# Patient Record
Sex: MALE | Race: WHITE | NOT HISPANIC OR LATINO | Employment: OTHER | URBAN - METROPOLITAN AREA
[De-identification: names, ages, dates, MRNs, and addresses within clinical notes are randomized per-mention and may not be internally consistent; named-entity substitution may affect disease eponyms.]

---

## 2017-01-05 ENCOUNTER — ALLSCRIPTS OFFICE VISIT (OUTPATIENT)
Dept: OTHER | Facility: OTHER | Age: 72
End: 2017-01-05

## 2017-01-09 ENCOUNTER — GENERIC CONVERSION - ENCOUNTER (OUTPATIENT)
Dept: OTHER | Facility: OTHER | Age: 72
End: 2017-01-09

## 2017-01-19 ENCOUNTER — GENERIC CONVERSION - ENCOUNTER (OUTPATIENT)
Dept: OTHER | Facility: OTHER | Age: 72
End: 2017-01-19

## 2017-01-20 ENCOUNTER — GENERIC CONVERSION - ENCOUNTER (OUTPATIENT)
Dept: OTHER | Facility: OTHER | Age: 72
End: 2017-01-20

## 2017-01-27 ENCOUNTER — ALLSCRIPTS OFFICE VISIT (OUTPATIENT)
Dept: OTHER | Facility: OTHER | Age: 72
End: 2017-01-27

## 2017-01-27 ENCOUNTER — GENERIC CONVERSION - ENCOUNTER (OUTPATIENT)
Dept: OTHER | Facility: OTHER | Age: 72
End: 2017-01-27

## 2017-01-27 ENCOUNTER — ALLSCRIPTS OFFICE VISIT (OUTPATIENT)
Dept: RADIOLOGY | Facility: CLINIC | Age: 72
End: 2017-01-27
Payer: MEDICARE

## 2017-02-01 ENCOUNTER — ALLSCRIPTS OFFICE VISIT (OUTPATIENT)
Dept: OTHER | Facility: OTHER | Age: 72
End: 2017-02-01

## 2017-02-03 ENCOUNTER — HOSPITAL ENCOUNTER (OUTPATIENT)
Dept: NON INVASIVE DIAGNOSTICS | Facility: CLINIC | Age: 72
Discharge: HOME | End: 2017-02-03
Payer: MEDICARE

## 2017-02-03 ENCOUNTER — ALLSCRIPTS OFFICE VISIT (OUTPATIENT)
Dept: OTHER | Facility: OTHER | Age: 72
End: 2017-02-03

## 2017-02-03 ENCOUNTER — LAB (OUTPATIENT)
Dept: LAB | Facility: CLINIC | Age: 72
End: 2017-02-03
Payer: MEDICARE

## 2017-02-03 ENCOUNTER — TRANSCRIBE ORDERS (OUTPATIENT)
Dept: LAB | Facility: CLINIC | Age: 72
End: 2017-02-03

## 2017-02-03 DIAGNOSIS — I65.23 OCCLUSION AND STENOSIS OF BILATERAL CAROTID ARTERIES: ICD-10-CM

## 2017-02-03 DIAGNOSIS — E10.8 TYPE 1 DIABETES MELLITUS WITH COMPLICATION (HCC): Primary | ICD-10-CM

## 2017-02-03 DIAGNOSIS — E03.9 UNSPECIFIED HYPOTHYROIDISM: ICD-10-CM

## 2017-02-03 DIAGNOSIS — I70.219 ATHEROSCLEROSIS OF NATIVE ARTERIES OF EXTREMITY WITH INTERMITTENT CLAUDICATION (HCC): ICD-10-CM

## 2017-02-03 DIAGNOSIS — E10.8 TYPE 1 DIABETES MELLITUS WITH COMPLICATION (HCC): ICD-10-CM

## 2017-02-03 LAB
EST. AVERAGE GLUCOSE BLD GHB EST-MCNC: 160 MG/DL
HBA1C MFR BLD: 7.2 % (ref 4.2–6.3)
TSH SERPL DL<=0.05 MIU/L-ACNC: 0.56 UIU/ML (ref 0.36–3.74)

## 2017-02-03 PROCEDURE — 93925 LOWER EXTREMITY STUDY: CPT

## 2017-02-03 PROCEDURE — 93923 UPR/LXTR ART STDY 3+ LVLS: CPT

## 2017-02-03 PROCEDURE — 93880 EXTRACRANIAL BILAT STUDY: CPT

## 2017-02-03 PROCEDURE — 36415 COLL VENOUS BLD VENIPUNCTURE: CPT

## 2017-02-03 PROCEDURE — 83036 HEMOGLOBIN GLYCOSYLATED A1C: CPT

## 2017-02-03 PROCEDURE — 84443 ASSAY THYROID STIM HORMONE: CPT

## 2017-02-03 PROCEDURE — 93978 VASCULAR STUDY: CPT

## 2017-02-06 ENCOUNTER — GENERIC CONVERSION - ENCOUNTER (OUTPATIENT)
Dept: OTHER | Facility: OTHER | Age: 72
End: 2017-02-06

## 2017-02-16 ENCOUNTER — HOSPITAL ENCOUNTER (OUTPATIENT)
Dept: ULTRASOUND IMAGING | Facility: HOSPITAL | Age: 72
Discharge: HOME | End: 2017-02-16
Attending: INTERNAL MEDICINE
Payer: MEDICARE

## 2017-02-16 ENCOUNTER — ALLSCRIPTS OFFICE VISIT (OUTPATIENT)
Dept: OTHER | Facility: OTHER | Age: 72
End: 2017-02-16

## 2017-02-16 DIAGNOSIS — I70.1 ATHEROSCLEROSIS OF RENAL ARTERY (HCC): ICD-10-CM

## 2017-02-16 DIAGNOSIS — N28.1 ACQUIRED CYST OF KIDNEY: ICD-10-CM

## 2017-02-16 DIAGNOSIS — N18.4 CHRONIC KIDNEY DISEASE, STAGE IV (SEVERE) (HCC): ICD-10-CM

## 2017-02-16 DIAGNOSIS — R80.9 PROTEINURIA: ICD-10-CM

## 2017-02-16 DIAGNOSIS — I12.9 HYPERTENSIVE CHRONIC KIDNEY DISEASE WITH STAGE 1 THROUGH STAGE 4 CHRONIC KIDNEY DISEASE, OR UNSPECIFIED CHRONIC KIDNEY DISEASE: ICD-10-CM

## 2017-02-16 DIAGNOSIS — N18.30 CHRONIC KIDNEY DISEASE, STAGE III (MODERATE) (HCC): ICD-10-CM

## 2017-02-16 PROCEDURE — 76770 US EXAM ABDO BACK WALL COMP: CPT

## 2017-02-23 ENCOUNTER — GENERIC CONVERSION - ENCOUNTER (OUTPATIENT)
Dept: OTHER | Facility: OTHER | Age: 72
End: 2017-02-23

## 2017-03-13 ENCOUNTER — LAB (OUTPATIENT)
Dept: LAB | Facility: CLINIC | Age: 72
End: 2017-03-13
Payer: MEDICARE

## 2017-03-13 DIAGNOSIS — N28.1 ACQUIRED CYST OF KIDNEY: ICD-10-CM

## 2017-03-13 DIAGNOSIS — I12.9 HYPERTENSIVE CHRONIC KIDNEY DISEASE WITH STAGE 1 THROUGH STAGE 4 CHRONIC KIDNEY DISEASE, OR UNSPECIFIED CHRONIC KIDNEY DISEASE: ICD-10-CM

## 2017-03-13 DIAGNOSIS — N18.4 CHRONIC KIDNEY DISEASE, STAGE IV (SEVERE) (HCC): ICD-10-CM

## 2017-03-13 DIAGNOSIS — N18.30 CHRONIC KIDNEY DISEASE, STAGE III (MODERATE) (HCC): ICD-10-CM

## 2017-03-13 DIAGNOSIS — I70.1 ATHEROSCLEROSIS OF RENAL ARTERY (HCC): ICD-10-CM

## 2017-03-13 DIAGNOSIS — R80.9 PROTEINURIA: ICD-10-CM

## 2017-03-13 LAB
ANION GAP SERPL CALCULATED.3IONS-SCNC: 8 MMOL/L (ref 4–13)
BACTERIA UR QL AUTO: NORMAL /HPF
BILIRUB UR QL STRIP: NEGATIVE
BUN SERPL-MCNC: 19 MG/DL (ref 5–25)
CALCIUM SERPL-MCNC: 9.1 MG/DL (ref 8.3–10.1)
CHLORIDE SERPL-SCNC: 103 MMOL/L (ref 100–108)
CLARITY UR: CLEAR
CO2 SERPL-SCNC: 27 MMOL/L (ref 21–32)
COLOR UR: ABNORMAL
CREAT SERPL-MCNC: 1.4 MG/DL (ref 0.6–1.3)
CREAT UR-MCNC: 210 MG/DL
ERYTHROCYTE [DISTWIDTH] IN BLOOD BY AUTOMATED COUNT: 13.2 % (ref 11.6–15.1)
GFR SERPL CREATININE-BSD FRML MDRD: 50 ML/MIN/1.73SQ M
GLUCOSE SERPL-MCNC: 141 MG/DL (ref 65–140)
GLUCOSE UR STRIP-MCNC: NEGATIVE MG/DL
HCT VFR BLD AUTO: 40.3 % (ref 36.5–49.3)
HGB BLD-MCNC: 13.5 G/DL (ref 12–17)
HGB UR QL STRIP.AUTO: NEGATIVE
HYALINE CASTS #/AREA URNS LPF: NORMAL /LPF
KETONES UR STRIP-MCNC: ABNORMAL MG/DL
LEUKOCYTE ESTERASE UR QL STRIP: NEGATIVE
MAGNESIUM SERPL-MCNC: 2.3 MG/DL (ref 1.6–2.6)
MCH RBC QN AUTO: 32.5 PG (ref 26.8–34.3)
MCHC RBC AUTO-ENTMCNC: 33.5 G/DL (ref 31.4–37.4)
MCV RBC AUTO: 97 FL (ref 82–98)
NITRITE UR QL STRIP: NEGATIVE
NON-SQ EPI CELLS URNS QL MICRO: NORMAL /HPF
PH UR STRIP.AUTO: 6.5 [PH] (ref 4.5–8)
PHOSPHATE SERPL-MCNC: 3.2 MG/DL (ref 2.3–4.1)
PLATELET # BLD AUTO: 157 THOUSANDS/UL (ref 149–390)
PMV BLD AUTO: 12.5 FL (ref 8.9–12.7)
POTASSIUM SERPL-SCNC: 4.4 MMOL/L (ref 3.5–5.3)
PROT UR STRIP-MCNC: ABNORMAL MG/DL
PROT UR-MCNC: 263 MG/DL
PROT/CREAT UR: 1.25 MG/G{CREAT} (ref 0–0.1)
PTH-INTACT SERPL-MCNC: 57.3 PG/ML (ref 14–72)
RBC # BLD AUTO: 4.15 MILLION/UL (ref 3.88–5.62)
RBC #/AREA URNS AUTO: NORMAL /HPF
SODIUM SERPL-SCNC: 138 MMOL/L (ref 136–145)
SP GR UR STRIP.AUTO: 1.03 (ref 1–1.03)
UROBILINOGEN UR QL STRIP.AUTO: 1 E.U./DL
WBC # BLD AUTO: 5.58 THOUSAND/UL (ref 4.31–10.16)
WBC #/AREA URNS AUTO: NORMAL /HPF

## 2017-03-13 PROCEDURE — 80048 BASIC METABOLIC PNL TOTAL CA: CPT

## 2017-03-13 PROCEDURE — 84156 ASSAY OF PROTEIN URINE: CPT

## 2017-03-13 PROCEDURE — 36415 COLL VENOUS BLD VENIPUNCTURE: CPT

## 2017-03-13 PROCEDURE — 81001 URINALYSIS AUTO W/SCOPE: CPT

## 2017-03-13 PROCEDURE — 83735 ASSAY OF MAGNESIUM: CPT

## 2017-03-13 PROCEDURE — 83970 ASSAY OF PARATHORMONE: CPT

## 2017-03-13 PROCEDURE — 84100 ASSAY OF PHOSPHORUS: CPT

## 2017-03-13 PROCEDURE — 82570 ASSAY OF URINE CREATININE: CPT

## 2017-03-13 PROCEDURE — 85027 COMPLETE CBC AUTOMATED: CPT

## 2017-03-15 ENCOUNTER — GENERIC CONVERSION - ENCOUNTER (OUTPATIENT)
Dept: OTHER | Facility: OTHER | Age: 72
End: 2017-03-15

## 2017-03-15 ENCOUNTER — ALLSCRIPTS OFFICE VISIT (OUTPATIENT)
Dept: OTHER | Facility: OTHER | Age: 72
End: 2017-03-15

## 2017-03-20 ENCOUNTER — ALLSCRIPTS OFFICE VISIT (OUTPATIENT)
Dept: OTHER | Facility: OTHER | Age: 72
End: 2017-03-20

## 2017-03-20 ENCOUNTER — GENERIC CONVERSION - ENCOUNTER (OUTPATIENT)
Dept: OTHER | Facility: OTHER | Age: 72
End: 2017-03-20

## 2017-03-21 ENCOUNTER — GENERIC CONVERSION - ENCOUNTER (OUTPATIENT)
Dept: OTHER | Facility: OTHER | Age: 72
End: 2017-03-21

## 2017-03-23 ENCOUNTER — GENERIC CONVERSION - ENCOUNTER (OUTPATIENT)
Dept: OTHER | Facility: OTHER | Age: 72
End: 2017-03-23

## 2017-03-24 ENCOUNTER — LAB (OUTPATIENT)
Dept: LAB | Facility: CLINIC | Age: 72
End: 2017-03-24
Payer: MEDICARE

## 2017-03-24 DIAGNOSIS — I70.219 ATHEROSCLEROSIS OF NATIVE ARTERIES OF EXTREMITY WITH INTERMITTENT CLAUDICATION (HCC): ICD-10-CM

## 2017-03-24 DIAGNOSIS — I25.10 ATHEROSCLEROTIC HEART DISEASE OF NATIVE CORONARY ARTERY WITHOUT ANGINA PECTORIS: ICD-10-CM

## 2017-03-24 LAB
ALBUMIN SERPL BCP-MCNC: 3 G/DL (ref 3.5–5)
ALP SERPL-CCNC: 122 U/L (ref 46–116)
ALT SERPL W P-5'-P-CCNC: 26 U/L (ref 12–78)
AST SERPL W P-5'-P-CCNC: 19 U/L (ref 5–45)
BILIRUB DIRECT SERPL-MCNC: 0.2 MG/DL (ref 0–0.2)
BILIRUB SERPL-MCNC: 0.85 MG/DL (ref 0.2–1)
CHOLEST SERPL-MCNC: 163 MG/DL (ref 50–200)
ERYTHROCYTE [DISTWIDTH] IN BLOOD BY AUTOMATED COUNT: 13.6 % (ref 11.6–15.1)
HCT VFR BLD AUTO: 39.7 % (ref 36.5–49.3)
HDLC SERPL-MCNC: 68 MG/DL (ref 40–60)
HGB BLD-MCNC: 13.3 G/DL (ref 12–17)
LDLC SERPL CALC-MCNC: 70 MG/DL (ref 0–100)
MCH RBC QN AUTO: 32.4 PG (ref 26.8–34.3)
MCHC RBC AUTO-ENTMCNC: 33.5 G/DL (ref 31.4–37.4)
MCV RBC AUTO: 97 FL (ref 82–98)
PLATELET # BLD AUTO: 143 THOUSANDS/UL (ref 149–390)
PMV BLD AUTO: 11.9 FL (ref 8.9–12.7)
PROT SERPL-MCNC: 7 G/DL (ref 6.4–8.2)
RBC # BLD AUTO: 4.1 MILLION/UL (ref 3.88–5.62)
TRIGL SERPL-MCNC: 127 MG/DL
WBC # BLD AUTO: 5.36 THOUSAND/UL (ref 4.31–10.16)

## 2017-03-24 PROCEDURE — 80076 HEPATIC FUNCTION PANEL: CPT

## 2017-03-24 PROCEDURE — 85027 COMPLETE CBC AUTOMATED: CPT

## 2017-03-24 PROCEDURE — 80061 LIPID PANEL: CPT

## 2017-03-24 PROCEDURE — 36415 COLL VENOUS BLD VENIPUNCTURE: CPT

## 2017-03-28 DIAGNOSIS — I65.23 OCCLUSION AND STENOSIS OF BILATERAL CAROTID ARTERIES: ICD-10-CM

## 2017-03-29 ENCOUNTER — ALLSCRIPTS OFFICE VISIT (OUTPATIENT)
Dept: OTHER | Facility: OTHER | Age: 72
End: 2017-03-29

## 2017-03-29 ENCOUNTER — GENERIC CONVERSION - ENCOUNTER (OUTPATIENT)
Dept: OTHER | Facility: OTHER | Age: 72
End: 2017-03-29

## 2017-03-30 ENCOUNTER — TRANSCRIBE ORDERS (OUTPATIENT)
Dept: LAB | Facility: CLINIC | Age: 72
End: 2017-03-30

## 2017-04-03 ENCOUNTER — GENERIC CONVERSION - ENCOUNTER (OUTPATIENT)
Dept: OTHER | Facility: OTHER | Age: 72
End: 2017-04-03

## 2017-04-25 ENCOUNTER — GENERIC CONVERSION - ENCOUNTER (OUTPATIENT)
Dept: OTHER | Facility: OTHER | Age: 72
End: 2017-04-25

## 2017-04-28 ENCOUNTER — ALLSCRIPTS OFFICE VISIT (OUTPATIENT)
Dept: OTHER | Facility: OTHER | Age: 72
End: 2017-04-28

## 2017-05-11 ENCOUNTER — LAB (OUTPATIENT)
Dept: LAB | Facility: CLINIC | Age: 72
End: 2017-05-11
Payer: MEDICARE

## 2017-05-11 ENCOUNTER — ALLSCRIPTS OFFICE VISIT (OUTPATIENT)
Dept: OTHER | Facility: OTHER | Age: 72
End: 2017-05-11

## 2017-05-11 ENCOUNTER — TRANSCRIBE ORDERS (OUTPATIENT)
Dept: LAB | Facility: CLINIC | Age: 72
End: 2017-05-11

## 2017-05-11 DIAGNOSIS — E10.9 TYPE 1 DIABETES MELLITUS WITHOUT COMPLICATION (HCC): ICD-10-CM

## 2017-05-11 DIAGNOSIS — E03.9 UNSPECIFIED HYPOTHYROIDISM: ICD-10-CM

## 2017-05-11 DIAGNOSIS — E03.9 UNSPECIFIED HYPOTHYROIDISM: Primary | ICD-10-CM

## 2017-05-11 LAB
EST. AVERAGE GLUCOSE BLD GHB EST-MCNC: 157 MG/DL
HBA1C MFR BLD: 7.1 % (ref 4.2–6.3)
TSH SERPL DL<=0.05 MIU/L-ACNC: 0.74 UIU/ML (ref 0.36–3.74)

## 2017-05-11 PROCEDURE — 84443 ASSAY THYROID STIM HORMONE: CPT

## 2017-05-11 PROCEDURE — 36415 COLL VENOUS BLD VENIPUNCTURE: CPT

## 2017-05-11 PROCEDURE — 83036 HEMOGLOBIN GLYCOSYLATED A1C: CPT

## 2017-05-22 ENCOUNTER — GENERIC CONVERSION - ENCOUNTER (OUTPATIENT)
Dept: OTHER | Facility: OTHER | Age: 72
End: 2017-05-22

## 2017-05-23 ENCOUNTER — GENERIC CONVERSION - ENCOUNTER (OUTPATIENT)
Dept: OTHER | Facility: OTHER | Age: 72
End: 2017-05-23

## 2017-05-23 ENCOUNTER — HOSPITAL ENCOUNTER (OUTPATIENT)
Dept: NON INVASIVE DIAGNOSTICS | Facility: CLINIC | Age: 72
Discharge: HOME | End: 2017-05-23
Payer: MEDICARE

## 2017-05-23 DIAGNOSIS — I70.1 ATHEROSCLEROSIS OF RENAL ARTERY (HCC): ICD-10-CM

## 2017-05-23 DIAGNOSIS — I12.9 HYPERTENSIVE CHRONIC KIDNEY DISEASE WITH STAGE 1 THROUGH STAGE 4 CHRONIC KIDNEY DISEASE, OR UNSPECIFIED CHRONIC KIDNEY DISEASE: ICD-10-CM

## 2017-05-23 DIAGNOSIS — N18.30 CHRONIC KIDNEY DISEASE, STAGE III (MODERATE) (HCC): ICD-10-CM

## 2017-05-23 DIAGNOSIS — N18.4 CHRONIC KIDNEY DISEASE, STAGE IV (SEVERE) (HCC): ICD-10-CM

## 2017-05-23 DIAGNOSIS — N28.1 ACQUIRED CYST OF KIDNEY: ICD-10-CM

## 2017-05-23 DIAGNOSIS — R80.9 PROTEINURIA: ICD-10-CM

## 2017-05-23 PROCEDURE — 93975 VASCULAR STUDY: CPT

## 2017-06-01 ENCOUNTER — ALLSCRIPTS OFFICE VISIT (OUTPATIENT)
Dept: RADIOLOGY | Facility: CLINIC | Age: 72
End: 2017-06-01
Payer: MEDICARE

## 2017-06-19 ENCOUNTER — GENERIC CONVERSION - ENCOUNTER (OUTPATIENT)
Dept: OTHER | Facility: OTHER | Age: 72
End: 2017-06-19

## 2017-06-26 DIAGNOSIS — I25.10 ATHEROSCLEROTIC HEART DISEASE OF NATIVE CORONARY ARTERY WITHOUT ANGINA PECTORIS: ICD-10-CM

## 2017-06-26 DIAGNOSIS — I70.219 ATHEROSCLEROSIS OF NATIVE ARTERIES OF EXTREMITY WITH INTERMITTENT CLAUDICATION (HCC): ICD-10-CM

## 2017-06-26 DIAGNOSIS — I12.9 HYPERTENSIVE CHRONIC KIDNEY DISEASE WITH STAGE 1 THROUGH STAGE 4 CHRONIC KIDNEY DISEASE, OR UNSPECIFIED CHRONIC KIDNEY DISEASE: ICD-10-CM

## 2017-06-30 ENCOUNTER — LAB (OUTPATIENT)
Dept: LAB | Facility: CLINIC | Age: 72
End: 2017-06-30
Payer: MEDICARE

## 2017-06-30 DIAGNOSIS — I70.219 ATHEROSCLEROSIS OF NATIVE ARTERIES OF EXTREMITY WITH INTERMITTENT CLAUDICATION (HCC): ICD-10-CM

## 2017-06-30 DIAGNOSIS — I12.9 HYPERTENSIVE CHRONIC KIDNEY DISEASE WITH STAGE 1 THROUGH STAGE 4 CHRONIC KIDNEY DISEASE, OR UNSPECIFIED CHRONIC KIDNEY DISEASE: ICD-10-CM

## 2017-06-30 DIAGNOSIS — I25.10 ATHEROSCLEROTIC HEART DISEASE OF NATIVE CORONARY ARTERY WITHOUT ANGINA PECTORIS: ICD-10-CM

## 2017-06-30 LAB
ALBUMIN SERPL BCP-MCNC: 3.2 G/DL (ref 3.5–5)
ALP SERPL-CCNC: 89 U/L (ref 46–116)
ALT SERPL W P-5'-P-CCNC: 21 U/L (ref 12–78)
ANION GAP SERPL CALCULATED.3IONS-SCNC: 6 MMOL/L (ref 4–13)
AST SERPL W P-5'-P-CCNC: 21 U/L (ref 5–45)
BILIRUB SERPL-MCNC: 0.69 MG/DL (ref 0.2–1)
BUN SERPL-MCNC: 21 MG/DL (ref 5–25)
CALCIUM SERPL-MCNC: 8.9 MG/DL (ref 8.3–10.1)
CHLORIDE SERPL-SCNC: 106 MMOL/L (ref 100–108)
CHOLEST SERPL-MCNC: 153 MG/DL (ref 50–200)
CO2 SERPL-SCNC: 28 MMOL/L (ref 21–32)
CREAT SERPL-MCNC: 1.17 MG/DL (ref 0.6–1.3)
ERYTHROCYTE [DISTWIDTH] IN BLOOD BY AUTOMATED COUNT: 12.7 % (ref 11.6–15.1)
GFR SERPL CREATININE-BSD FRML MDRD: >60 ML/MIN/1.73SQ M
GLUCOSE P FAST SERPL-MCNC: 123 MG/DL (ref 65–99)
HCT VFR BLD AUTO: 38.3 % (ref 36.5–49.3)
HDLC SERPL-MCNC: 59 MG/DL (ref 40–60)
HGB BLD-MCNC: 12.8 G/DL (ref 12–17)
LDLC SERPL CALC-MCNC: 74 MG/DL (ref 0–100)
MCH RBC QN AUTO: 32.5 PG (ref 26.8–34.3)
MCHC RBC AUTO-ENTMCNC: 33.4 G/DL (ref 31.4–37.4)
MCV RBC AUTO: 97 FL (ref 82–98)
PLATELET # BLD AUTO: 127 THOUSANDS/UL (ref 149–390)
PMV BLD AUTO: 12.6 FL (ref 8.9–12.7)
POTASSIUM SERPL-SCNC: 4.1 MMOL/L (ref 3.5–5.3)
PROT SERPL-MCNC: 6.7 G/DL (ref 6.4–8.2)
RBC # BLD AUTO: 3.94 MILLION/UL (ref 3.88–5.62)
SODIUM SERPL-SCNC: 140 MMOL/L (ref 136–145)
TRIGL SERPL-MCNC: 101 MG/DL
WBC # BLD AUTO: 4.88 THOUSAND/UL (ref 4.31–10.16)

## 2017-06-30 PROCEDURE — 36415 COLL VENOUS BLD VENIPUNCTURE: CPT

## 2017-06-30 PROCEDURE — 85027 COMPLETE CBC AUTOMATED: CPT

## 2017-06-30 PROCEDURE — 80053 COMPREHEN METABOLIC PANEL: CPT

## 2017-06-30 PROCEDURE — 80061 LIPID PANEL: CPT

## 2017-07-05 ENCOUNTER — ALLSCRIPTS OFFICE VISIT (OUTPATIENT)
Dept: OTHER | Facility: OTHER | Age: 72
End: 2017-07-05

## 2017-08-16 DIAGNOSIS — I12.9 HYPERTENSIVE CHRONIC KIDNEY DISEASE WITH STAGE 1 THROUGH STAGE 4 CHRONIC KIDNEY DISEASE, OR UNSPECIFIED CHRONIC KIDNEY DISEASE: ICD-10-CM

## 2017-08-16 DIAGNOSIS — R80.9 PROTEINURIA: ICD-10-CM

## 2017-08-16 DIAGNOSIS — M48.061 SPINAL STENOSIS OF LUMBAR REGION: ICD-10-CM

## 2017-08-16 DIAGNOSIS — N28.1 ACQUIRED CYST OF KIDNEY: ICD-10-CM

## 2017-08-16 DIAGNOSIS — I70.1 ATHEROSCLEROSIS OF RENAL ARTERY (HCC): ICD-10-CM

## 2017-08-16 DIAGNOSIS — I70.219 ATHEROSCLEROSIS OF NATIVE ARTERIES OF EXTREMITY WITH INTERMITTENT CLAUDICATION (HCC): ICD-10-CM

## 2017-08-16 DIAGNOSIS — I65.23 OCCLUSION AND STENOSIS OF BILATERAL CAROTID ARTERIES: ICD-10-CM

## 2017-08-16 DIAGNOSIS — N18.30 CHRONIC KIDNEY DISEASE, STAGE III (MODERATE) (HCC): ICD-10-CM

## 2017-08-17 ENCOUNTER — HOSPITAL ENCOUNTER (OUTPATIENT)
Dept: NON INVASIVE DIAGNOSTICS | Facility: CLINIC | Age: 72
Discharge: HOME | End: 2017-08-17
Payer: MEDICARE

## 2017-08-17 DIAGNOSIS — M48.061 SPINAL STENOSIS OF LUMBAR REGION: ICD-10-CM

## 2017-08-17 DIAGNOSIS — I70.1 ATHEROSCLEROSIS OF RENAL ARTERY (HCC): ICD-10-CM

## 2017-08-17 DIAGNOSIS — N18.30 CHRONIC KIDNEY DISEASE, STAGE III (MODERATE) (HCC): ICD-10-CM

## 2017-08-17 DIAGNOSIS — I70.219 ATHEROSCLEROSIS OF NATIVE ARTERIES OF EXTREMITY WITH INTERMITTENT CLAUDICATION (HCC): ICD-10-CM

## 2017-08-17 DIAGNOSIS — I65.23 OCCLUSION AND STENOSIS OF BILATERAL CAROTID ARTERIES: ICD-10-CM

## 2017-08-17 PROCEDURE — 93880 EXTRACRANIAL BILAT STUDY: CPT

## 2017-08-21 ENCOUNTER — TRANSCRIBE ORDERS (OUTPATIENT)
Dept: LAB | Facility: CLINIC | Age: 72
End: 2017-08-21

## 2017-08-21 ENCOUNTER — LAB (OUTPATIENT)
Dept: LAB | Facility: CLINIC | Age: 72
End: 2017-08-21
Payer: MEDICARE

## 2017-08-21 DIAGNOSIS — N28.1 ACQUIRED CYST OF KIDNEY: ICD-10-CM

## 2017-08-21 DIAGNOSIS — I70.1 ATHEROSCLEROSIS OF RENAL ARTERY (HCC): ICD-10-CM

## 2017-08-21 DIAGNOSIS — R80.9 PROTEINURIA: ICD-10-CM

## 2017-08-21 DIAGNOSIS — I12.9 HYPERTENSIVE CHRONIC KIDNEY DISEASE WITH STAGE 1 THROUGH STAGE 4 CHRONIC KIDNEY DISEASE, OR UNSPECIFIED CHRONIC KIDNEY DISEASE: ICD-10-CM

## 2017-08-21 DIAGNOSIS — E03.9 UNSPECIFIED HYPOTHYROIDISM: Primary | ICD-10-CM

## 2017-08-21 DIAGNOSIS — E10.9 TYPE 1 DIABETES MELLITUS WITHOUT COMPLICATION (HCC): ICD-10-CM

## 2017-08-21 DIAGNOSIS — N18.30 CHRONIC KIDNEY DISEASE, STAGE III (MODERATE) (HCC): ICD-10-CM

## 2017-08-21 DIAGNOSIS — E03.9 UNSPECIFIED HYPOTHYROIDISM: ICD-10-CM

## 2017-08-21 LAB
ALBUMIN SERPL BCP-MCNC: 3 G/DL (ref 3.5–5)
ANION GAP SERPL CALCULATED.3IONS-SCNC: 9 MMOL/L (ref 4–13)
BUN SERPL-MCNC: 18 MG/DL (ref 5–25)
CALCIUM SERPL-MCNC: 8.9 MG/DL (ref 8.3–10.1)
CHLORIDE SERPL-SCNC: 104 MMOL/L (ref 100–108)
CO2 SERPL-SCNC: 26 MMOL/L (ref 21–32)
CREAT SERPL-MCNC: 1.27 MG/DL (ref 0.6–1.3)
ERYTHROCYTE [DISTWIDTH] IN BLOOD BY AUTOMATED COUNT: 13.4 % (ref 11.6–15.1)
EST. AVERAGE GLUCOSE BLD GHB EST-MCNC: 160 MG/DL
GFR SERPL CREATININE-BSD FRML MDRD: 56 ML/MIN/1.73SQ M
GLUCOSE P FAST SERPL-MCNC: 260 MG/DL (ref 65–99)
HBA1C MFR BLD: 7.2 % (ref 4.2–6.3)
HCT VFR BLD AUTO: 38.8 % (ref 36.5–49.3)
HGB BLD-MCNC: 12.9 G/DL (ref 12–17)
MAGNESIUM SERPL-MCNC: 2.1 MG/DL (ref 1.6–2.6)
MCH RBC QN AUTO: 32.3 PG (ref 26.8–34.3)
MCHC RBC AUTO-ENTMCNC: 33.2 G/DL (ref 31.4–37.4)
MCV RBC AUTO: 97 FL (ref 82–98)
PHOSPHATE SERPL-MCNC: 3 MG/DL (ref 2.3–4.1)
PLATELET # BLD AUTO: 137 THOUSANDS/UL (ref 149–390)
PMV BLD AUTO: 12 FL (ref 8.9–12.7)
POTASSIUM SERPL-SCNC: 4.1 MMOL/L (ref 3.5–5.3)
PTH-INTACT SERPL-MCNC: 47.2 PG/ML (ref 14–72)
RBC # BLD AUTO: 4 MILLION/UL (ref 3.88–5.62)
SODIUM SERPL-SCNC: 139 MMOL/L (ref 136–145)
TSH SERPL DL<=0.05 MIU/L-ACNC: 0.99 UIU/ML (ref 0.36–3.74)
WBC # BLD AUTO: 5.06 THOUSAND/UL (ref 4.31–10.16)

## 2017-08-21 PROCEDURE — 83036 HEMOGLOBIN GLYCOSYLATED A1C: CPT

## 2017-08-21 PROCEDURE — 82040 ASSAY OF SERUM ALBUMIN: CPT

## 2017-08-21 PROCEDURE — 85027 COMPLETE CBC AUTOMATED: CPT

## 2017-08-21 PROCEDURE — 83735 ASSAY OF MAGNESIUM: CPT

## 2017-08-21 PROCEDURE — 36415 COLL VENOUS BLD VENIPUNCTURE: CPT

## 2017-08-21 PROCEDURE — 80048 BASIC METABOLIC PNL TOTAL CA: CPT

## 2017-08-21 PROCEDURE — 84100 ASSAY OF PHOSPHORUS: CPT

## 2017-08-21 PROCEDURE — 84443 ASSAY THYROID STIM HORMONE: CPT

## 2017-08-21 PROCEDURE — 83970 ASSAY OF PARATHORMONE: CPT

## 2017-08-23 ENCOUNTER — ALLSCRIPTS OFFICE VISIT (OUTPATIENT)
Dept: OTHER | Facility: OTHER | Age: 72
End: 2017-08-23

## 2017-08-30 ENCOUNTER — GENERIC CONVERSION - ENCOUNTER (OUTPATIENT)
Dept: OTHER | Facility: OTHER | Age: 72
End: 2017-08-30

## 2017-09-01 ENCOUNTER — ALLSCRIPTS OFFICE VISIT (OUTPATIENT)
Dept: OTHER | Facility: OTHER | Age: 72
End: 2017-09-01

## 2017-09-03 ENCOUNTER — GENERIC CONVERSION - ENCOUNTER (OUTPATIENT)
Dept: OTHER | Facility: OTHER | Age: 72
End: 2017-09-03

## 2017-09-05 ENCOUNTER — LAB (OUTPATIENT)
Dept: LAB | Facility: CLINIC | Age: 72
End: 2017-09-05
Payer: MEDICARE

## 2017-09-05 DIAGNOSIS — I65.23 OCCLUSION AND STENOSIS OF BILATERAL CAROTID ARTERIES: ICD-10-CM

## 2017-09-05 DIAGNOSIS — N18.30 CHRONIC KIDNEY DISEASE, STAGE III (MODERATE) (HCC): ICD-10-CM

## 2017-09-05 LAB
ANION GAP SERPL CALCULATED.3IONS-SCNC: 10 MMOL/L (ref 4–13)
BUN SERPL-MCNC: 20 MG/DL (ref 5–25)
CALCIUM SERPL-MCNC: 9.2 MG/DL (ref 8.3–10.1)
CHLORIDE SERPL-SCNC: 107 MMOL/L (ref 100–108)
CO2 SERPL-SCNC: 26 MMOL/L (ref 21–32)
CREAT SERPL-MCNC: 1.24 MG/DL (ref 0.6–1.3)
GFR SERPL CREATININE-BSD FRML MDRD: 58 ML/MIN/1.73SQ M
GLUCOSE P FAST SERPL-MCNC: 139 MG/DL (ref 65–99)
POTASSIUM SERPL-SCNC: 4.2 MMOL/L (ref 3.5–5.3)
SODIUM SERPL-SCNC: 143 MMOL/L (ref 136–145)

## 2017-09-05 PROCEDURE — 80048 BASIC METABOLIC PNL TOTAL CA: CPT

## 2017-09-05 PROCEDURE — 36415 COLL VENOUS BLD VENIPUNCTURE: CPT

## 2017-09-06 ENCOUNTER — GENERIC CONVERSION - ENCOUNTER (OUTPATIENT)
Dept: OTHER | Facility: OTHER | Age: 72
End: 2017-09-06

## 2017-09-19 ENCOUNTER — GENERIC CONVERSION - ENCOUNTER (OUTPATIENT)
Dept: OTHER | Facility: OTHER | Age: 72
End: 2017-09-19

## 2017-09-19 LAB
LEFT EYE DIABETIC RETINOPATHY: NORMAL
RIGHT EYE DIABETIC RETINOPATHY: NORMAL

## 2017-09-26 ENCOUNTER — ALLSCRIPTS OFFICE VISIT (OUTPATIENT)
Dept: OTHER | Facility: OTHER | Age: 72
End: 2017-09-26

## 2017-10-24 ENCOUNTER — GENERIC CONVERSION - ENCOUNTER (OUTPATIENT)
Dept: OTHER | Facility: OTHER | Age: 72
End: 2017-10-24

## 2017-10-24 DIAGNOSIS — I73.9 PERIPHERAL VASCULAR DISEASE (HCC): ICD-10-CM

## 2017-10-24 DIAGNOSIS — I70.219 ATHEROSCLEROSIS OF NATIVE ARTERIES OF EXTREMITY WITH INTERMITTENT CLAUDICATION (HCC): ICD-10-CM

## 2017-10-24 DIAGNOSIS — N18.30 CHRONIC KIDNEY DISEASE, STAGE III (MODERATE) (HCC): ICD-10-CM

## 2017-10-24 DIAGNOSIS — R80.9 PROTEINURIA: ICD-10-CM

## 2017-10-24 DIAGNOSIS — I65.23 OCCLUSION AND STENOSIS OF BILATERAL CAROTID ARTERIES: ICD-10-CM

## 2017-10-27 NOTE — PROGRESS NOTES
Assessment  Assessed    1  Multiple vessel coronary artery disease (414 00) (I25 10)   2  History of Cath Stent 1 Type Drug-Eluting   3  History of Cath Stent 2 Type Drug-Eluting   4  History of Cath Stent 3 Type Drug-Eluting   5  History of Cath Stent 1 Assessment Lesion Could Not Be Crossed   6  History of Ischemic cardiomyopathy (414 8) (I25 5)   7  Benign hypertension with chronic kidney disease, stage III (403 10,585 3) (I12 9,N18 3)   8  Dyslipidemia (272 4) (E78 5)   9  Peripheral vascular disease (443 9) (I73 9)   10  Asymptomatic bilateral carotid artery stenosis (433 10,433 30) (I65 23)   11  ITZEL on CPAP (327 23,V46 8) (G47 33,Z99 89)    Plan  Atherosclerosis of native artery of extremity with intermittent claudication, ITZEL on CPAP    · Follow-up visit in 2 months Evaluation and Treatment  Follow-up  Status: Complete   Done: 20SZD8567   Ordered; For: Atherosclerosis of native artery of extremity with intermittent claudication, ITZEL on CPAP; Ordered By: Len Kimball Performed:  Due: 62SXF9211; Last Updated By: Bertha Arteaga; 9/26/2017 3:56:42 PM  PMH: Cath Stent 1 Type Drug-Eluting    · Clopidogrel Bisulfate 75 MG Oral Tablet (Plavix); TAKE 1 TABLET DAILY   Rx By: Len Kimball; Dispense: 90 Days ; #:90 Tablet; Refill: 3;For: PMH: Cath Stent 1 Type Drug-Eluting; JACKIE = N; Verified Transmission to [place] [id number]; Last Updated By: SystemSemasio; 9/26/2017 3:53:57 PM  Unlinked    · Effient 10 MG Oral Tablet (Prasugrel HCl)   Dispense: 0 Days ; #: Sufficient Tablet; Refill: 0; JACKIE = N; Record; Last Updated By: Len Kimball; 9/26/2017 3:53:20 PM    Discussion/Summary  Cardiology Discussion Summary Free Text Note Form [facility]:   Mr Ricarda Obrien is a pleasant 66-year-old male who presents to the office today for routine follow-up  Since his last visit his shortness of breath persists     terms of his shortness of breath, this may be an anginal equivalent given his 95% 2nd OM lesion which was unable to be crossed during his cath  We had previously discussed a repeat cath given a stress test reveals ischemia in that territory  For now he wishes to continue medical management  HIs symptoms did not improve with Imdur or Ranexa  He noted fatigue on higher doses of carvedilol in the past  Thus no changes were made to his regimen  most recent lipids were reviewed  His LDL is slightly suboptimal  Therapeutic lifestyle modifications were recommended during his last visit I will reassess his lipids in the near future  If his LDL remains high he will need escalation of his atorvastatin dose  is euvolemic on exam on his current diuretic regimen  blood pressure control appears acceptable  is due to see his vascular surgeon in the near future regarding his claudication  He also was noted to have worsening of his carotid disease for which a CTA of his neck was recommended  will follow-up in the office in two months for re-evaluation  History of Present Illness  Cardiology HPI Free Text Note Form [facility]: Mr Sanya Cota is a pleasant 68-year-old male who presents to the office today for routine follow-up    leads a sedentary lifestyle  With the activity he is able to perform he denies any chest pain and his shortness of breath with exertion is persistent and unchanged since his last visit  He recovers within a few minutes of rest  His oral diuretic regimen was recently intensified by his PCP as he was taking Lasix every day given his shortness of breath  He felt no better  He denies signs or symptoms of heart failure including increasing lower extremity edema, paroxysmal nocturnal dyspnea, or orthopnea  His weight has been stable on his home scale  He denies lightheadedness, dizziness, syncope or presyncope  He denies symptoms of palpitations  He reports worsening cramping and heaviness in his legs with walking shorter distances and also with standing  remains compliant with CPAP nightly        Review of Systems  Cardiology Male ROS:     Cardiac: as noted in HPI  Skin: No complaints of nonhealing sores or skin rash  Genitourinary: No complaints of recurrent urinary tract infections, frequent urination at night, difficult urination, blood in urine, kidney stones, loss of bladder control, no kidney or prostate problems, no erectile dysfunction  General: as noted in HPI  Respiratory: as noted in HPI  HEENT: No complaints of serious problems, hearing problems, nose problems, throat problems, or snoring  Gastrointestinal: No complaints of liver problems, nausea, vomiting, heartburn, constipation, bloody stools, diarrhea, problems swallowing, adbominal pain, or rectal bleeding  Hematologic: No complaints of bleeding disorders, anemia, blood clots, or excessive brusing  Neurological: No complaints of numbness, tingling, dizziness, weakness, seizures, headaches, syncope or fainting, AM fatigue, daytime sleepiness, no witnessed apnea episodes  Musculoskeletal: No complaints of arthritis, back pain, or painfull swelling  Active Problems  Problems    1  Acquired polyneuropathy (356 9) (G62 9)   2  Acute conjunctivitis (372 00) (H10 30)   3  Age-related cataract (366 10) (H25 9)   4  Anemia (285 9) (D64 9)   5  Arthralgia (719 40) (M25 50)   6  Asymptomatic bilateral carotid artery stenosis (433 10,433 30) (I65 23)   7  Atherosclerosis of native artery of extremity with intermittent claudication (440 21)   (I70 219)   8  Benign hypertension with chronic kidney disease, stage III (403 10,585 3) (I12 9,N18 3)   9  Calf cramp (729 82) (R25 2)   10  Cataract (366 9) (H26 9)   11  Centrilobular emphysema (492 8) (J43 2)   12  Chronic kidney disease, stage 3 (585 3) (N18 3)   13  Degenerative lumbar spinal stenosis (724 02) (M48 06)   14  Dermatitis (692 9) (L30 9)   15  Dry eye syndrome (375 15) (H04 129)   16  Dyslipidemia (272 4) (E78 5)   17  Eczema (692 9) (L30 9)   18   Encounter for prostate cancer screening (V76 44) (Z12 5)   19  Facial skin lesion (709 9) (L98 9)   20  Fatigue (780 79) (R53 83)   21  Heartburn (787 1) (R12)   22  Hyperkalemia (276 7) (E87 5)   23  Hypothyroidism, postablative (244 1) (E89 0)   24  Intervertebral disc disorders with radiculopathy, lumbosacral region (724 4) (M51 17)   25  Low back pain with sciatica (724 3) (M54 40)   26  Multiple vessel coronary artery disease (414 00) (I25 10)   27  Need for 23-polyvalent pneumococcal polysaccharide vaccine (V03 82) (Z23)   28  Need for prophylactic vaccination and inoculation against influenza (V04 81) (Z23)   29  ITZEL on CPAP (327 23,V46 8) (G47 33,Z99 89)   30  Peripheral vascular disease (443 9) (I73 9)   31  Proteinuria (791 0) (R80 9)   32  Protruded lumbar disc (722 10) (M51 26)   33  Renal artery stenosis (440 1) (I70 1)   34  Renal cyst, right (753 10) (N28 1)   35  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 (250 41,585 9) (E10 22,N18 9)   36  Unsteadiness (781 2) (R26 81)   37  Vitamin D deficiency (268 9) (E55 9)   38  White coat hypertension   39  Xerosis cutis (706 8) (L85 3)    Past Medical History  Problems    1  History of Acute kidney injury (584 9) (N17 9)   2  History of Acute venous embolism and thrombosis of deep vessels of proximal lower   extremity (453 41) (I82 4Y9)   3  History of Chronic cough (786 2) (R05)   4  History of dizziness (V13 89) (Z87 898)   5  History of hypertension (V12 59) (Z86 79)   6  History of Ischemic cardiomyopathy (414 8) (I25 5)   7  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) (J84 10)  Active Problems And Past Medical History Reviewed: The active problems and past medical history were reviewed and updated today  Surgical History  Problems    1  History of Cardiac Cath Procedure Outcome:   2  History of Cath Stent 1 Assessment Lesion Could Not Be Crossed   3  History of Cath Stent 1 Type Drug-Eluting   4  History of Cath Stent 2 Type Drug-Eluting   5  History of Cath Stent 3 Type Drug-Eluting   6  History of PTA Femoral-Popliteal Initial Stenosis With Stent  Surgical History Reviewed: The surgical history was reviewed and updated today  Family History  Mother    1  Family history of liver disease (V18 59) (Z83 79)  Father    2  Family history of cardiac disorder (V17 49) (Z82 49)   3  Family history of kidney disease (V18 69) (Z84 1)   4  Family history of liver disease (V18 59) (Z83 79)  Paternal Cousin    11  Family history of diabetes mellitus (V18 0) (Z83 3)  Family History Reviewed: The family history was reviewed and updated today  Social History  Problems    · Alcohol Use (History)   · Former smoker (J15 93) (M75 843)   ·    · Retired  Social History Reviewed: The social history was reviewed and updated today  Current Meds   1  Accu-Chek Angie Plus In Vitro Strip; TEST 4 TIMES DAILY DX: E11 9; Therapy: 07OIQ0085 to (Evaluate:63Why4913)  Requested for: 81LYW7274 Recorded   2  AmLODIPine Besylate 10 MG Oral Tablet; take 1 tablet daily at bedtime; Therapy: 55KZH8517 to (Evaluate:17Nov2017)  Requested for: 77RJE3334; Last   Rx:22Nov2016 Ordered   3  Aspirin 81 MG TABS; CHEW ONE TABLET DAILY; Therapy: (Recorded:72Hgu1561) to Recorded   4  Atorvastatin Calcium 40 MG Oral Tablet; Take 1 tablet daily  Requested for: 93GQU6271;   Last Rx:61Qjo5247 Ordered   5  Carvedilol 12 5 MG Oral Tablet; TAKE ONE TABLET BY MOUTH TWICE A DAY; Therapy: 08CAG5837 to (Evaluate:08Jan2018)  Requested for: 13Apr2017; Last   Rx:13Apr2017 Ordered   6  CoQ-10 200 MG CAPS; TAKE 1 CAPSULE DAILY; Therapy: (Recorded:77Mgd3623) to Recorded   7  Effient 10 MG Oral Tablet; TAKE 1 TABLET DAILY AS DIRECTED; Therapy: (Recorded:52Btm0969) to Recorded   8  Furosemide 20 MG Oral Tablet; TAKE 1 TABLET EVERY OTHER DAY; Therapy: (Aurelia Flores) to Recorded   9  Furosemide 20 MG Oral Tablet; TAKE ONE TABLET BY MOUTH EVERY DAY AS NEEDED   FOR LEG edema;    Therapy: 53GQT4607 to (Evaluate:02Mar2018) Requested for: 03Sep2017; Last   Rx:03Sep2017 Ordered   10  Glucagon (rDNA) 1 MG Kit; AS NEEDED FOR <40 BLOOD SUGAR; Therapy: (Recorded:76Jkn1157) to Recorded   11  HumaLOG 100 UNIT/ML Subcutaneous Solution; USE AS DIRECTED; Therapy: (Recorded:57Efg4531) to Recorded   12  Lantus SoloStar 100 UNIT/ML Subcutaneous Solution Pen-injector; use as directed 19    IU BID; Therapy: 59CLR9563 to Recorded   13  Levothyroxine Sodium 175 MCG Oral Tablet; TAKE 1 TABLET DAILY  Requested for:    47HMO1867; Last Rx:04Oct2016 Ordered   14  Nitroglycerin 0 4 MG Sublingual Tablet Sublingual; DISSOLVE 1 TABLET UNDER THE    TONGUE AS NEEDED FOR CHEST PAIN; Last Rx:01Dec2016 Ordered   15  TraMADol HCl - 50 MG Oral Tablet; TAKE ONE 1-2  TABLET(S) THREE TIMES DAILYAS    NEEDED FOR PAIN;    Therapy: 69EHW6971 to (Evaluate:05Sep2017); Last Rx:07Jun2017 Ordered  Medication List Reviewed: The medication list was reviewed and updated today  Allergies  Medication    1  Cardura TABS   2  Zestril TABS    Vitals  Vital Signs    Recorded: 43AWN2437 03:21PM   Heart Rate 70, Apical   Pulse Quality Normal, Apical   Systolic 866, LUE, Sitting   Diastolic 52, LUE, Sitting   Height 5 ft 7 in   Weight 196 lb    BMI Calculated 30 7   BSA Calculated 2     Physical Exam    Constitutional   General appearance: No acute distress, well appearing and well nourished  Eyes   Conjunctiva and Sclera examination: Conjunctiva pink, sclera anicteric  Ears, Nose, Mouth, and Throat - Oropharynx: Clear, nares are clear, mucous membranes are moist    Neck   Neck and thyroid: Normal, supple, trachea midline, no thyromegaly  Pulmonary   Respiratory effort: No increased work of breathing or signs of respiratory distress  Auscultation of lungs: Clear to auscultation, no rales, no rhonchi, no wheezing, good air movement  Cardiovascular   Auscultation of heart: Abnormal   The heart rate was bradycardic  The rhythm was regular   Heart sounds: normal S1,-- normal S2,-- no S3-- and-- no S4  A grade 1 systolic murmur was heard at the RUSB  Carotid pulses: Abnormal   right carotid bruit heard-- and-- left carotid bruit heard  Peripheral vascular exam: Normal pulses throughout, no tenderness, erythema or swelling  Pedal pulses: Abnormal   Posterior tibialis pulse was 1+ on the right-- and-- 0 on the left  Dorsalis pedis pulse was 0 on the right-- and-- 0 on the left  Examination of extremities for edema and/or varicosities: Normal     Abdomen   Abdomen: Non-tender and no distention  Liver and spleen: No hepatomegaly or splenomegaly  Musculoskeletal Gait and station: Normal gait  -- Digits and nails: Normal without clubbing or cyanosis  -- Inspection/palpation of joints, bones, and muscles: Normal, ROM normal     Skin - Skin and subcutaneous tissue: Normal without rashes or lesions  Skin is warm and well perfused, normal turgor  Neurologic - Cranial nerves: II - XII intact  -- Speech: Normal     Psychiatric - Orientation to person, place, and time: Normal -- Mood and affect: Normal       Results/Data  ECG Report:   Rhythm and rate: sinus bradycardia  QRS: left ventricular hypertrophy   T waves:   there are nonspecific ST-T wave changes  Health Management  Health Maintenance   Medicare Annual Wellness Visit; every 1 year; Next Due: 36JDD0496; Active    Future Appointments    Date/Time Provider Specialty Site   10/31/2017 01:40 PM TERRY Caban   Pulmonary Medicine [facility] [place]   11/09/2017 03:40 PM Verónica Osullivan DO Cardiology [facility]   11/01/2017 01:45 PM Lee Ann Ocasio MD Vascular Surgery [facility]   10/24/2017 01:00 PM Huseyin Costa DO Nephrology  [address] [facility]     Signatures   Electronically signed by : Ken Overton DO; Sep 26 2017  9:10PM EST                       (Author)

## 2017-10-31 ENCOUNTER — ALLSCRIPTS OFFICE VISIT (OUTPATIENT)
Dept: OTHER | Facility: OTHER | Age: 72
End: 2017-10-31

## 2017-11-01 ENCOUNTER — APPOINTMENT (OUTPATIENT)
Dept: LAB | Facility: CLINIC | Age: 72
End: 2017-11-01
Payer: MEDICARE

## 2017-11-01 ENCOUNTER — TRANSCRIBE ORDERS (OUTPATIENT)
Dept: LAB | Facility: CLINIC | Age: 72
End: 2017-11-01

## 2017-11-01 ENCOUNTER — ALLSCRIPTS OFFICE VISIT (OUTPATIENT)
Dept: OTHER | Facility: OTHER | Age: 72
End: 2017-11-01

## 2017-11-01 DIAGNOSIS — R80.9 PROTEINURIA: ICD-10-CM

## 2017-11-01 DIAGNOSIS — N18.30 CHRONIC KIDNEY DISEASE, STAGE III (MODERATE) (HCC): ICD-10-CM

## 2017-11-01 LAB
ANION GAP SERPL CALCULATED.3IONS-SCNC: 7 MMOL/L (ref 4–13)
BUN SERPL-MCNC: 25 MG/DL (ref 5–25)
CALCIUM SERPL-MCNC: 9.6 MG/DL (ref 8.3–10.1)
CHLORIDE SERPL-SCNC: 103 MMOL/L (ref 100–108)
CO2 SERPL-SCNC: 27 MMOL/L (ref 21–32)
CREAT SERPL-MCNC: 1.28 MG/DL (ref 0.6–1.3)
CREAT UR-MCNC: 103 MG/DL
GFR SERPL CREATININE-BSD FRML MDRD: 56 ML/MIN/1.73SQ M
GLUCOSE SERPL-MCNC: 271 MG/DL (ref 65–140)
POTASSIUM SERPL-SCNC: 5.1 MMOL/L (ref 3.5–5.3)
PROT UR-MCNC: 132 MG/DL
PROT/CREAT UR: 1.28 MG/G{CREAT} (ref 0–0.1)
SODIUM SERPL-SCNC: 137 MMOL/L (ref 136–145)

## 2017-11-01 PROCEDURE — 84156 ASSAY OF PROTEIN URINE: CPT

## 2017-11-01 PROCEDURE — 36415 COLL VENOUS BLD VENIPUNCTURE: CPT

## 2017-11-01 PROCEDURE — 82570 ASSAY OF URINE CREATININE: CPT

## 2017-11-01 PROCEDURE — 80048 BASIC METABOLIC PNL TOTAL CA: CPT

## 2017-11-01 NOTE — PROGRESS NOTES
Assessment  1  Centrilobular emphysema (492 8) (J43 2)   2  ITZEL on CPAP (327 23,V46 8) (G47 33,Z99 89)   3  Shortness of breath on exertion (786 05) (R06 02)    Results/Data  Results Free Text Form [facility]:   Results   Other Recent hemoglobin was 12 9  Discussion/Summary  Discussion Summary:   Jarvis Valenzuela likely is not getting the therapeutic benefit from CPAP  I discussed with him about possibly pursuing a repeat CPAP titration but he does not seem particularly interested  He is agreeable to a slight increase in his pressures and I have ordered any increased from 6 cm of water to 8 cm of water to see if this makes a difference  I did warn him that this could increase the potential leak related to mask fit   has never been particularly responsive to bronchodilators and therefore I have recommended against a trial of bronchodilators again currently  He does have upcoming follow-up with his cardiologist and there is some concern that he could potentially have persistent coronary blockage  I do think he is somewhat deconditioned and I have encouraged more regular exercise as he is able to tolerate  will have Jarvis Valenzuela return to see me in 3 months to re-evaluate both his shortness of breath and his response to the increase in his CPAP pressure  If he has any new or worsening symptoms he was encouraged to contact me  Thank you for asking me to participate in his care  Goals and Barriers: The patient has the current Goals: Improved breathing  Patient's Capacity to Self-Care: Patient is able to Self-Care  Medication SE Review and Pt Understands Tx: The treatment plan was reviewed with the patient/guardian  The patient/guardian understands and agrees with the treatment plan   Self Referrals:   Self Referrals: No      Active Problems  1  Acquired polyneuropathy (356 9) (G62 9)   2  Acute conjunctivitis (372 00) (H10 30)   3  Age-related cataract (366 10) (H25 9)   4  Anemia (285 9) (D64 9)   5   Arthralgia (719 40) (M25 50) 6  Asymptomatic bilateral carotid artery stenosis (433 10,433 30) (I65 23)   7  Atherosclerosis of native artery of extremity with intermittent claudication (440 21)   (I70 219)   8  Benign hypertension with chronic kidney disease, stage III (403 10,585 3) (I12 9,N18 3)   9  Calf cramp (729 82) (R25 2)   10  Cataract (366 9) (H26 9)   11  Centrilobular emphysema (492 8) (J43 2)   12  Chronic kidney disease, stage 3 (585 3) (N18 3)   13  Degenerative lumbar spinal stenosis (724 02) (M48 061)   14  Dermatitis (692 9) (L30 9)   15  Dry eye syndrome (375 15) (H04 129)   16  Dyslipidemia (272 4) (E78 5)   17  Eczema (692 9) (L30 9)   18  Encounter for prostate cancer screening (V76 44) (Z12 5)   19  Facial skin lesion (709 9) (L98 9)   20  Fatigue (780 79) (R53 83)   21  Heartburn (787 1) (R12)   22  Hyperkalemia (276 7) (E87 5)   23  Hypothyroidism, postablative (244 1) (E89 0)   24  Intervertebral disc disorders with radiculopathy, lumbosacral region (724 4) (M51 17)   25  Low back pain with sciatica (724 3) (M54 40)   26  Multiple vessel coronary artery disease (414 00) (I25 10)   27  Need for 23-polyvalent pneumococcal polysaccharide vaccine (V03 82) (Z23)   28  Need for prophylactic vaccination and inoculation against influenza (V04 81) (Z23)   29  ITZEL on CPAP (327 23,V46 8) (G47 33,Z99 89)   30  Peripheral vascular disease (443 9) (I73 9)   31  Proteinuria (791 0) (R80 9)   32  Protruded lumbar disc (722 10) (M51 26)   33  Renal artery stenosis (440 1) (I70 1)   34  Renal cyst, right (753 10) (N28 1)   35  Shortness of breath on exertion (786 05) (R06 02)   36  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 (250 41,585 9) (E10 22,N18 9)   37  Unsteadiness (781 2) (R26 81)   38  Vitamin D deficiency (268 9) (E55 9)   39  White coat hypertension   40  Xerosis cutis (706 8) (L85 3)    History of Present Illness  HPI: [address] [facility] presents today with his wife for follow-up   He reports that he has had slightly more shortness of breath exertionally  He is more sedentary and less active  He has not had any signs or symptoms of congestive heart failure  He denies lower extremity swelling  He has not had chest pain  He denies wheezing  He has not had cough or phlegm production  He has only noticed exertional shortness of breath  He is not on any bronchodilators  He has had prior Spirometry is a have demonstrated restriction which is mild  also reports that he has felt more fatigued and less well rested after wakening  He has not noticed that his CPAP pressures are problem but his wife has noted that he has some whistling of the mask and a return of some snoring  He has gained about 10 lb since his diagnosis of sleep apnea  This was in 2015  He has gotten a new mask and straps and despite this has still noticed the mask leak and snoring  does have a multiple other medical problems  He has coronary disease  He has mild chronic kidney disease  He has had a recent hemoglobin which was 12 9 and therefore he is not anemic  Review of Systems  Complete-Male Pulm:   Constitutional: as noted in HPI  Eyes: no complaints of vision problems  ENT: no rhinitis, no PND, no epistaxis  Cardiovascular: as noted in HPI  Respiratory: as noted in HPI  Gastrointestinal: no complaints of esophageal reflux, no abdominal pain  Genitourinary: as noted in HPI  Musculoskeletal: no arthralgias, no joint swelling, no myalgias  Integumentary: no rash, no lesions  Neurological: no headache, no fainting, no weakness  Psychiatric: no anxiety, no depression  Hematologic/Lymphatic: no complaints of swollen glands  Past Medical History  1  History of Acute kidney injury (584 9) (N17 9)   2  History of Acute venous embolism and thrombosis of deep vessels of proximal lower   extremity (453 41) (I82 4Y9)   3  History of Chronic cough (786 2) (R05)   4  History of dizziness (V13 89) (Z87 898)   5   History of hypertension (V12 59) (Z86 79)   6  History of Ischemic cardiomyopathy (414 8) (I25 5)   7  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) (J84 10)    Surgical History  1  History of Cardiac Cath Procedure Outcome:   2  History of Cath Stent 1 Assessment Lesion Could Not Be Crossed   3  History of Cath Stent 1 Type Drug-Eluting   4  History of Cath Stent 2 Type Drug-Eluting   5  History of Cath Stent 3 Type Drug-Eluting   6  History of PTA Femoral-Popliteal Initial Stenosis With Stent  Surgical History Reviewed: The surgical history was reviewed and updated today  Family History  Mother    1  Family history of liver disease (V18 59) (Z83 79)  Father    2  Family history of cardiac disorder (V17 49) (Z82 49)   3  Family history of kidney disease (V18 69) (Z84 1)   4  Family history of liver disease (V18 59) (Z83 79)  Paternal Cousin    11  Family history of diabetes mellitus (V18 0) (Z83 3)  Family History Reviewed: The family history was reviewed and updated today  Social History   · Alcohol Use (History)   · 2 drinks per day   · Former smoker (V15 82) (G85 833)   · Smoking 48 years 1 5 ppd    d/c Oct 2008      Screening protocol   ·    · Retired   · desk work, [employer]  Social History Reviewed: The social history was reviewed and updated today  Current Meds   1  Accu-Chek Angie Plus In Vitro Strip; TEST 4 TIMES DAILY DX: E11 9; Therapy: 02ASC3962 to (Evaluate:45Ikv0692)  Requested for: 28GUL5507 Recorded   2  AmLODIPine Besylate 10 MG Oral Tablet; take 1 tablet daily at bedtime; Therapy: 28VPL1938 to (Evaluate:17Nov2017)  Requested for: 88TSP4663; Last   Rx:22Nov2016 Ordered   3  Aspirin 81 MG TABS; CHEW ONE TABLET DAILY; Therapy: (Recorded:05Blr2662) to Recorded   4  Atorvastatin Calcium 40 MG Oral Tablet; Take 1 tablet daily  Requested for: 01FET0594;   Last Rx:26Oct2017 Ordered   5  Carvedilol 12 5 MG Oral Tablet; TAKE ONE TABLET BY MOUTH TWICE A DAY;    Therapy: 00XNT9724 to (AEAQSOBY:23OHG3038)  Requested for: 15YVR7252; Last   Rx:02Oct2017 Ordered   6  CoQ-10 200 MG CAPS; TAKE 1 CAPSULE DAILY; Therapy: (Recorded:40Kum0334) to Recorded   7  Effient 10 MG Oral Tablet; TAKE 1 TABLET DAILY; Therapy: (YYQCNOMI:58XND9116) to Recorded   8  Furosemide 20 MG Oral Tablet; TAKE 1 TABLET EVERY OTHER DAY; Therapy: (Bernadette Woodard) to Recorded   9  Glucagon (rDNA) 1 MG Kit; AS NEEDED FOR <40 BLOOD SUGAR; Therapy: (Recorded:05Kkg5027) to Recorded   10  HumaLOG 100 UNIT/ML Subcutaneous Solution; USE AS DIRECTED; Therapy: (Recorded:02Qmh7893) to Recorded   11  Lantus SoloStar 100 UNIT/ML Subcutaneous Solution Pen-injector; use as directed 19    IU BID; Therapy: 70GSE3178 to Recorded   12  Levothyroxine Sodium 175 MCG Oral Tablet; TAKE 1 TABLET DAILY  Requested for:    95HUT9108; Last Rx:46Yky5698 Ordered   13  Nitroglycerin 0 4 MG Sublingual Tablet Sublingual; DISSOLVE 1 TABLET UNDER THE    TONGUE AS NEEDED FOR CHEST PAIN; Last Rx:21Eai5223 Ordered   14  TraMADol HCl - 50 MG Oral Tablet; TAKE ONE 1-2  TABLET(S) THREE TIMES DAILYAS    NEEDED FOR PAIN;    Therapy: 20HRT4871 to (Evaluate:36Uhd7395); Last Rx:07Jun2017 Ordered  Medication List Reviewed: The medication list was reviewed and updated today  Allergies  1  Cardura TABS   2  Zestril TABS    Vitals  Vital Signs    Recorded: 79LBK7660 02:23PM   Temperature 96 8 F   Heart Rate 67   Respiration 18   Systolic 477   Diastolic 58   Height 5 ft 8 in   Weight 199 lb    BMI Calculated 30 26   BSA Calculated 2 04   O2 Saturation 95, RA     Physical Exam    Constitutional   General appearance: No acute distress, well appearing and well nourished  Ears, Nose, Mouth, and Throat   Nasal mucosa, septum, and turbinates: Normal without edema or erythema  Lips, teeth, and gums: Normal, good dentition  Oropharynx: Normal with no erythema, edema, exudate or lesions  Neck   Neck: Supple, symmetric, trachea midline, no masses  Jugular veins: Normal     Pulmonary   Chest: Abnormal   Diminished respiratory excursion bilaterally  Respiratory effort: No increased work of breathing or signs of respiratory distress  Percussion of chest: Normal     Auscultation of lungs: Abnormal   Auscultation of the lungs revealed decreased breath sounds diffusely  no rales or crackles were heard bilaterally  no wheezing  Cardiovascular   Auscultation of heart: Normal rate and rhythm, normal S1 and S2, no murmurs  Examination of extremities for edema and/or varicosities: Normal     Abdomen   Abdomen: Soft, non-tender  Lymphatic   Palpation of lymph nodes in neck: No lymphadenopathy  Musculoskeletal   Gait and station: Normal     Digits and nails: Normal without clubbing or cyanosis  Neurologic   Mental Status: Normal  Not confused, no evidence of dementia, good comprehension, good concentration  Skin   Skin and subcutaneous tissue: Limited exam shows no rash  Psychiatric   Orientation to person, place and time: Normal     Mood and affect: Normal        Health Management  Health Maintenance   Medicare Annual Wellness Visit; every 1 year; Next Due: 31ZKG6837; Active    Future Appointments    Date/Time Provider Specialty Site   01/30/2018 02:20 PM TERRY Clinton   Pulmonary Medicine [address] [facility] [place]   11/09/2017 03:40 PM Willian Alexander DO Cardiology [facility]   11/01/2017 01:45 PM Hanna Flanagan MD Vascular Surgery [facility]     Signatures   Electronically signed by : TERRY Conn ; Oct 31 2017  3:03PM EST                       (Author)

## 2017-11-02 NOTE — PROGRESS NOTES
Assessment  1  Asymptomatic bilateral carotid artery stenosis (433 10,433 30) (I65 23)   2  Atherosclerosis of native artery of extremity with intermittent claudication (440 21)   (I70 219)   3  Chronic kidney disease, stage 3 (585 3) (N18 3)    Plan  Asymptomatic bilateral carotid artery stenosis, Atherosclerosis of native artery of  extremity with intermittent claudication, Chronic kidney disease, stage 3    · CTA NECK W WO CONTRAST; Status:Hold For - Scheduling; Requested for:01Nov2017;    Perform:[facility]; LTV:[id number]; Ordered; For:Asymptomatic bilateral carotid artery stenosis, Atherosclerosis of native artery of extremity with intermittent claudication, Chronic kidney disease, stage 3; Ordered By:Alec Quiros;   · Follow Up After Tests Complete Evaluation and Treatment  Follow-up  Status: Hold For -  Scheduling  Requested for: [id number]   Ordered; For: Asymptomatic bilateral carotid artery stenosis, Atherosclerosis of native artery of extremity with intermittent claudication, Chronic kidney disease, stage 3; Ordered By: Carvel Salts Performed:  Due: 49AKI8734    Discussion/Summary  Discussion Summary:   1  Asymptomatic bilateral carotid artery stenosis with progression of disease on the right  We discussed the findings on carotid duplex in the utility of further evaluation in light of his relatively Rapid progression on the right  We will plan on proceeding with CT angiogram with pre-CT hydration for renal protection  Following the study further treatment respirations be made  Aortoiliac and infrainguinal arterial occlusive disease with severe bilateral lower extremity claudication more severe on the left  We discussed the option of further evaluation potential treatment with arteriography but will await results in follow-up of the carotid CTA prior to final treatment recommendations  Chief Complaint  Chief Complaint Free Text Note Form: I am here for my leg pain    has bilateral leg pain with cramping in his calfs over the past 8 years that has been gradually getting worse  SYLVESTER was on 2/3/17  Patient has cramping when at rest as well as walking 1/2 block  Resting would help, however most recently does not  Patient had CV on 8/17/17  At his visit in Sept a CTA of the neck was recommended which patient deferred because he is worried about the dye  Patient denies TIA/stoke like symptoms  He denies hx of stroke  He denies tobacco use  He take ASA and Effient daily  History of Present Illness  HPI: 80-year-old with history of peripheral arterial occlusive disease with previous iliac and femoral stenting procedures presents for evaluation of both peripheral arterial occlusive disease and cerebrovascular occlusive disease  He was recently seen in the office with a progression of disease in his right internal carotid artery and advised to undergo CT angiogram  He originally deferred CT angiogram because of his renal insufficiency and now presents to discuss further  evaluation today he denies any focal neurologic event which would be consistent with TIA or CVA  He does complain of bilateral left greater than right calf claudication at less than one block  This then resolves with rest  This is been present for matter of years but has been worsening recently and now causes severe limitation in his ability to perform daily activities  He denies rest pain or tissue loss  has bilateral leg pain with cramping in his calfs over the past 8 years that has been gradually getting worse  SYLVESTER was on 2/3/17  Patient has cramping when at rest as well as walking 1/2 block  Resting would help, however most recently does not  Patient had CV on 8/17/17  At his visit in Sept a CTA of the neck was recommended which patient deferred because he is worried about the dye  Patient denies TIA/stoke like symptoms  He denies hx of stroke  He denies tobacco use  He take ASA and Effient daily        Review of Systems  Complete Male - Vasc:   Constitutional: No fever or chills, feels well, no tiredness, no recent weight gain or weight loss  Eyes: no sudden vision loss,-- no blurred vision-- and-- no double vision, but-- no eye pain,-- no eyesight problems,-- no dryness of the eyes,-- eyes not red,-- no purulent discharge from the eyes,-- no itching of the eyes-- and-- wears glasses  ENT: no loss of hearing, no nosebleeds, no hoarseness  Cardiovascular: intermittent leg claudication-- and-- no painful veins, but-- regular heart rate,-- no chest pain,-- no palpitations-- and-- leg pain with walking  Respiratory: no wheezing,-- no cough,-- no orthopnea-- and-- no complaints of PND, but-- shortness of breath-- and-- shortness of breath during exertion  Gastrointestinal: No nausea, No vomiting, no diarrhea, no blood in stool  Genitourinary: no dysuria, no hematuria, No urinary incontinence, no erectile dysfunction  Musculoskeletal: limb pain, but-- no limb pain, no limb swelling  Integumentary: no rash, no lesions, no wounds, no ulcer  Neurological: no dementia, no headache, no numbness, no limb weakness, no dizziness, no difficulty walking  Psychiatric: no depression, no mood disorders, no anxiety  Hematologic/Lymphatic: a tendency for easy bleeding, but-- no swollen glands,-- no tendency for easy bruising-- and-- no swollen glands in the neck  ROS Reviewed:   ROS reviewed  Active Problems  1  Acquired polyneuropathy (356 9) (G62 9)   2  Acute conjunctivitis (372 00) (H10 30)   3  Age-related cataract (366 10) (H25 9)   4  Anemia (285 9) (D64 9)   5  Arthralgia (719 40) (M25 50)   6  Asymptomatic bilateral carotid artery stenosis (433 10,433 30) (I65 23)   7  Atherosclerosis of native artery of extremity with intermittent claudication (440 21)   (I70 219)   8  Benign hypertension with chronic kidney disease, stage III (403 10,585 3) (I12 9,N18 3)   9  Calf cramp (729 82) (R25 2)   10  Cataract (366 9) (H26 9)   11  Centrilobular emphysema (492 8) (J43 2)   12  Chronic kidney disease, stage 3 (585 3) (N18 3)   13  Degenerative lumbar spinal stenosis (724 02) (M48 061)   14  Dermatitis (692 9) (L30 9)   15  Dry eye syndrome (375 15) (H04 129)   16  Dyslipidemia (272 4) (E78 5)   17  Eczema (692 9) (L30 9)   18  Encounter for prostate cancer screening (V76 44) (Z12 5)   19  Facial skin lesion (709 9) (L98 9)   20  Fatigue (780 79) (R53 83)   21  Heartburn (787 1) (R12)   22  Hyperkalemia (276 7) (E87 5)   23  Hypothyroidism, postablative (244 1) (E89 0)   24  Intervertebral disc disorders with radiculopathy, lumbosacral region (724 4) (M51 17)   25  Low back pain with sciatica (724 3) (M54 40)   26  Multiple vessel coronary artery disease (414 00) (I25 10)   27  Need for 23-polyvalent pneumococcal polysaccharide vaccine (V03 82) (Z23)   28  Need for prophylactic vaccination and inoculation against influenza (V04 81) (Z23)   29  ITZEL on CPAP (327 23,V46 8) (G47 33,Z99 89)   30  Peripheral vascular disease (443 9) (I73 9)   31  Proteinuria (791 0) (R80 9)   32  Protruded lumbar disc (722 10) (M51 26)   33  Renal artery stenosis (440 1) (I70 1)   34  Renal cyst, right (753 10) (N28 1)   35  Shortness of breath on exertion (786 05) (R06 02)   36  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 (250 41,585 9) (E10 22,N18 9)   37  Unsteadiness (781 2) (R26 81)   38  Vitamin D deficiency (268 9) (E55 9)   39  White coat hypertension   40  Xerosis cutis (706 8) (L85 3)    Past Medical History  1  History of Acute kidney injury (584 9) (N17 9)   2  History of Acute venous embolism and thrombosis of deep vessels of proximal lower   extremity (453 41) (I82 4Y9)   3  History of Chronic cough (786 2) (R05)   4  History of dizziness (V13 89) (Z87 898)   5  History of hypertension (V12 59) (Z86 79)   6  History of Ischemic cardiomyopathy (414 8) (I25 5)   7   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) (J84 10)  Active Problems And Past Medical History Reviewed: The active problems and past medical history were reviewed and updated today  Surgical History  1  History of Cardiac Cath Procedure Outcome:   2  History of Cath Stent 1 Assessment Lesion Could Not Be Crossed   3  History of Cath Stent 1 Type Drug-Eluting   4  History of Cath Stent 2 Type Drug-Eluting   5  History of Cath Stent 3 Type Drug-Eluting   6  History of PTA Femoral-Popliteal Initial Stenosis With Stent  Surgical History Reviewed: The surgical history was reviewed and updated today  Family History  Mother    1  Family history of liver disease (V18 59) (Z83 79)  Father    2  Family history of cardiac disorder (V17 49) (Z82 49)   3  Family history of kidney disease (V18 69) (Z84 1)   4  Family history of liver disease (V18 59) (Z83 79)  Paternal Cousin    11  Family history of diabetes mellitus (V18 0) (Z83 3)  Family History Reviewed: The family history was reviewed and updated today  Social History   · Alcohol Use (History)   · Former smoker (D64 44) (W86 967)   ·    · Retired  Social History Reviewed: The social history was reviewed and updated today  Current Meds   1  Accu-Chek Angie Plus In Vitro Strip; TEST 4 TIMES DAILY DX: E11 9; Therapy: 64TCU5447 to (Evaluate:59Uoe5309)  Requested for: 65VXX8140 Recorded   2  AmLODIPine Besylate 10 MG Oral Tablet; take 1 tablet daily at bedtime; Therapy: 04KIU7808 to (Evaluate:17Nov2017)  Requested for: 62OKZ9169; Last   Rx:22Nov2016 Ordered   3  Aspirin 81 MG TABS; CHEW ONE TABLET DAILY; Therapy: (Recorded:24Dby1679) to Recorded   4  Atorvastatin Calcium 40 MG Oral Tablet; Take 1 tablet daily  Requested for: 58XBI5993;   Last Rx:26Oct2017 Ordered   5  Carvedilol 12 5 MG Oral Tablet; TAKE ONE TABLET BY MOUTH TWICE A DAY; Therapy: 82DHD2186 to (Evaluate:29Jun2018)  Requested for: 02Oct2017; Last   Rx:02Oct2017 Ordered   6  CoQ-10 200 MG CAPS; TAKE 1 CAPSULE DAILY;    Therapy: (Recorded:65Fvy6332) to Recorded   7  Effient 10 MG Oral Tablet; TAKE 1 TABLET DAILY; Therapy: (JUQAMZQT:33RHM4377) to Recorded   8  Furosemide 20 MG Oral Tablet; TAKE 1 TABLET EVERY OTHER DAY; Therapy: (Christine Perez) to Recorded   9  Glucagon (rDNA) 1 MG Kit; AS NEEDED FOR <40 BLOOD SUGAR; Therapy: (Recorded:57Wvu7776) to Recorded   10  HumaLOG 100 UNIT/ML Subcutaneous Solution; USE AS DIRECTED; Therapy: (Recorded:07Hwa3314) to Recorded   11  Lantus SoloStar 100 UNIT/ML Subcutaneous Solution Pen-injector; use as directed 19    IU BID; Therapy: 08GOH7969 to Recorded   12  Levothyroxine Sodium 175 MCG Oral Tablet; TAKE 1 TABLET DAILY  Requested for:    33WSG7654; Last Rx:04Oct2016 Ordered   13  Nitroglycerin 0 4 MG Sublingual Tablet Sublingual; DISSOLVE 1 TABLET UNDER THE    TONGUE AS NEEDED FOR CHEST PAIN; Last Rx:44Hga4005 Ordered   14  TraMADol HCl - 50 MG Oral Tablet; TAKE ONE 1-2  TABLET(S) THREE TIMES DAILYAS    NEEDED FOR PAIN;    Therapy: 47NJO7701 to (Evaluate:78Njn1189); Last Rx:07Jun2017 Ordered  Medication List Reviewed: The medication list was reviewed and updated today  Allergies  1  Cardura TABS   2  Zestril TABS    Vitals  Vital Signs    Recorded: 56TDQ2576 01:55PM   Temperature 96 7 F, Tympanic   Heart Rate 72, L Radial   Pulse Quality Normal, L Radial   Respiration Quality Normal   Respiration 18   Systolic 535, LUE, Sitting   Diastolic 62, LUE, Sitting   Height 5 ft 8 in   Weight 199 lb    BMI Calculated 30 26   BSA Calculated 2 04     Physical Exam    Carotid: right 2+,-- bruit heard on the right,-- left 2+-- and-- bruit heard on the left  Radial: right 2+-- and-- left 2+  Femoral: right 2+,-- no bruit heard on the right,-- left 1+-- and-- no bruit on the left  Popliteal: right 0-- and-- left 0  Posterior tibialis: right 0-- and-- left 0  Dorsalis pedis: right 0-- and-- left 0  Distal Pulse Exam: Decreased capillary refill bilaterally       Extremities: No upper or lower extremity edema    LE Varicose Veins: No Varicose Veins are Present  The heart rate was normal  The rhythm was regular  Heart sounds: normal S1,-- normal S2,-- no S3-- and-- no S4  Murmurs: No murmurs were heard  Pulmonary   Respiratory effort: No increased work of breathing or signs of respiratory distress  Auscultation of lungs: Clear to auscultation  No wheezing, no rales, no rhonchi  Abdomen   Abdomen: Abdomen soft, non-tender, no masses, non distended, no rebound tenderness  No palpable aneurysm  -- No bruit heard  Psychiatric   Orientation to person, place and time: Normal    Mood and affect: Normal    Eyes   Pupils and irises: Equal, round and reactive to light  Ears, Nose, Mouth, and Throat   Hearing: Normal    Neck   Neck: No jugular venous distention, normal ROM and extension  No cervical adenopathy, trachea midline, neck supple  Neurologic Sensory exam normal   Motor skills intact  Musculoskeletal   Gait and station: Normal    Range of motion: Normal    Muscle strength/tone: Normal    Skin   Skin and subcutaneous tissue:-- Bilateral feet have a cyanotic appearance which may be related to a venous congestion or livedo reticularis  Palpation of skin and subcutaneous tissue: Normal turgor  Venous Disease: No lipodermatosclerosis, stasis dermatitis, hyperpigmentation, or atrophie suha noted on exam       Results/Data  Diagnostic Studies Reviewed Vasc: I personally reviewed the films/images/results in the office today  My interpretation follows  Laboratory Results Serum creatinine of 1 24 and GFR 58  Vascular Study Review Carotid duplex 8/17/17 with high-grade right carotid stenosis with flow velocity through her 71/68 cm/s  This is a significant progression from previous study with a flow velocity of 231   On the left there is a greater than 70% stenosis with flow velocity 229/81 7 m/s   and lower extremity arterial duplex studies with recurrent bilateral common iliac and left superficial femoral artery stenoses of greater than 75% with ankle-brachial indices of 0 6 on the right and 0 56 on the left  Hydration Order Form  Vascular Hydration Physician Orders:   Location:   Admission Type:    Diagnosis:   Please consult: Nephrology: Dr Liseth Garcia saw last week  Test Pending: CTA Carotids & 3D reconstruction  Future Appointments    Date/Time Provider Specialty Site   01/30/2018 02:20 PM TERRY Reaves   Pulmonary Medicine [facility] [place]   11/09/2017 03:40 PM Ayah Pérez DO Cardiology [facility]     Signatures   Electronically signed by : Komal Urban MD; Nov 1 2017  2:35PM EST                       (Author)

## 2017-11-07 ENCOUNTER — GENERIC CONVERSION - ENCOUNTER (OUTPATIENT)
Dept: OTHER | Facility: OTHER | Age: 72
End: 2017-11-07

## 2017-11-09 ENCOUNTER — ALLSCRIPTS OFFICE VISIT (OUTPATIENT)
Dept: OTHER | Facility: OTHER | Age: 72
End: 2017-11-09

## 2017-11-10 NOTE — PROGRESS NOTES
Assessment  1  Asymptomatic bilateral carotid artery stenosis (433 10,433 30) (I65 23)   2  Chronic kidney disease, stage 3 (585 3) (N18 3)    Plan   Asymptomatic bilateral carotid artery stenosis, Chronic kidney disease, stage 3    · Call 911 if: You have any symptoms of a stroke ; Status:Complete;   Done: 44VHQ0940   Ordered; For:Asymptomatic bilateral carotid artery stenosis, Chronic kidney disease, stage 3; Ordered By:Shreya Salas;   · (1) BASIC METABOLIC PROFILE; Status:Active; Requested for:01Sep2017;    Perform:[facility]; Due:01Sep2018; Ordered;bilateral carotid artery stenosis, Chronic kidney disease, stage 3; Ordered By:Shreya Salas;  CTA NECK W WO CONTRAST; Status:Hold For - Scheduling; Requested for:01Sep2017;  Perform:[facility]; Due:01Sep2018; Last Updated By:Zoe Garcia; 10/30/2017 12:05:33 PM;Ordered; For:Asymptomatic bilateral carotid artery stenosis, Chronic kidney disease, stage 3; Ordered By:Shreya Salas; Annotations             Pt needs clr from Nephrology to have CTA neck done  Faxed form to [phone] Pt saw Dr Brittany Mendoza in April and has an upcoming apt  Once clr is obtained pls set up pt has pending appt to review this 11/1 w/ TS0 please update appt line once scheduled, Follow Up After Tests Complete Evaluation and Treatment  Follow-up w/ Dr Ngozi Morocho  Patient requests TCO only  Status: Hold For - Scheduling  Requested for: 01Sep2017 Ordered; For: Asymptomatic bilateral carotid artery stenosis, Chronic kidney disease, stage 3;  Ordered By: Dellia Gosselin  Performed:   Due: 74LCA4526; Last Updated By: Bucky Cotton; 9/25/2017 4:01:26 PM    Annotations             Pt needs clr from Nephrology to have CTA neck done  Faxed form and hydration orders to [phone] Pt saw Dr Brittany Mendoza in April and has an upcoming apt  Pt is scheduled with  11/1 PLEASE UPDATE [facility] APPT LINE WITH ACTUAL CTA DATE  OK PER TL TO MAKE APPT BEFORE CTA IS SCHEDULED   1 - Diallo MARTINEZ [facility]  *  Status: Active - Retrospective By Protocol Authorization  Requested for: [id number] Ordered; For: Asymptomatic bilateral carotid artery stenosis; Ordered By: Johan Deluca  Performed:   Due: 76IVY2374; Last Updated By: Doris Phillips; 10/30/2017 12:05:34 PM    Annotations             Pt needs clr from Nephrology to have CTA neck done  Faxed form and hydration orders to [phone] Pt saw Dr Yessica Jaimes in April and has an upcoming apt  Asymptomatic bilateral carotid artery stenosis (433 10) (I65 23)     Chronic kidney disease, stage 3 (585 3) (N18 3)       Discussion/Summary  Discussion Summary:   70-year-old male with HTN, HLD, CAD, DM,CKD 3, Aortoiliac and peripheral arterial occlusive disease w/ iliac PTA/stenting '10, LSFA stenting, bilateral carotid stenosis who presents in follow up after routine carotid duplex 8/17/17  Bilateral carotid artery stenosis  Carotid duplex showed progression of right carotid artery stenosis to 70%, velocities 371/61, ratio 4 89  Left ICA 50-69 %stenosis  He is asymptomatic from a carotid standpoint will evaluate with CTA of the neck  Will get renal clearance  We reviewed the signs symptoms of TIA/CVA and that he go immediately to the emergency room should he experience these  He will return to the office with the physician to review the CTA of the neck  PAD/AIOD  Stable bilateral lower extremity claudication  Will continue with surveillance  We will evaluate with aortoiliac duplex  Counseling Documentation With Imm: The patient was counseled regarding diagnostic results,-- instructions for management,-- risk factor reductions,-- prognosis,-- patient and family education,-- impressions,-- risks and benefits of treatment options,-- importance of compliance with treatment  Chief Complaint  Chief Complaint Free Text Note Form:  I am here to review my test results  Ana Britton is here to review his CV he had on 08/17/2017   Pt denies TIA/stroke symptoms  Pt denies previous history of strokes  Pt denies fm hx of strokes  History of Present Illness  HPI: 55-year-old male with HTN, HLD, CAD, DM,CKD 3, Aortoiliac and peripheral arterial occlusive disease w/ iliac PTA/stenting '10, LSFA stenting, bilateral carotid stenosis who presents in follow up after routine carotid duplex 8/17/17  Carotid duplex noted progression in the right internal carotid artery stenosis to 70 percent and bilateral subclavian artery stenosis  He is asymptomatic from a carotid standpoint  He denies any focal neurological deficits  He denies amaurosis fugax  He has bilateral lower extremity claudication symptoms  Somewhat sedentary  No ischemic rest pain or tissue loss  Last AOIl 2/3/17  Review of Systems  Complete Male - Vasc:  Constitutional: no loss in appetite-- and-- feeling tired, but-- no fever,-- no recent weight gain,-- no chills-- and-- no recent weight loss  Eyes: No sudden vision loss, no blurred vision, no double vision  ENT: no loss of hearing, no nosebleeds, no hoarseness  Cardiovascular: irregular heart rate,-- intermittent leg claudication,-- no painful veins-- and-- no bleeding veins, but-- no chest pain,-- no palpitations-- and-- leg pain with walking  Respiratory: no wheezing,-- no cough,-- no orthopnea-- and-- no complaints of PND, but-- shortness of breath-- and-- shortness of breath during exertion  Gastrointestinal: No nausea, No vomiting, no diarrhea, no blood in stool  Genitourinary: no dysuria, no hematuria, No urinary incontinence, no erectile dysfunction  Musculoskeletal: no limb pain, no limb swelling  Integumentary: no rash, no lesions, no wounds, no ulcer  Neurological: no dementia-- and-- difficulty walking, but-- no headache,-- no numbness,-- no confusion,-- no dizziness,-- no limb weakness,-- no convulsions-- and-- no fainting  Psychiatric: no depression, no mood disorders, no anxiety    Hematologic/Lymphatic: no bleeding disorder, no easy bruising  ROS Reviewed:   ROS reviewed  Active Problems     1  Acquired polyneuropathy (356 9) (G62 9)   2  Acute conjunctivitis (372 00) (H10 30)   3  Age-related cataract (366 10) (H25 9)   4  Anemia (285 9) (D64 9)   5  Arthralgia (719 40) (M25 50)   6  Atherosclerosis of native artery of extremity with intermittent claudication (440 21) (I70 219)   7  Calf cramp (729 82) (R25 2)   8  Cataract (366 9) (H26 9)   9  Degenerative lumbar spinal stenosis (724 02) (M48 061)   10  Dermatitis (692 9) (L30 9)   11  Dry eye syndrome (375 15) (H04 129)   12  Eczema (692 9) (L30 9)   13  Encounter for prostate cancer screening (V76 44) (Z12 5)   14  Facial skin lesion (709 9) (L98 9)   15  Fatigue (780 79) (R53 83)   16  Heartburn (787 1) (R12)   17  Hyperkalemia (276 7) (E87 5)   18  Hypothyroidism, postablative (244 1) (E89 0)   19  Intervertebral disc disorders with radiculopathy, lumbosacral region (724 4) (M51 17)   20  Low back pain with sciatica (724 3) (M54 40)   21  Need for 23-polyvalent pneumococcal polysaccharide vaccine (V03 82) (Z23)   22  Protruded lumbar disc (722 10) (M51 26)   23  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 (250 41,585 9) (E10 22,N18 9)   24  Unsteadiness (781 2) (R26 81)   25  White coat hypertension   26  Xerosis cutis (706 8) (L85 3)  Dyslipidemia (272 4) (E78 5)     Peripheral vascular disease (443 9) (I73 9)     Renal artery stenosis (440 1) (I70 1)     Vitamin D deficiency (268 9) (E55 9)     Proteinuria (791 0) (R80 9)     Renal cyst, right (753 10) (N28 1)     Benign hypertension with chronic kidney disease, stage III (403 10) (I12 9)     Multiple vessel coronary artery disease (414 00) (I25 10)     Centrilobular emphysema (492 8) (J43 2)     Asymptomatic bilateral carotid artery stenosis (433 10) (I65 23)     Chronic kidney disease, stage 3 (585 3) (N18 3)     ITZEL on CPAP (327 23) (G47 33)       Past Medical History     1   History of Acute kidney injury (584 9) (N17 9)   2  History of Acute venous embolism and thrombosis of deep vessels of proximal lower extremity (453 41) (I82 4Y9)   3  History of Chronic cough (786 2) (R05)   4  History of dizziness (V13 89) (Z87 898)   5  History of hypertension (V12 59) (Z86 79)   6  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) (J84 10)  Active Problems And Past Medical History Reviewed: The active problems and past medical history were reviewed and updated today  History of Ischemic cardiomyopathy (414 8) (I25 5)          Surgical History     1  History of Cardiac Cath Procedure Outcome:   2  History of PTA Femoral-Popliteal Initial Stenosis With Stent  Surgical History Reviewed: The surgical history was reviewed and updated today  History of Cath Stent 1 Type Drug-Eluting    - Ostial LAD - 09/2015  History of Cath Stent 2 Type Drug-Eluting    - Proximal LAD - 09/2015  History of Cath Stent 3 Type Drug-Eluting    - Left circumflex x 2- 09/2015  History of Cath Stent 1 Assessment Lesion Could Not Be Crossed    - OM2 - 09/2015  Family History  Mother    1  Family history of liver disease (V18 59) (Z83 79)  Father    2  Family history of cardiac disorder (V17 49) (Z82 49)   3  Family history of kidney disease (V18 69) (Z84 1)   4  Family history of liver disease (V18 59) (Z83 79)  Paternal Cousin    11  Family history of diabetes mellitus (V18 0) (Z83 3)  Family History Reviewed: The family history was reviewed and updated today  Social History     · Alcohol Use (History)   · Former smoker (A80 02) (S97 086)   ·    · Retired  Social History Reviewed: The social history was reviewed and updated today  Current Meds   1  Accu-Chek Angie Plus In Vitro Strip; TEST 4 TIMES DAILY DX: E11 9; Therapy: 51KHF6579 to (Evaluate:89Cjt2028)  Requested for: 86LJQ5426 Recorded   2  AmLODIPine Besylate 10 MG Oral Tablet; take 1 tablet daily at bedtime;  Therapy: 52NXP5743 to (Evaluate:78Nzl1657)  Requested for: 94PFZ6289; Last Rx:22Nov2016 Ordered   3  Aspirin 81 MG TABS; CHEW ONE TABLET DAILY; Therapy: (Recorded:45Wek0818) to Recorded   4  Astaxanthin CAPS; Take 2mg tablets daily; Therapy: (Recorded:49Xtz9836) to Recorded   5  Atorvastatin Calcium 40 MG Oral Tablet; Take 1 tablet daily  Requested for: 32RTL7604; Last Rx:04Oct2016 Ordered   6  Carvedilol 12 5 MG Oral Tablet; TAKE ONE TABLET BY MOUTH TWICE A DAY; Therapy: 97ZTC5926 to (Evaluate:08Jan2018)  Requested for: 13Apr2017; Last Rx:13Apr2017 Ordered   7  CoQ-10 200 MG CAPS; TAKE 1 CAPSULE DAILY; Therapy: (Recorded:14Apm6209) to Recorded   8  Effient 10 MG Oral Tablet; TAKE 1 TABLET DAILY AS DIRECTED; Therapy: (Recorded:49Vdt6746) to Recorded   9  Furosemide 20 MG Oral Tablet; TAKE 1 TABLET DAILY AS NEEDED for leg edema; Therapy: 71WFX3989 to (Last Rx:86Ndt0267)  Requested for: 09SAR1187 Ordered   10  Glucagon (rDNA) 1 MG Kit; AS NEEDED FOR <40 BLOOD SUGAR; Therapy: (Recorded:39Wwk5110) to Recorded   11  HumaLOG 100 UNIT/ML Subcutaneous Solution; USE AS DIRECTED; Therapy: (Recorded:60Bbd4448) to Recorded   12  Lantus SoloStar 100 UNIT/ML Subcutaneous Solution Pen-injector; use as directed 19  IU BID; Therapy: 85HFS0789 to Recorded   13  Levothyroxine Sodium 175 MCG Oral Tablet; TAKE 1 TABLET DAILY  Requested for:  86OBH2480; Last Rx:04Oct2016 Ordered   14  Nitroglycerin 0 4 MG Sublingual Tablet Sublingual; DISSOLVE 1 TABLET UNDER THE  TONGUE AS NEEDED FOR CHEST PAIN; Last Rx:01Dec2016 Ordered   15  TraMADol HCl - 50 MG Oral Tablet; TAKE ONE 1-2  TABLET(S) THREE TIMES DAILYAS  NEEDED FOR PAIN;  Therapy: 39VXM3489 to (Evaluate:05Sep2017); Last Rx:07Jun2017 Ordered  Medication List Reviewed: The medication list was reviewed and updated today  Allergies  1  Cardura TABS   2   Zestril TABS    Vitals  Vital Signs    Recorded: 01Sep2017 11:22AM   Heart Rate 84   Respiration Quality Normal   Systolic 379, RUE, Sitting   Diastolic 78, RUE, Sitting   Height 5 ft 7 in Physical Exam   Carotid: bruit heard on the right-- and-- bruit heard on the left  Posterior tibialis: right 0-- and-- left 0  Dorsalis pedis: right 0-- and-- left 0  Distal Pulse Exam:    Neurologic Sensory exam normal  Motor skills intact  Skin  Skin and subcutaneous tissue: Normal without rashes or lesions  Results/Data  VAS CAROTID COMPLETE STUDY [id number] 02:12PM Montez Mattson Order Number: [id number]   - Patient Instructions: To schedule this appointment, please contact Central Scheduling at [phone]  Test Name Result Flag Reference   VAS CAROTID COMPLETE STUDY (Report)       [facility] REPORT  CLINICAL:  Indications: 6 month surveillance of carotid artery disease  Patient claims  he is very tired and finds it very difficult to walk even shorter distances  The  PCP is aware of his current conditions  Operative History  9/1/2015 Cardiac angioplasty and stent placement  2/25/2014 Left Transluminal placement of stent, percutaneous, SFA  1/1/2010 Bilateral Aortoiliac angioplasty with stent placement of iliac  arteries  Risk Factors  The patient has history of obesity, HTN, Diabetes (Yes), hyperlipidemia, PAD  and CAD  Clinical  Right Brachial Pressure: 166/68 mm Hg, Left Brachial Pressure: 162/68 mm Hg  FINDINGS:    Right    Impression PSV EDV (cm/s) Direction of Flow Ratio   Dist  ICA         44      0           0 58   Mid  ICA   50 - 69%  222     116           2 92   Prox  ICA  70%+    371     68           4 89   Dist CCA         74     17                Mid CCA          76     24           0 77   Prox CCA         99     22                Ext Carotid       175     15           2 31   Prox Vert         56     12 Antegrade           Subclavian  50 - 75%  309      0                  Left     Impression PSV EDV (cm/s) Direction of Flow Ratio   Dist  ICA  Other                           Mid  ICA         98     11           1 07   Prox   ICA  50 - 69%  229     41 2 50   Dist CCA         79     12                Mid CCA          92     11           1 01   Prox CCA         90     14                Ext Carotid       264     31           2 88   Prox Vert        107     16 Antegrade           Subclavian  75 - 99%  542      0                      CONCLUSION:    Impression  RIGHT:  There is 70% or greater stenosis noted in the internal carotid artery  Acoustic  shadowing may obscure a more significant stenosis  Plaque is calcified and irregular  Vertebral artery flow is antegrade  Findings suggest moderate to severe  subclavian artery disease  Anders Trevino LEFT:  There is 50-69% stenosis noted in the internal carotid artery( ICA )  The  proximal and more distal portions of the ICA are obscured by acoustic shadows  from dense concentrations of irregular, calcified plaque  Vertebral artery flow is antegrade  Findings suggest severe subclavian artery  disease  Compared to previous study on 2/03/2017 , there is progression of disease in  the right ICA and the high-grade stenosis discovered in the left subclavian  artery is a new finding  Recommend repeat testing in 6 months as per protocol unless otherwise  indicated  Internal carotid artery stenosis determination by consensus criteria from:  Naif Eason et al  Carotid Artery Stenosis: Gray-Scale and Doppler US Diagnosis  - Society of Radiologists in [address] [place] [address], Radiology 2003;  [phone]  SIGNATURE:  Electronically Signed by: Manny Morales on 2017-08-17 05:22:13 PM     Future Appointments    Date/Time Provider Specialty Site   01/30/2018 02:20 PM TERRY Duncan   Pulmonary Medicine [facility] [place]   11/09/2017 03:40 PM Kim Mary DO Cardiology [facility]   11/01/2017 01:45 PM Andressa Kim MD Vascular Surgery [facility]       Signatures   Electronically signed by : Vipul Salinas; Sep  1 2017  2:22PM EST (Author)    Electronically signed by : Aretha Sanches; Nov 1 2017 10:22AM EST                       (Author)    Electronically signed by : Aida Kim MD; Nov 8 2017  1:36PM EST

## 2017-11-16 ENCOUNTER — ALLSCRIPTS OFFICE VISIT (OUTPATIENT)
Dept: OTHER | Facility: OTHER | Age: 72
End: 2017-11-16

## 2017-11-19 NOTE — PROGRESS NOTES
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 (250 41,585 9) (E10 22,N18 9)  7  Benign hypertension with chronic kidney disease, stage III (403 10,585 3) (I12 9,N18 3)   · 12/2015 echo showed nl diast fx and nl LV thickness    Plan  Arthralgia    · Renew: TraMADol HCl - 50 MG Oral Tablet; TAKE ONE 1-2  TABLET(S) THREE TIMESDAILYAS NEEDED FOR PAIN  Atherosclerosis of native artery of extremity with intermittent claudication, Benignhypertension with chronic kidney disease, stage III    · Renew: AmLODIPine Besylate 10 MG Oral Tablet; take 1 tablet daily at bedtime  Atherosclerosis of native artery of extremity with intermittent claudication, Chronic kidneydisease, stage 3, Peripheral vascular disease    · (1) CBC/ PLT (NO DIFF); Status:Active; Requested for:16Nov2017;    · (1) COMPREHENSIVE METABOLIC PANEL; Status:Active; Requested for:16Nov2017;    · (1) LIPID PANEL FASTING W DIRECT LDL REFLEX; Status:Active; Requestedfor:16Nov2017;   Dermatitis    · Start: Ammonium Lactate 12 % External Lotion; APPLY  AND RUB  IN A THIN FILM TOAFFECTED AREAS TWICE DAILY  (AM AND PM)    Discussion/Summary    Patient with multiple chronic medical conditions presents to discuss forthcoming testing due to asymptomatic but worsening carotid stenosis bilaterally  I strongly advised him to follow vascular surgery advise to proceed with further testing that will determine further treatment  will be forcing fluids, we will follow his BMP 48 hours after CTA is performed  reassured patient that his recent creatinine levels are within normal range, I explained patient and his wife that asymptomatic carotid stenosis could be posing significant risk for development of CVA in this patient with significant vasculopathic features including type 1 diabetes, CAD, renal artery stenosis, peripheral artery disease and known albeit treated sleep apnea  is compliant with current medications including aspirin and Plavix and statin  dyspnea on exertion felt to be related to coronary artery disease  is currently well controlled on beta-blocker and amlodipine  1 diabetes and hypothyroidism a followed by Endocrinology  will use Lac-Hydrin lotion for treatment of xerosis  medications refilled  questions answered to patient's and his wife's satisfaction  laceration left ankle, no evidence of cellulitic change, observation advised  explained patient and his wife that if decision to proceed with carotid surgery would be made-he will actually require cardiac clearance  Office visit today Re presents follow-up/discussion, not a surgical clearance  The patient, patient's family was counseled regarding instructions for management,-- risk factor reductions,-- impressions  total time of encounter was 30 minutes-- and-- 20 minutes was spent counseling  Possible side effects of new medications were reviewed with the patient/guardian today  The treatment plan was reviewed with the patient/guardian  The patient/guardian understands and agrees with the treatment plan      Chief Complaint  Patient is here for a preoperative clearance for a CT of the carotid arteries dated for 11/27/17 with [facility], Dr Duke Bailey surgeon  Patient c/o discolored areas on back of his left ankle x's 2+ days  Patient would like a medication refill for amlodipine and tramadol sent to his local pharmacy  All medications were reviewed and updated with the patient  History of Present Illness  HPI: Patient presents to discuss a recent evaluation by vascular surgery and planning for further testing of carotid stenosis and possible carotid surgery   He is extremely concerned about forthcoming CTA neck due to known renal insufficiency and IV dye administration  denies any stroke type symptoms that were reviewed during recent consultation with Dr Marleen Elizabethr  has symptoms of chronic claudication, left more than right, he remains on daily medications for type 1 diabetes, coronary artery disease, hypertension, hyperlipidemia, hypothyroidism  He uses CPAP as directed for recent diagnosis of obstructive sleep apnea  is under comprehensive care of Endocrinology, Cardiology, vascular surgery, pulmonology  of recent carotid duplex, vascular consult reviewed with patient and his wife in length today  is complaining of skin lesions/laceration left lateral ankle  is requesting refill for tramadol that he uses as needed for chronic back pain in claudication  Review of Systems   Constitutional: feeling tired  Eyes: No complaints of eye pain, no red eyes, no discharge from eyes, no itchy eyes  ENT: no complaints of earache, no hearing loss, no nosebleeds, no nasal discharge, no sore throat, no hoarseness  Cardiovascular: intermittent leg claudication, but-- no chest pain,-- no palpitations-- and-- no extremity edema  Respiratory: shortness of breath during exertion  Gastrointestinal: No complaints of abdominal pain, no constipation, no nausea or vomiting, no diarrhea or bloody stools  Genitourinary: No complaints of dysuria, no incontinence, no hesitancy, no nocturia, no genital lesion, no testicular pain  Musculoskeletal: arthralgias-- and-- lower back pain  Integumentary: No complaints of skin rash or skin lesions, no itching, no skin wound, no dry skin  Neurological: No compliants of headache, no confusion, no convulsions, no numbness or tingling, no dizziness or fainting, no limb weakness, no difficulty walking  Psychiatric: Is not suicidal, no sleep disturbances, no anxiety or depression, no change in personality, no emotional problems    Endocrine: No complaints of proptosis, no hot flashes, no muscle weakness, no erectile dysfunction, no deepening of the voice, no feelings of weakness  Hematologic/Lymphatic: No complaints of swollen glands, no swollen glands in the neck, does not bleed easily, no easy bruising  Active Problems    1  Acquired polyneuropathy (356 9) (G62 9)  2  Acute conjunctivitis (372 00) (H10 30)  3  Age-related cataract (366 10) (H25 9)  4  Anemia (285 9) (D64 9)  5  Arthralgia (719 40) (M25 50)  6  Asymptomatic bilateral carotid artery stenosis (433 10,433 30) (I65 23)  7  Atherosclerosis of native artery of extremity with intermittent claudication (440 21) (I70 219)  8  Benign hypertension with chronic kidney disease, stage III (403 10,585 3) (I12 9,N18 3)  9  Calf cramp (729 82) (R25 2)  10  Cataract (366 9) (H26 9)  11  Centrilobular emphysema (492 8) (J43 2)  12  Chronic kidney disease, stage 3 (585 3) (N18 3)  13  Degenerative lumbar spinal stenosis (724 02) (M48 061)  14  Dermatitis (692 9) (L30 9)  15  Dry eye syndrome (375 15) (H04 129)  16  Dyslipidemia (272 4) (E78 5)  17  Eczema (692 9) (L30 9)  18  Encounter for prostate cancer screening (V76 44) (Z12 5)  19  Facial skin lesion (709 9) (L98 9)  20  Fatigue (780 79) (R53 83)  21  Heartburn (787 1) (R12)  22  Hyperkalemia (276 7) (E87 5)  23  Hypothyroidism, postablative (244 1) (E89 0)  24  Intervertebral disc disorders with radiculopathy, lumbosacral region (724 4) (M51 17)  25  Low back pain with sciatica (724 3) (M54 40)  26  Multiple vessel coronary artery disease (414 00) (I25 10)  27  Need for 23-polyvalent pneumococcal polysaccharide vaccine (V03 82) (Z23)  28  Need for prophylactic vaccination and inoculation against influenza (V04 81) (Z23)  29  ITZEL on CPAP (327 23,V46 8) (G47 33,Z99 89)  30  Peripheral vascular disease (443 9) (I73 9)  31  Proteinuria (791 0) (R80 9)  32  Protruded lumbar disc (722 10) (M51 26)  33  Renal artery stenosis (440 1) (I70 1)  34  Renal cyst, right (753 10) (N28 1)  35  Shortness of breath on exertion (786 05) (R06 02)  36  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 (250 41,585 9) (E10 22,N18 9)  37  Unsteadiness (781 2) (R26 81)  38  Vitamin D deficiency (268 9) (E55 9)  39  White coat hypertension  40  Xerosis cutis (706 8) (L85 3)    Past Medical History     · History of Acute kidney injury (584 9) (N17 9)   · History of Acute venous embolism and thrombosis of deep vessels of proximal lowerextremity (453 41) (I82 4Y9)   · History of Chronic cough (786 2) (R05)   · History of dizziness (V13 89) (S20 242)   · History of hypertension (V12 59) (Z86 79)   · History of Ischemic cardiomyopathy (414 8) (I25 5)   · 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) (J84 10)    The active problems and past medical history were reviewed and updated today  Surgical History   · History of Cardiac Cath Procedure Outcome:   · History of Cath Stent 1 Assessment Lesion Could Not Be Crossed   · History of Cath Stent 1 Type Drug-Eluting   · History of Cath Stent 2 Type Drug-Eluting   · History of Cath Stent 3 Type Drug-Eluting   · History of PTA Femoral-Popliteal Initial Stenosis With Stent    The surgical history was reviewed and updated today  Family History  Mother    · Family history of liver disease (V18 59) (Z80 76)  Father    · Family history of cardiac disorder (V17 49) (Z82 49)   · Family history of kidney disease (V18 69) (Z84 1)   · Family history of liver disease (V18 59) (Z80 76)  Paternal Cousin    · Family history of diabetes mellitus (V18 0) (Z83 3)    The family history was reviewed and updated today  Social History     · Alcohol Use (History)   · 2 drinks per day   · Former smoker (V15 82) (G24 417)   · Smoking 48 years 1 5 ppd    d/c Oct 2008Screening  protocol   ·    · Retired   · desk work, [email or web address]  The social history was reviewed and updated today  Current Meds  1   Accu-Chek Angie Plus In Vitro Strip; TEST 4 TIMES DAILY DX: E11 9; Therapy: 16RWY5599 to (Evaluate:31Kox7975)  Requested for: 92AAE8767 Recorded  2  AmLODIPine Besylate 10 MG Oral Tablet; take 1 tablet daily at bedtime; Therapy: 82IRV8037 to (Evaluate:17Nov2017)  Requested for: 08GAM0725; Last Rx:22Nov2016 Ordered  3  Aspirin 81 MG TABS; CHEW ONE TABLET DAILY; Therapy: (Recorded:62Ocw5641) to Recorded  4  Atorvastatin Calcium 40 MG Oral Tablet; Take 1 tablet daily  Requested for: 95DUP6595; Last Rx:26Oct2017 Ordered  5  Carvedilol 12 5 MG Oral Tablet; TAKE ONE TABLET BY MOUTH TWICE A DAY; Therapy: 72EMW4739 to (Evaluate:29Jun2018)  Requested for: 02Oct2017; Last Rx:02Oct2017 Ordered  6  Clopidogrel Bisulfate 75 MG Oral Tablet; take one tablet by mouth daily; Therapy: 02AOS6641 to Recorded  7  CoQ-10 200 MG CAPS; TAKE 1 CAPSULE DAILY; Therapy: (Recorded:06Nmx8280) to Recorded  8  Furosemide 20 MG Oral Tablet; TAKE 1 TABLET EVERY OTHER DAY; Therapy: (Recorded:16Nov2017) to Recorded  9  Glucagon (rDNA) 1 MG Kit; AS NEEDED FOR <40 BLOOD SUGAR; Therapy: (Recorded:82Dqh6777) to Recorded  10  HumaLOG 100 UNIT/ML Subcutaneous Solution; USE AS DIRECTED; Therapy: (Recorded:13Ldo2325) to Recorded  11  Lantus SoloStar 100 UNIT/ML Subcutaneous Solution Pen-injector; use as directed 22  IU BID; Therapy: 12CYZ9737 to Recorded  12  Levothyroxine Sodium 175 MCG Oral Tablet; TAKE 1 TABLET DAILY  Requested for:  78WCN4669; Last Rx:04Oct2016 Ordered  13  Nitroglycerin 0 4 MG Sublingual Tablet Sublingual; DISSOLVE 1 TABLET UNDER THE  TONGUE AS NEEDED FOR CHEST PAIN; Last Rx:00Vrp7296 Ordered  14  TraMADol HCl - 50 MG Oral Tablet; TAKE ONE 1-2  TABLET(S) THREE TIMES DAILYAS  NEEDED FOR PAIN;  Therapy: 41FRA7435 to (Evaluate:00Hzn1572); Last Rx:07Jun2017 Ordered    The medication list was reviewed and updated today  Allergies  1  Cardura TABS  2   Zestril TABS    Vitals   Recorded: 73FKM0806 08:12AM   Temperature 96 4 F   Heart Rate 76   Respiration 18   Systolic 325   Diastolic 72   Height 5 ft 8 in   Weight 199 lb    BMI Calculated 30 26   BSA Calculated 2 04       Physical Exam   Constitutional  General appearance: No acute distress, well appearing and well nourished  Neck  Neck: Supple, symmetric, trachea midline, no masses  Pulmonary  Auscultation of lungs: Clear to auscultation  Cardiovascular  Auscultation of heart: Normal rate and rhythm, normal S1 and S2, no murmurs  Carotid pulses: Abnormal   right 1+,-- right carotid bruit heard,-- left 1+-- and-- left carotid bruit heard  Examination of extremities for edema and/or varicosities: Normal  -- Tiny laceration left lateral ankle, no cellulitic change, dryness of bilateral lower extremities  Diabetic Foot Exam: Socks and shoes removed, Right Foot Findings: erythema,-- dryness-- and-- cold /purplish discoloration, but-- no swelling  The toes were normal  Socks and Shoes removed, Left Foot Findings: erythema,-- dryness-- and-- Cold/purplish discoloration, but-- no swelling  The toes were normal   Monofilament Testing: normal tactile sensation with monofilament testing throughout both feet  Vascular: Pulses: 1+ in the dorsalis pedis  Pulses: 0 in the dorsalis pedis  Assign Risk Category: 1: No loss of protective sensation, deformity present  Impending risk  Psychiatric  Judgment and insight: Normal    Mood and affect: Normal        End of Encounter Meds    1  TraMADol HCl - 50 MG Oral Tablet; TAKE ONE 1-2  TABLET(S) THREE TIMES DAILYAS NEEDED FOR PAIN; Therapy: 25SJM7648 to (Evaluate:55Xcp4442); Last Rx:16Nov2017 Ordered    2  Clopidogrel Bisulfate 75 MG Oral Tablet; take one tablet by mouth daily; Therapy: 36BSE1451 to Recorded    3  AmLODIPine Besylate 10 MG Oral Tablet; take 1 tablet daily at bedtime; Therapy: 85JID5336 to (Evaluate:11Nov2018)  Requested for: 31GJY3527; Last Rx:16Nov2017 Ordered    4  Carvedilol 12 5 MG Oral Tablet; TAKE ONE TABLET BY MOUTH TWICE A DAY;  Therapy: 58ZSD7796 to (Evaluate:29Jun2018)  Requested for: 30CJB5826; Last Rx:02Oct2017 Ordered    5  Ammonium Lactate 12 % External Lotion; APPLY  AND RUB  IN A THIN FILM TO AFFECTED AREAS TWICE DAILY  (AM AND PM); Therapy: 68CLG7383 to (Last Rx:16Nov2017)  Requested for: 21BRV2259 Ordered    6  HumaLOG 100 UNIT/ML Subcutaneous Solution; USE AS DIRECTED; Therapy: (Recorded:95Zqw4821) to Recorded    7  Atorvastatin Calcium 40 MG Oral Tablet; Take 1 tablet daily  Requested for: 53PKZ5963; Last Rx:26Oct2017 Ordered    8  Aspirin 81 MG TABS; CHEW ONE TABLET DAILY; Therapy: (Recorded:54Bbf4978) to Recorded  9  CoQ-10 200 MG CAPS; TAKE 1 CAPSULE DAILY; Therapy: (Recorded:01Dec2016) to Recorded  10  Furosemide 20 MG Oral Tablet; TAKE 1 TABLET EVERY OTHER DAY; Therapy: (Recorded:16Nov2017) to Recorded    11  Levothyroxine Sodium 175 MCG Oral Tablet; TAKE 1 TABLET DAILY  Requested for:  54MTR2261; Last Rx:04Oct2016 Ordered    12  Nitroglycerin 0 4 MG Sublingual Tablet Sublingual (Nitrostat); DISSOLVE 1 TABLET  UNDER THE TONGUE AS NEEDED FOR CHEST PAIN; Last Rx:79Kcp7593 Ordered    13  Accu-Chek Angie Plus In Vitro Strip; TEST 4 TIMES DAILY DX: E11 9; Therapy: 15IAJ3856 to (Evaluate:54Cwj9116)  Requested for: 23KUL9878 Recorded    14  Glucagon (rDNA) 1 MG Kit; AS NEEDED FOR <40 BLOOD SUGAR; Therapy: (Recorded:72Mgp2690) to Recorded  15  Lantus SoloStar 100 UNIT/ML Subcutaneous Solution Pen-injector; use as directed 22  IU BID; Therapy: 34SAG8652 to Recorded    Future Appointments    Date/Time Provider Specialty Site   01/30/2018 02:20 PM TERRY Smith  Pulmonary Medicine [facility]   11/30/2017 01:45 PM Elmo Romano MD Vascular Surgery [facility]       Signatures   Electronically signed by :  TERRY Hutchinson ; Nov 17 2017  9:54PM EST                       (Author)

## 2017-11-24 RX ORDER — SODIUM CHLORIDE 9 MG/ML
100 INJECTION, SOLUTION INTRAVENOUS CONTINUOUS
Status: DISCONTINUED | OUTPATIENT
Start: 2017-11-27 | End: 2017-11-30 | Stop reason: HOSPADM

## 2017-11-27 ENCOUNTER — HOSPITAL ENCOUNTER (OUTPATIENT)
Dept: CT IMAGING | Facility: HOSPITAL | Age: 72
Discharge: HOME | End: 2017-11-27
Attending: SURGERY
Payer: MEDICARE

## 2017-11-27 ENCOUNTER — HOSPITAL ENCOUNTER (OUTPATIENT)
Dept: INFUSION CENTER | Facility: CLINIC | Age: 72
Discharge: HOME | End: 2017-11-27
Payer: MEDICARE

## 2017-11-27 VITALS
OXYGEN SATURATION: 97 % | HEIGHT: 67 IN | BODY MASS INDEX: 31.12 KG/M2 | RESPIRATION RATE: 18 BRPM | WEIGHT: 198.31 LBS | SYSTOLIC BLOOD PRESSURE: 140 MMHG | DIASTOLIC BLOOD PRESSURE: 70 MMHG | HEART RATE: 66 BPM | TEMPERATURE: 98 F

## 2017-11-27 DIAGNOSIS — I70.219 ATHEROSCLEROSIS OF NATIVE ARTERIES OF EXTREMITY WITH INTERMITTENT CLAUDICATION (HCC): ICD-10-CM

## 2017-11-27 DIAGNOSIS — N18.30 CHRONIC KIDNEY DISEASE, STAGE III (MODERATE) (HCC): ICD-10-CM

## 2017-11-27 DIAGNOSIS — I65.23 OCCLUSION AND STENOSIS OF BILATERAL CAROTID ARTERIES: ICD-10-CM

## 2017-11-27 PROCEDURE — 70498 CT ANGIOGRAPHY NECK: CPT

## 2017-11-27 RX ADMIN — SODIUM CHLORIDE 100 ML/HR: 0.9 INJECTION, SOLUTION INTRAVENOUS at 08:34

## 2017-11-27 RX ADMIN — IOHEXOL 85 ML: 350 INJECTION, SOLUTION INTRAVENOUS at 11:03

## 2017-11-29 ENCOUNTER — TRANSCRIBE ORDERS (OUTPATIENT)
Dept: LAB | Facility: CLINIC | Age: 72
End: 2017-11-29

## 2017-11-29 ENCOUNTER — LAB (OUTPATIENT)
Dept: LAB | Facility: CLINIC | Age: 72
End: 2017-11-29
Payer: MEDICARE

## 2017-11-29 DIAGNOSIS — E03.9 HYPOTHYROIDISM, UNSPECIFIED TYPE: Primary | ICD-10-CM

## 2017-11-29 DIAGNOSIS — I70.219 ATHEROSCLEROSIS OF NATIVE ARTERIES OF EXTREMITY WITH INTERMITTENT CLAUDICATION (HCC): ICD-10-CM

## 2017-11-29 DIAGNOSIS — E10.9 TYPE 1 DIABETES MELLITUS WITHOUT COMPLICATION (HCC): ICD-10-CM

## 2017-11-29 DIAGNOSIS — N18.30 CHRONIC KIDNEY DISEASE, STAGE III (MODERATE) (HCC): ICD-10-CM

## 2017-11-29 DIAGNOSIS — E03.9 HYPOTHYROIDISM, UNSPECIFIED TYPE: ICD-10-CM

## 2017-11-29 DIAGNOSIS — I73.9 PERIPHERAL VASCULAR DISEASE (HCC): ICD-10-CM

## 2017-11-29 LAB
ALBUMIN SERPL BCP-MCNC: 3.1 G/DL (ref 3.5–5)
ALP SERPL-CCNC: 110 U/L (ref 46–116)
ALT SERPL W P-5'-P-CCNC: 22 U/L (ref 12–78)
ANION GAP SERPL CALCULATED.3IONS-SCNC: 9 MMOL/L (ref 4–13)
AST SERPL W P-5'-P-CCNC: 15 U/L (ref 5–45)
BILIRUB SERPL-MCNC: 0.6 MG/DL (ref 0.2–1)
BUN SERPL-MCNC: 20 MG/DL (ref 5–25)
CALCIUM SERPL-MCNC: 8.8 MG/DL (ref 8.3–10.1)
CHLORIDE SERPL-SCNC: 107 MMOL/L (ref 100–108)
CHOLEST SERPL-MCNC: 150 MG/DL (ref 50–200)
CO2 SERPL-SCNC: 24 MMOL/L (ref 21–32)
CREAT SERPL-MCNC: 1.31 MG/DL (ref 0.6–1.3)
ERYTHROCYTE [DISTWIDTH] IN BLOOD BY AUTOMATED COUNT: 13 % (ref 11.6–15.1)
EST. AVERAGE GLUCOSE BLD GHB EST-MCNC: 169 MG/DL
GFR SERPL CREATININE-BSD FRML MDRD: 54 ML/MIN/1.73SQ M
GLUCOSE P FAST SERPL-MCNC: 189 MG/DL (ref 65–99)
HBA1C MFR BLD: 7.5 % (ref 4.2–6.3)
HCT VFR BLD AUTO: 37.8 % (ref 36.5–49.3)
HDLC SERPL-MCNC: 54 MG/DL (ref 40–60)
HGB BLD-MCNC: 13 G/DL (ref 12–17)
LDLC SERPL CALC-MCNC: 73 MG/DL (ref 0–100)
MCH RBC QN AUTO: 32.6 PG (ref 26.8–34.3)
MCHC RBC AUTO-ENTMCNC: 34.4 G/DL (ref 31.4–37.4)
MCV RBC AUTO: 95 FL (ref 82–98)
PLATELET # BLD AUTO: 146 THOUSANDS/UL (ref 149–390)
PMV BLD AUTO: 12.7 FL (ref 8.9–12.7)
POTASSIUM SERPL-SCNC: 3.9 MMOL/L (ref 3.5–5.3)
PROT SERPL-MCNC: 6.7 G/DL (ref 6.4–8.2)
RBC # BLD AUTO: 3.99 MILLION/UL (ref 3.88–5.62)
SODIUM SERPL-SCNC: 140 MMOL/L (ref 136–145)
TRIGL SERPL-MCNC: 116 MG/DL
TSH SERPL DL<=0.05 MIU/L-ACNC: 1.96 UIU/ML (ref 0.36–3.74)
WBC # BLD AUTO: 5.32 THOUSAND/UL (ref 4.31–10.16)

## 2017-11-29 PROCEDURE — 36415 COLL VENOUS BLD VENIPUNCTURE: CPT

## 2017-11-29 PROCEDURE — 83036 HEMOGLOBIN GLYCOSYLATED A1C: CPT

## 2017-11-29 PROCEDURE — 80053 COMPREHEN METABOLIC PANEL: CPT

## 2017-11-29 PROCEDURE — 85027 COMPLETE CBC AUTOMATED: CPT

## 2017-11-29 PROCEDURE — 84443 ASSAY THYROID STIM HORMONE: CPT

## 2017-11-29 PROCEDURE — 80061 LIPID PANEL: CPT

## 2017-11-30 ENCOUNTER — GENERIC CONVERSION - ENCOUNTER (OUTPATIENT)
Dept: OTHER | Facility: OTHER | Age: 72
End: 2017-11-30

## 2017-12-05 ENCOUNTER — ALLSCRIPTS OFFICE VISIT (OUTPATIENT)
Dept: OTHER | Facility: OTHER | Age: 72
End: 2017-12-05

## 2017-12-05 NOTE — PROGRESS NOTES
Surgery Scheduling Form  Pre-Operative Risk Assessment:  Date Last Seen: 09/26/2017  Date of Surgery: pending  Hospital: [facility]  Type of Surgery: Right Carotid Endarterectomy  Surgeon Name: Dr Devi Sainz Office Number: [phone]  Fax Number: [phone]  Cardiac: No Cardiac Contraindication for planned surgical procedure  Further Testing Required: No  Anesthesia: General  Patient Approved for Surgery: Yes  Clearing Doctor: Lani Mortimer, DO  Comments: Continue statin and beta-blocker perioperatively        Signatures   Electronically signed by : Jay Hirsch, ; Dec  1 2017  9:39AM EST                       (Author)    Electronically signed by : Lani Mortimer, DO; Dec  1 2017  2:22PM EST                       (Author)

## 2017-12-06 NOTE — CONSULTS
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 (250 41,585 9) (E10 22,N18 9)    Discussion/Summary  Discussion Summary:   Type 1 diabetes well controlled: Currently patients blood sugars are well controlled during the day but do increase to 150 at night time, patient is currently on Lantus 22 units twice a day, after a discussion with the patient lantus will be changes to 28 units at night and continue 22 units during the day with carb counting based insulin with meals  There was also an extensive discussion about possibility of patient switching to an insulin pump in the future which will be further discussed during next visit  Records from his other consultants and his blood sugar sensor were reviewed as well  Patient should return for follow up in February with labs  Will follow the patient in the hospital during his surgery  disease status post ablation now hypothyroid: TSH in November was within normal limits  We will check it again in February  Plan is to continue current dose of levothyroxine  Counseling Documentation With Imm: The patient, patient's family was counseled regarding diagnostic results,-- instructions for management,-- impressions,-- risks and benefits of treatment options  Medication SE Review and Pt Understands Tx: Possible side effects of new medications were reviewed with the patient/guardian today  The treatment plan was reviewed with the patient/guardian  The patient/guardian understands and agrees with the treatment plan   Patient's Capacity to Self-Care: Patient is able to Self-Care  Chief Complaint  Chief Complaint Free Text Note Form: Consult DM 1      History of Present Illness  HPI: Pt will have surgery at [facility] for Carotid stenosis B/L and would like endocrine support at that time   He has an endocrinologist from [facility] but was advised to see Ziyad Lauren Endocrinologist  Last A1c 7 5% on 11/29  Eye doctor 9/17  Pod follow up  Protein/ cr ratio 1 28  Diabetes Type I (Initial): The patient is being seen for a consultation regarding diabetes mellitus type I  40 year(s) ago he was evaluated by a colleague and presented with  Current symptoms include fatigue, but-- no abdominal pain,-- no extremity pain,-- no polyphagia,-- no extremity numbness,-- no polydipsia,-- no extremity paresthesias,-- no polyuria-- (nocturia ),-- no anorexia,-- no blurred vision,-- no frequent infections,-- no seizures,-- no bladder dysfunction,-- no diarrhea,-- no change in vision-- (cataracts ),-- no excessive hunger,-- no lower extremity ulcers,-- no vomiting,-- no sweating,-- no nausea-- and-- no weight loss--   The patient presents with complaints of no fruity breath (Never been in DKA, never been hospitilized for DM)  The onset was sudden  The symptoms occur constantly  He describes this as mild  (Lantus 22 untis at HS and sliding scale humalog for tid meals) Pertinent medical history: cataract(s)  (NSTEMI 9/23/15 sp 4 stents  Hx of CHF  Graves disease sp radiation therapy, now hypothyroid ) (does not smoke, 2 drinks a day vodka on ice, no drugs )  (DM does not know if type 1 or 2 )  Disease Course and Complications:  Cardiovascular: peripheral arterial disease  Review of Systems  Endo Adult ROS Male New Patient:  Constitutional/General: no change in ring size,-- no change in shoe size,-- no chills,-- no dizziness,-- no fainting,-- no fatigue,-- no fever,-- forgetfulness,-- no headache,-- no loss of sleep,-- no recent weight loss,-- no nervousness,-- no numbness,-- no temperature intolerance,-- no excessive sweating-- and-- no weight gain    Muscle/Joint/Bone: leg pain,-- foot pain-- and-- leg weakness, but-- no arm pain,-- no back pain,-- no hip pain,-- no neck pain,-- no hand pain,-- no shoulder pain,-- no arm weakness,-- no back weakness,-- no hip weakness,-- no foot weakness,-- no neck weakness,-- no hand weakness,-- no shoulder weakness,-- no arm numbness,-- no back numbness,-- no hip numbness,-- no leg numbness,-- no foot numbness,-- no neck numbness,-- no hand numbness-- and-- no shoulder numbness  Gastrointestinal: no constipation,-- no diarrhea,-- no excessive hunger,-- no excessive thirst,-- no nausea,-- no poor appetite,-- no rectal bleeding,-- no stomach pain,-- no vomiting-- and-- no vomiting blood  Cardiovascular: hypertension,-- poor circulation-- and-- ankle swelling, but-- no chest pain,-- no irregular heart beat,-- no hypotension-- and-- no rapid heart beat  Eye/Ear/Nose/Throat: no bleeding gums,-- no blurred vision,-- no difficulty swallowing,-- no double vision,-- no gritty eyes,-- no hoarseness,-- no hearing loss,-- no persistent cough,-- no sinus problems,-- not seeing flashes-- and-- not seeing halos  Skin: no easy bruising,-- no hives,-- no itching,-- no change in a mole,-- no rashes,-- no scar-- and-- no slow healing sores  Genitourinary night time urination, but-- no blood in urine,-- no frequent urination-- and-- no painful urination  Genitourinary - Reproductive erection difficulties, but-- no breast lump  ROS Reviewed:   ROS reviewed  Active Problems    1  Acquired polyneuropathy (356 9) (G62 9)   2  Acute conjunctivitis (372 00) (H10 30)   3  Age-related cataract (366 10) (H25 9)   4  Anemia (285 9) (D64 9)   5  Arthralgia (719 40) (M25 50)   6  Asymptomatic bilateral carotid artery stenosis (433 10,433 30) (I65 23)   7  Atherosclerosis of native artery of extremity with intermittent claudication (440 21) (I70 219)   8  Benign hypertension with chronic kidney disease, stage III (403 10,585 3) (I12 9,N18 3)   9  Calf cramp (729 82) (R25 2)   10  Cataract (366 9) (H26 9)   11  Centrilobular emphysema (492 8) (J43 2)   12  Chronic kidney disease, stage 3 (585 3) (N18 3)   13   Degenerative lumbar spinal stenosis (724 02) (M48 061)   14  Dermatitis (692 9) (L30 9)   15  Dry eye syndrome (375 15) (H04 129)   16  Dyslipidemia (272 4) (E78 5)   17  Eczema (692 9) (L30 9)   18  Encounter for prostate cancer screening (V76 44) (Z12 5)   19  Facial skin lesion (709 9) (L98 9)   20  Fatigue (780 79) (R53 83)   21  Heartburn (787 1) (R12)   22  Hyperkalemia (276 7) (E87 5)   23  Hypothyroidism, postablative (244 1) (E89 0)   24  Intervertebral disc disorders with radiculopathy, lumbosacral region (724 4) (M51 17)   25  Low back pain with sciatica (724 3) (M54 40)   26  Multiple vessel coronary artery disease (414 00) (I25 10)   27  Need for 23-polyvalent pneumococcal polysaccharide vaccine (V03 82) (Z23)   28  Need for prophylactic vaccination and inoculation against influenza (V04 81) (Z23)   29  ITZEL on CPAP (327 23,V46 8) (G47 33,Z99 89)   30  Peripheral vascular disease (443 9) (I73 9)   31  Proteinuria (791 0) (R80 9)   32  Protruded lumbar disc (722 10) (M51 26)   33  Renal artery stenosis (440 1) (I70 1)   34  Renal cyst, right (753 10) (N28 1)   35  Shortness of breath on exertion (786 05) (R06 02)   36  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 (250 41,585 9) (E10 22,N18 9)   37  Unsteadiness (781 2) (R26 81)   38  Vitamin D deficiency (268 9) (E55 9)   39  White coat hypertension   40  Xerosis cutis (706 8) (L85 3)    Past Medical History    1  History of Acute kidney injury (584 9) (N17 9)   2  History of Acute venous embolism and thrombosis of deep vessels of proximal lower extremity (453 41) (I82 4Y9)   3  History of Chronic cough (786 2) (R05)   4  History of dizziness (V13 89) (Z87 898)   5  History of hypertension (V12 59) (Z86 79)   6  History of Ischemic cardiomyopathy (414 8) (I25 5)   7  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) (J84 10)  Active Problems And Past Medical History Reviewed: The active problems and past medical history were reviewed and updated today  Surgical History  1  History of Cardiac Cath Procedure Outcome:   2  History of Cath Stent 1 Assessment Lesion Could Not Be Crossed   3  History of Cath Stent 1 Type Drug-Eluting   4  History of Cath Stent 2 Type Drug-Eluting   5  History of Cath Stent 3 Type Drug-Eluting   6  History of PTA Femoral-Popliteal Initial Stenosis With Stent  Surgical History Reviewed: The surgical history was reviewed and updated today  Family History  Mother    1  Family history of liver disease (V18 59) (Z83 79)  Father    2  Family history of cardiac disorder (V17 49) (Z82 49)   3  Family history of kidney disease (V18 69) (Z84 1)   4  Family history of liver disease (V18 59) (Z83 79)  Paternal Cousin    11  Family history of diabetes mellitus (V18 0) (Z83 3)  Family History Reviewed: The family history was reviewed and updated today  Social History     · Alcohol Use (History)   · Former smoker (X86 97) (F31 279)   ·    · Retired  Social History Reviewed: The social history was reviewed and updated today  The social history was reviewed and is unchanged  Current Meds   1  Accu-Chek Angie Plus In Vitro Strip; TEST 4 TIMES DAILY DX: E11 9; Therapy: 55DGC8048 to (Evaluate:88Slz7358)  Requested for: 31ZBE0600 Recorded   2  AmLODIPine Besylate 10 MG Oral Tablet; take 1 tablet daily at bedtime; Therapy: 10ULB6635 to (Evaluate:11Nov2018)  Requested for: 59JHX0486; Last Rx:16Nov2017 Ordered   3  Aspirin 81 MG TABS; CHEW ONE TABLET DAILY; Therapy: (Recorded:90Cpd3038) to Recorded   4  Atorvastatin Calcium 40 MG Oral Tablet; Take 1 tablet daily  Requested for: 05RJZ0218; Last Rx:26Oct2017 Ordered   5  Carvedilol 12 5 MG Oral Tablet; TAKE ONE TABLET BY MOUTH TWICE A DAY; Therapy: 91PIP6448 to (Evaluate:29Jun2018)  Requested for: 02Oct2017; Last Rx:02Oct2017 Ordered   6  Clopidogrel Bisulfate 75 MG Oral Tablet; take one tablet by mouth daily; Therapy: 41PGG7549 to Recorded   7  CoQ-10 200 MG CAPS; TAKE 1 CAPSULE DAILY;  Therapy: (Recorded:48Qkq9010) to Recorded   8  Furosemide 20 MG Oral Tablet; TAKE 1 TABLET EVERY OTHER DAY; Therapy: (Recorded:16Nov2017) to Recorded   9  Glucagon (rDNA) 1 MG Kit; AS NEEDED FOR <40 BLOOD SUGAR; Therapy: (Recorded:62Usa7652) to Recorded   10  HumaLOG 100 UNIT/ML Subcutaneous Solution; USE AS DIRECTED; Therapy: (Recorded:50Brw7407) to Recorded   11  Levothyroxine Sodium 175 MCG Oral Tablet; TAKE 1 TABLET DAILY  Requested for:  12PLK7739; Last Rx:04Oct2016 Ordered   12  Nitroglycerin 0 4 MG Sublingual Tablet Sublingual; DISSOLVE 1 TABLET UNDER THE  TONGUE AS NEEDED FOR CHEST PAIN; Last Rx:01Dec2016 Ordered   13  TraMADol HCl - 50 MG Oral Tablet; TAKE ONE 1-2  TABLET(S) THREE TIMES DAILYAS  NEEDED FOR PAIN;  Therapy: 47PIO5105 to (Evaluate:86Ffh8547); Last Rx:16Nov2017 Ordered  Medication List Reviewed: The medication list was reviewed and updated today  Allergies  1  Cardura TABS   2  Zestril TABS    Vitals  Vital Signs    Recorded: 79AIT3919 07:42AM   Heart Rate 72   Systolic 989   Diastolic 68   Height 5 ft 8 in   Weight 202 lb    BMI Calculated 30 71   BSA Calculated 2 06       Physical Exam   Constitutional  General appearance: No acute distress, well appearing and well nourished  Eyes  Conjunctiva and lids: No swelling, erythema, or discharge  Pupils: Equal, round and reactive to light  The sclera are anicteric  Extraocular movements are intact  Ears, Nose, Mouth, and Throat  External inspection of ears, nose and lips: Normal    Oropharynx: Normal with no erythema, edema, exudate or lesions  Exam of Head: The head is atraumatic and normocephalic  Neck: The neck is supple  The thyroid is normal in size with no palpable nodules  Pulmonary  Respiratory effort: No increased work of breathing or signs of respiratory distress  Auscultation of lungs: Clear to auscultation bilaterally with normal chest expansion     Cardiovascular  Auscultation of heart: Normal rate and rhythm with no murmurs, gallops or rubs  Examination of pulses: Dorsalis pedal pulses are +2 and equal bilaterally  Abdomen  Abdomen: Abdomen is soft, non-tender with normal bowel sounds  Lymphatic  Palpation of lymph nodes: No supraclavicular or suboccipital lymphadenopathy  Musculoskeletal  Inspection/palpation of joints, bones, and muscles: Muscle bulk and tone is normal    Skin  Skin and subcutaneous tissue: Normal skin temperature and color  Neurologic  Cranial nerves: Cranial nerves 2-12 intact  Reflexes: 2+ and symmetric  Motor Strength: Strength is 5/5 bilaterally  Psychiatric  Orientation to person, place and time: Normal    Mood and affect: Affect and attention span are normal      Socks and shoes removed, Right Foot Findings: normal foot, no swelling, no erythema  The right toes were normal-- and-- had full range of motion  The sensory exam showed normal vibratory sensation at the level of the toes on the right  Normal tactile sensation with monofilament testing throughout the right foot  Socks and shoes removed, Left Foot Findings: normal foot, no swelling, no erythema  The left toes were normal-- and-- had full range of motion  The sensory exam showed normal vibratory sensation at the level of the toes on the left  Normal tactile sensation with monofilament testing throughout the left foot  Pulses:  2+ in the dorsalis pedis on the right  Pulses:  2+ in the dorsalis pedis on the left  Results/Data  Office Record Review: I have reviewed the office records as summarized above in the HPI  Diagnostic Studies Reviewed:  Diagnostic Review A continuous glucose monitoring report was reviewed  Please see separate scanned document   (1) LIPID PANEL FASTING W DIRECT LDL REFLEX 51FIQ5025 11:38AM Joleen Hahn Order Number: [id number]     Test Name Result Flag Reference   CHOLESTEROL 150 mg/dL     LDL CHOLESTEROL CALCULATED 73 mg/dL  0-100     Triglyceride:       Normal <150 mg/dl  Borderline High 150-199 mg/dl  High 200-499 mg/dl  Very High >499 mg/dl   Cholesterol:      Desirable <200 mg/dl   Borderline High 200-239 mg/dl   High >239 mg/dl   HDL Cholesterol:      High>59 mg/dL   Low <41 mg/dL   HDL Cholesterol:      High>59 mg/dL   Low <41 mg/dL   This screening LDL is a calculated result  It does not have the accuracy of the Direct Measured LDL in the monitoring of patients with hyperlipidemia and/or statin therapy  Direct Measure LDL (TJL954) must be ordered separately in these patients  TRIGLYCERIDES 116 mg/dL  <=150   Specimen collection should occur prior to N-Acetylcysteine or Metamizole administration due to the potential for falsely depressed results  HDL,DIRECT 54 mg/dL  40-60   Specimen collection should occur prior to Metamizole administration due to the potential for falsley depressed results  (1) HEMOGLOBIN A1C 90JBS9797 11:38AM EPIC, Provider   Test ordered by: Rena Chang Name Result Flag Reference   HEMOGLOBIN A1C 7 5 % H 4 2-6 3   EST  AVG  GLUCOSE 169 mg/dl       (1) TSH WITH FT4 REFLEX [id number] 11:38AM EPIC, Provider   Test ordered by: Evon Greco     Test Name Result Flag Reference   TSH 1 960 uIU/mL  0 358-3 740   Patients undergoing fluorescein dye angiography may retain small amounts of fluorescein in the body for 48-72 hours post procedure  Samples containing fluorescein can produce falsely depressed TSH values  If the patient had this procedure,a specimen should be resubmitted post fluorescein clearance  Attending Note  Attending Note Donavon Adams: Attending Note: I interviewed, took the history and examined the patient,-- I supervised the Resident-- and-- I agree with the Resident management plan as it was presented to me  Level of Participation: I was present in clinic and examined the patient  Key Parts of the Exam: Awake, moves all extremities  There are no lesions on the feet  I agree with the Resident's note        Future Appointments    Date/Time Provider Specialty Site   01/30/2018 02:20 PM TERRY Catalan  Pulmonary Medicine [facility] [place]   03/05/2018 12:50 PM TERRY Le   Endocrinology [facility] ENDOCRINOLOGY   01/09/2018 01:00 PM Avelina Deleon DO Cardiology [facility]   01/25/2018 03:00 PM Lazaro Rios MD Vascular Surgery [facility]       Signatures   Electronically signed by : Ken Stafford DO; Dec  5 2017 10:08AM EST                       (Author)    Electronically signed by : Ken Stafford DO; Dec  5 2017 10:10AM EST                       (Author)    Electronically signed by : TERRY Hood ; Dec  5 2017 10:31AM EST                       (Author)

## 2018-01-09 NOTE — RESULT NOTES
Verified Results  (1) COMPREHENSIVE METABOLIC PANEL 27OWR4066 77:13LV Nahun Sparks     Test Name Result Flag Reference   GLUCOSE,RANDM 107 mg/dL     If the patient is fasting, the ADA then defines impaired fasting glucose as > 100 mg/dL and diabetes as > or equal to 123 mg/dL  SODIUM 137 mmol/L  136-145   POTASSIUM 4 2 mmol/L  3 5-5 3   CHLORIDE 104 mmol/L  100-108   CARBON DIOXIDE 27 mmol/L  21-32   ANION GAP (CALC) 6 mmol/L  4-13   BLOOD UREA NITROGEN 19 mg/dL  5-25   CREATININE 1 32 mg/dL H 0 60-1 30   Standardized to IDMS reference method   CALCIUM 8 9 mg/dL  8 3-10 1   BILI, TOTAL 0 53 mg/dL  0 20-1 00   ALK PHOSPHATAS 98 U/L     ALT (SGPT) 29 U/L  12-78   AST(SGOT) 19 U/L  5-45   ALBUMIN 3 4 g/dL L 3 5-5 0   TOTAL PROTEIN 7 1 g/dL  6 4-8 2   eGFR Non-African American 53 5 ml/min/1 73sq m     - Patient Instructions: This is a fasting blood test  Water, black tea or black coffee only after 9:00pm the night before test Drink 2 glasses of water the morning of test - Patient Instructions: This bloodwork is non-fasting  Please drink two glasses of   water morning of bloodwork  National Kidney Disease Education Program recommendations are as follows:  GFR calculation is accurate only with a steady state creatinine  Chronic Kidney disease less than 60 ml/min/1 73 sq  meters  Kidney failure less than 15 ml/min/1 73 sq  meters  (1) TSH [id number] 08:59AM Nahun Sparks     Test Name Result Flag Reference   TSH 5 010 uIU/mL H 0 358-3 740   - Patient Instructions: This bloodwork is non-fasting  Please drink two glasses of water morning of bloodwork  - Patient Instructions: This is a fasting blood test  Water, black tea or black coffee only after 9:00pm the night before test Drink 2 glasses of water the morning of test - Patient Instructions: This bloodwork is non-fasting  Please drink two glasses of   water morning of bloodwork    Patients undergoing fluorescein dye angiography may retain small amounts of fluorescein in the body for 48-72 hours post procedure  Samples containing fluorescein can produce falsely depressed TSH values  If the patient had this procedure,a specimen should be resubmitted post fluorescein clearance  (1) LIPID PANEL FASTING W DIRECT LDL REFLEX 04Oct2016 08:59AM Rickey Delgado     Test Name Result Flag Reference   CHOLESTEROL 155 mg/dL     LDL CHOLESTEROL CALCULATED 65 mg/dL  0-100   - Patient Instructions: This is a fasting blood test  Water, black tea or black coffee only after 9:00pm the night before test   Drink 2 glasses of water the morning of test     - Patient Instructions: This is a fasting blood test  Water, black tea or black coffee only after 9:00pm the night before test Drink 2 glasses of water the morning of test - Patient Instructions: This bloodwork is non-fasting  Please drink two glasses of   water morning of bloodwork  Triglyceride:         Normal              <150 mg/dl       Borderline High    150-199 mg/dl       High               200-499 mg/dl       Very High          >499 mg/dl  Cholesterol:         Desirable        <200 mg/dl      Borderline High  200-239 mg/dl      High             >239 mg/dl  HDL Cholesterol:        High    >59 mg/dL      Low     <41 mg/dL  LDL Cholesterol:        Optimal          <100 mg/dl        Near Optimal     100-129 mg/dl        Above Optimal          Borderline High   130-159 mg/dl          High              160-189 mg/dl          Very High        >189 mg/dl  LDL CALCULATED:    This screening LDL is a calculated result  It does not have the accuracy of the Direct Measured LDL in the monitoring of patients with hyperlipidemia and/or statin therapy  Direct Measure LDL (NMW802) must be ordered separately in these patients  TRIGLYCERIDES 154 mg/dL H <=150   Specimen collection should occur prior to N-Acetylcysteine or Metamizole administration due to the potential for falsely depressed results     HDL,DIRECT 59 mg/dL  40-60   Specimen collection should occur prior to Metamizole administration due to the potential for falsely depressed results  (1) PSA (SCREEN) (Dx V76 44 Screen for Prostate Cancer) 43LOC4007 08:59AM [employer]     Test Name Result Flag Reference   PROSTATE SPECIFIC ANTIGEN 0 6 ng/mL  0 0-4 0   - Patient Instructions: This test is non-fasting  Please drink two glasses of water morning of bloodwork  - Patient Instructions: This test is non-fasting  Please drink two glasses of water morning of bloodwork  (1) HEMOGLOBIN A1C 04Oct2016 08:59AM [employer]   TW Order Number: [id number]     Test Name Result Flag Reference   HEMOGLOBIN A1C 6 4 % H 4 2-6 3   EST  AVG  GLUCOSE 137 mg/dl         Discussion/Summary   I spoke with patient's wife regarding bloodwork results  I will be discussing further management with endocrinology and cardiology  We should be increasing dose of Synthroid; we will check with Dr [place]  Signatures   Electronically signed by :  TERRY Summers ; Oct  5 2016  8:59AM EST                       (Author)

## 2018-01-09 NOTE — MISCELLANEOUS
Message   Recorded as Task   Date: 01/19/2017 01:37 PM, Created By: Alessandro Moore   Task Name: Follow Up   Assigned To: RONY Gupta   Regarding Patient: Tiffanie Dunaway, Status: Active   [facility] Jan [facility] 1[facility]37 PM     TASK CREATED  pt called wants to be R/S for procedure [id number]   Elida Woodruff - 19 Jan 2017 2:05 PM     TASK REPLIED TO: Previously Assigned To Dani Cain  this has to be scheduled by the nurse  she is aware because she needs to do another anti coag hold  [address] - 19 Jan 2017 4:07 PM     TASK REASSIGNED: Previously Assigned To Alba Montelongo - 19 Jan 2017 4:16 PM     TASK EDITED  T/C from pt's wife that she found out after she had scheduled his inj this morning that her  had taken his Effient already today and it's suppose to be held for 7 days so they need inj R/S to friday 1/27  I R/S B/L L5 TFES to 1/27/17 at 3:30 at [place] [address] [facility] [address] to be stopped tomorrow  Wife understood  Wife said they s/w someone last friday about his gabapentin not helping and wanting to come off of it and they said they would d/w Dr Frankie Langford and c/b and they haven't heard back yet  Wife said he is currently taking gabapentin 100mg (3) capsules at  and wants instr to wean off of it b/c he doesn't want to take it if it not going to help  She said his bottle is half full  Told wife that Dr Frankie Langford is out until [facility] but I would ask his CRNP to address their request and will c/b tomorrow with her rec  Valerie Antunez - 19 Jan 2017 4:30 PM     TASK REPLIED TO: Previously Assigned To Valerie Antunez  have him take gabapentin 100 mg 2 tabs at night for 1 week then gabapentin 100 mg 1 tab at night for 1 week then stop   Selma Rosario - 20 Jan 2017 8:43 AM     TASK EDITED  Pt advised of weaning instr for the gabapentin as outlined in task from Avtar Arellano  pt verbalized understanding of instr  Active Problems    1  Abnormal CT of the chest (793 2) (R93 8)   2  Acquired polyneuropathy (356 9) (G62 9)   3  Acute conjunctivitis (372 00) (H10 30)   4  Anemia (285 9) (D64 9)   5  Arthralgia (719 40) (M25 50)   6  Asymptomatic bilateral carotid artery stenosis (433 10,433 30) (I65 23)   7  Atherosclerosis of native artery of extremity with intermittent claudication (440 21)   (I70 219)   8  Benign hypertension with chronic kidney disease, stage III (403 10,585 3) (I12 9,N18 3)   9  Cataract (366 9) (H26 9)   10  Centrilobular emphysema (492 8) (J43 2)   11  Chest pain (786 50) (R07 9)   12  Chronic kidney disease, stage 3 (585 3) (N18 3)   13  Chronic kidney disease, stage 4 (severe) (585 4) (N18 4)   14  Colon cancer screening (V76 51) (Z12 11)   15  Degenerative lumbar spinal stenosis (724 02) (M48 06)   16  Dermatitis (692 9) (L30 9)   17  Dyslipidemia (272 4) (E78 5)   18  Eczema (692 9) (L30 9)   19  Encounter for consultation (V65 9) (Z71 9)   20  Fatigue (780 79) (R53 83)   21  Heartburn (787 1) (R12)   22  Hyperkalemia (276 7) (E87 5)   23  Hypothyroidism, postablative (244 1) (E89 0)   24  Intervertebral disc disorders with radiculopathy, lumbosacral region (724 4) (M51 17)   25  Low back pain with sciatica (724 3) (M54 40)   26  Multiple vessel coronary artery disease (414 00) (I25 10)   27  Need for prophylactic vaccination and inoculation against influenza (V04 81) (Z23)   28  Need for vaccination with 13-polyvalent pneumococcal conjugate vaccine (V03 82) (Z23)   29  Obstructive sleep apnea syndrome (327 23) (G47 33)   30  Peripheral vascular disease (443 9) (I73 9)   31  Pre-op exam (V72 84) (Z01 818)   32  Proteinuria (791 0) (R80 9)   33  Protruded lumbar disc (722 10) (M51 26)   34  Renal artery stenosis (440 1) (I70 1)   35  Renal cyst, right (753 10) (N28 1)   36  Shortness of breath on exertion (786 05) (R06 02)   37  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 (250 41,585 9) (E10 22,N18 9)   38   Type I diabetes mellitus (250 01) (E10 9)   39  Unsteadiness (781 2) (R26 81)   40  Upper respiratory infection (465 9) (J06 9)   41  Viral infection (079 99) (B34 9)   42  Vitamin D deficiency (268 9) (E55 9)   43  White coat hypertension   44  Xerosis cutis (706 8) (L85 3)    Current Meds   1  Accu-Chek Angie Plus In Vitro Strip; Therapy: 18JJA5411 to (Evaluate:01Jan2015) Recorded   2  AmLODIPine Besylate 10 MG Oral Tablet; take 1 tablet daily at bedtime; Therapy: 92YPF6537 to (Evaluate:17Nov2017)  Requested for: 18DIE3691; Last   Rx:22Nov2016 Ordered   3  Aspirin 81 MG TABS; CHEW ONE TABLET DAILY; Therapy: (Recorded:01Dec2016) to Recorded   4  Astaxanthin CAPS; Take 2mg tablets daily; Therapy: (Recorded:01Dec2016) to Recorded   5  Atorvastatin Calcium 40 MG Oral Tablet; Take 1 tablet daily  Requested for: 04CKJ1194;   Last Rx:04Oct2016 Ordered   6  Carvedilol 12 5 MG Oral Tablet; take one tablet by mouth twice daily; Therapy: 66DSZ4314 to (Evaluate:11Jun2017)  Requested for: 95GAO3356 Recorded   7  Centrum Silver TABS; TAKE 1 TABLET DAILY; Therapy: (Recorded:01Dec2016) to Recorded   8  CoQ-10 200 MG CAPS; TAKE 1 CAPSULE DAILY; Therapy: (Recorded:01Dec2016) to Recorded   9  Effient 10 MG Oral Tablet; TAKE 1 TABLET DAILY  Requested for: 52JEL8195; Last   Rx:01Dec2016 Ordered   10  Furosemide 20 MG Oral Tablet; TAKE 1 TABLET DAILY AS NEEDED for leg edema; Therapy: 43YKW7998 to (Last Rx:11Oct2016)  Requested for: 91WSL0799 Ordered   11  Gabapentin 100 MG Oral Capsule; TAKE 3 CAPS AT NIGHT; Therapy: 43KDJ6615 to (Demaris Bachelor)  Requested for: 33VHG5749; Last    Rx:04Jan2017 Ordered   12  Glucagon (rDNA) 1 MG Kit; AS NEEDED FOR <40 BLOOD SUGAR; Therapy: (Recorded:01Dec2016) to Recorded   13  HumaLOG 100 UNIT/ML Subcutaneous Solution; USE AS DIRECTED; Therapy: (Recorded:01Wki3276) to Recorded   14  Lantus SoloStar 100 UNIT/ML Subcutaneous Solution Pen-injector; use as directed;     Therapy: 60DHB1949 to Recorded   15  Levothyroxine Sodium 175 MCG Oral Tablet; TAKE 1 TABLET DAILY  Requested for:    89STW2630; Last Rx:04Oct2016 Ordered   16  Nitroglycerin 0 4 MG Sublingual Tablet Sublingual (Nitrostat); DISSOLVE 1 TABLET    UNDER THE TONGUE AS NEEDED FOR CHEST PAIN; Last Rx:79Cxp8958 Ordered   17  TraMADol HCl - 50 MG Oral Tablet; TAKE ONE (1) TABLET(S) THREE TIMES DAILYAS    NEEDED FOR PAIN;    Therapy: 58UAX1755 to (Evaluate:02Apr2017); Last Rx:04Oct2016 Ordered   18  Vitamin D 1000 UNIT CAPS; TAKE 1 TABLET BY MOUTH ONCE DAILY; Therapy: 11Aug2015 to (Last Rx:11Aug2015) Ordered   19  Vitamin K TABS; Vitamin K 90 mcg- one tab daily daily; Therapy: (Recorded:87Izg6770) to Recorded    Allergies    1  Cardura TABS   2   Zestril TABS    Signatures   Electronically signed by : Duy Rodriguez, ; Jan 20 2017  8:44AM EST                       (Author)

## 2018-01-09 NOTE — MISCELLANEOUS
Message   Recorded as Task   Date: 12/16/2016 08:22 AM, Created By: Michelle Hernandez   Task Name: Follow Up   Assigned To: [facility],Team   Regarding Patient: Kay Berumen, Status: In Progress   Comment:    Selma Rosario - 16 Dec 2016 8:22 AM     TASK CREATED  Other; [phone] (Mobile Phone)  Per Dr Michelle Lee ov note from 12/12/16: pt is being started on gabapentin if he is not seeing symptomatic relief from the gabapentin then pt will be scheduled for B/L L5 TFESI  Pt on Effient  Effient hold form faxed to Dr Sandoval Led by Sarai Chase on 12/12/16  Selma Rosario - 16 Dec 2016 8:22 AM     TASK IN PROGRESS   Selma Rosario - 19 Dec 2016 2:21 PM     TASK EDITED  Left vm on Nurse line at Dr Faizan Rg office asking to have the Effient Hold form faxed to our iffice, asked for it to be faxed to the [facility] fax at [phone]  Selma Rosario - 20 Dec 2016 2:11 PM     TASK EDITED  Dr Chadwick Blanco I received a Effient Hold approval dated 12/12/16 from Dr Sandoval Led  I saw in your ov note from 12/12 that if pt is not seeing relief from the gabapentin then pt will be scheduled for inj  Am I to call the pt for update or did you advise the pt to call the office if not seeing relief and then we would schedule inj? Please advise  Abraham Busch - 20 Dec 2016 2:34 PM     TASK REPLIED TO: Previously Assigned To [facility],Team  yes please call and see if gabapentin is providing relief   Selma Rosario - 20 Dec 2016 3:46 PM     TASK EDITED  S/W pt who reports he got no relief with the gabapentin and he did increase it to 2 capsules at HS and still no relief  Told pt I would make Dr Chadwick Blanco aware  I scheduled B/L L5 TFESI for 1/10/17 at 1:45 at [facility]  Pt was not avail the first wk of Jan for inj  Instr reviewed: light lunch then NPO 1 Hr prior to opro,  needed, c/b needed if sick/abx started, Effient to be held for 7 days prior to inj , effient to be stopped on 1/3/17   Pt verbalized understanding of instr  Rashmi Banda - 21 Dec 2016 8:07 AM     TASK REPLIED TO: Previously Assigned To Rashmi Banda  would like him to increase gabapentin to 100mg 3 capsules at bedtime   AbrahamSelma tierney - 21 Dec 2016 9:24 AM     TASK EDITED  [facility] for pt to c/b  Selma Rosario - 21 Dec 2016 9:24 AM     TASK IN PROGRESS   Selma Rosario - 21 Dec 2016 12:25 PM     TASK EDITED  Pt returning our call  Pt advised to inc his gabapentin to 3 capsules QHS  I asked how many they had on hand in case RF would be needed soon, they said they had 20-30 pillls still  Advised to contact the office 2 days before running out and he's not to stop taking this medication abruptly, if he's unable to tolerate the inc he is to contact the office  Pt verbalized understanding  Active Problems    1  Abnormal CT of the chest (793 2) (R93 8)   2  Acquired polyneuropathy (356 9) (G62 9)   3  Acute conjunctivitis (372 00) (H10 30)   4  Anemia (285 9) (D64 9)   5  Arthralgia (719 40) (M25 50)   6  Asymptomatic bilateral carotid artery stenosis (433 10,433 30) (I65 23)   7  Atherosclerosis of native artery of extremity with intermittent claudication (440 21)   (I70 219)   8  Benign hypertension with chronic kidney disease, stage III (403 10,585 3) (I12 9,N18 3)   9  Cataract (366 9) (H26 9)   10  Centrilobular emphysema (492 8) (J43 2)   11  Chest pain (786 50) (R07 9)   12  Chronic kidney disease, stage 3 (585 3) (N18 3)   13  Chronic kidney disease, stage 4 (severe) (585 4) (N18 4)   14  Colon cancer screening (V76 51) (Z12 11)   15  Degenerative lumbar spinal stenosis (724 02) (M48 06)   16  Dermatitis (692 9) (L30 9)   17  Dyslipidemia (272 4) (E78 5)   18  Eczema (692 9) (L30 9)   19  Encounter for consultation (V65 9) (Z71 9)   20  Fatigue (780 79) (R53 83)   21  Heartburn (787 1) (R12)   22  Hyperkalemia (276 7) (E87 5)   23  Hypothyroidism, postablative (244 1) (E89 0)   24   Intervertebral disc disorders with radiculopathy, lumbosacral region (724 4) (M51 17)   25  Low back pain with sciatica (724 3) (M54 40)   26  Multiple vessel coronary artery disease (414 00) (I25 10)   27  Need for prophylactic vaccination and inoculation against influenza (V04 81) (Z23)   28  Need for vaccination with 13-polyvalent pneumococcal conjugate vaccine (V03 82) (Z23)   29  Obstructive sleep apnea syndrome (327 23) (G47 33)   30  Peripheral vascular disease (443 9) (I73 9)   31  Pre-op exam (V72 84) (Z01 818)   32  Proteinuria (791 0) (R80 9)   33  Protruded lumbar disc (722 10) (M51 26)   34  Renal artery stenosis (440 1) (I70 1)   35  Renal cyst, right (753 10) (N28 1)   36  Shortness of breath on exertion (786 05) (R06 02)   37  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 (250 41,585 9) (E10 22,N18 9)   38  Type I diabetes mellitus (250 01) (E10 9)   39  Unsteadiness (781 2) (R26 81)   40  Upper respiratory infection (465 9) (J06 9)   41  Vitamin D deficiency (268 9) (E55 9)   42  White coat hypertension   43  Xerosis cutis (706 8) (L85 3)    Current Meds   1  Accu-Chek Angie Plus In Vitro Strip; Therapy: 40FEQ7948 to (Evaluate:01Jan2015) Recorded   2  AmLODIPine Besylate 10 MG Oral Tablet; take 1 tablet daily at bedtime; Therapy: 26PGR3003 to (Evaluate:17Nov2017)  Requested for: 43XQI3074; Last   Rx:22Nov2016 Ordered   3  Aspirin 81 MG TABS; CHEW ONE TABLET DAILY; Therapy: (Recorded:40Sra9669) to Recorded   4  Astaxanthin CAPS; Take 2mg tablets daily; Therapy: (Recorded:05Kid6381) to Recorded   5  Atorvastatin Calcium 40 MG Oral Tablet; Take 1 tablet daily  Requested for: 52KNP0649;   Last Rx:04Oct2016 Ordered   6  Carvedilol 12 5 MG Oral Tablet; take one tablet by mouth twice daily; Therapy: 04YKP8850 to (Evaluate:11Jun2017)  Requested for: 42QWR8937 Recorded   7  Centrum Silver TABS; TAKE 1 TABLET DAILY; Therapy: (Recorded:72Hww4766) to Recorded   8  CoQ-10 200 MG CAPS; TAKE 1 CAPSULE DAILY;    Therapy: (Recorded:42Nun3109) to Recorded   9  Effient 10 MG Oral Tablet; TAKE 1 TABLET DAILY  Requested for: 01BTY2269; Last   Rx:67Ciy9344 Ordered   10  Furosemide 20 MG Oral Tablet; TAKE 1 TABLET DAILY AS NEEDED for leg edema; Therapy: 74PEN8343 to (Last Rx:11Oct2016)  Requested for: 39RUH5874 Ordered   11  Gabapentin 100 MG Oral Capsule; take 1 capsule bedtime  may increase to 2 capsules    at bedtime as tolerated after  3 days; Therapy: 30Yux4511 to (Evaluate:56Xhz3831)  Requested for: 40Xtm0404; Last    Rx:37Lya2569 Ordered   12  Glucagon (rDNA) 1 MG Kit; AS NEEDED FOR <40 BLOOD SUGAR; Therapy: (Recorded:46Mci0855) to Recorded   13  HumaLOG 100 UNIT/ML Subcutaneous Solution; USE AS DIRECTED; Therapy: (Recorded:94Ulf2588) to Recorded   14  Lantus SoloStar 100 UNIT/ML Subcutaneous Solution Pen-injector; use as directed; Therapy: 85EAO6847 to Recorded   15  Levothyroxine Sodium 175 MCG Oral Tablet; TAKE 1 TABLET DAILY  Requested for:    85FTM8362; Last Rx:53Iyf0351 Ordered   16  Nitroglycerin 0 4 MG Sublingual Tablet Sublingual (Nitrostat); DISSOLVE 1 TABLET    UNDER THE TONGUE AS NEEDED FOR CHEST PAIN; Last Rx:08Qus1164 Ordered   17  TraMADol HCl - 50 MG Oral Tablet; TAKE ONE (1) TABLET(S) THREE TIMES DAILYAS    NEEDED FOR PAIN;    Therapy: 34WKF6282 to (Evaluate:02Apr2017); Last Rx:04Oct2016 Ordered   18  Vitamin D 1000 UNIT CAPS; TAKE 1 TABLET BY MOUTH ONCE DAILY; Therapy: 11Aug2015 to (Last Rx:11Aug2015) Ordered   19  Vitamin K TABS; Vitamin K 90 mcg- one tab daily daily; Therapy: (Recorded:17Nlt1418) to Recorded    Allergies    1  Cardura TABS   2   Zestril TABS    Signatures   Electronically signed by : Gloria Nash, ; Dec 21 2016 12:26PM EST                       (Author)

## 2018-01-09 NOTE — CONSULTS
I had the pleasure of evaluating your patient, Vivian Nielsen  My full evaluation follows:      History of Present Illness  Mr Nydia Arnold is a pleasant 60-year-old male who presents to the office today for routine follow-up  He leads a sedentary lifestyle  With the activity he is able to perform he denies any chest pain and his shortness of breath with exertion is persistent and unchanged since his last visit  He recovers within a few minutes of rest  His oral diuretic regimen was recently intensified by his PCP as he was taking Lasix every day given his shortness of breath  He felt no better  He denies signs or symptoms of heart failure including increasing lower extremity edema, paroxysmal nocturnal dyspnea, or orthopnea  His weight has been stable on his home scale  He denies lightheadedness, dizziness, syncope or presyncope  He denies symptoms of palpitations  He reports worsening cramping and heaviness in his legs with walking shorter distances and also with standing  He remains compliant with CPAP nightly  Review of Systems      Cardiac: as noted in HPI  Skin: No complaints of nonhealing sores or skin rash  Genitourinary: No complaints of recurrent urinary tract infections, frequent urination at night, difficult urination, blood in urine, kidney stones, loss of bladder control, no kidney or prostate problems, no erectile dysfunction  General: as noted in HPI  Respiratory: as noted in HPI  HEENT: No complaints of serious problems, hearing problems, nose problems, throat problems, or snoring  Gastrointestinal: No complaints of liver problems, nausea, vomiting, heartburn, constipation, bloody stools, diarrhea, problems swallowing, adbominal pain, or rectal bleeding  Hematologic: No complaints of bleeding disorders, anemia, blood clots, or excessive brusing     Neurological: No complaints of numbness, tingling, dizziness, weakness, seizures, headaches, syncope or fainting, AM fatigue, daytime sleepiness, no witnessed apnea episodes  Musculoskeletal: No complaints of arthritis, back pain, or painfull swelling  Active Problems    1  Acquired polyneuropathy (356 9) (G62 9)   2  Acute conjunctivitis (372 00) (H10 30)   3  Age-related cataract (366 10) (H25 9)   4  Anemia (285 9) (D64 9)   5  Arthralgia (719 40) (M25 50)   6  Asymptomatic bilateral carotid artery stenosis (433 10,433 30) (I65 23)   7  Atherosclerosis of native artery of extremity with intermittent claudication (440 21)   (I70 219)   8  Benign hypertension with chronic kidney disease, stage III (403 10,585 3) (I12 9,N18 3)   9  Calf cramp (729 82) (R25 2)   10  Cataract (366 9) (H26 9)   11  Centrilobular emphysema (492 8) (J43 2)   12  Chronic kidney disease, stage 3 (585 3) (N18 3)   13  Degenerative lumbar spinal stenosis (724 02) (M48 06)   14  Dermatitis (692 9) (L30 9)   15  Dry eye syndrome (375 15) (H04 129)   16  Dyslipidemia (272 4) (E78 5)   17  Eczema (692 9) (L30 9)   18  Encounter for prostate cancer screening (V76 44) (Z12 5)   19  Facial skin lesion (709 9) (L98 9)   20  Fatigue (780 79) (R53 83)   21  Heartburn (787 1) (R12)   22  Hyperkalemia (276 7) (E87 5)   23  Hypothyroidism, postablative (244 1) (E89 0)   24  Intervertebral disc disorders with radiculopathy, lumbosacral region (724 4) (M51 17)   25  Low back pain with sciatica (724 3) (M54 40)   26  Multiple vessel coronary artery disease (414 00) (I25 10)   27  Need for 23-polyvalent pneumococcal polysaccharide vaccine (V03 82) (Z23)   28  Need for prophylactic vaccination and inoculation against influenza (V04 81) (Z23)   29  ITZEL on CPAP (327 23,V46 8) (G47 33,Z99 89)   30  Peripheral vascular disease (443 9) (I73 9)   31  Proteinuria (791 0) (R80 9)   32  Protruded lumbar disc (722 10) (M51 26)   33  Renal artery stenosis (440 1) (I70 1)   34  Renal cyst, right (753 10) (N28 1)   35  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 (922 08,318  9) (E10 22,N18 9)   36  Unsteadiness (781 2) (R26 81)   37  Vitamin D deficiency (268 9) (E55 9)   38  White coat hypertension   39  Xerosis cutis (706 8) (L85 3)    Past Medical History    · History of Acute kidney injury (584 9) (N17 9)   · History of Acute venous embolism and thrombosis of deep vessels of proximal lower  extremity (453 41) (I82 4Y9)   · History of Chronic cough (786 2) (R05)   · History of dizziness (V13 89) (P58 671)   · History of hypertension (V12 59) (Z86 79)   · History of Ischemic cardiomyopathy (414 8) (I25 5)   · 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) (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 10)    The active problems and past medical history were reviewed and updated today  Surgical History    · History of Cardiac Cath Procedure Outcome:   · History of Cath Stent 1 Assessment Lesion Could Not Be Crossed   · History of Cath Stent 1 Type Drug-Eluting   · History of Cath Stent 2 Type Drug-Eluting   · History of Cath Stent 3 Type Drug-Eluting   · History of PTA Femoral-Popliteal Initial Stenosis With Stent    The surgical history was reviewed and updated today  Family History    · Family history of liver disease (V18 59) (Z83 79)    · Family history of cardiac disorder (V17 49) (Z82 49)   · Family history of kidney disease (V18 69) (Z84 1)   · Family history of liver disease (V18 59) (Z83 79)    · Family history of diabetes mellitus (V18 0) (Z83 3)    The family history was reviewed and updated today  Social History    · Alcohol Use (History)   · Former smoker (C05 70) (E74 220)   ·    · Retired  The social history was reviewed and updated today  Current Meds   1  Accu-Chek Angie Plus In Vitro Strip; TEST 4 TIMES DAILY DX: E11 9; Therapy: 20BDG1256 to (Evaluate:44Blv0653)  Requested for: 51IHH2177 Recorded   2  AmLODIPine Besylate 10 MG Oral Tablet; take 1 tablet daily at bedtime; Therapy: 13EHR8028 to (Evaluate:17Nov2017)  Requested for: 05TCQ1598; Last   Rx:22Nov2016 Ordered   3   Aspirin 81 MG TABS; CHEW ONE TABLET DAILY; Therapy: (Recorded:50Kyr6486) to Recorded   4  Atorvastatin Calcium 40 MG Oral Tablet; Take 1 tablet daily  Requested for: 44GCX7349;   Last Rx:04Oct2016 Ordered   5  Carvedilol 12 5 MG Oral Tablet; TAKE ONE TABLET BY MOUTH TWICE A DAY; Therapy: 84NZX3488 to (Evaluate:08Jan2018)  Requested for: 13Apr2017; Last   Rx:40Qnd8304 Ordered   6  CoQ-10 200 MG CAPS; TAKE 1 CAPSULE DAILY; Therapy: (Recorded:59Rwd0873) to Recorded   7  Effient 10 MG Oral Tablet; TAKE 1 TABLET DAILY AS DIRECTED; Therapy: (Recorded:12Pfv4111) to Recorded   8  Furosemide 20 MG Oral Tablet; TAKE 1 TABLET EVERY OTHER DAY; Therapy: (Denisse Serrano) to Recorded   9  Furosemide 20 MG Oral Tablet; TAKE ONE TABLET BY MOUTH EVERY DAY AS NEEDED   FOR LEG edema; Therapy: 85IRK8607 to (Vinicius Reagan)  Requested for: 03Sep2017; Last   Rx:03Sep2017 Ordered   10  Glucagon (rDNA) 1 MG Kit; AS NEEDED FOR <40 BLOOD SUGAR; Therapy: (Recorded:83Brx1736) to Recorded   11  HumaLOG 100 UNIT/ML Subcutaneous Solution; USE AS DIRECTED; Therapy: (Recorded:75Djt0007) to Recorded   12  Lantus SoloStar 100 UNIT/ML Subcutaneous Solution Pen-injector; use as directed 19    IU BID; Therapy: 20OXE6018 to Recorded   13  Levothyroxine Sodium 175 MCG Oral Tablet; TAKE 1 TABLET DAILY  Requested for:    63WOZ7886; Last Rx:04Oct2016 Ordered   14  Nitroglycerin 0 4 MG Sublingual Tablet Sublingual; DISSOLVE 1 TABLET UNDER THE    TONGUE AS NEEDED FOR CHEST PAIN; Last Rx:50Jzh7521 Ordered   15  TraMADol HCl - 50 MG Oral Tablet; TAKE ONE 1-2  TABLET(S) THREE TIMES DAILYAS    NEEDED FOR PAIN;    Therapy: 85NRM7439 to (Evaluate:05Sep2017); Last Rx:07Jun2017 Ordered    The medication list was reviewed and updated today  Allergies    1  Cardura TABS   2   Zestril TABS    Vitals   Recorded: 38JPT0159 03:21PM   Heart Rate 70, Apical   Pulse Quality Normal, Apical   Systolic 690, LUE, Sitting   Diastolic 52, LUE, Sitting   Height 5 ft 7 in   Weight 196 lb    BMI Calculated 30 7   BSA Calculated 2     Physical Exam    Constitutional   General appearance: No acute distress, well appearing and well nourished  Eyes   Conjunctiva and Sclera examination: Conjunctiva pink, sclera anicteric  Ears, Nose, Mouth, and Throat - Oropharynx: Clear, nares are clear, mucous membranes are moist    Neck   Neck and thyroid: Normal, supple, trachea midline, no thyromegaly  Pulmonary   Respiratory effort: No increased work of breathing or signs of respiratory distress  Auscultation of lungs: Clear to auscultation, no rales, no rhonchi, no wheezing, good air movement  Cardiovascular   Auscultation of heart: Abnormal   The heart rate was bradycardic  The rhythm was regular  Heart sounds: normal S1, normal S2, no S3 and no S4  A grade 1 systolic murmur was heard at the RUSB  Carotid pulses: Abnormal   right carotid bruit heard and left carotid bruit heard  Peripheral vascular exam: Normal pulses throughout, no tenderness, erythema or swelling  Pedal pulses: Abnormal   Posterior tibialis pulse was 1+ on the right and 0 on the left  Dorsalis pedis pulse was 0 on the right and 0 on the left  Examination of extremities for edema and/or varicosities: Normal     Abdomen   Abdomen: Non-tender and no distention  Liver and spleen: No hepatomegaly or splenomegaly  Musculoskeletal Gait and station: Normal gait  Digits and nails: Normal without clubbing or cyanosis  Inspection/palpation of joints, bones, and muscles: Normal, ROM normal     Skin - Skin and subcutaneous tissue: Normal without rashes or lesions  Skin is warm and well perfused, normal turgor  Neurologic - Cranial nerves: II - XII intact  Speech: Normal     Psychiatric - Orientation to person, place, and time: Normal  Mood and affect: Normal       Results/Data    Rhythm and rate: sinus bradycardia  QRS: left ventricular hypertrophy   T waves:   there are nonspecific ST-T wave changes  Assessment    1  Multiple vessel coronary artery disease (414 00) (I25 10)   2  History of Cath Stent 1 Type Drug-Eluting   · Ostial LAD - 09/2015  3  History of Cath Stent 2 Type Drug-Eluting   · Proximal LAD - 09/2015  4  History of Cath Stent 3 Type Drug-Eluting   · Left circumflex x 2- 09/2015    5  History of Cath Stent 1 Assessment Lesion Could Not Be Crossed   · OM2 - 09/2015  6  History of Ischemic cardiomyopathy (414 8) (I25 5)   7  Benign hypertension with chronic kidney disease, stage III (403 10,585 3) (I12 9,N18 3)   · 12/2015 echo showed nl diast fx and nl LV thickness   8  Dyslipidemia (272 4) (E78 5)   9  Peripheral vascular disease (443 9) (I73 9)   10  Asymptomatic bilateral carotid artery stenosis (433 10,433 30) (I65 23)   11  ITZEL on CPAP (327 23,V46 8) (G47 33,Z99 89)    Plan  Atherosclerosis of native artery of extremity with intermittent claudication, ITZEL on CPAP    · Follow-up visit in 2 months Evaluation and Treatment  Follow-up  Status: Complete   Done: 92FDT8363   Ordered; For: Atherosclerosis of native artery of extremity with intermittent claudication, ITZEL on CPAP; Ordered By: Amber Mcclain Performed:  Due: 99JPW7586; Last Updated By: Zelda Kim; 9/26/2017 3:56:42 PM  PMH: Cath Stent 1 Type Drug-Eluting    · Clopidogrel Bisulfate 75 MG Oral Tablet (Plavix); TAKE 1 TABLET DAILY   Rx By: Amber Mcclain; Dispense: 90 Days ; #:90 Tablet; Refill: 3; For: PMH: Cath Stent 1 Type Drug-Eluting; JACKIE = N; Verified Transmission to [facility] 5161; Last Updated By: System, Companion Pharmadaysi; 9/26/2017 3:53:57 PM  Unlinked    · Effient 10 MG Oral Tablet (Prasugrel HCl)   Dispense: 0 Days ; #: Sufficient Tablet; Refill: 0; JACKIE = N; Record; Last Updated By: Amber Mcclain; 9/26/2017 3:53:20 PM    Discussion/Summary    Mr Ana Vieira is a pleasant 80-year-old male who presents to the office today for routine follow-up  Since his last visit his shortness of breath persists       In terms of his shortness of breath, this may be an anginal equivalent given his 95% 2nd OM lesion which was unable to be crossed during his cath  We had previously discussed a repeat cath given a stress test reveals ischemia in that territory  For now he wishes to continue medical management  HIs symptoms did not improve with Imdur or Ranexa  He noted fatigue on higher doses of carvedilol in the past  Thus no changes were made to his regimen  His most recent lipids were reviewed  His LDL is slightly suboptimal  Therapeutic lifestyle modifications were recommended during his last visit I will reassess his lipids in the near future  If his LDL remains high he will need escalation of his atorvastatin dose  He is euvolemic on exam on his current diuretic regimen  His blood pressure control appears acceptable  He is due to see his vascular surgeon in the near future regarding his claudication  He also was noted to have worsening of his carotid disease for which a CTA of his neck was recommended  He will follow-up in the office in two months for re-evaluation  Thank you very much for allowing me to participate in the care of this patient  If you have any questions, please do not hesitate to contact me        Future Appointments    Signatures   Electronically signed by : Jamie Garcia DO; Sep 26 2017  9:10PM EST                       (Author)

## 2018-01-10 NOTE — MISCELLANEOUS
Message   Recorded as Task   Date: 04/17/2017 04:07 PM, Created By: Jarad Shrestha   Task Name: Miscellaneous   Assigned To: Saundra Media   Regarding Patient: Nahed Hong, Status: Active   CommentHarshamey Canchola - 17 Apr 2017 4:07 PM     TASK CREATED  Pt called to question where the agent orange paperwork is that he dropped off in March  Please call back at [phone]  Selma Rosario - 17 Apr 2017 4:16 PM     TASK EDITED  Pt seen by FQ on 3/20/17 for ov  Do you have any knowledge of this paperwork or should I tell pt I will need to check with FQ when he returns next week? Valerie Antunez - 17 Apr 2017 5:13 PM     TASK REPLIED TO: Previously Assigned To Valerie Antunez  no clue what he is talking about   Arther Query - 18 Apr 2017 9:10 AM     TASK EDITED  S/W Mrs Jasmin Carrillo and informed her that Dr Viviana White is out of the office this wk  I did check with his CRNP yest who said this paperwork did not come to her  I said I would send a message to Dr Viviana White and if he replies back to me while he is away I will contact them otherwise it will be addressed when he returns next wk  She stated FQ told them to mail it directly to him and she mailed it on 3/22  She said it was a substantial amt of paperwork not just one form  She said the paperwork must be submitted by June, and they just wanted to stay on top of it  John Harper - 24 Apr 2017 12:49 PM     TASK REPLIED TO: Previously Assigned To John Harper  i have the paperwork and it will take me awhile to complete   expect mid-May   Jade Vuong - 24 Apr 2017 3:47 PM     TASK REPLIED TO: Previously Assigned To [facility]  ****FYI****    S/w pt  and advised of above  Pt  verbalized understanding and states to make sure that Dr Viviana White knows that the completed paperwork has to be returned back to the pt  so the pt  can submit everything together   Pt  states that he did put a note on the paperwork, but would like to reiterate that information  Pt  was advised will remind FQ that once paperwork is completed, it is to be returned to the pt  Pt  eze     Jayda Gonzales - 25 Apr 2017 9:29 AM     TASK REPLIED TO: Previously Assigned To Homero Marshall md aware        Active Problems    1  Acquired polyneuropathy (356 9) (G62 9)   2  Acute conjunctivitis (372 00) (H10 30)   3  Age-related cataract (366 10) (H25 9)   4  Anemia (285 9) (D64 9)   5  Arthralgia (719 40) (M25 50)   6  Asymptomatic bilateral carotid artery stenosis (433 10,433 30) (I65 23)   7  Atherosclerosis of native artery of extremity with intermittent claudication (440 21)   (I70 219)   8  Benign hypertension with chronic kidney disease, stage III (403 10,585 3) (I12 9,N18 3)   9  Calf cramp (729 82) (R25 2)   10  Cataract (366 9) (H26 9)   11  Centrilobular emphysema (492 8) (J43 2)   12  Chronic kidney disease, stage 3 (585 3) (N18 3)   13  Chronic kidney disease, stage 4 (severe) (585 4) (N18 4)   14  Degenerative lumbar spinal stenosis (724 02) (M48 06)   15  Dermatitis (692 9) (L30 9)   16  Dry eye syndrome (375 15) (H04 129)   17  Dyslipidemia (272 4) (E78 5)   18  Eczema (692 9) (L30 9)   19  Facial skin lesion (709 9) (L98 9)   20  Fatigue (780 79) (R53 83)   21  Heartburn (787 1) (R12)   22  Hyperkalemia (276 7) (E87 5)   23  Hypothyroidism, postablative (244 1) (E89 0)   24  Intervertebral disc disorders with radiculopathy, lumbosacral region (724 4) (M51 17)   25  Low back pain with sciatica (724 3) (M54 40)   26  Multiple vessel coronary artery disease (414 00) (I25 10)   27  Need for 23-polyvalent pneumococcal polysaccharide vaccine (V03 82) (Z23)   28  Obstructive sleep apnea syndrome (327 23) (G47 33)   29  Peripheral vascular disease (443 9) (I73 9)   30  Proteinuria (791 0) (R80 9)   31  Protruded lumbar disc (722 10) (M51 26)   32  Renal artery stenosis (440 1) (I70 1)   33  Renal cyst, right (753 10) (N28 1)   34   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 (250 41,585 9) (E10 22,N18 9)   35  Unsteadiness (781 2) (R26 81)   36  Vitamin D deficiency (268 9) (E55 9)   37  White coat hypertension   38  Xerosis cutis (706 8) (L85 3)    Current Meds   1  Accu-Chek Angie Plus In Vitro Strip; TEST 2 TIMES DAILY DX: E11 9; Therapy: 06RRK0131 to (Evaluate:15Jun2017)  Requested for: 41RGK0246; Last   Rx:17Mar2017 Ordered   2  AmLODIPine Besylate 10 MG Oral Tablet; take 1 tablet daily at bedtime; Therapy: 31DIA5590 to (Evaluate:17Nov2017)  Requested for: 05XSK4988; Last   Rx:22Nov2016 Ordered   3  Aspirin 81 MG TABS; CHEW ONE TABLET DAILY; Therapy: (Recorded:72Rgh3584) to Recorded   4  Astaxanthin CAPS; Take 2mg tablets daily; Therapy: (Recorded:44Vsh4372) to Recorded   5  Atorvastatin Calcium 40 MG Oral Tablet; Take 1 tablet daily  Requested for: 64UAL7592;   Last Rx:04Oct2016 Ordered   6  Carvedilol 12 5 MG Oral Tablet; TAKE ONE TABLET BY MOUTH TWICE A DAY; Therapy: 59WDF2911 to (Evaluate:08Jan2018)  Requested for: 69Psj7057; Last   Rx:13Apr2017 Ordered   7  Centrum Silver TABS; TAKE 1 TABLET DAILY; Therapy: (Recorded:32Cyc2670) to Recorded   8  Clobetasol Propionate 0 05 % External Cream; APPLY SPARINGLY TO AFFECTED   AREA(S) 3 TIMES A DAY; Therapy: 92HQO5317 to (Evaluate:90Mwp1594)  Requested for: 18IXA3076; Last   Rx:27Jan2017 Ordered   9  CoQ-10 200 MG CAPS; TAKE 1 CAPSULE DAILY; Therapy: (Recorded:79Pbn0009) to Recorded   10  Effient 10 MG Oral Tablet; TAKE 1 TABLET DAILY  Requested for: 30EQZ6609; Last    Rx:63Ppm6598 Ordered   11  Furosemide 20 MG Oral Tablet; TAKE 1 TABLET DAILY AS NEEDED for leg edema; Therapy: 83XFS6673 to (Last Rx:11Oct2016)  Requested for: 54TWE5732 Ordered   12  Glucagon (rDNA) 1 MG Kit; AS NEEDED FOR <40 BLOOD SUGAR; Therapy: (Recorded:99Taw7610) to Recorded   13  HumaLOG 100 UNIT/ML Subcutaneous Solution; USE AS DIRECTED; Therapy: (Recorded:15Nal2172) to Recorded   14   Isosorbide Mononitrate ER 30 MG Oral Tablet Extended Release 24 Hour; TAKE 1    TABLET DAILY; Therapy: 01LWL8739 to (Kellen Shaka)  Requested for: 63KSX7497; Last    Rx:29Mar2017 Ordered   15  Lantus SoloStar 100 UNIT/ML Subcutaneous Solution Pen-injector; use as directed 19    IU BID; Therapy: 93SIS7208 to Recorded   16  Levothyroxine Sodium 175 MCG Oral Tablet; TAKE 1 TABLET DAILY  Requested for:    56EOV3806; Last Rx:04Oct2016 Ordered   17  Methocarbamol 750 MG Oral Tablet; TAKE 1 TABLET Twice daily PRN SPASM; Therapy: 80RHM2043 to (Evaluate:27Jun2017)  Requested for: 61CEG3454; Last    Rx:29Mar2017 Ordered   18  Nitroglycerin 0 4 MG Sublingual Tablet Sublingual (Nitrostat); DISSOLVE 1 TABLET    UNDER THE TONGUE AS NEEDED FOR CHEST PAIN; Last Rx:01Dec2016 Ordered   19  TraMADol HCl - 50 MG Oral Tablet; TAKE ONE 1-2  TABLET(S) THREE TIMES DAILYAS    NEEDED FOR PAIN;    Therapy: 82UIY7507 to (Evaluate:23Gkd8326); Last Rx:15Mar2017 Ordered   20  Vitamin D 1000 UNIT CAPS; TAKE 1 TABLET BY MOUTH ONCE DAILY; Therapy: 11Aug2015 to (Last Rx:11Aug2015) Ordered   21  Vitamin K TABS; Vitamin K 90 mcg- one tab daily daily; Therapy: (Recorded:53Clh6794) to Recorded    Allergies    1  Cardura TABS   2   Zestril TABS    Signatures   Electronically signed by : Will Leone RN; Apr 25 2017  9:32AM EST                       (Author)

## 2018-01-10 NOTE — MISCELLANEOUS
Message   Recorded as Task   Date: 03/30/2017 05:12 PM, Created By: Thuy Jaime   Task Name: Miscellaneous   Assigned To: Briana Daniel   Regarding Patient: Hai He, Status: In Progress   Ale Garduno - 30 Mar 2017 5:12 PM     TASK CREATED  based on BP log    no changes in meds   Renata Pelayo - 31 Mar 2017 12:31 PM     TASK IN PROGRESS   I did let Vivienne Adair know that based on his recent BP log there will be no changed to medication at this time  [facility] 4/3/2017      Active Problems    1  Acquired polyneuropathy (356 9) (G62 9)   2  Acute conjunctivitis (372 00) (H10 30)   3  Age-related cataract (366 10) (H25 9)   4  Anemia (285 9) (D64 9)   5  Arthralgia (719 40) (M25 50)   6  Asymptomatic bilateral carotid artery stenosis (433 10,433 30) (I65 23)   7  Atherosclerosis of native artery of extremity with intermittent claudication (440 21)   (I70 219)   8  Benign hypertension with chronic kidney disease, stage III (403 10,585 3) (I12 9,N18 3)   9  Calf cramp (729 82) (R25 2)   10  Cataract (366 9) (H26 9)   11  Centrilobular emphysema (492 8) (J43 2)   12  Chronic kidney disease, stage 3 (585 3) (N18 3)   13  Chronic kidney disease, stage 4 (severe) (585 4) (N18 4)   14  Degenerative lumbar spinal stenosis (724 02) (M48 06)   15  Dermatitis (692 9) (L30 9)   16  Dry eye syndrome (375 15) (H04 129)   17  Dyslipidemia (272 4) (E78 5)   18  Eczema (692 9) (L30 9)   19  Facial skin lesion (709 9) (L98 9)   20  Fatigue (780 79) (R53 83)   21  Heartburn (787 1) (R12)   22  Hyperkalemia (276 7) (E87 5)   23  Hypothyroidism, postablative (244 1) (E89 0)   24  Intervertebral disc disorders with radiculopathy, lumbosacral region (724 4) (M51 17)   25  Low back pain with sciatica (724 3) (M54 40)   26  Multiple vessel coronary artery disease (414 00) (I25 10)   27  Need for 23-polyvalent pneumococcal polysaccharide vaccine (V03 82) (Z23)   28  Obstructive sleep apnea syndrome (327 23) (G47 33)   29  Peripheral vascular disease (443 9) (I73 9)   30  Proteinuria (791 0) (R80 9)   31  Protruded lumbar disc (722 10) (M51 26)   32  Renal artery stenosis (440 1) (I70 1)   33  Renal cyst, right (753 10) (N28 1)   34  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 (250 41,585 9) (E10 22,N18 9)   35  Unsteadiness (781 2) (R26 81)   36  Vitamin D deficiency (268 9) (E55 9)   37  White coat hypertension   38  Xerosis cutis (706 8) (L85 3)    Current Meds   1  Accu-Chek Angie Plus In Vitro Strip; TEST 2 TIMES DAILY DX: E11 9; Therapy: 09BEM4093 to (Evaluate:15Jun2017)  Requested for: 61KCK2863; Last   Rx:17Mar2017 Ordered   2  AmLODIPine Besylate 10 MG Oral Tablet; take 1 tablet daily at bedtime; Therapy: 04VPL7566 to (Evaluate:17Nov2017)  Requested for: 52ZGV0670; Last   Rx:22Nov2016 Ordered   3  Aspirin 81 MG TABS; CHEW ONE TABLET DAILY; Therapy: (Recorded:47Nuv7947) to Recorded   4  Astaxanthin CAPS; Take 2mg tablets daily; Therapy: (Recorded:81Jdf3130) to Recorded   5  Atorvastatin Calcium 40 MG Oral Tablet; Take 1 tablet daily  Requested for: 35MHD6946;   Last Rx:04Oct2016 Ordered   6  Carvedilol 12 5 MG Oral Tablet; take one tablet by mouth twice daily; Therapy: 57WSQ4719 to (Evaluate:91Cpp5442)  Requested for: 77PWT4062; Last   Rx:15Mar2017 Ordered   7  Centrum Silver TABS; TAKE 1 TABLET DAILY; Therapy: (Recorded:60Nax9591) to Recorded   8  Clobetasol Propionate 0 05 % External Cream; APPLY SPARINGLY TO AFFECTED   AREA(S) 3 TIMES A DAY; Therapy: 08WQS6347 to (Evaluate:30Vyr6764)  Requested for: 68XJH9821; Last   Rx:27Jan2017 Ordered   9  CoQ-10 200 MG CAPS; TAKE 1 CAPSULE DAILY; Therapy: (Recorded:31Yes4272) to Recorded   10  Effient 10 MG Oral Tablet; TAKE 1 TABLET DAILY  Requested for: 50IFV5167; Last    Rx:19Jch5594 Ordered   11  Furosemide 20 MG Oral Tablet; TAKE 1 TABLET DAILY AS NEEDED for leg edema; Therapy: 55GPE9686 to (Last Rx:11Oct2016)  Requested for: 95CVQ8387 Ordered   12  Glucagon (rDNA) 1 MG Kit; AS NEEDED FOR <40 BLOOD SUGAR; Therapy: (Recorded:29Iws5346) to Recorded   13  HumaLOG 100 UNIT/ML Subcutaneous Solution; USE AS DIRECTED; Therapy: (Recorded:26Gfo7917) to Recorded   14  Isosorbide Mononitrate ER 30 MG Oral Tablet Extended Release 24 Hour; TAKE 1    TABLET DAILY; Therapy: 08PYS1895 to ([phone])  Requested for: 41MRO3719; Last    Rx:29Mar2017 Ordered   15  Lantus SoloStar 100 UNIT/ML Subcutaneous Solution Pen-injector; use as directed 19    IU BID; Therapy: 30ISX9199 to Recorded   16  Levothyroxine Sodium 175 MCG Oral Tablet; TAKE 1 TABLET DAILY  Requested for:    88YFO5748; Last Rx:04Oct2016 Ordered   17  Methocarbamol 750 MG Oral Tablet; TAKE 1 TABLET Twice daily PRN SPASM; Therapy: 60VXE5709 to (Evaluate:27Jun2017)  Requested for: 18RCO7275; Last    Rx:29Mar2017 Ordered   18  Nitroglycerin 0 4 MG Sublingual Tablet Sublingual (Nitrostat); DISSOLVE 1 TABLET    UNDER THE TONGUE AS NEEDED FOR CHEST PAIN; Last Rx:01Dec2016 Ordered   19  TraMADol HCl - 50 MG Oral Tablet; TAKE ONE 1-2  TABLET(S) THREE TIMES DAILYAS    NEEDED FOR PAIN;    Therapy: 31LAN8931 to (Evaluate:02Qbu5405); Last Rx:15Mar2017 Ordered   20  Vitamin D 1000 UNIT CAPS; TAKE 1 TABLET BY MOUTH ONCE DAILY; Therapy: 11Aug2015 to (Last Rx:11Aug2015) Ordered   21  Vitamin K TABS; Vitamin K 90 mcg- one tab daily daily; Therapy: (Recorded:86Piz1861) to Recorded    Allergies    1  Cardura TABS   2   Zestril TABS    Signatures   Electronically signed by : TERRY Pacheco ; Apr 4 2017 10:15PM EST

## 2018-01-10 NOTE — MISCELLANEOUS
Message   Recorded as Task   Date: 04/04/2016 08:28 PM, Created By: Thuy Jaime   Task Name: Miscellaneous   Assigned To: Anne Wright   Regarding Patient: Hai He, Status: Active   CommentIsadora Bridge - 04 Apr 2016 8:28 PM     TASK CREATED  based on 24 hour monitor, BP higher than target  Change Norvasc to 5mg in am and 2 5 mg in pm   I spoke with the patient and he is aware of the above  I sent in an updated script to [facility]  kja      Active Problems    1  Abnormal CT of the chest (793 2) (R93 8)   2  Acute conjunctivitis (372 00) (H10 30)   3  Anemia (285 9) (D64 9)   4  Arthralgia (719 40) (M25 50)   5  Asymptomatic bilateral carotid artery stenosis (433 10,433 30) (I65 23)   6  Atherosclerosis of native arteries of extremity with intermittent claudication (440 21)   (I70 219)   7  Benign hypertension with chronic kidney disease, stage III (403 10,585 3) (I12 9,N18 3)   8  Centrilobular emphysema (492 8) (J43 2)   9  Chest pain (786 50) (R07 9)   10  Chronic kidney disease, stage 3 (585 3) (N18 3)   11  Colon cancer screening (V76 51) (Z12 11)   12  Dermatitis (692 9) (L30 9)   13  Dyslipidemia (272 4) (E78 5)   14  Eczema (692 9) (L30 9)   15  Encounter for consultation (V65 9) (Z71 9)   16  Fatigue (780 79) (R53 83)   17  Heartburn (787 1) (R12)   18  Hyperkalemia (276 7) (E87 5)   19  Hypothyroidism, postablative (244 1) (E89 0)   20  Multiple vessel coronary artery disease (414 00) (I25 10)   21  Need for vaccination with 13-polyvalent pneumococcal conjugate vaccine (V03 82) (Z23)   22  Obstructive sleep apnea syndrome (327 23) (G47 33)   23  Peripheral vascular disease (443 9) (I73 9)   24  Proteinuria (791 0) (R80 9)   25  Renal artery stenosis (440 1) (I70 1)   26  Renal cyst, right (753 10) (Q61 00)   27  Shortness of breath on exertion (786 05) (R06 02)   28  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 (250 41,403 90,585 9) (E10 22,I12 9,N18 9)   29   Type I diabetes mellitus (250 01) (E10 9)   30  Unsteadiness (781 2) (R26 81)   31  Upper respiratory infection (465 9) (J06 9)   32  Vitamin D deficiency (268 9) (E55 9)   33  White coat hypertension (796 2) (R03 0)   34  Xerosis cutis (706 8) (L85 3)    Current Meds   1  Accu-Chek Angie Plus In Vitro Strip; Therapy: 39MGQ9055 to (Evaluate:01Jan2015) Recorded   2  Aspirin 81 MG Oral Tablet; CHEW ONE TABLET DAILY; Therapy: (Recorded:11Jan2016) to Recorded   3  Astaxanthin CAPS; Take one tab of 2 mg daily; Therapy: (DKCCSKSQ:36TYQ7394) to Recorded   4  Atorvastatin Calcium 40 MG Oral Tablet; Take 1 tablet daily; Therapy: (Recorded:11Jan2016) to Recorded   5  Carvedilol 12 5 MG Oral Tablet; Take 1 tablet twice daily; Therapy: 06NRW0028 to (Bradly Henriquez)  Requested for: 84HIG4464; Last   Rx:04Feb2016 Ordered   6  Centrum Silver TABS; TAKE 1 TABLET DAILY; Therapy: (Recorded:24Pgd8839) to Recorded   7  CoQ-10 200 MG Oral Capsule; TAKE 1 CAPSULE DAILY; Therapy: (Recorded:53Ycn2371) to Recorded   8  Effient 10 MG Oral Tablet; Take 1 tablet daily; Last Rx:18Dec2015 Ordered   9  Furosemide 40 MG Oral Tablet; TAKE 1 TABLET DAILY prn gain in weight of 3 or more   pounds; Therapy: (Recorded:11Jan2016) to Recorded   10  Glucagon Emergency 1 MG Injection Kit; inject sq if blood ssugar  is less than 60; Therapy: 08PAB3900 to (Evaluate:03Jun2016)  Requested for: 81YBM0730; Last    Rx:04Feb2016 Ordered   11  HumaLOG 100 UNIT/ML Subcutaneous Solution; USE AS DIRECTED; Therapy: (ZSNKNEVZ:76OEJ3989) to Recorded   12  Ketostix In Vitro Strip; USE AS DIRECTED; Therapy: 90PPP7387 to (Evaluate:26Okw9680); Last Rx:11Nov2015 Ordered   13  Lantus SoloStar 100 UNIT/ML Subcutaneous Solution Pen-injector; Inject 21 units subQ    in the am and 7 units in the pm;    Therapy: 29Feb2016 to Recorded   14  Levothyroxine Sodium 175 MCG Oral Tablet; TAKE 1 TABLET DAILY; Therapy: (FJRBTSDT:42YKY3079) to Recorded   15   Nitrostat 0 4 MG Sublingual Tablet Sublingual; DISSOLVE 1 TABLET UNDER THE    TONGUE AS NEEDED FOR CHEST PAIN;    Therapy: (Recorded:11Jan2016) to Recorded   16  Norvasc 5 MG Oral Tablet; TAKE 1 TABLET DAILY AS DIRECTED; Therapy: (Recorded:25Bxv9795) to Recorded   17  Pycnogenol Complex TABS; TAKE TABLET Daily 100mg; Therapy: (Recorded:11Jan2016) to Recorded   18  TraMADol HCl - 50 MG Oral Tablet; TAKE ONE (1) TABLET(S) THREE TIMES DAILYAS    NEEDED FOR PAIN;    Therapy: 39XHZ3736 to (Evaluate:11Dec2015); Last Rx:11Nov2015 Ordered   19  Ubiquinol 200 MG CAPS; TAKE CAPSULE Daily; Therapy: (Recorded:11Jan2016) to Recorded   20  Vitamin D 1000 UNIT CAPS; TAKE 1 TABLET BY MOUTH ONCE DAILY; Therapy: 11Aug2015 to (Last Rx:11Aug2015) Ordered   21  Vitamin K TABS; Vitamin K 90 mcg- one tab daily daily; Therapy: (Recorded:60Nqr0781) to Recorded    Allergies    1  Cardura TABS   2  Zestril TABS    Plan  Benign hypertension with chronic kidney disease, stage III, Chronic kidney disease,  stage 3    · AmLODIPine Besylate 5 MG Oral Tablet (Norvasc);  Take one tablet every  morning and 1/2 tablet every evening    Signatures   Electronically signed by : TERRY Darnell ; Apr 10 2016  7:55PM EST

## 2018-01-10 NOTE — RESULT NOTES
Message   please call pt or his wife,    kidney function is NORMAL    Creatinine is 1 24! Continue same Rx     Verified Results  (1) BASIC METABOLIC PROFILE 41QXB4108 10:36AM Kandice Monreal Order Number: [id number]     Test Name Result Flag Reference   GLUCOSE,RANDM 197 mg/dL H    If the patient is fasting, the ADA then defines impaired fasting glucose as > 100 mg/dL and diabetes as > or equal to 123 mg/dL  SODIUM 139 mmol/L  136-145   POTASSIUM 5 1 mmol/L  3 5-5 3   CHLORIDE 107 mmol/L  100-108   CARBON DIOXIDE 25 mmol/L  21-32   ANION GAP (CALC) 7 mmol/L  4-13   BLOOD UREA NITROGEN 20 mg/dL  5-25   CREATININE 1 24 mg/dL  0 60-1 30   Standardized to IDMS reference method   CALCIUM 8 9 mg/dL  8 3-10 1   eGFR Non-African American 57 5 ml/min/1 73sq m     [facility] [employer] Disease Education Program recommendations are as follows:  GFR calculation is accurate only with a steady state creatinine  Chronic Kidney disease less than 60 ml/min/1 73 sq  meters  Kidney failure less than 15 ml/min/1 73 sq  meters  Signatures   Electronically signed by :  TERRY Mccarty ; Aug  8 2016  4:52PM EST                       (Author)

## 2018-01-10 NOTE — MISCELLANEOUS
Message   Recorded as Task   Date: 03/15/2017 11:55 AM, Created By: Brendan Donohue   Task Name: Miscellaneous   Assigned To: Francheska Anderson   Regarding Patient: Savana Rios, Status: In Progress   CommentCarron Letters - 15 Mar 2017 11:55 AM     TASK CREATED  Chris's urine protein is up  Please call him and see what his BP's have been running   Renata Pelayo - 15 Mar 2017 11:59 AM     TASK IN PROGRESS   Renata Pelayo - 15 Mar 2017 1:21 PM     TASK REPLIED TO: Previously Assigned To Rachele Thompson has not been monitoring his BP's at home, and has no recent readings  Francois Murillo - 15 Mar 2017 2:46 PM     TASK EDITED  please have him check his BPs at home and send in in 2 weeks   Renata Pelayo - 20 Mar 2017 2:02 PM     TASK EDITED  Message was left for pt to call the office  [facility]   I spoke with Consuelo Porter and advised him to take his BP's at home tid and to send in readings in 2 weeks  [facility] 3/20/2017       Active Problems    1  Acquired polyneuropathy (356 9) (G62 9)   2  Acute conjunctivitis (372 00) (H10 30)   3  Anemia (285 9) (D64 9)   4  Arthralgia (719 40) (M25 50)   5  Asymptomatic bilateral carotid artery stenosis (433 10,433 30) (I65 23)   6  Atherosclerosis of native artery of extremity with intermittent claudication (440 21)   (I70 219)   7  Benign hypertension with chronic kidney disease, stage III (403 10,585 3) (I12 9,N18 3)   8  Calf cramp (729 82) (R25 2)   9  Cataract (366 9) (H26 9)   10  Centrilobular emphysema (492 8) (J43 2)   11  Chronic kidney disease, stage 3 (585 3) (N18 3)   12  Chronic kidney disease, stage 4 (severe) (585 4) (N18 4)   13  Degenerative lumbar spinal stenosis (724 02) (M48 06)   14  Dermatitis (692 9) (L30 9)   15  Dyslipidemia (272 4) (E78 5)   16  Eczema (692 9) (L30 9)   17  Facial skin lesion (709 9) (L98 9)   18  Fatigue (780 79) (R53 83)   19  Heartburn (787 1) (R12)   20  Hyperkalemia (276 7) (E87 5)   21   Hypothyroidism, postablative (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 1) (E89 0)   22  Intervertebral disc disorders with radiculopathy, lumbosacral region (724 4) (M51 17)   23  Low back pain with sciatica (724 3) (M54 40)   24  Multiple vessel coronary artery disease (414 00) (I25 10)   25  Need for 23-polyvalent pneumococcal polysaccharide vaccine (V03 82) (Z23)   26  Obstructive sleep apnea syndrome (327 23) (G47 33)   27  Peripheral vascular disease (443 9) (I73 9)   28  Proteinuria (791 0) (R80 9)   29  Protruded lumbar disc (722 10) (M51 26)   30  Renal artery stenosis (440 1) (I70 1)   31  Renal cyst, right (753 10) (N28 1)   32  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 (250 41,585 9) (E10 22,N18 9)   33  Unsteadiness (781 2) (R26 81)   34  Vitamin D deficiency (268 9) (E55 9)   35  White coat hypertension   36  Xerosis cutis (706 8) (L85 3)    Current Meds   1  Accu-Chek Angie Plus In Vitro Strip; TEST 2 TIMES DAILY DX: E11 9; Therapy: 03PAA6928 to (Evaluate:15Jun2017)  Requested for: 40ABX2827; Last   Rx:17Mar2017 Ordered   2  AmLODIPine Besylate 10 MG Oral Tablet; take 1 tablet daily at bedtime; Therapy: 00PWD1460 to (Evaluate:17Nov2017)  Requested for: 89RUC2682; Last   Rx:22Nov2016 Ordered   3  Aspirin 81 MG TABS; CHEW ONE TABLET DAILY; Therapy: (Recorded:46Cld8278) to Recorded   4  Astaxanthin CAPS; Take 2mg tablets daily; Therapy: (Recorded:09Ooo2417) to Recorded   5  Atorvastatin Calcium 40 MG Oral Tablet; Take 1 tablet daily  Requested for: 74LXC3133;   Last Rx:04Oct2016 Ordered   6  Carvedilol 12 5 MG Oral Tablet; take one tablet by mouth twice daily; Therapy: 40NGO4692 to (Evaluate:12Iou0742)  Requested for: 57QMT5576; Last   Rx:15Mar2017 Ordered   7  Centrum Silver TABS; TAKE 1 TABLET DAILY; Therapy: (Recorded:49Utd2167) to Recorded   8  Clobetasol Propionate 0 05 % External Cream; APPLY SPARINGLY TO AFFECTED   AREA(S) 3 TIMES A DAY; Therapy: 94IBP0942 to (Evaluate:93Lnt5309)  Requested for: 86KRN0870; Last   Rx:27Jan2017 Ordered   9   CoQ-10 200 MG CAPS; TAKE 1 CAPSULE DAILY; Therapy: (Recorded:80Img3282) to Recorded   10  Effient 10 MG Oral Tablet; TAKE 1 TABLET DAILY  Requested for: 20KCU1291; Last    Rx:25Osf9890 Ordered   11  Furosemide 20 MG Oral Tablet; TAKE 1 TABLET DAILY AS NEEDED for leg edema; Therapy: 06FEJ6360 to (Last Rx:11Oct2016)  Requested for: 11EMF0477 Ordered   12  Glucagon (rDNA) 1 MG Kit; AS NEEDED FOR <40 BLOOD SUGAR; Therapy: (Recorded:15Tyd6371) to Recorded   13  HumaLOG 100 UNIT/ML Subcutaneous Solution; USE AS DIRECTED; Therapy: (Recorded:77Sbd6749) to Recorded   14  Lantus SoloStar 100 UNIT/ML Subcutaneous Solution Pen-injector; use as directed 19    IU BID; Therapy: 05YHZ1963 to Recorded   15  Levothyroxine Sodium 175 MCG Oral Tablet; TAKE 1 TABLET DAILY  Requested for:    23JQK0472; Last Rx:57Pqz3339 Ordered   16  Methocarbamol 500 MG Oral Tablet; TAKE 1 TABLET Twice daily PRN muscle spasm; Therapy: 53MRM2256 to (Evaluate:13Ehs0004)  Requested for: 20Mar2017; Last    Rx:20Mar2017 Ordered   17  Nitroglycerin 0 4 MG Sublingual Tablet Sublingual (Nitrostat); DISSOLVE 1 TABLET    UNDER THE TONGUE AS NEEDED FOR CHEST PAIN; Last Rx:42Dgy4975 Ordered   18  TraMADol HCl - 50 MG Oral Tablet; TAKE ONE 1-2  TABLET(S) THREE TIMES DAILYAS    NEEDED FOR PAIN;    Therapy: 34TYO4423 to (Evaluate:60Wrs6716); Last Rx:15Mar2017 Ordered   19  Vitamin D 1000 UNIT CAPS; TAKE 1 TABLET BY MOUTH ONCE DAILY; Therapy: 11Aug2015 to (Last Rx:11Aug2015) Ordered   20  Vitamin K TABS; Vitamin K 90 mcg- one tab daily daily; Therapy: (Recorded:58Fmr0969) to Recorded    Allergies    1  Cardura TABS   2   Zestril TABS    Signatures   Electronically signed by : TERRY Peterson ; Mar 20 2017  4:03PM EST

## 2018-01-11 NOTE — MISCELLANEOUS
Message   Recorded as Task   Date: 03/29/2017 12:03 PM, Created By: Kosta Ivory   Task Name: Care Coordination   Assigned To: Nell Hathaway clinical,Team   Regarding Patient: Tayler Macias, Status: Active   Comment:    Joycelyn Martinez - 29 Mar 2017 12:03 PM     TASK CREATED  Caller: Self; Care Coordination; [phone] (Home)  Pt called stating he was instructed by Dr Myra Cm to call with an update regarding taking methocarbamol  Pt is taking methocarbamol 500mg bid and has no side effects however he has not had any improvement in his muscle spasms  Inquiring whether the dose should be increased or any other recommendations  [facility]Joycelyn Osuna - 29 Mar 2017 12:03 PM     TASK EDITED   Nirali Roelon - 29 Mar 2017 12:13 PM     TASK REPLIED TO: Previously Assigned To Nirali Uriel  please have him increase to 1 5 tabs BID   Selma Rosario - 29 Mar 2017 12:29 PM     TASK EDITED  Pt informed of the same  Pt said he has 2 pills left  The refill is only for 1 tab BID, #20  Pt asking for a new RX for 1 5 tabs BID for 1 month be sent to [facility] - 29 Mar 2017 1:03 PM     TASK REPLIED TO: Previously Assigned To Nirali Uriel  e-rx sent for methocarbamol 750 mg BID instead   Selma Rosario - 29 Mar 2017 1:11 PM     TASK EDITED  Pt informed that Dr Myra Cm sent a new RX with 2 RF but the new RX is a higher dosage and will read 750mg 1 tab BID  Pt verbalized understanding  Active Problems    1  Acquired polyneuropathy (356 9) (G62 9)   2  Acute conjunctivitis (372 00) (H10 30)   3  Age-related cataract (366 10) (H25 9)   4  Anemia (285 9) (D64 9)   5  Arthralgia (719 40) (M25 50)   6  Asymptomatic bilateral carotid artery stenosis (433 10,433 30) (I65 23)   7  Atherosclerosis of native artery of extremity with intermittent claudication (440 21)   (I70 219)   8   Benign hypertension with chronic kidney disease, stage III (403 10,585 3) (I12 9,N18 3)   9  Calf cramp (729 82) (R25 2)   10  Cataract (366 9) (H26 9)   11  Centrilobular emphysema (492 8) (J43 2)   12  Chronic kidney disease, stage 3 (585 3) (N18 3)   13  Chronic kidney disease, stage 4 (severe) (585 4) (N18 4)   14  Degenerative lumbar spinal stenosis (724 02) (M48 06)   15  Dermatitis (692 9) (L30 9)   16  Dry eye syndrome (375 15) (H04 129)   17  Dyslipidemia (272 4) (E78 5)   18  Eczema (692 9) (L30 9)   19  Facial skin lesion (709 9) (L98 9)   20  Fatigue (780 79) (R53 83)   21  Heartburn (787 1) (R12)   22  Hyperkalemia (276 7) (E87 5)   23  Hypothyroidism, postablative (244 1) (E89 0)   24  Intervertebral disc disorders with radiculopathy, lumbosacral region (724 4) (M51 17)   25  Low back pain with sciatica (724 3) (M54 40)   26  Multiple vessel coronary artery disease (414 00) (I25 10)   27  Need for 23-polyvalent pneumococcal polysaccharide vaccine (V03 82) (Z23)   28  Obstructive sleep apnea syndrome (327 23) (G47 33)   29  Peripheral vascular disease (443 9) (I73 9)   30  Proteinuria (791 0) (R80 9)   31  Protruded lumbar disc (722 10) (M51 26)   32  Renal artery stenosis (440 1) (I70 1)   33  Renal cyst, right (753 10) (N28 1)   34  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 (250 41,585 9) (E10 22,N18 9)   35  Unsteadiness (781 2) (R26 81)   36  Vitamin D deficiency (268 9) (E55 9)   37  White coat hypertension   38  Xerosis cutis (706 8) (L85 3)    Current Meds   1  Accu-Chek Angie Plus In Vitro Strip; TEST 2 TIMES DAILY DX: E11 9; Therapy: 68LBJ0587 to (Evaluate:60Ttx0538)  Requested for: 19SEE8524; Last   Rx:03Dke0176 Ordered   2  AmLODIPine Besylate 10 MG Oral Tablet; take 1 tablet daily at bedtime; Therapy: 99FWO5607 to (Evaluate:17Nov2017)  Requested for: 73KHO3729; Last   Rx:22Nov2016 Ordered   3  Aspirin 81 MG TABS; CHEW ONE TABLET DAILY; Therapy: (Recorded:38Ryn2116) to Recorded   4  Astaxanthin CAPS;  Take 2mg tablets daily; Therapy: (Recorded:38Zar5718) to Recorded   5  Atorvastatin Calcium 40 MG Oral Tablet; Take 1 tablet daily  Requested for: 13FXK9442;   Last Rx:04Oct2016 Ordered   6  Carvedilol 12 5 MG Oral Tablet; take one tablet by mouth twice daily; Therapy: 98JSA4783 to (Evaluate:49Glx8224)  Requested for: 97UJD6242; Last   Rx:15Mar2017 Ordered   7  Centrum Silver TABS; TAKE 1 TABLET DAILY; Therapy: (Recorded:43Udd2410) to Recorded   8  Clobetasol Propionate 0 05 % External Cream; APPLY SPARINGLY TO AFFECTED   AREA(S) 3 TIMES A DAY; Therapy: 85LXR4409 to (Evaluate:06Feb2017)  Requested for: 72KYU9425; Last   Rx:27Jan2017 Ordered   9  CoQ-10 200 MG CAPS; TAKE 1 CAPSULE DAILY; Therapy: (Recorded:02Vhr6681) to Recorded   10  Effient 10 MG Oral Tablet; TAKE 1 TABLET DAILY  Requested for: 38ULD4942; Last    Rx:90Njq8188 Ordered   11  Furosemide 20 MG Oral Tablet; TAKE 1 TABLET DAILY AS NEEDED for leg edema; Therapy: 27SPV6923 to (Last Rx:11Oct2016)  Requested for: 52RMU2442 Ordered   12  Glucagon (rDNA) 1 MG Kit; AS NEEDED FOR <40 BLOOD SUGAR; Therapy: (Recorded:84Zna2199) to Recorded   13  HumaLOG 100 UNIT/ML Subcutaneous Solution; USE AS DIRECTED; Therapy: (Recorded:36Qly5366) to Recorded   14  Lantus SoloStar 100 UNIT/ML Subcutaneous Solution Pen-injector; use as directed 19    IU BID; Therapy: 93DTP2089 to Recorded   15  Levothyroxine Sodium 175 MCG Oral Tablet; TAKE 1 TABLET DAILY  Requested for:    64TXK2301; Last Rx:04Oct2016 Ordered   16  Methocarbamol 750 MG Oral Tablet; TAKE 1 TABLET Twice daily PRN SPASM; Therapy: 36RNB5176 to (Evaluate:27Jun2017)  Requested for: 75XFE2064; Last    Rx:29Mar2017 Ordered   17  Nitroglycerin 0 4 MG Sublingual Tablet Sublingual (Nitrostat); DISSOLVE 1 TABLET    UNDER THE TONGUE AS NEEDED FOR CHEST PAIN; Last Rx:71Cjv0598 Ordered   18   TraMADol HCl - 50 MG Oral Tablet; TAKE ONE 1-2  TABLET(S) THREE TIMES DAILYAS    NEEDED FOR PAIN;    Therapy: 62STS4359 to (Evaluate:97Urb8375); Last Rx:15Mar2017 Ordered   19  Vitamin D 1000 UNIT CAPS; TAKE 1 TABLET BY MOUTH ONCE DAILY; Therapy: 11Aug2015 to (Last Rx:11Aug2015) Ordered   20  Vitamin K TABS; Vitamin K 90 mcg- one tab daily daily; Therapy: (Recorded:71Wvt2192) to Recorded    Allergies    1  Cardura TABS   2   Zestril TABS    Signatures   Electronically signed by : Anali Puentes, ; Mar 29 2017  1:15PM EST                       (Author)

## 2018-01-11 NOTE — MISCELLANEOUS
Message   Recorded as Task   Date: 2017 02:12 PM, Created By: Jennifer Manzano   Task Name: Follow Up   Assigned To: RONY Espinosa   Regarding Patient: Kaushik De Los Santos, Status: Active   Comment:    Jennifer Manzano - 2017 2:12 PM     TASK CREATED  Caller: Self; General Medical Question; [phone] (Home)  Pt left vm that he is scheduled for inj tomorrow but has a cold and he also has a question about the gabapentin  S/W pt and wife on speaker phone: pt has cold and is congested, denied being on abx  Told pt that inj needed to be cx'd b/c he was sick  Told pt to resume his Effient today and once his symptoms were gone he will need to c/b to R/S inj, I told pt his Effient Hold approval will  on 17 so when he calls back we will have to refax a new request to Dr Leopoldo Senior  Pt and wife understood  Pt said that he is up to 3 caps of gabapentin 100mg BID and the leg cramps are worse, he felt the cramps were a little less when he was taking 3 caps QHS  Pt denies drowsiness or dizziess on the current gabapentin dose  Told pt and wife Dr Joe Mccarthy to advise about gabapentin and his inc leg cramps  Merritt Arellano - 2017 3:05 PM     TASK REPLIED TO: Previously Assigned To [facility]    md aware   go back to 100mg 3 caps QHS   Selma Rosario - 2017 4:14 PM     TASK EDITED  Pt advised of the same  Pt verbalized understanding of instr  Active Problems    1  Abnormal CT of the chest (793 2) (R93 8)   2  Acquired polyneuropathy (356 9) (G62 9)   3  Acute conjunctivitis (372 00) (H10 30)   4  Anemia (285 9) (D64 9)   5  Arthralgia (719 40) (M25 50)   6  Asymptomatic bilateral carotid artery stenosis (433 10,433 30) (I65 23)   7  Atherosclerosis of native artery of extremity with intermittent claudication (440 21)   (I70 219)   8  Benign hypertension with chronic kidney disease, stage III (403 10,585 3) (I12 9,N18 3)   9   Cataract (366 9) (H26 9) 10  Centrilobular emphysema (492 8) (J43 2)   11  Chest pain (786 50) (R07 9)   12  Chronic kidney disease, stage 3 (585 3) (N18 3)   13  Chronic kidney disease, stage 4 (severe) (585 4) (N18 4)   14  Colon cancer screening (V76 51) (Z12 11)   15  Degenerative lumbar spinal stenosis (724 02) (M48 06)   16  Dermatitis (692 9) (L30 9)   17  Dyslipidemia (272 4) (E78 5)   18  Eczema (692 9) (L30 9)   19  Encounter for consultation (V65 9) (Z71 9)   20  Fatigue (780 79) (R53 83)   21  Heartburn (787 1) (R12)   22  Hyperkalemia (276 7) (E87 5)   23  Hypothyroidism, postablative (244 1) (E89 0)   24  Intervertebral disc disorders with radiculopathy, lumbosacral region (724 4) (M51 17)   25  Low back pain with sciatica (724 3) (M54 40)   26  Multiple vessel coronary artery disease (414 00) (I25 10)   27  Need for prophylactic vaccination and inoculation against influenza (V04 81) (Z23)   28  Need for vaccination with 13-polyvalent pneumococcal conjugate vaccine (V03 82) (Z23)   29  Obstructive sleep apnea syndrome (327 23) (G47 33)   30  Peripheral vascular disease (443 9) (I73 9)   31  Pre-op exam (V72 84) (Z01 818)   32  Proteinuria (791 0) (R80 9)   33  Protruded lumbar disc (722 10) (M51 26)   34  Renal artery stenosis (440 1) (I70 1)   35  Renal cyst, right (753 10) (N28 1)   36  Shortness of breath on exertion (786 05) (R06 02)   37  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 (250 41,585 9) (E10 22,N18 9)   38  Type I diabetes mellitus (250 01) (E10 9)   39  Unsteadiness (781 2) (R26 81)   40  Upper respiratory infection (465 9) (J06 9)   41  Viral infection (079 99) (B34 9)   42  Vitamin D deficiency (268 9) (E55 9)   43  White coat hypertension   44  Xerosis cutis (236 8) (L85 3)    Current Meds   1  Accu-Chek Angie Plus In Vitro Strip; Therapy: 32BOI8580 to (Evaluate:01Jan2015) Recorded   2  AmLODIPine Besylate 10 MG Oral Tablet; take 1 tablet daily at bedtime;    Therapy: 78HPH6550 to (Evaluate:17Nov2017)  Requested for: 08QME3222; Last   Rx:22Nov2016 Ordered   3  Aspirin 81 MG TABS; CHEW ONE TABLET DAILY; Therapy: (Recorded:55Bzc2810) to Recorded   4  Astaxanthin CAPS; Take 2mg tablets daily; Therapy: (Recorded:02Xjo8329) to Recorded   5  Atorvastatin Calcium 40 MG Oral Tablet; Take 1 tablet daily  Requested for: 91MEW6405;   Last Rx:04Oct2016 Ordered   6  Carvedilol 12 5 MG Oral Tablet; take one tablet by mouth twice daily; Therapy: 69FAB6596 to (Evaluate:11Jun2017)  Requested for: 45KQE0685 Recorded   7  Centrum Silver TABS; TAKE 1 TABLET DAILY; Therapy: (Recorded:83Ohf9119) to Recorded   8  CoQ-10 200 MG CAPS; TAKE 1 CAPSULE DAILY; Therapy: (Recorded:38Sdr5197) to Recorded   9  Effient 10 MG Oral Tablet; TAKE 1 TABLET DAILY  Requested for: 95AUY9303; Last   Rx:01Dec2016 Ordered   10  Furosemide 20 MG Oral Tablet; TAKE 1 TABLET DAILY AS NEEDED for leg edema; Therapy: 87QNO6920 to (Last Rx:11Oct2016)  Requested for: 62SZE9518 Ordered   11  Gabapentin 100 MG Oral Capsule; TAKE 3 CAPS AT NIGHT; Therapy: 35DGA5388 to (Evorn Found)  Requested for: 56WCL8285; Last    Rx:04Jan2017 Ordered   12  Glucagon (rDNA) 1 MG Kit; AS NEEDED FOR <40 BLOOD SUGAR; Therapy: (Recorded:28Bga5992) to Recorded   13  HumaLOG 100 UNIT/ML Subcutaneous Solution; USE AS DIRECTED; Therapy: (Recorded:11Uyh6391) to Recorded   14  Lantus SoloStar 100 UNIT/ML Subcutaneous Solution Pen-injector; use as directed; Therapy: 66ZZT5530 to Recorded   15  Levothyroxine Sodium 175 MCG Oral Tablet; TAKE 1 TABLET DAILY  Requested for:    84FEY0779; Last Rx:04Oct2016 Ordered   16  Nitroglycerin 0 4 MG Sublingual Tablet Sublingual (Nitrostat); DISSOLVE 1 TABLET    UNDER THE TONGUE AS NEEDED FOR CHEST PAIN; Last Rx:94Ahi3860 Ordered   17  TraMADol HCl - 50 MG Oral Tablet; TAKE ONE (1) TABLET(S) THREE TIMES DAILYAS    NEEDED FOR PAIN;    Therapy: 86NRB2151 to (Evaluate:02Apr2017); Last Rx:04Oct2016 Ordered   18  Vitamin D 1000 UNIT CAPS; TAKE 1 TABLET BY MOUTH ONCE DAILY; Therapy: 11Aug2015 to (Last Rx:11Aug2015) Ordered   19  Vitamin K TABS; Vitamin K 90 mcg- one tab daily daily; Therapy: (Recorded:81Fly9303) to Recorded    Allergies    1  Cardura TABS   2   Zestril TABS    Signatures   Electronically signed by : Mya Sainz, ; Jan 9 2017  4:14PM EST                       (Author)

## 2018-01-11 NOTE — MISCELLANEOUS
Message   Recorded as Task   Date: 02/03/2017 07:50 AM, Created By: Adonis Kemp   Task Name: Follow Up   Assigned To: [facility]   Regarding Patient: Kaushik De Los Santos, Status: In Progress   Lien Casper - 03 Feb 2017 7:50 AM     TASK CREATED  pt is S/P 1/27/2017 by Dr Joe Mccarthy   no pain diary   no f/u   Adonis Kemp - 03 Feb 2017 12:56 PM     TASK EDITED  spoke to pt he got 70% relief from inj no pain as of now no redness or swelling   Mel Todd - 03 Feb 2017 12:56 PM     TASK IN PROGRESS   Mel Todd - 06 Feb 2017 11:03 AM     TASK EDITED   Thomas Kessler - 06 Feb 2017 11:26 AM     TASK REPLIED TO: Previously Assigned To Thomas Kessler md aware   pt may f/u PRN   Adonis Kemp - 06 Feb 2017 2:37 PM     TASK COMPLETED   Selma Rosario - 17 Feb 2017 3:26 PM     TASK REACTIVATED  Pt left vm at 2:35 that he was calling with info in f/u of his inj he had on 1/27/17  C/B to cell [phone]  S/P B/L L5 TFESI, on effient, from 1/27/17 w/ FQ  Anne Gutierrez - 17 Feb 2017 3:58 PM     TASK EDITED  Spoke with pt who reports the injection lasted for a few weeks but his pain has returned to what it was prior to inj  Current level of pain when walking 10 and located in b/l calves  Thomas Kessler - 17 Feb 2017 4:02 PM     TASK REPLIED TO: Previously Assigned To [facility]  his vascular doctor thinks its more his arteries   injection would not help if it's from his peripheral vascular disease   if he does feel like it helped, would recommend repeating it for further efficacy, otherwise recommend scheduling SOVS to discuss scs   Selma Rosario - 20 Feb 2017 9:08 AM     TASK EDITED  LMOM at number provided for pt to c/b  Selma Rosario - 20 Feb 2017 9:08 AM     TASK IN PROGRESS   Selma Rosario - 20 Feb 2017 2:20 PM     TASK EDITED  Pt left vm returning call at 1204  S/W pt and advised of the same    He said 3 days after the inj he had great relief with the pain he had w/ walking  he said it lasted 2-3 wks  Pt would prefer to try another inj  Pt is on Effient, last approval  on 17  Told pt new Effient hold form will need to be faxed to Dr Darrick aPtel and once approval received the nurse will call him to schedule opro  Pt told not to stop his Effient until the nurse calls him  Effient hold form faxed to Dr Darrick Patel of [address] [place] [address] Cardiology  **B/L L5 TFESI needs to be sched once approval received  Juan Pablosky Perez - 2017 3:10 PM     TASK REPLIED TO: Previously Assigned To Daron Herman md aware   Selma Rosario - 2017 4:45 PM     TASK REASSIGNED: Previously Assigned To [facility]  Awaiting Effient hold approval    Milagros Cabral - 2017 4:45 PM     TASK IN PROGRESS   Selma Rosario - 03 Mar 2017 11:13 AM     TASK EDITED  2nd request made for Effient Hold approval   I left detailed vm on the Cardiology Nurse phone line asking for form to be completed by Dr Kate Vega and then faxed back to the [facility] private line at [phone]  The [facility] direct nurse phone # was provided if they had any questions regarding my request    Milagros Cabral - 03 Mar 2017 12:28 PM     TASK EDITED  Otilia Jay from Dr Flaquito Pabon office left vm on 3/3 at 11:41 that there is a form in allscripts dated  but if we need another one to c/b and ask for Otilia Jay or leave her a vm on the nurse ph line  Dr Kate Vega is on vac this wk and will be back in office on Mon 3/6 and she can have it signed then, if needed today she could ask one of the covering MDs if they would sign  I left vm for Otilia Jay that the forms are only good for 30 days so a new one does need to be signed, it could wait until Mon 3/6 when Dr Kate Vega returns     Selma Rosario - 13 Mar 2017 11:43 AM     TASK EDITED  2nd phone message left on the [facility] Cardiology Nurse ph line today asking if Dr Kate Vega has completed the Effient hold form we faxed to their office  Asking if form could be faxed to the [address] [place] [address] fax # [phone]  Any questions they can c/b to the [facility] at [phone]  Selma Rosario - 13 Mar 2017 1:09 PM     TASK EDITED  Received vm from Denae Sonido at [address] she got my message and can't find form, and then left 2nd vm that she believes the message she left for me on 3/3 was that there was a form in allscripts from 1/18 but if we needed a new one to c/b   c/b # [phone]  I left vm for Denae Head at [place] [address] Cardiology that I left her a vm on 3/3 that the 1/18 form was only good for 30 days and that we were requesting a new one  I re-faxed the Effient hold form to their office to the Attention of Denae Head  Advised to complete and fax back to the private [facility] at [phone]  *Await form  *   Selma Rosario - 15 Mar 2017 11:07 AM     TASK EDITED  Effient hold approval received from Dr Lou Glynn dated 3/15/17  S/W pt and scheduled B/L L5 TFESI for 3/23 at 3:30 at [facility] w/ FQ  Instr reviewed: light lunch then NPO 1 Hr prior to opro,  needed, c/b needed if sick/abx started prior to opro, Effient to be held for 7 days prior to opro, Effient to be stopped on 3/16/17  Pt verbalized understanding of all instr  Active Problems    1  Acquired polyneuropathy (356 9) (G62 9)   2  Acute conjunctivitis (372 00) (H10 30)   3  Anemia (285 9) (D64 9)   4  Arthralgia (719 40) (M25 50)   5  Asymptomatic bilateral carotid artery stenosis (433 10,433 30) (I65 23)   6  Atherosclerosis of native artery of extremity with intermittent claudication (440 21)   (I70 219)   7  Benign hypertension with chronic kidney disease, stage III (403 10,585 3) (I12 9,N18 3)   8  Cataract (366 9) (H26 9)   9  Centrilobular emphysema (492 8) (J43 2)   10  Chronic kidney disease, stage 3 (585 3) (N18 3)   11  Chronic kidney disease, stage 4 (severe) (585 4) (N18 4)   12   Degenerative lumbar spinal stenosis (724 02) (M48 06)   13  Dermatitis (692 9) (L30 9)   14  Dyslipidemia (272 4) (E78 5)   15  Eczema (692 9) (L30 9)   16  Facial skin lesion (709 9) (L98 9)   17  Fatigue (780 79) (R53 83)   18  Heartburn (787 1) (R12)   19  Hyperkalemia (276 7) (E87 5)   20  Hypothyroidism, postablative (244 1) (E89 0)   21  Intervertebral disc disorders with radiculopathy, lumbosacral region (724 4) (M51 17)   22  Low back pain with sciatica (724 3) (M54 40)   23  Multiple vessel coronary artery disease (414 00) (I25 10)   24  Obstructive sleep apnea syndrome (327 23) (G47 33)   25  Peripheral vascular disease (443 9) (I73 9)   26  Proteinuria (791 0) (R80 9)   27  Protruded lumbar disc (722 10) (M51 26)   28  Renal artery stenosis (440 1) (I70 1)   29  Renal cyst, right (753 10) (N28 1)   30  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 (250 41,585 9) (E10 22,N18 9)   31  Type I diabetes mellitus (250 01) (E10 9)   32  Unsteadiness (781 2) (R26 81)   33  Vitamin D deficiency (268 9) (E55 9)   34  White coat hypertension   35  Xerosis cutis (706 8) (L85 3)    Current Meds   1  Accu-Chek Angie Plus In Vitro Strip; Therapy: 35EVE6074 to (Evaluate:01Jan2015) Recorded   2  AmLODIPine Besylate 10 MG Oral Tablet; take 1 tablet daily at bedtime; Therapy: 14DPM8996 to (Evaluate:17Nov2017)  Requested for: 31JGJ3855; Last   Rx:22Nov2016 Ordered   3  Aspirin 81 MG TABS; CHEW ONE TABLET DAILY; Therapy: (Recorded:47Ewd2167) to Recorded   4  Astaxanthin CAPS; Take 2mg tablets daily; Therapy: (Recorded:35Kif7740) to Recorded   5  Atorvastatin Calcium 40 MG Oral Tablet; Take 1 tablet daily  Requested for: 11DKN8370;   Last Rx:04Oct2016 Ordered   6  Carvedilol 12 5 MG Oral Tablet; take one tablet by mouth twice daily; Therapy: 06LDM6211 to (Evaluate:11Jun2017)  Requested for: 00OVH1393 Recorded   7  Centrum Silver TABS; TAKE 1 TABLET DAILY; Therapy: (Recorded:94Iim2544) to Recorded   8   Clobetasol Propionate 0 05 % External Cream; APPLY SPARINGLY TO AFFECTED   AREA(S) 3 TIMES A DAY; Therapy: 30LDI5532 to (Evaluate:99Rpx3794)  Requested for: 66LCV3367; Last   Rx:27Jan2017 Ordered   9  CoQ-10 200 MG CAPS; TAKE 1 CAPSULE DAILY; Therapy: (Recorded:05Syf9413) to Recorded   10  Effient 10 MG Oral Tablet; TAKE 1 TABLET DAILY  Requested for: 31HSP1703; Last    Rx:87Lbr6885 Ordered   11  Furosemide 20 MG Oral Tablet; TAKE 1 TABLET DAILY AS NEEDED for leg edema; Therapy: 15IKA7770 to (Last Rx:11Oct2016)  Requested for: 38NFJ4255 Ordered   12  Glucagon (rDNA) 1 MG Kit; AS NEEDED FOR <40 BLOOD SUGAR; Therapy: (Recorded:64Iqi8540) to Recorded   13  HumaLOG 100 UNIT/ML Subcutaneous Solution; USE AS DIRECTED; Therapy: (Recorded:72Vdk5782) to Recorded   14  Lantus SoloStar 100 UNIT/ML Subcutaneous Solution Pen-injector; use as directed; Therapy: 59EPF0061 to Recorded   15  Levothyroxine Sodium 175 MCG Oral Tablet; TAKE 1 TABLET DAILY  Requested for:    08RMN0098; Last Rx:04Oct2016 Ordered   16  Nitroglycerin 0 4 MG Sublingual Tablet Sublingual (Nitrostat); DISSOLVE 1 TABLET    UNDER THE TONGUE AS NEEDED FOR CHEST PAIN; Last Rx:32Xed6586 Ordered   17  TraMADol HCl - 50 MG Oral Tablet; TAKE ONE (1) TABLET(S) THREE TIMES DAILYAS    NEEDED FOR PAIN;    Therapy: 10HES0875 to (Evaluate:02Apr2017); Last Rx:04Oct2016 Ordered   18  Vitamin D 1000 UNIT CAPS; TAKE 1 TABLET BY MOUTH ONCE DAILY; Therapy: 11Aug2015 to (Last Rx:11Aug2015) Ordered   19  Vitamin K TABS; Vitamin K 90 mcg- one tab daily daily; Therapy: (Recorded:95Ogc6743) to Recorded    Allergies    1  Cardura TABS   2   Zestril TABS    Signatures   Electronically signed by : Radha Pool, ; Mar 15 2017 11:08AM EST                       (Author)

## 2018-01-11 NOTE — MISCELLANEOUS
Message     Recorded as Task   Date: 05/18/2017 11:29 AM, Created By: Erich Najjar   Task Name: Call Back   Assigned To: SPA chana clinical,Team   Regarding Patient: Lucille Merida, Status: Active   Comment:    Erich Najjar - 18 May 2017 11:29 AM     TASK CREATED  SPA Call Center- Patients wife called requesting to s/w a nurse about a medication complication (wouldn't elaborate) and about some forms that has to be filled out  Please give wife Priyanka Wood maxwell c/b [phone] (cell)   Milagros Cabral - 18 May 2017 12:08 PM     TASK IN [address] [employer] - 18 May 2017 12:31 PM     TASK EDITED  S/W pt and wife on speaker phone regarding several issues:  1  Dr Frances Velez had promised the [place] forms and they still have not received them in the mail, they need them ASAP b/c the deadline to turn them in is June  Of all their 9 doctors he is the only one that has completed it yet  2  He wanted to make you aware that while you werer away on vac his PCP had him stop the Robaxin b/c it was making him unsteady and shaky  Pt does not want another MR prescribed  3 Pt said the [facility] is the only thing that helped with the leg cramps in 6 yrs  he said the last ISMAEL helped for 3 days  He is asking if he can have another ISMAEL done? He said Dr Frances Velez had told him previously he was concerned about his DM and his blood sugars going up and said "it could cause a heart attack " Wife said she is a nurse and doesn't understand why he said that? Pt said if FQ would allow another inj he would go to his endocrinologist before hand to get instr to follow regarding his blood sugars and he would also need the ok from his doctor to hold his Effient  All of that will take time to have done! Pt said if FQ doesn't want to do an inj then he wants an ov to discuss treatment options  He doesn't understand why he was never given a f/u appt?     Told pt and wife that Dr Frances Velez is going to the OR this afternoon, I will forward all of their concerns to Dr Zakiya Friedman but we might not be calling back until tomorrow with his rec b/c he is going to the OR  They understood and were fine with a c/b tomorrow  Miguel Angel Dry - 76 May 2017 4:27 PM     TASK REPLIED TO: Previously Assigned To Eric Nava I finished the paperwork last week and it's been in my bag   ok for them to  tomorrow    ok to schedule repeat ISMAEL (with effient hold)   Racquel Steward - 19 May 2017 7:49 AM     TASK EDITED  Effient hold request faxed to Racquel Sheikh - 19 May 2017 8:11 AM     TASK EDITED  S/w the pt  and he is aware  He is requesting the paperwork be mailed to his residence because he is in [place]  He was also made a ware that a consent was faxed to Dr Korey Lee to receive permission to hold his Effient  Will await permission to hold and pt  to be notified and scheduled up return of consent  Miguel Angel Nicholson - 34 May 2017 4:07 PM     TASK REPLIED TO: Previously Assigned To Miguel Angel Nicholson  paperwork given to triage nurse   BinRacquel - 19 May 2017 4:29 PM     TASK EDITED  paperwork put in mail  Racquel Steward - 19 May 2017 4:30 PM     TASK EDITED  Whoops, paperwork to be scanned first then mailed to the pt  Racquel Steward - 19 May 2017 4:30 PM     TASK IN PROGRESS   Selma Rosario - 22 May 2017 4:33 PM     TASK EDITED  Lavetta Soulier C will be scanning the paperwork and then mailing to pt  Selma Rosario - 22 May 2017 4:34 PM     TASK COMPLETED   Selma Rosario - 23 May 2017 9:27 AM     TASK REACTIVATED   Selma Rosario - 23 May 2017 9:34 AM     TASK EDITED  Received Effient hold approval from Dr Korey Lee dated 5/22/17, ok to hold Effient for 7 days but she would like pt to continue to take his ASA  Pt had stopped at office today for the [place] paperwork FQ had completed  I informed pt that Lavetta Soulier in our office mailed it out to him yest   While pt was here I scheduled his B/L L5 TFESI #2 for 6/1/17 at 3:30 at [facility]    Instr reviewed: light lunch then NPO 1 Hr prior to opro,  needed, c/b needed if sick/abx started prior to opro, Effient to be held for 7 days prior to opro, Effient to be stopped on 5/25, per Dr Esperanza Cortez pt is to stay on his ASA  Pt said he will be seeing his Endocrine Dr today and will discuss how to manage his blood sugars after the inj b/c last time they were elevated  Pt verbalized understanding of all instr  Copy of the fluoroscopy instr sheet provided to pt  Jessica Gerard - 23 May 2017 9:36 AM     TASK REPLIED TO: Previously Assigned To Jessica Gerard md aware        Active Problems    1  Acquired polyneuropathy (356 9) (G62 9)   2  Acute conjunctivitis (372 00) (H10 30)   3  Age-related cataract (366 10) (H25 9)   4  Anemia (285 9) (D64 9)   5  Arthralgia (719 40) (M25 50)   6  Asymptomatic bilateral carotid artery stenosis (433 10,433 30) (I65 23)   7  Atherosclerosis of native artery of extremity with intermittent claudication (440 21)   (I70 219)   8  Benign hypertension with chronic kidney disease, stage III (403 10,585 3) (I12 9,N18 3)   9  Calf cramp (729 82) (R25 2)   10  Cataract (366 9) (H26 9)   11  Centrilobular emphysema (492 8) (J43 2)   12  Chronic kidney disease, stage 3 (585 3) (N18 3)   13  Degenerative lumbar spinal stenosis (724 02) (M48 06)   14  Dermatitis (692 9) (L30 9)   15  Dry eye syndrome (375 15) (H04 129)   16  Dyslipidemia (272 4) (E78 5)   17  Eczema (692 9) (L30 9)   18  Encounter for prostate cancer screening (V76 44) (Z12 5)   19  Facial skin lesion (709 9) (L98 9)   20  Fatigue (780 79) (R53 83)   21  Heartburn (787 1) (R12)   22  Hyperkalemia (276 7) (E87 5)   23  Hypothyroidism, postablative (244 1) (E89 0)   24  Intervertebral disc disorders with radiculopathy, lumbosacral region (724 4) (M51 17)   25  Low back pain with sciatica (724 3) (M54 40)   26  Multiple vessel coronary artery disease (414 00) (I25 10)   27  Need for 23-polyvalent pneumococcal polysaccharide vaccine (V03 82) (Z23)   28   ITZEL on CPAP (327 23,V46 8) (G47 33,Z99 89)   29  Peripheral vascular disease (443 9) (I73 9)   30  Proteinuria (791 0) (R80 9)   31  Protruded lumbar disc (722 10) (M51 26)   32  Renal artery stenosis (440 1) (I70 1)   33  Renal cyst, right (753 10) (N28 1)   34  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 (250 41,585 9) (E10 22,N18 9)   35  Unsteadiness (781 2) (R26 81)   36  Vitamin D deficiency (268 9) (E55 9)   37  White coat hypertension   38  Xerosis cutis (706 8) (L85 3)    Current Meds   1  Accu-Chek Angie Plus In Vitro Strip; TEST 4 TIMES DAILY DX: E11 9; Therapy: 64JPD4466 to (Evaluate:11Yss1461)  Requested for: 68YQW3535 Recorded   2  AmLODIPine Besylate 10 MG Oral Tablet; take 1 tablet daily at bedtime; Therapy: 88IBT1158 to (Evaluate:17Nov2017)  Requested for: 56FBP1643; Last   Rx:22Nov2016 Ordered   3  Aspirin 81 MG TABS; CHEW ONE TABLET DAILY; Therapy: (Recorded:76Avt0720) to Recorded   4  Astaxanthin CAPS; Take 2mg tablets daily; Therapy: (Recorded:52Thi4779) to Recorded   5  Atorvastatin Calcium 40 MG Oral Tablet; Take 1 tablet daily  Requested for: 05WVR9330;   Last Rx:09Nni3036 Ordered   6  Carvedilol 12 5 MG Oral Tablet; TAKE ONE TABLET BY MOUTH TWICE A DAY; Therapy: 08YEV1784 to (Evaluate:08Jan2018)  Requested for: 13Apr2017; Last   Rx:13Apr2017 Ordered   7  Centrum Silver TABS; TAKE 1 TABLET DAILY; Therapy: (Recorded:79Gep7013) to Recorded   8  CoQ-10 200 MG CAPS; TAKE 1 CAPSULE DAILY; Therapy: (Recorded:01Fdu0487) to Recorded   9  Effient 10 MG Oral Tablet; TAKE 1 TABLET DAILY  Requested for: 75PGM2591; Last   Rx:36Apx8414 Ordered   10  Furosemide 20 MG Oral Tablet; TAKE 1 TABLET DAILY AS NEEDED for leg edema; Therapy: 77TQT5521 to (Last Rx:76Oai0990)  Requested for: 85AYG4056 Ordered   11  Glucagon (rDNA) 1 MG Kit; AS NEEDED FOR <40 BLOOD SUGAR; Therapy: (Recorded:21Kcz1801) to Recorded   12  HumaLOG 100 UNIT/ML Subcutaneous Solution; USE AS DIRECTED;     Therapy: (Recorded:64Bgk3159) to Recorded   13  Isosorbide Mononitrate ER 30 MG Oral Tablet Extended Release 24 Hour; TAKE 1    TABLET DAILY; Therapy: 13ZVH9142 to ([facility])  Requested for: 17BLC3533; Last    Rx:29Mar2017 Ordered   14  Lantus SoloStar 100 UNIT/ML Subcutaneous Solution Pen-injector; use as directed 19    IU BID; Therapy: 47BKY4669 to Recorded   15  Levothyroxine Sodium 175 MCG Oral Tablet; TAKE 1 TABLET DAILY  Requested for:    38YPK8819; Last Rx:04Oct2016 Ordered   16  Nitroglycerin 0 4 MG Sublingual Tablet Sublingual (Nitrostat); DISSOLVE 1 TABLET    UNDER THE TONGUE AS NEEDED FOR CHEST PAIN; Last Rx:01Dec2016 Ordered   17  TraMADol HCl - 50 MG Oral Tablet; TAKE ONE 1-2  TABLET(S) THREE TIMES DAILYAS    NEEDED FOR PAIN;    Therapy: 93BES7111 to (Evaluate:91Sdh7140); Last Rx:15Mar2017 Ordered   18  Vitamin D 1000 UNIT CAPS; TAKE 1 TABLET BY MOUTH ONCE DAILY; Therapy: 11Aug2015 to (Last Rx:11Aug2015) Ordered   19  Vitamin K TABS; Vitamin K 90 mcg- one tab daily daily; Therapy: (Recorded:59Ghp4075) to Recorded    Allergies    1  Cardura TABS   2   Zestril TABS    Signatures   Electronically signed by : Rachael Christianson, ; May 23 2017  9:52AM EST                       (Author)

## 2018-01-12 VITALS
HEIGHT: 67 IN | WEIGHT: 197.5 LBS | RESPIRATION RATE: 16 BRPM | BODY MASS INDEX: 31 KG/M2 | DIASTOLIC BLOOD PRESSURE: 78 MMHG | TEMPERATURE: 94.7 F | SYSTOLIC BLOOD PRESSURE: 102 MMHG | HEART RATE: 68 BPM

## 2018-01-12 VITALS
BODY MASS INDEX: 30.92 KG/M2 | TEMPERATURE: 97.5 F | SYSTOLIC BLOOD PRESSURE: 140 MMHG | HEART RATE: 70 BPM | DIASTOLIC BLOOD PRESSURE: 62 MMHG | OXYGEN SATURATION: 96 % | WEIGHT: 197 LBS | HEIGHT: 67 IN

## 2018-01-12 VITALS
BODY MASS INDEX: 30.63 KG/M2 | HEART RATE: 74 BPM | HEIGHT: 67 IN | SYSTOLIC BLOOD PRESSURE: 134 MMHG | WEIGHT: 195.13 LBS | DIASTOLIC BLOOD PRESSURE: 62 MMHG | TEMPERATURE: 96 F | RESPIRATION RATE: 16 BRPM

## 2018-01-12 NOTE — MISCELLANEOUS
Message   Recorded as Task   Date: 05/18/2017 11:29 AM, Created By: Gucci Martinez   Task Name: Call Back   Assigned To: SPA chana clinical,Team   Regarding Patient: Greyson Fry, Status: In Progress   Comment:    Mary Ruth - 18 May 2017 11:29 AM     TASK CREATED  SPA Call Center- Patients wife called requesting to s/w a nurse about a medication complication (wouldn't elaborate) and about some forms that has to be filled out  Please give wife Caro Marsh maxwell c/b [phone] (cell)   Piotr Murrieta - 18 May 2017 12:08 PM     TASK IN [address] [employer] - 18 May 2017 12:31 PM     TASK EDITED  S/W pt and wife on speaker phone regarding several issues:  1  Dr Candy Perez had promised the [facility] forms and they still have not received them in the mail, they need them ASAP b/c the deadline to turn them in is June  Of all their 9 doctors he is the only one that has completed it yet  2  He wanted to make you aware that while you werer away on vac his PCP had him stop the Robaxin b/c it was making him unsteady and shaky  Pt does not want another MR prescribed  3 Pt said the [facility] is the only thing that helped with the leg cramps in 6 yrs  he said the last ISMAEL helped for 3 days  He is asking if he can have another ISMAEL done? He said Dr Candy Perez had told him previously he was concerned about his DM and his blood sugars going up and said "it could cause a heart attack " Wife said she is a nurse and doesn't understand why he said that? Pt said if FQ would allow another inj he would go to his endocrinologist before hand to get instr to follow regarding his blood sugars and he would also need the ok from his doctor to hold his Effient  All of that will take time to have done! Pt said if FQ doesn't want to do an inj then he wants an ov to discuss treatment options  He doesn't understand why he was never given a f/u appt?     Told pt and wife that Dr Candy Perez is going to the OR this afternoon, I will forward all of their concerns to Dr Carline Hadley but we might not be calling back until tomorrow with his rec b/c he is going to the OR  They understood and were fine with a c/b tomorrow  Phoebe Marsh - 50 May 2017 4:27 PM     TASK REPLIED TO: Previously Assigned To Eric Nava I finished the paperwork last week and it's been in my bag   ok for them to  tomorrow    ok to schedule repeat ISMAEL (with effient hold)   Racquel Steward - 19 May 2017 7:49 AM     TASK EDITED  Effient hold request faxed to Racquel Jennings - 19 May 2017 8:11 AM     TASK EDITED  S/w the pt  and he is aware  He is requesting the paperwork be mailed to his residence because he is in [place]  He was also made a ware that a consent was faxed to Dr Erika Montana to receive permission to hold his Effient  Will await permission to hold and pt  to be notified and scheduled up return of consent  Phoebe Marsh - 78 May 2017 4:07 PM     TASK REPLIED TO: Previously Assigned To Phoebe Marsh  paperwork given to triage nurse   Racquel Steward - 19 May 2017 4:29 PM     TASK EDITED  paperwork put in mail  Racquel Steward - 19 May 2017 4:30 PM     TASK EDITED  Abida, paperwork to be scanned first then mailed to the pt  Racquel Steward - 19 May 2017 4:30 PM     TASK IN PROGRESS   Selma Rosario - 22 May 2017 4:33 PM     TASK EDITED  Fabián LAUREANO will be scanning the paperwork and then mailing to pt  Active Problems    1  Acquired polyneuropathy (356 9) (G62 9)   2  Acute conjunctivitis (372 00) (H10 30)   3  Age-related cataract (366 10) (H25 9)   4  Anemia (285 9) (D64 9)   5  Arthralgia (719 40) (M25 50)   6  Asymptomatic bilateral carotid artery stenosis (433 10,433 30) (I65 23)   7  Atherosclerosis of native artery of extremity with intermittent claudication (440 21)   (I70 219)   8  Benign hypertension with chronic kidney disease, stage III (403 10,585 3) (I12 9,N18 3)   9  Calf cramp (729 82) (R25 2)   10  Cataract (366 9) (H26 9)   11   Centrilobular emphysema (492 8) (J43 2)   12  Chronic kidney disease, stage 3 (585 3) (N18 3)   13  Degenerative lumbar spinal stenosis (724 02) (M48 06)   14  Dermatitis (692 9) (L30 9)   15  Dry eye syndrome (375 15) (H04 129)   16  Dyslipidemia (272 4) (E78 5)   17  Eczema (692 9) (L30 9)   18  Encounter for prostate cancer screening (V76 44) (Z12 5)   19  Facial skin lesion (709 9) (L98 9)   20  Fatigue (780 79) (R53 83)   21  Heartburn (787 1) (R12)   22  Hyperkalemia (276 7) (E87 5)   23  Hypothyroidism, postablative (244 1) (E89 0)   24  Intervertebral disc disorders with radiculopathy, lumbosacral region (724 4) (M51 17)   25  Low back pain with sciatica (724 3) (M54 40)   26  Multiple vessel coronary artery disease (414 00) (I25 10)   27  Need for 23-polyvalent pneumococcal polysaccharide vaccine (V03 82) (Z23)   28  ITZEL on CPAP (327 23,V46 8) (G47 33,Z99 89)   29  Peripheral vascular disease (443 9) (I73 9)   30  Proteinuria (791 0) (R80 9)   31  Protruded lumbar disc (722 10) (M51 26)   32  Renal artery stenosis (440 1) (I70 1)   33  Renal cyst, right (753 10) (N28 1)   34  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 (250 41,585 9) (E10 22,N18 9)   35  Unsteadiness (781 2) (R26 81)   36  Vitamin D deficiency (268 9) (E55 9)   37  White coat hypertension   38  Xerosis cutis (706 8) (L85 3)    Current Meds   1  Accu-Chek Angie Plus In Vitro Strip; TEST 4 TIMES DAILY DX: E11 9; Therapy: 10YPJ1829 to (Evaluate:55Ibi0099)  Requested for: 88YLU3229 Recorded   2  AmLODIPine Besylate 10 MG Oral Tablet; take 1 tablet daily at bedtime; Therapy: 09PFT3284 to (Evaluate:17Nov2017)  Requested for: 20NSP3890; Last   Rx:22Nov2016 Ordered   3  Aspirin 81 MG TABS; CHEW ONE TABLET DAILY; Therapy: (Recorded:01Dec2016) to Recorded   4  Astaxanthin CAPS; Take 2mg tablets daily; Therapy: (Recorded:01Dec2016) to Recorded   5  Atorvastatin Calcium 40 MG Oral Tablet;  Take 1 tablet daily  Requested for: 65KTA4577;   Last Rx: 42EBF3523 Ordered   6  Carvedilol 12 5 MG Oral Tablet; TAKE ONE TABLET BY MOUTH TWICE A DAY; Therapy: 56PIN8598 to (Evaluate:08Jan2018)  Requested for: 66Auc6321; Last   Rx:13Apr2017 Ordered   7  Centrum Silver TABS; TAKE 1 TABLET DAILY; Therapy: (Recorded:85Ljr6452) to Recorded   8  CoQ-10 200 MG CAPS; TAKE 1 CAPSULE DAILY; Therapy: (Recorded:86Dna3214) to Recorded   9  Effient 10 MG Oral Tablet; TAKE 1 TABLET DAILY  Requested for: 58GKF3076; Last   Rx:75Est4667 Ordered   10  Furosemide 20 MG Oral Tablet; TAKE 1 TABLET DAILY AS NEEDED for leg edema; Therapy: 53IIW2096 to (Last Rx:73Etu2162)  Requested for: 87DLK1397 Ordered   11  Glucagon (rDNA) 1 MG Kit; AS NEEDED FOR <40 BLOOD SUGAR; Therapy: (Recorded:54Kyz6788) to Recorded   12  HumaLOG 100 UNIT/ML Subcutaneous Solution; USE AS DIRECTED; Therapy: (Recorded:32Wfw9508) to Recorded   13  Isosorbide Mononitrate ER 30 MG Oral Tablet Extended Release 24 Hour; TAKE 1    TABLET DAILY; Therapy: 63RIR8293 to ([facility])  Requested for: 70QNS5352; Last    Rx:29Mar2017 Ordered   14  Lantus SoloStar 100 UNIT/ML Subcutaneous Solution Pen-injector; use as directed 19    IU BID; Therapy: 16VLB8255 to Recorded   15  Levothyroxine Sodium 175 MCG Oral Tablet; TAKE 1 TABLET DAILY  Requested for:    46FCF0164; Last Rx:04Oct2016 Ordered   16  Nitroglycerin 0 4 MG Sublingual Tablet Sublingual (Nitrostat); DISSOLVE 1 TABLET    UNDER THE TONGUE AS NEEDED FOR CHEST PAIN; Last Rx:01Dec2016 Ordered   17  TraMADol HCl - 50 MG Oral Tablet; TAKE ONE 1-2  TABLET(S) THREE TIMES DAILYAS    NEEDED FOR PAIN;    Therapy: 75SLW1093 to (Evaluate:89Tut7600); Last Rx:15Mar2017 Ordered   18  Vitamin D 1000 UNIT CAPS; TAKE 1 TABLET BY MOUTH ONCE DAILY; Therapy: 11Aug2015 to (Last Rx:11Aug2015) Ordered   19  Vitamin K TABS; Vitamin K 90 mcg- one tab daily daily; Therapy: (Recorded:90Uhh1406) to Recorded    Allergies    1  Cardura TABS   2   Zestril TABS    Signatures   Electronically signed by : Rachael Christianson, ; May 22 2017  4:34PM EST                       (Author)

## 2018-01-12 NOTE — MISCELLANEOUS
History of Present Illness   Spoke with Dr Mk Oreilly today, pt's BP was elevated  His BP is quite labile and seems to be elevated in the MD's office    Gina Cordero likely has component of white coat HTN and therefore, need to check 24 hour BP monitor to see what BP profile is throughout the day and then adjust meds based on this        Signatures   Electronically signed by : TERRY Lucio ; Mar 15 2016  1:52PM EST                       (Author)

## 2018-01-12 NOTE — MISCELLANEOUS
Message   Recorded as Task   Date: 08/28/2017 03:47 PM, Created By: Eleonora Mcbride   Task Name: Follow Up   Assigned To: Dayanara Ashford   Regarding Patient: Gerard Sanchez, Status: In Progress   CommentPatience Cabot - 28 Aug 2017 3:47 PM     TASK CREATED  I left message at [facility] cardiology requesting a phone call from Dr Annie Estrada  to discuss patient's case   Dayanara Ashford - 30 Aug 2017 3:33 PM     TASK REASSIGNED: Previously Assigned To Dayanara Ashford  Please contact [facility] cardiology have not heard from Dr Annie Estrada so far;   I would appreciate a call back regarding this patient     thank you   Marilia Pretty - 30 Aug 2017 3:50 PM     TASK IN PROGRESS   Marilia Pretty - 30 Aug 2017 3:54 PM     TASK EDITED  Dr Annie Estrada is currently working in the hospital, was informed to leave a message on the nurse line and one on the nurses will reach out to the physician and have her return your call  Eleonora Mcbride - 41 Sep 2017 1:33 PM     TASK EDITED  I spoke with Fay Langley on Friday, September 1 regarding my concern with patient  She will follow-up  Patient  has pending follow-up with vascular surgery as well  Signatures   Electronically signed by :  TERRY Simpson ; Sep  3 2017  1:34PM EST                       (Author)

## 2018-01-12 NOTE — MISCELLANEOUS
Message   Recorded as Task   Date: 01/27/2017 02:11 PM, Created By: Sinai Dee   Task Name: Care Coordination   Assigned To: Buster magdaleno,Team   Regarding Patient: Francy Winslow, Status: Active   Comment:    MagnoliaJorge ASofy - 27 Jan 2017 2:11 PM     TASK CREATED  Caller: Charlie Carranza, Physician; Care Coordination  ***FYI****      [facility] on [place] triage line at 12:33pm   Pt's PCP Dr Charlie Carranza calling to state that she saw this pt in her office today for a "rash"  Pt pcp she feels that this rash is dry skin/eczema and this should not interfere with the pt's injection that he has scheduled for this afternoon  Per pcp she would like a c/b on line [phone] or [phone] so that she knows that FQ had received this message  per pcp she has uploaded pics of this rash into the pt's chart for viewing, but does not yet have ov note in computer  RN called above number to inform pcp that I did give message to [employer] on opro side who will inform FQ in between opro's  RN spoke with "Reba?" at 1:59 in pcp office, who stated that she would inform pcp of same  [facility] - 27 Jan 2017 3:25 PM     TASK REPLIED TO: Previously Assigned To RONY magdaleno,Team  reviewed images ok to proceed        Active Problems    1  Abnormal CT of the chest (793 2) (R93 8)   2  Acquired polyneuropathy (356 9) (G62 9)   3  Acute conjunctivitis (372 00) (H10 30)   4  Anemia (285 9) (D64 9)   5  Arthralgia (719 40) (M25 50)   6  Asymptomatic bilateral carotid artery stenosis (433 10,433 30) (I65 23)   7  Atherosclerosis of native artery of extremity with intermittent claudication (440 21)   (I70 219)   8  Benign hypertension with chronic kidney disease, stage III (403 10,585 3) (I12 9,N18 3)   9  Cataract (366 9) (H26 9)   10  Centrilobular emphysema (492 8) (J43 2)   11  Chest pain (786 50) (R07 9)   12  Chronic kidney disease, stage 3 (585 3) (N18 3)   13   Chronic kidney disease, stage 4 (severe) (585 4) (N18 4)   14  Colon cancer screening (V76 51) (Z12 11)   15  Degenerative lumbar spinal stenosis (724 02) (M48 06)   16  Dermatitis (692 9) (L30 9)   17  Dyslipidemia (272 4) (E78 5)   18  Eczema (692 9) (L30 9)   19  Encounter for consultation (V65 9) (Z71 9)   20  Fatigue (780 79) (R53 83)   21  Heartburn (787 1) (R12)   22  Hyperkalemia (276 7) (E87 5)   23  Hypothyroidism, postablative (244 1) (E89 0)   24  Intervertebral disc disorders with radiculopathy, lumbosacral region (724 4) (M51 17)   25  Low back pain with sciatica (724 3) (M54 40)   26  Multiple vessel coronary artery disease (414 00) (I25 10)   27  Need for prophylactic vaccination and inoculation against influenza (V04 81) (Z23)   28  Need for vaccination with 13-polyvalent pneumococcal conjugate vaccine (V03 82) (Z23)   29  Obstructive sleep apnea syndrome (327 23) (G47 33)   30  Peripheral vascular disease (443 9) (I73 9)   31  Pre-op exam (V72 84) (Z01 818)   32  Proteinuria (791 0) (R80 9)   33  Protruded lumbar disc (722 10) (M51 26)   34  Renal artery stenosis (440 1) (I70 1)   35  Renal cyst, right (753 10) (N28 1)   36  Shortness of breath on exertion (786 05) (R06 02)   37  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 (250 41,585 9) (E10 22,N18 9)   38  Type I diabetes mellitus (250 01) (E10 9)   39  Unsteadiness (781 2) (R26 81)   40  Upper respiratory infection (465 9) (J06 9)   41  Viral infection (079 99) (B34 9)   42  Vitamin D deficiency (268 9) (E55 9)   43  White coat hypertension   44  Xerosis cutis (706 8) (L85 3)    Current Meds   1  Accu-Chek Angie Plus In Vitro Strip; Therapy: 55WZY7178 to (Evaluate:01Jan2015) Recorded   2  AmLODIPine Besylate 10 MG Oral Tablet; take 1 tablet daily at bedtime; Therapy: 88MLI7457 to (Evaluate:17Nov2017)  Requested for: 06EDL3128; Last   Rx:22Nov2016 Ordered   3  Aspirin 81 MG TABS; CHEW ONE TABLET DAILY; Therapy: (Recorded:45Hnh2632) to Recorded   4  Astaxanthin CAPS;  Take 2mg tablets daily; Therapy: (Recorded:80Bzl1569) to Recorded   5  Atorvastatin Calcium 40 MG Oral Tablet; Take 1 tablet daily  Requested for: 63URE8760;   Last Rx:04Oct2016 Ordered   6  Carvedilol 12 5 MG Oral Tablet; take one tablet by mouth twice daily; Therapy: 33LKN2087 to (Evaluate:11Jun2017)  Requested for: 10SGU2599 Recorded   7  Centrum Silver TABS; TAKE 1 TABLET DAILY; Therapy: (Recorded:84Obb8887) to Recorded   8  Clobetasol Propionate 0 05 % External Cream; APPLY SPARINGLY TO AFFECTED   AREA(S) 3 TIMES A DAY; Therapy: 80SUV3347 to (Evaluate:06Feb2017)  Requested for: 09SMO6643; Last   Rx:27Jan2017 Ordered   9  CoQ-10 200 MG CAPS; TAKE 1 CAPSULE DAILY; Therapy: (Recorded:12Oly8904) to Recorded   10  Effient 10 MG Oral Tablet; TAKE 1 TABLET DAILY  Requested for: 03NEA4767; Last    Rx:01Dec2016 Ordered   11  Furosemide 20 MG Oral Tablet; TAKE 1 TABLET DAILY AS NEEDED for leg edema; Therapy: 62TMT3352 to (Last Rx:11Oct2016)  Requested for: 68TPI1098 Ordered   12  Gabapentin 100 MG Oral Capsule; TAKE 3 CAPS AT NIGHT; Therapy: 29YBC0869 to (Asya Martinez)  Requested for: 72XOY5114; Last    Rx:04Jan2017 Ordered   13  Glucagon (rDNA) 1 MG Kit; AS NEEDED FOR <40 BLOOD SUGAR; Therapy: (Recorded:94Vbx8277) to Recorded   14  HumaLOG 100 UNIT/ML Subcutaneous Solution; USE AS DIRECTED; Therapy: (Recorded:90Jcq1532) to Recorded   15  Lantus SoloStar 100 UNIT/ML Subcutaneous Solution Pen-injector; use as directed; Therapy: 03VVH1025 to Recorded   16  Levothyroxine Sodium 175 MCG Oral Tablet; TAKE 1 TABLET DAILY  Requested for:    01DIE1777; Last Rx:04Oct2016 Ordered   17  Nitroglycerin 0 4 MG Sublingual Tablet Sublingual (Nitrostat); DISSOLVE 1 TABLET    UNDER THE TONGUE AS NEEDED FOR CHEST PAIN; Last Rx:51Cat2472 Ordered   18  TraMADol HCl - 50 MG Oral Tablet; TAKE ONE (1) TABLET(S) THREE TIMES DAILYAS    NEEDED FOR PAIN;    Therapy: 35CPH0180 to (Evaluate:02Apr2017);  Last Rx:04Oct2016 Ordered   19  Vitamin D 1000 UNIT CAPS; TAKE 1 TABLET BY MOUTH ONCE DAILY; Therapy: 11Aug2015 to (Last Rx:11Aug2015) Ordered   20  Vitamin K TABS; Vitamin K 90 mcg- one tab daily daily; Therapy: (Recorded:70Xen1468) to Recorded    Allergies    1  Cardura TABS   2   Zestril TABS    Signatures   Electronically signed by : Anali Puentes, ; Jan 27 2017  4:39PM EST                       (Author)

## 2018-01-13 VITALS
SYSTOLIC BLOOD PRESSURE: 142 MMHG | BODY MASS INDEX: 30.69 KG/M2 | WEIGHT: 195.5 LBS | DIASTOLIC BLOOD PRESSURE: 84 MMHG | TEMPERATURE: 97.4 F | HEIGHT: 67 IN | RESPIRATION RATE: 14 BRPM | HEART RATE: 72 BPM

## 2018-01-13 VITALS
RESPIRATION RATE: 18 BRPM | BODY MASS INDEX: 31.23 KG/M2 | DIASTOLIC BLOOD PRESSURE: 70 MMHG | HEART RATE: 80 BPM | SYSTOLIC BLOOD PRESSURE: 140 MMHG | HEIGHT: 67 IN | WEIGHT: 199 LBS

## 2018-01-13 VITALS
BODY MASS INDEX: 30.16 KG/M2 | TEMPERATURE: 96.8 F | OXYGEN SATURATION: 95 % | SYSTOLIC BLOOD PRESSURE: 130 MMHG | DIASTOLIC BLOOD PRESSURE: 58 MMHG | HEIGHT: 68 IN | HEART RATE: 67 BPM | WEIGHT: 199 LBS | RESPIRATION RATE: 18 BRPM

## 2018-01-13 VITALS — DIASTOLIC BLOOD PRESSURE: 78 MMHG | SYSTOLIC BLOOD PRESSURE: 134 MMHG | HEART RATE: 84 BPM | HEIGHT: 67 IN

## 2018-01-13 VITALS
RESPIRATION RATE: 16 BRPM | BODY MASS INDEX: 30.45 KG/M2 | HEART RATE: 75 BPM | SYSTOLIC BLOOD PRESSURE: 162 MMHG | WEIGHT: 194 LBS | TEMPERATURE: 96.8 F | HEIGHT: 67 IN | DIASTOLIC BLOOD PRESSURE: 70 MMHG | OXYGEN SATURATION: 95 %

## 2018-01-13 VITALS
HEIGHT: 67 IN | RESPIRATION RATE: 16 BRPM | HEART RATE: 76 BPM | WEIGHT: 198 LBS | DIASTOLIC BLOOD PRESSURE: 78 MMHG | SYSTOLIC BLOOD PRESSURE: 122 MMHG | BODY MASS INDEX: 31.08 KG/M2

## 2018-01-13 VITALS
HEIGHT: 67 IN | WEIGHT: 194.5 LBS | DIASTOLIC BLOOD PRESSURE: 56 MMHG | SYSTOLIC BLOOD PRESSURE: 130 MMHG | HEART RATE: 65 BPM | BODY MASS INDEX: 30.53 KG/M2

## 2018-01-13 VITALS
BODY MASS INDEX: 30.96 KG/M2 | DIASTOLIC BLOOD PRESSURE: 60 MMHG | HEART RATE: 67 BPM | SYSTOLIC BLOOD PRESSURE: 140 MMHG | HEIGHT: 67 IN | WEIGHT: 197.25 LBS

## 2018-01-13 VITALS
BODY MASS INDEX: 31.23 KG/M2 | HEART RATE: 72 BPM | WEIGHT: 199 LBS | SYSTOLIC BLOOD PRESSURE: 128 MMHG | HEIGHT: 67 IN | DIASTOLIC BLOOD PRESSURE: 58 MMHG

## 2018-01-14 VITALS
RESPIRATION RATE: 18 BRPM | TEMPERATURE: 96.4 F | WEIGHT: 199 LBS | BODY MASS INDEX: 30.16 KG/M2 | DIASTOLIC BLOOD PRESSURE: 72 MMHG | HEIGHT: 68 IN | SYSTOLIC BLOOD PRESSURE: 108 MMHG | HEART RATE: 76 BPM

## 2018-01-14 VITALS
HEIGHT: 67 IN | BODY MASS INDEX: 30.76 KG/M2 | HEART RATE: 70 BPM | DIASTOLIC BLOOD PRESSURE: 52 MMHG | SYSTOLIC BLOOD PRESSURE: 136 MMHG | WEIGHT: 196 LBS

## 2018-01-14 VITALS
RESPIRATION RATE: 18 BRPM | HEART RATE: 72 BPM | BODY MASS INDEX: 30.16 KG/M2 | WEIGHT: 199 LBS | HEIGHT: 68 IN | TEMPERATURE: 96.7 F | SYSTOLIC BLOOD PRESSURE: 128 MMHG | DIASTOLIC BLOOD PRESSURE: 62 MMHG

## 2018-01-14 VITALS
BODY MASS INDEX: 30.61 KG/M2 | RESPIRATION RATE: 16 BRPM | TEMPERATURE: 96.7 F | HEIGHT: 67 IN | WEIGHT: 195 LBS | DIASTOLIC BLOOD PRESSURE: 78 MMHG | SYSTOLIC BLOOD PRESSURE: 138 MMHG | HEART RATE: 72 BPM

## 2018-01-14 NOTE — RESULT NOTES
Verified Results  VAS LOWER LIMB ARTERIAL DUPLEX LIMITED - TERRI, PRESSURES, WAVEFORMS 69TSJ3023 12:50PM Tor Mendoza     Test Name Result Flag Reference   VAS LOWER LIMB ARTERIAL DUPLEX LIMITED - TERRI, PRESSURES, WAVEFORMS (Report)     [facility] REPORT   CLINICAL:   Indications: Left Atherosclerosis of native arteries of extremities with   intermittent claudication, left leg [I70 212]  Follow-up status post SFA stents,   LLE  Operative History   02/25/2014 Left Transluminal placement of stent, percutaneous, SFA   Bilateral Other aortoiliac procedure   Right Pressure: 913/ Systolic mm Hg, Left Pressure: 675/ Systolic mm Hg  FINDINGS:      Segment        Rig    Left                        PSV EDV Impression   PSV EDV    Common Femoral Artery 123  0         123  0    Prox Profunda     105  0         105  0    Prox SFA        172  0         172  0    Mid SFA        125  0         125  0    Dist SFA                     59  0    Proximal Pop                   35  1    Distal Pop                    34  0    Dist Post Tibial                 43  0    Dist  Ant  Tibial                 14  0    Dist Peroneal          Not visualized                  CONCLUSION:   Impression:   RIGHT LOWER LIMB:   Ankle/Brachial index: 0 57 and in the severe claudication range  Prior: 0 60  Metatarsal pressure of 94 mm Hg  Great toe pressure of 57 mm Hg, is within healing range  Prior: 73 mm Hg  LEFT LOWER LIMB:   This resting evaluation reveals patent stents in the superficial femoral artery   and no evidence of significant residual stenosis  There is additional evidence of inflow and tibio-peroneal arterial occlusive   disease  Ankle/Brachial index: 0 69 and in the moderate claudication range  Prior: 0 90  Metatarsal pressure of 145 mm Hg  Great toe pressure of 66 mm Hg, is within healing range  Prior: 72 mm Hg        Compared to previous study on 4/01/2015 , the stenosis previously discovered in the left SFA are not appreciated, however , the ankle/brachial index has   decreased  Assessment by a vascular surgeon may be of value  Recommend repeat testing in 6 months as per protocol unless otherwise   indicated  SIGNATURE:   Electronically Signed by: Hector Crockett DO, RPVI on 2016-01-18 12:50:35 PM       Discussion/Summary   doppler u/s shows severe reduction of blood flow on R lower extremity  He should f/u with Dr Alisha Delgado his vascular surgeon for evaluation

## 2018-01-14 NOTE — RESULT NOTES
Message      I spoke with patient's wife regarding CT results yesterday, January 11  I spoke with her earlier today regarding bloodwork results revealing mild elevation of creatinine and potassium of 5 8  I expressed my deep concern about patient's symptoms and multiple risk factors including carotid stenosis, type 1 diabetes, coronary artery disease, renal failure and hyperkalemia  I strongly suggest that patient needs to be further evaluated as an inpatient  He needs renal, neurology and the possibly vascular surgery consult  Unfortunately direct admission cannot be arranged due to overflow of both the [place] and  [address] [place] [address]   Patient prefers to be admitted to [facility]'s  Emergency room, patient access [address] [facility] internal medicine admitting attending were notified by me personally  Verified Results  VAS CAROTID COMPLETE STUDY 12Jan2016 01:42PM Alina Carranza     Test Name Result Flag Reference   VAS CAROTID COMPLETE STUDY (Report)     [facility] REPORT   CLINICAL:   Indications: Bilateral Carotid disease w/o CVA [I65 29]  6 month surveillance   of carotid artery disease  Patient is asymptomatic at this time  Office visit pending: No   Operative History   02/25/2014 Left Transluminal placement of stent, percutaneous, SFA   Bilateral Other aortoiliac procedure   Clinical   Right Pressure: 140/70 mm Hg, Left Pressure: 130/70 mm Hg  FINDINGS:      Segment   Right             Left              Impression  PSV EDV (cm/s) Impression PSV EDV (cm/s)    Dist  ICA         116     21       155     23    Mid  ICA         131     31       175     31    Prox  ICA  70%+     283     65 70%+    238     41    Dist CCA          64     15        57     13    Mid CCA          66     13        67     13    Prox CCA          85     21        70     14    Ext Carotid        144      7       180      5    Prox Vert         69     14        86     21    Subclavian  BF  Moderate 258      0 BE  Severe  372      0             CONCLUSION:   Impression   RIGHT:   >70% stenosis is noted in the internal carotid artery  Plaque is calcified and   irregular  Moderate subclavian artery and external carotid artery disease  Vertebral artery flow is antegrade  LEFT:   >70% stenosis is noted in the internal carotid artery  Plaque is calcified and   irregular  Severe subclavian artery disease and mild-moderate external carotid artery   disease  Vertebral artery flow is antegrade  Compared to previous study on 08/07/2015 , there is no significant interval   change in the disease process  Recommend repeat testing in 6 months as per protocol unless otherwise   indicated  Internal carotid artery stenosis determination by consensus criteria from:   Michela Blum et al  Carotid Artery Stenosis: Gray-Scale and Doppler US Diagnosis   - Society of Radiologists in [address] [place] [address] [facility], Radiology 2003;   [phone]  SIGNATURE:   Electronically Signed by: Rahul Hunt MD RPVI on 2016-01-12 01:42:03 PM     * CT HEAD WO CONTRAST [id number] 04:44PM Stephanie King     Test Name Result Flag Reference   CT HEAD WO CONTRAST (Report)     CT BRAIN - WITHOUT CONTRAST     INDICATION: Episode of unsteadiness and disorientation and dysarthria 4 days ago  Symptoms have resolved     COMPARISON: None  TECHNIQUE: CT examination of the brain was performed  In addition to axial images, coronal reformatted images were created and submitted for interpretation  Examination was performed utilizing techniques to minimize radiation dose, including the use    of dose reduction software  IMAGE QUALITY: Diagnostic  FINDINGS:      PARENCHYMA: No intracranial mass, mass effect or midline shift  No acute intracranial hemorrhage  No CT signs of acute infarction  There is relatively prominent internal carotid and vertebral artery calcific atherosclerotic plaque       There is mild chronic microvascular ischemic change in the periventricular white matter  VENTRICLES AND EXTRA-AXIAL SPACES: Normal for patient's age  VISUALIZED ORBITS AND PARANASAL SINUSES: Unremarkable  CALVARIUM AND EXTRACRANIAL SOFT TISSUES:  Normal        No acute intracranial abnormality  Chronic microvascular ischemic change in the white matter  Prominent atherosclerosis of the intracranial arteries  Signatures   Electronically signed by :  TERRY Hutchinson ; Jan 12 2016  4:19PM EST                       (Author)

## 2018-01-14 NOTE — MISCELLANEOUS
Message  Informed him of his bloodwork results  Recommended improved glycemic control as high normal K likely from transcellular shifts        Signatures   Electronically signed by : Doris Yun DO; Nov 7 2017 12:11PM EST                       (Author)

## 2018-01-14 NOTE — RESULT NOTES
Verified Results  (1) BASIC METABOLIC PROFILE [id number] 10:00AM Liseth Mary Order Number: [id number]     Test Name Result Flag Reference   SODIUM 143 mmol/L  136-145   POTASSIUM 4 2 mmol/L  3 5-5 3   CHLORIDE 107 mmol/L  100-108   CARBON DIOXIDE 26 mmol/L  21-32   ANION GAP (CALC) 10 mmol/L  4-13   BLOOD UREA NITROGEN 20 mg/dL  5-25   CREATININE 1 24 mg/dL  0 60-1 30   Standardized to IDMS reference method   CALCIUM 9 2 mg/dL  8 3-10 1   eGFR 58 ml/min/1 73sq m     National Kidney Disease Education Program recommendations are as follows:  GFR calculation is accurate only with a steady state creatinine  Chronic Kidney disease less than 60 ml/min/1 73 sq  meters  Kidney failure less than 15 ml/min/1 73 sq  meters  GLUCOSE FASTING 139 mg/dL H 65-99   Specimen collection should occur prior to Sulfasalazine administration due to the potential for falsely depressed results  Specimen collection should occur prior to Sulfapyridine administration due to the potential for falsely elevated results  Signatures   Electronically signed by :  TERRY Carson ; Sep  6 2017  3:31PM EST                       (Author)

## 2018-01-15 NOTE — MISCELLANEOUS
Message   Recorded as Task   Date: 02/03/2017 07:50 AM, Created By: Aisha Hills   Task Name: Follow Up   Assigned To: Kaitlin rodriguez,Team   Regarding Patient: Sp Barraza, Status: Active   CommentGapadmini Cain - 03 Feb 2017 7:50 AM     TASK CREATED  pt is S/P 1/27/2017 by Dr Angie Coronado   no pain diary   no f/u   Aisha Hills - 03 Feb 2017 12:56 PM     TASK EDITED  spoke to pt he got 70% relief from inj no pain as of now no redness or swelling   Mel Todd - 03 Feb 2017 12:56 PM     TASK IN PROGRESS   Mel Todd - 06 Feb 2017 11:03 AM     TASK EDITED   Manny Hogue - 06 Feb 2017 11:26 AM     TASK REPLIED TO: Previously Assigned To Manny Hogue md aware   pt may f/u PRN        Active Problems    1  Acquired polyneuropathy (356 9) (G62 9)   2  Acute conjunctivitis (372 00) (H10 30)   3  Anemia (285 9) (D64 9)   4  Arthralgia (719 40) (M25 50)   5  Asymptomatic bilateral carotid artery stenosis (433 10,433 30) (I65 23)   6  Atherosclerosis of native artery of extremity with intermittent claudication (440 21)   (I70 219)   7  Benign hypertension with chronic kidney disease, stage III (403 10,585 3) (I12 9,N18 3)   8  Cataract (366 9) (H26 9)   9  Centrilobular emphysema (492 8) (J43 2)   10  Chronic kidney disease, stage 3 (585 3) (N18 3)   11  Chronic kidney disease, stage 4 (severe) (585 4) (N18 4)   12  Degenerative lumbar spinal stenosis (724 02) (M48 06)   13  Dermatitis (692 9) (L30 9)   14  Dyslipidemia (272 4) (E78 5)   15  Eczema (692 9) (L30 9)   16  Facial skin lesion (709 9) (L98 9)   17  Fatigue (780 79) (R53 83)   18  Heartburn (787 1) (R12)   19  Hyperkalemia (276 7) (E87 5)   20  Hypothyroidism, postablative (244 1) (E89 0)   21  Intervertebral disc disorders with radiculopathy, lumbosacral region (724 4) (M51 17)   22  Low back pain with sciatica (724 3) (M54 40)   23  Multiple vessel coronary artery disease (414 00) (I25 10)   24   Obstructive sleep apnea syndrome (327 23) (G47 33)   25  Peripheral vascular disease (443 9) (I73 9)   26  Proteinuria (791 0) (R80 9)   27  Protruded lumbar disc (722 10) (M51 26)   28  Renal artery stenosis (440 1) (I70 1)   29  Renal cyst, right (753 10) (N28 1)   30  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 (250 41,585 9) (E10 22,N18 9)   31  Type I diabetes mellitus (250 01) (E10 9)   32  Unsteadiness (781 2) (R26 81)   33  Vitamin D deficiency (268 9) (E55 9)   34  White coat hypertension   35  Xerosis cutis (706 8) (L85 3)    Current Meds   1  Accu-Chek Angie Plus In Vitro Strip; Therapy: 68ARF4190 to (Evaluate:01Jan2015) Recorded   2  AmLODIPine Besylate 10 MG Oral Tablet; take 1 tablet daily at bedtime; Therapy: 69ELD9350 to (Evaluate:17Nov2017)  Requested for: 08QSS5804; Last   Rx:22Nov2016 Ordered   3  Aspirin 81 MG TABS; CHEW ONE TABLET DAILY; Therapy: (Recorded:31Qyr9446) to Recorded   4  Astaxanthin CAPS; Take 2mg tablets daily; Therapy: (Recorded:01Dec2016) to Recorded   5  Atorvastatin Calcium 40 MG Oral Tablet; Take 1 tablet daily  Requested for: 48KET7025;   Last Rx:04Oct2016 Ordered   6  Carvedilol 12 5 MG Oral Tablet; take one tablet by mouth twice daily; Therapy: 16GHH6286 to (Evaluate:11Jun2017)  Requested for: 65STC7995 Recorded   7  Centrum Silver TABS; TAKE 1 TABLET DAILY; Therapy: (Recorded:26Izo8984) to Recorded   8  Clobetasol Propionate 0 05 % External Cream; APPLY SPARINGLY TO AFFECTED   AREA(S) 3 TIMES A DAY; Therapy: 31KHS8991 to (Evaluate:85Qma7731)  Requested for: 61MHB7930; Last   Rx:27Jan2017 Ordered   9  CoQ-10 200 MG CAPS; TAKE 1 CAPSULE DAILY; Therapy: (Recorded:65Pbd5670) to Recorded   10  Effient 10 MG Oral Tablet; TAKE 1 TABLET DAILY  Requested for: 97LYL7687; Last    Rx:01Dec2016 Ordered   11  Furosemide 20 MG Oral Tablet; TAKE 1 TABLET DAILY AS NEEDED for leg edema; Therapy: 99VEP4468 to (Last Rx:11Oct2016)  Requested for: 98GTH6415 Ordered   12   Glucagon (rDNA) 1 MG Kit; AS NEEDED FOR <40 BLOOD SUGAR; Therapy: (Recorded:49Vgs8346) to Recorded   13  HumaLOG 100 UNIT/ML Subcutaneous Solution; USE AS DIRECTED; Therapy: (Recorded:83Krk0926) to Recorded   14  Lantus SoloStar 100 UNIT/ML Subcutaneous Solution Pen-injector; use as directed; Therapy: 75FYV9642 to Recorded   15  Levothyroxine Sodium 175 MCG Oral Tablet; TAKE 1 TABLET DAILY  Requested for:    83VWK4461; Last Rx:04Oct2016 Ordered   16  Nitroglycerin 0 4 MG Sublingual Tablet Sublingual (Nitrostat); DISSOLVE 1 TABLET    UNDER THE TONGUE AS NEEDED FOR CHEST PAIN; Last Rx:01Dec2016 Ordered   17  TraMADol HCl - 50 MG Oral Tablet; TAKE ONE (1) TABLET(S) THREE TIMES DAILYAS    NEEDED FOR PAIN;    Therapy: 23GME8361 to (Evaluate:02Apr2017); Last Rx:04Oct2016 Ordered   18  Vitamin D 1000 UNIT CAPS; TAKE 1 TABLET BY MOUTH ONCE DAILY; Therapy: 11Aug2015 to (Last Rx:11Aug2015) Ordered   19  Vitamin K TABS; Vitamin K 90 mcg- one tab daily daily; Therapy: (Recorded:75Ukm6828) to Recorded    Allergies    1  Cardura TABS   2   Zestril TABS    Signatures   Electronically signed by : Michelle Adkins, ; Feb 6 2017  2:37PM EST                       (Author)

## 2018-01-15 NOTE — MISCELLANEOUS
Message   Recorded as Task   Date: 12/16/2016 08:22 AM, Created By: Renuka Henderson   Task Name: Follow Up   Assigned To: RONY Caldwell   Regarding Patient: Phuong Castillo, Status: Active   Comment:    Renuka Henderson - 16 Dec 2016 8:22 AM     TASK CREATED  Other; [phone] (Mobile Phone)  Per Dr Dory Sadler ov note from 12/12/16: pt is being started on gabapentin if he is not seeing symptomatic relief from the gabapentin then pt will be scheduled for B/L L5 TFESI  Pt on Effient  Effient hold form faxed to Dr Mayco Teran by Jm Hill on 12/12/16  Selma Rosario - 16 Dec 2016 8:22 AM     TASK IN PROGRESS   Selma Rosario - 19 Dec 2016 2:21 PM     TASK EDITED  Left vm on Nurse line at Dr Yessenia Block office asking to have the Effient Hold form faxed to our iffice, asked for it to be faxed to the [facility] fax at [phone]  Selma oRsario - 20 Dec 2016 2:11 PM     TASK EDITED  Dr Papito Hooker I received a Effient Hold approval dated 12/12/16 from Dr Mayco Teran  I saw in your ov note from 12/12 that if pt is not seeing relief from the gabapentin then pt will be scheduled for inj  Am I to call the pt for update or did you advise the pt to call the office if not seeing relief and then we would schedule inj? Please advise  Angie Santiago 32 - 20 Dec 2016 2:34 PM     TASK REPLIED TO: Previously Assigned To [facility],Team  yes please call and see if gabapentin is providing relief   Selma Rosario - 20 Dec 2016 3:46 PM     TASK EDITED  S/W pt who reports he got no relief with the gabapentin and he did increase it to 2 capsules at HS and still no relief  Told pt I would make Dr Papito Hooker aware  I scheduled B/L L5 TFESI for 1/10/17 at 1:45 at [facility]  Pt was not avail the first wk of Jan for inj  Instr reviewed: light lunch then NPO 1 Hr prior to opro,  needed, c/b needed if sick/abx started, Effient to be held for 7 days prior to inj , effient to be stopped on 1/3/17   Pt verbalized understanding of instr  Olga Valerio - 21 Dec 2016 8:07 AM     TASK REPLIED TO: Previously Assigned To Olga Valerio  would like him to increase gabapentin to 100mg 3 capsules at bedtime   Selma Rosario - 21 Dec 2016 9:24 AM     TASK EDITED  [facility] for pt to c/b  Selma Rosario - 21 Dec 2016 9:24 AM     TASK IN PROGRESS   Selma Rosario - 21 Dec 2016 12:25 PM     TASK EDITED  Pt returning our call  Pt advised to inc his gabapentin to 3 capsules QHS  I asked how many they had on hand in case RF would be needed soon, they said they had 20-30 pillls still  Advised to contact the office 2 days before running out and he's not to stop taking this medication abruptly, if he's unable to tolerate the inc he is to contact the office  Pt verbalized understanding  Selma Rosario - 21 Dec 2016 12:26 PM     TASK COMPLETED   Joycelyn Martinez - 22 Dec 2016 1:41 PM     TASK REACTIVATED   Joycelyn Martinez - 22 Dec 2016 1:42 PM     TASK EDITED  Pt called stating he miscounted his gabaoentin  He needs 57 tabs to get him to the procedure on 1/10 and he only has 30  Pt was recently increased to Gabapentin 100mg 3 tabs @ hs  [facility]/Valerie Marin - 22 Dec 2016 4:18 PM     TASK REPLIED TO: Previously Assigned To Valerie Antunez  sent to Joycelyn Mancera - 22 Dec 2016 4:25 PM     TASK EDITED  Pt aware & appreciative  Active Problems    1  Abnormal CT of the chest (793 2) (R93 8)   2  Acquired polyneuropathy (356 9) (G62 9)   3  Acute conjunctivitis (372 00) (H10 30)   4  Anemia (285 9) (D64 9)   5  Arthralgia (719 40) (M25 50)   6  Asymptomatic bilateral carotid artery stenosis (433 10,433 30) (I65 23)   7  Atherosclerosis of native artery of extremity with intermittent claudication (440 21)   (I70 219)   8  Benign hypertension with chronic kidney disease, stage III (403 10,585 3) (I12 9,N18 3)   9  Cataract (366 9) (H26 9)   10  Centrilobular emphysema (492 8) (J43 2)   11  Chest pain (786 50) (R07 9)   12  Chronic kidney disease, stage 3 (585 3) (N18 3)   13  Chronic kidney disease, stage 4 (severe) (585 4) (N18 4)   14  Colon cancer screening (V76 51) (Z12 11)   15  Degenerative lumbar spinal stenosis (724 02) (M48 06)   16  Dermatitis (692 9) (L30 9)   17  Dyslipidemia (272 4) (E78 5)   18  Eczema (692 9) (L30 9)   19  Encounter for consultation (V65 9) (Z71 9)   20  Fatigue (780 79) (R53 83)   21  Heartburn (787 1) (R12)   22  Hyperkalemia (276 7) (E87 5)   23  Hypothyroidism, postablative (244 1) (E89 0)   24  Intervertebral disc disorders with radiculopathy, lumbosacral region (724 4) (M51 17)   25  Low back pain with sciatica (724 3) (M54 40)   26  Multiple vessel coronary artery disease (414 00) (I25 10)   27  Need for prophylactic vaccination and inoculation against influenza (V04 81) (Z23)   28  Need for vaccination with 13-polyvalent pneumococcal conjugate vaccine (V03 82) (Z23)   29  Obstructive sleep apnea syndrome (327 23) (G47 33)   30  Peripheral vascular disease (443 9) (I73 9)   31  Pre-op exam (V72 84) (Z01 818)   32  Proteinuria (791 0) (R80 9)   33  Protruded lumbar disc (722 10) (M51 26)   34  Renal artery stenosis (440 1) (I70 1)   35  Renal cyst, right (753 10) (N28 1)   36  Shortness of breath on exertion (786 05) (R06 02)   37  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 (250 41,585 9) (E10 22,N18 9)   38  Type I diabetes mellitus (250 01) (E10 9)   39  Unsteadiness (781 2) (R26 81)   40  Upper respiratory infection (465 9) (J06 9)   41  Vitamin D deficiency (268 9) (E55 9)   42  White coat hypertension   43  Xerosis cutis (706 8) (L85 3)    Current Meds   1  Accu-Chek Angie Plus In Vitro Strip; Therapy: 28SHQ2666 to (Evaluate:01Jan2015) Recorded   2  AmLODIPine Besylate 10 MG Oral Tablet; take 1 tablet daily at bedtime; Therapy: 85HRE1990 to (Evaluate:17Nov2017)  Requested for: 35UUV2486; Last   Rx:22Nov2016 Ordered   3   Aspirin 81 MG TABS; CHEW ONE TABLET DAILY; Therapy: (Recorded:65Jxm2054) to Recorded   4  Astaxanthin CAPS; Take 2mg tablets daily; Therapy: (Recorded:81Nte2537) to Recorded   5  Atorvastatin Calcium 40 MG Oral Tablet; Take 1 tablet daily  Requested for: 45BCA1578;   Last Rx:04Oct2016 Ordered   6  Carvedilol 12 5 MG Oral Tablet; take one tablet by mouth twice daily; Therapy: 25IUT3307 to (Evaluate:11Jun2017)  Requested for: 04MYN5079 Recorded   7  Centrum Silver TABS; TAKE 1 TABLET DAILY; Therapy: (Recorded:19Mjv2096) to Recorded   8  CoQ-10 200 MG CAPS; TAKE 1 CAPSULE DAILY; Therapy: (Recorded:15Mgi8889) to Recorded   9  Effient 10 MG Oral Tablet; TAKE 1 TABLET DAILY  Requested for: 55TXJ1187; Last   Rx:55Jbs4825 Ordered   10  Furosemide 20 MG Oral Tablet; TAKE 1 TABLET DAILY AS NEEDED for leg edema; Therapy: 16RWK5123 to (Last Rx:11Oct2016)  Requested for: 16AHH9778 Ordered   11  Gabapentin 100 MG Oral Capsule; TAKE 3 CAPS AT NIGHT; Therapy: 05Aqq7298 to (Evaluate:48Fqx4450)  Requested for: 90Azz0970; Last    Rx:49Wej1948 Ordered   12  Glucagon (rDNA) 1 MG Kit; AS NEEDED FOR <40 BLOOD SUGAR; Therapy: (Recorded:37Npe2042) to Recorded   13  HumaLOG 100 UNIT/ML Subcutaneous Solution; USE AS DIRECTED; Therapy: (Recorded:49Xky2414) to Recorded   14  Lantus SoloStar 100 UNIT/ML Subcutaneous Solution Pen-injector; use as directed; Therapy: 37ZDD0103 to Recorded   15  Levothyroxine Sodium 175 MCG Oral Tablet; TAKE 1 TABLET DAILY  Requested for:    84KTE3736; Last Rx:04Oct2016 Ordered   16  Nitroglycerin 0 4 MG Sublingual Tablet Sublingual (Nitrostat); DISSOLVE 1 TABLET    UNDER THE TONGUE AS NEEDED FOR CHEST PAIN; Last Rx:61Cuc0076 Ordered   17  TraMADol HCl - 50 MG Oral Tablet; TAKE ONE (1) TABLET(S) THREE TIMES DAILYAS    NEEDED FOR PAIN;    Therapy: 07FEQ6616 to (Evaluate:02Apr2017); Last Rx:04Oct2016 Ordered   18  Vitamin D 1000 UNIT CAPS; TAKE 1 TABLET BY MOUTH ONCE DAILY;     Therapy: 11Aug2015 to (Last Rx:11Aug2015) Ordered   19  Vitamin K TABS; Vitamin K 90 mcg- one tab daily daily; Therapy: (Recorded:18Fjh8712) to Recorded    Allergies    1  Cardura TABS   2   Zestril TABS    Signatures   Electronically signed by : Aisha Geiger RN; Dec 22 2016  4:25PM EST                       (Author)

## 2018-01-15 NOTE — MISCELLANEOUS
Message   Recorded as Task   Date: 10/06/2016 04:14 PM, Created By: Elvia Meraz   Task Name: Care Coordination   Assigned To: Dayanara Ashford   Regarding Patient: David Paez, Status: Active   CommentRoxanna Ruiz - 06 Oct 2016 4:14 PM     TASK CREATED  Hi Alize Israel,    I'm following up about our mutual patient  He was seen in emergency room for increased symptoms of dyspnea on exertion  I believe Dr Familia White did in ER cardiac consult  Patient had very high pro BNP , >1,000  He was advised to increase Lasix to 40 mg daily for 2 days and then go back to his regular dose which is 20 mg twice a week  His chest x-ray was clear  Recent echocardiogram performed on September 30 shows ejection fraction of 45%  I wanted to coordinate "an effort" in diuretic management  As you know he has chronic renal insufficiency and we are very careful with his furosemide  His creatinine has been staying normal lately  He also has COPD with centrilobular emphysema, we are holding his inhalers due to recent eye surgeries/possible side effects  Angelo Pyle believes that inhalers were not helpful overall      Thank you    Teo August - 07 Oct 2016 4:21 PM     TASK EDITED  Ivonne Fanluma,    If he feels well I would have him continue his current dose of 20 mg twice per week  I would recommend that he increase the Lasix to daily as needed for symptoms or a weight gain of 2 to 3 pounds overnight or 5 pounds in one week  If he did overall feel better on the daily dosing it may be reasonable to increase the Lasix to 20 mg daily and carefully watch him BMP  Let me know your thoughts  Clif Walsh - 15 Oct 2016 9:11 AM     TASK REPLIED TO: Previously Assigned To Dayanara Ashford  Please call pts wife back at [phone]  Filiberto Rosas - 11 Oct 2016 6:56 PM     TASK EDITED                spoke with  wife     Dayanara Ashford - 11 Oct 2016 7:00 PM     TASK EDITED                 I discussed my conversation with patient's cardiologist  Patient breathing symptoms have not changed  At this time I advise him to start Lasix 20 mg once a day for 5 days and then use 20 mg every other day  We will repeat BMP and BNP in 2-3 weeks  Patient unfortunately did not hear back from his endocrinologist regarding elevated TSH  I advised wife to contact Dr Braun Overall office tomorrow  If they unable to reach endocrinology then I will be increasing his Synthroid to 200 ÃÂµg daily  Patient's blood sugars were elevated with an past few days he will be discussing with endocrinology as well  Signatures   Electronically signed by :  Cira Fleischer, M D ; Oct 11 2016  7:00PM EST                       (Author)

## 2018-01-15 NOTE — PROGRESS NOTES
Assessment    1  Atherosclerosis of native artery of extremity with intermittent claudication (440 21)   (I70 219)   2  Benign hypertension with chronic kidney disease, stage III (403 10,585 3) (I12 9,N18 3)   · 12/2015 echo showed nl diast fx and nl LV thickness   3  Asymptomatic bilateral carotid artery stenosis (433 10,433 30) (I65 23)   4  Hypothyroidism, postablative (244 1) (E89 0)   5  Peripheral vascular disease (443 9) (I73 9)   6  Degenerative lumbar spinal stenosis (724 02) (M48 06)   7  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 (250 41,585 9) (E10 22,N18 9)   8  ITZEL on CPAP (327 23,V46 8) (G47 33,Z99 89)   9  Renal artery stenosis (440 1) (I70 1)   · CTA 1/2014    Alina Nissen Alina Nissen Aorta w diffuse Ath / RRA   ok / LRA w mild ostial stenosis      7/2015 US    R 9 9 & L 9 9 --+ cyst      4/2015 Doppler Patent RRA and L > 60% JOSÉ      7/2015 Doppler   >60% R w RVI 0 86 and 10 77cm // >60% L w RVI of 0 79 and size      10 7 cm      2016   < 60% R and > 60% L   10  Multiple vessel coronary artery disease (414 00) (I25 10)    Plan  Atherosclerosis of native artery of extremity with intermittent claudication    · Furosemide 20 MG Oral Tablet; TAKE 1 TABLET DAILY AS NEEDED for leg  edema  Atherosclerosis of native artery of extremity with intermittent claudication, Benign  hypertension with chronic kidney disease, stage III, Multiple vessel coronary artery  disease    · (1) CBC/ PLT (NO DIFF); Status:Active; Requested QBQ:[id number];    · (1) COMPREHENSIVE METABOLIC PANEL; Status:Active; Requested XDK:[id number];    · (1) LIPID PANEL FASTING W DIRECT LDL REFLEX; Status:Active; Requested  OLM:[id number];     Discussion/Summary       Patient presents for follow-up of chronic medical conditions  Recent blood work performed by endocrinology, including hemoglobin A1c of 7 1 and normal TSH  We will order routine blood work for late June  Patient remains on furosemide 20 mg every other day    He is compliant with medications for chronic medical conditions  His biggest concern is persistent symptoms of claudication  He likely has mutual vascular and radiculopathy component  Patient did notice significant relief of his symptoms after first epidural injection  At this time I suggested that he should make a follow-up with Dr Dino Ojeda to discuss his pain management further  If possibility of another injection could be entertained-he would need to discuss insulins management with endocrinology prior to procedure  Patient will follow up with all specialists as recommended  Routine checkup in 4 months  The patient was counseled regarding diagnostic results, instructions for management, patient and family education, impressions, risks and benefits of treatment options, importance of compliance with treatment  total time of encounter was 25 minutes and 15 minutes was spent counseling  Possible side effects of new medications were reviewed with the patient/guardian today  The treatment plan was reviewed with the patient/guardian  The patient/guardian understands and agrees with the treatment plan      Chief Complaint  pt is here for a 3 month f/u appt  Pt states that he is tired and that his legs continue to cramp unless he is sitting  Pt brought along a BS log for review  Pt also at times feels SOB upon exertion  All meds/allergies reviewed with pt  History of Present Illness  Patient presents for follow-up of chronic medical conditions  He remains on medications for coronary artery disease, type 1 diabetes, hypothyroidism, hypertension, hyperlipidemia, low back pain, chronic renal insufficiency  She is under care of cardiology, vascular surgery, endocrinology, nephrology, pain management  He is complaining of ongoing symptoms of claudication  He develops significant pain in his legs with ambulation    Patient did experience significant relief of the symptoms after first epidural injection which unfortunately was complicated by significant blood sugars spike  His leg pain has resolved 3 days after injection and then slowly came back  Patient is under care of pain management and vascular surgery  He was recently seen by cardiology, he is reluctant to proceed with repeat catheterization, Dr Dee added Imdur 30 mg daily  Patient does not feel any difference in his symptoms with addition of this medication so far  Review of Systems    Constitutional: feeling tired  Eyes: No complaints of eye pain, no red eyes, no discharge from eyes, no itchy eyes  ENT: no complaints of earache, no hearing loss, no nosebleeds, no nasal discharge, no sore throat, no hoarseness  Cardiovascular: intermittent leg claudication, but no chest pain, no palpitations and no extremity edema  Respiratory: shortness of breath during exertion  Gastrointestinal: No complaints of abdominal pain, no constipation, no nausea or vomiting, no diarrhea or bloody stools  Genitourinary: No complaints of dysuria, no incontinence, no hesitancy, no nocturia, no genital lesion, no testicular pain  Musculoskeletal: arthralgias and lower back pain  Integumentary: No complaints of skin rash or skin lesions, no itching, no skin wound, no dry skin  Neurological: No compliants of headache, no confusion, no convulsions, no numbness or tingling, no dizziness or fainting, no limb weakness, no difficulty walking  Psychiatric: Is not suicidal, no sleep disturbances, no anxiety or depression, no change in personality, no emotional problems  Endocrine: No complaints of proptosis, no hot flashes, no muscle weakness, no erectile dysfunction, no deepening of the voice, no feelings of weakness  Hematologic/Lymphatic: No complaints of swollen glands, no swollen glands in the neck, does not bleed easily, no easy bruising  Active Problems    1  Acquired polyneuropathy (356 9) (G62 9)   2  Acute conjunctivitis (372 00) (H10 30)   3   Age-related cataract (366 10) (H25 9)   4  Anemia (285 9) (D64 9)   5  Arthralgia (719 40) (M25 50)   6  Asymptomatic bilateral carotid artery stenosis (433 10,433 30) (I65 23)   7  Atherosclerosis of native artery of extremity with intermittent claudication (440 21)   (I70 219)   8  Benign hypertension with chronic kidney disease, stage III (403 10,585 3) (I12 9,N18 3)   9  Calf cramp (729 82) (R25 2)   10  Cataract (366 9) (H26 9)   11  Centrilobular emphysema (492 8) (J43 2)   12  Chronic kidney disease, stage 3 (585 3) (N18 3)   13  Degenerative lumbar spinal stenosis (724 02) (M48 06)   14  Dermatitis (692 9) (L30 9)   15  Dry eye syndrome (375 15) (H04 129)   16  Dyslipidemia (272 4) (E78 5)   17  Eczema (692 9) (L30 9)   18  Facial skin lesion (709 9) (L98 9)   19  Fatigue (780 79) (R53 83)   20  Heartburn (787 1) (R12)   21  Hyperkalemia (276 7) (E87 5)   22  Hypothyroidism, postablative (244 1) (E89 0)   23  Intervertebral disc disorders with radiculopathy, lumbosacral region (724 4) (M51 17)   24  Low back pain with sciatica (724 3) (M54 40)   25  Multiple vessel coronary artery disease (414 00) (I25 10)   26  Need for 23-polyvalent pneumococcal polysaccharide vaccine (V03 82) (Z23)   27  Peripheral vascular disease (443 9) (I73 9)   28  Proteinuria (791 0) (R80 9)   29  Protruded lumbar disc (722 10) (M51 26)   30  Renal artery stenosis (440 1) (I70 1)   31  Renal cyst, right (753 10) (N28 1)   32  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 (250 41,585 9) (E10 22,N18 9)   33  Unsteadiness (781 2) (R26 81)   34  Vitamin D deficiency (268 9) (E55 9)   35  White coat hypertension   36  Xerosis cutis (706 8) (L85 3)    Past Medical History    1  History of Acute kidney injury (584 9) (N17 9)   2  History of Acute venous embolism and thrombosis of deep vessels of proximal lower   extremity (453 41) (I82 4Y9)   3  History of Chronic cough (786 2) (R05)   4  History of dizziness (V13 89) (Z87 898)   5   History of hypertension (V12 59) (Z86 79)   6  History of Ischemic cardiomyopathy (414 8) (I25 5)   7  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) (J84 10)    The active problems and past medical history were reviewed and updated today  Surgical History    1  History of Cardiac Cath Procedure Outcome:   2  History of Cath Stent 1 Assessment Lesion Could Not Be Crossed   3  History of Cath Stent 1 Type Drug-Eluting   4  History of Cath Stent 2 Type Drug-Eluting   5  History of Cath Stent 3 Type Drug-Eluting   6  History of PTA Femoral-Popliteal Initial Stenosis With Stent    The surgical history was reviewed and updated today  Family History  Mother    1  Family history of liver disease (V18 59) (Z83 79)  Father    2  Family history of cardiac disorder (V17 49) (Z82 49)   3  Family history of kidney disease (V18 69) (Z84 1)   4  Family history of liver disease (V18 59) (Z83 79)  Paternal Cousin    11  Family history of diabetes mellitus (V18 0) (Z83 3)    The family history was reviewed and updated today  Social History    · Alcohol Use (History)   · Former smoker (D84 52) (I88 489)   ·    · Retired  The social history was reviewed and updated today  Current Meds   1  Accu-Chek Angie Plus In Vitro Strip; TEST 4 TIMES DAILY DX: E11 9; Therapy: 70JXJ2931 to (Evaluate:83Oyq6970)  Requested for: 97ZUH7905 Recorded   2  AmLODIPine Besylate 10 MG Oral Tablet; take 1 tablet daily at bedtime; Therapy: 67XAT0112 to (Evaluate:17Nov2017)  Requested for: 88JPY9435; Last   Rx:22Nov2016 Ordered   3  Aspirin 81 MG TABS; CHEW ONE TABLET DAILY; Therapy: (Recorded:37Cms5468) to Recorded   4  Astaxanthin CAPS; Take 2mg tablets daily; Therapy: (Recorded:53Rkx5728) to Recorded   5  Atorvastatin Calcium 40 MG Oral Tablet; Take 1 tablet daily  Requested for: 70VYD2198;   Last Rx:04Oct2016 Ordered   6  Carvedilol 12 5 MG Oral Tablet; TAKE ONE TABLET BY MOUTH TWICE A DAY;    Therapy: 33MRF7860 to (Evaluate:08Jan2018) Requested for: 55Obd4166; Last   Rx:26Miz8872 Ordered   7  Centrum Silver TABS; TAKE 1 TABLET DAILY; Therapy: (Recorded:33Asq0347) to Recorded   8  CoQ-10 200 MG CAPS; TAKE 1 CAPSULE DAILY; Therapy: (Recorded:63Rzi1139) to Recorded   9  Effient 10 MG Oral Tablet; TAKE 1 TABLET DAILY  Requested for: 03RSP2385; Last   Rx:23Nkf5232 Ordered   10  Furosemide 20 MG Oral Tablet; TAKE 1 TABLET DAILY AS NEEDED for leg edema; Therapy: 68LXD7379 to (Last Rx:11Oct2016)  Requested for: 44RDC3980 Ordered   11  Glucagon (rDNA) 1 MG Kit; AS NEEDED FOR <40 BLOOD SUGAR; Therapy: (Recorded:92Cqm2404) to Recorded   12  HumaLOG 100 UNIT/ML Subcutaneous Solution; USE AS DIRECTED; Therapy: (Recorded:91Ojc7448) to Recorded   13  Isosorbide Mononitrate ER 30 MG Oral Tablet Extended Release 24 Hour; TAKE 1    TABLET DAILY; Therapy: 28FHH9074 to (Crescencio Maya)  Requested for: 14IJU8027; Last    Rx:29Mar2017 Ordered   14  Lantus SoloStar 100 UNIT/ML Subcutaneous Solution Pen-injector; use as directed 19    IU BID; Therapy: 06LVP4818 to Recorded   15  Levothyroxine Sodium 175 MCG Oral Tablet; TAKE 1 TABLET DAILY  Requested for:    92OGC4407; Last Rx:04Oct2016 Ordered   16  Nitroglycerin 0 4 MG Sublingual Tablet Sublingual; DISSOLVE 1 TABLET UNDER THE    TONGUE AS NEEDED FOR CHEST PAIN; Last Rx:02Uym9936 Ordered   17  TraMADol HCl - 50 MG Oral Tablet; TAKE ONE 1-2  TABLET(S) THREE TIMES DAILYAS    NEEDED FOR PAIN;    Therapy: 15BOR1617 to (Evaluate:35Nze5499); Last Rx:15Mar2017 Ordered   18  Vitamin D 1000 UNIT CAPS; TAKE 1 TABLET BY MOUTH ONCE DAILY; Therapy: 36Lze8420 to (Last Rx:11Aug2015) Ordered   19  Vitamin K TABS; Vitamin K 90 mcg- one tab daily daily; Therapy: (Recorded:99Awp2931) to Recorded    The medication list was reviewed and updated today  Allergies    1  Cardura TABS   2   Zestril TABS    Vitals  Vital Signs    Recorded: 59KRX2811 12:16PM Recorded: 49QDR4970 11:34AM   Temperature  97 F   Heart Rate  76   Systolic 744 835   Diastolic 74 68   Height  5 ft 7 in   Weight  197 lb    BMI Calculated  30 85   BSA Calculated  2 01   O2 Saturation  98     Physical Exam    Constitutional   General appearance: No acute distress, well appearing and well nourished  well developed and overweight  Pulmonary   Respiratory effort: No increased work of breathing or signs of respiratory distress  Auscultation of lungs: Clear to auscultation, equal breath sounds bilaterally, no wheezes, no rales, no rhonci  Cardiovascular   Auscultation of heart: Normal rate and rhythm, normal S1 and S2, without murmurs  Examination of extremities for edema and/or varicosities: Normal     Carotid pulses: Normal     Abdomen   Abdomen: Non-tender, no masses  No abdominal bruit  Musculoskeletal   Gait and station: Normal     Neurologic   Cranial nerves: Cranial nerves 2-12 intact  Psychiatric   Orientation to person, place and time: Normal     Mood and affect: Normal          Results/Data  (1) HEMOGLOBIN A1C 05IVH4751 11:09AM EPIC, Provider   Test ordered by: Micki Christensen Name Result Flag Reference   HEMOGLOBIN A1C 7 1 % H 4 2-6 3   EST  AVG  GLUCOSE 157 mg/dl       (1) TSH WITH FT4 REFLEX 18QSJ9991 11:09AM EPIC, Provider   Test ordered by: Steven Morales     Test Name Result Flag Reference   TSH 0 736 uIU/mL  0 358-3 740   Patients undergoing fluorescein dye angiography may retain small amounts of fluorescein in the body for 48-72 hours post procedure  Samples containing fluorescein can produce falsely depressed TSH values  If the patient had this procedure,a specimen should be resubmitted post fluorescein clearance  Health Management  Health Maintenance   Medicare Annual Wellness Visit; every 1 year; Next Due: 90XGH7747; Active    Future Appointments    Date/Time Provider Specialty Site   09/14/2017 11:30 AM TERRY Barkley   [facility]  [address] Electronically signed by :  TERRY Camacho ; May 15 2017  7:03PM EST                       (Author)

## 2018-01-15 NOTE — MISCELLANEOUS
Message  I spoke with patient's wife regarding blood work results  I discussed patient's case earlier today with his cardiologist, Nuno Franco  She has no objections with clearance for cataract surgery  Patient will try Lasix 20 mg every other day for 2 weeks  We will repeat BMP and BNP in 2 weeks  Patient has follow-up with cardiology on May 17  Advised to increase dietary calcium      Plan  Atherosclerosis of native arteries of extremity with intermittent claudication, Chronic  kidney disease, stage 3    · (1) BASIC METABOLIC PROFILE; Status:Active; Requested for:17Reo9897;    · (1) BNP; Status:Active; Requested for:42Hcm6158;     Signatures   Electronically signed by :  TERRY Brandon ; May  2 2016  6:41PM EST                       (Author)

## 2018-01-15 NOTE — MISCELLANEOUS
Message     Recorded as Task   Date: 2017 02:10 PM, Created By: Viky Howell   Task Name: Miscellaneous   Assigned To: [employer]   Regarding Patient: Wyline Lesches, Status: Active   CommentHuseyin Goodson - 2017 2:10 PM     TASK CREATED  Patient called to reschedule his procedure from last week  He is a hold and the hold we have  17  Please call patient to reschedule his procedure  Thank you   Sofy Merida - 2017 3:51 PM     TASK EDITED  Faxed new hold request for effient to Dr Tanesha Sotelo office at [phone]  Sofy Merida - 2017 4:23 PM     TASK EDITED  S/w pt regarding above  started to explain above to pt when he asked if I could speak to his wife Pili Kristian  RN started to explain above, when Pili Townsend stated that I was speaking to an RN and she knows what I am talking about  Reiterated that I sent the hold request to Dr Kiara Kuo office as the first hold request was   Pili Townsend wanted FQ to know that they are flying out of town by  and the "absolute last day " to have an injection would be by   RN looked into FQ's schedule  and there were some openings, but did reiterate to Pili Townsend that we need the hold request sent back prior to being able to schedule  Scotty Hansen that she could call the cardiologists office to request they send the hold request back sooner  Pili Townsend asked that I call the office and provided me with that # of [phone]  Pili Townsend also wanted to provide me with [address]'s email for which I told her it is not our policy to email people but would gladly call her office  RN called LM's office and spoke with Bernadette Gutierrez  Explained to Bernadette Gutierrez above, and let her know the hold request was already faxed, and that if LM could send it back to us ASAP, we would be able to schedule the pt in a timely fashion and before the pt has to go out of town  Bernadette Gutierrez was very helpful and stated would task LM about same      RN then called Marleen back and left her know above  Marleen appreciative of assistance and c/b  Sofy Merida - 18 Jan 2017 4:23 PM     TASK REASSIGNED: Previously Assigned To Selma Sutton - 19 Jan 2017 8:23 AM     TASK EDITED  Left vm for  Fermín Sinha at Dr Bryce Velazquez office to fax Effient hold form back once approved to the [facility] fax at [phone]  Selma Rosario - 19 Jan 2017 11:14 AM     TASK EDITED  Effient hold approval received from Dr James Mention dated 1/18/17  Form to be scanned into chart by Fabián Rodriguez  S/W pt that Effient hold approval received and I would like to schedule his inj  Pt put his wife on the phone to schedule inj  B/L L5 TFESI was scheduled for 1/26/17 at 3:30 at [facility]  Instr reviewed with wife: light lunch then NPO 1 hr prior to opro,  needed, c/b needed if sick/abx started prior to opro, Effient to be held for 7 days prior to opro so Effient to be stopped today  Wife confirmed he takes his Effient in the evening and hasn't taken it yet today  Wife verbalized understanding of all instr  Active Problems    1  Abnormal CT of the chest (793 2) (R93 8)   2  Acquired polyneuropathy (356 9) (G62 9)   3  Acute conjunctivitis (372 00) (H10 30)   4  Anemia (285 9) (D64 9)   5  Arthralgia (719 40) (M25 50)   6  Asymptomatic bilateral carotid artery stenosis (433 10,433 30) (I65 23)   7  Atherosclerosis of native artery of extremity with intermittent claudication (440 21)   (I70 219)   8  Benign hypertension with chronic kidney disease, stage III (403 10,585 3) (I12 9,N18 3)   9  Cataract (366 9) (H26 9)   10  Centrilobular emphysema (492 8) (J43 2)   11  Chest pain (786 50) (R07 9)   12  Chronic kidney disease, stage 3 (585 3) (N18 3)   13  Chronic kidney disease, stage 4 (severe) (585 4) (N18 4)   14  Colon cancer screening (V76 51) (Z12 11)   15  Degenerative lumbar spinal stenosis (724 02) (M48 06)   16  Dermatitis (692 9) (L30 9)   17  Dyslipidemia (272 4) (E78 5)   18  Eczema (692 9) (L30 9)   19  Encounter for consultation (V65 9) (Z71 9)   20  Fatigue (780 79) (R53 83)   21  Heartburn (787 1) (R12)   22  Hyperkalemia (276 7) (E87 5)   23  Hypothyroidism, postablative (244 1) (E89 0)   24  Intervertebral disc disorders with radiculopathy, lumbosacral region (724 4) (M51 17)   25  Low back pain with sciatica (724 3) (M54 40)   26  Multiple vessel coronary artery disease (414 00) (I25 10)   27  Need for prophylactic vaccination and inoculation against influenza (V04 81) (Z23)   28  Need for vaccination with 13-polyvalent pneumococcal conjugate vaccine (V03 82) (Z23)   29  Obstructive sleep apnea syndrome (327 23) (G47 33)   30  Peripheral vascular disease (443 9) (I73 9)   31  Pre-op exam (V72 84) (Z01 818)   32  Proteinuria (791 0) (R80 9)   33  Protruded lumbar disc (722 10) (M51 26)   34  Renal artery stenosis (440 1) (I70 1)   35  Renal cyst, right (753 10) (N28 1)   36  Shortness of breath on exertion (786 05) (R06 02)   37  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 (250 41,585 9) (E10 22,N18 9)   38  Type I diabetes mellitus (250 01) (E10 9)   39  Unsteadiness (781 2) (R26 81)   40  Upper respiratory infection (465 9) (J06 9)   41  Viral infection (079 99) (B34 9)   42  Vitamin D deficiency (268 9) (E55 9)   43  White coat hypertension   44  Xerosis cutis (706 8) (L85 3)    Current Meds   1  Accu-Chek Angie Plus In Vitro Strip; Therapy: 78QUO1158 to (Evaluate:01Jan2015) Recorded   2  AmLODIPine Besylate 10 MG Oral Tablet; take 1 tablet daily at bedtime; Therapy: 32UYU9492 to (Evaluate:17Nov2017)  Requested for: 73PUF1602; Last   Rx:22Nov2016 Ordered   3  Aspirin 81 MG TABS; CHEW ONE TABLET DAILY; Therapy: (Recorded:40Jda1134) to Recorded   4  Astaxanthin CAPS; Take 2mg tablets daily; Therapy: (Recorded:79Mpz3463) to Recorded   5  Atorvastatin Calcium 40 MG Oral Tablet; Take 1 tablet daily  Requested for: 04BYN3194;   Last Rx:04Oct2016 Ordered   6  Carvedilol 12 5 MG Oral Tablet; take one tablet by mouth twice daily; Therapy: 25YQQ3483 to (Evaluate:11Jun2017)  Requested for: 87RTX9017 Recorded   7  Centrum Silver TABS; TAKE 1 TABLET DAILY; Therapy: (Recorded:35Ofe9526) to Recorded   8  CoQ-10 200 MG CAPS; TAKE 1 CAPSULE DAILY; Therapy: (Recorded:55Nst2631) to Recorded   9  Effient 10 MG Oral Tablet; TAKE 1 TABLET DAILY  Requested for: 04RZS1227; Last   Rx:74Fnh5102 Ordered   10  Furosemide 20 MG Oral Tablet; TAKE 1 TABLET DAILY AS NEEDED for leg edema; Therapy: 18DVP3812 to (Last Rx:11Oct2016)  Requested for: 60IZG5296 Ordered   11  Gabapentin 100 MG Oral Capsule; TAKE 3 CAPS AT NIGHT; Therapy: 87FYA3690 to (Andrey Rosas)  Requested for: 04EUY8425; Last    Rx:04Jan2017 Ordered   12  Glucagon (rDNA) 1 MG Kit; AS NEEDED FOR <40 BLOOD SUGAR; Therapy: (Recorded:33Rtp0817) to Recorded   13  HumaLOG 100 UNIT/ML Subcutaneous Solution; USE AS DIRECTED; Therapy: (Recorded:41Mki0855) to Recorded   14  Lantus SoloStar 100 UNIT/ML Subcutaneous Solution Pen-injector; use as directed; Therapy: 51EFJ8795 to Recorded   15  Levothyroxine Sodium 175 MCG Oral Tablet; TAKE 1 TABLET DAILY  Requested for:    77MQP8266; Last Rx:04Oct2016 Ordered   16  Nitroglycerin 0 4 MG Sublingual Tablet Sublingual (Nitrostat); DISSOLVE 1 TABLET    UNDER THE TONGUE AS NEEDED FOR CHEST PAIN; Last Rx:86Lxv6918 Ordered   17  TraMADol HCl - 50 MG Oral Tablet; TAKE ONE (1) TABLET(S) THREE TIMES DAILYAS    NEEDED FOR PAIN;    Therapy: 42LCH8377 to (Evaluate:02Apr2017); Last Rx:04Oct2016 Ordered   18  Vitamin D 1000 UNIT CAPS; TAKE 1 TABLET BY MOUTH ONCE DAILY; Therapy: 11Aug2015 to (Last Rx:11Aug2015) Ordered   19  Vitamin K TABS; Vitamin K 90 mcg- one tab daily daily; Therapy: (Recorded:50Fvc3074) to Recorded    Allergies    1  Cardura TABS   2   Zestril TABS    Signatures   Electronically signed by : Lisa Zaidi, ; Jan 19 2017 11:15AM EST (Author)

## 2018-01-15 NOTE — MISCELLANEOUS
Message   Recorded as Task   Date: 09/02/2016 01:40 PM, Created By: Roel Giang   Task Name: Miscellaneous   Assigned To: Roel Giang   Regarding Patient: Lucille Merida, Status: In Progress   KingaJeremie Banda - 02 Sep 2016 1:40 PM     TASK CREATED  Re: JOSÉ    Jan doppler had Right < 60% stenosisand Aug doppler had > 60% stenosis but Aug reading said "no chnage from Jan"    Khoi Andino please have dopplers re-read with correct reading   Tessie Lakhani - 02 Sep 2016 3:09 PM     TASK IN PROGRESS   Tessie Lakhani - 02 Sep 2016 3:10 PM     TASK EDITED  Called vascular and left message for them to call back   Karen Carrasco - 06 Sep 2016 2:58 PM     TASK REPLIED TO: Previously Assigned To [employer] vascular again today and they will send a message to the vascular surgeons to see if they can re-read the dopplers  They stated that they don't always have direct contact with the vascular surgeons  I gave them our phone number and fax number so that they can send over a new report  Joellen Gupta - 06 Sep 2016 5:02 PM     TASK REPLIED TO: Previously Assigned To Roel Luis from Vascular called and said he reviewed the two reports in question, and the patient is right on the cusp of being <60, >60 depending on the time of the reading  He said it's splitting a hair  He said it depends on how it is read at that specific time and the readings are so close to the line  So there was no error in the reports, he is right on the line       Signatures   Electronically signed by : TERRY Guzmán ; Sep  8 2016  9:47PM EST                       (Author)

## 2018-01-16 NOTE — MISCELLANEOUS
Message   Recorded as Task   Date: 06/08/2017 09:10 AM, Created By: Zeinab Keys   Task Name: Follow Up   Assigned To: [facility] clerical,Team   Regarding Patient: Hai He, Status: In Progress   Joshuahelen Stein - 08 Jun 7562 5:04 AM     TASK CREATED  S/P B/L L5 TFESI #2, EFFIENT ON 6/1/2017 W/ DR Derick Loja - NO F/U SCHEDULED   Neelam Davies - 08 Jun 2017 10:05 AM     TASK EDITED  patient states he has no improvement   when he walks his legs hurt & cramp   pain is a 9-10   Arciniega,Kaz - 08 Jun 2017 10:51 AM     TASK REPLIED TO: Previously Assigned To Ruth Villa md aware    please f/u with him next week   Anne Gutierrez - 12 Jun 2017 4:04 PM     TASK EDITED  Please call on 6/15/17  Zeinab Keys - 15 Gustavo 0613 94:26 AM     TASK EDITED  Left MSG on Vm to [facility] - 16 Jun 1792 79:52 AM     TASK EDITED  Patient still reports no relief -     He has no pain when sitting it is only when walking  Please advise recommendations  Ruth Villa - 16 Jun 2017 10:51 AM     TASK REPLIED TO: Previously Assigned To [facility] [place] clinical,Team  would he ever consider spinal cord stimulation? if so, we'll get him info for it   Racquel Steward - 16 Jun 2017 2:44 PM     TASK EDITED  S/w the pt  and he stated he would like more information on it  Would you like the Stim team to send out some information  ?? I'm not at [facility] today  Ruth Villa - 16 Jun 2017 3:15 PM     TASK REPLIED TO: Previously Assigned To Ruth Villa  better have him come for sovs   Racquel Steward - 16 Jun 2017 3:58 PM     TASK EDITED  Can you please schedule     Shabnam Mathew - 23 JFI 9139 10:31 AM     TASK IN PROGRESS   Shabnam Mathew - 19 Jun 2017 10:34 AM     TASK EDITED  Pt is scheduled for 8/9/17 @ 12:30     Signatures   Electronically signed by : Leon Nicole, ; Jun 19 2017 10:34AM EST                       (Author)

## 2018-01-16 NOTE — MISCELLANEOUS
Message  Spoke with patient and wife regarding results of blood work  Reviewed vascular surgery consult that was done earlier today  Patient will be proceeding with carotid artery  He would like to establish care with [facility] given the fact that he will need inpatient insulin management  Referral to [facility] group provided  All questions answered  Signatures   Electronically signed by :  TERRY Chavarria ; Nov 30 2017  6:31PM EST                       (Author)

## 2018-01-16 NOTE — PROGRESS NOTES
Assessment    1  Asymptomatic carotid artery stenosis (433 10) (I65 29)   2  Chronic kidney disease, stage 3 (585 3) (N18 3)   · JOSÉ/HTN/DM      + DARINEL 9/2015 after card cath   LVH    Bakari [place] peak creat 3 5 and d/c creat 1 7   3  Hypothyroidism, postablative (244 1) (E89 0)   4  Benign hypertension with chronic kidney disease, stage III (403 10,585 3) (I12 9,N18 3)   5  Multiple vessel coronary artery disease (414 00) (I25 10)   6  Peripheral vascular disease (443 9) (I73 9)    Plan  Benign hypertension with chronic kidney disease, stage III    · Carvedilol 12 5 MG Oral Tablet; Take 2 tabs in am and 1 tab in pm    Discussion/Summary    Patient presents for followup after recent hospitalization  We will decrease dose of carvedilol to 25 mg in the morning and 12 5 mg in the evening  We will followup closely, blood pressure recheck in 2 weeks  We might consider titrating dose of Norvasc in the future  Patient should follow up with vascular surgery to review lower extremity arterial Dopplers, I did contact his vascular surgeon today  Kidney ultrasound is normal, pending renal artery scan   Avapro was discontinued by inpatient team, patient is on Norvasc 5 mg daily   He underwent extensive neurological workup while inpatient including MRI, MRA, neurology consult  Possible side effects of new medications were reviewed with the patient/guardian today  The treatment plan was reviewed with the patient/guardian  The patient/guardian understands and agrees with the treatment plan   The patient, patient's family was counseled regarding diagnostic results, instructions for management, risk factor reductions, prognosis, patient and family education, impressions, risks and benefits of treatment options, importance of compliance with treatment  total time of encounter was 25 minutes and 15 minutes was spent counseling        Chief Complaint  pt here for a check up today   Followup chronic medical conditions and recent hospitalization at [facility]  Patient presented with symptoms of dizziness, memory loss, tremors  Extensive neurological workup including MRI and MRA of the brain and neurology consult were unremarkable  Inpatient team felt that patient's symptoms could be related to recent titration dose of beta blocker  Patient currently denies any headache, disorientation, memory loss, tremors or dizziness  He has been experiencing on and off symptoms of unsteadiness for the last 3-4 weeks  He is compliant with daily medications  Results of recent lower extremity arterial Dopplers reviewed  Patient has been experiencing daily symptoms of claudication  He denies headaches, visual changes  Patient is here today for follow up of chronic conditions described in HPI  History of Present Illness  Alejandro Loera -saw pt yesterday , will follow up 4/2016  Hemoglobin A1c 6 5, blood sugars remained labile but no overt hypoglycemia  Patient remains on CPAP for recently diagnosed obstructive sleep apnea      Review of Systems    Constitutional: No fever or chills, feels well, no tiredness, no recent weight gain or weight loss  Eyes: No complaints of eye pain, no red eyes, no discharge from eyes, no itchy eyes  ENT: no complaints of earache, no hearing loss, no nosebleeds, no nasal discharge, no sore throat, no hoarseness  Cardiovascular: intermittent leg claudication, but as noted in HPI  Respiratory: No complaints of shortness of breath, no wheezing, no cough, no SOB on exertion, no orthopnea or PND  Gastrointestinal: No complaints of abdominal pain, no constipation, no nausea or vomiting, no diarrhea or bloody stools  Genitourinary: No complaints of dysuria, no incontinence, no hesitancy, no nocturia, no genital lesion, no testicular pain  Musculoskeletal: No complaints of arthralgia, no myalgias, no joint swelling or stiffness, no limb pain or swelling     Integumentary: No complaints of skin rash or skin lesions, no itching, no skin wound, no dry skin  Neurological: as noted in HPI  Psychiatric: Is not suicidal, no sleep disturbances, no anxiety or depression, no change in personality, no emotional problems  Endocrine: No complaints of proptosis, no hot flashes, no muscle weakness, no erectile dysfunction, no deepening of the voice, no feelings of weakness  Hematologic/Lymphatic: No complaints of swollen glands, no swollen glands in the neck, does not bleed easily, no easy bruising  Active Problems    1  Abnormal CT of the chest (793 2) (R93 8)   2  Acute conjunctivitis (372 00) (H10 30)   3  Anemia (285 9) (D64 9)   4  Arthralgia (719 40) (M25 50)   5  Asymptomatic carotid artery stenosis (433 10) (I65 29)   6  Atheroscler native arteries the extremities w/intermit claudication (440 21) (I70 219)   7  Benign hypertension with chronic kidney disease, stage III (403 10,585 3) (I12 9,N18 3)   8  Centrilobular emphysema (492 8) (J43 2)   9  Chest pain (786 50) (R07 9)   10  Chronic cough (786 2) (R05)   11  Chronic kidney disease, stage 3 (585 3) (N18 3)   12  Colon cancer screening (V76 51) (Z12 11)   13  Dermatitis (692 9) (L30 9)   14  Dyslipidemia (272 4) (E78 5)   15  Eczema (692 9) (L30 9)   16  Encounter for consultation (V65 9) (Z71 9)   17  Fatigue (780 79) (R53 83)   18  Heartburn (787 1) (R12)   19  Hyperkalemia (276 7) (E87 5)   20  Hypothyroidism, postablative (244 1) (E89 0)   21  Multiple vessel coronary artery disease (414 00) (I25 10)   22  Need for vaccination with 13-polyvalent pneumococcal conjugate vaccine (V03 82) (Z23)   23  Obstructive sleep apnea syndrome (327 23) (G47 33)   24  Peripheral vascular disease (443 9) (I73 9)   25  Proteinuria (791 0) (R80 9)   26  Renal artery stenosis (440 1) (I70 1)   27  Renal cyst, right (753 10) (Q61 00)   28  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 (250 41,403 90,585 9) (E10 22,I12 9,N18 9)   29  Type I diabetes mellitus (250 01) (E10 9)   30  Unsteadiness (781 2) (R26 81)   31  Upper respiratory infection (465 9) (J06 9)   32  Vitamin D deficiency (268 9) (E55 9)   33  Xerosis cutis (706 8) (L85 3)    Past Medical History    1  History of Acute kidney injury (584 9) (N17 9)   2  History of Acute venous embolism and thrombosis of deep vessels of proximal lower   extremity (453 41) (I82 4Y9)   3  History of dizziness (V13 89) (Z87 898)   4  History of hypertension (V12 59) (Z86 79)   5  History of Ischemic cardiomyopathy (414 8) (I25 5)    The active problems and past medical history were reviewed and updated today  Surgical History    1  History of Cardiac Cath Procedure Outcome:   2  History of Cath Stent 1 Assessment Lesion Could Not Be Crossed   3  History of Cath Stent 1 Type Drug-Eluting   4  History of Cath Stent 2 Type Drug-Eluting   5  History of Cath Stent 3 Type Drug-Eluting   6  History of PTA Femoral-Popliteal Initial Stenosis With Stent    The surgical history was reviewed and updated today  Family History    1  Family history of liver disease (V18 59) (Z83 79)    2  Family history of cardiac disorder (V17 49) (Z82 49)   3  Family history of kidney disease (V18 69) (Z84 1)   4  Family history of liver disease (V18 59) (Z83 79)    5  Family history of diabetes mellitus (V18 0) (Z83 3)    The family history was reviewed and updated today  Social History    · Alcohol Use (History)   · Former smoker (M23 93) (Q02 692)   ·    · Retired  The social history was reviewed and updated today  Current Meds   1  Accu-Chek Angie Plus In Vitro Strip; Therapy: 20AWG2394 to (Evaluate:01Jan2015) Recorded   2  Aspirin 81 MG Oral Tablet; CHEW ONE TABLET DAILY; Therapy: (Recorded:11Jan2016) to Recorded   3  Astaxanthin CAPS; Take one tab of 2 mg daily; Therapy: (KZEWKXRT:60QMB3088) to Recorded   4  Atorvastatin Calcium 40 MG Oral Tablet; Take 1 tablet daily; Therapy: (Recorded:11Jan2016) to Recorded   5   Carvedilol 25 MG Oral Tablet; TAKE 1 TABLET TWICE DAILY; Therapy: 85BPU9379 to (Evaluate:22Fkm5862); Last Rx:18Dec2015 Ordered   6  Centrum Silver TABS; TAKE 1 TABLET DAILY; Therapy: (Recorded:02Dec2014) to Recorded   7  CoQ-10 200 MG Oral Capsule; TAKE 1 CAPSULE DAILY; Therapy: (Recorded:10Dec2015) to Recorded   8  Effient 10 MG Oral Tablet; Take 1 tablet daily; Last Rx:18Dec2015 Ordered   9  Furosemide 40 MG Oral Tablet; TAKE 1 TABLET DAILY prn gain in weight of 3 or more   pounds; Therapy: (Recorded:11Jan2016) to Recorded   10  Glucagon Emergency 1 MG Injection Kit; inject sq if bs is less than 50; Therapy: 24JOD6067 to (Roberto Stanley)  Requested for: 49AHA0643; Last    Rx:09Dec2014 Ordered   11  HumaLOG 100 UNIT/ML Subcutaneous Solution; USE AS DIRECTED; Therapy: (SJQICNXO:71FPQ6783) to Recorded   12  Ketostix In Vitro Strip; USE AS DIRECTED; Therapy: 96XNE3102 to (Evaluate:11Dec2015); Last Rx:11Nov2015 Ordered   13  Lantus 100 UNIT/ML Subcutaneous Solution; INJECT 23  UNITs am and 4 IU at night;    Last Rx:11Aug2015 Ordered   14  Levothyroxine Sodium 175 MCG Oral Tablet; TAKE 1 TABLET DAILY; Therapy: (EOGWKKNM:98NOX2414) to Recorded   15  Nitrostat 0 4 MG Sublingual Tablet Sublingual; DISSOLVE 1 TABLET UNDER THE    TONGUE AS NEEDED FOR CHEST PAIN;    Therapy: (Recorded:11Jan2016) to Recorded   16  Pycnogenol Complex TABS; TAKE TABLET Daily 100mg; Therapy: (Recorded:11Jan2016) to Recorded   17  TraMADol HCl - 50 MG Oral Tablet; TAKE ONE (1) TABLET(S) THREE TIMES DAILYAS    NEEDED FOR PAIN;    Therapy: 14YFQ3495 to (Evaluate:11Dec2015); Last Rx:11Nov2015 Ordered   18  Ubiquinol 200 MG CAPS; TAKE CAPSULE Daily; Therapy: (Recorded:11Jan2016) to Recorded   19  Vitamin D 1000 UNIT CAPS; TAKE 1 TABLET BY MOUTH ONCE DAILY; Therapy: 11Aug2015 to (Last Rx:11Aug2015) Ordered   20  Vitamin K TABS; Vitamin K 90 mcg- one tab daily daily;     Therapy: (Recorded:96Idf6779) to Recorded    The medication list was reviewed and updated today  Allergies    1  Cardura TABS   2  Zestril TABS    Vitals  Vital Signs [Data Includes: Current Encounter]    ** Printed in Appendix #1 below  Physical Exam    Constitutional   General appearance: No acute distress, well appearing and well nourished  Pulmonary   Respiratory effort: No increased work of breathing or signs of respiratory distress  Auscultation of lungs: Clear to auscultation, equal breath sounds bilaterally, no wheezes, no rales, no rhonci  Cardiovascular   Auscultation of heart: Normal rate and rhythm, normal S1 and S2, without murmurs  Musculoskeletal   Gait and station: Normal     Neurologic   Cranial nerves: Cranial nerves 2-12 intact  Psychiatric   Orientation to person, place and time: Normal     Mood and affect: Normal          Health Management  Health Maintenance   Medicare Annual Wellness Visit; every 1 year; Next Due: 88OCC2741; Active    Future Appointments    Date/Time Provider Specialty Site   2016 11:15 AM TERRY Faust  Family Medicine [address] [place] [address]   02/15/2016 02:20 PM TERRY Fox  Pulmonary Medicine [address] [facility]   02/10/2016 02:45 PM Zan Barahona MD Vascular Surgery [facility]  [facility]     Signatures   Electronically signed by :  TERRY Stratton ; 2016 10:05PM EST                       (Author)    Appendix #1     Vital Signs Lore Greeneoter Includes: Current Encounter]   Patient: Galen Lovell ; : 1945; MRN: [id number]      Recorded: 91Vvs5709 10:48AM Recorded: 41QOC2535 10:47AM Recorded: 80SOD8099 10:03AM Recorded: 24RXP6903 09:58AM   Temperature   95 7 F    Heart Rate   72    Respiration   14    Systolic 244 280 582    Diastolic 70 72 64    Height    5 ft 7 in   Weight   188 lb  188 lb    BMI Calculated    29 44   BSA Calculated    1 97

## 2018-01-17 ENCOUNTER — GENERIC CONVERSION - ENCOUNTER (OUTPATIENT)
Dept: OTHER | Facility: OTHER | Age: 73
End: 2018-01-17

## 2018-01-17 NOTE — MISCELLANEOUS
Message  I spoke with patient's wife regarding bloodwork and CT chest results  Patient has been using Lasix 20 mg once a day for 3 days  He still complaining of dyspnea on exertion, no other symptoms  BNP is slightly elevated at 160  Advised patient to continue on furosemide for the total of 5 days and then discontinue  We will contact [place] [address] cardiology to request early appointment  CT chest is stable reveal significant emphysema  I did contact patient's pulmonologist regarding possible start of inhaler  We will await Dr Lauren Lacey input  I left message for patient's vascular surgeon, Dr Garvin Ahr she to discuss long-term management of his bothersome PVD  Signatures   Electronically signed by :  TERRY Hansen ; Mar  2 2016  4:09PM EST                       (Author)

## 2018-01-17 NOTE — CONSULTS
Chief Complaint  Patient is here for a preoperative clearance for a CT of the carotid arteries dated for 11/27/17 with [address], Dr Ac Nick surgeon  Patient c/o discolored areas on back of his left ankle x's 2+ days  Patient would like a medication refill for amlodipine and tramadol sent to his local pharmacy  All medications were reviewed and updated with the patient  History of Present Illness  HPI: Patient presents to discuss a recent evaluation by vascular surgery and planning for further testing of carotid stenosis and possible carotid surgery  He is extremely concerned about forthcoming CTA neck due to known renal insufficiency and IV dye administration  Patient denies any stroke type symptoms that were reviewed during recent consultation with Dr Evon Silva  Patient has symptoms of chronic claudication, left more than right, he remains on daily medications for type 1 diabetes, coronary artery disease, hypertension, hyperlipidemia, hypothyroidism  He uses CPAP as directed for recent diagnosis of obstructive sleep apnea  Patient is under comprehensive care of Endocrinology, Cardiology, vascular surgery, pulmonology  Results of recent carotid duplex, vascular consult reviewed with patient and his wife in length today  Patient is complaining of skin lesions/laceration left lateral ankle  Patient is requesting refill for tramadol that he uses as needed for chronic back pain in claudication  Review of Systems    Constitutional: feeling tired  Eyes: No complaints of eye pain, no red eyes, no discharge from eyes, no itchy eyes  ENT: no complaints of earache, no hearing loss, no nosebleeds, no nasal discharge, no sore throat, no hoarseness  Cardiovascular: intermittent leg claudication, but no chest pain, no palpitations and no extremity edema  Respiratory: shortness of breath during exertion     Gastrointestinal: No complaints of abdominal pain, no constipation, no nausea or vomiting, no diarrhea or bloody stools  Genitourinary: No complaints of dysuria, no incontinence, no hesitancy, no nocturia, no genital lesion, no testicular pain  Musculoskeletal: arthralgias and lower back pain  Integumentary: No complaints of skin rash or skin lesions, no itching, no skin wound, no dry skin  Neurological: No compliants of headache, no confusion, no convulsions, no numbness or tingling, no dizziness or fainting, no limb weakness, no difficulty walking  Psychiatric: Is not suicidal, no sleep disturbances, no anxiety or depression, no change in personality, no emotional problems  Endocrine: No complaints of proptosis, no hot flashes, no muscle weakness, no erectile dysfunction, no deepening of the voice, no feelings of weakness  Hematologic/Lymphatic: No complaints of swollen glands, no swollen glands in the neck, does not bleed easily, no easy bruising  Active Problems    1  Acquired polyneuropathy (356 9) (G62 9)   2  Acute conjunctivitis (372 00) (H10 30)   3  Age-related cataract (366 10) (H25 9)   4  Anemia (285 9) (D64 9)   5  Arthralgia (719 40) (M25 50)   6  Asymptomatic bilateral carotid artery stenosis (433 10,433 30) (I65 23)   7  Atherosclerosis of native artery of extremity with intermittent claudication (440 21)   (I70 219)   8  Benign hypertension with chronic kidney disease, stage III (403 10,585 3) (I12 9,N18 3)   9  Calf cramp (729 82) (R25 2)   10  Cataract (366 9) (H26 9)   11  Centrilobular emphysema (492 8) (J43 2)   12  Chronic kidney disease, stage 3 (585 3) (N18 3)   13  Degenerative lumbar spinal stenosis (724 02) (M48 061)   14  Dermatitis (692 9) (L30 9)   15  Dry eye syndrome (375 15) (H04 129)   16  Dyslipidemia (272 4) (E78 5)   17  Eczema (692 9) (L30 9)   18  Encounter for prostate cancer screening (V76 44) (Z12 5)   19  Facial skin lesion (709 9) (L98 9)   20  Fatigue (780 79) (R53 83)   21  Heartburn (787 1) (R12)   22   Hyperkalemia (276 7) (E87 5)   23  Hypothyroidism, postablative (244 1) (E89 0)   24  Intervertebral disc disorders with radiculopathy, lumbosacral region (724 4) (M51 17)   25  Low back pain with sciatica (724 3) (M54 40)   26  Multiple vessel coronary artery disease (414 00) (I25 10)   27  Need for 23-polyvalent pneumococcal polysaccharide vaccine (V03 82) (Z23)   28  Need for prophylactic vaccination and inoculation against influenza (V04 81) (Z23)   29  ITZEL on CPAP (327 23,V46 8) (G47 33,Z99 89)   30  Peripheral vascular disease (443 9) (I73 9)   31  Proteinuria (791 0) (R80 9)   32  Protruded lumbar disc (722 10) (M51 26)   33  Renal artery stenosis (440 1) (I70 1)   34  Renal cyst, right (753 10) (N28 1)   35  Shortness of breath on exertion (786 05) (R06 02)   36  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 (250 41,585 9) (E10 22,N18 9)   37  Unsteadiness (781 2) (R26 81)   38  Vitamin D deficiency (268 9) (E55 9)   39  White coat hypertension   40  Xerosis cutis (706 8) (L85 3)    Past Medical History    · History of Acute kidney injury (584 9) (N17 9)   · History of Acute venous embolism and thrombosis of deep vessels of proximal lower  extremity (453 41) (I82 4Y9)   · History of Chronic cough (786 2) (R05)   · History of dizziness (V13 89) (S39 656)   · History of hypertension (V12 59) (Z86 79)   · History of Ischemic cardiomyopathy (414 8) (I25 5)   · 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) (J84 10)    The active problems and past medical history were reviewed and updated today  Surgical History    · History of Cardiac Cath Procedure Outcome:   · History of Cath Stent 1 Assessment Lesion Could Not Be Crossed   · History of Cath Stent 1 Type Drug-Eluting   · History of Cath Stent 2 Type Drug-Eluting   · History of Cath Stent 3 Type Drug-Eluting   · History of PTA Femoral-Popliteal Initial Stenosis With Stent    The surgical history was reviewed and updated today         Family History    · Family history of liver disease (V18 59) (Z83 79)    · Family history of cardiac disorder (V17 49) (Z82 49)   · Family history of kidney disease (V18 69) (Z84 1)   · Family history of liver disease (V18 59) (Z83 79)    · Family history of diabetes mellitus (V18 0) (Z83 3)    The family history was reviewed and updated today  Social History    · Alcohol Use (History)   · 2 drinks per day   · Former smoker (V15 82) (Y85 083)   · Smoking 48 years 1 5 ppd    d/c Oct 2008      Screening protocol   ·    · Retired   · desk work, [employer]  The social history was reviewed and updated today  Current Meds   1  Accu-Chek Angie Plus In Vitro Strip; TEST 4 TIMES DAILY DX: E11 9; Therapy: 23KIR4959 to (Evaluate:85Kmi0556)  Requested for: 49DKL8137 Recorded   2  AmLODIPine Besylate 10 MG Oral Tablet; take 1 tablet daily at bedtime; Therapy: 04UPT3396 to (Evaluate:17Nov2017)  Requested for: 06RTO7987; Last   Rx:22Nov2016 Ordered   3  Aspirin 81 MG TABS; CHEW ONE TABLET DAILY; Therapy: (Recorded:85Zrt1525) to Recorded   4  Atorvastatin Calcium 40 MG Oral Tablet; Take 1 tablet daily  Requested for: 82HBM3454;   Last Rx:26Oct2017 Ordered   5  Carvedilol 12 5 MG Oral Tablet; TAKE ONE TABLET BY MOUTH TWICE A DAY; Therapy: 46LUP1277 to (Evaluate:29Jun2018)  Requested for: 02Oct2017; Last   Rx:02Oct2017 Ordered   6  Clopidogrel Bisulfate 75 MG Oral Tablet; take one tablet by mouth daily; Therapy: 23OQP7523 to Recorded   7  CoQ-10 200 MG CAPS; TAKE 1 CAPSULE DAILY; Therapy: (Recorded:22Qei0335) to Recorded   8  Furosemide 20 MG Oral Tablet; TAKE 1 TABLET EVERY OTHER DAY; Therapy: (Recorded:16Nov2017) to Recorded   9  Glucagon (rDNA) 1 MG Kit; AS NEEDED FOR <40 BLOOD SUGAR; Therapy: (Recorded:99Mnq3961) to Recorded   10  HumaLOG 100 UNIT/ML Subcutaneous Solution; USE AS DIRECTED; Therapy: (Recorded:08Abv0899) to Recorded   11   Lantus SoloStar 100 UNIT/ML Subcutaneous Solution Pen-injector; use as directed 22 IU BID; Therapy: 66FSR6362 to Recorded   12  Levothyroxine Sodium 175 MCG Oral Tablet; TAKE 1 TABLET DAILY  Requested for:    96AGT5574; Last Rx:84Nms7679 Ordered   13  Nitroglycerin 0 4 MG Sublingual Tablet Sublingual; DISSOLVE 1 TABLET UNDER THE    TONGUE AS NEEDED FOR CHEST PAIN; Last Rx:91Dta7983 Ordered   14  TraMADol HCl - 50 MG Oral Tablet; TAKE ONE 1-2  TABLET(S) THREE TIMES DAILYAS    NEEDED FOR PAIN;    Therapy: 12DVG2384 to (Evaluate:42Dgq3702); Last Rx:13Gwy4194 Ordered    The medication list was reviewed and updated today  Allergies    1  Cardura TABS   2  Zestril TABS    Vitals  Signs    Temperature: 96 4 F  Heart Rate: 76  Respiration: 18  Systolic: 922  Diastolic: 72  Height: 5 ft 8 in  Weight: 199 lb   BMI Calculated: 30 26  BSA Calculated: 2 04    Physical Exam    Constitutional   General appearance: No acute distress, well appearing and well nourished  Neck   Neck: Supple, symmetric, trachea midline, no masses  Pulmonary   Auscultation of lungs: Clear to auscultation  Cardiovascular   Auscultation of heart: Normal rate and rhythm, normal S1 and S2, no murmurs  Carotid pulses: Abnormal   right 1+, right carotid bruit heard, left 1+ and left carotid bruit heard  Examination of extremities for edema and/or varicosities: Normal   Tiny laceration left lateral ankle, no cellulitic change, dryness of bilateral lower extremities  Diabetic Foot Exam: Socks and shoes removed, Right Foot Findings: erythema, dryness and cold /purplish discoloration, but no swelling  The toes were normal  Socks and Shoes removed, Left Foot Findings: erythema, dryness and Cold/purplish discoloration, but no swelling  The toes were normal    Monofilament Testing: normal tactile sensation with monofilament testing throughout both feet  Vascular: Pulses: 1+ in the dorsalis pedis  Pulses: 0 in the dorsalis pedis  Assign Risk Category: 1: No loss of protective sensation, deformity present  Impending risk  Psychiatric   Judgment and insight: Normal     Mood and affect: Normal        Assessment    1  Asymptomatic bilateral carotid artery stenosis (433 10,433 30) (I65 23)   2  Dermatitis (692 9) (L30 9)   3  ITZEL on CPAP (327 23,V46 8) (G47 33,Z99 89)   4  Atherosclerosis of native artery of extremity with intermittent claudication (440 21)   (I70 219)   5  Multiple vessel coronary artery disease (414 00) (I25 10)   6  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 (250 41,585 9) (E10 22,N18 9)   7  Benign hypertension with chronic kidney disease, stage III (403 10,585 3) (I12 9,N18 3)   · 12/2015 echo showed nl diast fx and nl LV thickness    Plan  Arthralgia    · TraMADol HCl - 50 MG Oral Tablet; TAKE ONE 1-2  TABLET(S) THREE TIMES  DAILYAS NEEDED FOR PAIN  Atherosclerosis of native artery of extremity with intermittent claudication, Benign  hypertension with chronic kidney disease, stage III    · AmLODIPine Besylate 10 MG Oral Tablet; take 1 tablet daily at bedtime  Atherosclerosis of native artery of extremity with intermittent claudication, Chronic kidney  disease, stage 3, Peripheral vascular disease    · (1) CBC/ PLT (NO DIFF); Status:Active; Requested for:16Nov2017;    · (1) COMPREHENSIVE METABOLIC PANEL; Status:Active; Requested for:16Nov2017;    · (1) LIPID PANEL FASTING W DIRECT LDL REFLEX; Status:Active; Requested  for:16Nov2017;   Dermatitis    · Ammonium Lactate 12 % External Lotion; APPLY  AND RUB  IN A THIN FILM TO  AFFECTED AREAS TWICE DAILY  (AM AND PM)    Discussion/Summary    Patient with multiple chronic medical conditions presents to discuss forthcoming testing due to asymptomatic but worsening carotid stenosis bilaterally  I strongly advised him to follow vascular surgery advise to proceed with further testing that will determine further treatment  Patient will be forcing fluids, we will follow his BMP 48 hours after CTA is performed    I reassured patient that his recent creatinine levels are within normal range, I explained patient and his wife that asymptomatic carotid stenosis could be posing significant risk for development of CVA in this patient with significant vasculopathic features including type 1 diabetes, CAD, renal artery stenosis, peripheral artery disease and known albeit treated sleep apnea  Patient is compliant with current medications including aspirin and Plavix and statin  Chronic dyspnea on exertion felt to be related to coronary artery disease  Hypertension is currently well controlled on beta-blocker and amlodipine  Type 1 diabetes and hypothyroidism a followed by Endocrinology  Patient will use Lac-Hydrin lotion for treatment of xerosis  Regular medications refilled  All questions answered to patient's and his wife's satisfaction  Tiny laceration left ankle, no evidence of cellulitic change, observation advised  I explained patient and his wife that if decision to proceed with carotid surgery would be made-he will actually require cardiac clearance  Office visit today Re presents follow-up/discussion, not a surgical clearance  The patient, patient's family was counseled regarding instructions for management, risk factor reductions, impressions  total time of encounter was 30 minutes and 20 minutes was spent counseling  Possible side effects of new medications were reviewed with the patient/guardian today  The treatment plan was reviewed with the patient/guardian  The patient/guardian understands and agrees with the treatment plan      End of Encounter Meds    1  TraMADol HCl - 50 MG Oral Tablet; TAKE ONE 1-2  TABLET(S) THREE TIMES DAILYAS   NEEDED FOR PAIN;   Therapy: 00YJG3147 to (Evaluate:34Ldt7403); Last Rx:16Nov2017 Ordered    2  Clopidogrel Bisulfate 75 MG Oral Tablet; take one tablet by mouth daily; Therapy: 06KJU6607 to Recorded    3  AmLODIPine Besylate 10 MG Oral Tablet; take 1 tablet daily at bedtime;    Therapy: 85OBH6754 to (Evaluate:11Nov2018) Requested for: 37CSI8565; Last   Rx:16Nov2017 Ordered    4  Carvedilol 12 5 MG Oral Tablet; TAKE ONE TABLET BY MOUTH TWICE A DAY; Therapy: 07OXR7944 to (Evaluate:74Ztc3974)  Requested for: 02Oct2017; Last   Rx:02Oct2017 Ordered    5  Ammonium Lactate 12 % External Lotion; APPLY  AND RUB  IN A THIN FILM TO   AFFECTED AREAS TWICE DAILY  (AM AND PM); Therapy: 72RTV0260 to (Last Rx:16Nov2017)  Requested for: 48EPF3376 Ordered    6  HumaLOG 100 UNIT/ML Subcutaneous Solution; USE AS DIRECTED; Therapy: (Recorded:85Dhv4615) to Recorded    7  Atorvastatin Calcium 40 MG Oral Tablet; Take 1 tablet daily  Requested for: 03UJW4711;   Last Rx:26Oct2017 Ordered    8  Aspirin 81 MG TABS; CHEW ONE TABLET DAILY; Therapy: (Recorded:58Eur3389) to Recorded   9  CoQ-10 200 MG CAPS; TAKE 1 CAPSULE DAILY; Therapy: (Recorded:46Ntp5577) to Recorded   10  Furosemide 20 MG Oral Tablet; TAKE 1 TABLET EVERY OTHER DAY; Therapy: (Recorded:16Nov2017) to Recorded    11  Levothyroxine Sodium 175 MCG Oral Tablet; TAKE 1 TABLET DAILY  Requested for:    78MYB0660; Last Rx:04Oct2016 Ordered    12  Nitroglycerin 0 4 MG Sublingual Tablet Sublingual (Nitrostat); DISSOLVE 1 TABLET    UNDER THE TONGUE AS NEEDED FOR CHEST PAIN; Last Rx:96Gjo6765 Ordered    13  Accu-Chek Angie Plus In Vitro Strip; TEST 4 TIMES DAILY DX: E11 9; Therapy: 64XAG2103 to (Evaluate:27Ome9332)  Requested for: 37MEE3768 Recorded    14  Glucagon (rDNA) 1 MG Kit; AS NEEDED FOR <40 BLOOD SUGAR; Therapy: (Recorded:86Eqb7000) to Recorded   15  Lantus SoloStar 100 UNIT/ML Subcutaneous Solution Pen-injector; use as directed 22    IU BID; Therapy: 29ESC5404 to Recorded    Signatures   Electronically signed by :  TERRY Dunn ; Nov 17 2017  9:54PM EST                       (Author)

## 2018-01-17 NOTE — MISCELLANEOUS
Message   Recorded as Task   Date: 04/10/2016 07:56 PM, Created By: Acacia Mcqueen   Task Name: Miscellaneous   Assigned To: [facility],Team   Regarding Patient: Zach Wilson, Status: Active   CommentDodileticia Hernandez - 10 Apr 2016 7:56 PM     TASK CREATED  Please have Nasreen Armenta send in BPs in 2 weeks   Patient is aware  kja      Active Problems    1  Abnormal CT of the chest (793 2) (R93 8)   2  Acute conjunctivitis (372 00) (H10 30)   3  Anemia (285 9) (D64 9)   4  Arthralgia (719 40) (M25 50)   5  Asymptomatic bilateral carotid artery stenosis (433 10,433 30) (I65 23)   6  Atherosclerosis of native arteries of extremity with intermittent claudication (440 21)   (I70 219)   7  Benign hypertension with chronic kidney disease, stage III (403 10,585 3) (I12 9,N18 3)   8  Centrilobular emphysema (492 8) (J43 2)   9  Chest pain (786 50) (R07 9)   10  Chronic kidney disease, stage 3 (585 3) (N18 3)   11  Colon cancer screening (V76 51) (Z12 11)   12  Dermatitis (692 9) (L30 9)   13  Dyslipidemia (272 4) (E78 5)   14  Eczema (692 9) (L30 9)   15  Encounter for consultation (V65 9) (Z71 9)   16  Fatigue (780 79) (R53 83)   17  Heartburn (787 1) (R12)   18  Hyperkalemia (276 7) (E87 5)   19  Hypothyroidism, postablative (244 1) (E89 0)   20  Multiple vessel coronary artery disease (414 00) (I25 10)   21  Need for vaccination with 13-polyvalent pneumococcal conjugate vaccine (V03 82) (Z23)   22  Obstructive sleep apnea syndrome (327 23) (G47 33)   23  Peripheral vascular disease (443 9) (I73 9)   24  Proteinuria (791 0) (R80 9)   25  Renal artery stenosis (440 1) (I70 1)   26  Renal cyst, right (753 10) (Q61 00)   27  Shortness of breath on exertion (786 05) (R06 02)   28  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 (250 41,403 90,585 9) (E10 22,I12 9,N18 9)   29  Type I diabetes mellitus (250 01) (E10 9)   30  Unsteadiness (781 2) (R26 81)   31  Upper respiratory infection (465 9) (J06 9)   32   Vitamin D deficiency (268 9) (E55 9)   33  White coat hypertension (796 2) (R03 0)   34  Xerosis cutis (706 8) (L85 3)    Current Meds   1  Accu-Chek Angie Plus In Vitro Strip; Therapy: 35PEE3460 to (Evaluate:01Jan2015) Recorded   2  AmLODIPine Besylate 5 MG Oral Tablet (Norvasc); Take one tablet every morning and   1/2 tablet every evening  Requested for: 06Apr2016; Last Rx:05Apr2016 Ordered   3  Aspirin 81 MG Oral Tablet; CHEW ONE TABLET DAILY; Therapy: (Recorded:11Jan2016) to Recorded   4  Astaxanthin CAPS; Take one tab of 2 mg daily; Therapy: ([facility]:90TSW2879) to Recorded   5  Atorvastatin Calcium 40 MG Oral Tablet; Take 1 tablet daily; Therapy: (Recorded:11Jan2016) to Recorded   6  Carvedilol 12 5 MG Oral Tablet; Take 1 tablet twice daily; Therapy: 61BNU3951 to (Geovanna Staple)  Requested for: 89KEQ5898; Last   Rx:04Feb2016 Ordered   7  Centrum Silver TABS; TAKE 1 TABLET DAILY; Therapy: (Recorded:60Koc0011) to Recorded   8  CoQ-10 200 MG Oral Capsule; TAKE 1 CAPSULE DAILY; Therapy: (Recorded:31Qox0473) to Recorded   9  Effient 10 MG Oral Tablet; Take 1 tablet daily; Last Rx:18Dec2015 Ordered   10  Furosemide 40 MG Oral Tablet; TAKE 1 TABLET DAILY prn gain in weight of 3 or more    pounds; Therapy: (Recorded:11Jan2016) to Recorded   11  Glucagon Emergency 1 MG Injection Kit; inject sq if blood ssugar  is less than 60; Therapy: 03SCM7260 to (Evaluate:03Jun2016)  Requested for: 17NRO3889; Last    Rx:04Feb2016 Ordered   12  HumaLOG 100 UNIT/ML Subcutaneous Solution; USE AS DIRECTED; Therapy: (77 385 106) to Recorded   13  Ketostix In Vitro Strip; USE AS DIRECTED; Therapy: 86XFT0103 to (Evaluate:11Dec2015); Last Rx:11Nov2015 Ordered   14  Lantus SoloStar 100 UNIT/ML Subcutaneous Solution Pen-injector; Inject 21 units subQ    in the am and 7 units in the pm;    Therapy: 44Aab9855 to Recorded   15  Levothyroxine Sodium 175 MCG Oral Tablet; TAKE 1 TABLET DAILY;     Therapy: (UOBISXSK:34CCA5021) to Recorded   16  Nitrostat 0 4 MG Sublingual Tablet Sublingual; DISSOLVE 1 TABLET UNDER THE    TONGUE AS NEEDED FOR CHEST PAIN;    Therapy: (Recorded:11Jan2016) to Recorded   17  Pycnogenol Complex TABS; TAKE TABLET Daily 100mg; Therapy: (Recorded:11Jan2016) to Recorded   18  TraMADol HCl - 50 MG Oral Tablet; TAKE ONE (1) TABLET(S) THREE TIMES DAILYAS    NEEDED FOR PAIN;    Therapy: 56TIF9898 to (Evaluate:11Dec2015); Last Rx:11Nov2015 Ordered   19  Ubiquinol 200 MG CAPS; TAKE CAPSULE Daily; Therapy: (Recorded:11Jan2016) to Recorded   20  Vitamin D 1000 UNIT CAPS; TAKE 1 TABLET BY MOUTH ONCE DAILY; Therapy: 11Aug2015 to (Last Rx:11Aug2015) Ordered   21  Vitamin K TABS; Vitamin K 90 mcg- one tab daily daily; Therapy: (Recorded:02Dec2014) to Recorded    Allergies    1  Cardura TABS   2   Zestril TABS    Signatures   Electronically signed by : TERRY Ho ; Apr 11 2016  4:19PM EST

## 2018-01-17 NOTE — PROGRESS NOTES
Assessment    1  Obstructive sleep apnea syndrome (327 23) (G47 33)   2  History of Chronic cough (786 2) (R05)   3  Abnormal CT of the chest (793 2) (R93 8)   4  Centrilobular emphysema (492 8) (J43 2)    Plan  Abnormal CT of the chest    · * CT Thorax Chest Without Contrast; Status:Hold For - Scheduling; Requested  for:03Qol8205 09:45AM;    Perform:[facility]; Order Comments:@ [facility]; GJS:[id number]; Last Updated Natanael Castellon; 2/4/2016 1:23:15 PM;Ordered; For:Abnormal CT of the chest; Ordered By:Venu Meneses;    Results/Data  Results Free Text Form  Luke:   Results   Other I reviewed his CPAP compliance report  The findings are as mentioned in the history of present illness  Discussion/Summary  Discussion Summary: At this time, Lou Delarosa is doing well  He is using CPAP regularly and has been compliant  He received a call that his compliance was poor however in review of his data, elimination of the days that he was in the hospital would result in essentially 100% compliance with the device  He will continue on CPAP  He does not need any bronchodilators given that his cough and symptoms have improved  He will have the follow-up CAT scan in February and I will be in contact with him with the result  I will see him back in 6 months for CPAP follow-up  If a appointment needs to be made sooner because of an abnormality on the CAT scan, we will accommodate this  Thank you for asking me to participate in his care  Please do not hesitate to call with questions or concerns  Active Problems    1  Abnormal CT of the chest (793 2) (R93 8)   2  Acute conjunctivitis (372 00) (H10 30)   3  Anemia (285 9) (D64 9)   4  Arthralgia (719 40) (M25 50)   5  Asymptomatic carotid artery stenosis (433 10) (I65 29)   6  Atheroscler native arteries the extremities w/intermit claudication (440 21) (I70 219)   7  Benign hypertension with chronic kidney disease, stage III (496 04,305  3) (I12 9,N18 3)   8  Centrilobular emphysema (492 8) (J43 2)   9  Chest pain (786 50) (R07 9)   10  Chronic kidney disease, stage 3 (585 3) (N18 3)   11  Colon cancer screening (V76 51) (Z12 11)   12  Dermatitis (692 9) (L30 9)   13  Dyslipidemia (272 4) (E78 5)   14  Eczema (692 9) (L30 9)   15  Encounter for consultation (V65 9) (Z71 9)   16  Fatigue (780 79) (R53 83)   17  Heartburn (787 1) (R12)   18  Hyperkalemia (276 7) (E87 5)   19  Hypothyroidism, postablative (244 1) (E89 0)   20  Multiple vessel coronary artery disease (414 00) (I25 10)   21  Need for vaccination with 13-polyvalent pneumococcal conjugate vaccine (V03 82) (Z23)   22  Obstructive sleep apnea syndrome (327 23) (G47 33)   23  Peripheral vascular disease (443 9) (I73 9)   24  Proteinuria (791 0) (R80 9)   25  Renal artery stenosis (440 1) (I70 1)   26  Renal cyst, right (753 10) (Q61 00)   27  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 (250 41,403 90,585 9) (E10 22,I12 9,N18 9)   28  Type I diabetes mellitus (250 01) (E10 9)   29  Unsteadiness (781 2) (R26 81)   30  Upper respiratory infection (465 9) (J06 9)   31  Vitamin D deficiency (268 9) (E55 9)   32  Xerosis cutis (706 8) (L85 3)    History of Present Illness  HPI: Jeffrey Gross is being seen today for pulmonary follow-up, particularly with regard to his obstructive sleep apnea and prior abnormal CAT scan  Since I last saw him, he had a hospitalization for 3 days for hyperkalemia and possible TIA symptoms  He had a neurologic workup which was unrevealing and his potassium was likely a spurious lab result secondary to hemolysis  He did have production of his carvedilol which may be contributing to his symptoms  He had renal insufficiency and after discussion with his nephrologist they had wanted him to have a higher blood pressure  His respiratory status has improved since I last saw him  His chronic cough has completely resolved  He has had no further cough symptoms  He has been initiated on CPAP  Initially had some reservations about using the CPAP but is now using it regularly and averaging 7 hours or more a night based on the CPAP compliance report we requested and received today  He only had 4 days of not using the device which were around the time of the hospital stay  He does not feel like it has improved any somnolence but he has had more consistent sleep throughout the night  His wife reports that he is far less restless and does not seem to snore or awaken with any significant frequency  He has not yet had a follow-up CAT scan of the chest  This is scheduled for the end of February  This is for the minor lingular infiltrate that was seen on the last CAT scan  The CAT scan that is due is a 6 month follow-up study  Review of Systems  Complete-Male Pulm:   Constitutional: as noted in HPI  Eyes: no complaints of vision problems  ENT: no rhinitis, no PND, no epistaxis  Cardiovascular: as noted in HPI  Respiratory: as noted in HPI  Gastrointestinal: no complaints of esophageal reflux, no abdominal pain  Genitourinary: no urinary retention  Musculoskeletal: no arthralgias, no joint swelling, no myalgias  Integumentary: no rash, no lesions  Neurological: no headache, no fainting, no weakness  Psychiatric: no anxiety, no depression  Hematologic/Lymphatic: no complaints of swollen glands  Past Medical History    1  History of Acute kidney injury (584 9) (N17 9)   2  History of Acute venous embolism and thrombosis of deep vessels of proximal lower   extremity (453 41) (I82 4Y9)   3  History of Chronic cough (786 2) (R05)   4  History of dizziness (V13 89) (Z87 898)   5  History of hypertension (V12 59) (Z86 79)   6  History of Ischemic cardiomyopathy (414 8) (I25 5)    Surgical History    1  History of Cardiac Cath Procedure Outcome:   2  History of Cath Stent 1 Assessment Lesion Could Not Be Crossed   3  History of Cath Stent 1 Type Drug-Eluting   4   History of Cath Stent 2 Type Drug-Eluting   5  History of Cath Stent 3 Type Drug-Eluting   6  History of PTA Femoral-Popliteal Initial Stenosis With Stent  Surgical History Reviewed: The surgical history was reviewed and updated today  Family History    1  Family history of liver disease (V18 59) (Z83 79)    2  Family history of cardiac disorder (V17 49) (Z82 49)   3  Family history of kidney disease (V18 69) (Z84 1)   4  Family history of liver disease (V18 59) (Z83 79)    5  Family history of diabetes mellitus (V18 0) (Z83 3)  Family History Reviewed: The family history was reviewed and updated today  Social History    · Alcohol Use (History)   · 2 drinks per day   · Former smoker (V15 82) (S19 337)   · Smoking 48 years 1 5 ppd    d/c Oct 2008      Screening protocol   ·    · Retired   · desk work, [employer]  Social History Reviewed: The social history was reviewed and updated today  Current Meds   1  Accu-Chek Angie Plus In Vitro Strip; Therapy: 88IDO5723 to (Evaluate:01Jan2015) Recorded   2  Aspirin 81 MG Oral Tablet; CHEW ONE TABLET DAILY; Therapy: (Recorded:11Jan2016) to Recorded   3  Astaxanthin CAPS; Take one tab of 2 mg daily; Therapy: (BCDLOZBC:38CSY3619) to Recorded   4  Atorvastatin Calcium 40 MG Oral Tablet; Take 1 tablet daily; Therapy: (Recorded:11Jan2016) to Recorded   5  Carvedilol 12 5 MG Oral Tablet; Take 1 tablet twice daily; Therapy: 29OLV4245 to (([phone] 174-263)  Requested for: 57LWA9368; Last   Rx:53Onr9474 Ordered   6  Centrum Silver TABS; TAKE 1 TABLET DAILY; Therapy: (Recorded:13Pml8212) to Recorded   7  CoQ-10 200 MG Oral Capsule; TAKE 1 CAPSULE DAILY; Therapy: (Recorded:48Qko4014) to Recorded   8  Effient 10 MG Oral Tablet; Take 1 tablet daily; Last Rx:46Fdi7014 Ordered   9  Furosemide 40 MG Oral Tablet; TAKE 1 TABLET DAILY prn gain in weight of 3 or more   pounds; Therapy: (Recorded:11Jan2016) to Recorded   10   Glucagon Emergency 1 MG Injection Kit; inject sq if blood ssugar  is less than 60; Therapy: 74MLH1158 to (Evaluate:03Jun2016)  Requested for: 85PNP9587; Last    Rx:04Feb2016 Ordered   11  HumaLOG 100 UNIT/ML Subcutaneous Solution; USE AS DIRECTED; Therapy: (LLPXUPJD:95VCG0087) to Recorded   12  Ketostix In Vitro Strip; USE AS DIRECTED; Therapy: 26HKN4308 to (Evaluate:11Dec2015); Last Rx:11Nov2015 Ordered   13  Lantus 100 UNIT/ML Subcutaneous Solution; INJECT 23  UNITs am and 4 IU at night;    Last Rx:11Aug2015 Ordered   14  Levothyroxine Sodium 175 MCG Oral Tablet; TAKE 1 TABLET DAILY; Therapy: (OWDMDDCN:67UKI1010) to Recorded   15  Nitrostat 0 4 MG Sublingual Tablet Sublingual; DISSOLVE 1 TABLET UNDER THE    TONGUE AS NEEDED FOR CHEST PAIN;    Therapy: (Recorded:11Jan2016) to Recorded   16  Norvasc 5 MG Oral Tablet; TAKE 1 TABLET DAILY AS DIRECTED; Therapy: (Recorded:04Feb2016) to Recorded   17  Pycnogenol Complex TABS; TAKE TABLET Daily 100mg; Therapy: (Recorded:11Jan2016) to Recorded   18  TraMADol HCl - 50 MG Oral Tablet; TAKE ONE (1) TABLET(S) THREE TIMES DAILYAS    NEEDED FOR PAIN;    Therapy: 54RBX2524 to (Evaluate:11Dec2015); Last Rx:11Nov2015 Ordered   19  Ubiquinol 200 MG CAPS; TAKE CAPSULE Daily; Therapy: (Recorded:11Jan2016) to Recorded   20  Vitamin D 1000 UNIT CAPS; TAKE 1 TABLET BY MOUTH ONCE DAILY; Therapy: 11Aug2015 to (Last Rx:11Aug2015) Ordered   21  Vitamin K TABS; Vitamin K 90 mcg- one tab daily daily; Therapy: (Recorded:02Dec2014) to Recorded  Medication List Reviewed: The medication list was reviewed and updated today  Allergies    1  Cardura TABS   2   Zestril TABS    Vitals  Vital Signs [Data Includes: Current Encounter]    Recorded: 65CNM9411 01:02PM   Temperature 96 2 F   Heart Rate 54   Respiration 16   Systolic 394   Diastolic 68   Height 5 ft 7 in   Weight 189 lb 4 00 oz   BMI Calculated 29 64   BSA Calculated 1 97   O2 Saturation 100, RA     Physical Exam    Constitutional   General appearance: No acute distress, well appearing and well nourished  Ears, Nose, Mouth, and Throat   Nasal mucosa, septum, and turbinates: Normal without edema or erythema  Lips, teeth, and gums: Normal, good dentition  Oropharynx: Normal with no erythema, edema, exudate or lesions  Neck   Neck: Supple, symmetric, trachea midline, no masses  Jugular veins: Normal     Pulmonary   Chest: Normal     Respiratory effort: No increased work of breathing or signs of respiratory distress  Percussion of chest: Normal     Auscultation of lungs: Clear to auscultation, no rales, no crackles, no wheezing  Cardiovascular   Auscultation of heart: Normal rate and rhythm, normal S1 and S2, no murmurs  Examination of extremities for edema and/or varicosities: Normal     Abdomen   Abdomen: Soft, non-tender  Lymphatic   Palpation of lymph nodes in neck: No lymphadenopathy  Musculoskeletal   Gait and station: Normal     Digits and nails: Normal without clubbing or cyanosis  Neurologic   Mental Status: Normal  Not confused, no evidence of dementia, good comprehension, good concentration  Skin   Skin and subcutaneous tissue: Limited exam shows no rash  Psychiatric   Orientation to person, place and time: Normal     Mood and affect: Normal        Health Management  Health Maintenance   Medicare Annual Wellness Visit; every 1 year; Next Due: 18HQV7168; Active    Future Appointments    Date/Time Provider Specialty Site   02/29/2016 02:30 PM TERRY Carrasco   Family Medicine [address] [place] [address]   02/10/2016 02:45 PM Chio Johnson MD Vascular Surgery [facility]     Signatures   Electronically signed by : TERRY Love ; Feb 4 2016  2:56PM EST                       (Author)

## 2018-01-22 VITALS
SYSTOLIC BLOOD PRESSURE: 122 MMHG | WEIGHT: 200.25 LBS | DIASTOLIC BLOOD PRESSURE: 50 MMHG | HEIGHT: 68 IN | BODY MASS INDEX: 30.35 KG/M2

## 2018-01-22 VITALS
HEIGHT: 67 IN | HEART RATE: 76 BPM | WEIGHT: 197 LBS | TEMPERATURE: 97 F | OXYGEN SATURATION: 98 % | SYSTOLIC BLOOD PRESSURE: 130 MMHG | DIASTOLIC BLOOD PRESSURE: 74 MMHG | BODY MASS INDEX: 30.92 KG/M2

## 2018-01-23 ENCOUNTER — GENERIC CONVERSION - ENCOUNTER (OUTPATIENT)
Dept: OTHER | Facility: OTHER | Age: 73
End: 2018-01-23

## 2018-01-23 VITALS
BODY MASS INDEX: 30.62 KG/M2 | DIASTOLIC BLOOD PRESSURE: 68 MMHG | HEART RATE: 72 BPM | SYSTOLIC BLOOD PRESSURE: 138 MMHG | WEIGHT: 202 LBS | HEIGHT: 68 IN

## 2018-01-23 DIAGNOSIS — E10.22 TYPE 1 DIABETES MELLITUS WITH DIABETIC CHRONIC KIDNEY DISEASE (CODE): ICD-10-CM

## 2018-01-23 DIAGNOSIS — I65.23 OCCLUSION AND STENOSIS OF BILATERAL CAROTID ARTERIES: ICD-10-CM

## 2018-01-23 NOTE — MISCELLANEOUS
Message   Recorded as Task   Date: 12/01/2017 09:38 AM, Created By: Oneida Bumpers   Task Name: Go to Order   Assigned To: vascular cardiac clearances,Team   Regarding Patient: Felicity Garcia, Status: In Progress   Silvina Hill - 01 Dec 2017 9:38 AM     TASK CREATED  Patient has an upcoming appointment with Dr Nakia Rodas on Dec, 12  faxed CLR to office for a possible Right carotid endarterectomy  Kayley Shabazz - 04 Dec 2017 1:13 PM     TASK REPLIED TO: Previously Assigned To vascular cardiac clearances,Team   Kayley Shabazz - 04 Dec 2017 3:29 PM     TASK REPLIED TO: Previously Assigned To vascular cardiac clearances,Team       on board and in [address]  Juany Laramie - 04 Dec 2017 3:30 PM     TASK IN PROGRESS   Stephanie Marshall - 13 Dec 2017 11:04 AM     TASK EDITED  pt saw dr buckley 12/5  called dr buckley office, left vm for return call   Jose Bailey - 19 Dec 2017 4:26 PM     TASK EDITED  called Dr Colby Banda office, s/w [place] Fat  she req we fax clearance form to [phone] - done   Mary Aponte - 26 Dec 2017 4:42 PM     TASK EDITED  called Dr Colby Banda office, closed  Alicia Nelson - 29 Dec 2017 11:20 AM     TASK EDITED  called dr Ese Duke, spoke to joann, dr buckley comes back to the office 1/5   Stephanie Marshall - 16 Jan 2018 9:59 AM     TASK EDITED  left vm for endocrinology regarding clearance  Emerald Caba - 16 Jan 2018 11:43 AM     TASK REPLIED TO: Previously Assigned To Whit Zavala calling extension twice no one answered   Can you please re fax clearance form attention to [phone]  Thank you for your assistance  Stephanie Marshall - 16 Jan 2018 1:15 PM     TASK REASSIGNED: Previously Assigned To TERRY Olsen, can you refax? thanks! Mary Aponte - 16 Jan 2018 2:16 PM     TASK EDITED  refaxed   Alicia Nelson - 17 Jan 2018 10:24 AM     TASK EDITED  received clearance, gave to ss        Active Problems    1  Acquired polyneuropathy (356 9) (G62 9)   2   Acute conjunctivitis (372 00) (H10 30)   3  Age-related cataract (366 10) (H25 9)   4  Anemia (285 9) (D64 9)   5  Arthralgia (719 40) (M25 50)   6  Asymptomatic bilateral carotid artery stenosis (433 10,433 30) (I65 23)   7  Atherosclerosis of native artery of extremity with intermittent claudication (440 21)   (I70 219)   8  Benign hypertension with chronic kidney disease, stage III (403 10,585 3) (I12 9,N18 3)   9  Calf cramp (729 82) (R25 2)   10  Cataract (366 9) (H26 9)   11  Centrilobular emphysema (492 8) (J43 2)   12  Chronic kidney disease, stage 3 (585 3) (N18 3)   13  Degenerative lumbar spinal stenosis (724 02) (M48 061)   14  Dermatitis (692 9) (L30 9)   15  Dry eye syndrome (375 15) (H04 129)   16  Dyslipidemia (272 4) (E78 5)   17  Eczema (692 9) (L30 9)   18  Encounter for prostate cancer screening (V76 44) (Z12 5)   19  Facial skin lesion (709 9) (L98 9)   20  Fatigue (780 79) (R53 83)   21  Heartburn (787 1) (R12)   22  Hyperkalemia (276 7) (E87 5)   23  Hypothyroidism, postablative (244 1) (E89 0)   24  Intervertebral disc disorders with radiculopathy, lumbosacral region (724 4) (M51 17)   25  Low back pain with sciatica (724 3) (M54 40)   26  Multiple vessel coronary artery disease (414 00) (I25 10)   27  Need for 23-polyvalent pneumococcal polysaccharide vaccine (V03 82) (Z23)   28  Need for prophylactic vaccination and inoculation against influenza (V04 81) (Z23)   29  ITZEL on CPAP (327 23,V46 8) (G47 33,Z99 89)   30  Peripheral vascular disease (443 9) (I73 9)   31  Proteinuria (791 0) (R80 9)   32  Protruded lumbar disc (722 10) (M51 26)   33  Renal artery stenosis (440 1) (I70 1)   34  Renal cyst, right (753 10) (N28 1)   35  Shortness of breath on exertion (786 05) (R06 02)   36  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 (250 41,585 9) (E10 22)   37  Unsteadiness (781 2) (R26 81)   38  Vitamin D deficiency (268 9) (E55 9)   39  White coat hypertension   40   Xerosis cutis (706 8) (L85 3)    Current Meds   1  Accu-Chek Angie Plus In Vitro Strip; TEST 4 TIMES DAILY DX: E11 9; Therapy: 38NIW6317 to (Evaluate:65Xmx7726)  Requested for: 63YAD6589 Recorded   2  AmLODIPine Besylate 10 MG Oral Tablet; take 1 tablet daily at bedtime; Therapy: 36VIP8631 to (Evaluate:11Nov2018)  Requested for: 49FBO9830; Last   Rx:16Nov2017 Ordered   3  Aspirin 81 MG TABS; CHEW ONE TABLET DAILY; Therapy: (Recorded:33Yuu7170) to Recorded   4  Atorvastatin Calcium 40 MG Oral Tablet; Take 1 tablet daily  Requested for: 59LXQ2481;   Last Rx:26Oct2017 Ordered   5  Carvedilol 12 5 MG Oral Tablet; TAKE ONE TABLET BY MOUTH TWICE A DAY; Therapy: 81CKI2134 to (Evaluate:29Jun2018)  Requested for: 02Oct2017; Last   Rx:02Oct2017 Ordered   6  Clopidogrel Bisulfate 75 MG Oral Tablet; take one tablet by mouth daily; Therapy: 61SJY3324 to Recorded   7  CoQ-10 200 MG CAPS; TAKE 1 CAPSULE DAILY; Therapy: (Recorded:06Rws6885) to Recorded   8  Furosemide 20 MG Oral Tablet; TAKE 1 TABLET EVERY OTHER DAY; Therapy: (Recorded:16Nov2017) to Recorded   9  Glucagon (rDNA) 1 MG Kit; AS NEEDED FOR <40 BLOOD SUGAR; Therapy: (Recorded:03Rxf4533) to Recorded   10  HumaLOG 100 UNIT/ML Subcutaneous Solution; USE AS DIRECTED; Therapy: (Recorded:88Gpk7115) to Recorded   11  Lantus 100 UNIT/ML Subcutaneous Solution; 22 units in AM and 28 units QHS; Therapy: 26KFV0210 to (Last Rx:65Htq6088)  Requested for: 37KCW7459 Ordered   12  Levothyroxine Sodium 175 MCG Oral Tablet; TAKE 1 TABLET DAILY  Requested for:    50VBO8839; Last Rx:04Oct2016 Ordered   13  Nitroglycerin 0 4 MG Sublingual Tablet Sublingual (Nitrostat); DISSOLVE 1 TABLET    UNDER THE TONGUE AS NEEDED FOR CHEST PAIN; Last Rx:21Mfz4345 Ordered   14  TraMADol HCl - 50 MG Oral Tablet; TAKE ONE 1-2  TABLET(S) THREE TIMES DAILYAS    NEEDED FOR PAIN;    Therapy: 16PQV8538 to (Evaluate:49Zyi7509); Last Rx:16Nov2017 Ordered    Allergies    1  Cardura TABS   2   Zestril TABS    Signatures   Electronically signed by : Kiana Phan RN; Jan 17 2018 10:25AM EST                       (Author)

## 2018-01-24 ENCOUNTER — OFFICE VISIT (OUTPATIENT)
Dept: CARDIOLOGY CLINIC | Facility: CLINIC | Age: 73
End: 2018-01-24
Payer: MEDICARE

## 2018-01-24 VITALS
DIASTOLIC BLOOD PRESSURE: 50 MMHG | SYSTOLIC BLOOD PRESSURE: 136 MMHG | BODY MASS INDEX: 31 KG/M2 | HEIGHT: 67 IN | HEART RATE: 72 BPM | WEIGHT: 197.5 LBS | OXYGEN SATURATION: 96 %

## 2018-01-24 DIAGNOSIS — R06.02 SHORTNESS OF BREATH ON EXERTION: ICD-10-CM

## 2018-01-24 DIAGNOSIS — I65.23 CAROTID ARTERY STENOSIS, ASYMPTOMATIC, BILATERAL: ICD-10-CM

## 2018-01-24 DIAGNOSIS — I10 ESSENTIAL HYPERTENSION: ICD-10-CM

## 2018-01-24 DIAGNOSIS — Z95.5 PRESENCE OF DRUG COATED STENT IN LEFT CIRCUMFLEX CORONARY ARTERY: ICD-10-CM

## 2018-01-24 DIAGNOSIS — G47.33 OBSTRUCTIVE SLEEP APNEA OF ADULT: ICD-10-CM

## 2018-01-24 DIAGNOSIS — E78.5 DYSLIPIDEMIA: ICD-10-CM

## 2018-01-24 DIAGNOSIS — Z95.5 PRESENCE OF DRUG COATED STENT IN LAD CORONARY ARTERY: ICD-10-CM

## 2018-01-24 DIAGNOSIS — Z86.79 HISTORY OF ISCHEMIC CARDIOMYOPATHY: ICD-10-CM

## 2018-01-24 DIAGNOSIS — I25.10 CORONARY ARTERY DISEASE INVOLVING NATIVE HEART: Primary | ICD-10-CM

## 2018-01-24 PROCEDURE — 99214 OFFICE O/P EST MOD 30 MIN: CPT | Performed by: INTERNAL MEDICINE

## 2018-01-24 RX ORDER — CLOPIDOGREL BISULFATE 75 MG/1
75 TABLET ORAL
COMMUNITY
End: 2018-09-26 | Stop reason: ALTCHOICE

## 2018-01-24 RX ORDER — TRAMADOL HYDROCHLORIDE 50 MG/1
50 TABLET ORAL EVERY 8 HOURS PRN
COMMUNITY
End: 2018-03-08 | Stop reason: SDUPTHER

## 2018-01-24 RX ORDER — AMLODIPINE BESYLATE 10 MG/1
1 TABLET ORAL
COMMUNITY
Start: 2016-11-18 | End: 2018-03-28

## 2018-01-24 RX ORDER — ROSUVASTATIN CALCIUM 40 MG/1
40 TABLET, COATED ORAL DAILY
Qty: 90 TABLET | Refills: 3 | Status: SHIPPED | OUTPATIENT
Start: 2018-01-24 | End: 2019-01-30 | Stop reason: SDUPTHER

## 2018-01-24 NOTE — PROGRESS NOTES
Preliminary Nursing Report                Patient Information    Initial Encounter Entry Date:   2018 6:17 PM EST (Automated Transmission Automated Transmission)       Last Modified:   {Yarelis Wen}              Legal Name: Moni Mitchell        Social Security Number:        YOB: 1945        Age (years): 67        Gender: M        Body Mass Index (BMI): 31 kg/m2        Height: 68 in  Weight: 202 lbs (92 kgs)           Address:   [address] [place], [place] [id number] [place]              Phone: [phone]   (consent to leave messages)        Email:        Ethnicity: Decline to State        Oriental orthodox:        Marital Status:        Preferred Language: English        Race: Other Race                    Patient Insurance Information        Primary Insurance Information Carrier Name: {Primary  CarrierName}           Carrier Address:   {Primary  CarrierAddress}              Carrier Phone: {Primary  CarrierPhone}          Group Number: {Primary  GroupNumber}          Policy Number: {Primary  PolicyNumber}          Insured Name: {Primary  InsuredName}          Insured : {Primary  InsuredDOB}          Relationship to Insured: {Primary  RelationshiptoInsured}           Secondary Insurance Information Carrier Name: {Secondary  CarrierName}           Carrier Address:   {Secondary  CarrierAddress}              Carrier Phone: {Secondary  CarrierPhone}          Group Number: {Secondary  GroupNumber}          Policy Number: {Secondary  PolicyNumber}          Insured Name: {Secondary  InsuredName}          Insured : {Secondary  InsuredDOB}          Relationship to Insured: {Secondary  RelationshiptoInsured}                       Health Profile   Booking #:   Sallie Nellie #: [id number]               DOS: 2018    Surgery :  Thromboendarterectomy, including patch graft, if performed; carotid, vertebral, subclavian, by neck incision    Add'l Procedures/Notes:     Surgery Risk: Major Precautions     Chronic kidney disease, stage 3       Multiple vessel coronary artery disease       ITZEL on CPAP       Peripheral vascular disease       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                   Allergies    Cardura TABS       Clinical Comments: Reaction Type: , Reaction: , Severity:        Zestril TABS       Clinical Comments: Reaction Type: , Reaction: , Severity:              Medications    AmLODIPine Besylate 10 MG Oral Tablet       Aspirin 81 MG TABS       Atorvastatin Calcium 40 MG Oral Tablet       Carvedilol 12 5 MG Oral Tablet       Clopidogrel Bisulfate 75 MG Oral Tablet       CoQ-10 200 MG CAPS       Furosemide 20 MG Oral Tablet       Glucagon (rDNA) 1 MG Kit       HumaLOG 100 UNIT/ML Subcutaneous Solution       Lantus 100 UNIT/ML Subcutaneous Solution       Levothyroxine Sodium 175 MCG Oral Tablet       Nitroglycerin 0 4 MG Sublingual Tablet Sublingual       TraMADol HCl - 50 MG Oral Tablet               Conditions    Acquired polyneuropathy       Acute conjunctivitis       Age-related cataract       Anemia       Arthralgia       Asymptomatic bilateral carotid artery stenosis       Atherosclerosis of native artery of extremity with intermittent claudication       Benign hypertension with chronic kidney disease, stage III       Calf cramp       Cataract       Centrilobular emphysema       Chronic kidney disease, stage 3       Degenerative lumbar spinal stenosis       Dry eye syndrome       Dyslipidemia       Eczema       Encounter for prostate cancer screening       Facial skin lesion       Fatigue       Heartburn       Hyperkalemia       Hypothyroidism, postablative       Intervertebral disc disorders with radiculopathy, lumbosacral region       Low back pain with sciatica       Multiple vessel coronary artery disease       Need for 23-polyvalent pneumococcal polysaccharide vaccine       Need for prophylactic vaccination and inoculation against influenza       ITZEL on CPAP Peripheral vascular disease       Proteinuria       Protruded lumbar disc       Renal artery stenosis       Renal cyst, right       Shortness of breath on exertion       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       Unsteadiness       Vitamin D deficiency       White coat hypertension       Xerosis cutis               Family History    None             Surgical History    None             Social History    Alcohol Use (History)       Clinical Comments: 2 drinks per day; Former smoker       Clinical Comments: Smoking 48 years 1 5 ppd    d/c Oct 2008Screening protocol;               Retired       Clinical Comments: [employer] work, department of [employer]; Patient Instructions       Medical Procedure Risk  NPO Instructions   The day before surgery it is recommended to have a light dinner at your usual time and you are allowed a light snack early in the evening  Do not eat anything heavy or eat a big meal after 7pm  Do not eat or drink anything after midnight prior to your surgery  If you are supposed to take any of your medications, do so with a sip of water  Failure to follow these instructions can lead to an increased risk of lung complications and may result in a delay or cancellation of your procedure  If you have any questions, contact your institution for further instructions  No candy, no gum, no mints, no chewing tobacco          Aspirin 81 MG TABS  Medication Instruction (Aspirin - Blood Thinners) 112, 104, 105, 114, 113, 103, 110, 107, 108, 109, 111, 106  Please continue to take this medication on your normal schedule  If this is an oral medication and you take in the morning, you may do so with a sip of water  However, your surgeon may have you stop aspirin up to a week early if you are having intracranial, middle ear, posterior eye, spine surgery or prostate surgery           Carvedilol 12 5 MG Oral Tablet  Medication Instruction (Beta Blocker) 3, 2, c, 4, 6, 5  Please continue to take this medication on your normal schedule  If this is an oral medication and you take in the morning, you may do so with a sip of water  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, , HumaLOG 100 UNIT/ML Subcutaneous Solution  Medication Instruction (Diabetic Medication)   Please decrease your morning insulin dose to one-half of normal dose  If you are taking oral diabetes medications, the morning dose should be omitted  If taking metformin (Glucophage), discontinue the medication for 24 hours prior to surgery  If you have an insulin pump, continue at a basal rate only  AmLODIPine Besylate 10 MG Oral Tablet  Calcium Blocker (Blood Pressure Medication) 3, 4, 6, 5, 2  Please continue to take this medication on your normal schedule  If this is an oral medication and you take in the morning, you may do so with a sip of water  Furosemide 20 MG Oral Tablet  Medication Instruction (Diuretics - Water Pills) 28, 29  Please continue the following medications up to the evening before surgery, but do not take it on the day of surgery  Nitroglycerin 0 4 MG Sublingual Tablet Sublingual  Medication Instruction (Nitroglycerin)   Please continue your nitroglycerin as directed, and notify your physician if you have needed to increase the frequency or dosage  Levothyroxine Sodium 175 MCG Oral Tablet  Medication Instruction (Thyroxine - Synthroid)   Please continue to take this medication on your normal schedule  If this is an oral medication and you take in the morning, you may do so with a sip of water  ITZEL on CPAP  Sleep Apnea   Please notify surgeon and anesthesiologist if you have sleep apnea, severe snoring, or daytime somnolence  For those sleep apnea patients with continuous positive airway pressure (CPAP or BiPAP) machines, please bring your machine to the hospital on the day of surgery  Testing Considerations       ?  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 Chest X-ray (CXR) t  Consider a Chest X-Ray (CXR) if patient is having respiratory symptoms  Triggered by: Asymptomatic bilateral carotid artery stenosis, Shortness of breath on exertion, Age or Facility Rec         ? Coagulation Tests (PT/PTT/INR) t  Triggered by: Asymptomatic bilateral carotid artery stenosis         ? Complete Blood Count (CBC) t, client, client  If test was completed and normal within last six months, repeat test is not necessary  Triggered by: Anemia, Asymptomatic bilateral carotid artery stenosis, Multiple vessel coronary artery disease, Chronic kidney disease, stage 3, Peripheral vascular disease, Proteinuria, Shortness of breath on exertion, Age or Facility Rec         ? Comprehensive Metabolic Panel (CMP) t  If test was completed and normal within last six months, repeat test is not necessary  Triggered by: Multiple vessel coronary artery disease, Hyperkalemia, Peripheral vascular disease, Proteinuria, Shortness of breath on exertion         ? 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 Type and Screen client  Type and Screen - Blood:  If there is anticipated or possible large blood loss with this procedure, then a Type and Screen for Blood should be ordered  Triggered by: Age or Facility Rec         ? Urinalysis t  Urinalysis may be appropriate if recent urinary symptoms or implants are being placed in surgical procedure  Triggered by: Proteinuria               Consultations       ? Cardiac Consult (Major MI) c  If the patient has had a Myocardial Infarction within 30 days then a cardiac consult is indicated  Also, if the patient is having increasing frequency or intensity of chest pain then a cardiac consult is indicated  Otherwise, optimization of medical therapy is recommended under the direction of the PCP or cardiologist   Triggered by: Multiple vessel coronary artery disease         ? Cardiac Evaluation   Further evaluation of cardiopulmonary status should be strongly considered  Triggered by: Asymptomatic bilateral carotid artery stenosis         ? Primary Care Physician Evaluation   Primary care physician may need to evaluate patient prior to surgery  This is likely NOT necessary if the listed conditions are chronic and stable  Triggered by: Anemia, Peripheral vascular disease, Proteinuria, Shortness of breath on exertion               Miscellaneous Questions         Question: Are you able to walk up a flight of stairs, walk up a hill or do heavy housework WITHOUT having chest pain or shortness of breath? Answer: YES                   Allergies/Conditions/Medications Not Found        The following were not recognized by our system when generating the recommendations  Please consider if this would impact any preoperative protocols  ? Acquired polyneuropathy       ? Alcohol Use (History)       ? Degenerative lumbar spinal stenosis       ?        ? Retired       ? White coat hypertension       ? Atorvastatin Calcium 40 MG Oral Tablet       ? Clopidogrel Bisulfate 75 MG Oral Tablet       ? Glucagon (rDNA) 1 MG Kit       ?  Lantus 100 UNIT/ML Subcutaneous Solution       ? TraMADol HCl - 50 MG Oral Tablet                  Appointment Information         Date:    02/06/2018        Location:    [place]        Address:           Directions:                      Footnotes revision 14      ?? Denotes a free-text entry  Legal Disclaimer: Any and all recommendations and services provided herein are designed to assist in the preoperative care of the patient  Nothing contained herein is designed to replace, eliminate or alleviate the responsibility of the attending physician to supervise and determine the patient?s preoperative care and course of treatment  Failure to provide complete, accurate information may negatively impact the system?s ability to recommend the proper preoperative protocol  THE ATTENDING PHYSICIAN IS RESPONSIBLE TO REVIEW THE SUGGESTED PREOPERATIVE PROTOCOLS/COURSE OF TREATMENT AND PRESCRIBE THE FINAL COURSE OF PREOPERATIVE TREATMENT IN CONSULTATION WITH THE PATIENT  THE ePREOP SYSTEM AND ITS MATERIALS ARE PROVIDED ? AS IS? WITHOUT WARRANTY OF ANY KIND, EXPRESS OR IMPLIED, INCLUDING, BUT NOT LIMITED TO, WARRANTIES OF PERFORMANCE OR MERCHANTABILITY OR FITNESS FOR A PARTICULAR PURPOSE  PATIENT AND PHYSICIANS HEREBY AGREE THAT THEIR USE OF THE MATERIALS AND RESOURCES ACT AS A CONSENT TO RELEASE AND WAIVE ePREOP FROM ANY AND ALL CLAIMS OF WARRANTY, TORT OR CONTRACT LAW OF ANY KIND

## 2018-01-24 NOTE — PROGRESS NOTES
Cardiology Follow Up    Chelita Cisneros  1945  [id number]         1  Coronary artery disease involving native heart     2  Presence of drug coated stent in left circumflex coronary artery     3  Presence of drug coated stent in LAD coronary artery     4  History of ischemic cardiomyopathy     5  Essential hypertension     6  Dyslipidemia     7  Obstructive sleep apnea of adult     8  Carotid artery stenosis, asymptomatic, bilateral         Interval History:  Mr Eveline Richey is a pleasant 54-year-old gentleman who presents to the office today for routine follow-up  Since last visit he has not been feeling very well  He reports extreme daytime fatigue  He naps frequently  He also reports worsening shortness of breath with walking very short distances  He denies any exertional chest pain  He denies any signs or symptoms of congestive heart failure including progressive lower extremity edema, paroxysmal nocturnal dyspnea, orthopnea, acute weight gain or increasing abdominal girth  He denies lightheadedness, syncope or presyncope  He denies palpitations  He reports he cannot walk far enough due to shortness of breath to experience symptoms of claudication  Problem List     Atherosclerosis of arteries    Essential hypertension    Type 1 diabetes mellitus ([facility] )        Past Medical History:   Diagnosis Date    Cardiac disease     heart attack, stents x 4    [facility]est[facility] failure[facility]     [facility]ch[facility] obstructive [facility] disease) [facility]     Diabetes mellitus [facility]     Disease of thyroid gland     DVT [facility]deep venous thrombosis) [facility]     Hypertension     Renal failure     Sleep apnea      Social History     Social History    Marital status: /Civil Union     Spouse name: N/A    Number of children: N/A    Years of education: N/A     Occupational History    Not on file       Social History Main Topics    Smoking status: Former Smoker    Smokeless tobacco: Never Used    Alcohol use Yes      Comment: 1 glasses of vodka per day    Drug use: No    Sexual activity: Not on file     Other Topics Concern    Not on file     Social History Narrative    No narrative on file      No family history on file  No past surgical history on file  Current Outpatient Prescriptions:     amLODIPine (NORVASC) 10 mg tablet, Take 1 tablet by mouth, Disp: , Rfl:     aspirin 81 MG tablet, Take 81 mg by mouth daily  , Disp: , Rfl:     ASTAXANTHIN PO, Take 2 mg by mouth daily  , Disp: , Rfl:     atorvastatin (LIPITOR) 40 mg tablet, Take 40 mg by mouth daily at bedtime  , Disp: , Rfl:     carvedilol (COREG) 25 mg tablet, Take 25 mg by mouth 2 (two) times a day with meals  , Disp: , Rfl:     cholecalciferol (VITAMIN D3) 1,000 units tablet, Take 1,000 Units by mouth daily  , Disp: , Rfl:     furosemide (LASIX) 40 mg tablet, Take 40 mg by mouth daily as needed (take one is wt greater then 183lbs)  , Disp: , Rfl:     insulin glargine (LANTUS) 100 units/mL subcutaneous injection, Inject 4 Units under the skin daily at bedtime  , Disp: , Rfl:     insulin glargine (LANTUS) 100 units/mL subcutaneous injection, Inject 21 Units under the skin daily Indications: AM , Disp: , Rfl:     Insulin Lispro, Human, (HUMALOG SC), Inject under the skin Indications: sliding scale for carbs , Disp: , Rfl:     levothyroxine 175 mcg tablet, Take 175 mcg by mouth daily in the early morning , Disp: , Rfl:     Multiple Vitamin (MULTIVITAMIN) tablet, Take 1 tablet by mouth daily  , Disp: , Rfl:     nitroglycerin (NITROSTAT) 0 4 mg SL tablet, Place 0 4 mg under the tongue every 5 (five) minutes as needed for chest pain , Disp: , Rfl:     Nutritional Supplements (PYCNOGENOL PO), Take 100 mg by mouth daily  , Disp: , Rfl:     prasugrel (EFFIENT) tablet, Take 10 mg by mouth daily  , Disp: , Rfl:     Ubiquinol 200 MG CAPS, Take 200 mg by mouth daily  , Disp: , Rfl:     VITAMIN K PO, Take 90 mcg by mouth daily  , Disp: , Rfl:   Allergies   Allergen Reactions    Cardura [Doxazosin Mesylate]     Other      Iv dye for cardiac cath  Renal failure very close to dialysis  But no dialysis    Zestril [Lisinopril]        Labs:     Chemistry        Component Value Date/Time     11/29/2017 1138     12/21/2015 1044    K 3 9 11/29/2017 1138    K 4 9 12/21/2015 1044     11/29/2017 1138     (H) 12/21/2015 1044    CO2 24 11/29/2017 1138    CO2 26 12/21/2015 1044    BUN 20 11/29/2017 1138    BUN 19 12/21/2015 1044    CREATININE 1 31 (H) 11/29/2017 1138    CREATININE 1 29 12/21/2015 1044        Component Value Date/Time    CALCIUM 8 8 11/29/2017 1138    CALCIUM 8 7 12/21/2015 1044    ALKPHOS 110 11/29/2017 1138    ALKPHOS 99 10/01/2015 1105    AST 15 11/29/2017 1138    AST 22 10/01/2015 1105    ALT 22 11/29/2017 1138    ALT 35 10/01/2015 1105    BILITOT 0 60 11/29/2017 1138    BILITOT 0 33 10/01/2015 1105            Lab Results   Component Value Date    CHOL 150 11/29/2017    CHOL 153 06/30/2017    CHOL 163 03/24/2017     Lab Results   Component Value Date    HDL 54 11/29/2017    HDL 59 06/30/2017    HDL 68 (H) 03/24/2017     Lab Results   Component Value Date    LDLCALC 73 11/29/2017    LDLCALC 74 06/30/2017    LDLCALC 70 03/24/2017     Lab Results   Component Value Date    TRIG 116 11/29/2017    TRIG 101 06/30/2017    TRIG 127 03/24/2017     No components found for: CHOLHDL    Imaging: No results found  Review of Systems:  Review of Systems - Positive as per the HPI, all other systems were reviewed and negative       Vitals:    01/24/18 1417   BP: 136/50   Pulse: 72   SpO2: 96%       Physical Exam:  Physical Examination:   General appearance - alert, well appearing, and in no distress  HEENT:  PERRLA, EOMI, no scleral icterus, no conjunctival pallor  NECK:  Supple, No elevated JVP, no thyromegaly, b/l carotid bruits  HEART:  Regular rate and rhythm, normal S1/S2, no S3/S4, no murmur or rub  LUNGS: Clear to auscultation bilaterally  ABDOMEN:  Soft, non-tender, positive bowel sounds, no rebound or guarding  EXTREMITIES:  No edema      Discussion/Summary:                                              Mr Dannie Davies is a pleasant 80-year-old gentleman who presents to the office today for routine follow-up  Since his last visit he reports worsening fatigue and shortness of breath with exertion  Regarding his fatigue, it was recommended by his pulmonologist that he return to the sleep lab for re-titration of CPAP therapy  He had not been agreeable but now is and thus a prescription was provided for CPAP titration  Regarding his exertional shortness of breath, he did undergo a stress test a few years ago which revealed ischemia in the territory of a known coronary lesion which could not be crossed  We did discuss further diagnostic options  This includes an invasive approach with a repeat left heart catheterization to redefine his coronary anatomy  He is scheduled to have a carotid endarterectomy in the near future  He will discuss with his surgeon whether or not should a stent be placed he can remain on Plavix for surgery  I have also requested another echocardiogram     Otherwise his most recent lipids reveal a suboptimal LDL  I will change his atorvastatin to rosuvastatin and reassess his lipids in a few months  His blood pressure control appears adequate  Follow-up will be arranged in a few months or sooner if deemed necessary

## 2018-01-29 ENCOUNTER — OFFICE VISIT (OUTPATIENT)
Dept: VASCULAR SURGERY | Facility: CLINIC | Age: 73
End: 2018-01-29
Payer: MEDICARE

## 2018-01-29 ENCOUNTER — TELEPHONE (OUTPATIENT)
Dept: VASCULAR SURGERY | Facility: CLINIC | Age: 73
End: 2018-01-29

## 2018-01-29 ENCOUNTER — TELEPHONE (OUTPATIENT)
Dept: ENDOCRINOLOGY | Facility: CLINIC | Age: 73
End: 2018-01-29

## 2018-01-29 VITALS
DIASTOLIC BLOOD PRESSURE: 80 MMHG | SYSTOLIC BLOOD PRESSURE: 160 MMHG | BODY MASS INDEX: 30.76 KG/M2 | HEART RATE: 78 BPM | RESPIRATION RATE: 16 BRPM | TEMPERATURE: 97.6 F | HEIGHT: 67 IN | WEIGHT: 196 LBS

## 2018-01-29 DIAGNOSIS — I65.23 CAROTID ARTERY STENOSIS, ASYMPTOMATIC, BILATERAL: Primary | ICD-10-CM

## 2018-01-29 DIAGNOSIS — I70.229 ATHEROSCLEROSIS OF ARTERY OF EXTREMITY WITH REST PAIN (HCC): Chronic | ICD-10-CM

## 2018-01-29 PROCEDURE — 99214 OFFICE O/P EST MOD 30 MIN: CPT | Performed by: SURGERY

## 2018-01-29 NOTE — PROGRESS NOTES
Office Note - Vascular Surgery   [facility]: [phone]    Assessment and Plan:  1  Asymptomatic critical bilateral internal carotid artery stenosis  Again we discussed the option of proceeding with carotid intervention but unfortunately this will have to wait until full cardiac and pulmonary evaluation is performed including the possibility of cardiac catheterization  We also discussed that any carotid surgery could be performed while on Plavix we also discussed the possibility of proceeding with carotid stenting if cardiac risks are deemed high  Will plan re-evaluation in the office following cardiac and pulmonary re-evaluation  2   Bilateral lower extremity atherosclerosis with rest pain  There are no signs of tissue loss  We did discuss at length the signs and symptoms of progressive disease but at this time will plan on further evaluation and treatment following above cardiac and pulmonary evaluation and treatment  I have suggested elevating the head of the bed to improve pedal perfusion at night       _____________________________    Chief Complaint: "I am here to discuss my surgery on my carotid arteries "    HPI: Giovanna Maria is a 67 y o  male who presents with asymptomatic carotid occlusive disease and known history of severe peripheral arterial occlusive disease  He again denies any signs or symptoms which would be consistent with TIA, CVA or amaurosis fugax  Unfortunately he has been severely limited in his activity secondary to dyspnea on exertion  He also even describes an episode in which he became short of breath while flying  This was then resolved with nitroglycerin  He also complains of bilateral foot pain at night which does improve if he stands and walks around  He notes a red color of his feet but no complaints of cyanosis or areas of tissue loss  He denies a significant limitations from claudication mostly because he is limited by his breathing      Review of Systems:  Review of Systems   Eyes: Negative for blurred vision, vision loss in left eye, vision loss in right eye and visual disturbance  Cardiovascular: Positive for paroxysmal nocturnal dyspnea  Respiratory: Positive for shortness of breath  Skin: Positive for dry skin and rash  Musculoskeletal: Positive for back pain, joint pain, muscle cramps and myalgias  Neurological: Negative for aphonia, brief paralysis and focal weakness  Past Medical History:  Past Medical History:   Diagnosis Date    Cardiac disease     heart attack, stents x 4    [facility]     COPD [facility]chronic obstructive pulmonary disease) [facility]     Diabetes mellitus (HCC)     Disease of thyroid gland     DVT (deep venous thrombosis) ([facility] [address] )     Hypertension     Renal failure     Sleep apnea        Past Surgical History:  No past surgical history on file  Social History:  History   Alcohol Use    Yes     Comment: 1 glasses of vodka per day     History   Drug Use No     History   Smoking Status    Former Smoker   Smokeless Tobacco    Never Used       Family History:  No family history on file  Allergies: Allergies   Allergen Reactions    Cardura [Doxazosin Mesylate]     Other      Iv dye for cardiac cath  Renal failure very close to dialysis  But no dialysis    Zestril [Lisinopril]        Medications:  No current facility-administered medications for this visit          Vitals:  BP      Temp      Pulse     Resp      SpO2        Lab Results and Cultures:   CBC with diff:   Lab Results   Component Value Date    WBC 5 32 11/29/2017    HGB 13 0 11/29/2017    HCT 37 8 11/29/2017    MCV 95 11/29/2017     (L) 11/29/2017    MCH 32 6 11/29/2017    MCHC 34 4 11/29/2017    RDW 13 0 11/29/2017    MPV 12 7 11/29/2017    NRBC 0 09/23/2016   ,   BMP/CMP:  Lab Results   Component Value Date     11/29/2017     12/21/2015    K 3 9 11/29/2017    K 4 9 12/21/2015     11/29/2017     (H) 12/21/2015    CO2 24 11/29/2017    CO2 26 12/21/2015    ANIONGAP 9 11/29/2017    ANIONGAP 6 12/21/2015    BUN 20 11/29/2017    BUN 19 12/21/2015    CREATININE 1 31 (H) 11/29/2017    CREATININE 1 29 12/21/2015    GLUCOSE 271 (H) 11/01/2017    GLUCOSE 186 (H) 12/21/2015    CALCIUM 8 8 11/29/2017    CALCIUM 8 7 12/21/2015    AST 15 11/29/2017    AST 22 10/01/2015    ALT 22 11/29/2017    ALT 35 10/01/2015    ALKPHOS 110 11/29/2017    ALKPHOS 99 10/01/2015    PROT 6 7 11/29/2017    PROT 7 6 10/01/2015    BILITOT 0 60 11/29/2017    BILITOT 0 33 10/01/2015    EGFR 54 11/29/2017   ,   Lipid Panel:   Lab Results   Component Value Date    CHOL 150 11/29/2017    CHOL 129 10/01/2015   ,   Coags:   Lab Results   Component Value Date    PTT 42 (H) 02/21/2014    INR 0 96 02/24/2014   ,     Imaging:  CT angiogram 11/01/2017 with bilateral distal common and proximal internal carotid artery stenosis secondary to calcified atherosclerotic plaque creating greater than 80% stenosis bilaterally  There is some lucency on the right of ill-defined origin  Physical Exam:    General appearance: alert and oriented, in no acute distress but does appear to have some mild labored breathing  Head: Normocephalic, without obvious abnormality, atraumatic  Eyes: conjunctivae/corneas clear  PERRL, EOM's intact  Fundi benign  Neck:   Positive bruit, no JVD  Extremities: Dependent rubor  No evidence of tissue loss  Skin: As above  Areas of spider varicosities bilateral feet    Neurologic: Grossly normal      Pulse exam:    DP: Right: non-palpable Left: non-palpable  PT: Right: non-palpable Left: non-palpable    Maurizio Tavares MD  1/29/2018

## 2018-01-29 NOTE — TELEPHONE ENCOUNTER
Dr Fredy Laguna called after seeing patient today and said that patients procedure for 2/6/18 is to be cancelled

## 2018-01-30 ENCOUNTER — OFFICE VISIT (OUTPATIENT)
Dept: PULMONOLOGY | Facility: CLINIC | Age: 73
End: 2018-01-30
Payer: MEDICARE

## 2018-01-30 VITALS
HEIGHT: 67 IN | TEMPERATURE: 97.4 F | WEIGHT: 197 LBS | SYSTOLIC BLOOD PRESSURE: 120 MMHG | RESPIRATION RATE: 16 BRPM | BODY MASS INDEX: 30.92 KG/M2 | DIASTOLIC BLOOD PRESSURE: 58 MMHG | HEART RATE: 60 BPM | OXYGEN SATURATION: 95 %

## 2018-01-30 DIAGNOSIS — R06.02 SHORTNESS OF BREATH ON EXERTION: Primary | ICD-10-CM

## 2018-01-30 DIAGNOSIS — G47.33 OBSTRUCTIVE SLEEP APNEA OF ADULT: ICD-10-CM

## 2018-01-30 DIAGNOSIS — J98.4 RESTRICTIVE LUNG DISEASE: ICD-10-CM

## 2018-01-30 DIAGNOSIS — J44.9 CHRONIC OBSTRUCTIVE PULMONARY DISEASE, UNSPECIFIED COPD TYPE (HCC): ICD-10-CM

## 2018-01-30 PROCEDURE — 99214 OFFICE O/P EST MOD 30 MIN: CPT | Performed by: INTERNAL MEDICINE

## 2018-01-31 ENCOUNTER — HOSPITAL ENCOUNTER (OUTPATIENT)
Dept: NON INVASIVE DIAGNOSTICS | Facility: CLINIC | Age: 73
Discharge: HOME | End: 2018-01-31
Payer: MEDICARE

## 2018-01-31 DIAGNOSIS — Z86.79 HISTORY OF ISCHEMIC CARDIOMYOPATHY: ICD-10-CM

## 2018-01-31 PROCEDURE — 93306 TTE W/DOPPLER COMPLETE: CPT

## 2018-01-31 NOTE — ASSESSMENT & PLAN NOTE
· Previously has tried multiple inhalers and these do not work  · Spirometry does not show chronic obstruction  · Shortness of breath is multifactorial but suspect a component of emphysema which contributes to his shortness of breath

## 2018-01-31 NOTE — ASSESSMENT & PLAN NOTE
· Currently using CPAP nightly at 8 cm of water  · Recurrent symptoms suggesting an effective CPAP  · Unable to obtain CPAP titration study for several weeks and is having symptoms  Cardiac catheterization delayed for CPAP titration  · Recommended that he proceed with cardiac catheterization  I will empirically raise his CPAP pressure to 12 cm of water and re-evaluate his symptomatology    Cardiac workup should take priority

## 2018-01-31 NOTE — PROGRESS NOTES
Progress note- Pulmonary Medicine   Zach Murrieta 67 y o  male MRN: [id number]      Problem List Items Addressed This Visit        Respiratory    Obstructive sleep apnea of adult     · Currently using CPAP nightly at 8 cm of water  · Recurrent symptoms suggesting an effective CPAP  · Unable to obtain CPAP titration study for several weeks and is having symptoms  Cardiac catheterization delayed for CPAP titration  · Recommended that he proceed with cardiac catheterization  I will empirically raise his CPAP pressure to 12 cm of water and re-evaluate his symptomatology  Cardiac workup should take priority         Relevant Orders    Cpap DME    Restrictive lung disease     · Multiple prior spirometries have demonstrated reduced FVC at 63-67% predicted suggesting restrictive disease  · Recommended formal full pulmonary function testing for lung volume assessment and diffusing capacity as well         Relevant Orders    Complete pulmonary function test    COPD (chronic obstructive pulmonary disease) (HCC)     · Previously has tried multiple inhalers and these do not work  · Spirometry does not show chronic obstruction  · Shortness of breath is multifactorial but suspect a component of emphysema which contributes to his shortness of breath            Other    Shortness of breath on exertion - Primary    Relevant Orders    Complete pulmonary function test            Overall shortness of breath is multifactorial   I agree with cardiovascular evaluation given that his last echocardiogram did show systolic cardiomyopathy  We will address his obstructive sleep apnea with increasing CPAP pressure    Also will send for full formal pulmonary function testing     ______________________________________________________________________    HPI:    Zach Murrieta presents today for follow-up of shortness of Breath which is associated with restrictive lung disease, an element of pulmonary emphysema, and obstructive sleep apnea on CPAP therapy  He has had several recurrence symptoms  He has been more short of breath  He has had less exertional tolerance with concern for possible at anginal equivalent  He is considering undergoing cardiac catheterization recommended by Dr Tierney Vick and to be performed by Dr Christianne Millard  I was recommended however that he defer this until after having re-evaluation of his sleep apnea since he is having increasing sleep apnea symptoms  He is sleeping more poorly  He has gained some weight, approximately 10 lb  He is more restless at nighttime  He has more daytime fatigue  His wife notes that he is having more restless leg syndrome symptoms as well  He takes trazodone at nighttime for pain and this does seem to help minimize the restless leg syndrome symptoms but does not eliminate the snoring and apneas  Previously, symptoms remitted with increasing pressure from 6-8 cm of water  He does not have abdominal pain or GERD symptoms  He denies any sinusitis or postnasal drip  No frequent or recurrent headaches  No gait instability ear new musculoskeletal complaints  Review of Systems:  Review of Systems   Constitutional:        As noted in HPI   HENT:        As noted in HPI   Eyes: Negative  Respiratory:        As noted in HPI   Cardiovascular:        As noted in HPI   Gastrointestinal:        As noted in HPI   Endocrine: Negative  Genitourinary: Negative  Musculoskeletal: Positive for arthralgias  Skin: Negative  Allergic/Immunologic: Negative  Neurological: Negative  Hematological: Negative  Psychiatric/Behavioral: Positive for agitation and sleep disturbance           Social history updates:  History   Smoking Status    Former Smoker    Packs/day: 4 00    Types: Cigarettes    Quit date: 2008   Smokeless Tobacco    Never Used         PhysicalExamination:  Vitals:   /58 (BP Location: Left arm, Patient Position: Sitting, Cuff Size: Standard)   Pulse 60   Temp (!) 97 4 °F (36 3 °C) (Tympanic)   Resp 16   Ht 5' 7" (1 702 m)   Wt 89 4 kg (197 lb)   SpO2 95%   BMI 30 85 kg/m²   HEENT: PERRL  Oropharynx is clear without any erythema or exudate  Slightly crowded posterior oropharynx  Neck: Supple  There is no JVD, lymphadenopathy or thyromegaly appreciated  Trachea is midline  Chest:  Breath sounds are slightly distant without wheezes, rhonchi or crackles  Cardiac: Regular rate and rhythm  There are no murmurs, rubs or gallops  Abdomen: Soft and nontender  Bowel sounds are present  Extremities: No clubbing, cyanosis or edema  Neurologic: No focal deficits  Skin: Warm and dry with no appreciable rashes    Diagnostic Data:  Labs: I personally reviewed the most recent laboratory data pertinent to today's visit  Comprehensive metabolic profile from November showed slight elevation in creatinine at 1 3 and an elevated glucose at 189  Liver function normal   Normal CBC    PFT results: The most recent pulmonary function tests were reviewed  · Multiple prior spirometries were reviewed  There is mild restriction with reduction in the FVC  The FVC range is 63-67% predicted over multiple studies    Imaging:  I personally reviewed the images on the [facility] system pertinent to today's visit  No recent chest x-ray    Cardiac:  Echocardiogram:  Repeat echocardiogram is pending  Last echocardiogram was September 2016 and at that time demonstrated ejection fraction of 45% with grade 2 diastolic dysfunction    Normal right ventricle without evidence for pulmonary hypertension      Chioma Higuera MD

## 2018-01-31 NOTE — ASSESSMENT & PLAN NOTE
· Multiple prior spirometries have demonstrated reduced FVC at 63-67% predicted suggesting restrictive disease  · Recommended formal full pulmonary function testing for lung volume assessment and diffusing capacity as well

## 2018-02-01 DIAGNOSIS — N28.1 ACQUIRED CYST OF KIDNEY: ICD-10-CM

## 2018-02-01 DIAGNOSIS — N18.30 CHRONIC KIDNEY DISEASE, STAGE III (MODERATE) (HCC): ICD-10-CM

## 2018-02-01 PROCEDURE — 93306 TTE W/DOPPLER COMPLETE: CPT | Performed by: INTERNAL MEDICINE

## 2018-02-09 DIAGNOSIS — I25.10 CAD IN NATIVE ARTERY: Primary | ICD-10-CM

## 2018-02-15 ENCOUNTER — LAB (OUTPATIENT)
Dept: LAB | Facility: CLINIC | Age: 73
End: 2018-02-15
Payer: MEDICARE

## 2018-02-15 ENCOUNTER — TRANSCRIBE ORDERS (OUTPATIENT)
Dept: LAB | Facility: CLINIC | Age: 73
End: 2018-02-15

## 2018-02-15 DIAGNOSIS — I25.10 CORONARY ARTERY DISEASE INVOLVING NATIVE HEART: ICD-10-CM

## 2018-02-15 DIAGNOSIS — E10.22 TYPE 1 DIABETES MELLITUS WITH DIABETIC CHRONIC KIDNEY DISEASE (CODE): ICD-10-CM

## 2018-02-15 DIAGNOSIS — I25.10 CAD IN NATIVE ARTERY: ICD-10-CM

## 2018-02-15 DIAGNOSIS — I65.23 OCCLUSION AND STENOSIS OF BILATERAL CAROTID ARTERIES: ICD-10-CM

## 2018-02-15 LAB
ALBUMIN SERPL BCP-MCNC: 3.4 G/DL (ref 3.5–5)
ALP SERPL-CCNC: 120 U/L (ref 46–116)
ALT SERPL W P-5'-P-CCNC: 21 U/L (ref 12–78)
ANION GAP SERPL CALCULATED.3IONS-SCNC: 10 MMOL/L (ref 4–13)
AST SERPL W P-5'-P-CCNC: 18 U/L (ref 5–45)
BASOPHILS # BLD AUTO: 0.02 THOUSANDS/ΜL (ref 0–0.1)
BASOPHILS NFR BLD AUTO: 0 % (ref 0–1)
BILIRUB SERPL-MCNC: 0.65 MG/DL (ref 0.2–1)
BUN SERPL-MCNC: 31 MG/DL (ref 5–25)
CALCIUM SERPL-MCNC: 9.1 MG/DL (ref 8.3–10.1)
CHLORIDE SERPL-SCNC: 106 MMOL/L (ref 100–108)
CHOLEST SERPL-MCNC: 138 MG/DL (ref 50–200)
CO2 SERPL-SCNC: 25 MMOL/L (ref 21–32)
CREAT SERPL-MCNC: 2.32 MG/DL (ref 0.6–1.3)
EOSINOPHIL # BLD AUTO: 0.25 THOUSAND/ΜL (ref 0–0.61)
EOSINOPHIL NFR BLD AUTO: 4 % (ref 0–6)
ERYTHROCYTE [DISTWIDTH] IN BLOOD BY AUTOMATED COUNT: 12.9 % (ref 11.6–15.1)
EST. AVERAGE GLUCOSE BLD GHB EST-MCNC: 169 MG/DL
GFR SERPL CREATININE-BSD FRML MDRD: 27 ML/MIN/1.73SQ M
GLUCOSE P FAST SERPL-MCNC: 178 MG/DL (ref 65–99)
HBA1C MFR BLD: 7.5 % (ref 4.2–6.3)
HCT VFR BLD AUTO: 39.9 % (ref 36.5–49.3)
HDLC SERPL-MCNC: 56 MG/DL (ref 40–60)
HGB BLD-MCNC: 13.5 G/DL (ref 12–17)
INR PPP: 1 (ref 0.86–1.16)
LDLC SERPL CALC-MCNC: 37 MG/DL (ref 0–100)
LYMPHOCYTES # BLD AUTO: 1 THOUSANDS/ΜL (ref 0.6–4.47)
LYMPHOCYTES NFR BLD AUTO: 18 % (ref 14–44)
MCH RBC QN AUTO: 32.1 PG (ref 26.8–34.3)
MCHC RBC AUTO-ENTMCNC: 33.8 G/DL (ref 31.4–37.4)
MCV RBC AUTO: 95 FL (ref 82–98)
MONOCYTES # BLD AUTO: 0.71 THOUSAND/ΜL (ref 0.17–1.22)
MONOCYTES NFR BLD AUTO: 13 % (ref 4–12)
NEUTROPHILS # BLD AUTO: 3.7 THOUSANDS/ΜL (ref 1.85–7.62)
NEUTS SEG NFR BLD AUTO: 65 % (ref 43–75)
NRBC BLD AUTO-RTO: 0 /100 WBCS
PLATELET # BLD AUTO: 155 THOUSANDS/UL (ref 149–390)
PMV BLD AUTO: 12.6 FL (ref 8.9–12.7)
POTASSIUM SERPL-SCNC: 4 MMOL/L (ref 3.5–5.3)
PROT SERPL-MCNC: 7.7 G/DL (ref 6.4–8.2)
PROTHROMBIN TIME: 13.2 SECONDS (ref 12.1–14.4)
RBC # BLD AUTO: 4.21 MILLION/UL (ref 3.88–5.62)
SODIUM SERPL-SCNC: 141 MMOL/L (ref 136–145)
TRIGL SERPL-MCNC: 224 MG/DL
WBC # BLD AUTO: 5.68 THOUSAND/UL (ref 4.31–10.16)

## 2018-02-15 PROCEDURE — 36415 COLL VENOUS BLD VENIPUNCTURE: CPT

## 2018-02-15 PROCEDURE — 85025 COMPLETE CBC W/AUTO DIFF WBC: CPT

## 2018-02-15 PROCEDURE — 80061 LIPID PANEL: CPT

## 2018-02-15 PROCEDURE — 85610 PROTHROMBIN TIME: CPT

## 2018-02-15 PROCEDURE — 80053 COMPREHEN METABOLIC PANEL: CPT

## 2018-02-15 PROCEDURE — 83036 HEMOGLOBIN GLYCOSYLATED A1C: CPT

## 2018-02-16 ENCOUNTER — TELEPHONE (OUTPATIENT)
Dept: CARDIOLOGY CLINIC | Facility: CLINIC | Age: 73
End: 2018-02-16

## 2018-02-16 DIAGNOSIS — I70.1 ATHEROSCLEROSIS OF RENAL ARTERY (HCC): ICD-10-CM

## 2018-02-16 DIAGNOSIS — I65.23 OCCLUSION AND STENOSIS OF BILATERAL CAROTID ARTERIES: ICD-10-CM

## 2018-02-16 DIAGNOSIS — N18.30 CHRONIC KIDNEY DISEASE, STAGE III (MODERATE) (HCC): ICD-10-CM

## 2018-02-16 DIAGNOSIS — I70.219 ATHEROSCLEROSIS OF NATIVE ARTERIES OF EXTREMITY WITH INTERMITTENT CLAUDICATION (HCC): ICD-10-CM

## 2018-02-16 DIAGNOSIS — M48.061 SPINAL STENOSIS OF LUMBAR REGION: ICD-10-CM

## 2018-02-16 DIAGNOSIS — N17.9 ACUTE KIDNEY INJURY (HCC): Primary | ICD-10-CM

## 2018-02-16 NOTE — TELEPHONE ENCOUNTER
Spouse, Margaret Steve left a v/m asking to s/w you regarding cath scheduled for next week  Spouse concerned about recent lab results, particularly the creatinine     #[phone]

## 2018-02-19 ENCOUNTER — LAB (OUTPATIENT)
Dept: LAB | Facility: CLINIC | Age: 73
End: 2018-02-19
Payer: MEDICARE

## 2018-02-19 ENCOUNTER — TRANSCRIBE ORDERS (OUTPATIENT)
Dept: LAB | Facility: CLINIC | Age: 73
End: 2018-02-19

## 2018-02-19 DIAGNOSIS — N17.9 ACUTE KIDNEY INJURY (HCC): ICD-10-CM

## 2018-02-19 LAB
ANION GAP SERPL CALCULATED.3IONS-SCNC: 7 MMOL/L (ref 4–13)
BUN SERPL-MCNC: 22 MG/DL (ref 5–25)
CALCIUM SERPL-MCNC: 9.1 MG/DL (ref 8.3–10.1)
CHLORIDE SERPL-SCNC: 107 MMOL/L (ref 100–108)
CO2 SERPL-SCNC: 27 MMOL/L (ref 21–32)
CREAT SERPL-MCNC: 1.8 MG/DL (ref 0.6–1.3)
GFR SERPL CREATININE-BSD FRML MDRD: 37 ML/MIN/1.73SQ M
GLUCOSE SERPL-MCNC: 203 MG/DL (ref 65–140)
POTASSIUM SERPL-SCNC: 4.2 MMOL/L (ref 3.5–5.3)
SODIUM SERPL-SCNC: 141 MMOL/L (ref 136–145)

## 2018-02-19 PROCEDURE — 80048 BASIC METABOLIC PNL TOTAL CA: CPT

## 2018-02-19 PROCEDURE — 36415 COLL VENOUS BLD VENIPUNCTURE: CPT

## 2018-02-20 ENCOUNTER — TELEPHONE (OUTPATIENT)
Dept: PULMONOLOGY | Facility: CLINIC | Age: 73
End: 2018-02-20

## 2018-02-20 ENCOUNTER — HOSPITAL ENCOUNTER (OUTPATIENT)
Dept: PULMONOLOGY | Facility: HOSPITAL | Age: 73
Discharge: HOME | End: 2018-02-20
Attending: INTERNAL MEDICINE
Payer: MEDICARE

## 2018-02-20 ENCOUNTER — HOSPITAL ENCOUNTER (OUTPATIENT)
Dept: NON INVASIVE DIAGNOSTICS | Facility: CLINIC | Age: 73
Discharge: HOME | End: 2018-02-20
Payer: MEDICARE

## 2018-02-20 ENCOUNTER — OFFICE VISIT (OUTPATIENT)
Dept: NEPHROLOGY | Facility: CLINIC | Age: 73
End: 2018-02-20
Payer: MEDICARE

## 2018-02-20 VITALS
DIASTOLIC BLOOD PRESSURE: 62 MMHG | SYSTOLIC BLOOD PRESSURE: 136 MMHG | HEIGHT: 67 IN | BODY MASS INDEX: 30.95 KG/M2 | WEIGHT: 197.2 LBS

## 2018-02-20 DIAGNOSIS — N18.30 CHRONIC KIDNEY DISEASE, STAGE III (MODERATE) (HCC): ICD-10-CM

## 2018-02-20 DIAGNOSIS — M48.061 SPINAL STENOSIS OF LUMBAR REGION: ICD-10-CM

## 2018-02-20 DIAGNOSIS — I70.219 ATHEROSCLEROSIS OF NATIVE ARTERIES OF EXTREMITY WITH INTERMITTENT CLAUDICATION (HCC): ICD-10-CM

## 2018-02-20 DIAGNOSIS — I12.9 BENIGN HYPERTENSION WITH CHRONIC KIDNEY DISEASE, STAGE III (HCC): ICD-10-CM

## 2018-02-20 DIAGNOSIS — E55.9 VITAMIN D DEFICIENCY: ICD-10-CM

## 2018-02-20 DIAGNOSIS — N18.30 BENIGN HYPERTENSION WITH CHRONIC KIDNEY DISEASE, STAGE III (HCC): ICD-10-CM

## 2018-02-20 DIAGNOSIS — R80.9 PROTEINURIA, UNSPECIFIED TYPE: ICD-10-CM

## 2018-02-20 DIAGNOSIS — I70.1 ATHEROSCLEROSIS OF RENAL ARTERY (HCC): ICD-10-CM

## 2018-02-20 DIAGNOSIS — R53.83 FATIGUE, UNSPECIFIED TYPE: ICD-10-CM

## 2018-02-20 DIAGNOSIS — J98.4 RESTRICTIVE LUNG DISEASE: ICD-10-CM

## 2018-02-20 DIAGNOSIS — R06.02 SHORTNESS OF BREATH ON EXERTION: ICD-10-CM

## 2018-02-20 DIAGNOSIS — N28.1 RENAL CYST, RIGHT: ICD-10-CM

## 2018-02-20 DIAGNOSIS — I70.1 RENAL ARTERY STENOSIS (HCC): ICD-10-CM

## 2018-02-20 DIAGNOSIS — N17.9 AKI (ACUTE KIDNEY INJURY) (HCC): Primary | ICD-10-CM

## 2018-02-20 DIAGNOSIS — I65.23 OCCLUSION AND STENOSIS OF BILATERAL CAROTID ARTERIES: ICD-10-CM

## 2018-02-20 DIAGNOSIS — N18.30 CHRONIC KIDNEY DISEASE, STAGE 3 (HCC): ICD-10-CM

## 2018-02-20 PROCEDURE — 93925 LOWER EXTREMITY STUDY: CPT

## 2018-02-20 PROCEDURE — 94726 PLETHYSMOGRAPHY LUNG VOLUMES: CPT | Performed by: INTERNAL MEDICINE

## 2018-02-20 PROCEDURE — 93922 UPR/L XTREMITY ART 2 LEVELS: CPT | Performed by: SURGERY

## 2018-02-20 PROCEDURE — 93925 LOWER EXTREMITY STUDY: CPT | Performed by: SURGERY

## 2018-02-20 PROCEDURE — 94060 EVALUATION OF WHEEZING: CPT | Performed by: INTERNAL MEDICINE

## 2018-02-20 PROCEDURE — 99215 OFFICE O/P EST HI 40 MIN: CPT | Performed by: INTERNAL MEDICINE

## 2018-02-20 PROCEDURE — 93978 VASCULAR STUDY: CPT

## 2018-02-20 PROCEDURE — 94729 DIFFUSING CAPACITY: CPT | Performed by: INTERNAL MEDICINE

## 2018-02-20 PROCEDURE — 94729 DIFFUSING CAPACITY: CPT

## 2018-02-20 PROCEDURE — 94726 PLETHYSMOGRAPHY LUNG VOLUMES: CPT

## 2018-02-20 PROCEDURE — 94060 EVALUATION OF WHEEZING: CPT

## 2018-02-20 PROCEDURE — 93978 VASCULAR STUDY: CPT | Performed by: SURGERY

## 2018-02-20 PROCEDURE — 93923 UPR/LXTR ART STDY 3+ LVLS: CPT

## 2018-02-20 PROCEDURE — 94760 N-INVAS EAR/PLS OXIMETRY 1: CPT

## 2018-02-20 RX ORDER — ALBUTEROL SULFATE 2.5 MG/3ML
2.5 SOLUTION RESPIRATORY (INHALATION) ONCE
Status: COMPLETED | OUTPATIENT
Start: 2018-02-20 | End: 2018-02-20

## 2018-02-20 RX ADMIN — ALBUTEROL SULFATE 2.5 MG: 2.5 SOLUTION RESPIRATORY (INHALATION) at 13:04

## 2018-02-20 NOTE — PROGRESS NOTES
NEPHROLOGY OUTPATIENT PROGRESS NOTE   Mansi Triplett 67 y o  male MRN: [id number]  DATE: 2/20/2018  Reason for visit:   Chief Complaint   Patient presents with    Acute Renal Failure        There are no Patient Instructions on file for this visit  Luisana Mensah was seen today for acute renal failure  Diagnoses and all orders for this visit:    DARINEL (acute kidney injury) ([facility] [address] )  -     Basic metabolic panel; Future  -     Urinalysis with microscopic; Future    Benign hypertension with chronic kidney disease, stage III    Renal artery stenosis (HCC)    Chronic kidney disease, stage 3    Renal cyst, right    Proteinuria, unspecified type    Vitamin D deficiency  -     Vitamin D 25 hydroxy; Future    Fatigue, unspecified type  -     TSH, 3rd generation; Future        Assessment/Plan:  1  Elevated sCr d/t acute kidney injury(DARINEL) vs progressive chronic kidney disease(CKD)  DARINEL likely prerenal in setting of increased alcohol intake  Recommend holding alcohol and lasix, repeat BMP end of the week as well as urinalysis  -previously CKD stage II-III likely d/t renal artery stenosis, Hypertension and Diabetes mellitus    -Peak sCr 2 3 as of 2/15/18  Latest sCr 1 8 as of 2/19/18    -Planning for cardiac cath soon  Would ideally want sCr normalized/back to baseline prior to this  If sCr remains elevated, may need hospital admission  -echo performed January 2018 shows EF 45-50%  -kidney ultrasound planned for next week on 2/27/18 - should be done sooner if possible    -avoid BP < 130s/80s for renal perfusion  Current BP acceptable  -Continue to monitor renal indices and avoid nonsteroidals(ibuprofen, aleve, advil, motrin)  -may use tramadol for pain    2   HTN in setting of bilateral renal artery stenosis - BP well controlled on current regimen  -continue amlodipine 10mg daily, coreg 12 5mg twice daily  -avoid very low diastolic pressures in light of renal vascular disease which is bilateral  -avoiding ACEi/ARB, both in light of JOSÉ and d/t current DARINEL    3  Proteinuria - not on ACEi/ARB due to bilateral JOSÉ  UpCr 1 25 as of March 2017-->1 28 as of November 2017  Will continue to monitor  -needs ongoing blood pressure and blood sugar control    4  Renal cyst, right - renal ultrasound to be performed 2/27/18 at 9:45am at [place] [address]  DM2 - well controlled, last A1C 7 5% - close to goal, on insulin    6  Hx vitamin d deficiency- normal level as of August 2015  Repeat level with next bloodwork  Of note, not anemic  Phos and PTH levels acceptable  RTC in 1-2 months  Call [facility] Lower this Friday after obtain bloodwork  SUBJECTIVE / INTERVAL HISTORY:  67 y o  male presents in follow up of CKD  Mansi Triplett denies any recent illness/hospitalizations since last office visit  He has been feeling tired and has seen cardiology for this  Vascular planned for CEA until cardiology found that his fatigue could be due to his cardiac status  They were planning on cardiac cath for clearance for CEA  Vascular was considering stenting rather than CEA  Denies NSAID/herbal/OTC medication use  Has been taking tramadol for claudication but held due to DARINEL  Had been on every other day lasix for years  Had never seen fluid accumulation  No edema now either  Denies orthostatic symptoms  Denies dehydration  He thinks he is drinking more alcohol  He feels most relaxed by drinking a glass of vodka  He drinks 6 shots of alcohol a day  Review of Systems   Constitutional: Positive for fatigue  Negative for chills and fever  HENT: Negative for sore throat  Eyes: Negative for visual disturbance  Respiratory: Positive for shortness of breath (x few months  experiences this walking from the waiting room to here)  Negative for cough  Cardiovascular: Negative for chest pain and leg swelling  Gastrointestinal: Negative for abdominal pain, constipation, diarrhea, nausea and vomiting  Endocrine: Negative for polyuria  Genitourinary: Negative for decreased urine volume, difficulty urinating and dysuria  Musculoskeletal: Positive for neck pain  Negative for back pain and myalgias  Skin: Negative for rash  Neurological: Negative for dizziness, light-headedness and numbness  Psychiatric/Behavioral: Negative for confusion  The patient is not nervous/anxious  OBJECTIVE:  /62 (BP Location: Left arm, Patient Position: Sitting, Cuff Size: Standard)   Ht 5' 7" (1 702 m)   Wt 89 4 kg (197 lb 3 2 oz)   BMI 30 89 kg/m²  Body mass index is 30 89 kg/m²  Physical exam:  Physical Exam   Constitutional: He appears well-developed and well-nourished  No distress  HENT:   Head: Normocephalic and atraumatic  Mouth/Throat: No oropharyngeal exudate  Eyes: Right eye exhibits no discharge  Left eye exhibits no discharge  No scleral icterus  Neck: Neck supple  Cardiovascular: Normal rate, regular rhythm and normal heart sounds  Pulmonary/Chest: Effort normal and breath sounds normal  He has no wheezes  He has no rales  Abdominal: Soft  Bowel sounds are normal  He exhibits no distension  There is no tenderness  Musculoskeletal: Normal range of motion  He exhibits no edema  Neurological: He is alert  Skin: Skin is warm and dry  No rash noted  He is not diaphoretic  Psychiatric: He has a normal mood and affect  His behavior is normal    Vitals reviewed  Medications:    Current Outpatient Prescriptions:     amLODIPine (NORVASC) 10 mg tablet, Take 1 tablet by mouth, Disp: , Rfl:     aspirin 81 MG tablet, Take 81 mg by mouth daily  , Disp: , Rfl:     carvedilol (COREG) 25 mg tablet, Take 12 5 mg by mouth 2 (two) times a day with meals  , Disp: , Rfl:     clopidogrel (PLAVIX) 75 mg tablet, Take 75 mg by mouth daily at bedtime, Disp: , Rfl:     furosemide (LASIX) 40 mg tablet, Take 20 mg by mouth daily as needed (take one is wt greater then 183lbs)  , Disp: , Rfl:     insulin glargine (LANTUS) 100 units/mL subcutaneous injection, Inject 28 Units under the skin daily at bedtime  , Disp: , Rfl:     insulin glargine (LANTUS) 100 units/mL subcutaneous injection, Inject 22 Units under the skin daily in the early morning  , Disp: , Rfl:     Insulin Lispro, Human, (HUMALOG SC), Inject under the skin as needed  , Disp: , Rfl:     levothyroxine 175 mcg tablet, Take 175 mcg by mouth daily in the early morning , Disp: , Rfl:     nitroglycerin (NITROSTAT) 0 4 mg SL tablet, Place 0 4 mg under the tongue every 5 (five) minutes as needed for chest pain , Disp: , Rfl:     rosuvastatin (CRESTOR) 40 MG tablet, Take 1 tablet by mouth daily, Disp: 90 tablet, Rfl: 3    traMADol (ULTRAM) 50 mg tablet, Take 50 mg by mouth every 8 (eight) hours as needed for moderate pain (1-2 tablets prn for pain), Disp: , Rfl:     Ubiquinol 200 MG CAPS, Take 200 mg by mouth daily  , Disp: , Rfl:     Allergies: Allergies as of 02/20/2018 - Reviewed 01/30/2018   Allergen Reaction Noted    Cardura [doxazosin mesylate]  01/12/2016    Other  05/13/2016    Zestril [lisinopril]  01/12/2016       The following portions of the patient's history were reviewed and updated as appropriate: past family history, past surgical history and problem list     Laboratory Results:  Lab Results   Component Value Date    GLUCOSE 203 (H) 02/19/2018    CALCIUM 9 1 02/19/2018     02/19/2018    K 4 2 02/19/2018    CO2 27 02/19/2018     02/19/2018    BUN 22 02/19/2018    CREATININE 1 80 (H) 02/19/2018        Lab Results   Component Value Date    PTH 47 2 08/21/2017    CALCIUM 9 1 02/19/2018    PHOS 3 0 08/21/2017       Portions of the record may have been created with voice recognition software   Occasional wrong word or "sound a like" substitutions may have occurred due to the inherent limitations of voice recognition software   Read the chart carefully and recognize, using context, where substitutions have occurred

## 2018-02-20 NOTE — PATIENT INSTRUCTIONS
1  Elevated sCr d/t acute kidney injury(DARIENL) vs progressive chronic kidney disease(CKD)  DARINEL likely prerenal in setting of increased alcohol intake  Recommend holding alcohol and lasix, repeat BMP end of the week as well as urinalysis  -previously CKD stage II-III likely d/t renal artery stenosis, Hypertension and Diabetes mellitus    -Peak sCr 2 3 as of 2/15/18  Latest sCr 1 8 as of 2/19/18    -Planning for cardiac cath soon  Would ideally want sCr normalized/back to baseline prior to this  If sCr remains elevated, may need hospital admission  -echo performed January 2018 shows EF 45-50%  -kidney ultrasound planned for next week on 2/27/18 - should be done sooner if possible    -avoid BP < 130s/80s for renal perfusion  Current BP acceptable  -Continue to monitor renal indices and avoid nonsteroidals(ibuprofen, aleve, advil, motrin)  -may use tramadol for pain    2  HTN in setting of bilateral renal artery stenosis - BP well controlled on current regimen  -continue amlodipine 10mg daily, coreg 12 5mg twice daily  -avoid very low diastolic pressures in light of renal vascular disease which is bilateral  -avoiding ACEi/ARB, both in light of JOSÉ and d/t current DARINEL    3  Proteinuria - not on ACEi/ARB due to bilateral JOSÉ  UpCr 1 25 as of March 2017-->1 28 as of November 2017  Will continue to monitor  -needs ongoing blood pressure and blood sugar control    4  Renal cyst, right - renal ultrasound to be performed 2/27/18 at 9:45am at [address]    5  DM2 - well controlled, last A1C 7 5% - close to goal, on insulin    6  Hx vitamin d deficiency- normal level as of August 2015  Repeat level with next bloodwork  Of note, not anemic  Phos and PTH levels acceptable  RTC in 1-2 months  Call Kady Cadena this Friday after obtain bloodwork

## 2018-02-20 NOTE — TELEPHONE ENCOUNTER
Wife calling- according to pt's last appt c-pap machine setting was supposed to be changed by [facility] but it hast not happened yet  Please advise

## 2018-02-20 NOTE — TELEPHONE ENCOUNTER
Results note - Pulmonary Medicine   Chelita Cisneros 67 y o  male MRN: [id number]    Result discussed:  Pulmonary function test and change in CPAP order    Pertinent details: There has not been a significant change in his pulmonary function on spirometry since the September 2016 office spirometry  It is Katheleen Lyla shows moderate restriction  There is no significant change after bronchodilator  Lung volumes are somewhat discordant with the Spirometry and suggests severe restriction  Repeat testing was advised  Diffusing capacity is moderately severely reduced  I spoke with him and his wife by phone today  I believe that he has a mixed picture of obstruction and restriction based on the significant emphysema that is demonstrated on his CT scan as well as the pulmonary function test findings  I do not think the pulmonary function test at this point need to be repeated  He does have some scarring in his lungs which also account for the restriction  He does not have evidence of typical pulmonary fibrosis however  I offered a trial of a bronchodilator although I think his response to this would be mild to moderate at best   He prefers to defer that for now and is going to undergo the cardiovascular workup as planned to determine whether he has obstructing coronary disease  Catheterization so far has been deferred because of kidney disease

## 2018-02-20 NOTE — LETTER
February 20, 2018     Deanna Lovell MD  [address]    Patient: Palmira Shukla   YOB: 1945   Date of Visit: 2/20/2018       Dear Dr Alicia Meadows: Thank you for referring Kelly Nam to me for evaluation  Below are my notes for this consultation  If you have questions, please do not hesitate to call me  I look forward to following your patient along with you  Sincerely,        Matt Estrada DO        CC: DO Emili Casas MD Maryetta Herbert, DO  2/20/2018 12:13 PM  Sign at close encounter  [facility]   Palmira Shukla 67 y o  male MRN: [id number]  DATE: 2/20/2018  Reason for visit:   Chief Complaint   Patient presents with    Acute Renal Failure        There are no Patient Instructions on file for this visit  Sherri Ramirez was seen today for acute renal failure  Diagnoses and all orders for this visit:    DARINEL (acute kidney injury) ([facility] [address] )  -     Basic metabolic panel; Future  -     Urinalysis with microscopic; Future    Benign hypertension with chronic kidney disease, stage III    Renal artery stenosis (HCC)    Chronic kidney disease, stage 3    Renal cyst, right    Proteinuria, unspecified type    Vitamin D deficiency  -     Vitamin D 25 hydroxy; Future    Fatigue, unspecified type  -     TSH, 3rd generation; Future        Assessment/Plan:  1  Elevated sCr d/t acute kidney injury(DARINEL) vs progressive chronic kidney disease(CKD)  DARINEL likely prerenal in setting of increased alcohol intake  Recommend holding alcohol and lasix, repeat BMP end of the week as well as urinalysis  -previously CKD stage II-III likely d/t renal artery stenosis, Hypertension and Diabetes mellitus    -Peak sCr 2 3 as of 2/15/18  Latest sCr 1 8 as of 2/19/18    -Planning for cardiac cath soon  Would ideally want sCr normalized/back to baseline prior to this  If sCr remains elevated, may need hospital admission    -echo performed January 2018 shows EF 45-50%  -kidney ultrasound planned for next week on 2/27/18 - should be done sooner if possible    -avoid BP < 130s/80s for renal perfusion  Current BP acceptable  -Continue to monitor renal indices and avoid nonsteroidals(ibuprofen, aleve, advil, motrin)  -may use tramadol for pain    2  HTN in setting of bilateral renal artery stenosis - BP well controlled on current regimen  -continue amlodipine 10mg daily, coreg 12 5mg twice daily  -avoid very low diastolic pressures in light of renal vascular disease which is bilateral  -avoiding ACEi/ARB, both in light of JOSÉ and d/t current DARINEL    3  Proteinuria - not on ACEi/ARB due to bilateral JOSÉ  UpCr 1 25 as of March 2017-->1 28 as of November 2017  Will continue to monitor  -needs ongoing blood pressure and blood sugar control    4  Renal cyst, right - renal ultrasound to be performed 2/27/18 at 9:45am at [facility]    5  DM2 - well controlled, last A1C 7 5% - close to goal, on insulin    6  Hx vitamin d deficiency- normal level as of August 2015  Repeat level with next bloodwork  Of note, not anemic  Phos and PTH levels acceptable  RTC in 1-2 months  Call [facility] this Friday after obtain bloodwork  SUBJECTIVE / INTERVAL HISTORY:  67 y o  male presents in follow up of CKD  Raman Oneil denies any recent illness/hospitalizations since last office visit  He has been feeling tired and has seen cardiology for this  Vascular planned for CEA until cardiology found that his fatigue could be due to his cardiac status  They were planning on cardiac cath for clearance for CEA  Vascular was considering stenting rather than CEA  Denies NSAID/herbal/OTC medication use  Has been taking tramadol for claudication but held due to DARINEL  Had been on every other day lasix for years  Had never seen fluid accumulation  No edema now either  Denies orthostatic symptoms  Denies dehydration  He thinks he is drinking more alcohol   He feels most relaxed by drinking a glass of vodka  He drinks 6 shots of alcohol a day  Review of Systems   Constitutional: Positive for fatigue  Negative for chills and fever  HENT: Negative for sore throat  Eyes: Negative for visual disturbance  Respiratory: Positive for shortness of breath (x few months  experiences this walking from the waiting room to here)  Negative for cough  Cardiovascular: Negative for chest pain and leg swelling  Gastrointestinal: Negative for abdominal pain, constipation, diarrhea, nausea and vomiting  Endocrine: Negative for polyuria  Genitourinary: Negative for decreased urine volume, difficulty urinating and dysuria  Musculoskeletal: Positive for neck pain  Negative for back pain and myalgias  Skin: Negative for rash  Neurological: Negative for dizziness, light-headedness and numbness  Psychiatric/Behavioral: Negative for confusion  The patient is not nervous/anxious  OBJECTIVE:  /62 (BP Location: Left arm, Patient Position: Sitting, Cuff Size: Standard)   Ht 5' 7" (1 702 m)   Wt 89 4 kg (197 lb 3 2 oz)   BMI 30 89 kg/m²   Body mass index is 30 89 kg/m²  Physical exam:  Physical Exam   Constitutional: He appears well-developed and well-nourished  No distress  HENT:   Head: Normocephalic and atraumatic  Mouth/Throat: No oropharyngeal exudate  Eyes: Right eye exhibits no discharge  Left eye exhibits no discharge  No scleral icterus  Neck: Neck supple  Cardiovascular: Normal rate, regular rhythm and normal heart sounds  Pulmonary/Chest: Effort normal and breath sounds normal  He has no wheezes  He has no rales  Abdominal: Soft  Bowel sounds are normal  He exhibits no distension  There is no tenderness  Musculoskeletal: Normal range of motion  He exhibits no edema  Neurological: He is alert  Skin: Skin is warm and dry  No rash noted  He is not diaphoretic  Psychiatric: He has a normal mood and affect   His behavior is normal    Vitals reviewed  Medications:    Current Outpatient Prescriptions:     amLODIPine (NORVASC) 10 mg tablet, Take 1 tablet by mouth, Disp: , Rfl:     aspirin 81 MG tablet, Take 81 mg by mouth daily  , Disp: , Rfl:     carvedilol (COREG) 25 mg tablet, Take 12 5 mg by mouth 2 (two) times a day with meals  , Disp: , Rfl:     clopidogrel (PLAVIX) 75 mg tablet, Take 75 mg by mouth daily at bedtime, Disp: , Rfl:     furosemide (LASIX) 40 mg tablet, Take 20 mg by mouth daily as needed (take one is wt greater then 183lbs)  , Disp: , Rfl:     insulin glargine (LANTUS) 100 units/mL subcutaneous injection, Inject 28 Units under the skin daily at bedtime  , Disp: , Rfl:     insulin glargine (LANTUS) 100 units/mL subcutaneous injection, Inject 22 Units under the skin daily in the early morning  , Disp: , Rfl:     Insulin Lispro, Human, (HUMALOG SC), Inject under the skin as needed  , Disp: , Rfl:     levothyroxine 175 mcg tablet, Take 175 mcg by mouth daily in the early morning , Disp: , Rfl:     nitroglycerin (NITROSTAT) 0 4 mg SL tablet, Place 0 4 mg under the tongue every 5 (five) minutes as needed for chest pain , Disp: , Rfl:     rosuvastatin (CRESTOR) 40 MG tablet, Take 1 tablet by mouth daily, Disp: 90 tablet, Rfl: 3    traMADol (ULTRAM) 50 mg tablet, Take 50 mg by mouth every 8 (eight) hours as needed for moderate pain (1-2 tablets prn for pain), Disp: , Rfl:     Ubiquinol 200 MG CAPS, Take 200 mg by mouth daily  , Disp: , Rfl:     Allergies:   Allergies as of 02/20/2018 - Reviewed 01/30/2018   Allergen Reaction Noted    Cardura [doxazosin mesylate]  01/12/2016    Other  05/13/2016    Zestril [lisinopril]  01/12/2016       The following portions of the patient's history were reviewed and updated as appropriate: past family history, past surgical history and problem list     Laboratory Results:  Lab Results   Component Value Date    GLUCOSE 203 (H) 02/19/2018    CALCIUM 9 1 02/19/2018     02/19/2018    K 4 2 02/19/2018    CO2 27 02/19/2018     02/19/2018    BUN 22 02/19/2018    CREATININE 1 80 (H) 02/19/2018        Lab Results   Component Value Date    PTH 47 2 08/21/2017    CALCIUM 9 1 02/19/2018    PHOS 3 0 08/21/2017       Portions of the record may have been created with voice recognition software   Occasional wrong word or "sound a like" substitutions may have occurred due to the inherent limitations of voice recognition software   Read the chart carefully and recognize, using context, where substitutions have occurred

## 2018-02-23 ENCOUNTER — LAB (OUTPATIENT)
Dept: LAB | Facility: CLINIC | Age: 73
End: 2018-02-23
Payer: MEDICARE

## 2018-02-23 DIAGNOSIS — N17.9 AKI (ACUTE KIDNEY INJURY) (HCC): ICD-10-CM

## 2018-02-23 DIAGNOSIS — E55.9 VITAMIN D DEFICIENCY: ICD-10-CM

## 2018-02-23 DIAGNOSIS — R53.83 FATIGUE, UNSPECIFIED TYPE: ICD-10-CM

## 2018-02-23 LAB
25(OH)D3 SERPL-MCNC: 17.8 NG/ML (ref 30–100)
ANION GAP SERPL CALCULATED.3IONS-SCNC: 5 MMOL/L (ref 4–13)
BACTERIA UR QL AUTO: ABNORMAL /HPF
BILIRUB UR QL STRIP: NEGATIVE
BUN SERPL-MCNC: 22 MG/DL (ref 5–25)
CALCIUM SERPL-MCNC: 9.1 MG/DL (ref 8.3–10.1)
CHLORIDE SERPL-SCNC: 106 MMOL/L (ref 100–108)
CLARITY UR: CLEAR
CO2 SERPL-SCNC: 29 MMOL/L (ref 21–32)
COLOR UR: YELLOW
CREAT SERPL-MCNC: 1.62 MG/DL (ref 0.6–1.3)
GFR SERPL CREATININE-BSD FRML MDRD: 42 ML/MIN/1.73SQ M
GLUCOSE P FAST SERPL-MCNC: 155 MG/DL (ref 65–99)
GLUCOSE UR STRIP-MCNC: NEGATIVE MG/DL
HGB UR QL STRIP.AUTO: ABNORMAL
HYALINE CASTS #/AREA URNS LPF: ABNORMAL /LPF
KETONES UR STRIP-MCNC: NEGATIVE MG/DL
LEUKOCYTE ESTERASE UR QL STRIP: NEGATIVE
NITRITE UR QL STRIP: NEGATIVE
NON-SQ EPI CELLS URNS QL MICRO: ABNORMAL /HPF
PH UR STRIP.AUTO: 6 [PH] (ref 4.5–8)
POTASSIUM SERPL-SCNC: 4 MMOL/L (ref 3.5–5.3)
PROT UR STRIP-MCNC: ABNORMAL MG/DL
RBC #/AREA URNS AUTO: ABNORMAL /HPF
SODIUM SERPL-SCNC: 140 MMOL/L (ref 136–145)
SP GR UR STRIP.AUTO: 1.02 (ref 1–1.03)
TSH SERPL DL<=0.05 MIU/L-ACNC: 1.39 UIU/ML (ref 0.36–3.74)
UROBILINOGEN UR QL STRIP.AUTO: 0.2 E.U./DL
WBC #/AREA URNS AUTO: ABNORMAL /HPF

## 2018-02-23 PROCEDURE — 82306 VITAMIN D 25 HYDROXY: CPT

## 2018-02-23 PROCEDURE — 80048 BASIC METABOLIC PNL TOTAL CA: CPT

## 2018-02-23 PROCEDURE — 84443 ASSAY THYROID STIM HORMONE: CPT

## 2018-02-23 PROCEDURE — 36415 COLL VENOUS BLD VENIPUNCTURE: CPT

## 2018-02-23 PROCEDURE — 81001 URINALYSIS AUTO W/SCOPE: CPT

## 2018-02-27 ENCOUNTER — TELEPHONE (OUTPATIENT)
Dept: OTHER | Facility: HOSPITAL | Age: 73
End: 2018-02-27

## 2018-02-27 ENCOUNTER — LAB (OUTPATIENT)
Dept: LAB | Facility: CLINIC | Age: 73
End: 2018-02-27
Payer: MEDICARE

## 2018-02-27 ENCOUNTER — HOSPITAL ENCOUNTER (OUTPATIENT)
Dept: ULTRASOUND IMAGING | Facility: HOSPITAL | Age: 73
Discharge: HOME | End: 2018-02-27
Attending: INTERNAL MEDICINE
Payer: MEDICARE

## 2018-02-27 ENCOUNTER — HOSPITAL ENCOUNTER (OUTPATIENT)
Dept: NON INVASIVE DIAGNOSTICS | Facility: CLINIC | Age: 73
Discharge: HOME | End: 2018-02-27
Payer: MEDICARE

## 2018-02-27 ENCOUNTER — DOCUMENTATION (OUTPATIENT)
Dept: OTHER | Facility: HOSPITAL | Age: 73
End: 2018-02-27

## 2018-02-27 ENCOUNTER — TELEPHONE (OUTPATIENT)
Dept: ENDOCRINOLOGY | Facility: CLINIC | Age: 73
End: 2018-02-27

## 2018-02-27 DIAGNOSIS — E10.22 TYPE 1 DIABETES MELLITUS WITH DIABETIC CHRONIC KIDNEY DISEASE (CODE): ICD-10-CM

## 2018-02-27 DIAGNOSIS — I70.1 ATHEROSCLEROSIS OF RENAL ARTERY (HCC): ICD-10-CM

## 2018-02-27 DIAGNOSIS — N18.30 CHRONIC KIDNEY DISEASE, STAGE III (MODERATE) (HCC): ICD-10-CM

## 2018-02-27 DIAGNOSIS — M48.061 SPINAL STENOSIS OF LUMBAR REGION: ICD-10-CM

## 2018-02-27 DIAGNOSIS — I65.23 OCCLUSION AND STENOSIS OF BILATERAL CAROTID ARTERIES: ICD-10-CM

## 2018-02-27 DIAGNOSIS — N28.1 ACQUIRED CYST OF KIDNEY: ICD-10-CM

## 2018-02-27 DIAGNOSIS — I70.219 ATHEROSCLEROSIS OF NATIVE ARTERIES OF EXTREMITY WITH INTERMITTENT CLAUDICATION (HCC): ICD-10-CM

## 2018-02-27 LAB
EST. AVERAGE GLUCOSE BLD GHB EST-MCNC: 180 MG/DL
HBA1C MFR BLD: 7.9 % (ref 4.2–6.3)

## 2018-02-27 PROCEDURE — 76770 US EXAM ABDO BACK WALL COMP: CPT

## 2018-02-27 PROCEDURE — 83036 HEMOGLOBIN GLYCOSYLATED A1C: CPT

## 2018-02-27 PROCEDURE — 93975 VASCULAR STUDY: CPT | Performed by: SURGERY

## 2018-02-27 PROCEDURE — 36415 COLL VENOUS BLD VENIPUNCTURE: CPT

## 2018-02-27 PROCEDURE — 93975 VASCULAR STUDY: CPT

## 2018-02-27 NOTE — TELEPHONE ENCOUNTER
I let the patient's wife know about the patient's test results  His serum creatinine has improved to 1 6  The patient's wife is very anxious regarding when he would have cardiac catheterization and when he would be cleared to do so  I explained to the patient's wife that by continuing to hold diuretics as well as having the patient avoid alcohol intake his creatinine has improved  She states that he has not had significant weight gain  I recommend ongoing holding of diuretics  I recommend the patient follow up again with Cardiology in the office  I explained that there is risk of contrast induced nephropathy but it is less so now that the creatinine is improved  If the patient were to have cardiac catheterization, nephrology should be involved in the care  He still feels tired and she is worried about him     ----- Message from Erica Miranda sent at 2/23/2018  3:24 PM EST -----  Chris's wife called for lab & imaging results  I verbally told her the lab results that she was looking for however, she would like a more detailed conversation regarding his imaging results

## 2018-02-27 NOTE — PROGRESS NOTES
Please call the patient regarding his abnormal result  Hemoglobin A1c has increased  Please send blood sugar log

## 2018-02-27 NOTE — TELEPHONE ENCOUNTER
----- Message from Clarence Hair MD sent at 2/27/2018 12:11 PM EST -----  Please call the patient regarding his abnormal result  Hemoglobin A1c has increased  Please send blood sugar log

## 2018-02-27 NOTE — TELEPHONE ENCOUNTER
----- Message from Nena Leigh MD sent at 2/27/2018 12:11 PM EST -----  Please call the patient regarding his abnormal result  Hemoglobin A1c has increased  Please send blood sugar log

## 2018-02-28 ENCOUNTER — TELEPHONE (OUTPATIENT)
Dept: NON INVASIVE DIAGNOSTICS | Facility: HOSPITAL | Age: 73
End: 2018-02-28

## 2018-02-28 ENCOUNTER — TELEPHONE (OUTPATIENT)
Dept: ENDOCRINOLOGY | Facility: CLINIC | Age: 73
End: 2018-02-28

## 2018-02-28 ENCOUNTER — TELEPHONE (OUTPATIENT)
Dept: NEPHROLOGY | Facility: CLINIC | Age: 73
End: 2018-02-28

## 2018-02-28 DIAGNOSIS — E55.9 VITAMIN D DEFICIENCY: Primary | ICD-10-CM

## 2018-02-28 RX ORDER — ERGOCALCIFEROL 1.25 MG/1
50000 CAPSULE ORAL WEEKLY
Qty: 12 CAPSULE | Refills: 0 | Status: SHIPPED | OUTPATIENT
Start: 2018-02-28 | End: 2018-03-06 | Stop reason: SDUPTHER

## 2018-02-28 NOTE — TELEPHONE ENCOUNTER
----- Message from Ga Oleary DO sent at 2/28/2018  2:36 PM EST -----  sCr has improved  25(OH) vit D level is low  Should begin vitamin D2 50,000u weekly x 12 weeks for vitamin D deficiency  Please inform the patient  Thanks

## 2018-02-28 NOTE — TELEPHONE ENCOUNTER
Spoke to patient, advised that recent Dexcom received  Having high BS overnight, increase night time Lantus to 35 units   Send Dexcom download in 2 weeks

## 2018-02-28 NOTE — TELEPHONE ENCOUNTER
Patient is aware his creatinine level has improved and his vitamin d level is low  He was instructed to start the vitamin D2 50,000 units once a week for 12 weeks  Patient would also like to discuss the UA results

## 2018-03-01 ENCOUNTER — TELEPHONE (OUTPATIENT)
Dept: ADMINISTRATIVE | Facility: HOSPITAL | Age: 73
End: 2018-03-01

## 2018-03-01 ENCOUNTER — OFFICE VISIT (OUTPATIENT)
Dept: VASCULAR SURGERY | Facility: CLINIC | Age: 73
End: 2018-03-01
Payer: MEDICARE

## 2018-03-01 VITALS
DIASTOLIC BLOOD PRESSURE: 64 MMHG | SYSTOLIC BLOOD PRESSURE: 118 MMHG | BODY MASS INDEX: 31.3 KG/M2 | HEART RATE: 76 BPM | RESPIRATION RATE: 21 BRPM | WEIGHT: 199.4 LBS | HEIGHT: 67 IN

## 2018-03-01 DIAGNOSIS — I74.09 AORTOILIAC OCCLUSIVE DISEASE (HCC): Chronic | ICD-10-CM

## 2018-03-01 DIAGNOSIS — I65.23 CAROTID ARTERY STENOSIS, ASYMPTOMATIC, BILATERAL: Primary | ICD-10-CM

## 2018-03-01 DIAGNOSIS — I70.213 ATHEROSCLEROSIS OF NATIVE ARTERY OF BOTH LOWER EXTREMITIES WITH INTERMITTENT CLAUDICATION (HCC): ICD-10-CM

## 2018-03-01 DIAGNOSIS — N18.30 CHRONIC KIDNEY DISEASE, STAGE 3 (HCC): ICD-10-CM

## 2018-03-01 DIAGNOSIS — I70.1 RENAL ARTERY STENOSIS (HCC): ICD-10-CM

## 2018-03-01 PROCEDURE — 99215 OFFICE O/P EST HI 40 MIN: CPT | Performed by: SURGERY

## 2018-03-01 NOTE — ASSESSMENT & PLAN NOTE
Chronic renal insufficiency  Serum creatinine on most recent evaluation on 02/23/2018 is 1 6  Will continue to follow up with repeat testing in 1 month

## 2018-03-01 NOTE — PROGRESS NOTES
Assessment/Plan:    Carotid artery stenosis, asymptomatic, bilateral  Bilateral severe asymptomatic carotid artery stenosis  Continued plan for bilateral carotid endarterectomy right 1st secondary to some hypo lucency at the area of stenosis  Cardiac clearance is still pending  Atherosclerosis of both lower extremities with intermittent claudication (HCC)  Bilateral severe atherosclerotic occlusive disease with disabling claudication  Will plan further evaluation and treatment once cardiac and cerebral vascular disease is treated and stabilized  Chronic kidney disease, stage 3  Chronic renal insufficiency  Serum creatinine on most recent evaluation on 02/23/2018 is 1 6  Will continue to follow up with repeat testing in 1 month  Diagnoses and all orders for this visit:    Carotid artery stenosis, asymptomatic, bilateral  -     Case request operating room: ENDARTERECTOMY ARTERY CAROTID; Standing  -     Type and screen; Future  -     Basic metabolic panel; Future  -     CBC and Platelet; Future  -     Protime-INR; Future  -     EKG 12 lead; Future  -     XR chest pa & lateral; Future  -     Case request operating room: ENDARTERECTOMY ARTERY CAROTID    Aortoiliac occlusive disease ([facility] [address] )    Atherosclerosis of native artery of both lower extremities with intermittent claudication (HCC)    Renal artery stenosis (HCC)    Chronic kidney disease, stage 3  -     Basic metabolic panel; Future    Other orders  -     Diet NPO; Sips with meds; Standing  -     Void on call to OR; Standing  -     Insert peripheral IV; Standing  -     Place sequential compression device; Standing  -     ceFAZolin (ANCEF) IVPB (premix) 2,000 mg; Infuse 2,000 mg into a venous catheter once           Subjective:      Patient ID: Tj Betts is a 67 y o  male      70-year-old with severe arterial occlusive disease to include cerebral vascular, aortoiliac, infrainguinal arterial, renovascular and cardiovascular disease presents in follow-up  He has been advised to undergo cardiac catheterization which has not yet been scheduled  He wants to discuss this option further with his cardiologist prior to proceeding mostly because of his underlying renal insufficiency  On evaluation today he denies any new symptoms  Specifically he denies any focal neurologic event which would be consistent with TIA, CVA or amaurosis fugax  He continues to complain of severe claudication which causes significant limitation in both lower extremities  He denies any symptoms which would be typical of rest pain but does experience foot pain on standing which is most likely secondary to his peripheral neuropathy  The following portions of the patient's history were reviewed and updated as appropriate: allergies, current medications, past family history, past medical history, past social history, past surgical history and problem list     Review of Systems   Constitutional: Positive for activity change and fatigue  HENT: Negative  Eyes: Negative  Respiratory: Positive for shortness of breath  Gastrointestinal: Negative  Endocrine: Negative  Genitourinary: Negative  Musculoskeletal: Positive for arthralgias, back pain, gait problem and myalgias  Leg pain, leg cramping when walking  Skin: Negative  Allergic/Immunologic: Negative  Hematological: Negative  Psychiatric/Behavioral: Positive for agitation  All other systems reviewed and are negative  Objective:      /64 (BP Location: Right arm, Patient Position: Sitting, Cuff Size: Standard)   Pulse 76   Resp 21   Ht 5' 7" (1 702 m)   Wt 90 4 kg (199 lb 6 4 oz)   BMI 31 23 kg/m²          Physical Exam   Constitutional: He appears well-developed and well-nourished  Neck: No JVD present  Carotid bruit is present (Bilateral bruit)  Cardiovascular: Normal rate, regular rhythm and normal heart sounds      Pulses:       Dorsalis pedis pulses are 0 on the right side, and 0 on the left side  Posterior tibial pulses are 0 on the right side, and 0 on the left side  Dependent rubor bilateral lower extremities    There is no evidence of tissue loss   Pulmonary/Chest: Breath sounds normal    Neurological:   Motor and sensory function grossly intact       Operative Scheduling Information:    Hospital[facility]  [facility]    Physician:  Jonatan Booth    Surgery:  Right carotid endarterectomy    Urgency:  Standard    Case Length:  Normal    Post-op Bed:  Stepdown    OR Table:  Standard    Equipment Needs:      Medication Instructions:  Aspirin:   Continue (do not hold)  Plavix:  Hold for 5 days prior to procedure    Hydration:  3 ml  NS/kilogram over 1 hour preop

## 2018-03-01 NOTE — PATIENT INSTRUCTIONS
Carotid artery stenosis, asymptomatic, bilateral  Bilateral severe asymptomatic carotid artery stenosis  Continued plan for bilateral carotid endarterectomy right 1st secondary to some hypo lucency at the area of stenosis  Cardiac clearance is still pending  Atherosclerosis of both lower extremities with intermittent claudication (HCC)  Bilateral severe atherosclerotic occlusive disease with disabling claudication  Will plan further evaluation and treatment once cardiac and cerebral vascular disease is treated and stabilized  Chronic kidney disease, stage 3  Chronic renal insufficiency  Serum creatinine on most recent evaluation on 02/23/2018 is 1 6  Will continue to follow up with repeat testing in 1 month

## 2018-03-01 NOTE — ASSESSMENT & PLAN NOTE
Bilateral severe asymptomatic carotid artery stenosis  Continued plan for bilateral carotid endarterectomy right 1st secondary to some hypo lucency at the area of stenosis  Cardiac clearance is still pending

## 2018-03-01 NOTE — LETTER
March 1, 2018     Trace Flores MD  [address]    Patient: Albertina Carranza   YOB: 1945   Date of Visit: 3/1/2018       Dear Dr Isidro Louise: Thank you for referring Charlie Yip to me for evaluation  Below are the relevant portions of my assessment and plan of care  Diagnoses and all orders for this visit:    Carotid artery stenosis, asymptomatic, bilateral  Bilateral severe asymptomatic carotid artery stenosis  Continued plan for bilateral carotid endarterectomy right 1st secondary to some hypo lucency at the area of stenosis  Cardiac clearance is still pending  Atherosclerosis of both lower extremities with intermittent claudication (HCC)  Bilateral severe atherosclerotic occlusive disease with disabling claudication  Will plan further evaluation and treatment once cardiac and cerebral vascular disease is treated and stabilized  Chronic kidney disease, stage 3  Chronic renal insufficiency  Serum creatinine on most recent evaluation on 02/23/2018 is 1 6  Will continue to follow up with repeat testing in 1 month  If you have questions, please do not hesitate to call me  I look forward to following Pam Mendez along with you           Sincerely,        Daryn Palacios MD        CC: No Recipients

## 2018-03-01 NOTE — ASSESSMENT & PLAN NOTE
Bilateral severe atherosclerotic occlusive disease with disabling claudication  Will plan further evaluation and treatment once cardiac and cerebral vascular disease is treated and stabilized

## 2018-03-02 ENCOUNTER — PATIENT OUTREACH (OUTPATIENT)
Dept: FAMILY MEDICINE CLINIC | Facility: CLINIC | Age: 73
End: 2018-03-02

## 2018-03-02 ENCOUNTER — OFFICE VISIT (OUTPATIENT)
Dept: FAMILY MEDICINE CLINIC | Facility: CLINIC | Age: 73
End: 2018-03-02
Payer: MEDICARE

## 2018-03-02 VITALS
HEART RATE: 78 BPM | DIASTOLIC BLOOD PRESSURE: 62 MMHG | WEIGHT: 198.6 LBS | HEIGHT: 67 IN | BODY MASS INDEX: 31.17 KG/M2 | TEMPERATURE: 96.5 F | SYSTOLIC BLOOD PRESSURE: 116 MMHG

## 2018-03-02 DIAGNOSIS — I25.10 CORONARY ARTERY DISEASE INVOLVING NATIVE HEART, ANGINA PRESENCE UNSPECIFIED, UNSPECIFIED VESSEL OR LESION TYPE: ICD-10-CM

## 2018-03-02 DIAGNOSIS — I70.1 RENAL ARTERY STENOSIS (HCC): ICD-10-CM

## 2018-03-02 DIAGNOSIS — Z00.00 ENCOUNTER FOR MEDICARE ANNUAL WELLNESS EXAM: ICD-10-CM

## 2018-03-02 DIAGNOSIS — I70.213 ATHEROSCLEROSIS OF NATIVE ARTERY OF BOTH LOWER EXTREMITIES WITH INTERMITTENT CLAUDICATION (HCC): ICD-10-CM

## 2018-03-02 DIAGNOSIS — N18.30 CHRONIC KIDNEY DISEASE, STAGE 3 (HCC): ICD-10-CM

## 2018-03-02 DIAGNOSIS — N17.9 AKI (ACUTE KIDNEY INJURY) (HCC): Primary | ICD-10-CM

## 2018-03-02 DIAGNOSIS — I65.23 CAROTID ARTERY STENOSIS, ASYMPTOMATIC, BILATERAL: ICD-10-CM

## 2018-03-02 PROCEDURE — 99214 OFFICE O/P EST MOD 30 MIN: CPT | Performed by: FAMILY MEDICINE

## 2018-03-02 PROCEDURE — G0439 PPPS, SUBSEQ VISIT: HCPCS | Performed by: FAMILY MEDICINE

## 2018-03-02 NOTE — PROGRESS NOTES
HPI:  Chelita Cisneros is a 67 y o  male here for his Subsequent Wellness Visit      Patient Active Problem List   Diagnosis    [facility]INEL [facility]) ([facility] [facility]    Type 1 diabetes mellitus ([facility])    Presence of drug coated stent in LAD coronary artery    Coronary artery disease involving native heart    Presence of drug coated stent in left circumflex coronary artery    Dyslipidemia    Obstructive sleep apnea of adult    Carotid artery stenosis, asymptomatic, bilateral    History of ischemic cardiomyopathy    Shortness of breath on exertion    Atherosclerosis of both lower extremities with intermittent claudication ([facility])    Restrictive lung disease    COPD (chronic obstructive pulmonary disease) ([facility] [facility]    Benign hypertension with chronic kidney disease, stage III    Chronic kidney disease, stage 3    Proteinuria    Renal artery stenosis ([facility])    Renal cyst, right    Vitamin D deficiency    Aortoiliac occlusive disease ([facility] )     Past Medical History:   Diagnosis Date    Acute kidney injury ([facility] [address] )     resolved 11/30/2015    Acute venous embolism and thrombosis of deep vessels of proximal lower extremity ([facility] )     resolved 04/04/2015    Cardiac disease     heart attack, stents x 4    CHF (congestive heart failure) ([facility])     Chronic cough     resolved 02/04/2016    COPD (chronic obstructive pulmonary disease) ([address]     Diabetes mellitus ([address]     Disease of thyroid gland     DVT (deep venous thrombosis) ([facility] [address] [facility]     Hypertension     Ischemic cardiomyopathy     last assessed 09/26/2017    Pulmonary granuloma ([facility] [address] )     resolved 02/03/2017    Renal failure     Sleep apnea      Past Surgical History:   Procedure Laterality Date    BYPASS FEMORAL-POPLITEAL      initial stenosis with stent left, 7 x 100 smart stent onset 02/24/2014    CORONARY ANGIOPLASTY WITH STENT PLACEMENT      x4     Family History   Problem Relation Age of Onset    Heart disease Father      pacer placement    Hypertension Father     Kidney disease Father     Heart failure Father     Heart attack Father     Liver disease Father     Cirrhosis Mother      due to beer consumption    Liver disease Mother     Diabetes Other      History   Smoking Status    Former Smoker    Packs/day: 4 00    Years: 48 00    Types: Cigarettes    Quit date: 2008   Smokeless Tobacco    Never Used     Comment: smoking 48 years 1 5 ppd d/c oct 2008 screening protocol as per allscripts     History   Alcohol Use    12 6 oz/week    21 Standard drinks or equivalent per week     Comment: 2-3 glasses of vodka daily (6 shots) (history 2 drinks per day as per allscripts      History   Drug Use No     /62   Pulse 78   Temp (!) 96 5 °F (35 8 °C)   Ht 5' 7" (1 702 m)   Wt 90 1 kg (198 lb 9 6 oz)   BMI 31 11 kg/m²       Current Outpatient Prescriptions   Medication Sig Dispense Refill    amLODIPine (NORVASC) 10 mg tablet Take 1 tablet by mouth daily at bedtime        aspirin 81 MG tablet Take 81 mg by mouth daily   carvedilol (COREG) 25 mg tablet Take 12 5 mg by mouth 2 (two) times a day with meals        clopidogrel (PLAVIX) 75 mg tablet Take 75 mg by mouth daily at bedtime      ergocalciferol (VITAMIN D2) 50,000 units Take 1 capsule (50,000 Units total) by mouth once a week for 12 doses 12 capsule 0    furosemide (LASIX) 40 mg tablet Take 20 mg by mouth daily as needed (take one is wt greater then 183lbs)        insulin glargine (LANTUS) 100 units/mL subcutaneous injection Inject 28 Units under the skin daily at bedtime        insulin glargine (LANTUS) 100 units/mL subcutaneous injection Inject 22 Units under the skin daily in the early morning        Insulin Lispro, Human, (HUMALOG SC) Inject under the skin as needed        levothyroxine 175 mcg tablet Take 175 mcg by mouth daily in the early morning   nitroglycerin (NITROSTAT) 0 4 mg SL tablet Place 0 4 mg under the tongue every 5 (five) minutes as needed for chest pain   rosuvastatin (CRESTOR) 40 MG tablet Take 1 tablet by mouth daily 90 tablet 3    traMADol (ULTRAM) 50 mg tablet Take 50 mg by mouth every 8 (eight) hours as needed for moderate pain (1-2 tablets prn for pain)      Ubiquinol 200 MG CAPS Take 200 mg by mouth daily  No current facility-administered medications for this visit  Allergies   Allergen Reactions    Cardura [Doxazosin Mesylate]     Other      Iv dye for cardiac cath  Renal failure very close to dialysis  But no dialysis    Zestril [Lisinopril]      Immunization History   Administered Date(s) Administered    DT (pediatric) 12/01/2009    H1N1, All Formulations 12/01/2009    Influenza Split High Dose Preservative Free IM 08/28/2014, 10/03/2016    Influenza TIV (IM) 10/01/2008, 10/19/2010, 09/20/2011, 08/01/2012, 09/13/2013, 09/15/2015, 09/03/2017    Pneumococcal Conjugate 13-Valent 08/11/2015    Pneumococcal Polysaccharide PPV23 10/01/2008, 03/15/2017    Tdap 05/05/2013    Zoster 10/01/2009       Patient Care Team:  Deanna Lovell MD as PCP - General  Melissa Cordoba, DO Snehal Valladares, MD Shayla Valles MD Sibyl Moons, MD Robinson Silva MD Davene Freshwater, MD Jerrald Abate, MD Judson Asher MD (Pain Medicine)  Agueda Alcocer, RN as Care Manager (Care Coordination)    Medicare Screening Tests and Risk Assessments:  AWV Clinical     ISAR:   Previous hospitalizations?:  No       Once in a Lifetime Medicare Screening:   EKG performed?:  No    AAA screening performed? (if performed, please add date to Health Maintenance):  No       Medicare Screening Tests and Risk Assessment:   AAA Risk Assessment    Age over 72 (males only):  Yes Family history of AAA:  Yes   Osteoporosis Risk Assessment    :  Yes    Age over 48:  Yes    Tobacco use:  No    Low calcium diet:  No PMHX of fractures:  Yes   FHX of fractures:  Yes    HIV Risk Assessment    None indicated:   Yes Drug and Alcohol Use:   Tobacco use    Cigarettes:  former smoker    Quit date:  1/2/1970   Smokeless:  never used smokeless tobacco    Tobacco use duration    Packs per day:  2    Tobacco Cessation Readiness    Alcohol use    Alcohol use:  frequent use    Amount of alcohol consumed:  2 drinks/day  Vodka    Family concern:   Yes   Alcohol Treatment Readiness   Readiness to quit:  not ready    Illicit Drug Use    Drug use:  never    Drug type:  no sedative use       Diet & Exercise:   Diet   What is your diet?:  Regular   How many servings a day of the following:   Fruits and Vegetables:  1-2 Meat:  1-2   Whole Grains:  2 Simple Carbs:  1   Dairy:  1 Soda:  1   Coffee:  4 Tea:  0   Exercise    Do you currently exercise?:  yes    Frequency:  occasional    Minutes per day:  20    Type of exercise:  walking       Cognitive Impairment Screening:   Depression screening preformed:  Yes     PHQ-9 Depression scale score:  1   Depression screening results:  no significant symptoms   Cognitive Impairment Screening    Do you have difficulty learning or retaining new information?:  No Do you have difficulty handling new tasks?:  No   Do you have difficulty with reasoning?:  No Do you have difficulty with spatial ability and orientation?:  No   Do you have difficulty with language?:  No Do you have difficulty with behavior?:  No       Functional Ability/Level of Safety:   Hearing    Hearing difficulties:  No Bilateral:  normal   Hearing aid:  No    Hearing Impairment Assessment    Hearing status:  No impairment   Current Activities    Status:  unlimited ADL's, unlimited IADL's, unlimited social activities, unlimited driving   Help needed with the folllowing:    Using the phone:  No Transportation:  No   Shopping:  No Preparing Meals:  No   Doing Housework:  No Doing Laundry:  No   Managing Medications:  No Managing Money:  No   ADL    Feeding:  Independant   Oral hygiene and Facial grooming:  Independant   Bathing: Independant   Upper Body Dressing:  Independant   Lower Body Dressing:  Independant   Toileting:  Independant   Bed Mobility:  Independant   Fall Risk   Have you fallen in the last 12 months?:  No Are you unsteady on your feet?:  No    Are you taking any medications that may cause fatigue or dizziness?:  No   Do you have any chronic conditions that may contribute to a fall?:  Diabetes Do you rush to the bathroom potentially risking a fall?:  No   Injury History   Polypharmacy:  Yes Antidepressant Use:  No   Sedative Use:  No Antihypertensive Use:  Yes   Previous Fall:  No Alcohol Use:  Yes   Deconditioning:  No Visual Impairment:  No   Cogitive Impairment:  No Mmobility Impairment:  No   Postural Hypotension:  No Urinary Incontinence:  No       Home Safety:   Are there hazards in your environment?:  No   Home Safety Risk Factors   Unfamilar with surroundings:  No Uneven floors:  No   Stairs or handrail saftey risk:  No Loose rugs:  No   Household clutter:  No Poor household lighting:  No   No grab bars in bathroom:  No Further evaluation needed:  No       Advanced Directives:   Advanced Directives    Living Will:  Yes Durable POA for healthcare:   Yes   Advanced directive:  Yes    Patient's End of Life Decisions        Urinary Incontinence:   Do you have urinary incontinence?:  No        Glaucoma:            Provider Screening     Preventative Screening/Counseling:   Cardiovascular Screening/Counseling:   (Labs Q5 years, EKG optional one-time)   General:  Screening Current           Diabetes Screening/Counseling:   (2 tests/year if Pre-Diabetes or 1 test/year if no Diabetes)   General:  Screening Current           Colorectal Cancer Screening/Counseling:   (FOBT Q1 yr; Flex Sig Q4 yrs or Q10 yrs after Screening Colonoscopy; Screening Colonoscpy Q2 yrs High Risk or Q10 yrs Low Risk; Barium Enema Q2 yrs High Risk or Q4 yrs Low Risk)   General:  Screening Current           Prostate Cancer Screening/Counseling:   (Annual) General:  Screening Current          Breast Cancer Screening/Counseling:   (Baseline Age 28 - 43; Annual Age 36+)         Cervical Cancer Screening/Counseling:   (Annual for High Risk or Childbearing Age with Abnormal Pap in Last 3 yrs; Every 2 all others)         Osteoporosis Screening/Counseling:   (Every 2 Yrs if at risk or more if medically necessary)         AAA Screening/Counseling:   (Once per Lifetime with risk factors)    Family History of AAA:  Yes Age over 72 (males only):  Yes    General:  Screening Current           Glaucoma Screening/Counseling:   (Annual)   General:  Screening Current          HIV Screening/Counseling:   (Voluntary; Once annually for high risk OR 3 times for Pregnancy at diagnosis of IUP; 3rd trimester; and at Labor         Hepatitis C Screening:             Immunizations:   Influenza (annual): Influenza Recommended Annually, Influenza UTD This Year   Pneumococcal (Once in a Lifetime):  Lifetime Vaccine Completed   Zostavax (Medicare D Coverage, Pt >72 yo):  Zostavax Vaccine UTD   Tdap (Non-Medicare Wellness Visit required): Tdap Vaccine UTD       Other Preventative Couseling (Non-Medicare Wellness Visit Required):   nutrition counseling performed, alcohol use counseling provided       Referrals (Non-Medicare Wellness Visit Required):   pulmonologist consult, cardiologist consult, endocrinology consult ordered, Orthopedics, Surgeon (comment)       Medical Equipment/Suppliers:   CPAP           No exam data present    Physical Exam :  Physical Exam    Reviewed Updated [facility] Prior Wellness Visits:   Last Medicare wellness visit information was reviewed, patient interviewed , no change since last AWVyes  Last Medicare wellness visit information was reviewed, patient interviewed and updates made to the record today yes  Annual Medicare wellness    Patient is not a candidate for cardiovascular diabetic screening since he is being treated for coronary artery disease and type 2 diabetes  Chronic claudication, he is not able to exercise due to leg pain  We discussed healthy dietary habits ; We discussed importance of proper fluid intake  He is being followed by multiple specialists including Cardiology, Endocrinology, pulmonology, Nephrology, vascular surgery, Ophthalmology and primary care physician  Assessment and Plan:  1  DARINEL (acute kidney injury) ([facility] [address] )     2  Renal artery stenosis (HCC)  Basic metabolic panel   3  Chronic kidney disease, stage 3  Basic metabolic panel   4  Coronary artery disease involving native heart, angina presence unspecified, unspecified vessel or lesion type     5  Carotid artery stenosis, asymptomatic, bilateral     6  Atherosclerosis of native artery of both lower extremities with intermittent claudication ([facility] )     7   Encounter for Medicare annual wellness exam         Health Maintenance Due   Topic Date Due    Hepatitis C Screening  1945    SLP PLAN OF CARE  1945    Diabetic Foot Exam  04/27/1955    Depression Screening PHQ-9  04/27/1957    Lung Cancer Screening  04/27/2000    Fall Risk  04/27/2010    GLAUCOMA SCREENING 67+ YR  04/27/2012    DTaP,Tdap,and Td Vaccines (3 - Td) 11/05/2013    URINE MICROALBUMIN  10/01/2016

## 2018-03-02 NOTE — TELEPHONE ENCOUNTER
I explained to the patient's wife that there is proteinuria but likely CRS related  I would repeat UA with microscopy and check UpCr once sCr in steady state  As sCr improving, I am less concerned for glomerular etiology of renal failure  Further, the patient had trace blood without RBCs on microscopy

## 2018-03-02 NOTE — PROGRESS NOTES
Outpatient Care Management Note: Complex Care  Met with the patient & his wife  They have concerns about fragmentation of care and want some assistance in helping them navigate the healthcare system  He has 7 providers along with his ophthalmologist & dentist  He has multiple comorbidities, which include DM, CHF & CKD  He is currently managing well and has not been hospitalized  He sees his PCP on  a regular basis  His wife, Breanne Cho, is his primary care giver and he prefers that I contact her, as he states that he has some short term memory loss  I will be working with the patient & his wife to coordinate his care

## 2018-03-02 NOTE — PROGRESS NOTES
FAMILY PRACTICE OFFICE VISIT       NAME: Leesa Artis  AGE: 67 y o  SEX: male       : 1945        MRN: [id number]    DATE: 3/2/2018  TIME: 4:06 PM    Assessment and Plan     Problem List Items Addressed This Visit     DARINEL (acute kidney injury) ([facility] [address] ) - Primary    Coronary artery disease involving native heart    Carotid artery stenosis, asymptomatic, bilateral    Atherosclerosis of both lower extremities with intermittent claudication ([facility] [address] )    Chronic kidney disease, stage 3    Relevant Orders    Basic metabolic panel    Renal artery stenosis ([facility] [address] )    Relevant Orders    Basic metabolic panel      Other Visit Diagnoses     Encounter for Medicare annual wellness exam           Patient presents for follow-up of chronic medical conditions  Recent development of acute renal insufficiency on top of chronic renal insufficiency of unknown cause  Creatinine has been trending down within past few weeks with most recent value 1 6  Patient has been forcing fluids  He denies symptoms of dysuria or flank pain  No fever, no other identifiable causes that could be triggering creatinine spiked  Will repeat BMP again in 2 weeks  I will discuss patient's care with [facility]  His blood pressure is indeed is on the lower side of normal 118/60 and 110/60 on my recheck today  I explained patient and his wife that from renal function standpoint we should target his BP is to 140s over 80s  On the other hand, I am not comfortable decreasing his blood pressure medications today did due to known coronary artery disease and carotid atherosclerosis  I believe we should coordinate treatment effort with all specialists including Cardiology, Nephrology, vascular surgery and primary care  Patient discussed his case today with clinical coordinator in our office  Will continue same medications for now  I will contact patient after discussing his case with Nephrology    Follow-up pending specialist re-evaluations    There are no Patient Instructions on file for this visit  Chief Complaint     Chief Complaint   Patient presents with    Follow-up     Pt is here for a f/u appt for chronic conditions  Pt has questions regarding previous testing and upcoming treatments that are scheduled   Medicare Wellness Visit     subsequent       History of Present Illness     Patient presents for follow-up of chronic medical conditions  Results of recent specialist follow-ups and a blood work reviewed with patient and his wife in detail  Recent spike of creatinine of 2 3, followed by see really BMP is down to 1 8 and most recent 1 6  No identifiable cause  Most recent creatinine was 1 3 following CTA neck, testing was performed 48 hours after IV contrast  Patient feels well, no recent new medications are no recent health developments  He is experiencing chronic fatigue and worsening of claudication  He reports improved symptoms of chronic dyspnea with current use of CPAP and inhalers  Lasix was discontinued due to creatinine elevation, patient denies any worsening of leg edema  Results of recent renal artery scan, renal ultrasound on blood work reviewed with patient and his wife  Urinalysis revealed +2 protein your which actually has improved since prior urinalysis that has revealed 3+ proteinuria  Patient likely is a poor candidate for ACE or ARB due to decreased GFR as well as history of intolerance to lisinopril/cough  Patient's wife is concerned about lower blood pressures within past few months along with symptoms of chronic fatigue  She is wondering if his blood pressure medications should be reduced  Patient is currently using Norvasc 10 milligrams once a day and Coreg 12 5 milligrams twice a day  He denies chest pain, palpitations or dizziness  Chronic low back pain, patient uses tramadol as needed    He is scheduled for follow-up with [facility] to review possibility of cardiac catheterization  He remains under care of vascular surgery for treatment of carotid stenosis and peripheral vascular disease  Patient is under care of [facility]          Review of Systems   Review of Systems   Constitutional: Positive for fatigue  HENT: Negative  Eyes: Negative  Respiratory: Positive for shortness of breath  Negative for cough (recently improved) and wheezing  Cardiovascular:        Worsening of claudication symptoms   Gastrointestinal: Negative  Endocrine: Negative  Genitourinary: Negative  Musculoskeletal: Positive for arthralgias and back pain  Neurological: Negative  Hematological: Negative  Psychiatric/Behavioral: Positive for sleep disturbance         Active Problem List     Patient Active Problem List   Diagnosis    [facility]    Type 1 diabetes mellitus (HCC)    Presence of drug coated stent in LAD coronary artery    Coronary artery disease involving native heart    Presence of drug coated stent in left circumflex coronary artery    Dyslipidemia    Obstructive sleep apnea of adult    Carotid artery stenosis, asymptomatic, bilateral    History of ischemic cardiomyopathy    Shortness of breath on exertion    Atherosclerosis of both lower extremities with intermittent claudication [facility]    Restrictive lung disease    COPD (chronic obstructive pulmonary disease) [facility]    Benign hypertension with chronic kidney disease, stage III    Chronic kidney disease, stage 3    Proteinuria    Renal artery stenosis (HCC)    Renal cyst, right    Vitamin D deficiency    Aortoiliac occlusive disease ([facility] )       Past Medical History:  Past Medical History:   Diagnosis Date    [facility] ([facility] )     resolved 11/30/2015    Acute venous embolism and thrombosis of deep vessels of proximal lower extremity ([facility] )     resolved 04/04/2015    Cardiac disease     heart attack, stents x 4    CHF (congestive heart failure) ([facility] )     Chronic cough     resolved 02/04/2016    COPD (chronic obstructive pulmonary disease) (HCC)     Diabetes mellitus ([facility] [address] )     Disease of thyroid gland     DVT (deep venous thrombosis) ([facility] )     Hypertension     Ischemic cardiomyopathy     last assessed 09/26/2017    Pulmonary granuloma (HCC)     resolved 02/03/2017    Renal failure     Sleep apnea        Past Surgical History:  Past Surgical History:   Procedure Laterality Date    BYPASS FEMORAL-POPLITEAL      initial stenosis with stent left, 7 x 100 smart stent onset 02/24/2014    CORONARY ANGIOPLASTY WITH STENT PLACEMENT      x4       Family History:  Family History   Problem Relation Age of Onset    Heart disease Father      pacer placement    Hypertension Father     Kidney disease Father     Heart failure Father     Heart attack Father     Liver disease Father     Cirrhosis Mother      due to beer consumption    Liver disease Mother     Diabetes Other        Social History:  Social History     Social History    Marital status: /Civil Union     Spouse name: N/A    Number of children: N/A    Years of education: N/A     Occupational History    Retired     [employer] work     [employer]      Social History Main Topics    Smoking status: Former Smoker     Packs/day: 4 00     Years: 48 00     Types: Cigarettes     Quit date: 2008    Smokeless tobacco: Never Used      Comment: smoking 48 years 1 5 ppd d/c oct 2008 screening protocol as per allscripts    Alcohol use 12 6 oz/week     21 Standard drinks or equivalent per week      Comment: 2-3 glasses of vodka daily (6 shots) (history 2 drinks per day as per allscripts    Drug use: No    Sexual activity: Not on file     Other Topics Concern    Not on file     Social History Narrative    No narrative on file   I have reviewed the patient's medical history in detail; there are no changes to the history as noted in the electronic medical record      Objective     Vitals: 03/02/18 0920   BP: 116/62   Pulse: 78   Temp: (!) 96 5 °F (35 8 °C)     Wt Readings from Last 3 Encounters:   03/02/18 90 1 kg (198 lb 9 6 oz)   03/01/18 90 4 kg (199 lb 6 4 oz)   02/20/18 89 4 kg (197 lb 3 2 oz)       Physical Exam   Constitutional: He is oriented to person, place, and time  He appears well-developed and well-nourished  HENT:   Head: Normocephalic and atraumatic  Eyes: Conjunctivae are normal    Neck: Neck supple  Carotid bruit is not present  Cardiovascular: Normal rate, regular rhythm and normal heart sounds  No murmur heard  Pulmonary/Chest: Effort normal and breath sounds normal  No respiratory distress  He has no wheezes  He has no rales  Musculoskeletal: Normal range of motion  Neurological: He is alert and oriented to person, place, and time  Psychiatric: He has a normal mood and affect  His behavior is normal    Nursing note and vitals reviewed        Pertinent Laboratory/Diagnostic Studies:  Lab Results   Component Value Date    GLUCOSE 203 (H) 02/19/2018    BUN 22 02/23/2018    CREATININE 1 62 (H) 02/23/2018    CALCIUM 9 1 02/23/2018     02/23/2018    K 4 0 02/23/2018    CO2 29 02/23/2018     02/23/2018     Lab Results   Component Value Date    ALT 21 02/15/2018    AST 18 02/15/2018    ALKPHOS 120 (H) 02/15/2018    BILITOT 0 65 02/15/2018       Lab Results   Component Value Date    WBC 5 68 02/15/2018    HGB 13 5 02/15/2018    HCT 39 9 02/15/2018    MCV 95 02/15/2018     02/15/2018       No results found for: TSH    Lab Results   Component Value Date    CHOL 138 02/15/2018     Lab Results   Component Value Date    TRIG 224 (H) 02/15/2018     Lab Results   Component Value Date    HDL 56 02/15/2018     Lab Results   Component Value Date    LDLCALC 37 02/15/2018     Lab Results   Component Value Date    HGBA1C 7 9 (H) 02/27/2018       Results for orders placed or performed in visit on 02/27/18   Hemoglobin A1c   Result Value Ref Range    Hemoglobin A1C 7 9 (H) 4 2 - 6 3 %     mg/dl       Orders Placed This Encounter   Procedures    Basic metabolic panel       ALLERGIES:  Allergies   Allergen Reactions    Cardura [Doxazosin Mesylate]     Other      Iv dye for cardiac cath  Renal failure very close to dialysis  But no dialysis    Zestril [Lisinopril]        Current Medications     Current Outpatient Prescriptions   Medication Sig Dispense Refill    amLODIPine (NORVASC) 10 mg tablet Take 1 tablet by mouth daily at bedtime        aspirin 81 MG tablet Take 81 mg by mouth daily   carvedilol (COREG) 25 mg tablet Take 12 5 mg by mouth 2 (two) times a day with meals        clopidogrel (PLAVIX) 75 mg tablet Take 75 mg by mouth daily at bedtime      ergocalciferol (VITAMIN D2) 50,000 units Take 1 capsule (50,000 Units total) by mouth once a week for 12 doses 12 capsule 0    furosemide (LASIX) 40 mg tablet Take 20 mg by mouth daily as needed (take one is wt greater then 183lbs)        insulin glargine (LANTUS) 100 units/mL subcutaneous injection Inject 28 Units under the skin daily at bedtime        insulin glargine (LANTUS) 100 units/mL subcutaneous injection Inject 22 Units under the skin daily in the early morning        Insulin Lispro, Human, (HUMALOG SC) Inject under the skin as needed        levothyroxine 175 mcg tablet Take 175 mcg by mouth daily in the early morning   nitroglycerin (NITROSTAT) 0 4 mg SL tablet Place 0 4 mg under the tongue every 5 (five) minutes as needed for chest pain   rosuvastatin (CRESTOR) 40 MG tablet Take 1 tablet by mouth daily 90 tablet 3    traMADol (ULTRAM) 50 mg tablet Take 50 mg by mouth every 8 (eight) hours as needed for moderate pain (1-2 tablets prn for pain)      Ubiquinol 200 MG CAPS Take 200 mg by mouth daily  No current facility-administered medications for this visit            Health Maintenance     Health Maintenance   Topic Date Due    Hepatitis C Screening  1945    SLP PLAN OF CARE  1945    Diabetic Foot Exam  04/27/1955    Depression Screening PHQ-9  04/27/1957    Lung Cancer Screening  04/27/2000    Fall Risk  04/27/2010    GLAUCOMA SCREENING 67+ YR  04/27/2012    DTaP,Tdap,and Td Vaccines (3 - Td) 11/05/2013    URINE MICROALBUMIN  10/01/2016    OPHTHALMOLOGY EXAM  03/21/2018    INFLUENZA VACCINE  Completed    ABDOMINAL AORTIC ANEURYSM (AAA) SCREEN  Completed    PNEUMOCOCCAL POLYSACCHARIDE VACCINE AGE 72 AND OVER  Completed     Immunization History   Administered Date(s) Administered    DT (pediatric) 12/01/2009    H1N1, All Formulations 12/01/2009    Influenza Split High Dose Preservative Free IM 08/28/2014, 10/03/2016    Influenza TIV (IM) 10/01/2008, 10/19/2010, 09/20/2011, 08/01/2012, 09/13/2013, 09/15/2015, 09/03/2017    Pneumococcal Conjugate 13-Valent 08/11/2015    Pneumococcal Polysaccharide PPV23 10/01/2008, 03/15/2017    Tdap 05/05/2013    Zoster 10/01/2009       Joyce Carmichael MD

## 2018-03-06 ENCOUNTER — OFFICE VISIT (OUTPATIENT)
Dept: ENDOCRINOLOGY | Facility: CLINIC | Age: 73
End: 2018-03-06
Payer: MEDICARE

## 2018-03-06 VITALS
WEIGHT: 198.3 LBS | HEIGHT: 67 IN | DIASTOLIC BLOOD PRESSURE: 58 MMHG | SYSTOLIC BLOOD PRESSURE: 120 MMHG | BODY MASS INDEX: 31.12 KG/M2 | HEART RATE: 80 BPM

## 2018-03-06 DIAGNOSIS — E78.5 DYSLIPIDEMIA: ICD-10-CM

## 2018-03-06 DIAGNOSIS — N18.30 BENIGN HYPERTENSION WITH CHRONIC KIDNEY DISEASE, STAGE III (HCC): ICD-10-CM

## 2018-03-06 DIAGNOSIS — I12.9 BENIGN HYPERTENSION WITH CHRONIC KIDNEY DISEASE, STAGE III (HCC): ICD-10-CM

## 2018-03-06 DIAGNOSIS — E89.0 HYPOTHYROIDISM, POSTABLATIVE: ICD-10-CM

## 2018-03-06 DIAGNOSIS — E55.9 VITAMIN D DEFICIENCY: ICD-10-CM

## 2018-03-06 DIAGNOSIS — E10.8 TYPE 1 DIABETES MELLITUS WITH COMPLICATION (HCC): Primary | ICD-10-CM

## 2018-03-06 PROCEDURE — 99214 OFFICE O/P EST MOD 30 MIN: CPT | Performed by: NURSE PRACTITIONER

## 2018-03-06 RX ORDER — INSULIN GLARGINE 100 [IU]/ML
28 INJECTION, SOLUTION SUBCUTANEOUS
Qty: 10 ML | Refills: 3 | Status: SHIPPED | OUTPATIENT
Start: 2018-03-06 | End: 2018-03-06 | Stop reason: SDUPTHER

## 2018-03-06 RX ORDER — INSULIN GLARGINE 100 [IU]/ML
28 INJECTION, SOLUTION SUBCUTANEOUS
Qty: 10 ML | Refills: 0 | Status: SHIPPED | OUTPATIENT
Start: 2018-03-06 | End: 2018-06-06 | Stop reason: SDUPTHER

## 2018-03-06 RX ORDER — INSULIN GLARGINE 100 [IU]/ML
28 INJECTION, SOLUTION SUBCUTANEOUS
Qty: 10 ML | Refills: 0 | Status: SHIPPED | OUTPATIENT
Start: 2018-03-06 | End: 2018-03-06 | Stop reason: SDUPTHER

## 2018-03-06 RX ORDER — INSULIN GLARGINE 100 [IU]/ML
22 INJECTION, SOLUTION SUBCUTANEOUS
Qty: 10 ML | Refills: 0 | Status: SHIPPED | OUTPATIENT
Start: 2018-03-06 | End: 2018-04-24

## 2018-03-06 NOTE — PATIENT INSTRUCTIONS
Change carb ratio from 5 g to 8 g  Check BG 4x per day  Send in BG log in 2 weeks   Recheck A1C in 3 months      Hypoglycemia in a Person with Diabetes   WHAT YOU NEED TO KNOW:   What is hypoglycemia? Hypoglycemia is a serious condition that happens when your blood glucose (sugar) level drops too low  The blood sugar level is usually too high in a person with diabetes, but the level can also drop too low  It is important to follow your diabetes management plan to keep your blood sugar level steady  What increases my risk for hypoglycemia? · A missed meal, or a meal eaten later than usual     · Certain medicines, or too much insulin or other diabetes medicine     · More exercise than usual, without extra food     · Alcohol     · Pregnancy    · Decreased liver or kidney function  What are the signs and symptoms of hypoglycemia? · Headache, hunger, or nervousness     · Trouble thinking or moodiness     · Sweating, or a pounding heartbeat     · Forgetfulness, confusion, or double vision     · Weakness or trouble walking     · Numbness and tingling in your fingers or around your mouth     · Seizures or loss of consciousness  How do I manage hypoglycemia? · Check your blood sugar level right away if you have symptoms of hypoglycemia  Hypoglycemia is usually 70 mg/dL or below  Ask your healthcare provider what blood sugar level is too low for you  · If your blood sugar level is too low, eat or drink 15 grams of fast-acting carbohydrate  Examples of this amount of fast-acting carbohydrate are 4 ounces (½ cup) of fruit juice or 4 ounces of regular soda  Other examples are 2 tablespoons of raisins or 3 to 4 glucose tablets  Check your blood sugar level 15 minutes later  If the level is still low (less than 100 mg/dL), have another 15 grams of carbohydrate  When the level returns to 100 mg/dL, eat a snack or meal that contains carbohydrates  This will help prevent another drop in blood sugar   Always carefully follow your healthcare provider's instructions on how to treat low blood sugar levels  · Always carry a source of fast-acting carbohydrate  If you have symptoms of hypoglycemia and you do not have a blood glucose meter, have a source of fast-acting carbohydrate anyway  Avoid carbohydrate foods that are high in fat  The fat content may make it take longer to increase your blood sugar level  Ask your healthcare provider if you should carry a glucagon kit  Glucagon is a medicine that is injected when you develop severe hypoglycemia and become unconscious  Check the expiration date every month and replace it before it expires  · Teach others how to help you if you have symptoms of hypoglycemia  Tell them about the symptoms of hypoglycemia  Ask them to give you a source of fast-acting carbohydrate if you cannot get it yourself  Ask them to give you a glucagon injection if you have symptoms of hypoglycemia and you become unconscious or have a seizure  Ask them to call 911   This is an emergency  Tell them never to try to make you swallow anything if you faint or have a seizure  · Wear medical alert jewelry  or carry a card that says you have diabetes  Ask where to get these items  How do I prevent hypoglycemia? · Take diabetes medicine as directed  Take your medicine at the right time and in the right amount  Your healthcare provider may change your blood sugar goals if you get hypoglycemia often  · Eat regular meals and snacks  Talk to your dietitian or healthcare provider about a meal plan that is right for you  Do not skip meals  · Check your blood sugar level as directed  Ask your healthcare provider what your blood sugar levels should be before and after you eat  Ask when and how often to check your blood sugar level  You may need to check at least 3 times each day  Record your blood sugar level results and take the record with you when you see your healthcare provider   Your provider may use the record to make changes to your medicine, food, or exercise schedules  · Check your blood sugar level before you exercise  Exercise can decrease your blood sugar level  If your blood sugar level is less than 100 mg/dL, have a carbohydrate snack  Examples are 4 to 6 crackers, ½ banana, 8 ounces (1 cup) of nonfat or 1% milk, or 4 ounces (½ cup) of juice  If you will exercise for more than 1 hour, you may need to check your blood sugar level every 30 minutes  Your healthcare provider may also recommend that you check your blood sugar level after exercise  · Be aware of how alcohol affects your blood sugar level  Alcohol can cause your blood sugar level to drop for up to 12 hours after drinking  Ask your healthcare provider if alcohol is safe for you  If you drink alcohol, always have a snack or meal at the same time  Women should limit alcohol to 1 drink a day  Men should limit alcohol to 2 drinks a day  A drink of alcohol is 12 ounces of beer, 5 ounces of wine, or 1½ ounces of liquor  When should I or someone else call 911? · You have a seizure or pass out  · You feel you are going to pass out  · You have trouble thinking clearly  When should I seek immediate care? · Your blood sugar is less than 50 mg/dL and does not respond to treatment  When should I contact my healthcare provider? · You have had symptoms of low blood sugar several times  · You have questions about the amount of insulin or diabetes medicine you are taking  · You have questions or concerns about your condition or care  CARE AGREEMENT:   You have the right to help plan your care  Learn about your health condition and how it may be treated  Discuss treatment options with your caregivers to decide what care you want to receive  You always have the right to refuse treatment  The above information is an  only   It is not intended as medical advice for individual conditions or treatments  Talk to your doctor, nurse or pharmacist before following any medical regimen to see if it is safe and effective for you  © 2017 [employer] Information is for End User's use only and may not be sold, redistributed or otherwise used for commercial purposes  All illustrations and images included in CareNotes® are the copyrighted property of [employer]  or [employer]

## 2018-03-06 NOTE — ASSESSMENT & PLAN NOTE
Vitamin d level at 17 4  He reports he is taking 10,000 units weekly  Increase to every other day  Recheck vitamin d level in 3 months

## 2018-03-06 NOTE — PROGRESS NOTES
Established Patient Progress Note      Chief Complaint   Patient presents with    Diabetes Type 1        History of Present Illness:   Aixa Alberto is a 67 y o  male with a history of HTN, HLD, hypothyroidism, vitamin d deficiency, and type 1 diabetes with long term use of insulin for 40 years  Reports no complications of diabetes  Denies recent illness or hospitalizations  He is on a dexcom sensor and has been having frequent episodes of hypoglycemia with his lowest reading being 39  He reports he never gets symptoms of hypoglycemia and he does over treat  He reports he treats with 4 oz of orange juice and rechecks in 15 minutes and still remains low  He repeats this process 2-3x before his BG improves but reports at that point his numbers become very high  He currently is on an I:C ratio of 1 unit for 5 g of carbs  Denies recent severe hyperglycemic episodes  Denies any issues with his current regimen  Home glucose monitoring: are performed regularly 2-3x per day  Home blood glucose readings:   Before breakfast: 150s  Before lunch: 120s  Before dinner: 150s  Bedtime: 190s    Current regimen: Lantus 22 in morning and 35 in evening, Humalog 1 unit for every 5 carbs   compliant all of the timedenies any side effects from current medications    Injects in: abdomen Rotates sites: Yes  Hypoglycemic episodes: Yes daily  H/o of hypoglycemia causing hospitalization or Intervention such as glucagon injection  or ambulance call  No  Hypoglycemia symptoms: none  Treatment of hypoglycemia: glucose tablets or 4 oz orange juice      Last Eye Exam: goes annually, no retinoptahy   Last Foot Exam: doesn't see podiatrist       Has hypertension: Taking Amlodipine 10 mg, Carvedilol 25 mg,   Has hyperlipidemia: Taking Rosuvastatin 40 mg   Thyroid disorders: Levothyroxine 175 mcg    Patient Active Problem List   Diagnosis    [facility]INEL [facility]) [facility] [address] [facility]    Type 1 diabetes mellitus ([facility] )    Presence of drug coated stent in LAD coronary artery    Coronary artery disease involving native heart    Presence of drug coated stent in left circumflex coronary artery    Dyslipidemia    Obstructive sleep apnea of adult    Carotid artery stenosis, asymptomatic, bilateral    History of ischemic cardiomyopathy    Shortness of breath on exertion    Atherosclerosis of both lower extremities with intermittent claudication [facility])    Restrictive lung disease    COPD [facility]chronic obstructive pulmonary disease) ([facility]    Benign hypertension with chronic kidney disease, stage III    Chronic kidney disease, stage 3    Proteinuria    Renal artery stenosis ([facility])    Renal cyst, right    Vitamin D deficiency    Aortoiliac occlusive disease ([facility])    Hypothyroidism, postablative      Past Medical History:   Diagnosis Date    Acute kidney injury ([facility] [address] )     resolved 11/30/2015    Acute venous embolism and thrombosis of deep vessels of proximal lower extremity ([facility] [address] )     resolved 04/04/2015    Cardiac disease     heart attack, stents x 4    CH[facility]congestive heart failure) [facility]     Chronic cough     resolved 02/04/2016    COPD (chronic obstructive pulmonary disease) ([facility] [address] [facility]     Diabetes mellitus ([facility] [address] [facility]     Disease of thyroid gland     DVT (deep venous thrombosis) ([facility] [address] [facility]     Hypertension     Ischemic cardiomyopathy     last assessed 09/26/2017    Pulmonary granuloma ([facility] [address] )     resolved 02/03/2017    Renal failure     Sleep apnea       Past Surgical History:   Procedure Laterality Date    BYPASS FEMORAL-POPLITEAL      initial stenosis with stent left, 7 x 100 smart stent onset 02/24/2014    CORONARY ANGIOPLASTY WITH STENT PLACEMENT      x4      Family History   Problem Relation Age of Onset    Heart disease Father      pacer placement    Hypertension Father     Kidney disease Father     Heart failure Father     Heart attack Father     Liver disease Father     Cirrhosis Mother      due to beer consumption    Liver disease Mother    Sergio Spicer Diabetes Other      Social History   Substance Use Topics    Smoking status: Former Smoker     Packs/day: 4 00     Years: 48 00     Types: Cigarettes     Quit date: 2008    Smokeless tobacco: Never Used      Comment: smoking 48 years 1 5 ppd d/c oct 2008 screening protocol as per allscripts    Alcohol use 12 6 oz/week     21 Standard drinks or equivalent per week      Comment: 2-3 glasses of vodka daily (6 shots) (history 2 drinks per day as per allscripts     Allergies   Allergen Reactions    Cardura [Doxazosin Mesylate]     Other      Iv dye for cardiac cath  Renal failure very close to dialysis  But no dialysis    Zestril [Lisinopril]          Current Outpatient Prescriptions:     amLODIPine (NORVASC) 10 mg tablet, Take 1 tablet by mouth daily at bedtime  , Disp: , Rfl:     aspirin 81 MG tablet, Take 81 mg by mouth daily  , Disp: , Rfl:     carvedilol (COREG) 25 mg tablet, Take 12 5 mg by mouth 2 (two) times a day with meals  , Disp: , Rfl:     clopidogrel (PLAVIX) 75 mg tablet, Take 75 mg by mouth daily at bedtime, Disp: , Rfl:     furosemide (LASIX) 40 mg tablet, Take 20 mg by mouth daily as needed (take one is wt greater then 183lbs)  , Disp: , Rfl:     insulin glargine (LANTUS) 100 units/mL subcutaneous injection, Inject 28 Units under the skin daily at bedtime, Disp: 10 mL, Rfl: 0    insulin lispro (HUMALOG) 100 units/mL injection, Inject 10-15 Units under the skin 3 (three) times a day, Disp: 10 mL, Rfl: 3    levothyroxine 175 mcg tablet, Take 175 mcg by mouth daily in the early morning , Disp: , Rfl:     nitroglycerin (NITROSTAT) 0 4 mg SL tablet, Place 0 4 mg under the tongue every 5 (five) minutes as needed for chest pain , Disp: , Rfl:     rosuvastatin (CRESTOR) 40 MG tablet, Take 1 tablet by mouth daily, Disp: 90 tablet, Rfl: 3    traMADol (ULTRAM) 50 mg tablet, Take 50 mg by mouth every 8 (eight) hours as needed for moderate pain (1-2 tablets prn for pain), Disp: , Rfl:     Ubiquinol 200 MG CAPS, Take 200 mg by mouth daily  , Disp: , Rfl:     cholecalciferol (VITAMIN D3) 30575 units capsule, Take 1 capsule (10,000 Units total) by mouth every other day, Disp: , Rfl: 0    insulin glargine (LANTUS) 100 units/mL subcutaneous injection, Inject 22 Units under the skin daily in the early morning, Disp: 10 mL, Rfl: 0    Review of Systems   Constitutional: Negative for activity change, appetite change and fatigue  HENT: Negative for sore throat, trouble swallowing and voice change  Eyes: Negative for visual disturbance  Respiratory: Negative for choking, chest tightness and shortness of breath  Cardiovascular: Negative for chest pain, palpitations and leg swelling  Gastrointestinal: Negative for abdominal pain, constipation and diarrhea  Endocrine: Negative for cold intolerance, heat intolerance, polydipsia, polyphagia and polyuria  Genitourinary: Negative for frequency  Musculoskeletal: Negative for arthralgias and myalgias  Skin: Negative for rash  Neurological: Negative for dizziness and syncope  Hematological: Negative for adenopathy  Psychiatric/Behavioral: Negative for sleep disturbance  All other systems reviewed and are negative  Physical Exam:  Body mass index is 31 06 kg/m²  /58   Pulse 80   Ht 5' 7" (1 702 m)   Wt 89 9 kg (198 lb 4 8 oz)   BMI 31 06 kg/m²    Wt Readings from Last 3 Encounters:   03/06/18 89 9 kg (198 lb 4 8 oz)   03/02/18 90 1 kg (198 lb 9 6 oz)   03/01/18 90 4 kg (199 lb 6 4 oz)       Physical Exam   Constitutional: He is oriented to person, place, and time  He appears well-developed and well-nourished  No distress  HENT:   Head: Normocephalic and atraumatic  Mouth/Throat: Oropharynx is clear and moist    Eyes: Conjunctivae and EOM are normal  Pupils are equal, round, and reactive to light  Neck: Normal range of motion  Neck supple  No thyromegaly present  Cardiovascular: Normal rate, regular rhythm and normal heart sounds  Pulses are weak pulses  No murmur heard  Pulses:       Dorsalis pedis pulses are 1+ on the right side, and 1+ on the left side  Pulmonary/Chest: Effort normal and breath sounds normal  No respiratory distress  He has no wheezes  He has no rales  Abdominal: Soft  Bowel sounds are normal  He exhibits no distension  There is no tenderness  Musculoskeletal: Normal range of motion  He exhibits no edema  Feet:   Right Foot:   Skin Integrity: Positive for dry skin  Negative for ulcer, skin breakdown, erythema, warmth or callus  Left Foot:   Skin Integrity: Positive for dry skin  Negative for ulcer, skin breakdown, erythema, warmth or callus  Lymphadenopathy:     He has no cervical adenopathy  Neurological: He is alert and oriented to person, place, and time  Skin: Skin is warm and dry  Psychiatric: He has a normal mood and affect  Vitals reviewed  Patient's shoes and socks removed  Right Foot/Ankle   Right Foot Inspection  Skin Exam: skin normal and dry skin skin not intact, no warmth, no callus, no erythema, no maceration, no abnormal color, no pre-ulcer, no ulcer and no callus                            Sensory       Monofilament testing: intact  Vascular    The right DP pulse is 1+  Left Foot/Ankle  Left Foot Inspection  Skin Exam: skin normal and dry skinskin not intact, no warmth, no erythema, no maceration, normal color, no pre-ulcer, no ulcer and no callus                                         Sensory       Monofilament: intact  Vascular    The left DP pulse is 1+  Assign Risk Category:  No deformity present;  No loss of protective sensation; Weak pulses       Risk: 1      Labs:     Lab Results   Component Value Date    HGBA1C 7 9 (H) 02/27/2018         Lab Results   Component Value Date     02/23/2018    K 4 0 02/23/2018     02/23/2018    CO2 29 02/23/2018    ANIONGAP 5 02/23/2018    BUN 22 02/23/2018    CREATININE 1 62 (H) 02/23/2018    GLUCOSE 203 (H) 02/19/2018 GLUF 155 (H) 02/23/2018    CALCIUM 9 1 02/23/2018    AST 18 02/15/2018    ALT 21 02/15/2018    ALKPHOS 120 (H) 02/15/2018    PROT 7 7 02/15/2018    BILITOT 0 65 02/15/2018    EGFR 42 02/23/2018       Lab Results   Component Value Date    CHOL 138 02/15/2018    HDL 56 02/15/2018    TRIG 224 (H) 02/15/2018       Lab Results   Component Value Date    APJ5MPJLDJSD 1 390 02/23/2018    WTN0DTAPKORC 1 960 11/29/2017    WBS3TFRBVEAY 0 986 08/21/2017     Lab Results   Component Value Date    FREET4 1 6 07/07/2015       Impression & Plan:    Problem List Items Addressed This Visit     Type 1 diabetes mellitus ([facility] [address] ) - Primary     Last A1C 7 9  Review of dexcom report shows he is having frequent episodes of hypoglycemia  He reports he does not feel when his BG is low but is aware he is over treating  Changed carb ratio to 8 g with meals to prevent episodes of hypoglycemia  He will continue to check BG 3-4x per day and send BG log in two weeks for further adjustments  He is being treated with intensive insulin therapy which increases his risk for hypoglycemia  Episodes of hypoglycemia can lead to permanent disability and death  Reviewed recognition and proper treatment of hypoglycemic episodes  He will repeat A1C prior to next visit  Relevant Medications    insulin lispro (HUMALOG) 100 units/mL injection    insulin glargine (LANTUS) 100 units/mL subcutaneous injection    insulin glargine (LANTUS) 100 units/mL subcutaneous injection    Other Relevant Orders    HEMOGLOBIN A1C W/ EAG ESTIMATION    Dyslipidemia     Triglycerides elevated, will recheck in 3 months, continue statin          Relevant Orders    Lipid Panel with Direct LDL reflex    Benign hypertension with chronic kidney disease, stage III     BP stable, continue current regimen          Vitamin D deficiency     Vitamin d level at 17 4  He reports he is taking 10,000 units weekly  Increase to every other day  Recheck vitamin d level in 3 months           Relevant Medications    cholecalciferol (VITAMIN D3) 06180 units capsule    Other Relevant Orders    Vitamin D 25 hydroxy    Hypothyroidism, postablative     Stable, continue levothyroxine                Orders Placed This Encounter   Procedures    HEMOGLOBIN A1C W/ EAG ESTIMATION     Standing Status:   Future     Standing Expiration Date:   3/6/2019    Lipid Panel with Direct LDL reflex     This is a patient instruction: This test requires patient fasting for 10-12 hours or longer  Drinking of black coffee or black tea is acceptable  Standing Status:   Future     Standing Expiration Date:   3/6/2019    Vitamin D 25 hydroxy     Standing Status:   Future     Standing Expiration Date:   3/6/2019       Patient Instructions     Change carb ratio from 5 g to 8 g  Check BG 4x per day  Send in BG log in 2 weeks   Recheck A1C in 3 months      Hypoglycemia in a Person with Diabetes   WHAT YOU NEED TO KNOW:   What is hypoglycemia? Hypoglycemia is a serious condition that happens when your blood glucose (sugar) level drops too low  The blood sugar level is usually too high in a person with diabetes, but the level can also drop too low  It is important to follow your diabetes management plan to keep your blood sugar level steady  What increases my risk for hypoglycemia? · A missed meal, or a meal eaten later than usual     · Certain medicines, or too much insulin or other diabetes medicine     · More exercise than usual, without extra food     · Alcohol     · Pregnancy    · Decreased liver or kidney function  What are the signs and symptoms of hypoglycemia? · Headache, hunger, or nervousness     · Trouble thinking or moodiness     · Sweating, or a pounding heartbeat     · Forgetfulness, confusion, or double vision     · Weakness or trouble walking     · Numbness and tingling in your fingers or around your mouth     · Seizures or loss of consciousness  How do I manage hypoglycemia?    · Check your blood sugar level right away if you have symptoms of hypoglycemia  Hypoglycemia is usually 70 mg/dL or below  Ask your healthcare provider what blood sugar level is too low for you  · If your blood sugar level is too low, eat or drink 15 grams of fast-acting carbohydrate  Examples of this amount of fast-acting carbohydrate are 4 ounces (½ cup) of fruit juice or 4 ounces of regular soda  Other examples are 2 tablespoons of raisins or 3 to 4 glucose tablets  Check your blood sugar level 15 minutes later  If the level is still low (less than 100 mg/dL), have another 15 grams of carbohydrate  When the level returns to 100 mg/dL, eat a snack or meal that contains carbohydrates  This will help prevent another drop in blood sugar  Always carefully follow your healthcare provider's instructions on how to treat low blood sugar levels  · Always carry a source of fast-acting carbohydrate  If you have symptoms of hypoglycemia and you do not have a blood glucose meter, have a source of fast-acting carbohydrate anyway  Avoid carbohydrate foods that are high in fat  The fat content may make it take longer to increase your blood sugar level  Ask your healthcare provider if you should carry a glucagon kit  Glucagon is a medicine that is injected when you develop severe hypoglycemia and become unconscious  Check the expiration date every month and replace it before it expires  · Teach others how to help you if you have symptoms of hypoglycemia  Tell them about the symptoms of hypoglycemia  Ask them to give you a source of fast-acting carbohydrate if you cannot get it yourself  Ask them to give you a glucagon injection if you have symptoms of hypoglycemia and you become unconscious or have a seizure  Ask them to call 911   This is an emergency  Tell them never to try to make you swallow anything if you faint or have a seizure  · Wear medical alert jewelry  or carry a card that says you have diabetes  Ask where to get these items    How do I prevent hypoglycemia? · Take diabetes medicine as directed  Take your medicine at the right time and in the right amount  Your healthcare provider may change your blood sugar goals if you get hypoglycemia often  · Eat regular meals and snacks  Talk to your dietitian or healthcare provider about a meal plan that is right for you  Do not skip meals  · Check your blood sugar level as directed  Ask your healthcare provider what your blood sugar levels should be before and after you eat  Ask when and how often to check your blood sugar level  You may need to check at least 3 times each day  Record your blood sugar level results and take the record with you when you see your healthcare provider  Your provider may use the record to make changes to your medicine, food, or exercise schedules  · Check your blood sugar level before you exercise  Exercise can decrease your blood sugar level  If your blood sugar level is less than 100 mg/dL, have a carbohydrate snack  Examples are 4 to 6 crackers, ½ banana, 8 ounces (1 cup) of nonfat or 1% milk, or 4 ounces (½ cup) of juice  If you will exercise for more than 1 hour, you may need to check your blood sugar level every 30 minutes  Your healthcare provider may also recommend that you check your blood sugar level after exercise  · Be aware of how alcohol affects your blood sugar level  Alcohol can cause your blood sugar level to drop for up to 12 hours after drinking  Ask your healthcare provider if alcohol is safe for you  If you drink alcohol, always have a snack or meal at the same time  Women should limit alcohol to 1 drink a day  Men should limit alcohol to 2 drinks a day  A drink of alcohol is 12 ounces of beer, 5 ounces of wine, or 1½ ounces of liquor  When should I or someone else call 911? · You have a seizure or pass out  · You feel you are going to pass out  · You have trouble thinking clearly  When should I seek immediate care? · Your blood sugar is less than 50 mg/dL and does not respond to treatment  When should I contact my healthcare provider? · You have had symptoms of low blood sugar several times  · You have questions about the amount of insulin or diabetes medicine you are taking  · You have questions or concerns about your condition or care  CARE AGREEMENT:   You have the right to help plan your care  Learn about your health condition and how it may be treated  Discuss treatment options with your caregivers to decide what care you want to receive  You always have the right to refuse treatment  The above information is an  only  It is not intended as medical advice for individual conditions or treatments  Talk to your doctor, nurse or pharmacist before following any medical regimen to see if it is safe and effective for you  © 2017 [facility] Information is for End User's use only and may not be sold, redistributed or otherwise used for commercial purposes  All illustrations and images included in CareNotes® are the copyrighted property of [employer]  or [employer]ylon Lambert  Discussed with the patient and all questioned fully answered  He will call me if any problems arise  Follow-up appointment in 3 months       Counseled patient on diagnostic results, prognosis, risk and benefit of treatment options, instruction for management, importance of treatment compliance, Risk  factor reduction and impressions      Mich Blevins [facility]

## 2018-03-06 NOTE — ASSESSMENT & PLAN NOTE
Last A1C 7 9  Review of dexcom report shows he is having frequent episodes of hypoglycemia  He reports he does not feel when his BG is low but is aware he is over treating  Changed carb ratio to 8 g with meals to prevent episodes of hypoglycemia  He will continue to check BG 3-4x per day and send BG log in two weeks for further adjustments  He is being treated with intensive insulin therapy which increases his risk for hypoglycemia  Episodes of hypoglycemia can lead to permanent disability and death  Reviewed recognition and proper treatment of hypoglycemic episodes  He will repeat A1C prior to next visit

## 2018-03-08 DIAGNOSIS — R52 PAIN: Primary | ICD-10-CM

## 2018-03-08 RX ORDER — TRAMADOL HYDROCHLORIDE 50 MG/1
50 TABLET ORAL EVERY 8 HOURS PRN
Qty: 270 TABLET | Refills: 0 | Status: SHIPPED | OUTPATIENT
Start: 2018-03-08 | End: 2018-05-17 | Stop reason: SDUPTHER

## 2018-03-09 ENCOUNTER — PATIENT OUTREACH (OUTPATIENT)
Dept: FAMILY MEDICINE CLINIC | Facility: CLINIC | Age: 73
End: 2018-03-09

## 2018-03-09 NOTE — PROGRESS NOTES
Outpatient Care Management Note: Complex Care  Mindi Vinson stated that Dr Sierra Beto associate changed the insulin dosage and he is to keep a record and send it/call the office '5 days from now'  He does not wish to have any assistance (in regard to this) at the moment  He says that his sugars are a little higher than they had been, but that he has no symptoms  I asked him to call the office right away if he has symptoms

## 2018-03-13 ENCOUNTER — TELEPHONE (OUTPATIENT)
Dept: FAMILY MEDICINE CLINIC | Facility: CLINIC | Age: 73
End: 2018-03-13

## 2018-03-13 ENCOUNTER — LAB (OUTPATIENT)
Dept: LAB | Facility: CLINIC | Age: 73
End: 2018-03-13
Payer: MEDICARE

## 2018-03-13 DIAGNOSIS — I70.1 RENAL ARTERY STENOSIS (HCC): ICD-10-CM

## 2018-03-13 DIAGNOSIS — N18.30 CHRONIC KIDNEY DISEASE, STAGE 3 (HCC): ICD-10-CM

## 2018-03-13 PROBLEM — I25.118 CORONARY ARTERY DISEASE OF NATIVE ARTERY OF NATIVE HEART WITH STABLE ANGINA PECTORIS (HCC): Status: ACTIVE | Noted: 2018-01-24

## 2018-03-13 LAB
ANION GAP SERPL CALCULATED.3IONS-SCNC: 8 MMOL/L (ref 4–13)
BUN SERPL-MCNC: 22 MG/DL (ref 5–25)
CALCIUM SERPL-MCNC: 8.8 MG/DL (ref 8.3–10.1)
CHLORIDE SERPL-SCNC: 103 MMOL/L (ref 100–108)
CO2 SERPL-SCNC: 28 MMOL/L (ref 21–32)
CREAT SERPL-MCNC: 1.48 MG/DL (ref 0.6–1.3)
GFR SERPL CREATININE-BSD FRML MDRD: 47 ML/MIN/1.73SQ M
GLUCOSE P FAST SERPL-MCNC: 116 MG/DL (ref 65–99)
POTASSIUM SERPL-SCNC: 4 MMOL/L (ref 3.5–5.3)
SODIUM SERPL-SCNC: 139 MMOL/L (ref 136–145)

## 2018-03-13 PROCEDURE — 80048 BASIC METABOLIC PNL TOTAL CA: CPT

## 2018-03-13 PROCEDURE — 36415 COLL VENOUS BLD VENIPUNCTURE: CPT

## 2018-03-13 NOTE — TELEPHONE ENCOUNTER
----- Message from Kiran Regalado MD sent at 3/13/2018 12:47 PM EDT -----  Please contact patient  His kidney function is gradually improving  Current creatinine is 1 48  Please let him know that I spoke with Dr Siri Zimmerman, [facility]ph[facility], and he will follow up with patient from now on    Thank you

## 2018-03-13 NOTE — PROGRESS NOTES
Interventional Cardiology Consultation      Alex Gallegos  1945  [id number]  [address]  [facility] [address]    1  History of ischemic cardiomyopathy  POCT ECG   2  Presence of drug coated stent in LAD coronary artery     3  Coronary artery disease of native artery of native heart with stable angina pectoris [facility])          History:     Patient presents to office today for Interventional Cardiology consultation pre carotid endarterectomy as requested by Dr Rachel Moore from vascular surgery  Patient has a history of coronary artery disease  He had been cared for at [address] [place] [address]  Underwent what appears to be drug-eluting stent placement to his left anterior descending artery and circumflex arteries in September 2015 with some residual disease which has been managed medically  Has history of peripheral vascular disease with lower extremity stenting originally performed at [facility], now follows with [facility] vascular  Also has a history of sleep apnea number CPAP at night  He admits to chronic fatigue now for years as well as dyspnea  When he presented in September 2015 at [facility] he had indigestion and chest burning  This was his anginal equivalent  He has not had this  Since that time  He has bilateral carotid artery disease  He is planning on elective carotid endarterectomy by Dr Rachel Moore  He has a history of nonischemic cardiomyopathy with ejection fractions ranging 40-45%  In addition to that, it states that he had contrast induced nephropathy that when he had his coronary angiogram in 2015 and PCI            Patient Active Problem List   Diagnosis    [facility]INEL [facility]) [facility]    Type 1 diabetes mellitus ([facility] )    Presence of drug coated stent in LAD coronary artery    Coronary artery disease of native artery of native heart with stable angina pectoris ([facility])    Presence of drug coated stent in left circumflex coronary artery    Dyslipidemia    Obstructive sleep apnea of adult    Carotid artery stenosis, asymptomatic, bilateral    History of ischemic cardiomyopathy    Shortness of breath on exertion    Atherosclerosis of both lower extremities with intermittent claudication ([facility])    Restrictive lung disease    COPD (chronic obstructive pulmonary disease) ([facility] )    Benign hypertension with chronic kidney disease, stage III    Chronic kidney disease, stage 3    Proteinuria    Renal artery stenosis ([facility])    Renal cyst, right    Vitamin D deficiency    Aortoiliac occlusive disease ([facility] )    Hypothyroidism, postablative     Past Medical History:   Diagnosis Date    Acute kidney injury ([place] [address] )     resolved 11/30/2015    Acute venous embolism and thrombosis of deep vessels of proximal lower extremity ([place] [address] )     resolved 04/04/2015    Cardiac disease     heart attack, stents x 4    CHF (congestive heart failure) ([facility])     Chronic cough     resolved 02/04/2016    COPD (chronic obstructive pulmonary disease) ([facility] )     Diabetes mellitus ([place] [address] )     Disease of thyroid gland     DVT (deep venous thrombosis) ([facility] )     Hypertension     Ischemic cardiomyopathy     last assessed 09/26/2017    Pulmonary granuloma ([place] [address] )     resolved 02/03/2017    Renal failure     Sleep apnea      Social History     Social History    Marital status: /Civil Union     Spouse name: N/A    Number of children: N/A    Years of education: N/A     Occupational History    Retired     Desk work     [employer]      Social History Main Topics    Smoking status: Former Smoker     Packs/day: 4 00     Years: 48 00     Types: Cigarettes     Quit date: 2008    Smokeless tobacco: Never Used      Comment: smoking 48 years 1 5 ppd d/c oct 2008 screening protocol as per allscripts    Alcohol use 12 6 oz/week     21 Standard drinks or equivalent per week      Comment: 2-3 glasses of vodka daily (6 shots) (history 2 drinks per day as per allscripts    Drug use: No    Sexual activity: Not on file     Other Topics Concern    Not on file     Social History Narrative    No narrative on file      Family History   Problem Relation Age of Onset    Heart disease Father      pacer placement    Hypertension Father     Kidney disease Father     Heart failure Father     Heart attack Father     Liver disease Father     Cirrhosis Mother      due to beer consumption    Liver disease Mother     Diabetes Other      Past Surgical History:   Procedure Laterality Date    BYPASS FEMORAL-POPLITEAL      initial stenosis with stent left, 7 x 100 smart stent onset 02/24/2014    CORONARY ANGIOPLASTY WITH STENT PLACEMENT      x4       Current Outpatient Prescriptions:     amLODIPine (NORVASC) 10 mg tablet, Take 1 tablet by mouth daily at bedtime  , Disp: , Rfl:     aspirin 81 MG tablet, Take 81 mg by mouth daily  , Disp: , Rfl:     carvedilol (COREG) 25 mg tablet, Take 12 5 mg by mouth 2 (two) times a day with meals  , Disp: , Rfl:     cholecalciferol (VITAMIN D3) 96997 units capsule, Take 1 capsule (10,000 Units total) by mouth every other day (Patient taking differently: Take 50,000 Units by mouth every other day  ), Disp: , Rfl: 0    clopidogrel (PLAVIX) 75 mg tablet, Take 75 mg by mouth daily at bedtime, Disp: , Rfl:     insulin glargine (LANTUS) 100 units/mL subcutaneous injection, Inject 28 Units under the skin daily at bedtime, Disp: 10 mL, Rfl: 0    insulin glargine (LANTUS) 100 units/mL subcutaneous injection, Inject 22 Units under the skin daily in the early morning, Disp: 10 mL, Rfl: 0    insulin lispro (HUMALOG) 100 units/mL injection, Inject 10-15 Units under the skin 3 (three) times a day, Disp: 10 mL, Rfl: 3    levothyroxine 175 mcg tablet, Take 175 mcg by mouth daily in the early morning , Disp: , Rfl:     nitroglycerin (NITROSTAT) 0 4 mg SL tablet, Place 0 4 mg under the tongue every 5 (five) minutes as needed for chest pain , Disp: , Rfl:     rosuvastatin (CRESTOR) 40 MG tablet, Take 1 tablet by mouth daily, Disp: 90 tablet, Rfl: 3    traMADol (ULTRAM) 50 mg tablet, Take 1 tablet (50 mg total) by mouth every 8 (eight) hours as needed for moderate pain (1-2 tablets prn for pain), Disp: 270 tablet, Rfl: 0    Ubiquinol 200 MG CAPS, Take 200 mg by mouth daily  , Disp: , Rfl:     furosemide (LASIX) 40 mg tablet, Take 20 mg by mouth daily as needed (take one is wt greater then 183lbs)  , Disp: , Rfl:   Allergies   Allergen Reactions    Cardura [Doxazosin Mesylate]     Other      Iv dye for cardiac cath  Renal failure very close to dialysis  But no dialysis    Tylenol [Acetaminophen]      interferes with reading of blood sugar    Zestril [Lisinopril]        Labs:   Lab Results   Component Value Date     03/13/2018    K 4 0 03/13/2018     03/13/2018    CO2 28 03/13/2018    BUN 22 03/13/2018    CREATININE 1 48 (H) 03/13/2018    CREATININE 1 62 (H) 02/23/2018    GLUCOSE 203 (H) 02/19/2018    CALCIUM 8 8 03/13/2018       Lab Results   Component Value Date    WBC 5 68 02/15/2018    HGB 13 5 02/15/2018    HGB 13 0 11/29/2017    HCT 39 9 02/15/2018    HCT 37 8 11/29/2017     02/15/2018     (L) 11/29/2017       Lab Results   Component Value Date    CHOL 138 02/15/2018    CHOL 150 11/29/2017     Lab Results   Component Value Date    HDL 56 02/15/2018    HDL 54 11/29/2017     Lab Results   Component Value Date    LDLCALC 37 02/15/2018    LDLCALC 73 11/29/2017     Lab Results   Component Value Date    TRIG 224 (H) 02/15/2018    TRIG 116 11/29/2017       Lab Results   Component Value Date    ALT 21 02/15/2018    ALT 22 11/29/2017    AST 18 02/15/2018    AST 15 11/29/2017             Lab Results   Component Value Date    NTBNP 1,054 (H) 10/20/2016    NTBNP 1,091 (H) 09/23/2016       Lab Results   Component Value Date HGBA1C 7 9 (H) 02/27/2018    HGBA1C 7 5 (H) 02/15/2018    HGBA1C 7 5 (H) 11/29/2017       Imaging: Reviewed in epic    ECG: Normal sinus rhythm, inferolateral ST-T changes unchanged 1/2016 ecg    Review of Systems:  14 systems reviewed and negative with exception of the above and the following shortness of breath, fatigue    Review of Systems    PHYSICAL EXAM:    Physical Exam    Vitals:    03/15/18 1538   BP: 134/78   Pulse: 58     Body mass index is 31 17 kg/m²  Weight (last 2 days)     Date/Time   Weight    03/15/18 1538  90 3 (199)              Gen: No acute distress  HEENT: anicteric, mucous membranes moist  Neck: supple, no jugular venous distention, or carotid bruit  Heart: regular, normal s1 and s2, no murmur/rub or gallop  Lungs :clear to auscultation bilaterally, no rales/rhonchi or wheeze  Abdomen: soft nontender, normoactive bowel sounds, no organomegaly  Ext: warm and perfused, normal femoral pulses, no edema, clubbing  Skin: warm, no rashes  Neuro: AAO x 3, no focal findings  Psychiatric: normal affect  Musculoskeletal: no obvious joint deformities  Discussion/Summary:    1  CAD  A  H/o NSTEMI 9/2015 LVH-M, PCI w/ OJ LAD/circumflex, by report residual OM branch disease    2  Ischemic CM, NYHA 2, C, LVEF 45-50%, echo 2018    3  H/o acute chronic renal disease, creat 1 7 approx baseline  A  H/o ANGEL post cath [facility] 9/2015    4  Hypertension, overall controlled    5  Hypelipidemia, on high potent statin    6  Obst sleep apnea, on cpap    7  Pre op carotid endarterectomy    Plan:   Discussed in detail with patient and his wife  I have reviewed in detail is the angiogram from 2015  He had stents placed left anterior descending artery in distal circumflex  He has residual disease in his lower obtuse marginal branch as well as diffuse atherosclerosis and what appears to be a small caliber right coronary artery    He has had no recurrence indigestion/chest burning which he states prompted his catheterization 2015  His wife had also mentioned that he received only 150 cc of dye and had contrast induced nephropathy regardless  He remains fatigued, this is multifactorial   He wears his CPAP at night and is deconditioned due to  His lack of exercise due to what he states is lower extremity vascular disease  He has had stress testing which showed  A small amount of mild inferior ischemia  This is in keeping with his known residual small vessel disease  which I personally reviewed on his angiogram today from [facility] in 2015  Overall, I find no absolute cardiac contraindication for carotid endarterectomy  I would proceed as planned  It appears he is planned for an open procedure  and not an endovascular procedure (this serves as cardiac evaluation prior to either procedure )    I explained in detail to the patient and his wife that he does have known vascular disease and while there is no guarantee on  having zero risk of  perioperative cardiac issues  Given the fact that he has had stable residual small vessel disease  since 2015  with no clinical recurrence of his symptoms which was chest burning / indigestion or any other cardiac symptoms that raise a contraindication to carotid surgery I I would therefore proceed as planned for CEA on listed medical therapy  His wife questioned the need for continued Plavix  He has been on this since 2015  He has completed  Well over 1 year of dual antiplatelet therapy and  from my standpoint needs 81 mg of aspirin daily which hopefully can be continued  Of course, I told him to discuss with Dr Kaylah Ham skin should he have him on plavix for any peripheral vascular issues  However, from the heart standpoint  His Plavix can be stopped  This was discussed with Dr Zbigniew Costa today as well today via telephone

## 2018-03-15 ENCOUNTER — OFFICE VISIT (OUTPATIENT)
Dept: CARDIOLOGY CLINIC | Facility: CLINIC | Age: 73
End: 2018-03-15
Payer: MEDICARE

## 2018-03-15 VITALS
SYSTOLIC BLOOD PRESSURE: 134 MMHG | BODY MASS INDEX: 31.23 KG/M2 | HEART RATE: 58 BPM | DIASTOLIC BLOOD PRESSURE: 78 MMHG | WEIGHT: 199 LBS | HEIGHT: 67 IN

## 2018-03-15 DIAGNOSIS — Z95.5 PRESENCE OF DRUG COATED STENT IN LAD CORONARY ARTERY: ICD-10-CM

## 2018-03-15 DIAGNOSIS — Z86.79 HISTORY OF ISCHEMIC CARDIOMYOPATHY: Primary | ICD-10-CM

## 2018-03-15 DIAGNOSIS — I25.118 CORONARY ARTERY DISEASE OF NATIVE ARTERY OF NATIVE HEART WITH STABLE ANGINA PECTORIS (HCC): ICD-10-CM

## 2018-03-15 PROCEDURE — 99214 OFFICE O/P EST MOD 30 MIN: CPT | Performed by: INTERNAL MEDICINE

## 2018-03-15 PROCEDURE — 93000 ELECTROCARDIOGRAM COMPLETE: CPT | Performed by: INTERNAL MEDICINE

## 2018-03-20 ENCOUNTER — TELEPHONE (OUTPATIENT)
Dept: VASCULAR SURGERY | Facility: CLINIC | Age: 73
End: 2018-03-20

## 2018-03-20 NOTE — TELEPHONE ENCOUNTER
LMOM for patient to call back and confirm surgery date of 4/9/18 at [facility] with Dr Celestino Recio

## 2018-03-21 ENCOUNTER — PATIENT OUTREACH (OUTPATIENT)
Dept: FAMILY MEDICINE CLINIC | Facility: CLINIC | Age: 73
End: 2018-03-21

## 2018-03-21 NOTE — PROGRESS NOTES
Outpatient Care Management Note: Complex Care  During today's call, Rebel Vogel & his wife were interested in talking about the possibility of him having carotid surgery  They said that they have been reading about the procedure  The biggest question is 'should I have this procedure?'  He is including his wife in the decision-making process and neither is sure of which path to take at this time  I encouraged them to jot down questions as they come mind, so that they can discuss their questions with the physician  We talked about the risks of not having the procedure and they were both aware of potential complications  I offered support and encouraged them to ask questions  I acknowledged that it can be difficult to decide  In regard to past issues (pre-procedure hydration before receiving dye & Dexcom Sensor--needs Endocrine management of BS's), we talked about having a letter from each specialty that he could carry with him  I will check with Endocrinology and Nephrology  Rebeldiaz Vogel & his wife have my contact information

## 2018-03-23 ENCOUNTER — TELEPHONE (OUTPATIENT)
Dept: ENDOCRINOLOGY | Facility: CLINIC | Age: 73
End: 2018-03-23

## 2018-03-26 ENCOUNTER — LAB (OUTPATIENT)
Dept: LAB | Facility: CLINIC | Age: 73
End: 2018-03-26
Payer: MEDICARE

## 2018-03-26 DIAGNOSIS — I65.23 CAROTID ARTERY STENOSIS, ASYMPTOMATIC, BILATERAL: ICD-10-CM

## 2018-03-26 LAB
ANION GAP SERPL CALCULATED.3IONS-SCNC: 8 MMOL/L (ref 4–13)
BUN SERPL-MCNC: 28 MG/DL (ref 5–25)
CALCIUM SERPL-MCNC: 9.5 MG/DL (ref 8.3–10.1)
CHLORIDE SERPL-SCNC: 105 MMOL/L (ref 100–108)
CO2 SERPL-SCNC: 25 MMOL/L (ref 21–32)
CREAT SERPL-MCNC: 1.55 MG/DL (ref 0.6–1.3)
GFR SERPL CREATININE-BSD FRML MDRD: 44 ML/MIN/1.73SQ M
GLUCOSE SERPL-MCNC: 236 MG/DL (ref 65–140)
POTASSIUM SERPL-SCNC: 4.7 MMOL/L (ref 3.5–5.3)
SODIUM SERPL-SCNC: 138 MMOL/L (ref 136–145)

## 2018-03-26 PROCEDURE — 36415 COLL VENOUS BLD VENIPUNCTURE: CPT

## 2018-03-26 PROCEDURE — 80048 BASIC METABOLIC PNL TOTAL CA: CPT

## 2018-03-27 ENCOUNTER — TELEPHONE (OUTPATIENT)
Dept: ENDOCRINOLOGY | Facility: CLINIC | Age: 73
End: 2018-03-27

## 2018-03-27 ENCOUNTER — TELEPHONE (OUTPATIENT)
Dept: VASCULAR SURGERY | Facility: CLINIC | Age: 73
End: 2018-03-27

## 2018-03-27 NOTE — TELEPHONE ENCOUNTER
Left message received download, Tara Cherry wanted to know if he can re-fax or e-mail because some of the data was cut off     Provided e-mail address

## 2018-03-27 NOTE — TELEPHONE ENCOUNTER
Spoke with patient this afternoon to schedule him for R CEA with Dr Carmen Velazquez  Patient advised me he read our material and it seems a bit scary and is not sure if he wants to proceed  He wishes to speak w/Dr Carmen Velazquez and re-discuss  Patient is scheduled for 4/17/18 at 3pm in [facility]  Patient was offered earlier date 4/3/18 but elected not to accept appointment

## 2018-03-28 ENCOUNTER — OFFICE VISIT (OUTPATIENT)
Dept: NEPHROLOGY | Facility: HOSPITAL | Age: 73
End: 2018-03-28
Payer: MEDICARE

## 2018-03-28 VITALS
WEIGHT: 196 LBS | SYSTOLIC BLOOD PRESSURE: 110 MMHG | BODY MASS INDEX: 30.76 KG/M2 | HEART RATE: 60 BPM | HEIGHT: 67 IN | DIASTOLIC BLOOD PRESSURE: 72 MMHG

## 2018-03-28 DIAGNOSIS — N17.9 AKI (ACUTE KIDNEY INJURY) (HCC): ICD-10-CM

## 2018-03-28 DIAGNOSIS — I70.1 RENAL ARTERY STENOSIS (HCC): ICD-10-CM

## 2018-03-28 DIAGNOSIS — N18.30 BENIGN HYPERTENSION WITH CHRONIC KIDNEY DISEASE, STAGE III (HCC): ICD-10-CM

## 2018-03-28 DIAGNOSIS — N18.30 CKD (CHRONIC KIDNEY DISEASE) STAGE 3, GFR 30-59 ML/MIN (HCC): Primary | ICD-10-CM

## 2018-03-28 DIAGNOSIS — R80.9 PROTEINURIA, UNSPECIFIED TYPE: ICD-10-CM

## 2018-03-28 DIAGNOSIS — E10.8 TYPE 1 DIABETES MELLITUS WITH COMPLICATION (HCC): ICD-10-CM

## 2018-03-28 DIAGNOSIS — I12.9 BENIGN HYPERTENSION WITH CHRONIC KIDNEY DISEASE, STAGE III (HCC): ICD-10-CM

## 2018-03-28 DIAGNOSIS — N28.1 RENAL CYST, RIGHT: ICD-10-CM

## 2018-03-28 PROCEDURE — 99214 OFFICE O/P EST MOD 30 MIN: CPT | Performed by: INTERNAL MEDICINE

## 2018-03-28 RX ORDER — ERGOCALCIFEROL 1.25 MG/1
50000 CAPSULE ORAL WEEKLY
COMMUNITY
End: 2018-04-24

## 2018-03-28 RX ORDER — AMLODIPINE BESYLATE 2.5 MG/1
7.5 TABLET ORAL DAILY
Qty: 90 TABLET | Refills: 90 | Status: SHIPPED | OUTPATIENT
Start: 2018-04-28 | End: 2018-05-08 | Stop reason: CLARIF

## 2018-03-28 NOTE — LETTER
2018     Harleen Scott MD  [address]    Patient: Dylon Parrish   YOB: 1945   Date of Visit: 3/28/2018       Dear Dr Deshawn Fuentes: Thank you for referring Oliva Baltazar to me for evaluation  Below are my notes for this consultation  If you have questions, please do not hesitate to call me  I look forward to following your patient along with you  Sincerely,        Guicho Noel DO        CC: MD Guicho Vargas DO  3/28/2018 12:46 PM  Sign at close encounter  OFFICE FOLLOW UP - Nephrology   Dylon Parrish 67 y o  male MRN: [id number]    Encounter: [id number]      DISCUSSION:    Patient Lexi Sr, you were seen today for follow-up of chronic kidney disease  We discussed the followin  Based on your most recent blood work your renal function is improving and your creatinine is stable   2  We will repeat blood work and a urine study at the end of April to make sure this remains stable   3  If you  Decide on the carotid endarterectomy  Or any other contrast studies please let us know so that we can provide recommendations to reduce renal risk   4  Your blood pressure are now on the lower side will slowly titrate down your blood pressure medication  We will start by decreasing urine amlodipine from 10 mg down to 7 5 mg   5   If you have any other questions or concerns please let me know otherwise we will follow-up in about 2 months    ASSESSMENT and PLAN:  Diagnoses and all orders for this visit:    CKD (chronic kidney disease) stage 3, GFR 30-59 ml/min  -  His baseline creatinine was around 1 2-1 3 more recently has been around 1 4-1 6  - he had a recent acute kidney injury related to pre renal azotemia, his Lasix has been used as needed  - the etiology of his chronic kidney disease is likely related to the fact that he has prolonged standing history of diabetes and likely diabetic nephropathy with subsequent episodes of acute kidney injury that have left some residual scarring and now chronic kidney disease  - his blood pressure is on the lower side his wife is vigilant about checking blood pressures at home and he average blood pressures around  806 systolic she states that the highest her his blood pressure has been is 552 systolic  - will solely down titrate his amlodipine and will change from 10 mg daily to 7 5 mg  - we did discuss how hyperglycemia and alcohol can cause worsening renal function and pre renal state  - at this point he has decided not to pursue any make further carotid vascular procedures after weighing the risks and benefits however he will will discuss more with vascular regarding this  - if he does require contrast, would place him on IV normal saline before and after his procedure had a low slow rate to prevent flash pulmonary edema given his renal artery stenosis    Type 1 diabetes mellitus with complication ([facility] [address] )  - continue  Glycemic control he has seen Endocrinology as well    Renal artery stenosis ([facility] [address] )  - he has bilateral renal artery stenosis at less than 60% he gets serial renal artery Dopplers  - currently being managed medically and is stable    Benign hypertension with chronic kidney disease, stage III  - as above regarding his blood pressure  - decrease amlodipine and continue to monitor if blood pressure remains less than 641 systolic will decrease amlodipine to 5 mg daily  - continue carvedilol 12 5 mg 2 times daily    DARINEL (acute kidney injury) ([facility] [address] )  - likely secondary to pre renal azotemia creatinine peaked to 2 3 Lasix held increased  Fluid intake and now improved    Renal cyst, right  - renal ultrasound was checked in February of 2018 which showed a stable simple cyst in the upper pole of the right kidney and a normal left kidney and bladder,    Proteinuria, unspecified type  - he does have about 1 2 g of protein once his creatinine is in steady state will continue to quantify and do a further serologic workup currently not anemic or hypercalcemia,  And no associated hematuria as his RBCs were negative    HPI: Tj Betts is a 67 y o  male who is here for  Chronic kidney disease    Since our last visit, there has been no ER visits or hospitilizations  Patient currently has no complaints at this time and is feeling well  Patient denies any chest pain, shortness of breath and swelling  The last blood work was done on  March 26, which we have reviewed together  he has an extensive past medical history with uncontrolled diabetes mellitus follows up with Endocrinology now and continues to work on this, coronary artery disease status post CABG complicated by acute kidney injury, peripheral vascular disease, renal artery stenosis, and carotid artery stenosis who presents for follow-up of his chronic kidney disease    most recently he had an acute kidney injury secondary to pre renal azotemia that has since improved his creatinine is currently slightly above his baseline his electrolytes remained stable    his alcohol intake has improved after counseling by Dr Dot Luna, but his blood sugars sometimes do remain elevated in the 200-300 range  He is avoiding nephrotoxic agent     it was suggested to him that he he get bilateral carotid endarterectomy and is weighing the risks and benefits of this procedure following up with vascular regular    ROS:   All the systems were reviewed and were negative except as documented on the HPI  Allergies: Cardura [doxazosin mesylate]; Other; Tylenol [acetaminophen]; and Zestril [lisinopril]    Medications:   Current Outpatient Prescriptions:     aspirin 81 MG tablet, Take 81 mg by mouth daily  , Disp: , Rfl:     carvedilol (COREG) 25 mg tablet, Take 12 5 mg by mouth 2 (two) times a day with meals  , Disp: , Rfl:     clopidogrel (PLAVIX) 75 mg tablet, Take 75 mg by mouth daily at bedtime, Disp: , Rfl:     ergocalciferol (VITAMIN D2) 50,000 units, Take 50,000 Units by mouth once a week, Disp: , Rfl:     insulin glargine (LANTUS) 100 units/mL subcutaneous injection, Inject 28 Units under the skin daily at bedtime, Disp: 10 mL, Rfl: 0    insulin glargine (LANTUS) 100 units/mL subcutaneous injection, Inject 22 Units under the skin daily in the early morning, Disp: 10 mL, Rfl: 0    insulin lispro (HUMALOG) 100 units/mL injection, Inject 10-15 Units under the skin 3 (three) times a day, Disp: 10 mL, Rfl: 3    levothyroxine 175 mcg tablet, Take 175 mcg by mouth daily in the early morning , Disp: , Rfl:     nitroglycerin (NITROSTAT) 0 4 mg SL tablet, Place 0 4 mg under the tongue every 5 (five) minutes as needed for chest pain , Disp: , Rfl:     rosuvastatin (CRESTOR) 40 MG tablet, Take 1 tablet by mouth daily, Disp: 90 tablet, Rfl: 3    traMADol (ULTRAM) 50 mg tablet, Take 1 tablet (50 mg total) by mouth every 8 (eight) hours as needed for moderate pain (1-2 tablets prn for pain), Disp: 270 tablet, Rfl: 0    VITAMIN K PO, Take 1 tablet by mouth daily, Disp: , Rfl:     [START ON 4/28/2018] amLODIPine (NORVASC) 2 5 mg tablet, Take 3 tablets (7 5 mg total) by mouth daily, Disp: 90 tablet, Rfl: 90    cholecalciferol (VITAMIN D3) 93293 units capsule, Take 1 capsule (10,000 Units total) by mouth every other day (Patient taking differently: Take 50,000 Units by mouth every other day  ), Disp: , Rfl: 0    furosemide (LASIX) 40 mg tablet, Take 20 mg by mouth daily as needed (take one is wt greater then 183lbs)  , Disp: , Rfl:     Ubiquinol 200 MG CAPS, Take 200 mg by mouth daily  , Disp: , Rfl:     Past Medical History:   Diagnosis Date    Acute kidney injury ([address] )     resolved 11/30/2015    Acute venous embolism and thrombosis of deep vessels of proximal lower extremity ([address] )     resolved 04/04/2015    Cardiac disease     heart attack, stents x 4    CHF (congestive heart failure) (HCC)     Chronic cough     resolved 02/04/2016    COPD (chronic obstructive pulmonary disease) ([facility] [address] )     [facility]     Disease of thyroid gland     [facility]deep [facility] thrombosis) ([facility]     Hypertension     Ischemic cardiomyopathy     last assessed 09/26/2017    Pulmonary granuloma (HCC)     resolved 02/03/2017    Renal failure     Sleep apnea      Past Surgical History:   Procedure Laterality Date    BYPASS FEMORAL-POPLITEAL      initial stenosis with stent left, 7 x 100 smart stent onset 02/24/2014    CORONARY ANGIOPLASTY WITH STENT PLACEMENT      x4     Family History   Problem Relation Age of Onset    Heart disease Father      pacer placement    Hypertension Father     Kidney disease Father     Heart failure Father     Heart attack Father     Liver disease Father     Cirrhosis Mother      due to beer consumption    Liver disease Mother     Diabetes Other       reports that he quit smoking about 10 years ago  His smoking use included Cigarettes  He has a 192 00 pack-year smoking history  He has never used smokeless tobacco  He reports that he drinks about 12 6 oz of alcohol per week   He reports that he does not use drugs  Physical Exam:   Vitals:    03/28/18 1135   BP: 110/72   BP Location: Right arm   Patient Position: Sitting   Cuff Size: Adult   Pulse: 60   Weight: 88 9 kg (196 lb)   Height: 5' 7" (1 702 m)     Body mass index is 30 7 kg/m²      General: conscious, cooperative, in not acute distress  Eyes: conjunctivae pink, anicteric sclerae  ENT: lips and mucous membranes moist  Neck: supple, no JVD  Chest: clear breath sounds bilateral, no crackles, ronchus or wheezings  CVS: distinct S1 & S2, normal rate, regular rhythm  Abdomen: soft, non-tender, non-distended, normoactive bowel sounds  Extremities: no edema of both legs  Skin: no rash  Neuro: awake, alert, oriented,  No asterixis or myoclonus     renal imaging:    A renal artery Doppler was done 2/27/2018 which shows that his right renal artery has less than 60% stenosis and in the main renal artery and a patent renal veins  The left renal artery has greater than 60% stenosis in the main renal artery and a patent renal veins    an ultrasound of the kidney and bladder was done on February 27 which shows a right kidney that was 9 3 x 5 2 cm and a left kidney that was 9 5 x 5 8 cm with normal echogenicity and contour-     Lab Results:    Results from last 7 days  Lab Units 03/26/18  1135   SODIUM mmol/L 138   POTASSIUM mmol/L 4 7   CHLORIDE mmol/L 105   CO2 mmol/L 25   BUN mg/dL 28*   CREATININE mg/dL 1 55*   CALCIUM mg/dL 9 5   GLUCOSE RANDOM mg/dL 236*         Portions of the record may have been created with voice recognition software  Occasional wrong word or "sound a like" substitutions may have occurred due to the inherent limitations of voice recognition software  Read the chart carefully and recognize, using context, where substitutions have occurred  If you have any questions, please contact the dictating provider

## 2018-03-28 NOTE — PROGRESS NOTES
OFFICE FOLLOW UP - Nephrology   Jade Vanegas 67 y o  male MRN: [id number]    Encounter: [id number]      DISCUSSION:    Patient Ren Staples, you were seen today for follow-up of chronic kidney disease  We discussed the followin  Based on your most recent blood work your renal function is improving and your creatinine is stable   2  We will repeat blood work and a urine study at the end of April to make sure this remains stable   3  If you  Decide on the carotid endarterectomy  Or any other contrast studies please let us know so that we can provide recommendations to reduce renal risk   4  Your blood pressure are now on the lower side will slowly titrate down your blood pressure medication  We will start by decreasing urine amlodipine from 10 mg down to 7 5 mg   5   If you have any other questions or concerns please let me know otherwise we will follow-up in about 2 months    ASSESSMENT and PLAN:  Diagnoses and all orders for this visit:    CKD (chronic kidney disease) stage 3, GFR 30-59 ml/min  -  His baseline creatinine was around 1 2-1 3 more recently has been around 1 4-1 6  - he had a recent acute kidney injury related to pre renal azotemia, his Lasix has been used as needed  - the etiology of his chronic kidney disease is likely related to the fact that he has prolonged standing history of diabetes and likely diabetic nephropathy with subsequent episodes of acute kidney injury that have left some residual scarring and now chronic kidney disease  - his blood pressure is on the lower side his wife is vigilant about checking blood pressures at home and he average blood pressures around  971 systolic she states that the highest her his blood pressure has been is 988 systolic  - will solely down titrate his amlodipine and will change from 10 mg daily to 7 5 mg  - we did discuss how hyperglycemia and alcohol can cause worsening renal function and pre renal state  - at this point he has decided not to pursue any make further carotid vascular procedures after weighing the risks and benefits however he will will discuss more with vascular regarding this  - if he does require contrast, would place him on IV normal saline before and after his procedure had a low slow rate to prevent flash pulmonary edema given his renal artery stenosis    Type 1 diabetes mellitus with complication ([facility] [address] )  - continue  Glycemic control he has seen Endocrinology as well    Renal artery stenosis ([facility] [address] )  - he has bilateral renal artery stenosis at less than 60% he gets serial renal artery Dopplers  - currently being managed medically and is stable    Benign hypertension with chronic kidney disease, stage III  - as above regarding his blood pressure  - decrease amlodipine and continue to monitor if blood pressure remains less than 545 systolic will decrease amlodipine to 5 mg daily  - continue carvedilol 12 5 mg 2 times daily    DARINEL (acute kidney injury) ([facility] [address] )  - likely secondary to pre renal azotemia creatinine peaked to 2 3 Lasix held increased  Fluid intake and now improved    Renal cyst, right  - renal ultrasound was checked in February of 2018 which showed a stable simple cyst in the upper pole of the right kidney and a normal left kidney and bladder,    Proteinuria, unspecified type  - he does have about 1 2 g of protein once his creatinine is in steady state will continue to quantify and do a further serologic workup currently not anemic or hypercalcemia,  And no associated hematuria as his RBCs were negative    HPI: Kailyn Peterson is a 67 y o  male who is here for  Chronic kidney disease    Since our last visit, there has been no ER visits or hospitilizations  Patient currently has no complaints at this time and is feeling well  Patient denies any chest pain, shortness of breath and swelling  The last blood work was done on  March 26, which we have reviewed together      he has an extensive past medical history with uncontrolled diabetes mellitus follows up with Endocrinology now and continues to work on this, coronary artery disease status post CABG complicated by acute kidney injury, peripheral vascular disease, renal artery stenosis, and carotid artery stenosis who presents for follow-up of his chronic kidney disease    most recently he had an acute kidney injury secondary to pre renal azotemia that has since improved his creatinine is currently slightly above his baseline his electrolytes remained stable    his alcohol intake has improved after counseling by Dr Chiara Shay, but his blood sugars sometimes do remain elevated in the 200-300 range  He is avoiding nephrotoxic agent     it was suggested to him that he he get bilateral carotid endarterectomy and is weighing the risks and benefits of this procedure following up with vascular regular    ROS:   All the systems were reviewed and were negative except as documented on the HPI  Allergies: Cardura [doxazosin mesylate]; Other; Tylenol [acetaminophen]; and Zestril [lisinopril]    Medications:   Current Outpatient Prescriptions:     aspirin 81 MG tablet, Take 81 mg by mouth daily  , Disp: , Rfl:     carvedilol (COREG) 25 mg tablet, Take 12 5 mg by mouth 2 (two) times a day with meals  , Disp: , Rfl:     clopidogrel (PLAVIX) 75 mg tablet, Take 75 mg by mouth daily at bedtime, Disp: , Rfl:     ergocalciferol (VITAMIN D2) 50,000 units, Take 50,000 Units by mouth once a week, Disp: , Rfl:     insulin glargine (LANTUS) 100 units/mL subcutaneous injection, Inject 28 Units under the skin daily at bedtime, Disp: 10 mL, Rfl: 0    insulin glargine (LANTUS) 100 units/mL subcutaneous injection, Inject 22 Units under the skin daily in the early morning, Disp: 10 mL, Rfl: 0    insulin lispro (HUMALOG) 100 units/mL injection, Inject 10-15 Units under the skin 3 (three) times a day, Disp: 10 mL, Rfl: 3    levothyroxine 175 mcg tablet, Take 175 mcg by mouth daily in the early morning , Disp: , Rfl:     nitroglycerin (NITROSTAT) 0 4 mg SL tablet, Place 0 4 mg under the tongue every 5 (five) minutes as needed for chest pain , Disp: , Rfl:     rosuvastatin (CRESTOR) 40 MG tablet, Take 1 tablet by mouth daily, Disp: 90 tablet, Rfl: 3    traMADol (ULTRAM) 50 mg tablet, Take 1 tablet (50 mg total) by mouth every 8 (eight) hours as needed for moderate pain (1-2 tablets prn for pain), Disp: 270 tablet, Rfl: 0    VITAMIN K PO, Take 1 tablet by mouth daily, Disp: , Rfl:     [START ON 4/28/2018] amLODIPine (NORVASC) 2 5 mg tablet, Take 3 tablets (7 5 mg total) by mouth daily, Disp: 90 tablet, Rfl: 90    cholecalciferol (VITAMIN D3) 32862 units capsule, Take 1 capsule (10,000 Units total) by mouth every other day (Patient taking differently: Take 50,000 Units by mouth every other day  ), Disp: , Rfl: 0    furosemide (LASIX) 40 mg tablet, Take 20 mg by mouth daily as needed (take one is wt greater then 183lbs)  , Disp: , Rfl:     Ubiquinol 200 MG CAPS, Take 200 mg by mouth daily  , Disp: , Rfl:     Past Medical History:   Diagnosis Date    Acute kidney injury ([facility] [address] )     resolved 11/30/2015    Acute venous embolism and thrombosis of deep vessels of proximal lower extremity ([address] )     resolved 04/04/2015    Cardiac disease     heart attack, stents x 4    [facility]est[facility]) [facility]     Chronic cough     resolved 02/04/2016    COPD (chronic obstructive pulmonary disease) ([address] )     Diabetes mellitus ([address] )     Disease of thyroid gland     DVT (deep venous thrombosis) ([address] )     Hypertension     Ischemic cardiomyopathy     last assessed 09/26/2017    Pulmonary granuloma ([address] )     resolved 02/03/2017    Renal failure     Sleep apnea      Past Surgical History:   Procedure Laterality Date    BYPASS FEMORAL-POPLITEAL      initial stenosis with stent left, 7 x 100 smart stent onset 02/24/2014    CORONARY ANGIOPLASTY WITH STENT PLACEMENT      x4     Family History   Problem Relation Age of Onset    Heart disease Father      pacer placement    Hypertension Father     Kidney disease Father     Heart failure Father     Heart attack Father     Liver disease Father     Cirrhosis Mother      due to beer consumption    Liver disease Mother     Diabetes Other       reports that he quit smoking about 10 years ago  His smoking use included Cigarettes  He has a 192 00 pack-year smoking history  He has never used smokeless tobacco  He reports that he drinks about 12 6 oz of alcohol per week   He reports that he does not use drugs  Physical Exam:   Vitals:    03/28/18 1135   BP: 110/72   BP Location: Right arm   Patient Position: Sitting   Cuff Size: Adult   Pulse: 60   Weight: 88 9 kg (196 lb)   Height: 5' 7" (1 702 m)     Body mass index is 30 7 kg/m²  General: conscious, cooperative, in not acute distress  Eyes: conjunctivae pink, anicteric sclerae  ENT: lips and mucous membranes moist  Neck: supple, no JVD  Chest: clear breath sounds bilateral, no crackles, ronchus or wheezings  CVS: distinct S1 & S2, normal rate, regular rhythm  Abdomen: soft, non-tender, non-distended, normoactive bowel sounds  Extremities: no edema of both legs  Skin: no rash  Neuro: awake, alert, oriented,  No asterixis or myoclonus     renal imaging:    A renal artery Doppler was done 2/27/2018 which shows that his right renal artery has less than 60% stenosis and in the main renal artery and a patent renal veins    The left renal artery has greater than 60% stenosis in the main renal artery and a patent renal veins    an ultrasound of the kidney and bladder was done on February 27 which shows a right kidney that was 9 3 x 5 2 cm and a left kidney that was 9 5 x 5 8 cm with normal echogenicity and contour-     Lab Results:    Results from last 7 days  Lab Units 03/26/18  1135   SODIUM mmol/L 138   POTASSIUM mmol/L 4 7   CHLORIDE mmol/L 105   CO2 mmol/L 25   BUN mg/dL 28*   CREATININE mg/dL 1 55*   CALCIUM mg/dL 9 5 GLUCOSE RANDOM mg/dL 236*         Portions of the record may have been created with voice recognition software  Occasional wrong word or "sound a like" substitutions may have occurred due to the inherent limitations of voice recognition software  Read the chart carefully and recognize, using context, where substitutions have occurred  If you have any questions, please contact the dictating provider

## 2018-03-28 NOTE — PATIENT INSTRUCTIONS
Jeffrey Gross, you were seen today for follow-up of chronic kidney disease  We discussed the followin  Based on your most recent blood work your renal function is improving and your creatinine is stable   2  We will repeat blood work and a urine study at the end of April to make sure this remains stable   3  If you  Decide on the carotid endarterectomy  Or any other contrast studies please let us know so that we can provide recommendations to reduce renal risk   4  Your blood pressure are now on the lower side will slowly titrate down your blood pressure medication  We will start by decreasing urine amlodipine from 10 mg down to 7 5 mg   5  If you have any other questions or concerns please let me know otherwise we will follow-up in about 2 months    Chronic Kidney Disease   WHAT YOU NEED TO KNOW:   Chronic kidney disease (CKD) is the gradual and permanent loss of kidney function  It is also called chronic kidney failure, or chronic renal insufficiency  Normally, the kidneys remove fluid, chemicals, and waste from your blood  These wastes are turned into urine by your kidneys  CKD may worsen over time and lead to kidney failure  DISCHARGE INSTRUCTIONS:   Return to the emergency department if:   · You are confused and very drowsy  · You have a seizure  · You have shortness of breath  Contact your healthcare provider if:   · You suddenly gain or lose more weight than your healthcare provider has told you is okay  · You have itchy skin or a rash  · You urinate more or less than you normally do  · You have blood in your urine  · You have nausea and repeated vomiting  · You have fatigue or muscle weakness  · You have hiccups that will not stop  · You have questions or concerns about your condition or care  Medicines:   · Medicines  may be given to decrease blood pressure and get rid of extra fluid   You may also receive medicine to manage health conditions that may occur with CKD, such as anemia, diabetes, and heart disease  · Take your medicine as directed  Contact your healthcare provider if you think your medicine is not helping or if you have side effects  Tell him or her if you are allergic to any medicine  Keep a list of the medicines, vitamins, and herbs you take  Include the amounts, and when and why you take them  Bring the list or the pill bottles to follow-up visits  Carry your medicine list with you in case of an emergency  Follow up with your healthcare provider as directed: You will need to return for tests to monitor your kidney function  You may also be referred to a kidney specialist  Write down your questions so you remember to ask them during your visits  Manage other health conditions: Follow your healthcare provider's directions on how to manage diabetes, high blood pressure, and heart disease  These conditions can make CKD worse  Talk to your healthcare provider before you take over-the-counter medicine  Medicines such as NSAIDs, stomach medicine, or laxatives may harm your kidneys  Weigh yourself daily:  Ask your healthcare provider what your weight should be  Ask how much liquid you should drink each day  CKD may cause you to gain or lose weight rapidly  Weigh yourself every day  Write down your weight, how much liquid you drink or eat, and how much you urinate each day  Contact your healthcare provider if your weight is higher or lower than it should be  Manage CKD:   · Maintain a healthy weight  Ask your healthcare provider how much you should weigh  Ask him to help you create a weight loss plan if you are overweight  · Exercise 30 to 60 minutes a day, 4 to 7 times a week, or as directed  Ask about the best exercise plan for you  Regular exercise can help you manage CKD, high blood pressure, and diabetes  · Follow your healthcare provider's advice about what to eat and drink    He may tell you to eat food low in sodium (salt), potassium, phosphorus, or protein  You may need to see a dietitian if you need help planning meals  Ask how much liquid to drink each day and which liquids are best for you  · Limit alcohol  Ask how much alcohol is safe for you to drink  A drink of alcohol is 12 ounces of beer, 5 ounces of wine, or 1½ ounces of liquor  · Do not smoke  Nicotine and other chemicals in cigarettes and cigars can cause lung and kidney damage  Ask your healthcare provider for information if you currently smoke and need help to quit  E-cigarettes or smokeless tobacco still contain nicotine  Talk to your healthcare provider before you use these products  · Ask your healthcare provider if you need vaccines  Infections such as pneumonia, influenza, and hepatitis can be more harmful or more likely to occur in a person who has CKD  Vaccines reduce your risk of infection with these viruses  © 2017 [facility] Information is for End User's use only and may not be sold, redistributed or otherwise used for commercial purposes  All illustrations and images included in CareNotes® are the copyrighted property of [employer] , [employer]  or Dylon Lambert  The above information is an  only  It is not intended as medical advice for individual conditions or treatments  Talk to your doctor, nurse or pharmacist before following any medical regimen to see if it is safe and effective for you

## 2018-04-06 ENCOUNTER — TELEPHONE (OUTPATIENT)
Dept: ENDOCRINOLOGY | Facility: CLINIC | Age: 73
End: 2018-04-06

## 2018-04-09 ENCOUNTER — TELEPHONE (OUTPATIENT)
Dept: ENDOCRINOLOGY | Facility: CLINIC | Age: 73
End: 2018-04-09

## 2018-04-09 NOTE — TELEPHONE ENCOUNTER
----- Message from [place] sent at 4/9/2018  4:11 PM EDT -----  Please call patient review of his dexcom report reveals his BG is running high overnight and into the morning  Due to his pattern of high BG please have him increase evening dose of Lantus to 38 units  Please notify the office immediately if he experiences any episodes of hypoglycemia

## 2018-04-09 NOTE — TELEPHONE ENCOUNTER
Wife called c/o that it has been 1 month since her husbands BS's have been addressed  They were first sent in on the fax Francisco Rolon  Then they were told to fax them again  Then they were told to send Dexcom reports  They did and then were called again to refax because they were cut off by fax  So the Friday before Easter they drove up here with the sensor to have it downloaded  The report was placed on [facility] and has not been reviewed yet  Wife has been calling ever since  I called wife back today to inform her that Michael Records was out at the clinic on Friday and is not in on Mondays and I spoke with the pt instead  I tried to inform him of what was going on  I apologized to him several times, he said that we were very unprofessional  I again apologized  He then hung up on me

## 2018-04-11 DIAGNOSIS — E03.9 HYPOTHYROIDISM, UNSPECIFIED TYPE: Primary | ICD-10-CM

## 2018-04-11 RX ORDER — LEVOTHYROXINE SODIUM 175 UG/1
175 TABLET ORAL
Qty: 90 TABLET | Refills: 3 | Status: SHIPPED | OUTPATIENT
Start: 2018-04-11 | End: 2019-12-06 | Stop reason: SDUPTHER

## 2018-04-16 ENCOUNTER — TELEPHONE (OUTPATIENT)
Dept: CARDIOLOGY CLINIC | Facility: CLINIC | Age: 73
End: 2018-04-16

## 2018-04-16 NOTE — TELEPHONE ENCOUNTER
Pt's spouse Lobo Torres called requesting to s/w you  States she emailed you but did not hear back  Advised you are out of the office- will return on Wednesday    C/b # [phone]

## 2018-04-24 ENCOUNTER — OFFICE VISIT (OUTPATIENT)
Dept: FAMILY MEDICINE CLINIC | Facility: CLINIC | Age: 73
End: 2018-04-24
Payer: MEDICARE

## 2018-04-24 ENCOUNTER — TRANSCRIBE ORDERS (OUTPATIENT)
Dept: LAB | Facility: CLINIC | Age: 73
End: 2018-04-24

## 2018-04-24 ENCOUNTER — LAB (OUTPATIENT)
Dept: LAB | Facility: CLINIC | Age: 73
End: 2018-04-24
Payer: MEDICARE

## 2018-04-24 VITALS
SYSTOLIC BLOOD PRESSURE: 138 MMHG | WEIGHT: 193.8 LBS | DIASTOLIC BLOOD PRESSURE: 72 MMHG | TEMPERATURE: 95.8 F | HEIGHT: 67 IN | RESPIRATION RATE: 16 BRPM | HEART RATE: 60 BPM | BODY MASS INDEX: 30.42 KG/M2

## 2018-04-24 DIAGNOSIS — N18.30 CKD (CHRONIC KIDNEY DISEASE) STAGE 3, GFR 30-59 ML/MIN (HCC): ICD-10-CM

## 2018-04-24 DIAGNOSIS — K59.00 CONSTIPATION, UNSPECIFIED CONSTIPATION TYPE: ICD-10-CM

## 2018-04-24 DIAGNOSIS — R30.0 DYSURIA: Primary | ICD-10-CM

## 2018-04-24 DIAGNOSIS — I25.10 ATHEROSCLEROSIS OF NATIVE CORONARY ARTERY OF NATIVE HEART, ANGINA PRESENCE UNSPECIFIED: ICD-10-CM

## 2018-04-24 LAB
ALBUMIN SERPL BCP-MCNC: 3.5 G/DL (ref 3.5–5)
ALP SERPL-CCNC: 102 U/L (ref 46–116)
ALT SERPL W P-5'-P-CCNC: 15 U/L (ref 12–78)
ANION GAP SERPL CALCULATED.3IONS-SCNC: 7 MMOL/L (ref 4–13)
AST SERPL W P-5'-P-CCNC: 13 U/L (ref 5–45)
BACTERIA UR QL AUTO: ABNORMAL /HPF
BILIRUB SERPL-MCNC: 0.45 MG/DL (ref 0.2–1)
BILIRUB UR QL STRIP: NEGATIVE
BUN SERPL-MCNC: 20 MG/DL (ref 5–25)
CALCIUM SERPL-MCNC: 9.4 MG/DL (ref 8.3–10.1)
CHLORIDE SERPL-SCNC: 108 MMOL/L (ref 100–108)
CHOLEST SERPL-MCNC: 126 MG/DL (ref 50–200)
CLARITY UR: CLEAR
CO2 SERPL-SCNC: 28 MMOL/L (ref 21–32)
COLOR UR: YELLOW
CREAT SERPL-MCNC: 1.29 MG/DL (ref 0.6–1.3)
CREAT UR-MCNC: 161 MG/DL
GFR SERPL CREATININE-BSD FRML MDRD: 55 ML/MIN/1.73SQ M
GLUCOSE P FAST SERPL-MCNC: 98 MG/DL (ref 65–99)
GLUCOSE UR STRIP-MCNC: NEGATIVE MG/DL
HDLC SERPL-MCNC: 49 MG/DL (ref 40–60)
HGB UR QL STRIP.AUTO: ABNORMAL
HYALINE CASTS #/AREA URNS LPF: ABNORMAL /LPF
KETONES UR STRIP-MCNC: NEGATIVE MG/DL
LDLC SERPL CALC-MCNC: 46 MG/DL (ref 0–100)
LEUKOCYTE ESTERASE UR QL STRIP: NEGATIVE
MAGNESIUM SERPL-MCNC: 2.2 MG/DL (ref 1.6–2.6)
NITRITE UR QL STRIP: NEGATIVE
NON-SQ EPI CELLS URNS QL MICRO: ABNORMAL /HPF
NONHDLC SERPL-MCNC: 77 MG/DL
PH UR STRIP.AUTO: 6 [PH] (ref 4.5–8)
PHOSPHATE SERPL-MCNC: 3.8 MG/DL (ref 2.3–4.1)
POTASSIUM SERPL-SCNC: 4.1 MMOL/L (ref 3.5–5.3)
PROT SERPL-MCNC: 7.5 G/DL (ref 6.4–8.2)
PROT UR STRIP-MCNC: ABNORMAL MG/DL
PROT UR-MCNC: 186 MG/DL
PROT/CREAT UR: 1.16 MG/G{CREAT} (ref 0–0.1)
PTH-INTACT SERPL-MCNC: 44.8 PG/ML (ref 18.4–80.1)
RBC #/AREA URNS AUTO: ABNORMAL /HPF
SODIUM SERPL-SCNC: 143 MMOL/L (ref 136–145)
SP GR UR STRIP.AUTO: 1.02 (ref 1–1.03)
TRIGL SERPL-MCNC: 156 MG/DL
UROBILINOGEN UR QL STRIP.AUTO: 0.2 E.U./DL
WBC #/AREA URNS AUTO: ABNORMAL /HPF

## 2018-04-24 PROCEDURE — 81001 URINALYSIS AUTO W/SCOPE: CPT | Performed by: INTERNAL MEDICINE

## 2018-04-24 PROCEDURE — 87086 URINE CULTURE/COLONY COUNT: CPT

## 2018-04-24 PROCEDURE — 36415 COLL VENOUS BLD VENIPUNCTURE: CPT | Performed by: INTERNAL MEDICINE

## 2018-04-24 PROCEDURE — 84156 ASSAY OF PROTEIN URINE: CPT | Performed by: INTERNAL MEDICINE

## 2018-04-24 PROCEDURE — 83970 ASSAY OF PARATHORMONE: CPT | Performed by: INTERNAL MEDICINE

## 2018-04-24 PROCEDURE — 80053 COMPREHEN METABOLIC PANEL: CPT | Performed by: INTERNAL MEDICINE

## 2018-04-24 PROCEDURE — 99213 OFFICE O/P EST LOW 20 MIN: CPT | Performed by: FAMILY MEDICINE

## 2018-04-24 PROCEDURE — 82570 ASSAY OF URINE CREATININE: CPT | Performed by: INTERNAL MEDICINE

## 2018-04-24 PROCEDURE — 80061 LIPID PANEL: CPT

## 2018-04-24 PROCEDURE — 83735 ASSAY OF MAGNESIUM: CPT | Performed by: INTERNAL MEDICINE

## 2018-04-24 PROCEDURE — 84100 ASSAY OF PHOSPHORUS: CPT | Performed by: INTERNAL MEDICINE

## 2018-04-24 NOTE — PROGRESS NOTES
FAMILY PRACTICE OFFICE VISIT       NAME: Luiza Artis  AGE: 67 y o  SEX: male       : 1945        MRN: [id number]    DATE: 2018  TIME: 5:19 AM    Assessment and Plan     Problem List Items Addressed This Visit     CKD (chronic kidney disease) stage 3, GFR 30-59 ml/min      Other Visit Diagnoses     Dysuria    -  Primary    Relevant Orders    Urine culture    Constipation, unspecified constipation type           Patient presents for evaluation of intermittent symptoms of dysuria and constipation  Elevated blood sugars within past few days  He denies symptoms of polyuria or polydipsia  Will hold of antibiotic therapy today  Will await results of urine culture  I advised patient to force fluids  He will get in touch with [facility] to schedule his appointment for earlier date  We discussed treatment of constipation including start of probiotic, daily fiber supplements, if patient does not have bowel movement for more than 48 hr-he should use MiraLax on a as needed basis  Will discontinue vitamin D2 and will switch him to daily vitamin D3 2000 units daily  There are no Patient Instructions on file for this visit  Chief Complaint     Chief Complaint   Patient presents with    Urinary Tract Infection     Patient is here c/o burning with urination and urinary frequency x's 1 wks   Constipation     Patient also c/o constipation x's 3 wks  History of Present Illness     Constipated for 3 weeks   uses colace,  Senokot   Constipated for up to 3 days  Patient recently developed intermittent symptoms of urinary burning , urinary frequency  Nocturia- 3 per night ( occ  Up to 5  Times per night)  Concerned about  Vitamin D2 causing his symptoms  Patient has been using the supplement for 2 months    He remains under care of [facility]  Patient denies abdominal pain, hematuria or flank pain  No fever or chills  Patient is concerned about elevated blood sugars within past few days, fasting sugars are in 300s  Patient is under care of [facility]  Urinary Tract Infection    Pertinent negatives include no hematuria or nausea  Constipation   Pertinent negatives include no abdominal pain or nausea  Review of Systems   Review of Systems   Constitutional: Positive for fatigue  Respiratory: Negative  Cardiovascular: Negative  Gastrointestinal: Positive for constipation  Negative for abdominal pain and nausea  Genitourinary: Positive for dysuria  Negative for hematuria  Musculoskeletal: Positive for arthralgias  Neurological: Negative          Active Problem List     Patient Active Problem List   Diagnosis    DARINEL (acute kidney injury) ([facility] )    Type 1 diabetes mellitus (HCC)    Presence of drug coated stent in LAD coronary artery    Coronary artery disease of native artery of native heart with stable angina pectoris ([facility])    Presence of drug coated stent in left circumflex coronary artery    Dyslipidemia    Obstructive sleep apnea of adult    Carotid artery stenosis, asymptomatic, bilateral    History of ischemic cardiomyopathy    Shortness of breath on exertion    Atherosclerosis of both lower extremities with intermittent claudication ([facility] )    Restrictive lung disease    COPD (chronic obstructive pulmonary disease) ([facility] )    Benign hypertension with chronic kidney disease, stage III    CKD (chronic kidney disease) stage 3, GFR 30-59 ml/min    Proteinuria    Renal artery stenosis ([facility])    Renal cyst, right    Vitamin D deficiency    A[facility]ac occlusive disease ([facility] )    Hypothyroidism, postablative       Past Medical History:  Past Medical History:   Diagnosis Date    Acute kidney injury ([facility] )     resolved 11/30/2015    Acute venous embolism and thrombosis of deep vessels of proximal lower extremity ([facility] )     resolved 04/04/2015    Cardiac disease     heart attack, stents x 4    CHF (congestive heart failure) ([place] [address] )     Chronic cough     resolved 02/04/2016    COPD (chronic obstructive pulmonary disease) (HCC)     Diabetes mellitus [facility] )     Disease of thyroid gland     D[facility]deep venous thrombosis) [facility] )     Hypertension     Ischemic cardiomyopathy     last assessed 09/26/2017    Pulmonary granuloma (HCC)     resolved 02/03/2017    Renal failure     Sleep apnea        Past Surgical History:  Past Surgical History:   Procedure Laterality Date    BYPASS FEMORAL-POPLITEAL      initial stenosis with stent left, 7 x 100 smart stent onset 02/24/2014    CORONARY ANGIOPLASTY WITH STENT PLACEMENT      x4       Family History:  Family History   Problem Relation Age of Onset    Heart disease Father      pacer placement    Hypertension Father     Kidney disease Father     Heart failure Father     Heart attack Father     Liver disease Father     Cirrhosis Mother      due to beer consumption    Liver disease Mother     Diabetes Other        Social History:  Social History     Social History    Marital status: /Civil Union     Spouse name: N/A    Number of children: N/A    Years of education: N/A     Occupational History    Retired     [employer] work     [employer]      Social History Main Topics    Smoking status: Former Smoker     Packs/day: 4 00     Years: 48 00     Types: Cigarettes     Quit date: 2008    Smokeless tobacco: Never Used      Comment: smoking 48 years 1 5 ppd d/c oct 2008 screening protocol as per allscripts    Alcohol use 12 6 oz/week     21 Standard drinks or equivalent per week      Comment: 2-3 glasses of vodka daily (6 shots) (history 2 drinks per day as per allscripts    Drug use: No    Sexual activity: No     Other Topics Concern    Not on file     Social History Narrative    No narrative on file       Objective     Vitals:    04/24/18 1147   BP: 138/72   Pulse: 60   Resp: 16   Temp: (!) 95 8 °F (35 4 °C)     Wt Readings from Last 3 Encounters:   04/24/18 87 9 kg (193 lb 12 8 oz)   03/28/18 88 9 kg (196 lb)   03/15/18 90 3 kg (199 lb)       Physical Exam   Constitutional: He is oriented to person, place, and time  He appears well-developed and well-nourished  HENT:   Head: Normocephalic and atraumatic  Eyes: Conjunctivae are normal    Neck: Neck supple  Carotid bruit is not present  Cardiovascular: Normal rate, regular rhythm and normal heart sounds  No murmur heard  Pulmonary/Chest: Effort normal and breath sounds normal  No respiratory distress  He has no wheezes  He has no rales  Abdominal: Soft  Normal appearance and bowel sounds are normal  He exhibits no distension and no abdominal bruit  There is no tenderness  Musculoskeletal: Normal range of motion  Neurological: He is alert and oriented to person, place, and time  Psychiatric: He has a normal mood and affect  His behavior is normal    Nursing note and vitals reviewed        Pertinent Laboratory/Diagnostic Studies:  Lab Results   Component Value Date    GLUCOSE 236 (H) 03/26/2018    BUN 20 04/24/2018    CREATININE 1 29 04/24/2018    CALCIUM 9 4 04/24/2018     04/24/2018    K 4 1 04/24/2018    CO2 28 04/24/2018     04/24/2018     Lab Results   Component Value Date    ALT 15 04/24/2018    AST 13 04/24/2018    ALKPHOS 102 04/24/2018    BILITOT 0 45 04/24/2018       Lab Results   Component Value Date    WBC 5 68 02/15/2018    HGB 13 5 02/15/2018    HCT 39 9 02/15/2018    MCV 95 02/15/2018     02/15/2018       No results found for: TSH    Lab Results   Component Value Date    CHOL 126 04/24/2018     Lab Results   Component Value Date    TRIG 156 (H) 04/24/2018     Lab Results   Component Value Date    HDL 49 04/24/2018     Lab Results   Component Value Date    LDLCALC 46 04/24/2018     Lab Results   Component Value Date    HGBA1C 7 9 (H) 02/27/2018       Results for orders placed or performed in visit on 04/24/18   Lipid panel   Result Value Ref Range    Cholesterol 126 50 - 200 mg/dL Triglycerides 156 (H) <=150 mg/dL    HDL, Direct 49 40 - 60 mg/dL    LDL Calculated 46 0 - 100 mg/dL    Non-HDL-Chol (CHOL-HDL) 77 mg/dl       Orders Placed This Encounter   Procedures    Urine culture       ALLERGIES:  Allergies   Allergen Reactions    Cardura [Doxazosin Mesylate]     Other      Iv dye for cardiac cath  Renal failure very close to dialysis  But no dialysis    Tylenol [Acetaminophen]      interferes with reading of blood sugar    Zestril [Lisinopril]        Current Medications     Current Outpatient Prescriptions   Medication Sig Dispense Refill    [START ON 4/28/2018] amLODIPine (NORVASC) 2 5 mg tablet Take 3 tablets (7 5 mg total) by mouth daily 90 tablet 90    aspirin 81 MG tablet Take 81 mg by mouth daily   carvedilol (COREG) 25 mg tablet Take 12 5 mg by mouth 2 (two) times a day with meals        clopidogrel (PLAVIX) 75 mg tablet Take 75 mg by mouth daily at bedtime      insulin glargine (LANTUS) 100 units/mL subcutaneous injection Inject 28 Units under the skin daily at bedtime (Patient taking differently: Inject 28 Units under the skin daily at bedtime Take 22 units in the am and 38 units at bedtime ) 10 mL 0    insulin lispro (HUMALOG) 100 units/mL injection Inject 10-15 Units under the skin 3 (three) times a day 10 mL 3    levothyroxine 175 mcg tablet Take 1 tablet (175 mcg total) by mouth daily in the early morning 90 tablet 3    nitroglycerin (NITROSTAT) 0 4 mg SL tablet Place 0 4 mg under the tongue every 5 (five) minutes as needed for chest pain   rosuvastatin (CRESTOR) 40 MG tablet Take 1 tablet by mouth daily 90 tablet 3    traMADol (ULTRAM) 50 mg tablet Take 1 tablet (50 mg total) by mouth every 8 (eight) hours as needed for moderate pain (1-2 tablets prn for pain) 270 tablet 0    Ubiquinol 200 MG CAPS Take 200 mg by mouth daily        VITAMIN K PO Take 1 tablet by mouth daily      furosemide (LASIX) 40 mg tablet Take 20 mg by mouth daily as needed (take one is wt greater then 183lbs)         No current facility-administered medications for this visit            Health Maintenance     Health Maintenance   Topic Date Due    Hepatitis C Screening  1945    SLP PLAN OF CARE  1945    COLONOSCOPY  1945    Depression Screening PHQ-9  04/27/1957    Lung Cancer Screening  04/27/2000    Fall Risk  04/27/2010    GLAUCOMA SCREENING 67+ YR  04/27/2012    URINE MICROALBUMIN  10/01/2016    OPHTHALMOLOGY EXAM  03/21/2018    HEMOGLOBIN A1C  08/27/2018    Diabetic Foot Exam  03/06/2019    DTaP,Tdap,and Td Vaccines (2 - Td) 05/05/2023    INFLUENZA VACCINE  Completed    ABDOMINAL AORTIC ANEURYSM (AAA) SCREEN  Completed    PNEUMOCOCCAL POLYSACCHARIDE VACCINE AGE 72 AND OVER  Completed     Immunization History   Administered Date(s) Administered    DT (pediatric) 12/01/2009    H1N1, All Formulations 12/01/2009    Influenza 10/01/2008, 10/06/2009, 09/01/2010, 11/04/2013    Influenza Split High Dose Preservative Free IM 08/28/2014, 10/03/2016    Influenza TIV (IM) 10/01/2008, 10/19/2010, 09/20/2011, 08/01/2012, 09/13/2013, 09/15/2015, 09/03/2017    Pneumococcal Conjugate 13-Valent 08/11/2015    Pneumococcal Polysaccharide PPV23 10/01/2008, 06/04/2009, 03/15/2017    Tdap 05/05/2013    Zoster 10/01/2009       Jamel Rosas MD

## 2018-04-25 ENCOUNTER — TELEPHONE (OUTPATIENT)
Dept: FAMILY MEDICINE CLINIC | Facility: CLINIC | Age: 73
End: 2018-04-25

## 2018-04-25 LAB — BACTERIA UR CULT: NORMAL

## 2018-04-25 NOTE — TELEPHONE ENCOUNTER
----- Message from Mirna Vidales MD sent at 4/25/2018  3:28 PM EDT -----  Please contact patient or his wife    Urine culture was negative, no evidence of UTI

## 2018-04-26 ENCOUNTER — OFFICE VISIT (OUTPATIENT)
Dept: PULMONOLOGY | Facility: CLINIC | Age: 73
End: 2018-04-26
Payer: MEDICARE

## 2018-04-26 ENCOUNTER — OFFICE VISIT (OUTPATIENT)
Dept: VASCULAR SURGERY | Facility: CLINIC | Age: 73
End: 2018-04-26
Payer: MEDICARE

## 2018-04-26 ENCOUNTER — TELEPHONE (OUTPATIENT)
Dept: VASCULAR SURGERY | Facility: CLINIC | Age: 73
End: 2018-04-26

## 2018-04-26 VITALS
WEIGHT: 191 LBS | SYSTOLIC BLOOD PRESSURE: 142 MMHG | DIASTOLIC BLOOD PRESSURE: 64 MMHG | HEIGHT: 67 IN | TEMPERATURE: 96.1 F | RESPIRATION RATE: 16 BRPM | HEART RATE: 62 BPM | BODY MASS INDEX: 29.98 KG/M2

## 2018-04-26 VITALS
HEART RATE: 70 BPM | BODY MASS INDEX: 30.7 KG/M2 | WEIGHT: 196 LBS | SYSTOLIC BLOOD PRESSURE: 144 MMHG | DIASTOLIC BLOOD PRESSURE: 62 MMHG | RESPIRATION RATE: 18 BRPM | TEMPERATURE: 97.6 F | OXYGEN SATURATION: 98 %

## 2018-04-26 DIAGNOSIS — I65.23 CAROTID ARTERY STENOSIS, ASYMPTOMATIC, BILATERAL: ICD-10-CM

## 2018-04-26 DIAGNOSIS — J44.9 CHRONIC OBSTRUCTIVE PULMONARY DISEASE, UNSPECIFIED COPD TYPE (HCC): Primary | ICD-10-CM

## 2018-04-26 DIAGNOSIS — J98.4 RESTRICTIVE LUNG DISEASE: ICD-10-CM

## 2018-04-26 DIAGNOSIS — G47.33 OBSTRUCTIVE SLEEP APNEA OF ADULT: ICD-10-CM

## 2018-04-26 DIAGNOSIS — I74.09 AORTOILIAC OCCLUSIVE DISEASE (HCC): Primary | Chronic | ICD-10-CM

## 2018-04-26 DIAGNOSIS — I70.213 ATHEROSCLEROSIS OF NATIVE ARTERY OF BOTH LOWER EXTREMITIES WITH INTERMITTENT CLAUDICATION (HCC): ICD-10-CM

## 2018-04-26 DIAGNOSIS — N18.30 CKD (CHRONIC KIDNEY DISEASE) STAGE 3, GFR 30-59 ML/MIN (HCC): ICD-10-CM

## 2018-04-26 PROBLEM — R06.02 SHORTNESS OF BREATH ON EXERTION: Status: RESOLVED | Noted: 2018-01-24 | Resolved: 2018-04-26

## 2018-04-26 PROCEDURE — 99214 OFFICE O/P EST MOD 30 MIN: CPT | Performed by: SURGERY

## 2018-04-26 PROCEDURE — 99214 OFFICE O/P EST MOD 30 MIN: CPT | Performed by: INTERNAL MEDICINE

## 2018-04-26 NOTE — ASSESSMENT & PLAN NOTE
Aortoiliac and infrainguinal arterial occlusive disease with severe disabling claudication of both lower extremities  We discussed the findings on duplex evaluation and the indications for treatment along with their associated risks and benefits  He is admit the something needs to be done in the very near future because he is so severely limited by his symptoms  We will plan on proceeding with arteriography and possible endovascular intervention in the near future  We will obtain Nephrology a valve and clearance

## 2018-04-26 NOTE — ASSESSMENT & PLAN NOTE
· Not currently on inhalers  · Has shortness of breath with exertion but has been inactive due to claudication symptoms in the legs and vascular disease    He is significantly deconditioned

## 2018-04-26 NOTE — TELEPHONE ENCOUNTER
Spoke to [place] from from [facility] and patient was recently seen by Doctor Tavares Baxter  Faxed over clearance form and hydration sheet

## 2018-04-26 NOTE — PROGRESS NOTES
Assessment/Plan:    Carotid artery stenosis, asymptomatic, bilateral  Asymptomatic high-grade bilateral internal carotid artery stenosis  The patient made it very clear today that he does not want any form of intervention in regards to his carotid occlusive disease until his lower extremity claudication symptoms are treated  He does understand the risk of stroke and other complication in this regard  We will plan on duplex follow-up which will be scheduled in the next month and further treatment recommendations as he recovers from intervention from his lower extremities  Atherosclerosis of both lower extremities with intermittent claudication (HCC)  Aortoiliac and infrainguinal arterial occlusive disease with severe disabling claudication of both lower extremities  We discussed the findings on duplex evaluation and the indications for treatment along with their associated risks and benefits  He is admit the something needs to be done in the very near future because he is so severely limited by his symptoms  We will plan on proceeding with arteriography and possible endovascular intervention in the near future  We will obtain Nephrology a valve and clearance  CKD (chronic kidney disease) stage 3, GFR 30-59 ml/min  Chronic renal insufficiency with most recent serum creatinine of 1 29  His renal function appears to be stable  He has been followed by Nephrology  We will ask Nephrology to make appropriate recommendations from hydration and medical management and preparation for his contrast based study  Diagnoses and all orders for this visit:    Aortoiliac occlusive disease ([address] )    Atherosclerosis of native artery of both lower extremities with intermittent claudication (HCC)    Carotid artery stenosis, asymptomatic, bilateral  -     VAS carotid complete study; Future    CKD (chronic kidney disease) stage 3, GFR 30-59 ml/min               Patient ID: Austin Mass is a 67 y o  male    Chief Complaint: Pt is here to discuss carotid endart  Pt has questions before proceeding with SX  Pt denies TIA/ Stroke symptoms  Pt denies one-sided weakness  No facial droopiness  Pt is taking ASA  Pt does not have HX of stroke  Pt is c/o of cramping pain to bilateral legs when walking that has been ongoing for years  Pt states he has to stop for 10-15-20 minutes depending on how bad the pain is  Pt states he has some numbness or tingling to bilateral feet  Pt denies resting pain  Pt denies open wounds or sores  Pt offers no other complaints  70-year-old with known severe peripheral arterial occlusive disease to include carotid occlusive disease, aortoiliac and infrainguinal arterial occlusive disease presents in follow-up  Over the past 4-5 months plans had been made in regards to treatment of his asymptomatic critical bilateral internal carotid artery stenosis  He has been hesitant to proceed until all medical issues are accounted for  At this point he now feels that proceeding with his non symptomatic carotid disease needs to wait until his severe limiting claudication symptoms are treated  We had a very long discussion in regards to his findings on multiple imaging studies in regards to both his carotid and infrainguinal arterial occlusive disease along with the risks of stroke wall his lower extremity disease is treated  He is adamant that he understands the risk and feels the legs must be treated initially  On evaluation today he states he experiences claudication symptoms of both calves at very short distances which severely limits his ability to perform any activities  These symptoms do resolve with rest but recur very quickly  He denies specific symptoms of rest pain but does have an underlying neuropathy and spinal disease  Previous arterial imaging studies from 02/20/2018 are reviewed    These show bilateral greater than 75% external iliac artery stenoses with evidence of bilateral superficial femoral artery segmental occlusion and a high-grade stenosis of the left deep femoral artery  Ankle-brachial indices are 0 4 bilaterally and toe pressures 48 on the right and 40 on the left  This has been a progression over the past year  Previous arteriogram from 2014 is also reviewed  At that time a short segment occlusion of the left above knee popliteal artery was successfully treated with angioplasty and stenting with 3 vessel runoff to the foot  There was no significant disease of the more proximal superficial femoral artery segment at that time  Prolonged discussion with the patient and wife as noted above incompetence over 45 minutes to include patient discussion and image review of which over half was spent with the patient and family  The following portions of the patient's history were reviewed and updated as appropriate: allergies, current medications, past family history, past medical history, past social history, past surgical history and problem list     Review of Systems   Constitutional: Positive for activity change and fatigue  HENT: Negative  Eyes: Negative  Respiratory: Positive for shortness of breath  Cardiovascular: Negative  Gastrointestinal: Positive for constipation  Endocrine: Negative  Genitourinary: Positive for frequency  Musculoskeletal: Positive for arthralgias, back pain and gait problem  Leg pain   leg cramping when walking  Skin: Negative  Allergic/Immunologic: Negative  Hematological: Negative  Psychiatric/Behavioral: Positive for agitation  Objective:      /64 (BP Location: Left arm, Patient Position: Sitting, Cuff Size: Adult)   Pulse 62   Temp (!) 96 1 °F (35 6 °C) (Tympanic)   Resp 16   Ht 5' 7" (1 702 m)   Wt 86 6 kg (191 lb)   BMI 29 91 kg/m²          Physical Exam      Operative Scheduling Information:    Hospital:  [facility]    Physician:  Elle Gasca and Any [facility] doc    Surgery:   Aortogram, bilateral runoff possible endovascular intervention left    Urgency:  Standard    Case Length:  Normal    Post-op Bed:  Outpatient    OR Table:  [facility]    Equipment Needs:      Medication Instructions:  Aspirin:   Continue (do not hold)  Plavix:  Continue (do not hold)    Hydration:  Hydration as per Nephrology as well as medication hold as per Nephrology

## 2018-04-26 NOTE — PATIENT INSTRUCTIONS
Carotid artery stenosis, asymptomatic, bilateral  Asymptomatic high-grade bilateral internal carotid artery stenosis  The patient made it very clear today that he does not want any form of intervention in regards to his carotid occlusive disease until his lower extremity claudication symptoms are treated  He does understand the risk of stroke and other complication in this regard  We will plan on duplex follow-up which will be scheduled in the next month and further treatment recommendations as he recovers from intervention from his lower extremities  Atherosclerosis of both lower extremities with intermittent claudication (HCC)  Aortoiliac and infrainguinal arterial occlusive disease with severe disabling claudication of both lower extremities  We discussed the findings on duplex evaluation and the indications for treatment along with their associated risks and benefits  He is admit the something needs to be done in the very near future because he is so severely limited by his symptoms  We will plan on proceeding with arteriography and possible endovascular intervention in the near future  We will obtain Nephrology a valve and clearance  CKD (chronic kidney disease) stage 3, GFR 30-59 ml/min  Chronic renal insufficiency with most recent serum creatinine of 1 29  His renal function appears to be stable  He has been followed by Nephrology  We will ask Nephrology to make appropriate recommendations from hydration and medical management and preparation for his contrast based study

## 2018-04-26 NOTE — LETTER
April 26, 2018     Estelle Tuttle MD  [address]    Patient: Celeste Delatorre   YOB: 1945   Date of Visit: 4/26/2018       Dear Dr Dow Marrow: Thank you for referring Kristin Díaz to me for evaluation  Below are my notes for this consultation  If you have questions, please do not hesitate to call me  I look forward to following your patient along with you  Sincerely,        Kwan Fox MD        CC: MD Kwan Patel MD  4/26/2018  1:56 PM  Sign at close encounter    Progress note - Pulmonary Medicine   Celeste Delatorre 67 y o  male MRN: [id number]       Impression & Plan:     Obstructive sleep apnea of adult  · Sleeping much better, no snoring, feels better and refreshed after a night's sleep with the new pressure setting of 12 cm of water  · Continue present therapy  No compliance concerns  · Follow-up 6 months    Restrictive lung disease  · Recent spirometry suggests mild restriction  Lung volumes were indeterminate  Mild to moderate diffusing capacity impairment as well  · Overlap with COPD but symptoms mostly from deconditioning and inactivity    COPD (chronic obstructive pulmonary disease) (HCC)  · Not currently on inhalers  · Has shortness of breath with exertion but has been inactive due to claudication symptoms in the legs and vascular disease  He is significantly deconditioned    I will see him back in 6 months for follow-up of sleep apnea and pulmonary conditions  No pulmonary limitation to the intended surgery and would proceed as planned     ______________________________________________________________________    HPI:    Celeste Delatorre presents today for follow-up of obstructive sleep apnea, shortness of breath related to restrictive lung disease, COPD, deconditioning, and vascular disease  Recently he has been undergoing evaluation for claudication symptoms    He has both significant peripheral vascular disease as well as carotid disease  He has elected to proceed with surgical intervention on his legs for the claudication and this is in the process of being scheduled with Dr Yao [address]  He has seen nephrology for clearance due to his stage 3 chronic kidney disease and has also seen Cardiology for clearance due to his history of coronary artery disease with stenting previously  Carmela Siegel does still have shortness of breath with exertion  He is mostly limited exertionally by his claudication symptoms however  He cannot exercise much as a result of this and has become deconditioned  We did change his CPAP settings at the last visit  This was due to increased symptoms with somnolence, snoring, and general malaise  He tells me that the increase in his pressures as well as the new mask and equipment has made a big difference in his sleep quality  He is sleeping throughout the night, not snoring, and feels refreshed after a night's sleep  Review of Systems:  Review of Systems   Constitutional: Negative for chills, fatigue and unexpected weight change  HENT: Positive for sore throat  Negative for postnasal drip and sinus pressure  Cardiovascular: Negative for chest pain, palpitations and leg swelling  Gastrointestinal: Negative  Endocrine: Negative  Genitourinary: Negative  Musculoskeletal: Negative for arthralgias and myalgias  Allergic/Immunologic: Negative  Neurological: Negative  Hematological: Negative  Psychiatric/Behavioral: Negative            Past medical history, surgical history, and family history were reviewed and updated as appropriate    Social history updates:  History   Smoking Status    Former Smoker    Packs/day: 4 00    Years: 48 00    Types: Cigarettes    Quit date: 2008   Smokeless Tobacco    Never Used     Comment: smoking 48 years 1 5 ppd d/c oct 2008 screening protocol as per allscripts       PhysicalExamination:  Vitals:   /62   Pulse 70   Temp 97 6 °F (36 4 °C)   Resp 18   Wt 88 9 kg (196 lb)   SpO2 98%   BMI 30 70 kg/m²      Physical Exam:   Gen: Comfortable  Non-labored on room air  HEENT: PERRL  O/P: clear  moist  Neck: Trachea is midline  No JVD  No adenopathy  Chest:  Slightly distant breath sounds with limited chest wall excursion but otherwise clear to auscultation  Cardiac:  Regular  no murmur  Abdomen: Soft and nontender  Bowel sounds are present  Extremities: No edema    Diagnostic Data:  Labs: I personally reviewed the most recent laboratory data pertinent to today's visit    Lab Results   Component Value Date    WBC 5 68 02/15/2018    HGB 13 5 02/15/2018    HCT 39 9 02/15/2018    MCV 95 02/15/2018     02/15/2018     Lab Results   Component Value Date    GLUCOSE 236 (H) 03/26/2018    CALCIUM 9 4 04/24/2018     04/24/2018    K 4 1 04/24/2018    CO2 28 04/24/2018     04/24/2018    BUN 20 04/24/2018    CREATININE 1 29 04/24/2018     No results found for: IGE  Lab Results   Component Value Date    ALT 15 04/24/2018    AST 13 04/24/2018    ALKPHOS 102 04/24/2018    BILITOT 0 45 04/24/2018       PFT results: The most recent pulmonary function tests were reviewed  Recent pulmonary function test from February showed mild restriction on spirometry  Lung volumes were not valid for interpretation    Diffusing capacity was mildly reduced    Other studies:  Multiple vascular studies have been performed and were reviewed    Hai Pablo MD

## 2018-04-26 NOTE — ASSESSMENT & PLAN NOTE
Chronic renal insufficiency with most recent serum creatinine of 1 29  His renal function appears to be stable  He has been followed by Nephrology  We will ask Nephrology to make appropriate recommendations from hydration and medical management and preparation for his contrast based study

## 2018-04-26 NOTE — ASSESSMENT & PLAN NOTE
Asymptomatic high-grade bilateral internal carotid artery stenosis  The patient made it very clear today that he does not want any form of intervention in regards to his carotid occlusive disease until his lower extremity claudication symptoms are treated  He does understand the risk of stroke and other complication in this regard  We will plan on duplex follow-up which will be scheduled in the next month and further treatment recommendations as he recovers from intervention from his lower extremities

## 2018-04-26 NOTE — LETTER
April 26, 2018     Carlie Ho MD  [address]    Patient: Cassy Roque   YOB: 1945   Date of Visit: 4/26/2018       Dear Dr Consuelo Barrios: Thank you for referring Hermilo Hirsch to me for evaluation  Below are the relevant portions of my assessment and plan of care  Carotid artery stenosis, asymptomatic, bilateral  Asymptomatic high-grade bilateral internal carotid artery stenosis  The patient made it very clear today that he does not want any form of intervention in regards to his carotid occlusive disease until his lower extremity claudication symptoms are treated  He does understand the risk of stroke and other complication in this regard  We will plan on duplex follow-up which will be scheduled in the next month and further treatment recommendations as he recovers from intervention from his lower extremities  Atherosclerosis of both lower extremities with intermittent claudication (HCC)  Aortoiliac and infrainguinal arterial occlusive disease with severe disabling claudication of both lower extremities  We discussed the findings on duplex evaluation and the indications for treatment along with their associated risks and benefits  He is admit the something needs to be done in the very near future because he is so severely limited by his symptoms  We will plan on proceeding with arteriography and possible endovascular intervention in the near future  We will obtain Nephrology a valve and clearance  CKD (chronic kidney disease) stage 3, GFR 30-59 ml/min  Chronic renal insufficiency with most recent serum creatinine of 1 29  His renal function appears to be stable  He has been followed by Nephrology  We will ask Nephrology to make appropriate recommendations from hydration and medical management and preparation for his contrast based study  If you have questions, please do not hesitate to call me   I look forward to following Jarvis Valenzuela along with you           Sincerely,        Marco A Fitzgerald MD        CC: Henry Oneil, Roanne Severs, MD

## 2018-04-26 NOTE — ASSESSMENT & PLAN NOTE
· Recent spirometry suggests mild restriction  Lung volumes were indeterminate  Mild to moderate diffusing capacity impairment as well    · Overlap with COPD but symptoms mostly from deconditioning and inactivity

## 2018-04-26 NOTE — PROGRESS NOTES
Progress note - Pulmonary Medicine   Esequiel Garcia 67 y o  male MRN: [id number]       Impression & Plan:     Obstructive sleep apnea of adult  · Sleeping much better, no snoring, feels better and refreshed after a night's sleep with the new pressure setting of 12 cm of water  · Continue present therapy  No compliance concerns  · Follow-up 6 months    Restrictive lung disease  · Recent spirometry suggests mild restriction  Lung volumes were indeterminate  Mild to moderate diffusing capacity impairment as well  · Overlap with COPD but symptoms mostly from deconditioning and inactivity    COPD (chronic obstructive pulmonary disease) (HCC)  · Not currently on inhalers  · Has shortness of breath with exertion but has been inactive due to claudication symptoms in the legs and vascular disease  He is significantly deconditioned    I will see him back in 6 months for follow-up of sleep apnea and pulmonary conditions  No pulmonary limitation to the intended surgery and would proceed as planned     ______________________________________________________________________    HPI:    Esequiel Garcia presents today for follow-up of obstructive sleep apnea, shortness of breath related to restrictive lung disease, COPD, deconditioning, and vascular disease  Recently he has been undergoing evaluation for claudication symptoms  He has both significant peripheral vascular disease as well as carotid disease  He has elected to proceed with surgical intervention on his legs for the claudication and this is in the process of being scheduled with Dr Homer Ledezma  He has seen nephrology for clearance due to his stage 3 chronic kidney disease and has also seen Cardiology for clearance due to his history of coronary artery disease with stenting previously  Susu Arellano does still have shortness of breath with exertion  He is mostly limited exertionally by his claudication symptoms however    He cannot exercise much as a result of this and has become deconditioned  We did change his CPAP settings at the last visit  This was due to increased symptoms with somnolence, snoring, and general malaise  He tells me that the increase in his pressures as well as the new mask and equipment has made a big difference in his sleep quality  He is sleeping throughout the night, not snoring, and feels refreshed after a night's sleep  Review of Systems:  Review of Systems   Constitutional: Negative for chills, fatigue and unexpected weight change  HENT: Positive for sore throat  Negative for postnasal drip and sinus pressure  Cardiovascular: Negative for chest pain, palpitations and leg swelling  Gastrointestinal: Negative  Endocrine: Negative  Genitourinary: Negative  Musculoskeletal: Negative for arthralgias and myalgias  Allergic/Immunologic: Negative  Neurological: Negative  Hematological: Negative  Psychiatric/Behavioral: Negative  Past medical history, surgical history, and family history were reviewed and updated as appropriate    Social history updates:  History   Smoking Status    Former Smoker    Packs/day: 4 00    Years: 48 00    Types: Cigarettes    Quit date: 2008   Smokeless Tobacco    Never Used     Comment: smoking 48 years 1 5 ppd d/c oct 2008 screening protocol as per allscripts       PhysicalExamination:  Vitals:   /62   Pulse 70   Temp 97 6 °F (36 4 °C)   Resp 18   Wt 88 9 kg (196 lb)   SpO2 98%   BMI 30 70 kg/m²     Physical Exam:   Gen: Comfortable  Non-labored on room air  HEENT: PERRL  O/P: clear  moist  Neck: Trachea is midline  No JVD  No adenopathy  Chest:  Slightly distant breath sounds with limited chest wall excursion but otherwise clear to auscultation  Cardiac:  Regular  no murmur  Abdomen: Soft and nontender  Bowel sounds are present  Extremities: No edema    Diagnostic Data:  Labs:   I personally reviewed the most recent laboratory data pertinent to today's visit    Lab Results   Component Value Date    WBC 5 68 02/15/2018    HGB 13 5 02/15/2018    HCT 39 9 02/15/2018    MCV 95 02/15/2018     02/15/2018     Lab Results   Component Value Date    GLUCOSE 236 (H) 03/26/2018    CALCIUM 9 4 04/24/2018     04/24/2018    K 4 1 04/24/2018    CO2 28 04/24/2018     04/24/2018    BUN 20 04/24/2018    CREATININE 1 29 04/24/2018     No results found for: IGE  Lab Results   Component Value Date    ALT 15 04/24/2018    AST 13 04/24/2018    ALKPHOS 102 04/24/2018    BILITOT 0 45 04/24/2018       PFT results: The most recent pulmonary function tests were reviewed  Recent pulmonary function test from February showed mild restriction on spirometry  Lung volumes were not valid for interpretation    Diffusing capacity was mildly reduced    Other studies:  Multiple vascular studies have been performed and were reviewed    Monica Ortega MD

## 2018-04-30 ENCOUNTER — PREP FOR PROCEDURE (OUTPATIENT)
Dept: VASCULAR SURGERY | Facility: CLINIC | Age: 73
End: 2018-04-30

## 2018-04-30 NOTE — TELEPHONE ENCOUNTER
Dr Elle Gasca received pt message re: clearance recommendations from Dr Erika Wilder  Called her office and s/w Karen Cruz - explained the message is not part of pt's chart and we will need the clearance form and iv hydration order sheet we prev sent to them completed and faxed back to us  She will ask Dr Erika Wilder to complete

## 2018-05-02 DIAGNOSIS — N18.30 CKD (CHRONIC KIDNEY DISEASE) STAGE 3, GFR 30-59 ML/MIN (HCC): Primary | ICD-10-CM

## 2018-05-07 DIAGNOSIS — I74.09 AORTOILIAC OCCLUSIVE DISEASE (HCC): Primary | ICD-10-CM

## 2018-05-08 ENCOUNTER — TELEPHONE (OUTPATIENT)
Dept: VASCULAR SURGERY | Facility: CLINIC | Age: 73
End: 2018-05-08

## 2018-05-08 ENCOUNTER — OFFICE VISIT (OUTPATIENT)
Dept: CARDIOLOGY CLINIC | Facility: CLINIC | Age: 73
End: 2018-05-08
Payer: MEDICARE

## 2018-05-08 VITALS
WEIGHT: 194.6 LBS | BODY MASS INDEX: 30.54 KG/M2 | HEART RATE: 76 BPM | OXYGEN SATURATION: 96 % | HEIGHT: 67 IN | SYSTOLIC BLOOD PRESSURE: 128 MMHG | DIASTOLIC BLOOD PRESSURE: 60 MMHG

## 2018-05-08 DIAGNOSIS — Z95.5 PRESENCE OF DRUG COATED STENT IN LAD CORONARY ARTERY: ICD-10-CM

## 2018-05-08 DIAGNOSIS — E78.5 DYSLIPIDEMIA: ICD-10-CM

## 2018-05-08 DIAGNOSIS — I12.9 BENIGN HYPERTENSION WITH CHRONIC KIDNEY DISEASE, STAGE III (HCC): ICD-10-CM

## 2018-05-08 DIAGNOSIS — I25.10 CORONARY ARTERY DISEASE INVOLVING NATIVE CORONARY ARTERY OF NATIVE HEART WITHOUT ANGINA PECTORIS: Primary | ICD-10-CM

## 2018-05-08 DIAGNOSIS — Z95.5 PRESENCE OF DRUG COATED STENT IN LEFT CIRCUMFLEX CORONARY ARTERY: ICD-10-CM

## 2018-05-08 DIAGNOSIS — I65.23 CAROTID ARTERY STENOSIS, ASYMPTOMATIC, BILATERAL: ICD-10-CM

## 2018-05-08 DIAGNOSIS — G47.33 OBSTRUCTIVE SLEEP APNEA OF ADULT: ICD-10-CM

## 2018-05-08 DIAGNOSIS — N18.30 CKD (CHRONIC KIDNEY DISEASE) STAGE 3, GFR 30-59 ML/MIN (HCC): ICD-10-CM

## 2018-05-08 DIAGNOSIS — Z86.79 HISTORY OF ISCHEMIC CARDIOMYOPATHY: ICD-10-CM

## 2018-05-08 DIAGNOSIS — N18.30 BENIGN HYPERTENSION WITH CHRONIC KIDNEY DISEASE, STAGE III (HCC): ICD-10-CM

## 2018-05-08 PROCEDURE — 99214 OFFICE O/P EST MOD 30 MIN: CPT | Performed by: INTERNAL MEDICINE

## 2018-05-08 RX ORDER — MELATONIN
1000 DAILY
COMMUNITY

## 2018-05-08 RX ORDER — CARVEDILOL 6.25 MG/1
6.25 TABLET ORAL 2 TIMES DAILY WITH MEALS
Qty: 180 TABLET | Refills: 3 | Status: SHIPPED | OUTPATIENT
Start: 2018-05-08 | End: 2018-12-05 | Stop reason: SDUPTHER

## 2018-05-08 RX ORDER — AMLODIPINE BESYLATE 5 MG/1
5 TABLET ORAL DAILY
Refills: 0 | Status: CANCELLED | OUTPATIENT
Start: 2018-05-08

## 2018-05-08 NOTE — TELEPHONE ENCOUNTER
LMOM regarding pts pre and post hydration orders for his upcoming agram on 5/17/18  I informed that it will be 4 hours of hydration both before and after agram, and that the procedure is scheduled at 10:30am   Also informed that IR will be calling them with a phone consult on 5/15 and they will go over orders at that time

## 2018-05-09 ENCOUNTER — OFFICE VISIT (OUTPATIENT)
Dept: NEPHROLOGY | Facility: HOSPITAL | Age: 73
End: 2018-05-09
Payer: MEDICARE

## 2018-05-09 VITALS
SYSTOLIC BLOOD PRESSURE: 129 MMHG | WEIGHT: 195 LBS | HEART RATE: 78 BPM | DIASTOLIC BLOOD PRESSURE: 78 MMHG | HEIGHT: 67 IN | BODY MASS INDEX: 30.61 KG/M2

## 2018-05-09 DIAGNOSIS — N18.30 STAGE 3 CHRONIC KIDNEY DISEASE (HCC): Primary | ICD-10-CM

## 2018-05-09 PROCEDURE — 99214 OFFICE O/P EST MOD 30 MIN: CPT | Performed by: INTERNAL MEDICINE

## 2018-05-09 RX ORDER — AMLODIPINE BESYLATE 2.5 MG/1
7.5 TABLET ORAL DAILY
COMMUNITY
End: 2018-11-28 | Stop reason: ALTCHOICE

## 2018-05-09 NOTE — PROGRESS NOTES
OFFICE FOLLOW UP - Nephrology   Keronnataleemaxwell Carvalho 68 y o  male MRN: [id number]    Encounter: [id number]      ASSESSMENT and PLAN:  Diagnoses and all orders for this visit:    He is 68years old with a longstanding history of type 2 diabetes mellitus, renal artery stenosis and hypertension, peripheral vascular disease and carotid artery stenosis who presents for follow-up of stage 3 chronic kidney disease    CKD (chronic kidney disease) stage 3, GFR 30-59 ml/min  -  Creatinine now is at baseline of 1 3  - he had a recent acute kidney injury related to pre renal azotemia, his Lasix has been used as needed  - the etiology of his chronic kidney disease is likely related to the fact that he has prolonged standing history of diabetes and likely diabetic nephropathy with subsequent episodes of acute kidney injury that have left some residual scarring and now chronic kidney disease  - blood pressures remain well controlled  -  Repeat blood pressure was at goal of 129/78  - order sent for renal prophylaxis for a lower extremity angiogram    Type 1 diabetes mellitus with complication (HCC)  - continue  Glycemic control he has seen Endocrinology as well    Renal artery stenosis ([facility] [address] )  - he has bilateral renal artery stenosis at less than 60% he gets serial renal artery Dopplers  - currently being managed medically and is stable    Benign hypertension with chronic kidney disease, stage III  - as above regarding his blood pressure  - decrease amlodipine and continue to monitor if blood pressure remains less than 077 systolic will decrease amlodipine to 5 mg daily  - continue carvedilol 12 5 mg 2 times daily    DARINEL (acute kidney injury) ([facility] [address] )  - likely secondary to pre renal azotemia creatinine peaked to 2 3 Lasix held increased  Fluid intake and now improved    Renal cyst, right  - renal ultrasound was checked in February of 2018 which showed a stable simple cyst in the upper pole of the right kidney and a normal left kidney and bladder,    Proteinuria, unspecified type  - he does have about 1 2 g of protein once his creatinine is in steady state will continue to quantify and do a further serologic workup currently not anemic or hypercalcemia,  And no associated hematuria as his RBCs were negative    HPI: Aaron Arroyo is a 68 y o  male who is here for  Chronic kidney disease     since his last office visit he is talked with vascular surgery regarding his carotid arteries, he wants to have a lower extremity vascular procedures before he has any carotid artery procedures, he is scheduled to have a angiogram done later this month  He denies any chest pain or shortness of Breath his good urine output, has had some increased stress as his wife is in the hospital at the [facility] for sepsis  He otherwise denies any other complaints at this time and and is it in his usual state of health his lower extremity pain remained significant his creatinine has come back down to baseline    ROS:   All the systems were reviewed and were negative except as documented on the HPI  Allergies: Cardura [doxazosin mesylate]; Other; Tylenol [acetaminophen]; and Zestril [lisinopril]    Medications:   Current Outpatient Prescriptions:     amLODIPine (NORVASC) 2 5 mg tablet, Take 7 5 mg by mouth daily, Disp: , Rfl:     aspirin 81 MG tablet, Take 81 mg by mouth daily  , Disp: , Rfl:     carvedilol (COREG) 6 25 mg tablet, Take 1 tablet (6 25 mg total) by mouth 2 (two) times a day with meals, Disp: 180 tablet, Rfl: 3    cholecalciferol (VITAMIN D3) 1,000 units tablet, Take 10,000 Units by mouth daily, Disp: , Rfl:     clopidogrel (PLAVIX) 75 mg tablet, Take 75 mg by mouth daily at bedtime, Disp: , Rfl:     insulin glargine (LANTUS) 100 units/mL subcutaneous injection, Inject 28 Units under the skin daily at bedtime (Patient taking differently: Inject 28 Units under the skin daily at bedtime Take 22 units in the am and 38 units at bedtime ), Disp: 10 mL, Rfl: 0    insulin lispro (HUMALOG) 100 units/mL injection, Inject 10-15 Units under the skin 3 (three) times a day, Disp: 10 mL, Rfl: 3    levothyroxine 175 mcg tablet, Take 1 tablet (175 mcg total) by mouth daily in the early morning, Disp: 90 tablet, Rfl: 3    nitroglycerin (NITROSTAT) 0 4 mg SL tablet, Place 0 4 mg under the tongue every 5 (five) minutes as needed for chest pain , Disp: , Rfl:     rosuvastatin (CRESTOR) 40 MG tablet, Take 1 tablet by mouth daily, Disp: 90 tablet, Rfl: 3    traMADol (ULTRAM) 50 mg tablet, Take 1 tablet (50 mg total) by mouth every 8 (eight) hours as needed for moderate pain (1-2 tablets prn for pain), Disp: 270 tablet, Rfl: 0    Ubiquinol 200 MG CAPS, Take 200 mg by mouth daily  , Disp: , Rfl:     VITAMIN K PO, Take 1 tablet by mouth daily, Disp: , Rfl:     Acetylcysteine 600 MG CAPS, Take 2 capsules (1,200 mg total) by mouth 2 (two) times a day, Disp: 8 capsule, Rfl: 0    Past Medical History:   Diagnosis Date    Acute kidney injury ([facility] [address] )     resolved 11/30/2015    Acute venous embolism and thrombosis of deep vessels of proximal lower extremity ([facility] [address] )     resolved 04/04/2015    Cardiac disease     heart attack, stents x 4    [facility]congest[facility] failure) ([facility])     Chronic cough     resolved 02/04/2016    COPD (chronic obstructive pulmonary disease) ([facility] [address] [facility]     Diabetes mellitus ([facility] [address] [facility]     Disease of thyroid gland     DVT (deep venous thrombosis) ([facility] [address] [facility]     Hypertension     Ischemic cardiomyopathy     last assessed 09/26/2017    Pulmonary granuloma ([facility] [address] )     resolved 02/03/2017    Renal failure     Sleep apnea      Past Surgical History:   Procedure Laterality Date    BYPASS FEMORAL-POPLITEAL      initial stenosis with stent left, 7 x 100 smart stent onset 02/24/2014    CORONARY ANGIOPLASTY WITH STENT PLACEMENT      x4     Family History   Problem Relation Age of Onset    Heart disease Father      pacer placement    Hypertension Father     Kidney disease Father     Heart failure Father     Heart attack Father     Liver disease Father     Cirrhosis Mother      due to beer consumption    Liver disease Mother     Diabetes Other       reports that he quit smoking about 10 years ago  His smoking use included Cigarettes  He has a 192 00 pack-year smoking history  He has never used smokeless tobacco  He reports that he drinks about 12 6 oz of alcohol per week   He reports that he does not use drugs  Physical Exam:   Vitals:    05/09/18 1123   BP: 148/78   BP Location: Left arm   Patient Position: Sitting   Cuff Size: Adult   Pulse: 78   Weight: 88 5 kg (195 lb)   Height: 5' 7" (1 702 m)     Body mass index is 30 54 kg/m²  General: conscious, cooperative, in not acute distress  Eyes: conjunctivae pink, anicteric sclerae  ENT: lips and mucous membranes moist  Neck: supple, no JVD  Chest: clear breath sounds bilateral, no crackles, ronchus or wheezings  CVS: distinct S1 & S2, normal rate, regular rhythm  Abdomen: soft, non-tender, non-distended, normoactive bowel sounds  Extremities: no edema of both legs  Skin: no rash  Neuro: awake, alert, oriented,  No asterixis or myoclonus     renal imaging:    A renal artery Doppler was done 2/27/2018 which shows that his right renal artery has less than 60% stenosis and in the main renal artery and a patent renal veins    The left renal artery has greater than 60% stenosis in the main renal artery and a patent renal veins    an ultrasound of the kidney and bladder was done on February 27 which shows a right kidney that was 9 3 x 5 2 cm and a left kidney that was 9 5 x 5 8 cm with normal echogenicity and contour-     Lab Results:      Results for orders placed or performed in visit on 04/24/18   Urine culture   Result Value Ref Range    Urine Culture <10,000 cfu/ml     Lipid panel   Result Value Ref Range    Cholesterol 126 50 - 200 mg/dL    Triglycerides 156 (H) <=150 mg/dL    HDL, Direct 49 40 - 60 mg/dL    LDL Calculated 46 0 - 100 mg/dL    Non-HDL-Chol (CHOL-HDL) 77 mg/dl      creatinine 1 3  Portions of the record may have been created with voice recognition software  Occasional wrong word or "sound a like" substitutions may have occurred due to the inherent limitations of voice recognition software  Read the chart carefully and recognize, using context, where substitutions have occurred  If you have any questions, please contact the dictating provider

## 2018-05-09 NOTE — PATIENT INSTRUCTIONS
Chronic Kidney Disease   WHAT YOU NEED TO KNOW:   Chronic kidney disease (CKD) is the gradual and permanent loss of kidney function  It is also called chronic kidney failure, or chronic renal insufficiency  Normally, the kidneys remove fluid, chemicals, and waste from your blood  These wastes are turned into urine by your kidneys  CKD may worsen over time and lead to kidney failure  DISCHARGE INSTRUCTIONS:   Return to the emergency department if:   · You are confused and very drowsy  · You have a seizure  · You have shortness of breath  Contact your healthcare provider if:   · You suddenly gain or lose more weight than your healthcare provider has told you is okay  · You have itchy skin or a rash  · You urinate more or less than you normally do  · You have blood in your urine  · You have nausea and repeated vomiting  · You have fatigue or muscle weakness  · You have hiccups that will not stop  · You have questions or concerns about your condition or care  Medicines:   · Medicines  may be given to decrease blood pressure and get rid of extra fluid  You may also receive medicine to manage health conditions that may occur with CKD, such as anemia, diabetes, and heart disease  · Take your medicine as directed  Contact your healthcare provider if you think your medicine is not helping or if you have side effects  Tell him or her if you are allergic to any medicine  Keep a list of the medicines, vitamins, and herbs you take  Include the amounts, and when and why you take them  Bring the list or the pill bottles to follow-up visits  Carry your medicine list with you in case of an emergency  Follow up with your healthcare provider as directed: You will need to return for tests to monitor your kidney function  You may also be referred to a kidney specialist  Write down your questions so you remember to ask them during your visits  Manage other health conditions:   Follow your healthcare provider's directions on how to manage diabetes, high blood pressure, and heart disease  These conditions can make CKD worse  Talk to your healthcare provider before you take over-the-counter medicine  Medicines such as NSAIDs, stomach medicine, or laxatives may harm your kidneys  Weigh yourself daily:  Ask your healthcare provider what your weight should be  Ask how much liquid you should drink each day  CKD may cause you to gain or lose weight rapidly  Weigh yourself every day  Write down your weight, how much liquid you drink or eat, and how much you urinate each day  Contact your healthcare provider if your weight is higher or lower than it should be  Manage CKD:   · Maintain a healthy weight  Ask your healthcare provider how much you should weigh  Ask him to help you create a weight loss plan if you are overweight  · Exercise 30 to 60 minutes a day, 4 to 7 times a week, or as directed  Ask about the best exercise plan for you  Regular exercise can help you manage CKD, high blood pressure, and diabetes  · Follow your healthcare provider's advice about what to eat and drink  He may tell you to eat food low in sodium (salt), potassium, phosphorus, or protein  You may need to see a dietitian if you need help planning meals  Ask how much liquid to drink each day and which liquids are best for you  · Limit alcohol  Ask how much alcohol is safe for you to drink  A drink of alcohol is 12 ounces of beer, 5 ounces of wine, or 1½ ounces of liquor  · Do not smoke  Nicotine and other chemicals in cigarettes and cigars can cause lung and kidney damage  Ask your healthcare provider for information if you currently smoke and need help to quit  E-cigarettes or smokeless tobacco still contain nicotine  Talk to your healthcare provider before you use these products  · Ask your healthcare provider if you need vaccines    Infections such as pneumonia, influenza, and hepatitis can be more harmful or more likely to occur in a person who has CKD  Vaccines reduce your risk of infection with these viruses  © 2017 [facility] Information is for End User's use only and may not be sold, redistributed or otherwise used for commercial purposes  All illustrations and images included in CareNotes® are the copyrighted property of A D A M [employer] Inc  or Dylon Lambert  The above information is an  only  It is not intended as medical advice for individual conditions or treatments  Talk to your doctor, nurse or pharmacist before following any medical regimen to see if it is safe and effective for you

## 2018-05-09 NOTE — LETTER
May 9, 2018     Ministerio Eaton MD  [id number]    Patient: Nguyen Preston   YOB: 1945   Date of Visit: 5/9/2018       Dear Dr Kalee Rowan: Thank you for referring Rishi Mcduffie to me for evaluation  Below are my notes for this consultation  If you have questions, please do not hesitate to call me  I look forward to following your patient along with you           Sincerely,        Radha Beebe DO        CC: No Recipients  Radha Beebe DO  5/9/2018 12:49 PM  Sign at close encounter  OFFICE FOLLOW UP - Nephrology   Nguyen Preston 68 y o  male MRN: [id number]    Encounter: [id number]      ASSESSMENT and PLAN:  Diagnoses and all orders for this visit:    He is 68years old with a longstanding history of type 2 diabetes mellitus, renal artery stenosis and hypertension, peripheral vascular disease and carotid artery stenosis who presents for follow-up of stage 3 chronic kidney disease    CKD (chronic kidney disease) stage 3, GFR 30-59 ml/min  -  Creatinine now is at baseline of 1 3  - he had a recent acute kidney injury related to pre renal azotemia, his Lasix has been used as needed  - the etiology of his chronic kidney disease is likely related to the fact that he has prolonged standing history of diabetes and likely diabetic nephropathy with subsequent episodes of acute kidney injury that have left some residual scarring and now chronic kidney disease  - blood pressures remain well controlled  -  Repeat blood pressure was at goal of 129/78  - order sent for renal prophylaxis for a lower extremity angiogram    Type 1 diabetes mellitus with complication (HCC)  - continue  Glycemic control he has seen Endocrinology as well    Renal artery stenosis ([facility] [address] )  - he has bilateral renal artery stenosis at less than 60% he gets serial renal artery Dopplers  - currently being managed medically and is stable    Benign hypertension with chronic kidney disease, stage III  - as above regarding his blood pressure  - decrease amlodipine and continue to monitor if blood pressure remains less than 618 systolic will decrease amlodipine to 5 mg daily  - continue carvedilol 12 5 mg 2 times daily    DARINEL (acute kidney injury) ([facility] [address] )  - likely secondary to pre renal azotemia creatinine peaked to 2 3 Lasix held increased  Fluid intake and now improved    Renal cyst, right  - renal ultrasound was checked in February of 2018 which showed a stable simple cyst in the upper pole of the right kidney and a normal left kidney and bladder,    Proteinuria, unspecified type  - he does have about 1 2 g of protein once his creatinine is in steady state will continue to quantify and do a further serologic workup currently not anemic or hypercalcemia,  And no associated hematuria as his RBCs were negative    HPI: Jose Moreau is a 68 y o  male who is here for  Chronic kidney disease     since his last office visit he is talked with vascular surgery regarding his carotid arteries, he wants to have a lower extremity vascular procedures before he has any carotid artery procedures, he is scheduled to have a angiogram done later this month  He denies any chest pain or shortness of Breath his good urine output, has had some increased stress as his wife is in the hospital at [facility] for sepsis  He otherwise denies any other complaints at this time and and is it in his usual state of health his lower extremity pain remained significant his creatinine has come back down to baseline    ROS:   All the systems were reviewed and were negative except as documented on the HPI  Allergies: Cardura [doxazosin mesylate]; Other; Tylenol [acetaminophen]; and Zestril [lisinopril]    Medications:   Current Outpatient Prescriptions:     amLODIPine (NORVASC) 2 5 mg tablet, Take 7 5 mg by mouth daily, Disp: , Rfl:     aspirin 81 MG tablet, Take 81 mg by mouth daily  , Disp: , Rfl:     carvedilol (COREG) 6 25 mg tablet, Take 1 tablet (6 25 mg total) by mouth 2 (two) times a day with meals, Disp: 180 tablet, Rfl: 3    cholecalciferol (VITAMIN D3) 1,000 units tablet, Take 10,000 Units by mouth daily, Disp: , Rfl:     clopidogrel (PLAVIX) 75 mg tablet, Take 75 mg by mouth daily at bedtime, Disp: , Rfl:     insulin glargine (LANTUS) 100 units/mL subcutaneous injection, Inject 28 Units under the skin daily at bedtime (Patient taking differently: Inject 28 Units under the skin daily at bedtime Take 22 units in the am and 38 units at bedtime ), Disp: 10 mL, Rfl: 0    insulin lispro (HUMALOG) 100 units/mL injection, Inject 10-15 Units under the skin 3 (three) times a day, Disp: 10 mL, Rfl: 3    levothyroxine 175 mcg tablet, Take 1 tablet (175 mcg total) by mouth daily in the early morning, Disp: 90 tablet, Rfl: 3    nitroglycerin (NITROSTAT) 0 4 mg SL tablet, Place 0 4 mg under the tongue every 5 (five) minutes as needed for chest pain , Disp: , Rfl:     rosuvastatin (CRESTOR) 40 MG tablet, Take 1 tablet by mouth daily, Disp: 90 tablet, Rfl: 3    traMADol (ULTRAM) 50 mg tablet, Take 1 tablet (50 mg total) by mouth every 8 (eight) hours as needed for moderate pain (1-2 tablets prn for pain), Disp: 270 tablet, Rfl: 0    Ubiquinol 200 MG CAPS, Take 200 mg by mouth daily  , Disp: , Rfl:     VITAMIN K PO, Take 1 tablet by mouth daily, Disp: , Rfl:     Acetylcysteine 600 MG CAPS, Take 2 capsules (1,200 mg total) by mouth 2 (two) times a day, Disp: 8 capsule, Rfl: 0    Past Medical History:   Diagnosis Date    Acute kidney injury ([facility] [address] )     resolved 11/30/2015    Acute venous embolism and thrombosis of deep vessels of proximal lower extremity ([facility] [address] )     resolved 04/04/2015    Cardiac disease     heart attack, stents x 4    CHF (congestive heart failure) (HCC)     Chronic cough     resolved 02/04/2016    COPD (chronic obstructive pulmonary disease) ([facility] [address]     Diabetes mellitus ([facility] [address] )     Disease of thyroid gland     DVT (deep venous thrombosis) ([facility] [address]     Hypertension     Ischemic cardiomyopathy     last assessed 09/26/2017    Pulmonary granuloma ([facility] [address] )     resolved 02/03/2017    Renal failure     Sleep apnea      Past Surgical History:   Procedure Laterality Date    BYPASS FEMORAL-POPLITEAL      initial stenosis with stent left, 7 x 100 smart stent onset 02/24/2014    CORONARY ANGIOPLASTY WITH STENT PLACEMENT      x4     Family History   Problem Relation Age of Onset    Heart disease Father      pacer placement    Hypertension Father     Kidney disease Father     Heart failure Father     Heart attack Father     Liver disease Father     Cirrhosis Mother      due to beer consumption    Liver disease Mother     Diabetes Other       reports that he quit smoking about 10 years ago  His smoking use included Cigarettes  He has a 192 00 pack-year smoking history  He has never used smokeless tobacco  He reports that he drinks about 12 6 oz of alcohol per week   He reports that he does not use drugs  Physical Exam:   Vitals:    05/09/18 1123   BP: 148/78   BP Location: Left arm   Patient Position: Sitting   Cuff Size: Adult   Pulse: 78   Weight: 88 5 kg (195 lb)   Height: 5' 7" (1 702 m)     Body mass index is 30 54 kg/m²  General: conscious, cooperative, in not acute distress  Eyes: conjunctivae pink, anicteric sclerae  ENT: lips and mucous membranes moist  Neck: supple, no JVD  Chest: clear breath sounds bilateral, no crackles, ronchus or wheezings  CVS: distinct S1 & S2, normal rate, regular rhythm  Abdomen: soft, non-tender, non-distended, normoactive bowel sounds  Extremities: no edema of both legs  Skin: no rash  Neuro: awake, alert, oriented,  No asterixis or myoclonus     renal imaging:    A renal artery Doppler was done 2/27/2018 which shows that his right renal artery has less than 60% stenosis and in the main renal artery and a patent renal veins    The left renal artery has greater than 60% stenosis in the main renal artery and a patent renal veins    an ultrasound of the kidney and bladder was done on February 27 which shows a right kidney that was 9 3 x 5 2 cm and a left kidney that was 9 5 x 5 8 cm with normal echogenicity and contour-     Lab Results:      Results for orders placed or performed in visit on 04/24/18   Urine culture   Result Value Ref Range    Urine Culture <10,000 cfu/ml     Lipid panel   Result Value Ref Range    Cholesterol 126 50 - 200 mg/dL    Triglycerides 156 (H) <=150 mg/dL    HDL, Direct 49 40 - 60 mg/dL    LDL Calculated 46 0 - 100 mg/dL    Non-HDL-Chol (CHOL-HDL) 77 mg/dl      creatinine 1 3  Portions of the record may have been created with voice recognition software  Occasional wrong word or "sound a like" substitutions may have occurred due to the inherent limitations of voice recognition software  Read the chart carefully and recognize, using context, where substitutions have occurred  If you have any questions, please contact the dictating provider

## 2018-05-15 ENCOUNTER — TELEPHONE (OUTPATIENT)
Dept: RADIOLOGY | Facility: HOSPITAL | Age: 73
End: 2018-05-15

## 2018-05-15 ENCOUNTER — TELEPHONE (OUTPATIENT)
Dept: ENDOCRINOLOGY | Facility: CLINIC | Age: 73
End: 2018-05-15

## 2018-05-15 RX ORDER — SODIUM CHLORIDE 9 MG/ML
100 INJECTION, SOLUTION INTRAVENOUS CONTINUOUS
Status: CANCELLED | OUTPATIENT
Start: 2018-05-15

## 2018-05-15 RX ORDER — SODIUM CHLORIDE 9 MG/ML
75 INJECTION, SOLUTION INTRAVENOUS CONTINUOUS
Status: CANCELLED | OUTPATIENT
Start: 2018-05-15

## 2018-05-15 NOTE — TELEPHONE ENCOUNTER
Spoke to adelita his wife and she is aware to increase lantus to 24 units with breakfast and 38 unit with dinner

## 2018-05-16 ENCOUNTER — TELEPHONE (OUTPATIENT)
Dept: INPATIENT UNIT | Facility: HOSPITAL | Age: 73
End: 2018-05-16

## 2018-05-17 ENCOUNTER — HOSPITAL ENCOUNTER (OUTPATIENT)
Dept: RADIOLOGY | Facility: HOSPITAL | Age: 73
Discharge: HOME | End: 2018-05-17
Attending: SURGERY | Admitting: SURGERY
Payer: MEDICARE

## 2018-05-17 VITALS
SYSTOLIC BLOOD PRESSURE: 153 MMHG | BODY MASS INDEX: 29.82 KG/M2 | HEIGHT: 67 IN | WEIGHT: 190 LBS | TEMPERATURE: 97.8 F | RESPIRATION RATE: 18 BRPM | HEART RATE: 71 BPM | DIASTOLIC BLOOD PRESSURE: 67 MMHG | OXYGEN SATURATION: 93 %

## 2018-05-17 DIAGNOSIS — I74.09 AORTOILIAC OCCLUSIVE DISEASE (HCC): ICD-10-CM

## 2018-05-17 DIAGNOSIS — R52 PAIN: ICD-10-CM

## 2018-05-17 LAB
ANION GAP SERPL CALCULATED.3IONS-SCNC: 9 MMOL/L (ref 4–13)
BUN SERPL-MCNC: 24 MG/DL (ref 5–25)
CALCIUM SERPL-MCNC: 9.1 MG/DL (ref 8.3–10.1)
CHLORIDE SERPL-SCNC: 104 MMOL/L (ref 100–108)
CO2 SERPL-SCNC: 24 MMOL/L (ref 21–32)
CREAT SERPL-MCNC: 1.68 MG/DL (ref 0.6–1.3)
ERYTHROCYTE [DISTWIDTH] IN BLOOD BY AUTOMATED COUNT: 13.6 % (ref 11.6–15.1)
GFR SERPL CREATININE-BSD FRML MDRD: 40 ML/MIN/1.73SQ M
GLUCOSE P FAST SERPL-MCNC: 240 MG/DL (ref 65–99)
GLUCOSE SERPL-MCNC: 240 MG/DL (ref 65–140)
HCT VFR BLD AUTO: 35.9 % (ref 36.5–49.3)
HGB BLD-MCNC: 12.4 G/DL (ref 12–17)
INR PPP: 1.07 (ref 0.86–1.16)
MCH RBC QN AUTO: 32.2 PG (ref 26.8–34.3)
MCHC RBC AUTO-ENTMCNC: 34.5 G/DL (ref 31.4–37.4)
MCV RBC AUTO: 93 FL (ref 82–98)
PLATELET # BLD AUTO: 135 THOUSANDS/UL (ref 149–390)
PMV BLD AUTO: 12.2 FL (ref 8.9–12.7)
POTASSIUM SERPL-SCNC: 4.1 MMOL/L (ref 3.5–5.3)
PROTHROMBIN TIME: 13.9 SECONDS (ref 11.8–14.2)
RBC # BLD AUTO: 3.85 MILLION/UL (ref 3.88–5.62)
SODIUM SERPL-SCNC: 137 MMOL/L (ref 136–145)
WBC # BLD AUTO: 5.89 THOUSAND/UL (ref 4.31–10.16)

## 2018-05-17 PROCEDURE — C1760 CLOSURE DEV, VASC: HCPCS

## 2018-05-17 PROCEDURE — 96360 HYDRATION IV INFUSION INIT: CPT

## 2018-05-17 PROCEDURE — C1876 STENT, NON-COA/NON-COV W/DEL: HCPCS

## 2018-05-17 PROCEDURE — 85027 COMPLETE CBC AUTOMATED: CPT | Performed by: SURGERY

## 2018-05-17 PROCEDURE — C1894 INTRO/SHEATH, NON-LASER: HCPCS

## 2018-05-17 PROCEDURE — C1725 CATH, TRANSLUMIN NON-LASER: HCPCS

## 2018-05-17 PROCEDURE — 76937 US GUIDE VASCULAR ACCESS: CPT

## 2018-05-17 PROCEDURE — 80048 BASIC METABOLIC PNL TOTAL CA: CPT | Performed by: SURGERY

## 2018-05-17 PROCEDURE — 99152 MOD SED SAME PHYS/QHP 5/>YRS: CPT | Performed by: SURGERY

## 2018-05-17 PROCEDURE — C1769 GUIDE WIRE: HCPCS

## 2018-05-17 PROCEDURE — 99152 MOD SED SAME PHYS/QHP 5/>YRS: CPT

## 2018-05-17 PROCEDURE — 75625 CONTRAST EXAM ABDOMINL AORTA: CPT | Performed by: SURGERY

## 2018-05-17 PROCEDURE — 37223 HB ILIAC REVASC W/STENT ADD-ON: CPT

## 2018-05-17 PROCEDURE — 37221 PR REVASCULARIZE ILIAC ARTERY,ANGIOPLASTY/STENT, INITIAL VESSEL: CPT | Performed by: SURGERY

## 2018-05-17 PROCEDURE — 96361 HYDRATE IV INFUSION ADD-ON: CPT

## 2018-05-17 PROCEDURE — 85610 PROTHROMBIN TIME: CPT | Performed by: SURGERY

## 2018-05-17 PROCEDURE — 99153 MOD SED SAME PHYS/QHP EA: CPT

## 2018-05-17 PROCEDURE — 37221 HB ILIAC REVASC W/STENT: CPT

## 2018-05-17 RX ORDER — DIPHENHYDRAMINE HYDROCHLORIDE 50 MG/ML
INJECTION INTRAMUSCULAR; INTRAVENOUS CODE/TRAUMA/SEDATION MEDICATION
Status: COMPLETED | OUTPATIENT
Start: 2018-05-17 | End: 2018-05-17

## 2018-05-17 RX ORDER — HEPARIN SODIUM 1000 [USP'U]/ML
INJECTION, SOLUTION INTRAVENOUS; SUBCUTANEOUS CODE/TRAUMA/SEDATION MEDICATION
Status: COMPLETED | OUTPATIENT
Start: 2018-05-17 | End: 2018-05-17

## 2018-05-17 RX ORDER — SODIUM CHLORIDE 9 MG/ML
100 INJECTION, SOLUTION INTRAVENOUS CONTINUOUS
Status: DISCONTINUED | OUTPATIENT
Start: 2018-05-17 | End: 2018-05-17 | Stop reason: HOSPADM

## 2018-05-17 RX ORDER — FENTANYL CITRATE 50 UG/ML
INJECTION, SOLUTION INTRAMUSCULAR; INTRAVENOUS CODE/TRAUMA/SEDATION MEDICATION
Status: COMPLETED | OUTPATIENT
Start: 2018-05-17 | End: 2018-05-17

## 2018-05-17 RX ORDER — MIDAZOLAM HYDROCHLORIDE 1 MG/ML
INJECTION INTRAMUSCULAR; INTRAVENOUS CODE/TRAUMA/SEDATION MEDICATION
Status: COMPLETED | OUTPATIENT
Start: 2018-05-17 | End: 2018-05-17

## 2018-05-17 RX ORDER — TRAMADOL HYDROCHLORIDE 50 MG/1
50 TABLET ORAL EVERY 6 HOURS PRN
Status: DISCONTINUED | OUTPATIENT
Start: 2018-05-17 | End: 2018-05-17 | Stop reason: HOSPADM

## 2018-05-17 RX ADMIN — DIPHENHYDRAMINE HYDROCHLORIDE 25 MG: 50 INJECTION, SOLUTION INTRAMUSCULAR; INTRAVENOUS at 10:37

## 2018-05-17 RX ADMIN — MIDAZOLAM 1 MG: 1 INJECTION INTRAMUSCULAR; INTRAVENOUS at 09:46

## 2018-05-17 RX ADMIN — MIDAZOLAM 0.5 MG: 1 INJECTION INTRAMUSCULAR; INTRAVENOUS at 11:20

## 2018-05-17 RX ADMIN — SODIUM CHLORIDE 100 ML/HR: 0.9 INJECTION, SOLUTION INTRAVENOUS at 07:05

## 2018-05-17 RX ADMIN — FENTANYL CITRATE 50 MCG: 50 INJECTION, SOLUTION INTRAMUSCULAR; INTRAVENOUS at 12:00

## 2018-05-17 RX ADMIN — FENTANYL CITRATE 25 MCG: 50 INJECTION, SOLUTION INTRAMUSCULAR; INTRAVENOUS at 09:44

## 2018-05-17 RX ADMIN — FENTANYL CITRATE 25 MCG: 50 INJECTION, SOLUTION INTRAMUSCULAR; INTRAVENOUS at 11:20

## 2018-05-17 RX ADMIN — FENTANYL CITRATE 25 MCG: 50 INJECTION, SOLUTION INTRAMUSCULAR; INTRAVENOUS at 10:47

## 2018-05-17 RX ADMIN — MIDAZOLAM 0.5 MG: 1 INJECTION INTRAMUSCULAR; INTRAVENOUS at 10:07

## 2018-05-17 RX ADMIN — MIDAZOLAM 1 MG: 1 INJECTION INTRAMUSCULAR; INTRAVENOUS at 09:42

## 2018-05-17 RX ADMIN — HEPARIN SODIUM 2000 UNITS: 1000 INJECTION INTRAVENOUS; SUBCUTANEOUS at 10:12

## 2018-05-17 RX ADMIN — IODIXANOL 38 ML: 320 INJECTION, SOLUTION INTRAVASCULAR at 13:49

## 2018-05-17 RX ADMIN — HEPARIN SODIUM 1000 UNITS: 1000 INJECTION INTRAVENOUS; SUBCUTANEOUS at 11:46

## 2018-05-17 RX ADMIN — HEPARIN SODIUM 3000 UNITS: 1000 INJECTION INTRAVENOUS; SUBCUTANEOUS at 10:09

## 2018-05-17 RX ADMIN — FENTANYL CITRATE 25 MCG: 50 INJECTION, SOLUTION INTRAMUSCULAR; INTRAVENOUS at 10:30

## 2018-05-17 RX ADMIN — MIDAZOLAM 0.5 MG: 1 INJECTION INTRAMUSCULAR; INTRAVENOUS at 11:33

## 2018-05-17 RX ADMIN — FENTANYL CITRATE 25 MCG: 50 INJECTION, SOLUTION INTRAMUSCULAR; INTRAVENOUS at 09:42

## 2018-05-17 RX ADMIN — FENTANYL CITRATE 25 MCG: 50 INJECTION, SOLUTION INTRAMUSCULAR; INTRAVENOUS at 10:07

## 2018-05-17 RX ADMIN — FENTANYL CITRATE 25 MCG: 50 INJECTION, SOLUTION INTRAMUSCULAR; INTRAVENOUS at 11:33

## 2018-05-17 RX ADMIN — MIDAZOLAM 0.5 MG: 1 INJECTION INTRAMUSCULAR; INTRAVENOUS at 11:12

## 2018-05-17 RX ADMIN — MIDAZOLAM 0.5 MG: 1 INJECTION INTRAMUSCULAR; INTRAVENOUS at 10:47

## 2018-05-17 RX ADMIN — MIDAZOLAM 0.5 MG: 1 INJECTION INTRAMUSCULAR; INTRAVENOUS at 10:29

## 2018-05-17 RX ADMIN — DIPHENHYDRAMINE HYDROCHLORIDE 25 MG: 50 INJECTION, SOLUTION INTRAMUSCULAR; INTRAVENOUS at 09:48

## 2018-05-17 NOTE — DISCHARGE INSTRUCTIONS
ARTERIOGRAM    WHAT YOU SHOULD KNOW:   An angiogram is a procedure to look at arteries in your body  Arteries are the blood vessels that carry blood from your heart to your body  AFTER YOU LEAVE:     Self-care:   · Limit activity: Rest for the remainder of the day of your procedure  Have some one with you until the next morning  Keep your arm or leg straight as much as possible  Rest as much as possible, sitting lying or reclining  Walk only to go to the bathroom, to bed or to eat  If the angiogram catheter was put in your leg, use the stairs as little as possible  No driving  · Keep your wound clean and dry  You may shower 24 hours after your procedure  The bandage you have on should fall off in 2-3 days  If there is any drainage from the puncture site, you should put on a clean bandage  · Watch for bleeding and bruising: It is normal to have a bruise and soreness where the angiogram catheter went in  · Diet:   · You may resume your regular diet, Sips of flat soda will help with mild nausea  · Drink more liquids than usual for the next 24 hours      · IMMEDIATELY Contact Interventional Radiology at [phone] Lilia PATIENTS: Contact Interventional Radiology at [phone]) Nancy Angulo PATIENTS: Contact Interventional Radiology at [phone]) if any of the following occur:  · If your bruise gets larger or if you notice any active bleeding  APPLY DIRECT PRESSURE TO THE BLEEDING SITE  · If you notice increased swelling or have increased pain at the puncture site   · If you have any numbness or pain in the extremity of the puncture site   · If that extremity seems cold or pale      · You have fever greater than 101  · Persistent nausea or vomitting    Follow up with your primary healthcare provider  as directed: Write down your questions so you remember to ask them during your visits

## 2018-05-18 RX ORDER — TRAMADOL HYDROCHLORIDE 50 MG/1
TABLET ORAL
Qty: 270 TABLET | Refills: 0 | Status: SHIPPED | OUTPATIENT
Start: 2018-05-18 | End: 2018-07-16 | Stop reason: ALTCHOICE

## 2018-05-24 ENCOUNTER — TELEPHONE (OUTPATIENT)
Dept: CARDIOLOGY CLINIC | Facility: CLINIC | Age: 73
End: 2018-05-24

## 2018-05-24 ENCOUNTER — OFFICE VISIT (OUTPATIENT)
Dept: FAMILY MEDICINE CLINIC | Facility: CLINIC | Age: 73
End: 2018-05-24
Payer: MEDICARE

## 2018-05-24 VITALS
HEIGHT: 67 IN | DIASTOLIC BLOOD PRESSURE: 64 MMHG | BODY MASS INDEX: 30.86 KG/M2 | HEART RATE: 64 BPM | RESPIRATION RATE: 18 BRPM | WEIGHT: 196.6 LBS | SYSTOLIC BLOOD PRESSURE: 132 MMHG | TEMPERATURE: 96.5 F

## 2018-05-24 DIAGNOSIS — E10.8 TYPE 1 DIABETES MELLITUS WITH COMPLICATION (HCC): ICD-10-CM

## 2018-05-24 DIAGNOSIS — E10.43 DIABETIC AUTONOMIC NEUROPATHY ASSOCIATED WITH TYPE 1 DIABETES MELLITUS (HCC): ICD-10-CM

## 2018-05-24 DIAGNOSIS — I12.9 BENIGN HYPERTENSION WITH CHRONIC KIDNEY DISEASE, STAGE III (HCC): ICD-10-CM

## 2018-05-24 DIAGNOSIS — R68.89 COLD INTOLERANCE: ICD-10-CM

## 2018-05-24 DIAGNOSIS — L30.9 DERMATITIS: ICD-10-CM

## 2018-05-24 DIAGNOSIS — B37.0 THRUSH: ICD-10-CM

## 2018-05-24 DIAGNOSIS — E89.0 HYPOTHYROIDISM, POSTABLATIVE: ICD-10-CM

## 2018-05-24 DIAGNOSIS — N18.30 BENIGN HYPERTENSION WITH CHRONIC KIDNEY DISEASE, STAGE III (HCC): ICD-10-CM

## 2018-05-24 PROBLEM — E03.8 OTHER SPECIFIED HYPOTHYROIDISM: Status: ACTIVE | Noted: 2018-05-24

## 2018-05-24 LAB
SL AMB  POCT GLUCOSE, UA: ABNORMAL
SL AMB LEUKOCYTE ESTERASE,UA: ABNORMAL
SL AMB POCT BILIRUBIN,UA: ABNORMAL
SL AMB POCT BLOOD,UA: ABNORMAL
SL AMB POCT CLARITY,UA: CLEAR
SL AMB POCT COLOR,UA: YELLOW
SL AMB POCT KETONES,UA: ABNORMAL
SL AMB POCT NITRITE,UA: ABNORMAL
SL AMB POCT PH,UA: 5
SL AMB POCT SPECIFIC GRAVITY,UA: 1.02
SL AMB POCT URINE PROTEIN: ABNORMAL
SL AMB POCT UROBILINOGEN: ABNORMAL

## 2018-05-24 PROCEDURE — 99214 OFFICE O/P EST MOD 30 MIN: CPT | Performed by: FAMILY MEDICINE

## 2018-05-24 PROCEDURE — 81002 URINALYSIS NONAUTO W/O SCOPE: CPT | Performed by: FAMILY MEDICINE

## 2018-05-24 PROCEDURE — 87086 URINE CULTURE/COLONY COUNT: CPT | Performed by: FAMILY MEDICINE

## 2018-05-24 RX ORDER — NYSTATIN 100000 U/G
CREAM TOPICAL 2 TIMES DAILY
Qty: 30 G | Refills: 0 | Status: SHIPPED | OUTPATIENT
Start: 2018-05-24 | End: 2018-07-12 | Stop reason: ALTCHOICE

## 2018-05-24 RX ORDER — CLOTRIMAZOLE 10 MG/1
10 LOZENGE ORAL; TOPICAL 4 TIMES DAILY
Qty: 40 TABLET | Refills: 0 | Status: SHIPPED | OUTPATIENT
Start: 2018-05-24 | End: 2018-07-16 | Stop reason: ALTCHOICE

## 2018-05-24 RX ORDER — GABAPENTIN 100 MG/1
100 CAPSULE ORAL 3 TIMES DAILY
Qty: 90 CAPSULE | Refills: 0 | Status: SHIPPED | OUTPATIENT
Start: 2018-05-24 | End: 2018-06-11 | Stop reason: SDUPTHER

## 2018-05-24 NOTE — PROGRESS NOTES
FAMILY PRACTICE OFFICE VISIT       NAME: Juliet Artis  AGE: 68 y o  SEX: male       : 1945        MRN: [id number]    DATE: 2018  TIME: 1:40 PM    Assessment and Plan     Problem List Items Addressed This Visit     Type 1 diabetes mellitus ([address] )    Relevant Medications    gabapentin (NEURONTIN) 100 mg capsule    Other Relevant Orders    CBC and differential (Completed)    Mononucleosis screen    EBV acute panel    CMV IgG/IgM Antibodies    Basic metabolic panel    POCT urine dip (Completed)    Urine culture    Benign hypertension with chronic kidney disease, stage III    Relevant Orders    Basic metabolic panel (Completed)    TSH, 3rd generation (Completed)    CBC and differential (Completed)    Mononucleosis screen    EBV acute panel    CMV IgG/IgM Antibodies    Basic metabolic panel    Hypothyroidism, postablative    Relevant Orders    CBC and differential (Completed)    Mononucleosis screen    EBV acute panel    CMV IgG/IgM Antibodies    Basic metabolic panel      Other Visit Diagnoses     Cold intolerance        Relevant Orders    CBC and differential (Completed)    Mononucleosis screen    EBV acute panel    CMV IgG/IgM Antibodies    Basic metabolic panel    Dermatitis        Relevant Medications    nystatin (MYCOSTATIN) cream    Diabetic autonomic neuropathy associated with type 1 diabetes mellitus (HCC)        Relevant Medications    gabapentin (NEURONTIN) 100 mg capsule    Thrush        Relevant Medications    clotrimazole (MYCELEX) 10 mg chelsea       Patient presents for follow-up  He has been experiencing symptoms of fatigue, irritability, elevated blood sugars, cold and heat intolerance  His wife was recently diagnosed with mononucleosis and patient is worried about possible exposure  Patient had few days of diarrhea that have subsided, he is currently constipated but is complaining of perianal skin  irritation    He is also experiencing symptoms of oral irritation, sneezing and cough   Lung exam is clear  [place] noted on  exam   Will treat him with nystatin cream and clotrimazole troches  Will proceed with UA C&S for symptoms of urinary frequency even though I am concerned that his uncontrolled type 1 diabetes is contributing to s/o  urgency and frequency  I will contact [facility] office regarding  concern of elevated blood sugars  Patient will proceed with blood work as outlined above  Lower extremity paresthesias are likely triggered by recent successful abdominal aortogram and stent placements, as patient admits to significant improvement of his chronic claudication symptoms after procedure  Will try him on gabapentin for this discomfort  He will remain on tramadol as needed  I strongly advised patient and his wife to contact [facility] regarding their questions about long-term therapy Plavix  Follow up pending labs and updates  I have spent 25  minutes with Patient and family today in which greater than 50% of this time was spent in counseling/coordination of care regarding Diagnostic results, Prognosis, Risks and benefits of tx options, Intructions for management, Patient and family education, Importance of tx compliance, Risk factor reductions and Impressions  There are no Patient Instructions on file for this visit  Chief Complaint     Chief Complaint   Patient presents with    Follow-up     Patient is here for a follow up  Should he restart his blood thinner  He wants to know if he should he checked for mono   Medication Refill     Patient would like a refill of his tramadol  History of Present Illness      Patient presents  for follow-up  He recently underwent  Abdominal aortogram  Patient reports significant improvement in his claudication symptoms  He is able to walk without pain for much longer distances    Interestingly, he has developed very uncomfortable burning sensation paresthesias in bilateral lower legs immediately after procedure  Patient has been using tramadol without significant improvement in pain  He is also complaining of bilateral knee pain   Patient denies low back pain or radiculopathy  Patient complaining of being tired    Irritable    Constipated , c/o perianal irritation / soreness    Feels cold and  hot    Sneezing  and coughing at times    BS are high   200s- 300s -  Dr Montero follows, [facility] should have received patient's blood sugars and download from [facility] records reviewed  Creatinine 1 68 upon discharge, no recent TSH  Apparently there was as speculation that patient may be able to discontinue Plavix  Patient and wife are wondering if I could make this decision  I advised him to contact [facility] for this important medication change  Medication Refill   Associated symptoms include arthralgias, fatigue and a rash  Pertinent negatives include no fever  Review of Systems   Review of Systems   Constitutional: Positive for fatigue  Negative for fever  HENT: Negative  Eyes: Negative  Respiratory: Negative  Cardiovascular: Negative  Symptoms of claudication have improved   Gastrointestinal: Negative  Endocrine: Positive for cold intolerance and heat intolerance  Genitourinary: Negative  Musculoskeletal: Positive for arthralgias  Skin: Positive for rash  Neurological:        Lower leg paresthesias   Hematological: Negative  Psychiatric/Behavioral: Negative          Active Problem List     Patient Active Problem List   Diagnosis    [facility]    Type 1 diabetes mellitus ([facility]    Presence of drug coated stent in LAD coronary artery    Coronary artery disease involving native coronary artery of native heart without angina pectoris    Presence of drug coated stent in left circumflex coronary artery    Dyslipidemia    Obstructive sleep apnea of adult    Carotid artery stenosis, asymptomatic, bilateral    History of ischemic cardiomyopathy    Atherosclerosis of both lower extremities with intermittent claudication ([facility])    Restrictive lung disease    COPD (chronic obstructive pulmonary disease) ([facility])    Benign hypertension with chronic kidney disease, stage III    CKD (chronic kidney disease) stage 3, GFR 30-59 ml/min    Proteinuria    Renal artery stenosis (HCC)    Renal cyst, right    Vitamin D deficiency    [facility]oiliac occlusive disease [facility]    Hypothyroidism, postablative    Other specified hypothyroidism       Past Medical History:  Past Medical History:   Diagnosis Date    Acute kidney [facility] ([facility] )     resolved 11/30/2015    Acute venous embolism and thrombosis of deep vessels of proximal lower extremity ([facility] )     resolved 04/04/2015    Cardiac disease     heart attack, stents x 4    CHF (congestive heart failure) ([facility])     Chronic cough     resolved 02/04/2016    COPD (chronic obstructive pulmonary disease) [facility]     Diabetes mellitus ([facility]     Disease of thyroid gland     DVT (deep venous thrombosis) ([facility]     Hypertension     Ischemic cardiomyopathy     last assessed 09/26/2017    Pulmonary granuloma ([facility] )     resolved 02/03/2017    Renal failure     Sleep apnea        Past Surgical History:  Past Surgical History:   Procedure Laterality Date    BYPASS FEMORAL-POPLITEAL      initial stenosis with stent left, 7 x 100 smart stent onset 02/24/2014    CORONARY ANGIOPLASTY WITH STENT PLACEMENT      x4       Family History:  Family History   Problem Relation Age of Onset    Heart disease Father      pacer placement    Hypertension Father     Kidney disease Father     Heart failure Father     Heart attack Father     Liver disease Father     Cirrhosis Mother      due to beer consumption    Liver disease Mother     Diabetes Other        Social History:  Social History     Social History    Marital status: /Civil Union     Spouse name: N/A    Number of children: N/A    Years of education: N/A     Occupational History    Retired     Desk work     [employer]      Social History Main Topics    Smoking status: Former Smoker     Packs/day: 4 00     Years: 48 00     Types: Cigarettes     Quit date: 2008    Smokeless tobacco: Never Used      Comment: smoking 48 years 1 5 ppd d/c oct 2008 screening protocol as per allscripts    Alcohol use 12 6 oz/week     21 Standard drinks or equivalent per week      Comment: 2-3 glasses of vodka daily (6 shots) (history 2 drinks per day as per allscripts    Drug use: No    Sexual activity: No     Other Topics Concern    Not on file     Social History Narrative    No narrative on file       Objective     Vitals:    05/24/18 1340   BP: 132/64   Pulse: 64   Resp: 18   Temp: (!) 96 5 °F (35 8 °C)     Wt Readings from Last 3 Encounters:   05/24/18 89 2 kg (196 lb 9 6 oz)   05/17/18 86 2 kg (190 lb)   05/09/18 88 5 kg (195 lb)       Physical Exam   Constitutional: He is oriented to person, place, and time  He appears well-developed and well-nourished  HENT:   Head: Normocephalic and atraumatic  Mouth/Throat: No oropharyngeal exudate (No erythema)  Thrush right  cheeck  surface   Eyes: Conjunctivae are normal    Neck: Neck supple  Carotid bruit is not present  Cardiovascular: Normal rate, regular rhythm and normal heart sounds  Pulses are no weak pulses  No murmur heard  Pulses:       Dorsalis pedis pulses are 1+ on the right side, and 1+ on the left side  Pulmonary/Chest: Effort normal and breath sounds normal  No respiratory distress  He has no wheezes  He has no rales  Musculoskeletal: Normal range of motion  He exhibits no edema  Feet:   Right Foot:   Skin Integrity: Positive for dry skin  Negative for ulcer, skin breakdown, erythema, warmth or callus  Left Foot:   Skin Integrity: Positive for dry skin  Negative for ulcer, skin breakdown, erythema, warmth or callus     Neurological: He is alert and oriented to person, place, and time  No cranial nerve deficit  Skin: Skin is warm  Psychiatric: He has a normal mood and affect  His behavior is normal    Nursing note and vitals reviewed  Patient's shoes and socks removed  Right Foot/Ankle   Right Foot Inspection  Skin Exam: skin normal, skin intact and dry skin no warmth, no callus, no erythema, no maceration, no abnormal color, no pre-ulcer, no ulcer and no callus                          Toe Exam: ROM and strength within normal limitsno tenderness, erythema and  no right toe deformity  Sensory   Vibration: diminished  Proprioception: diminished   Monofilament testing: diminished  Vascular    The right DP pulse is 1+  Left Foot/Ankle  Left Foot Inspection  Skin Exam: skin normal, skin intact and dry skinno warmth, no erythema, no maceration, normal color, no pre-ulcer, no ulcer and no callus                         Toe Exam: ROM and strength within normal limitsno tenderness, no erythema and no left toe deformity                   Sensory   Vibration: diminished  Proprioception: diminished  Monofilament: diminished  Vascular    The left DP pulse is 1+  Assign Risk Category:  No deformity present; Loss of protective sensation;  No weak pulses       Risk: 1      Pertinent Laboratory/Diagnostic Studies:  Lab Results   Component Value Date    GLUCOSE 240 (H) 05/17/2018    BUN 23 05/25/2018    CREATININE 1 44 (H) 05/25/2018    CALCIUM 9 5 05/25/2018     05/25/2018    K 4 2 05/25/2018    CO2 25 05/25/2018     05/25/2018     Lab Results   Component Value Date    ALT 15 04/24/2018    AST 13 04/24/2018    ALKPHOS 102 04/24/2018    BILITOT 0 45 04/24/2018       Lab Results   Component Value Date    WBC 4 95 05/25/2018    HGB 13 1 05/25/2018    HCT 38 9 05/25/2018    MCV 96 05/25/2018     05/25/2018       No results found for: TSH    Lab Results   Component Value Date    CHOL 126 04/24/2018     Lab Results   Component Value Date TRIG 156 (H) 04/24/2018     Lab Results   Component Value Date    HDL 49 04/24/2018     Lab Results   Component Value Date    LDLCALC 46 04/24/2018     Lab Results   Component Value Date    HGBA1C 7 9 (H) 02/27/2018       Results for orders placed or performed in visit on 05/24/18   POCT urine dip   Result Value Ref Range    LEUKOCYTE ESTERASE,UA -      NITRITE,UA -     SL AMB POCT UROBILINOGEN -     SL AMB POCT URINE PROTEIN 300+++      PH,UA 5 0      BLOOD,UA ++      SPECIFIC GRAVITY,UA 1 025      KETONES,UA -      BILIRUBIN,UA -     GLUCOSE, UA 2000++++      COLOR,UA Yellow      CLARITY,UA Clear        Orders Placed This Encounter   Procedures    Urine culture    Basic metabolic panel    TSH, 3rd generation    CBC and differential    Mononucleosis screen    EBV acute panel    CMV IgG/IgM Antibodies    Basic metabolic panel    POCT urine dip       ALLERGIES:  Allergies   Allergen Reactions    Cardura [Doxazosin Mesylate]     Other      Iv dye for cardiac cath  Renal failure very close to dialysis  But no dialysis    Tylenol [Acetaminophen]      interferes with reading of blood sugar    Zestril [Lisinopril]        Current Medications     Current Outpatient Prescriptions   Medication Sig Dispense Refill    Acetylcysteine 600 MG CAPS Take 2 capsules (1,200 mg total) by mouth 2 (two) times a day 8 capsule 0    amLODIPine (NORVASC) 2 5 mg tablet Take 7 5 mg by mouth daily      aspirin 81 MG tablet Take 81 mg by mouth daily        carvedilol (COREG) 6 25 mg tablet Take 1 tablet (6 25 mg total) by mouth 2 (two) times a day with meals 180 tablet 3    cholecalciferol (VITAMIN D3) 1,000 units tablet Take 10,000 Units by mouth daily      clopidogrel (PLAVIX) 75 mg tablet Take 75 mg by mouth daily at bedtime      insulin glargine (LANTUS) 100 units/mL subcutaneous injection Inject 28 Units under the skin daily at bedtime (Patient taking differently: Inject 28 Units under the skin daily at bedtime Take 22 units in the am and 38 units at bedtime ) 10 mL 0    insulin lispro (HUMALOG) 100 units/mL injection Inject 10-15 Units under the skin 3 (three) times a day 10 mL 3    Lactobacillus-Inulin (CULTURELLE DIGESTIVE HEALTH PO) Take 1 capsule by mouth daily      levothyroxine 175 mcg tablet Take 1 tablet (175 mcg total) by mouth daily in the early morning 90 tablet 3    nitroglycerin (NITROSTAT) 0 4 mg SL tablet Place 0 4 mg under the tongue every 5 (five) minutes as needed for chest pain   rosuvastatin (CRESTOR) 40 MG tablet Take 1 tablet by mouth daily 90 tablet 3    traMADol (ULTRAM) 50 mg tablet TAKE ONE TABLET BY MOUTH EVERY 8 HOURS AS NEEDED FOR MODERATE PAIN 270 tablet 0    Ubiquinol 200 MG CAPS Take 200 mg by mouth daily   VITAMIN K PO Take 1 tablet by mouth daily      clotrimazole (MYCELEX) 10 mg che[place]sea Take 1 tablet (10 mg total) by mouth 4 (four) times a day 40 tablet 0    gabapentin (NEURONTIN) 100 mg capsule Take 1 capsule (100 mg total) by mouth 3 (three) times a day 90 capsule 0    nystatin (MYCOSTATIN) cream Apply topically 2 (two) times a day 30 g 0     No current facility-administered medications for this visit            Health Maintenance     Health Maintenance   Topic Date Due    Hepatitis C Screening  1945    Depression Screening PHQ-9  1945    SLP PLAN OF CARE  1945    COLONOSCOPY  1945    Lung Cancer Screening  04/27/2000    Fall Risk  04/27/2010    GLAUCOMA SCREENING 67+ YR  04/27/2012    URINE MICROALBUMIN  10/01/2016    OPHTHALMOLOGY EXAM  03/21/2018    HEMOGLOBIN A1C  08/27/2018    INFLUENZA VACCINE  09/01/2018    Diabetic Foot Exam  03/06/2019    DTaP,Tdap,and Td Vaccines (2 - Td) 05/05/2023    ABDOMINAL AORTIC ANEURYSM (AAA) SCREEN  Completed    PNEUMOCOCCAL POLYSACCHARIDE VACCINE AGE 72 AND OVER  Completed     Immunization History   Administered Date(s) Administered    DT (pediatric) 12/01/2009    H1N1, All Formulations 12/01/2009  Influenza 10/01/2008, 10/06/2009, 09/01/2010, 11/04/2013    Influenza Split High Dose Preservative Free IM 08/28/2014, 10/03/2016    Influenza TIV (IM) 10/01/2008, 10/19/2010, 09/20/2011, 08/01/2012, 09/13/2013, 09/15/2015, 09/03/2017    Pneumococcal Conjugate 13-Valent 08/11/2015    Pneumococcal Polysaccharide PPV23 10/01/2008, 06/04/2009, 03/15/2017    Tdap 05/05/2013    Zoster 10/01/2009       Sandra Tejada MD

## 2018-05-24 NOTE — TELEPHONE ENCOUNTER
Pt's spouse Caro Marsh called requesting to s/w you  PCP advised her to call our office     c/b # [phone]

## 2018-05-25 ENCOUNTER — CLINICAL SUPPORT (OUTPATIENT)
Dept: ENDOCRINOLOGY | Facility: CLINIC | Age: 73
End: 2018-05-25
Payer: MEDICARE

## 2018-05-25 ENCOUNTER — TELEPHONE (OUTPATIENT)
Dept: ENDOCRINOLOGY | Facility: CLINIC | Age: 73
End: 2018-05-25

## 2018-05-25 ENCOUNTER — LAB (OUTPATIENT)
Dept: LAB | Facility: CLINIC | Age: 73
End: 2018-05-25
Payer: MEDICARE

## 2018-05-25 DIAGNOSIS — E89.0 HYPOTHYROIDISM, POSTABLATIVE: ICD-10-CM

## 2018-05-25 DIAGNOSIS — E10.8 TYPE 1 DIABETES MELLITUS WITH COMPLICATION (HCC): ICD-10-CM

## 2018-05-25 DIAGNOSIS — E10.8 TYPE 1 DIABETES MELLITUS WITH COMPLICATION (HCC): Primary | ICD-10-CM

## 2018-05-25 DIAGNOSIS — N18.30 BENIGN HYPERTENSION WITH CHRONIC KIDNEY DISEASE, STAGE III (HCC): ICD-10-CM

## 2018-05-25 DIAGNOSIS — R68.89 COLD INTOLERANCE: ICD-10-CM

## 2018-05-25 DIAGNOSIS — I12.9 BENIGN HYPERTENSION WITH CHRONIC KIDNEY DISEASE, STAGE III (HCC): ICD-10-CM

## 2018-05-25 LAB
ANION GAP SERPL CALCULATED.3IONS-SCNC: 7 MMOL/L (ref 4–13)
BASOPHILS # BLD AUTO: 0.02 THOUSANDS/ΜL (ref 0–0.1)
BASOPHILS NFR BLD AUTO: 0 % (ref 0–1)
BUN SERPL-MCNC: 23 MG/DL (ref 5–25)
CALCIUM SERPL-MCNC: 9.5 MG/DL (ref 8.3–10.1)
CHLORIDE SERPL-SCNC: 107 MMOL/L (ref 100–108)
CO2 SERPL-SCNC: 25 MMOL/L (ref 21–32)
CREAT SERPL-MCNC: 1.44 MG/DL (ref 0.6–1.3)
EOSINOPHIL # BLD AUTO: 0.27 THOUSAND/ΜL (ref 0–0.61)
EOSINOPHIL NFR BLD AUTO: 6 % (ref 0–6)
ERYTHROCYTE [DISTWIDTH] IN BLOOD BY AUTOMATED COUNT: 13.6 % (ref 11.6–15.1)
GFR SERPL CREATININE-BSD FRML MDRD: 48 ML/MIN/1.73SQ M
GLUCOSE P FAST SERPL-MCNC: 227 MG/DL (ref 65–99)
HCT VFR BLD AUTO: 38.9 % (ref 36.5–49.3)
HGB BLD-MCNC: 13.1 G/DL (ref 12–17)
LYMPHOCYTES # BLD AUTO: 0.86 THOUSANDS/ΜL (ref 0.6–4.47)
LYMPHOCYTES NFR BLD AUTO: 17 % (ref 14–44)
MCH RBC QN AUTO: 32.3 PG (ref 26.8–34.3)
MCHC RBC AUTO-ENTMCNC: 33.7 G/DL (ref 31.4–37.4)
MCV RBC AUTO: 96 FL (ref 82–98)
MONOCYTES # BLD AUTO: 0.63 THOUSAND/ΜL (ref 0.17–1.22)
MONOCYTES NFR BLD AUTO: 13 % (ref 4–12)
NEUTROPHILS # BLD AUTO: 3.16 THOUSANDS/ΜL (ref 1.85–7.62)
NEUTS SEG NFR BLD AUTO: 64 % (ref 43–75)
NRBC BLD AUTO-RTO: 0 /100 WBCS
PLATELET # BLD AUTO: 149 THOUSANDS/UL (ref 149–390)
PMV BLD AUTO: 12.4 FL (ref 8.9–12.7)
POTASSIUM SERPL-SCNC: 4.2 MMOL/L (ref 3.5–5.3)
RBC # BLD AUTO: 4.05 MILLION/UL (ref 3.88–5.62)
SODIUM SERPL-SCNC: 139 MMOL/L (ref 136–145)
TSH SERPL DL<=0.05 MIU/L-ACNC: 0.56 UIU/ML (ref 0.36–3.74)
WBC # BLD AUTO: 4.95 THOUSAND/UL (ref 4.31–10.16)

## 2018-05-25 PROCEDURE — 36415 COLL VENOUS BLD VENIPUNCTURE: CPT

## 2018-05-25 PROCEDURE — 86645 CMV ANTIBODY IGM: CPT

## 2018-05-25 PROCEDURE — 84443 ASSAY THYROID STIM HORMONE: CPT

## 2018-05-25 PROCEDURE — 86644 CMV ANTIBODY: CPT

## 2018-05-25 PROCEDURE — 80048 BASIC METABOLIC PNL TOTAL CA: CPT

## 2018-05-25 PROCEDURE — 86665 EPSTEIN-BARR CAPSID VCA: CPT

## 2018-05-25 PROCEDURE — 86663 EPSTEIN-BARR ANTIBODY: CPT

## 2018-05-25 PROCEDURE — 86664 EPSTEIN-BARR NUCLEAR ANTIGEN: CPT

## 2018-05-25 PROCEDURE — 85025 COMPLETE CBC W/AUTO DIFF WBC: CPT

## 2018-05-25 PROCEDURE — 86308 HETEROPHILE ANTIBODY SCREEN: CPT

## 2018-05-25 PROCEDURE — 95251 CONT GLUC MNTR ANALYSIS I&R: CPT | Performed by: INTERNAL MEDICINE

## 2018-05-25 NOTE — TELEPHONE ENCOUNTER
Called and spoke to wife Leah Rivas and  advised that Dexcom report reviewed and he is having hyperglycemia most of the time   Increase the morning  Lantus to 35 units

## 2018-05-26 LAB
BACTERIA UR CULT: ABNORMAL
CMV IGG SERPL IA-ACNC: <0.6 U/ML (ref 0–0.59)
CMV IGM SERPL IA-ACNC: <30 AU/ML (ref 0–29.9)
EBV EA IGG SER-ACNC: <9 U/ML (ref 0–8.9)
EBV NA IGG SER IA-ACNC: <18 U/ML (ref 0–17.9)
EBV PATRN SPEC IB-IMP: NORMAL
EBV VCA IGG SER IA-ACNC: <18 U/ML (ref 0–17.9)
EBV VCA IGM SER IA-ACNC: <36 U/ML (ref 0–35.9)

## 2018-05-29 ENCOUNTER — HOSPITAL ENCOUNTER (OUTPATIENT)
Dept: NON INVASIVE DIAGNOSTICS | Facility: CLINIC | Age: 73
Discharge: HOME | End: 2018-05-29
Payer: MEDICARE

## 2018-05-29 ENCOUNTER — TELEPHONE (OUTPATIENT)
Dept: FAMILY MEDICINE CLINIC | Facility: CLINIC | Age: 73
End: 2018-05-29

## 2018-05-29 DIAGNOSIS — I65.23 CAROTID ARTERY STENOSIS, ASYMPTOMATIC, BILATERAL: ICD-10-CM

## 2018-05-29 LAB — HETEROPH AB SER QL: NEGATIVE

## 2018-05-29 PROCEDURE — 93880 EXTRACRANIAL BILAT STUDY: CPT

## 2018-05-29 PROCEDURE — 93880 EXTRACRANIAL BILAT STUDY: CPT | Performed by: SURGERY

## 2018-05-29 NOTE — TELEPHONE ENCOUNTER
----- Message from Dora Anderson MD sent at 5/29/2018 10:14 AM EDT -----  Please contact patient or his wife  Titers for CMV and EBV are negative  I do not believe there is any evidence of mononucleosis    Thank you

## 2018-05-29 NOTE — TELEPHONE ENCOUNTER
----- Message from Carisa Peck MD sent at 5/29/2018 10:12 AM EDT -----  Please contact patient or his wife, there is no evidence of urinary tract infection

## 2018-05-30 NOTE — PROGRESS NOTES
Continous glucose monitoring dexcom intrepretation     Date/Time 5/30/2018 3:14 PM     Performed by  Chel Andrade by Vilma Wetzel       Consent: Verbal consent obtained  Written consent obtained    Consent given by: patient  Patient understanding: patient states understanding of the procedure being performed  Patient consent: the patient's understanding of the procedure matches consent given  Procedure consent: procedure consent matches procedure scheduled  Relevant documents: relevant documents present and verified  Test results: test results available and properly labeled  Site marked: the operative site was not marked  Imaging studies: imaging studies not available  Patient identity confirmed: verbally with patient      Site preparation: Isopropyl alcohol    Local anesthesia used: no     Anesthesia   Local anesthesia used: no     Sedation   Patient sedated: no      Specimen: no    Culture: no

## 2018-06-04 ENCOUNTER — LAB (OUTPATIENT)
Dept: LAB | Facility: CLINIC | Age: 73
End: 2018-06-04
Payer: MEDICARE

## 2018-06-04 DIAGNOSIS — E10.8 TYPE 1 DIABETES MELLITUS WITH COMPLICATION (HCC): ICD-10-CM

## 2018-06-04 DIAGNOSIS — E55.9 VITAMIN D DEFICIENCY: ICD-10-CM

## 2018-06-04 DIAGNOSIS — E78.5 DYSLIPIDEMIA: ICD-10-CM

## 2018-06-04 LAB
25(OH)D3 SERPL-MCNC: 33.4 NG/ML (ref 30–100)
CHOLEST SERPL-MCNC: 143 MG/DL (ref 50–200)
EST. AVERAGE GLUCOSE BLD GHB EST-MCNC: 186 MG/DL
HBA1C MFR BLD: 8.1 % (ref 4.2–6.3)
HDLC SERPL-MCNC: 46 MG/DL (ref 40–60)
LDLC SERPL CALC-MCNC: 59 MG/DL (ref 0–100)
TRIGL SERPL-MCNC: 190 MG/DL

## 2018-06-04 PROCEDURE — 80061 LIPID PANEL: CPT

## 2018-06-04 PROCEDURE — 82306 VITAMIN D 25 HYDROXY: CPT

## 2018-06-04 PROCEDURE — 83036 HEMOGLOBIN GLYCOSYLATED A1C: CPT

## 2018-06-04 PROCEDURE — 36415 COLL VENOUS BLD VENIPUNCTURE: CPT

## 2018-06-06 ENCOUNTER — OFFICE VISIT (OUTPATIENT)
Dept: ENDOCRINOLOGY | Facility: CLINIC | Age: 73
End: 2018-06-06
Payer: MEDICARE

## 2018-06-06 ENCOUNTER — TELEPHONE (OUTPATIENT)
Dept: VASCULAR SURGERY | Facility: CLINIC | Age: 73
End: 2018-06-06

## 2018-06-06 VITALS
HEART RATE: 61 BPM | SYSTOLIC BLOOD PRESSURE: 152 MMHG | DIASTOLIC BLOOD PRESSURE: 78 MMHG | HEIGHT: 67 IN | WEIGHT: 199.9 LBS | BODY MASS INDEX: 31.37 KG/M2

## 2018-06-06 DIAGNOSIS — E89.0 HYPOTHYROIDISM, POSTABLATIVE: ICD-10-CM

## 2018-06-06 DIAGNOSIS — N18.30 BENIGN HYPERTENSION WITH CHRONIC KIDNEY DISEASE, STAGE III (HCC): ICD-10-CM

## 2018-06-06 DIAGNOSIS — E78.5 DYSLIPIDEMIA: ICD-10-CM

## 2018-06-06 DIAGNOSIS — E55.9 VITAMIN D DEFICIENCY: ICD-10-CM

## 2018-06-06 DIAGNOSIS — E10.8 TYPE 1 DIABETES MELLITUS WITH COMPLICATION (HCC): Primary | ICD-10-CM

## 2018-06-06 DIAGNOSIS — I12.9 BENIGN HYPERTENSION WITH CHRONIC KIDNEY DISEASE, STAGE III (HCC): ICD-10-CM

## 2018-06-06 PROCEDURE — 99214 OFFICE O/P EST MOD 30 MIN: CPT | Performed by: NURSE PRACTITIONER

## 2018-06-06 RX ORDER — INSULIN GLARGINE 100 [IU]/ML
66 INJECTION, SOLUTION SUBCUTANEOUS DAILY
Qty: 10 ML | Refills: 0 | Status: ON HOLD
Start: 2018-06-06 | End: 2019-04-18 | Stop reason: SDUPTHER

## 2018-06-06 NOTE — ASSESSMENT & PLAN NOTE
Uncontrolled with A1C of 8 1  He is overall high  For now will change carb ratio to 1 unit for every 6 g  Instructed to upload dexcom report in 2 weeks and send into office so further adjustments can be made  Educated on complications of uncontrolled diabetes including; retinopathy, neuropathy, nephropathy, heart attack, and stroke  Check A1C, cmp, and microalbumin prior to next visit

## 2018-06-06 NOTE — PROGRESS NOTES
Established Patient Progress Note      Chief Complaint   Patient presents with    Diabetes Type 1          History of Present Illness:   Ankush Keith is a 68 y o  male with a history of type HTN, HLD, hypothyroidism, vitamin d deficiency, and type 1 diabetes with long term use of insulin  Reports no complications of diabetes  Last A1C 8 1  Review of BG log and dexcom sensor shows BG overall high throughout the day  He reports he has noticed blurred vision and neuropathy  Denies polyuria, polydipsia, or polyphagia  Denies recent illness or hospitalizations  Denies recent severe hypoglycemic or severe hyperglycemic episodes  Denies any issues with his current regimen  Home glucose monitoring: are performed regularly    Home blood glucose readings:   Before breakfast: 140-300s  Before lunch: 110-300s  Before dinner: 160-300s  Bedtime: 130-300s    Current regimen: Lantus 38 units am and 28 units pm, Humalog 1 unit for 8 g  compliant all of the timedenies any side effects from current medications     Hypoglycemic episodes: No never   H/o of hypoglycemia causing hospitalization or Intervention such as glucagon injection or ambulance call No     Last Eye Exam: last month, no retinopathy   Last Foot Exam: do not see podiatrist     Has hypertension: Taking Amlodipine and Carvedilol   Has hyperlipidemia: Taking Rosuvastatin     Has hypothyroidism: Taking Levothyroxine 175 mcg   Has vitamin d deficiency: Taking 10,000 units daily     Patient Active Problem List   Diagnosis    [facility]    Type 1 diabetes mellitus ([facility] )    Presence of drug coated stent in LAD coronary artery    Coronary artery disease involving native coronary artery of native heart without angina pectoris    Presence of drug coated stent in left circumflex coronary artery    Dyslipidemia    Obstructive sleep apnea of adult    Carotid artery stenosis, asymptomatic, bilateral    History of ischemic cardiomyopathy    Atherosclerosis of both lower extremities with intermittent claudication (HCC)    Restrictive lung disease    COPD (chronic obstructive pulmonary disease) (HCC)    Benign hypertension with chronic kidney disease, stage III    CKD (chronic kidney disease) stage 3, GFR 30-59 ml/min    Proteinuria    Renal artery stenosis (HCC)    Renal cyst, right    Vitamin D deficiency    Aortoiliac occlusive disease (HCC)    Hypothyroidism, postablative    Other specified hypothyroidism      Past Medical History:   Diagnosis Date    [facility]     resolved 11/30/2015    Acute venous embolism and thrombosis of deep vessels of proximal lower [facility]     resolved 04/04/2015    Cardiac disease     heart attack, stents x 4    CH[facility]congestive heart failure) [facility]     Chronic cough     resolved 02/04/2016    COPD (chronic obstructive pulmonary disease) ([facility]     Diabetes mellitus ([facility]     Disease of thyroid gland     DVT (deep venous thrombosis) ([facility]     Hypertension     Ischemic cardiomyopathy     last assessed 09/26/2017    Pulmonary granuloma ([facility] )     resolved 02/03/2017    Renal failure     Sleep apnea       Past Surgical History:   Procedure Laterality Date    BYPASS FEMORAL-POPLITEAL      initial stenosis with stent left, 7 x 100 smart stent onset 02/24/2014    CORONARY ANGIOPLASTY WITH STENT PLACEMENT      x4      Family History   Problem Relation Age of Onset    Heart disease Father      pacer placement    Hypertension Father     Kidney disease Father     Heart failure Father     Heart attack Father     Liver disease Father     Cirrhosis Mother      due to beer consumption    Liver disease Mother     Diabetes Other      Social History   Substance Use Topics    Smoking status: Former Smoker     Packs/day: 4 00     Years: 48 00     Types: Cigarettes     Quit date: 2008    Smokeless tobacco: Never Used      Comment: smoking 48 years 1 5 ppd d/c oct 2008 screening protocol as per allscripts    Alcohol use 12 6 oz/week     21 Standard drinks or equivalent per week      Comment: 2-3 glasses of vodka daily (6 shots) (history 2 drinks per day as per allscripts     Allergies   Allergen Reactions    Cardura [Doxazosin Mesylate]     Other      Iv dye for cardiac cath  Renal failure very close to dialysis  But no dialysis    Tylenol [Acetaminophen]      interferes with reading of blood sugar    Zestril [Lisinopril]          Current Outpatient Prescriptions:     amLODIPine (NORVASC) 2 5 mg tablet, Take 7 5 mg by mouth daily, Disp: , Rfl:     aspirin 81 MG tablet, Take 81 mg by mouth daily  , Disp: , Rfl:     carvedilol (COREG) 6 25 mg tablet, Take 1 tablet (6 25 mg total) by mouth 2 (two) times a day with meals, Disp: 180 tablet, Rfl: 3    cholecalciferol (VITAMIN D3) 1,000 units tablet, Take 10,000 Units by mouth daily, Disp: , Rfl:     clopidogrel (PLAVIX) 75 mg tablet, Take 75 mg by mouth daily at bedtime, Disp: , Rfl:     clotrimazole (MYCELEX) 10 mg [place], Take 1 tablet (10 mg total) by mouth 4 (four) times a day, Disp: 40 tablet, Rfl: 0    gabapentin (NEURONTIN) 100 mg capsule, Take 1 capsule (100 mg total) by mouth 3 (three) times a day, Disp: 90 capsule, Rfl: 0    insulin glargine (LANTUS) 100 units/mL subcutaneous injection, Inject 66 Units under the skin daily 38 units in the morning and 28 units in the evening, Disp: 10 mL, Rfl: 0    insulin lispro (HUMALOG) 100 units/mL injection, Inject 10-15 Units under the skin 3 (three) times a day, Disp: 10 mL, Rfl: 3    Lactobacillus-Inulin ([facility] ), Take 1 capsule by mouth daily, Disp: , Rfl:     levothyroxine 175 mcg tablet, Take 1 tablet (175 mcg total) by mouth daily in the early morning, Disp: 90 tablet, Rfl: 3    nitroglycerin (NITROSTAT) 0 4 mg SL tablet, Place 0 4 mg under the tongue every 5 (five) minutes as needed for chest pain , Disp: , Rfl:     nystatin (MYCOSTATIN) cream, Apply topically 2 (two) times a day, Disp: 30 g, Rfl: 0    rosuvastatin (CRESTOR) 40 MG tablet, Take 1 tablet by mouth daily, Disp: 90 tablet, Rfl: 3    traMADol (ULTRAM) 50 mg tablet, TAKE ONE TABLET BY MOUTH EVERY 8 HOURS AS NEEDED FOR MODERATE PAIN, Disp: 270 tablet, Rfl: 0    Ubiquinol 200 MG CAPS, Take 200 mg by mouth daily  , Disp: , Rfl:     VITAMIN K PO, Take 1 tablet by mouth daily, Disp: , Rfl:     Acetylcysteine 600 MG CAPS, Take 2 capsules (1,200 mg total) by mouth 2 (two) times a day, Disp: 8 capsule, Rfl: 0    Review of Systems   Constitutional: Negative for activity change, appetite change and fatigue  HENT: Negative for sore throat, trouble swallowing and voice change  Eyes: Negative for visual disturbance  Respiratory: Negative for choking, chest tightness and shortness of breath  Cardiovascular: Negative for chest pain, palpitations and leg swelling  Gastrointestinal: Negative for abdominal pain, constipation and diarrhea  Endocrine: Negative for cold intolerance, heat intolerance, polydipsia, polyphagia and polyuria  Genitourinary: Negative for frequency  Musculoskeletal: Negative for arthralgias and myalgias  Skin: Negative for rash  Neurological: Negative for dizziness and syncope  Hematological: Negative for adenopathy  Psychiatric/Behavioral: Negative for sleep disturbance  All other systems reviewed and are negative  Physical Exam:  Body mass index is 31 31 kg/m²  /78   Pulse 61   Ht 5' 7" (1 702 m)   Wt 90 7 kg (199 lb 14 4 oz)   BMI 31 31 kg/m²    Wt Readings from Last 3 Encounters:   06/06/18 90 7 kg (199 lb 14 4 oz)   05/24/18 89 2 kg (196 lb 9 6 oz)   05/17/18 86 2 kg (190 lb)       Physical Exam   Constitutional: He is oriented to person, place, and time  He appears well-developed and well-nourished  No distress  HENT:   Head: Normocephalic and atraumatic     Mouth/Throat: Oropharynx is clear and moist    Eyes: Conjunctivae and EOM are normal  Pupils are equal, round, and reactive to light  Neck: Normal range of motion  Neck supple  No thyromegaly present  Cardiovascular: Normal rate, regular rhythm and normal heart sounds  Pulses are no weak pulses  No murmur heard  Pulses:       Dorsalis pedis pulses are 2+ on the right side, and 2+ on the left side  Pulmonary/Chest: Effort normal and breath sounds normal  No respiratory distress  He has no wheezes  He has no rales  Abdominal: Soft  Bowel sounds are normal  He exhibits no distension  There is no tenderness  Musculoskeletal: Normal range of motion  He exhibits no edema  Feet:   Right Foot:   Skin Integrity: Positive for dry skin  Negative for ulcer, skin breakdown, erythema, warmth or callus  Left Foot:   Skin Integrity: Positive for dry skin  Negative for ulcer, skin breakdown, erythema, warmth or callus  Lymphadenopathy:     He has no cervical adenopathy  Neurological: He is alert and oriented to person, place, and time  Skin: Skin is warm and dry  Psychiatric: He has a normal mood and affect  Vitals reviewed  Patient's shoes and socks removed  Right Foot/Ankle   Right Foot Inspection  Skin Exam: skin normal and dry skin skin not intact, no warmth, no callus, no erythema, no maceration, no abnormal color, no pre-ulcer, no ulcer and no callus                            Sensory       Monofilament testing: diminished  Vascular    The right DP pulse is 2+  Left Foot/Ankle  Left Foot Inspection  Skin Exam: skin normal and dry skinskin not intact, no warmth, no erythema, no maceration, normal color, no pre-ulcer, no ulcer and no callus                                         Sensory       Monofilament: diminished  Vascular    The left DP pulse is 2+  Assign Risk Category:  No deformity present; Loss of protective sensation;  No weak pulses             Labs:     Lab Results   Component Value Date    HGBA1C 8 1 (H) 06/04/2018       Lab Results   Component Value Date     05/25/2018    K 4 2 05/25/2018     05/25/2018    CO2 25 05/25/2018    ANIONGAP 7 05/25/2018    BUN 23 05/25/2018    CREATININE 1 44 (H) 05/25/2018    GLUCOSE 240 (H) 05/17/2018    GLUF 227 (H) 05/25/2018    CALCIUM 9 5 05/25/2018    AST 13 04/24/2018    ALT 15 04/24/2018    ALKPHOS 102 04/24/2018    PROT 7 5 04/24/2018    BILITOT 0 45 04/24/2018    EGFR 48 05/25/2018         Lab Results   Component Value Date    CHOL 143 06/04/2018    HDL 46 06/04/2018    TRIG 190 (H) 06/04/2018       Lab Results   Component Value Date    DED6WIQJHAIB 0 558 05/25/2018    [id number] 1 390 02/23/2018    XNW9YMNYRYBL 1 960 11/29/2017     Lab Results   Component Value Date    FREET4 1 6 07/07/2015       Impression & Plan:    Problem List Items Addressed This Visit     Type 1 diabetes mellitus ([facility] ) - Primary     Uncontrolled with A1C of 8 1  He is overall high  For now will change carb ratio to 1 unit for every 6 g  Instructed to upload dexcom report in 2 weeks and send into office so further adjustments can be made  Educated on complications of uncontrolled diabetes including; retinopathy, neuropathy, nephropathy, heart attack, and stroke  Check A1C, cmp, and microalbumin prior to next visit  Relevant Medications    insulin glargine (LANTUS) 100 units/mL subcutaneous injection    Other Relevant Orders    HEMOGLOBIN A1C W/ EAG ESTIMATION    Comprehensive metabolic panel    Microalbumin / creatinine urine ratio    Dyslipidemia     Triglycerides elevated, continue statin, check lipid panel  Relevant Orders    Lipid Panel with Direct LDL reflex    Benign hypertension with chronic kidney disease, stage III     BP elevated, will continue to monitor, continue current regimen           Vitamin D deficiency     Continue vitamin d supplementation          Hypothyroidism, postablative     Continue current dose of levothyroxine, check TSH and T4         Relevant Orders    T4, free    TSH, 3rd generation          Orders Placed This Encounter   Procedures    HEMOGLOBIN A1C W/ EAG ESTIMATION     Standing Status:   Future     Standing Expiration Date:   6/6/2019    Comprehensive metabolic panel     This is a patient instruction: Patient fasting for 8 hours or longer recommended  Standing Status:   Future     Standing Expiration Date:   6/6/2019    Lipid Panel with Direct LDL reflex     This is a patient instruction: This test requires patient fasting for 10-12 hours or longer  Drinking of black coffee or black tea is acceptable  Standing Status:   Future     Standing Expiration Date:   6/6/2019    Microalbumin / creatinine urine ratio     Standing Status:   Future     Standing Expiration Date:   6/6/2019    T4, free     Standing Status:   Future     Standing Expiration Date:   9/6/2018    TSH, 3rd generation     This is a patient instruction: This test is non-fasting  Please drink two glasses of water morning of bloodwork  Standing Status:   Future     Standing Expiration Date:   9/6/2018       Discussed with the patient and all questioned fully answered  He will call me if any problems arise  Follow-up appointment in 3 months       Counseled patient on diagnostic results, prognosis, risk and benefit of treatment options, instruction for management, importance of treatment compliance, Risk  factor reduction and impressions      Mich Blevins [address]

## 2018-06-06 NOTE — TELEPHONE ENCOUNTER
gail wife called  Re pain radiating down right buttock cheek and radiating down leg since procedure  Patients claudication symptoms have improved, but now he seems to have develop this sciatica pain  Sent message to Dr Homer Ledezma to please advise

## 2018-06-11 ENCOUNTER — OFFICE VISIT (OUTPATIENT)
Dept: FAMILY MEDICINE CLINIC | Facility: CLINIC | Age: 73
End: 2018-06-11
Payer: MEDICARE

## 2018-06-11 VITALS
TEMPERATURE: 97.1 F | RESPIRATION RATE: 20 BRPM | DIASTOLIC BLOOD PRESSURE: 68 MMHG | BODY MASS INDEX: 31.74 KG/M2 | SYSTOLIC BLOOD PRESSURE: 132 MMHG | WEIGHT: 202.2 LBS | HEIGHT: 67 IN | HEART RATE: 64 BPM

## 2018-06-11 DIAGNOSIS — E10.43 DIABETIC AUTONOMIC NEUROPATHY ASSOCIATED WITH TYPE 1 DIABETES MELLITUS (HCC): ICD-10-CM

## 2018-06-11 DIAGNOSIS — G89.29 CHRONIC RIGHT-SIDED LOW BACK PAIN WITH RIGHT-SIDED SCIATICA: Primary | ICD-10-CM

## 2018-06-11 DIAGNOSIS — E10.8 TYPE 1 DIABETES MELLITUS WITH COMPLICATION (HCC): ICD-10-CM

## 2018-06-11 DIAGNOSIS — M54.41 CHRONIC RIGHT-SIDED LOW BACK PAIN WITH RIGHT-SIDED SCIATICA: Primary | ICD-10-CM

## 2018-06-11 PROCEDURE — 99213 OFFICE O/P EST LOW 20 MIN: CPT | Performed by: FAMILY MEDICINE

## 2018-06-11 RX ORDER — GABAPENTIN 100 MG/1
CAPSULE ORAL
Qty: 180 CAPSULE | Refills: 2 | Status: SHIPPED | OUTPATIENT
Start: 2018-06-11 | End: 2018-10-15 | Stop reason: SDUPTHER

## 2018-06-11 RX ORDER — METHYLPREDNISOLONE 4 MG/1
TABLET ORAL
Qty: 21 TABLET | Refills: 0 | Status: SHIPPED | OUTPATIENT
Start: 2018-06-11 | End: 2018-07-16 | Stop reason: ALTCHOICE

## 2018-06-11 NOTE — PROGRESS NOTES
FAMILY PRACTICE OFFICE VISIT       NAME: Amina Artis  AGE: 68 y o  SEX: male       : 1945        MRN: [id number]    DATE: 2018  TIME: 9:44 PM    Assessment and Plan     Problem List Items Addressed This Visit     Type 1 diabetes mellitus (HCC)    Relevant Medications    glucagon (GLUCAGON EMERGENCY) 1 MG injection    gabapentin (NEURONTIN) 100 mg capsule    Methylprednisolone 4 MG TBPK      Other Visit Diagnoses     Chronic right-sided low back pain with right-sided sciatica    -  Primary    Diabetic autonomic neuropathy associated with type 1 diabetes mellitus (HCC)        Relevant Medications    glucagon (GLUCAGON EMERGENCY) 1 MG injection    gabapentin (NEURONTIN) 100 mg capsule       Patient presents for evaluation of right lower extremity sciatica  He actually reports significant improvement of claudication symptoms after recent angiography and stent placement  His sciatica symptoms are intermittent but bothersome at times  Will try patient on gabapentin  He will start 100 mg at bedtime and will titrate dose up to 300 mg at bedtime  If his symptoms will not improve significantly in a few days-he may consider adding Medrol Dosepak  I would like to avoid steroids to the first-line therapy since patient's blood sugars have been quite fluctuating lately  He is addressing type 1 diabetes treatment with [facility]    There are no Patient Instructions on file for this visit  Chief Complaint     Chief Complaint   Patient presents with    Leg Pain     Pt is here w/ c/o of right leg pain that starts in the back  at buttock and travels down to knee since habing angiogram done on 2018       History of Present Illness     Patient presents for evaluation of pain in the right upper buttock/sacroiliac region radiating down to his right posterior thigh down to the knee  His symptoms started after angiography procedure a few weeks ago    He denies any generalized low back pain per se  No dysuria, no other symptoms  Patient denies any rash or leg discoloration  This discomfort is occurring on and off  Patient states that symptoms of claudication have improved significantly after recent angiography and stent placement  Leg Pain          Review of Systems   Review of Systems   Constitutional: Negative  Respiratory: Negative  Cardiovascular: Negative  Gastrointestinal: Negative      Musculoskeletal:        As outlined in HPI       Active Problem List     Patient Active Problem List   Diagnosis    [facility]INEL [facility]) [facility]    Type 1 diabetes mellitus ([facility]    Presence of drug coated stent in LAD coronary artery    Coronary artery disease involving native coronary artery of native heart without angina pectoris    Presence of drug coated stent in left circumflex coronary artery    Dyslipidemia    Obstructive sleep apnea of adult    Carotid artery stenosis, asymptomatic, bilateral    History of ischemic cardiomyopathy    Atherosclerosis of both lower extremities with intermittent claudication ([facility]    Restrictive lung disease    [facility]    Benign hypertension with chronic kidney disease, stage III    CKD (chronic kidney disease) stage 3, GFR 30-59 ml/min    Proteinuria    Renal artery stenosis ([facility])    Renal cyst, right    Vitamin D deficiency    Aortoiliac occlusive disease ([facility]    Hypothyroidism, postablative    Other specified hypothyroidism       Past Medical History:  Past Medical History:   Diagnosis Date    [facility]     resolved 11/30/2015    Acute venous embolism and thrombosis of deep vessels of proximal lower extremity ([facility]     resolved 04/04/2015    Cardiac disease     heart attack, stents x 4    CHF (congestive heart failure) ([facility])     Chronic cough     resolved 02/04/2016    COPD (chronic obstructive pulmonary disease) ([facility]     Diabetes mellitus ([facility]     Disease of thyroid gland     DVT (deep venous thrombosis) ([facility]     Hypertension     Ischemic cardiomyopathy     last assessed 09/26/2017    Pulmonary granuloma (HCC)     resolved 02/03/2017    Renal failure     Sleep apnea        Past Surgical History:  Past Surgical History:   Procedure Laterality Date    BYPASS FEMORAL-POPLITEAL      initial stenosis with stent left, 7 x 100 smart stent onset 02/24/2014    CORONARY ANGIOPLASTY WITH STENT PLACEMENT      x4       Family History:  Family History   Problem Relation Age of Onset    Heart disease Father      pacer placement    Hypertension Father     Kidney disease Father     Heart failure Father     Heart attack Father     Liver disease Father     Cirrhosis Mother      due to beer consumption    Liver disease Mother     Diabetes Other        Social History:  Social History     Social History    Marital status: /Civil Union     Spouse name: N/A    Number of children: N/A    Years of education: N/A     Occupational History    Retired     [employer] work     [employer]      Social History Main Topics    Smoking status: Former Smoker     Packs/day: 4 00     Years: 48 00     Types: Cigarettes     Quit date: 2008    Smokeless tobacco: Never Used      Comment: smoking 48 years 1 5 ppd d/c oct 2008 screening protocol as per allscripts    Alcohol use 12 6 oz/week     21 Standard drinks or equivalent per week      Comment: 2-3 glasses of vodka daily (6 shots) (history 2 drinks per day as per allscripts    Drug use: No    Sexual activity: No     Other Topics Concern    Not on file     Social History Narrative    No narrative on file       Objective     Vitals:    06/11/18 1130 06/11/18 1212   BP: 158/70 132/68   BP Location: Left arm    Patient Position: Sitting    Cuff Size: Adult    Pulse: 64    Resp: 20    Temp: (!) 97 1 °F (36 2 °C)    TempSrc: Tympanic    Weight: 91 7 kg (202 lb 3 2 oz)    Height: 5' 7" (1 702 m)          Vitals: 06/11/18 1212   BP: 132/68   Pulse:    Resp:    Temp:      Wt Readings from Last 3 Encounters:   06/11/18 91 7 kg (202 lb 3 2 oz)   06/06/18 90 7 kg (199 lb 14 4 oz)   05/24/18 89 2 kg (196 lb 9 6 oz)       Physical Exam   Constitutional: He is oriented to person, place, and time  He appears well-developed and well-nourished  Musculoskeletal: Normal range of motion  He exhibits no edema  Bilateral thighs:  No discoloration, no edema, no tenderness with palpation  Negative straight leg raising bilaterally  Tenderness at right sacroiliac joint  No CVA tenderness   Neurological: He is oriented to person, place, and time  Psychiatric: He has a normal mood and affect   His behavior is normal        Pertinent Laboratory/Diagnostic Studies:  Lab Results   Component Value Date    GLUCOSE 240 (H) 05/17/2018    BUN 23 05/25/2018    CREATININE 1 44 (H) 05/25/2018    CALCIUM 9 5 05/25/2018     05/25/2018    K 4 2 05/25/2018    CO2 25 05/25/2018     05/25/2018     Lab Results   Component Value Date    ALT 15 04/24/2018    AST 13 04/24/2018    ALKPHOS 102 04/24/2018    BILITOT 0 45 04/24/2018       Lab Results   Component Value Date    WBC 4 95 05/25/2018    HGB 13 1 05/25/2018    HCT 38 9 05/25/2018    MCV 96 05/25/2018     05/25/2018       No results found for: TSH    Lab Results   Component Value Date    CHOL 143 06/04/2018     Lab Results   Component Value Date    TRIG 190 (H) 06/04/2018     Lab Results   Component Value Date    HDL 46 06/04/2018     Lab Results   Component Value Date    LDLCALC 59 06/04/2018     Lab Results   Component Value Date    HGBA1C 8 1 (H) 06/04/2018       Results for orders placed or performed in visit on 06/04/18   HEMOGLOBIN A1C W/ EAG ESTIMATION   Result Value Ref Range    Hemoglobin A1C 8 1 (H) 4 2 - 6 3 %     mg/dl   Lipid Panel with Direct LDL reflex   Result Value Ref Range    Cholesterol 143 50 - 200 mg/dL    Triglycerides 190 (H) <=150 mg/dL    HDL, Direct 46 40 - 60 mg/dL    LDL Calculated 59 0 - 100 mg/dL   Vitamin D 25 hydroxy   Result Value Ref Range    Vit D, 25-Hydroxy 33 4 30 0 - 100 0 ng/mL       No orders of the defined types were placed in this encounter  ALLERGIES:  Allergies   Allergen Reactions    Cardura [Doxazosin Mesylate]     Other      Iv dye for cardiac cath  Renal failure very close to dialysis  But no dialysis    Tylenol [Acetaminophen]      interferes with reading of blood sugar    Zestril [Lisinopril]        Current Medications     Current Outpatient Prescriptions   Medication Sig Dispense Refill    Acetylcysteine 600 MG CAPS Take 2 capsules (1,200 mg total) by mouth 2 (two) times a day 8 capsule 0    amLODIPine (NORVASC) 2 5 mg tablet Take 7 5 mg by mouth daily      aspirin 81 MG tablet Take 81 mg by mouth daily        carvedilol (COREG) 6 25 mg tablet Take 1 tablet (6 25 mg total) by mouth 2 (two) times a day with meals 180 tablet 3    cholecalciferol (VITAMIN D3) 1,000 units tablet Take 10,000 Units by mouth daily      clopidogrel (PLAVIX) 75 mg tablet Take 75 mg by mouth daily at bedtime      clotrimazole (MYCELEX) 10 mg [place] Take 1 tablet (10 mg total) by mouth 4 (four) times a day 40 tablet 0    gabapentin (NEURONTIN) 100 mg capsule Take 2 capsules 3 times a day 180 capsule 2    glucagon (GLUCAGON EMERGENCY) 1 MG injection Inject 1 mg under the skin once as needed for low blood sugar      insulin glargine (LANTUS) 100 units/mL subcutaneous injection Inject 66 Units under the skin daily 38 units in the morning and 28 units in the evening 10 mL 0    insulin lispro (HUMALOG) 100 units/mL injection Inject 10-15 Units under the skin 3 (three) times a day 10 mL 3    Lactobacillus-Inulin (CULTUREE DIGESTIVE HEALTH PO) Take 1 capsule by mouth daily      levothyroxine 175 mcg tablet Take 1 tablet (175 mcg total) by mouth daily in the early morning 90 tablet 3    nitroglycerin (NITROSTAT) 0 4 mg SL tablet Place 0 4 mg under the tongue every 5 (five) minutes as needed for chest pain   nystatin (MYCOSTATIN) cream Apply topically 2 (two) times a day 30 g 0    rosuvastatin (CRESTOR) 40 MG tablet Take 1 tablet by mouth daily 90 tablet 3    traMADol (ULTRAM) 50 mg tablet TAKE ONE TABLET BY MOUTH EVERY 8 HOURS AS NEEDED FOR MODERATE PAIN 270 tablet 0    Ubiquinol 200 MG CAPS Take 200 mg by mouth daily   VITAMIN K PO Take 1 tablet by mouth daily      Methylprednisolone 4 MG TBPK Use as directed on package 21 tablet 0     No current facility-administered medications for this visit            Health Maintenance     Health Maintenance   Topic Date Due    Hepatitis C Screening  1945    Depression Screening PHQ-9  1945    SLP PLAN OF CARE  1945    CRC Screening: Colonoscopy  1945    Lung Cancer Screening  04/27/2000    Fall Risk  04/27/2010    GLAUCOMA SCREENING 67+ YR  04/27/2012    URINE MICROALBUMIN  10/01/2016    OPHTHALMOLOGY EXAM  03/21/2018    INFLUENZA VACCINE  09/01/2018    HEMOGLOBIN A1C  12/04/2018    Diabetic Foot Exam  05/25/2019    DTaP,Tdap,and Td Vaccines (2 - Td) 05/05/2023    ABDOMINAL AORTIC ANEURYSM (AAA) SCREEN  Completed    PNEUMOCOCCAL POLYSACCHARIDE VACCINE AGE 72 AND OVER  Completed     Immunization History   Administered Date(s) Administered    DT (pediatric) 12/01/2009    H1N1, All Formulations 12/01/2009    Influenza 10/01/2008, 10/06/2009, 09/01/2010, 11/04/2013    Influenza Split High Dose Preservative Free IM 08/28/2014, 10/03/2016    Influenza TIV (IM) 10/01/2008, 10/19/2010, 09/20/2011, 08/01/2012, 09/13/2013, 09/15/2015, 09/03/2017    Pneumococcal Conjugate 13-Valent 08/11/2015    Pneumococcal Polysaccharide PPV23 10/01/2008, 06/04/2009, 03/15/2017    Tdap 05/05/2013    Zoster 10/01/2009       Elda Landers MD

## 2018-07-09 ENCOUNTER — TELEPHONE (OUTPATIENT)
Dept: ENDOCRINOLOGY | Facility: CLINIC | Age: 73
End: 2018-07-09

## 2018-07-09 NOTE — TELEPHONE ENCOUNTER
Patient left message stating that he downloaded Dexcom and also wanted to let you know that he had been on Steroid from 6/20 to 6/24  Download is on your desk

## 2018-07-10 ENCOUNTER — TELEPHONE (OUTPATIENT)
Dept: ENDOCRINOLOGY | Facility: CLINIC | Age: 73
End: 2018-07-10

## 2018-07-10 NOTE — TELEPHONE ENCOUNTER
Left message that pump download reviewed and BG overall is high  Increase lantus to 40 units in the am & 33 units in pm    Advised that Mary aLrry or Marley Farnsworth may work better  To call insurance company to see what will be covered  Patient stated that he was on steroids and that is the cause of the highs  He advised he will increase his Lantus I just advised and see if that will decrease his BS   If not then he will contact insurance

## 2018-07-12 ENCOUNTER — OFFICE VISIT (OUTPATIENT)
Dept: VASCULAR SURGERY | Facility: CLINIC | Age: 73
End: 2018-07-12
Payer: MEDICARE

## 2018-07-12 VITALS
WEIGHT: 207 LBS | SYSTOLIC BLOOD PRESSURE: 144 MMHG | HEIGHT: 67 IN | BODY MASS INDEX: 32.49 KG/M2 | DIASTOLIC BLOOD PRESSURE: 70 MMHG | HEART RATE: 70 BPM | RESPIRATION RATE: 18 BRPM

## 2018-07-12 DIAGNOSIS — I70.213 ATHEROSCLEROSIS OF NATIVE ARTERY OF BOTH LOWER EXTREMITIES WITH INTERMITTENT CLAUDICATION (HCC): ICD-10-CM

## 2018-07-12 DIAGNOSIS — I74.09 AORTOILIAC OCCLUSIVE DISEASE (HCC): Primary | Chronic | ICD-10-CM

## 2018-07-12 PROCEDURE — 99214 OFFICE O/P EST MOD 30 MIN: CPT | Performed by: SURGERY

## 2018-07-12 RX ORDER — TOBRAMYCIN AND DEXAMETHASONE 3; 1 MG/ML; MG/ML
SUSPENSION/ DROPS OPHTHALMIC
COMMUNITY
Start: 2018-06-24 | End: 2018-10-10

## 2018-07-12 NOTE — PROGRESS NOTES
Assessment/Plan: Aortoiliac occlusive disease (HCC)  Aortoiliac occlusive disease status post angioplasty and stenting of bilateral external iliac artery stenoses 05/17/2018  He has had a marked improvement in his claudication  He is now able to walk approximately 1 block before experiencing discomfort  As standard follow-up we will plan on performing an aortic duplex with segmental pressures at approximately 3 months  He will continue his Plavix therapy for a total of 3 months  He will continue his current walking program     Of note he has known residual bilateral superficial femoral artery occlusion  Diagnoses and all orders for this visit:    Aortoiliac occlusive disease ([address] )  -     VAS abdominal aorta/iliacs; complete study; Future  -     VAS TERRI & waveform analysis, multiple levels; Future    Atherosclerosis of native artery of both lower extremities with intermittent claudication (HCC)  -     VAS abdominal aorta/iliacs; complete study; Future  -     VAS TERRI & waveform analysis, multiple levels; Future    Other orders  -     tobramycin-dexamethasone (TOBRADEX) ophthalmic suspension;           Subjective:      Patient ID: Jason Cherry is a 68 y o  male  Patient had Agram on 5/17/2018  Patient has cramping in legs after about 1 block resting about 10 minutes for relief  Pain down right leg when he walks at all times and if he continues to walk he gets the pain down the left side as well  Patient saw [facility] and they prescribed steroid that he has taken and it did not help  Patient is on aspirin 80    17-year-old with long history of peripheral arterial occlusive disease recently underwent arteriogram at which time he was found to have critical bilateral external iliac artery stenosis successfully treated with angioplasty and stenting  He also has evidence of segmental bilateral superficial femoral artery occlusion      On evaluation today he notes there was a significant improvement in his walking distance  Before he was barely able to walk across the office now he is able to walk approximately 1 block before experiencing some calf discomfort which then resolves with rest   He does complain of some back pain which radiates down the posterior aspect of both legs which has been present since the procedure  He underwent evaluation by his primary care physician and was given a course of steroids without significant improvement  The following portions of the patient's history were reviewed and updated as appropriate: allergies, current medications, past family history, past medical history, past social history, past surgical history and problem list     Review of Systems   Constitutional: Positive for activity change and fatigue  Negative for appetite change and fever  HENT: Negative for congestion, sinus pain and sinus pressure  Eyes: Negative for discharge and itching  Respiratory: Positive for apnea and shortness of breath (with activity)  Gastrointestinal: Negative for diarrhea and nausea  Endocrine: Negative for cold intolerance and heat intolerance  Genitourinary: Positive for frequency  Negative for flank pain and urgency  Musculoskeletal: Positive for arthralgias, back pain and gait problem  Leg cramping with walking   Skin: Negative for color change and wound  Allergic/Immunologic: Negative for food allergies  Neurological: Negative for dizziness and numbness  Psychiatric/Behavioral: Positive for agitation  Objective:      /70 (BP Location: Left arm, Patient Position: Sitting)   Pulse 70   Resp 18   Ht 5' 7" (1 702 m)   Wt 93 9 kg (207 lb)   BMI 32 42 kg/m²          Physical Exam   Constitutional: He is oriented to person, place, and time  He appears well-developed and well-nourished  HENT:   Head: Normocephalic  Cardiovascular:   Pulses:       Femoral pulses are 2+ on the right side, and 2+ on the left side         Popliteal pulses are 0 on the right side, and 0 on the left side  Dorsalis pedis pulses are 0 on the right side, and 0 on the left side  Posterior tibial pulses are 0 on the right side, and 0 on the left side  Musculoskeletal: He exhibits edema  Neurological: He is alert and oriented to person, place, and time  Skin: Skin is warm and dry  Psychiatric: He has a normal mood and affect

## 2018-07-12 NOTE — PATIENT INSTRUCTIONS
Aortoiliac occlusive disease (HCC)  Aortoiliac occlusive disease status post angioplasty and stenting of bilateral external iliac artery stenoses 05/17/2018  He has had a marked improvement in his claudication  He is now able to walk approximately 1 block before experiencing discomfort  As standard follow-up we will plan on performing an aortic duplex with segmental pressures at approximately 3 months  He will continue his Plavix therapy for a total of 3 months    He will continue his current walking program

## 2018-07-12 NOTE — ASSESSMENT & PLAN NOTE
Aortoiliac occlusive disease status post angioplasty and stenting of bilateral external iliac artery stenoses 05/17/2018  He has had a marked improvement in his claudication  He is now able to walk approximately 1 block before experiencing discomfort  As standard follow-up we will plan on performing an aortic duplex with segmental pressures at approximately 3 months  He will continue his Plavix therapy for a total of 3 months  He will continue his current walking program     Of note he has known residual bilateral superficial femoral artery occlusion

## 2018-07-12 NOTE — LETTER
July 12, 2018     Larry Vanegas MD  [address] [facility] [address]    Patient: Mega Hines   YOB: 1945   Date of Visit: 7/12/2018       Dear Dr Anitha Jarrell: Thank you for referring Miguel Francisco to me for evaluation  Below are the relevant portions of my assessment and plan of care  Diagnoses and all orders for this visit:    Aortoiliac occlusive disease ([address] )  Aortoiliac occlusive disease status post angioplasty and stenting of bilateral external iliac artery stenoses 05/17/2018  He has had a marked improvement in his claudication  He is now able to walk approximately 1 block before experiencing discomfort  As standard follow-up we will plan on performing an aortic duplex with segmental pressures at approximately 3 months  He will continue his Plavix therapy for a total of 3 months  He will continue his current walking program     Of note he has known residual bilateral superficial femoral artery occlusion  If you have questions, please do not hesitate to call me  I look forward to following Arlen Hernandez along with you           Sincerely,        Melissa Arroyo MD        CC: No Recipients

## 2018-07-16 ENCOUNTER — OFFICE VISIT (OUTPATIENT)
Dept: FAMILY MEDICINE CLINIC | Facility: CLINIC | Age: 73
End: 2018-07-16
Payer: MEDICARE

## 2018-07-16 VITALS
HEIGHT: 67 IN | WEIGHT: 204.8 LBS | DIASTOLIC BLOOD PRESSURE: 62 MMHG | HEART RATE: 68 BPM | TEMPERATURE: 97.2 F | BODY MASS INDEX: 32.15 KG/M2 | RESPIRATION RATE: 16 BRPM | SYSTOLIC BLOOD PRESSURE: 122 MMHG

## 2018-07-16 DIAGNOSIS — I25.10 CORONARY ARTERY DISEASE INVOLVING NATIVE CORONARY ARTERY OF NATIVE HEART WITHOUT ANGINA PECTORIS: ICD-10-CM

## 2018-07-16 DIAGNOSIS — E10.8 TYPE 1 DIABETES MELLITUS WITH COMPLICATION (HCC): ICD-10-CM

## 2018-07-16 DIAGNOSIS — R45.4 BEHAVIOR-IRRITABILITY: ICD-10-CM

## 2018-07-16 DIAGNOSIS — N18.30 CKD (CHRONIC KIDNEY DISEASE) STAGE 3, GFR 30-59 ML/MIN (HCC): ICD-10-CM

## 2018-07-16 DIAGNOSIS — G89.29 CHRONIC LOW BACK PAIN WITH RIGHT-SIDED SCIATICA, UNSPECIFIED BACK PAIN LATERALITY: Primary | ICD-10-CM

## 2018-07-16 DIAGNOSIS — M54.41 CHRONIC LOW BACK PAIN WITH RIGHT-SIDED SCIATICA, UNSPECIFIED BACK PAIN LATERALITY: Primary | ICD-10-CM

## 2018-07-16 PROCEDURE — 99214 OFFICE O/P EST MOD 30 MIN: CPT | Performed by: FAMILY MEDICINE

## 2018-07-16 NOTE — PATIENT INSTRUCTIONS
Please contact Pain Management    please decrease dose of gabapentin to 2 tablets in the morning and 2 tablets at night    As long as you feeling well you may decrease dose further to  2 tablets at bedtime only

## 2018-07-16 NOTE — PROGRESS NOTES
FAMILY PRACTICE OFFICE VISIT       NAME: Rivas Artis  AGE: 68 y o  SEX: male       : 1945        MRN: [id number]    DATE: 2018  TIME: 6:59 AM    Assessment and Plan     Problem List Items Addressed This Visit     Type 1 diabetes mellitus ([address] )    Coronary artery disease involving native coronary artery of native heart without angina pectoris    CKD (chronic kidney disease) stage 3, GFR 30-59 ml/min    Relevant Orders    Basic metabolic panel    Low back pain with sciatica - Primary    Relevant Orders    Ambulatory referral to Pain Management      Other Visit Diagnoses     Behavior-irritability           Patient presents for follow-up of chronic medical conditions  He remains under care of Cardiology, Endocrinology, vascular surgery  He reports significant improvement of claudication but unfortunately has redeveloped persistent low back pain and right lower extremity sciatica  I am referring her to [facility] Pain Management for re-evaluation  His last lumbar sacral MRI was performed in 2016  Patient reports no relief of his symptoms with gabapentin  He has tried tramadol in the past which was ineffective as well  Will discontinue tramadol and will decrease dose of gabapentin from 200 mg t i d  to 200 mg b i d  Patient will continue close follow-up with [facility] Endocrinology regarding his blood sugars  He is well aware of importance of hypoglycemia avoidance  We discussed alcohol moderation and treatment of increased symptoms of irritability and anger  He is reluctant to pursue counseling or consider low-dose of SSRI  CK D  Hypertension  Will recheck BMP  Coronary artery disease:  Patient follows up with [facility] Cardiology   D  Sleep apnea  He is under care of [facility] Pulmonary Medicine  Will schedule routine follow-up in 3-4 months  Patient will contact me in the interim with any questions, concerns or updates    I have spent 30 minutes with Patient and family today in which greater than 50% of this time was spent in counseling/coordination of care regarding Diagnostic results, Prognosis, Risks and benefits of tx options, Intructions for management, Patient and family education, Importance of tx compliance, Risk factor reductions and Impressions  Patient Instructions   Please contact Pain Management    please decrease dose of gabapentin to 2 tablets in the morning and 2 tablets at night  As long as you feeling well you may decrease dose further inches keep 2 tablets at bedtime only          Chief Complaint     Chief Complaint   Patient presents with    Follow-up     Patient is here for a followup  Patient would like to discuss discontinuing certain medications   Diarrhea     Patient c/o diarrhea x's 2 days   Back Pain     Patient c/o lower back pain which radiates downt he right leg  History of Present Illness     1  Diarrhea since Friday , July 13th,  brown, liquidy    No diarrhea today    no N/V, no abdominal pain  No recent travel or sick contacts  Patient is concerned that his diarrhea might be associated with recent steak house dining  His appetite is good, no fever, no dysuria    2  Type 1 diabetes  Patient is under care of [facility] Endocrinology  Dose of basal insulin was recently increased  He has been experiencing occasional mild hyperglycemia, blood sugar was down to 70 this morning and down to 68 during today's office visit  Patient and wife are planning to proceed for lunch right after visit with me  We did provide 2 boxes of apple juice and lollipop with symptomatic improvement   Current dose of basal insulin : Lantus 40 u q am and 33 qpm     3  Patient remains under car of [facility] vascular surgery  He admits to significant improvement of claudication symptoms after recent procedure  Dr Nedra Nuñez follows       4   Patient has been experiencing recurrent symptoms of low back pain radiating to the right lower extremity (down to the right knee) within past few months  He has no symptoms at rest, pain is relieved by sitting or lying down  Pain is significantly worsening with walking or prolonged standing  Patient was evaluated with MRI of lumbar spine for similar symptoms over a year ago  (11/2016) revealing:    Lumbar spondylytic degenerative change  Moderately large broad-based central disc protrusion at L5-S1  Mild to moderate canal stenosis and foraminal narrowing    Mild canal stenosis and foraminal narrowing at L2-3, L3-4 and L4-5    Patient was evaluated by pain management at that time and had L5 right and left transforaminal epidural steroid injection  Patient did notice significant improvement of his symptoms immediately after procedure, unfortunately therapeutic effect was very short living  Patient states that tramadol is ineffective in alleviating his pain  He has tried ibuprofen on a few occasion and it helped quite a bit  Patient should not be using anti-inflammatories due to chronic renal insufficiency, he is well aware but was desperate to alleviate his back pain  5   Patient admits to recurrent symptoms of irritability, anger bursts, being inpatient  He denies symptoms of depression per se  He consumes at least 2 drinks of vodka or whiskey daily  He feels relaxed afterwards  He is not interested in pursuing counseling or trying medications  We had long discussion about moderation of EtOH intake and its effect on patient's fluctuating blood sugar, weight and overall health  Diarrhea      Back Pain         Review of Systems   Review of Systems   Constitutional: Positive for fatigue  HENT: Negative  Eyes: Negative  Respiratory: Negative  Cardiovascular: Negative  Gastrointestinal: Positive for diarrhea (Resolved as of today)  Endocrine: Negative  Genitourinary: Negative  Musculoskeletal: Positive for back pain  Allergic/Immunologic: Negative  Neurological: Negative  Psychiatric/Behavioral:        As outlined in HPI       Active Problem List     Patient Active Problem List   Diagnosis    DARINEL (acute kidney injury) ([address] )    Type 1 diabetes mellitus ([address] )    Presence of drug coated stent in LAD coronary artery    Coronary artery disease involving native coronary artery of native heart without angina pectoris    Presence of drug coated stent in left circumflex coronary artery    Dyslipidemia    Obstructive sleep apnea of adult    Carotid artery stenosis, asymptomatic, bilateral    History of ischemic cardiomyopathy    Atherosclerosis of both lower extremities with intermittent claudication ([address] )    Restrictive lung disease    COPD (chronic obstructive pulmonary disease) ([address] )    Benign hypertension with chronic kidney disease, stage III    CKD (chronic kidney disease) stage 3, GFR 30-59 ml/min    Proteinuria    Ren[facility] artery stenosis ([facility])    Renal cyst, right    Vitamin D deficiency    Aortoiliac occlusive disease ([address] )    Hypothyroidism, postablative    Other specified hypothyroidism    Low back pain with sciatica       Past Medical History:  Past Medical History:   Diagnosis Date    Acute kidney injury ([place] [address] )     resolved 11/30/2015    Acute venous embolism and thrombosis of deep vessels of proximal lower extremity ([place] [address] )     resolved 04/04/2015    Cardiac disease     heart attack, stents x 4    CHF (congestive heart failure) ([facility])     Chronic cough     resolved 02/04/2016    COPD (chronic obstructive pulmonary disease) ([place] [address] )     Diabetes mellitus ([place] [address] )     Disease of thyroid gland     DVT (deep venous thrombosis) ([place] [address] )     Hypertension     Ischemic cardiomyopathy     last assessed 09/26/2017    Pulmonary granuloma ([place] [address] )     resolved 02/03/2017    Renal failure     Sleep apnea        Past Surgical History:  Past Surgical History:   Procedure Laterality Date    BYPASS FEMORAL-POPLITEAL      initial stenosis with stent left, 7 x 100 smart stent onset 02/24/2014    CORONARY ANGIOPLASTY WITH STENT PLACEMENT      x4       Family History:  Family History   Problem Relation Age of Onset    Heart disease Father         pacer placement    Hypertension Father     Kidney disease Father     Heart failure Father     Heart attack Father     Liver disease Father     Cirrhosis Mother         due to beer consumption    Liver disease Mother     Diabetes Other        Social History:  Social History     Social History    Marital status: /Civil Union     Spouse name: N/A    Number of children: N/A    Years of education: N/A     Occupational History    Retired     Desk work     [employer]      Social History Main Topics    Smoking status: Former Smoker     Packs/day: 4 00     Years: 48 00     Types: Cigarettes     Quit date: 2008    Smokeless tobacco: Never Used      Comment: smoking 48 years 1 5 ppd d/c oct 2008 screening protocol as per allscripts    Alcohol use 12 6 oz/week     21 Standard drinks or equivalent per week      Comment: 2-3 glasses of vodka daily (6 shots) (history 2 drinks per day as per allscripts    Drug use: No    Sexual activity: No     Other Topics Concern    Not on file     Social History Narrative    No narrative on file       Objective     Vitals:    07/16/18 1341   BP: 122/62   Pulse: 68   Resp: 16   Temp: (!) 97 2 °F (36 2 °C)     Wt Readings from Last 3 Encounters:   07/16/18 92 9 kg (204 lb 12 8 oz)   07/12/18 93 9 kg (207 lb)   06/11/18 91 7 kg (202 lb 3 2 oz)       Physical Exam   Constitutional: He is oriented to person, place, and time  He appears well-developed and well-nourished  HENT:   Head: Normocephalic and atraumatic  Eyes: Conjunctivae are normal    Neck: Neck supple  Carotid bruit is not present  Cardiovascular: Normal rate, regular rhythm and normal heart sounds  No murmur heard  Pulmonary/Chest: Effort normal and breath sounds normal  No respiratory distress  He has no wheezes  He has no rales  Musculoskeletal: Normal range of motion  Neurological: He is alert and oriented to person, place, and time  No cranial nerve deficit  Psychiatric: His speech is normal and behavior is normal  Judgment and thought content normal  His mood appears anxious  Nursing note and vitals reviewed        Pertinent Laboratory/Diagnostic Studies:  Lab Results   Component Value Date    GLUCOSE 240 (H) 05/17/2018    BUN 23 05/25/2018    CREATININE 1 44 (H) 05/25/2018    CALCIUM 9 5 05/25/2018     05/25/2018    K 4 2 05/25/2018    CO2 25 05/25/2018     05/25/2018     Lab Results   Component Value Date    ALT 15 04/24/2018    AST 13 04/24/2018    ALKPHOS 102 04/24/2018    BILITOT 0 45 04/24/2018       Lab Results   Component Value Date    WBC 4 95 05/25/2018    HGB 13 1 05/25/2018    HCT 38 9 05/25/2018    MCV 96 05/25/2018     05/25/2018       No results found for: TSH    Lab Results   Component Value Date    CHOL 143 06/04/2018     Lab Results   Component Value Date    TRIG 190 (H) 06/04/2018     Lab Results   Component Value Date    HDL 46 06/04/2018     Lab Results   Component Value Date    LDLCALC 59 06/04/2018     Lab Results   Component Value Date    HGBA1C 8 1 (H) 06/04/2018       Results for orders placed or performed in visit on 06/04/18   HEMOGLOBIN A1C W/ EAG ESTIMATION   Result Value Ref Range    Hemoglobin A1C 8 1 (H) 4 2 - 6 3 %     mg/dl   Lipid Panel with Direct LDL reflex   Result Value Ref Range    Cholesterol 143 50 - 200 mg/dL    Triglycerides 190 (H) <=150 mg/dL    HDL, Direct 46 40 - 60 mg/dL    LDL Calculated 59 0 - 100 mg/dL   Vitamin D 25 hydroxy   Result Value Ref Range    Vit D, 25-Hydroxy 33 4 30 0 - 100 0 ng/mL       Orders Placed This Encounter   Procedures    Basic metabolic panel    Ambulatory referral to Pain Management       ALLERGIES:  Allergies   Allergen Reactions    Cardura [Doxazosin Mesylate]     Other      Iv dye for cardiac cath  Renal failure very close to dialysis  But no dialysis    Tylenol [Acetaminophen]      interferes with reading of blood sugar    Zestril [Lisinopril]        Current Medications     Current Outpatient Prescriptions   Medication Sig Dispense Refill    Acetylcysteine 600 MG CAPS Take 2 capsules (1,200 mg total) by mouth 2 (two) times a day 8 capsule 0    amLODIPine (NORVASC) 2 5 mg tablet Take 7 5 mg by mouth daily      aspirin 81 MG tablet Take 81 mg by mouth daily   carvedilol (COREG) 6 25 mg tablet Take 1 tablet (6 25 mg total) by mouth 2 (two) times a day with meals 180 tablet 3    cholecalciferol (VITAMIN D3) 1,000 units tablet Take 10,000 Units by mouth daily      clopidogrel (PLAVIX) 75 mg tablet Take 75 mg by mouth daily at bedtime      gabapentin (NEURONTIN) 100 mg capsule Take 2 capsules 3 times a day 180 capsule 2    glucagon (GLUCAGON EMERGENCY) 1 MG injection Inject 1 mg under the skin once as needed for low blood sugar      insulin glargine (LANTUS) 100 units/mL subcutaneous injection Inject 66 Units under the skin daily 38 units in the morning and 28 units in the evening (Patient taking differently: Inject 66 Units under the skin daily 40 units in the morning and 33 units in the evening ) 10 mL 0    insulin lispro (HUMALOG) 100 units/mL injection Inject 10-15 Units under the skin 3 (three) times a day 10 mL 3    Lactobacillus-Inulin ([facility] DIGESTIVE HEALTH PO) Take 1 capsule by mouth daily      levothyroxine 175 mcg tablet Take 1 tablet (175 mcg total) by mouth daily in the early morning 90 tablet 3    nitroglycerin (NITROSTAT) 0 4 mg SL tablet Place 0 4 mg under the tongue every 5 (five) minutes as needed for chest pain   rosuvastatin (CRESTOR) 40 MG tablet Take 1 tablet by mouth daily 90 tablet 3    tobramycin-dexamethasone (TOBRADEX) ophthalmic suspension       Ubiquinol 200 MG CAPS Take 200 mg by mouth daily        VITAMIN K PO Take 1 tablet by mouth daily       No current facility-administered medications for this visit            Health Maintenance     Health Maintenance   Topic Date Due    Hepatitis C Screening  1945    Depression Screening PHQ-9  1945    SLP PLAN OF CARE  1945    CRC Screening: Colonoscopy  1945    Lung Cancer Screening  04/27/2000    Fall Risk  04/27/2010    GLAUCOMA SCREENING 72 + YR  04/27/2012    URINE MICROALBUMIN  12/22/2016    DM Eye Exam  03/21/2018    INFLUENZA VACCINE  09/01/2018    HEMOGLOBIN A1C  12/04/2018    Diabetic Foot Exam  05/25/2019    DTaP,Tdap,and Td Vaccines (2 - Td) 05/05/2023    ABDOMINAL AORTIC ANEURYSM (AAA) SCREEN  Completed    PNEUMOCOCCAL POLYSACCHARIDE VACCINE AGE 72 AND OVER  Completed     Immunization History   Administered Date(s) Administered    DT (pediatric) 12/01/2009    H1N1, All Formulations 12/01/2009    Influenza 10/01/2008, 10/06/2009, 09/01/2010, 11/04/2013    Influenza Split High Dose Preservative Free IM 08/28/2014, 10/03/2016    Influenza TIV (IM) 10/01/2008, 10/19/2010, 09/20/2011, 08/01/2012, 09/13/2013, 09/15/2015, 09/03/2017    Pneumococcal Conjugate 13-Valent 08/11/2015    Pneumococcal Polysaccharide PPV23 10/01/2008, 06/04/2009, 03/15/2017    Tdap 05/05/2013    Zoster 10/01/2009       Carisa Peck MD

## 2018-07-17 ENCOUNTER — TELEPHONE (OUTPATIENT)
Dept: PAIN MEDICINE | Facility: MEDICAL CENTER | Age: 73
End: 2018-07-17

## 2018-07-17 NOTE — TELEPHONE ENCOUNTER
Please advise  Patients wife called to schedule appt  w/ you  However, patient last saw Dr Jessie Zacarias last year  Patients wife was very rude and wouldn't answer questions asked only stating that patient is to schedule w/ you  for a new issue because he is being referred  Wife was informed that a message would be sent to you to look into as she wouldn't let me explain the policy of EP   C/b [phone]

## 2018-07-19 ENCOUNTER — PATIENT OUTREACH (OUTPATIENT)
Dept: FAMILY MEDICINE CLINIC | Facility: CLINIC | Age: 73
End: 2018-07-19

## 2018-07-19 NOTE — PROGRESS NOTES
Outpatient Care Management Note: Complex Care    I spoke with Rody Caruso & gave him the following information:  I spoke with Mena at Dr Juanjose Cardenas office & made an appointment for him at the [facility] for Wednesday, 8/1 @ 1430  He will need to bring photo ID, insurance cards & completed new patient paperwork that will be mailed to him  He should arrive at 1415 on that day  He said that he wrote this down and he verbalized that he understood

## 2018-07-23 ENCOUNTER — LAB (OUTPATIENT)
Dept: LAB | Facility: CLINIC | Age: 73
End: 2018-07-23
Payer: MEDICARE

## 2018-07-23 ENCOUNTER — TELEPHONE (OUTPATIENT)
Dept: ENDOCRINOLOGY | Facility: CLINIC | Age: 73
End: 2018-07-23

## 2018-07-23 DIAGNOSIS — N18.30 CKD (CHRONIC KIDNEY DISEASE) STAGE 3, GFR 30-59 ML/MIN (HCC): ICD-10-CM

## 2018-07-23 LAB
ANION GAP SERPL CALCULATED.3IONS-SCNC: 5 MMOL/L (ref 4–13)
BUN SERPL-MCNC: 27 MG/DL (ref 5–25)
CALCIUM SERPL-MCNC: 9.3 MG/DL (ref 8.3–10.1)
CHLORIDE SERPL-SCNC: 107 MMOL/L (ref 100–108)
CO2 SERPL-SCNC: 27 MMOL/L (ref 21–32)
CREAT SERPL-MCNC: 1.55 MG/DL (ref 0.6–1.3)
GFR SERPL CREATININE-BSD FRML MDRD: 44 ML/MIN/1.73SQ M
GLUCOSE P FAST SERPL-MCNC: 169 MG/DL (ref 65–99)
POTASSIUM SERPL-SCNC: 4.1 MMOL/L (ref 3.5–5.3)
SODIUM SERPL-SCNC: 139 MMOL/L (ref 136–145)

## 2018-07-23 PROCEDURE — 36415 COLL VENOUS BLD VENIPUNCTURE: CPT

## 2018-07-23 PROCEDURE — 80048 BASIC METABOLIC PNL TOTAL CA: CPT

## 2018-07-23 NOTE — TELEPHONE ENCOUNTER
Patient left message with answering service that he needs medication refill, he did not state what medication      Called and left message to call back and provide medication he needs refilled

## 2018-07-24 ENCOUNTER — TELEPHONE (OUTPATIENT)
Dept: FAMILY MEDICINE CLINIC | Facility: CLINIC | Age: 73
End: 2018-07-24

## 2018-07-24 NOTE — TELEPHONE ENCOUNTER
----- Message from Larry Vanegas MD sent at 7/24/2018  4:36 PM EDT -----  Please contact patient or his wife  His kidney function is overall stable  Now when creatinine is 1 55  Slightly elevated BUN at 27, he is to drink more water    I recommend to continue same daily medications and keep an eye on BMP with repeat test in 4 weeks, I will place orders, thank you

## 2018-08-01 ENCOUNTER — TELEPHONE (OUTPATIENT)
Dept: RADIOLOGY | Facility: CLINIC | Age: 73
End: 2018-08-01

## 2018-08-01 ENCOUNTER — TELEPHONE (OUTPATIENT)
Dept: VASCULAR SURGERY | Facility: CLINIC | Age: 73
End: 2018-08-01

## 2018-08-01 ENCOUNTER — OFFICE VISIT (OUTPATIENT)
Dept: PAIN MEDICINE | Facility: CLINIC | Age: 73
End: 2018-08-01
Payer: MEDICARE

## 2018-08-01 VITALS
WEIGHT: 217 LBS | DIASTOLIC BLOOD PRESSURE: 62 MMHG | SYSTOLIC BLOOD PRESSURE: 130 MMHG | HEART RATE: 60 BPM | BODY MASS INDEX: 33.99 KG/M2

## 2018-08-01 DIAGNOSIS — M54.16 LUMBAR RADICULOPATHY: ICD-10-CM

## 2018-08-01 DIAGNOSIS — M51.26 LUMBAR DISC HERNIATION: Primary | ICD-10-CM

## 2018-08-01 DIAGNOSIS — M54.41 CHRONIC LOW BACK PAIN WITH RIGHT-SIDED SCIATICA, UNSPECIFIED BACK PAIN LATERALITY: ICD-10-CM

## 2018-08-01 DIAGNOSIS — G89.29 CHRONIC LOW BACK PAIN WITH RIGHT-SIDED SCIATICA, UNSPECIFIED BACK PAIN LATERALITY: ICD-10-CM

## 2018-08-01 PROCEDURE — 99204 OFFICE O/P NEW MOD 45 MIN: CPT | Performed by: ANESTHESIOLOGY

## 2018-08-01 NOTE — PROGRESS NOTES
Assessment:  1  Lumbar disc herniation    2  Chronic low back pain with right-sided sciatica, unspecified back pain laterality    3  Lumbar radiculopathy        Plan:    The patient's pain persists despite time, relative rest, activity modification and therapy  I believe that he would benefit from a lumbar epidural steroid injection to diminish any inflammatory component of his pain  I will initially use a transforaminal approach to better concentrate the steroid along the affected nerve root  The injection may need to be repeated based on the degree of pain relief following the initial injection  As the patient is on anticoagulation which is contraindicated in neuroaxial techniques, we will obtain permission if medically appropriate to hold the anticoagulation for an appropriate length of time  Given the patient's history of diabetes, there may be an increased risk of infection in association with the procedure although the overall risk is low  In addition, following the injection there may be a transient elevation in serum glucose levels for several days  The patient understands that this may require additional glycemic coverage in coordination with the treating physician  In the office today, we reviewed the nature of the patient's pathology in depth using  diagrams and models  I discussed the approach I would use for the epidural steroid injection and provided literature for home review  The patient understands the risks associated with the procedure including but not limited to bleeding, infection, tissue injury, exacerbation of symptoms, allergic reaction, spinal headache, and paralysis and provided written and verbal consent  today  My impressions and treatment recommendations were discussed in detail with the patient who verbalized understanding and had no further questions  Discharge instructions were provided   I personally saw and examined the patient and I agree with the above discussed plan of care  Orders Placed This Encounter   Procedures    FL spine and pain procedure     Standing Status:   Future     Standing Expiration Date:   8/1/2022     Order Specific Question:   Reason for Exam:     Answer:   Right L5/S1 transforaminal epidural steroid injection#1     Order Specific Question:   Anticoagulant hold needed? Answer:   Yes-Plavix    FL spine and pain procedure     Standing Status:   Future     Standing Expiration Date:   8/1/2022     Order Specific Question:   Reason for Exam:     Answer:   Right L5/S1 transforaminal epidural steroid injection#2     Order Specific Question:   Anticoagulant hold needed? Answer:   yes-plavix     No orders of the defined types were placed in this encounter  History of Present Illness:    Orestes Meek is a 68 y o  male patient is extremely pleasant 27-year-old gentleman longstanding history of low back and lower extremity pain  Approximately 4 years ago he wonder went epidural steroid traction determine if that can help with his lower extremity pain but in a being vascular claudication had vascular surgery and his lower extremity pain and claudication is reduced however he developed a right-sided low back pain radiating down the posterior lateral aspect of his right leg more a sciatic" type sensation  Symptoms are moderate to severe rates it as 9/10 on the visual analog scale described sharp cutting dull achy notes that lying down sitting and relaxation decreases symptoms will standing bending walking exercise aggravate them  Exercise provided no relief he occasionally uses a cane and scooter  I have personally reviewed and/or updated the patient's past medical history, past surgical history, family history, social history, current medications, allergies, and vital signs today  Review of Systems:    Review of Systems   Constitutional: Positive for unexpected weight change  Negative for fever     HENT: Negative for trouble swallowing  Eyes: Negative for visual disturbance  Respiratory: Positive for shortness of breath  Negative for wheezing  Cardiovascular: Negative for chest pain and palpitations  Gastrointestinal: Negative for constipation, diarrhea, nausea and vomiting  Endocrine: Negative for cold intolerance, heat intolerance and polydipsia  Genitourinary: Negative for difficulty urinating and frequency  Musculoskeletal: Positive for gait problem and joint swelling (joint stiffness)  Negative for arthralgias and myalgias  Skin: Negative for rash  Neurological: Positive for weakness  Negative for dizziness, seizures, syncope and headaches  Hematological: Does not bruise/bleed easily  Psychiatric/Behavioral: Negative for dysphoric mood  All other systems reviewed and are negative        Patient Active Problem List   Diagnosis    [facility]    Type 1 diabetes mellitus (HCC)    Presence of drug coated stent in LAD coronary artery    Coronary artery disease involving native coronary artery of native heart without angina pectoris    Presence of drug coated stent in left circumflex coronary artery    Dyslipidemia    Obstructive sleep apnea of adult    Carotid artery stenosis, asymptomatic, bilateral    History of ischemic cardiomyopathy    Atherosclerosis of both lower extremities with intermittent claudication ([facility] )    Restrictive lung disease    COPD (chronic obstructive pulmonary disease) ([facility] )    Benign hypertension with chronic kidney disease, stage III    CKD (chronic kidney disease) stage 3, GFR 30-59 ml/min    Proteinuria    Renal artery stenosis (HCC)    Renal cyst, right    Vitamin D deficiency    Aortoiliac occlusive disease ([facility] )    Hypothyroidism, postablative    Other specified hypothyroidism    Low back pain with sciatica    Lumbar disc herniation    Lumbar radiculopathy       Past Medical History:   Diagnosis Date    Acute kidney [facility] ([facility] ) resolved 11/30/2015    Acute venous embolism and thrombosis of deep vessels of proximal lower extremity ([email or web address] )     resolved 04/04/2015    Cardiac disease     heart attack, stents x 4    CHF (congestive heart failure) (HCC)     Chronic cough     resolved 02/04/2016    COPD (chronic obstructive pulmonary disease) ([place] [address] )     Diabetes mellitus ([place] [address] )     Disease of thyroid gland     DVT (deep venous thrombosis) ([place] [address] )     Hypertension     Ischemic cardiomyopathy     last assessed 09/26/2017    Pulmonary granuloma (HCC)     resolved 02/03/2017    Renal failure     Sleep apnea        Past Surgical History:   Procedure Laterality Date    BYPASS FEMORAL-POPLITEAL      initial stenosis with stent left, 7 x 100 smart stent onset 02/24/2014    CORONARY ANGIOPLASTY WITH STENT PLACEMENT      x4       Family History   Problem Relation Age of Onset    Heart disease Father         pacer placement    Hypertension Father     Kidney disease Father     Heart failure Father     Heart attack Father     Liver disease Father     Cirrhosis Mother         due to beer consumption    Liver disease Mother     Diabetes Other        Social History     Occupational History    Retired     [employer] work     [employer]      Social History Main Topics    Smoking status: Former Smoker     Packs/day: 4 00     Years: 48 00     Types: Cigarettes     Quit date: 2008    Smokeless tobacco: Never Used      Comment: smoking 48 years 1 5 ppd d/c oct 2008 screening protocol as per allscripts    Alcohol use 12 6 oz/week     21 Standard drinks or equivalent per week      Comment: 2-3 glasses of vodka daily (6 shots) (history 2 drinks per day as per allscripts    Drug use: No    Sexual activity: No       Current Outpatient Prescriptions on File Prior to Visit   Medication Sig    Acetylcysteine 600 MG CAPS Take 2 capsules (1,200 mg total) by mouth 2 (two) times a day    amLODIPine (NORVASC) 2 5 mg tablet Take 7 5 mg by mouth daily    aspirin 81 MG tablet Take 81 mg by mouth daily   carvedilol (COREG) 6 25 mg tablet Take 1 tablet (6 25 mg total) by mouth 2 (two) times a day with meals    cholecalciferol (VITAMIN D3) 1,000 units tablet Take 10,000 Units by mouth daily    clopidogrel (PLAVIX) 75 mg tablet Take 75 mg by mouth daily at bedtime    gabapentin (NEURONTIN) 100 mg capsule Take 2 capsules 3 times a day    glucagon (GLUCAGON EMERGENCY) 1 MG injection Inject 1 mg under the skin once as needed for low blood sugar    insulin glargine (LANTUS) 100 units/mL subcutaneous injection Inject 66 Units under the skin daily 38 units in the morning and 28 units in the evening (Patient taking differently: Inject 66 Units under the skin daily 40 units in the morning and 33 units in the evening )    insulin lispro (HUMALOG) 100 units/mL injection Inject 10-15 Units under the skin 3 (three) times a day    Lactobacillus-Inulin ([facility]) Take 1 capsule by mouth daily    levothyroxine 175 mcg tablet Take 1 tablet (175 mcg total) by mouth daily in the early morning    nitroglycerin (NITROSTAT) 0 4 mg SL tablet Place 0 4 mg under the tongue every 5 (five) minutes as needed for chest pain   rosuvastatin (CRESTOR) 40 MG tablet Take 1 tablet by mouth daily    tobramycin-dexamethasone (TOBRADEX) ophthalmic suspension     Ubiquinol 200 MG CAPS Take 200 mg by mouth daily   VITAMIN K PO Take 1 tablet by mouth daily     No current facility-administered medications on file prior to visit          Allergies   Allergen Reactions    Cardura [Doxazosin Mesylate]     Other      Iv dye for cardiac cath  Renal failure very close to dialysis  But no dialysis    Tylenol [Acetaminophen]      interferes with reading of blood sugar    Zestril [Lisinopril]        Physical Exam:    /62   Pulse 60   Wt 98 4 kg (217 lb)   BMI 33 99 kg/m²     Constitutional: normal, well developed, well nourished, alert, in no distress and non-toxic and no overt pain behavior  and overweight  Eyes: anicteric  HEENT: grossly intact  Neck: supple, symmetric, trachea midline and no masses   Pulmonary:even and unlabored  Cardiovascular:No edema or pitting edema present  Skin:Normal without rashes or lesions and well hydrated  Psychiatric:Mood and affect appropriate  Neurologic:Cranial Nerves II-XII grossly intact  Musculoskeletal:normal   Difficulty going from sitting to standing to sitting position no obvious skin lesions or erythema lumbar sacral spine no tenderness to palpation lumbar sacral spine spinous process sacroiliac joint or greater trochanter bilateral, deep tendon reflexes diminished but symmetrical bilateral patella Achilles positive straight leg raising on the right negative on the left, no focal motor deficit appreciated lower limbs  Imaging    MRI LUMBAR SPINE WITHOUT CONTRAST 11/15/16     INDICATION:  Leg claudication      COMPARISON:  None      TECHNIQUE:  Sagittal T1, sagittal T2, sagittal inversion recovery, axial T1 and axial T2, coronal T2        IMAGE QUALITY:  Diagnostic     FINDINGS:     ALIGNMENT:  Slight dextroscoliosis of the lumbar spine centered at L2  Straightening of the normal lumbar lordosis  No compression fracture  No spondylolisthesis or spondylolysis      MARROW SIGNAL:  Normal marrow signal is identified within the visualized bony structures  No discrete marrow lesion      DISTAL CORD AND CONUS:  Normal size and signal within the distal cord and conus  The conus ends at the L1 level      PARASPINAL SOFT TISSUES:  Paraspinal soft tissues are unremarkable      SACRUM:  Normal signal within the sacrum  No evidence of insufficiency or stress fracture      LOWER THORACIC DISC SPACES:  Normal disc height and signal   No disc herniation, canal stenosis or foraminal narrowing      LUMBAR DISC SPACES:          L1-L2:  Normal      L2-L3:  Disc desiccation and loss of disc height  Diffuse annular bulging    Broad-based left foraminal and lateral disc protrusion with associated endplate hypertrophic osteophyte formation  Minimal canal stenosis without cauda equina compression  Mild left foraminal narrowing      L3-L4:  Mild diffuse annular bulging  Mild endplate and facet degenerative change  Mild canal stenosis  Mild bilateral foraminal narrowing      L4-L5:  Mild diffuse annular bulging  Small central disc protrusion  Mild canal stenosis  Minimal foraminal narrowing without nerve impingement      L5-S1:  Disc desiccation and loss of disc height  Diffuse annular bulging with broad-based central disc protrusion, moderately large in size  Mild facet degenerative change  Mild to moderate canal stenosis and bilateral foraminal narrowing      IMPRESSION:     Lumbar spondylytic degenerative change  Moderately large broad-based central disc protrusion at L5-S1  Mild to moderate canal stenosis and foraminal narrowing      Mild canal stenosis and foraminal narrowing at L2-3, L3-4 and L4-5    I have personally reviewed pertinent films in PACS

## 2018-08-01 NOTE — TELEPHONE ENCOUNTER
Patients wife called, pain mgt wants to do an epidural and wants to hold plavix for 7 days  Dr Vicente Blake put him on since stent placement on 5/17 and he is due to stop on 8 /17,   I asked if epidural could wait until after 8/17, but she wanted to proceed as soon as possible  I told her we would ask dr Vicente Blake when he returns on 8/6

## 2018-08-02 NOTE — TELEPHONE ENCOUNTER
Request for anticoag hold form rec'd and sent to T to have him complete on 8/7 8/6 (the day he returns) he will be in [place]

## 2018-08-06 ENCOUNTER — TELEPHONE (OUTPATIENT)
Dept: VASCULAR SURGERY | Facility: CLINIC | Age: 73
End: 2018-08-06

## 2018-08-06 NOTE — TELEPHONE ENCOUNTER
See Юлия's note  She informed pt that he may have epidural and she faxed the form over to receiving facility and to right fax

## 2018-08-06 NOTE — TELEPHONE ENCOUNTER
Rec'd a request from [facility] to hold Plavix x 7 days  The hold was approved by Dr Gray Gilbert and Margot Avila signed the form   I lm on pt's machine notifying him of this and and faxed form to [facility]

## 2018-08-07 NOTE — TELEPHONE ENCOUNTER
S/w pt and his wife  Advised of above  Scheduled procedure #1 on 8/20 arrive at 1350  Reviewed pre procedure instructions; eat a light meal - npo 1 hour prior, , loose fitting clothing, cb if illness / abx start prior to procedure, no plavix 7/13 - 7/20 post procedure  Pt confirmed asa - ok to continue w/ tfesi  Pt verbalized understanding  Will be traveling the month of september  Advised pt to cb when he returns if he would like to schedule the 2nd injection  Pt verbalized understanding

## 2018-08-13 ENCOUNTER — LAB (OUTPATIENT)
Dept: LAB | Facility: CLINIC | Age: 73
End: 2018-08-13
Payer: MEDICARE

## 2018-08-13 ENCOUNTER — TRANSCRIBE ORDERS (OUTPATIENT)
Dept: LAB | Facility: CLINIC | Age: 73
End: 2018-08-13

## 2018-08-13 DIAGNOSIS — E03.9 HYPOTHYROIDISM, ADULT: Primary | ICD-10-CM

## 2018-08-13 LAB — TSH SERPL DL<=0.05 MIU/L-ACNC: 0.37 UIU/ML (ref 0.36–3.74)

## 2018-08-13 PROCEDURE — 36415 COLL VENOUS BLD VENIPUNCTURE: CPT

## 2018-08-13 PROCEDURE — 84443 ASSAY THYROID STIM HORMONE: CPT

## 2018-08-15 ENCOUNTER — HOSPITAL ENCOUNTER (OUTPATIENT)
Dept: NON INVASIVE DIAGNOSTICS | Facility: CLINIC | Age: 73
Discharge: HOME | End: 2018-08-15
Payer: MEDICARE

## 2018-08-15 DIAGNOSIS — I74.09 AORTOILIAC OCCLUSIVE DISEASE (HCC): Chronic | ICD-10-CM

## 2018-08-15 DIAGNOSIS — I70.213 ATHEROSCLEROSIS OF NATIVE ARTERY OF BOTH LOWER EXTREMITIES WITH INTERMITTENT CLAUDICATION (HCC): ICD-10-CM

## 2018-08-15 PROCEDURE — 93923 UPR/LXTR ART STDY 3+ LVLS: CPT

## 2018-08-15 PROCEDURE — 93978 VASCULAR STUDY: CPT

## 2018-08-16 PROCEDURE — 93923 UPR/LXTR ART STDY 3+ LVLS: CPT | Performed by: SURGERY

## 2018-08-16 PROCEDURE — 93978 VASCULAR STUDY: CPT | Performed by: SURGERY

## 2018-08-20 ENCOUNTER — TELEPHONE (OUTPATIENT)
Dept: RADIOLOGY | Facility: CLINIC | Age: 73
End: 2018-08-20

## 2018-08-20 ENCOUNTER — HOSPITAL ENCOUNTER (OUTPATIENT)
Dept: RADIOLOGY | Facility: CLINIC | Age: 73
Discharge: HOME | End: 2018-08-20
Attending: ANESTHESIOLOGY | Admitting: ANESTHESIOLOGY
Payer: MEDICARE

## 2018-08-20 VITALS
OXYGEN SATURATION: 98 % | TEMPERATURE: 97.7 F | DIASTOLIC BLOOD PRESSURE: 58 MMHG | SYSTOLIC BLOOD PRESSURE: 149 MMHG | RESPIRATION RATE: 20 BRPM | HEART RATE: 59 BPM

## 2018-08-20 DIAGNOSIS — M54.16 LUMBAR RADICULOPATHY: ICD-10-CM

## 2018-08-20 DIAGNOSIS — M51.26 LUMBAR DISC HERNIATION: ICD-10-CM

## 2018-08-20 PROCEDURE — 64483 NJX AA&/STRD TFRM EPI L/S 1: CPT | Performed by: ANESTHESIOLOGY

## 2018-08-20 PROCEDURE — 64484 NJX AA&/STRD TFRM EPI L/S EA: CPT | Performed by: ANESTHESIOLOGY

## 2018-08-20 RX ORDER — METHYLPREDNISOLONE ACETATE 80 MG/ML
160 INJECTION, SUSPENSION INTRA-ARTICULAR; INTRALESIONAL; INTRAMUSCULAR; PARENTERAL; SOFT TISSUE ONCE
Status: COMPLETED | OUTPATIENT
Start: 2018-08-20 | End: 2018-08-20

## 2018-08-20 RX ORDER — LIDOCAINE HYDROCHLORIDE 10 MG/ML
5 INJECTION, SOLUTION EPIDURAL; INFILTRATION; INTRACAUDAL; PERINEURAL ONCE
Status: COMPLETED | OUTPATIENT
Start: 2018-08-20 | End: 2018-08-20

## 2018-08-20 RX ADMIN — IOHEXOL 1 ML: 300 INJECTION, SOLUTION INTRAVENOUS at 14:00

## 2018-08-20 RX ADMIN — LIDOCAINE HYDROCHLORIDE 5 ML: 20 INJECTION, SOLUTION EPIDURAL; INFILTRATION; INTRACAUDAL at 13:54

## 2018-08-20 RX ADMIN — METHYLPREDNISOLONE ACETATE 160 MG: 80 INJECTION, SUSPENSION INTRA-ARTICULAR; INTRALESIONAL; INTRAMUSCULAR; SOFT TISSUE at 13:54

## 2018-08-20 RX ADMIN — LIDOCAINE HYDROCHLORIDE 4 ML: 10 INJECTION, SOLUTION EPIDURAL; INFILTRATION; INTRACAUDAL; PERINEURAL at 13:53

## 2018-08-20 NOTE — TELEPHONE ENCOUNTER
Per 8/7 tc, pt will be traveling during the month of september  Will contact [facility] when he returns re: scheduling 2nd procedure prn        is aware

## 2018-08-20 NOTE — NURSING NOTE
Per patient and wife stated he is not to restart the Plavix per their physician  He stated he was supposed to stop it anyway on 8/17  But stopped on 8/13 for the scheduled procedure    Watch BS over next two weeks

## 2018-08-20 NOTE — H&P
History of Present Illness: The patient is a 68 y o  male who presents with complaints of low back and right leg pain      Patient Active Problem List   Diagnosis    [facility]    Type [facility] diabetes mellitus ([facility])    Presence of drug coated stent in LAD coronary artery    Coronary artery disease involving native coronary artery of native heart without angina pectoris    Presence of drug coated stent in left circumflex coronary artery    Dyslipidemia    Obstructive sleep apnea of adult    Carotid artery stenosis, asymptomatic, bilateral    History of ischemic cardiomyopathy    Atherosclerosis of both lower extremities with intermittent claudication ([facility])    Restrictive lung disease    COPD (chronic obstructive pulmonary disease) ([facility] )    Benign hypertension with chronic kidney disease, stage III    CKD (chronic kidney disease) stage 3, GFR 30-59 ml/min    Proteinuria    Renal artery stenosis ([facility])    Renal cyst, right    Vitamin D deficiency    Aortoiliac occlusive disease ([facility] )    Hypothyroidism, postablative    Other specified hypothyroidism    Low back pain with sciatica    Lumbar disc herniation    Lumbar radiculopathy       Past Medical History:   Diagnosis Date    Acute kidney injury ([facility] )     resolved 11/30/2015    Acute venous embolism and thrombosis of deep vessels of proximal lower extremity ([facility] )     resolved 04/04/2015    Cardiac disease     heart attack, stents x 4    CHF (congestive heart failure) ([facility])     Chronic cough     resolved 02/04/2016    COPD (chronic obstructive pulmonary disease) ([facility] )     Diabetes mellitus ([facility] )     Disease of thyroid gland     DVT (deep venous thrombosis) ([facility] )     Hypertension     Ischemic cardiomyopathy     last assessed 09/26/2017    Pulmonary granuloma ([facility] )     resolved 02/03/2017    Renal failure     Sleep apnea        Past Surgical History:   Procedure Laterality Date    BYPASS FEMORAL-POPLITEAL      initial stenosis with stent left, 7 x 100 smart stent onset 02/24/2014    CORONARY ANGIOPLASTY WITH STENT PLACEMENT      x4         Current Outpatient Prescriptions:     Acetylcysteine 600 MG CAPS, Take 2 capsules (1,200 mg total) by mouth 2 (two) times a day, Disp: 8 capsule, Rfl: 0    amLODIPine (NORVASC) 2 5 mg tablet, Take 7 5 mg by mouth daily, Disp: , Rfl:     aspirin 81 MG tablet, Take 81 mg by mouth daily  , Disp: , Rfl:     carvedilol (COREG) 6 25 mg tablet, Take 1 tablet (6 25 mg total) by mouth 2 (two) times a day with meals, Disp: 180 tablet, Rfl: 3    cholecalciferol (VITAMIN D3) 1,000 units tablet, Take 10,000 Units by mouth daily, Disp: , Rfl:     clopidogrel (PLAVIX) 75 mg tablet, Take 75 mg by mouth daily at bedtime, Disp: , Rfl:     gabapentin (NEURONTIN) 100 mg capsule, Take 2 capsules 3 times a day, Disp: 180 capsule, Rfl: 2    glucagon (GLUCAGON EMERGENCY) 1 MG injection, Inject 1 mg under the skin once as needed for low blood sugar, Disp: , Rfl:     insulin glargine (LANTUS) 100 units/mL subcutaneous injection, Inject 66 Units under the skin daily 38 units in the morning and 28 units in the evening (Patient taking differently: Inject 66 Units under the skin daily 40 units in the morning and 33 units in the evening ), Disp: 10 mL, Rfl: 0    insulin lispro (HUMALOG) 100 units/mL injection, Inject 10-15 Units under the skin 3 (three) times a day, Disp: 10 mL, Rfl: 3    Lactobacillus-Inulin ([facility] [employer] [facility]), Take 1 capsule by mouth daily, Disp: , Rfl:     levothyroxine 175 mcg tablet, Take 1 tablet (175 mcg total) by mouth daily in the early morning, Disp: 90 tablet, Rfl: 3    nitroglycerin (NITROSTAT) 0 4 mg SL tablet, Place 0 4 mg under the tongue every 5 (five) minutes as needed for chest pain , Disp: , Rfl:     rosuvastatin (CRESTOR) 40 MG tablet, Take 1 tablet by mouth daily, Disp: 90 tablet, Rfl: 3    tobramycin-dexamethasone (TOBRADEX) ophthalmic suspension, , Disp: , Rfl:     Ubiquinol 200 MG CAPS, Take 200 mg by mouth daily  , Disp: , Rfl:     VITAMIN K PO, Take 1 tablet by mouth daily, Disp: , Rfl:     Allergies   Allergen Reactions    Cardura [Doxazosin Mesylate]     Other      Iv dye for cardiac cath  Renal failure very close to dialysis  But no dialysis    Tylenol [Acetaminophen]      interferes with reading of blood sugar    Zestril [Lisinopril]        Physical Exam:   Vitals:    08/20/18 1343   BP: 141/62   Pulse: 62   Resp: 20   Temp: 97 7 °F (36 5 °C)   SpO2: 97%     General: Awake, Alert, Oriented x 3, Mood and affect appropriate  Respiratory: Respirations even and unlabored  Cardiovascular: Peripheral pulses intact; no edema  Musculoskeletal Exam:  Decreased range of motion lumbar spine    ASA Score: II         Assessment:   1  Lumbar disc herniation    2   Lumbar radiculopathy        Plan: Right L5/S1 transforaminal epidural steroid injection#1

## 2018-08-20 NOTE — TELEPHONE ENCOUNTER
Pt had proceudre Rt L5-S1 TFESI on 8/20/2018    Check anticoag hold and schedule repeat procedure 2 wks later

## 2018-08-20 NOTE — DISCHARGE INSTRUCTIONS
Epidural Steroid Injection   WHAT YOU NEED TO KNOW:   An epidural steroid injection (ISMAEL) is a procedure to inject steroid medicine into the epidural space  The epidural space is between your spinal cord and vertebrae  Steroids reduce inflammation and fluid buildup in your spine that may be causing pain  You may be given pain medicine along with the steroids  ACTIVITY  · Do not drive or operate machinery today  · No strenuous activity today - bending, lifting, etc   · You may resume normal activites starting tomorrow - start slowly and as tolerated  · You may shower today, but no tub baths or hot tubs  · You may have numbness for several hours from the local anesthetic  Please use caution and common sense, especially with weight-bearing activities  CARE OF THE INJECTION SITE  · If you have soreness or pain, apply ice to the area today (20 minutes on/20 minutes off)  · Starting tomorrow, you may use warm, moist heat or ice if needed  · You may have an increase or change in your discomfort for 36-48 hours after your treatment  · Apply ice and continue with any pain medication you have been prescribed  · Notify the [facility] if you have any of the following: redness, drainage, swelling, headache, stiff neck or fever above 100°F     SPECIAL INSTRUCTIONS  · Our office will contact you in approximately 7 days for a progress report  MEDICATIONS  · Continue to take all routine medications  · Our office may have instructed you to hold some medications  If you have a problem specifically related to your procedure, please call our office at [phone]  Problems not related to your procedure should be directed to your primary care physician

## 2018-08-29 ENCOUNTER — TELEPHONE (OUTPATIENT)
Dept: PAIN MEDICINE | Facility: CLINIC | Age: 73
End: 2018-08-29

## 2018-08-31 NOTE — TELEPHONE ENCOUNTER
Telephone note   Reason for the call    Post Op F/u [place]    LMTCB w/pain level and % of relief  1st attempt     S/P Rt L5-S1 TFESI on 8/20/18 w/SL in [place]  No f/u at this time

## 2018-09-10 NOTE — TELEPHONE ENCOUNTER
S/w pt, states when sitting pain level is a zero, walking is a 10, and has zero relief from procedure   Pt is in [facility], 3 hr time differnce

## 2018-09-26 NOTE — TELEPHONE ENCOUNTER
LMOM for nursing at Dr andrade's office requesting confirmation of the pt's plavix and asa  Provided cb number and office hours

## 2018-09-26 NOTE — TELEPHONE ENCOUNTER
S/w Caity from dr Kristin Briscoe office  Confirmed pt does not need to take plavix any longer - rx'd only for 3 mos after his procedure  Pt continues asa at this time  Forwarding to [employer]  Please schedule - asa is not contraindicated for TFESI  Thanks

## 2018-09-26 NOTE — TELEPHONE ENCOUNTER
Pt scheduled for 10/18/2018, pt given diabetic instructions, aware need for , wear comfortable pants like sweatpants, if become ill/antbx call to r/s , pt verbalized understanding

## 2018-09-26 NOTE — TELEPHONE ENCOUNTER
Pt was transferred to  instead of nursing  Pt states he was out of town and returned  Pt would like to schedule his 2nd injection  Pt c/o difficulty walking and pain down both legs  When speaking to the pt he states he is not taking his Plavix any longer and states he has not taken since his last injection  Advised pt will need to discuss with clinical team on how long since stopping his coag prior to scheduling or will have clinical team coordinate  Sending to clinical team to clarify time off Plavix

## 2018-10-04 ENCOUNTER — OFFICE VISIT (OUTPATIENT)
Dept: CARDIOLOGY CLINIC | Facility: CLINIC | Age: 73
End: 2018-10-04
Payer: MEDICARE

## 2018-10-04 VITALS
HEART RATE: 65 BPM | WEIGHT: 211 LBS | DIASTOLIC BLOOD PRESSURE: 60 MMHG | SYSTOLIC BLOOD PRESSURE: 128 MMHG | BODY MASS INDEX: 33.12 KG/M2 | HEIGHT: 67 IN

## 2018-10-04 DIAGNOSIS — Z95.5 PRESENCE OF DRUG COATED STENT IN LEFT CIRCUMFLEX CORONARY ARTERY: ICD-10-CM

## 2018-10-04 DIAGNOSIS — E78.5 DYSLIPIDEMIA: ICD-10-CM

## 2018-10-04 DIAGNOSIS — N18.30 BENIGN HYPERTENSION WITH CHRONIC KIDNEY DISEASE, STAGE III (HCC): ICD-10-CM

## 2018-10-04 DIAGNOSIS — I65.23 CAROTID ARTERY STENOSIS, ASYMPTOMATIC, BILATERAL: ICD-10-CM

## 2018-10-04 DIAGNOSIS — Z86.79 HISTORY OF ISCHEMIC CARDIOMYOPATHY: ICD-10-CM

## 2018-10-04 DIAGNOSIS — I12.9 BENIGN HYPERTENSION WITH CHRONIC KIDNEY DISEASE, STAGE III (HCC): ICD-10-CM

## 2018-10-04 DIAGNOSIS — Z95.5 PRESENCE OF DRUG COATED STENT IN LAD CORONARY ARTERY: ICD-10-CM

## 2018-10-04 DIAGNOSIS — I25.10 CORONARY ARTERY DISEASE INVOLVING NATIVE CORONARY ARTERY OF NATIVE HEART WITHOUT ANGINA PECTORIS: Primary | ICD-10-CM

## 2018-10-04 PROCEDURE — 99214 OFFICE O/P EST MOD 30 MIN: CPT | Performed by: INTERNAL MEDICINE

## 2018-10-04 NOTE — PROGRESS NOTES
Cardiology Follow Up    Zach Murrieta  1945  [id number]  [address] [facility] [place] [phone]    1  Coronary artery disease involving native coronary artery of native heart without angina pectoris     2  Presence of drug coated stent in LAD coronary artery     3  Presence of drug coated stent in left circumflex coronary artery     4  History of ischemic cardiomyopathy     5  Benign hypertension with chronic kidney disease, stage III ([address] )     6  Dyslipidemia     7  Carotid artery stenosis, asymptomatic, bilateral         Discussion/Summary:  Mr Charity Giang is a pleasant 80-year-old gentleman who presents to the office today for routine follow-up  Since his last visit he has been feeling about the same from a cardiac perspective  He did undergo bilateral external iliac artery stenting and notes market improvement in his symptoms of claudication  However now he reports diffuse myalgias in his bilateral lower extremities  I recently did change his atorvastatin to rosuvastatin  I have asked him to temporarily discontinue this to see if he has any improvement of his symptoms  Otherwise his most recent lipids reveal his LDL has improved after the changed from atorvastatin to rosuvastatin  As noted above he will temporarily discontinue this      His blood pressure control appears adequate  He will remain on his current doses of amlodipine and carvedilol  He remains compliant with CPAP  His carotid disease is under surveillance by vascular surgery      Follow-up will be arranged in four months or sooner if deemed necessary  Interval History:   Mr Charity Giang is a pleasant 80-year-old gentleman who presents to the office today for routine follow-up  Since last visit he has been feeling the same  He reports ongoing daytime fatigue  He naps frequently    He also reports continued shortness of breath with walking very short distances  He denies any exertional chest pain  He denies any signs or symptoms of congestive heart failure including progressive lower extremity edema, paroxysmal nocturnal dyspnea, orthopnea, acute weight gain or increasing abdominal girth  He denies lightheadedness, syncope or presyncope  He denies palpitations  After his last visit he did undergo bilateral external iliac artery stenting  He notes marked improvement in his symptoms of claudication but he remains with diffuse aching in his muscles from his hips down to his feet  He remains compliant with CPAP  Problem List     Presence of drug coated stent in LAD coronary artery    Overview Signed 3/13/2018 10:53 AM by Mindi Armstrong DO     9/2015 LVH-M         DARINEL (acute kidney injury) ([facility] )    Type 1 diabetes mellitus ([facility] )    Coronary artery disease involving native coronary artery of native heart without angina pectoris    Presence of drug coated stent in left circumflex coronary artery    Overview Signed 3/13/2018 10:53 AM by Mindi Armstrong DO     9/2015-LVH-M         Dyslipidemia    Obstructive sleep apnea of adult    Carotid artery stenosis, asymptomatic, bilateral    History of ischemic cardiomyopathy    Atherosclerosis of both lower extremities with intermittent claudication (HCC)    Restrictive lung disease    COPD (chronic obstructive pulmonary disease) ([facility] )    Benign hypertension with chronic kidney disease, stage III    Overview Signed 2/20/2018 11:01 AM by Alicia Hook DO     Description: 12/2015 echo showed nl diast fx and nl LV thickness         CKD (chronic kidney disease) stage 3, GFR 30-59 ml/min    Proteinuria    Overview Signed 2/20/2018 11:01 AM by Alicia Hook DO     Description: 200 mg by spot 8/2015         Renal artery stenosis ([facility] )    Overview Signed 2/20/2018 11:01 AM by Alicia Hook DO     Description: CTA 1/2014    [facility] / RRA   ok / LRA w mild ostial stenosis; 7/2015 US    R 9 9 & L 9 9 --+ cyst; 4/2015 Doppler Patent RRA and L > 60% JOSÉ; 7/2015 Doppler   >60% R w RVI 0 86 and 10 77cm  // >60% L w RVI of 0 79 and size 10 7 cm; 2016   < 60% R and > 60% L         Renal cyst, right    Vitamin D deficiency    Aortoiliac occlusive disease (HCC) (Chronic)    Hypothyroidism, postablative        Past Medical History:   Diagnosis Date    Acute kidney injury ([facility] )     resolved 11/30/2015    Acute venous embolism and thrombosis of deep vessels of proximal lower extremity ([facility] )     resolved 04/04/2015    Cardiac disease     heart attack, stents x 4    CH[facility]congestive heart failure) ([facility])     Chronic cough     resolved 02/04/2016    COPD (chronic obstructive pulmonary disease) ([facility]     Diabetes mellitus ([facility] )     Disease of thyroid gland     DVT (deep venous thrombosis) ([facility] )     Hypertension     Ischemic cardiomyopathy     last assessed 09/26/2017    Pulmonary granuloma ([facility] )     resolved 02/03/2017    Renal failure     Sleep apnea      Social History     Social History    Marital status: /Civil Union     Spouse name: N/A    Number of children: N/A    Years of education: N/A     Occupational History    Retired     Desk work     [employer]      Social History Main Topics    Smoking status: Former Smoker     Packs/day: 4 00     Years: 48 00     Types: Cigarettes     Quit date: 2008    Smokeless tobacco: Never Used      Comment: smoking 48 years 1 5 ppd d/c oct 2008 screening protocol as per allscripts    Alcohol use 12 6 oz/week     21 Standard drinks or equivalent per week      Comment: 2-3 glasses of vodka daily (6 shots) (history 2 drinks per day as per allscripts    Drug use: No    Sexual activity: No     Other Topics Concern    Not on file     Social History Narrative    No narrative on file      Family History   Problem Relation Age of Onset    Heart disease Father         pacer placement    Hypertension Father     Kidney disease Father     Heart failure Father     Heart attack Father     Liver disease Father     Cirrhosis Mother         due to beer consumption    Liver disease Mother     Diabetes Other      Past Surgical History:   Procedure Laterality Date    BYPASS FEMORAL-POPLITEAL      initial stenosis with stent left, 7 x 100 smart stent onset 02/24/2014    CORONARY ANGIOPLASTY WITH STENT PLACEMENT      x4       Current Outpatient Prescriptions:     Acetylcysteine 600 MG CAPS, Take 2 capsules (1,200 mg total) by mouth 2 (two) times a day, Disp: 8 capsule, Rfl: 0    amLODIPine (NORVASC) 2 5 mg tablet, Take 7 5 mg by mouth daily, Disp: , Rfl:     aspirin 81 MG tablet, Take 81 mg by mouth daily  , Disp: , Rfl:     carvedilol (COREG) 6 25 mg tablet, Take 1 tablet (6 25 mg total) by mouth 2 (two) times a day with meals, Disp: 180 tablet, Rfl: 3    cholecalciferol (VITAMIN D3) 1,000 units tablet, Take 10,000 Units by mouth daily, Disp: , Rfl:     gabapentin (NEURONTIN) 100 mg capsule, Take 2 capsules 3 times a day (Patient taking differently: 200 mg 2 (two) times a day Take 2 capsules 3 times a day ), Disp: 180 capsule, Rfl: 2    glucagon (GLUCAGON EMERGENCY) 1 MG injection, Inject 1 mg under the skin once as needed for low blood sugar, Disp: , Rfl:     insulin glargine (LANTUS) 100 units/mL subcutaneous injection, Inject 66 Units under the skin daily 38 units in the morning and 28 units in the evening (Patient taking differently: Inject 66 Units under the skin daily 40 units in the morning and 33 units in the evening ), Disp: 10 mL, Rfl: 0    insulin lispro (HUMALOG) 100 units/mL injection, Inject 10-15 Units under the skin 3 (three) times a day, Disp: 10 mL, Rfl: 3    Lactobacillus-Inulin ([facility] ), Take 1 capsule by mouth daily, Disp: , Rfl:     levothyroxine 175 mcg tablet, Take 1 tablet (175 mcg total) by mouth daily in the early morning, Disp: 90 tablet, Rfl: 3    nitroglycerin (NITROSTAT) 0 4 mg SL tablet, Place 0 4 mg under the tongue every 5 (five) minutes as needed for chest pain , Disp: , Rfl:     rosuvastatin (CRESTOR) 40 MG tablet, Take 1 tablet by mouth daily, Disp: 90 tablet, Rfl: 3    tobramycin-dexamethasone (TOBRADEX) ophthalmic suspension, , Disp: , Rfl:     Ubiquinol 200 MG CAPS, Take 200 mg by mouth daily  , Disp: , Rfl:     VITAMIN K PO, Take 1 tablet by mouth daily, Disp: , Rfl:   Allergies   Allergen Reactions    Cardura [Doxazosin Mesylate]     Other      Iv dye for cardiac cath  Renal failure very close to dialysis  But no dialysis    Tylenol [Acetaminophen]      interferes with reading of blood sugar    Zestril [Lisinopril]        Labs:     Chemistry        Component Value Date/Time     07/23/2018 1129     12/21/2015 1044    K 4 1 07/23/2018 1129    K 4 9 12/21/2015 1044     07/23/2018 1129     (H) 12/21/2015 1044    CO2 27 07/23/2018 1129    CO2 26 12/21/2015 1044    BUN 27 (H) 07/23/2018 1129    BUN 19 12/21/2015 1044    CREATININE 1 55 (H) 07/23/2018 1129    CREATININE 1 29 12/21/2015 1044        Component Value Date/Time    CALCIUM 9 3 07/23/2018 1129    CALCIUM 8 7 12/21/2015 1044    ALKPHOS 102 04/24/2018 1045    ALKPHOS 99 10/01/2015 1105    AST 13 04/24/2018 1045    AST 22 10/01/2015 1105    ALT 15 04/24/2018 1045    ALT 35 10/01/2015 1105    BILITOT 0 33 10/01/2015 1105            Lab Results   Component Value Date    CHOL 129 10/01/2015     Lab Results   Component Value Date    HDL 46 06/04/2018    HDL 49 04/24/2018    HDL 56 02/15/2018     Lab Results   Component Value Date    LDLCALC 59 06/04/2018    LDLCALC 46 04/24/2018    LDLCALC 37 02/15/2018     Lab Results   Component Value Date    TRIG 190 (H) 06/04/2018    TRIG 156 (H) 04/24/2018    TRIG 224 (H) 02/15/2018     No components found for: CHOLHDL      Review of Systems   Constitution: Positive for malaise/fatigue     Cardiovascular: Positive for dyspnea on exertion  Vitals:    10/04/18 1504   BP: 128/60   Pulse: 65     Vitals:    10/04/18 1504   Weight: 95 7 kg (211 lb)     Height: 5' 7" (170 2 cm)   Body mass index is 33 05 kg/m²      Physical Exam:   General appearance:  Appears stated age, alert, well appearing and in no distress  HEENT:  PERRLA, EOMI, no scleral icterus, no conjunctival pallor  NECK:  Supple, No elevated JVP, no thyromegaly, bilateral carotid bruits  HEART:  Regular rate and rhythm, normal S1/S2, no S3/S4, no murmur or rub  LUNGS:  Clear to auscultation bilaterally  ABDOMEN:  Soft, non-tender, positive bowel sounds, no rebound or guarding, no organomegaly   EXTREMITIES:  No edema  VASCULAR:  Absent pedal pulses   SKIN: No lesions or rashes on exposed skin  NEURO:  CN II-XII intact, no focal deficits

## 2018-10-10 ENCOUNTER — OFFICE VISIT (OUTPATIENT)
Dept: FAMILY MEDICINE CLINIC | Facility: CLINIC | Age: 73
End: 2018-10-10
Payer: MEDICARE

## 2018-10-10 VITALS
BODY MASS INDEX: 32.93 KG/M2 | RESPIRATION RATE: 16 BRPM | SYSTOLIC BLOOD PRESSURE: 120 MMHG | HEART RATE: 84 BPM | DIASTOLIC BLOOD PRESSURE: 60 MMHG | HEIGHT: 67 IN | TEMPERATURE: 96.3 F | WEIGHT: 209.8 LBS

## 2018-10-10 DIAGNOSIS — J44.1 CHRONIC OBSTRUCTIVE PULMONARY DISEASE WITH ACUTE EXACERBATION (HCC): Primary | ICD-10-CM

## 2018-10-10 DIAGNOSIS — J06.9 ACUTE URI: ICD-10-CM

## 2018-10-10 PROCEDURE — 99213 OFFICE O/P EST LOW 20 MIN: CPT | Performed by: FAMILY MEDICINE

## 2018-10-10 RX ORDER — AMLODIPINE BESYLATE 5 MG/1
TABLET ORAL
COMMUNITY
Start: 2018-08-06 | End: 2018-10-10

## 2018-10-10 RX ORDER — ALBUTEROL SULFATE 2.5 MG/3ML
2.5 SOLUTION RESPIRATORY (INHALATION) ONCE
Status: COMPLETED | OUTPATIENT
Start: 2018-10-10 | End: 2018-10-10

## 2018-10-10 RX ORDER — AZITHROMYCIN 250 MG/1
TABLET, FILM COATED ORAL
Qty: 6 TABLET | Refills: 0 | Status: SHIPPED | OUTPATIENT
Start: 2018-10-10 | End: 2018-10-14

## 2018-10-10 RX ORDER — BENZONATATE 100 MG/1
100 CAPSULE ORAL 3 TIMES DAILY PRN
Qty: 20 CAPSULE | Refills: 0 | Status: SHIPPED | OUTPATIENT
Start: 2018-10-10 | End: 2018-11-28 | Stop reason: ALTCHOICE

## 2018-10-10 RX ORDER — ALBUTEROL SULFATE 90 UG/1
2 AEROSOL, METERED RESPIRATORY (INHALATION) EVERY 4 HOURS PRN
Qty: 1 INHALER | Refills: 0 | Status: SHIPPED | OUTPATIENT
Start: 2018-10-10 | End: 2018-11-28 | Stop reason: ALTCHOICE

## 2018-10-10 RX ADMIN — ALBUTEROL SULFATE 2.5 MG: 2.5 SOLUTION RESPIRATORY (INHALATION) at 18:02

## 2018-10-10 NOTE — PROGRESS NOTES
FAMILY PRACTICE OFFICE VISIT       NAME: Leslie Artis  AGE: 68 y o  SEX: male       : 1945        MRN: [id number]        Assessment and Plan     Problem List Items Addressed This Visit     COPD (chronic obstructive pulmonary disease) ([address] ) - Primary    Relevant Medications    albuterol (PROVENTIL HFA,VENTOLIN HFA) 90 mcg/act inhaler    benzonatate (TESSALON PERLES) 100 mg capsule    albuterol inhalation solution 2 5 mg (Completed)      Other Visit Diagnoses     Acute URI        Relevant Medications    azithromycin (ZITHROMAX) 250 mg tablet    benzonatate (TESSALON PERLES) 100 mg capsule       Patient presents for evaluation of upper respiratory infection/COPD exacerbation  Will treat with Zithromax Z-Marquis, Tessalon Perles as needed for cough, patient will start albuterol inhaler 2 puffs every 4-6 hours as needed  Advised rest, fluids  I spoke with patient and his wife over the phone  I advised him to contact me in a few days if his symptoms are not improving significantly  There are no Patient Instructions on file for this visit  Chief Complaint     Chief Complaint   Patient presents with    Cough    Headache    Nasal Congestion       History of Present Illness     Cough / Parley Harrison for 2-3 weeks    Started as dry cough , now is  productive cough with colored mucus expectoration, he feels congested, recurrent attacks  of cough    no fever    headache   fatigued  Sugars are OK   no St,  No wheezing  Patient has tried Mucinex over-the-counter without relief  Patient contracted cold symptoms while visiting his family in [place]  Patient denies nocturnal cough  He remains on CPAP as directed        Cough   Associated symptoms include headaches  Pertinent negatives include no fever  Headache    Associated symptoms include coughing  Pertinent negatives include no fever  Review of Systems   Review of Systems   Constitutional: Positive for fatigue  Negative for fever  HENT: Negative  Eyes: Negative  Respiratory: Positive for cough  Gastrointestinal: Negative  Endocrine: Negative  Neurological: Positive for headaches  Psychiatric/Behavioral: Negative          Active Problem List     Patient Active Problem List   Diagnosis    DARINEL (acute kidney injury) ([place] [address] )    Type 1 diabetes mellitus ([facility])    Presence of drug coated stent in LAD coronary artery    Coronary artery disease involving native coronary artery of native heart without angina pectoris    Presence of drug coated stent in left circumflex coronary artery    Dyslipidemia    Obstructive sleep apnea of adult    Carotid artery stenosis, asymptomatic, bilateral    History of ischemic cardiomyopathy    Atherosclerosis of both lower extremities with intermittent claudication ([facility])    Restrictive lung disease    COPD (chronic obstructive pulmonary disease) ([place] [address] )    Benign hypertension with chronic kidney disease, stage III ([facility])    CKD (chronic kidney disease) stage 3, GFR 30-59 ml/min ([facility])    Proteinuria    Renal artery stenosis ([facility])    Renal cyst, right    Vitamin D deficiency    Aortoiliac occlusive disease ([place] [address] )    Hypothyroidism, postablative    Other specified hypothyroidism    Low back pain with sciatica    Lumbar disc herniation    Lumbar radiculopathy       Past Medical History:  Past Medical History:   Diagnosis Date    Acute kidney injury ([place] [address] )     resolved 11/30/2015    Acute venous embolism and thrombosis of deep vessels of proximal lower extremity ([place] [address] )     resolved 04/04/2015    Cardiac disease     heart attack, stents x 4    CHF (congestive heart failure) ([facility])     Chronic cough     resolved 02/04/2016    COPD (chronic obstructive pulmonary disease) ([place] [address] )     Diabetes mellitus ([facility] )     Disease of thyroid gland     DVT (deep venous thrombosis) ([place] [address] )     Hypertension     Ischemic cardiomyopathy     last assessed 09/26/2017    Pulmonary granuloma ([place] [address] )     resolved 02/03/2017    Renal failure     Sleep apnea        Past Surgical History:  Past Surgical History:   Procedure Laterality Date    BYPASS FEMORAL-POPLITEAL      initial stenosis with stent left, 7 x 100 smart stent onset 02/24/2014    CORONARY ANGIOPLASTY WITH STENT PLACEMENT      x4       Family History:  Family History   Problem Relation Age of Onset    Heart disease Father         pacer placement    Hypertension Father     Kidney disease Father     Heart failure Father     Heart attack Father     Liver disease Father     Cirrhosis Mother         due to beer consumption    Liver disease Mother     Diabetes Other        Social History:  Social History     Social History    Marital status: /Civil Union     Spouse name: N/A    Number of children: N/A    Years of education: N/A     Occupational History    Retired     [employer] [employer] [facility] work     Department [employer]      Social History Main Topics    Smoking status: Former Smoker     Packs/day: 4 00     Years: 48 00     Types: Cigarettes     Quit date: 2008    Smokeless tobacco: Never Used      Comment: smoking 48 years 1 5 ppd d/c oct 2008 screening protocol as per allscripts    Alcohol use 12 6 oz/week     21 Standard drinks or equivalent per week      Comment: 2-3 glasses of vodka daily (6 shots) (history 2 drinks per day as per allscripts    Drug use: No    Sexual activity: No     Other Topics Concern    Not on file     Social History Narrative    No narrative on file     PHQ-9 Depression Screening    PHQ-9:    Frequency of the following problems over the past two weeks:                Counseling given: Not Answered        Objective     Vitals:    10/10/18 1004   BP: 120/60   BP Location: Left arm   Patient Position: Sitting   Cuff Size: Adult   Pulse: 84   Resp: 16   Temp: (!) 96 3 °F (35 7 °C)   TempSrc: Tympanic   Weight: 95 2 kg (209 lb 12 8 oz)   Height: 5' 7" (1 702 m)     Wt Readings from Last 3 Encounters:   10/10/18 95 2 kg (209 lb 12 8 oz) 10/04/18 95 7 kg (211 lb)   08/01/18 98 4 kg (217 lb)       Physical Exam   Constitutional: He is oriented to person, place, and time  He appears well-developed and well-nourished  HENT:   Head: Normocephalic and atraumatic  Right Ear: Tympanic membrane and ear canal normal    Left Ear: Tympanic membrane and ear canal normal    Nose: Mucosal edema present  Mouth/Throat: No oropharyngeal exudate  Eyes: Conjunctivae are normal    Neck: Neck supple  Cardiovascular: Normal rate, regular rhythm and normal heart sounds  No murmur heard  Pulmonary/Chest: Effort normal    Lungs:  Decreased air entry bilaterally  Persistent coughing attacks throughout exam   Albuterol via nebulizer administered in the office  Lungs:  Generalized wheezing all fields, no rales, no rhonchi, improved air entry bilaterally   Musculoskeletal: Normal range of motion  Neurological: He is alert and oriented to person, place, and time  Psychiatric: He has a normal mood and affect  His behavior is normal    Nursing note and vitals reviewed        Pertinent Laboratory/Diagnostic Studies:  Lab Results   Component Value Date    GLUCOSE 186 (H) 12/21/2015    BUN 27 (H) 07/23/2018    CREATININE 1 55 (H) 07/23/2018    CALCIUM 9 3 07/23/2018     07/23/2018    K 4 1 07/23/2018    CO2 27 07/23/2018     07/23/2018     Lab Results   Component Value Date    ALT 15 04/24/2018    AST 13 04/24/2018    ALKPHOS 102 04/24/2018    BILITOT 0 33 10/01/2015       Lab Results   Component Value Date    WBC 4 95 05/25/2018    HGB 13 1 05/25/2018    HCT 38 9 05/25/2018    MCV 96 05/25/2018     05/25/2018       No results found for: TSH    Lab Results   Component Value Date    CHOL 129 10/01/2015     Lab Results   Component Value Date    TRIG 190 (H) 06/04/2018     Lab Results   Component Value Date    HDL 46 06/04/2018     Lab Results   Component Value Date    LDLCALC 59 06/04/2018     Lab Results   Component Value Date    HGBA1C 8 1 (H) 06/04/2018       Results for orders placed or performed in visit on 08/13/18   TSH, 3rd generation with Free T4 reflex   Result Value Ref Range    TSH 3RD GENERATON 0 370 0 358 - 3 740 uIU/mL       No orders of the defined types were placed in this encounter  ALLERGIES:  Allergies   Allergen Reactions    Cardura [Doxazosin Mesylate]     Other      Iv dye for cardiac cath  Renal failure very close to dialysis  But no dialysis    Tylenol [Acetaminophen]      interferes with reading of blood sugar    Zestril [Lisinopril]        Current Medications     Current Outpatient Prescriptions   Medication Sig Dispense Refill    Acetylcysteine 600 MG CAPS Take 2 capsules (1,200 mg total) by mouth 2 (two) times a day 8 capsule 0    amLODIPine (NORVASC) 2 5 mg tablet Take 7 5 mg by mouth daily      aspirin 81 MG tablet Take 81 mg by mouth daily        carvedilol (COREG) 6 25 mg tablet Take 1 tablet (6 25 mg total) by mouth 2 (two) times a day with meals 180 tablet 3    cholecalciferol (VITAMIN D3) 1,000 units tablet Take 5,000 Units by mouth daily        gabapentin (NEURONTIN) 100 mg capsule Take 2 capsules 3 times a day (Patient taking differently: 200 mg 2 (two) times a day Take 2 capsules 3 times a day ) 180 capsule 2    glucagon (GLUCAGON EMERGENCY) 1 MG injection Inject 1 mg under the skin once as needed for low blood sugar      insulin glargine (LANTUS) 100 units/mL subcutaneous injection Inject 66 Units under the skin daily 38 units in the morning and 28 units in the evening (Patient taking differently: Inject 66 Units under the skin daily 40 units in the morning and 28 units in the evening ) 10 mL 0    insulin lispro (HUMALOG) 100 units/mL injection Inject 10-15 Units under the skin 3 (three) times a day 10 mL 3    Lactobacillus-Inulin ([employer] [facility] PO) Take 1 capsule by mouth daily      levothyroxine 175 mcg tablet Take 1 tablet (175 mcg total) by mouth daily in the early morning 90 tablet 3    nitroglycerin (NITROSTAT) 0 4 mg SL tablet Place 0 4 mg under the tongue every 5 (five) minutes as needed for chest pain   rosuvastatin (CRESTOR) 40 MG tablet Take 1 tablet by mouth daily 90 tablet 3    Ubiquinol 200 MG CAPS Take 200 mg by mouth daily   VITAMIN K PO Take 1 tablet by mouth daily      albuterol (PROVENTIL HFA,VENTOLIN HFA) 90 mcg/act inhaler Inhale 2 puffs every 4 (four) hours as needed for wheezing or shortness of breath (cough) 1 Inhaler 0    azithromycin (ZITHROMAX) 250 mg tablet Take 2 tablets today then 1 tablet daily x 4 days 6 tablet 0    benzonatate (TESSALON PERLES) 100 mg capsule Take 1 capsule (100 mg total) by mouth 3 (three) times a day as needed for cough 20 capsule 0     No current facility-administered medications for this visit            Health Maintenance     Health Maintenance   Topic Date Due    SLP PLAN OF CARE  1945    Lung Cancer Screening  04/27/2000    INFLUENZA VACCINE  07/01/2018    Depression Screening PHQ  11/10/2018 (Originally 1945)    DM Eye Exam  11/10/2018 (Originally 3/21/2018)    URINE MICROALBUMIN  11/10/2018 (Originally 12/22/2016)    CRC Screening: Colonoscopy  11/10/2018 (Originally 1945)    HEMOGLOBIN A1C  12/04/2018    Fall Risk  03/02/2019    Diabetic Foot Exam  05/25/2019    DTaP,Tdap,and Td Vaccines (2 - Td) 05/05/2023    Pneumococcal PPSV23/PCV13 65+ Years / Low and Medium Risk  Completed     Immunization History   Administered Date(s) Administered    DT (pediatric) 12/01/2009    H1N1, All Formulations 12/01/2009    Influenza 10/01/2008, 10/06/2009, 09/01/2010, 11/04/2013    Influenza Split High Dose Preservative Free IM 08/28/2014, 10/03/2016    Influenza TIV (IM) 10/01/2008, 10/19/2010, 09/20/2011, 08/01/2012, 09/13/2013, 09/15/2015, 09/03/2017    Pneumococcal Conjugate 13-Valent 08/11/2015    Pneumococcal Polysaccharide PPV23 10/01/2008, 06/04/2009, 03/15/2017    Tdap 05/05/2013    Zoster 10/01/2009       Corazon Whatley MD

## 2018-10-15 DIAGNOSIS — E10.43 DIABETIC AUTONOMIC NEUROPATHY ASSOCIATED WITH TYPE 1 DIABETES MELLITUS (HCC): ICD-10-CM

## 2018-10-15 DIAGNOSIS — E10.8 TYPE 1 DIABETES MELLITUS WITH COMPLICATION (HCC): ICD-10-CM

## 2018-10-15 RX ORDER — GABAPENTIN 100 MG/1
CAPSULE ORAL
Qty: 180 CAPSULE | Refills: 2 | Status: SHIPPED | OUTPATIENT
Start: 2018-10-15 | End: 2018-10-28 | Stop reason: SDUPTHER

## 2018-10-25 ENCOUNTER — OFFICE VISIT (OUTPATIENT)
Dept: FAMILY MEDICINE CLINIC | Facility: CLINIC | Age: 73
End: 2018-10-25
Payer: MEDICARE

## 2018-10-25 VITALS
TEMPERATURE: 97.7 F | HEIGHT: 67 IN | WEIGHT: 209.2 LBS | HEART RATE: 64 BPM | BODY MASS INDEX: 32.83 KG/M2 | SYSTOLIC BLOOD PRESSURE: 126 MMHG | DIASTOLIC BLOOD PRESSURE: 78 MMHG | RESPIRATION RATE: 18 BRPM

## 2018-10-25 DIAGNOSIS — E03.9 HYPOTHYROIDISM, UNSPECIFIED TYPE: ICD-10-CM

## 2018-10-25 DIAGNOSIS — J44.9 CHRONIC OBSTRUCTIVE PULMONARY DISEASE, UNSPECIFIED COPD TYPE (HCC): ICD-10-CM

## 2018-10-25 DIAGNOSIS — E10.43 DIABETIC AUTONOMIC NEUROPATHY ASSOCIATED WITH TYPE 1 DIABETES MELLITUS (HCC): ICD-10-CM

## 2018-10-25 DIAGNOSIS — E10.8 TYPE 1 DIABETES MELLITUS WITH COMPLICATION (HCC): ICD-10-CM

## 2018-10-25 DIAGNOSIS — R05.9 COUGH: Primary | ICD-10-CM

## 2018-10-25 DIAGNOSIS — M54.41 CHRONIC LOW BACK PAIN WITH RIGHT-SIDED SCIATICA, UNSPECIFIED BACK PAIN LATERALITY: ICD-10-CM

## 2018-10-25 DIAGNOSIS — N18.30 CKD (CHRONIC KIDNEY DISEASE) STAGE 3, GFR 30-59 ML/MIN (HCC): ICD-10-CM

## 2018-10-25 DIAGNOSIS — Z12.11 SCREENING FOR COLON CANCER: ICD-10-CM

## 2018-10-25 DIAGNOSIS — G89.29 CHRONIC LOW BACK PAIN WITH RIGHT-SIDED SCIATICA, UNSPECIFIED BACK PAIN LATERALITY: ICD-10-CM

## 2018-10-25 DIAGNOSIS — I25.10 CORONARY ARTERY DISEASE INVOLVING NATIVE CORONARY ARTERY OF NATIVE HEART WITHOUT ANGINA PECTORIS: ICD-10-CM

## 2018-10-25 DIAGNOSIS — N18.30 BENIGN HYPERTENSION WITH CHRONIC KIDNEY DISEASE, STAGE III (HCC): ICD-10-CM

## 2018-10-25 DIAGNOSIS — I65.23 CAROTID ARTERY STENOSIS, ASYMPTOMATIC, BILATERAL: ICD-10-CM

## 2018-10-25 DIAGNOSIS — G47.33 OBSTRUCTIVE SLEEP APNEA OF ADULT: ICD-10-CM

## 2018-10-25 DIAGNOSIS — I12.9 BENIGN HYPERTENSION WITH CHRONIC KIDNEY DISEASE, STAGE III (HCC): ICD-10-CM

## 2018-10-25 PROCEDURE — 99214 OFFICE O/P EST MOD 30 MIN: CPT | Performed by: FAMILY MEDICINE

## 2018-10-25 RX ORDER — CEFPROZIL 500 MG/1
500 TABLET, FILM COATED ORAL 2 TIMES DAILY
Qty: 14 TABLET | Refills: 0 | Status: SHIPPED | OUTPATIENT
Start: 2018-10-25 | End: 2018-11-01

## 2018-10-25 NOTE — PATIENT INSTRUCTIONS
Claritin 10 mg / Loratadine 10 mg daily     I increase dose of gabapentin to 3 capsules in the morning and 3 capsules in the evening

## 2018-10-25 NOTE — PROGRESS NOTES
FAMILY PRACTICE OFFICE VISIT       NAME: David Artis  AGE: 68 y o  SEX: male       : 1945        MRN: [id number]        Assessment and Plan     Problem List Items Addressed This Visit     Type 1 diabetes mellitus (HCC)    Relevant Medications    insulin lispro (HUMALOG) 100 units/mL injection    glucagon (GLUCAGON EMERGENCY) 1 MG injection    gabapentin (NEURONTIN) 100 mg capsule    Other Relevant Orders    CBC    Hemoglobin A1C    Lipid panel    Coronary artery disease involving native coronary artery of native heart without angina pectoris     Continue regimen of Coreg, Crestor and aspirin  Obstructive sleep apnea of adult     Patient is compliant with CPAP         Carotid artery stenosis, asymptomatic, bilateral    COPD (chronic obstructive pulmonary disease) ([facility] [address] )    Benign hypertension with chronic kidney disease, stage III ([facility])    CKD (chronic kidney disease) stage 3, GFR 30-59 ml/min ([facility])    Relevant Orders    Comprehensive metabolic panel    Low back pain with sciatica      Other Visit Diagnoses     Cough    -  Primary    Relevant Medications    cefprosil (CEFZIL) 500 MG tablet    Hypothyroidism, unspecified type        Relevant Orders    TSH, 3rd generation    Diabetic autonomic neuropathy associated with type 1 diabetes mellitus (HCC)        Relevant Medications    insulin lispro (HUMALOG) 100 units/mL injection    glucagon (GLUCAGON EMERGENCY) 1 MG injection    gabapentin (NEURONTIN) 100 mg capsule    Screening for colon cancer        Relevant Orders    Cologuard       Patient presents for follow-up of chronic medical conditions  Persistent symptoms of cough  Lung exam is clear, wheezing has resolved  Patient denies dyspnea, fever or colored mucus production  He is experiencing persistent tickle/postnasal drip  I suspect that his current symptoms are most likely triggered by persistent sinusitis  I advised patient to start Claritin 10 mg once a day over-the-counter    Will treat with Cefzil 500 mg b i d  For 7 days  He will contact me if symptoms are not improving  Patient remains on daily medications for chronic medical conditions  He will be proceeding with complete blood work within next few weeks  He remains under ongoing care of Cardiology, Endocrinology, Nephrology, and vascular surgery  Patient has been holding Crestor 40 mg as per Cardiology advise due to persistent symptoms of lower extremity  myalgias  He is not experiencing any relief and I have advised him to restart statin  Most likely he is experiencing symptoms of neurogenic claudication due to lumbar sacral disc disease/stenosis  He is following up with pain management closely  Will increase dose of gabapentin from 200 mg b i d  To 300 mg b i d   Patient is up-to-date with pneumococcal and flu vaccines  He is due for Cologuard screening  Routine follow-up 6 months and early if necessary  Patient Instructions   Claritin 10 mg / Loratadine 10 mg daily     I increase dose of gabapentin to 3 capsules in the morning and 3 capsules in the evening          Chief Complaint     Chief Complaint   Patient presents with    Follow-up    Cough     Patient c/o a persistent cough which is not improving   Medication Refill     Patient would like a paper script for humalog  History of Present Illness     Patient presents for follow-up of chronic medical conditions  He is complaining of lingering cough since his last office visit for evaluation of upper respiratory infection/bronchitis  Patient denies episodes of wheezing  He is afebrile  He remains under care of [facility] Cardiology, vascular surgery and Nephrology  He is following up with endocrinologist regularly  Patient is requesting refills of medications as he is leaving for vacation for the next few weeks  Patient is experiencing persistent low back pain with sciatica  No recurrences of muscle cramps after recent vascular procedure    Patient denies symptoms of paresthesias, gabapentin helps quite a bit  Chronic dyspnea on exertion, no chest pain  He remains on daily medications for hypertension, hyperlipidemia, hypothyroidism  Routine blood work is followed by Endocrinology, PCP and Nephrology  Cough   Associated symptoms include postnasal drip  Pertinent negatives include no shortness of breath or wheezing  Medication Refill   Associated symptoms include arthralgias, coughing and fatigue  Review of Systems   Review of Systems   Constitutional: Positive for fatigue  HENT: Positive for postnasal drip  Eyes: Negative  Respiratory: Positive for cough  Negative for shortness of breath and wheezing  Cardiovascular: Negative  Gastrointestinal: Negative  Endocrine: Negative  Genitourinary: Negative  Musculoskeletal: Positive for arthralgias and back pain  Skin: Negative  Allergic/Immunologic: Negative  Neurological: Negative  Psychiatric/Behavioral: Negative          Active Problem List     Patient Active Problem List   Diagnosis    Type 1 diabetes mellitus (HCC)    Presence of drug coated stent in LAD coronary artery    Coronary artery disease involving native coronary artery of native heart without angina pectoris    Presence of drug coated stent in left circumflex coronary artery    Dyslipidemia    Obstructive sleep apnea of adult    Carotid artery stenosis, asymptomatic, bilateral    History of ischemic cardiomyopathy    Atherosclerosis of both lower extremities with intermittent claudication ([facility])    Restrictive lung disease    COPD (chronic obstructive pulmonary disease) ([facility] [address] )    Benign hypertension with chronic kidney disease, stage III ([facility])    CKD (chronic kidney disease) stage 3, GFR 30-59 ml/min ([facility])    Proteinuria    Renal artery stenosis ([facility])    Renal cyst, right    Vitamin D deficiency    Aortoiliac occlusive disease ([facility] [address] )    Hypothyroidism, postablative    Other specified hypothyroidism    Low back pain with sciatica    Lumbar disc herniation    Lumbar radiculopathy       Past Medical History:  Past Medical History:   Diagnosis Date    Acute kidney injury ([facility] )     resolved 11/30/2015    Acute venous embolism and thrombosis of deep vessels of proximal lower extremity ([place] [address] )     resolved 04/04/2015    DARINEL (acute kidney injury) ([place] [address] ) 1/12/2016    Cardiac disease     heart attack, stents x 4    CHF (congestive heart failure) ([facility])     Chronic cough     resolved 02/04/2016    COPD (chronic obstructive pulmonary disease) ([facility] )     Diabetes mellitus ([facility] )     Disease of thyroid gland     DVT (deep venous thrombosis) ([place] [address] )     Hypertension     Ischemic cardiomyopathy     last assessed 09/26/2017    Pulmonary granuloma ([place] [address] )     resolved 02/03/2017    Renal failure     Sleep apnea        Past Surgical History:  Past Surgical History:   Procedure Laterality Date    BYPASS FEMORAL-POPLITEAL      initial stenosis with stent left, 7 x 100 smart stent onset 02/24/2014    CORONARY ANGIOPLASTY WITH STENT PLACEMENT      x4       Family History:  Family History   Problem Relation Age of Onset    Heart disease Father         pacer placement    Hypertension Father     Kidney disease Father     Heart failure Father     Heart attack Father     Liver disease Father     Cirrhosis Mother         due to beer consumption    Liver disease Mother     Diabetes Other        Social History:  Social History     Social History    Marital status: /Civil Union     Spouse name: N/A    Number of children: N/A    Years of education: N/A     Occupational History    Retired     [employer] work     [employer]      Social History Main Topics    Smoking status: Former Smoker     Packs/day: 4 00     Years: 48 00     Types: Cigarettes     Quit date: 2008    Smokeless tobacco: Never Used      Comment: smoking 48 years 1 5 ppd d/c oct 2008 [facility]  Alcohol use 12 6 oz/week     21 Standard drinks or equivalent per week      Comment: 2-3 glasses of vodka daily (6 shots) (history 2 drinks per day as per allscripts    Drug use: No    Sexual activity: No     Other Topics Concern    Not on file     Social History Narrative    No narrative on file       Objective     Vitals:    10/25/18 1342 10/25/18 1415   BP: 148/72 126/78   Pulse: 64    Resp: 18    Temp: 97 7 °F (36 5 °C)    TempSrc: Tympanic    Weight: 94 9 kg (209 lb 3 2 oz)    Height: 5' 7" (1 702 m)      Wt Readings from Last 3 Encounters:   10/25/18 94 9 kg (209 lb 3 2 oz)   10/10/18 95 2 kg (209 lb 12 8 oz)   10/04/18 95 7 kg (211 lb)       Physical Exam   Constitutional: He is oriented to person, place, and time  He appears well-developed and well-nourished  HENT:   Head: Normocephalic and atraumatic  Mouth/Throat: No oropharyngeal exudate (Erythema of oropharynx, postnasal drip, no exudates)  Eyes: Conjunctivae are normal    Neck: Neck supple  Carotid bruit is not present  Cardiovascular: Normal rate, regular rhythm and normal heart sounds  No murmur heard  Pulmonary/Chest: Effort normal and breath sounds normal  No respiratory distress  He has no wheezes  He has no rales  Episodic dry cough on exam   Increased expiratory phase, no rhonchi, good air entry bilaterally   Abdominal: Bowel sounds are normal  He exhibits no distension and no abdominal bruit  Musculoskeletal: Normal range of motion  He exhibits no edema  Neurological: He is alert and oriented to person, place, and time  No cranial nerve deficit  Psychiatric: He has a normal mood and affect  His behavior is normal    Nursing note and vitals reviewed        Pertinent Laboratory/Diagnostic Studies:  Lab Results   Component Value Date    GLUCOSE 186 (H) 12/21/2015    BUN 27 (H) 07/23/2018    CREATININE 1 55 (H) 07/23/2018    CALCIUM 9 3 07/23/2018     07/23/2018    K 4 1 07/23/2018    CO2 27 07/23/2018     07/23/2018     Lab Results   Component Value Date    ALT 15 04/24/2018    AST 13 04/24/2018    ALKPHOS 102 04/24/2018    BILITOT 0 33 10/01/2015       Lab Results   Component Value Date    WBC 4 95 05/25/2018    HGB 13 1 05/25/2018    HCT 38 9 05/25/2018    MCV 96 05/25/2018     05/25/2018       No results found for: TSH    Lab Results   Component Value Date    CHOL 129 10/01/2015     Lab Results   Component Value Date    TRIG 190 (H) 06/04/2018     Lab Results   Component Value Date    HDL 46 06/04/2018     Lab Results   Component Value Date    LDLCALC 59 06/04/2018     Lab Results   Component Value Date    HGBA1C 8 1 (H) 06/04/2018       Results for orders placed or performed in visit on 08/13/18   TSH, 3rd generation with Free T4 reflex   Result Value Ref Range    TSH 3RD GENERATON 0 370 0 358 - 3 740 uIU/mL       Orders Placed This Encounter   Procedures    Cologuard    CBC    Comprehensive metabolic panel    Hemoglobin A1C    Lipid panel    TSH, 3rd generation       ALLERGIES:  Allergies   Allergen Reactions    Cardura [Doxazosin Mesylate]     Other      Iv dye for cardiac cath  Renal failure very close to dialysis  But no dialysis    Tylenol [Acetaminophen]      interferes with reading of blood sugar    Zestril [Lisinopril]        Current Medications     Current Outpatient Prescriptions   Medication Sig Dispense Refill    Acetylcysteine 600 MG CAPS Take 2 capsules (1,200 mg total) by mouth 2 (two) times a day 8 capsule 0    albuterol (PROVENTIL HFA,VENTOLIN HFA) 90 mcg/act inhaler Inhale 2 puffs every 4 (four) hours as needed for wheezing or shortness of breath (cough) 1 Inhaler 0    amLODIPine (NORVASC) 2 5 mg tablet Take 7 5 mg by mouth daily      aspirin 81 MG tablet Take 81 mg by mouth daily        carvedilol (COREG) 6 25 mg tablet Take 1 tablet (6 25 mg total) by mouth 2 (two) times a day with meals 180 tablet 3    cholecalciferol (VITAMIN D3) 1,000 units tablet Take 5,000 Units by mouth daily        gabapentin (NEURONTIN) 100 mg capsule Take 3 capsules twice a day 180 capsule 0    glucagon (GLUCAGON EMERGENCY) 1 MG injection Inject 1 mg under the skin once as needed for low blood sugar 1 each 2    insulin glargine (LANTUS) 100 units/mL subcutaneous injection Inject 66 Units under the skin daily 38 units in the morning and 28 units in the evening (Patient taking differently: Inject 66 Units under the skin daily 40 units in the morning and 28 units in the evening ) 10 mL 0    insulin lispro (HUMALOG) 100 units/mL injection Inject 3 units with breakfast, 6 units at lunch, and 6 units at dinner 10 mL 1    Lactobacillus-Inulin (CULTUREE DIGESTIVE HEALTH PO) Take 1 capsule by mouth daily      levothyroxine 175 mcg tablet Take 1 tablet (175 mcg total) by mouth daily in the early morning 90 tablet 3    nitroglycerin (NITROSTAT) 0 4 mg SL tablet Place 0 4 mg under the tongue every 5 (five) minutes as needed for chest pain   rosuvastatin (CRESTOR) 40 MG tablet Take 1 tablet by mouth daily 90 tablet 3    Ubiquinol 200 MG CAPS Take 200 mg by mouth daily   VITAMIN K PO Take 1 tablet by mouth daily      benzonatate (TESSALON PERLES) 100 mg capsule Take 1 capsule (100 mg total) by mouth 3 (three) times a day as needed for cough (Patient not taking: Reported on 10/25/2018 ) 20 capsule 0    cefprosil (CEFZIL) 500 MG tablet Take 1 tablet (500 mg total) by mouth 2 (two) times a day for 7 days 14 tablet 0     No current facility-administered medications for this visit            Health Maintenance     Health Maintenance   Topic Date Due    SLP PLAN OF CARE  1945    Lung Cancer Screening  04/27/2000    Depression Screening PHQ  11/10/2018 (Originally 1945)    DM Eye Exam  11/10/2018 (Originally 3/21/2018)    URINE MICROALBUMIN  11/10/2018 (Originally 12/22/2016)    CRC Screening: Colonoscopy  11/10/2018 (Originally 1945)    HEMOGLOBIN A1C  12/04/2018    Fall Risk 03/02/2019    Diabetic Foot Exam  05/25/2019    DTaP,Tdap,and Td Vaccines (2 - Td) 05/05/2023    INFLUENZA VACCINE  Completed    Pneumococcal PPSV23/PCV13 65+ Years / Low and Medium Risk  Completed     Immunization History   Administered Date(s) Administered    DT (pediatric) 12/01/2009    H1N1, All Formulations 12/01/2009    Influenza 10/01/2008, 10/06/2009, 09/01/2010, 11/04/2013, 08/29/2018    Influenza Split High Dose Preservative Free IM 08/28/2014, 10/03/2016    Influenza TIV (IM) 10/01/2008, 10/19/2010, 09/20/2011, 08/01/2012, 09/13/2013, 09/15/2015, 09/03/2017    Pneumococcal Conjugate 13-Valent 08/11/2015    Pneumococcal Polysaccharide PPV23 10/01/2008, 06/04/2009, 03/15/2017    Tdap 05/05/2013    Zoster 10/01/2009       Steffanie Osler, MD

## 2018-10-28 RX ORDER — GABAPENTIN 100 MG/1
CAPSULE ORAL
Qty: 180 CAPSULE | Refills: 0
Start: 2018-10-28 | End: 2019-04-01 | Stop reason: SDUPTHER

## 2018-11-01 ENCOUNTER — TELEPHONE (OUTPATIENT)
Dept: FAMILY MEDICINE CLINIC | Facility: CLINIC | Age: 73
End: 2018-11-01

## 2018-11-01 NOTE — TELEPHONE ENCOUNTER
Wife was calling to advise Dr Chloe Avila that her  had a heart attack & they are out of town in [place], [place]   He has been admitted to  [facility]  (she just wanted Dr Chloe Avila to be aware)

## 2018-11-08 ENCOUNTER — TELEPHONE (OUTPATIENT)
Dept: RADIOLOGY | Facility: CLINIC | Age: 73
End: 2018-11-08

## 2018-11-08 ENCOUNTER — TELEPHONE (OUTPATIENT)
Dept: CARDIOLOGY CLINIC | Facility: CLINIC | Age: 73
End: 2018-11-08

## 2018-11-08 ENCOUNTER — TRANSITIONAL CARE MANAGEMENT (OUTPATIENT)
Dept: FAMILY MEDICINE CLINIC | Facility: CLINIC | Age: 73
End: 2018-11-08

## 2018-11-08 NOTE — TELEPHONE ENCOUNTER
Forwarding to spa surg coordinator  Please document and advise of cancelled procedure  Pt should fu w/ cardiology / pcp

## 2018-11-08 NOTE — TELEPHONE ENCOUNTER
Spouse stated they are bringing records along with them to the appt, and requested to see a cardiologist not a NP!  Scheduled w/Dr Lewis Double on Thursday

## 2018-11-08 NOTE — TELEPHONE ENCOUNTER
Per clinical they do not need to reach out to f/u with pt  Advised to call wife back and let her aware of procedure being canceled  S/w Wife Jonathan Boatengvers to make her aware of proc canceled, and how sorry to hear and wish him to feel better  Wife states they are hope to fly back home tomorrow from [place] and appreciated our well wishes and concern  Forwarding to Dr Juliet Flannery for [place]

## 2018-11-08 NOTE — TELEPHONE ENCOUNTER
Pt's wife l/m on  line to cx procedure for 11/15/2018, wife Jonathan Shukla [phone] states pt had another heartattack while in [place]  Forward call to clinical and task for nursing to f/u on

## 2018-11-08 NOTE — TELEPHONE ENCOUNTER
FYI: pt had an NSTEMI, CHF in [place], hoping to fly home tomorrow  Wife Janeth Walker requested an appt next week ASAP   Scheduled w/NP [email or web address]

## 2018-11-15 ENCOUNTER — TELEPHONE (OUTPATIENT)
Dept: ADMINISTRATIVE | Facility: HOSPITAL | Age: 73
End: 2018-11-15

## 2018-11-20 ENCOUNTER — TELEPHONE (OUTPATIENT)
Dept: PULMONOLOGY | Facility: CLINIC | Age: 73
End: 2018-11-20

## 2018-11-20 NOTE — TELEPHONE ENCOUNTER
Called stated that patient is admitted in [place] and that the answering service was called at 630am for the on call provider but they never got a call back  She requested that the provider call her back asap  Sent the message to Pam Neal

## 2018-11-26 NOTE — TELEPHONE ENCOUNTER
Patient's wife Arauz Million called stating both her and patient are returning home tomorrow from [place] and would like an appointment with [facility] as soon as possible  Regla Villalta can be reached  At [phone]

## 2018-11-27 ENCOUNTER — TRANSITIONAL CARE MANAGEMENT (OUTPATIENT)
Dept: FAMILY MEDICINE CLINIC | Facility: CLINIC | Age: 73
End: 2018-11-27

## 2018-11-27 NOTE — TELEPHONE ENCOUNTER
I called patient back, he is scheduled to see Dr Coty Stark tomorrow 11/28 @ [facility] office

## 2018-11-28 ENCOUNTER — OFFICE VISIT (OUTPATIENT)
Dept: PULMONOLOGY | Facility: CLINIC | Age: 73
End: 2018-11-28
Payer: MEDICARE

## 2018-11-28 ENCOUNTER — TRANSCRIBE ORDERS (OUTPATIENT)
Dept: LAB | Facility: CLINIC | Age: 73
End: 2018-11-28

## 2018-11-28 ENCOUNTER — TELEPHONE (OUTPATIENT)
Dept: NEPHROLOGY | Facility: CLINIC | Age: 73
End: 2018-11-28

## 2018-11-28 ENCOUNTER — OFFICE VISIT (OUTPATIENT)
Dept: FAMILY MEDICINE CLINIC | Facility: CLINIC | Age: 73
End: 2018-11-28
Payer: MEDICARE

## 2018-11-28 ENCOUNTER — LAB (OUTPATIENT)
Dept: LAB | Facility: CLINIC | Age: 73
End: 2018-11-28
Payer: MEDICARE

## 2018-11-28 VITALS
BODY MASS INDEX: 30.92 KG/M2 | RESPIRATION RATE: 16 BRPM | WEIGHT: 197 LBS | TEMPERATURE: 95.8 F | HEART RATE: 66 BPM | SYSTOLIC BLOOD PRESSURE: 110 MMHG | DIASTOLIC BLOOD PRESSURE: 60 MMHG | HEIGHT: 67 IN

## 2018-11-28 VITALS
WEIGHT: 197 LBS | TEMPERATURE: 97.6 F | HEART RATE: 78 BPM | BODY MASS INDEX: 30.92 KG/M2 | DIASTOLIC BLOOD PRESSURE: 64 MMHG | RESPIRATION RATE: 20 BRPM | HEIGHT: 67 IN | SYSTOLIC BLOOD PRESSURE: 122 MMHG | OXYGEN SATURATION: 95 %

## 2018-11-28 DIAGNOSIS — E10.8 TYPE 1 DIABETES MELLITUS WITH COMPLICATION (HCC): ICD-10-CM

## 2018-11-28 DIAGNOSIS — E03.9 HYPOTHYROIDISM, UNSPECIFIED TYPE: ICD-10-CM

## 2018-11-28 DIAGNOSIS — J98.4 RESTRICTIVE LUNG DISEASE: ICD-10-CM

## 2018-11-28 DIAGNOSIS — M54.41 CHRONIC LOW BACK PAIN WITH RIGHT-SIDED SCIATICA, UNSPECIFIED BACK PAIN LATERALITY: ICD-10-CM

## 2018-11-28 DIAGNOSIS — R05.9 COUGH IN ADULT: ICD-10-CM

## 2018-11-28 DIAGNOSIS — G89.29 CHRONIC LOW BACK PAIN WITH RIGHT-SIDED SCIATICA, UNSPECIFIED BACK PAIN LATERALITY: ICD-10-CM

## 2018-11-28 DIAGNOSIS — N18.30 CKD (CHRONIC KIDNEY DISEASE) STAGE 3, GFR 30-59 ML/MIN (HCC): ICD-10-CM

## 2018-11-28 DIAGNOSIS — E10.40 TYPE 1 DIABETES MELLITUS WITH DIABETIC NEUROPATHY (HCC): ICD-10-CM

## 2018-11-28 DIAGNOSIS — J44.9 CHRONIC OBSTRUCTIVE PULMONARY DISEASE, UNSPECIFIED COPD TYPE (HCC): Primary | ICD-10-CM

## 2018-11-28 DIAGNOSIS — G47.33 OBSTRUCTIVE SLEEP APNEA OF ADULT: ICD-10-CM

## 2018-11-28 DIAGNOSIS — I25.10 CORONARY ARTERY DISEASE INVOLVING NATIVE CORONARY ARTERY OF NATIVE HEART WITHOUT ANGINA PECTORIS: ICD-10-CM

## 2018-11-28 DIAGNOSIS — E10.8 TYPE 1 DIABETES MELLITUS WITH COMPLICATION (HCC): Primary | ICD-10-CM

## 2018-11-28 LAB
25(OH)D3 SERPL-MCNC: 35.3 NG/ML (ref 30–100)
ALBUMIN SERPL BCP-MCNC: 2.9 G/DL (ref 3.5–5)
ALP SERPL-CCNC: 106 U/L (ref 46–116)
ALT SERPL W P-5'-P-CCNC: 54 U/L (ref 12–78)
ANION GAP SERPL CALCULATED.3IONS-SCNC: 4 MMOL/L (ref 4–13)
AST SERPL W P-5'-P-CCNC: 62 U/L (ref 5–45)
BACTERIA UR QL AUTO: ABNORMAL /HPF
BILIRUB SERPL-MCNC: 0.72 MG/DL (ref 0.2–1)
BILIRUB UR QL STRIP: NEGATIVE
BUN SERPL-MCNC: 26 MG/DL (ref 5–25)
CALCIUM SERPL-MCNC: 9.4 MG/DL (ref 8.3–10.1)
CHLORIDE SERPL-SCNC: 108 MMOL/L (ref 100–108)
CHOLEST SERPL-MCNC: 92 MG/DL (ref 50–200)
CLARITY UR: ABNORMAL
CO2 SERPL-SCNC: 24 MMOL/L (ref 21–32)
COLOR UR: YELLOW
CREAT SERPL-MCNC: 1.75 MG/DL (ref 0.6–1.3)
CREAT UR-MCNC: 142 MG/DL
ERYTHROCYTE [DISTWIDTH] IN BLOOD BY AUTOMATED COUNT: 15.2 % (ref 11.6–15.1)
EST. AVERAGE GLUCOSE BLD GHB EST-MCNC: 177 MG/DL
GFR SERPL CREATININE-BSD FRML MDRD: 38 ML/MIN/1.73SQ M
GLUCOSE P FAST SERPL-MCNC: 62 MG/DL (ref 65–99)
GLUCOSE UR STRIP-MCNC: NEGATIVE MG/DL
HBA1C MFR BLD: 7.8 % (ref 4.2–6.3)
HCT VFR BLD AUTO: 37 % (ref 36.5–49.3)
HDLC SERPL-MCNC: 25 MG/DL (ref 40–60)
HGB BLD-MCNC: 11.5 G/DL (ref 12–17)
HGB UR QL STRIP.AUTO: ABNORMAL
HYALINE CASTS #/AREA URNS LPF: ABNORMAL /LPF
KETONES UR STRIP-MCNC: NEGATIVE MG/DL
LDLC SERPL CALC-MCNC: 19 MG/DL (ref 0–100)
LEUKOCYTE ESTERASE UR QL STRIP: NEGATIVE
MAGNESIUM SERPL-MCNC: 2.3 MG/DL (ref 1.6–2.6)
MCH RBC QN AUTO: 29.9 PG (ref 26.8–34.3)
MCHC RBC AUTO-ENTMCNC: 31.1 G/DL (ref 31.4–37.4)
MCV RBC AUTO: 96 FL (ref 82–98)
NITRITE UR QL STRIP: NEGATIVE
NON-SQ EPI CELLS URNS QL MICRO: ABNORMAL /HPF
NONHDLC SERPL-MCNC: 67 MG/DL
PH UR STRIP.AUTO: 5.5 [PH] (ref 4.5–8)
PHOSPHATE SERPL-MCNC: 3.9 MG/DL (ref 2.3–4.1)
PLATELET # BLD AUTO: 133 THOUSANDS/UL (ref 149–390)
PMV BLD AUTO: 12.5 FL (ref 8.9–12.7)
POTASSIUM SERPL-SCNC: 4.1 MMOL/L (ref 3.5–5.3)
PROT SERPL-MCNC: 6.8 G/DL (ref 6.4–8.2)
PROT UR STRIP-MCNC: ABNORMAL MG/DL
PROT UR-MCNC: 176 MG/DL
PROT/CREAT UR: 1.24 MG/G{CREAT} (ref 0–0.1)
PTH-INTACT SERPL-MCNC: 40.4 PG/ML (ref 18.4–80.1)
RBC # BLD AUTO: 3.84 MILLION/UL (ref 3.88–5.62)
RBC #/AREA URNS AUTO: ABNORMAL /HPF
SODIUM SERPL-SCNC: 136 MMOL/L (ref 136–145)
SP GR UR STRIP.AUTO: 1.02 (ref 1–1.03)
TRIGL SERPL-MCNC: 240 MG/DL
TSH SERPL DL<=0.05 MIU/L-ACNC: 1.86 UIU/ML (ref 0.36–3.74)
UROBILINOGEN UR QL STRIP.AUTO: 0.2 E.U./DL
WBC # BLD AUTO: 8.15 THOUSAND/UL (ref 4.31–10.16)
WBC #/AREA URNS AUTO: ABNORMAL /HPF

## 2018-11-28 PROCEDURE — 85027 COMPLETE CBC AUTOMATED: CPT

## 2018-11-28 PROCEDURE — 82570 ASSAY OF URINE CREATININE: CPT | Performed by: INTERNAL MEDICINE

## 2018-11-28 PROCEDURE — 36415 COLL VENOUS BLD VENIPUNCTURE: CPT | Performed by: INTERNAL MEDICINE

## 2018-11-28 PROCEDURE — 84443 ASSAY THYROID STIM HORMONE: CPT

## 2018-11-28 PROCEDURE — 99496 TRANSJ CARE MGMT HIGH F2F 7D: CPT | Performed by: FAMILY MEDICINE

## 2018-11-28 PROCEDURE — 99215 OFFICE O/P EST HI 40 MIN: CPT | Performed by: INTERNAL MEDICINE

## 2018-11-28 PROCEDURE — 84100 ASSAY OF PHOSPHORUS: CPT | Performed by: INTERNAL MEDICINE

## 2018-11-28 PROCEDURE — 82306 VITAMIN D 25 HYDROXY: CPT | Performed by: INTERNAL MEDICINE

## 2018-11-28 PROCEDURE — 84156 ASSAY OF PROTEIN URINE: CPT | Performed by: INTERNAL MEDICINE

## 2018-11-28 PROCEDURE — 94618 PULMONARY STRESS TESTING: CPT | Performed by: INTERNAL MEDICINE

## 2018-11-28 PROCEDURE — 81001 URINALYSIS AUTO W/SCOPE: CPT | Performed by: INTERNAL MEDICINE

## 2018-11-28 PROCEDURE — 83970 ASSAY OF PARATHORMONE: CPT | Performed by: INTERNAL MEDICINE

## 2018-11-28 PROCEDURE — 80053 COMPREHEN METABOLIC PANEL: CPT | Performed by: INTERNAL MEDICINE

## 2018-11-28 PROCEDURE — 83735 ASSAY OF MAGNESIUM: CPT | Performed by: INTERNAL MEDICINE

## 2018-11-28 PROCEDURE — 83036 HEMOGLOBIN GLYCOSYLATED A1C: CPT

## 2018-11-28 PROCEDURE — 80061 LIPID PANEL: CPT

## 2018-11-28 RX ORDER — ASPIRIN 81 MG/1
81 TABLET ORAL
COMMUNITY
End: 2018-11-28 | Stop reason: ALTCHOICE

## 2018-11-28 RX ORDER — PREDNISONE 10 MG/1
10 TABLET ORAL SEE ADMIN INSTRUCTIONS
Qty: 21 TABLET | Refills: 0 | Status: SHIPPED | OUTPATIENT
Start: 2018-11-28 | End: 2018-12-17

## 2018-11-28 RX ORDER — LEVALBUTEROL INHALATION SOLUTION 1.25 MG/3ML
1.25 SOLUTION RESPIRATORY (INHALATION) EVERY 6 HOURS PRN
Qty: 120 VIAL | Refills: 1 | Status: SHIPPED | OUTPATIENT
Start: 2018-11-28 | End: 2018-12-05 | Stop reason: SDUPTHER

## 2018-11-28 RX ORDER — SYRINGE AND NEEDLE,INSULIN,1ML 30 G X1/2"
SYRINGE, EMPTY DISPOSABLE MISCELLANEOUS
Refills: 0 | COMMUNITY
Start: 2018-11-07 | End: 2019-09-30

## 2018-11-28 RX ORDER — TICAGRELOR 90 MG/1
90 TABLET ORAL 2 TIMES DAILY
Refills: 0 | COMMUNITY
Start: 2018-11-07 | End: 2018-12-14 | Stop reason: SDUPTHER

## 2018-11-28 RX ORDER — LANCETS 30 GAUGE
EACH MISCELLANEOUS
Refills: 0 | COMMUNITY
Start: 2018-11-07

## 2018-11-28 RX ORDER — METHOCARBAMOL 500 MG/1
TABLET, FILM COATED ORAL
Qty: 30 TABLET | Refills: 0 | Status: SHIPPED | OUTPATIENT
Start: 2018-11-28 | End: 2018-12-17 | Stop reason: ALTCHOICE

## 2018-11-28 RX ORDER — LEVOFLOXACIN 750 MG/1
TABLET ORAL
COMMUNITY
Start: 2018-11-16 | End: 2018-11-28 | Stop reason: ALTCHOICE

## 2018-11-28 RX ORDER — PANTOPRAZOLE SODIUM 40 MG/1
40 TABLET, DELAYED RELEASE ORAL DAILY
Qty: 30 TABLET | Refills: 2 | Status: SHIPPED | OUTPATIENT
Start: 2018-11-28 | End: 2019-02-19 | Stop reason: SDUPTHER

## 2018-11-28 RX ORDER — FUROSEMIDE 20 MG/1
40 TABLET ORAL DAILY PRN
COMMUNITY
Start: 2018-11-17 | End: 2019-03-18 | Stop reason: SDUPTHER

## 2018-11-28 RX ORDER — LEVALBUTEROL TARTRATE 45 UG/1
1-2 AEROSOL, METERED ORAL
COMMUNITY
Start: 2018-11-16 | End: 2019-09-11 | Stop reason: ALTCHOICE

## 2018-11-28 NOTE — ASSESSMENT & PLAN NOTE
· Unclear if symptoms are due to increased COPD  Suspect mild exacerbation  He reported significant benefit from IV steroids  · I have recommended that we try a 2 week course of a low-dose of steroids  He will take 20 mg for 1 week and then 10 mg for an additional week  · He was given a sliding scale for coverage of his diabetes during the course of prednisone by his primary care physician  · If the steroid course and is helpful we will reassess his COPD after this  · He may benefit from pulmonary rehabilitation to help with his dyspnea  · I suspect a contribution from anxiety as well  · Ambulatory oximetry performed in the office today did not reveal significant hypoxemia he was only able to complete 2 min however because of leg pain

## 2018-11-28 NOTE — PROGRESS NOTES
Progress note - Pulmonary Medicine   Tj Betts 68 y o  male MRN: [id number]       Impression & Plan:     COPD (chronic obstructive pulmonary disease) ([facility] [address] )  · Unclear if symptoms are due to increased COPD  Suspect mild exacerbation  He reported significant benefit from IV steroids  · I have recommended that we try a 2 week course of a low-dose of steroids  He will take 20 mg for 1 week and then 10 mg for an additional week  · He was given a sliding scale for coverage of his diabetes during the course of prednisone by his primary care physician  · If the steroid course and is helpful we will reassess his COPD after this  · He may benefit from pulmonary rehabilitation to help with his dyspnea  · I suspect a contribution from anxiety as well  · Ambulatory oximetry performed in the office today did not reveal significant hypoxemia he was only able to complete 2 min however because of leg pain  Restrictive lung disease  · Unclear how much of a restrictive component he has  This is primarily due to obesity  Obstructive sleep apnea of adult  · On 14 cm of water pressure  Continue with current CPAP  · Instructed to use CPAP with all naps as well since he has demonstrated significant apneic events during naps  · May consider referral to Dr Zoie Santiago for a second sleep opinion to see whether additional therapies more testing might be recommended    I will see Kristine Mohan back in 2 months to re-evaluate his symptoms  He was encouraged to call me if he has any new or worsening problems  ______________________________________________________________________    HPI:    Tj Betts presents today for follow-up of COPD and sleep apnea  He was hospitalized multiple times while on vacation in [place]  He had occlusion of coronary arteries requiring stat repeat stenting to the LAD  He also had circumflex disease which was not able to be intervened upon    He had recurrent hospitalizations however with respiratory difficulty  He was significantly short of breath  Ultimately was felt that it may be related to his obstructive sleep apnea and despite this his breathing was adequate using his CPAP at 14 cm of water  I did speak with his wife while hospitalized at [facility]     Today he is following up due to persistent symptoms  He is post visit with his primary care physician  He was given prescriptions for Xopenex HFA inhaler and Xopenex nebulizer treatments  He has previously not been on nebulizer treatments but had significant benefit from these while hospitalized  He also believes he may have had significant benefit from a single dose of Solu-Medrol  It is unclear of Solu-Medrol was continued but the significant elevation in his blood sugars suggest that he did receive at least 2-3 doses  He does feel short of breath today  This does seem to vary  He is able to speak in full sentences and even while speaking quickly when he gets excited in anxious, he does not appear particularly dyspneic  While resting however he does tend to breathe more heavily  He definitely admits to being more aware of his breathing and had a significant component of anxiety that contributed to symptoms while in [place]  He denies any contributing factors currently  No abdominal pain  No GERD symptoms  No postnasal drip  He denies headache or sinusitis  He did lose some weight  Approximately 10-15 lb  No lightheadedness, dizziness, or palpitations  No new beta-blocker medications  He does admit to more leg discomfort  He has had some cramping in his thighs  He believes that this was due to extra activity yesterday however  Review of Systems:  Review of Systems   Constitutional: Positive for activity change  Negative for appetite change, fever and unexpected weight change  HENT: Negative  Eyes: Negative  Respiratory: Negative      Cardiovascular:        As per HPI   Gastrointestinal: Negative  Endocrine:        His sugar control has been variable  This was particularly elevated when he was given steroids in the hospital   Genitourinary:        Had acute kidney injury which was felt to be die related while hospitalized after his catheterization   Musculoskeletal: Positive for gait problem  Skin: Negative  Allergic/Immunologic: Negative  Neurological: Negative for headaches  Hematological: Negative  Psychiatric/Behavioral: The patient is nervous/anxious  All other systems reviewed and are negative  Past medical history, surgical history, and family history were reviewed and updated as appropriate    Social history updates:  History   Smoking Status    Former Smoker    Packs/day: 4 00    Years: 48 00    Types: Cigarettes    Quit date: 2008   Smokeless Tobacco    Never Used     Comment: smoking 48 years 1 5 ppd d/c oct 2008 screening protocol as per allscripts       PhysicalExamination:  Vitals:   /64   Pulse 78   Temp 97 6 °F (36 4 °C)   Resp 20   Ht 5' 7" (1 702 m)   Wt 89 4 kg (197 lb)   SpO2 95%   BMI 30 85 kg/m²     Gen: Intermittent periods of dyspnea even at rest   Somewhat sporadic  HEENT: PERRL  Oropharynx is clear, moist  Neck: Supple  There is no JVD, lymphadenopathy or thyromegaly  Trachea is midline  Chest:  Chest excursion symmetric  Lungs are hyperinflated  Breath sounds are distant bilaterally but otherwise clear  Hyper-resonant to percussion  Cardiac:  Regular  There are no murmurs appreciated  Abdomen: Soft and nontender  Benign  Extremities: No clubbing, cyanosis or edema  Neurologic: No focal deficits  Normal affect  Skin: No appreciable rashes  Diagnostic Data:  Labs:   I personally reviewed the most recent laboratory data pertinent to today's visit    Lab Results   Component Value Date    WBC 8 15 11/28/2018    HGB 11 5 (L) 11/28/2018    HCT 37 0 11/28/2018    MCV 96 11/28/2018     (L) 11/28/2018     Lab Results Component Value Date    GLUCOSE 186 (H) 12/21/2015    CALCIUM 9 4 11/28/2018     12/21/2015    K 4 1 11/28/2018    CO2 24 11/28/2018     11/28/2018    BUN 26 (H) 11/28/2018    CREATININE 1 75 (H) 11/28/2018     No results found for: IGE  Lab Results   Component Value Date    ALT 54 11/28/2018    AST 62 (H) 11/28/2018    ALKPHOS 106 11/28/2018    BILITOT 0 33 10/01/2015       PFT results:  No recent spirometry  Ambulatory oximetry with intention of 6 min walk testing was performed  He was unable to complete 6 min however  He had to stop after 2 min for leg pain  Pulse 83-88 beats per minute  Oxygen saturation at rest was 97% but remained at 94% throughout the 2 min duration of the walking  No evidence of exertional desaturation      Imaging:  Care everywhere review:  Reports of imaging studies from [facility] showed clear x-rays    Other studies:  Echocardiography performed at [facility] showed a preserved ejection fraction without significant pulmonary hypertension or valvular abnormality    Jacob Heredia MD

## 2018-11-28 NOTE — PROGRESS NOTES
FAMILY PRACTICE OFFICE VISIT       NAME: Vanessa Artis  AGE: 68 y o  SEX: male       : 1945        MRN: [id number]        Assessment and Plan     Problem List Items Addressed This Visit     Type 1 diabetes mellitus ([address] )    Coronary artery disease involving native coronary artery of native heart without angina pectoris    Relevant Medications    BRILINTA 90 MG    COPD (chronic obstructive pulmonary disease) ([facility]) - Primary    Relevant Medications    levalbuterol (XOPENEX HFA) 45 mcg/act inhaler    levalbuterol (XOPENEX) 1 25 mg/3 mL nebulizer solution    Other Relevant Orders    Nebulizer    Nebulizer Supplies    CKD (chronic kidney disease) stage 3, GFR 30-59 ml/min ([facility])    Relevant Medications    furosemide (LASIX) 20 mg tablet    Low back pain with sciatica    Relevant Medications    methocarbamol (ROBAXIN) 500 mg tablet      Other Visit Diagnoses     Cough in adult        Relevant Medications    pantoprazole (PROTONIX) 40 mg tablet       Patient with extremely complex history of multivessel coronary artery disease, type 1 diabetes, chronic renal insufficiency, peripheral vascular disease, hypertension, hypothyroidism, COPD and sleep apnea presents for follow-up after 3 recent hospitalizations in [place]  He originally presented to [facility] on  with complaints of chest pain, diagnosed with non STEMI and is currently status post LAD stent placement  New medications include Brilinta 90 mg twice a day  Patient denies further recurrences of chest pain, palpitations or leg edema  Patient has been experiencing recurrent bouts of dyspnea, cough and wheezing within past 4 weeks  He was rehospitalized twice at [facility] for evaluation of dyspnea  Reportedly, there was no evidence of new cardiac events or congestive heart failure  Patient was treated with COPD and has noticed significant improvement after 1 dose of Solu-Medrol and nebulized Xopenex    His current medications include Xopenex inhaler  She is still experiencing chronic shortness of breath, fatigue, cough  Patient is scheduled to see [facility] pulmonology today at 2:30 p m     I would hold off new medications from pulmonary standpoint but I wonder if patient will benefit from outpatient steroid taper  He does have type 1 diabetes and remains on Lantus 40 units in the morning and 22 units in the evening  I did provide patient with regular insulin sliding scale in case outpatient steroid therapy will be initiated  Patient will use NovoLog insulin 4 times a day pending on blood sugar readings  He will check blood sugars at home q i d  and adjust regular insulin as following: If blood sugar is 150-200  3 units, 200-249-6 units, 250-299 9units, 300-349 12 units, 350-349-15 IU, if blood sugars over 400-she use 18 units and contact MD  Patient will start Xopenex inhalations via nebulizer at home  Pending follow-up with [facility] Cardiology within next 10 days  Patient will try Robaxin as needed for painful muscle cramps  I wonder if acid reflux disease could be the culprit for his chronic cough, will try patient on Protonix 40 mg daily  Pending complete blood work to reassess GFR, CBC, platelet count, TSH  Will follow up pending Pulmonary and Cardiology consult and blood work results and patient's updates  There are no Patient Instructions on file for this visit  [employer] software was used to dictate this note  It may contain errors with dictating incorrect words/spelling  Please contact provider directly with any questions  Chief Complaint     Chief Complaint   Patient presents with    Transition of Care Management     Patient is here for a TCM visit due to a heart attack and COPD  History of Present Illness     Patient presents for follow-up after 3 recent hospitalizations while visiting his children in [place]    Reportedly he developed chest pain and was admitted in [place] on October 30th through November 7th  Patient was diagnosed with non STEMI and underwent cardiac catheterization with finding of restenoses of LAD  New stent displaced  Patient is currently on Brilinta 90 mg twice a day  His postop course was complicated by acute on chronic renal failure, as well as persistent dyspnea  Subsequently he has improved and was discharged on November 7th  Patient presented to [facility] on November 12 with worsening of dyspnea cough and wheezing  He was hospitalized for 5 days and discharged on November 17th  Patient was readmitted to [facility] November 20th through November 23rd as with acute dyspnea  Results of most recent diagnostic testing including GFR, troponin eyes and BUN/creatinine reviewed as per wife's personal records  I reviewed available records on epic  Note from [facility] on November 15th    Kelly Nam is a 68 y o  male with MVCAD s/p 2x NSTEMI 9/2015 with placement of 4 stents, and more recently in 10/2018 with LAD in-stent restenosis and 1x stent placement, HFrEF (30-35% 10/2018), COPD, ITZEL, and CKD who presented for worsening cough and shortness of breath in setting of chronic bronchitis, acute on CKD, and found to have mildly elevated troponin (flat at 0 047 and down from 14 9 2 weeks prior) and BNP (140, down from 800s)     -Patient continues to be without chest pain or concerning ECG changes suggestive of ACS at this time    -Elevated troponin on admission consistent with recent/prior myocardial injury and in setting of kidney disease    -Patient also is without clinical evidence of volume overload and his biomarkers are improving, CXR clear   -Echocardiogram reviewed, LV systolic function improved (EF 45-50%) with RWMAs consistent with history  According to patient and his wife  He had no recurrences of chest pain since stent placement in early November    His last 2 hospitalizations were related to persistent dyspnea and lingering cough  Patient states that he felt wonderful just once update and injection of Solu-Medrol was administered doing most recent hospitalization  His symptoms of improvement lasted 1-2 days and now are recurring  Patient also reports significant improvement of his symptoms with nebulized Xopenex while inpatient  He is using Xopenex inhaler at present time but does not have nebulizer at home  Patient is compliant with CPAP  No leg edema  He feels very fatigued  He is having lingering cough for over a month  I saw patient for symptoms of acute bronchitis with bronchospasm in October, he was treated with Z-Marquis, Cefzil and albuterol inhaler  Patient has pending follow-up with [facility] Cardiology in early December  Patient denies symptoms of burping, belching, nausea vomiting  He admits that his bowel movements are rather hard in consistency, occasional constipation  Blood sugars have been fluctuating  Patient remains on Lantus 62 units today and uses NovoLog insulin as per carbohydrate intake scale  Patient is complaining of upper leg cramps/thigh cramps after prolonged car ride  He is requesting p r n  Muscle relaxant  TCM Call (since 10/28/2018)     Date and time call was made  11/26/2018 11:49 AM    Hospital care reviewed  Records reviewed    Patient was hospitialized at  Other (comment)    [address] [place] [address]    Date of Admission  11/20/18    Date of discharge  11/22/18    Diagnosis  COPD exacerbation    Disposition  Home    Were the patients medications reviewed and updated  No    Current Symptoms  Fatigue; Weakness    Weakness severity  Mild    Fatigue severity  Mild      TCM Call (since 10/28/2018)     Post hospital issues  Reduced activity; Poor medication adherence    Should patient be enrolled in anticoag monitoring? No    Scheduled for follow up?   Yes    Did you obtain your prescribed medications  Yes    Do you need help managing your prescriptions or medications Yes    Why type of assitance do you need  Currently all aspects    Is transportation to your appointment needed  Yes    Specify why  Wife will drive to due fatigue    I have advised the patient to call PCP with any new or worsening symptoms    Phineas Blood, LPN    Living Arrangements  Spouse or Significiant other    Support System  Spouse    The type of support provided  Physical; Emotional    Do you have social support  Yes, as much as I need    Are you recieving any outpatient services  No    Are you recieving home care services  No    Are you using any community resources  No    Current waiver services  No    Have you fallen in the last 12 months  No    Interperter language line needed  No    Counseling  Family    Counseling topics  Importance of RX compliance; instructions for   management    Comments  Appt made with Dr Jackie Leon for 11/28/18 @ 11:30am                    Review of Systems   Review of Systems   Constitutional: Positive for fatigue  HENT: Negative  Eyes: Negative  Respiratory: Positive for cough and shortness of breath  Cardiovascular: Negative  Gastrointestinal: Positive for constipation  Endocrine: Negative  Genitourinary: Negative  Musculoskeletal: Positive for arthralgias and myalgias  Skin: Negative  Allergic/Immunologic: Negative  Neurological: Negative  Psychiatric/Behavioral: Negative          Active Problem List     Patient Active Problem List   Diagnosis    Type 1 diabetes mellitus (HCC)    Presence of drug coated stent in LAD coronary artery    Coronary artery disease involving native coronary artery of native heart without angina pectoris    Presence of drug coated stent in left circumflex coronary artery    Dyslipidemia    Obstructive sleep apnea of adult    Carotid artery stenosis, asymptomatic, bilateral    History of ischemic cardiomyopathy    Atherosclerosis of both lower extremities with intermittent claudication (HCC)    Restrictive lung disease    [facility]) [facility])    Benign hypertension with chronic kidney disease, stage III (HCC)    CKD (chronic kidney disease) stage 3, GFR 30-59 ml/min ([facility])    Proteinuria    Renal artery stenosis (HCC)    Renal cyst, right    Vitamin D deficiency    Aortoiliac occlusive disease (HCC)    Hypothyroidism, postablative    Other specified hypothyroidism    Low back pain with sciatica    Lumbar disc herniation    Lumbar radiculopathy       Past Medical History:  Past Medical History:   Diagnosis Date    Acute kidney injury ([facility] )     resolved 11/30/2015    Acute venous embolism and thrombosis of deep vessels of proximal lower extremity ([facility] )     resolved 04/04/2015    DARINEL (acute kidney injury) ([facility] ) 1/12/2016    Cardiac disease     heart attack, stents x 4    CHF (congestive heart failure) ([facility])     Chronic cough     resolved 02/04/2016    COPD (chronic obstructive pulmonary disease) ([facility] )     Diabetes mellitus [facility]     Disease of thyroid gland     DVT (deep venous thrombosis) ([facility] )     Hypertension     Ischemic cardiomyopathy     last assessed 09/26/2017    Pulmonary granuloma ([facility] )     resolved 02/03/2017    Renal failure     Sleep apnea        Past Surgical History:  Past Surgical History:   Procedure Laterality Date    BYPASS FEMORAL-POPLITEAL      initial stenosis with stent left, 7 x 100 smart stent onset 02/24/2014    CORONARY ANGIOPLASTY WITH STENT PLACEMENT      x4       Family History:  Family History   Problem Relation Age of Onset    Heart disease Father         pacer placement    Hypertension Father     Kidney disease Father     Heart failure Father     Heart attack Father     Liver disease Father     Cirrhosis Mother         due to beer consumption    Liver disease Mother     Diabetes Other        Social History:  Social History     Social History    Marital status: /Civil Union     Spouse name: N/A    Number of children: N/A    Years of education: N/A     Occupational History    Retired     Desk work     [employer]      Social History Main Topics    Smoking status: Former Smoker     Packs/day: 4 00     Years: 48 00     Types: Cigarettes     Quit date: 2008    Smokeless tobacco: Never Used      Comment: smoking 48 years 1 5 ppd d/c oct 2008 screening protocol as per allscripts    Alcohol use 12 6 oz/week     21 Standard drinks or equivalent per week      Comment: 2-3 glasses of vodka daily (6 shots) (history 2 drinks per day as per allscripts    Drug use: No    Sexual activity: No     Other Topics Concern    Not on file     Social History Narrative    No narrative on file     PHQ-9 Depression Screening    PHQ-9:    Frequency of the following problems over the past two weeks:       Little interest or pleasure in doing things:  0 - not at all  Feeling down, depressed, or hopeless:  0 - not at all  PHQ-2 Score:  0               Objective     Vitals:    11/28/18 1148   BP: 110/60   Pulse: 66   Resp: 16   Temp: (!) 95 8 °F (35 4 °C)   TempSrc: Tympanic   Weight: 89 4 kg (197 lb)   Height: 5' 7" (1 702 m)     Wt Readings from Last 3 Encounters:   11/28/18 89 4 kg (197 lb)   11/28/18 89 4 kg (197 lb)   10/25/18 94 9 kg (209 lb 3 2 oz)       Physical Exam   Constitutional: He is oriented to person, place, and time  He appears well-developed and well-nourished  Appears fatigued     HENT:   Head: Normocephalic and atraumatic  Eyes: Conjunctivae are normal    Neck: Neck supple  Carotid bruit is not present  Cardiovascular: Normal rate, regular rhythm and normal heart sounds  No murmur heard  Pulmonary/Chest: Effort normal  No respiratory distress  He has wheezes  He has no rales  Increased expiratory phase, few scattered wheezes, and expiratory, no rhonchi, no rales   Abdominal: Bowel sounds are normal  He exhibits no distension and no abdominal bruit  Musculoskeletal: Normal range of motion   He exhibits no edema (No leg edema or discoloration)  Neurological: He is alert and oriented to person, place, and time  No cranial nerve deficit  Psychiatric: He has a normal mood and affect  His behavior is normal    Nursing note and vitals reviewed        Pertinent Laboratory/Diagnostic Studies:  Lab Results   Component Value Date    GLUCOSE 186 (H) 12/21/2015    BUN 26 (H) 11/28/2018    CREATININE 1 75 (H) 11/28/2018    CALCIUM 9 4 11/28/2018     12/21/2015    K 4 1 11/28/2018    CO2 24 11/28/2018     11/28/2018     Lab Results   Component Value Date    ALT 54 11/28/2018    AST 62 (H) 11/28/2018    ALKPHOS 106 11/28/2018    BILITOT 0 33 10/01/2015       Lab Results   Component Value Date    WBC 8 15 11/28/2018    HGB 11 5 (L) 11/28/2018    HCT 37 0 11/28/2018    MCV 96 11/28/2018     (L) 11/28/2018       No results found for: TSH    Lab Results   Component Value Date    CHOL 129 10/01/2015     Lab Results   Component Value Date    TRIG 240 (H) 11/28/2018     Lab Results   Component Value Date    HDL 25 (L) 11/28/2018     Lab Results   Component Value Date    LDLCALC 19 11/28/2018     Lab Results   Component Value Date    HGBA1C 7 8 (H) 11/28/2018       Results for orders placed or performed in visit on 11/28/18   CBC   Result Value Ref Range    WBC 8 15 4 31 - 10 16 Thousand/uL    RBC 3 84 (L) 3 88 - 5 62 Million/uL    Hemoglobin 11 5 (L) 12 0 - 17 0 g/dL    Hematocrit 37 0 36 5 - 49 3 %    MCV 96 82 - 98 fL    MCH 29 9 26 8 - 34 3 pg    MCHC 31 1 (L) 31 4 - 37 4 g/dL    RDW 15 2 (H) 11 6 - 15 1 %    Platelets 403 (L) 544 - 390 Thousands/uL    MPV 12 5 8 9 - 12 7 fL   Hemoglobin A1C   Result Value Ref Range    Hemoglobin A1C 7 8 (H) 4 2 - 6 3 %     mg/dl   Lipid panel   Result Value Ref Range    Cholesterol 92 50 - 200 mg/dL    Triglycerides 240 (H) <=150 mg/dL    HDL, Direct 25 (L) 40 - 60 mg/dL    LDL Calculated 19 0 - 100 mg/dL    Non-HDL-Chol (CHOL-HDL) 67 mg/dl   TSH, 3rd generation Result Value Ref Range    [facility] 860 0 358 - 3 740 uIU/mL       Orders Placed This Encounter   Procedures    Nebulizer    Nebulizer Supplies       ALLERGIES:  Allergies   Allergen Reactions    Doxazosin     Cardura [Doxazosin Mesylate]     Other      Iv dye for cardiac cath  Renal failure very close to dialysis  But no dialysis    Tylenol [Acetaminophen]      interferes with reading of blood sugar    Zestril [Lisinopril]        Current Medications     Current Outpatient Prescriptions   Medication Sig Dispense Refill    aspirin 81 MG tablet Take 81 mg by mouth daily        BRILINTA 90 MG Take 90 mg by mouth 2 (two) times a day  0    carvedilol (COREG) 6 25 mg tablet Take 1 tablet (6 25 mg total) by mouth 2 (two) times a day with meals 180 tablet 3    cholecalciferol (VITAMIN D3) 1,000 units tablet Take 5,000 Units by mouth daily        furosemide (LASIX) 20 mg tablet Take 20 mg by mouth daily as needed       gabapentin (NEURONTIN) 100 mg capsule Take 3 capsules twice a day (Patient taking differently: Take 100 mg by mouth 2 (two) times a day Take 3 capsules twice a day ) 180 capsule 0    glucagon (GLUCAGON EMERGENCY) 1 MG injection Inject 1 mg under the skin once as needed for low blood sugar 1 each 2    insulin glargine (LANTUS) 100 units/mL subcutaneous injection Inject 66 Units under the skin daily 38 units in the morning and 28 units in the evening (Patient taking differently: Inject 62 Units under the skin daily 40 units in the am and 22 units in the pm ) 10 mL 0    insulin lispro (HUMALOG) 100 units/mL injection Inject 3 units with breakfast, 6 units at lunch, and 6 units at dinner (Patient taking differently: As directed by doctor  ) 10 mL 1    Lactobacillus-Inulin (CULTUREXuehuile DIGESTIVE HEALTH PO) Take 1 capsule by mouth daily      levalbuterol (XOPENEX HFA) 45 mcg/act inhaler Inhale 1-2 puffs      levothyroxine 175 mcg tablet Take 1 tablet (175 mcg total) by mouth daily in the early morning 90 tablet 3    nitroglycerin (NITROSTAT) 0 4 mg SL tablet Place 0 4 mg under the tongue every 5 (five) minutes as needed for chest pain   rosuvastatin (CRESTOR) 40 MG tablet Take 1 tablet by mouth daily 90 tablet 3    Ubiquinol 200 MG CAPS Take 200 mg by mouth daily   ULTICARE INSULIN SYRINGE 30G X 1/2" 1 ML MISC Use as directed  0    ULTICARE INSULIN SYRINGE 30G X 5/16" 0 3 ML MISC Use as directed  0    levalbuterol (XOPENEX) 1 25 mg/3 mL nebulizer solution Take 1 vial (1 25 mg total) by nebulization every 6 (six) hours as needed for wheezing or shortness of breath 120 vial 1    methocarbamol (ROBAXIN) 500 mg tablet Take 1 tablet 3 times daily as needed for painful muscle spasms 30 tablet 0    pantoprazole (PROTONIX) 40 mg tablet Take 1 tablet (40 mg total) by mouth daily 30 tablet 2     No current facility-administered medications for this visit            Health Maintenance     Health Maintenance   Topic Date Due    Hepatitis C Screening  1945    Depression Screening PHQ  1945    SLP PLAN OF CARE  1945    CRC Screening: Colonoscopy  1945    Lung Cancer Screening  02/28/2017    DM Eye Exam  03/21/2018    HEMOGLOBIN A1C  12/04/2018    Fall Risk  03/02/2019    Diabetic Foot Exam  05/25/2019    URINE MICROALBUMIN  11/14/2019    DTaP,Tdap,and Td Vaccines (2 - Td) 05/05/2023    INFLUENZA VACCINE  Completed    Pneumococcal PPSV23/PCV13 65+ Years / Low and Medium Risk  Completed     Immunization History   Administered Date(s) Administered    DT (pediatric) 12/01/2009    H1N1, All Formulations 12/01/2009    Influenza 10/01/2008, 10/06/2009, 09/01/2010, 11/04/2013, 08/29/2018    Influenza Split High Dose Preservative Free IM 08/28/2014, 10/03/2016    Influenza TIV (IM) 10/01/2008, 10/19/2010, 09/20/2011, 08/01/2012, 09/13/2013, 09/15/2015, 09/03/2017    Pneumococcal Conjugate 13-Valent 08/11/2015    Pneumococcal Polysaccharide PPV23 10/01/2008, 06/04/2009, 03/15/2017    Tdap 05/05/2013    Zoster 10/01/2009       Lissett Oliveira MD

## 2018-11-28 NOTE — LETTER
November 28, 2018     Nita Holland MD  [address] [facility] [address]    Patient: Emiliano Enrique   YOB: 1945   Date of Visit: 11/28/2018       Dear Dr Corbin Michaels: Thank you for referring Leisa Hall to me for evaluation  Below are my notes for this consultation  If you have questions, please do not hesitate to call me  I look forward to following your patient along with you  Sincerely,        Hayley Travis MD        CC: No Recipients  Hayley Travis MD  11/28/2018  6:28 PM  Sign at close encounter    Progress note - Pulmonary Medicine   Emiliano Enrique 68 y o  male MRN: [id number]       Impression & Plan:     COPD (chronic obstructive pulmonary disease) ([facility] )  · Unclear if symptoms are due to increased COPD  Suspect mild exacerbation  He reported significant benefit from IV steroids  · I have recommended that we try a 2 week course of a low-dose of steroids  He will take 20 mg for 1 week and then 10 mg for an additional week  · He was given a sliding scale for coverage of his diabetes during the course of prednisone by his primary care physician  · If the steroid course and is helpful we will reassess his COPD after this  · He may benefit from pulmonary rehabilitation to help with his dyspnea  · I suspect a contribution from anxiety as well  · Ambulatory oximetry performed in the office today did not reveal significant hypoxemia he was only able to complete 2 min however because of leg pain  Restrictive lung disease  · Unclear how much of a restrictive component he has  This is primarily due to obesity  Obstructive sleep apnea of adult  · On 14 cm of water pressure  Continue with current CPAP    · Instructed to use CPAP with all naps as well since he has demonstrated significant apneic events during naps  · May consider referral to Dr Camille Corbin for a second sleep opinion to see whether additional therapies more testing might be recommended    I will see Polanco James back in 2 months to re-evaluate his symptoms  He was encouraged to call me if he has any new or worsening problems  ______________________________________________________________________    HPI:    Gaelhay Shukla presents today for follow-up of COPD and sleep apnea  He was hospitalized multiple times while on vacation in [place]  He had occlusion of coronary arteries requiring stat repeat stenting to the LAD  He also had circumflex disease which was not able to be intervened upon  He had recurrent hospitalizations however with respiratory difficulty  He was significantly short of breath  Ultimately was felt that it may be related to his obstructive sleep apnea and despite this his breathing was adequate using his CPAP at 14 cm of water  I did speak with his wife while hospitalized at [facility]     Today he is following up due to persistent symptoms  He is post visit with his primary care physician  He was given prescriptions for Xopenex HFA inhaler and Xopenex nebulizer treatments  He has previously not been on nebulizer treatments but had significant benefit from these while hospitalized  He also believes he may have had significant benefit from a single dose of Solu-Medrol  It is unclear of Solu-Medrol was continued but the significant elevation in his blood sugars suggest that he did receive at least 2-3 doses  He does feel short of breath today  This does seem to vary  He is able to speak in full sentences and even while speaking quickly when he gets excited in anxious, he does not appear particularly dyspneic  While resting however he does tend to breathe more heavily  He definitely admits to being more aware of his breathing and had a significant component of anxiety that contributed to symptoms while in [place]  He denies any contributing factors currently  No abdominal pain  No GERD symptoms  No postnasal drip  He denies headache or sinusitis    He did lose some weight  Approximately 10-15 lb  No lightheadedness, dizziness, or palpitations  No new beta-blocker medications  He does admit to more leg discomfort  He has had some cramping in his thighs  He believes that this was due to extra activity yesterday however  Review of Systems:  Review of Systems   Constitutional: Positive for activity change  Negative for appetite change, fever and unexpected weight change  HENT: Negative  Eyes: Negative  Respiratory: Negative  Cardiovascular:        As per HPI   Gastrointestinal: Negative  Endocrine:        His sugar control has been variable  This was particularly elevated when he was given steroids in the hospital   Genitourinary:        Had acute kidney injury which was felt to be die related while hospitalized after his catheterization   Musculoskeletal: Positive for gait problem  Skin: Negative  Allergic/Immunologic: Negative  Neurological: Negative for headaches  Hematological: Negative  Psychiatric/Behavioral: The patient is nervous/anxious  All other systems reviewed and are negative  Past medical history, surgical history, and family history were reviewed and updated as appropriate    Social history updates:  History   Smoking Status    Former Smoker    Packs/day: 4 00    Years: 48 00    Types: Cigarettes    Quit date: 2008   Smokeless Tobacco    Never Used     Comment: smoking 48 years 1 5 ppd d/c oct 2008 screening protocol as per allscripts       PhysicalExamination:  Vitals:   /64   Pulse 78   Temp 97 6 °F (36 4 °C)   Resp 20   Ht 5' 7" (1 702 m)   Wt 89 4 kg (197 lb)   SpO2 95%   BMI 30 85 kg/m²      Gen: Intermittent periods of dyspnea even at rest   Somewhat sporadic  HEENT: PERRL  Oropharynx is clear, moist  Neck: Supple  There is no JVD, lymphadenopathy or thyromegaly  Trachea is midline  Chest:  Chest excursion symmetric  Lungs are hyperinflated   Breath sounds are distant bilaterally but otherwise clear  Hyper-resonant to percussion  Cardiac:  Regular  There are no murmurs appreciated  Abdomen: Soft and nontender  Benign  Extremities: No clubbing, cyanosis or edema  Neurologic: No focal deficits  Normal affect  Skin: No appreciable rashes  Diagnostic Data:  Labs: I personally reviewed the most recent laboratory data pertinent to today's visit    Lab Results   Component Value Date    WBC 8 15 11/28/2018    HGB 11 5 (L) 11/28/2018    HCT 37 0 11/28/2018    MCV 96 11/28/2018     (L) 11/28/2018     Lab Results   Component Value Date    GLUCOSE 186 (H) 12/21/2015    CALCIUM 9 4 11/28/2018     12/21/2015    K 4 1 11/28/2018    CO2 24 11/28/2018     11/28/2018    BUN 26 (H) 11/28/2018    CREATININE 1 75 (H) 11/28/2018     No results found for: IGE  Lab Results   Component Value Date    ALT 54 11/28/2018    AST 62 (H) 11/28/2018    ALKPHOS 106 11/28/2018    BILITOT 0 33 10/01/2015       PFT results:  No recent spirometry  Ambulatory oximetry with intention of 6 min walk testing was performed  He was unable to complete 6 min however  He had to stop after 2 min for leg pain  Pulse 83-88 beats per minute  Oxygen saturation at rest was 97% but remained at 94% throughout the 2 min duration of the walking  No evidence of exertional desaturation      Imaging:  Care everywhere review:  Reports of imaging studies from [facility] showed clear x-rays    Other studies:  Echocardiography performed at [facility] showed a preserved ejection fraction without significant pulmonary hypertension or valvular abnormality    Faiza Palacios MD

## 2018-11-28 NOTE — TELEPHONE ENCOUNTER
I called and spoke with Mingo Sarabia, wife  She is aware of Chris's lab results  She mentioned that he had a heart attack on 10/30/18, along with 3 hospitalizations within the past 3 months, all in [place]  She stated that while in [place], his creatinine was 2 2  Per Mingo Sarabia, his BP has been 120s/60s-70  Would you still like for him to repeat a BMP prior to seeing Christina Rogers?

## 2018-11-28 NOTE — TELEPHONE ENCOUNTER
Nimisha Lawson no he does not   that makes more sense then we can discuss further at his follow up appointment    thanks

## 2018-11-28 NOTE — TELEPHONE ENCOUNTER
----- Message from Lalito Hair DO sent at 11/28/2018  2:55 PM EST -----  Creatinine bumped up to 1 75 his baseline is around 1 3 and more recently has been around 1 44-1 5  Can you make sure he is checking his blood pressures at home and if they are low let me know also we should repeat a BMP before he sees Miguelangel Theodore to make   sure this is stable  Also ask if he has been using the furosemide more  Thanks

## 2018-11-28 NOTE — ASSESSMENT & PLAN NOTE
· On 14 cm of water pressure  Continue with current CPAP    · Instructed to use CPAP with all naps as well since he has demonstrated significant apneic events during naps  · May consider referral to Dr Aman Hirsch for a second sleep opinion to see whether additional therapies more testing might be recommended

## 2018-11-28 NOTE — PROGRESS NOTES
Creatinine bumped up to 1 75 his baseline is around 1 3 and more recently has been around 1 44-1 5  Can you make sure he is checking his blood pressures at home and if they are low let me know also we should repeat a BMP before he sees Esther Woodard to make sure this is stable  Also ask if he has been using the furosemide more  Thanks

## 2018-11-30 ENCOUNTER — TELEPHONE (OUTPATIENT)
Dept: FAMILY MEDICINE CLINIC | Facility: CLINIC | Age: 73
End: 2018-11-30

## 2018-11-30 DIAGNOSIS — I25.10 CORONARY ARTERY DISEASE INVOLVING NATIVE CORONARY ARTERY OF NATIVE HEART WITHOUT ANGINA PECTORIS: ICD-10-CM

## 2018-11-30 DIAGNOSIS — E10.40 TYPE 1 DIABETES MELLITUS WITH DIABETIC NEUROPATHY (HCC): Primary | ICD-10-CM

## 2018-11-30 NOTE — TELEPHONE ENCOUNTER
I called and spoke with Young Castillo, wife  She is aware that repeat blood work is not necessary  No other concerns at the moment  Consent: The patient's consent was obtained including but not limited to risks of crusting, scabbing, blistering, scarring, darker or lighter pigmentary change, recurrence, incomplete removal and infection. Duration Of Freeze Thaw-Cycle (Seconds): 0 Detail Level: Detailed Render Post-Care Instructions In Note?: no Post-Care Instructions: I reviewed with the patient in detail post-care instructions. Patient is to wear sunprotection, and avoid picking at any of the treated lesions. Pt may apply Vaseline to crusted or scabbing areas.

## 2018-11-30 NOTE — TELEPHONE ENCOUNTER
Please contact patient's wife regarding patient's blood work results  His creatinine stable at 1 75, mild elevation of 1 of liver function tests, AST 62, platelet count has improved, currently 133 1000, his hemoglobin A1c slightly elevated at 7 8, TSH was normal   Please advise to continue same daily medications and repeat blood work 2nd week of December  I ordered new CBC and CM P    Thank you

## 2018-12-04 NOTE — PROGRESS NOTES
OFFICE FOLLOW UP - Nephrology   Albertina Herndonindu 68 y o  male MRN: [id number]       ASSESSMENT and PLAN:  Pam Mendez was seen today for hypertension and chronic kidney disease  Diagnoses and all orders for this visit:    Acute kidney injury ([facility] )  -etiology likely ATN secondary to recent MI with dye exposure while in [place] from cardiac catheterization  -creatinine peaked at 2 3 in [place]  -current creatinine improving at 1 78    CKD (chronic kidney disease) stage 3, GFR 30-59 ml/min ([facility])  -previous baseline around 1 4-1 6 dating back to 2018, however prior creatinine closer to the 1 3 pedro  -etiology likely secondary to diabetes with diabetic nephropathy and hypertension with known history of renal artery stenosis  -Will have patient return in three months with updated blood work and urine studies for ongoing renal management  -     Basic metabolic panel; Future  -     CBC and differential; Future  -     Magnesium; Future  -     Phosphorus; Future  -     Protein / creatinine ratio, urine; Future    Benign hypertension with chronic kidney disease, stage III ([facility])  -BP on the lower side 110/60  -currently only on Coreg which will decrease to 3 125 from 6 25 b i d   -patient wife has been instructed to take blood pressure twice daily sitting and standing for two weeks and send in for review  -patient with trace pitting edema lower extremity-wife states lower extremity edema is much improved since discharge from [place]  -patient has been instructed to take Lasix 20 mg p r n   If weight gain of 2 lb in a day or 3 lb in two days and to call the office    Renal artery stenosis ([facility] )  -will continue to follow    Type 1 diabetes mellitus with diabetic neuropathy ([facility] )  -needs good blood seem a control    Coronary artery disease involving native coronary artery of native heart without angina pectoris  -status post MI with stent placement in LAD while in [place]  -continues on beta blocker however will need to decrease to 3 125 secondary to lower blood pressure readings to assist with renal perfusion  -     carvedilol (COREG) 6 25 mg tablet; Take 0 5 tablets (3 125 mg total) by mouth 2 (two) times a day with meals        Patient Instructions   All questions asked and answered  The patient has been instructed to call office at [phone] with any questions or concerns  The patient has been instructed to obtain prescribed blood work and urine studies prior to next appointment   Avoid NSAID products to include Motrin, Ibuprofen, Aleve, Naprosyn, or naproxen   Education material " Kidney Disease: Eating Less Sodium" given to patient today  Get blood work and urine studies in 3 months with return office visit with Dr Aron Johnson  The patient has been instructed to call the office if gain >2 pounds in a day or 3 pounds in 2 days and take PRN lasix 20 mg The Patient has been instructed to take home BP twice daily sitting and standing and send/bring in 10 days for review  HPI: Kieran Solomon is a 68 y o  male who is here for Hypertension and Chronic Kidney Disease  Patient returns to office for Chronic Kidney Disease and hypertension management  He was last seen on 05/09/2018 and at that time he was stable from a renal standpoint and recently recovered from [facility] secondary to prerenal azotemia and was taken Lasix as needed  The patient has a history of longstanding history of type 2 diabetes mellitus, renal artery stenosis, hypertension, peripheral vascular disease and carotid artery stenosis  Per hospital records, the patient wife recently reported that Bruna Rinne suffered a myocardial infarction on 10/30/2018 with repeat stenting of the LAD while in [place] along with three hospital admissions within 3 months and noted creatinine as high as 2 2  Most recent lab work on 11/28/2018 reveals a creatinine 1 75 with stable electrolytes  Prior to this AK I insults patient's baseline was 1 4-1  55  Medication list has been reviewed   On discussion, he reports being tired and having shortness of breath since coming home from [place] after having three ER visits and two hospitalizations  He reports having respiratory illness on top of a MI and cardiac stents  He has seen Dr Ruchi arreola and has been placed on nebulizer treatments  He is to see Dr Eileen Elizabeth tomorrow for cardiac standpoint  Blood pressures have been reviewed and reveals blood pressures in the 110 range  He denies chest pain, nausea, vomiting, lightheadedness, dizziness, urinary issues, fevers or chills  The patient and the wife are concerned about lower blood pressure readings when he has been instructed with his history of JOSÉ to have blood pressures closer to the 130 range  Wife reports the patient being off Lasix since November 15th and was instructed to take Lasix as needed if he has weight gain  Per patient report he has been stable with his weight since being discharged and home  ROS:   All the systems were reviewed and were negative except as documented on the HPI  Allergies: Doxazosin; Cardura [doxazosin mesylate]; Other; Tylenol [acetaminophen]; and Zestril [lisinopril]    Medications:   Current Outpatient Prescriptions:     aspirin 81 MG tablet, Take 81 mg by mouth daily  , Disp: , Rfl:     BRILINTA 90 MG, Take 90 mg by mouth 2 (two) times a day, Disp: , Rfl: 0    carvedilol (COREG) 6 25 mg tablet, Take 0 5 tablets (3 125 mg total) by mouth 2 (two) times a day with meals, Disp: 180 tablet, Rfl: 3    cholecalciferol (VITAMIN D3) 1,000 units tablet, Take 1,000 Units by mouth daily  , Disp: , Rfl:     furosemide (LASIX) 20 mg tablet, Take 40 mg by mouth daily as needed  , Disp: , Rfl:     gabapentin (NEURONTIN) 100 mg capsule, Take 3 capsules twice a day (Patient taking differently: Take 100 mg by mouth 2 (two) times a day Take 3 capsules twice a day ), Disp: 180 capsule, Rfl: 0    glucagon (GLUCAGON EMERGENCY) 1 MG injection, Inject 1 mg under the skin once as needed for low blood sugar, Disp: 1 each, Rfl: 2    insulin glargine (LANTUS) 100 units/mL subcutaneous injection, Inject 66 Units under the skin daily 38 units in the morning and 28 units in the evening (Patient taking differently: Inject 62 Units under the skin daily 40 units in the am and 22 units in the pm ), Disp: 10 mL, Rfl: 0    insulin lispro (HUMALOG) 100 units/mL injection, Inject 3 units with breakfast, 6 units at lunch, and 6 units at dinner (Patient taking differently: As directed by doctor  ), Disp: 10 mL, Rfl: 1    Lactobacillus-Inulin ([address] [place] [address]), Take 1 capsule by mouth daily, Disp: , Rfl:     levalbuterol (XOPENEX HFA) 45 mcg/act inhaler, Inhale 1-2 puffs, Disp: , Rfl:     levothyroxine 175 mcg tablet, Take 1 tablet (175 mcg total) by mouth daily in the early morning, Disp: 90 tablet, Rfl: 3    methocarbamol (ROBAXIN) 500 mg tablet, Take 1 tablet 3 times daily as needed for painful muscle spasms, Disp: 30 tablet, Rfl: 0    nitroglycerin (NITROSTAT) 0 4 mg SL tablet, Place 0 4 mg under the tongue every 5 (five) minutes as needed for chest pain , Disp: , Rfl:     pantoprazole (PROTONIX) 40 mg tablet, Take 1 tablet (40 mg total) by mouth daily, Disp: 30 tablet, Rfl: 2    predniSONE 10 mg tablet, Take 1 tablet (10 mg total) by mouth see administration instructions 2 tablet daily for 1 week then 1 tablet daily for 1 week, Disp: 21 tablet, Rfl: 0    rosuvastatin (CRESTOR) 40 MG tablet, Take 1 tablet by mouth daily, Disp: 90 tablet, Rfl: 3    Ubiquinol 200 MG CAPS, Take 200 mg by mouth daily  , Disp: , Rfl:     ULTICARE INSULIN SYRINGE 30G X 1/2" 1 ML MISC, Use as directed, Disp: , Rfl: 0    ULTICARE INSULIN SYRINGE 30G X 5/16" 0 3 ML MISC, Use as directed, Disp: , Rfl: 0    Past Medical History:   Diagnosis Date    Acute kidney [facility] ([facility] )     resolved 11/30/2015    Acute venous embolism and thrombosis of deep vessels of proximal lower extremity ([facility] )     resolved 04/04/2015    DARINEL (acute kidney [facility]) ([facility] ) 1/12/2016    Cardiac disease     heart attack, stents x 4    CHF [facility]congestive heart failure) ([facility])     Chronic cough     resolved 02/04/2016    COPD (chronic obstructive pulmonary disease) ([place] [address] )     Diabetes mellitus ([place] [address] )     Disease of thyroid gland     DVT (deep venous thrombosis) ([place] [address] )     Hypertension     Ischemic cardiomyopathy     last assessed 09/26/2017    Pulmonary granuloma (HCC)     resolved 02/03/2017    Renal failure     Sleep apnea      Past Surgical History:   Procedure Laterality Date    BYPASS FEMORAL-POPLITEAL      initial stenosis with stent left, 7 x 100 smart stent onset 02/24/2014    MANUEL (HISTORICAL)  2018    CORONARY ANGIOPLASTY WITH STENT PLACEMENT      x4     Family History   Problem Relation Age of Onset    Heart disease Father         pacer placement    Hypertension Father     Kidney disease Father     Heart failure Father     Heart attack Father     Liver disease Father     Cirrhosis Mother         due to beer consumption    Liver disease Mother     Diabetes Other       reports that he quit smoking about 10 years ago  His smoking use included Cigarettes  He has a 192 00 pack-year smoking history  He has never used smokeless tobacco  He reports that he drinks about 12 6 oz of alcohol per week   He reports that he does not use drugs  Physical Exam:   Vitals:    12/05/18 1441   BP: 110/60   BP Location: Left arm   Patient Position: Sitting   Cuff Size: Standard   Pulse: 64   Resp: 20   Weight: 90 3 kg (199 lb 2 oz)   Height: 5' 7" (1 702 m)     Body mass index is 31 19 kg/m²      General: cooperative, in not acute distress  Eyes: conjunctivae pink, anicteric sclerae  ENT: lips and mucous membranes moist  Neck: supple, no JVD noted  Chest: clear breath sounds bilateral, no crackles, ronchus or wheezings  CVS: normal rate, regular rhythm  Abdomen: soft, non-tender, non-distended, normoactive bowel sounds  Back: no CVA tenderness  Extremities: Trace pitting edema of both legs   Skin: no rash  Neuro: awake, alert, oriented      Lab Results:  Results for orders placed or performed in visit on 11/28/18   CBC   Result Value Ref Range    WBC 8 15 4 31 - 10 16 Thousand/uL    RBC 3 84 (L) 3 88 - 5 62 Million/uL    Hemoglobin 11 5 (L) 12 0 - 17 0 g/dL    Hematocrit 37 0 36 5 - 49 3 %    MCV 96 82 - 98 fL    MCH 29 9 26 8 - 34 3 pg    MCHC 31 1 (L) 31 4 - 37 4 g/dL    RDW 15 2 (H) 11 6 - 15 1 %    Platelets 655 (L) 302 - 390 Thousands/uL    MPV 12 5 8 9 - 12 7 fL   Hemoglobin A1C   Result Value Ref Range    Hemoglobin A1C 7 8 (H) 4 2 - 6 3 %     mg/dl   Lipid panel   Result Value Ref Range    Cholesterol 92 50 - 200 mg/dL    Triglycerides 240 (H) <=150 mg/dL    HDL, Direct 25 (L) 40 - 60 mg/dL    LDL Calculated 19 0 - 100 mg/dL    Non-HDL-Chol (CHOL-HDL) 67 mg/dl   TSH, 3rd generation   Result Value Ref Range    TSH 3RD GENERATON 1 860 0 358 - 3 740 uIU/mL             Invalid input(s): ALBUMIN        Portions of the record may have been created with voice recognition software  Occasional wrong word or "sound a like" substitutions may have occurred due to the inherent limitations of voice recognition software  Read the chart carefully and recognize, using context, where substitutions have occurred  If you have any questions, please contact the dictating provider

## 2018-12-05 ENCOUNTER — OFFICE VISIT (OUTPATIENT)
Dept: NEPHROLOGY | Facility: CLINIC | Age: 73
End: 2018-12-05
Payer: MEDICARE

## 2018-12-05 VITALS
DIASTOLIC BLOOD PRESSURE: 60 MMHG | SYSTOLIC BLOOD PRESSURE: 110 MMHG | BODY MASS INDEX: 31.25 KG/M2 | WEIGHT: 199.13 LBS | RESPIRATION RATE: 20 BRPM | HEIGHT: 67 IN | HEART RATE: 64 BPM

## 2018-12-05 DIAGNOSIS — N17.9 ACUTE KIDNEY INJURY (HCC): Primary | ICD-10-CM

## 2018-12-05 DIAGNOSIS — I25.10 CORONARY ARTERY DISEASE INVOLVING NATIVE CORONARY ARTERY OF NATIVE HEART WITHOUT ANGINA PECTORIS: ICD-10-CM

## 2018-12-05 DIAGNOSIS — I70.1 RENAL ARTERY STENOSIS (HCC): ICD-10-CM

## 2018-12-05 DIAGNOSIS — E10.40 TYPE 1 DIABETES MELLITUS WITH DIABETIC NEUROPATHY (HCC): ICD-10-CM

## 2018-12-05 DIAGNOSIS — I12.9 BENIGN HYPERTENSION WITH CHRONIC KIDNEY DISEASE, STAGE III (HCC): ICD-10-CM

## 2018-12-05 DIAGNOSIS — N18.30 BENIGN HYPERTENSION WITH CHRONIC KIDNEY DISEASE, STAGE III (HCC): ICD-10-CM

## 2018-12-05 DIAGNOSIS — N18.30 CKD (CHRONIC KIDNEY DISEASE) STAGE 3, GFR 30-59 ML/MIN (HCC): ICD-10-CM

## 2018-12-05 PROCEDURE — 99214 OFFICE O/P EST MOD 30 MIN: CPT | Performed by: NURSE PRACTITIONER

## 2018-12-05 RX ORDER — CARVEDILOL 6.25 MG/1
3.12 TABLET ORAL 2 TIMES DAILY WITH MEALS
Qty: 180 TABLET | Refills: 3 | Status: SHIPPED | OUTPATIENT
Start: 2018-12-05 | End: 2018-12-06 | Stop reason: SDUPTHER

## 2018-12-05 NOTE — PATIENT INSTRUCTIONS
All questions asked and answered  The patient has been instructed to call office at [phone] with any questions or concerns  The patient has been instructed to obtain prescribed blood work and urine studies prior to next appointment   Avoid NSAID products to include Motrin, Ibuprofen, Aleve, Naprosyn, or naproxen   Education material " Kidney Disease: Eating Less Sodium" given to patient today  Get blood work and urine studies in 3 months with return office visit with Dr Kevin Nieves  The patient has been instructed to call the office if gain >2 pounds in a day or 3 pounds in 2 days and take PRN lasix 20 mg The Patient has been instructed to take home BP twice daily sitting and standing and send/bring in 10 days for review

## 2018-12-06 ENCOUNTER — OFFICE VISIT (OUTPATIENT)
Dept: CARDIOLOGY CLINIC | Facility: CLINIC | Age: 73
End: 2018-12-06
Payer: MEDICARE

## 2018-12-06 VITALS
WEIGHT: 195 LBS | HEART RATE: 65 BPM | OXYGEN SATURATION: 98 % | BODY MASS INDEX: 30.54 KG/M2 | SYSTOLIC BLOOD PRESSURE: 140 MMHG | DIASTOLIC BLOOD PRESSURE: 64 MMHG

## 2018-12-06 DIAGNOSIS — I25.10 CORONARY ARTERY DISEASE INVOLVING NATIVE CORONARY ARTERY OF NATIVE HEART WITHOUT ANGINA PECTORIS: ICD-10-CM

## 2018-12-06 PROCEDURE — 99215 OFFICE O/P EST HI 40 MIN: CPT | Performed by: INTERNAL MEDICINE

## 2018-12-06 RX ORDER — CARVEDILOL 3.12 MG/1
3.12 TABLET ORAL 2 TIMES DAILY WITH MEALS
Qty: 180 TABLET | Refills: 3 | Status: SHIPPED | OUTPATIENT
Start: 2018-12-06 | End: 2019-01-30 | Stop reason: CLARIF

## 2018-12-06 NOTE — PROGRESS NOTES
Cardiology Outpatient Progress Note - Tomeka Navarrete 68 y o  male MRN: [id number]    @ Encounter: [id number]      Patient Active Problem List    Diagnosis Date Noted    Lumbar disc herniation 08/01/2018    Lumbar radiculopathy 08/01/2018    Other specified hypothyroidism 05/24/2018    Aortoiliac occlusive disease ([facility] ) 03/01/2018    Restrictive lung disease 01/30/2018    COPD (chronic obstructive pulmonary disease) ([facility] ) 01/30/2018    Atherosclerosis of both lower extremities with intermittent claudication ([place] [address] ) 01/29/2018    Presence of drug coated stent in LAD coronary artery 01/24/2018    Coronary artery disease involving native coronary artery of native heart without angina pectoris 01/24/2018    Presence of drug coated stent in left circumflex coronary artery 01/24/2018    Dyslipidemia 01/24/2018    Obstructive sleep apnea of adult 01/24/2018    Carotid artery stenosis, asymptomatic, bilateral 01/24/2018    History of ischemic cardiomyopathy 01/24/2018    CKD (chronic kidney disease) stage 3, GFR 30-59 ml/min ([facility]) 12/13/2016    Low back pain with sciatica 08/22/2016    Acute kidney injury ([place] [address] ) 01/12/2016    Type 1 diabetes mellitus ([place] [address] ) 01/12/2016    Benign hypertension with chronic kidney disease, stage III ([place] [address] ) 08/12/2015    Renal cyst, right 08/12/2015    Proteinuria 08/11/2015    Vitamin D deficiency 05/22/2015    Renal artery stenosis ([place] [address] ) 05/09/2015    Hypothyroidism, postablative 04/02/2013       Assessment:  # CAD- hx of LAD stent, recently in [place] and had restenosis of LAD with stenting again, 50% LCx (in setting on NSTEMI0 seams like chest pressure brought him to the hospital  Echo 1/31/18: EF: 45-50%, LVEDd 5 8 cm  # Hyperlipidemia- on Crestor; LDL 19, HDL 25  # HTN- coreg 3 125 mg BID   mmHg  # Carotid artery disease  # PVD- stenting to LE  Still with claudication as 6 minute walk limited by leg pain  VAS abdominal aorta: 8/15/18: external iliac arteries and stents are patent bilaterally  50-75% stenosis noted in the left common femoral arter  > 70% stenosis superior mesenteric artery  # COPD- on steroids now  6 minute walk test 11/28/18: only 2 minutes limited by leg pain, oxygen saturation remained above 94%  # ITZEL on CPAP  # CKD, Cr 1 79, previous baseline 1 4  # JOSÉ  # DM1 with diabetic neuropathy    TODAY'S PLAN:  Optimized care, allow a little higher BP given bilateral JOSÉ and CKD    HPI:     69 yo male with hx of CAD, hyperlipidemia, HTN, CVD, PVD with bypass and claudication, ITZEL on CPAP who presents for follow up after hospitalization for multiple diagnosis one of which NSTEMI with classic chest pressure and underwent repeat stent to previous LAD  It also sounds like he was admitted for COPD exacerbation and maybe some diuretic  Wife, who is nurse, provides history  She reports at one point his EF was recorded at 35% and then follow up echo 50%  Told to take lasix prn for weight gain  Interval History:   Started on prednisone, has not needed to take diuretic       Past Medical History:   Diagnosis Date    [facility] ([facility] )     resolved 11/30/2015    Acute venous embolism and thrombosis of deep vessels of proximal lower extremity ([address] )     resolved 04/04/2015    DARINEL (acute kidney injury) ([address] ) 1/12/2016    Cardiac disease     heart attack, stents x 4    CH[facility]congestive heart failure) [facility])     Chronic cough     resolved 02/04/2016    COPD (chronic obstructive pulmonary disease) ([address] )     Diabetes mellitus ([address] )     Disease of thyroid gland     DVT (deep venous thrombosis) ([facility] )     Hypertension     Ischemic cardiomyopathy     last assessed 09/26/2017    Pulmonary granuloma ([facility])     resolved 02/03/2017    Renal failure     Sleep apnea        Review of Systems - Negative except breathing better on prednisone, no edema,  No chest apin    Allergies   Allergen Reactions    Doxazosin     Cardura [Doxazosin Mesylate]     Other      Iv dye for cardiac cath  Renal failure very close to dialysis  But no dialysis    Tylenol [Acetaminophen]      interferes with reading of blood sugar    Zestril [Lisinopril]        Current Outpatient Prescriptions:     aspirin 81 MG tablet, Take 81 mg by mouth daily  , Disp: , Rfl:     BRILINTA 90 MG, Take 90 mg by mouth 2 (two) times a day, Disp: , Rfl: 0    carvedilol (COREG) 6 25 mg tablet, Take 0 5 tablets (3 125 mg total) by mouth 2 (two) times a day with meals, Disp: 180 tablet, Rfl: 3    cholecalciferol (VITAMIN D3) 1,000 units tablet, Take 1,000 Units by mouth daily  , Disp: , Rfl:     furosemide (LASIX) 20 mg tablet, Take 40 mg by mouth daily as needed  , Disp: , Rfl:     gabapentin (NEURONTIN) 100 mg capsule, Take 3 capsules twice a day (Patient taking differently: Take 100 mg by mouth 2 (two) times a day Take 3 capsules twice a day ), Disp: 180 capsule, Rfl: 0    insulin glargine (LANTUS) 100 units/mL subcutaneous injection, Inject 66 Units under the skin daily 38 units in the morning and 28 units in the evening (Patient taking differently: Inject 62 Units under the skin daily 40 units in the am and 22 units in the pm ), Disp: 10 mL, Rfl: 0    insulin lispro (HUMALOG) 100 units/mL injection, Inject 3 units with breakfast, 6 units at lunch, and 6 units at dinner (Patient taking differently: As directed by doctor  ), Disp: 10 mL, Rfl: 1    Lactobacillus-Inulin ([facility] [employer] [facility] ), Take 1 capsule by mouth daily, Disp: , Rfl:     levalbuterol (XOPENEX HFA) 45 mcg/act inhaler, Inhale 1-2 puffs, Disp: , Rfl:     levothyroxine 175 mcg tablet, Take 1 tablet (175 mcg total) by mouth daily in the early morning, Disp: 90 tablet, Rfl: 3    methocarbamol (ROBAXIN) 500 mg tablet, Take 1 tablet 3 times daily as needed for painful muscle spasms, Disp: 30 tablet, Rfl: 0    pantoprazole (PROTONIX) 40 mg tablet, Take 1 tablet (40 mg total) by mouth daily, Disp: 30 tablet, Rfl: 2    predniSONE 10 mg tablet, Take 1 tablet (10 mg total) by mouth see administration instructions 2 tablet daily for 1 week then 1 tablet daily for 1 week, Disp: 21 tablet, Rfl: 0    rosuvastatin (CRESTOR) 40 MG tablet, Take 1 tablet by mouth daily, Disp: 90 tablet, Rfl: 3    Ubiquinol 200 MG CAPS, Take 200 mg by mouth daily  , Disp: , Rfl:     ULTICARE INSULIN SYRINGE 30G X 1/2" 1 ML MISC, Use as directed, Disp: , Rfl: 0    ULTICARE INSULIN SYRINGE 30G X 5/16" 0 3 ML MISC, Use as directed, Disp: , Rfl: 0    glucagon (GLUCAGON EMERGENCY) 1 MG injection, Inject 1 mg under the skin once as needed for low blood sugar (Patient not taking: Reported on 12/6/2018 ), Disp: 1 each, Rfl: 2    nitroglycerin (NITROSTAT) 0 4 mg SL tablet, Place 0 4 mg under the tongue every 5 (five) minutes as needed for chest pain , Disp: , Rfl:     Social History     Social History    Marital status: /Civil Union     Spouse name: N/A    Number of children: N/A    Years of education: N/A     Occupational History    Retired     [employer] work     [employer]      Social History Main Topics    Smoking status: Former Smoker     Packs/day: 4 00     Years: 48 00     Types: Cigarettes     Quit date: 2008    Smokeless tobacco: Never Used      Comment: smoking 48 years 1 5 ppd d/c oct 2008 screening protocol as per allscripts    Alcohol use 12 6 oz/week     21 Standard drinks or equivalent per week      Comment: 2-3 glasses of vodka daily (6 shots) (history 2 drinks per day as per allscripts    Drug use: No    Sexual activity: No     Other Topics Concern    Not on file     Social History Narrative    No narrative on file       Family History   Problem Relation Age of Onset    Heart disease Father         pacer placement    Hypertension Father     Kidney disease Father     Heart failure Father     Heart attack Father     Liver disease Father     Cirrhosis Mother         due to beer consumption    Liver disease Mother    [facility] Diabetes Other        Physical Exam:    Vitals: Blood pressure 140/64, pulse 65, weight 88 5 kg (195 lb), SpO2 98 %  , Body mass index is 30 54 kg/m² ,   Wt Readings from Last 3 Encounters:   12/06/18 88 5 kg (195 lb)   12/05/18 90 3 kg (199 lb 2 oz)   11/28/18 89 4 kg (197 lb)       Physical Exam:    GEN: Otelia Plenty appears well, alert and oriented x 3, pleasant and cooperative   HEENT: pupils equal, round, and reactive to light; extraocular muscles intact  NECK: supple, no carotid bruits   HEART: regular rhythm, normal S1 and S2, no murmurs, clicks, gallops or rubs, JVP is  down  LUNGS: clear to auscultation bilaterally; no wheezes, rales, or rhonchi   ABDOMEN: normal bowel sounds, soft, no tenderness, no distention  EXTREMITIES: peripheral pulses normal; no clubbing, cyanosis, or edema  NEURO: no focal findings   SKIN: normal without suspicious lesions on exposed skin    Labs & Results:    Lab Results   Component Value Date    GLUCOSE 186 (H) 12/21/2015    CALCIUM 9 4 11/28/2018     12/21/2015    K 4 1 11/28/2018    CO2 24 11/28/2018     11/28/2018    BUN 26 (H) 11/28/2018    CREATININE 1 75 (H) 11/28/2018     Lab Results   Component Value Date    WBC 8 15 11/28/2018    HGB 11 5 (L) 11/28/2018    HCT 37 0 11/28/2018    MCV 96 11/28/2018     (L) 11/28/2018     Lab Results   Component Value Date     18 (H) 05/17/2016      Lab Results   Component Value Date    CHOL 129 10/01/2015     Lab Results   Component Value Date    HDL 25 (L) 11/28/2018    HDL 46 06/04/2018    HDL 49 04/24/2018     Lab Results   Component Value Date    LDLCALC 19 11/28/2018    LDLCALC 59 06/04/2018    LDLCALC 46 04/24/2018     Lab Results   Component Value Date    TRIG 240 (H) 11/28/2018    TRIG 190 (H) 06/04/2018    TRIG 156 (H) 04/24/2018     No results found for: CHOLHDL    Echocardiogram  LVEF:   LVIDd:  RV:  MR:  PASP:  RVOT:   Other:    EKG personally reviewed by Vane Rucker, DO       Counseling / Coordination of Care  Total floor / unit time spent today 25 minutes  Greater than 50% of total time was spent with the patient and / or family counseling and / or coordination of care  A description of the counseling / coordination of care: 15  Thank you for the opportunity to participate in the care of this patient    TALITA SUTTON [address]

## 2018-12-10 ENCOUNTER — LAB (OUTPATIENT)
Dept: LAB | Facility: CLINIC | Age: 73
End: 2018-12-10
Payer: MEDICARE

## 2018-12-10 DIAGNOSIS — I25.10 CORONARY ARTERY DISEASE INVOLVING NATIVE CORONARY ARTERY OF NATIVE HEART WITHOUT ANGINA PECTORIS: ICD-10-CM

## 2018-12-10 DIAGNOSIS — E10.40 TYPE 1 DIABETES MELLITUS WITH DIABETIC NEUROPATHY (HCC): ICD-10-CM

## 2018-12-10 LAB
ALBUMIN SERPL BCP-MCNC: 3.1 G/DL (ref 3.5–5)
ALP SERPL-CCNC: 101 U/L (ref 46–116)
ALT SERPL W P-5'-P-CCNC: 28 U/L (ref 12–78)
ANION GAP SERPL CALCULATED.3IONS-SCNC: 6 MMOL/L (ref 4–13)
AST SERPL W P-5'-P-CCNC: 22 U/L (ref 5–45)
BILIRUB SERPL-MCNC: 0.71 MG/DL (ref 0.2–1)
BUN SERPL-MCNC: 20 MG/DL (ref 5–25)
CALCIUM SERPL-MCNC: 8.7 MG/DL (ref 8.3–10.1)
CHLORIDE SERPL-SCNC: 110 MMOL/L (ref 100–108)
CO2 SERPL-SCNC: 29 MMOL/L (ref 21–32)
CREAT SERPL-MCNC: 1.45 MG/DL (ref 0.6–1.3)
ERYTHROCYTE [DISTWIDTH] IN BLOOD BY AUTOMATED COUNT: 16.1 % (ref 11.6–15.1)
GFR SERPL CREATININE-BSD FRML MDRD: 47 ML/MIN/1.73SQ M
GLUCOSE P FAST SERPL-MCNC: 117 MG/DL (ref 65–99)
HCT VFR BLD AUTO: 39.8 % (ref 36.5–49.3)
HGB BLD-MCNC: 12.3 G/DL (ref 12–17)
MCH RBC QN AUTO: 30.1 PG (ref 26.8–34.3)
MCHC RBC AUTO-ENTMCNC: 30.9 G/DL (ref 31.4–37.4)
MCV RBC AUTO: 98 FL (ref 82–98)
PMV BLD AUTO: 12.7 FL (ref 8.9–12.7)
POTASSIUM SERPL-SCNC: 4.6 MMOL/L (ref 3.5–5.3)
PROT SERPL-MCNC: 6.8 G/DL (ref 6.4–8.2)
RBC # BLD AUTO: 4.08 MILLION/UL (ref 3.88–5.62)
SODIUM SERPL-SCNC: 145 MMOL/L (ref 136–145)
WBC # BLD AUTO: 7.55 THOUSAND/UL (ref 4.31–10.16)

## 2018-12-10 PROCEDURE — 36415 COLL VENOUS BLD VENIPUNCTURE: CPT

## 2018-12-10 PROCEDURE — 85027 COMPLETE CBC AUTOMATED: CPT

## 2018-12-10 PROCEDURE — 80053 COMPREHEN METABOLIC PANEL: CPT

## 2018-12-12 ENCOUNTER — TELEPHONE (OUTPATIENT)
Dept: FAMILY MEDICINE CLINIC | Facility: CLINIC | Age: 73
End: 2018-12-12

## 2018-12-12 NOTE — TELEPHONE ENCOUNTER
----- Message from Saray Miramontes MD sent at 12/12/2018  3:39 PM EST -----  Please contact patient and or his wife  I am calling regarding blood work results  Happy to inform that his blood work looks stable/improving  CBC is essentially normal with hemoglobin of 12 3  His creatinine/kidney function has improved as well, currently at 1 45  Please offer to schedule follow-up sometime in early January  If any immediate concerns-please let me know    Thank you

## 2018-12-12 NOTE — TELEPHONE ENCOUNTER
----- Message from Nita Holland MD sent at 12/12/2018  3:39 PM EST -----  Please contact patient and or his wife  I am calling regarding blood work results  Happy to inform that his blood work looks stable/improving  CBC is essentially normal with hemoglobin of 12 3  His creatinine/kidney function has improved as well, currently at 1 45  Please offer to schedule follow-up sometime in early January  If any immediate concerns-please let me know    Thank you

## 2018-12-14 DIAGNOSIS — E78.5 HYPERLIPIDEMIA, UNSPECIFIED HYPERLIPIDEMIA TYPE: Primary | ICD-10-CM

## 2018-12-14 RX ORDER — TICAGRELOR 90 MG/1
90 TABLET ORAL 2 TIMES DAILY
Qty: 10 TABLET | Refills: 0 | Status: SHIPPED | OUTPATIENT
Start: 2018-12-14 | End: 2018-12-17 | Stop reason: SDUPTHER

## 2018-12-17 ENCOUNTER — OFFICE VISIT (OUTPATIENT)
Dept: FAMILY MEDICINE CLINIC | Facility: CLINIC | Age: 73
End: 2018-12-17
Payer: MEDICARE

## 2018-12-17 VITALS
BODY MASS INDEX: 31.17 KG/M2 | TEMPERATURE: 96.4 F | DIASTOLIC BLOOD PRESSURE: 58 MMHG | RESPIRATION RATE: 14 BRPM | HEIGHT: 67 IN | SYSTOLIC BLOOD PRESSURE: 106 MMHG | HEART RATE: 68 BPM | WEIGHT: 198.6 LBS

## 2018-12-17 DIAGNOSIS — L03.116 CELLULITIS OF LEFT FOOT: Primary | ICD-10-CM

## 2018-12-17 DIAGNOSIS — I25.10 CORONARY ARTERY DISEASE INVOLVING NATIVE CORONARY ARTERY OF NATIVE HEART WITHOUT ANGINA PECTORIS: ICD-10-CM

## 2018-12-17 DIAGNOSIS — E78.5 HYPERLIPIDEMIA, UNSPECIFIED HYPERLIPIDEMIA TYPE: ICD-10-CM

## 2018-12-17 DIAGNOSIS — M25.50 POLYARTHRALGIA: ICD-10-CM

## 2018-12-17 DIAGNOSIS — E10.40 TYPE 1 DIABETES MELLITUS WITH DIABETIC NEUROPATHY (HCC): ICD-10-CM

## 2018-12-17 DIAGNOSIS — G60.9 IDIOPATHIC PERIPHERAL NEUROPATHY: ICD-10-CM

## 2018-12-17 PROBLEM — Z86.79 HISTORY OF ISCHEMIC CARDIOMYOPATHY: Status: RESOLVED | Noted: 2018-01-24 | Resolved: 2018-12-17

## 2018-12-17 PROCEDURE — 99214 OFFICE O/P EST MOD 30 MIN: CPT | Performed by: FAMILY MEDICINE

## 2018-12-17 RX ORDER — TICAGRELOR 90 MG/1
90 TABLET ORAL 2 TIMES DAILY
Qty: 60 TABLET | Refills: 6 | Status: SHIPPED | OUTPATIENT
Start: 2018-12-17 | End: 2019-02-27 | Stop reason: ALTCHOICE

## 2018-12-17 RX ORDER — CEPHALEXIN 500 MG/1
500 CAPSULE ORAL 3 TIMES DAILY
Qty: 21 CAPSULE | Refills: 0 | Status: SHIPPED | OUTPATIENT
Start: 2018-12-17 | End: 2018-12-24

## 2018-12-17 NOTE — PROGRESS NOTES
FAMILY PRACTICE OFFICE VISIT       NAME: Jovanni Artis  AGE: 68 y o  SEX: male       : 1945        MRN: [id number]        Assessment and Plan     Problem List Items Addressed This Visit     Type 1 diabetes mellitus ([address] )     Lab Results   Component Value Date    HGBA1C 7 8 (H) 2018     Patient remains under care of Endocrinology  No results for input(s): POCGLU in the last 72 hours  Blood Sugar Average: Last 72 hrs:           Coronary artery disease involving native coronary artery of native heart without angina pectoris      Other Visit Diagnoses     Cellulitis of left foot    -  Primary    Relevant Medications    cephalexin (KEFLEX) 500 mg capsule    mupirocin (BACTROBAN) 2 % ointment    Other Relevant Orders    Uric acid    Lyme Antibody Profile with reflex to WB    Polyarthralgia        Relevant Orders    Uric acid    Lyme Antibody Profile with reflex to WB    Idiopathic peripheral neuropathy           Patient presents for evaluation of left foot cellulitis  Will start Keflex 500 mg t i d  And Bactroban ointment  If his swelling/erythema spreads all will not improve in a few days, patient of wife will contact me with an update  I doubt gout as an etiology but will check uric acid  Patient's wife is also concerned about possible Lyme disease giving endemic area  Polyarticular arthralgias  Seemingly, patient's symptoms are worsening with restart of Crestor  Patient with history of coronary artery disease and peripheral vascular disease with essential need for statin therapy  I advised patient to continue his medical regimen for now  I will discuss possibility of switching him back  to atorvastatin with Cardiology  Patient also reports few episodes of nonexertional mid sternal chest pains,  He describes symptoms as "sensation" rather than pain, they are lasting few minutes, nonradiating, reportedly relieved with Mylanta once    Patient with  history of recent non ST elevation MI and LAD stent placement  He is compliant with Brilinta and aspirin  Recent evaluation with [facility]  I will review with Cardiology regarding further suggestions for evaluation of patient's symptoms  Peripheral neuropathy:  Continue gabapentin 200 mg t i d  COPD  Chronic dyspnea on exertion and at rest   Patient did not notice any improvement with low-dose prednisone taper and t i d  Albuterol nebulizers  I advised patient wife to contact [facility] pulmonology to discuss alternative treatment options  I have spent 30 minutes with Patient and family today in which greater than 50% of this time was spent in counseling/coordination of care regarding Diagnostic results, Prognosis, Risks and benefits of tx options, Intructions for management, Patient and family education, Importance of tx compliance, Risk factor reductions and Impressions  There are no Patient Instructions on file for this visit  M*[employer] software was used to dictate this note  It may contain errors with dictating incorrect words/spelling  Please contact provider directly with any questions  Chief Complaint     Chief Complaint   Patient presents with    Edema     Patient c/o a break in the skin of his left foot and left foot swelling x's 1+ days   Fatigue       History of Present Illness     Patient presents for evaluation of erythema and broken skin in his left foot  Patient's wife noticed changes yesterday  Patient denies any pain and admits overall decreased sensation in his bilateral feet due to symptoms of peripheral neuropathy  He has been using gabapentin 200 mg t i d  For those symptoms  Patient is afebrile  He has been using Lasix PRN only  No leg edema  He is complaining of essentially unchanged dyspnea ;patient has completed course of steroids as per pulmonology and reports no significant changes in his symptoms    He has been using albuterol via nebulizer 3 times a day and is not noticing any changes in his breathing symptoms as well  Patient uses Protonix once a day for symptoms of reflux  Patient reports few episodes of what he describes as chest discomfort and not chest pain  This discomfort is rather transient, nonexertional, nonradiating  Patient states that he has not experience those symptoms for a while, ( last few days?)   Reportedly, patient's wife provided him with Mylanta and medication has helped to alleviate chest discomfort  Patient just had non ST elevation myocardial infarction with LAD stent placed in [place]  He remains on regimen of aspirin and Brilinta  Recent evaluation with [facility] on December 6   Patient has pending follow-up with Cardiology and pulmonology on January 31st   Patient is also complaining of generalized arthralgias within past few weeks  He is complaining of pain in his hips and knees  Interestingly, he had similar complaints few months ago during office with with Dr Ramesh Jacome, it was felt to be related to Crestor use  Patient actually felt better while not using statin, now he is back on Crestor 40 mg daily and is experiencing somewhat similar symptoms  Patient did use Lipitor in the past and does not recall side effects associated with atorvastatin use  Review of Systems   Review of Systems   Constitutional: Positive for fatigue  HENT: Negative  Eyes: Negative  Respiratory: Positive for shortness of breath  Cardiovascular: Positive for chest pain  Negative for palpitations and leg swelling  As outlined in HPI, currently chest pain-free   Gastrointestinal: Negative  Endocrine: Negative  Genitourinary: Negative  Musculoskeletal: Positive for arthralgias and back pain  Skin: Positive for color change ( left foot erythema)  Allergic/Immunologic: Negative  Neurological: Positive for numbness  Chronic symptoms of peripheral neuropathy   Hematological: Negative          Active Problem List     Patient Active Problem List   Diagnosis    Acute kidney injury ([place] [address] )    Type 1 diabetes mellitus ([facility])    Presence of drug coated stent in LAD coronary artery    Coronary artery disease involving native coronary artery of native heart without angina pectoris    Presence of drug coated stent in left circumflex coronary artery    Dyslipidemia    Obstructive sleep apnea of adult    Carotid artery stenosis, asymptomatic, bilateral    Atherosclerosis of both lower extremities with intermittent claudication ([facility])    Restrictive lung disease    COPD (chronic obstructive pulmonary disease) ([facility] )    Benign hypertension with chronic kidney disease, stage III ([facility])    CKD (chronic kidney disease) stage 3, GFR 30-59 ml/min ([facility])    Proteinuria    Renal artery stenosis ([facility])    Renal cyst, right    Vitamin D deficiency    Aortoiliac occlusive disease ([place] [address] )    Hypothyroidism, postablative    Other specified hypothyroidism    Low back pain with sciatica    Lumbar disc herniation    Lumbar radiculopathy       Past Medical History:  Past Medical History:   Diagnosis Date    Acute kidney injury ([place] [address] )     resolved 11/30/2015    Acute venous embolism and thrombosis of deep vessels of proximal lower extremity ([place] [address] )     resolved 04/04/2015    DARINEL (acute kidney injury) ([place] [address] ) 1/12/2016    Cardiac disease     heart attack, stents x 4    CHF (congestive heart failure) ([facility])     Chronic cough     resolved 02/04/2016    COPD (chronic obstructive pulmonary disease) ([place] [address] )     Diabetes mellitus ([place] [address] )     Disease of thyroid gland     DVT (deep venous thrombosis) ([place] [address] )     Hypertension     Ischemic cardiomyopathy     last assessed 09/26/2017    Pulmonary granuloma ([place] [address] )     resolved 02/03/2017    Renal failure     Sleep apnea        Past Surgical History:  Past Surgical History:   Procedure Laterality Date    BYPASS FEMORAL-POPLITEAL      initial stenosis with stent left, 7 x 100 smart stent onset 02/24/2014    MANUEL (HISTORICAL)  2018    CORONARY ANGIOPLASTY WITH STENT PLACEMENT      x4       Family History:  Family History   Problem Relation Age of Onset    Heart disease Father         pacer placement    Hypertension Father     Kidney disease Father     Heart failure Father     Heart attack Father     Liver disease Father     Cirrhosis Mother         due to beer consumption    Liver disease Mother     Diabetes Other        Social History:  Social History     Social History    Marital status: /Civil Union     Spouse name: N/A    Number of children: N/A    Years of education: N/A     Occupational History    Retired     Desk work     [employer]      Social History Main Topics    Smoking status: Former Smoker     Packs/day: 4 00     Years: 48 00     Types: Cigarettes     Quit date: 2008    Smokeless tobacco: Never Used      Comment: smoking 48 years 1 5 ppd d/c oct 2008 screening protocol as per allscripts    Alcohol use 12 6 oz/week     21 Standard drinks or equivalent per week      Comment: 2-3 glasses of vodka daily (6 shots) (history 2 drinks per day as per allscripts    Drug use: No    Sexual activity: No     Other Topics Concern    Not on file     Social History Narrative    No narrative on file         Objective     Vitals:    12/17/18 1310   BP: 106/58   Pulse: 68   Resp: 14   Temp: (!) 96 4 °F (35 8 °C)   TempSrc: Tympanic   Weight: 90 1 kg (198 lb 9 6 oz)   Height: 5' 7" (1 702 m)     Wt Readings from Last 3 Encounters:   12/17/18 90 1 kg (198 lb 9 6 oz)   12/06/18 88 5 kg (195 lb)   12/05/18 90 3 kg (199 lb 2 oz)       Physical Exam   Constitutional: He is oriented to person, place, and time  He appears well-developed and well-nourished  HENT:   Head: Normocephalic and atraumatic  Eyes: Conjunctivae are normal    Neck: Neck supple  Carotid bruit is not present  Cardiovascular: Normal rate, regular rhythm and normal heart sounds  No murmur heard    Pulmonary/Chest: Effort normal and breath sounds normal  No respiratory distress  He has no wheezes  He has no rales  Abdominal: Bowel sounds are normal  He exhibits no distension and no abdominal bruit  Musculoskeletal: Normal range of motion  He exhibits no edema  Neurological: He is alert and oriented to person, place, and time  No cranial nerve deficit  Skin:        Left foot:  Skin is dry, few broken areas at 1st 2nd MTP area  Mild erythema, no warmth, mild edema, area approximately of 4 -5 cm  Right foot:  Dry skin, no lesions   Psychiatric: He has a normal mood and affect  His behavior is normal    Nursing note and vitals reviewed  Pertinent Laboratory/Diagnostic Studies:  Lab Results   Component Value Date    GLUCOSE 186 (H) 12/21/2015    BUN 20 12/10/2018    CREATININE 1 45 (H) 12/10/2018    CALCIUM 8 7 12/10/2018     12/21/2015    K 4 6 12/10/2018    CO2 29 12/10/2018     (H) 12/10/2018     Lab Results   Component Value Date    ALT 28 12/10/2018    AST 22 12/10/2018    ALKPHOS 101 12/10/2018    BILITOT 0 33 10/01/2015       Lab Results   Component Value Date    WBC 7 55 12/10/2018    HGB 12 3 12/10/2018    HCT 39 8 12/10/2018    MCV 98 12/10/2018    PLT  12/10/2018      Comment:      Not reported due to platelet clumping         No results found for: TSH    Lab Results   Component Value Date    CHOL 129 10/01/2015     Lab Results   Component Value Date    TRIG 240 (H) 11/28/2018     Lab Results   Component Value Date    HDL 25 (L) 11/28/2018     Lab Results   Component Value Date    LDLCALC 19 11/28/2018     Lab Results   Component Value Date    HGBA1C 7 8 (H) 11/28/2018       Results for orders placed or performed in visit on 12/10/18   CBC   Result Value Ref Range    WBC 7 55 4 31 - 10 16 Thousand/uL    RBC 4 08 3 88 - 5 62 Million/uL    Hemoglobin 12 3 12 0 - 17 0 g/dL    Hematocrit 39 8 36 5 - 49 3 %    MCV 98 82 - 98 fL    MCH 30 1 26 8 - 34 3 pg    MCHC 30 9 (L) 31 4 - 37 4 g/dL    RDW 16 1 (H) 11 6 - 15 1 %    Platelets  614 - 841 Thousands/uL    MPV 12 7 8 9 - 12 7 fL   Comprehensive metabolic panel   Result Value Ref Range    Sodium 145 136 - 145 mmol/L    Potassium 4 6 3 5 - 5 3 mmol/L    Chloride 110 (H) 100 - 108 mmol/L    CO2 29 21 - 32 mmol/L    ANION GAP 6 4 - 13 mmol/L    BUN 20 5 - 25 mg/dL    Creatinine 1 45 (H) 0 60 - 1 30 mg/dL    Glucose, Fasting 117 (H) 65 - 99 mg/dL    Calcium 8 7 8 3 - 10 1 mg/dL    AST 22 5 - 45 U/L    ALT 28 12 - 78 U/L    Alkaline Phosphatase 101 46 - 116 U/L    Total Protein 6 8 6 4 - 8 2 g/dL    Albumin 3 1 (L) 3 5 - 5 0 g/dL    Total Bilirubin 0 71 0 20 - 1 00 mg/dL    eGFR 47 ml/min/1 73sq m       Orders Placed This Encounter   Procedures    Uric acid    Lyme Antibody Profile with reflex to WB       ALLERGIES:  Allergies   Allergen Reactions    Doxazosin     Cardura [Doxazosin Mesylate]     Other      Iv dye for cardiac cath  Renal failure very close to dialysis  But no dialysis    Tylenol [Acetaminophen]      interferes with reading of blood sugar    Zestril [Lisinopril]        Current Medications     Current Outpatient Prescriptions   Medication Sig Dispense Refill    aspirin 81 MG tablet Take 81 mg by mouth daily        BRILINTA 90 MG Take 1 tablet (90 mg total) by mouth 2 (two) times a day 60 tablet 6    carvedilol (COREG) 3 125 mg tablet Take 1 tablet (3 125 mg total) by mouth 2 (two) times a day with meals 180 tablet 3    cholecalciferol (VITAMIN D3) 1,000 units tablet Take 1,000 Units by mouth daily        furosemide (LASIX) 20 mg tablet Take 40 mg by mouth daily as needed        gabapentin (NEURONTIN) 100 mg capsule Take 3 capsules twice a day (Patient taking differently: Take 200 mg by mouth 2 (two) times a day Take 3 capsules twice a day ) 180 capsule 0    glucagon (GLUCAGON EMERGENCY) 1 MG injection Inject 1 mg under the skin once as needed for low blood sugar 1 each 2    insulin glargine (LANTUS) 100 units/mL subcutaneous injection Inject 66 Units under the skin daily 38 units in the morning and 28 units in the evening (Patient taking differently: Inject 62 Units under the skin daily 40 units in the am and 22 units in the pm ) 10 mL 0    insulin lispro (HUMALOG) 100 units/mL injection Inject 3 units with breakfast, 6 units at lunch, and 6 units at dinner (Patient taking differently: As directed by doctor  ) 10 mL 1    Lactobacillus-Inulin ([address] PO) Take 1 capsule by mouth daily      levalbuterol (XOPENEX HFA) 45 mcg/act inhaler Inhale 1-2 puffs      levothyroxine 175 mcg tablet Take 1 tablet (175 mcg total) by mouth daily in the early morning 90 tablet 3    nitroglycerin (NITROSTAT) 0 4 mg SL tablet Place 0 4 mg under the tongue every 5 (five) minutes as needed for chest pain   pantoprazole (PROTONIX) 40 mg tablet Take 1 tablet (40 mg total) by mouth daily 30 tablet 2    rosuvastatin (CRESTOR) 40 MG tablet Take 1 tablet by mouth daily 90 tablet 3    Ubiquinol 200 MG CAPS Take 200 mg by mouth daily   ULTICARE INSULIN SYRINGE 30G X 1/2" 1 ML MISC Use as directed  0    ULTICARE INSULIN SYRINGE 30G X 5/16" 0 3 ML MISC Use as directed  0    cephalexin (KEFLEX) 500 mg capsule Take 1 capsule (500 mg total) by mouth 3 (three) times a day for 7 days 21 capsule 0    mupirocin (BACTROBAN) 2 % ointment Apply topically 3 (three) times a day 30 g 1     No current facility-administered medications for this visit            Health Maintenance     Health Maintenance   Topic Date Due    Hepatitis C Screening  1945   Blanche Graff Medicare Annual Wellness Visit (AWV)  1945    SLP PLAN OF CARE  1945    CRC Screening: Colonoscopy  1945    Lung Cancer Screening  02/28/2017    DM Eye Exam  03/21/2018    Fall Risk  03/02/2019    Diabetic Foot Exam  05/25/2019    HEMOGLOBIN A1C  05/28/2019    URINE MICROALBUMIN  11/14/2019    Depression Screening PHQ  11/28/2019    DTaP,Tdap,and Td Vaccines (2 - Td) 05/05/2023    INFLUENZA VACCINE  Completed    Pneumococcal PPSV23/PCV13 65+ Years / Low and Medium Risk  Completed     Immunization History   Administered Date(s) Administered    DT (pediatric) 12/01/2009    H1N1, All Formulations 12/01/2009    Influenza 10/01/2008, 10/06/2009, 09/01/2010, 11/04/2013, 08/29/2018    Influenza Split High Dose Preservative Free IM 08/28/2014, 10/03/2016    Influenza TIV (IM) 10/01/2008, 10/19/2010, 09/20/2011, 08/01/2012, 09/13/2013, 09/15/2015, 09/03/2017    Pneumococcal Conjugate 13-Valent 08/11/2015    Pneumococcal Polysaccharide PPV23 10/01/2008, 06/04/2009, 03/15/2017    Tdap 05/05/2013    Zoster 10/01/2009       Syl Vaughan MD

## 2018-12-18 ENCOUNTER — LAB (OUTPATIENT)
Dept: LAB | Facility: CLINIC | Age: 73
End: 2018-12-18
Payer: MEDICARE

## 2018-12-18 DIAGNOSIS — M25.50 POLYARTHRALGIA: ICD-10-CM

## 2018-12-18 DIAGNOSIS — L03.116 CELLULITIS OF LEFT FOOT: ICD-10-CM

## 2018-12-18 LAB — URATE SERPL-MCNC: 4.4 MG/DL (ref 4.2–8)

## 2018-12-18 PROCEDURE — 86618 LYME DISEASE ANTIBODY: CPT

## 2018-12-18 PROCEDURE — 36415 COLL VENOUS BLD VENIPUNCTURE: CPT

## 2018-12-18 PROCEDURE — 84550 ASSAY OF BLOOD/URIC ACID: CPT

## 2018-12-18 PROCEDURE — 86617 LYME DISEASE ANTIBODY: CPT

## 2018-12-18 NOTE — ASSESSMENT & PLAN NOTE
Lab Results   Component Value Date    HGBA1C 7 8 (H) 11/28/2018     Patient remains under care of Endocrinology  No results for input(s): POCGLU in the last 72 hours      Blood Sugar Average: Last 72 hrs:

## 2018-12-20 LAB
B BURGDOR IGG SER IA-ACNC: 4.31
B BURGDOR IGM SER IA-ACNC: 0.19

## 2018-12-21 ENCOUNTER — TELEPHONE (OUTPATIENT)
Dept: FAMILY MEDICINE CLINIC | Facility: CLINIC | Age: 73
End: 2018-12-21

## 2018-12-21 DIAGNOSIS — A69.20 LYME DISEASE: Primary | ICD-10-CM

## 2018-12-21 LAB
B BURGDOR IGG PATRN SER IB-IMP: POSITIVE
B BURGDOR IGM PATRN SER IB-IMP: NEGATIVE
B BURGDOR18KD IGG SER QL IB: PRESENT
B BURGDOR23KD IGG SER QL IB: ABNORMAL
B BURGDOR23KD IGM SER QL IB: ABNORMAL
B BURGDOR28KD IGG SER QL IB: PRESENT
B BURGDOR30KD IGG SER QL IB: ABNORMAL
B BURGDOR39KD IGG SER QL IB: PRESENT
B BURGDOR39KD IGM SER QL IB: ABNORMAL
B BURGDOR41KD IGG SER QL IB: PRESENT
B BURGDOR41KD IGM SER QL IB: ABNORMAL
B BURGDOR45KD IGG SER QL IB: ABNORMAL
B BURGDOR58KD IGG SER QL IB: PRESENT
B BURGDOR66KD IGG SER QL IB: ABNORMAL
B BURGDOR93KD IGG SER QL IB: ABNORMAL

## 2018-12-21 RX ORDER — DOXYCYCLINE 100 MG/1
100 TABLET ORAL 2 TIMES DAILY
Qty: 60 TABLET | Refills: 0 | Status: SHIPPED | OUTPATIENT
Start: 2018-12-21 | End: 2019-01-20

## 2018-12-21 NOTE — TELEPHONE ENCOUNTER
----- Message from Westley Banegas MD sent at [id number]  4:35 PM EST -----  Recent blood work shows positive IgG antibody to Lyme disease however this would indicate that he was exposed to Lyme disease greater than 3 months ago  Has he ever been diagnosed or treated for Lyme disease in the past 2-3 years? Is if not I would recommend switching his cephalexin to doxycycline which is better treatment for both his foot infection and Lyme disease at the same time

## 2018-12-21 NOTE — TELEPHONE ENCOUNTER
Spoke w/ pt's wife and gave results  She states that he has never been diagnosed or treated for lymes dx in the past 3 years  Can you please send rx for doxycycline to [facility]?  Thanks

## 2018-12-21 NOTE — TELEPHONE ENCOUNTER
Please contact patient or his wife  I received part of his blood work  Uric acid level was normal, no evidence of gout  Complete Lyme testing is still pending  Original Lyme screen was negative for new disease but was positive for old exposure      so lab is running Western blot which is still pending at present time  They should contact office for updated results next week  Please also let them know that I spoke with patient's cardiologist about his chest pains and Dr Javan Fritz was supposed to follow up with him     Thank you

## 2019-01-03 ENCOUNTER — TELEPHONE (OUTPATIENT)
Dept: FAMILY MEDICINE CLINIC | Facility: CLINIC | Age: 74
End: 2019-01-03

## 2019-01-03 NOTE — TELEPHONE ENCOUNTER
Spoke to wife and she said that she sees no difference with him being on the Doxycycline, she wants to know if there is a follow up that he needs to make with you or anything else that can be done

## 2019-01-03 NOTE — TELEPHONE ENCOUNTER
I did review patient's record  I do see a note by Dr Merrill Copping about positive Lyme Western blot Ig G   I believe he we was supposed to send doxycycline for patient  Please clarify this message which blood work is patient's wife referring to?

## 2019-01-08 NOTE — TELEPHONE ENCOUNTER
I spoke with patient's wife 5 days ago  I explained her that results of Western blot were given to patient and herself by Dr Genesis Puckett patient was started on doxycycline even though he is IgM level was negative  Western blot IgG was positive which represented prior exposure to Lyme bacteria  Patient is still experiencing persistent myalgias which could be associated with ongoing statin use  Patient's wife left a message to Cardiology to discuss possibility of switch from Crestor back to Lipitor  I advised her to follow up with Shreya Howe regarding this concern    Reportedly, patient has not been experiencing any recurrences of chest pains

## 2019-01-11 ENCOUNTER — OFFICE VISIT (OUTPATIENT)
Dept: FAMILY MEDICINE CLINIC | Facility: CLINIC | Age: 74
End: 2019-01-11
Payer: MEDICARE

## 2019-01-11 VITALS
TEMPERATURE: 96 F | HEIGHT: 67 IN | BODY MASS INDEX: 32.21 KG/M2 | SYSTOLIC BLOOD PRESSURE: 118 MMHG | DIASTOLIC BLOOD PRESSURE: 70 MMHG | HEART RATE: 72 BPM | WEIGHT: 205.2 LBS | RESPIRATION RATE: 18 BRPM

## 2019-01-11 DIAGNOSIS — I74.09 AORTOILIAC OCCLUSIVE DISEASE (HCC): Chronic | ICD-10-CM

## 2019-01-11 DIAGNOSIS — E10.49 OTHER DIABETIC NEUROLOGICAL COMPLICATION ASSOCIATED WITH TYPE 1 DIABETES MELLITUS (HCC): ICD-10-CM

## 2019-01-11 DIAGNOSIS — E10.40 TYPE 1 DIABETES MELLITUS WITH DIABETIC NEUROPATHY (HCC): Primary | ICD-10-CM

## 2019-01-11 PROCEDURE — 99213 OFFICE O/P EST LOW 20 MIN: CPT | Performed by: FAMILY MEDICINE

## 2019-01-11 NOTE — PROGRESS NOTES
FAMILY PRACTICE OFFICE VISIT       NAME: Jovanni Artis  AGE: 68 y o  SEX: male       : 1945        MRN: [id number]        Assessment and Plan     Problem List Items Addressed This Visit     Type 1 diabetes mellitus ([facility] ) - Primary    Relevant Orders    Ambulatory referral to Podiatry    Ambulatory referral to Podiatry    Aortoiliac occlusive disease ([place] [address] ) (Chronic)    Relevant Orders    Ambulatory referral to Podiatry    Ambulatory referral to Podiatry    Diabetic neuropathy associated with type 1 diabetes mellitus ([facility] )       Patient presents for follow-up  [place] area left lateral foot secondary to skin breakdown  Patient has been experiencing chronic symptoms of diabetic neuropathy due to type 1 diabetes  He remains on gabapentin 200 mg twice a day, medication and provide him significant symptomatic relief  Patient is under ongoing care of [facility] vascular surgery for treatment of aorto- iliac occlusive disease and claudication with pending vascular studies in 2019  Refer close follow-up of [address] [place] [address] pulmonology and Cardiology  He has been experiencing persistent dyspnea at rest within past 2 months  I wonder, if he is experiencing side effect of Brilinta that was started at that time after myocardial infarction  I advised patient wife to discuss this possibility further with pulmonology and Cardiology later this month  Coronary artery disease:  Patient reportedly is experiencing myalgias possibly due to Crestor  I advised him to switch Crestor to half a tablet once a day instead of 1 tablet every other day to improve compliance and help to follow-up on his daily symptoms  There are no Patient Instructions on file for this visit  [employer] software was used to dictate this note  It may contain errors with dictating incorrect words/spelling  Please contact provider directly with any questions       Chief Complaint     Chief Complaint   Patient presents with    Foot Pain Pt is here for both heel pain 2 + wks, fatigue       History of Present Illness     Patient presents for follow-up  He is complaining of pain in the left lateral heel/foot area for the last few days  No injury or fall  Patient's wife noticed superficial skin qod corresponding to the site of his discomfort  Dose of Crestor was decreased to 40 mg every other day due to persistent myalgias  Patient did not notice any difference in his symptoms so far  He is still experiencing persistent shortness of breath, nonexertional   He feels that sometimes his simply need to take deeper breath  Interestingly, patient started experiencing those symptoms since his recent hospitalization [place] for myocardial on function and start of Brilinta therapy  Pending follow-ups with pulmonology and Cardiology   Klarissa Lam  1/30/19   Patient is being followed by [facility] vascular surgery with pending arterial duplex in February  Review of Systems   Review of Systems   Constitutional: Positive for fatigue  HENT: Negative  Respiratory: Positive for shortness of breath (Present at rest and on exertion)  Cardiovascular: Negative  Musculoskeletal: Positive for arthralgias and myalgias  Skin: Negative for rash  Bilateral feet are dry, skin breakdown left lateral heel   Neurological:        Symptoms of peripheral neuropathy   Hematological: Negative  Psychiatric/Behavioral: Negative          Active Problem List     Patient Active Problem List   Diagnosis    Acute kidney injury ([facility] )    Type 1 diabetes mellitus (HCC)    Presence of drug coated stent in LAD coronary artery    Coronary artery disease involving native coronary artery of native heart without angina pectoris    Presence of drug coated stent in left circumflex coronary artery    Dyslipidemia    Obstructive sleep apnea of adult    Carotid artery stenosis, asymptomatic, bilateral    Atherosclerosis of both lower extremities with intermittent claudication (HCC)    Restrictive lung disease    COPD (chronic obstructive pulmonary disease) (HCC)    Benign hypertension with chronic kidney disease, stage III (HCC)    CKD (chronic kidney disease) stage 3, GFR 30-59 ml/min (HCC)    Proteinuria    Renal artery stenosis (HCC)    Renal cyst, right    Vitamin D deficiency    Aortoiliac occlusive disease (HCC)    Hypothyroidism, postablative    Other specified hypothyroidism    Low back pain with sciatica    Lumbar disc herniation    Lumbar radiculopathy    Diabetic neuropathy associated with type 1 diabetes mellitus ([facility] )       Past Medical History:  Past Medical History:   Diagnosis Date    Acute kidney injury ([facility] )     resolved 11/30/2015    Acute venous embolism and thrombosis of deep vessels of proximal lower extremity ([facility] )     resolved 04/04/2015    DARINEL (acute kidney injury) ([facility] ) 1/12/2016    Cardiac disease     heart attack, stents x 4    CH[facility] (congestive heart failure) [facility]     Chronic cough     resolved 02/04/2016    COPD (chronic obstructive pulmonary disease) ([facility] )     Diabetes mellitus ([facility] )     Disease of thyroid gland     DVT (deep venous thrombosis) ([facility] )     Hypertension     Ischemic cardiomyopathy     last assessed 09/26/2017    Pulmonary granuloma ([facility] )     resolved 02/03/2017    Renal failure     Sleep apnea        Past Surgical History:  Past Surgical History:   Procedure Laterality Date    BYPASS FEMORAL-POPLITEAL      initial stenosis with stent left, 7 x 100 smart stent onset 02/24/2014    MANUEL (HISTORICAL)  2018    CORONARY ANGIOPLASTY WITH STENT PLACEMENT      x4       Family History:  Family History   Problem Relation Age of Onset    Heart disease Father         pacer placement    Hypertension Father     Kidney disease Father     Heart failure Father     Heart attack Father     Liver disease Father     Cirrhosis Mother         due to beer consumption    Liver disease Mother  Diabetes Other        Social History:  Social History     Social History    Marital status: /Civil Union     Spouse name: N/A    Number of children: N/A    Years of education: N/A     Occupational History    Retired     Desk work     [employer]      Social History Main Topics    Smoking status: Former Smoker     Packs/day: 4 00     Years: 48 00     Types: Cigarettes     Quit date: 2008    Smokeless tobacco: Never Used      Comment: smoking 48 years 1 5 ppd d/c oct 2008 screening protocol as per allscripts    Alcohol use 12 6 oz/week     21 Standard drinks or equivalent per week      Comment: 2-3 glasses of vodka daily (6 shots) (history 2 drinks per day as per allscripts    Drug use: No    Sexual activity: No     Other Topics Concern    Not on file     Social History Narrative    No narrative on file       Objective     Vitals:    01/11/19 1305   BP: 118/70   Pulse: 72   Resp: 18   Temp: (!) 96 °F (35 6 °C)   TempSrc: Tympanic   Weight: 93 1 kg (205 lb 3 2 oz)   Height: 5' 7" (1 702 m)     Wt Readings from Last 3 Encounters:   01/11/19 93 1 kg (205 lb 3 2 oz)   12/17/18 90 1 kg (198 lb 9 6 oz)   12/06/18 88 5 kg (195 lb)       Physical Exam   Constitutional: He is oriented to person, place, and time  He appears well-developed and well-nourished  HENT:   Head: Normocephalic and atraumatic  Eyes: Conjunctivae are normal    Neck: Neck supple  Carotid bruit is not present  Cardiovascular: Normal rate, regular rhythm and normal heart sounds  Pulses are weak pulses  No murmur heard  Pulses:       Dorsalis pedis pulses are 1+ on the right side, and 1+ on the left side  Pulmonary/Chest: Effort normal and breath sounds normal  No respiratory distress  He has no wheezes  He has no rales  Abdominal: Bowel sounds are normal  He exhibits no distension and no abdominal bruit  Musculoskeletal: Normal range of motion  He exhibits no edema          Feet:    Feet:   Right Foot: Skin Integrity: Positive for dry skin  Negative for ulcer, skin breakdown, warmth or callus  Left Foot:   Skin Integrity: Positive for erythema and dry skin  Negative for ulcer, skin breakdown, warmth or callus  Neurological: He is alert and oriented to person, place, and time  No cranial nerve deficit  Psychiatric: He has a normal mood and affect  His behavior is normal    Nursing note and vitals reviewed  Patient's shoes and socks removed  Right Foot/Ankle   Right Foot Inspection  Skin Exam: skin normal, skin intact and dry skin no warmth, no callus, no maceration, no abnormal color, no pre-ulcer, no ulcer and no callus                          Toe Exam: ROM and strength within normal limits  Sensory   Vibration: diminished and intact  Proprioception: diminished and intact   Monofilament testing: diminished  Vascular  Capillary refills: elevated  The right DP pulse is 1+  Left Foot/Ankle  Left Foot Inspection  Skin Exam: skin normal, dry skin and erythemaskin not intact, no warmth, no maceration, normal color, no pre-ulcer, no ulcer and no callus                         Toe Exam: ROM and strength within normal limits                   Sensory   Vibration: diminished  Proprioception: diminished  Monofilament: diminished  Vascular  Capillary refills: elevated  The left DP pulse is 1+  Assign Risk Category:  No deformity present;  Loss of protective sensation; Weak pulses       Risk: 2        Pertinent Laboratory/Diagnostic Studies:  Lab Results   Component Value Date    GLUCOSE 186 (H) 12/21/2015    BUN 20 12/10/2018    CREATININE 1 45 (H) 12/10/2018    CALCIUM 8 7 12/10/2018     12/21/2015    K 4 6 12/10/2018    CO2 29 12/10/2018     (H) 12/10/2018     Lab Results   Component Value Date    ALT 28 12/10/2018    AST 22 12/10/2018    ALKPHOS 101 12/10/2018    BILITOT 0 33 10/01/2015       Lab Results   Component Value Date    WBC 7 55 12/10/2018    HGB 12 3 12/10/2018    HCT 39 8 12/10/2018 MCV 98 12/10/2018    PLT  12/10/2018      Comment:      Not reported due to platelet clumping  No results found for: TSH    Lab Results   Component Value Date    CHOL 129 10/01/2015     Lab Results   Component Value Date    TRIG 240 (H) 11/28/2018     Lab Results   Component Value Date    HDL 25 (L) 11/28/2018     Lab Results   Component Value Date    LDLCALC 19 11/28/2018     Lab Results   Component Value Date    HGBA1C 7 8 (H) 11/28/2018       Results for orders placed or performed in visit on 12/18/18   Uric acid   Result Value Ref Range    Uric Acid 4 4 4 2 - 8 0 mg/dL   Lyme Antibody Profile with reflex to WB   Result Value Ref Range    LYME AB IGG 4 31 (H) 0 00 - 0 79    LYME AB IGM 0 19 0 00 - 0 79   Lyme disease, western blot   Result Value Ref Range    Lyme 18 kD IgG Present (A)     Lyme 23 kD IgG Absent     Lyme 28 kD IgG Present (A)     Lyme 30 kD IgG Absent     Lyme 39 kD IgG Present (A)     Lyme 41 kD IgG Present (A)     Lyme 45 kD IgG Absent     Lyme 58 kD IgG Present (A)     Lyme 66 kD IgG Absent     Lyme 93 kD IgG Absent     Lyme 23 kD IgM Absent     Lyme 39 kD IgM Absent     Lyme 41 kD IgM Absent     Lyme IgG WB Interp  Positive (A)     Lyme IgM WB Interp  Negative        Orders Placed This Encounter   Procedures    Ambulatory referral to Podiatry    Ambulatory referral to Podiatry       ALLERGIES:  Allergies   Allergen Reactions    Doxazosin     Cardura [Doxazosin Mesylate]     Other      Iv dye for cardiac cath  Renal failure very close to dialysis  But no dialysis    Tylenol [Acetaminophen]      interferes with reading of blood sugar    Zestril [Lisinopril]        Current Medications     Current Outpatient Prescriptions   Medication Sig Dispense Refill    aspirin 81 MG tablet Take 81 mg by mouth daily        BRILINTA 90 MG Take 1 tablet (90 mg total) by mouth 2 (two) times a day 60 tablet 6    carvedilol (COREG) 3 125 mg tablet Take 1 tablet (3 125 mg total) by mouth 2 (two) times a day with meals 180 tablet 3    cholecalciferol (VITAMIN D3) 1,000 units tablet Take 1,000 Units by mouth daily        doxycycline (ADOXA) 100 MG tablet Take 1 tablet (100 mg total) by mouth 2 (two) times a day for 30 days 60 tablet 0    furosemide (LASIX) 20 mg tablet Take 40 mg by mouth daily as needed        gabapentin (NEURONTIN) 100 mg capsule Take 3 capsules twice a day (Patient taking differently: Take 200 mg by mouth 2 (two) times a day Take 3 capsules twice a day ) 180 capsule 0    glucagon (GLUCAGON EMERGENCY) 1 MG injection Inject 1 mg under the skin once as needed for low blood sugar 1 each 2    insulin glargine (LANTUS) 100 units/mL subcutaneous injection Inject 66 Units under the skin daily 38 units in the morning and 28 units in the evening (Patient taking differently: Inject 62 Units under the skin daily 40 units in the am and 22 units in the pm ) 10 mL 0    insulin lispro (HUMALOG) 100 units/mL injection Inject 3 units with breakfast, 6 units at lunch, and 6 units at dinner (Patient taking differently: As directed by doctor  ) 10 mL 1    Lactobacillus-Inulin ([facility] ) Take 1 capsule by mouth daily      levalbuterol (XOPENEX HFA) 45 mcg/act inhaler Inhale 1-2 puffs      levothyroxine 175 mcg tablet Take 1 tablet (175 mcg total) by mouth daily in the early morning 90 tablet 3    mupirocin (BACTROBAN) 2 % ointment Apply topically 3 (three) times a day 30 g 1    nitroglycerin (NITROSTAT) 0 4 mg SL tablet Place 0 4 mg under the tongue every 5 (five) minutes as needed for chest pain   pantoprazole (PROTONIX) 40 mg tablet Take 1 tablet (40 mg total) by mouth daily 30 tablet 2    rosuvastatin (CRESTOR) 40 MG tablet Take 1 tablet by mouth daily 90 tablet 3    Ubiquinol 200 MG CAPS Take 200 mg by mouth daily        ULTICARE INSULIN SYRINGE 30G X 1/2" 1 ML MISC Use as directed  0    ULTICARE INSULIN SYRINGE 30G X 5/16" 0 3 ML MISC Use as directed  0     No current facility-administered medications for this visit            Health Maintenance     Health Maintenance   Topic Date Due   [facility] Annual Wellness Visit (AWV)  1945    SLP PLAN OF CARE  1945    CRC Screening: Colonoscopy  1945    Lung Cancer Screening  02/28/2017    DM Eye Exam  03/21/2018    Hepatitis C Screening  04/10/2019 (Originally 1945)    Fall Risk  03/02/2019    Diabetic Foot Exam  05/25/2019    HEMOGLOBIN A1C  05/28/2019    URINE MICROALBUMIN  11/14/2019    Depression Screening PHQ  11/28/2019    DTaP,Tdap,and Td Vaccines (2 - Td) 05/05/2023    INFLUENZA VACCINE  Completed    Pneumococcal PPSV23/PCV13 65+ Years / Low and Medium Risk  Completed     Immunization History   Administered Date(s) Administered    DT (pediatric) 12/01/2009    H1N1, All Formulations 12/01/2009    Influenza 10/01/2008, 10/06/2009, 09/01/2010, 11/04/2013, 08/29/2018    Influenza Split High Dose Preservative Free IM 08/28/2014, 10/03/2016    Influenza TIV (IM) 10/01/2008, 10/19/2010, 09/20/2011, 08/01/2012, 09/13/2013, 09/15/2015, 09/03/2017    Pneumococcal Conjugate 13-Valent 08/11/2015    Pneumococcal Polysaccharide PPV23 10/01/2008, 06/04/2009, 03/15/2017    Tdap 05/05/2013    Zoster 10/01/2009       Atul Espinosa MD

## 2019-01-14 PROBLEM — E10.40 DIABETIC NEUROPATHY ASSOCIATED WITH TYPE 1 DIABETES MELLITUS (HCC): Status: ACTIVE | Noted: 2019-01-14

## 2019-01-17 DIAGNOSIS — I70.219 EXTREMITY ATHEROSCLEROSIS WITH INTERMITTENT CLAUDICATION (HCC): Primary | ICD-10-CM

## 2019-01-17 DIAGNOSIS — I74.09 EMBOLISM FROM ABDOMINAL AORTA (HCC): ICD-10-CM

## 2019-01-18 DIAGNOSIS — A69.20 LYME DISEASE: ICD-10-CM

## 2019-01-18 RX ORDER — DOXYCYCLINE 100 MG/1
CAPSULE ORAL
Qty: 60 CAPSULE | Refills: 0 | OUTPATIENT
Start: 2019-01-18

## 2019-01-29 NOTE — PROGRESS NOTES
Cardiology Follow Up    Davis Sheridan  1945  [id number]  [facility] [address] [address]    1  Coronary artery disease involving native coronary artery of native heart without angina pectoris  metoprolol tartrate (LOPRESSOR) 25 mg tablet    nitroglycerin (NITROSTAT) 0 4 mg SL tablet   2  Presence of drug coated stent in LAD coronary artery     3  Presence of drug coated stent in left circumflex coronary artery     4  History of Ischemic cardiomyopathy     5  Benign hypertension with chronic kidney disease, stage III ([address] )     6  Dyslipidemia  rosuvastatin (CRESTOR) 20 MG tablet    Lipid Panel with Direct LDL reflex   7  Obstructive sleep apnea of adult     8  Carotid artery stenosis, asymptomatic, bilateral         Discussion/Summary:  Mr Stefanie Arzola is a pleasant 79-year-old gentleman who presents to the office today for routine follow-up  From a cardiac standpoint he feels about the same  He has continued shortness of breath with exertion but now also experiences this at rest   This may be a side effect from his Brilinta  We did discuss transitioning back to Plavix  He wishes to remain on his current regimen for now and I have encouraged him to take his medication with caffeine  Otherwise his blood pressure is on the low side  I will attempt to change carvedilol to metoprolol succinate 12 5 milligrams daily to see if this has less effect on his blood pressure  Ideally he should be on a beta-blocker given his underlying coronary disease  His most recent lipids were reviewed  These reflect Crestor 40 mg daily  His LDL was 19  I have asked him to have his lipids reassessed on the 20 mg dose  He remains compliant with CPAP  His carotid disease is under surveillance by vascular surgery      Follow-up will be arranged in three months or sooner if deemed necessary      Interval History:   Mr Raffy Jin is a pleasant 77-year-old gentleman who presents to the office today for routine follow-up  Since his last visit he was admitted to two separate hospitals in [place] on a few separate occasions  During the first hospital stay he was admitted with a non-STEMI  He underwent a repeat left heart catheterization which resulted in drug-eluting stent placement to his LAD  He had an echo performed which revealed his ejection fraction was 45 to 50 percent with LAD regional wall motion abnormalities  He was discharged but returned a short time later with shortness of breath and was diagnosed with bronchitis and placed on a short course of antibiotics  He was then readmitted again with acute shortness of breath  A chest x-ray and V/Q scan were unrevealing as to the etiology  It was not felt that he was in heart failure  Repeat echocardiogram revealed that his LV function had normalized with an ejection fraction reported at 55 percent  He reports ongoing daytime fatigue  He naps frequently  He also reports continued shortness of breath with walking very short distances  He now will experience shortness of breath at rest as well  He denies any exertional chest pain  He denies any signs or symptoms of congestive heart failure including progressive lower extremity edema, paroxysmal nocturnal dyspnea, orthopnea, acute weight gain or increasing abdominal girth  He denies lightheadedness, syncope or presyncope  He denies palpitations  He continues with diffuse aching and cramping in his legs from his knees to his feet  His wife did call the office regarding this and I had decreased his rosuvastatin to 20 mg daily with no improvement of his symptoms  He remains compliant with CPAP       Problem List     Presence of drug coated stent in LAD coronary artery    Overview Signed 3/13/2018 10:53 AM by Sarai Baker DO     9/2015 LVH-M         DARINEL (acute kidney injury) ([facility] ) Type 1 diabetes mellitus ([facility] )    Coronary artery disease involving native coronary artery of native heart without angina pectoris    Presence of drug coated stent in left circumflex coronary artery    Overview Signed 3/13/2018 10:53 AM by Ulisses Loja DO     9/2015-LVH-M         Dyslipidemia    Obstructive sleep apnea of adult    Carotid artery stenosis, asymptomatic, bilateral    History of ischemic cardiomyopathy    Atherosclerosis of both lower extremities with intermittent claudication ([facility])    Restrictive lung disease    COPD (chronic obstructive pulmonary disease) ([facility] )    Benign hypertension with chronic kidney disease, stage III    Overview Signed 2/20/2018 11:01 AM by Elena Members, DO     Description: 12/2015 echo showed nl diast fx and nl LV thickness         CKD (chronic kidney disease) stage 3, GFR 30-59 ml/min    Proteinuria    Overview Signed 2/20/2018 11:01 AM by Elena Members, DO     Description: 200 mg by spot 8/2015         Renal artery stenosis ([facility] )    Overview Signed 2/20/2018 11:01 AM by Elena Members, DO     Description: CTA 1/2014    Brennen Mccarthy Aorta w diffuse Ath / RRA   ok / LRA w mild ostial stenosis; 7/2015 US    R 9 9 & L 9 9 --+ cyst; 4/2015 Doppler Patent RRA and L > 60% JOSÉ; 7/2015 Doppler   >60% R w RVI 0 86 and 10 77cm  // >60% L w RVI of 0 79 and size 10 7 cm; 2016   < 60% R and > 60% L         Renal cyst, right    Vitamin D deficiency    Aortoiliac occlusive disease (HCC) (Chronic)    Hypothyroidism, postablative        Past Medical History:   Diagnosis Date    Acute kidney injury ([facility] )     resolved 11/30/2015    Acute venous embolism and thrombosis of deep vessels of proximal lower extremity ([facility] )     resolved 04/04/2015    DARINEL (acute kidney injury) ([facility] ) 1/12/2016    Cardiac disease     heart attack, stents x 4    CHF (congestive heart failure) ([facility])     Chronic cough     resolved 02/04/2016    COPD (chronic obstructive pulmonary disease) ([facility] )     [facility]     Disease of thyroid gland     DVT (deep venous thrombosis) ([facility] )     Hypertension     Ischemic cardiomyopathy     last assessed 09/26/2017    Pulmonary granuloma (HCC)     resolved 02/03/2017    Renal failure     Sleep apnea      Social History     Social History    Marital status: /Civil Union     Spouse name: N/A    Number of children: N/A    Years of education: N/A     Occupational History    Retired     Desk work     [employer]      Social History Main Topics    Smoking status: Former Smoker     Packs/day: 4 00     Years: 48 00     Types: Cigarettes     Quit date: 2008    Smokeless tobacco: Never Used      Comment: smoking 48 years 1 5 ppd d/c oct 2008 screening protocol as per allscripts    Alcohol use 12 6 oz/week     21 Standard drinks or equivalent per week      Comment: 2-3 glasses of vodka daily (6 shots) (history 2 drinks per day as per allscripts    Drug use: No    Sexual activity: No     Other Topics Concern    Not on file     Social History Narrative    No narrative on file      Family History   Problem Relation Age of Onset    Heart disease Father         pacer placement    Hypertension Father     Kidney disease Father     Heart failure Father     Heart attack Father     Liver disease Father     Cirrhosis Mother         due to beer consumption    Liver disease Mother     Diabetes Other      Past Surgical History:   Procedure Laterality Date    BYPASS FEMORAL-POPLITEAL      initial stenosis with stent left, 7 x 100 smart stent onset 02/24/2014    MANUEL (HISTORICAL)  2018    CORONARY ANGIOPLASTY WITH STENT PLACEMENT      x4       Current Outpatient Prescriptions:     aspirin 81 MG tablet, Take 81 mg by mouth daily  , Disp: , Rfl:     BRILINTA 90 MG, Take 1 tablet (90 mg total) by mouth 2 (two) times a day, Disp: 60 tablet, Rfl: 6    cholecalciferol (VITAMIN D3) 1,000 units tablet, Take 1,000 Units by mouth daily  , Disp: , Rfl:     furosemide (LASIX) 20 mg tablet, Take 40 mg by mouth daily as needed  , Disp: , Rfl:     gabapentin (NEURONTIN) 100 mg capsule, Take 3 capsules twice a day (Patient taking differently: Take 200 mg by mouth 2 (two) times a day Take 3 capsules twice a day ), Disp: 180 capsule, Rfl: 0    glucagon (GLUCAGON EMERGENCY) 1 MG injection, Inject 1 mg under the skin once as needed for low blood sugar, Disp: 1 each, Rfl: 2    insulin glargine (LANTUS) 100 units/mL subcutaneous injection, Inject 66 Units under the skin daily 38 units in the morning and 28 units in the evening (Patient taking differently: Inject 62 Units under the skin daily 40 units in the am and 22 units in the pm ), Disp: 10 mL, Rfl: 0    insulin lispro (HUMALOG) 100 units/mL injection, Inject 3 units with breakfast, 6 units at lunch, and 6 units at dinner (Patient taking differently: As directed by doctor  ), Disp: 10 mL, Rfl: 1    Lactobacillus-Inulin ([facility]), Take 1 capsule by mouth daily, Disp: , Rfl:     levalbuterol (XOPENEX HFA) 45 mcg/act inhaler, Inhale 1-2 puffs, Disp: , Rfl:     levothyroxine 175 mcg tablet, Take 1 tablet (175 mcg total) by mouth daily in the early morning, Disp: 90 tablet, Rfl: 3    mupirocin (BACTROBAN) 2 % ointment, Apply topically 3 (three) times a day, Disp: 30 g, Rfl: 1    pantoprazole (PROTONIX) 40 mg tablet, Take 1 tablet (40 mg total) by mouth daily, Disp: 30 tablet, Rfl: 2    rosuvastatin (CRESTOR) 20 MG tablet, Take 1 tablet (20 mg total) by mouth daily, Disp: 90 tablet, Rfl: 3    Ubiquinol 200 MG CAPS, Take 200 mg by mouth daily  , Disp: , Rfl:     ULTICARE INSULIN SYRINGE 30G X 1/2" 1 ML MISC, Use as directed, Disp: , Rfl: 0    ULTICARE INSULIN SYRINGE 30G X 5/16" 0 3 ML MISC, Use as directed, Disp: , Rfl: 0    metoprolol tartrate (LOPRESSOR) 25 mg tablet, Take 0 5 tablets (12 5 mg total) by mouth every 12 (twelve) hours, Disp: 45 tablet, Rfl: 3    nitroglycerin (NITROSTAT) 0 4 mg SL tablet, Place 1 tablet (0 4 mg total) under the tongue every 5 (five) minutes as needed for chest pain, Disp: 25 tablet, Rfl: 3  Allergies   Allergen Reactions    Doxazosin     Cardura [Doxazosin Mesylate]     Other      Iv dye for cardiac cath  Renal failure very close to dialysis  But no dialysis    Tylenol [Acetaminophen]      interferes with reading of blood sugar    Zestril [Lisinopril]        Labs:     Chemistry        Component Value Date/Time     12/21/2015 1044    K 4 6 12/10/2018 0954    K 4 9 12/21/2015 1044     (H) 12/10/2018 0954     (H) 12/21/2015 1044    CO2 29 12/10/2018 0954    CO2 26 12/21/2015 1044    BUN 20 12/10/2018 0954    BUN 19 12/21/2015 1044    CREATININE 1 45 (H) 12/10/2018 0954    CREATININE 1 29 12/21/2015 1044        Component Value Date/Time    CALCIUM 8 7 12/10/2018 0954    CALCIUM 8 7 12/21/2015 1044    ALKPHOS 101 12/10/2018 0954    ALKPHOS 99 10/01/2015 1105    AST 22 12/10/2018 0954    AST 22 10/01/2015 1105    ALT 28 12/10/2018 0954    ALT 35 10/01/2015 1105    BILITOT 0 33 10/01/2015 1105            Lab Results   Component Value Date    CHOL 129 10/01/2015     Lab Results   Component Value Date    HDL 25 (L) 11/28/2018    HDL 46 06/04/2018    HDL 49 04/24/2018     Lab Results   Component Value Date    LDLCALC 19 11/28/2018    LDLCALC 59 06/04/2018    LDLCALC 46 04/24/2018     Lab Results   Component Value Date    TRIG 240 (H) 11/28/2018    TRIG 190 (H) 06/04/2018    TRIG 156 (H) 04/24/2018     No results found for: CHOLHDL      Review of Systems   Constitution: Positive for malaise/fatigue  Cardiovascular: Positive for dyspnea on exertion  Negative for chest pain  Musculoskeletal: Positive for muscle cramps  Vitals:    01/30/19 1047   BP: 90/50     Vitals:    01/30/19 1047   Weight: 93 1 kg (205 lb 3 2 oz)     Height: 5' 7" (170 2 cm)   Body mass index is 32 14 kg/m²      Physical Exam:  General:  Alert and cooperative, appears stated age  [de-identified]:  PERRLA, EOMI, no scleral icterus, no conjunctival pallor  Neck:  No lymphadenopathy, no thyromegaly, bilateral carotid bruits  Heart[de-identified]  Regular rate and rhythm, normal S1/S2, no S3/S4, no murmur  Lungs:  Clear to auscultation bilaterally   Abdomen:  Soft, non-tender, positive bowel sounds, no rebound or guarding, no organomegaly   Extremities:  No edema   Vascular:  Nonpalpable pedal pulses  Skin:  No rashes or lesions on exposed skin  Neurologic:  Cranial nerves II-XII grossly intact without focal deficits

## 2019-01-30 ENCOUNTER — OFFICE VISIT (OUTPATIENT)
Dept: CARDIOLOGY CLINIC | Facility: CLINIC | Age: 74
End: 2019-01-30
Payer: MEDICARE

## 2019-01-30 VITALS
BODY MASS INDEX: 32.21 KG/M2 | WEIGHT: 205.2 LBS | SYSTOLIC BLOOD PRESSURE: 90 MMHG | HEIGHT: 67 IN | DIASTOLIC BLOOD PRESSURE: 50 MMHG

## 2019-01-30 DIAGNOSIS — I65.23 CAROTID ARTERY STENOSIS, ASYMPTOMATIC, BILATERAL: ICD-10-CM

## 2019-01-30 DIAGNOSIS — G47.33 OBSTRUCTIVE SLEEP APNEA OF ADULT: ICD-10-CM

## 2019-01-30 DIAGNOSIS — I12.9 BENIGN HYPERTENSION WITH CHRONIC KIDNEY DISEASE, STAGE III (HCC): ICD-10-CM

## 2019-01-30 DIAGNOSIS — Z95.5 PRESENCE OF DRUG COATED STENT IN LEFT CIRCUMFLEX CORONARY ARTERY: ICD-10-CM

## 2019-01-30 DIAGNOSIS — N18.30 BENIGN HYPERTENSION WITH CHRONIC KIDNEY DISEASE, STAGE III (HCC): ICD-10-CM

## 2019-01-30 DIAGNOSIS — E78.5 DYSLIPIDEMIA: ICD-10-CM

## 2019-01-30 DIAGNOSIS — I25.10 CORONARY ARTERY DISEASE INVOLVING NATIVE CORONARY ARTERY OF NATIVE HEART WITHOUT ANGINA PECTORIS: Primary | ICD-10-CM

## 2019-01-30 DIAGNOSIS — I25.5 ISCHEMIC CARDIOMYOPATHY: ICD-10-CM

## 2019-01-30 DIAGNOSIS — Z95.5 PRESENCE OF DRUG COATED STENT IN LAD CORONARY ARTERY: ICD-10-CM

## 2019-01-30 PROCEDURE — 99214 OFFICE O/P EST MOD 30 MIN: CPT | Performed by: INTERNAL MEDICINE

## 2019-01-30 RX ORDER — ROSUVASTATIN CALCIUM 20 MG/1
20 TABLET, COATED ORAL DAILY
Qty: 90 TABLET | Refills: 3 | Status: SHIPPED | OUTPATIENT
Start: 2019-01-30 | End: 2020-02-10

## 2019-01-30 RX ORDER — NITROGLYCERIN 0.4 MG/1
0.4 TABLET SUBLINGUAL
Qty: 25 TABLET | Refills: 3 | Status: SHIPPED | OUTPATIENT
Start: 2019-01-30 | End: 2021-02-24 | Stop reason: SDUPTHER

## 2019-02-19 DIAGNOSIS — R05.9 COUGH IN ADULT: ICD-10-CM

## 2019-02-20 RX ORDER — PANTOPRAZOLE SODIUM 40 MG/1
TABLET, DELAYED RELEASE ORAL
Qty: 30 TABLET | Refills: 2 | Status: SHIPPED | OUTPATIENT
Start: 2019-02-20 | End: 2019-02-27 | Stop reason: ALTCHOICE

## 2019-02-27 ENCOUNTER — OFFICE VISIT (OUTPATIENT)
Dept: FAMILY MEDICINE CLINIC | Facility: CLINIC | Age: 74
End: 2019-02-27
Payer: MEDICARE

## 2019-02-27 ENCOUNTER — OFFICE VISIT (OUTPATIENT)
Dept: CARDIOLOGY CLINIC | Facility: CLINIC | Age: 74
End: 2019-02-27
Payer: MEDICARE

## 2019-02-27 VITALS
HEIGHT: 67 IN | BODY MASS INDEX: 31.39 KG/M2 | OXYGEN SATURATION: 96 % | SYSTOLIC BLOOD PRESSURE: 144 MMHG | DIASTOLIC BLOOD PRESSURE: 60 MMHG | HEART RATE: 80 BPM | WEIGHT: 200 LBS

## 2019-02-27 VITALS
DIASTOLIC BLOOD PRESSURE: 70 MMHG | WEIGHT: 200.6 LBS | HEART RATE: 76 BPM | SYSTOLIC BLOOD PRESSURE: 126 MMHG | RESPIRATION RATE: 16 BRPM | HEIGHT: 67 IN | TEMPERATURE: 96.1 F | BODY MASS INDEX: 31.48 KG/M2

## 2019-02-27 DIAGNOSIS — I10 HTN (HYPERTENSION), BENIGN: ICD-10-CM

## 2019-02-27 DIAGNOSIS — I25.10 CORONARY ARTERY DISEASE INVOLVING NATIVE CORONARY ARTERY OF NATIVE HEART WITHOUT ANGINA PECTORIS: Primary | ICD-10-CM

## 2019-02-27 DIAGNOSIS — G47.33 OSA ON CPAP: ICD-10-CM

## 2019-02-27 DIAGNOSIS — R30.0 BURNING WITH URINATION: Primary | ICD-10-CM

## 2019-02-27 DIAGNOSIS — I25.118 CORONARY ARTERY DISEASE OF NATIVE ARTERY OF NATIVE HEART WITH STABLE ANGINA PECTORIS (HCC): ICD-10-CM

## 2019-02-27 DIAGNOSIS — K64.9 HEMORRHOIDS, UNSPECIFIED HEMORRHOID TYPE: ICD-10-CM

## 2019-02-27 DIAGNOSIS — E10.40 TYPE 1 DIABETES MELLITUS WITH DIABETIC NEUROPATHY (HCC): ICD-10-CM

## 2019-02-27 DIAGNOSIS — Z99.89 OSA ON CPAP: ICD-10-CM

## 2019-02-27 LAB
SL AMB  POCT GLUCOSE, UA: NORMAL
SL AMB LEUKOCYTE ESTERASE,UA: NORMAL
SL AMB POCT BILIRUBIN,UA: NORMAL
SL AMB POCT BLOOD,UA: NORMAL
SL AMB POCT CLARITY,UA: CLEAR
SL AMB POCT COLOR,UA: YELLOW
SL AMB POCT KETONES,UA: NORMAL
SL AMB POCT NITRITE,UA: NORMAL
SL AMB POCT PH,UA: 5
SL AMB POCT SPECIFIC GRAVITY,UA: 1
SL AMB POCT URINE PROTEIN: NORMAL
SL AMB POCT UROBILINOGEN: 0.2

## 2019-02-27 PROCEDURE — 87086 URINE CULTURE/COLONY COUNT: CPT | Performed by: FAMILY MEDICINE

## 2019-02-27 PROCEDURE — 99213 OFFICE O/P EST LOW 20 MIN: CPT | Performed by: FAMILY MEDICINE

## 2019-02-27 PROCEDURE — 99214 OFFICE O/P EST MOD 30 MIN: CPT | Performed by: INTERNAL MEDICINE

## 2019-02-27 PROCEDURE — 81002 URINALYSIS NONAUTO W/O SCOPE: CPT | Performed by: FAMILY MEDICINE

## 2019-02-27 RX ORDER — CLOPIDOGREL BISULFATE 75 MG/1
75 TABLET ORAL DAILY
Qty: 30 TABLET | Refills: 4 | Status: SHIPPED | OUTPATIENT
Start: 2019-02-27 | End: 2019-08-09 | Stop reason: SDUPTHER

## 2019-02-27 RX ORDER — BIOTIN 1 MG
5000 TABLET ORAL DAILY
COMMUNITY
End: 2019-03-15 | Stop reason: SDUPTHER

## 2019-02-27 RX ORDER — LACTOBACILLUS ACIDOPHILUS 1B CELL
1 TABLET ORAL DAILY
COMMUNITY
End: 2019-03-14

## 2019-02-27 NOTE — PATIENT INSTRUCTIONS
Stop brilanta  Start plavix 75 mg daily    I ordered  Labs and an echo both to evaluate shortness of breath

## 2019-02-27 NOTE — PROGRESS NOTES
Cardiology Outpatient Progress Note - Dylon Parrish 68 y o  male MRN: [id number]    @ Encounter: [id number]      Patient Active Problem List    Diagnosis Date Noted    History of Ischemic cardiomyopathy 01/30/2019    Diabetic neuropathy associated with type 1 diabetes mellitus ([place] [address] ) 01/14/2019    Lumbar disc herniation 08/01/2018    Lumbar radiculopathy 08/01/2018    Other specified hypothyroidism 05/24/2018    Aortoiliac occlusive disease ([place] [address] ) 03/01/2018    Restrictive lung disease 01/30/2018    COPD (chronic obstructive pulmonary disease) ([place] [address] ) 01/30/2018    Atherosclerosis of both lower extremities with intermittent claudication ([place] [address] ) 01/29/2018    Presence of drug coated stent in LAD coronary artery 01/24/2018    Coronary artery disease involving native coronary artery of native heart without angina pectoris 01/24/2018    Presence of drug coated stent in left circumflex coronary artery 01/24/2018    Dyslipidemia 01/24/2018    Obstructive sleep apnea of adult 01/24/2018    Carotid artery stenosis, asymptomatic, bilateral 01/24/2018    CKD (chronic kidney disease) stage 3, GFR 30-59 ml/min ([facility]) 12/13/2016    Low back pain with sciatica 08/22/2016    Acute kidney injury ([place] [address] ) 01/12/2016    Type 1 diabetes mellitus ([place] [address] ) 01/12/2016    Benign hypertension with chronic kidney disease, stage III ([place] [address] ) 08/12/2015    Renal cyst, right 08/12/2015    Proteinuria 08/11/2015    Vitamin D deficiency 05/22/2015    Renal artery stenosis ([place] [address] ) 05/09/2015    Hypothyroidism, postablative 04/02/2013       Assessment:  # CAD- hx of LAD stent, recently in [place] and had restenosis of LAD with stenting again, 50% LCx (in setting on NSTEMI) seams like chest pressure brought him to the hospital  Echo 1/31/18: EF: 45-50%, LVEDd 5 8 cm  # Hyperlipidemia- on Crestor; LDL 19, HDL 25  # HTN- metoprolol 12 5 mg BID   mmHg  # Carotid artery disease  # PVD- stenting to LE  Still with claudication as 6 minute walk limited by leg pain  VAS abdominal aorta: 8/15/18: external iliac arteries and stents are patent bilaterally  50-75% stenosis noted in the left common femoral arter  > 70% stenosis superior mesenteric artery  # COPD- on steroids now  6 minute walk test 11/28/18: only 2 minutes limited by leg pain, oxygen saturation remained above 94%  # ITZEL on CPAP  # CKD, Cr 1 45 on 12/10  # JOSÉ  # DM1 with diabetic neuropathy    TODAY'S PLAN:  Will stop Brilanta to see if contributing his dyspnea and place on Clopidogrel  Does not take lasix daily- will check NT pro BNP and echo for indirect PA pressures to approximate wedge  Already did cardiac rehab    HPI:     69 yo male with hx of CAD, hyperlipidemia, HTN, CVD, PVD with bypass and claudication, ITZEL on CPAP who presents for follow up after hospitalization for multiple diagnosis one of which NSTEMI with classic chest pressure and underwent repeat stent to previous LAD  It also sounds like he was admitted for COPD exacerbation and maybe some diuretic  Wife, who is nurse, provides history  She reports at one point his EF was recorded at 35% and then follow up echo 50%  Told to take lasix prn for weight gain  Interval History: Many complaints today- tired, no energy, fatigue, with main complaint being fatigue  He does get some dyspnea on exertion     Trouble with ADLs    Past Medical History:   Diagnosis Date    Acute kidney injury ([address] )     resolved 11/30/2015    Acute venous embolism and thrombosis of deep vessels of proximal lower extremity ([address] )     resolved 04/04/2015    DARINEL (acute kidney injury) ([address] ) 1/12/2016    Cardiac disease     heart attack, stents x 4    CHF (congestive heart failure) (HCC)     Chronic cough     resolved 02/04/2016    COPD (chronic obstructive pulmonary disease) ([address] )     Diabetes mellitus ([address] )     Disease of thyroid gland     DVT (deep venous thrombosis) ([address] )     Hypertension     Ischemic cardiomyopathy     last assessed 09/26/2017    Pulmonary granuloma ([address] )     resolved 02/03/2017    Renal failure     Sleep apnea        Review of Systems - as above in interval history    Allergies   Allergen Reactions    Doxazosin     Cardura [Doxazosin Mesylate]     Other      Iv dye for cardiac cath  Renal failure very close to dialysis  But no dialysis    Tylenol [Acetaminophen]      interferes with reading of blood sugar    Zestril [Lisinopril]        Current Outpatient Medications:     aspirin 81 MG tablet, Take 81 mg by mouth daily  , Disp: , Rfl:     BRILINTA 90 MG, Take 1 tablet (90 mg total) by mouth 2 (two) times a day, Disp: 60 tablet, Rfl: 6    cholecalciferol (VITAMIN D3) 1,000 units tablet, Take 1,000 Units by mouth daily  , Disp: , Rfl:     Cholecalciferol (VITAMIN D3) 1000 units CAPS, Take 5,000 Units by mouth daily, Disp: , Rfl:     Coenzyme Q10 (COQ-10) 200 MG CAPS, Take 200 mg by mouth daily, Disp: , Rfl:     furosemide (LASIX) 20 mg tablet, Take 40 mg by mouth daily as needed  , Disp: , Rfl:     gabapentin (NEURONTIN) 100 mg capsule, Take 3 capsules twice a day (Patient taking differently: Take 200 mg by mouth 2 (two) times a day Take 3 capsules twice a day ), Disp: 180 capsule, Rfl: 0    glucagon (GLUCAGON EMERGENCY) 1 MG injection, Inject 1 mg under the skin once as needed for low blood sugar, Disp: 1 each, Rfl: 2    glucagon (GLUCAGON EMERGENCY) 1 MG injection, 1 mg, Disp: , Rfl:     hydrocortisone (ANUSOL-HC, PROCTOSOL HC,) 2 5 % rectal cream, Apply twice a day, Disp: 35 g, Rfl: 0    insulin glargine (LANTUS) 100 units/mL subcutaneous injection, Inject 66 Units under the skin daily 38 units in the morning and 28 units in the evening (Patient taking differently: Inject 62 Units under the skin daily 40 units in the am and 22 units in the pm ), Disp: 10 mL, Rfl: 0    insulin lispro (HUMALOG) 100 units/mL injection, Inject 3 units with breakfast, 6 units at lunch, and 6 units at dinner (Patient taking differently: As directed by doctor  ), Disp: 10 mL, Rfl: 1    Lactobacillus (PROBIATA) TABS, Take 1 capsule by mouth daily, Disp: , Rfl:     Lactobacillus-Inulin ([facility] ), Take 1 capsule by mouth daily, Disp: , Rfl:     levalbuterol (XOPENEX HFA) 45 mcg/act inhaler, Inhale 1-2 puffs, Disp: , Rfl:     levothyroxine 175 mcg tablet, Take 1 tablet (175 mcg total) by mouth daily in the early morning, Disp: 90 tablet, Rfl: 3    metoprolol tartrate (LOPRESSOR) 25 mg tablet, Take 0 5 tablets (12 5 mg total) by mouth every 12 (twelve) hours, Disp: 45 tablet, Rfl: 3    mupirocin (BACTROBAN) 2 % ointment, Apply topically 3 (three) times a day, Disp: 30 g, Rfl: 1    nitroglycerin (NITROSTAT) 0 4 mg SL tablet, Place 1 tablet (0 4 mg total) under the tongue every 5 (five) minutes as needed for chest pain, Disp: 25 tablet, Rfl: 3    rosuvastatin (CRESTOR) 20 MG tablet, Take 1 tablet (20 mg total) by mouth daily, Disp: 90 tablet, Rfl: 3    Ubiquinol 200 MG CAPS, Take 200 mg by mouth daily  , Disp: , Rfl:     ULTICARE INSULIN SYRINGE 30G X 1/2" 1 ML MISC, Use as directed, Disp: , Rfl: 0    ULTICARE INSULIN SYRINGE 30G X 5/16" 0 3 ML MISC, Use as directed, Disp: , Rfl: 0    Social History     Socioeconomic History    Marital status: /Civil Union     Spouse name: Not on file    Number of children: Not on file    Years of education: Not on file    Highest education level: Not on file   Occupational History    Occupation: Retired    Occupation: Desk work    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   Social Needs    Financial resource strain: Not on file   [place] insecurity:     Worry: Not on file     Inability: Not on file   [employer] needs:     Medical: Not on file     Non-medical: Not on file   Tobacco Use    Smoking status: Former Smoker     Packs/day: 4 00     Years: 48 00     Pack years: 192 00     Types: Cigarettes     Last attempt to quit:      Years since quittin   Smokeless tobacco: Never Used    Tobacco comment: smoking 48 years 1 5 ppd d/c oct 2008 screening protocol as per allscripts   Substance and Sexual Activity    Alcohol use: Yes     Alcohol/week: 12 6 oz     Types: 21 Standard drinks or equivalent per week     Comment: 2-3 glasses of vodka daily (6 shots) (history 2 drinks per day as per allscripts    Drug use: No    Sexual activity: Never   Lifestyle    Physical activity:     Days per week: Not on file     Minutes per session: Not on file    Stress: Not on file   Relationships    Social connections:     Talks on phone: Not on file     Gets together: Not on file     Attends Church service: Not on file     Active member of club or organization: Not on file     Attends meetings of clubs or organizations: Not on file     Relationship status: Not on file    Intimate partner violence:     Fear of current or ex partner: Not on file     Emotionally abused: Not on file     Physically abused: Not on file     Forced sexual activity: Not on file   Other Topics Concern    Not on file   Social History Narrative    Not on file       Family History   Problem Relation Age of Onset    Heart disease Father         pacer placement    Hypertension Father     Kidney disease Father     Heart failure Father     Heart attack Father     Liver disease Father     Cirrhosis Mother         due to beer consumption    Liver disease Mother     Diabetes Other        Physical Exam:    Vitals: Blood pressure 144/60, pulse 80, height 5' 7" (1 702 m), weight 90 7 kg (200 lb), SpO2 96 %  , Body mass index is 31 32 kg/m² ,   Wt Readings from Last 3 Encounters:   02/27/19 90 7 kg (200 lb)   02/27/19 91 kg (200 lb 9 6 oz)   01/30/19 93 1 kg (205 lb 3 2 oz)       Physical Exam:    GEN: Dylon Parrish appears well, alert and oriented x 3, pleasant and cooperative   HEENT: pupils equal, round, and reactive to light; extraocular muscles intact  NECK: supple, no carotid bruits   HEART: regular rhythm, normal S1 and S2, no murmurs, clicks, gallops or rubs, JVP is  down  LUNGS: clear to auscultation bilaterally; no wheezes, rales, or rhonchi   ABDOMEN: normal bowel sounds, soft, no tenderness, no distention  EXTREMITIES: peripheral pulses normal; no clubbing, cyanosis, or edema  NEURO: no focal findings   SKIN: normal without suspicious lesions on exposed skin    Labs & Results:    Lab Results   Component Value Date    GLUCOSE 186 (H) 12/21/2015    CALCIUM 8 7 12/10/2018     12/21/2015    K 4 6 12/10/2018    CO2 29 12/10/2018     (H) 12/10/2018    BUN 20 12/10/2018    CREATININE 1 45 (H) 12/10/2018     Lab Results   Component Value Date    WBC 7 55 12/10/2018    HGB 12 3 12/10/2018    HCT 39 8 12/10/2018    MCV 98 12/10/2018    PLT  12/10/2018      Comment:      Not reported due to platelet clumping  Lab Results   Component Value Date     18 (H) 05/17/2016      Lab Results   Component Value Date    CHOL 129 10/01/2015     Lab Results   Component Value Date    HDL 25 (L) 11/28/2018    HDL 46 06/04/2018    HDL 49 04/24/2018     Lab Results   Component Value Date    LDLCALC 19 11/28/2018    LDLCALC 59 06/04/2018    LDLCALC 46 04/24/2018     Lab Results   Component Value Date    TRIG 240 (H) 11/28/2018    TRIG 190 (H) 06/04/2018    TRIG 156 (H) 04/24/2018     No results found for: CHOLHDL      EKG personally reviewed by Shruthi Patterson DO  Counseling / Coordination of Care  Total floor / unit time spent today 25 minutes  Greater than 50% of total time was spent with the patient and / or family counseling and / or coordination of care  A description of the counseling / coordination of care: 15  Thank you for the opportunity to participate in the care of this patient    TALITA Saldivar

## 2019-02-27 NOTE — PROGRESS NOTES
FAMILY PRACTICE OFFICE VISIT       NAME: Andrea Artis  AGE: 68 y o  SEX: male       : 1945        MRN: [id number]        Assessment and Plan     Problem List Items Addressed This Visit        Endocrine    Type 1 diabetes mellitus ([facility] )    Relevant Medications    glucagon (GLUCAGON EMERGENCY) 1 MG injection       Cardiovascular and Mediastinum    Coronary artery disease of native artery of native heart with stable angina pectoris ([facility] )      Other Visit Diagnoses     Burning with urination    -  Primary    Relevant Orders    POCT urine dip auto non-scope (Completed)    Urine culture (Completed)    Hemorrhoids, unspecified hemorrhoid type        Relevant Medications    hydrocortisone (ANUSOL-HC, PROCTOSOL HC,) 2 5 % rectal cream       Patient with type 1 diabetes presents for evaluation of intermittent symptoms of urinary burning but denies symptoms of frequency, urgency, flank pain, gross hematuria or fever  Urinalysis performed in the office today is clear  Will proceed with urine culture  I suspect that patient's symptoms could be triggered by fluctuating blood sugars and possible episodic glucosuria  I advised him to follow up with Endocrinology closely  Coronary artery disease  COPD  Chronic dyspnea  Patient has pending follow-ups with Cardiology and pulmonology  Intermittent symptoms of heartburn, patient denies chest pain per se  I strongly advised him to review those symptoms at his forthcoming appointment with [facility] Cardiology later today    Rectal pain  Non thrombosed external hemorrhoid  Trial of Anusol cream   Referral to Colorectal surgery for further evaluation  Intermittent symptoms of heartburn, patient denies chest pain per se  I strongly advised him to review those symptoms at his forthcoming appointment with [facility] Cardiology later today    There are no Patient Instructions on file for this visit  M*eyetok software was used to dictate this note   It may contain errors with dictating incorrect words/spelling  Please contact provider directly with any questions  Chief Complaint     Chief Complaint   Patient presents with    Burning w/ Urination     times a couple of months     Rectal Pain     times a couple of months        History of Present Illness     Patient presents for evaluation of 2 concerns  He has been experiencing intemittent burning  With urination -   Symptoms started few months ago    No blood in urine, no flank pain, no fever    No frequnecy or urgency    Chronic symptoms of nocturnal awakenings:  Once a night only  Blood sugars have been elevated lately  Patient is in touch with his endocrinologist   Patient is also complaining of rectal discomfort that he has been experiencing within past few months  His bowel movements are regular, every morning, sometimes the harder in consistency and patient is using stool softener  Patient refuses colonoscopy, he was evaluated with negative Cologuard in the past      Chronic symptoms of dyspnea  Patient has pending follow-ups with Cardiology and pulmonology  He remains on Brilinta  Intermittent symptoms of acid reflux, patient denies symptoms of chest pain  He will be discussing it today at forthcoming appointment with Cardiology  Review of Systems   Review of Systems   Constitutional: Positive for fatigue (Chronic)  Respiratory: Positive for shortness of breath  Negative for cough and chest tightness  Cardiovascular: Negative  Gastrointestinal: Negative  Intermittent symptoms of acid reflux   Genitourinary: Positive for dysuria  Negative for flank pain, frequency, hematuria and urgency  Musculoskeletal: Positive for arthralgias  Neurological: Negative          Active Problem List     Patient Active Problem List   Diagnosis    Acute kidney injury ([facility] )    Type 1 diabetes mellitus ([facility] )    Presence of drug coated stent in LAD coronary artery    Coronary artery disease of native artery of native heart with stable angina pectoris ([facility])    Presence of drug coated stent in left circumflex coronary artery    Dyslipidemia    Obstructive sleep apnea of adult    Carotid artery stenosis, asymptomatic, bilateral    Atherosclerosis of both lower extremities with intermittent claudication ([facility])    Restrictive lung disease    COPD (chronic obstructive pulmonary disease) ([facility])    Benign hypertension with chronic kidney disease, stage III ([facility])    CKD (chronic kidney disease) stage 3, GFR 30-59 ml/min ([facility])    Proteinuria    Renal artery stenosis ([facility])    Renal cyst, right    Vitamin D deficiency    Aortoiliac occlusive disease ([facility])    Hypothyroidism, postablative    Other specified hypothyroidism    Low back pain with sciatica    Lumbar disc herniation    Lumbar radiculopathy    Diabetic neuropathy associated with type 1 diabetes mellitus ([facility]    History of Ischemic cardiomyopathy    Chronic systolic congestive heart failure ([facility] )       Past Medical History:  Past Medical History:   Diagnosis Date    Acute kidney injury ([facility] )     resolved 11/30/2015    Acute venous embolism and thrombosis of deep vessels of proximal lower extremity ([facility] )     resolved 04/04/2015    DARINEL (acute kidney injury) ([facility] ) 1/12/2016    Cardiac disease     heart attack, stents x 4    CHF (congestive heart failure) ([facility])     Chronic cough     resolved 02/04/2016    COPD (chronic obstructive pulmonary disease) ([facility]     Diabetes mellitus ([facility] )     Disease of thyroid gland     DVT (deep venous thrombosis) ([facility] )     Hypertension     Ischemic cardiomyopathy     last assessed 09/26/2017    Pulmonary granuloma ([facility] )     resolved 02/03/2017    Renal failure     Sleep apnea        Past Surgical History:  Past Surgical History:   Procedure Laterality Date    BYPASS FEMORAL-POPLITEAL      initial stenosis with stent left, 7 x 100 smart stent onset 02/24/2014    MANUEL (HISTORICAL)  2018    CORONARY ANGIOPLASTY WITH STENT PLACEMENT      x4       Family History:  Family History   Problem Relation Age of Onset    Heart disease Father         pacer placement    Hypertension Father     Kidney disease Father     Heart failure Father     Heart attack Father     Liver disease Father     Cirrhosis Mother         due to beer consumption    Liver disease Mother     Diabetes Other        Social History:  Social History     Socioeconomic History    Marital status: /Civil Union     Spouse name: Not on file    Number of children: Not on file    Years of education: Not on file    Highest education level: Not on file   Occupational History    Occupation: Retired    Occupation: Desk work    Occupation: [employer]   Social Needs    Financial resource strain: Not on file   [address] insecurity:     Worry: Not on file     Inability: Not on file   [employer] needs:     Medical: Not on file     Non-medical: Not on file   Tobacco Use    Smoking status: Former Smoker     Packs/day: 4 00     Years: 48 00     Pack years: 192 00     Types: Cigarettes     Last attempt to quit:      Years since quittin 1    Smokeless tobacco: Never Used    Tobacco comment: smoking 48 years 1 5 ppd d/c oct 2008 screening protocol as per allscripts   Substance and Sexual Activity    Alcohol use:  Yes     Alcohol/week: 12 6 oz     Types: 21 Standard drinks or equivalent per week     Comment: 2-3 glasses of vodka daily (6 shots) (history 2 drinks per day as per allscripts    Drug use: No    Sexual activity: Never   Lifestyle    Physical activity:     Days per week: Not on file     Minutes per session: Not on file    Stress: Not on file   Relationships    Social connections:     Talks on phone: Not on file     Gets together: Not on file     Attends Worship service: Not on file     Active member of club or organization: Not on file     Attends meetings of clubs or organizations: Not on file Relationship status: Not on file    Intimate partner violence:     Fear of current or ex partner: Not on file     Emotionally abused: Not on file     Physically abused: Not on file     Forced sexual activity: Not on file   Other Topics Concern    Not on file   Social History Narrative    Not on file         Objective     Vitals:    02/27/19 1011   BP: 126/70   BP Location: Right arm   Patient Position: Sitting   Cuff Size: Large   Pulse: 76   Resp: 16   Temp: (!) 96 1 °F (35 6 °C)   TempSrc: Tympanic   Weight: 91 kg (200 lb 9 6 oz)   Height: 5' 7" (1 702 m)     Wt Readings from Last 3 Encounters:   03/01/19 91 6 kg (202 lb)   03/01/19 91 6 kg (202 lb)   02/27/19 90 7 kg (200 lb)       Physical Exam   Constitutional: He appears well-developed and well-nourished  Cardiovascular: Normal rate  Pulmonary/Chest: Effort normal and breath sounds normal    Abdominal: Soft  He exhibits no distension  There is no tenderness  There is no guarding  Small external hemorrhoid/anal tag, nonthrombosed   Nursing note reviewed  Pertinent Laboratory/Diagnostic Studies:  Lab Results   Component Value Date    GLUCOSE 186 (H) 12/21/2015    BUN 20 12/10/2018    CREATININE 1 45 (H) 12/10/2018    CALCIUM 8 7 12/10/2018     12/21/2015    K 4 6 12/10/2018    CO2 29 12/10/2018     (H) 12/10/2018     Lab Results   Component Value Date    ALT 28 12/10/2018    AST 22 12/10/2018    ALKPHOS 101 12/10/2018    BILITOT 0 33 10/01/2015       Lab Results   Component Value Date    WBC 7 55 12/10/2018    HGB 12 3 12/10/2018    HCT 39 8 12/10/2018    MCV 98 12/10/2018    PLT  12/10/2018      Comment:      Not reported due to platelet clumping         No results found for: TSH    Lab Results   Component Value Date    CHOL 129 10/01/2015     Lab Results   Component Value Date    TRIG 240 (H) 11/28/2018     Lab Results   Component Value Date    HDL 25 (L) 11/28/2018     Lab Results   Component Value Date    LDLCALC 19 11/28/2018 Lab Results   Component Value Date    HGBA1C 7 8 (H) 11/28/2018       Results for orders placed or performed in visit on 02/27/19   Urine culture   Result Value Ref Range    Urine Culture No Growth <1000 cfu/mL    POCT urine dip auto non-scope   Result Value Ref Range     COLOR,UA yellow     CLARITY,UA clear     SPECIFIC GRAVITY,UA 1 005      PH,UA 5 0     LEUKOCYTE ESTERASE,UA -     NITRITE,UA -     GLUCOSE, UA -     KETONES,UA -     BILIRUBIN,UA -     BLOOD,UA -     POCT URINE PROTEIN -     SL AMB POCT UROBILINOGEN 0 2        Orders Placed This Encounter   Procedures    Urine culture    POCT urine dip auto non-scope       ALLERGIES:  Allergies   Allergen Reactions    Doxazosin     Cardura [Doxazosin Mesylate]     Other      Iv dye for cardiac cath  Renal failure very close to dialysis  But no dialysis    Tylenol [Acetaminophen]      interferes with reading of blood sugar    Zestril [Lisinopril]        Current Medications     Current Outpatient Medications   Medication Sig Dispense Refill    aspirin 81 MG tablet Take 81 mg by mouth daily        cholecalciferol (VITAMIN D3) 1,000 units tablet Take 1,000 Units by mouth daily        Cholecalciferol (VITAMIN D3) 1000 units CAPS Take 5,000 Units by mouth daily      Coenzyme Q10 (COQ-10) 200 MG CAPS Take 200 mg by mouth daily      furosemide (LASIX) 20 mg tablet Take 40 mg by mouth daily as needed        gabapentin (NEURONTIN) 100 mg capsule Take 3 capsules twice a day (Patient taking differently: Take 200 mg by mouth 2 (two) times a day Take 3 capsules twice a day ) 180 capsule 0    glucagon (GLUCAGON EMERGENCY) 1 MG injection Inject 1 mg under the skin once as needed for low blood sugar 1 each 2    glucagon (GLUCAGON EMERGENCY) 1 MG injection 1 mg      insulin glargine (LANTUS) 100 units/mL subcutaneous injection Inject 66 Units under the skin daily 38 units in the morning and 28 units in the evening 10 mL 0    insulin lispro (HUMALOG) 100 units/mL injection Inject 3 units with breakfast, 6 units at lunch, and 6 units at dinner 10 mL 1    Lactobacillus (PROBIATA) TABS Take 1 capsule by mouth daily      Lactobacillus-Inulin (CULTURELLE DIGESTIVE HEALTH PO) Take 1 capsule by mouth daily      levalbuterol (XOPENEX HFA) 45 mcg/act inhaler Inhale 1-2 puffs      levothyroxine 175 mcg tablet Take 1 tablet (175 mcg total) by mouth daily in the early morning 90 tablet 3    metoprolol tartrate (LOPRESSOR) 25 mg tablet Take 0 5 tablets (12 5 mg total) by mouth every 12 (twelve) hours 45 tablet 3    mupirocin (BACTROBAN) 2 % ointment Apply topically 3 (three) times a day 30 g 1    nitroglycerin (NITROSTAT) 0 4 mg SL tablet Place 1 tablet (0 4 mg total) under the tongue every 5 (five) minutes as needed for chest pain 25 tablet 3    rosuvastatin (CRESTOR) 20 MG tablet Take 1 tablet (20 mg total) by mouth daily 90 tablet 3    Ubiquinol 200 MG CAPS Take 200 mg by mouth daily   ULTICARE INSULIN SYRINGE 30G X 1/2" 1 ML MISC Use as directed  0    ULTICARE INSULIN SYRINGE 30G X 5/16" 0 3 ML MISC Use as directed  0    clopidogrel (PLAVIX) 75 mg tablet Take 1 tablet (75 mg total) by mouth daily 30 tablet 4    hydrocortisone (ANUSOL-HC, PROCTOSOL HC,) 2 5 % rectal cream Apply twice a day 35 g 0    NIFEdipine 0 3%-lidocaine 5% rectal ointment Apply 1 application topically every 4 (four) hours as needed for discomfort or pain Apply a small amount to anal opening 4-6 times per day  2 oz 0     No current facility-administered medications for this visit            Health Maintenance     Health Maintenance   Topic Date Due    SLP PLAN OF CARE  1945    CRC Screening: Colonoscopy  1945    BMI: Followup Plan  04/27/1963    HEPATITIS B VACCINES (1 of 3 - Risk 3-dose series) 04/27/1964    Fall Risk  03/02/2019    Medicare Annual Wellness Visit (AWV)  03/02/2019    Hepatitis C Screening  04/10/2019 (Originally 1945)    HEMOGLOBIN A1C  05/28/2019    DM Eye Exam  09/19/2019    URINE MICROALBUMIN  11/14/2019    Depression Screening PHQ  11/28/2019    Diabetic Foot Exam  01/28/2020    BMI: Adult  03/01/2020    DTaP,Tdap,and Td Vaccines (2 - Td) 05/05/2023    INFLUENZA VACCINE  Completed    Pneumococcal PPSV23/PCV13 65+ Years / High and Highest Risk  Completed     Immunization History   Administered Date(s) Administered    DT (pediatric) 12/01/2009    H1N1, All Formulations 12/01/2009    INFLUENZA 10/01/2008, 10/06/2009, 09/01/2010, 11/04/2013, 08/29/2018    Influenza Split High Dose Preservative Free IM 08/28/2014, 10/03/2016    Influenza TIV (IM) 10/01/2008, 10/19/2010, 09/20/2011, 08/01/2012, 09/13/2013, 09/15/2015, 09/03/2017    Pneumococcal Conjugate 13-Valent 08/11/2015    Pneumococcal Polysaccharide PPV23 10/01/2008, 06/04/2009, 03/15/2017    Tdap 05/05/2013    Zoster 10/01/2009       Narciso Riojas MD

## 2019-03-01 ENCOUNTER — TELEPHONE (OUTPATIENT)
Dept: NEPHROLOGY | Facility: CLINIC | Age: 74
End: 2019-03-01

## 2019-03-01 ENCOUNTER — OFFICE VISIT (OUTPATIENT)
Dept: PULMONOLOGY | Facility: CLINIC | Age: 74
End: 2019-03-01
Payer: MEDICARE

## 2019-03-01 ENCOUNTER — TELEPHONE (OUTPATIENT)
Dept: FAMILY MEDICINE CLINIC | Facility: CLINIC | Age: 74
End: 2019-03-01

## 2019-03-01 VITALS
SYSTOLIC BLOOD PRESSURE: 124 MMHG | HEART RATE: 83 BPM | WEIGHT: 202 LBS | TEMPERATURE: 97 F | DIASTOLIC BLOOD PRESSURE: 48 MMHG | OXYGEN SATURATION: 97 % | HEIGHT: 67 IN | BODY MASS INDEX: 31.71 KG/M2

## 2019-03-01 DIAGNOSIS — G47.33 OBSTRUCTIVE SLEEP APNEA OF ADULT: ICD-10-CM

## 2019-03-01 DIAGNOSIS — J44.9 CHRONIC OBSTRUCTIVE PULMONARY DISEASE, UNSPECIFIED COPD TYPE (HCC): Primary | ICD-10-CM

## 2019-03-01 PROBLEM — I50.22 CHRONIC SYSTOLIC CONGESTIVE HEART FAILURE (HCC): Status: ACTIVE | Noted: 2019-03-01

## 2019-03-01 LAB — BACTERIA UR CULT: NORMAL

## 2019-03-01 PROCEDURE — 99214 OFFICE O/P EST MOD 30 MIN: CPT | Performed by: INTERNAL MEDICINE

## 2019-03-01 NOTE — ASSESSMENT & PLAN NOTE
· Previously CPAP was increased to 14 cm of water  He did not tolerate the increase in pressure and was returned to his recommended pressure of 12 cm of water  · This also did not improve his shortness of breath with the trial  · He is very compliant with CPAP therapy and uses it nightly    He averages about 10 hours of sleep nightly

## 2019-03-01 NOTE — ASSESSMENT & PLAN NOTE
· No significant improvement after course of steroids with the previous visit  · Not currently requiring inhalers  · Cardiology just changed Brilinta based on reports of dyspnea related to Brilinta therapy  He is now taking Plavix as his anti-platelet agent  · We will see whether this change in therapy will result in improvement in his symptoms of shortness of Breath    · Symptoms have not changed since the last visit any continues to have some exertional shortness of breath without any evidence of decompensated heart failure or worsening COPD symptoms

## 2019-03-01 NOTE — TELEPHONE ENCOUNTER
----- Message from Shaw Flores MD sent at 3/1/2019  5:07 PM EST -----  Please contact patient or his wife  Urine culture was negative for UTI    Thank you

## 2019-03-01 NOTE — PROGRESS NOTES
Progress note - Pulmonary Medicine   Madeline Driver 68 y o  male MRN: [id number]       Impression & Plan:     COPD (chronic obstructive pulmonary disease) ([address] )  · No significant improvement after course of steroids with the previous visit  · Not currently requiring inhalers  · Cardiology just changed Brilinta based on reports of dyspnea related to Brilinta therapy  He is now taking Plavix as his anti-platelet agent  · We will see whether this change in therapy will result in improvement in his symptoms of shortness of Breath  · Symptoms have not changed since the last visit any continues to have some exertional shortness of breath without any evidence of decompensated heart failure or worsening COPD symptoms    Obstructive sleep apnea of adult  · Previously CPAP was increased to 14 cm of water  He did not tolerate the increase in pressure and was returned to his recommended pressure of 12 cm of water  · This also did not improve his shortness of breath with the trial  · He is very compliant with CPAP therapy and uses it nightly  He averages about 10 hours of sleep nightly    I will see Lety Grubbs back in 6 months  He will certainly contact me if he has new or worsening breathing problems  ______________________________________________________________________    HPI:    Madeline Driver presents today for follow-up of COPD and an element of restrictive lung disease as well as obstructive sleep apnea  He he has significant coronary disease and congestive heart failure  He recently saw Dr Madeline Mckinney and has had his Brilinta changed to Plavix  This is based on some case report information that Brilinta may cause dyspnea  At the last visit, we did try a course of steroids to see if his COPD was potentially the culprit in his recent dyspnea  It does not appear that this is the case  He has not felt more short of breath and he did not feel that the steroid burst changed his breathing at all    He is not currently using any bronchodilators  He has never noticed a benefit to these in the past    He does use CPAP regularly  He has not had any problems with his CPAP  We did try increasing his pressure to 14 cm of water since there was some concern that his sleep apnea may be contributing to his symptoms  He did not tolerate the increased pressure and was returned to 12 cm of water  He does well at this pressure and has no symptoms of increased somnolence, snoring, or other breakthrough symptoms  He is diabetic  He is overweight  His weight has remained stable however  He has not been holding on to increase fluid or demonstrating decompensated heart failure  He does have some kidney issues and is being followed by Nephrology for his chronic kidney disease    Review of Systems:  Review of Systems   Constitutional: Negative for activity change, fatigue and unexpected weight change  HENT: Negative  Respiratory: Positive for shortness of breath  Negative for cough and chest tightness  Cardiovascular:        As per HPI   Gastrointestinal: Negative  Genitourinary: Negative  Musculoskeletal: Positive for arthralgias and gait problem  Neurological: Negative for weakness and headaches  All other systems reviewed and are negative          Past medical history, surgical history, and family history were reviewed and updated as appropriate    Social history updates:  Social History     Tobacco Use   Smoking Status Former Smoker    Packs/day: 4 00    Years: 48 00    Pack years: 192 00    Types: Cigarettes    Last attempt to quit:     Years since quittin 1   Smokeless Tobacco Never Used   Tobacco Comment    smoking 48 years 1 5 ppd d/c oct 2008 screening protocol as per allscripts       PhysicalExamination:  Vitals:   BP (!) 124/48 (BP Location: Left arm, Patient Position: Sitting)   Pulse 83   Temp (!) 97 °F (36 1 °C) (Tympanic)   Ht 5' 7" (1 702 m)   Wt 91 6 kg (202 lb)   SpO2 97% BMI 31 64 kg/m²     Physical Exam:   Gen: Comfortable  Non-labored on room air  HEENT: PERRL  O/P: clear  moist  Neck: Trachea is midline  No JVD  No adenopathy  Chest:  Breath sounds slightly distant  Chest wall excursion limited  No wheeze or rhonchi  Cardiac:  Distant, regular  no murmur  Abdomen: Soft and nontender  Bowel sounds are present  Extremities: No edema    Diagnostic Data:  Labs: I personally reviewed the most recent laboratory data pertinent to today's visit    Lab Results   Component Value Date    WBC 7 55 12/10/2018    HGB 12 3 12/10/2018    HCT 39 8 12/10/2018    MCV 98 12/10/2018    PLT  12/10/2018      Comment:      Not reported due to platelet clumping       Lab Results   Component Value Date    SODIUM 145 12/10/2018    K 4 6 12/10/2018    CO2 29 12/10/2018     (H) 12/10/2018    BUN 20 12/10/2018    CALCIUM 8 7 12/10/2018       Other studies:  No new interim pulmonary studies    Monserrat West MD

## 2019-03-05 ENCOUNTER — TRANSCRIBE ORDERS (OUTPATIENT)
Dept: LAB | Facility: CLINIC | Age: 74
End: 2019-03-05

## 2019-03-05 ENCOUNTER — TELEPHONE (OUTPATIENT)
Dept: NEPHROLOGY | Facility: CLINIC | Age: 74
End: 2019-03-05

## 2019-03-05 ENCOUNTER — HOSPITAL ENCOUNTER (OUTPATIENT)
Dept: NON INVASIVE DIAGNOSTICS | Facility: CLINIC | Age: 74
Discharge: HOME | End: 2019-03-05
Payer: MEDICARE

## 2019-03-05 ENCOUNTER — APPOINTMENT (OUTPATIENT)
Dept: LAB | Facility: CLINIC | Age: 74
End: 2019-03-05
Payer: MEDICARE

## 2019-03-05 DIAGNOSIS — E10.9 INSULIN DEPENDENT DIABETES MELLITUS TYPE IA (HCC): ICD-10-CM

## 2019-03-05 DIAGNOSIS — E03.9 MYXEDEMA HEART DISEASE: Primary | ICD-10-CM

## 2019-03-05 DIAGNOSIS — I70.219 EXTREMITY ATHEROSCLEROSIS WITH INTERMITTENT CLAUDICATION (HCC): ICD-10-CM

## 2019-03-05 DIAGNOSIS — E78.5 DYSLIPIDEMIA: ICD-10-CM

## 2019-03-05 DIAGNOSIS — I74.09 EMBOLISM FROM ABDOMINAL AORTA (HCC): ICD-10-CM

## 2019-03-05 DIAGNOSIS — I10 HTN (HYPERTENSION), BENIGN: ICD-10-CM

## 2019-03-05 DIAGNOSIS — I65.21 STENOSIS OF RIGHT INTERNAL CAROTID ARTERY: ICD-10-CM

## 2019-03-05 DIAGNOSIS — I25.10 CORONARY ARTERY DISEASE INVOLVING NATIVE CORONARY ARTERY OF NATIVE HEART WITHOUT ANGINA PECTORIS: ICD-10-CM

## 2019-03-05 DIAGNOSIS — R30.0 BURNING WITH URINATION: Primary | ICD-10-CM

## 2019-03-05 DIAGNOSIS — I51.9 MYXEDEMA HEART DISEASE: Primary | ICD-10-CM

## 2019-03-05 DIAGNOSIS — N18.30 CKD (CHRONIC KIDNEY DISEASE) STAGE 3, GFR 30-59 ML/MIN (HCC): ICD-10-CM

## 2019-03-05 DIAGNOSIS — R30.0 BURNING WITH URINATION: ICD-10-CM

## 2019-03-05 LAB
ANION GAP SERPL CALCULATED.3IONS-SCNC: 8 MMOL/L (ref 4–13)
BASOPHILS # BLD AUTO: 0.01 THOUSANDS/ΜL (ref 0–0.1)
BASOPHILS NFR BLD AUTO: 0 % (ref 0–1)
BUN SERPL-MCNC: 21 MG/DL (ref 5–25)
CALCIUM SERPL-MCNC: 9.4 MG/DL (ref 8.3–10.1)
CHLORIDE SERPL-SCNC: 107 MMOL/L (ref 100–108)
CHOLEST SERPL-MCNC: 113 MG/DL (ref 50–200)
CO2 SERPL-SCNC: 28 MMOL/L (ref 21–32)
CREAT SERPL-MCNC: 1.2 MG/DL (ref 0.6–1.3)
CREAT UR-MCNC: 44.2 MG/DL
EOSINOPHIL # BLD AUTO: 0.25 THOUSAND/ΜL (ref 0–0.61)
EOSINOPHIL NFR BLD AUTO: 4 % (ref 0–6)
ERYTHROCYTE [DISTWIDTH] IN BLOOD BY AUTOMATED COUNT: 13.2 % (ref 11.6–15.1)
EST. AVERAGE GLUCOSE BLD GHB EST-MCNC: 171 MG/DL
GFR SERPL CREATININE-BSD FRML MDRD: 60 ML/MIN/1.73SQ M
GLUCOSE P FAST SERPL-MCNC: 74 MG/DL (ref 65–99)
HBA1C MFR BLD: 7.6 % (ref 4.2–6.3)
HCT VFR BLD AUTO: 38.4 % (ref 36.5–49.3)
HDLC SERPL-MCNC: 39 MG/DL (ref 40–60)
HGB BLD-MCNC: 12.6 G/DL (ref 12–17)
IMM GRANULOCYTES # BLD AUTO: 0.01 THOUSAND/UL (ref 0–0.2)
IMM GRANULOCYTES NFR BLD AUTO: 0 % (ref 0–2)
LDLC SERPL CALC-MCNC: 46 MG/DL (ref 0–100)
LYMPHOCYTES # BLD AUTO: 2.29 THOUSANDS/ΜL (ref 0.6–4.47)
LYMPHOCYTES NFR BLD AUTO: 37 % (ref 14–44)
MAGNESIUM SERPL-MCNC: 2.3 MG/DL (ref 1.6–2.6)
MCH RBC QN AUTO: 31.5 PG (ref 26.8–34.3)
MCHC RBC AUTO-ENTMCNC: 32.8 G/DL (ref 31.4–37.4)
MCV RBC AUTO: 96 FL (ref 82–98)
MONOCYTES # BLD AUTO: 0.92 THOUSAND/ΜL (ref 0.17–1.22)
MONOCYTES NFR BLD AUTO: 15 % (ref 4–12)
NEUTROPHILS # BLD AUTO: 2.75 THOUSANDS/ΜL (ref 1.85–7.62)
NEUTS SEG NFR BLD AUTO: 44 % (ref 43–75)
NRBC BLD AUTO-RTO: 0 /100 WBCS
NT-PROBNP SERPL-MCNC: 2679 PG/ML
PHOSPHATE SERPL-MCNC: 3.4 MG/DL (ref 2.3–4.1)
PLATELET # BLD AUTO: 137 THOUSANDS/UL (ref 149–390)
PMV BLD AUTO: 12.2 FL (ref 8.9–12.7)
POTASSIUM SERPL-SCNC: 5.1 MMOL/L (ref 3.5–5.3)
PROT UR-MCNC: 42 MG/DL
PROT/CREAT UR: 0.95 MG/G{CREAT} (ref 0–0.1)
RBC # BLD AUTO: 4 MILLION/UL (ref 3.88–5.62)
SODIUM SERPL-SCNC: 143 MMOL/L (ref 136–145)
TRIGL SERPL-MCNC: 140 MG/DL
TSH SERPL DL<=0.05 MIU/L-ACNC: 0.22 UIU/ML (ref 0.36–3.74)
WBC # BLD AUTO: 6.23 THOUSAND/UL (ref 4.31–10.16)

## 2019-03-05 PROCEDURE — 93925 LOWER EXTREMITY STUDY: CPT

## 2019-03-05 PROCEDURE — 83735 ASSAY OF MAGNESIUM: CPT

## 2019-03-05 PROCEDURE — 93978 VASCULAR STUDY: CPT | Performed by: SURGERY

## 2019-03-05 PROCEDURE — 93978 VASCULAR STUDY: CPT

## 2019-03-05 PROCEDURE — 80061 LIPID PANEL: CPT

## 2019-03-05 PROCEDURE — 93922 UPR/L XTREMITY ART 2 LEVELS: CPT | Performed by: SURGERY

## 2019-03-05 PROCEDURE — 87086 URINE CULTURE/COLONY COUNT: CPT

## 2019-03-05 PROCEDURE — 93925 LOWER EXTREMITY STUDY: CPT | Performed by: SURGERY

## 2019-03-05 PROCEDURE — 93880 EXTRACRANIAL BILAT STUDY: CPT

## 2019-03-05 PROCEDURE — 36415 COLL VENOUS BLD VENIPUNCTURE: CPT | Performed by: INTERNAL MEDICINE

## 2019-03-05 PROCEDURE — 84156 ASSAY OF PROTEIN URINE: CPT

## 2019-03-05 PROCEDURE — 83036 HEMOGLOBIN GLYCOSYLATED A1C: CPT | Performed by: INTERNAL MEDICINE

## 2019-03-05 PROCEDURE — 84443 ASSAY THYROID STIM HORMONE: CPT

## 2019-03-05 PROCEDURE — 82570 ASSAY OF URINE CREATININE: CPT

## 2019-03-05 PROCEDURE — 93923 UPR/LXTR ART STDY 3+ LVLS: CPT

## 2019-03-05 PROCEDURE — 85025 COMPLETE CBC W/AUTO DIFF WBC: CPT

## 2019-03-05 PROCEDURE — 83880 ASSAY OF NATRIURETIC PEPTIDE: CPT

## 2019-03-05 PROCEDURE — 84100 ASSAY OF PHOSPHORUS: CPT

## 2019-03-05 PROCEDURE — 80048 BASIC METABOLIC PNL TOTAL CA: CPT | Performed by: INTERNAL MEDICINE

## 2019-03-05 PROCEDURE — 93880 EXTRACRANIAL BILAT STUDY: CPT | Performed by: SURGERY

## 2019-03-06 ENCOUNTER — TELEPHONE (OUTPATIENT)
Dept: CARDIOLOGY CLINIC | Facility: CLINIC | Age: 74
End: 2019-03-06

## 2019-03-06 ENCOUNTER — OFFICE VISIT (OUTPATIENT)
Dept: NEPHROLOGY | Facility: CLINIC | Age: 74
End: 2019-03-06
Payer: MEDICARE

## 2019-03-06 ENCOUNTER — TELEPHONE (OUTPATIENT)
Dept: FAMILY MEDICINE CLINIC | Facility: CLINIC | Age: 74
End: 2019-03-06

## 2019-03-06 ENCOUNTER — OFFICE VISIT (OUTPATIENT)
Dept: VASCULAR SURGERY | Facility: CLINIC | Age: 74
End: 2019-03-06
Payer: MEDICARE

## 2019-03-06 VITALS
HEIGHT: 67 IN | DIASTOLIC BLOOD PRESSURE: 60 MMHG | WEIGHT: 200 LBS | TEMPERATURE: 98 F | SYSTOLIC BLOOD PRESSURE: 114 MMHG | BODY MASS INDEX: 31.39 KG/M2 | HEART RATE: 64 BPM

## 2019-03-06 VITALS
BODY MASS INDEX: 29.19 KG/M2 | DIASTOLIC BLOOD PRESSURE: 52 MMHG | WEIGHT: 186 LBS | HEART RATE: 63 BPM | HEIGHT: 67 IN | SYSTOLIC BLOOD PRESSURE: 144 MMHG

## 2019-03-06 DIAGNOSIS — N18.30 BENIGN HYPERTENSION WITH CHRONIC KIDNEY DISEASE, STAGE III (HCC): Primary | ICD-10-CM

## 2019-03-06 DIAGNOSIS — N28.1 RENAL CYST, RIGHT: ICD-10-CM

## 2019-03-06 DIAGNOSIS — I70.299 ATHEROSCLEROSIS OF ARTERY OF EXTREMITY WITH ULCERATION (HCC): Chronic | ICD-10-CM

## 2019-03-06 DIAGNOSIS — I74.09 AORTOILIAC OCCLUSIVE DISEASE (HCC): Primary | Chronic | ICD-10-CM

## 2019-03-06 DIAGNOSIS — I12.9 BENIGN HYPERTENSION WITH CHRONIC KIDNEY DISEASE, STAGE III (HCC): Primary | ICD-10-CM

## 2019-03-06 DIAGNOSIS — I50.22 CHRONIC SYSTOLIC CONGESTIVE HEART FAILURE (HCC): ICD-10-CM

## 2019-03-06 DIAGNOSIS — I70.1 RENAL ARTERY STENOSIS (HCC): ICD-10-CM

## 2019-03-06 DIAGNOSIS — N18.30 CKD (CHRONIC KIDNEY DISEASE) STAGE 3, GFR 30-59 ML/MIN (HCC): ICD-10-CM

## 2019-03-06 DIAGNOSIS — I65.23 CAROTID ARTERY STENOSIS, ASYMPTOMATIC, BILATERAL: ICD-10-CM

## 2019-03-06 DIAGNOSIS — R80.9 PROTEINURIA, UNSPECIFIED TYPE: ICD-10-CM

## 2019-03-06 DIAGNOSIS — L97.909 ATHEROSCLEROSIS OF ARTERY OF EXTREMITY WITH ULCERATION (HCC): Chronic | ICD-10-CM

## 2019-03-06 LAB — BACTERIA UR CULT: NORMAL

## 2019-03-06 PROCEDURE — 99215 OFFICE O/P EST HI 40 MIN: CPT | Performed by: SURGERY

## 2019-03-06 PROCEDURE — 99214 OFFICE O/P EST MOD 30 MIN: CPT | Performed by: INTERNAL MEDICINE

## 2019-03-06 NOTE — PROGRESS NOTES
Assessment/Plan:    Atherosclerosis of artery of extremity with ulceration (HCC)  Atherosclerotic occlusive disease of the aortoiliac and infrainguinal arterial segment with severe right lower extremity claudication and left nonhealing heel ulcer  We discussed the findings on recent duplex evaluation along with the indications for treatment, the treatment options available and their associated risks and benefits  We will plan on proceeding with arteriography with possible endovascular intervention of the left lower extremity in the near future  We will plan pre and post procedural hydration secondary to his chronic renal insufficiency  We will ask that he continue his Plavix therapy pre and post procedure  Carotid artery stenosis, asymptomatic, bilateral  Asymptomatic bilateral carotid artery stenosis  There has been no significant change on most recent duplex  At this point will continue current plan for which the patient has historically desired medical management for which she is already maintained on appropriate statin and antiplatelet therapy  Duplex follow-up will be scheduled in 6 months  Diagnoses and all orders for this visit:    Aortoiliac occlusive disease ([facility] [address] )  -     IR consult; Future    Atherosclerosis of artery of extremity with ulceration ([facility] )  -     IR consult; Future    Carotid artery stenosis, asymptomatic, bilateral  -     VAS carotid complete study; Future          Subjective:      Patient ID: Raman Oneil is a 68 y o  male  Patient returns to the office today to discuss results of carotid duplex, SYLVESTER and AOIL  Patient complains of claudication and states that he is not able to walk far as a result  He also complains of rest pain and left heel ulcer  He denies all TIA/CVA symptoms        77-year-old with long history of diabetes mellitus and severe peripheral arterial occlusive disease has a history of bilateral iliac stenting and left superficial femoral artery stent placement  He has had a long history of bilateral lower extremity claudication which has been treated conservatively  He notes his claudication has worsened but most recently has noticed a wound on the left heel which is painful and has been nonhealing despite local wound care by Podiatry  He cannot identify any specific inciting event  He notes no specific position or activity which alleviates or worsens his heel pain but does state he is only able to walk very short distances before he experiences bilateral calf claudication which will only resolve with prolonged periods of rest   He feels this symptom has worsened significantly over the past months  Of note patient also has had a known history of carotid occlusive disease for which patient is family has continued with medical management  03/05/2019 aortic and lower extremity arterial studies are reviewed at length  On the right there is a 50-75% external iliac artery restenosis with greater than 75% deep and proximal superficial femoral artery stenosis with mid superficial femoral artery occlusion and ankle-brachial index of 0 32  The left the external iliac artery stent is widely patent  There is a 50-75% common femoral stenosis and segmental occlusion of the superficial femoral artery with ankle-brachial index of 0 22 and toe pressure of 0  Carotid duplex 03/05/2019 with right greater than 70% stenosis with flow velocity of 261/64 centimeters/second and 50-69% left carotid stenosis with flow velocity 165/37 centimeters/second  This is unchanged from previous study 05/29/2018  The following portions of the patient's history were reviewed and updated as appropriate: allergies, current medications, past family history, past medical history, past social history, past surgical history and problem list     The ROS as bellow was reviewed and changes made as indictated       Review of Systems   Constitutional: Negative  HENT: Negative  Eyes: Negative  Respiratory: Negative  Cardiovascular: Negative  Gastrointestinal: Negative  Endocrine: Negative  Genitourinary: Negative  Musculoskeletal: Negative  Skin: Positive for wound  Allergic/Immunologic: Negative  Neurological: Negative  Hematological: Negative  Psychiatric/Behavioral: Negative  Objective:      /60 (BP Location: Right arm, Patient Position: Sitting)   Pulse 64   Temp 98 °F (36 7 °C) (Tympanic)   Ht 5' 7" (1 702 m)   Wt 90 7 kg (200 lb)   BMI 31 32 kg/m²          Physical Exam   Constitutional: He is oriented to person, place, and time  He appears well-developed and well-nourished  HENT:   Head: Normocephalic and atraumatic  Eyes: Conjunctivae and EOM are normal    Neck: Normal range of motion  Neck supple  No JVD present  Carotid bruit is present (Bilateral carotid bruit)  Cardiovascular: Normal rate, regular rhythm, S1 normal, S2 normal and normal heart sounds  No murmur heard  Pulses:       Carotid pulses are 2+ on the right side, and 2+ on the left side  Radial pulses are 0 on the right side, and 2+ on the left side  Popliteal pulses are 0 on the right side, and 0 on the left side  Dorsalis pedis pulses are 0 on the right side, and 0 on the left side  Posterior tibial pulses are 0 on the right side, and 0 on the left side  Pulmonary/Chest: Effort normal and breath sounds normal    Abdominal: Soft  Normal appearance and normal aorta  He exhibits no abdominal bruit and no pulsatile midline mass  There is no tenderness  No hernia  Musculoskeletal: Normal range of motion  He exhibits no edema, tenderness or deformity  Neurological: He is alert and oriented to person, place, and time  He has normal strength  No sensory deficit  Skin: Skin is warm, dry and intact  No pallor  Dependent rubor of both feet  On the left heel on the lateral aspect there is a skin crack    There is no obvious infection  Psychiatric: He has a normal mood and affect  His speech is normal and behavior is normal        Operative Scheduling Information:    Hospital:  [facility],  [facility] or IR [facility]    Physician:  Bertha Nguyen or alternative if unable to schedule within 2 weeks    Surgery:   Aortogram with left intervention/atherectomy possible imaging right lower extremity using combination of CO2 and contrast    Urgency:  Urgent:  2 weeks    Case Length:  Normal    Post-op Bed:  Outpatient    OR Table:  IR    Equipment Needs:  Rep:  [address] for possible atherectomy    Medication Instructions:  Aspirin:   Continue (do not hold)  Plavix:  Continue (do not hold)    Hydration:  Hydration protocol

## 2019-03-06 NOTE — TELEPHONE ENCOUNTER
Wife called, pt's bnp results [id number]  They were at vascular today and they want to do angiogram  Wife's concern is the echo is not until 3/26 and she does not feel it is a good idea to have the angiogram done before the echo  Wanted to know if you could get him in sooner for the echo? I called central scheduling and that is the soonest appt  Saw nephrology today ,he gave him Lasix 40 mg for 5 days          Please advise

## 2019-03-06 NOTE — LETTER
March 6, 2019     Marline Dillon MD  [address] [facility] [address]    Patient: Raman Oneil   YOB: 1945   Date of Visit: 3/6/2019       Dear Dr Mancia Shed: Thank you for referring Raymond Granado to me for evaluation  Below are the relevant portions of my assessment and plan of care  Diagnoses and all orders for this visit:    Atherosclerosis of artery of extremity with ulceration ([address] )  Atherosclerotic occlusive disease of the aortoiliac and infrainguinal arterial segment with severe right lower extremity claudication and left nonhealing heel ulcer  We discussed the findings on recent duplex evaluation along with the indications for treatment, the treatment options available and their associated risks and benefits  We will plan on proceeding with arteriography with possible endovascular intervention of the left lower extremity in the near future  We will plan pre and post procedural hydration secondary to his chronic renal insufficiency  We will ask that he continue his Plavix therapy pre and post procedure  Carotid artery stenosis, asymptomatic, bilateral  Asymptomatic bilateral carotid artery stenosis  There has been no significant change on most recent duplex  At this point will continue current plan for which the patient has historically desired medical management for which she is already maintained on appropriate statin and antiplatelet therapy  Duplex follow-up will be scheduled in 6 months  If you have questions, please do not hesitate to call me  I look forward to following Griselda Noel along with you           Sincerely,        Dianna Lyn MD        CC: No Recipients

## 2019-03-06 NOTE — ASSESSMENT & PLAN NOTE
Asymptomatic bilateral carotid artery stenosis  There has been no significant change on most recent duplex  At this point will continue current plan for which the patient has historically desired medical management for which she is already maintained on appropriate statin and antiplatelet therapy  Duplex follow-up will be scheduled in 6 months

## 2019-03-06 NOTE — H&P (VIEW-ONLY)
Assessment/Plan:    Atherosclerosis of artery of extremity with ulceration (HCC)  Atherosclerotic occlusive disease of the aortoiliac and infrainguinal arterial segment with severe right lower extremity claudication and left nonhealing heel ulcer  We discussed the findings on recent duplex evaluation along with the indications for treatment, the treatment options available and their associated risks and benefits  We will plan on proceeding with arteriography with possible endovascular intervention of the left lower extremity in the near future  We will plan pre and post procedural hydration secondary to his chronic renal insufficiency  We will ask that he continue his Plavix therapy pre and post procedure  Carotid artery stenosis, asymptomatic, bilateral  Asymptomatic bilateral carotid artery stenosis  There has been no significant change on most recent duplex  At this point will continue current plan for which the patient has historically desired medical management for which she is already maintained on appropriate statin and antiplatelet therapy  Duplex follow-up will be scheduled in 6 months  Diagnoses and all orders for this visit:    Aortoiliac occlusive disease ([facility] [address] )  -     IR consult; Future    Atherosclerosis of artery of extremity with ulceration ([facility] )  -     IR consult; Future    Carotid artery stenosis, asymptomatic, bilateral  -     VAS carotid complete study; Future          Subjective:      Patient ID: Elizabeth Eden is a 68 y o  male  Patient returns to the office today to discuss results of carotid duplex, SYLVESTER and AOIL  Patient complains of claudication and states that he is not able to walk far as a result  He also complains of rest pain and left heel ulcer  He denies all TIA/CVA symptoms        35-year-old with long history of diabetes mellitus and severe peripheral arterial occlusive disease has a history of bilateral iliac stenting and left superficial femoral artery stent placement  He has had a long history of bilateral lower extremity claudication which has been treated conservatively  He notes his claudication has worsened but most recently has noticed a wound on the left heel which is painful and has been nonhealing despite local wound care by Podiatry  He cannot identify any specific inciting event  He notes no specific position or activity which alleviates or worsens his heel pain but does state he is only able to walk very short distances before he experiences bilateral calf claudication which will only resolve with prolonged periods of rest   He feels this symptom has worsened significantly over the past months  Of note patient also has had a known history of carotid occlusive disease for which patient is family has continued with medical management  03/05/2019 aortic and lower extremity arterial studies are reviewed at length  On the right there is a 50-75% external iliac artery restenosis with greater than 75% deep and proximal superficial femoral artery stenosis with mid superficial femoral artery occlusion and ankle-brachial index of 0 32  The left the external iliac artery stent is widely patent  There is a 50-75% common femoral stenosis and segmental occlusion of the superficial femoral artery with ankle-brachial index of 0 22 and toe pressure of 0  Carotid duplex 03/05/2019 with right greater than 70% stenosis with flow velocity of 261/64 centimeters/second and 50-69% left carotid stenosis with flow velocity 165/37 centimeters/second  This is unchanged from previous study 05/29/2018  The following portions of the patient's history were reviewed and updated as appropriate: allergies, current medications, past family history, past medical history, past social history, past surgical history and problem list     The ROS as bellow was reviewed and changes made as indictated       Review of Systems   Constitutional: Negative  HENT: Negative  Eyes: Negative  Respiratory: Negative  Cardiovascular: Negative  Gastrointestinal: Negative  Endocrine: Negative  Genitourinary: Negative  Musculoskeletal: Negative  Skin: Positive for wound  Allergic/Immunologic: Negative  Neurological: Negative  Hematological: Negative  Psychiatric/Behavioral: Negative  Objective:      /60 (BP Location: Right arm, Patient Position: Sitting)   Pulse 64   Temp 98 °F (36 7 °C) (Tympanic)   Ht 5' 7" (1 702 m)   Wt 90 7 kg (200 lb)   BMI 31 32 kg/m²          Physical Exam   Constitutional: He is oriented to person, place, and time  He appears well-developed and well-nourished  HENT:   Head: Normocephalic and atraumatic  Eyes: Conjunctivae and EOM are normal    Neck: Normal range of motion  Neck supple  No JVD present  Carotid bruit is present (Bilateral carotid bruit)  Cardiovascular: Normal rate, regular rhythm, S1 normal, S2 normal and normal heart sounds  No murmur heard  Pulses:       Carotid pulses are 2+ on the right side, and 2+ on the left side  Radial pulses are 0 on the right side, and 2+ on the left side  Popliteal pulses are 0 on the right side, and 0 on the left side  Dorsalis pedis pulses are 0 on the right side, and 0 on the left side  Posterior tibial pulses are 0 on the right side, and 0 on the left side  Pulmonary/Chest: Effort normal and breath sounds normal    Abdominal: Soft  Normal appearance and normal aorta  He exhibits no abdominal bruit and no pulsatile midline mass  There is no tenderness  No hernia  Musculoskeletal: Normal range of motion  He exhibits no edema, tenderness or deformity  Neurological: He is alert and oriented to person, place, and time  He has normal strength  No sensory deficit  Skin: Skin is warm, dry and intact  No pallor  Dependent rubor of both feet  On the left heel on the lateral aspect there is a skin crack    There is no obvious infection  Psychiatric: He has a normal mood and affect  His speech is normal and behavior is normal        Operative Scheduling Information:    Hospital:  [facility], [facility] or [facility]    Physician:  Domenic Lei or alternative if unable to schedule within 2 weeks    Surgery:   Aortogram with left intervention/atherectomy possible imaging right lower extremity using combination of CO2 and contrast    Urgency:  Urgent:  2 weeks    Case Length:  Normal    Post-op Bed:  Outpatient    OR Table:  IR    Equipment Needs:  Rep:  [address] for possible atherectomy    Medication Instructions:  Aspirin:   Continue (do not hold)  Plavix:  Continue (do not hold)    Hydration:  Hydration protocol

## 2019-03-06 NOTE — ASSESSMENT & PLAN NOTE
Atherosclerotic occlusive disease of the aortoiliac and infrainguinal arterial segment with severe right lower extremity claudication and left nonhealing heel ulcer  We discussed the findings on recent duplex evaluation along with the indications for treatment, the treatment options available and their associated risks and benefits  We will plan on proceeding with arteriography with possible endovascular intervention of the left lower extremity in the near future  We will plan pre and post procedural hydration secondary to his chronic renal insufficiency  We will ask that he continue his Plavix therapy pre and post procedure

## 2019-03-06 NOTE — H&P (VIEW-ONLY)
Assessment/Plan:    Atherosclerosis of artery of extremity with ulceration (HCC)  Atherosclerotic occlusive disease of the aortoiliac and infrainguinal arterial segment with severe right lower extremity claudication and left nonhealing heel ulcer  We discussed the findings on recent duplex evaluation along with the indications for treatment, the treatment options available and their associated risks and benefits  We will plan on proceeding with arteriography with possible endovascular intervention of the left lower extremity in the near future  We will plan pre and post procedural hydration secondary to his chronic renal insufficiency  We will ask that he continue his Plavix therapy pre and post procedure  Carotid artery stenosis, asymptomatic, bilateral  Asymptomatic bilateral carotid artery stenosis  There has been no significant change on most recent duplex  At this point will continue current plan for which the patient has historically desired medical management for which she is already maintained on appropriate statin and antiplatelet therapy  Duplex follow-up will be scheduled in 6 months  Diagnoses and all orders for this visit:    Aortoiliac occlusive disease ([facility] [address] )  -     IR consult; Future    Atheros[facility] of artery of extremity with ulceration ([facility] )  -     IR consult; Future    Carotid artery stenosis, asymptomatic, bilateral  -     VAS carotid complete study; Future          Subjective:      Patient ID: Roberto Camejo is a 68 y o  male  Patient returns to the office today to discuss results of carotid duplex, SYLVESTER and AOIL  Patient complains of claudication and states that he is not able to walk far as a result  He also complains of rest pain and left heel ulcer  He denies all TIA/CVA symptoms        77-year-old with long history of diabetes mellitus and severe peripheral arterial occlusive disease has a history of bilateral iliac stenting and left superficial femoral artery stent placement  He has had a long history of bilateral lower extremity claudication which has been treated conservatively  He notes his claudication has worsened but most recently has noticed a wound on the left heel which is painful and has been nonhealing despite local wound care by Podiatry  He cannot identify any specific inciting event  He notes no specific position or activity which alleviates or worsens his heel pain but does state he is only able to walk very short distances before he experiences bilateral calf claudication which will only resolve with prolonged periods of rest   He feels this symptom has worsened significantly over the past months  Of note patient also has had a known history of carotid occlusive disease for which patient is family has continued with medical management  03/05/2019 aortic and lower extremity arterial studies are reviewed at length  On the right there is a 50-75% external iliac artery restenosis with greater than 75% deep and proximal superficial femoral artery stenosis with mid superficial femoral artery occlusion and ankle-brachial index of 0 32  The left the external iliac artery stent is widely patent  There is a 50-75% common femoral stenosis and segmental occlusion of the superficial femoral artery with ankle-brachial index of 0 22 and toe pressure of 0  Carotid duplex 03/05/2019 with right greater than 70% stenosis with flow velocity of 261/64 centimeters/second and 50-69% left carotid stenosis with flow velocity 165/37 centimeters/second  This is unchanged from previous study 05/29/2018  The following portions of the patient's history were reviewed and updated as appropriate: allergies, current medications, past family history, past medical history, past social history, past surgical history and problem list     The ROS as bellow was reviewed and changes made as indictated       Review of Systems   Constitutional: Negative  HENT: Negative  Eyes: Negative  Respiratory: Negative  Cardiovascular: Negative  Gastrointestinal: Negative  Endocrine: Negative  Genitourinary: Negative  Musculoskeletal: Negative  Skin: Positive for wound  Allergic/Immunologic: Negative  Neurological: Negative  Hematological: Negative  Psychiatric/Behavioral: Negative  Objective:      /60 (BP Location: Right arm, Patient Position: Sitting)   Pulse 64   Temp 98 °F (36 7 °C) (Tympanic)   Ht 5' 7" (1 702 m)   Wt 90 7 kg (200 lb)   BMI 31 32 kg/m²          Physical Exam   Constitutional: He is oriented to person, place, and time  He appears well-developed and well-nourished  HENT:   Head: Normocephalic and atraumatic  Eyes: Conjunctivae and EOM are normal    Neck: Normal range of motion  Neck supple  No JVD present  Carotid bruit is present (Bilateral carotid bruit)  Cardiovascular: Normal rate, regular rhythm, S1 normal, S2 normal and normal heart sounds  No murmur heard  Pulses:       Carotid pulses are 2+ on the right side, and 2+ on the left side  Radial pulses are 0 on the right side, and 2+ on the left side  Popliteal pulses are 0 on the right side, and 0 on the left side  Dorsalis pedis pulses are 0 on the right side, and 0 on the left side  Posterior tibial pulses are 0 on the right side, and 0 on the left side  Pulmonary/Chest: Effort normal and breath sounds normal    Abdominal: Soft  Normal appearance and normal aorta  He exhibits no abdominal bruit and no pulsatile midline mass  There is no tenderness  No hernia  Musculoskeletal: Normal range of motion  He exhibits no edema, tenderness or deformity  Neurological: He is alert and oriented to person, place, and time  He has normal strength  No sensory deficit  Skin: Skin is warm, dry and intact  No pallor  Dependent rubor of both feet  On the left heel on the lateral aspect there is a skin crack    There is no obvious infection  Psychiatric: He has a normal mood and affect  His speech is normal and behavior is normal        Operative Scheduling Information:    Hospital:  [facility], [facility] or [facility]    Physician:  Aleyda Hernandez or alternative if unable to schedule within 2 weeks    Surgery:   Aortogram with left intervention/atherectomy possible imaging right lower extremity using combination of CO2 and contrast    Urgency:  Urgent:  2 weeks    Case Length:  Normal    Post-op Bed:  Outpatient    OR Table:  IR    Equipment Needs:  Rep:  [address] for possible atherectomy    Medication Instructions:  Aspirin:   Continue (do not hold)  Plavix:  Continue (do not hold)    Hydration:  Hydration protocol

## 2019-03-06 NOTE — LETTER
March 6, 2019     Shirley Ware MD  [address] [facility] [address]    Patient: Ely Solis   YOB: 1945   Date of Visit: 3/6/2019       Dear Dr Yun Waggoner: Thank you for referring Kalyan Salvador to me for evaluation  Below are my notes for this consultation  If you have questions, please do not hesitate to call me  I look forward to following your patient along with you           Sincerely,        Tabitha Rodas DO        CC: Kristen Andino MD  Yasmine EastDO Tabitha DO  3/6/2019  4:08 PM  Addendum  OFFICE FOLLOW UP - Nephrology   Ely Solis 68 y o  male MRN: [id number]    Encounter: [id number]      ASSESSMENT and PLAN:  Diagnoses and all orders for this visit:    He is 68years old with a longstanding history of type 2 diabetes mellitus, renal artery stenosis and hypertension, peripheral vascular disease and carotid artery stenosis who presents for follow-up of stage 3 chronic kidney disease    CKD (chronic kidney disease) stage 3, GFR 30-59 ml/min  -overall given most recent history creatinine has remained stable around 1 2 and estimated GFR is around 60  -medications are appropriately dosed  -continue cardiovascular risk reduction measures    Type 1 diabetes mellitus with complication ([facility] [address] )  - continue  Glycemic control he has seen Endocrinology as well    Renal artery stenosis ([facility] [address] )  - he has bilateral renal artery stenosis he is having cardiology testing as well as an angiogram will hold off on repeat renal artery Doppler until after his workup is complete    Benign hypertension with chronic kidney disease, stage III  - as above regarding his blood pressure  - blood pressures are currently stable avoid hypotension  -continue blood pressure control he is currently on metoprolol 25 mg twice daily    DARINEL (acute kidney injury) ([facility] [address] )  -had a recent acute kidney injury after cardiac catheterization in [place] now creatinine is better 1 2 d    Renal cyst, right  - renal ultrasound was checked in February of 2018 which showed a stable simple cyst in the upper pole of the right kidney and a normal left kidney and bladder,    Proteinuria, unspecified type  - he does have about 1 2 g of protein once his creatinine is in steady state will continue to quantify and do a further serologic workup currently not anemic or hypercalcemia,  And no associated hematuria as his RBCs were negative-this will be done when his angiogram and cardiac testing her complete    Shortness of breath with coronary artery disease  -his echocardiograms were reviewed from [facility] elevated BNP no appreciable JVD or over volume overload  -nevertheless will start furosemide 40 mg daily for the next 5 days to see if there is any symptomatic improvement    Peripheral vascular disease  -will be going for repeat angiogram and potential intervention as an outpatient  -sees vascular surgery    More recently he has been doing okay, repeat echocardiogram is pending, BNP elevated creatinine lower than baseline blood pressure slightly elevated no signs of overt volume overload but will initiate Lasix 40 mg daily for the next 5 days to see if there is any symptomatic improvement in his dyspnea on exertion  Will repeat a BMP in about 5 days to make sure creatinine is stable and electrolytes are stable prior to angiogram he will require protocol intravenous fluids as well as discontinuation of diuretic    Follow-up in 6 weeks for further serologic workup of proteinuria as well as repeat imaging for renal artery stenosis    HPI: Nicole Pro is a 68 y o  male who is here for  Chronic kidney disease    Since his last office visit he had gone to [facility] where he had unstable angina and cardiac catheterization requiring stent placement he follows up with Cardiology as well now here and electrolytes have been stable as creatinine has been stable he did have an AKA I with a creatinine that went to 2 2 he still has some dyspnea on exertion he has a history of COPD as well as an ischemic cardiomyopathy currently denies any difficulties with urination no hypotension he is accompanied by his wife today    ROS:   All the systems were reviewed and were negative except as documented on the HPI  Allergies: Doxazosin; Cardura [doxazosin mesylate]; Other; Tylenol [acetaminophen]; and Zestril [lisinopril]    Medications:   Current Outpatient Medications:     aspirin 81 MG tablet, Take 81 mg by mouth daily  , Disp: , Rfl:     cholecalciferol (VITAMIN D3) 1,000 units tablet, Take 1,000 Units by mouth daily  , Disp: , Rfl:     clopidogrel (PLAVIX) 75 mg tablet, Take 1 tablet (75 mg total) by mouth daily, Disp: 30 tablet, Rfl: 4    Coenzyme Q10 (COQ-10) 200 MG CAPS, Take 200 mg by mouth daily, Disp: , Rfl:     furosemide (LASIX) 20 mg tablet, Take 40 mg by mouth daily as needed take daily for the next 5 days, Disp: , Rfl:     gabapentin (NEURONTIN) 100 mg capsule, Take 3 capsules twice a day (Patient taking differently: Take 200 mg by mouth 2 (two) times a day Take 3 capsules twice a day ), Disp: 180 capsule, Rfl: 0    glucagon (GLUCAGON EMERGENCY) 1 MG injection, Inject 1 mg under the skin once as needed for low blood sugar, Disp: 1 each, Rfl: 2    glucagon (GLUCAGON EMERGENCY) 1 MG injection, 1 mg, Disp: , Rfl:     hydrocortisone (ANUSOL-HC, PROCTOSOL HC,) 2 5 % rectal cream, Apply twice a day, Disp: 35 g, Rfl: 0    insulin glargine (LANTUS) 100 units/mL subcutaneous injection, Inject 66 Units under the skin daily 38 units in the morning and 28 units in the evening, Disp: 10 mL, Rfl: 0    insulin lispro (HUMALOG) 100 units/mL injection, Inject 3 units with breakfast, 6 units at lunch, and 6 units at dinner, Disp: 10 mL, Rfl: 1    Lactobacillus (PROBIATA) TABS, Take 1 capsule by mouth daily, Disp: , Rfl:     Lactobacillus-Inulin ([facility] PO), Take 1 capsule by mouth daily, Disp: , Rfl:     levalbuterol (XOPENEX HFA) 45 mcg/act inhaler, Inhale 1-2 puffs, Disp: , Rfl:     levothyroxine 175 mcg tablet, Take 1 tablet (175 mcg total) by mouth daily in the early morning, Disp: 90 tablet, Rfl: 3    metoprolol tartrate (LOPRESSOR) 25 mg tablet, Take 0 5 tablets (12 5 mg total) by mouth every 12 (twelve) hours, Disp: 45 tablet, Rfl: 3    mupirocin (BACTROBAN) 2 % ointment, Apply topically 3 (three) times a day, Disp: 30 g, Rfl: 1    NIFEdipine 0 3%-lidocaine 5% rectal ointment, Apply 1 application topically every 4 (four) hours as needed for discomfort or pain Apply a small amount to anal opening 4-6 times per day , Disp: 2 oz, Rfl: 0    nitroglycerin (NITROSTAT) 0 4 mg SL tablet, Place 1 tablet (0 4 mg total) under the tongue every 5 (five) minutes as needed for chest pain, Disp: 25 tablet, Rfl: 3    rosuvastatin (CRESTOR) 20 MG tablet, Take 1 tablet (20 mg total) by mouth daily, Disp: 90 tablet, Rfl: 3    Ubiquinol 200 MG CAPS, Take 200 mg by mouth daily  , Disp: , Rfl:     ULTICARE INSULIN SYRINGE 30G X 1/2" 1 ML MISC, Use as directed, Disp: , Rfl: 0    ULTICARE INSULIN SYRINGE 30G X 5/16" 0 3 ML MISC, Use as directed, Disp: , Rfl: 0    Cholecalciferol (VITAMIN D3) 1000 units CAPS, Take 5,000 Units by mouth daily, Disp: , Rfl:     Past Medical History:   Diagnosis Date    Acute kidney injury ([facility] [address] )     resolved 11/30/2015    Acute venous embolism and thrombosis of deep vessels of proximal lower extremity ([facility] [address] )     resolved 04/04/2015    DARINEL (acute kidney injury) ([facility] [address] ) 1/12/2016    Cardiac disease     heart attack, stents x 4    CHF (congestive heart failure) (HCC)     Chronic cough     resolved 02/04/2016    COPD (chronic obstructive pulmonary disease) ([facility] [address] )     Diabetes mellitus ([facility] [address] )     Disease of thyroid gland     DVT (deep venous thrombosis) ([facility] [address] )     Hypertension     Ischemic cardiomyopathy     last assessed 09/26/2017    Pulmonary granuloma ([facility] [address] )     resolved 02/03/2017    Renal failure     Sleep apnea Past Surgical History:   Procedure Laterality Date    BYPASS FEMORAL-POPLITEAL      initial stenosis with stent left, 7 x 100 smart stent onset 02/24/2014    MANUEL (HISTORICAL)  2018    CORONARY ANGIOPLASTY WITH STENT PLACEMENT      x4     Family History   Problem Relation Age of Onset    Heart disease Father         pacer placement    Hypertension Father     Kidney disease Father     Heart failure Father     Heart attack Father     Liver disease Father     Cirrhosis Mother         due to beer consumption    Liver disease Mother     Diabetes Other       reports that he quit smoking about 11 years ago  His smoking use included cigarettes  He has a 192 00 pack-year smoking history  He has never used smokeless tobacco  He reports that he drinks about 12 6 oz of alcohol per week  He reports that he does not use drugs  Physical Exam:   Vitals:    03/06/19 1331   BP: 144/52   BP Location: Right arm   Patient Position: Sitting   Cuff Size: Large   Pulse: 63   Weight: 84 4 kg (186 lb)   Height: 5' 7" (1 702 m)     Body mass index is 29 13 kg/m²  General: conscious, cooperative, in not acute distress  Eyes: conjunctivae pink, anicteric sclerae  ENT: lips and mucous membranes moist  Neck: supple, no JVD  Chest: clear breath sounds bilateral, no crackles, ronchus or wheezings  CVS: distinct S1 & S2, normal rate, regular rhythm  Abdomen: soft, non-tender, non-distended, normoactive bowel sounds  Extremities: no edema of both legs  Skin: no rash  Neuro: awake, alert, oriented,  No asterixis or myoclonus     renal imaging:    A renal artery Doppler was done 2/27/2018 which shows that his right renal artery has less than 60% stenosis and in the main renal artery and a patent renal veins    The left renal artery has greater than 60% stenosis in the main renal artery and a patent renal veins    an ultrasound of the kidney and bladder was done on February 27 which shows a right kidney that was 9 3 x 5 2 cm and a left kidney that was 9 5 x 5 8 cm with normal echogenicity and contour-     Lab Results:  Results from last 7 days   Lab Units 03/05/19  1031   WBC Thousand/uL 6 23   HEMOGLOBIN g/dL 12 6   HEMATOCRIT % 38 4   PLATELETS Thousands/uL 137*   POTASSIUM mmol/L 5 1   CHLORIDE mmol/L 107   CO2 mmol/L 28   BUN mg/dL 21   CREATININE mg/dL 1 20   CALCIUM mg/dL 9 4   MAGNESIUM mg/dL 2 3   PHOSPHORUS mg/dL 3 4     Results for orders placed or performed in visit on 03/05/19   Urine culture   Result Value Ref Range    Urine Culture No Growth <1000 cfu/mL    CBC and differential   Result Value Ref Range    WBC 6 23 4 31 - 10 16 Thousand/uL    RBC 4 00 3 88 - 5 62 Million/uL    Hemoglobin 12 6 12 0 - 17 0 g/dL    Hematocrit 38 4 36 5 - 49 3 %    MCV 96 82 - 98 fL    MCH 31 5 26 8 - 34 3 pg    MCHC 32 8 31 4 - 37 4 g/dL    RDW 13 2 11 6 - 15 1 %    MPV 12 2 8 9 - 12 7 fL    Platelets 935 (L) 973 - 390 Thousands/uL    nRBC 0 /100 WBCs    Neutrophils Relative 44 43 - 75 %    Immat GRANS % 0 0 - 2 %    Lymphocytes Relative 37 14 - 44 %    Monocytes Relative 15 (H) 4 - 12 %    Eosinophils Relative 4 0 - 6 %    Basophils Relative 0 0 - 1 %    Neutrophils Absolute 2 75 1 85 - 7 62 Thousands/µL    Immature Grans Absolute 0 01 0 00 - 0 20 Thousand/uL    Lymphocytes Absolute 2 29 0 60 - 4 47 Thousands/µL    Monocytes Absolute 0 92 0 17 - 1 22 Thousand/µL    Eosinophils Absolute 0 25 0 00 - 0 61 Thousand/µL    Basophils Absolute 0 01 0 00 - 0 10 Thousands/µL   Magnesium   Result Value Ref Range    Magnesium 2 3 1 6 - 2 6 mg/dL   Phosphorus   Result Value Ref Range    Phosphorus 3 4 2 3 - 4 1 mg/dL   Protein / creatinine ratio, urine   Result Value Ref Range    Creatinine, Ur 44 2 mg/dL    Protein Urine Random 42 mg/dL    Prot/Creat Ratio, Ur 0 95 (H) 0 00 - 0 10   Lipid Panel with Direct LDL reflex   Result Value Ref Range    Cholesterol 113 50 - 200 mg/dL    Triglycerides 140 <=150 mg/dL    HDL, Direct 39 (L) 40 - 60 mg/dL    LDL Calculated 46 0 - 100 mg/dL   NT-BNP PRO   Result Value Ref Range    NT-proBNP 2,679 (H) <125 pg/mL   TSH, 3rd generation   Result Value Ref Range    TSH 3RD GENERATON 0 223 (L) 0 358 - 3 740 uIU/mL      creatinine 1 2  Portions of the record may have been created with voice recognition software  Occasional wrong word or "sound a like" substitutions may have occurred due to the inherent limitations of voice recognition software  Read the chart carefully and recognize, using context, where substitutions have occurred  If you have any questions, please contact the dictating provider

## 2019-03-06 NOTE — PATIENT INSTRUCTIONS
Atherosclerosis of artery of extremity with ulceration (HCC)  Atherosclerotic occlusive disease of the aortoiliac and infrainguinal arterial segment with severe right lower extremity claudication and left nonhealing heel ulcer  We discussed the findings on recent duplex evaluation along with the indications for treatment, the treatment options available and their associated risks and benefits  We will plan on proceeding with arteriography with possible endovascular intervention of the left lower extremity in the near future  We will plan pre and post procedural hydration secondary to his chronic renal insufficiency  We will ask that he continue his Plavix therapy pre and post procedure  Carotid artery stenosis, asymptomatic, bilateral  Asymptomatic bilateral carotid artery stenosis  There has been no significant change on most recent duplex  At this point will continue current plan for which the patient has historically desired medical management for which she is already maintained on appropriate statin and antiplatelet therapy  Duplex follow-up will be scheduled in 6 months

## 2019-03-06 NOTE — PATIENT INSTRUCTIONS
Vanessa Gates here today for follow-up regarding your kidney function  Your creatinine is stable at 1 2  We reviewed events over the last 5 months  He continued to have some shortness of breath, elevated BNP  Examination wise there does not appear to be any overt volume overload but never the less your blood pressure is slightly on the higher side as well we can start furosemide 40 mg daily for the next 5 days  We will monitor weights during this time and over the next 1-2 weeks please repeat your blood work which has already been ordered  We will follow up on this and make further recommendations after the blood work has been completed    Please follow up in 4-6 weeks

## 2019-03-06 NOTE — TELEPHONE ENCOUNTER
----- Message from Colette Kim MD sent at 3/6/2019  5:45 PM EST -----  Please contact patient or his wife  Urine culture was negative  Please also let them know that patient's thyroid function was slightly overactive  Please ask them to contact his endocrinologist, Dr Jade Maldonado, regarding those results    Thank you

## 2019-03-07 ENCOUNTER — TELEPHONE (OUTPATIENT)
Dept: VASCULAR SURGERY | Facility: CLINIC | Age: 74
End: 2019-03-07

## 2019-03-07 ENCOUNTER — TELEPHONE (OUTPATIENT)
Dept: FAMILY MEDICINE CLINIC | Facility: CLINIC | Age: 74
End: 2019-03-07

## 2019-03-07 NOTE — TELEPHONE ENCOUNTER
----- Message from Kailyn Holder MD sent at 3/6/2019  5:45 PM EST -----  Please contact patient or his wife  Urine culture was negative  Please also let them know that patient's thyroid function was slightly overactive  Please ask them to contact his endocrinologist, Dr Lenny Graff, regarding those results    Thank you

## 2019-03-07 NOTE — TELEPHONE ENCOUNTER
Left a voice message to patient to call us back to schedule Leda Weir    Patient's wife Luke Rodriguez called us back   Patient is scheduled for AGRAM at [facility] with Dr Quiros on 3-14-19   She was told the IR will call them on 3-11-19 for consult, no hold on plavix or ASA and nothing to drink or eat after midnight of 3-13-19   She was told the hospital will be calling day before with time to report at [facility]

## 2019-03-11 ENCOUNTER — TELEPHONE (OUTPATIENT)
Dept: RADIOLOGY | Facility: HOSPITAL | Age: 74
End: 2019-03-11

## 2019-03-11 RX ORDER — SODIUM CHLORIDE 9 MG/ML
100 INJECTION, SOLUTION INTRAVENOUS CONTINUOUS
Status: CANCELLED | OUTPATIENT
Start: 2019-03-11

## 2019-03-13 ENCOUNTER — TELEPHONE (OUTPATIENT)
Dept: RADIOLOGY | Facility: HOSPITAL | Age: 74
End: 2019-03-13

## 2019-03-14 ENCOUNTER — TELEPHONE (OUTPATIENT)
Dept: VASCULAR SURGERY | Facility: CLINIC | Age: 74
End: 2019-03-14

## 2019-03-14 ENCOUNTER — HOSPITAL ENCOUNTER (OUTPATIENT)
Dept: RADIOLOGY | Facility: HOSPITAL | Age: 74
Discharge: HOME | End: 2019-03-14
Attending: SURGERY | Admitting: SURGERY
Payer: MEDICARE

## 2019-03-14 VITALS
HEART RATE: 67 BPM | SYSTOLIC BLOOD PRESSURE: 182 MMHG | WEIGHT: 194.31 LBS | BODY MASS INDEX: 30.5 KG/M2 | DIASTOLIC BLOOD PRESSURE: 76 MMHG | OXYGEN SATURATION: 99 % | TEMPERATURE: 97.5 F | RESPIRATION RATE: 18 BRPM | HEIGHT: 67 IN

## 2019-03-14 DIAGNOSIS — L97.909 ATHEROSCLEROSIS OF ARTERY OF EXTREMITY WITH ULCERATION (HCC): ICD-10-CM

## 2019-03-14 DIAGNOSIS — I70.299 ATHEROSCLEROSIS OF ARTERY OF EXTREMITY WITH ULCERATION (HCC): Primary | ICD-10-CM

## 2019-03-14 DIAGNOSIS — L97.909 ATHEROSCLEROSIS OF ARTERY OF EXTREMITY WITH ULCERATION (HCC): Primary | ICD-10-CM

## 2019-03-14 DIAGNOSIS — I70.299 ATHEROSCLEROSIS OF ARTERY OF EXTREMITY WITH ULCERATION (HCC): ICD-10-CM

## 2019-03-14 DIAGNOSIS — I74.09 AORTOILIAC OCCLUSIVE DISEASE (HCC): ICD-10-CM

## 2019-03-14 LAB — GLUCOSE SERPL-MCNC: 215 MG/DL (ref 65–140)

## 2019-03-14 PROCEDURE — C1760 CLOSURE DEV, VASC: HCPCS

## 2019-03-14 PROCEDURE — C1769 GUIDE WIRE: HCPCS

## 2019-03-14 PROCEDURE — 82948 REAGENT STRIP/BLOOD GLUCOSE: CPT

## 2019-03-14 PROCEDURE — 75716 ARTERY X-RAYS ARMS/LEGS: CPT | Performed by: SURGERY

## 2019-03-14 PROCEDURE — 99152 MOD SED SAME PHYS/QHP 5/>YRS: CPT | Performed by: SURGERY

## 2019-03-14 PROCEDURE — C1887 CATHETER, GUIDING: HCPCS

## 2019-03-14 PROCEDURE — 75716 ARTERY X-RAYS ARMS/LEGS: CPT

## 2019-03-14 PROCEDURE — C1894 INTRO/SHEATH, NON-LASER: HCPCS

## 2019-03-14 PROCEDURE — 99152 MOD SED SAME PHYS/QHP 5/>YRS: CPT

## 2019-03-14 PROCEDURE — 76937 US GUIDE VASCULAR ACCESS: CPT

## 2019-03-14 PROCEDURE — 36247 INS CATH ABD/L-EXT ART 3RD: CPT | Performed by: SURGERY

## 2019-03-14 PROCEDURE — 99153 MOD SED SAME PHYS/QHP EA: CPT

## 2019-03-14 RX ORDER — SODIUM CHLORIDE 9 MG/ML
100 INJECTION, SOLUTION INTRAVENOUS CONTINUOUS
Status: DISCONTINUED | OUTPATIENT
Start: 2019-03-14 | End: 2019-03-15 | Stop reason: HOSPADM

## 2019-03-14 RX ORDER — FENTANYL CITRATE 50 UG/ML
INJECTION, SOLUTION INTRAMUSCULAR; INTRAVENOUS CODE/TRAUMA/SEDATION MEDICATION
Status: COMPLETED | OUTPATIENT
Start: 2019-03-14 | End: 2019-03-14

## 2019-03-14 RX ORDER — HEPARIN SODIUM 1000 [USP'U]/ML
INJECTION, SOLUTION INTRAVENOUS; SUBCUTANEOUS CODE/TRAUMA/SEDATION MEDICATION
Status: COMPLETED | OUTPATIENT
Start: 2019-03-14 | End: 2019-03-14

## 2019-03-14 RX ORDER — MIDAZOLAM HYDROCHLORIDE 1 MG/ML
INJECTION INTRAMUSCULAR; INTRAVENOUS CODE/TRAUMA/SEDATION MEDICATION
Status: COMPLETED | OUTPATIENT
Start: 2019-03-14 | End: 2019-03-14

## 2019-03-14 RX ORDER — ACETAMINOPHEN 325 MG/1
650 TABLET ORAL ONCE
Status: COMPLETED | OUTPATIENT
Start: 2019-03-14 | End: 2019-03-14

## 2019-03-14 RX ORDER — SODIUM CHLORIDE 9 MG/ML
100 INJECTION, SOLUTION INTRAVENOUS CONTINUOUS
Status: DISPENSED | OUTPATIENT
Start: 2019-03-14 | End: 2019-03-14

## 2019-03-14 RX ADMIN — IODIXANOL 43 ML: 320 INJECTION, SOLUTION INTRAVASCULAR at 11:14

## 2019-03-14 RX ADMIN — MIDAZOLAM 0.5 MG: 1 INJECTION INTRAMUSCULAR; INTRAVENOUS at 09:50

## 2019-03-14 RX ADMIN — HEPARIN SODIUM 1000 UNITS: 1000 INJECTION INTRAVENOUS; SUBCUTANEOUS at 10:28

## 2019-03-14 RX ADMIN — MIDAZOLAM 0.5 MG: 1 INJECTION INTRAMUSCULAR; INTRAVENOUS at 10:20

## 2019-03-14 RX ADMIN — FENTANYL CITRATE 50 MCG: 50 INJECTION INTRAMUSCULAR; INTRAVENOUS at 10:46

## 2019-03-14 RX ADMIN — MIDAZOLAM 0.5 MG: 1 INJECTION INTRAMUSCULAR; INTRAVENOUS at 09:17

## 2019-03-14 RX ADMIN — MIDAZOLAM 0.5 MG: 1 INJECTION INTRAMUSCULAR; INTRAVENOUS at 09:29

## 2019-03-14 RX ADMIN — HEPARIN SODIUM 5000 UNITS: 1000 INJECTION INTRAVENOUS; SUBCUTANEOUS at 09:46

## 2019-03-14 RX ADMIN — SODIUM CHLORIDE 250 ML: 0.9 INJECTION, SOLUTION INTRAVENOUS at 08:11

## 2019-03-14 RX ADMIN — SODIUM CHLORIDE 100 ML/HR: 0.9 INJECTION, SOLUTION INTRAVENOUS at 11:30

## 2019-03-14 RX ADMIN — ACETAMINOPHEN 650 MG: 325 TABLET ORAL at 14:05

## 2019-03-14 RX ADMIN — FENTANYL CITRATE 25 MCG: 50 INJECTION INTRAMUSCULAR; INTRAVENOUS at 10:27

## 2019-03-14 RX ADMIN — MIDAZOLAM 0.5 MG: 1 INJECTION INTRAMUSCULAR; INTRAVENOUS at 10:34

## 2019-03-14 RX ADMIN — FENTANYL CITRATE 25 MCG: 50 INJECTION INTRAMUSCULAR; INTRAVENOUS at 09:22

## 2019-03-14 RX ADMIN — FENTANYL CITRATE 25 MCG: 50 INJECTION INTRAMUSCULAR; INTRAVENOUS at 09:17

## 2019-03-14 RX ADMIN — FENTANYL CITRATE 50 MCG: 50 INJECTION INTRAMUSCULAR; INTRAVENOUS at 10:08

## 2019-03-14 NOTE — OP NOTE
OPERATIVE REPORT  PATIENT NAME: Conchita Avila    :  1945  MRN: [id number]  Pt Location:  [facility]    SURGERY DATE: 3/14/2019    Surgeon:  Maritza Rios MD    Preoperative and postoperative diagnosis:  1  Atherosclerotic occlusive disease with rest pain and left foot ulceration  2  Atherosclerotic occlusive disease with severe right lower extremity claudication    Procedure:  1  Ultrasound-guided right common femoral artery puncture  2  Bilateral lower extremity runoff      Flouro Time:  36 4 min    Contrast:  43 cc Visipaque 320    Sedation Time:  100 min    Estimated Blood Loss:   Minimal    Anesthesia Type:   Conscious sedation    Operative Indications:  51-year-old with long history of severe peripheral arterial occlusive disease presents with worsening symptoms of left leg  He has progressed from claudication to rest pain with a area of ulceration on the left heel  He also has severe claudication of the right lower extremity  Operative Findings: On the left there is eccentric calcific disease of the common femoral artery with less than 50% stenosis  The deep femoral artery is occluded proximally and reconstitutes  The superficial femoral artery is occluded in the mid segment and reconstitutes in the mid portion of the distal superficial femoral artery stent  The above knee popliteal artery is then patent and relatively normal in appearance with 3 vessel runoff  On the right there is eccentric highly calcified plaque creating a 75% stenosis in the proximal deep and superficial femoral arteries  There is a segmental occlusion of the superficial femoral artery  The above knee popliteal artery is then relatively normal in appearance with a normal trifurcation  Complications:   None    Procedure and Technique:    IV sedation was administered  The right groin was prepped and draped in the normal sterile fashion and Ioban drape was placed      Ultrasound guidance was utilized to puncture the right common femoral artery  A micropuncture system was utilized  The right common femoral artery was directly imaged under B-mode imaging  A [place]oo wire was then placed and a sheath was inserted  The left common iliac artery was then cannulated with a Contra catheter  The catheter was then advanced into the distal external iliac artery  Oblique images were obtained of the femoral bifurcation  Runoff arteriography was then performed with stepped hand injections  A 7 Iraqi sheath was then positioned in the proximal superficial femoral artery  Multiple guidewires and catheters were then used in an attempt to cross the superficial femoral artery occlusion  Attempts to re-enter the true lumen within the distal superficial femoral artery stent were made with a Outback catheter  Completion images were obtained to include runoff to the foot  The sheath was then withdrawn to the ipsilateral external iliac artery and oblique images were obtained of the femoral bifurcation and stepped images were obtained through to the level of the tibial trifurcation  A Star closure device was used to close the right femoral puncture site  Findings: There was significant calcific disease within the common femoral artery  The artery was punctured in a region devoid of any significant disease  On the left there is eccentric calcific disease of the common femoral artery with less than 50% stenosis  The deep femoral artery is occluded proximally and reconstitutes  The superficial femoral artery is occluded in the mid segment and reconstitutes in the mid portion of the distal superficial femoral artery stent  The above knee popliteal artery is then patent and relatively normal in appearance with 3 vessel runoff      Multiple attempts to cross this segmental occlusion with multiple catheters and wires to include an Outback catheter failed to obtain re-entry into the true lumen/the lumen of the distal superficial femoral artery stent  On the right there is eccentric highly calcified plaque creating a 75% stenosis in the proximal deep and superficial femoral arteries  There is a segmental occlusion of the superficial femoral artery  The above knee popliteal artery is then relatively normal in appearance with a normal trifurcation  Conclusion:  On the left secondary to the deep femoral artery occlusion and failed attempt to cross the superficial femoral artery occlusion is felt the best option for revascularization would be femoral endarterectomy to restore flow in the deep femoral artery and femoral-above knee popliteal artery bypass  Following the procedure the vascular exam of both lower extremities was unchanged from pre procedure         I was present for the entire procedure    Patient Disposition:  APU    SIGNATURE: Emilee Nolan MD  DATE: March 14, 2019  TIME: 11:15 AM

## 2019-03-14 NOTE — TELEPHONE ENCOUNTER
Spoke with patient's wife Edyta Lopez and advised her that vein mapping is scheduled for Monday 3/18/19 at 9am

## 2019-03-14 NOTE — TELEPHONE ENCOUNTER
Rec call from Dr Mare Miller  Patient had agram this morning and now needs a left common femoral endarterectomy with femoral to popliteal bypass, vein mapping and cardiology clearance  Requests to schedule as soon as I can as patient has rest pain and wounds  Called OR scheduling and was able to obtain [facility]/OR block Friday 3/22/19  Called cardiology and faxed clearance request  I had to Merged with [facility] for the nurse line  Faxed to [phone]

## 2019-03-14 NOTE — DISCHARGE INSTRUCTIONS
ARTERIOGRAM    WHAT YOU SHOULD KNOW:   An angiogram is a procedure to look at arteries in your body  Arteries are the blood vessels that carry blood from your heart to your body  AFTER YOU LEAVE:     Self-care:   · Limit activity: Rest for the remainder of the day of your procedure  Have some one with you until the next morning  Keep your arm or leg straight as much as possible  Rest as much as possible, sitting lying or reclining  Walk only to go to the bathroom, to bed or to eat  If the angiogram catheter was put in your leg, use the stairs as little as possible  No driving  · Keep your wound clean and dry  You may shower 24 hours after your procedure  The bandage you have on should fall off in 2-3 days  If there is any drainage from the puncture site, you should put on a clean bandage  · Watch for bleeding and bruising: It is normal to have a bruise and soreness where the angiogram catheter went in  · Diet:   · You may resume your regular diet, Sips of flat soda will help with mild nausea  · Drink more liquids than usual for the next 24 hours      · IMMEDIATELY Contact Interventional Radiology at [phone] Lilia PATIENTS: Contact Interventional Radiology at [phone]) Jeffy Randall PATIENTS: Contact Interventional Radiology at [phone]) if any of the following occur:  · If your bruise gets larger or if you notice any active bleeding  APPLY DIRECT PRESSURE TO THE BLEEDING SITE  · If you notice increased swelling or have increased pain at the puncture site   · If you have any numbness or pain in the extremity of the puncture site   · If that extremity seems cold or pale      · You have fever greater than 101  · Persistent nausea or vomitting    Follow up with your primary healthcare provider  as directed: Write down your questions so you remember to ask them during your visits

## 2019-03-15 ENCOUNTER — DOCUMENTATION (OUTPATIENT)
Dept: CARDIOLOGY CLINIC | Facility: CLINIC | Age: 74
End: 2019-03-15

## 2019-03-15 ENCOUNTER — PREP FOR PROCEDURE (OUTPATIENT)
Dept: VASCULAR SURGERY | Facility: CLINIC | Age: 74
End: 2019-03-15

## 2019-03-15 ENCOUNTER — OFFICE VISIT (OUTPATIENT)
Dept: CARDIOLOGY CLINIC | Facility: CLINIC | Age: 74
End: 2019-03-15
Payer: MEDICARE

## 2019-03-15 VITALS
DIASTOLIC BLOOD PRESSURE: 70 MMHG | WEIGHT: 201 LBS | SYSTOLIC BLOOD PRESSURE: 110 MMHG | OXYGEN SATURATION: 95 % | HEIGHT: 67 IN | HEART RATE: 94 BPM | BODY MASS INDEX: 31.55 KG/M2

## 2019-03-15 DIAGNOSIS — I73.9 PVD (PERIPHERAL VASCULAR DISEASE) (HCC): ICD-10-CM

## 2019-03-15 DIAGNOSIS — I70.299 ATHEROSCLEROSIS OF ARTERY OF EXTREMITY WITH ULCERATION (HCC): Primary | ICD-10-CM

## 2019-03-15 DIAGNOSIS — I50.30 (HFPEF) HEART FAILURE WITH PRESERVED EJECTION FRACTION (HCC): Primary | ICD-10-CM

## 2019-03-15 DIAGNOSIS — L97.909 ATHEROSCLEROSIS OF ARTERY OF EXTREMITY WITH ULCERATION (HCC): Primary | ICD-10-CM

## 2019-03-15 DIAGNOSIS — I10 HTN (HYPERTENSION), BENIGN: ICD-10-CM

## 2019-03-15 PROCEDURE — 99215 OFFICE O/P EST HI 40 MIN: CPT | Performed by: INTERNAL MEDICINE

## 2019-03-15 PROCEDURE — 93000 ELECTROCARDIOGRAM COMPLETE: CPT | Performed by: INTERNAL MEDICINE

## 2019-03-15 RX ORDER — FUROSEMIDE 10 MG/ML
80 INJECTION INTRAMUSCULAR; INTRAVENOUS ONCE
Status: COMPLETED | OUTPATIENT
Start: 2019-03-15 | End: 2019-03-15

## 2019-03-15 RX ADMIN — FUROSEMIDE 80 MG: 10 INJECTION INTRAMUSCULAR; INTRAVENOUS at 14:30

## 2019-03-15 NOTE — PROGRESS NOTES
Cardiology Outpatient Progress Note - Chelita Cisneros 68 y o  male MRN: [id number]    @ Encounter: [id number]      Patient Active Problem List    Diagnosis Date Noted    Chronic systolic congestive heart failure ([facility] ) 03/01/2019    History of Ischemic cardiomyopathy 01/30/2019    Diabetic neuropathy associated with type 1 diabetes mellitus ([facility] ) 01/14/2019    Lumbar disc herniation 08/01/2018    Lumbar radiculopathy 08/01/2018    Other specified hypothyroidism 05/24/2018    Aortoiliac occlusive disease ([facility] ) 03/01/2018    Restrictive lung disease 01/30/2018    COPD (chronic obstructive pulmonary disease) ([place] [address] ) 01/30/2018    Atherosclerosis of artery of extremity with ulceration ([place] [address] ) 01/29/2018    Presence of drug coated stent in LAD coronary artery 01/24/2018    Coronary artery disease of native artery of native heart with stable angina pectoris ([place] [address] ) 01/24/2018    Presence of drug coated stent in left circumflex coronary artery 01/24/2018    Dyslipidemia 01/24/2018    Obstructive sleep apnea of adult 01/24/2018    Carotid artery stenosis, asymptomatic, bilateral 01/24/2018    CKD (chronic kidney disease) stage 3, GFR 30-59 ml/min ([facility]) 12/13/2016    Low back pain with sciatica 08/22/2016    Acute kidney injury ([place] [address] ) 01/12/2016    Type 1 diabetes mellitus ([place] [address] ) 01/12/2016    Benign hypertension with chronic kidney disease, stage III ([place] [address] ) 08/12/2015    Renal cyst, right 08/12/2015    Proteinuria 08/11/2015    Vitamin D deficiency 05/22/2015    Renal artery stenosis ([place] [address] ) 05/09/2015    Hypothyroidism, postablative 04/02/2013       Assessment:  # HFpEF, mildy reduced systolic function, Stage C, NYHA 2-3  Diuretic:   Weight: 200 lbs last visit, 201 lbs today  NT pro BNP: 3/5/19: 4549   # CAD- hx of LAD stent, recently in [place] and had restenosis of LAD with stenting again, 50% LCx (in setting on NSTEMI) seams like chest pressure brought him to the hospital  Echo 1/31/18: EF: 45-50%, LVEDd 5 8 cm  # Hyperlipidemia- on Crestor; LDL 46, HDL 39 3/5/19  # HTN- metoprolol 12 5 mg BID   mmHg  # Carotid artery disease  # PVD- stenting to LE  Still with claudication as 6 minute walk limited by leg pain  VAS abdominal aorta: 8/15/18: external iliac arteries and stents are patent bilaterally  50-75% stenosis noted in the left common femoral arter  > 70% stenosis superior mesenteric artery  # COPD-   6 minute walk test 11/28/18: only 2 minutes limited by leg pain, oxygen saturation remained above 94%  # ITZEL on CPAP  # CKD, Cr 1 2, GFR around 60  # Bilateral renal artery stenosis  2/27/18: right renal artery < 60%, left renal artery > 60%  # DM1 with diabetic neuropathy  HgA1C 7 6% on 3/5/19    TODAY'S PLAN:  Lasix 80 mg IV today  Lasix 20 mg daily  Labs in 10 days    HPI:     67 yo male with hx of CAD, hyperlipidemia, HTN, CVD, PVD with bypass and claudication, ITZEL on CPAP who presents for follow up after hospitalization for multiple diagnosis one of which NSTEMI with classic chest pressure and underwent repeat stent to previous LAD  It also sounds like he was admitted for COPD exacerbation and maybe some diuretic  Wife, who is nurse, provides history  She reports at one point his EF was recorded at 35% and then follow up echo 50%  Told to take lasix prn for weight gain  Interval History:   Last time stopped Brilanta and put on Plavix to see if contributing to his dyspnea   Was not taking lasix daily  Saw Nephrology 3/6 and short of breath so prescribed lasix 40 mg daily  Lost 5 lbs during that time    Planned possible atherectomy    Past Medical History:   Diagnosis Date    Acute kidney injury ([address] )     resolved 11/30/2015    Acute venous embolism and thrombosis of deep vessels of proximal lower extremity ([address] )     resolved 04/04/2015    DARINEL (acute kidney injury) ([address] ) 1/12/2016    Cardiac disease     heart attack, stents x 4    CHF (congestive heart failure) (HCC)     Chronic cough resolved 02/04/2016    Chronic pain disorder     intermitent claudication    [facility] disease) ([facility])     Coronary artery disease     CPAP (continuous positive airway pressure) dependence     Diabetes mellitus ([facility] )     Disease of thyroid gland     DVT (deep venous thrombosis) ([facility])     Heart failure ([facility] )     Hyperlipidemia     Hypertension     Ischemic cardiomyopathy     last assessed 09/26/2017    Myocardial infarction [facility])     MI +2 2015,2018    Pulmonary emphysema ([facility] )     Pulmonary granuloma ([facility])     resolved 02/03/2017    Renal failure     Sleep apnea        Review of Systems - [place], weight gain    Allergies   Allergen Reactions    Doxazosin     Cardura [Doxazosin Mesylate]     Other      Iv dye for cardiac cath  Renal failure very close to dialysis  But no dialysis    Zestril [Lisinopril]        Current Outpatient Medications:     aspirin 81 MG tablet, Take 81 mg by mouth daily  , Disp: , Rfl:     cholecalciferol (VITAMIN D3) 1,000 units tablet, Take 1,000 Units by mouth daily  , Disp: , Rfl:     Cholecalciferol (VITAMIN D3) 1000 units CAPS, Take 5,000 Units by mouth daily, Disp: , Rfl:     clopidogrel (PLAVIX) 75 mg tablet, Take 1 tablet (75 mg total) by mouth daily, Disp: 30 tablet, Rfl: 4    Coenzyme Q10 (COQ-10) 200 MG CAPS, Take 200 mg by mouth daily, Disp: , Rfl:     docusate sodium (COLACE) 50 mg capsule, Take by mouth daily, Disp: , Rfl:     furosemide (LASIX) 20 mg tablet, Take 40 mg by mouth daily as needed take daily for the next 5 days, Disp: , Rfl:     gabapentin (NEURONTIN) 100 mg capsule, Take 3 capsules twice a day (Patient taking differently: Take 200 mg by mouth 2 (two) times a day Take 3 capsules twice a day ), Disp: 180 capsule, Rfl: 0    glucagon (GLUCAGON EMERGENCY) 1 MG injection, Inject 1 mg under the skin once as needed for low blood sugar, Disp: 1 each, Rfl: 2    glucagon (GLUCAGON EMERGENCY) 1 MG injection, 1 mg, Disp: , Rfl:     insulin glargine (LANTUS) 100 units/mL subcutaneous injection, Inject 66 Units under the skin daily 38 units in the morning and 28 units in the evening, Disp: 10 mL, Rfl: 0    insulin lispro (HUMALOG) 100 units/mL injection, Inject 3 units with breakfast, 6 units at lunch, and 6 units at dinner, Disp: 10 mL, Rfl: 1    Lactobacillus-Inulin ([facility] ), Take 1 capsule by mouth daily, Disp: , Rfl:     levalbuterol (XOPENEX HFA) 45 mcg/act inhaler, Inhale 1-2 puffs, Disp: , Rfl:     levothyroxine 175 mcg tablet, Take 1 tablet (175 mcg total) by mouth daily in the early morning, Disp: 90 tablet, Rfl: 3    metoprolol tartrate (LOPRESSOR) 25 mg tablet, Take 0 5 tablets (12 5 mg total) by mouth every 12 (twelve) hours, Disp: 45 tablet, Rfl: 3    NIFEdipine 0 3%-lidocaine 5% rectal ointment, Apply 1 application topically every 4 (four) hours as needed for discomfort or pain Apply a small amount to anal opening 4-6 times per day , Disp: 2 oz, Rfl: 0    nitroglycerin (NITROSTAT) 0 4 mg SL tablet, Place 1 tablet (0 4 mg total) under the tongue every 5 (five) minutes as needed for chest pain, Disp: 25 tablet, Rfl: 3    rosuvastatin (CRESTOR) 20 MG tablet, Take 1 tablet (20 mg total) by mouth daily, Disp: 90 tablet, Rfl: 3    ULTICARE INSULIN SYRINGE 30G X 1/2" 1 ML MISC, Use as directed, Disp: , Rfl: 0    ULTICARE INSULIN SYRINGE 30G X 5/16" 0 3 ML MISC, Use as directed, Disp: , Rfl: 0  No current facility-administered medications for this visit       Social History     Socioeconomic History    Marital status: /Civil Union     Spouse name: Not on file    Number of children: Not on file    Years of education: Not on file    Highest education level: Not on file   Occupational History    Occupation: Retired    Occupation: Desk work    Occupation: [employer]   Social Needs    Financial resource strain: Not on file   Independence-Lou insecurity:     Worry: Not on file Inability: Not on file    Transportation needs:     Medical: Not on file     Non-medical: Not on file   Tobacco Use    Smoking status: Former Smoker     Packs/day: 4 00     Years: 48 00     Pack years: 192 00     Types: Cigarettes     Last attempt to quit:      Years since quittin 2    Smokeless tobacco: Never Used    Tobacco comment: smoking 48 years 1 5 ppd d/c oct 2008 screening protocol as per allscripts   Substance and Sexual Activity    Alcohol use: Yes     Alcohol/week: 12 6 oz     Types: 21 Standard drinks or equivalent per week     Comment: 2-3 glasses of vodka daily (6 shots) (history 2 drinks per day as per allscripts    Drug use: No    Sexual activity: Never   Lifestyle    Physical activity:     Days per week: Not on file     Minutes per session: Not on file    Stress: Not on file   Relationships    Social connections:     Talks on phone: Not on file     Gets together: Not on file     Attends [employer] service: Not on file     Active member of club or organization: Not on file     Attends meetings of clubs or organizations: Not on file     Relationship status: Not on file    Intimate partner violence:     Fear of current or ex partner: Not on file     Emotionally abused: Not on file     Physically abused: Not on file     Forced sexual activity: Not on file   Other Topics Concern    Not on file   Social History Narrative    Not on file       Family History   Problem Relation Age of Onset    Heart disease Father         pacer placement    Hypertension Father     Kidney disease Father     Heart failure Father     Heart attack Father     Liver disease Father     Cirrhosis Mother         due to beer consumption    Liver disease Mother     Diabetes Other        Physical Exam:    Vitals: There were no vitals taken for this visit  , There is no height or weight on file to calculate BMI ,   Wt Readings from Last 3 Encounters:   19 88 1 kg (194 lb 5 oz)   19 84 4 kg (186 lb)   03/06/19 90 7 kg (200 lb)       Physical Exam:    GEN: Davis Senna appears well, alert and oriented x 3, pleasant and cooperative   HEENT: pupils equal, round, and reactive to light; extraocular muscles intact  NECK: supple, no carotid bruits   HEART: regular rhythm, normal S1 and S2, no murmurs, clicks, gallops or rubs, JVP is  down  LUNGS: clear to auscultation bilaterally; no wheezes, rales, or rhonchi   ABDOMEN: normal bowel sounds, soft, no tenderness, no distention  EXTREMITIES: peripheral pulses normal; no clubbing, cyanosis, or edema  NEURO: no focal findings   SKIN: normal without suspicious lesions on exposed skin    Labs & Results:    Lab Results   Component Value Date    GLUCOSE 186 (H) 12/21/2015    CALCIUM 9 4 03/05/2019     12/21/2015    K 5 1 03/05/2019    CO2 28 03/05/2019     03/05/2019    BUN 21 03/05/2019    CREATININE 1 20 03/05/2019     Lab Results   Component Value Date    WBC 6 23 03/05/2019    HGB 12 6 03/05/2019    HCT 38 4 03/05/2019    MCV 96 03/05/2019     (L) 03/05/2019     Lab Results   Component Value Date     18 (H) 05/17/2016      Lab Results   Component Value Date    CHOL 129 10/01/2015     Lab Results   Component Value Date    HDL 39 (L) 03/05/2019    HDL 25 (L) 11/28/2018    HDL 46 06/04/2018     Lab Results   Component Value Date    LDLCALC 46 03/05/2019    LDLCALC 19 11/28/2018    LDLCALC 59 06/04/2018     Lab Results   Component Value Date    TRIG 140 03/05/2019    TRIG 240 (H) 11/28/2018    TRIG 190 (H) 06/04/2018     No results found for: CHOLHDL      EKG personally reviewed by Arlene Mckeon DO  Counseling / Coordination of Care  Total floor / unit time spent today 25 minutes  Greater than 50% of total time was spent with the patient and / or family counseling and / or coordination of care  A description of the counseling / coordination of care: 15  Thank you for the opportunity to participate in the care of this patient    T[place]ITA [id number] [place] Glenn Saldivar

## 2019-03-15 NOTE — PROGRESS NOTES
80 mg IV Lasix given w/o difficulty    BP post injection 110/60    Voided: 275 ml    Weighs daily and will continue to do so  Will continue to monitor sodium and fluid intake although Pam Mendez and his wife are going out for dinner now  Provided my card and I will follow up w/ Patient on Monday

## 2019-03-18 ENCOUNTER — TELEPHONE (OUTPATIENT)
Dept: VASCULAR SURGERY | Facility: CLINIC | Age: 74
End: 2019-03-18

## 2019-03-18 ENCOUNTER — HOSPITAL ENCOUNTER (OUTPATIENT)
Dept: NON INVASIVE DIAGNOSTICS | Facility: CLINIC | Age: 74
Discharge: HOME | End: 2019-03-18
Payer: MEDICARE

## 2019-03-18 ENCOUNTER — TELEPHONE (OUTPATIENT)
Dept: CARDIOLOGY CLINIC | Facility: CLINIC | Age: 74
End: 2019-03-18

## 2019-03-18 ENCOUNTER — LAB (OUTPATIENT)
Dept: LAB | Facility: CLINIC | Age: 74
End: 2019-03-18
Payer: MEDICARE

## 2019-03-18 DIAGNOSIS — I70.299 ATHEROSCLEROSIS OF ARTERY OF EXTREMITY WITH ULCERATION (HCC): ICD-10-CM

## 2019-03-18 DIAGNOSIS — L97.909 ATHEROSCLEROSIS OF ARTERY OF EXTREMITY WITH ULCERATION (HCC): ICD-10-CM

## 2019-03-18 DIAGNOSIS — I50.22 CHRONIC SYSTOLIC CONGESTIVE HEART FAILURE (HCC): Primary | ICD-10-CM

## 2019-03-18 DIAGNOSIS — I50.30 (HFPEF) HEART FAILURE WITH PRESERVED EJECTION FRACTION (HCC): ICD-10-CM

## 2019-03-18 LAB
ANION GAP SERPL CALCULATED.3IONS-SCNC: 5 MMOL/L (ref 4–13)
BUN SERPL-MCNC: 27 MG/DL (ref 5–25)
CALCIUM SERPL-MCNC: 8.8 MG/DL (ref 8.3–10.1)
CHLORIDE SERPL-SCNC: 108 MMOL/L (ref 100–108)
CO2 SERPL-SCNC: 26 MMOL/L (ref 21–32)
CREAT SERPL-MCNC: 1.47 MG/DL (ref 0.6–1.3)
GFR SERPL CREATININE-BSD FRML MDRD: 47 ML/MIN/1.73SQ M
GLUCOSE SERPL-MCNC: 109 MG/DL (ref 65–140)
POTASSIUM SERPL-SCNC: 4.7 MMOL/L (ref 3.5–5.3)
SODIUM SERPL-SCNC: 139 MMOL/L (ref 136–145)

## 2019-03-18 PROCEDURE — 93971 EXTREMITY STUDY: CPT

## 2019-03-18 PROCEDURE — 80048 BASIC METABOLIC PNL TOTAL CA: CPT

## 2019-03-18 PROCEDURE — 93971 EXTREMITY STUDY: CPT | Performed by: SURGERY

## 2019-03-18 PROCEDURE — 36415 COLL VENOUS BLD VENIPUNCTURE: CPT | Performed by: INTERNAL MEDICINE

## 2019-03-18 RX ORDER — FUROSEMIDE 20 MG/1
20 TABLET ORAL DAILY
Qty: 90 TABLET | Refills: 3 | Status: SHIPPED | OUTPATIENT
Start: 2019-03-18 | End: 2019-08-29 | Stop reason: SDUPTHER

## 2019-03-18 NOTE — TELEPHONE ENCOUNTER
Mr & Mrs Fidela Apley stopped in office after vein mapping  Fatigue & SOB w/ ambulation unchanged  Voiding did increase post injection, now stable  No edema, no abd bloating  Weight o/v 3/15 201 lbs  Weight home 3/15 195 lbs  3/16 192 6  3/17 192 6  3/18/ 194 4    Admits to increase sodium intake, went out to eat yesterday (beef stew)  Mrs Fidela Apley wants to know if you want echo to be done prior to clearing Chris for sx on Friday? They are heading over to lab now to get BMP  Please advise     C/b # [phone]

## 2019-03-18 NOTE — TELEPHONE ENCOUNTER
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

## 2019-03-18 NOTE — TELEPHONE ENCOUNTER
Adebayo Carreno called me , she spoke with Paulette Timmons at cardiology office, they did not fax form back yet on plavix hold, but  is going to sign tonight  Jeremiah Edmond asked me to call patient and ask him to hold on plavix tonite  I called and patient already took today,i asked him to hold tomorrow in am until he hears from us  While ask nursing to check on clearance tomorrow

## 2019-03-19 ENCOUNTER — TELEPHONE (OUTPATIENT)
Dept: PREADMISSION TESTING | Facility: HOSPITAL | Age: 74
End: 2019-03-19

## 2019-03-19 NOTE — TELEPHONE ENCOUNTER
Rescheduled echo for tomorrow at [address]  Mrs Artis made aware  She is asking about Plavix hold  Can we have Pt start holding Plavix for fem pop scheduled for Friday? Vascular would like Pt to hold medication as of yesterday  Please advise

## 2019-03-19 NOTE — TELEPHONE ENCOUNTER
Received call from [place] heart failure nurse at [facility] cardiology  Pt is in heart failure and needed iv lasix, Dr Alex Nielsen is trying to pull more fluid off of him  He is sched for echo tomorrow am, at this point not cleared yet  Wife is ? What to do re: plavix  Explained surgery is sched, we are awaiting clearance and their ok to hold plavix  She will s/w Dr Alex Nielsen re: this - she thinks pt can hold today, if not cleared for surgery tomorrow can then resume it  She will also s/w wife re: plavix

## 2019-03-20 ENCOUNTER — TELEPHONE (OUTPATIENT)
Dept: PREADMISSION TESTING | Facility: HOSPITAL | Age: 74
End: 2019-03-20

## 2019-03-20 ENCOUNTER — HOSPITAL ENCOUNTER (OUTPATIENT)
Dept: NON INVASIVE DIAGNOSTICS | Facility: CLINIC | Age: 74
Discharge: HOME | DRG: 252 | End: 2019-03-20
Payer: MEDICARE

## 2019-03-20 ENCOUNTER — ANESTHESIA EVENT (OUTPATIENT)
Dept: PERIOP | Facility: HOSPITAL | Age: 74
DRG: 252 | End: 2019-03-20
Payer: MEDICARE

## 2019-03-20 DIAGNOSIS — I25.10 CORONARY ARTERY DISEASE INVOLVING NATIVE CORONARY ARTERY OF NATIVE HEART WITHOUT ANGINA PECTORIS: ICD-10-CM

## 2019-03-20 DIAGNOSIS — I10 HTN (HYPERTENSION), BENIGN: ICD-10-CM

## 2019-03-20 PROCEDURE — 93306 TTE W/DOPPLER COMPLETE: CPT | Performed by: INTERNAL MEDICINE

## 2019-03-20 PROCEDURE — 93306 TTE W/DOPPLER COMPLETE: CPT

## 2019-03-20 NOTE — TELEPHONE ENCOUNTER
wife called to talk to RN -she stated that pt has memory  lost and it would be best to talk to her     asked her that if still ok RN will call her back at time set for 1 - wife stated that would be fine - to call her at [phone]

## 2019-03-20 NOTE — TELEPHONE ENCOUNTER
Called Valerie at Heart failure clinic and asked if the echo was reviewed yet  She said she will call Dr Mary Randall and see about reading the echo and clearing the patient for procedure

## 2019-03-20 NOTE — PRE-PROCEDURE INSTRUCTIONS
Pre-Surgery Instructions:   Medication Instructions    aspirin 81 MG tablet Instructed patient per Anesthesia Guidelines   cholecalciferol (VITAMIN D3) 1,000 units tablet Instructed patient per Anesthesia Guidelines   clopidogrel (PLAVIX) 75 mg tablet Patient was instructed by Physician and understands   Coenzyme Q10 (COQ-10) 200 MG CAPS Instructed patient per Anesthesia Guidelines   docusate sodium (COLACE) 50 mg capsule Instructed patient per Anesthesia Guidelines   furosemide (LASIX) 20 mg tablet Instructed patient per Anesthesia Guidelines   gabapentin (NEURONTIN) 100 mg capsule Instructed patient per Anesthesia Guidelines   glucagon (GLUCAGON EMERGENCY) 1 MG injection Instructed patient per Anesthesia Guidelines   insulin glargine (LANTUS) 100 units/mL subcutaneous injection Instructed patient per Anesthesia Guidelines   insulin lispro (HUMALOG) 100 units/mL injection Instructed patient per Anesthesia Guidelines   Lactobacillus-Inulin ([address]) Instructed patient per Anesthesia Guidelines   levalbuterol (XOPENEX HFA) 45 mcg/act inhaler Instructed patient per Anesthesia Guidelines   levothyroxine 175 mcg tablet Instructed patient per Anesthesia Guidelines   metoprolol tartrate (LOPRESSOR) 25 mg tablet Instructed patient per Anesthesia Guidelines   NIFEdipine 0 3%-lidocaine 5% rectal ointment Instructed patient per Anesthesia Guidelines   nitroglycerin (NITROSTAT) 0 4 mg SL tablet Instructed patient per Anesthesia Guidelines   rosuvastatin (CRESTOR) 20 MG tablet Instructed patient per Anesthesia Guidelines   silver sulfadiazine (SILVADENE,SSD) 1 % cream Instructed patient per Anesthesia Guidelines  REVIEWED  PRINTED SURGICAL INSTRUCTIONS WITH SPOUSE , VERBALIZED UNDERSTANDING  MEDICATIONS REVIEWED  PLAVIX LAST DOSE 3/20  NO VITAMINS OR NSAIDS ONE WEEK BEFORE SURGERY, NPO AFTER MIDNIGHT   SOAP AND SHOWER INSTRUCTIONS REVIEWED- NO INSTRUCTIONS FROM OFFICE  NO INSTRUCTIONS FROM OFFICE FOR ASPIRIN  Diuretic Med Class     Continue this medication up to the evening before surgery/procedure, but do not take the morning of the day of surgery  Antiepileptic Med Class     Continue to take this medication on your normal schedule  If this is an oral medication and you take it in the morning, then you may take this medicine with a sip of water  ASA Med Class: Aspirin     Should be discontinued at least one week prior to planned operation, unless specifically stated otherwise by surgical service  Your Surgeon may have patient stop taking aspirin up to a week before surgery if having intracranial, middle ear, posterior eye, spine surgery or prostate surgery  [Patients taking aspirin for coronary stents should be reviewed by an anesthesiologist in the optimization clinic  Please do not discontinue aspirin in patients with coronary stents unless given specific permission to do so by the cardiologist who prescribed medication ]   If your surgeon approves please continue to take this medication on your normal schedule  You may take this medication on the morning of your surgery with a sip of water  Beta blocker Med Class     Continue to take this heart medication on your normal schedule  If this is an oral medication and you take it in the morning, then you may take this medicine with a sip of water  Calcium Channel Blocker Med Class     Continue to take this heart medication on your normal schedule  If this is an oral medication and you take it in the morning, then you may take this medicine with a sip of water  Herbal Med Class     Stop taking this herbal medications at least one week prior to surgery/procedure  Inhalational Med Class     Continue to take these inhaler medications on your normal schedule up to and including the day of surgery     Insulin Med Class     Pre-Surgery/Procedure Instructions for Adult Patients who Take Medicine for Diabetes or to Control their Blood Sugar     Day Before Surgery/Procedure  Use the directions based on the type of medicine you take for your diabetes  1  If you are having a procedure that does not require a bowel prep:  ? Pre-Mixed Insulin (Intermediate Acting: Humalog 75/25, Humulin 70/30  Novolog 70/30, Regular Insulin)  § Take ½ your regular dose the evening before your procedure  ? Rapid/Fast Acting Insulin/Long Acting Insulin (Humalog U200, NovoLog, Apidra, Lantus, Levemir, Johnston Halo, [place])  § Take your FULL regular dose the day before procedure  ? Oral Diabetic Medicines including Glipizide/Glimepiride/Glucotrol (sulfonylurea)  § Take your regular dose with dinner the evening before your procedure  2  If you are having a procedure (e g  Colonoscopy) that requires a bowel prep and you are allowed to have at least a clear liquid diet:  ? Pre-Mixed Insulin (Intermediate Acting: Humalog 75/25, Humulin 70/30, Novolog 70/30, Regular Insulin)  § Take ½ your regular dose the evening before your procedure  ? Rapid/Fast Acting Insulin (Humalog U200, NovoLog, Apidra, Fiasp)  § Take ½ your regular dose the evening before your procedure  ? Long Acting Insulin (Lantus, Levemir, Johnston Halo)  § Take your FULL regular dose the day before procedure  ? Oral Glipizide/Glimepiride/Glucotrol (sulfonylurea)  § Take ½ your regular dose the evening before your procedure  ? Oral Diabetic Medicines that are NOT Glipizide/Glimepiride/Glucotrol  § Take your regular dose with dinner in the evening before your procedure      Day of Surgery/Procedure  · Long Acting Insulin (Lantus, Levemir, Johnston Halo)  ? If you usually take your Long-Acting Insulin in the morning, take the full dose as scheduled    · With the exception of the morning Long-Acting Insulin noted above, DO NOT take ANY diabetic medicine on the day of your procedure unless you were instructed by the doctor who manages your diabetic medicines  · Continue to check your blood sugars  · If you have an insulin pump then consult with your Endocrinologist for instructions  · If you cannot see your Endocrinologist, on the day of the procedure set your insulin pump to your basal rate only  Please bring your insulin pump supplies to the hospital      This Educational material has been approved by the Patient Education Advisory Committee  Date prepared: 1/17/2018          Expiration date: 1/17/2019        Approval Number:                     Nitroglycerin Med Class     Continue to take your nitroglyerin as directed by your prescribing Physician and notify your Physician and Anesthesiologist if you have needed to increase the frequency or dosage  Platelet Aggregation Inhibitor Clopidogrel (Plavix) Med Class     Should be discontinued at least one week prior to planned operation, unless specifically stated otherwise by surgical service  [Patients taking Plavix for coronary stents should be reviewed by an anesthesiologist in the optimization clinic  Please do not discontinue Plavix in patients with coronary stents unless given specific permission to do so by the cardiologist who prescribed medication ] If your surgeon approves please continue to take this medication on your normal schedule  You may take this medication on the morning of your surgery with a sip of water  Statin Med Class     Continue to take this medication on your normal schedule  If this is an oral medication and you take it in the morning, then you may take this medicine with a sip of water  Stool Softener Med Class     Continue to take this medication on your normal schedule  If this is an oral medication and you take it in the morning, then you may take this medicine with a sip of water  Thyroxine Med Class     Continue to take this medication on your normal schedule    If this is an oral medication and you take it in the morning, then you may take this medicine with a sip of water

## 2019-03-20 NOTE — TELEPHONE ENCOUNTER
Betty Devine from heart failure called back and left vm saying Dr Niraj Lara is going to clear the pt but wants to speak to the pt's wife first about it and then he will complete the clearance

## 2019-03-20 NOTE — TELEPHONE ENCOUNTER
Patients wife called asking about clearance , I told her we were still waiting to hear from dr Vi Carrasco and he was going to touch base with her  If she does not hear from him today, she will call Magali Mullen in the morning

## 2019-03-21 ENCOUNTER — APPOINTMENT (OUTPATIENT)
Dept: LAB | Facility: CLINIC | Age: 74
End: 2019-03-21
Payer: MEDICARE

## 2019-03-21 ENCOUNTER — TELEPHONE (OUTPATIENT)
Dept: CARDIOLOGY CLINIC | Facility: CLINIC | Age: 74
End: 2019-03-21

## 2019-03-21 ENCOUNTER — LAB REQUISITION (OUTPATIENT)
Dept: LAB | Facility: HOSPITAL | Age: 74
DRG: 252 | End: 2019-03-21
Payer: MEDICARE

## 2019-03-21 ENCOUNTER — TRANSCRIBE ORDERS (OUTPATIENT)
Dept: LAB | Facility: CLINIC | Age: 74
End: 2019-03-21

## 2019-03-21 DIAGNOSIS — N18.30 CHRONIC KIDNEY DISEASE, STAGE III (MODERATE) (HCC): ICD-10-CM

## 2019-03-21 DIAGNOSIS — I51.9 MYXEDEMA HEART DISEASE: ICD-10-CM

## 2019-03-21 DIAGNOSIS — I50.22 CHRONIC SYSTOLIC HEART FAILURE (HCC): Primary | ICD-10-CM

## 2019-03-21 DIAGNOSIS — I70.299 ATHEROSCLEROSIS OF ARTERY OF EXTREMITY WITH ULCERATION (HCC): ICD-10-CM

## 2019-03-21 DIAGNOSIS — E03.9 MYXEDEMA HEART DISEASE: ICD-10-CM

## 2019-03-21 DIAGNOSIS — N28.1 ACQUIRED CYST OF KIDNEY: ICD-10-CM

## 2019-03-21 DIAGNOSIS — I70.299 OTHER ATHEROSCLEROSIS OF NATIVE ARTERIES OF EXTREMITIES, UNSPECIFIED EXTREMITY (HCC): ICD-10-CM

## 2019-03-21 DIAGNOSIS — L97.909: ICD-10-CM

## 2019-03-21 DIAGNOSIS — I12.0 PARENCHYMAL RENAL HYPERTENSION, STAGE 5 CHRONIC KIDNEY DISEASE OR END STAGE RENAL DISEASE (HCC): ICD-10-CM

## 2019-03-21 DIAGNOSIS — L97.909 ATHEROSCLEROSIS OF ARTERY OF EXTREMITY WITH ULCERATION (HCC): ICD-10-CM

## 2019-03-21 DIAGNOSIS — N06.0 ISOLATED PROTEINURIA WITH MINOR GLOMERULAR ABNORMALITY: ICD-10-CM

## 2019-03-21 DIAGNOSIS — I70.1 ATHEROSCLEROSIS OF RENAL ARTERY (HCC): ICD-10-CM

## 2019-03-21 LAB
ABO GROUP BLD: NORMAL
ALBUMIN SERPL BCP-MCNC: 3.6 G/DL (ref 3.5–5)
ALP SERPL-CCNC: 92 U/L (ref 46–116)
ALT SERPL W P-5'-P-CCNC: 25 U/L (ref 12–78)
ANION GAP SERPL CALCULATED.3IONS-SCNC: 8 MMOL/L (ref 4–13)
AST SERPL W P-5'-P-CCNC: 18 U/L (ref 5–45)
BILIRUB SERPL-MCNC: 0.4 MG/DL (ref 0.2–1)
BLD GP AB SCN SERPL QL: NEGATIVE
BUN SERPL-MCNC: 36 MG/DL (ref 5–25)
CALCIUM SERPL-MCNC: 9.5 MG/DL (ref 8.3–10.1)
CHLORIDE SERPL-SCNC: 106 MMOL/L (ref 100–108)
CO2 SERPL-SCNC: 28 MMOL/L (ref 21–32)
CREAT SERPL-MCNC: 1.59 MG/DL (ref 0.6–1.3)
GFR SERPL CREATININE-BSD FRML MDRD: 42 ML/MIN/1.73SQ M
GLUCOSE SERPL-MCNC: 168 MG/DL (ref 65–140)
INR PPP: 1.07 (ref 0.86–1.17)
MAGNESIUM SERPL-MCNC: 2.3 MG/DL (ref 1.6–2.6)
PHOSPHATE SERPL-MCNC: 4.1 MG/DL (ref 2.3–4.1)
POTASSIUM SERPL-SCNC: 4.9 MMOL/L (ref 3.5–5.3)
PROT SERPL-MCNC: 7.5 G/DL (ref 6.4–8.2)
PROTHROMBIN TIME: 13.6 SECONDS (ref 11.8–14.2)
RH BLD: POSITIVE
SODIUM SERPL-SCNC: 142 MMOL/L (ref 136–145)
SPECIMEN EXPIRATION DATE: NORMAL
TSH SERPL DL<=0.05 MIU/L-ACNC: 0.38 UIU/ML (ref 0.36–3.74)

## 2019-03-21 PROCEDURE — 84443 ASSAY THYROID STIM HORMONE: CPT

## 2019-03-21 PROCEDURE — 80053 COMPREHEN METABOLIC PANEL: CPT | Performed by: INTERNAL MEDICINE

## 2019-03-21 PROCEDURE — 83735 ASSAY OF MAGNESIUM: CPT | Performed by: INTERNAL MEDICINE

## 2019-03-21 PROCEDURE — 86850 RBC ANTIBODY SCREEN: CPT | Performed by: SURGERY

## 2019-03-21 PROCEDURE — 36415 COLL VENOUS BLD VENIPUNCTURE: CPT | Performed by: INTERNAL MEDICINE

## 2019-03-21 PROCEDURE — 85610 PROTHROMBIN TIME: CPT

## 2019-03-21 PROCEDURE — 84100 ASSAY OF PHOSPHORUS: CPT | Performed by: INTERNAL MEDICINE

## 2019-03-21 PROCEDURE — 86901 BLOOD TYPING SEROLOGIC RH(D): CPT | Performed by: SURGERY

## 2019-03-21 PROCEDURE — 86900 BLOOD TYPING SEROLOGIC ABO: CPT | Performed by: SURGERY

## 2019-03-21 NOTE — ANESTHESIA PREPROCEDURE EVALUATION
Review of Systems/Medical History          Cardiovascular  EKG reviewed, Exercise tolerance (METS): >4,  Hyperlipidemia, Hypertension , Past MI > 6 months, CAD , Cardiac stents: X4   Angina , CHF , PVD,   Comment: Ischemic Cardiomyopathy  3/20/19  EF-45%, Severe Hypokinesis   Severe Pulmonary Hypertension  LEFT VENTRICLE:  The ventricle was mildly dilated  Systolic function was mildly reduced  Ejection fraction was estimated to be 45 %  There was severe hypokinesis of the basal-mid anteroseptal, basal-mid inferior, and basal-mid inferolateral wall(s)  Wall thickness was mildly increased  The changes were consistent with eccentric hypertrophy  Doppler parameters were consistent with abnormal left ventricular relaxation (grade 1 diastolic dysfunction)      LEFT ATRIUM:  The atrium was mildly dilated      MITRAL VALVE:  There was mild annular calcification  There was mild regurgitation      TRICUSPID VALVE:  There was mild regurgitation  Estimated peak PA pressure was 57 mmHg  The findings suggest severe pulmonary hypertension  ,  Pulmonary  Smoker ex-smoker  , COPD , Sleep apnea CPAP,        GI/Hepatic       Chronic kidney disease stage 3,        Endo/Other  Diabetes , History of thyroid disease , hypothyroidism,   Obesity    GYN       Hematology   Musculoskeletal       Neurology    Diabetic neuropathy,    Psychology           Physical Exam    Airway  Comment: Good Jaw thrust  Mallampati score: III  TM Distance: >3 FB  Neck ROM: full     Dental   No notable dental hx     Cardiovascular      Pulmonary      Other Findings        Anesthesia Plan  ASA Score- 4     Anesthesia Type- general with ASA Monitors  Additional Monitors: arterial line  Airway Plan: ETT  Plan Factors-    Induction- intravenous  Postoperative Plan-     Informed Consent- Anesthetic plan and risks discussed with patient and spouse  I personally reviewed this patient with the CRNA   Discussed and agreed on the Anesthesia Plan with the CRNA             Lab Results   Component Value Date    GLUC 168 (H) 03/21/2019    GLUF 74 03/05/2019    ALT 25 03/21/2019    AST 18 03/21/2019    BUN 36 (H) 03/21/2019    CALCIUM 9 5 03/21/2019     03/21/2019    CHOL 129 10/01/2015    CO2 28 03/21/2019    CREATININE 1 59 (H) 03/21/2019    HDL 39 (L) 03/05/2019    INR 1 07 03/21/2019    HCT 38 4 03/05/2019    HGB 12 6 03/05/2019    PROT 7 6 10/01/2015    HGBA1C 7 6 (H) 03/05/2019    MG 2 3 03/21/2019    PHOS 4 1 03/21/2019     (L) 03/05/2019    K 4 9 03/21/2019    PSA 0 6 10/04/2016     12/21/2015    TRIG 140 03/05/2019    WBC 6 23 03/05/2019

## 2019-03-21 NOTE — TELEPHONE ENCOUNTER
Pt's spouse Rigo Lester called, she would like a c/b to discuss plan for sx  I did tell her you called and left a message yesterday to discuss  States she did not have a message  I talked to her about Chris's echo and that you felt he was ok to have sx  She still would like a call from you     C/b # [phone]

## 2019-03-22 ENCOUNTER — HOSPITAL ENCOUNTER (INPATIENT)
Facility: HOSPITAL | Age: 74
LOS: 6 days | Discharge: HOME HEALTH | DRG: 252 | End: 2019-03-28
Attending: SURGERY | Admitting: SURGERY
Payer: MEDICARE

## 2019-03-22 ENCOUNTER — APPOINTMENT (INPATIENT)
Dept: RADIOLOGY | Facility: HOSPITAL | Age: 74
DRG: 252 | End: 2019-03-22
Payer: MEDICARE

## 2019-03-22 ENCOUNTER — ANESTHESIA (OUTPATIENT)
Dept: PERIOP | Facility: HOSPITAL | Age: 74
DRG: 252 | End: 2019-03-22
Payer: MEDICARE

## 2019-03-22 ENCOUNTER — TELEPHONE (OUTPATIENT)
Dept: NEPHROLOGY | Facility: CLINIC | Age: 74
End: 2019-03-22

## 2019-03-22 DIAGNOSIS — I21.4 NSTEMI (NON-ST ELEVATED MYOCARDIAL INFARCTION) (HCC): ICD-10-CM

## 2019-03-22 DIAGNOSIS — I74.09 AORTOILIAC OCCLUSIVE DISEASE (HCC): Chronic | ICD-10-CM

## 2019-03-22 DIAGNOSIS — N17.9 ACUTE KIDNEY INJURY (HCC): Primary | ICD-10-CM

## 2019-03-22 DIAGNOSIS — E11.51 DM (DIABETES MELLITUS), TYPE 2 WITH PERIPHERAL VASCULAR COMPLICATIONS (HCC): ICD-10-CM

## 2019-03-22 DIAGNOSIS — I50.22 CHRONIC SYSTOLIC CONGESTIVE HEART FAILURE (HCC): ICD-10-CM

## 2019-03-22 DIAGNOSIS — E55.9 VITAMIN D DEFICIENCY: ICD-10-CM

## 2019-03-22 DIAGNOSIS — N18.30 CKD (CHRONIC KIDNEY DISEASE) STAGE 3, GFR 30-59 ML/MIN (HCC): ICD-10-CM

## 2019-03-22 DIAGNOSIS — L97.909 ATHEROSCLEROSIS OF ARTERY OF EXTREMITY WITH ULCERATION (HCC): Chronic | ICD-10-CM

## 2019-03-22 DIAGNOSIS — I70.299 ATHEROSCLEROSIS OF ARTERY OF EXTREMITY WITH ULCERATION (HCC): Chronic | ICD-10-CM

## 2019-03-22 DIAGNOSIS — E89.0 HYPOTHYROIDISM, POSTABLATIVE: ICD-10-CM

## 2019-03-22 DIAGNOSIS — I25.5 ISCHEMIC CARDIOMYOPATHY: ICD-10-CM

## 2019-03-22 DIAGNOSIS — S91.302A NON HEALING LEFT HEEL WOUND: ICD-10-CM

## 2019-03-22 LAB
ALBUMIN SERPL BCP-MCNC: 3.4 G/DL (ref 3.5–5)
ALP SERPL-CCNC: 70 U/L (ref 46–116)
ALT SERPL W P-5'-P-CCNC: 17 U/L (ref 12–78)
ANION GAP SERPL CALCULATED.3IONS-SCNC: 6 MMOL/L (ref 4–13)
AST SERPL W P-5'-P-CCNC: 18 U/L (ref 5–45)
BASE EXCESS BLDA CALC-SCNC: -2 MMOL/L (ref -2–3)
BASE EXCESS BLDA CALC-SCNC: -5 MMOL/L (ref -2–3)
BASE EXCESS BLDA CALC-SCNC: -5 MMOL/L (ref -2–3)
BASOPHILS # BLD AUTO: 0.01 THOUSANDS/ΜL (ref 0–0.1)
BASOPHILS NFR BLD AUTO: 0 % (ref 0–1)
BILIRUB SERPL-MCNC: 0.33 MG/DL (ref 0.2–1)
BUN SERPL-MCNC: 38 MG/DL (ref 5–25)
CA-I BLD-SCNC: 1.09 MMOL/L (ref 1.12–1.32)
CA-I BLD-SCNC: 1.09 MMOL/L (ref 1.12–1.32)
CA-I BLD-SCNC: 1.15 MMOL/L (ref 1.12–1.32)
CA-I BLD-SCNC: 1.2 MMOL/L (ref 1.12–1.32)
CALCIUM SERPL-MCNC: 8.1 MG/DL (ref 8.3–10.1)
CHLORIDE SERPL-SCNC: 112 MMOL/L (ref 100–108)
CO2 SERPL-SCNC: 21 MMOL/L (ref 21–32)
CREAT SERPL-MCNC: 1.56 MG/DL (ref 0.6–1.3)
EOSINOPHIL # BLD AUTO: 0.04 THOUSAND/ΜL (ref 0–0.61)
EOSINOPHIL NFR BLD AUTO: 1 % (ref 0–6)
ERYTHROCYTE [DISTWIDTH] IN BLOOD BY AUTOMATED COUNT: 13.6 % (ref 11.6–15.1)
GFR SERPL CREATININE-BSD FRML MDRD: 43 ML/MIN/1.73SQ M
GLUCOSE SERPL-MCNC: 178 MG/DL (ref 65–140)
GLUCOSE SERPL-MCNC: 187 MG/DL (ref 65–140)
GLUCOSE SERPL-MCNC: 189 MG/DL (ref 65–140)
GLUCOSE SERPL-MCNC: 207 MG/DL (ref 65–140)
GLUCOSE SERPL-MCNC: 207 MG/DL (ref 65–140)
GLUCOSE SERPL-MCNC: 226 MG/DL (ref 65–140)
GLUCOSE SERPL-MCNC: 248 MG/DL (ref 65–140)
GLUCOSE SERPL-MCNC: 59 MG/DL (ref 65–140)
GLUCOSE SERPL-MCNC: 65 MG/DL (ref 65–140)
GLUCOSE SERPL-MCNC: 66 MG/DL (ref 65–140)
GLUCOSE SERPL-MCNC: 90 MG/DL (ref 65–140)
HCO3 BLDA-SCNC: 19.6 MMOL/L (ref 22–28)
HCO3 BLDA-SCNC: 20.4 MMOL/L (ref 22–28)
HCO3 BLDA-SCNC: 23.2 MMOL/L (ref 24–30)
HCT VFR BLD AUTO: 28.7 % (ref 36.5–49.3)
HCT VFR BLD CALC: 27 % (ref 36.5–49.3)
HCT VFR BLD CALC: 29 % (ref 36.5–49.3)
HCT VFR BLD CALC: 30 % (ref 36.5–49.3)
HGB BLD-MCNC: 9.3 G/DL (ref 12–17)
HGB BLDA-MCNC: 10.2 G/DL (ref 12–17)
HGB BLDA-MCNC: 9.2 G/DL (ref 12–17)
HGB BLDA-MCNC: 9.9 G/DL (ref 12–17)
IMM GRANULOCYTES # BLD AUTO: 0.01 THOUSAND/UL (ref 0–0.2)
IMM GRANULOCYTES NFR BLD AUTO: 0 % (ref 0–2)
INR PPP: 1.11 (ref 0.86–1.17)
KCT BLD-ACNC: 126 SEC (ref 89–137)
KCT BLD-ACNC: 189 SEC (ref 89–137)
KCT BLD-ACNC: 195 SEC (ref 89–137)
KCT BLD-ACNC: 200 SEC (ref 89–137)
KCT BLD-ACNC: 218 SEC (ref 89–137)
KCT BLD-ACNC: 242 SEC (ref 89–137)
LACTATE SERPL-SCNC: 1.7 MMOL/L (ref 0.5–2)
LYMPHOCYTES # BLD AUTO: 1.17 THOUSANDS/ΜL (ref 0.6–4.47)
LYMPHOCYTES NFR BLD AUTO: 16 % (ref 14–44)
MAGNESIUM SERPL-MCNC: 2.1 MG/DL (ref 1.6–2.6)
MCH RBC QN AUTO: 31.5 PG (ref 26.8–34.3)
MCHC RBC AUTO-ENTMCNC: 32.4 G/DL (ref 31.4–37.4)
MCV RBC AUTO: 97 FL (ref 82–98)
MONOCYTES # BLD AUTO: 0.93 THOUSAND/ΜL (ref 0.17–1.22)
MONOCYTES NFR BLD AUTO: 13 % (ref 4–12)
NEUTROPHILS # BLD AUTO: 5.29 THOUSANDS/ΜL (ref 1.85–7.62)
NEUTS SEG NFR BLD AUTO: 70 % (ref 43–75)
NRBC BLD AUTO-RTO: 0 /100 WBCS
PCO2 BLD: 21 MMOL/L (ref 21–32)
PCO2 BLD: 21 MMOL/L (ref 21–32)
PCO2 BLD: 24 MMOL/L (ref 21–32)
PCO2 BLD: 34.6 MM HG (ref 36–44)
PCO2 BLD: 37.6 MM HG (ref 36–44)
PCO2 BLD: 42.8 MM HG (ref 42–50)
PH BLD: 7.34 [PH] (ref 7.35–7.45)
PH BLD: 7.34 [PH] (ref 7.3–7.4)
PH BLD: 7.36 [PH] (ref 7.35–7.45)
PHOSPHATE SERPL-MCNC: 3.5 MG/DL (ref 2.3–4.1)
PLATELET # BLD AUTO: 120 THOUSANDS/UL (ref 149–390)
PMV BLD AUTO: 12 FL (ref 8.9–12.7)
PO2 BLD: 161 MM HG (ref 75–129)
PO2 BLD: 173 MM HG (ref 75–129)
PO2 BLD: 225 MM HG (ref 35–45)
POTASSIUM BLD-SCNC: 3.8 MMOL/L (ref 3.5–5.3)
POTASSIUM BLD-SCNC: 4.3 MMOL/L (ref 3.5–5.3)
POTASSIUM BLD-SCNC: 4.4 MMOL/L (ref 3.5–5.3)
POTASSIUM SERPL-SCNC: 4.7 MMOL/L (ref 3.5–5.3)
PROT SERPL-MCNC: 6.3 G/DL (ref 6.4–8.2)
PROTHROMBIN TIME: 14.4 SECONDS (ref 11.8–14.2)
RBC # BLD AUTO: 2.95 MILLION/UL (ref 3.88–5.62)
SAO2 % BLD FROM PO2: 100 % (ref 95–98)
SAO2 % BLD FROM PO2: 99 % (ref 95–98)
SAO2 % BLD FROM PO2: 99 % (ref 95–98)
SODIUM BLD-SCNC: 139 MMOL/L (ref 136–145)
SODIUM BLD-SCNC: 140 MMOL/L (ref 136–145)
SODIUM BLD-SCNC: 141 MMOL/L (ref 136–145)
SODIUM SERPL-SCNC: 139 MMOL/L (ref 136–145)
SPECIMEN SOURCE: ABNORMAL
SPECIMEN SOURCE: NORMAL
TROPONIN I SERPL-MCNC: 0.07 NG/ML
TROPONIN I SERPL-MCNC: 0.11 NG/ML
TROPONIN I SERPL-MCNC: 0.24 NG/ML
WBC # BLD AUTO: 7.45 THOUSAND/UL (ref 4.31–10.16)

## 2019-03-22 PROCEDURE — 84100 ASSAY OF PHOSPHORUS: CPT | Performed by: NURSE PRACTITIONER

## 2019-03-22 PROCEDURE — 37221 PR REVASCULARIZE ILIAC ARTERY,ANGIOPLASTY/STENT, INITIAL VESSEL: CPT | Performed by: PHYSICIAN ASSISTANT

## 2019-03-22 PROCEDURE — 85347 COAGULATION TIME ACTIVATED: CPT

## 2019-03-22 PROCEDURE — 93005 ELECTROCARDIOGRAM TRACING: CPT

## 2019-03-22 PROCEDURE — 82947 ASSAY GLUCOSE BLOOD QUANT: CPT

## 2019-03-22 PROCEDURE — 84484 ASSAY OF TROPONIN QUANT: CPT | Performed by: ANESTHESIOLOGY

## 2019-03-22 PROCEDURE — 80053 COMPREHEN METABOLIC PANEL: CPT | Performed by: NURSE PRACTITIONER

## 2019-03-22 PROCEDURE — 99223 1ST HOSP IP/OBS HIGH 75: CPT | Performed by: SURGERY

## 2019-03-22 PROCEDURE — 047J3DZ DILATION OF LEFT EXTERNAL ILIAC ARTERY WITH INTRALUMINAL DEVICE, PERCUTANEOUS APPROACH: ICD-10-PCS | Performed by: SURGERY

## 2019-03-22 PROCEDURE — 83735 ASSAY OF MAGNESIUM: CPT | Performed by: NURSE PRACTITIONER

## 2019-03-22 PROCEDURE — 84484 ASSAY OF TROPONIN QUANT: CPT | Performed by: NURSE PRACTITIONER

## 2019-03-22 PROCEDURE — 04CL0ZZ EXTIRPATION OF MATTER FROM LEFT FEMORAL ARTERY, OPEN APPROACH: ICD-10-PCS | Performed by: SURGERY

## 2019-03-22 PROCEDURE — C1725 CATH, TRANSLUMIN NON-LASER: HCPCS | Performed by: SURGERY

## 2019-03-22 PROCEDURE — 75710 ARTERY X-RAYS ARM/LEG: CPT

## 2019-03-22 PROCEDURE — 84132 ASSAY OF SERUM POTASSIUM: CPT

## 2019-03-22 PROCEDURE — C1894 INTRO/SHEATH, NON-LASER: HCPCS | Performed by: SURGERY

## 2019-03-22 PROCEDURE — 82948 REAGENT STRIP/BLOOD GLUCOSE: CPT

## 2019-03-22 PROCEDURE — 94760 N-INVAS EAR/PLS OXIMETRY 1: CPT

## 2019-03-22 PROCEDURE — 82330 ASSAY OF CALCIUM: CPT | Performed by: NURSE PRACTITIONER

## 2019-03-22 PROCEDURE — 35656 BPG FEMORAL-POPLITEAL: CPT | Performed by: PHYSICIAN ASSISTANT

## 2019-03-22 PROCEDURE — B41GYZZ FLUOROSCOPY OF LEFT LOWER EXTREMITY ARTERIES USING OTHER CONTRAST: ICD-10-PCS | Performed by: SURGERY

## 2019-03-22 PROCEDURE — 85610 PROTHROMBIN TIME: CPT | Performed by: NURSE PRACTITIONER

## 2019-03-22 PROCEDURE — 85025 COMPLETE CBC W/AUTO DIFF WBC: CPT | Performed by: NURSE PRACTITIONER

## 2019-03-22 PROCEDURE — C1769 GUIDE WIRE: HCPCS | Performed by: SURGERY

## 2019-03-22 PROCEDURE — 94640 AIRWAY INHALATION TREATMENT: CPT

## 2019-03-22 PROCEDURE — 35656 BPG FEMORAL-POPLITEAL: CPT | Performed by: SURGERY

## 2019-03-22 PROCEDURE — 83605 ASSAY OF LACTIC ACID: CPT | Performed by: NURSE PRACTITIONER

## 2019-03-22 PROCEDURE — 86920 COMPATIBILITY TEST SPIN: CPT

## 2019-03-22 PROCEDURE — 99222 1ST HOSP IP/OBS MODERATE 55: CPT | Performed by: INTERNAL MEDICINE

## 2019-03-22 PROCEDURE — 37221 PR REVASCULARIZE ILIAC ARTERY,ANGIOPLASTY/STENT, INITIAL VESSEL: CPT | Performed by: SURGERY

## 2019-03-22 PROCEDURE — 84295 ASSAY OF SERUM SODIUM: CPT

## 2019-03-22 PROCEDURE — 82330 ASSAY OF CALCIUM: CPT

## 2019-03-22 PROCEDURE — 85014 HEMATOCRIT: CPT

## 2019-03-22 PROCEDURE — 04UL0KZ SUPPLEMENT LEFT FEMORAL ARTERY WITH NONAUTOLOGOUS TISSUE SUBSTITUTE, OPEN APPROACH: ICD-10-PCS | Performed by: SURGERY

## 2019-03-22 PROCEDURE — C1768 GRAFT, VASCULAR: HCPCS | Performed by: SURGERY

## 2019-03-22 PROCEDURE — C1781 MESH (IMPLANTABLE): HCPCS | Performed by: SURGERY

## 2019-03-22 PROCEDURE — C1876 STENT, NON-COA/NON-COV W/DEL: HCPCS | Performed by: SURGERY

## 2019-03-22 PROCEDURE — 82803 BLOOD GASES ANY COMBINATION: CPT

## 2019-03-22 PROCEDURE — 041L0JL BYPASS LEFT FEMORAL ARTERY TO POPLITEAL ARTERY WITH SYNTHETIC SUBSTITUTE, OPEN APPROACH: ICD-10-PCS | Performed by: SURGERY

## 2019-03-22 DEVICE — GRAFT VASC GORTEX PROPATEN 8MM: Type: IMPLANTABLE DEVICE | Site: ARTERIAL | Status: FUNCTIONAL

## 2019-03-22 DEVICE — IMPLANTABLE DEVICE: Type: IMPLANTABLE DEVICE | Site: ARTERIAL | Status: FUNCTIONAL

## 2019-03-22 DEVICE — XENOSURE BIOLOGIC PATCH, 0.8CM X 8CM, EIFU
Type: IMPLANTABLE DEVICE | Site: ARTERIAL | Status: FUNCTIONAL
Brand: XENOSURE BIOLOGIC PATCH

## 2019-03-22 RX ORDER — LIDOCAINE HYDROCHLORIDE 10 MG/ML
INJECTION, SOLUTION INFILTRATION; PERINEURAL AS NEEDED
Status: DISCONTINUED | OUTPATIENT
Start: 2019-03-22 | End: 2019-03-22 | Stop reason: SURG

## 2019-03-22 RX ORDER — NITROGLYCERIN 20 MG/100ML
INJECTION INTRAVENOUS CONTINUOUS PRN
Status: DISCONTINUED | OUTPATIENT
Start: 2019-03-22 | End: 2019-03-22 | Stop reason: SURG

## 2019-03-22 RX ORDER — HYDROMORPHONE HCL/PF 1 MG/ML
0.5 SYRINGE (ML) INJECTION
Status: DISCONTINUED | OUTPATIENT
Start: 2019-03-22 | End: 2019-03-22 | Stop reason: HOSPADM

## 2019-03-22 RX ORDER — DOCUSATE SODIUM 100 MG/1
100 CAPSULE, LIQUID FILLED ORAL 2 TIMES DAILY
Status: DISCONTINUED | OUTPATIENT
Start: 2019-03-22 | End: 2019-03-28 | Stop reason: HOSPADM

## 2019-03-22 RX ORDER — OXYCODONE HYDROCHLORIDE 10 MG/1
10 TABLET ORAL EVERY 4 HOURS PRN
Status: DISCONTINUED | OUTPATIENT
Start: 2019-03-22 | End: 2019-03-28 | Stop reason: HOSPADM

## 2019-03-22 RX ORDER — NITROGLYCERIN 20 MG/100ML
5-200 INJECTION INTRAVENOUS
Status: DISCONTINUED | OUTPATIENT
Start: 2019-03-22 | End: 2019-03-25

## 2019-03-22 RX ORDER — HEPARIN SODIUM 5000 [USP'U]/ML
5000 INJECTION, SOLUTION INTRAVENOUS; SUBCUTANEOUS EVERY 8 HOURS SCHEDULED
Status: DISCONTINUED | OUTPATIENT
Start: 2019-03-22 | End: 2019-03-22

## 2019-03-22 RX ORDER — FENTANYL CITRATE 50 UG/ML
INJECTION, SOLUTION INTRAMUSCULAR; INTRAVENOUS AS NEEDED
Status: DISCONTINUED | OUTPATIENT
Start: 2019-03-22 | End: 2019-03-22 | Stop reason: SURG

## 2019-03-22 RX ORDER — SODIUM CHLORIDE, SODIUM GLUCONATE, SODIUM ACETATE, POTASSIUM CHLORIDE, MAGNESIUM CHLORIDE, SODIUM PHOSPHATE, DIBASIC, AND POTASSIUM PHOSPHATE .53; .5; .37; .037; .03; .012; .00082 G/100ML; G/100ML; G/100ML; G/100ML; G/100ML; G/100ML; G/100ML
75 INJECTION, SOLUTION INTRAVENOUS CONTINUOUS
Status: DISCONTINUED | OUTPATIENT
Start: 2019-03-22 | End: 2019-03-23

## 2019-03-22 RX ORDER — MELATONIN
1000 DAILY
Status: DISCONTINUED | OUTPATIENT
Start: 2019-03-23 | End: 2019-03-28 | Stop reason: HOSPADM

## 2019-03-22 RX ORDER — SODIUM CHLORIDE 9 MG/ML
INJECTION, SOLUTION INTRAVENOUS CONTINUOUS PRN
Status: DISCONTINUED | OUTPATIENT
Start: 2019-03-22 | End: 2019-03-22 | Stop reason: SURG

## 2019-03-22 RX ORDER — HYDROMORPHONE HCL/PF 1 MG/ML
SYRINGE (ML) INJECTION AS NEEDED
Status: DISCONTINUED | OUTPATIENT
Start: 2019-03-22 | End: 2019-03-22 | Stop reason: SURG

## 2019-03-22 RX ORDER — INSULIN GLARGINE 100 [IU]/ML
40 INJECTION, SOLUTION SUBCUTANEOUS EVERY MORNING
Status: DISCONTINUED | OUTPATIENT
Start: 2019-03-23 | End: 2019-03-23

## 2019-03-22 RX ORDER — OXYCODONE HYDROCHLORIDE 5 MG/1
5 TABLET ORAL EVERY 4 HOURS PRN
Status: DISCONTINUED | OUTPATIENT
Start: 2019-03-22 | End: 2019-03-28 | Stop reason: HOSPADM

## 2019-03-22 RX ORDER — MEPERIDINE HYDROCHLORIDE 25 MG/ML
25 INJECTION INTRAMUSCULAR; INTRAVENOUS; SUBCUTANEOUS
Status: DISCONTINUED | OUTPATIENT
Start: 2019-03-22 | End: 2019-03-22 | Stop reason: HOSPADM

## 2019-03-22 RX ORDER — SUCCINYLCHOLINE/SOD CL,ISO/PF 100 MG/5ML
SYRINGE (ML) INTRAVENOUS AS NEEDED
Status: DISCONTINUED | OUTPATIENT
Start: 2019-03-22 | End: 2019-03-22 | Stop reason: SURG

## 2019-03-22 RX ORDER — ATORVASTATIN CALCIUM 40 MG/1
40 TABLET, FILM COATED ORAL
Status: DISCONTINUED | OUTPATIENT
Start: 2019-03-22 | End: 2019-03-28 | Stop reason: HOSPADM

## 2019-03-22 RX ORDER — SODIUM CHLORIDE, SODIUM LACTATE, POTASSIUM CHLORIDE, CALCIUM CHLORIDE 600; 310; 30; 20 MG/100ML; MG/100ML; MG/100ML; MG/100ML
INJECTION, SOLUTION INTRAVENOUS CONTINUOUS PRN
Status: DISCONTINUED | OUTPATIENT
Start: 2019-03-22 | End: 2019-03-22

## 2019-03-22 RX ORDER — ROCURONIUM BROMIDE 10 MG/ML
INJECTION, SOLUTION INTRAVENOUS AS NEEDED
Status: DISCONTINUED | OUTPATIENT
Start: 2019-03-22 | End: 2019-03-22 | Stop reason: SURG

## 2019-03-22 RX ORDER — ACETAMINOPHEN 325 MG/1
975 TABLET ORAL EVERY 8 HOURS SCHEDULED
Status: DISCONTINUED | OUTPATIENT
Start: 2019-03-22 | End: 2019-03-27

## 2019-03-22 RX ORDER — NITROGLYCERIN 20 MG/100ML
50 INJECTION INTRAVENOUS
Status: DISCONTINUED | OUTPATIENT
Start: 2019-03-22 | End: 2019-03-22

## 2019-03-22 RX ORDER — NEOSTIGMINE METHYLSULFATE 1 MG/ML
INJECTION INTRAVENOUS AS NEEDED
Status: DISCONTINUED | OUTPATIENT
Start: 2019-03-22 | End: 2019-03-22 | Stop reason: SURG

## 2019-03-22 RX ORDER — CLOPIDOGREL BISULFATE 75 MG/1
75 TABLET ORAL DAILY
Status: DISCONTINUED | OUTPATIENT
Start: 2019-03-23 | End: 2019-03-28 | Stop reason: HOSPADM

## 2019-03-22 RX ORDER — LORAZEPAM 2 MG/ML
INJECTION INTRAMUSCULAR AS NEEDED
Status: DISCONTINUED | OUTPATIENT
Start: 2019-03-22 | End: 2019-03-22 | Stop reason: SURG

## 2019-03-22 RX ORDER — HYDROMORPHONE HCL/PF 1 MG/ML
1 SYRINGE (ML) INJECTION
Status: DISCONTINUED | OUTPATIENT
Start: 2019-03-22 | End: 2019-03-23

## 2019-03-22 RX ORDER — PROPOFOL 10 MG/ML
INJECTION, EMULSION INTRAVENOUS AS NEEDED
Status: DISCONTINUED | OUTPATIENT
Start: 2019-03-22 | End: 2019-03-22 | Stop reason: SURG

## 2019-03-22 RX ORDER — POTASSIUM CHLORIDE 29.8 MG/ML
40 INJECTION INTRAVENOUS ONCE
Status: DISCONTINUED | OUTPATIENT
Start: 2019-03-22 | End: 2019-03-22

## 2019-03-22 RX ORDER — ONDANSETRON 2 MG/ML
4 INJECTION INTRAMUSCULAR; INTRAVENOUS ONCE AS NEEDED
Status: DISCONTINUED | OUTPATIENT
Start: 2019-03-22 | End: 2019-03-22 | Stop reason: HOSPADM

## 2019-03-22 RX ORDER — ALBUTEROL SULFATE 90 UG/1
2 AEROSOL, METERED RESPIRATORY (INHALATION) EVERY 4 HOURS PRN
Status: DISCONTINUED | OUTPATIENT
Start: 2019-03-22 | End: 2019-03-22

## 2019-03-22 RX ORDER — ALBUMIN, HUMAN INJ 5% 5 %
SOLUTION INTRAVENOUS CONTINUOUS PRN
Status: DISCONTINUED | OUTPATIENT
Start: 2019-03-22 | End: 2019-03-22 | Stop reason: SURG

## 2019-03-22 RX ORDER — GLYCOPYRROLATE 0.2 MG/ML
INJECTION INTRAMUSCULAR; INTRAVENOUS AS NEEDED
Status: DISCONTINUED | OUTPATIENT
Start: 2019-03-22 | End: 2019-03-22 | Stop reason: SURG

## 2019-03-22 RX ORDER — ONDANSETRON 2 MG/ML
INJECTION INTRAMUSCULAR; INTRAVENOUS AS NEEDED
Status: DISCONTINUED | OUTPATIENT
Start: 2019-03-22 | End: 2019-03-22 | Stop reason: SURG

## 2019-03-22 RX ORDER — PROTAMINE SULFATE 10 MG/ML
INJECTION, SOLUTION INTRAVENOUS AS NEEDED
Status: DISCONTINUED | OUTPATIENT
Start: 2019-03-22 | End: 2019-03-22 | Stop reason: SURG

## 2019-03-22 RX ORDER — ASPIRIN 81 MG/1
81 TABLET, CHEWABLE ORAL DAILY
Status: DISCONTINUED | OUTPATIENT
Start: 2019-03-23 | End: 2019-03-28 | Stop reason: HOSPADM

## 2019-03-22 RX ORDER — INSULIN GLARGINE 100 [IU]/ML
18 INJECTION, SOLUTION SUBCUTANEOUS
Status: DISCONTINUED | OUTPATIENT
Start: 2019-03-22 | End: 2019-03-23

## 2019-03-22 RX ORDER — HYDRALAZINE HYDROCHLORIDE 25 MG/1
25 TABLET, FILM COATED ORAL EVERY 8 HOURS SCHEDULED
Status: DISCONTINUED | OUTPATIENT
Start: 2019-03-22 | End: 2019-03-23

## 2019-03-22 RX ORDER — HEPARIN SODIUM 1000 [USP'U]/ML
INJECTION, SOLUTION INTRAVENOUS; SUBCUTANEOUS AS NEEDED
Status: DISCONTINUED | OUTPATIENT
Start: 2019-03-22 | End: 2019-03-22 | Stop reason: SURG

## 2019-03-22 RX ORDER — LEVALBUTEROL 1.25 MG/.5ML
1.25 SOLUTION, CONCENTRATE RESPIRATORY (INHALATION) EVERY 6 HOURS PRN
Status: DISCONTINUED | OUTPATIENT
Start: 2019-03-22 | End: 2019-03-28 | Stop reason: HOSPADM

## 2019-03-22 RX ORDER — SODIUM CHLORIDE FOR INHALATION 0.9 %
3 VIAL, NEBULIZER (ML) INHALATION EVERY 6 HOURS PRN
Status: DISCONTINUED | OUTPATIENT
Start: 2019-03-22 | End: 2019-03-28 | Stop reason: HOSPADM

## 2019-03-22 RX ORDER — GABAPENTIN 100 MG/1
200 CAPSULE ORAL 2 TIMES DAILY
Status: DISCONTINUED | OUTPATIENT
Start: 2019-03-22 | End: 2019-03-25

## 2019-03-22 RX ORDER — HYDROMORPHONE HCL/PF 1 MG/ML
0.5 SYRINGE (ML) INJECTION EVERY 4 HOURS PRN
Status: DISCONTINUED | OUTPATIENT
Start: 2019-03-22 | End: 2019-03-22

## 2019-03-22 RX ORDER — CEFAZOLIN SODIUM 1 G/3ML
INJECTION, POWDER, FOR SOLUTION INTRAMUSCULAR; INTRAVENOUS AS NEEDED
Status: DISCONTINUED | OUTPATIENT
Start: 2019-03-22 | End: 2019-03-22 | Stop reason: SURG

## 2019-03-22 RX ORDER — SODIUM CHLORIDE, SODIUM LACTATE, POTASSIUM CHLORIDE, CALCIUM CHLORIDE 600; 310; 30; 20 MG/100ML; MG/100ML; MG/100ML; MG/100ML
100 INJECTION, SOLUTION INTRAVENOUS CONTINUOUS
Status: DISCONTINUED | OUTPATIENT
Start: 2019-03-22 | End: 2019-03-22

## 2019-03-22 RX ORDER — HYDROMORPHONE HCL/PF 1 MG/ML
0.5 SYRINGE (ML) INJECTION ONCE
Status: DISCONTINUED | OUTPATIENT
Start: 2019-03-22 | End: 2019-03-25

## 2019-03-22 RX ORDER — CHLORHEXIDINE GLUCONATE 0.12 MG/ML
15 RINSE ORAL ONCE
Status: COMPLETED | OUTPATIENT
Start: 2019-03-22 | End: 2019-03-22

## 2019-03-22 RX ORDER — ONDANSETRON 2 MG/ML
4 INJECTION INTRAMUSCULAR; INTRAVENOUS EVERY 6 HOURS PRN
Status: DISCONTINUED | OUTPATIENT
Start: 2019-03-22 | End: 2019-03-28 | Stop reason: HOSPADM

## 2019-03-22 RX ADMIN — PROPOFOL 150 MG: 10 INJECTION, EMULSION INTRAVENOUS at 07:58

## 2019-03-22 RX ADMIN — PHENYLEPHRINE HYDROCHLORIDE 100 MCG: 10 INJECTION INTRAVENOUS at 08:32

## 2019-03-22 RX ADMIN — HEPARIN SODIUM 2000 UNITS: 1000 INJECTION INTRAVENOUS; SUBCUTANEOUS at 10:07

## 2019-03-22 RX ADMIN — LEVALBUTEROL 1.25 MG: 1.25 SOLUTION, CONCENTRATE RESPIRATORY (INHALATION) at 16:02

## 2019-03-22 RX ADMIN — PHENYLEPHRINE HYDROCHLORIDE 100 MCG: 10 INJECTION INTRAVENOUS at 09:22

## 2019-03-22 RX ADMIN — ACETAMINOPHEN 975 MG: 325 TABLET ORAL at 16:13

## 2019-03-22 RX ADMIN — HYDRALAZINE HYDROCHLORIDE 25 MG: 25 TABLET, FILM COATED ORAL at 16:39

## 2019-03-22 RX ADMIN — PHENYLEPHRINE HYDROCHLORIDE 100 MCG: 10 INJECTION INTRAVENOUS at 08:55

## 2019-03-22 RX ADMIN — PHENYLEPHRINE HYDROCHLORIDE 100 MCG: 10 INJECTION INTRAVENOUS at 09:40

## 2019-03-22 RX ADMIN — GLYCOPYRROLATE 0.9 MG: 0.2 INJECTION, SOLUTION INTRAMUSCULAR; INTRAVENOUS at 13:40

## 2019-03-22 RX ADMIN — INSULIN HUMAN 5 UNITS: 100 INJECTION, SOLUTION PARENTERAL at 13:35

## 2019-03-22 RX ADMIN — ONDANSETRON 4 MG: 2 INJECTION INTRAMUSCULAR; INTRAVENOUS at 13:30

## 2019-03-22 RX ADMIN — INSULIN LISPRO 6 UNITS: 100 INJECTION, SOLUTION INTRAVENOUS; SUBCUTANEOUS at 17:24

## 2019-03-22 RX ADMIN — HYDROMORPHONE HYDROCHLORIDE 0.5 MG: 1 INJECTION, SOLUTION INTRAMUSCULAR; INTRAVENOUS; SUBCUTANEOUS at 14:08

## 2019-03-22 RX ADMIN — PHENYLEPHRINE HYDROCHLORIDE 100 MCG: 10 INJECTION INTRAVENOUS at 08:22

## 2019-03-22 RX ADMIN — Medication 140 MG: at 07:59

## 2019-03-22 RX ADMIN — CEFAZOLIN 2000 MG: 1 INJECTION, POWDER, FOR SOLUTION INTRAVENOUS at 12:15

## 2019-03-22 RX ADMIN — HYDROMORPHONE HYDROCHLORIDE 0.5 MG: 1 INJECTION, SOLUTION INTRAMUSCULAR; INTRAVENOUS; SUBCUTANEOUS at 14:39

## 2019-03-22 RX ADMIN — HEPARIN SODIUM 8000 UNITS: 1000 INJECTION INTRAVENOUS; SUBCUTANEOUS at 09:26

## 2019-03-22 RX ADMIN — ROCURONIUM BROMIDE 10 MG: 10 INJECTION INTRAVENOUS at 12:16

## 2019-03-22 RX ADMIN — FENTANYL CITRATE 100 MCG: 50 INJECTION, SOLUTION INTRAMUSCULAR; INTRAVENOUS at 07:58

## 2019-03-22 RX ADMIN — PHENYLEPHRINE HYDROCHLORIDE 25 MCG/MIN: 10 INJECTION INTRAVENOUS at 08:22

## 2019-03-22 RX ADMIN — INSULIN GLARGINE 18 UNITS: 100 INJECTION, SOLUTION SUBCUTANEOUS at 21:09

## 2019-03-22 RX ADMIN — ROCURONIUM BROMIDE 10 MG: 10 INJECTION INTRAVENOUS at 10:25

## 2019-03-22 RX ADMIN — INSULIN LISPRO 2 UNITS: 100 INJECTION, SOLUTION INTRAVENOUS; SUBCUTANEOUS at 17:25

## 2019-03-22 RX ADMIN — NITROGLYCERIN 25 MCG/MIN: 20 INJECTION INTRAVENOUS at 08:54

## 2019-03-22 RX ADMIN — METOPROLOL TARTRATE 12.5 MG: 25 TABLET ORAL at 21:10

## 2019-03-22 RX ADMIN — PHENYLEPHRINE HYDROCHLORIDE 100 MCG: 10 INJECTION INTRAVENOUS at 09:24

## 2019-03-22 RX ADMIN — ALBUMIN (HUMAN): 12.5 SOLUTION INTRAVENOUS at 08:25

## 2019-03-22 RX ADMIN — ACETAMINOPHEN 975 MG: 325 TABLET ORAL at 21:09

## 2019-03-22 RX ADMIN — EPINEPHRINE 1 MCG/MIN: 1 INJECTION, SOLUTION, CONCENTRATE INTRAVENOUS at 08:25

## 2019-03-22 RX ADMIN — NEOSTIGMINE METHYLSULFATE 5 MG: 1 INJECTION, SOLUTION INTRAVENOUS at 13:40

## 2019-03-22 RX ADMIN — PHENYLEPHRINE HYDROCHLORIDE 100 MCG: 10 INJECTION INTRAVENOUS at 08:18

## 2019-03-22 RX ADMIN — HYDRALAZINE HYDROCHLORIDE 25 MG: 25 TABLET, FILM COATED ORAL at 21:10

## 2019-03-22 RX ADMIN — HEPARIN SODIUM 2000 UNITS: 1000 INJECTION INTRAVENOUS; SUBCUTANEOUS at 11:58

## 2019-03-22 RX ADMIN — GABAPENTIN 200 MG: 100 CAPSULE ORAL at 17:24

## 2019-03-22 RX ADMIN — ATORVASTATIN CALCIUM 40 MG: 40 TABLET, FILM COATED ORAL at 16:13

## 2019-03-22 RX ADMIN — SODIUM CHLORIDE, SODIUM LACTATE, POTASSIUM CHLORIDE, AND CALCIUM CHLORIDE: .6; .31; .03; .02 INJECTION, SOLUTION INTRAVENOUS at 07:38

## 2019-03-22 RX ADMIN — FENTANYL CITRATE 50 MCG: 50 INJECTION, SOLUTION INTRAMUSCULAR; INTRAVENOUS at 14:02

## 2019-03-22 RX ADMIN — PHENYLEPHRINE HYDROCHLORIDE 100 MCG: 10 INJECTION INTRAVENOUS at 11:42

## 2019-03-22 RX ADMIN — ROCURONIUM BROMIDE 10 MG: 10 INJECTION INTRAVENOUS at 10:51

## 2019-03-22 RX ADMIN — OXYCODONE HYDROCHLORIDE 10 MG: 5 TABLET ORAL at 16:12

## 2019-03-22 RX ADMIN — FENTANYL CITRATE 50 MCG: 50 INJECTION, SOLUTION INTRAMUSCULAR; INTRAVENOUS at 13:20

## 2019-03-22 RX ADMIN — SODIUM CHLORIDE, SODIUM GLUCONATE, SODIUM ACETATE, POTASSIUM CHLORIDE, MAGNESIUM CHLORIDE, SODIUM PHOSPHATE, DIBASIC, AND POTASSIUM PHOSPHATE 75 ML/HR: .53; .5; .37; .037; .03; .012; .00082 INJECTION, SOLUTION INTRAVENOUS at 15:01

## 2019-03-22 RX ADMIN — FENTANYL CITRATE 50 MCG: 50 INJECTION, SOLUTION INTRAMUSCULAR; INTRAVENOUS at 14:06

## 2019-03-22 RX ADMIN — FENTANYL CITRATE 50 MCG: 50 INJECTION, SOLUTION INTRAMUSCULAR; INTRAVENOUS at 11:54

## 2019-03-22 RX ADMIN — HYDROMORPHONE HYDROCHLORIDE 1 MG: 1 INJECTION, SOLUTION INTRAMUSCULAR; INTRAVENOUS; SUBCUTANEOUS at 21:55

## 2019-03-22 RX ADMIN — HYDROMORPHONE HYDROCHLORIDE 1 MG: 1 INJECTION, SOLUTION INTRAMUSCULAR; INTRAVENOUS; SUBCUTANEOUS at 16:50

## 2019-03-22 RX ADMIN — ALBUMIN (HUMAN): 12.5 SOLUTION INTRAVENOUS at 10:59

## 2019-03-22 RX ADMIN — HYDROMORPHONE HYDROCHLORIDE 0.5 MG: 1 INJECTION, SOLUTION INTRAMUSCULAR; INTRAVENOUS; SUBCUTANEOUS at 14:20

## 2019-03-22 RX ADMIN — LORAZEPAM 0.5 MG: 2 INJECTION INTRAMUSCULAR; INTRAVENOUS at 14:15

## 2019-03-22 RX ADMIN — DOCUSATE SODIUM 100 MG: 100 CAPSULE, LIQUID FILLED ORAL at 17:24

## 2019-03-22 RX ADMIN — SODIUM CHLORIDE: 0.9 INJECTION, SOLUTION INTRAVENOUS at 08:05

## 2019-03-22 RX ADMIN — ROCURONIUM BROMIDE 20 MG: 10 INJECTION INTRAVENOUS at 08:20

## 2019-03-22 RX ADMIN — PHENYLEPHRINE HYDROCHLORIDE 100 MCG: 10 INJECTION INTRAVENOUS at 12:08

## 2019-03-22 RX ADMIN — LORAZEPAM 0.5 MG: 2 INJECTION INTRAMUSCULAR; INTRAVENOUS at 14:20

## 2019-03-22 RX ADMIN — FENTANYL CITRATE 25 MCG: 50 INJECTION, SOLUTION INTRAMUSCULAR; INTRAVENOUS at 08:53

## 2019-03-22 RX ADMIN — INSULIN HUMAN 10 UNITS: 100 INJECTION, SOLUTION PARENTERAL at 14:37

## 2019-03-22 RX ADMIN — PHENYLEPHRINE HYDROCHLORIDE 100 MCG: 10 INJECTION INTRAVENOUS at 09:38

## 2019-03-22 RX ADMIN — PHENYLEPHRINE HYDROCHLORIDE 100 MCG: 10 INJECTION INTRAVENOUS at 12:06

## 2019-03-22 RX ADMIN — FENTANYL CITRATE 25 MCG: 50 INJECTION, SOLUTION INTRAMUSCULAR; INTRAVENOUS at 08:50

## 2019-03-22 RX ADMIN — FENTANYL CITRATE 50 MCG: 50 INJECTION, SOLUTION INTRAMUSCULAR; INTRAVENOUS at 09:07

## 2019-03-22 RX ADMIN — CEFAZOLIN 2000 MG: 1 INJECTION, POWDER, FOR SOLUTION INTRAVENOUS at 08:17

## 2019-03-22 RX ADMIN — PROTAMINE SULFATE 50 MG: 10 INJECTION, SOLUTION INTRAVENOUS at 13:01

## 2019-03-22 RX ADMIN — HEPARIN SODIUM 1000 UNITS: 1000 INJECTION INTRAVENOUS; SUBCUTANEOUS at 10:41

## 2019-03-22 RX ADMIN — HYDROMORPHONE HYDROCHLORIDE 0.5 MG: 1 INJECTION, SOLUTION INTRAMUSCULAR; INTRAVENOUS; SUBCUTANEOUS at 15:42

## 2019-03-22 RX ADMIN — ISODIUM CHLORIDE 3 ML: 0.03 SOLUTION RESPIRATORY (INHALATION) at 16:02

## 2019-03-22 RX ADMIN — LIDOCAINE HYDROCHLORIDE 50 MG: 10 INJECTION, SOLUTION INFILTRATION; PERINEURAL at 07:58

## 2019-03-22 RX ADMIN — CHLORHEXIDINE GLUCONATE 0.12% ORAL RINSE 15 ML: 1.2 LIQUID ORAL at 06:54

## 2019-03-22 RX ADMIN — NITROGLYCERIN 20 MCG/MIN: 20 INJECTION INTRAVENOUS at 20:23

## 2019-03-22 RX ADMIN — HYDROMORPHONE HYDROCHLORIDE 0.5 MG: 1 INJECTION, SOLUTION INTRAMUSCULAR; INTRAVENOUS; SUBCUTANEOUS at 14:19

## 2019-03-22 RX ADMIN — OXYCODONE HYDROCHLORIDE 5 MG: 5 TABLET ORAL at 20:21

## 2019-03-22 NOTE — CONSULTS
Consultation - Cardiology Team One  Rose Mary Henderson 68 y o  male MRN: [id number]  Unit/Bed#: [facility] 061-83 Encounter: [id number]    Inpatient consult to Cardiology  Consult performed by: PEDRO LUIS Lawton  Consult ordered by: Fernando Stafford PA-C      Physician Requesting Consult: Maurizio Tavares MD  Reason for Consult / Principal Problem:  ECG changes/elevated troponin    Assessment/ Plan      1  Abnormal ECG with elevated troponin- reviewed ECG showing normal sinus rhythm with nonspecific ST and T-wave abnormalities in lateral leads and prolonged QT/QTC interval  Troponin 0 07, will trend   Patient has no complaint of chest pain  Can discontinue Nitro gtt  2  Peripheral artery disease- status post endarterectomy arterial femoral with patch angioplasty, balloon angioplasty, stent left EIA bypass femoral popliteal with completion arteriogram    Followed by primary team    3  HFpEF- mildly reduced EF on last echocardiogram 03/20/2019 EF 45%  Patient on Lasix 20 mg p o  Daily at home, metoprolol 12 5 mg p o  B i d   Will increase metoprolol to QID at this time  Patient appears euvolemic     4  Coronary artery disease- with history of NSTEMI in 2015 and most recently 10/2018 with stent to LAD where prior to stent was  On DAPT:  Aspirin and Plavix at home  Recommend restart  Continue statin:  Atorvastatin 40 mg p o  Daily and metoprolol b i d  Patient reports he was instructed to stop plavix for 2 days  5  Hypertension- average blood pressure is 133/63  BP currently elevated due to pain       6  Chronic kidney disease stage 3- nephrology following post procedure  1 59 today  Baseline creatinine 1 4-1 6      History of Present Illness   HPI: Rose Mary Henderson is a 68y o  year old male who has a history of coronary artery disease with history non-STEMI with stents to LAD, peripheral artery disease with stenting to the lower extremities, chronic congestive heart failure, hypertension, hyperlipidemia, obstructive sleep apnea, chronic kidney disease stage III, bilateral renal artery stenosis and type 1 diabetes with neuropathy  He follows with cardiologist Dr Edouard Mohan and Dr Sai Knapp  He [facility] 03/22/2019 for elective vascular procedure  He is POD #0 status post endarterectomy arterial femoral with patch angioplasty, balloon angioplasty, stent left EIA bypass femoral popliteal with completion arteriogram      Cardiology consulted due to abnormal ECG perioperative with elevated troponin  Patient has no complaint of chest pain  He is reporting LLE/groin pain  He also denies SOB  He reports that he held Plavix yesterday and today for vascular procedure  He was started on nitroglycerin drip but will recommend to discontinue  Echocardiogram 03/20/2019 showed EF 45%, severe hypokinesis of the basal mid anterior septal, basal mid inferior, basal mid inferior lateral walls, grade 1 diastolic dysfunction, mildly dilated left atrium, mild MR, mild TR and elevated PA pressure 57 mmHg suggestive of severe pulmonary hypertension  EKG reviewed personally:  Normal sinus rhythm with nonspecific ST T wave abnormalities and prolonged QT/QTC interval  Ventricular rate 80 beats per minute  CA interval 163 milliseconds  QRS D interval 96 milliseconds  QT interval 592 millisecond  QTC interval 684 millisecond    Telemetry reviewed personally:   Normal sinus rhythm HR 90s    Review of Systems   Constitution: Negative for chills and fever  Cardiovascular: Negative for chest pain, leg swelling, orthopnea and palpitations  Respiratory: Negative for shortness of breath  Musculoskeletal: Negative for falls  LLE pain    Gastrointestinal: Negative for nausea and vomiting  Neurological: Negative for dizziness and light-headedness  Psychiatric/Behavioral: Negative for altered mental status       Historical Information   Past Medical History:   Diagnosis Date    Acute kidney injury ([facility] )     resolved 11/30/2015    [facility] venous embol[facility] and thrombosis [facility] deep vessels [facility] proximal [facility]     resolved 04/04/2015    DARINEL (acute kidney injury) ([facility] ) 1/12/2016    Anesthesia     RESPIRATORY ISSUES DUE TO SLEEP APNEA    Cardiac disease     heart attack, stents x 4    CHF [facility]congestive heart failure) [facility]     Chronic cough     resolved 02/04/2016    Chronic pain disorder     intermitent claudication    COPD (chronic obstructive pulmonary disease) ([facility])     Coronary artery disease     CPAP (continuous positive airway pressure) dependence     Diabetes mellitus ([facility] )     Disease of thyroid gland     DVT (deep venous thrombosis) ([facility])     Heart failure ([facility])     Hyperlipidemia     Hypertension     Ischemic cardiomyopathy     last assessed 09/26/2017    Myocardial infarction [facility])     MI +2 2015,2018    Pulmonary emphysema ([facility])     Pulmonary granuloma ([facility])     resolved 02/03/2017    Renal failure     Sleep apnea      Past Surgical History:   Procedure Laterality Date    BYPASS FEMORAL-POPLITEAL      initial stenosis with stent left, 7 x 100 smart stent onset 02/24/2014    CARDIAC SURGERY      cardiac stents    CATARACT EXTRACTION      COLOGUARD (HISTORICAL)  2018    CORONARY ANGIOPLASTY WITH STENT PLACEMENT      x4    IR ABDOMINAL ANGIOGRAPHY / INTERVENTION  3/14/2019    VASCULAR SURGERY      stent placement     Social History     Substance and Sexual Activity   Alcohol Use Yes    Alcohol/week: 12 6 oz    Types: 21 Standard drinks or equivalent per week    Frequency: 4 or more times a week    Drinks per session: 1 or 2    Comment: 2-3 glasses of vodka daily (6 shots) (history 2 drinks per day as per allscripts     Social History     Substance and Sexual Activity   Drug Use No     Social History     Tobacco Use   Smoking Status Former Smoker    Packs/day: 4 00    Years: 48 00    Pack years: 192 00    Types: Cigarettes    Last attempt to quit: 2008    Years since quitting: 11 2   Smokeless Tobacco Never Used   Tobacco Comment    smoking 48 years 1 5 ppd d/c oct 2008 screening protocol as per allscripts     Family History:   Family History   Problem Relation Age of Onset    Heart disease Father         pacer placement    Hypertension Father     Kidney disease Father     Heart failure Father     Heart attack Father     Liver disease Father     Cirrhosis Mother         due to beer consumption    Liver disease Mother     Diabetes Other        Meds/Allergies   all current active meds have been reviewed and current meds:   Current Facility-Administered Medications   Medication Dose Route Frequency    acetaminophen (TYLENOL) tablet 975 mg  975 mg Oral Q8H [address]    [START ON 3/23/2019] aspirin chewable tablet 81 mg  81 mg Oral Daily    atorvastatin (LIPITOR) tablet 40 mg  40 mg Oral Daily With Dinner    [START ON 3/23/2019] cholecalciferol (VITAMIN D3) tablet 1,000 Units  1,000 Units Oral Daily    [START ON 3/23/2019] clopidogrel (PLAVIX) tablet 75 mg  75 mg Oral Daily    docusate sodium (COLACE) capsule 100 mg  100 mg Oral BID    gabapentin (NEURONTIN) capsule 200 mg  200 mg Oral BID    hydrALAZINE (APRESOLINE) tablet 25 mg  25 mg Oral Q8H [address]    HYDROmorphone (DILAUDID) injection 0 5 mg  0 5 mg Intravenous Q4H PRN    insulin glargine (LANTUS) subcutaneous injection 18 Units 0 18 mL  18 Units Subcutaneous HS    [START ON 3/23/2019] insulin glargine (LANTUS) subcutaneous injection 40 Units 0 4 mL  40 Units Subcutaneous QAM    insulin lispro (HumaLOG) 100 units/mL subcutaneous injection 1-5 Units  1-5 Units Subcutaneous HS    insulin lispro (HumaLOG) 100 units/mL subcutaneous injection 1-6 Units  1-6 Units Subcutaneous TID AC    [START ON 3/23/2019] insulin lispro (HumaLOG) 100 units/mL subcutaneous injection 3 Units  3 Units Subcutaneous Daily With Breakfast    insulin lispro (HumaLOG) 100 units/mL subcutaneous injection 6 Units  6 Units Subcutaneous Daily With Lunch    insulin lispro (HumaLOG) 100 units/mL subcutaneous injection 6 Units  6 Units Subcutaneous Daily With Dinner    levalbuterol (XOPENEX) inhalation solution 1 25 mg  1 25 mg Nebulization Q6H PRN    [START ON 3/23/2019] levothyroxine tablet 175 mcg  175 mcg Oral Early Morning    metoprolol tartrate (LOPRESSOR) partial tablet 12 5 mg  12 5 mg Oral Q12H [facility]    multi-electrolyte (ISOLYTE-S PH 7 4 equivalent) IV solution  75 mL/hr Intravenous Continuous    ondansetron (ZOFRAN) injection 4 mg  4 mg Intravenous Q6H PRN    oxyCODONE (ROXICODONE) IR tablet 5 mg  5 mg Oral Q4H PRN    Or    oxyCODONE (ROXICODONE) IR tablet 10 mg  10 mg Oral Q4H PRN    sodium chloride 0 9 % inhalation solution 3 mL  3 mL Nebulization Q6H PRN       multi-electrolyte 75 mL/hr Last Rate: 75 mL/hr (03/22/19 1501)   nitroGLYcerin 50 mcg/min        Allergies   Allergen Reactions    Doxazosin     Cardura [Doxazosin Mesylate]      Muscle cramps    Other      Iv dye for cardiac cath  Renal failure very close to dialysis  But no dialysis    Zestril [Lisinopril]      cough       Objective   Vitals: Blood pressure 129/59, pulse 78, temperature (!) 97 4 °F (36 3 °C), resp  rate 16, height 5' 7" (1 702 m), weight 88 kg (194 lb), SpO2 98 %  ,     Body mass index is 30 38 kg/m²  ,     Systolic (12YBJ), UTX:309 , Min:117 , UTK:197     Diastolic (59OBT), IVS:32, Min:51, Max:70      Intake/Output Summary (Last 24 hours) at 3/22/2019 1550  Last data filed at 3/22/2019 1500  Gross per 24 hour   Intake 2650 ml   Output 625 ml   Net 2025 ml     Weight (last 2 days)     Date/Time   Weight    03/22/19 0606   88 (194)    03/20/19 1439   88 5 (195)            Invasive Devices     Peripheral Intravenous Line            Peripheral IV 03/22/19 Left Hand less than 1 day    Peripheral IV 03/22/19 Right Hand less than 1 day          Arterial Line            Arterial Line 03/22/19 Left Radial less than 1 day          Drain            Urethral Catheter Latex 16 Fr  less than 1 day              Physical Exam   Constitutional: He is oriented to person, place, and time  No distress  HENT:   Head: Normocephalic  Neck: Neck supple  Cardiovascular: Normal rate, regular rhythm, normal heart sounds and intact distal pulses  Pulmonary/Chest: Effort normal  No stridor  No respiratory distress  He has no wheezes  No crackles  NC    Abdominal: Soft  Bowel sounds are normal    Musculoskeletal: He exhibits edema  Trace edema LLE    Neurological: He is alert and oriented to person, place, and time  Skin: Skin is warm and dry  He is not diaphoretic  Psychiatric: He has a normal mood and affect   His behavior is normal      LABORATORY RESULTS:  Results from last 7 days   Lab Units 03/22/19  1434   TROPONIN I ng/mL 0 07*     CBC with diff:       Invalid input(s):  RBC, TOTALCELLSCOUNTED, SEGS%, GRANS%, LYMPHS%, EOS%, BASO%, ABNEUT, ABGRANS, ABLYMPHS, ABMOMOS, ABEOS, ABBASO    CMP:  Results from last 7 days   Lab Units 03/21/19  1418 03/18/19  1114   POTASSIUM mmol/L 4 9 4 7   CHLORIDE mmol/L 106 108   CO2 mmol/L 28 26   BUN mg/dL 36* 27*   CREATININE mg/dL 1 59* 1 47*   CALCIUM mg/dL 9 5 8 8   AST U/L 18  --    ALT U/L 25  --    ALK PHOS U/L 92  --    EGFR ml/min/1 73sq m 42 47       BMP:  Results from last 7 days   Lab Units 03/21/19  1418 03/18/19  1114   POTASSIUM mmol/L 4 9 4 7   CHLORIDE mmol/L 106 108   CO2 mmol/L 28 26   BUN mg/dL 36* 27*   CREATININE mg/dL 1 59* 1 47*   CALCIUM mg/dL 9 5 8 8          Lab Results   Component Value Date    NTBNP 2,679 (H) 03/05/2019    NTBNP 1,054 (H) 10/20/2016    NTBNP 1,091 (H) 09/23/2016       Results from last 7 days   Lab Units 03/21/19  1418   MAGNESIUM mg/dL 2 3        Results from last 7 days   Lab Units 03/21/19  1418   TSH 3RD GENERATON uIU/mL 0 376       Results from last 7 days   Lab Units 03/21/19  1418   INR  1 07     Lipid Profile:   Lab Results   Component Value Date    CHOL 129 10/01/2015     Lab Results   Component Value Date HDL 39 (L) 2019    HDL 25 (L) 2018    HDL 46 2018     Lab Results   Component Value Date    LDLCALC 46 2019    LDLCALC 19 2018    LDLCALC 59 2018     Lab Results   Component Value Date    TRIG 140 2019    TRIG 240 (H) 2018    TRIG 190 (H) 2018         Cardiac testing:   Results for orders placed during the hospital encounter of 19   Echo complete with contrast if indicated    Narrative [address]  [facility], [address] [facility]  [phone]    Transthoracic Echocardiogram  2D, M-mode, Doppler, and Color Doppler    Study date:  20-Mar-2019    Patient: Epifanio Young  MR number: [id number]  Account number: [de-identified]  : 1945  Age: 68 years  Gender: Male  Status: Outpatient  Location: [address] [place] [address] [facility] [place]  Height: 67 in  Weight: 201 lb  BP: 110/ 70 mmHg    Indications: Coronary artery disease    Diagnoses: I25 10 - Atherosclerotic heart disease of native coronary artery without angina pectoris    Sonographer:  SHOLA Brannon  Primary Physician:  Uzair Arnett MD  Referring Physician:  Demar Vasquez DO  Group:  [facility]  Cardiology Fellow:  Michelle Chi MD  Interpreting Physician:  Gilmar Aponte MD  Cardiology Fellow:  Buffy Velazquez DO    SUMMARY    LEFT VENTRICLE:  The ventricle was mildly dilated  Systolic function was mildly reduced  Ejection fraction was estimated to be 45 %  There was severe hypokinesis of the basal-mid anteroseptal, basal-mid inferior, and basal-mid inferolateral wall(s)  Wall thickness was mildly increased  The changes were consistent with eccentric hypertrophy  Doppler parameters were consistent with abnormal left ventricular relaxation (grade 1 diastolic dysfunction)  LEFT ATRIUM:  The atrium was mildly dilated  MITRAL VALVE:  There was mild annular calcification  There was mild regurgitation      TRICUSPID VALVE:  There was mild regurgitation  Estimated peak PA pressure was 57 mmHg  The findings suggest severe pulmonary hypertension  HISTORY: PRIOR HISTORY: Hypertension, dyslipidemia, coronary artery disease, myocardial infarction, stent, ischemic cardiomyopathy, congestive heart failure, deep vein thrombosis, former smoker, COPD, emphysema, diabetes, chronic kidney  disease, acute kidney injury, sleep apnea, hypothyroidism    PROCEDURE: The study was performed in the [address] [facility]  This was a routine study  The transthoracic approach was used  The study included complete 2D imaging, M-mode, complete spectral Doppler, and color Doppler  Image  quality was adequate  LEFT VENTRICLE: The ventricle was mildly dilated  Systolic function was mildly reduced  Ejection fraction was estimated to be 45 %  There was severe hypokinesis of the basal-mid anteroseptal, basal-mid inferior, and basal-mid inferolateral  wall(s)  Wall thickness was mildly increased  The changes were consistent with eccentric hypertrophy  DOPPLER: Doppler parameters were consistent with abnormal left ventricular relaxation (grade 1 diastolic dysfunction)  RIGHT VENTRICLE: The size was normal  Systolic function was normal  Wall thickness was normal     LEFT ATRIUM: The atrium was mildly dilated  RIGHT ATRIUM: Size was normal     MITRAL VALVE: There was mild annular calcification  Valve structure was normal  There was normal leaflet separation  DOPPLER: The transmitral velocity was within the normal range  There was no evidence for stenosis  There was mild  regurgitation  AORTIC VALVE: The valve was probably trileaflet  Leaflets exhibited normal thickness and normal cuspal separation  DOPPLER: Transaortic velocity was within the normal range  There was no evidence for stenosis  There was no significant  regurgitation  TRICUSPID VALVE: The valve structure was normal  There was normal leaflet separation   DOPPLER: The transtricuspid velocity was within the normal range  There was no evidence for stenosis  There was mild regurgitation  Estimated peak PA  pressure was 57 mmHg  The findings suggest severe pulmonary hypertension  PULMONIC VALVE: Leaflets exhibited normal thickness, no calcification, and normal cuspal separation  DOPPLER: The transpulmonic velocity was within the normal range  There was no significant regurgitation  PERICARDIUM: There was no pericardial effusion  The pericardium was normal in appearance  AORTA: The root exhibited normal size  SYSTEMIC VEINS: IVC: The inferior vena cava was normal in size  SYSTEM MEASUREMENT TABLES    2D  %FS: 22 59 %  Ao Diam: 3 34 cm  EDV(Teich): 183 79 ml  EF Biplane: 46 68 %  EF(Cube): 53 62 %  EF(Teich): 44 67 %  ESV(Cube): 102 95 ml  ESV(Teich): 101 69 ml  IVSd: 1 1 cm  LA Area: 20 01 cm2  LA Diam: 4 48 cm  LVEDV MOD A2C: 193 4 ml  LVEDV MOD A4C: 185 05 ml  LVEDV MOD BP: 196 77 ml  LVEF MOD A2C: 44 75 %  LVEF MOD A4C: 44 41 %  LVESV MOD A2C: 106 85 ml  LVESV MOD A4C: 102 87 ml  LVESV MOD BP: 104 91 ml  LVIDd: 6 05 cm  LVIDs: 4 69 cm  LVLd A2C: 8 57 cm  LVLd A4C: 7 9 cm  LVLs A2C: 7 01 cm  LVLs A4C: 6 84 cm  LVPWd: 1 08 cm  RA Area: 14 1 cm2  RV Diam : 2 99 cm  SV MOD A2C: 86 55 ml  SV MOD A4C: 82 18 ml  SV(Cube): 119 03 ml  SV(Teich): 82 09 ml    CW  TR Vmax: 3 62 m/s  TR maxP 56 mmHg    MM  TAPSE: 2 01 cm    PW  AVC: 414 39 ms  E': 0 06 m/s  E/E': 15 38  MV A Goyo: 0 64 m/s  MV Dec [place]: 6 25 m/s2  MV DecT: 149 29 ms  MV E Goyo: 0 93 m/s  MV E/A Ratio: 1 45    [employer] Accredited Echocardiography Laboratory    Prepared and electronically signed by    Bard Yuki MD  Signed 20-Mar-2019 11:52:53       No results found for this or any previous visit  No procedure found  No results found for this or any previous visit  Imaging: I have personally reviewed pertinent reports  Vas Abdominal Aorta/iliacs;  Complete Study    Result Date: 3/5/2019  Narrative:  [facility] REPORT CLINICAL: Indications:  Evaluate for progression of aortoiliac occlusive disease  Patient is complains of bilateral leg cramping, and now presents with a left heel ulcer  Patient states there is minimal change in his walking as he still can only walk short distances, and needs to rest  Operative History: 2018-05-17 Transluminal placement of iliac stent, percutaneous 2015-09-23 Cardiac angioplasty and stent placement 2014-02-25 Left Transluminal placement of stent, percutaneous, SFA 2012-04-02 Aortoiliac angioplasty with stent placement of iliac arteries Risk Factors: Obesity, HTN, Diabetes (Yes), HLD, CKD, PAD, CAD and previous smoking (quit >10yrs ago)  The patient current BMI is 28 73, Weight is 178 lb and height is 66 in  Clinical Right Pressure:  154/ mm Hg, Left Pressure:  152/ mm Hg  FINDINGS:  Unilateral     Impression  PSV (cm/s)  EDV (cm/s)  Sup-Aneta Ao                         78              Px Inf-Julian Ao                      60          11  Ds Inf-Julian Ao                      37          11  Celiac                             91          16  Prox  SMA      >70%               268          24   Right              Impression  PSV (cm/s)  EDV (cm/s)  Prox ISIAH - Stent                      205          28  Dist ISIAH - Stent                      151          21  Prox  EIA - Stent  50-75%             301          46  Dist EIA - Stent                      145          26   Left               Impression  PSV (cm/s)  EDV (cm/s)  Prox ISIAH - Stent                      174          28  Dist ISIAH - Stent                      148          23  Internal Iliac     >70%               449          93  Prox  EIA - Stent                     140          26  Dist EIA - Stent                      188          24     CONCLUSION:  Impression The abdominal aorta appears to be patent  The right and left common iliac arteries and stents appear to be patent   The left external artery and stent appear to be patent  Findings suggest a >75% stenosis of the left internal iliac artery  Findings suggest a 50-75% stenosis of the proximal right external iliac artery and stent  There is a known >70% stenosis of the superior mesenteric artery  The bilateral renal arteries were not visualized  Ankle/Brachial indices are RT- 0 32 ( Prior 0 54), and LT- 0 22 ( Prior 0 47)  In comparison to the study of 08/15/2018, there is stenosis noted in the right EIA, the left IIA, and a significant decrease in TERRI's  SIGNATURE: Electronically Signed by: Tyesah Andrews on 2019-03-05 11:11:30 AM    Vas Lower Limb Vein Mapping Bypass Graft    Result Date: 3/18/2019  Narrative:  [facility] REPORT CLINICAL: Indications: Bilateral Other Specified Pre-Operative Examination [[id number]]  Pre-op Vein Mapping for Future Lower Extremity Bypass Graft possibly on friday the 22nd Operative History: 2018-05-17 Transluminal placement of iliac stent, percutaneous 2015-09-23 Cardiac angioplasty and stent placement 2014-02-25 Left Transluminal placement of stent, percutaneous, SFA 2012-04-02 Aortoiliac angioplasty with stent placement of iliac arteries Risk Factors The patient has history of Obesity, HTN, Diabetes (Yes), HLD, CKD, PAD, CAD and previous smoking (quit >10yrs ago)    FINDINGS:  Segment         Right        Left                            Diameter AP  Diameter AP  DVT                  GSV Inguinal            6 4          4 5                       GSV Prox Thigh          4 2          2 9                       GSV Mid Thigh           3 3          3 1                       GSV Dist Thigh          2 8               Chronic - Occlusive  GSV Knee                3 0               Chronic - Occlusive  GSV Prox Calf           3 1               Chronic - Occlusive  GSV Mid Calf            3 4               Chronic - Occlusive  GSV Dist Calf           2 8          2 8                       GSV Ankle               3 5 3 4                          CONCLUSION: Impression: RIGHT LOWER LIMB: The great saphenous vein is patent  from the groin to the ankle  The vein is of adequate caliber and quality for graft conduit with intraluminal diameters ranging from 2 8mm to 4 2mm throughout the leg  There are several branches coming off of the greater saphenous vein at the proximal thigh, distal thigh, proximal and mid calf  LEFT LOWER LIMB: The great saphenous vein is patent from the groin to the mid thigh  There is chronic thrombus identified in the greater saphenous from the distal thigh to the proximal calf  Tech Note:  Gloria Armenta RN was notified of these findings  Mar 18 2019 10:35AM  SIGNATURE: Electronically Signed by: James Braga on 2019 12:52:33 PM    Ir Abdominal Angiography / Intervention    Result Date: 3/18/2019  Narrative: OPERATIVE REPORT PATIENT NAME: Raman Oneil  :  1945 MRN: [id number] Pt Location:  [facility]   SURGERY DATE: 3/14/2019   Surgeon:  Dianna Lyn MD   Preoperative and postoperative diagnosis: 1  Atherosclerotic occlusive disease with rest pain and left foot ulceration 2  Atherosclerotic occlusive disease with severe right lower extremity claudication   Procedure: 1  Ultrasound-guided right common femoral artery puncture 2  Bilateral lower extremity runoff    Flouro Time:  36 4 min   Contrast:  43 cc Visipaque 320   Sedation Time:  100 min   Estimated Blood Loss: Minimal   Anesthesia Type: Conscious sedation   Operative Indications: 66-year-old with long history of severe peripheral arterial occlusive disease presents with worsening symptoms of left leg  He has progressed from claudication to rest pain with a area of ulceration on the left heel  He also has severe claudication of the right lower extremity    Operative Findings: On the left there is eccentric calcific disease of the common femoral artery with less than 50% stenosis    The deep femoral artery is occluded proximally and reconstitutes  The superficial femoral artery is occluded in the mid segment and reconstitutes in the mid portion of the distal superficial femoral artery stent  The above knee popliteal artery is then patent and relatively normal in appearance with 3 vessel runoff    On the right there is eccentric highly calcified plaque creating a 75% stenosis in the proximal deep and superficial femoral arteries  There is a segmental occlusion of the superficial femoral artery  The above knee popliteal artery is then relatively normal in appearance with a normal trifurcation    Complications: None   Procedure and Technique:   IV sedation was administered  The right groin was prepped and draped in the normal sterile fashion and Ioban drape was placed    Ultrasound guidance was utilized to puncture the right common femoral artery  A micropuncture system was utilized  The right common femoral artery was directly imaged under B-mode imaging  A ScaleDB wire was then placed and a sheath was inserted    The left common iliac artery was then cannulated with a Contra catheter  The catheter was then advanced into the distal external iliac artery  Oblique images were obtained of the femoral bifurcation  Runoff arteriography was then performed with stepped hand injections    A 7 English sheath was then positioned in the proximal superficial femoral artery  Multiple guidewires and catheters were then used in an attempt to cross the superficial femoral artery occlusion    Attempts to re-enter the true lumen within the distal superficial femoral artery stent were made with a Outback catheter    Completion images were obtained to include runoff to the foot    The sheath was then withdrawn to the ipsilateral external iliac artery and oblique images were obtained of the femoral bifurcation and stepped images were obtained through to the level of the tibial trifurcation    A Star closure device was used to close the right femoral puncture site    Findings:   There was significant calcific disease within the common femoral artery  The artery was punctured in a region devoid of any significant disease    On the left there is eccentric calcific disease of the common femoral artery with less than 50% stenosis  The deep femoral artery is occluded proximally and reconstitutes  The superficial femoral artery is occluded in the mid segment and reconstitutes in the mid portion of the distal superficial femoral artery stent  The above knee popliteal artery is then patent and relatively normal in appearance with 3 vessel runoff    Multiple attempts to cross this segmental occlusion with multiple catheters and wires to include an Outback catheter failed to obtain re-entry into the true lumen/the lumen of the distal superficial femoral artery stent    On the right there is eccentric highly calcified plaque creating a 75% stenosis in the proximal deep and superficial femoral arteries  There is a segmental occlusion of the superficial femoral artery  The above knee popliteal artery is then relatively normal in appearance with a normal trifurcation      Conclusion: On the left secondary to the deep femoral artery occlusion and failed attempt to cross the superficial femoral artery occlusion is felt the best option for revascularization would be femoral endarterectomy to restore flow in the deep femoral artery and femoral-above knee popliteal artery bypass    Following the procedure the vascular exam of both lower extremities was unchanged from pre procedure      I was present for the entire procedure   Patient Disposition: APU   SIGNATURE: Moustapha Huff MD DATE: March 14, 2019 TIME: 11:15 AM   Electronically signed by Moustapha Huff MD at 3/14/2019 11:31 AM       Thank you for allowing us to participate in this patient's care  This pt will follow up with Dr Ramonita Perea and Dr Sofya Hickey once discharged       Counseling / Coordination of Care  Total floor / unit time spent today 45 minutes  Greater than 50% of total time was spent with the patient and / or family counseling and / or coordination of care  A description of the counseling / coordination of care: Review of history, current assessment, development of a plan  Code Status: Level 1 - Full Code    ** Please Note: Dragon 360 Dictation voice to text software may have been used in the creation of this document   **

## 2019-03-22 NOTE — OP NOTE
OPERATIVE REPORT  PATIENT NAME: Emiliano Enrique    :  1945  MRN: [id number]  Pt Location: [facility] OR ROOM 08    SURGERY DATE: 3/22/2019    Surgeon(s) and Role:     * Bard Adnreia MD - Primary     * Rock Christopher PA-C - Assisting    Pre-op Diagnosis:  Atherosclerosis of artery of extremity with ulceration ([address] ) [I70 299, L92 369]    Post-Op Diagnosis Codes:     * Atherosclerosis of artery of extremity with ulceration ([address] ) [I70 299, P34 128]      Procedure(s):  ENDARTERECTOMY ARTERIAL FEMORAL WITH PATCH ANGIOPLASTY, BALLOON ANGIOPLASTY, STENT LEFT EIA  BYPASS FEMORAL-POPLITEAL WITH COMPLETION ARTERIOGRAM    Specimen(s):  * No specimens in log *    Estimated Blood Loss:   Minimal    Drains:  Urethral Catheter Latex 16 Fr  (Active)   Site Assessment Clean;Skin intact 3/22/2019  8:35 AM   Collection Container Standard drainage bag 3/22/2019  8:35 AM   Securement Method Securing device (Describe) 3/22/2019  8:35 AM   Number of days: 0       [REMOVED] NG/OG/Enteral Tube Orogastric 16 Fr Center mouth (Removed)   Number of days: 0       Anesthesia Type:   General    Operative Indications: Atherosclerosis of artery of extremity with ulceration ([address] ) [I70 299, H36 207]  75-DSCL-WOS with long history of severe peripheral arterial occlusive disease status post multiple endovascular interventions  Unfortunately has progressed to rest pain and a nonhealing left heel wound  Attempts at endovascular recanalization of his occluded superficial femoral artery and previously placed stent failed  He now presents for surgical revascularization  Operative Findings: The common femoral and distal external iliac artery were noted to be severely disease and highly calcified with only a small area of soft anterior vessel wall available for inflow control    Following extended endarterectomy to include recanalization of an occluded proximal deep femoral artery and performance of femoral-popliteal bypass the endarterectomy was widely patent with outflow via secondary and tertiary deep femoral branches  The bypass graft was widely patent to include the proximal and distal anastomosis with intact popliteal and tibial runoff  There was noted to be a high-grade stenosis in the most distal aspect of the external iliac artery  Initial plan to extend the femoral endarterectomy to include this segment were met with extensive calcific disease without a area for safe inflow control  It was thus felt that an endovascular approach would be best thus angioplasty was performed with a 6 mm balloon followed by placement of a 7 x 60 mm self expanding stent again post dilated with a 6 mm balloon  Completion imaging showed wide patency of the treated area  Complications:   None    Procedure and Technique:    A qualified surgical resident was not available for this case  Assistance was required to aid in dissection, retraction and arterial reconstruction  The patient was brought to the operating room and placed on the operating table in the supine position  The patient was identified by verbal confirmation and armband identification  General anesthesia was administered  A radial A-line was placed  The left groin was prepped and draped in the normal sterile fashion with chlorhexidine prep  An Ioban drape was placed  A slightly transverse incision was made overlying the previously marked common femoral artery  Bovie cautery was used for hemostasis  The lymph nodes were retracted medially  Any crossing veins were clipped  The inguinal ligament was identified  The common femoral artery was then visualized and dissected free  The distal external iliac artery was also exposed and any branches encircled with vessel loops  Proximal control was obtained in the external iliac artery with a vessel loop    Dissection was then carried down the anterior surface of the common femoral artery at which point the superficial femoral and deep femoral arteries were identified  A soft point on both the deep and superficial femoral arteries were identified and encircled with vessel loops  On evaluation of the femoral bifurcation there was significant disease identified within the common femoral artery  There was a relatively soft area for inflow control in the mid to distal common femoral artery  Since preoperative imaging showed disease of the common femoral artery without focal stenosis it was felt to be adequate for reconstruction  The deep femoral artery was highly calcified with reconstitution at secondary and tertiary branch points  A 2nd incision was made on the medial aspect of the distal thigh just above the knee  Bovie cautery was used for hemostasis  The popliteal fossa was entered and the popliteal artery was identified  The artery was isolated over a 6-8 cm segment  The artery was noted be of good quality distally and in the proximal region was firm consistent with the previously placed stent  Vessel loops were placed  A tunnel tract was created between the groin and thigh incision and a Shirline Campanile tunneler was left in place  IV heparin was administered  An ACT was obtained  Further heparin boluses were administered to maintain a therapeutic level  The superficial femoral, deep femoral and external iliac arteries and branches were occluded with a combination of vessel loops and vascular clamps  Control of the deep femoral artery was obtained at secondary and tertiary branch points  An arteriotomy was then created with an 11 blade and Boudreaux scissors from the mid common femoral artery to the deep femoral artery at the point of a tertiary branch point  A standard endarterectomy was then performed  An appropriate endpoint was obtained in the distal external iliac artery, deep and superficial femoral arteries  The endpoint in the deep femoral artery was just proximal to a Tertiary branch point      The arteriotomy was then closed with a bovine pericardial patch which was secured with a running 6 0 Prolene in a running fashion  Once this closure was nearly completed all vessels were back bled and flushed appropriately  The anastomosis was completed and flow was restored  No further bleeding points were encountered  The proximal superficial femoral artery was then occluded with vascular clamps  An arteriotomy was created with 11 blade Boudreaux scissors  An 8 mm ringed C[place]-Steve graft was passed through the Thayer Burke  The graft was spatulated at the appropriate length and the groin and anastomosed with a running Cumberland-Steve suture  Once this anastomosis was completed flow was restored in the superficial femoral artery  No bleeding points were encountered  The popliteal artery was then occluded with vascular clamps  An arteriotomy was created with an 11 blade and Boudreaux scissors  The artery was noted be of good quality  The graft was then spatulated at the appropriate length after rings were removed  An anastomosis was then created with a 6 0 Cumberland-Steve suture in the standard fashion  Once the anastomosis was nearly completed all vessels were back bled and flushed appropriately  The anastomosis was then completed and flow was restored  No bleeding points were encountered  The common femoral artery patch was then punctured with a micropuncture kit  An arteriogram was then performed via puncture of the patch  Stepped images were performed which showed a high-grade stenosis in the distal external iliac/proximal common femoral artery the endarterectomized common and deep femoral arteries were then widely patent with outflow via multiple profundus branches  The femoral to above knee popliteal bypass was widely patent with outflow via widely patent popliteal artery and tibial vessels  A 6 Taiwanese sheath was then placed and roadmap images were obtained of the iliac segment    A 6 mm balloon angioplasty was performed of the distal external iliac artery into the proximal common femoral artery  A 7 x 60 self expanding stent was then positioned across the area of stenosis and deployed  A completion image was obtained  Post dilatation was then performed with 6 mm balloon  A completion image was obtained showing wide patency of the treated external iliac artery with good filling of the endarterectomized common femoral artery and deep femoral artery as noted above  The sheath was then removed and puncture site closed with a Prolene suture  The wound was then irrigated with antibiotic laden solution  Any residual bleeding points were clipped or coagulated  50 mg of protamine were administered  Febrile are and thrombin-soaked Surgicel were placed  Once hemostasis was obtained the deep tissues were reapproximated using a deep 2 0 Monocryl running suture and interrupted 3 0 Monocryl sutures to reapproximate the subcutaneous tissues  A 4 Monocryl subcuticular skin closure was performed with placement of a Histoacryl dressing  At the conclusion of the procedure the patient had pedal Doppler signals       I was present for the entire procedure    Patient Disposition:  PACU     SIGNATURE: Maritza Rios MD  DATE: March 22, 2019  TIME: 1:53 PM

## 2019-03-22 NOTE — TELEPHONE ENCOUNTER
----- Message from Emili Sanon DO sent at 3/22/2019 11:32 AM EDT -----  Labs reviewed creatinine slightly up to 1 59  Chart reviewed, getting fem pop bypass, vascular sx  Today  Will monitor post op course

## 2019-03-22 NOTE — ANESTHESIA POSTPROCEDURE EVALUATION
Post-Op Assessment Note    CV Status:  Stable  Pain Score: 10    Pain management: adequate     Mental Status:  Alert, awake and agitated   Hydration Status:  Euvolemic   PONV Controlled:  Controlled   Airway Patency:  Patent   Post Op Vitals Reviewed: Yes      Staff: CRNA   Comments: 1 mg dilaudid and 1 mg ativan given in divided doses in the PACU by Dr Pastora Jauregui for pain             BP   143/51   Temp  97 3 F    Pulse  84   Resp   13   SpO2   100%

## 2019-03-22 NOTE — ANESTHESIA PROCEDURE NOTES
Arterial Line Insertion  Performed by: Carisa Rodgers CRNA  Authorized by: Carisa Rodgers CRNA   Consent: Written consent obtained  Risks and benefits: risks, benefits and alternatives were discussed  Consent given by: patient  Patient understanding: patient states understanding of the procedure being performed  Patient consent: the patient's understanding of the procedure matches consent given  Procedure consent: procedure consent matches procedure scheduled  Relevant documents: relevant documents present and verified  Patient identity confirmed: verbally with patient, arm band and hospital-assigned identification number  Preparation: Patient was prepped and draped in the usual sterile fashion  Indications: multiple ABGs and hemodynamic monitoring  Orientation:  Left  Location: radial artery  Sedation:  Patient sedated: no (patient under anesthesia)    Procedure Details:  Jerry's test normal: yes  Needle gauge: 20  Number of attempts: 1    Post-procedure:  Post-procedure: dressing applied  Waveform: good waveform  Patient tolerance: Patient tolerated the procedure well with no immediate complications  Comments: Ultrasound guided

## 2019-03-22 NOTE — RESPIRATORY THERAPY NOTE
RT Protocol Note  Aixa Alberto 68 y o  male MRN: [id number]  Unit/Bed#: [facility] 517-01 Encounter: [id number]    Assessment    Principal Problem:     Atherosclerosis of artery of extremity with ulceration ([place] [address] )      Home Pulmonary Medications:  xopenex MDI prn    Home Devices/Therapy: BiPAP/CPAP    Past Medical History:   Diagnosis Date    Acute kidney injury ([place] [address] )     resolved 11/30/2015    Acute venous embolism and thrombosis of deep vessels of proximal lower extremity ([place] [address] )     resolved 04/04/2015    DARINEL (acute kidney injury) ([place] [address] ) 1/12/2016    Anesthesia     RESPIRATORY ISSUES DUE TO SLEEP APNEA    Cardiac disease     heart attack, stents x 4    [facility]     Chronic cough     resolved 02/04/2016    Chronic pain disorder     intermitent claudication    COPD (chronic obstructive pulmonary disease) ([place] [address] )     Coronary artery disease     CPAP (continuous positive airway pressure) dependence     Diabetes mellitus ([place] [address] )     Disease of thyroid gland     DVT [facility]deep venous thrombosis) [facility]     Heart failure ([facility]     Hyperlipidemia     Hypertension     Ischemic cardiomyopathy     last assessed 09/26/2017    Myocardial infarction [facility])     MI +2 2015,2018    Pulmonary emphysema ([facility]     Pulmonary granuloma ([facility]     resolved 02/03/2017    Renal failure     Sleep apnea      Social History     Socioeconomic History    Marital status: /Civil Union     Spouse name: None    Number of children: None    Years of education: None    Highest education level: None   Occupational History    Occupation: Retired    Occupation: Desk work    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   Social Needs    Financial resource strain: None    Food insecurity:     Worry: None     Inability: None    Transportation needs:     Medical: None     Non-medical: None   Tobacco Use    Smoking status: Former Smoker     Packs/day: 4 00     Years: 48 00     Pack years: 192 00     Types: Cigarettes Last attempt to quit:      Years since quittin 2    Smokeless tobacco: Never Used    Tobacco comment: smoking 48 years 1 5 ppd d/c oct 2008 screening protocol as per allscripts   Substance and Sexual Activity    Alcohol use: Yes     Alcohol/week: 12 6 oz     Types: 21 Standard drinks or equivalent per week     Frequency: 4 or more times a week     Drinks per session: 1 or 2     Comment: 2-3 glasses of vodka daily (6 shots) (history 2 drinks per day as per allscripts    Drug use: No    Sexual activity: Never   Lifestyle    Physical activity:     Days per week: None     Minutes per session: None    Stress: None   Relationships    Social connections:     Talks on phone: None     Gets together: None     Attends L[employer] service: None     Active member of club or organization: None     Attends meetings of clubs or organizations: None     Relationship status: None    Intimate partner violence:     Fear of current or ex partner: None     Emotionally abused: None     Physically abused: None     Forced sexual activity: None   Other Topics Concern    None   Social History Narrative    None       Subjective         Objective    Physical Exam:   Assessment Type: Pre-treatment  General Appearance: Awake, Drowsy  Respiratory Pattern: Normal  Chest Assessment: Chest expansion symmetrical  Bilateral Breath Sounds: Diminished, Clear    Vitals:  Blood pressure 131/66, pulse 85, temperature (!) 97 4 °F (36 3 °C), temperature source Oral, resp  rate 21, height 5' 7" (1 702 m), weight 88 kg (194 lb), SpO2 100 %  Imaging and other studies: I have personally reviewed pertinent reports  Plan    Respiratory Plan: No distress/Pulmonary history        Resp Comments: (P) Pt has a xopenex MDI at home prn for a hx of COPD  Per pts wife he never uses this  Xopenex autosubstituted for albuterol MDI  Per wife pt cannot have albuterol due to tachycardia  Pt changed to xopenex UDN prn instead   Pt requested UDN at this time  He also has alot of pain at this time

## 2019-03-22 NOTE — INTERVAL H&P NOTE
H&P reviewed  After examining the patient I find no changes in the patients condition since the H&P had been written  Impression: Atherosclerotic occlusive disease with severe rest pain and ulceration of the left foot  Plan revascularization with extended profundoplasty and femoral-popliteal bypass

## 2019-03-22 NOTE — CONSULTS
Consultation    Nephrology   Raman Oneil 68 y o  male MRN: [id number]  Unit/Bed#: OR [place] Encounter: [id number]    History of Present Illness   Physician Requesting Consult: Dianna Lyn MD  Reason for Consult : -  perioperative optimization to reduce incidence of acute kidney injury  ASSESSMENT/PLAN:  68 y o   male with pmh of obesity, diabetes, hypertension, severe PAD, CAD, bilateral renal artery stenosis and CKD stage 3 (baseline creatinine 1 4-1 6 mg per dL) who was admitted for revascularization of lower extremity due to severe claudication  Nephrology has been consulted for perioperative optimization to reduce incidence of acute kidney injury  1)perioperative optimization to reduce incidence of acute kidney injury / CKD stage 3:  - patient has underlying CKD secondary to renal vascular disease plus hypertensive nephrosclerosis plus diabetic glomerular nodular sclerosis plus 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 and age-related nephron loss  - patient is at risk for acute kidney injury due to his underlying comorbidities as well as increased susceptibility to hemodynamic perturbations during the surgical procedure and exposure to contrast    - status post endarterectomy arterial femoral with patch angioplasty, balloon angioplasty, stent left EIA bypass femoral popliteal with completion arteriogram on 03/22/2019   - clinically patient appears to be euvolemic  - change IV fluids to Plasmalyte at 75 cc/hour and continue for additional 10 hours then discontinue  - After review of records it appears that the patient has a baseline Creatinine of 1 4-1 6mg/dL  - patient was admitted with a creatinine of 1 59 mg/dL  - peak creatinine during this admission thus far was 1 59 mg/dL on 03/22/2019   - patient's creatinine today is at pending mg/dL    - Avoid nephrotoxins, adjust meds to appropriate GFR   - Clinically patient is not uremic and there is no acute indication for renal replacement therapy (dialysis)  - Optimize hemodynamic status to avoid delay in renal recovery  - Place on a renal diet when allowed diet order    - Strict I/O   - check BMP, magnesium, phosphorus daily   - follow-up with Emanuel Jain after hospitalization for his CKD    2) hypertension:  - Optimize hemodynamics   - Maintain MAP > 65mmHg  - Avoid BP fluctuations  - hold off on home Lasix at this time  - can place on hydralazine 25 mg p o  Q 8 hours p r n  SBP greater than 150 once tridil drip is off     3)H/H:  - most recent hemoglobin at 12 6 grams/deciliter  - Management as per primary team     4)Volume status:  - Clinically patient appears to be euvolemic   - Adjust fluids as above  5)DM:  - management as per Primary team  - continue insulin    6) severe PAD:  - Management as per primary team  - status post revascularization angiogram today  - follow-up with vascular surgery    7) CAD/CHF:  - on most recent echo from 03/20/2019 shows EF of 45%  - grade 1 diastolic dysfunction    Thanks for the consult  Will continue to follow  Please call with questions/ concerns  Plan was discussed with the team in depth  Reta [facility]reola [facility], [facility], 3/22/2019, 2:22 PM          HISTORY OF PRESENT ILLNESS:   Chelita Cisneros is a 68 y o   male with pmh of obesity, diabetes, hypertension, severe PAD, CAD, bilateral renal artery stenosis and CKD stage 3 (baseline creatinine 1 4-1 6 mg per dL) who was admitted for revascularization of lower extremity due to severe claudication  Nephrology has been consulted for perioperative optimization to reduce incidence of acute kidney injury  Patient seen and examined in the PACU  Patient still under the effects of anesthesia and unable to give much history  Review of the chart shows that he follows with Emanuel Jain for nephrology  Patient is status post endarterectomy arterial femoral with patch angioplasty, balloon angioplasty, stent left EIA bypass femoral popliteal with completion arteriogram on 03/22/2019  Patient's creatinine on 03/21/19 was 1 59 mg/dL  Labs from today are pending     History obtained from chart review    Consults    Review of Systems   Unable to perform ROS: Other        Historical Information   Patient Active Problem List   Diagnosis    Acute kidney injury ([facility] )    Type 1 diabetes mellitus ([facility] )    Presence of drug coated stent in LAD coronary artery    Coronary artery disease of native artery of native heart with stable angina pectoris ([facility] )    Presence of drug coated stent in left circumflex coronary artery    Dyslipidemia    Obstructive sleep apnea of adult    Carotid artery stenosis, asymptomatic, bilateral    [facility]ros[facility] artery of extremity [facility] ulceration [facility]    Restrictive lung disease    COPD (chronic obstructive pulmonary disease) ([facility] )    Benign hypertension with chronic kidney disease, stage III ([facility])    CKD (chronic kidney disease) stage 3, GFR 30-59 ml/min ([facility])    Proteinuria    Renal artery stenosis ([facility])    Renal cyst, right    Vitamin D deficiency    Aortoiliac occlusive disease ([facility] )    Hypothyroidism, postablative    Other specified hypothyroidism    Low back pain with sciatica    Lumbar disc herniation    Lumbar radiculopathy    Diabetic neuropathy associated with type 1 diabetes mellitus ([facility] )    History of Ischemic cardiomyopathy    Chronic systolic congestive heart failure ([facility])     Past Medical History:   Diagnosis Date    Acute kidney injury ([place] [address] )     resolved 11/30/2015    Acute venous embolism and thrombosis of deep vessels of proximal lower extremity ([facility] )     resolved 04/04/2015    DARINEL (acute kidney injury) ([facility] ) 1/12/2016    Anesthesia     RESPIRATORY ISSUES DUE TO SLEEP APNEA    Cardiac disease     heart attack, stents x 4    CHF (congestive heart failure) ([facility])     Chronic cough     resolved 02/04/2016    Chronic pain disorder     intermitent claudication    COPD (chronic obstructive pulmonary disease) ([facility] )     Coronary artery disease     CPAP (continuous positive airway pressure) dependence     Diabetes mellitus ([address] )     Disease of thyroid gland     DVT (deep venous thrombosis) (H[facility])     Heart failure (HCC)     Hyperlipidemia     Hypertension     Ischemic cardiomyopathy     last assessed 2017    Myocardial infarction [facility])     MI +2 ,    Pulmonary emphysema (HCC)     Pulmonary granuloma ([facility]CC)     resolved 2017    Renal failure     Sleep apnea      Past Surgical History:   Procedure Laterality Date    BYPASS FEMORAL-POPLITEAL      initial stenosis with stent left, 7 x 100 smart stent onset 2014    CARDIAC SURGERY      cardiac stents    CATARACT EXTRACTION      COLOGJOSE (HISTORICAL)      CORONARY ANGIOPLASTY WITH STENT PLACEMENT      x4    IR ABDOMINAL ANGIOGRAPHY / INTERVENTION  3/14/2019    VASCULAR SURGERY      stent placement     Social History   Social History     Substance and Sexual Activity   Alcohol Use Yes    Alcohol/week: 12 6 oz    Types: 21 Standard drinks or equivalent per week    Frequency: 4 or more times a week    Drinks per session: 1 or 2    Comment: 2-3 glasses of vodka daily (6 shots) (history 2 drinks per day as per allscripts     Social History     Substance and Sexual Activity   Drug Use No     Social History     Tobacco Use   Smoking Status Former Smoker    Packs/day: 4 00    Years: 48 00    Pack years: 192 00    Types: Cigarettes    Last attempt to quit:     Years since quittin 2   Smokeless Tobacco Never Used   Tobacco Comment    smoking 48 years 1 5 ppd d/c oct 2008 screening protocol as per allscripts     Family History   Problem Relation Age of Onset    Heart disease Father         pacer placement    Hypertension Father     Kidney disease Father     Heart failure Father     Heart attack Father     Liver disease Father     Cirrhosis Mother         due to beer consumption    Liver disease Mother     Diabetes Other Meds/Allergies   current meds:   Current Facility-Administered Medications   Medication Dose Route Frequency    HYDROmorphone (DILAUDID) injection 0 5 mg  0 5 mg Intravenous Q5 Min PRN    insulin regular (HumuLIN R,NovoLIN R) injection 10 Units  10 Units Subcutaneous Once    lactated ringers infusion  100 mL/hr Intravenous Continuous    meperidine (DEMEROL) injection 25 mg  25 mg Intravenous Q5 Min PRN    nitroGLYcerin (TRIDIL) 50 mg in 250 mL infusion (premix)  50 mcg/min Intravenous Titrated    ondansetron (ZOFRAN) injection 4 mg  4 mg Intravenous Once PRN    and PTA meds:   Prior to Admission Medications   Prescriptions Last Dose Informant Patient Reported? Taking? Coenzyme Q10 (COQ-10) 200 MG CAPS 3/20/2019 Self Yes Yes   Sig: Take 200 mg by mouth daily   Lactobacillus-Inulin ([address] [place] [address]) 3/20/2019 Self Yes Yes   Sig: Take 1 capsule by mouth daily   NIFEdipine 0 3%-lidocaine 5% rectal ointment 3/21/2019 at 2200 Self No Yes   Sig: Apply 1 application topically every 4 (four) hours as needed for discomfort or pain Apply a small amount to anal opening 4-6 times per day  ULTICARE INSULIN SYRINGE 30G X 1/2" 1 ML MISC  Self Yes No   Sig: Use as directed   ULTICARE INSULIN SYRINGE 30G X 5/16" 0 3 ML MISC  Self Yes No   Sig: Use as directed   aspirin 81 MG tablet 3/21/2019 at 2200 Self Yes Yes   Sig: Take 81 mg by mouth daily     cholecalciferol (VITAMIN D3) 1,000 units tablet 3/20/2019 Self Yes Yes   Sig: Take 1,000 Units by mouth daily     clopidogrel (PLAVIX) 75 mg tablet 3/18/2019 at 0800 Self No Yes   Sig: Take 1 tablet (75 mg total) by mouth daily   docusate sodium (COLACE) 50 mg capsule 3/21/2019 at 2200 Self Yes Yes   Sig: Take by mouth daily   furosemide (LASIX) 20 mg tablet 3/21/2019 at 0800  No Yes   Sig: Take 1 tablet (20 mg total) by mouth daily   gabapentin (NEURONTIN) 100 mg capsule 3/21/2019 at 2200 Self No Yes   Sig: Take 3 capsules twice a day   Patient taking differently: Take 200 mg by mouth 2 (two) times a day Take 3 capsules twice a day    glucagon (GLUCAGON EMERGENCY) 1 MG injection  Self Yes Yes   Si mg   insulin glargine (LANTUS) 100 units/mL subcutaneous injection 3/22/2019 at 0430 Self No Yes   Sig: Inject 66 Units under the skin daily 38 units in the morning and 28 units in the evening   Patient taking differently: Inject 40 Units under the skin daily 40 units in the morning and 18 units in the evening   insulin lispro (HUMALOG) 100 units/mL injection 3/21/2019 at 2200 Self No Yes   Sig: Inject 3 units with breakfast, 6 units at lunch, and 6 units at dinner   Patient taking differently: 4 (four) times a day Inject 3 units with breakfast, 6 units at lunch, and 6 units at dinner   levalbuterol (XOPENEX HFA) 45 mcg/act inhaler More than a month at Unknown time Self Yes No   Sig: Inhale 1-2 puffs   levothyroxine 175 mcg tablet 3/22/2019 at 0530 Self No Yes   Sig: Take 1 tablet (175 mcg total) by mouth daily in the early morning   metoprolol tartrate (LOPRESSOR) 25 mg tablet 3/22/2019 at 0430 Self No Yes   Sig: Take 0 5 tablets (12 5 mg total) by mouth every 12 (twelve) hours   nitroglycerin (NITROSTAT) 0 4 mg SL tablet More than a month at Unknown time Self No No   Sig: Place 1 tablet (0 4 mg total) under the tongue every 5 (five) minutes as needed for chest pain   rosuvastatin (CRESTOR) 20 MG tablet 3/21/2019 at 2200 Self No Yes   Sig: Take 1 tablet (20 mg total) by mouth daily   silver sulfadiazine (SILVADENE,SSD) 1 % cream Past Month at Unknown time  Yes Yes   Sig: Apply topically daily      Facility-Administered Medications: None       Scheduled Meds:  Current Facility-Administered Medications:  heparin 2000 units and papaverine 60 mg in isolyte equivalent 500 mL irrigation bag  Irrigation Once Breanna Bryan MD   lactated ringers 100 mL/hr Intravenous Continuous Iona Acosta CRNA     Facility-Administered Medications Ordered in Other Encounters:  albumin human   Continuous PRN [place] Agent, CRNA Last Rate: Stopped (03/22/19 1124)   ceFAZolin  Intravenous PRN [place] Agent, CRNA    EPINEPHrine in 0 9 % sodium chloride infusion 250 mL   Continuous PRN [place] Agent, CRNA Last Rate: Stopped (03/22/19 1353)   fentanyl citrate (PF)  Intravenous PRN [place] Agent, CRNA    glycopyrrolate  Intravenous PRN [place] Agent, CRNA    heparin (porcine)   PRN [place] Agent, CRNA    HYDROmorphone   PRN [place] Agent, CRNA    insulin regular   PRN [place] Agent, CRNA    lidocaine   PRN [place] Agent, CRNA    LORazepam  Intravenous PRN [place] Agent, CRNA    neostigmine  Intravenous PRN [place] Agent, CRNA    nitroGLYcerin   Continuous PRN [place] Agent, CRNA Last Rate: 25 mcg/min (03/22/19 0854)   ondansetron   PRN [place] Agent, CRNA    phenylephrine (MEDARDO-SYNEPHRINE) 50 mg (STANDARD CONCENTRATION) in sodium chloride 0 9% 250 mL   Continuous PRN [place] Agent, CRNA Last Rate: Stopped (03/22/19 0850)   phenylephrine   PRN [place] Agent, CRNA    propofol  Intravenous PRN [place] Agent, CRNA    protamine   PRN [place] Agent, CRNA    rocuronium   PRN [place] Agent, CRNA    sodium chloride   Continuous PRN [place] Agent, CRNA Last Rate: Stopped (03/22/19 1328)   Succinylcholine Chloride  Intravenous PRN [place] Agent, CRNA        PRN Meds:     Continuous Infusions:  lactated ringers 100 mL/hr       Allergies   Allergen Reactions    Doxazosin     Cardura [Doxazosin Mesylate]      Muscle cramps    Other      Iv dye for cardiac cath  Renal failure very close to dialysis  But no dialysis    Zestril [Lisinopril]      cough         Invasive Devices:      Invasive Devices     Peripheral Intravenous Line            Peripheral IV 03/22/19 Left Hand less than 1 day    Peripheral IV 03/22/19 Right Hand less than 1 day          Arterial Line            Arterial Line 03/22/19 Left Radial less than 1 day          Drain Urethral Catheter Latex 16 Fr  less than 1 day                  PHYSICAL EXAM  BP (P) 139/65   Pulse (P) 87   Temp (!) (P) 97 3 °F (36 3 °C)   Resp (P) 20   Ht 5' 7" (1 702 m)   Wt 88 kg (194 lb)   SpO2 (P) 100%   BMI 30 38 kg/m²   Temp (24hrs), Av 4 °F (36 3 °C), Min:97 3 °F (36 3 °C), Max:97 5 °F (36 4 °C)        Intake/Output Summary (Last 24 hours) at 3/22/2019 1422  Last data filed at 3/22/2019 1421  Gross per 24 hour   Intake 2500 ml   Output 550 ml   Net 1950 ml       I/O last 24 hours: In: 2500 [I V :2000; IV Piggyback:500]  Out: 550 [Urine:350; Blood:200]          Current Weight: Weight - Scale: 88 kg (194 lb)  First Weight: Weight - Scale: 88 5 kg (195 lb)  Physical Exam   Constitutional: He appears well-developed and well-nourished  No distress  HENT:   Head: Normocephalic and atraumatic  Eyes: Conjunctivae are normal  No scleral icterus  Neck: Neck supple  No JVD present  No tracheal deviation present  Cardiovascular: Normal heart sounds  Exam reveals no friction rub  Pulmonary/Chest: He has no wheezes  He has no rales  Abdominal: Soft  He exhibits no mass  There is no tenderness  Musculoskeletal: He exhibits no edema  Neurological: He is alert  Skin: Skin is warm  He is not diaphoretic  No pallor  Nursing note and vitals reviewed  LABORATORY:    Results from last 7 days   Lab Units 19  1418 19  1114   POTASSIUM mmol/L 4 9 4 7   CHLORIDE mmol/L 106 108   CO2 mmol/L 28 26   BUN mg/dL 36* 27*   CREATININE mg/dL 1 59* 1 47*   CALCIUM mg/dL 9 5 8 8   MAGNESIUM mg/dL 2 3  --    PHOSPHORUS mg/dL 4 1  --       rest all reviewed    RADIOLOGY:  XR or angio leg lt    (Results Pending)     Rest all reviewed    Portions of the record may have been created with voice recognition software  Occasional wrong word or "sound a like" substitutions may have occurred due to the inherent limitations of voice recognition software   Read the chart carefully and recognize, using context, where substitutions have occurred  If you have any questions, please contact the dictating provider

## 2019-03-22 NOTE — PLAN OF CARE
Problem: Prexisting or High Potential for Compromised Skin Integrity  Goal: Skin integrity is maintained or improved  Description  INTERVENTIONS:  - Identify patients at risk for skin breakdown  - Assess and monitor skin integrity  - Assess and monitor nutrition and hydration status  - Monitor labs (i e  albumin)  - Assess for incontinence   - Turn and reposition patient  - Assist with mobility/ambulation  - Relieve pressure over bony prominences  - Avoid friction and shearing  - Provide appropriate hygiene as needed including keeping skin clean and dry  - Evaluate need for skin moisturizer/barrier cream  - Collaborate with interdisciplinary team (i e  Nutrition, Rehabilitation, etc )   - Patient/family teaching  Outcome: Progressing     Problem: Potential for Falls  Goal: Patient will remain free of falls  Description  INTERVENTIONS:  - Assess patient frequently for physical needs  -  Identify cognitive and physical deficits and behaviors that affect risk of falls    -  [place] fall precautions as indicated by assessment   - Educate patient/family on patient safety including physical limitations  - Instruct patient to call for assistance with activity based on assessment  - Modify environment to reduce risk of injury  - Consider OT/PT consult to assist with strengthening/mobility  Outcome: Progressing

## 2019-03-22 NOTE — ANESTHESIA POSTPROCEDURE EVALUATION
Post-Op Assessment Note    CV Status:  Stable    Pain management: adequate     Mental Status:  Alert and awake   Hydration Status:  Stable   PONV Controlled:  None   Airway Patency:  Patent  Airway: intubated   Post Op Vitals Reviewed: Yes      Staff: Anesthesiologist       ECG post op Sr with NSSTT wave changes  ICU planned due to Strong CAD History with Intra-Op St depression and T wave inversions  Troponins ordered      BP   [email or web address]   Temp     Pulse  78 @1500   Resp   [email or web address]   SpO2   98 @1500 with 3l/m Nasal O2

## 2019-03-23 ENCOUNTER — APPOINTMENT (INPATIENT)
Dept: NON INVASIVE DIAGNOSTICS | Facility: HOSPITAL | Age: 74
DRG: 252 | End: 2019-03-23
Payer: MEDICARE

## 2019-03-23 PROBLEM — I21.4 NSTEMI (NON-ST ELEVATED MYOCARDIAL INFARCTION) (HCC): Status: ACTIVE | Noted: 2019-03-23

## 2019-03-23 LAB
ANION GAP SERPL CALCULATED.3IONS-SCNC: 5 MMOL/L (ref 4–13)
APTT PPP: 170 SECONDS (ref 26–38)
APTT PPP: 31 SECONDS (ref 26–38)
APTT PPP: 56 SECONDS (ref 26–38)
APTT PPP: 64 SECONDS (ref 26–38)
ATRIAL RATE: 103 BPM
BASE EX.OXY STD BLDV CALC-SCNC: 71.6 % (ref 60–80)
BASE EXCESS BLDV CALC-SCNC: -5.7 MMOL/L
BASOPHILS # BLD AUTO: 0.01 THOUSANDS/ΜL (ref 0–0.1)
BASOPHILS NFR BLD AUTO: 0 % (ref 0–1)
BUN SERPL-MCNC: 30 MG/DL (ref 5–25)
CALCIUM SERPL-MCNC: 7.9 MG/DL (ref 8.3–10.1)
CHLORIDE SERPL-SCNC: 109 MMOL/L (ref 100–108)
CK MB SERPL-MCNC: 1.6 % (ref 0–2.5)
CK MB SERPL-MCNC: 1.8 % (ref 0–2.5)
CK MB SERPL-MCNC: 27.6 NG/ML (ref 0–5)
CK MB SERPL-MCNC: 33.1 NG/ML (ref 0–5)
CK SERPL-CCNC: 1685 U/L (ref 39–308)
CK SERPL-CCNC: 1836 U/L (ref 39–308)
CO2 SERPL-SCNC: 24 MMOL/L (ref 21–32)
CREAT SERPL-MCNC: 1.47 MG/DL (ref 0.6–1.3)
EOSINOPHIL # BLD AUTO: 0.14 THOUSAND/ΜL (ref 0–0.61)
EOSINOPHIL NFR BLD AUTO: 3 % (ref 0–6)
ERYTHROCYTE [DISTWIDTH] IN BLOOD BY AUTOMATED COUNT: 14 % (ref 11.6–15.1)
ERYTHROCYTE [DISTWIDTH] IN BLOOD BY AUTOMATED COUNT: 14 % (ref 11.6–15.1)
GFR SERPL CREATININE-BSD FRML MDRD: 47 ML/MIN/1.73SQ M
GLUCOSE SERPL-MCNC: 109 MG/DL (ref 65–140)
GLUCOSE SERPL-MCNC: 128 MG/DL (ref 65–140)
GLUCOSE SERPL-MCNC: 142 MG/DL (ref 65–140)
GLUCOSE SERPL-MCNC: 144 MG/DL (ref 65–140)
GLUCOSE SERPL-MCNC: 209 MG/DL (ref 65–140)
GLUCOSE SERPL-MCNC: 271 MG/DL (ref 65–140)
GLUCOSE SERPL-MCNC: 68 MG/DL (ref 65–140)
GLUCOSE SERPL-MCNC: 81 MG/DL (ref 65–140)
HCO3 BLDV-SCNC: 19.4 MMOL/L (ref 24–30)
HCT VFR BLD AUTO: 25.4 % (ref 36.5–49.3)
HCT VFR BLD AUTO: 27 % (ref 36.5–49.3)
HCT VFR BLD AUTO: 29.6 % (ref 36.5–49.3)
HGB BLD-MCNC: 8.3 G/DL (ref 12–17)
HGB BLD-MCNC: 8.7 G/DL (ref 12–17)
HGB BLD-MCNC: 9.7 G/DL (ref 12–17)
IMM GRANULOCYTES # BLD AUTO: 0.02 THOUSAND/UL (ref 0–0.2)
IMM GRANULOCYTES NFR BLD AUTO: 0 % (ref 0–2)
INR PPP: 1.09 (ref 0.86–1.17)
LACTATE SERPL-SCNC: 1.4 MMOL/L (ref 0.5–2)
LYMPHOCYTES # BLD AUTO: 1.29 THOUSANDS/ΜL (ref 0.6–4.47)
LYMPHOCYTES NFR BLD AUTO: 23 % (ref 14–44)
MAGNESIUM SERPL-MCNC: 2.1 MG/DL (ref 1.6–2.6)
MCH RBC QN AUTO: 31.8 PG (ref 26.8–34.3)
MCH RBC QN AUTO: 32.3 PG (ref 26.8–34.3)
MCHC RBC AUTO-ENTMCNC: 32.2 G/DL (ref 31.4–37.4)
MCHC RBC AUTO-ENTMCNC: 32.7 G/DL (ref 31.4–37.4)
MCV RBC AUTO: 99 FL (ref 82–98)
MCV RBC AUTO: 99 FL (ref 82–98)
MONOCYTES # BLD AUTO: 0.87 THOUSAND/ΜL (ref 0.17–1.22)
MONOCYTES NFR BLD AUTO: 16 % (ref 4–12)
NEUTROPHILS # BLD AUTO: 3.22 THOUSANDS/ΜL (ref 1.85–7.62)
NEUTS SEG NFR BLD AUTO: 58 % (ref 43–75)
NRBC BLD AUTO-RTO: 0 /100 WBCS
O2 CT BLDV-SCNC: 9.2 ML/DL
P AXIS: 30 DEGREES
PCO2 BLDV: 36.4 MM HG (ref 42–50)
PH BLDV: 7.34 [PH] (ref 7.3–7.4)
PHOSPHATE SERPL-MCNC: 2.9 MG/DL (ref 2.3–4.1)
PLATELET # BLD AUTO: 123 THOUSANDS/UL (ref 149–390)
PLATELET # BLD AUTO: 124 THOUSANDS/UL (ref 149–390)
PMV BLD AUTO: 11.6 FL (ref 8.9–12.7)
PMV BLD AUTO: 12.2 FL (ref 8.9–12.7)
PO2 BLDV: 41.4 MM HG (ref 35–45)
POTASSIUM SERPL-SCNC: 4.3 MMOL/L (ref 3.5–5.3)
PR INTERVAL: 148 MS
PROTHROMBIN TIME: 14.2 SECONDS (ref 11.8–14.2)
QRS AXIS: -50 DEGREES
QRSD INTERVAL: 102 MS
QT INTERVAL: 334 MS
QTC INTERVAL: 437 MS
RBC # BLD AUTO: 2.57 MILLION/UL (ref 3.88–5.62)
RBC # BLD AUTO: 2.74 MILLION/UL (ref 3.88–5.62)
SODIUM SERPL-SCNC: 138 MMOL/L (ref 136–145)
T WAVE AXIS: 132 DEGREES
TROPONIN I SERPL-MCNC: 0.2 NG/ML
TROPONIN I SERPL-MCNC: 0.92 NG/ML
TROPONIN I SERPL-MCNC: 10.4 NG/ML
TROPONIN I SERPL-MCNC: 2.79 NG/ML
TROPONIN I SERPL-MCNC: 7.29 NG/ML
TROPONIN I SERPL-MCNC: 7.47 NG/ML
TROPONIN I SERPL-MCNC: 8.26 NG/ML
VENTRICULAR RATE: 103 BPM
WBC # BLD AUTO: 5.55 THOUSAND/UL (ref 4.31–10.16)
WBC # BLD AUTO: 5.9 THOUSAND/UL (ref 4.31–10.16)

## 2019-03-23 PROCEDURE — 85025 COMPLETE CBC W/AUTO DIFF WBC: CPT | Performed by: NURSE PRACTITIONER

## 2019-03-23 PROCEDURE — 93010 ELECTROCARDIOGRAM REPORT: CPT | Performed by: INTERNAL MEDICINE

## 2019-03-23 PROCEDURE — 85018 HEMOGLOBIN: CPT | Performed by: EMERGENCY MEDICINE

## 2019-03-23 PROCEDURE — 85027 COMPLETE CBC AUTOMATED: CPT | Performed by: STUDENT IN AN ORGANIZED HEALTH CARE EDUCATION/TRAINING PROGRAM

## 2019-03-23 PROCEDURE — 83735 ASSAY OF MAGNESIUM: CPT | Performed by: NURSE PRACTITIONER

## 2019-03-23 PROCEDURE — 85730 THROMBOPLASTIN TIME PARTIAL: CPT | Performed by: SURGERY

## 2019-03-23 PROCEDURE — 84484 ASSAY OF TROPONIN QUANT: CPT | Performed by: PHYSICIAN ASSISTANT

## 2019-03-23 PROCEDURE — 82805 BLOOD GASES W/O2 SATURATION: CPT | Performed by: EMERGENCY MEDICINE

## 2019-03-23 PROCEDURE — C1751 CATH, INF, PER/CENT/MIDLINE: HCPCS

## 2019-03-23 PROCEDURE — 93306 TTE W/DOPPLER COMPLETE: CPT

## 2019-03-23 PROCEDURE — 36569 INSJ PICC 5 YR+ W/O IMAGING: CPT

## 2019-03-23 PROCEDURE — 85014 HEMATOCRIT: CPT | Performed by: EMERGENCY MEDICINE

## 2019-03-23 PROCEDURE — 99233 SBSQ HOSP IP/OBS HIGH 50: CPT | Performed by: INTERNAL MEDICINE

## 2019-03-23 PROCEDURE — 93308 TTE F-UP OR LMTD: CPT | Performed by: INTERNAL MEDICINE

## 2019-03-23 PROCEDURE — 99233 SBSQ HOSP IP/OBS HIGH 50: CPT | Performed by: SURGERY

## 2019-03-23 PROCEDURE — 02HV33Z INSERTION OF INFUSION DEVICE INTO SUPERIOR VENA CAVA, PERCUTANEOUS APPROACH: ICD-10-PCS | Performed by: SURGERY

## 2019-03-23 PROCEDURE — 83605 ASSAY OF LACTIC ACID: CPT | Performed by: EMERGENCY MEDICINE

## 2019-03-23 PROCEDURE — 80048 BASIC METABOLIC PNL TOTAL CA: CPT | Performed by: NURSE PRACTITIONER

## 2019-03-23 PROCEDURE — 82550 ASSAY OF CK (CPK): CPT | Performed by: EMERGENCY MEDICINE

## 2019-03-23 PROCEDURE — 99232 SBSQ HOSP IP/OBS MODERATE 35: CPT | Performed by: INTERNAL MEDICINE

## 2019-03-23 PROCEDURE — 99024 POSTOP FOLLOW-UP VISIT: CPT | Performed by: SURGERY

## 2019-03-23 PROCEDURE — P9016 RBC LEUKOCYTES REDUCED: HCPCS

## 2019-03-23 PROCEDURE — 82948 REAGENT STRIP/BLOOD GLUCOSE: CPT

## 2019-03-23 PROCEDURE — 85610 PROTHROMBIN TIME: CPT | Performed by: STUDENT IN AN ORGANIZED HEALTH CARE EDUCATION/TRAINING PROGRAM

## 2019-03-23 PROCEDURE — 82553 CREATINE MB FRACTION: CPT | Performed by: PHYSICIAN ASSISTANT

## 2019-03-23 PROCEDURE — 84484 ASSAY OF TROPONIN QUANT: CPT | Performed by: STUDENT IN AN ORGANIZED HEALTH CARE EDUCATION/TRAINING PROGRAM

## 2019-03-23 PROCEDURE — 82550 ASSAY OF CK (CPK): CPT | Performed by: PHYSICIAN ASSISTANT

## 2019-03-23 PROCEDURE — 93005 ELECTROCARDIOGRAM TRACING: CPT

## 2019-03-23 PROCEDURE — 84100 ASSAY OF PHOSPHORUS: CPT | Performed by: NURSE PRACTITIONER

## 2019-03-23 PROCEDURE — 30233N1 TRANSFUSION OF NONAUTOLOGOUS RED BLOOD CELLS INTO PERIPHERAL VEIN, PERCUTANEOUS APPROACH: ICD-10-PCS | Performed by: SURGERY

## 2019-03-23 PROCEDURE — 82553 CREATINE MB FRACTION: CPT | Performed by: EMERGENCY MEDICINE

## 2019-03-23 PROCEDURE — 99223 1ST HOSP IP/OBS HIGH 75: CPT | Performed by: INTERNAL MEDICINE

## 2019-03-23 PROCEDURE — 85730 THROMBOPLASTIN TIME PARTIAL: CPT | Performed by: STUDENT IN AN ORGANIZED HEALTH CARE EDUCATION/TRAINING PROGRAM

## 2019-03-23 RX ORDER — INSULIN GLARGINE 100 [IU]/ML
15 INJECTION, SOLUTION SUBCUTANEOUS EVERY 12 HOURS SCHEDULED
Status: DISCONTINUED | OUTPATIENT
Start: 2019-03-23 | End: 2019-03-25

## 2019-03-23 RX ORDER — FENTANYL CITRATE 50 UG/ML
50 INJECTION, SOLUTION INTRAMUSCULAR; INTRAVENOUS ONCE
Status: COMPLETED | OUTPATIENT
Start: 2019-03-23 | End: 2019-03-23

## 2019-03-23 RX ORDER — SODIUM CHLORIDE, SODIUM GLUCONATE, SODIUM ACETATE, POTASSIUM CHLORIDE, MAGNESIUM CHLORIDE, SODIUM PHOSPHATE, DIBASIC, AND POTASSIUM PHOSPHATE .53; .5; .37; .037; .03; .012; .00082 G/100ML; G/100ML; G/100ML; G/100ML; G/100ML; G/100ML; G/100ML
500 INJECTION, SOLUTION INTRAVENOUS ONCE
Status: COMPLETED | OUTPATIENT
Start: 2019-03-23 | End: 2019-03-23

## 2019-03-23 RX ORDER — FENTANYL CITRATE 50 UG/ML
25 INJECTION, SOLUTION INTRAMUSCULAR; INTRAVENOUS EVERY 2 HOUR PRN
Status: DISCONTINUED | OUTPATIENT
Start: 2019-03-23 | End: 2019-03-25

## 2019-03-23 RX ORDER — DEXTROSE MONOHYDRATE 25 G/50ML
INJECTION, SOLUTION INTRAVENOUS
Status: COMPLETED
Start: 2019-03-23 | End: 2019-03-23

## 2019-03-23 RX ORDER — FOLIC ACID 1 MG/1
1 TABLET ORAL DAILY
Status: DISCONTINUED | OUTPATIENT
Start: 2019-03-23 | End: 2019-03-28 | Stop reason: HOSPADM

## 2019-03-23 RX ORDER — CLOPIDOGREL BISULFATE 75 MG/1
300 TABLET ORAL ONCE
Status: COMPLETED | OUTPATIENT
Start: 2019-03-23 | End: 2019-03-23

## 2019-03-23 RX ORDER — HEPARIN SODIUM 10000 [USP'U]/100ML
3-20 INJECTION, SOLUTION INTRAVENOUS
Status: DISCONTINUED | OUTPATIENT
Start: 2019-03-23 | End: 2019-03-25

## 2019-03-23 RX ORDER — CLOPIDOGREL BISULFATE 75 MG/1
75 TABLET ORAL DAILY
Status: DISCONTINUED | OUTPATIENT
Start: 2019-03-23 | End: 2019-03-23

## 2019-03-23 RX ORDER — HYDROMORPHONE HCL/PF 1 MG/ML
0.5 SYRINGE (ML) INJECTION
Status: DISCONTINUED | OUTPATIENT
Start: 2019-03-23 | End: 2019-03-23

## 2019-03-23 RX ORDER — CLOPIDOGREL BISULFATE 75 MG/1
300 TABLET ORAL DAILY
Status: DISCONTINUED | OUTPATIENT
Start: 2019-03-23 | End: 2019-03-23

## 2019-03-23 RX ORDER — NOREPINEPHRINE BITARTRATE 1 MG/ML
INJECTION, SOLUTION INTRAVENOUS
Status: COMPLETED
Start: 2019-03-23 | End: 2019-03-23

## 2019-03-23 RX ORDER — DEXTROSE, SODIUM CHLORIDE, SODIUM LACTATE, POTASSIUM CHLORIDE, AND CALCIUM CHLORIDE 5; .6; .31; .03; .02 G/100ML; G/100ML; G/100ML; G/100ML; G/100ML
75 INJECTION, SOLUTION INTRAVENOUS CONTINUOUS
Status: DISCONTINUED | OUTPATIENT
Start: 2019-03-23 | End: 2019-03-23

## 2019-03-23 RX ORDER — SODIUM CHLORIDE, SODIUM GLUCONATE, SODIUM ACETATE, POTASSIUM CHLORIDE, MAGNESIUM CHLORIDE, SODIUM PHOSPHATE, DIBASIC, AND POTASSIUM PHOSPHATE .53; .5; .37; .037; .03; .012; .00082 G/100ML; G/100ML; G/100ML; G/100ML; G/100ML; G/100ML; G/100ML
75 INJECTION, SOLUTION INTRAVENOUS CONTINUOUS
Status: DISCONTINUED | OUTPATIENT
Start: 2019-03-23 | End: 2019-03-24

## 2019-03-23 RX ORDER — DEXTROSE MONOHYDRATE 25 G/50ML
25 INJECTION, SOLUTION INTRAVENOUS ONCE
Status: COMPLETED | OUTPATIENT
Start: 2019-03-23 | End: 2019-03-23

## 2019-03-23 RX ORDER — THIAMINE MONONITRATE (VIT B1) 100 MG
100 TABLET ORAL DAILY
Status: DISCONTINUED | OUTPATIENT
Start: 2019-03-23 | End: 2019-03-28 | Stop reason: HOSPADM

## 2019-03-23 RX ORDER — METOPROLOL TARTRATE 5 MG/5ML
2.5 INJECTION INTRAVENOUS EVERY 6 HOURS
Status: DISCONTINUED | OUTPATIENT
Start: 2019-03-23 | End: 2019-03-24

## 2019-03-23 RX ORDER — MAGNESIUM HYDROXIDE/ALUMINUM HYDROXICE/SIMETHICONE 120; 1200; 1200 MG/30ML; MG/30ML; MG/30ML
30 SUSPENSION ORAL EVERY 4 HOURS PRN
Status: DISCONTINUED | OUTPATIENT
Start: 2019-03-23 | End: 2019-03-27

## 2019-03-23 RX ADMIN — NOREPINEPHRINE BITARTRATE 4000 MCG: 1 INJECTION INTRAVENOUS at 08:54

## 2019-03-23 RX ADMIN — Medication 1 TABLET: at 17:31

## 2019-03-23 RX ADMIN — DEXTROSE MONOHYDRATE 25 ML: 25 INJECTION, SOLUTION INTRAVENOUS at 09:49

## 2019-03-23 RX ADMIN — GABAPENTIN 200 MG: 100 CAPSULE ORAL at 17:31

## 2019-03-23 RX ADMIN — ACETAMINOPHEN 975 MG: 325 TABLET ORAL at 05:58

## 2019-03-23 RX ADMIN — SODIUM CHLORIDE, SODIUM GLUCONATE, SODIUM ACETATE, POTASSIUM CHLORIDE, MAGNESIUM CHLORIDE, SODIUM PHOSPHATE, DIBASIC, AND POTASSIUM PHOSPHATE 500 ML: .53; .5; .37; .037; .03; .012; .00082 INJECTION, SOLUTION INTRAVENOUS at 05:25

## 2019-03-23 RX ADMIN — GABAPENTIN 200 MG: 100 CAPSULE ORAL at 08:48

## 2019-03-23 RX ADMIN — OXYCODONE HYDROCHLORIDE 5 MG: 5 TABLET ORAL at 13:38

## 2019-03-23 RX ADMIN — ACETAMINOPHEN 975 MG: 325 TABLET ORAL at 13:39

## 2019-03-23 RX ADMIN — METOPROLOL TARTRATE 12.5 MG: 25 TABLET ORAL at 00:04

## 2019-03-23 RX ADMIN — INSULIN LISPRO 4 UNITS: 100 INJECTION, SOLUTION INTRAVENOUS; SUBCUTANEOUS at 17:35

## 2019-03-23 RX ADMIN — ASPIRIN 81 MG 81 MG: 81 TABLET ORAL at 08:48

## 2019-03-23 RX ADMIN — FENTANYL CITRATE 25 MCG: 50 INJECTION, SOLUTION INTRAMUSCULAR; INTRAVENOUS at 08:47

## 2019-03-23 RX ADMIN — SODIUM CHLORIDE, SODIUM GLUCONATE, SODIUM ACETATE, POTASSIUM CHLORIDE, MAGNESIUM CHLORIDE, SODIUM PHOSPHATE, DIBASIC, AND POTASSIUM PHOSPHATE 500 ML: .53; .5; .37; .037; .03; .012; .00082 INJECTION, SOLUTION INTRAVENOUS at 07:23

## 2019-03-23 RX ADMIN — INSULIN GLARGINE 15 UNITS: 100 INJECTION, SOLUTION SUBCUTANEOUS at 21:20

## 2019-03-23 RX ADMIN — CLOPIDOGREL BISULFATE 75 MG: 75 TABLET ORAL at 08:48

## 2019-03-23 RX ADMIN — DOCUSATE SODIUM 100 MG: 100 CAPSULE, LIQUID FILLED ORAL at 17:31

## 2019-03-23 RX ADMIN — ATORVASTATIN CALCIUM 40 MG: 40 TABLET, FILM COATED ORAL at 17:31

## 2019-03-23 RX ADMIN — INSULIN LISPRO 5 UNITS: 100 INJECTION, SOLUTION INTRAVENOUS; SUBCUTANEOUS at 13:33

## 2019-03-23 RX ADMIN — METOPROLOL TARTRATE 2.5 MG: 5 INJECTION, SOLUTION INTRAVENOUS at 22:39

## 2019-03-23 RX ADMIN — FENTANYL CITRATE 25 MCG: 50 INJECTION, SOLUTION INTRAMUSCULAR; INTRAVENOUS at 14:47

## 2019-03-23 RX ADMIN — NOREPINEPHRINE BITARTRATE 6 MCG/MIN: 1 INJECTION INTRAVENOUS at 10:02

## 2019-03-23 RX ADMIN — HYDROMORPHONE HYDROCHLORIDE 1 MG: 1 INJECTION, SOLUTION INTRAMUSCULAR; INTRAVENOUS; SUBCUTANEOUS at 05:13

## 2019-03-23 RX ADMIN — DEXTROSE, SODIUM CHLORIDE, SODIUM LACTATE, POTASSIUM CHLORIDE, AND CALCIUM CHLORIDE 75 ML/HR: 5; .6; .31; .03; .02 INJECTION, SOLUTION INTRAVENOUS at 10:01

## 2019-03-23 RX ADMIN — CLOPIDOGREL BISULFATE 300 MG: 75 TABLET ORAL at 00:36

## 2019-03-23 RX ADMIN — ACETAMINOPHEN 975 MG: 325 TABLET ORAL at 21:19

## 2019-03-23 RX ADMIN — THIAMINE HCL TAB 100 MG 100 MG: 100 TAB at 17:31

## 2019-03-23 RX ADMIN — ALUMINUM HYDROXIDE, MAGNESIUM HYDROXIDE, AND SIMETHICONE 30 ML: 200; 200; 20 SUSPENSION ORAL at 17:47

## 2019-03-23 RX ADMIN — DOCUSATE SODIUM 100 MG: 100 CAPSULE, LIQUID FILLED ORAL at 08:48

## 2019-03-23 RX ADMIN — INSULIN LISPRO 5 UNITS: 100 INJECTION, SOLUTION INTRAVENOUS; SUBCUTANEOUS at 21:19

## 2019-03-23 RX ADMIN — OXYCODONE HYDROCHLORIDE 5 MG: 5 TABLET ORAL at 08:51

## 2019-03-23 RX ADMIN — LEVOTHYROXINE SODIUM 175 MCG: 125 TABLET ORAL at 05:58

## 2019-03-23 RX ADMIN — HEPARIN SODIUM AND DEXTROSE 11.8 UNITS/KG/HR: 10000; 5 INJECTION INTRAVENOUS at 00:36

## 2019-03-23 RX ADMIN — OXYCODONE HYDROCHLORIDE 5 MG: 5 TABLET ORAL at 23:10

## 2019-03-23 RX ADMIN — FENTANYL CITRATE 50 MCG: 50 INJECTION, SOLUTION INTRAMUSCULAR; INTRAVENOUS at 15:41

## 2019-03-23 RX ADMIN — METOPROLOL TARTRATE 2.5 MG: 5 INJECTION, SOLUTION INTRAVENOUS at 17:31

## 2019-03-23 RX ADMIN — HEPARIN SODIUM AND DEXTROSE 10.8 UNITS/KG/HR: 10000; 5 INJECTION INTRAVENOUS at 23:11

## 2019-03-23 RX ADMIN — FOLIC ACID 1 MG: 1 TABLET ORAL at 17:34

## 2019-03-23 RX ADMIN — OXYCODONE HYDROCHLORIDE 10 MG: 5 TABLET ORAL at 17:54

## 2019-03-23 RX ADMIN — SODIUM CHLORIDE, SODIUM GLUCONATE, SODIUM ACETATE, POTASSIUM CHLORIDE, MAGNESIUM CHLORIDE, SODIUM PHOSPHATE, DIBASIC, AND POTASSIUM PHOSPHATE 75 ML/HR: .53; .5; .37; .037; .03; .012; .00082 INJECTION, SOLUTION INTRAVENOUS at 13:22

## 2019-03-23 RX ADMIN — VITAMIN D, TAB 1000IU (100/BT) 1000 UNITS: 25 TAB at 08:48

## 2019-03-23 NOTE — PROGRESS NOTES
Called to bedside after reported that patient had episode of chest pain this evening  Upon entering the room patient stated that he was having substernal chest pain feeling like gas pains  He stated that this pain was significantly different from his previous heart attacks during which time he had more of a burning sensation substernally  He states that he has had this gas pain before  He denies any worsening with palpation or change with respiratory variation  On physical exam patient was tachycardic with heart rates in low 100s and blood pressure systolically in the 403X  EKG performed at bedside showed ST depressions in the inferolateral leads  Will check troponin  In the setting of patient's significant vascular and coronary disease history with recent stenting in October of 2018 is unclear at this time whether this is an NSTEMI type 1 or type 2 event  In that setting will recommend increasing nitro infusion as well as increasing metoprolol to 25 mg twice daily with an extra dose of 12 5 mg this evening  Will also recommend that patient be initiated on ACS heparin protocol and have his Plavix re-initiated with loading dose of 300 mg this evening and then standing dose of 75 mg starting tomorrow  Will await vascular surgery approval for initiation of antiplatelet and anticoagulation  At this time patient denies any active chest pain and states that he feels well  Will continue to monitor patient and trend troponins to peak

## 2019-03-23 NOTE — PLAN OF CARE
Problem: Prexisting or High Potential for Compromised Skin Integrity  Goal: Skin integrity is maintained or improved  Description  INTERVENTIONS:  - Identify patients at risk for skin breakdown  - Assess and monitor skin integrity  - Assess and monitor nutrition and hydration status  - Monitor labs (i e  albumin)  - Assess for incontinence   - Turn and reposition patient  - Assist with mobility/ambulation  - Relieve pressure over bony prominences  - Avoid friction and shearing  - Provide appropriate hygiene as needed including keeping skin clean and dry  - Evaluate need for skin moisturizer/barrier cream  - Collaborate with interdisciplinary team (i e  Nutrition, Rehabilitation, etc )   - Patient/family teaching  Outcome: Progressing     Problem: Potential for Falls  Goal: Patient will remain free of falls  Description  INTERVENTIONS:  - Assess patient frequently for physical needs  -  Identify cognitive and physical deficits and behaviors that affect risk of falls    -  [place] fall precautions as indicated by assessment   - Educate patient/family on patient safety including physical limitations  - Instruct patient to call for assistance with activity based on assessment  - Modify environment to reduce risk of injury  - Consider OT/PT consult to assist with strengthening/mobility  Outcome: Progressing     Problem: PAIN - ADULT  Goal: Verbalizes/displays adequate comfort level or baseline comfort level  Description  Interventions:  - Encourage patient to monitor pain and request assistance  - Assess pain using appropriate pain scale  - Administer analgesics based on type and severity of pain and evaluate response  - Implement non-pharmacological measures as appropriate and evaluate response  - Consider cultural and social influences on pain and pain management  - Notify physician/advanced practitioner if interventions unsuccessful or patient reports new pain  Outcome: Progressing     Problem: INFECTION - ADULT  Goal: Absence or prevention of progression during hospitalization  Description  INTERVENTIONS:  - Assess and monitor for signs and symptoms of infection  - Monitor lab/diagnostic results  - Monitor all insertion sites, i e  indwelling lines, tubes, and drains  - Monitor endotracheal (as able) and nasal secretions for changes in amount and color  - [place] appropriate cooling/warming therapies per order  - Administer medications as ordered  - Instruct and encourage patient and family to use good hand hygiene technique  - Identify and instruct in appropriate isolation precautions for identified infection/condition  Outcome: Progressing  Goal: Absence of fever/infection during neutropenic period  Description  INTERVENTIONS:  - Monitor WBC  - Implement neutropenic guidelines  Outcome: Progressing     Problem: SAFETY ADULT  Goal: Maintain or return to baseline ADL function  Description  INTERVENTIONS:  -  Assess patient's ability to carry out ADLs; assess patient's baseline for ADL function and identify physical deficits which impact ability to perform ADLs (bathing, care of mouth/teeth, toileting, grooming, dressing, etc )  - Assess/evaluate cause of self-care deficits   - Assess range of motion  - Assess patient's mobility; develop plan if impaired  - Assess patient's need for assistive devices and provide as appropriate  - Encourage maximum independence but intervene and supervise when necessary  ¯ Involve family in performance of ADLs  ¯ Assess for home care needs following discharge   ¯ Request OT consult to assist with ADL evaluation and planning for discharge  ¯ Provide patient education as appropriate  Outcome: Progressing  Goal: Maintain or return mobility status to optimal level  Description  INTERVENTIONS:  - Assess patient's baseline mobility status (ambulation, transfers, stairs, etc )    - Identify cognitive and physical deficits and behaviors that affect mobility  - Identify mobility aids required to assist with transfers and/or ambulation (gait belt, sit-to-stand, lift, walker, cane, etc )  - [place] fall precautions as indicated by assessment  - Record patient progress and toleration of activity level on Mobility SBAR; progress patient to next Phase/Stage  - Instruct patient to call for assistance with activity based on assessment  - Request Rehabilitation consult to assist with strengthening/weightbearing, etc   Outcome: Progressing     Problem: DISCHARGE PLANNING  Goal: Discharge to home or other facility with appropriate resources  Description  INTERVENTIONS:  - Identify barriers to discharge w/patient and caregiver  - Arrange for needed discharge resources and transportation as appropriate  - Identify discharge learning needs (meds, wound care, etc )  - Arrange for interpretive services to assist at discharge as needed  - Refer to Case Management Department for coordinating discharge planning if the patient needs post-hospital services based on physician/advanced practitioner order or complex needs related to functional status, cognitive ability, or social support system  Outcome: Progressing     Problem: Knowledge Deficit  Goal: Patient/family/caregiver demonstrates understanding of disease process, treatment plan, medications, and discharge instructions  Description  Complete learning assessment and assess knowledge base    Interventions:  - Provide teaching at level of understanding  - Provide teaching via preferred learning methods  Outcome: Progressing

## 2019-03-23 NOTE — PROGRESS NOTES
Progress Note - Critical Care   Roberto Camejo 68 y o  male MRN: [id number]  Unit/Bed#: [facility] 106-63 Encounter: [id number]    Attending Physician: Ronit Oswald MD      ______________________________________________________________________  Assessment and Plan:   Principal Problem: Atherosclerosis of artery of extremity with ulceration (HCC)  Active Problems:    NSTEMI (non-ST elevated [facility]) [facility]  Resolved Problems:    * No resolved hospital problems  *    42-year-old male with peripheral arterial disease status post right fem-pop bypass with completion angiogram, endarterectomy of the left femoral artery with patch angioplasty and left external iliac artery balloon angioplasty and stenting on 03/22  Developed hypotension and NSTEMI perioperatively so admitted to ICU  Neuro:   Analgesia-change IV Dilaudid to IV fentanyl due to sympathetic affect from the Dilaudid causing hypotension  Patient may take oral oxycodone as well needed  Diabetic peripheral neuropathy-continue Neurontin    CV: And STEMI-ST depressions inferolaterally on EKG last night in setting of chest 8-patient was placed on nitro drip however that was discontinued this morning after given a dose of Dilaudid with subsequent hypotension  Patient no longer has chest pain or EKG changes however does remain hypotensive felt to be related to IV narcotic  Continue aspirin, Plavix, heparin infusion which was started last night as well as beta blockade metoprolol 25 mg q 12 hours (additional 12 5 mg metoprolol dose given overnight), and statin  Will decrease dose of metoprolol to 12 5 mg q 12 hours in setting of hypotension currently  Obtain repeat echocardiogram today and follow up Cardiology plan including possible need for cardiac catheterization  Patient's troponin is up trending  He does have a history of an LAD stent which required restenting in October 2018 due to InStent stenosis      History of CAD status post LAD stent with InStent stenosis restenting October 2018-continue beta blockade with metoprolol 25 mg q 12 hours scheduled, on dual anti-platelet therapy and statin  Patient has prior history of MIs and 2015 in 2018  Hypotension-discontinue IV Dilaudid, changed IV fentanyl  Give 2nd 500 mL bolus of I slight due to persistent maps in the mid 46s  Patient is asymptomatic from this  Pulm:   History of COPD-patient does not take chronic steroids  History of ITZEL-continue CPAP at night  Patient is on room air when he is not sleeping  No active issues at this time  Continue encourage pulmonary toilet  GI:   No active issues at this time  Will keep patient NPO pending cardiology evaluation this morning  :   Acute kidney injury-baseline creatinine approximately 1 1-1 2  Creatinine is 1 45 this morning  Will continue to trend  Giving small fluid boluses now due to hypotension  Home Lasix on hold  Discontinue Cronin catheter today  F/E/N:   On no maintenance IV fluids at this time  Given 500 mL fluid bolus x2 due to hypotension  Electrolytes normal   Diabetic diet ordered-will hold breakfast this morning due to possibility of cardiology intervention  Will await cardiology follow-up  ID:   No active issues at this time  Heme:   Acute blood loss anemia-secondary to postop blood loss hemoglobin stable at 8 3  Would keep hemoglobin greater than 8 due to NSTEMI  Thrombocytopenia-mild, trend    Endo:   History of diabetes mellitus-blood sugars have been ranging from   He is on his home insulin regimen  Will half this due to lower blood sugars and cover with SSI if needed  History of hypothyroidism-continue home Synthroid     Msk/Skin:   Chronic ulceration to the left heel-consult wound Care for dressing  Routine skin care/frequent turning and repositioning      Prophylaxis:  SCDs, heparin infusion    Disposition:   Continue ICU level care due to NSTEMI overnight and hypotension for close hemodynamic monitoring    Code Status: Level 1 - Full Code    Counseling / Coordination of Care  Total Critical Care time spent 30 minutes excluding procedures, teaching and family updates  ______________________________________________________________________    Chief Complaint:  I am feeling much better    24 Hour Events:  Patient came to the ICU postoperatively yesterday due to perioperative hypotension EKG changes  Patient has a history of CAD with multiple stents including 2 stents to the LAD most recently in 2018 due to an MI at that time with an stent occlusion  Overnight he developed worsening chest pain set off by pain in his left lower extremity and EKG showed ST depressions inferiorly laterally consistent with a non STEMI  He was started on a nitro drip, heparin, loaded with Plavix and resumed on his home dual anti-platelet therapy and given an additional 12 5 mg of p o  Lopressor  His chest pain resolved and he rested comfortably until around 530 this morning when he had severe pain in his left leg was given 1 mg of Dilaudid which then caused hypotension  At that time his nitro infusion was discontinued and he was given a 500 mL bolus of crystalloid  His blood pressure is improved from the 60 systolic to 80 systolic we however they did not improved beyond that is maps remained around 50  Another 500 mL of isolate was ordered at this time  Review of Systems   Constitutional: Negative  HENT: Negative  Eyes: Negative  Respiratory: Negative  Negative for shortness of breath  Cardiovascular: Negative  Negative for chest pain  Gastrointestinal: Negative  Endocrine: Negative  Genitourinary: Negative  Musculoskeletal:        +left lower extremity pain is intermittent, currently does not have any discomfort there   Skin: Negative  Allergic/Immunologic: Negative  Neurological: Negative  Hematological: Negative  Psychiatric/Behavioral: Negative  ______________________________________________________________________    Physical Exam:     Physical Exam   Constitutional: He appears well-developed and well-nourished  HENT:   Head: Normocephalic  Eyes: Pupils are equal, round, and reactive to light  Neck: Normal range of motion  Neck supple  Cardiovascular: Regular rhythm and normal heart sounds  + heart rate in the mid 60s   Pulmonary/Chest: Effort normal and breath sounds normal  No respiratory distress  +on CPAP   Abdominal: Soft  Bowel sounds are normal  He exhibits no distension  There is no tenderness  There is no rebound and no guarding  Genitourinary: Penis normal    Genitourinary Comments: +Cronin catheter in place  +right groin site from angiogram on 03/15 clean and dry  +left groin incision clean dry and intact without hematoma   Musculoskeletal: Normal range of motion  He exhibits no edema  +left lower extremity with rubor, Doppler signals intact to the left DP and PT site   Neurological: He is alert  Skin: Skin is warm and dry  Capillary refill takes less than 2 seconds  Psychiatric: He has a normal mood and affect  His behavior is normal  Thought content normal        ______________________________________________________________________  Vitals:    19 0544 19 0547 19 0554 19 0600   BP: (!) 83/46  (!) 97/48 (!) 97/48   Pulse: 76  72 72   Resp: 17  16 16   Temp:       TempSrc:       SpO2: 92%  94% 93%   Weight:  90 8 kg (200 lb 2 8 oz)     Height:           Temperature:   Temp (24hrs), Av 1 °F (36 7 °C), Min:97 3 °F (36 3 °C), Max:100 5 °F (38 1 °C)    Current Temperature: 100 5 °F (38 1 °C)  Weights:   IBW: 66 1 kg    Body mass index is 31 35 kg/m²    Weight (last 2 days)     Date/Time   Weight    19 0547   90 8 (200 18)    19 0606   88 (194)            Hemodynamic Monitoring:  N/A     Non-Invasive/Invasive Ventilation Settings:  Respiratory    Lab Data (Last 4 hours)    None O2/Vent Data (Last 4 hours)    None              No results found for: PHART, SUX1MLN, PO2ART, USS0FDR, N0MOOYNJ, BEART, SOURCE  SpO2: SpO2: 95 %, SpO2 Device: O2 Device: Other (comment)(CPAP)  Intake and Outputs:  I/O       03/21 0701 - 03/22 0700 03/22 0701 - 03/23 0700 03/23 0701 - 03/24 0700    P  O   600     I V  (mL/kg)  3704 8 (40 8)     IV Piggyback  500     Total Intake(mL/kg)  4804 8 (52 9)     Urine (mL/kg/hr)  1475 (0 7)     Blood  200     Total Output  1675     Net  +3129 8                UOP: 50 ml/hr   Nutrition:        Diet Orders   (From admission, onward)            Start     Ordered    03/22/19 1547  Diet Regular; Regular House; Consistent Carbohydrate Diet Level 2 (5 carb servings/75 grams CHO/meal)  Diet effective now     Question Answer Comment   Diet Type Regular    Regular Regular House    Other Restriction(s): Consistent Carbohydrate Diet Level 2 (5 carb servings/75 grams CHO/meal)    RD to adjust diet per protocol?  Yes        03/22/19 1547          Labs:   Results from last 7 days   Lab Units 03/23/19  0530 03/23/19  0049 03/22/19  1615 03/22/19  1330 03/22/19  1126 03/22/19  0830   WBC Thousand/uL 5 55 5 90 7 45  --   --   --    HEMOGLOBIN g/dL 8 3* 8 7* 9 3*  --   --   --    I STAT HEMOGLOBIN g/dl  --   --   --  9 2* 9 9* 10 2*   HEMATOCRIT % 25 4* 27 0* 28 7*  --   --   --    HEMATOCRIT, ISTAT %  --   --   --  27* 29* 30*   PLATELETS Thousands/uL 123* 124* 120*  --   --   --    NEUTROS PCT % 58  --  70  --   --   --    MONOS PCT % 16*  --  13*  --   --   --      Results from last 7 days   Lab Units 03/23/19  0530 03/22/19  1614 03/22/19  1330 03/22/19  1126 03/22/19  0830 03/21/19  1418 03/18/19  1114   SODIUM mmol/L 138 139  --   --   --  142 139   POTASSIUM mmol/L 4 3 4 7  --   --   --  4 9 4 7   CHLORIDE mmol/L 109* 112*  --   --   --  106 108   CO2 mmol/L 24 21  --   --   --  28 26   CO2, I-STAT mmol/L  --   --  21 21 24  --   --    ANION GAP mmol/L 5 6  --   --   --  8 5   BUN mg/dL 30* 38*  --   --   --  36* 27*   CREATININE mg/dL 1 47* 1 56*  --   --   --  1 59* 1 47*   CALCIUM mg/dL 7 9* 8 1*  --   --   --  9 5 8 8   ALT U/L  --  17  --   --   --  25  --    AST U/L  --  18  --   --   --  18  --    ALK PHOS U/L  --  70  --   --   --  92  --    ALBUMIN g/dL  --  3 4*  --   --   --  3 6  --    TOTAL BILIRUBIN mg/dL  --  0 33  --   --   --  0 40  --      Results from last 7 days   Lab Units 03/23/19  0530 03/22/19  1614 03/21/19  1418   MAGNESIUM mg/dL 2 1 2 1 2 3   PHOSPHORUS mg/dL 2 9 3 5 4 1      Results from last 7 days   Lab Units 03/23/19  0530 03/23/19  0049 03/22/19  1615 03/21/19  1418   INR   --  1 09 1 11 1 07   PTT seconds 64* 31  --   --      Results from last 7 days   Lab Units 03/23/19  0530 03/23/19  0300 03/22/19  2352 03/22/19  2026 03/22/19  1615 03/22/19  1434   TROPONIN I ng/mL 2 79* 0 92* 0 20* 0 24* 0 11* 0 07*     Results from last 7 days   Lab Units 03/22/19  1615   LACTIC ACID mmol/L 1 7       Imaging: no new I have personally reviewed pertinent reports  3/20 ECHO: EFM 45%, severe hypokinesis of the basal to mid anteroseptal, basal to mid inferior, and basal to mid inferior lateral walls  Grade 1 diastolic dysfunction  Mild left atrial dilation, mild tricuspid and mitral regurgitation and peak PA pressures of 57 mm Hg  Consistent with pulmonary hypertension  EKG: 3/22 Sinus tachycardia, rate 107 bpm  ST depressions leads II, III, Avf and V4-V6 with reciprocal changes  Micro: Allergies:    Allergies   Allergen Reactions    Doxazosin     Iohexol     Cardura [Doxazosin Mesylate]      Muscle cramps    Other      Iv dye for cardiac cath  Renal failure very close to dialysis  But no dialysis    Zestril [Lisinopril]      cough     Medications:   Scheduled Meds:  Current Facility-Administered Medications:  acetaminophen 975 mg Oral Q8H [address] Shirley Bose PA-C    aspirin 81 mg Oral Daily Shirley Bose PA-C    atorvastatin 40 mg Oral Daily With Dinner Renie Najjar, PA-C    cholecalciferol 1,000 Units Oral Daily Renie Najjar, PA-C    clopidogrel 75 mg Oral Daily Renie Najjar, PA-C    docusate sodium 100 mg Oral BID Renie Najjar, PA-C    gabapentin 200 mg Oral BID Renie Najjar, PA-C    heparin (porcine) 3-20 Units/kg/hr (Order-Specific) Intravenous Titrated Bolivar Simeon MD Last Rate: 11 8 Units/kg/hr (03/23/19 0036)   hydrALAZINE 25 mg Oral Q8H [address] Chavo Romero MD    HYDROmorphone 0 5 mg Intravenous Once Melanie Stone MD    HYDROmorphone 0 5 mg Intravenous Q3H PRN Bolivar Simeon MD    insulin glargine 18 Units Subcutaneous HS Renie Najjar, PA-C    insulin glargine 40 Units Subcutaneous QAM Renie Najjar, PA-C    insulin lispro 1-5 Units Subcutaneous HS Renie Najjar, PA-C    insulin lispro 1-6 Units Subcutaneous TID AC Renie Najjar, PA-C    insulin lispro 3 Units Subcutaneous Daily With Breakfast [place] Antoinette Martinez, PA-GEORGI    insulin lispro 6 Units Subcutaneous Daily With Lunch Josiane Martinez PA-C    insulin lispro 6 Units Subcutaneous Daily With Dinner Josiane Martinez PA-C    levalbuterol 1 25 mg Nebulization Q6H PRN Kristina Catherine MD    levothyroxine 175 mcg Oral Early Morning Renie Najjar, PA-C    metoprolol tartrate 25 mg Oral Q12H [address] Bolivar Simeon MD    multi-electrolyte 500 mL Intravenous Once Prabhjot Lloyd PA-C    nitroGLYcerin 5-200 mcg/min Intravenous Titrated Bolivar Simeon MD Last Rate: Stopped (03/23/19 0500)   ondansetron 4 mg Intravenous Q6H PRN Renie Najjar, PA-C    oxyCODONE 5 mg Oral Q4H PRN Renie Najjar, PA-C    Or        oxyCODONE 10 mg Oral Q4H PRN Renie Najjar, PA-C    sodium chloride 3 mL Nebulization Q6H PRN Kristina Catherine MD      Continuous Infusions:  heparin (porcine) 3-20 Units/kg/hr (Order-Specific) Last Rate: 11 8 Units/kg/hr (03/23/19 0036)   nitroGLYcerin 5-200 mcg/min Last Rate: Stopped (03/23/19 0500)     PRN Meds:    HYDROmorphone 0 5 mg Q3H PRN   levalbuterol 1 25 mg Q6H PRN   ondansetron 4 mg Q6H PRN   oxyCODONE 5 mg Q4H PRN   Or     oxyCODONE 10 mg Q4H PRN   sodium chloride 3 mL Q6H PRN     VTE Pharmacologic Prophylaxis: Heparin Drip  VTE Mechanical Prophylaxis: sequential compression device     Invasive lines and devices: Invasive Devices     Peripheral Intravenous Line            Peripheral IV 03/22/19 Left Hand less than 1 day    Peripheral IV 03/22/19 Right Hand less than 1 day          Arterial Line            Arterial Line 03/22/19 Left Radial less than 1 day          Drain            Urethral Catheter Latex 16 Fr  less than 1 day                     Portions of the record may have been created with voice recognition software  Occasional wrong word or "sound a like" substitutions may have occurred due to the inherent limitations of voice recognition software  Read the chart carefully and recognize, using context, where substitutions have occurred      Linda Browning PA-C

## 2019-03-23 NOTE — PROGRESS NOTES
Follow up Consultation    Nephrology   Kailyn Peterson 68 y o  male MRN: [id number]  Unit/Bed#: [address] 515-99 Encounter: [id number]      Physician Requesting Consult: Winsome White MD  Reason for Consult:  perioperative optimization to reduce incidence of acute kidney injury  ASSESSMENT/PLAN:  68 y o   male with pmh of obesity, diabetes, hypertension, severe PAD, CAD, bilateral renal artery stenosis and CKD stage 3 (baseline creatinine 1 4-1 6 mg per dL) who was admitted for revascularization of lower extremity due to severe claudication  Nephrology has been consulted for perioperative optimization to reduce incidence of acute kidney injury  1)perioperative optimization to reduce incidence of acute kidney injury / CKD stage 3:  - patient has underlying CKD secondary to renal vascular disease plus hypertensive nephrosclerosis plus diabetic glomerular nodular sclerosis plus 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 and age-related nephron loss  - patient is at risk for acute kidney injury due to his underlying comorbidities as well as increased susceptibility to hemodynamic perturbations during the surgical procedure and exposure to contrast    - status post endarterectomy arterial femoral with patch angioplasty, balloon angioplasty, stent left EIA bypass femoral popliteal with completion arteriogram on 03/22/2019   - postprocedure patient with NSTEMI and hypotension   - clinically patient appears to be euvolemic to minimally hypervolemic  - After review of records it appears that the patient has a baseline Creatinine of 1 4-1 6mg/dL  - patient was admitted with a creatinine of 1 59 mg/dL  - peak creatinine during this admission thus far was 1 59 mg/dL on 03/22/2019   - patient's creatinine today is at 1 47 mg/dL  - Avoid nephrotoxins, adjust meds to appropriate GFR   - Clinically patient is not uremic and there is no acute indication for renal replacement therapy (dialysis)    - Optimize hemodynamic status to avoid delay in renal recovery  - Place on a renal diet when allowed diet order    - Strict I/O   - check BMP, magnesium, phosphorus daily   - follow-up with Ahmet Vasquezer after hospitalization for his CKD  - agree with initiation of pressors  - follow up with Cardiology to optimize his cardiac output and status    2) blood pressure/hypertension:  - Optimize hemodynamics   - Maintain MAP > 65mmHg  - Avoid BP fluctuations  - hold off on home Lasix at this time  - recommend DC with Hydralazine  - to be started on pressors  - may use albumin 25 IV Q 6 for hypotension    3)H/H:  - most recent hemoglobin at 8 3 grams/deciliter  - Management as per primary team     4)Volume status:  - Clinically patient appears to be euvolemic to minimally hypervolemic   - see above recs    5)DM:  - management as per Primary team  - continue insulin    6) severe PAD:  - Management as per primary team  - status post revascularization angiogram today  - follow-up with vascular surgery    7) CAD/CHF/ NSTEMI:  - on most recent echo from 2019 shows EF of 45%  - grade 1 diastolic dysfunction  - continues to have elevation in troponins  - follow-up with cardiology    Thanks for the consult  Will continue to follow  Please call with questions/ concerns  Plan was discussed with the team in depth  Marcellus Watkins MD, FASN, 3/23/2019, 12:07 PM              Objective :   Patient seen and examined in the ICU  Family at bedside  Patient overnight with NSTEMI  Hemodynamically tenuous blood pressures had been low  Urine output has been total of 1 4 L in last 24 hours and is low  Patient being followed by Cardiology  Nitro drip is now off  Has received IV fluids  Is about to be started on pressors        PHYSICAL EXAM  BP (!) 82/43   Pulse 94   Temp 98 1 °F (36 7 °C) (Oral)   Resp 21   Ht 5' 7" (1 702 m)   Wt 90 8 kg (200 lb 2 8 oz)   SpO2 94%   BMI 31 35 kg/m²   Temp (24hrs), Av 1 °F (36 7 °C), Min:97 3 °F (36 3 °C), Max:100 5 °F (38 1 °C)        Intake/Output Summary (Last 24 hours) at 3/23/2019 1207  Last data filed at 3/23/2019 1154  Gross per 24 hour   Intake 5286 88 ml   Output 1625 ml   Net 3661 88 ml       I/O last 24 hours: In: 5786 9 [P O :840; I V :4446 9; IV Piggyback:500]  Out: 1875 [Urine:1675; Blood:200]      Current Weight: Weight - Scale: 90 8 kg (200 lb 2 8 oz)  First Weight: Weight - Scale: 88 5 kg (195 lb)  Physical Exam   Constitutional: He is oriented to person, place, and time  He appears well-developed and well-nourished  No distress  HENT:   Head: Normocephalic and atraumatic  BiPAP in place   Eyes: Conjunctivae are normal  No scleral icterus  Neck: Neck supple  No JVD present  No tracheal deviation present  Cardiovascular: Normal heart sounds  Exam reveals no friction rub  No murmur heard  Pulmonary/Chest:   Course breath sounds no wheezing   Abdominal: Soft  He exhibits no mass  There is no tenderness  There is no guarding  Musculoskeletal: He exhibits no edema  Left lower extremity read and tender to palpation  No edema bilateral lower extremities, trace edema bilateral upper extremities   Neurological: He is alert and oriented to person, place, and time  Skin: Skin is warm  He is not diaphoretic  No pallor  Psychiatric: He has a normal mood and affect  His behavior is normal    Nursing note and vitals reviewed  Review of Systems   Constitutional: Negative for appetite change, fatigue and fever  HENT: Negative for congestion and sore throat  Respiratory: Negative for cough, shortness of breath and wheezing  Cardiovascular: Negative for chest pain, palpitations and leg swelling  Gastrointestinal: Negative for abdominal pain, constipation, diarrhea, nausea and vomiting  Genitourinary: Negative for flank pain  Musculoskeletal: Negative for back pain  Left lower extremity pain   Skin: Negative for rash  Neurological: Negative for dizziness and headaches  Psychiatric/Behavioral: Positive for agitation  Negative for confusion  All other systems reviewed and are negative        Scheduled Meds:  Current Facility-Administered Medications:  acetaminophen 975 mg Oral Q8H [address] Ashish Lares PA-C    aspirin 81 mg Oral Daily Ashish Lares PA-C    atorvastatin 40 mg Oral Daily With [employer] [place]    cholecalciferol 1,000 Units Oral Daily Ashish Lares PA-C    clopidogrel 75 mg Oral Daily PATRICIA Ying-GEORGI    dextrose 5% lactated ringer's 75 mL/hr Intravenous Continuous Pura Greene PA-C Last Rate: 75 mL/hr (03/23/19 1001)   docusate sodium 100 mg Oral BID Ashish Lares PA-C    fentanyl citrate (PF) 25 mcg Intravenous Q2H PRN Pura Greene PA-C    gabapentin 200 mg Oral BID Ashish Lares PA-C    heparin (porcine) 3-20 Units/kg/hr (Order-Specific) Intravenous Titrated Dafne Abdalla MD Last Rate: 11 8 Units/kg/hr (03/23/19 0036)   HYDROmorphone 0 5 mg Intravenous Once Karmen Agustin MD    insulin lispro 1-5 Units Subcutaneous HS Ashish Lares PA-C    insulin lispro 1-6 Units Subcutaneous TID AC Ashish Lares PA-C    levalbuterol 1 25 mg Nebulization Q6H PRN Madonna Quezada MD    levothyroxine 175 mcg Oral Early Morning Ashish Lares PA-C    metoprolol tartrate 12 5 mg Oral Q12H [address] Pura Greene PA-C    nitroGLYcerin 5-200 mcg/min Intravenous Titrated Dafne Abdalla MD Last Rate: Stopped (03/23/19 0500)   norepinephrine 1-30 mcg/min Intravenous Titrated Pura Greene PA-C Last Rate: 2 mcg/min (03/23/19 1205)   ondansetron 4 mg Intravenous Q6H PRN Ashish Lares PA-C    oxyCODONE 5 mg Oral Q4H PRN Ashish Lares PA-C    Or        oxyCODONE 10 mg Oral Q4H PRN Ashish Lares PA-C    sodium chloride 3 mL Nebulization Q6H PRN Madonna Quezada MD        PRN Meds: fentanyl citrate (PF)    levalbuterol    ondansetron    oxyCODONE **OR** oxyCODONE    sodium chloride    Continuous Infusions:  dextrose 5% lactated ringer's 75 mL/hr Last Rate: 75 mL/hr (03/23/19 1001)   heparin (porcine) 3-20 Units/kg/hr (Order-Specific) Last Rate: 11 8 Units/kg/hr (03/23/19 0036)   nitroGLYcerin 5-200 mcg/min Last Rate: Stopped (03/23/19 0500)   norepinephrine 1-30 mcg/min Last Rate: 2 mcg/min (03/23/19 1205)         Invasive Devices:      Invasive Devices     Peripherally Inserted Central Catheter Line            PICC Line 22/08/01 Right Basilic less than 1 day          Peripheral Intravenous Line            Peripheral IV 03/22/19 Left Hand 1 day    Peripheral IV 03/23/19 Right Arm less than 1 day          Arterial Line            Arterial Line 03/22/19 Left Radial 1 day                  LABORATORY:    Results from last 7 days   Lab Units 03/23/19  0530 03/23/19  0049 03/22/19  1615 03/22/19  1614 03/22/19  1330 03/22/19  1126 03/22/19  0830 03/21/19  1418 03/18/19  1114   WBC Thousand/uL 5 55 5 90 7 45  --   --   --   --   --   --    HEMOGLOBIN g/dL 8 3* 8 7* 9 3*  --   --   --   --   --   --    I STAT HEMOGLOBIN g/dl  --   --   --   --  9 2* 9 9* 10 2*  --   --    HEMATOCRIT % 25 4* 27 0* 28 7*  --   --   --   --   --   --    HEMATOCRIT, ISTAT %  --   --   --   --  27* 29* 30*  --   --    PLATELETS Thousands/uL 123* 124* 120*  --   --   --   --   --   --    POTASSIUM mmol/L 4 3  --   --  4 7  --   --   --  4 9 4 7   CHLORIDE mmol/L 109*  --   --  112*  --   --   --  106 108   CO2 mmol/L 24  --   --  21  --   --   --  28 26   CO2, I-STAT mmol/L  --   --   --   --  21 21 24  --   --    BUN mg/dL 30*  --   --  38*  --   --   --  36* 27*   CREATININE mg/dL 1 47*  --   --  1 56*  --   --   --  1 59* 1 47*   CALCIUM mg/dL 7 9*  --   --  8 1*  --   --   --  9 5 8 8   MAGNESIUM mg/dL 2 1  --   --  2 1  --   --   --  2 3  --    PHOSPHORUS mg/dL 2 9  --   --  3 5  --   --   --  4 1  --    GLUCOSE, ISTAT mg/dl  --   --   --   --  207* 187* 178*  --   --       rest all reviewed    RADIOLOGY:  XR or angio leg lt    (Results Pending)     Rest all reviewed    Portions of the record may have been created with voice recognition software  Occasional wrong word or "sound a like" substitutions may have occurred due to the inherent limitations of voice recognition software  Read the chart carefully and recognize, using context, where substitutions have occurred  If you have any questions, please contact the dictating provider

## 2019-03-23 NOTE — PROGRESS NOTES
Notified by the ICU resident that the patient was having an NSTEMI  He was complaining of substernal chest pain and a 12 lead EKG was obtained demonstrating tracings consistent with NSTEMI  The patient's wife believe this was brought on by a sharp, electric pain in his left leg that woke the patient from sleep  Presently, the patient is asymptomatic  Appreciate cardiology recs: Increase nitro gtt, increase metoprolol to 25 mg twice daily with an additional 12 5 mg at this time  We also agree with a loading dose of 300 mg clopidogrel at this time with resumption of 75 mg daily in the morning  We will also initiate heparin gtt via the ACS protocol without additional boluses of heparin  At this time the patient has a palpable left femoral pulse without hematoma at his operative sites  His distal signals remain easily detectable by Doppler at the PT, DP, and peroneal      This was discussed with the attending vascular surgeon on duty, Dr Jenny Ortiz, who agreed with the recommendations      Lester Brandon MD

## 2019-03-23 NOTE — PROGRESS NOTES
Progress Note - Vascular Surgery   Tomeka Landon 68 y o  male MRN: [id number]  Unit/Bed#: [facility] 517-01 Encounter: [id number]      Subjective:  Had an NSTEMI overnight and was initiated on ACS heparin gtt and loaded with plavix and received metoprolol  He was on nitro drip which was stopped this morning after he became hypotensive  He received a 500cc bolus of crystalloid  His chest pain is resolved this morning  He complains of intermittent L leg pain  Vitals:  BP (!) 97/48   Pulse 72   Temp 100 5 °F (38 1 °C) (Axillary)   Resp 16   Ht 5' 7" (1 702 m)   Wt 90 8 kg (200 lb 2 8 oz)   SpO2 93%   BMI 31 35 kg/m²     I/Os:  I/O last 3 completed shifts: In: 4804 8 [P O :600; I V :3704 8; IV Piggyback:500]  Out: 8690 [Urine:1475; Blood:200]  No intake/output data recorded      Lab Results and Cultures:   CBC with diff:   Lab Results   Component Value Date    WBC 5 55 03/23/2019    HGB 8 3 (L) 03/23/2019    HCT 25 4 (L) 03/23/2019    MCV 99 (H) 03/23/2019     (L) 03/23/2019    MCH 32 3 03/23/2019    MCHC 32 7 03/23/2019    RDW 14 0 03/23/2019    MPV 12 2 03/23/2019    NRBC 0 03/23/2019   ,   BMP/CMP:  Lab Results   Component Value Date    SODIUM 138 03/23/2019    K 4 3 03/23/2019    K 4 9 12/21/2015     (H) 03/23/2019     (H) 12/21/2015    CO2 24 03/23/2019    CO2 21 03/22/2019    ANIONGAP 6 12/21/2015    BUN 30 (H) 03/23/2019    BUN 19 12/21/2015    CREATININE 1 47 (H) 03/23/2019    CREATININE 1 29 12/21/2015    GLUCOSE 207 (H) 03/22/2019    GLUCOSE 186 (H) 12/21/2015    CALCIUM 7 9 (L) 03/23/2019    CALCIUM 8 7 12/21/2015    AST 18 03/22/2019    AST 22 10/01/2015    ALT 17 03/22/2019    ALT 35 10/01/2015    ALKPHOS 70 03/22/2019    ALKPHOS 99 10/01/2015    PROT 7 6 10/01/2015    BILITOT 0 33 10/01/2015    EGFR 47 03/23/2019   ,   Lipid Panel:   Lab Results   Component Value Date    CHOL 129 10/01/2015   ,   Coags:   Lab Results   Component Value Date    PTT 64 (H) 03/23/2019    PTT 42 (H) 02/21/2014    INR 1 09 03/23/2019    INR 0 96 02/24/2014   ,     Blood Culture: No results found for: BLOODCX,   Urinalysis:   Lab Results   Component Value Date    COLORU yellow 02/27/2019    COLORU Yellow 11/28/2018    CLARITYU clear 02/27/2019    CLARITYU Cloudy 11/28/2018    SPECGRAV 1 021 11/28/2018    PHUR 5 5 11/28/2018    LEUKOCYTESUR - 02/27/2019    LEUKOCYTESUR Negative 11/28/2018    NITRITE - 02/27/2019    NITRITE Negative 11/28/2018    GLUCOSEU - 02/27/2019    GLUCOSEU Negative 11/28/2018    KETONESU - 02/27/2019    KETONESU Negative 11/28/2018    BILIRUBINUR - 02/27/2019    BILIRUBINUR Negative 11/28/2018    BLOODU - 02/27/2019    BLOODU Large (A) 11/28/2018   ,   Urine Culture:   Lab Results   Component Value Date    URINECX No Growth <1000 cfu/mL 03/05/2019   ,   Wound Culure: No results found for: WOUNDCULT    Medications:  Current Facility-Administered Medications   Medication Dose Route Frequency    acetaminophen (TYLENOL) tablet 975 mg  975 mg Oral Q8H [address]    aspirin chewable tablet 81 mg  81 mg Oral Daily    atorvastatin (LIPITOR) tablet 40 mg  40 mg Oral Daily With Dinner    cholecalciferol (VITAMIN D3) tablet 1,000 Units  1,000 Units Oral Daily    clopidogrel (PLAVIX) tablet 75 mg  75 mg Oral Daily    docusate sodium (COLACE) capsule 100 mg  100 mg Oral BID    gabapentin (NEURONTIN) capsule 200 mg  200 mg Oral BID    heparin (porcine) 25,000 units in 250 mL infusion (premix)  3-20 Units/kg/hr (Order-Specific) Intravenous Titrated    hydrALAZINE (APRESOLINE) tablet 25 mg  25 mg Oral Q8H [address]    HYDROmorphone (DILAUDID) injection 0 5 mg  0 5 mg Intravenous Once    HYDROmorphone (DILAUDID) injection 0 5 mg  0 5 mg Intravenous Q3H PRN    insulin glargine (LANTUS) subcutaneous injection 18 Units 0 18 mL  18 Units Subcutaneous HS    insulin glargine (LANTUS) subcutaneous injection 40 Units 0 4 mL  40 Units Subcutaneous QAM    insulin lispro (HumaLOG) 100 units/mL subcutaneous injection 1-5 Units  1-5 Units Subcutaneous HS    insulin lispro (HumaLOG) 100 units/mL subcutaneous injection 1-6 Units  1-6 Units Subcutaneous TID AC    insulin lispro (HumaLOG) 100 units/mL subcutaneous injection 3 Units  3 Units Subcutaneous Daily With Breakfast    insulin lispro (HumaLOG) 100 units/mL subcutaneous injection 6 Units  6 Units Subcutaneous Daily With Lunch    insulin lispro (HumaLOG) 100 units/mL subcutaneous injection 6 Units  6 Units Subcutaneous Daily With Dinner    levalbuterol (XOPENEX) inhalation solution 1 25 mg  1 25 mg Nebulization Q6H PRN    levothyroxine tablet 175 mcg  175 mcg Oral Early Morning    metoprolol tartrate (LOPRESSOR) tablet 25 mg  25 mg Oral Q12H [address]    multi-electrolyte (ISOLYTE-S PH 7 4) bolus 500 mL  500 mL Intravenous Once    nitroGLYcerin (TRIDIL) 50 mg in 250 mL infusion (premix)  5-200 mcg/min Intravenous Titrated    ondansetron (ZOFRAN) injection 4 mg  4 mg Intravenous Q6H PRN    oxyCODONE (ROXICODONE) IR tablet 5 mg  5 mg Oral Q4H PRN    Or    oxyCODONE (ROXICODONE) IR tablet 10 mg  10 mg Oral Q4H PRN    sodium chloride 0 9 % inhalation solution 3 mL  3 mL Nebulization Q6H PRN       Imaging:    Physical Exam:    General: NAD  CV: RRR  Respiratory: nonlabored respirations on CPAP  Abdominal: soft, nt, nd  Extremities: moves all extremities    Wound/Incision:  Groin incisions c/d/i    Pulse exam:  Dopp PT/Dp bilaterally   Dopp graft    Assessment:  73M with AIOD with claudication now POD#1 s/p ENDARTERECTOMY ARTERIAL FEMORAL WITH PATCH ANGIOPLASTY, BALLOON ANGIOPLASTY, STENT LEFT EIA  BYPASS FEMORAL-POPLITEAL WITH COMPLETION ARTERIOGRAM  Patient also had NSTEMI overnight    Plan:  Cardiac diet  Appreciate cardiology recommendations  Continue heparin gtt  Analgesia  PT/OT  Keep Soila  Remove Cronin  Continue ICU level of care    Karine Bautista MD  3/23/2019

## 2019-03-23 NOTE — CONSULTS
Consultation - Emiliano Enrique 68 y o  male MRN: [id number]    [facility] Encounter: [id number]      Assessment/Plan     Assessment: This is a 68y o -year-old male with type 1 diabetes with hypoglycemia, hyperglycemia, hypothyroidism-peripheral arterial disease status post endarterectomy with patch angioplasty, stent , left femoral popliteal bypass and non ST elevation MI    Plan: Will restart basal insulin at a lower dose-start Lantus 15 units twice daily  Premeal insulin will be 1 unit for every 5 g of carbs  Also use a correction scale of 1 unit for every 40 mg/dL above a target of 140  Fingersticks to be monitored before meals and bedtime and will adjust accordingly  Discussed with the nurse that now he is eating he does not need to be on D5 infusion  Hypothyroidism-continue levothyroxine at current dose    Peripheral arterial disease -measurement as per vascular surgery    Non ST elevation MI-initiated on ACS protocol-continues on IV heparin    Chronic kidney disease-nephrology is on board-if renal function declines he will need reduction in his insulin dose    CC: Diabetes Consult    History of Present Illness     HPI: Emiliano Enrique is a 68y o  year old male with type 1 diabetes , hypertension, severe PAD, coronary artery disease, CKD who was admitted for revascularization of lower extremity due to severe claudication  Since admission he has had hypo and hyperglycemic episodes and endocrine consult is requested for management of type 1 dm  Patient states that he was diagnosed with type 1 diabetes in 1970s and has been on insulin therapy since then  He has hypoglycemia unawareness - uses  DEXCOM CGM -has  not had any hospitalizations for severe hypoglycemia  His home regimen is Lantus 40 units in the morning and 18 at bedtime, he uses Humalog on a flexible regimen-1 unit for every 3-5 g of carbs, 1 unit for 25-40 above a target of 140    He states that he checks his blood sugars 4 times a day at home and sugars are usually well controlled without any significant hypo or hyperglycemia  Complains of occasional polyuria, polydipsia and blurry vision  Complains of numbness and tingling in his feet  He did not have much of an appetite for breakfast but did eat more than 50% of his lunch this afternoon  His sugars since admission have ranged between 59-240s  Consults    Review of Systems   Constitutional: Positive for appetite change and fatigue  Negative for unexpected weight change  Eyes: Negative for visual disturbance  Respiratory: Negative for cough and shortness of breath  Cardiovascular: Negative for palpitations and leg swelling  Gastrointestinal: Positive for constipation  Negative for diarrhea, nausea and vomiting  Endocrine: Negative for polydipsia and polyuria  Skin: Positive for wound  Neurological: Positive for numbness  Psychiatric/Behavioral: Negative for confusion and sleep disturbance  All other systems reviewed and are negative        Historical Information   Past Medical History:   Diagnosis Date    Acute kidney injury ([facility] [address]     resolved 11/30/2015    [facility] venous [facility] and [facility] deep vessels [facility]     resolved 04/04/2015    [facility]INEL [facility]) [facility] 1/12/2016    Anesthesia     RESPIRATORY ISSUES DUE TO SLEEP APNEA    Cardiac disease     heart attack, stents x 4    CH[facility]congestive [facility] failure) [facility]     Chronic cough     resolved 02/04/2016    Chronic pain disorder     intermitent claudication    COPD (chronic obstructive pulmonary disease) ([facility]     Coronary artery disease     CPAP (continuous positive airway pressure) dependence     Diabetes mellitus ([facility] [address] [facility]     Disease of thyroid gland     DVT (deep venous thrombosis) ([facility] [address] [facility]     Heart failure ([facility] [address] [facility]     Hyperlipidemia     Hypertension     Ischemic cardiomyopathy     last assessed 09/26/2017    Myocardial infarction [facility])     MI +2 2015,2018  Pulmonary emphysema (HCC)     Pulmonary granuloma ([facility] [address] )     resolved 2017    Renal failure     Sleep apnea      Past Surgical History:   Procedure Laterality Date    BYPASS FEMORAL-POPLITEAL      initial stenosis with stent left, 7 x 100 smart stent onset 2014    CARDIAC SURGERY      cardiac stents    CATARACT EXTRACTION      MANUEL (HISTORICAL)      CORONARY ANGIOPLASTY WITH STENT PLACEMENT      x4    IR ABDOMINAL ANGIOGRAPHY / INTERVENTION  3/14/2019    VASCULAR SURGERY      stent placement     Social History   Social History     Substance and Sexual Activity   Alcohol Use Yes    Alcohol/week: 12 6 oz    Types: 21 Standard drinks or equivalent per week    Frequency: 4 or more times a week    Drinks per session: 1 or 2    Binge frequency: Never    Comment: 2-3 glasses of vodka daily (6 shots) (history 2 drinks per day as per allscripts     Social History     Substance and Sexual Activity   Drug Use No     Social History     Tobacco Use   Smoking Status Former Smoker    Packs/day: 4 00    Years: 48 00    Pack years: 192 00    Types: Cigarettes    Last attempt to quit:     Years since quittin 2   Smokeless Tobacco Never Used   Tobacco Comment    smoking 48 years 1 5 ppd d/c oct 2008 screening protocol as per allscripts     Family History:   Family History   Problem Relation Age of Onset    Heart disease Father         pacer placement    Hypertension Father     Kidney disease Father     Heart failure Father     Heart attack Father     Liver disease Father     Cirrhosis Mother         due to beer consumption    Liver disease Mother     Diabetes Other        Meds/Allergies   Current Facility-Administered Medications   Medication Dose Route Frequency Provider Last Rate Last Dose    acetaminophen (TYLENOL) tablet 975 mg  975 mg Oral [facility] Pauleen Cooks, PA-C   975 mg at 19 0558    aspirin chewable tablet 81 mg  81 mg Oral Daily Gaudencio Gonzalez MAGO Martinez   81 mg at 03/23/19 0848    atorvastatin (LIPITOR) tablet 40 mg  40 mg Oral Daily With [employer] MAGO Martinez   40 mg at 03/22/19 1613    cholecalciferol (VITAMIN D3) tablet 1,000 Units  1,000 Units Oral Daily Geena Mercer PA-C   1,000 Units at 03/23/19 0848    clopidogrel (PLAVIX) tablet 75 mg  75 mg Oral Daily Geena Mercer PA-C   75 mg at 03/23/19 0848    dextrose 5 % in lactated Ringer's infusion  75 mL/hr Intravenous Continuous John Heredia PA-C 75 mL/hr at 03/23/19 1001 75 mL/hr at 03/23/19 1001    docusate sodium (COLACE) capsule 100 mg  100 mg Oral BID Geena Mercer PA-C   100 mg at 03/23/19 0848    fentanyl citrate (PF) 100 MCG/2ML 25 mcg  25 mcg Intravenous Q2H PRN John Heredia PA-C   25 mcg at 03/23/19 0847    gabapentin (NEURONTIN) capsule 200 mg  200 mg Oral BID Geena Mercer PA-C   200 mg at 03/23/19 0848    heparin (porcine) 25,000 units in 250 mL infusion (premix)  3-20 Units/kg/hr (Order-Specific) Intravenous Titrated Justin Avila MD 11 7 mL/hr at 03/23/19 1259 13 8 Units/kg/hr at 03/23/19 1259    HYDROmorphone (DILAUDID) injection 0 5 mg  0 5 mg Intravenous Once Snehal Beavers MD        insulin lispro (HumaLOG) 100 units/mL subcutaneous injection 1-5 Units  1-5 Units Subcutaneous HS Geena Mercer PA-C        insulin lispro (HumaLOG) 100 units/mL subcutaneous injection 1-6 Units  1-6 Units Subcutaneous TID AC Geena Mercer PA-C   2 Units at 03/22/19 1725    levalbuterol (Thomasenia Vivar) inhalation solution 1 25 mg  1 25 mg Nebulization Q6H PRN Jamal Etienne MD   1 25 mg at 03/22/19 1602    levothyroxine tablet 175 mcg  175 mcg Oral Early Morning Geena Mercer PA-C   175 mcg at 03/23/19 0558    metoprolol tartrate (LOPRESSOR) partial tablet 12 5 mg  12 5 mg Oral Q12H [address] John Heredia PA-C        nitroGLYcerin (TRIDIL) 50 mg in 250 mL infusion (premix)  5-200 mcg/min Intravenous Titrated Perla Allen MD   Stopped at 03/23/19 0500    norepinephrine (LEVOPHED) 4 mg (STANDARD CONCENTRATION) IV in sodium chloride 0 9% 250 mL  1-30 mcg/min Intravenous Titrated Kevin Izquierdo PA-C 7 5 mL/hr at 03/23/19 1205 2 mcg/min at 03/23/19 1205    ondansetron (ZOFRAN) injection 4 mg  4 mg Intravenous Q6H PRN Marlene Rosenbaum PA-C        oxyCODONE (ROXICODONE) IR tablet 5 mg  5 mg Oral Q4H PRN MAKENNA PhillipsC   5 mg at 03/23/19 4496    Or    oxyCODONE (ROXICODONE) IR tablet 10 mg  10 mg Oral Q4H PRN Marlene Rosenbaum PA-C   10 mg at 03/22/19 1612    sodium chloride 0 9 % inhalation solution 3 mL  3 mL Nebulization Q6H PRN Daryn Palacios MD   3 mL at 03/22/19 1602     Allergies   Allergen Reactions    Doxazosin     Iohexol     Cardura [Doxazosin Mesylate]      Muscle cramps    Other      Iv dye for cardiac cath  Renal failure very close to dialysis  But no dialysis    Zestril [Lisinopril]      cough       Objective   Vitals: Blood pressure (!) 82/43, pulse 94, temperature 98 1 °F (36 7 °C), temperature source Oral, resp  rate 21, height 5' 7" (1 702 m), weight 90 8 kg (200 lb 2 8 oz), SpO2 94 %  Intake/Output Summary (Last 24 hours) at 3/23/2019 1300  Last data filed at 3/23/2019 1154  Gross per 24 hour   Intake 5286 88 ml   Output 1625 ml   Net 3661 88 ml     Invasive Devices     Peripherally Inserted Central Catheter Line            PICC Line 88/18/03 Right Basilic less than 1 day          Peripheral Intravenous Line            Peripheral IV 03/22/19 Left Hand 1 day    Peripheral IV 03/23/19 Right Arm less than 1 day          Arterial Line            Arterial Line 03/22/19 Left Radial 1 day                Physical Exam   Constitutional: He is oriented to person, place, and time  He appears well-developed and well-nourished  No distress  HENT:   Head: Atraumatic  Eyes: EOM are normal  No scleral icterus  Neck: Neck supple   No thyromegaly present  Cardiovascular: Normal rate, regular rhythm and normal heart sounds  No murmur heard  Pulmonary/Chest: Effort normal and breath sounds normal  No respiratory distress  He has no wheezes  He has no rales  Abdominal: Soft  Bowel sounds are normal  He exhibits no mass  There is no guarding  Musculoskeletal: Normal range of motion  He exhibits no edema or deformity  Neurological: He is alert and oriented to person, place, and time  Skin: Skin is dry  Left lower extremity rubor   Psychiatric: He has a normal mood and affect  His behavior is normal  Judgment and thought content normal        The history was obtained from the review of the chart, patient  Lab Results:      hemoglobin A3a-EtgljMarch 5, 2019-7 6  TSH March 21, 2019-0 37  Results from last 7 days   Lab Units 03/23/19  0530 03/22/19  1614 03/22/19  1330  03/21/19  1418   SODIUM mmol/L 138 139  --   --  142   POTASSIUM mmol/L 4 3 4 7  --   --  4 9   CHLORIDE mmol/L 109* 112*  --   --  106   CO2 mmol/L 24 21  --   --  28   CO2, I-STAT mmol/L  --   --  21   < >  --    BUN mg/dL 30* 38*  --   --  36*   CREATININE mg/dL 1 47* 1 56*  --   --  1 59*   GLUCOSE, ISTAT mg/dl  --   --  207*   < >  --    CALCIUM mg/dL 7 9* 8 1*  --   --  9 5    < > = values in this interval not displayed       Lab Results   Component Value Date    WBC 5 55 03/23/2019    HGB 8 3 (L) 03/23/2019    HCT 25 4 (L) 03/23/2019    MCV 99 (H) 03/23/2019     (L) 03/23/2019     Lab Results   Component Value Date/Time    BUN 30 (H) 03/23/2019 05:30 AM    BUN 19 12/21/2015 10:44 AM     12/21/2015 10:44 AM    K 4 3 03/23/2019 05:30 AM    K 4 9 12/21/2015 10:44 AM     (H) 03/23/2019 05:30 AM     (H) 12/21/2015 10:44 AM    CO2 24 03/23/2019 05:30 AM    CO2 21 03/22/2019 01:30 PM    CREATININE 1 47 (H) 03/23/2019 05:30 AM    CREATININE 1 29 12/21/2015 10:44 AM    AST 18 03/22/2019 04:14 PM    AST 22 10/01/2015 11:05 AM    ALT 17 03/22/2019 04:14 PM    ALT 35 10/01/2015 11:05 AM    ALB 3 4 (L) 03/22/2019 04:14 PM    ALB 3 3 (L) 10/01/2015 11:05 AM     No results for input(s): CHOL, HDL, LDL, TRIG, VLDL in the last 72 hours  No results found for: Tiffanie Carl  POC Glucose (mg/dl)   Date Value   03/23/2019 144 (H)   03/23/2019 142 (H)   03/23/2019 68   03/23/2019 109   03/23/2019 81   03/22/2019 65   03/22/2019 66   03/22/2019 59 (L)   03/22/2019 90   03/22/2019 207 (H)       Imaging Studies: I have personally reviewed pertinent reports  VAS lower limb vein mapping bypass graft   Status: Final result       CONCLUSION:  Impression:  RIGHT LOWER LIMB:  The great saphenous vein is patent  from the groin to the ankle  The vein is of adequate caliber and quality for graft conduit with intraluminal  diameters ranging from 2 8mm to 4 2mm throughout the leg  There are several branches coming off of the greater saphenous vein at the  proximal thigh, distal thigh, proximal and mid calf  LEFT LOWER LIMB:  The great saphenous vein is patent from the groin to the mid thigh  There is  chronic thrombus identified in the greater saphenous from the distal thigh to  the proximal calf  Portions of the record may have been created with voice recognition software

## 2019-03-23 NOTE — PROCEDURES
Insert PICC line  Date/Time: 3/23/2019 9:49 AM  Performed by: Noreen Moreno RN  Authorized by: Navneet Matias MD     Patient location:  Bedside  Other Assisting Provider: Yes (comment) Cooper Rodriguez    Consent:     Consent obtained:  Verbal (consent obtained by physician)    Consent given by:  Patient and spouse  Universal protocol:     Procedure explained and questions answered to patient or proxy's satisfaction: yes      Relevant documents present and verified: yes      Test results available and properly labeled: yes      Radiology Images displayed and confirmed  If images not available, report reviewed: yes      Required blood products, implants, devices, and special equipment available: yes      Site/side marked: yes      Immediately prior to procedure, a time out was called: yes      Patient identity confirmed:  Verbally with patient and arm band  Pre-procedure details:     Hand hygiene: Hand hygiene performed prior to insertion      Sterile barrier technique: All elements of maximal sterile technique followed      Skin preparation:  ChloraPrep    Skin preparation agent: Skin preparation agent completely dried prior to procedure    Indications:     PICC line indications: medications requiring central line    Anesthesia (see MAR for exact dosages):      Anesthesia method:  Local infiltration    Local anesthetic:  Lidocaine 1% w/o epi (2ml)  Procedure details:     Location:  Basilic    Vessel type: vein      Laterality:  Right    Approach: percutaneous technique used      Patient position:  Flat    Procedural supplies:  Triple lumen    Catheter size:  5 Fr    Landmarks identified: yes      Ultrasound guidance: yes      Sterile ultrasound techniques: Sterile gel and sterile probe covers were used      Number of attempts:  1    Successful placement: yes      Vessel of catheter tip end:  Sherlock 3CG confirmed    Total catheter length (cm):  44    Catheter out on skin (cm):  0    Max flow rate: 999    Arm circumference:  34  Post-procedure details:     Post-procedure:  Dressing applied and securement device placed    Assessment:  Blood return through all ports and free fluid flow    Post-procedure complications: none      Patient tolerance of procedure:   Tolerated well, no immediate complications  Comments:      Ok to use 3CG confirmed

## 2019-03-23 NOTE — NURSING NOTE
Patient with sudden onset hypotension  BP 80/47 (56)  Patient is wide awake, cognitive processes intact, no dizziness  CCSx Jc Karen at bedside to assess  Bolus given

## 2019-03-23 NOTE — PROGRESS NOTES
Critical Care Interval Progress Note      Patient had a repeat ECHO which shows stable EF at 45% and WMA is stable from prior  Troponin down trended from 10 to 7  No plans to take to cath lab at this time per cardiology  Transfused an additional unit of PRBC due to Hgb of 8 3 and persistent pressor requirement (levophed added at 8:30 am due to persistent hypotension)  Levophed able to be weaned off with blood transfusion  Recheck H/H pending at this time  Patient had persistent LLE pain and was given IV narcotics which helped       Total Critical Care Time 30 mintues

## 2019-03-23 NOTE — PROGRESS NOTES
Progress Note - Cardiology   Palmira Shukla 68 y o  male MRN: [id number]  [facility] Encounter: [id number]  03/23/19  12:02 PM          Impression and Plan:     35-year-old with history of coronary artery disease, non ST elevation myocardial infarction, stents to the LAD, peripheral vascular disease with stenting to the lower extremities, ischemic cardiomyopathy with an ejection fraction around 45%, most recently stenting-in [place] -5 months ago-October 2018-lad  At baseline was on dual antiplatelet therapy which was discontinued about 2 days prior to the surgery      Cardiology consultation was placed for potential ECG changes on telemetry, leading to a 12 lead ECG on 03/22-around 4:00 p m -which showed half-1 mm ST-depression in the lateral leads  Overnight-3/22/2019-developed some chest pains with increasing ST segment depressions in the inferior leads  Troponin so far has peaked around 8  Patient is completely asymptomatic from a cardiac standpoint and denies any cardiac symptoms       Plan:     ECG changes:  dynamic changes last night in the setting of chest pain, ECG changes have resolved and back to his ECG from 3/22/2019 evening  Increase metoprolol to 25 b i d , continue dual antiplatelet therapy, re-loaded Plavix last night  continue IV heparin     Hypotension at this time precludes beta-blockade  Troponin elevation:  Follow till plateaus or trends down, continue dual antiplatelet therapy and heparin, not a candidate for beta-blocker at this time, likely a type 2 myocardial infarction in the setting of increased demand from postoperative state, less likely type 1 - did have chest pain last night but none currently  Hypotension:  Reviewed echo at bedside, does have wall motion abnormalities that appear to be his baseline  Ejection fraction is also at baseline  Has dry mucosa, responded to fluids earlier, would give IV fluids, likely has mild hypovolemia      Peripheral vascular disease:  Status post femoral artery endarterectomy with patch angioplasty, balloon angioplasty and stent to the left external iliac artery, femoral-popliteal bypass, postoperative day 1      Prolonged QT interval on ECG:  QT interval roughly half of the RR interval-borderline elevated, avoid QT prolonging drugs, follow for now      Chronic diastolic CHF:  On Lasix 20 mg daily at baseline, hold for now while giving fluids      CKD: Baseline creatinine up to 1 5, at baseline currently        ===================================================================    Chief Complaint: No chief complaint on file          Subjective/Objective     Subjective:  Denies any complaints    Objective:  Appears well, better than yesterday when he was in a lot of pain in the lower extremity    Patient Active Problem List   Diagnosis    Acute kidney injury ([facility] )    Type 1 diabetes mellitus ([facility] )    Presence of drug coated stent in LAD coronary artery    Coronary artery disease of native artery of native heart with stable angina pectoris ([facility])    Presence of drug coated stent in left circumflex coronary artery    Dyslipidemia    Obstructive sleep apnea of adult    Carotid artery stenosis, asymptomatic, bilateral    Atherosclerosis of artery of extremity with ulceration ([facility] )    Restrictive lung disease    COPD (chronic obstructive pulmonary disease) ([facility] )    Benign hypertension with chronic kidney disease, stage III ([facility])    CKD (chronic kidney disease) stage 3, GFR 30-59 ml/min ([facility])    Proteinuria    Renal artery stenosis ([facility])    Renal cyst, right    Vitamin D deficiency    Aortoiliac occlusive disease ([facility] )    Hypothyroidism, postablative    Other specified hypothyroidism    Low back pain with sciatica    Lumbar disc herniation    Lumbar radiculopathy    Diabetic neuropathy associated with type 1 diabetes mellitus ([facility] )    History of Ischemic cardiomyopathy    Chronic systolic congestive heart failure ([facility] )  NSTEMI (non-ST elevated myocardial infarction) ([facility])       Vitals: BP (!) 82/43   Pulse 90   Temp 98 1 °F (36 7 °C) (Oral)   Resp (!) 25   Ht 5' 7" (1 702 m)   Wt 90 8 kg (200 lb 2 8 oz)   SpO2 96%   BMI 31 35 kg/m²     I/O this shift: In: 982 1 [P O :240; I V :742 1]  Out: 200 [Urine:200]  Wt Readings from Last 3 Encounters:   03/23/19 90 8 kg (200 lb 2 8 oz)   03/15/19 91 2 kg (201 lb)   03/14/19 88 1 kg (194 lb 5 oz)       Intake/Output Summary (Last 24 hours) at 3/23/2019 1202  Last data filed at 3/23/2019 1154  Gross per 24 hour   Intake 5286 88 ml   Output 1625 ml   Net 3661 88 ml     I/O last 3 completed shifts: In: 4804 8 [P O :600; I V :3704 8;  IV Piggyback:500]  Out: 1675 [ETWLP:5633; Blood:200]    Invasive Devices     Peripherally Inserted Central Catheter Line            PICC Line 58/52/32 Right Basilic less than 1 day          Peripheral Intravenous Line            Peripheral IV 03/22/19 Left Hand 1 day    Peripheral IV 03/23/19 Right Arm less than 1 day          Arterial Line            Arterial Line 03/22/19 Left Radial 1 day                  Physical Exam:  GEN: Ankush Jay appears well, alert and oriented x 3, pleasant and cooperative   HEENT: pupils equal, round, and reactive to light; extraocular muscles intact  NECK: supple, no carotid bruits or JVD  HEART: regular rhythm, normal S1 and S2, no murmur, no clicks, gallops or rubs   LUNGS: clear to auscultation bilaterally; no wheezes or rhonchi, no rales  ABDOMEN/GI: normal bowel sounds, soft, no tenderness, no distention  EXTREMITIES/Musculoskeltal: peripheral pulses normal; no clubbing, cyanosis, minimal edema, left lower extremity is postop  NEURO: no focal motor findings   SKIN: normal without suspicious lesions on exposed skin              Lab Results:   Results from last 7 days   Lab Units 03/23/19  0904 03/23/19  0530 03/23/19  0300   TROPONIN I ng/mL 8 26* 2 79* 0 92*     Results from last 7 days   Lab Units 03/23/19  0559 03/23/19  0049 03/22/19  1615   WBC Thousand/uL 5 55 5 90 7 45   HEMOGLOBIN g/dL 8 3* 8 7* 9 3*   HEMATOCRIT % 25 4* 27 0* 28 7*   PLATELETS Thousands/uL 123* 124* 120*         Results from last 7 days   Lab Units 03/23/19  0530 03/22/19  1614 03/22/19  1330 03/22/19  1126 03/22/19  0830  03/21/19  1418   POTASSIUM mmol/L 4 3 4 7  --   --   --   --  4 9   CHLORIDE mmol/L 109* 112*  --   --   --   --  106   CO2 mmol/L 24 21  --   --   --   --  28   CO2, I-STAT mmol/L  --   --  21 21 24   < >  --    BUN mg/dL 30* 38*  --   --   --   --  36*   CREATININE mg/dL 1 47* 1 56*  --   --   --   --  1 59*   CALCIUM mg/dL 7 9* 8 1*  --   --   --   --  9 5   ALK PHOS U/L  --  70  --   --   --   --  92   ALT U/L  --  17  --   --   --   --  25   AST U/L  --  18  --   --   --   --  18   GLUCOSE, ISTAT mg/dl  --   --  207* 187* 178*  --   --     < > = values in this interval not displayed  Results from last 7 days   Lab Units 03/23/19  0049 03/22/19  1615 03/21/19  1418   INR  1 09 1 11 1 07       Imaging: I have personally reviewed pertinent reports      EKG/Telemtry:  No events    Scheduled Meds:    Current Facility-Administered Medications:  acetaminophen 975 mg Oral Q8H [facility] Aleyda Mora PA-C    aspirin 81 mg Oral Daily Aleyda Mora PA-C    atorvastatin 40 mg Oral Daily With [employer] Cari Martinez PA-C    cholecalciferol 1,000 Units Oral Daily Aleyad Mora PA-C    clopidogrel 75 mg Oral Daily Aleyda Mora PA-C    dextrose 5% lactated ringer's 75 mL/hr Intravenous Continuous Sam MAGO Lora Last Rate: 75 mL/hr (03/23/19 1001)   docusate sodium 100 mg Oral BID Aleyda Mora PA-C    fentanyl citrate (PF) 25 mcg Intravenous Q2H PRN Sam Lora PA-C    gabapentin 200 mg Oral BID Aleyda Mora PA-C    heparin (porcine) 3-20 Units/kg/hr (Order-Specific) Intravenous Titrated Bonnie Louis MD Last Rate: 11 8 Units/kg/hr (03/23/19 0036)   HYDROmorphone 0 5 mg Intravenous Once Yusuf Sharma MD    insulin lispro 1-5 Units Subcutaneous HS Jonna Jamil PA-C    insulin lispro 1-6 Units Subcutaneous TID AC Jonna Jamil PA-C    levalbuterol 1 25 mg Nebulization Q6H PRN Nolan Barger MD    levothyroxine 175 mcg Oral Early Morning Jonna Jamil PA-C    metoprolol tartrate 12 5 mg Oral Q12H [address] Terrill Severin, PA-C    nitroGLYcerin 5-200 mcg/min Intravenous Titrated Vinita Sky MD Last Rate: Stopped (03/23/19 0500)   norepinephrine 1-30 mcg/min Intravenous Titrated Terrill Severin, PA-C Last Rate: 4 mcg/min (03/23/19 1159)   ondansetron 4 mg Intravenous Q6H PRN Jonna Jamil PA-C    oxyCODONE 5 mg Oral Q4H PRN Jonna Jamil PA-C    Or        oxyCODONE 10 mg Oral Q4H PRN Jonna Jamil PA-C    sodium chloride 3 mL Nebulization Q6H PRN Nolan Barger MD      Continuous Infusions:    dextrose 5% lactated ringer's 75 mL/hr Last Rate: 75 mL/hr (03/23/19 1001)   heparin (porcine) 3-20 Units/kg/hr (Order-Specific) Last Rate: 11 8 Units/kg/hr (03/23/19 0036)   nitroGLYcerin 5-200 mcg/min Last Rate: Stopped (03/23/19 0500)   norepinephrine 1-30 mcg/min Last Rate: 4 mcg/min (03/23/19 1159)       VTE Pharmacologic Prophylaxis: Heparin  VTE Mechanical Prophylaxis: sequential compression device      Counseling / Coordination of Care  Total time spent 20 minutes including teaching and family updates  More than 50% was spent counseling pt and family

## 2019-03-23 NOTE — NURSING NOTE
Patient reporting sudden 5/10 "squeezing" chest pain  Dr Alex Marcelino of cardiology at bedside  Nitroglycerin gtt increased  EKG taken  Troponin sent

## 2019-03-24 LAB
ABO GROUP BLD BPU: NORMAL
ABO GROUP BLD BPU: NORMAL
ANION GAP SERPL CALCULATED.3IONS-SCNC: 8 MMOL/L (ref 4–13)
APTT PPP: 57 SECONDS (ref 26–38)
APTT PPP: 57 SECONDS (ref 26–38)
APTT PPP: 63 SECONDS (ref 26–38)
BACTERIA UR QL AUTO: ABNORMAL /HPF
BASOPHILS # BLD AUTO: 0.02 THOUSANDS/ΜL (ref 0–0.1)
BASOPHILS NFR BLD AUTO: 0 % (ref 0–1)
BILIRUB UR QL STRIP: NEGATIVE
BPU ID: NORMAL
BPU ID: NORMAL
BUN SERPL-MCNC: 23 MG/DL (ref 5–25)
CALCIUM SERPL-MCNC: 7.9 MG/DL (ref 8.3–10.1)
CHLORIDE SERPL-SCNC: 106 MMOL/L (ref 100–108)
CLARITY UR: CLEAR
CO2 SERPL-SCNC: 23 MMOL/L (ref 21–32)
COLOR UR: YELLOW
CREAT SERPL-MCNC: 1.33 MG/DL (ref 0.6–1.3)
CROSSMATCH: NORMAL
CROSSMATCH: NORMAL
EOSINOPHIL # BLD AUTO: 0.36 THOUSAND/ΜL (ref 0–0.61)
EOSINOPHIL NFR BLD AUTO: 6 % (ref 0–6)
ERYTHROCYTE [DISTWIDTH] IN BLOOD BY AUTOMATED COUNT: 14.4 % (ref 11.6–15.1)
GFR SERPL CREATININE-BSD FRML MDRD: 53 ML/MIN/1.73SQ M
GLUCOSE SERPL-MCNC: 115 MG/DL (ref 65–140)
GLUCOSE SERPL-MCNC: 140 MG/DL (ref 65–140)
GLUCOSE SERPL-MCNC: 150 MG/DL (ref 65–140)
GLUCOSE SERPL-MCNC: 164 MG/DL (ref 65–140)
GLUCOSE SERPL-MCNC: 181 MG/DL (ref 65–140)
GLUCOSE UR STRIP-MCNC: NEGATIVE MG/DL
HCT VFR BLD AUTO: 28.7 % (ref 36.5–49.3)
HGB BLD-MCNC: 9.7 G/DL (ref 12–17)
HGB UR QL STRIP.AUTO: NEGATIVE
IMM GRANULOCYTES # BLD AUTO: 0.01 THOUSAND/UL (ref 0–0.2)
IMM GRANULOCYTES NFR BLD AUTO: 0 % (ref 0–2)
KETONES UR STRIP-MCNC: NEGATIVE MG/DL
LEUKOCYTE ESTERASE UR QL STRIP: NEGATIVE
LYMPHOCYTES # BLD AUTO: 1.85 THOUSANDS/ΜL (ref 0.6–4.47)
LYMPHOCYTES NFR BLD AUTO: 30 % (ref 14–44)
MAGNESIUM SERPL-MCNC: 2.3 MG/DL (ref 1.6–2.6)
MCH RBC QN AUTO: 32.2 PG (ref 26.8–34.3)
MCHC RBC AUTO-ENTMCNC: 33.8 G/DL (ref 31.4–37.4)
MCV RBC AUTO: 95 FL (ref 82–98)
MONOCYTES # BLD AUTO: 1.02 THOUSAND/ΜL (ref 0.17–1.22)
MONOCYTES NFR BLD AUTO: 16 % (ref 4–12)
NEUTROPHILS # BLD AUTO: 2.97 THOUSANDS/ΜL (ref 1.85–7.62)
NEUTS SEG NFR BLD AUTO: 48 % (ref 43–75)
NITRITE UR QL STRIP: NEGATIVE
NON-SQ EPI CELLS URNS QL MICRO: ABNORMAL /HPF
NRBC BLD AUTO-RTO: 0 /100 WBCS
PH UR STRIP.AUTO: 6 [PH]
PHOSPHATE SERPL-MCNC: 2.8 MG/DL (ref 2.3–4.1)
PLATELET # BLD AUTO: 106 THOUSANDS/UL (ref 149–390)
PMV BLD AUTO: 11.8 FL (ref 8.9–12.7)
POTASSIUM SERPL-SCNC: 4 MMOL/L (ref 3.5–5.3)
PROT UR STRIP-MCNC: NEGATIVE MG/DL
RBC # BLD AUTO: 3.01 MILLION/UL (ref 3.88–5.62)
RBC #/AREA URNS AUTO: ABNORMAL /HPF
SODIUM SERPL-SCNC: 137 MMOL/L (ref 136–145)
SP GR UR STRIP.AUTO: 1.01 (ref 1–1.03)
UNIT DISPENSE STATUS: NORMAL
UNIT DISPENSE STATUS: NORMAL
UNIT PRODUCT CODE: NORMAL
UNIT PRODUCT CODE: NORMAL
UNIT RH: NORMAL
UNIT RH: NORMAL
UROBILINOGEN UR QL STRIP.AUTO: 1 E.U./DL
WBC # BLD AUTO: 6.23 THOUSAND/UL (ref 4.31–10.16)
WBC #/AREA URNS AUTO: ABNORMAL /HPF

## 2019-03-24 PROCEDURE — 85025 COMPLETE CBC W/AUTO DIFF WBC: CPT | Performed by: SURGERY

## 2019-03-24 PROCEDURE — 82948 REAGENT STRIP/BLOOD GLUCOSE: CPT

## 2019-03-24 PROCEDURE — 85730 THROMBOPLASTIN TIME PARTIAL: CPT | Performed by: SURGERY

## 2019-03-24 PROCEDURE — 81001 URINALYSIS AUTO W/SCOPE: CPT | Performed by: PHYSICIAN ASSISTANT

## 2019-03-24 PROCEDURE — 80048 BASIC METABOLIC PNL TOTAL CA: CPT | Performed by: SURGERY

## 2019-03-24 PROCEDURE — 99232 SBSQ HOSP IP/OBS MODERATE 35: CPT | Performed by: SURGERY

## 2019-03-24 PROCEDURE — 99024 POSTOP FOLLOW-UP VISIT: CPT | Performed by: SURGERY

## 2019-03-24 PROCEDURE — 83735 ASSAY OF MAGNESIUM: CPT | Performed by: SURGERY

## 2019-03-24 PROCEDURE — 84100 ASSAY OF PHOSPHORUS: CPT | Performed by: SURGERY

## 2019-03-24 PROCEDURE — 99231 SBSQ HOSP IP/OBS SF/LOW 25: CPT | Performed by: INTERNAL MEDICINE

## 2019-03-24 PROCEDURE — 99233 SBSQ HOSP IP/OBS HIGH 50: CPT | Performed by: INTERNAL MEDICINE

## 2019-03-24 PROCEDURE — 94760 N-INVAS EAR/PLS OXIMETRY 1: CPT

## 2019-03-24 RX ORDER — SENNOSIDES 8.6 MG
2 TABLET ORAL
Status: DISCONTINUED | OUTPATIENT
Start: 2019-03-24 | End: 2019-03-28 | Stop reason: HOSPADM

## 2019-03-24 RX ORDER — POLYETHYLENE GLYCOL 3350 17 G/17G
17 POWDER, FOR SOLUTION ORAL DAILY
Status: DISCONTINUED | OUTPATIENT
Start: 2019-03-24 | End: 2019-03-28 | Stop reason: HOSPADM

## 2019-03-24 RX ADMIN — ACETAMINOPHEN 975 MG: 325 TABLET ORAL at 21:39

## 2019-03-24 RX ADMIN — OXYCODONE HYDROCHLORIDE 10 MG: 5 TABLET ORAL at 19:54

## 2019-03-24 RX ADMIN — OXYCODONE HYDROCHLORIDE 10 MG: 5 TABLET ORAL at 06:21

## 2019-03-24 RX ADMIN — VITAMIN D, TAB 1000IU (100/BT) 1000 UNITS: 25 TAB at 08:08

## 2019-03-24 RX ADMIN — FENTANYL CITRATE 25 MCG: 50 INJECTION, SOLUTION INTRAMUSCULAR; INTRAVENOUS at 08:05

## 2019-03-24 RX ADMIN — METOPROLOL TARTRATE 12.5 MG: 25 TABLET ORAL at 21:38

## 2019-03-24 RX ADMIN — GABAPENTIN 200 MG: 100 CAPSULE ORAL at 08:07

## 2019-03-24 RX ADMIN — ACETAMINOPHEN 975 MG: 325 TABLET ORAL at 15:31

## 2019-03-24 RX ADMIN — CLOPIDOGREL BISULFATE 75 MG: 75 TABLET ORAL at 08:08

## 2019-03-24 RX ADMIN — LEVOTHYROXINE SODIUM 175 MCG: 125 TABLET ORAL at 06:21

## 2019-03-24 RX ADMIN — DOCUSATE SODIUM 100 MG: 100 CAPSULE, LIQUID FILLED ORAL at 17:45

## 2019-03-24 RX ADMIN — METOPROLOL TARTRATE 12.5 MG: 25 TABLET ORAL at 08:12

## 2019-03-24 RX ADMIN — FOLIC ACID 1 MG: 1 TABLET ORAL at 08:08

## 2019-03-24 RX ADMIN — INSULIN GLARGINE 15 UNITS: 100 INJECTION, SOLUTION SUBCUTANEOUS at 08:08

## 2019-03-24 RX ADMIN — ATORVASTATIN CALCIUM 40 MG: 40 TABLET, FILM COATED ORAL at 17:45

## 2019-03-24 RX ADMIN — POLYETHYLENE GLYCOL 3350 17 G: 17 POWDER, FOR SOLUTION ORAL at 09:23

## 2019-03-24 RX ADMIN — ALTEPLASE 2 MG: 2.2 INJECTION, POWDER, LYOPHILIZED, FOR SOLUTION INTRAVENOUS at 10:00

## 2019-03-24 RX ADMIN — SENNOSIDES 17.2 MG: 8.6 TABLET, FILM COATED ORAL at 21:40

## 2019-03-24 RX ADMIN — METOPROLOL TARTRATE 2.5 MG: 5 INJECTION, SOLUTION INTRAVENOUS at 06:21

## 2019-03-24 RX ADMIN — THIAMINE HCL TAB 100 MG 100 MG: 100 TAB at 08:08

## 2019-03-24 RX ADMIN — INSULIN LISPRO 10 UNITS: 100 INJECTION, SOLUTION INTRAVENOUS; SUBCUTANEOUS at 12:16

## 2019-03-24 RX ADMIN — ACETAMINOPHEN 975 MG: 325 TABLET ORAL at 06:21

## 2019-03-24 RX ADMIN — INSULIN LISPRO 7 UNITS: 100 INJECTION, SOLUTION INTRAVENOUS; SUBCUTANEOUS at 17:44

## 2019-03-24 RX ADMIN — GABAPENTIN 200 MG: 100 CAPSULE ORAL at 17:45

## 2019-03-24 RX ADMIN — INSULIN LISPRO 6 UNITS: 100 INJECTION, SOLUTION INTRAVENOUS; SUBCUTANEOUS at 08:11

## 2019-03-24 RX ADMIN — SODIUM CHLORIDE, SODIUM GLUCONATE, SODIUM ACETATE, POTASSIUM CHLORIDE, MAGNESIUM CHLORIDE, SODIUM PHOSPHATE, DIBASIC, AND POTASSIUM PHOSPHATE 75 ML/HR: .53; .5; .37; .037; .03; .012; .00082 INJECTION, SOLUTION INTRAVENOUS at 02:47

## 2019-03-24 RX ADMIN — Medication 1 TABLET: at 08:08

## 2019-03-24 RX ADMIN — DOCUSATE SODIUM 100 MG: 100 CAPSULE, LIQUID FILLED ORAL at 08:08

## 2019-03-24 RX ADMIN — OXYCODONE HYDROCHLORIDE 10 MG: 5 TABLET ORAL at 15:31

## 2019-03-24 RX ADMIN — ASPIRIN 81 MG 81 MG: 81 TABLET ORAL at 08:08

## 2019-03-24 RX ADMIN — HEPARIN SODIUM AND DEXTROSE 14.8 UNITS/KG/HR: 10000; 5 INJECTION INTRAVENOUS at 23:59

## 2019-03-24 NOTE — PROGRESS NOTES
Follow up Consultation    Nephrology   Emiliano Enrique 68 y o  male MRN: [id number]  [address] Encounter: [id number]      Physician Requesting Consult: Bard Andreia MD  Reason for Consult:  perioperative optimization to reduce incidence of acute kidney injury  ASSESSMENT/PLAN:  68 y o   male with pmh of obesity, diabetes, hypertension, severe PAD, CAD, bilateral renal artery stenosis and CKD stage 3 (baseline creatinine 1 4-1 6 mg per dL) who was admitted for revascularization of lower extremity due to severe claudication  Nephrology has been consulted for perioperative optimization to reduce incidence of acute kidney injury  1)perioperative optimization to reduce incidence of acute kidney injury / CKD stage 3:  - patient has underlying CKD secondary to renal vascular disease plus hypertensive nephrosclerosis plus diabetic glomerular nodular sclerosis plus 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 and age-related nephron loss  - patient is at risk for acute kidney injury due to his underlying comorbidities as well as increased susceptibility to hemodynamic perturbations during the surgical procedure and exposure to contrast    - status post endarterectomy arterial femoral with patch angioplasty, balloon angioplasty, stent left EIA bypass femoral popliteal with completion arteriogram on 03/22/2019   - postprocedure patient with NSTEMI and hypotension   - clinically patient appears to be euvolemic to minimally hypervolemic may use Lasix 40 mg IV x1 if patient gets short of breath  Or there is decreased urine output  - After review of records it appears that the patient has a baseline Creatinine of 1 4-1 6mg/dL  - patient was admitted with a creatinine of 1 59 mg/dL  - peak creatinine during this admission thus far was 1 59 mg/dL on 03/22/2019   - patient's creatinine today is at 1 33 mg/dL    - Avoid nephrotoxins, adjust meds to appropriate GFR   - Clinically patient is not uremic and there is no acute indication for renal replacement therapy (dialysis)  - Optimize hemodynamic status to avoid delay in renal recovery  - Place on a renal diet when allowed diet order    - Strict I/O   - check BMP, magnesium, phosphorus daily   - follow-up with Lucinda Ashley after hospitalization for his CKD  - agree with initiation of pressors  - follow up with Cardiology to optimize his cardiac output and status    2) blood pressure/hypertension:  - Optimize hemodynamics   - Maintain MAP > 65mmHg  - Avoid BP fluctuations  - hold off on home Lasix at this time  - may give Lasix 40 mg IV x1 if patient is short of breath over decreased urine output  3)H/H:  - most recent hemoglobin at 9 7 grams/deciliter  - Management as per primary team     4)Volume status:  - Clinically patient appears to be euvolemic to minimally hypervolemic   - see above recs    5)DM:  - management as per Primary team  - continue insulin    6) severe PAD:  - Management as per primary team  - status post revascularization angiogram today  - follow-up with vascular surgery    7) CAD/CHF/ NSTEMI:  - on most recent echo from 2019 shows EF of 45%  - grade 1 diastolic dysfunction  - NSTEMI this admission  - follow-up with cardiology, no intervention    Thanks for the consult  Will continue to follow  Please call with questions/ concerns  Plan was discussed with the team in depth  Nicky Ojeda MD, FASN, 3/24/2019, 11:21 AM              Objective :   Patient seen and examined in the ICU  Family at bedside hemodynamically improved status post Levophed as well as IV fluids yesterday  Hemodynamics much improved  Afebrile  Urine output documented close to 1 7 L in last 24 hours  Also got PRBC yesterday      PHYSICAL EXAM  /53   Pulse 70   Temp 98 1 °F (36 7 °C) (Oral)   Resp 13   Ht 5' 7" (1 702 m)   Wt 90 8 kg (200 lb 2 8 oz)   SpO2 99%   BMI 31 35 kg/m²   Temp (24hrs), Av 4 °F (36 9 °C), Min:98 1 °F (36 7 °C), Max:98 9 °F (37 2 °C)        Intake/Output Summary (Last 24 hours) at 3/24/2019 1121  Last data filed at 3/24/2019 1101  Gross per 24 hour   Intake 3255 65 ml   Output 2050 ml   Net 1205 65 ml       I/O last 24 hours: In: 4055 [P O :1200; I V :2505; Blood:350]  Out: 2150 [Urine:2150]      Current Weight: Weight - Scale: 90 8 kg (200 lb 2 8 oz)  First Weight: Weight - Scale: 88 5 kg (195 lb)  Physical Exam   Constitutional: He is oriented to person, place, and time  He appears well-developed and well-nourished  No distress  HENT:   Head: Normocephalic and atraumatic  Mouth/Throat: Oropharynx is clear and moist  No oropharyngeal exudate  Eyes: Conjunctivae are normal  No scleral icterus  Neck: Neck supple  No JVD present  No tracheal deviation present  Cardiovascular: Normal heart sounds  Exam reveals no friction rub  Pulmonary/Chest: Effort normal  He has no wheezes  He has no rales  Abdominal: Soft  He exhibits no mass  There is no guarding  Musculoskeletal: He exhibits edema  Trace bilateral lower extremity edema, left lower extremity erythematous yet warm   Neurological: He is alert and oriented to person, place, and time  Skin: Skin is warm  He is not diaphoretic  No pallor  Psychiatric: He has a normal mood and affect  His behavior is normal    Nursing note and vitals reviewed  Review of Systems   Constitutional: Negative for fatigue and fever  HENT: Negative for congestion and sore throat  Respiratory: Negative for cough, shortness of breath and wheezing  Cardiovascular: Negative for chest pain, palpitations and leg swelling  Gastrointestinal: Negative for abdominal pain, diarrhea, nausea and vomiting  Genitourinary: Negative for difficulty urinating  Musculoskeletal: Negative for back pain  Skin: Negative for wound  Neurological: Negative for dizziness and headaches  Psychiatric/Behavioral: Negative for agitation and confusion     All other systems reviewed and are negative        Scheduled Meds:    Current Facility-Administered Medications:  acetaminophen 975 mg Oral [facility] Kym Elliott PA-C    aluminum-magnesium hydroxide-simethicone 30 mL Oral Q4H PRN Raoul Ward PA-C    aspirin 81 mg Oral Daily Kym Elliott PA-C    atorvastatin 40 mg Oral Daily With [employer] MAGO Martinez    cholecalciferol 1,000 Units Oral Daily Kym Elliott PA-C    clopidogrel 75 mg Oral Daily Kym Elliott PA-C    docusate sodium 100 mg Oral BID Kym Elliott PA-C    fentanyl citrate (PF) 25 mcg Intravenous Q2H PRN Raoul Ward PA-C    folic acid 1 mg Oral Daily Raoul Ward PA-C    gabapentin 200 mg Oral BID Kym Elliott PA-C    heparin (porcine) 3-20 Units/kg/hr (Order-Specific) Intravenous Titrated Carmelita Villarreal MD Last Rate: 12 8 Units/kg/hr (03/24/19 0417)   HYDROmorphone 0 5 mg Intravenous Once Tari Thao MD    insulin glargine 15 Units Subcutaneous Q12H [facility] Kike Kaba MD    insulin lispro 1-5 Units Subcutaneous HS Kike Kaba MD    insulin lispro 2-10 Units Subcutaneous TID With Meals Kike Kaba MD    levalbuterol 1 25 mg Nebulization Q6H PRN Marcelino Nguyen MD    levothyroxine 175 mcg Oral Early Morning Kym Elliott PA-C    metoprolol tartrate 12 5 mg Oral Q12H [facility] Raoul Ward PA-C    multivitamin-minerals 1 tablet Oral Daily Raoul Ward PA-C    nitroGLYcerin 5-200 mcg/min Intravenous Titrated Carmelita Villarrela MD Last Rate: Stopped (03/23/19 0500)   ondansetron 4 mg Intravenous Q6H PRN Kym Elliott PA-C    oxyCODONE 5 mg Oral Q4H PRN Kym Elliott PA-C    Or        oxyCODONE 10 mg Oral Q4H PRN Kym Elliott PA-C    polyethylene glycol 17 g Oral Daily Raoul Ward PA-C    senna 2 tablet Oral HS Raoul Ward PA-C    sodium chloride 3 mL Nebulization Q6H PRN Marcelino Nguyen MD    thiamine 100 mg Oral Daily Raoul Ward PA-C PRN Meds: aluminum-magnesium hydroxide-simethicone    fentanyl citrate (PF)    levalbuterol    ondansetron    oxyCODONE **OR** oxyCODONE    sodium chloride    Continuous Infusions:    heparin (porcine) 3-20 Units/kg/hr (Order-Specific) Last Rate: 12 8 Units/kg/hr (03/24/19 6527)   nitroGLYcerin 5-200 mcg/min Last Rate: Stopped (03/23/19 0500)         Invasive Devices:      Invasive Devices     Peripherally Inserted Central Catheter Line            PICC Line 98/58/32 Right Basilic 1 day          Peripheral Intravenous Line            Peripheral IV 03/22/19 Left Hand 2 days    Peripheral IV 03/23/19 Right Arm 1 day          Arterial Line            Arterial Line 03/22/19 Left Radial 2 days                  LABORATORY:    Results from last 7 days   Lab Units 03/24/19  0336 03/23/19  1750 03/23/19  0530 03/23/19  0049 03/22/19  1615 03/22/19  1614 03/22/19  1330 03/22/19  1126 03/22/19  0830  03/21/19  1418 03/18/19  1114   WBC Thousand/uL 6 23  --  5 55 5 90 7 45  --   --   --   --   --   --   --    HEMOGLOBIN g/dL 9 7* 9 7* 8 3* 8 7* 9 3*  --   --   --   --   --   --   --    I STAT HEMOGLOBIN g/dl  --   --   --   --   --   --  9 2* 9 9* 10 2*   < >  --   --    HEMATOCRIT % 28 7* 29 6* 25 4* 27 0* 28 7*  --   --   --   --   --   --   --    HEMATOCRIT, ISTAT %  --   --   --   --   --   --  27* 29* 30*   < >  --   --    PLATELETS Thousands/uL 106*  --  123* 124* 120*  --   --   --   --   --   --   --    POTASSIUM mmol/L 4 0  --  4 3  --   --  4 7  --   --   --   --  4 9 4 7   CHLORIDE mmol/L 106  --  109*  --   --  112*  --   --   --   --  106 108   CO2 mmol/L 23  --  24  --   --  21  --   --   --   --  28 26   CO2, I-STAT mmol/L  --   --   --   --   --   --  21 21 24  --   --   --    BUN mg/dL 23  --  30*  --   --  38*  --   --   --   --  36* 27*   CREATININE mg/dL 1 33*  --  1 47*  --   --  1 56*  --   --   --   --  1 59* 1 47*   CALCIUM mg/dL 7 9*  --  7 9*  --   --  8 1*  --   --   --   --  9 5 8 8 MAGNESIUM mg/dL 2 3  --  2 1  --   --  2 1  --   --   --   --  2 3  --    PHOSPHORUS mg/dL 2 8  --  2 9  --   --  3 5  --   --   --   --  4 1  --    GLUCOSE, ISTAT mg/dl  --   --   --   --   --   --  207* 187* 178*  --   --   --     < > = values in this interval not displayed  rest all reviewed    RADIOLOGY:  XR or angio leg lt    (Results Pending)     Rest all reviewed    Portions of the record may have been created with voice recognition software  Occasional wrong word or "sound a like" substitutions may have occurred due to the inherent limitations of voice recognition software  Read the chart carefully and recognize, using context, where substitutions have occurred  If you have any questions, please contact the dictating provider

## 2019-03-24 NOTE — PROGRESS NOTES
Progress Note - Critical Care   Conchita Avila 68 y o  male MRN: [id number]  Unit/Bed#: [facility] 406-62 Encounter: [id number]    Attending Physician: Maritza Rios MD      ______________________________________________________________________  Assessment and Plan:   Principal Problem: Atherosclerosis of artery of extremity with ulceration (HCC)  Active Problems:    NSTEMI (non-ST elevated myocardial infarction) ([facility] [address] )  Resolved Problems:    * No resolved hospital problems  *    51-year-old male with peripheral arterial disease status post right fem-pop bypass with completion angiogram, endarterectomy of the left femoral artery with patch angioplasty and left external iliac artery balloon angioplasty and stenting on 03/22  Developed hypotension and NSTEMI perioperatively so admitted to the ICU    Neuro:   Analgesia-continue scheduled Tylenol, p r n  Oxycodone and IV fentanyl for breakthrough  Received 4 doses of oxycodone in 2 doses of fentanyl in the last 24 hours  Diabetic peripheral neuropathy-continue Neurontin    CV:   NSTEMI- EKG changes have mildly improved on most recent EKG last evening  Echocardiogram from yesterday, 3/23 showed stable EF at 40-45% and akinesis of the mid apical anterior, entire inferior, and basal mid inferior lateral walls, stable from prior  Troponin down trended to 7  Can stop checking troponins at this time  Continue dual anti-platelet therapy and heparin infusion as well as resuming PO Lopressor at 12 5 mg Q 12  Can up titrate beta-blocker dose as blood pressure tolerates  Will follow up anticoagulation plan with Cardiology  History of CAD status post LAD stent with InStent stenosis and restenting October 2018-continue beta blockade, dual anti-platelet therapy and statin  Patient has a prior history of MIs in 2015 and 2018  Hypotension has resolved  Vasopressors have been discontinued  Goal map greater than or equal to 85 mmHg      Pulm:   History of COPD-patient does not take chronic steroids  History of ITZEL-continue CPAP at night  Patient is on room air when awake  No active issues at this time  Continue to encourage pulmonary toilet  GI:   No active issues at this time  Will continue diabetic diet which patient is tolerating  :   CKD 3-per Renal, baseline creatinine is 1 4-1 6  Currently creatinine is 1 2  Discontinue IV fluids at this time is patient is eating and continue monitor urine output which is adequate  Home diuretic on hold at this time  Cronin catheter has been discontinued patient is voiding  F/E/N:   Hep-Lock maintenance IV fluids at this time is patient is tolerating a diet  Electrolytes normal today  Continue diabetic diet  Patient is positive 1 6 L today, he had 3 3 in and 1 7 out  Would continue to hold Lasix for 1 more day  ID:   No active issues at this time  Heme:   Acute blood loss anemia-hemoglobin dropped to 8 3 yesterday and patient received 1 unit of packed red blood cells  Hemoglobin responded to 9 7 and is stable at 9 7 this morning  Recheck tomorrow morning  Endo:   History of type 1 diabetes-blood sugars have been ranging from  in the last 24 hours  Endocrine is following appreciate recommendations  Patient is on Lantus 15 units twice daily  He is on mealtime coverage as well to me his carb intake  Goal -180  History of hypothyroidism-continue home Synthroid    Msk/Skin:   Chronic ulceration to the left heel-continue wound care  Routine skin care/frequent turning and repositioning  Prophylaxis:  SCDs, heparin infusion    Disposition:   Continued to monitor in the  ICU today to monitor volume status and hemodynamics  Code Status: Level 1 - Full Code    Counseling / Coordination of Care  Total Critical Care time spent 30 minutes excluding procedures, teaching and family updates      ______________________________________________________________________    Chief Complaint:  I had a much better night last night    24 Hour Events:  Yesterday the patient required Levophed infusion due to ongoing hypotension  Levophed ran from around 8 in the morning until about to in the afternoon  He did receive a unit of blood for hemoglobin of 8 3 and stable echo and Levophed was able to be weaned off after that  He has been tolerating a diabetic diet and Lantus has been resumed  He slept well overnight last night and he still has pain in the left lower extremity in the groin region  This pain is controlled with his oral oxycodone and IV fentanyl that is ordered  Troponin down trended to 7  Review of Systems   Constitutional: Negative  HENT: Negative  Eyes: Negative  Respiratory: Negative  Negative for shortness of breath  Cardiovascular: Negative  Negative for chest pain  Gastrointestinal: Negative  Negative for abdominal pain, diarrhea and vomiting         +have been having some indigestion yesterday afternoon which is completely resolved following Maalox at that time   Endocrine: Negative  Genitourinary: Positive for dysuria, frequency and urgency  +Cronin catheter was removed yesterday   Musculoskeletal: Negative  Skin: Negative  Allergic/Immunologic: Negative  Neurological: Negative  Hematological: Negative  Psychiatric/Behavioral: Negative       ______________________________________________________________________    Physical Exam:     Physical Exam   Constitutional: He is oriented to person, place, and time  He appears well-developed and well-nourished  No distress  HENT:   Head: Normocephalic  Eyes: Pupils are equal, round, and reactive to light  EOM are normal    Neck: Normal range of motion  Neck supple  Cardiovascular: Normal rate, regular rhythm and normal heart sounds  Pulmonary/Chest: Effort normal and breath sounds normal  No stridor  No respiratory distress  He has no wheezes  Abdominal: Soft   Bowel sounds are normal  He exhibits no distension  There is no tenderness  There is no rebound and no guarding  Genitourinary: Penis normal    Musculoskeletal: Normal range of motion  He exhibits no edema or deformity  +Doppler signals present in the left DP and PT regions  +rubor throughout the distal left lower extremity  + left groin site clean dry and intact with no evidence of hematoma   Neurological: He is alert and oriented to person, place, and time  +nonfocal exam   Skin: Skin is warm and dry  Capillary refill takes less than 2 seconds  He is not diaphoretic  Psychiatric: He has a normal mood and affect  His behavior is normal          ______________________________________________________________________  Vitals:    19 0300 19 0400 19 0500 19 0600   BP:       Pulse: 80 82 98 86   Resp: 20 19 (!) 36 19   Temp:       TempSrc:       SpO2: 99% 100% 97% 100%   Weight:       Height:           Temperature:   Temp (24hrs), Av 4 °F (36 9 °C), Min:98 1 °F (36 7 °C), Max:98 9 °F (37 2 °C)    Current Temperature: 98 9 °F (37 2 °C)  Weights:   IBW: 66 1 kg    Body mass index is 31 35 kg/m²  Weight (last 2 days)     Date/Time   Weight    19 0547   90 8 (200 18)    19 0606   88 (194)            Hemodynamic Monitoring:  N/A     Non-Invasive/Invasive Ventilation Settings:  Respiratory    Lab Data (Last 4 hours)    None         O2/Vent Data (Last 4 hours)    None              No results found for: PHART, MND3XNX, PO2ART, PAY5OVK, E0CROCBA, BEART, SOURCE  SpO2: SpO2: 100 %, SpO2 Device: O2 Device: None (Room air)     Intake and Outputs:  I/O        07 -  0700 701 -  0700    P  O  600 720    I V  (mL/kg) 3704 8 (40 8) 2311 4 (25 5)    Blood  350    IV Piggyback 500     Total Intake(mL/kg) 4804 8 (52 9) 3381 4 (37 2)    Urine (mL/kg/hr) 1475 (0 7) 1750 (0 8)    Blood 200     Total Output 1675 1750    Net +3129 8 +1631 4              UOP:  70 ml/hr     Nutrition:        Diet Orders   (From admission, onward)            Start     Ordered    03/22/19 1547  Diet Regular; Regular House; Consistent Carbohydrate Diet Level 2 (5 carb servings/75 grams CHO/meal)  Diet effective now     Question Answer Comment   Diet Type Regular    Regular Regular House    Other Restriction(s): Consistent Carbohydrate Diet Level 2 (5 carb servings/75 grams CHO/meal)    RD to adjust diet per protocol?  Yes        03/22/19 1547          Labs:   Results from last 7 days   Lab Units 03/24/19  0336 03/23/19  1750 03/23/19  0530 03/23/19  0049 03/22/19  1615 03/22/19  1330 03/22/19  1126   WBC Thousand/uL 6 23  --  5 55 5 90 7 45  --   --    HEMOGLOBIN g/dL 9 7* 9 7* 8 3* 8 7* 9 3*  --   --    I STAT HEMOGLOBIN g/dl  --   --   --   --   --  9 2* 9 9*   HEMATOCRIT % 28 7* 29 6* 25 4* 27 0* 28 7*  --   --    HEMATOCRIT, ISTAT %  --   --   --   --   --  27* 29*   PLATELETS Thousands/uL 106*  --  123* 124* 120*  --   --    NEUTROS PCT % 48  --  58  --  70  --   --    MONOS PCT % 16*  --  16*  --  13*  --   --      Results from last 7 days   Lab Units 03/24/19  0336 03/23/19  0530 03/22/19  1614 03/22/19  1330 03/22/19  1126 03/22/19  0830 03/21/19  1418 03/18/19  1114   SODIUM mmol/L 137 138 139  --   --   --  142 139   POTASSIUM mmol/L 4 0 4 3 4 7  --   --   --  4 9 4 7   CHLORIDE mmol/L 106 109* 112*  --   --   --  106 108   CO2 mmol/L 23 24 21  --   --   --  28 26   CO2, I-STAT mmol/L  --   --   --  21 21 24  --   --    ANION GAP mmol/L 8 5 6  --   --   --  8 5   BUN mg/dL 23 30* 38*  --   --   --  36* 27*   CREATININE mg/dL 1 33* 1 47* 1 56*  --   --   --  1 59* 1 47*   CALCIUM mg/dL 7 9* 7 9* 8 1*  --   --   --  9 5 8 8   ALT U/L  --   --  17  --   --   --  25  --    AST U/L  --   --  18  --   --   --  18  --    ALK PHOS U/L  --   --  70  --   --   --  92  --    ALBUMIN g/dL  --   --  3 4*  --   --   --  3 6  --    TOTAL BILIRUBIN mg/dL  --   --  0 33  --   --   --  0 40  --      Results from last 7 days   Lab Units 03/24/19  0336 03/23/19  0530 03/22/19  1614 03/21/19  1418   MAGNESIUM mg/dL 2 3 2 1 2 1 2 3   PHOSPHORUS mg/dL 2 8 2 9 3 5 4 1      Results from last 7 days   Lab Units 03/24/19  0335 03/23/19  1925 03/23/19  1149 03/23/19  0530 03/23/19  0049 03/22/19  1615 03/21/19  1418   INR   --   --   --   --  1 09 1 11 1 07   PTT seconds 57* 170* 56* 64* 31  --   --      Results from last 7 days   Lab Units 03/23/19  1925 03/23/19  1443 03/23/19  1141 03/23/19  0904 03/23/19  0530 03/23/19  0300 03/22/19  2352   TROPONIN I ng/mL 7 29* 7 47* 10 40* 8 26* 2 79* 0 92* 0 20*     Results from last 7 days   Lab Units 03/23/19  1141 03/22/19  1615   LACTIC ACID mmol/L 1 4 1 7     ABG:No results found for: PHART, EGI2APC, PO2ART, YKC7CHT, T3GAHKJA, BEART, SOURCE  VBG:  Results from last 7 days   Lab Units 03/23/19  1142   PH SHEEBA  7 344   PCO2 SHEEBA mm Hg 36 4*   PO2 SHEEBA mm Hg 41 4   HCO3 SHEEBA mmol/L 19 4*   BASE EXC SHEEBA mmol/L -5 7       Imaging:  No new I have personally reviewed pertinent reports  EKG:  3/23 5:18 p m :  Sinus tachycardia 104 beats per minute, mild improved ST depressions inferior laterally but improved from prior    Micro:     none    Allergies:    Allergies   Allergen Reactions    Doxazosin     Iohexol     Cardura [Doxazosin Mesylate]      Muscle cramps    Other      Iv dye for cardiac cath  Renal failure very close to dialysis  But no dialysis    Zestril [Lisinopril]      cough     Medications:   Scheduled Meds:  Current Facility-Administered Medications:  acetaminophen 975 mg Oral Q8H [facility] Josiane Martinez PA-C    aluminum-magnesium hydroxide-simethicone 30 mL Oral Q4H PRN Scott Rudolph PA-C    aspirin 81 mg Oral Daily Loli Puente PA-C    atorvastatin 40 mg Oral Daily With Leon Martinez PA-C    cholecalciferol 1,000 Units Oral Daily Loli Puente PA-C    clopidogrel 75 mg Oral Daily Loli Puente PA-C    docusate sodium 100 mg Oral BID Loli Puente PA-C fentanyl citrate (PF) 25 mcg Intravenous Q2H PRN Vivinene Nichole PA-C    folic acid 1 mg Oral Daily Vivienne Nichole PA-C    gabapentin 200 mg Oral BID Riley Joy PA-C    heparin (porcine) 3-20 Units/kg/hr (Order-Specific) Intravenous Titrated Jerome Vergara MD Last Rate: 12 8 Units/kg/hr (03/24/19 0417)   HYDROmorphone 0 5 mg Intravenous Once Liliane Camejo MD    insulin glargine 15 Units Subcutaneous Q12H [facility] Yannick Yanes MD    insulin lispro 1-5 Units Subcutaneous HS Yannick Yanes MD    insulin lispro 2-10 Units Subcutaneous TID With Meals Yannick Yanes MD    levalbuterol 1 25 mg Nebulization Q6H PRN Amber Peterson MD    levothyroxine 175 mcg Oral Early Morning Riley Joy PA-C    metoprolol 2 5 mg Intravenous Q6H Vivienne Nichole PA-C    multi-electrolyte 75 mL/hr Intravenous Continuous Vivienne Nichole PA-C Last Rate: 75 mL/hr (03/24/19 0247)   multivitamin-minerals 1 tablet Oral Daily Vivienne Nichole PA-C    nitroGLYcerin 5-200 mcg/min Intravenous Titrated Jerome Vergara MD Last Rate: Stopped (03/23/19 0500)   norepinephrine 1-30 mcg/min Intravenous Titrated Vivienne Nichole PA-C Last Rate: Stopped (03/23/19 1401)   ondansetron 4 mg Intravenous Q6H PRN Riley Joy PA-C    oxyCODONE 5 mg Oral Q4H PRN Riley Joy PA-C    Or        oxyCODONE 10 mg Oral Q4H PRN Riley Joy PA-C    sodium chloride 3 mL Nebulization Q6H PRN Amber Peterson MD    thiamine 100 mg Oral Daily Vivienne Nichole PA-C      Continuous Infusions:  heparin (porcine) 3-20 Units/kg/hr (Order-Specific) Last Rate: 12 8 Units/kg/hr (03/24/19 0417)   multi-electrolyte 75 mL/hr Last Rate: 75 mL/hr (03/24/19 0247)   nitroGLYcerin 5-200 mcg/min Last Rate: Stopped (03/23/19 0500)   norepinephrine 1-30 mcg/min Last Rate: Stopped (03/23/19 1401)     PRN Meds:    aluminum-magnesium hydroxide-simethicone 30 mL Q4H PRN   fentanyl citrate (PF) 25 mcg Q2H PRN   levalbuterol 1 25 mg Q6H PRN   ondansetron 4 mg Q6H PRN   oxyCODONE 5 mg Q4H PRN   Or     oxyCODONE 10 mg Q4H PRN   sodium chloride 3 mL Q6H PRN     VTE Pharmacologic Prophylaxis: Heparin Drip  VTE Mechanical Prophylaxis: sequential compression device  Invasive lines and devices: Invasive Devices     Peripherally Inserted Central Catheter Line            PICC Line 48/53/18 Right Basilic less than 1 day          Peripheral Intravenous Line            Peripheral IV 03/22/19 Left Hand 1 day    Peripheral IV 03/23/19 Right Arm less than 1 day          Arterial Line            Arterial Line 03/22/19 Left Radial 1 day                     Portions of the record may have been created with voice recognition software  Occasional wrong word or "sound a like" substitutions may have occurred due to the inherent limitations of voice recognition software  Read the chart carefully and recognize, using context, where substitutions have occurred      Jerald Calvillo PA-C

## 2019-03-24 NOTE — PLAN OF CARE
Problem: Prexisting or High Potential for Compromised Skin Integrity  Goal: Skin integrity is maintained or improved  Description  INTERVENTIONS:  - Identify patients at risk for skin breakdown  - Assess and monitor skin integrity  - Assess and monitor nutrition and hydration status  - Monitor labs (i e  albumin)  - Assess for incontinence   - Turn and reposition patient  - Assist with mobility/ambulation  - Relieve pressure over bony prominences  - Avoid friction and shearing  - Provide appropriate hygiene as needed including keeping skin clean and dry  - Evaluate need for skin moisturizer/barrier cream  - Collaborate with interdisciplinary team (i e  Nutrition, Rehabilitation, etc )   - Patient/family teaching  Outcome: Progressing     Problem: Potential for Falls  Goal: Patient will remain free of falls  Description  INTERVENTIONS:  - Assess patient frequently for physical needs  -  Identify cognitive and physical deficits and behaviors that affect risk of falls    -  [place] fall precautions as indicated by assessment   - Educate patient/family on patient safety including physical limitations  - Instruct patient to call for assistance with activity based on assessment  - Modify environment to reduce risk of injury  - Consider OT/PT consult to assist with strengthening/mobility  Outcome: Progressing     Problem: PAIN - ADULT  Goal: Verbalizes/displays adequate comfort level or baseline comfort level  Description  Interventions:  - Encourage patient to monitor pain and request assistance  - Assess pain using appropriate pain scale  - Administer analgesics based on type and severity of pain and evaluate response  - Implement non-pharmacological measures as appropriate and evaluate response  - Consider cultural and social influences on pain and pain management  - Notify physician/advanced practitioner if interventions unsuccessful or patient reports new pain  Outcome: Progressing     Problem: INFECTION - ADULT  Goal: Absence or prevention of progression during hospitalization  Description  INTERVENTIONS:  - Assess and monitor for signs and symptoms of infection  - Monitor lab/diagnostic results  - Monitor all insertion sites, i e  indwelling lines, tubes, and drains  - Monitor endotracheal (as able) and nasal secretions for changes in amount and color  - [place] appropriate cooling/warming therapies per order  - Administer medications as ordered  - Instruct and encourage patient and family to use good hand hygiene technique  - Identify and instruct in appropriate isolation precautions for identified infection/condition  Outcome: Progressing  Goal: Absence of fever/infection during neutropenic period  Description  INTERVENTIONS:  - Monitor WBC  - Implement neutropenic guidelines  Outcome: Progressing     Problem: SAFETY ADULT  Goal: Maintain or return to baseline ADL function  Description  INTERVENTIONS:  -  Assess patient's ability to carry out ADLs; assess patient's baseline for ADL function and identify physical deficits which impact ability to perform ADLs (bathing, care of mouth/teeth, toileting, grooming, dressing, etc )  - Assess/evaluate cause of self-care deficits   - Assess range of motion  - Assess patient's mobility; develop plan if impaired  - Assess patient's need for assistive devices and provide as appropriate  - Encourage maximum independence but intervene and supervise when necessary  ¯ Involve family in performance of ADLs  ¯ Assess for home care needs following discharge   ¯ Request OT consult to assist with ADL evaluation and planning for discharge  ¯ Provide patient education as appropriate  Outcome: Progressing  Goal: Maintain or return mobility status to optimal level  Description  INTERVENTIONS:  - Assess patient's baseline mobility status (ambulation, transfers, stairs, etc )    - Identify cognitive and physical deficits and behaviors that affect mobility  - Identify mobility aids required to assist with transfers and/or ambulation (gait belt, sit-to-stand, lift, walker, cane, etc )  - [place] fall precautions as indicated by assessment  - Record patient progress and toleration of activity level on Mobility SBAR; progress patient to next Phase/Stage  - Instruct patient to call for assistance with activity based on assessment  - Request Rehabilitation consult to assist with strengthening/weightbearing, etc   Outcome: Progressing     Problem: DISCHARGE PLANNING  Goal: Discharge to home or other facility with appropriate resources  Description  INTERVENTIONS:  - Identify barriers to discharge w/patient and caregiver  - Arrange for needed discharge resources and transportation as appropriate  - Identify discharge learning needs (meds, wound care, etc )  - Arrange for interpretive services to assist at discharge as needed  - Refer to Case Management Department for coordinating discharge planning if the patient needs post-hospital services based on physician/advanced practitioner order or complex needs related to functional status, cognitive ability, or social support system  Outcome: Progressing     Problem: Knowledge Deficit  Goal: Patient/family/caregiver demonstrates understanding of disease process, treatment plan, medications, and discharge instructions  Description  Complete learning assessment and assess knowledge base    Interventions:  - Provide teaching at level of understanding  - Provide teaching via preferred learning methods  Outcome: Progressing     Problem: CARDIOVASCULAR - ADULT  Goal: Maintains optimal cardiac output and hemodynamic stability  Description  INTERVENTIONS:  - Monitor I/O, vital signs and rhythm  - Monitor for S/S and trends of decreased cardiac output i e  bleeding, hypotension  - Administer and titrate ordered vasoactive medications to optimize hemodynamic stability  - Assess quality of pulses, skin color and temperature  - Assess for signs of decreased coronary artery perfusion - ex   Angina  - Instruct patient to report change in severity of symptoms  Outcome: Progressing  Goal: Absence of cardiac dysrhythmias or at baseline rhythm  Description  INTERVENTIONS:  - Continuous cardiac monitoring, monitor vital signs, obtain 12 lead EKG if indicated  - Administer antiarrhythmic and heart rate control medications as ordered  - Monitor electrolytes and administer replacement therapy as ordered  Outcome: Progressing

## 2019-03-24 NOTE — PROGRESS NOTES
Progress Note - Vascular Surgery   Roberto Camejo 68 y o  male MRN: [id number]  Unit/Bed#: [facility] 490-51 Encounter: [id number]      Subjective:  Patient was requiring increased vasopressors yesterday  His pressor requirements decreased and pressors were weaned off after receiving 1 unit of packed red cells  He denies chest pain this morning and remains on room air  Intermittent left lower extremity pain  Troponins peaked at ~10 yesterday and trending down to ~7 today  ECHO yesterday was stable from prior    Vitals:  BP (!) 140/48   Pulse 82   Temp 98 9 °F (37 2 °C) (Axillary)   Resp 19   Ht 5' 7" (1 702 m)   Wt 90 8 kg (200 lb 2 8 oz)   SpO2 100%   BMI 31 35 kg/m²     I/Os:  I/O last 3 completed shifts: In: 7159 4 [P O :1320; I V :4989 4; Blood:350; IV Piggyback:500]  Out: 9217 [ZGIHZ:0628; Blood:200]  I/O this shift:   In: 727 4 [I V :727 4]  Out: 1275 [Urine:1275]    Lab Results and Cultures:   CBC with diff:   Lab Results   Component Value Date    WBC 6 23 03/24/2019    HGB 9 7 (L) 03/24/2019    HCT 28 7 (L) 03/24/2019    MCV 95 03/24/2019     (L) 03/24/2019    MCH 32 2 03/24/2019    MCHC 33 8 03/24/2019    RDW 14 4 03/24/2019    MPV 11 8 03/24/2019    NRBC 0 03/24/2019   ,   BMP/CMP:  Lab Results   Component Value Date    SODIUM 137 03/24/2019    K 4 0 03/24/2019    K 4 9 12/21/2015     03/24/2019     (H) 12/21/2015    CO2 23 03/24/2019    CO2 21 03/22/2019    ANIONGAP 6 12/21/2015    BUN 23 03/24/2019    BUN 19 12/21/2015    CREATININE 1 33 (H) 03/24/2019    CREATININE 1 29 12/21/2015    GLUCOSE 207 (H) 03/22/2019    GLUCOSE 186 (H) 12/21/2015    CALCIUM 7 9 (L) 03/24/2019    CALCIUM 8 7 12/21/2015    AST 18 03/22/2019    AST 22 10/01/2015    ALT 17 03/22/2019    ALT 35 10/01/2015    ALKPHOS 70 03/22/2019    ALKPHOS 99 10/01/2015    PROT 7 6 10/01/2015    BILITOT 0 33 10/01/2015    EGFR 53 03/24/2019   ,   Lipid Panel:   Lab Results   Component Value Date    CHOL 129 10/01/2015   , Coags:   Lab Results   Component Value Date    PTT 57 (H) 03/24/2019    PTT 42 (H) 02/21/2014    INR 1 09 03/23/2019    INR 0 96 02/24/2014   ,     Blood Culture: No results found for: BLOODCX,   Urinalysis:   Lab Results   Component Value Date    COLORU yellow 02/27/2019    COLORU Yellow 11/28/2018    CLARITYU clear 02/27/2019    CLARITYU Cloudy 11/28/2018    SPECGRAV 1 021 11/28/2018    PHUR 5 5 11/28/2018    LEUKOCYTESUR - 02/27/2019    LEUKOCYTESUR Negative 11/28/2018    NITRITE - 02/27/2019    NITRITE Negative 11/28/2018    GLUCOSEU - 02/27/2019    GLUCOSEU Negative 11/28/2018    KETONESU - 02/27/2019    KETONESU Negative 11/28/2018    BILIRUBINUR - 02/27/2019    BILIRUBINUR Negative 11/28/2018    BLOODU - 02/27/2019    BLOODU Large (A) 11/28/2018   ,   Urine Culture:   Lab Results   Component Value Date    URINECX No Growth <1000 cfu/mL 03/05/2019   ,   Wound Culure: No results found for: WOUNDCULT    Medications:  Current Facility-Administered Medications   Medication Dose Route Frequency    acetaminophen (TYLENOL) tablet 975 mg  975 mg Oral Q8H [address]    aluminum-magnesium hydroxide-simethicone (MYLANTA) 200-200-20 mg/5 mL oral suspension 30 mL  30 mL Oral Q4H PRN    aspirin chewable tablet 81 mg  81 mg Oral Daily    atorvastatin (LIPITOR) tablet 40 mg  40 mg Oral Daily With Dinner    cholecalciferol (VITAMIN D3) tablet 1,000 Units  1,000 Units Oral Daily    clopidogrel (PLAVIX) tablet 75 mg  75 mg Oral Daily    docusate sodium (COLACE) capsule 100 mg  100 mg Oral BID    fentanyl citrate (PF) 100 MCG/2ML 25 mcg  25 mcg Intravenous T0X PRN    folic acid (FOLVITE) tablet 1 mg  1 mg Oral Daily    gabapentin (NEURONTIN) capsule 200 mg  200 mg Oral BID    heparin (porcine) 25,000 units in 250 mL infusion (premix)  3-20 Units/kg/hr (Order-Specific) Intravenous Titrated    HYDROmorphone (DILAUDID) injection 0 5 mg  0 5 mg Intravenous Once    insulin glargine (LANTUS) subcutaneous injection 15 Units 0 15 mL  15 Units Subcutaneous Q12H [facility]    insulin lispro (HumaLOG) 100 units/mL subcutaneous injection 1-5 Units  1-5 Units Subcutaneous HS    insulin lispro (HumaLOG) 100 units/mL subcutaneous injection 2-10 Units  2-10 Units Subcutaneous TID With Meals    levalbuterol (XOPENEX) inhalation solution 1 25 mg  1 25 mg Nebulization Q6H PRN    levothyroxine tablet 175 mcg  175 mcg Oral Early Morning    metoprolol (LOPRESSOR) injection 2 5 mg  2 5 mg Intravenous Q6H    multi-electrolyte (ISOLYTE-S PH 7 4 equivalent) IV solution  75 mL/hr Intravenous Continuous    multivitamin-minerals (CENTRUM) tablet 1 tablet  1 tablet Oral Daily    nitroGLYcerin (TRIDIL) 50 mg in 250 mL infusion (premix)  5-200 mcg/min Intravenous Titrated    norepinephrine (LEVOPHED) 4 mg (STANDARD CONCENTRATION) IV in sodium chloride 0 9% 250 mL  1-30 mcg/min Intravenous Titrated    ondansetron (ZOFRAN) injection 4 mg  4 mg Intravenous Q6H PRN    oxyCODONE (ROXICODONE) IR tablet 5 mg  5 mg Oral Q4H PRN    Or    oxyCODONE (ROXICODONE) IR tablet 10 mg  10 mg Oral Q4H PRN    sodium chloride 0 9 % inhalation solution 3 mL  3 mL Nebulization Q6H PRN    thiamine (VITAMIN B1) tablet 100 mg  100 mg Oral Daily         Physical Exam:    General:  No acute distress  CV:  Regular rate and rhythm  Respiratory:  Nonlabored respirations on room air  Abdominal:  Soft, nontender, nondistended  Extremities:  Moves all extremities  Neurologic:  Alert and oriented    Wound/Incision:  Incisions clean dry and intact    Pulse exam:  Doppler signal bilateral DP/PT    Assessment:  68-year-old male with extensive heart history now postop day 2 status post left femoral endarterectomy with patch angioplasty, left external iliac artery stent, left femoral to popliteal bypass  Echo performed yesterday stable from prior    Plan:  Cardiac diet as tolerated  Appreciate Cardiology recommendations    Aspirin/Plavix/beta blocker for NSTEMI  Continue heparin drip  Neurovascular checks  P r n   Analgesia  Mobilize patient  ICU level of care    Uriel Chun MD  3/24/2019

## 2019-03-24 NOTE — PROGRESS NOTES
Progress Note - Cardiology   Palmira Shukla 68 y o  male MRN: [id number]  Unit/Bed#: [facility] 517-01 Encounter: [id number]  03/24/19  11:29 AM          Impression and Plan:     51-year-old with history of coronary artery disease, non ST elevation myocardial infarction, stents to the LAD, peripheral vascular disease with stenting to the lower extremities, ischemic cardiomyopathy with an ejection fraction around 45%, most recently stenting-in [place] -5 months ago-October 2018-lad  At baseline was on dual antiplatelet therapy which was discontinued about 2 days prior to the surgery      Cardiology consultation was placed for potential ECG changes on telemetry, leading to a 12 lead ECG on 03/22-around 4:00 p m -which showed half-1 mm ST-depression in the lateral leads  Overnight-3/22/2019-developed some chest pains with increasing ST segment depressions in the inferior leads  Troponin so far has peaked around 8  Patient is completely asymptomatic from a cardiac standpoint and denies any cardiac symptoms  Overall clinically doing better, no cardiac symptoms, ECG changes have resolved, no events on telemetry, remains in sinus rhythm, hypotension has resolved  Yesterday's echo was unchanged      Plan:     ECG changes:  dynamic changes in the setting of chest pain on the night of 3/22/2019, ECG changes have resolved and back to his ECG from 3/22/2019 evening  Increased metoprolol to 25 b i d , continue dual antiplatelet therapy, re-loaded Plavix last night  continue IV heparin  Hypotension has resolved, Started back on metoprolol-12 5 b i d -increase to 25 b i d  Tomorrow    Troponin elevation:  Peaked at 10 and decreased, this is a type 2 myocardial infarction in the setting of increased catecholamine surges and demand, did have some chest pains and ECG changes, cannot entirely rule out a type 1   From a cardiac standpoint, can discontinue IV heparin tomorrow evening    Hypotension:  Resolved with IV fluids and blood, echo is unchanged with unchanged ejection fraction and wall motion abnormalities    Peripheral vascular disease:  Status post femoral artery endarterectomy with patch angioplasty, balloon angioplasty and stent to the left external iliac artery, femoral-popliteal bypass, postoperative day 2      Prolonged QT interval on ECG:  QT interval roughly half of the RR interval-borderline elevated, avoid QT prolonging drugs, follow for now      Chronic diastolic CHF:  On Lasix 20 mg daily at baseline, can restart from tomorrow       CKD: Baseline creatinine up to 1 5, at baseline currently        ===================================================================    Chief Complaint: No chief complaint on file          Subjective/Objective     Subjective:  Denies any complaints    Objective:  Appears well, better than yesterday when he was in a lot of pain in the lower extremity    Patient Active Problem List   Diagnosis    Acute kidney injury ([facility] )    Type 1 diabetes mellitus ([facility] )    Presence of drug coated stent in LAD coronary artery    Coronary artery disease of native artery of native heart with stable angina pectoris ([facility] )    Presence of drug coated stent in left circumflex coronary artery    Dyslipidemia    Obstructive sleep apnea of adult    Carotid artery stenosis, asymptomatic, bilateral    Atherosclerosis of artery of extremity with ulceration ([facility] )    Restrictive lung disease    COPD (chronic obstructive pulmonary disease) ([facility] )    Benign hypertension with chronic kidney disease, stage III ([facility]    CKD (chronic kidney disease) stage 3, GFR 30-59 ml/min ([facility])    Proteinuria    Renal artery stenosis ([facility])    Renal cyst, right    Vitamin D deficiency    Aortoiliac occlusive disease ([facility] )    Hypothyroidism, postablative    Other specified hypothyroidism    Low back pain with sciatica    Lumbar disc herniation    Lumbar radiculopathy    Diabetic neuropathy associated with type 1 diabetes mellitus ([facility]    History of Ischemic cardiomyopathy    Chronic systolic congestive heart failure (HCC)    NSTEMI (non-ST elevated myocardial infarction) ([facility])       Vitals: /53   Pulse 70   Temp 98 1 °F (36 7 °C) (Oral)   Resp 13   Ht 5' 7" (1 702 m)   Wt 90 8 kg (200 lb 2 8 oz)   SpO2 99%   BMI 31 35 kg/m²     I/O this shift:  In: 673 6 [P O :480; I V :193 6]  Out: 400 [Urine:400]  Wt Readings from Last 3 Encounters:   03/23/19 90 8 kg (200 lb 2 8 oz)   03/15/19 91 2 kg (201 lb)   03/14/19 88 1 kg (194 lb 5 oz)       Intake/Output Summary (Last 24 hours) at 3/24/2019 1129  Last data filed at 3/24/2019 1101  Gross per 24 hour   Intake 3255 65 ml   Output 2050 ml   Net 1205 65 ml     I/O last 3 completed shifts: In: 5436 7 [P O :1320;  I V :3766 7; Blood:350]  Out: 2500 [Urine:2500]    Invasive Devices     Peripherally Inserted Central Catheter Line            PICC Line 65/55/19 Right Basilic 1 day          Peripheral Intravenous Line            Peripheral IV 03/22/19 Left Hand 2 days    Peripheral IV 03/23/19 Right Arm 1 day          Arterial Line            Arterial Line 03/22/19 Left Radial 2 days                  Physical Exam:  GEN: Narcisa Martinez appears well, alert and oriented x 3, pleasant and cooperative   HEENT: pupils equal, round, and reactive to light; extraocular muscles intact  NECK: supple, no carotid bruits or JVD  HEART: regular rhythm, normal S1 and S2, no murmur, no clicks, gallops or rubs   LUNGS: clear to auscultation bilaterally; no wheezes or rhonchi, no rales  ABDOMEN/GI: normal bowel sounds, soft, no tenderness, no distention  EXTREMITIES/Musculoskeltal: peripheral pulses normal; no clubbing, cyanosis, minimal edema, left lower extremity is postop  NEURO: no focal motor findings   SKIN: normal without suspicious lesions on exposed skin              Lab Results:   Results from last 7 days   Lab Units 03/23/19  1925 03/23/19  1750 03/23/19  1443 03/23/19  1141   CK TOTAL U/L --  3,898*  --  1,836*   TROPONIN I ng/mL 7 29*  --  7 47* 10 40*   CK MB INDEX %  --  1 6  --  1 8     Results from last 7 days   Lab Units 03/24/19  0336 03/23/19  1750 03/23/19  0530 03/23/19  0049   WBC Thousand/uL 6 23  --  5 55 5 90   HEMOGLOBIN g/dL 9 7* 9 7* 8 3* 8 7*   HEMATOCRIT % 28 7* 29 6* 25 4* 27 0*   PLATELETS Thousands/uL 106*  --  123* 124*         Results from last 7 days   Lab Units 03/24/19  0336 03/23/19  0530 03/22/19  1614 03/22/19  1330 03/22/19  1126 03/22/19  0830  03/21/19  1418   POTASSIUM mmol/L 4 0 4 3 4 7  --   --   --   --  4 9   CHLORIDE mmol/L 106 109* 112*  --   --   --   --  106   CO2 mmol/L 23 24 21  --   --   --   --  28   CO2, I-STAT mmol/L  --   --   --  21 21 24   < >  --    BUN mg/dL 23 30* 38*  --   --   --   --  36*   CREATININE mg/dL 1 33* 1 47* 1 56*  --   --   --   --  1 59*   CALCIUM mg/dL 7 9* 7 9* 8 1*  --   --   --   --  9 5   ALK PHOS U/L  --   --  70  --   --   --   --  92   ALT U/L  --   --  17  --   --   --   --  25   AST U/L  --   --  18  --   --   --   --  18   GLUCOSE, ISTAT mg/dl  --   --   --  207* 187* 178*  --   --     < > = values in this interval not displayed  Results from last 7 days   Lab Units 03/23/19  0049 03/22/19  1615 03/21/19  1418   INR  1 09 1 11 1 07       Imaging: I have personally reviewed pertinent reports      EKG/Telemtry:  No events    Scheduled Meds:    Current Facility-Administered Medications:  acetaminophen 975 mg Oral Q8H [facility] Josiane Martinez PA-C    aluminum-magnesium hydroxide-simethicone 30 mL Oral Q4H PRN Raoul Ward PA-C    aspirin 81 mg Oral Daily Kym Elliott PA-C    atorvastatin 40 mg Oral Daily With [employer] MAGO Martinez    cholecalciferol 1,000 Units Oral Daily Kym Elliott PA-C    clopidogrel 75 mg Oral Daily Kym Elliott PA-C    docusate sodium 100 mg Oral BID Kym Elliott PA-C    fentanyl citrate (PF) 25 mcg Intravenous Q2H PRN Raoul Ward PA-GEORGI    folic acid 1 mg Oral Daily Judyheaven Shen PA-C    gabapentin 200 mg Oral BID Aydee MAGO Mcduffie    heparin (porcine) 3-20 Units/kg/hr (Order-Specific) Intravenous Titrated Lara Hector MD Last Rate: 12 8 Units/kg/hr (03/24/19 0417)   HYDROmorphone 0 5 mg Intravenous Once Orlando Díaz MD    insulin glargine 15 Units Subcutaneous Q12H [address] Judith Powell MD    insulin lispro 1-5 Units Subcutaneous HS Judith Powell MD    insulin lispro 2-10 Units Subcutaneous TID With Meals Judith Powell MD    levalbuterol 1 25 mg Nebulization Q6H PRN Natanael Gould MD    levothyroxine 175 mcg Oral Early Morning Aydee SellMAGO arreola    metoprolol tartrate 12 5 mg Oral Q12H [address] Judy Shen PA-C    multivitamin-minerals 1 tablet Oral Daily Judy Shen PA-C    nitroGLYcerin 5-200 mcg/min Intravenous Titrated Lara Hector MD Last Rate: Stopped (03/23/19 0500)   ondansetron 4 mg Intravenous Q6H PRN Aydee SellaMAGO    oxyCODONE 5 mg Oral Q4H PRN Aydee SellMAGO arreola    Or        oxyCODONE 10 mg Oral Q4H PRN Aydee SellaMAGO    polyethylene glycol 17 g Oral Daily Judy Shen PA-C    senna 2 tablet Oral HS Judy Shen PA-C    sodium chloride 3 mL Nebulization Q6H PRN Natanael Gould MD    thiamine 100 mg Oral Daily Judy Shen PA-C      Continuous Infusions:    heparin (porcine) 3-20 Units/kg/hr (Order-Specific) Last Rate: 12 8 Units/kg/hr (03/24/19 0417)   nitroGLYcerin 5-200 mcg/min Last Rate: Stopped (03/23/19 0500)       VTE Pharmacologic Prophylaxis: Heparin  VTE Mechanical Prophylaxis: sequential compression device      Counseling / Coordination of Care  Total time spent 20 minutes including teaching and family updates  More than 50% was spent counseling pt and family

## 2019-03-25 PROBLEM — E87.1 HYPONATREMIA: Status: ACTIVE | Noted: 2019-03-25

## 2019-03-25 PROBLEM — D62 ACUTE BLOOD LOSS ANEMIA: Status: ACTIVE | Noted: 2019-03-25

## 2019-03-25 LAB
ANION GAP SERPL CALCULATED.3IONS-SCNC: 6 MMOL/L (ref 4–13)
APTT PPP: 86 SECONDS (ref 26–38)
APTT PPP: 87 SECONDS (ref 26–38)
ATRIAL RATE: 104 BPM
ATRIAL RATE: 107 BPM
ATRIAL RATE: 113 BPM
ATRIAL RATE: 64 BPM
ATRIAL RATE: 80 BPM
ATRIAL RATE: 90 BPM
ATRIAL RATE: 91 BPM
ATRIAL RATE: 99 BPM
BASOPHILS # BLD AUTO: 0.02 THOUSANDS/ΜL (ref 0–0.1)
BASOPHILS NFR BLD AUTO: 0 % (ref 0–1)
BUN SERPL-MCNC: 19 MG/DL (ref 5–25)
CALCIUM SERPL-MCNC: 8.4 MG/DL (ref 8.3–10.1)
CHLORIDE SERPL-SCNC: 103 MMOL/L (ref 100–108)
CO2 SERPL-SCNC: 24 MMOL/L (ref 21–32)
CREAT SERPL-MCNC: 1.32 MG/DL (ref 0.6–1.3)
EOSINOPHIL # BLD AUTO: 0.32 THOUSAND/ΜL (ref 0–0.61)
EOSINOPHIL NFR BLD AUTO: 5 % (ref 0–6)
ERYTHROCYTE [DISTWIDTH] IN BLOOD BY AUTOMATED COUNT: 13.9 % (ref 11.6–15.1)
GFR SERPL CREATININE-BSD FRML MDRD: 53 ML/MIN/1.73SQ M
GLUCOSE SERPL-MCNC: 156 MG/DL (ref 65–140)
GLUCOSE SERPL-MCNC: 185 MG/DL (ref 65–140)
GLUCOSE SERPL-MCNC: 202 MG/DL (ref 65–140)
GLUCOSE SERPL-MCNC: 220 MG/DL (ref 65–140)
GLUCOSE SERPL-MCNC: 314 MG/DL (ref 65–140)
HCT VFR BLD AUTO: 29.7 % (ref 36.5–49.3)
HGB BLD-MCNC: 9.7 G/DL (ref 12–17)
IMM GRANULOCYTES # BLD AUTO: 0.02 THOUSAND/UL (ref 0–0.2)
IMM GRANULOCYTES NFR BLD AUTO: 0 % (ref 0–2)
LYMPHOCYTES # BLD AUTO: 1.7 THOUSANDS/ΜL (ref 0.6–4.47)
LYMPHOCYTES NFR BLD AUTO: 27 % (ref 14–44)
MAGNESIUM SERPL-MCNC: 2.2 MG/DL (ref 1.6–2.6)
MCH RBC QN AUTO: 31.7 PG (ref 26.8–34.3)
MCHC RBC AUTO-ENTMCNC: 32.7 G/DL (ref 31.4–37.4)
MCV RBC AUTO: 97 FL (ref 82–98)
MONOCYTES # BLD AUTO: 0.87 THOUSAND/ΜL (ref 0.17–1.22)
MONOCYTES NFR BLD AUTO: 14 % (ref 4–12)
NEUTROPHILS # BLD AUTO: 3.37 THOUSANDS/ΜL (ref 1.85–7.62)
NEUTS SEG NFR BLD AUTO: 54 % (ref 43–75)
NRBC BLD AUTO-RTO: 0 /100 WBCS
P AXIS: 1 DEGREES
P AXIS: 2 DEGREES
P AXIS: 20 DEGREES
P AXIS: 21 DEGREES
P AXIS: 31 DEGREES
P AXIS: 40 DEGREES
P AXIS: 44 DEGREES
P AXIS: 58 DEGREES
PHOSPHATE SERPL-MCNC: 3.2 MG/DL (ref 2.3–4.1)
PLATELET # BLD AUTO: 112 THOUSANDS/UL (ref 149–390)
PMV BLD AUTO: 12.3 FL (ref 8.9–12.7)
POTASSIUM SERPL-SCNC: 4.2 MMOL/L (ref 3.5–5.3)
PR INTERVAL: 150 MS
PR INTERVAL: 163 MS
PR INTERVAL: 166 MS
PR INTERVAL: 166 MS
PR INTERVAL: 171 MS
PR INTERVAL: 171 MS
QRS AXIS: -47 DEGREES
QRS AXIS: -48 DEGREES
QRS AXIS: -56 DEGREES
QRS AXIS: 126 DEGREES
QRS AXIS: 145 DEGREES
QRS AXIS: 166 DEGREES
QRS AXIS: 187 DEGREES
QRS AXIS: 205 DEGREES
QRSD INTERVAL: 100 MS
QRSD INTERVAL: 88 MS
QRSD INTERVAL: 96 MS
QRSD INTERVAL: 96 MS
QRSD INTERVAL: 98 MS
QT INTERVAL: 325 MS
QT INTERVAL: 350 MS
QT INTERVAL: 354 MS
QT INTERVAL: 366 MS
QT INTERVAL: 382 MS
QT INTERVAL: 383 MS
QT INTERVAL: 392 MS
QT INTERVAL: 592 MS
QTC INTERVAL: 396 MS
QTC INTERVAL: 428 MS
QTC INTERVAL: 450 MS
QTC INTERVAL: 467 MS
QTC INTERVAL: 473 MS
QTC INTERVAL: 480 MS
QTC INTERVAL: 504 MS
QTC INTERVAL: 684 MS
RBC # BLD AUTO: 3.06 MILLION/UL (ref 3.88–5.62)
SODIUM SERPL-SCNC: 133 MMOL/L (ref 136–145)
T WAVE AXIS: -10 DEGREES
T WAVE AXIS: -28 DEGREES
T WAVE AXIS: -59 DEGREES
T WAVE AXIS: 113 DEGREES
T WAVE AXIS: 116 DEGREES
T WAVE AXIS: 118 DEGREES
T WAVE AXIS: 57 DEGREES
T WAVE AXIS: 70 DEGREES
VENTRICULAR RATE: 104 BPM
VENTRICULAR RATE: 107 BPM
VENTRICULAR RATE: 113 BPM
VENTRICULAR RATE: 64 BPM
VENTRICULAR RATE: 80 BPM
VENTRICULAR RATE: 90 BPM
VENTRICULAR RATE: 91 BPM
VENTRICULAR RATE: 99 BPM
WBC # BLD AUTO: 6.3 THOUSAND/UL (ref 4.31–10.16)

## 2019-03-25 PROCEDURE — 99232 SBSQ HOSP IP/OBS MODERATE 35: CPT | Performed by: INTERNAL MEDICINE

## 2019-03-25 PROCEDURE — 93005 ELECTROCARDIOGRAM TRACING: CPT

## 2019-03-25 PROCEDURE — 83735 ASSAY OF MAGNESIUM: CPT | Performed by: PHYSICIAN ASSISTANT

## 2019-03-25 PROCEDURE — 84100 ASSAY OF PHOSPHORUS: CPT | Performed by: PHYSICIAN ASSISTANT

## 2019-03-25 PROCEDURE — G8987 SELF CARE CURRENT STATUS: HCPCS

## 2019-03-25 PROCEDURE — 82948 REAGENT STRIP/BLOOD GLUCOSE: CPT

## 2019-03-25 PROCEDURE — 93010 ELECTROCARDIOGRAM REPORT: CPT | Performed by: INTERNAL MEDICINE

## 2019-03-25 PROCEDURE — 97167 OT EVAL HIGH COMPLEX 60 MIN: CPT

## 2019-03-25 PROCEDURE — 85025 COMPLETE CBC W/AUTO DIFF WBC: CPT | Performed by: PHYSICIAN ASSISTANT

## 2019-03-25 PROCEDURE — G8988 SELF CARE GOAL STATUS: HCPCS

## 2019-03-25 PROCEDURE — 80048 BASIC METABOLIC PNL TOTAL CA: CPT | Performed by: PHYSICIAN ASSISTANT

## 2019-03-25 PROCEDURE — 85730 THROMBOPLASTIN TIME PARTIAL: CPT | Performed by: SURGERY

## 2019-03-25 PROCEDURE — 99024 POSTOP FOLLOW-UP VISIT: CPT | Performed by: SURGERY

## 2019-03-25 PROCEDURE — 94760 N-INVAS EAR/PLS OXIMETRY 1: CPT

## 2019-03-25 RX ORDER — GABAPENTIN 300 MG/1
300 CAPSULE ORAL 2 TIMES DAILY
Status: DISCONTINUED | OUTPATIENT
Start: 2019-03-26 | End: 2019-03-28 | Stop reason: HOSPADM

## 2019-03-25 RX ORDER — NITROGLYCERIN 0.4 MG/1
TABLET SUBLINGUAL
Status: COMPLETED
Start: 2019-03-25 | End: 2019-03-25

## 2019-03-25 RX ORDER — HYDROMORPHONE HCL/PF 1 MG/ML
0.5 SYRINGE (ML) INJECTION EVERY 4 HOURS PRN
Status: DISCONTINUED | OUTPATIENT
Start: 2019-03-25 | End: 2019-03-26

## 2019-03-25 RX ORDER — NITROGLYCERIN 0.4 MG/1
0.4 TABLET SUBLINGUAL
Status: DISCONTINUED | OUTPATIENT
Start: 2019-03-25 | End: 2019-03-28 | Stop reason: HOSPADM

## 2019-03-25 RX ORDER — INSULIN GLARGINE 100 [IU]/ML
22 INJECTION, SOLUTION SUBCUTANEOUS EVERY 12 HOURS SCHEDULED
Status: DISCONTINUED | OUTPATIENT
Start: 2019-03-25 | End: 2019-03-28 | Stop reason: HOSPADM

## 2019-03-25 RX ADMIN — GABAPENTIN 200 MG: 100 CAPSULE ORAL at 08:04

## 2019-03-25 RX ADMIN — Medication 1 TABLET: at 08:05

## 2019-03-25 RX ADMIN — DOCUSATE SODIUM 100 MG: 100 CAPSULE, LIQUID FILLED ORAL at 17:09

## 2019-03-25 RX ADMIN — INSULIN LISPRO 1 UNITS: 100 INJECTION, SOLUTION INTRAVENOUS; SUBCUTANEOUS at 22:03

## 2019-03-25 RX ADMIN — HYDROMORPHONE HYDROCHLORIDE 0.5 MG: 1 INJECTION, SOLUTION INTRAMUSCULAR; INTRAVENOUS; SUBCUTANEOUS at 19:23

## 2019-03-25 RX ADMIN — ALUMINUM HYDROXIDE, MAGNESIUM HYDROXIDE, AND SIMETHICONE 30 ML: 200; 200; 20 SUSPENSION ORAL at 08:08

## 2019-03-25 RX ADMIN — ASPIRIN 81 MG 81 MG: 81 TABLET ORAL at 08:04

## 2019-03-25 RX ADMIN — FENTANYL CITRATE 25 MCG: 50 INJECTION, SOLUTION INTRAMUSCULAR; INTRAVENOUS at 05:10

## 2019-03-25 RX ADMIN — OXYCODONE HYDROCHLORIDE 5 MG: 5 TABLET ORAL at 11:09

## 2019-03-25 RX ADMIN — NITROGLYCERIN 1 INCH: 20 OINTMENT TOPICAL at 11:30

## 2019-03-25 RX ADMIN — ACETAMINOPHEN 975 MG: 325 TABLET ORAL at 22:02

## 2019-03-25 RX ADMIN — NITROGLYCERIN 0.4 MG: 0.4 TABLET SUBLINGUAL at 09:14

## 2019-03-25 RX ADMIN — ATORVASTATIN CALCIUM 40 MG: 40 TABLET, FILM COATED ORAL at 17:09

## 2019-03-25 RX ADMIN — ACETAMINOPHEN 975 MG: 325 TABLET ORAL at 05:31

## 2019-03-25 RX ADMIN — GABAPENTIN 200 MG: 100 CAPSULE ORAL at 17:09

## 2019-03-25 RX ADMIN — FOLIC ACID 1 MG: 1 TABLET ORAL at 08:04

## 2019-03-25 RX ADMIN — DOCUSATE SODIUM 100 MG: 100 CAPSULE, LIQUID FILLED ORAL at 08:04

## 2019-03-25 RX ADMIN — OXYCODONE HYDROCHLORIDE 10 MG: 5 TABLET ORAL at 22:06

## 2019-03-25 RX ADMIN — INSULIN LISPRO 6 UNITS: 100 INJECTION, SOLUTION INTRAVENOUS; SUBCUTANEOUS at 08:05

## 2019-03-25 RX ADMIN — METOPROLOL TARTRATE 12.5 MG: 25 TABLET ORAL at 08:04

## 2019-03-25 RX ADMIN — ACETAMINOPHEN 975 MG: 325 TABLET ORAL at 14:30

## 2019-03-25 RX ADMIN — VITAMIN D, TAB 1000IU (100/BT) 1000 UNITS: 25 TAB at 08:04

## 2019-03-25 RX ADMIN — LEVOTHYROXINE SODIUM 175 MCG: 125 TABLET ORAL at 05:32

## 2019-03-25 RX ADMIN — INSULIN GLARGINE 15 UNITS: 100 INJECTION, SOLUTION SUBCUTANEOUS at 08:05

## 2019-03-25 RX ADMIN — OXYCODONE HYDROCHLORIDE 5 MG: 5 TABLET ORAL at 16:05

## 2019-03-25 RX ADMIN — NITROGLYCERIN 1 INCH: 20 OINTMENT TOPICAL at 17:09

## 2019-03-25 RX ADMIN — FENTANYL CITRATE 25 MCG: 50 INJECTION, SOLUTION INTRAMUSCULAR; INTRAVENOUS at 07:24

## 2019-03-25 RX ADMIN — METOPROLOL TARTRATE 25 MG: 25 TABLET, FILM COATED ORAL at 22:02

## 2019-03-25 RX ADMIN — INSULIN GLARGINE 22 UNITS: 100 INJECTION, SOLUTION SUBCUTANEOUS at 22:02

## 2019-03-25 RX ADMIN — POLYETHYLENE GLYCOL 3350 17 G: 17 POWDER, FOR SOLUTION ORAL at 08:08

## 2019-03-25 RX ADMIN — SENNOSIDES 17.2 MG: 8.6 TABLET, FILM COATED ORAL at 22:07

## 2019-03-25 RX ADMIN — CLOPIDOGREL BISULFATE 75 MG: 75 TABLET ORAL at 08:05

## 2019-03-25 RX ADMIN — THIAMINE HCL TAB 100 MG 100 MG: 100 TAB at 08:04

## 2019-03-25 RX ADMIN — OXYCODONE HYDROCHLORIDE 10 MG: 5 TABLET ORAL at 06:46

## 2019-03-25 RX ADMIN — INSULIN LISPRO 1 UNITS: 100 INJECTION, SOLUTION INTRAVENOUS; SUBCUTANEOUS at 17:25

## 2019-03-25 NOTE — PROGRESS NOTES
Progress Note - Rose Mary Henderson 68 y o  male MRN: [id number]    [facility] Encounter: [id number]      CC: diabetes f/u    Subjective:   Rose Mary Henderson is a 68y o  year old male with type 1  diabetes  Feels well  No complaints  No hypoglycemia  Sugars above goal today    Objective:     Vitals: Blood pressure 126/64, pulse 69, temperature 97 8 °F (36 6 °C), temperature source Oral, resp  rate 18, height 5' 7" (1 702 m), weight 90 8 kg (200 lb 2 8 oz), SpO2 100 %  ,Body mass index is 31 35 kg/m²  Intake/Output Summary (Last 24 hours) at 3/25/2019 1536  Last data filed at 3/25/2019 1500  Gross per 24 hour   Intake 752 23 ml   Output 3750 ml   Net -2997 77 ml       Physical Exam:  General Appearance: awake, appears stated age and cooperative  Head: Normocephalic, without obvious abnormality, atraumatic  Extremities: moves all extremities  Skin: Skin color and temperature normal    Pulm: no labored breathing    Lab, Imaging and other studies: I have personally reviewed pertinent reports  Results from last 7 days   Lab Units 03/25/19  0539  03/22/19  1330   SODIUM mmol/L 133*   < >  --    POTASSIUM mmol/L 4 2   < >  --    CHLORIDE mmol/L 103   < >  --    CO2 mmol/L 24   < >  --    CO2, I-STAT mmol/L  --   --  21   BUN mg/dL 19   < >  --    CREATININE mg/dL 1 32*   < >  --    GLUCOSE, ISTAT mg/dl  --   --  207*   CALCIUM mg/dL 8 4   < >  --     < > = values in this interval not displayed  POC Glucose (mg/dl)   Date Value   03/25/2019 314 (H)   03/25/2019 220 (H)   03/24/2019 164 (H)   03/24/2019 115   03/24/2019 181 (H)   03/24/2019 150 (H)   03/23/2019 209 (H)   03/23/2019 271 (H)   03/23/2019 144 (H)   03/23/2019 142 (H)       Assessment:  Type 1 diabetes with hyperglycemia  Hypothyroidism  Peripheral arterial disease  Chronic kidney disease    Plan:  Sugars above goal today-increase Lantus to 22 units twice daily    Change correction to 1 unit for every 30 mg/dL above a target of 140   Continue insulin to carb ratio of 1 unit for every 5 g of carbs  On discharge patient can glucose back on his home insulin regimen and follow up with his endocrinologist 1-2 weeks after discharge  Portions of the record may have been created with voice recognition software

## 2019-03-25 NOTE — OCCUPATIONAL THERAPY NOTE
Occupational Therapy Cancellation Note    Orders received, chart reviewed  Noted podiatry consult ordered for L heel wound; will hold OT evaluation pending podiatry activity recommendation  OT to continue to follow and attempt evaluation when medically appropriate        Kati Quintana OT

## 2019-03-25 NOTE — UTILIZATION REVIEW
Initial Clinical Review    Age/Sex: 68 y o  male  Surgery Date: 3/22/2019  Procedure: Pre-op Diagnosis:  Atherosclerosis of artery of extremity with ulceration ([facility] ) [I70 299, L99 699]     Post-Op Diagnosis Codes:     * Atherosclerosis of artery of extremity with ulceration ([facility] ) [I70 299, L94 729]        Procedure(s):  ENDARTERECTOMY ARTERIAL FEMORAL WITH PATCH ANGIOPLASTY, BALLOON ANGIOPLASTY, STENT LEFT EIA  BYPASS FEMORAL-POPLITEAL WITH COMPLETION ARTERIOGRAM      Anesthesia: general  Admission Orders: Date/Time/Statement: 3/22/19 @ 5772   Orders Placed This Encounter   Procedures    Inpatient Admission     Standing Status:   Standing     Number of Occurrences:   1     Order Specific Question:   Admitting Physician     Answer:   Irma Goodman     Order Specific Question:   Level of Care     Answer:   Level 1 Stepdown [13]     Order Specific Question:   Estimated length of stay     Answer:   Inpatient Only Surgery     Vital Signs: /59 (BP Location: Left arm)   Pulse 75   Temp 98 2 °F (36 8 °C) (Oral)   Resp 18   Ht 5' 7" (1 702 m)   Wt 90 8 kg (200 lb 2 8 oz)   SpO2 97%   BMI 31 35 kg/m²   Diet:        Diet Orders   (From admission, onward)            Start     Ordered    03/22/19 1547  Diet Regular; Regular House; Consistent Carbohydrate Diet Level 2 (5 carb servings/75 grams CHO/meal)  Diet effective now     Question Answer Comment   Diet Type Regular    Regular Regular House    Other Restriction(s): Consistent Carbohydrate Diet Level 2 (5 carb servings/75 grams CHO/meal)    RD to adjust diet per protocol? Yes        03/22/19 1547        Mobility: ankle foot brace--scd  fs glucose 4 x daily  ciwa score protocol  Wound care daily  Neurovascular checks and neuro q4  Fall precautions  Cronin  tadon boot  Is q1  Dysphagia assessment  Pt/ot    DVT Prophylaxis: scd, heparin  Pain Control:   Pain Medications             aspirin 81 MG tablet Take 81 mg by mouth daily      gabapentin (NEURONTIN) 100 mg capsule Take 3 capsules twice a day      consult to cardiology -- ekg periop changes -- d/c nitro drip  Consult nephrology  Surgical critical care , endocrine , wound care, podiatry, and acute pain service

## 2019-03-25 NOTE — ASSESSMENT & PLAN NOTE
Lab Results   Component Value Date    HGBA1C 7 6 (H) 03/05/2019       Recent Labs     03/24/19  1103 03/24/19  1633 03/24/19  2121 03/25/19  0720   POCGLU 181* 115 164* 220*       Blood Sugar Average: Last 72 hrs:  (P) 194 [id number]     Plan:  --Endocrine management

## 2019-03-25 NOTE — SOCIAL WORK
Cm met with pt and spouse Rey Cornejo to discuss DCP and cm role  Pt lives in a 2sh with no chemo  Prior to admission pt used a RW occasionally with ambulation and was independent with ADLS  No prior hx of VNA  Pt's preference for pharmacy is [facility] in [place]  Pt denies any hx of drug/ETOH or inpt psych stays  Patient/caregiver received discharge checklist  Content reviewed  Patient/caregiver encouraged to participate in discharge plan of care prior to discharge home  CM reviewed d/c planning process including the following: identifying help at home, patient preference for d/c planning needs, Discharge Lounge, [facility] Meds to Bed program, availability of treatment team to discuss questions or concerns patient and/or family may have regarding understanding medications and recognizing signs and symptoms once discharged  CM also encouraged patient to follow up with all recommended appointments after discharge  Patient advised of importance for patient and family to participate in managing patients medical well being

## 2019-03-25 NOTE — CONSULTS
Consultation - Acute Pain Service   Mansi Triplett 68 y o  male MRN: [id number]  Unit/Bed#: [facility] 407-81 Encounter: [id number]               Assessment/Plan     Assessment:   68 yr old M status post left femoral endarterectomy and left femoral to AKA pop bypass on 03/22, postop day 3  I have reviewed the patient's controlled substance dispensing history in the Prescription Drug Monitoring Program before prescribing any controlled substances: Patient is opioid naive  He has a history lumbar disc herniation and radiculopathy  Other comorbidities include CKD stage 3, CAD, ITZEL on CPAP, and diabetic neuropathy  His home meds include gabapentin    His current creatinine level is 1 3 today, GFR is 53  Creatinine clearance is 64 9 mL/minute  Given this range of creatinine clearance, it is acceptable to resume use of gabapentin at his regular home med dosing  Current pain regimen:  - Tylenol scheduled  - Gabapentin 200 mg p o  B i d   - fentanyl 25 mcg IV q 2 hours p r n  Severe breakthrough pain  - oxycodone 5-10 mg p o  Q 4 hours p r n  Moderate-to-severe pain    Pt may not be a candidate for ibuprofen products given his renal insufficiency and recent surgery  Extensive discuss with patient and wife in his room regarding pain regimen  Pt prefers BID dosing to TID dosing ("I don't want to be bothered taking something in the middle of the day")      Plan:   - continue Tylenol scheduled  - modifiy Gabapentin to 300 mg PO BID  Pt prefers BID dosing to TID dosing    - discontinue fentanyl IV p r n  Order  - consider dilaudid 0 5 mg IV Q 4 hours PRN severe breakthrough pain  Discontinue this med tomorrow, as it will be 4 days out of surgery and patient should rely on PO pain meds instead  - consider 500 mg of Robaxin p o  BID scheduled for muscle spasms (pt had this before with no side effects)  - continue oxycodone 5-10 mg p o  Q 4 hours p r n   Moderate to severe pain     recs communicated with patient, nursing staff, and vascular service  APS will continue to follow; please call  / [phone] ( [facility] [phone]) with any questions    History of Present Illness    Admit Date:  3/22/2019  Hospital Day:  3 days  Primary Service:  Vascular Surgery  Attending Provider:  Kourtney Villatoro MD  Reason for Consult / Principal Problem:  Acute postoperative pain, postop day 3, opioid naive patient, on anti neuroleptics for neuropathy, complicated by CKD stage 3  Hx and PE limited by:  None   HPI: Conchita Avila is  a 68y o  year old male who presents with (see above assessment section closed    Inpatient consult to Acute Pain Service  Consult performed by:  Naa Hernadez MD  Consult ordered by: Mickey Colby PA-C          Review of Systems    Historical Information   Past Medical History:   Diagnosis Date    Acute kidney injury [facility])     resolved 11/30/2015    Acute venous embolism and thrombosis of deep vessels of proximal lower extremity ([place] [address] )     resolved 04/04/2015    DARINEL (acute kidney injury) ([place] [address] ) 1/12/2016    Anesthesia     RESPIRATORY ISSUES DUE TO SLEEP APNEA    Cardiac disease     heart attack, stents x 4    [facility]congestive heart failure) ([facility])     Chronic cough     resolved 02/04/2016    Chronic pain disorder     intermitent claudication    COPD (chronic obstructive pulmonary disease) ([facility])     Coronary artery disease     CPAP (continuous positive airway pressure) dependence     Diabetes mellitus ([place] [address] )     Disease of thyroid gland     DVT (deep venous thrombosis) ([facility])     Heart failure ([place] [address] )     Hyperlipidemia     Hypertension     Ischemic cardiomyopathy     last assessed 09/26/2017    Myocardial infarction [facility])     MI +2 2015,2018    Pulmonary emphysema ([facility])     Pulmonary granuloma ([place] [address] )     resolved 02/03/2017    Renal failure     Sleep apnea      Past Surgical History:   Procedure Laterality Date    BYPASS FEMORAL-POPLITEAL      initial stenosis with stent left, 7 x 100 smart stent onset 2014    CARDIAC SURGERY      cardiac stents    CATARACT EXTRACTION      COLOGUARD (HISTORICAL)      CORONARY ANGIOPLASTY WITH STENT PLACEMENT      x4    IR ABDOMINAL ANGIOGRAPHY / INTERVENTION  3/14/2019    RI THROMBOENDARTECTMY FEMORAL COMMON Left 3/22/2019    Procedure: ENDARTERECTOMY ARTERIAL FEMORAL WITH PATCH ANGIOPLASTY, BALLOON ANGIOPLASTY, STENT;  Surgeon: Dede Acevedo MD;  [facility];  Service: Vascular    RI VEIN BYPASS GRAFT,FEM-POP Left 3/22/2019    Procedure: BYPASS FEMORAL-POPLITEAL WITH COMPLETION ARTERIOGRAM;  Surgeon: Dede Acevedo MD;  [facility];  Service: Vascular    VASCULAR SURGERY      stent placement     Social History   Social History     Substance and Sexual Activity   Alcohol Use Yes    Alcohol/week: 12 6 oz    Types: 21 Standard drinks or equivalent per week    Frequency: 4 or more times a week    Drinks per session: 1 or 2    Binge frequency: Never    Comment: 2-3 glasses of vodka daily (6 shots) (history 2 drinks per day as per allscripts     Social History     Substance and Sexual Activity   Drug Use No     Social History     Tobacco Use   Smoking Status Former Smoker    Packs/day: 4 00    Years: 48 00    Pack years: 192 00    Types: Cigarettes    Last attempt to quit:     Years since quittin 2   Smokeless Tobacco Never Used   Tobacco Comment    smoking 48 years 1 5 ppd d/c oct 2008 screening protocol as per allscripts     Family History: non-contributory    Meds/Allergies   all current active meds have been reviewed, current meds:   Current Facility-Administered Medications   Medication Dose Route Frequency    acetaminophen (TYLENOL) tablet 975 mg  975 mg Oral Q8H [facility]    aluminum-magnesium hydroxide-simethicone (MYLANTA) 200-200-20 mg/5 mL oral suspension 30 mL  30 mL Oral Q4H PRN    aspirin chewable tablet 81 mg  81 mg Oral Daily    atorvastatin (LIPITOR) tablet 40 mg  40 mg Oral Daily With [employer] cholecalciferol (VITAMIN D3) tablet 1,000 Units  1,000 Units Oral Daily    clopidogrel (PLAVIX) tablet 75 mg  75 mg Oral Daily    docusate sodium (COLACE) capsule 100 mg  100 mg Oral BID    fentanyl citrate (PF) 100 MCG/2ML 25 mcg  25 mcg Intravenous R1D PRN    folic acid (FOLVITE) tablet 1 mg  1 mg Oral Daily    gabapentin (NEURONTIN) capsule 200 mg  200 mg Oral BID    heparin (porcine) 25,000 units in 250 mL infusion (premix)  3-20 Units/kg/hr (Order-Specific) Intravenous Titrated    HYDROmorphone (DILAUDID) injection 0 5 mg  0 5 mg Intravenous Once    insulin glargine (LANTUS) subcutaneous injection 15 Units 0 15 mL  15 Units Subcutaneous Q12H [address]    insulin lispro (HumaLOG) 100 units/mL subcutaneous injection 1-5 Units  1-5 Units Subcutaneous HS    insulin lispro (HumaLOG) 100 units/mL subcutaneous injection 2-15 Units  2-15 Units Subcutaneous TID With Meals    levalbuterol (XOPENEX) inhalation solution 1 25 mg  1 25 mg Nebulization Q6H PRN    levothyroxine tablet 175 mcg  175 mcg Oral Early Morning    metoprolol tartrate (LOPRESSOR) tablet 25 mg  25 mg Oral Q12H [address]    multivitamin-minerals (CENTRUM) tablet 1 tablet  1 tablet Oral Daily    nitroglycerin (NITRO-BID) 2 % TD ointment 1 inch  1 inch Topical Q8H    nitroglycerin (NITROSTAT) SL tablet 0 4 mg  0 4 mg Sublingual Q5 Min PRN    ondansetron (ZOFRAN) injection 4 mg  4 mg Intravenous Q6H PRN    oxyCODONE (ROXICODONE) IR tablet 5 mg  5 mg Oral Q4H PRN    Or    oxyCODONE (ROXICODONE) IR tablet 10 mg  10 mg Oral Q4H PRN    polyethylene glycol (MIRALAX) packet 17 g  17 g Oral Daily    senna (SENOKOT) tablet 17 2 mg  2 tablet Oral HS    sodium chloride 0 9 % inhalation solution 3 mL  3 mL Nebulization Q6H PRN    thiamine (VITAMIN B1) tablet 100 mg  100 mg Oral Daily    and PTA meds:   Prior to Admission Medications   Prescriptions Last Dose Informant Patient Reported? Taking?    Coenzyme Q10 (COQ-10) 200 MG CAPS 3/20/2019 Self Yes Yes Sig: Take 200 mg by mouth daily   Lactobacillus-Inulin ([address] [place] [address]) 3/20/2019 Self Yes Yes   Sig: Take 1 capsule by mouth daily   NIFEdipine 0 3%-lidocaine 5% rectal ointment 3/21/2019 at 2200 Self No Yes   Sig: Apply 1 application topically every 4 (four) hours as needed for discomfort or pain Apply a small amount to anal opening 4-6 times per day  ULTICARE INSULIN SYRINGE 30G X /2" 1 ML MISC  Self Yes No   Sig: Use as directed   ULTICARE INSULIN SYRINGE 30G X " 0 3 ML MISC  Self Yes No   Sig: Use as directed   aspirin 81 MG tablet 3/21/2019 at 2200 Self Yes Yes   Sig: Take 81 mg by mouth daily     cholecalciferol (VITAMIN D3) 1,000 units tablet 3/20/2019 Self Yes Yes   Sig: Take 1,000 Units by mouth daily     clopidogrel (PLAVIX) 75 mg tablet 3/18/2019 at 0800 Self No Yes   Sig: Take 1 tablet (75 mg total) by mouth daily   docusate sodium (COLACE) 50 mg capsule 3/21/2019 at 2200 Self Yes Yes   Sig: Take by mouth daily   furosemide (LASIX) 20 mg tablet 3/21/2019 at 0800  No Yes   Sig: Take 1 tablet (20 mg total) by mouth daily   gabapentin (NEURONTIN) 100 mg capsule 3/21/2019 at 2200 Self No Yes   Sig: Take 3 capsules twice a day   Patient taking differently: Take 200 mg by mouth 2 (two) times a day Take 3 capsules twice a day    glucagon (GLUCAGON EMERGENCY) 1 MG injection  Self Yes Yes   Si mg   insulin glargine (LANTUS) 100 units/mL subcutaneous injection 3/22/2019 at 0430 Self No Yes   Sig: Inject 66 Units under the skin daily 38 units in the morning and 28 units in the evening   Patient taking differently: Inject 40 Units under the skin daily 40 units in the morning and 18 units in the evening   insulin lispro (HUMALOG) 100 units/mL injection 3/21/2019 at 2200 Self No Yes   Sig: Inject 3 units with breakfast, 6 units at lunch, and 6 units at dinner   Patient taking differently: 4 (four) times a day Inject 3 units with breakfast, 6 units at lunch, and 6 units at dinner levalbuterol (XOPENEX HFA) 45 mcg/act inhaler More than a month at Unknown time Self Yes No   Sig: Inhale 1-2 puffs   levothyroxine 175 mcg tablet 3/22/2019 at 0530 Self No Yes   Sig: Take 1 tablet (175 mcg total) by mouth daily in the early morning   metoprolol tartrate (LOPRESSOR) 25 mg tablet 3/22/2019 at 0430 Self No Yes   Sig: Take 0 5 tablets (12 5 mg total) by mouth every 12 (twelve) hours   nitroglycerin (NITROSTAT) 0 4 mg SL tablet More than a month at Unknown time Self No No   Sig: Place 1 tablet (0 4 mg total) under the tongue every 5 (five) minutes as needed for chest pain   rosuvastatin (CRESTOR) 20 MG tablet 3/21/2019 at 2200 Self No Yes   Sig: Take 1 tablet (20 mg total) by mouth daily   silver sulfadiazine (SILVADENE,SSD) 1 % cream Past Month at Unknown time  Yes Yes   Sig: Apply topically daily      Facility-Administered Medications: None       Allergies   Allergen Reactions    Doxazosin     Iohexol     Cardura [Doxazosin Mesylate]      Muscle cramps    Other      Iv dye for cardiac cath  Renal failure very close to dialysis  But no dialysis    Zestril [Lisinopril]      cough       Objective   Temp:  [98 1 °F (36 7 °C)-98 9 °F (37 2 °C)] 98 2 °F (36 8 °C)  HR:  [70-96] 75  Resp:  [18-36] 18  BP: (105-140)/(52-67) 121/59  Arterial Line BP: (128)/(48) 128/48    Intake/Output Summary (Last 24 hours) at 3/25/2019 1249  Last data filed at 3/25/2019 1213  Gross per 24 hour   Intake 535 03 ml   Output 3600 ml   Net -3064 97 ml       Physical Exam    Lab Results:   I have personally reviewed pertinent labs  , CBC:   Lab Results   Component Value Date    WBC 6 30 03/25/2019    HGB 9 7 (L) 03/25/2019    HCT 29 7 (L) 03/25/2019    MCV 97 03/25/2019     (L) 03/25/2019    MCH 31 7 03/25/2019    MCHC 32 7 03/25/2019    RDW 13 9 03/25/2019    MPV 12 3 03/25/2019    NRBC 0 03/25/2019   , CMP:   Lab Results   Component Value Date    SODIUM 133 (L) 03/25/2019    K 4 2 03/25/2019     03/25/2019 CO2 24 03/25/2019    BUN 19 03/25/2019    CREATININE 1 32 (H) 03/25/2019    CALCIUM 8 4 03/25/2019    EGFR 53 03/25/2019     Imaging Studies: I have personally reviewed pertinent reports  EKG, Pathology, and Other Studies: I have personally reviewed pertinent reports  Counseling / Coordination of Care  Total floor / unit time spent today Level 1 = 20 minutes  Greater than 50% of total time was spent with the patient and / or family counseling and / or coordination of care   A description of the counseling / coordination of care:

## 2019-03-25 NOTE — PHYSICAL THERAPY NOTE
PT order received  Chart reviewed; noted an order for podiatry consult re: (L) heel wound; will hold PT evaluation at this time; will follow and initiate PT assessment following podiatry recommendations for mobilization      Patrick Dangelo, PT

## 2019-03-25 NOTE — ORTHOTIC NOTE
Orthotic Note            Date: 3/25/2019      Patient Name: Nicole Pro        Time: 14:00pm    Reason for Consult:  Patient Active Problem List   Diagnosis    Acute kidney [facility])    Type 1 diabetes mellitus ([place] [address] )    Presence of drug coated stent in LAD coronary artery    Coronary artery disease of native artery of native heart with stable angina pectoris ([facility] )    Presence of drug coated stent in left circumflex coronary artery    Dyslipidemia    Obstructive sleep apnea of adult    Carotid artery stenosis, asymptomatic, bilateral    Atherosclerosis of artery of extremity with ulceration ([place] [address] )    Restrictive lung disease    COPD (chronic obstructive pulmonary disease) ([place] [address] )    Benign hypertension with chronic kidney disease, stage III ([facility])    CKD (chronic kidney disease) stage 3, GFR 30-59 ml/min ([facility])    Proteinuria    Renal artery stenosis ([facility])    Renal cyst, right    Vitamin D deficiency    Aortoiliac occlusive disease ([place] [address] )    Hypothyroidism, postablative    Other specified hypothyroidism    Low back pain with sciatica    Lumbar disc herniation    Lumbar radiculopathy    Diabetic neuropathy associated with type 1 diabetes mellitus ([place] [address] )    History of Ischemic cardiomyopathy    Chronic systolic congestive heart failure ([facility])    NSTEMI (non-ST elevated myocardial infarction) ([facility])    Acute blood loss anemia    Hyponatremia   Globoped Shoe Lot Number [id number]  Per Podiatry    I measured, fit and donned Medium Globoped steve to patient's RLE while he was sitting up in bed  Patient tolerated well without complaint and instructions/adjustments reviewed while wife present in room  I will continue to follow up daily  My contact information and instructions at bedside  Globoped shoe currently at bedside  Recommendations:  Please call Mobility Coordinator at ext  [phone] in regards to bracing instruction and/or adjustment      Jozef Merida Mobility Coordinator LCFo, LCOF, ASOP R  O T, O B T

## 2019-03-25 NOTE — ASSESSMENT & PLAN NOTE
AOID/PAD with claudication and left heel wound    S/P left femoral endarterectomy with bovine patch angioplasty, left femoral to AKA pop bypass with ringed [place] graft, completion arteriogram with left EIA PTA/stent- 3/22 [place]    Plan:  --consult podiatry for left heel wound  --consult acute pain service for postop pain management  --PT/OT  --case management for dispo planning

## 2019-03-25 NOTE — PLAN OF CARE
Problem: OCCUPATIONAL THERAPY ADULT  Goal: Performs self-care activities at highest level of function for planned discharge setting  See evaluation for individualized goals  Description  Treatment Interventions: ADL retraining, Functional transfer training, Endurance training, Cognitive reorientation, Patient/family training, Equipment evaluation/education, Energy conservation, Activityengagement          See flowsheet documentation for full assessment, interventions and recommendations  Note:   Limitation: Decreased ADL status, Decreased cognition, Decreased endurance, Decreased self-care trans  Prognosis: Good  Assessment: Pt is a 68 y o  male who was admitted to [facility] on 3/22/2019 with Atherosclerosis of artery of extremity with ulceration ([facility] )   Pt s/p ENDARTERECTOMY ARTERIAL FEMORAL WITH PATCH ANGIOPLASTY, BALLOON ANGIOPLASTY, STENT LEFT EIA BYPASS FEMORAL-POPLITEAL WITH COMPLETION ARTERIOGRAM 3/22/2019  Pt also with L heel wound for ~2 months; per podiatry LLE WBAT with Globoped Shoe  Pt's problem list also includes PMH of DM, CKD, NSTEMI, anemia, hyponatria, DARINEL, CAD, ITZEL, COPD, lumbar disc herniation, diabetic neuropathy  At baseline pt was completing all ADLs IND, assist from spouse for IADLs, +driving, ambulates w/o an AD in household & with scooter in community  Pt lives with his spouse in a house with 1st floor set up; 7 SUYAPA in front entrance, 0 SUYAPA in back; +raised toilet, tub bench, shower grab bars  Currently pt requires MAX A for LB ADLs, MIN A UB ADLs, and MIN Ax2 for functional mobility/transfers  Pt currently presents with impairments in the following categories -steps to enter environment and difficulty performing ADLS activity tolerance, standing balance/tolerance and memory   These impairments, as well as pt's pain, fatigue, orthopedic restricitions , WBS  and risk for falls  limit pt's ability to safely engage in all baseline areas of occupation, includinggrooming, bathing, dressing, toileting and functional mobility/transfers From OT standpoint, recommend SHORT TERM REHAB upon D/C  OT will continue to follow to address the below stated goals        OT Discharge Recommendation: Short Term Rehab  OT - OK to Discharge: Yes(when medically appropriate)

## 2019-03-25 NOTE — PROGRESS NOTES
Cardiology Progress Note - Alex Gallegos 68 y o  male MRN: [id number]    Unit/Bed#: [facility] 508-01 Encounter: [id number]      Assessment:  Principal Problem: Atherosclerosis of artery of extremity with ulceration (Former[facility])  Active Problems:    Type 1 diabetes mellitus ([facility])    CKD (chronic kidney disease) stage 3, GFR 30-59 ml/min ([facility])    NSTEMI (non-ST elevated myocardial infarction) ([facility] [address] )    Acute blood loss anemia    Hyponatremia      Plan:  Patient has no chest pain or significant dyspnea  He does have lower extremity discomfort  He is in sinus rhythm on telemetry with a rate in the 80s  I will titrate the dose of the metoprolol  Okay to discontinue intravenous heparin this evening from a cardiac point of view if okay with vascular service  Patient with prior history of PCI and apparently had left circumflex disease for which PCI was attempted but was unsuccessful  This may have been source of postoperative ischemia  He is now stable however from a coronary point of view and hopefully will remain so  He continues on dual anti-platelet therapy and statin therapy  Potassium today is 4 2 with creatinine of 1 32  Addendum:  Patient had recurrent chest discomfort and will now be placed on nitro-paste and given sublingual nitroglycerin  Will continue intravenous heparin for now  Has a remote history of cardiac catheterization and intervention at [facility] with placement of four stents at that time in 2015  He had a second cardiac catheterization in 10/2018 with restenosis of the LAD and stent placement in [place], [place]  Presented the patient and his wife with the option of cardiac catheterization if this is an ongoing issue  They will keep an open mind to this  At present time the patient is comfortable  Subjective:   Patient seen and examined  No significant events overnight   negative      Objective:     Vitals: Blood pressure 139/63, pulse 96, temperature 98 1 °F (36 7 °C), temperature source Oral, resp  rate 18, height 5' 7" (1 702 m), weight 90 8 kg (200 lb 2 8 oz), SpO2 96 %  , Body mass index is 31 35 kg/m² ,   Orthostatic Blood Pressures      Most Recent Value   Blood Pressure  139/63 filed at 03/25/2019 0804   Patient Position - Orthostatic VS  Lying filed at 03/25/2019 0804      ,      Intake/Output Summary (Last 24 hours) at 3/25/2019 0843  Last data filed at 3/25/2019 0600  Gross per 24 hour   Intake 1034 05 ml   Output 3000 ml   Net -1965 95 ml       No significant arrhythmias seen on telemetry review  Physical Exam:    GEN: Nicole Pro appears well, alert and oriented x 3, pleasant and cooperative   NECK: supple, no carotid bruits, no JVD or HJR  HEART: normal rate, regular rhythm, normal S1 and S2, no murmurs, clicks, gallops or rubs   LUNGS: clear to auscultation bilaterally; no wheezes, rales, or rhonchi   Labs & Results:    Admission on 03/22/2019   No results displayed because visit has over 200 results  Vas Abdominal Aorta/iliacs; Complete Study    Result Date: 3/5/2019  Narrative:  [facility] REPORT CLINICAL: Indications:  Evaluate for progression of aortoiliac occlusive disease  Patient is complains of bilateral leg cramping, and now presents with a left heel ulcer  Patient states there is minimal change in his walking as he still can only walk short distances, and needs to rest  Operative History: 2018-05-17 Transluminal placement of iliac stent, percutaneous 2015-09-23 Cardiac angioplasty and stent placement 2014-02-25 Left Transluminal placement of stent, percutaneous, SFA 2012-04-02 Aortoiliac angioplasty with stent placement of iliac arteries Risk Factors: Obesity, HTN, Diabetes (Yes), HLD, CKD, PAD, CAD and previous smoking (quit >10yrs ago)  The patient current BMI is 28 73, Weight is 178 lb and height is 66 in  Clinical Right Pressure:  154/ mm Hg, Left Pressure:  152/ mm Hg    FINDINGS:  Unilateral     Impression  PSV (cm/s)  EDV (cm/s)  Sup-Aneta Ao                         78              Px Inf-Julian Ao                      60          11  Ds Inf-Julian Ao                      37          11  Celiac                             91          16  Prox  SMA      >70%               268          24   Right              Impression  PSV (cm/s)  EDV (cm/s)  Prox ISIAH - Stent                      205          28  Dist ISIAH - Stent                      151          21  Prox  EIA - Stent  50-75%             301          46  Dist EIA - Stent                      145          26   Left               Impression  PSV (cm/s)  EDV (cm/s)  Prox ISIAH - Stent                      174          28  Dist ISIAH - Stent                      148          23  Internal Iliac     >70%               449          93  Prox  EIA - Stent                     140          26  Dist EIA - Stent                      188          24     CONCLUSION:  Impression The abdominal aorta appears to be patent  The right and left common iliac arteries and stents appear to be patent  The left external artery and stent appear to be patent  Findings suggest a >75% stenosis of the left internal iliac artery  Findings suggest a 50-75% stenosis of the proximal right external iliac artery and stent  There is a known >70% stenosis of the superior mesenteric artery  The bilateral renal arteries were not visualized  Ankle/Brachial indices are RT- 0 32 ( Prior 0 54), and LT- 0 22 ( Prior 0 47)  In comparison to the study of 08/15/2018, there is stenosis noted in the right EIA, the left IIA, and a significant decrease in TERRI's  SIGNATURE: Electronically Signed by: Anju Best on 2019-03-05 11:11:30 AM    Vas Carotid Complete Study    Result Date: 3/5/2019  Narrative:  [facility] REPORT CLINICAL: Indications:  6 month surveillance of carotid artery disease  Patient is asymptomatic at this time   Operative History: 2018-05-17 Transluminal placement of iliac stent, percutaneous 2015-09-23 Cardiac angioplasty and stent placement 2014-02-25 Left Transluminal placement of stent, percutaneous, SFA 2012-04-02 Aortoiliac angioplasty with stent placement of iliac arteries Risk Factors: Obesity, HTN, Diabetes (Yes), HLD, CKD, PAD, CAD and previous smoking (quit >10yrs ago)  The patient current BMI is 28 73, Weight is 178 lb and height is 66 in  Clinical Right Pressure:  154/ mm Hg, Left Pressure:  152/ mm Hg  FINDINGS:  Right        Impression  PSV  EDV (cm/s)  Direction of Flow  Ratio  Dist  ICA                109          25                      2 23  Mid  ICA                 136          32                            Prox  ICA    70%+        281          64                      5 73  Dist CCA                  56          13                            Mid CCA                   49          11                      0 85  Prox CCA                  57          12                            Ext Carotid              110          17                      2 24  Prox Vert                 39          11  Antegrade                 Subclavian                94           0                             Left         Impression  PSV  EDV (cm/s)  Direction of Flow  Ratio  Dist  ICA                 80          23                      1 08  Mid  ICA                 121          28                      1 64  Prox  ICA    50 - 69%    165          37                      2 24  Dist CCA                  66           9                            Mid CCA                   74          12                      1 15  Prox CCA                  64          12                            Ext Carotid  Moderate    258           0                      3 51  Prox Vert                 70          10  Antegrade                 Subclavian               150           0                               CONCLUSION:  Impression RIGHT: There is >70% stenosis noted in the internal carotid artery  Plaque is calcified and irregular   Vertebral artery flow is antegrade  Findings suggestive of subclavian artery disease  LEFT: There is 50-69% stenosis noted in the internal carotid artery  Plaque is calcified and irregular  Moderate external carotid artery disease is noted  Vertebral artery flow is antegrade  Findings suggestive of subclavian artery disease  Compared to previous study on 5/29/2018, there is no significant change noted  Tech note: This exam was technically limited due to patient's body habitus, and continuous snoring  Recommend repeat testing in 6month as per protocol unless otherwise indicated  Internal carotid artery stenosis determination by consensus criteria from: Thaddeus Price et al  Carotid Artery Stenosis: Gray-Scale and Doppler US Diagnosis - Society of Radiologists in Formerly named [facility] [address], Radiology 2003; [phone]  SIGNATURE: Electronically Signed by: Karlos Peterson on 2019-03-05 12:39:40 PM    Vas Lower Limb Arterial Duplex, Complete Bilateral    Result Date: 3/5/2019  Narrative:  [facility] REPORT CLINICAL: Indications: Atherosclerosis with Claudication [I70 219]  Evaluate for progression of peripheral arterial disease  Patient is complains of bilateral leg cramping, and now presents with a left heel ulcer  Patient states there is minimal change in his walking as he still can only walk short distances, and needs to rest  Operative History: 2018-05-17 Transluminal placement of iliac stent, percutaneous 2015-09-23 Cardiac angioplasty and stent placement 2014-02-25 Left Transluminal placement of stent, percutaneous, SFA 2012-04-02 Aortoiliac angioplasty with stent placement of iliac arteries Risk Factors: Obesity, HTN, Diabetes (Yes), HLD, CKD, PAD, CAD and previous smoking (quit >10yrs ago)  The patient current BMI is 28 73, Weight is 178 lb and height is 66 in  Clinical Right Pressure:  154/ mm Hg, Left Pressure:  152/ mm Hg    FINDINGS:  Right                  Impression  Waveform    PSV  EDV  Post  Tibial Continuous            Ant  Tibial                        Continuous            Common Femoral Artery                          111   27  Prox Profunda          >75%                    653   49  Prox SFA               >75%                    379   77  Mid SFA                Occluded                  0    0  Dist SFA                                       120   45  Proximal Pop                                    68   28  Distal Pop                                      48   17  Tibioperoneal                                   74       Dist Post Tibial                                37   13  Dist  Ant  Tibial                               31   13  Dist Peroneal                                   16    0   Left                   Impression  Waveform    PSV  EDV  Post  Tibial                       Continuous            Ant  Tibial                        Continuous            Common Femoral Artery  50-75%                  243   48  Prox Profunda                                  192   29  Prox SFA                                       153   29  Mid SFA                                         19    0  Dist SFA               Occluded                  0    0  Proximal Pop                                    31   15  Distal Pop                                      37   18  Tibioperoneal                                   39       Dist Post Tibial                                38   22  Dist  Ant  Tibial                               15   10     CONCLUSION:  Impression: RIGHT LOWER LIMB: There is an occlusion of the mid superficial femoral artery  There is a >75% stenosis of the profunda femoris, and proximal superficial femoral artery  Findings suggest tibioperoneal disease  Ankle/Brachial index: 0 32 Prior 0 54 PVR/ PPG tracings are severely dampened  Metatarsal pressure of 29mmHg Great toe pressure of  0mmHg, within the healing range, Prior 64mmHg   LEFT LOWER LIMB: There is an occlusion of the distal superficial femoral artery  There is a 50-75% stenosis of the common femoral artery  Findings suggest tibioperoneal disease  Ankle/Brachial index: 0 22 , Prior 0 47 PVR/ PPG tracings are severely dampened  Metatarsal pressure of 25mmHg Great toe pressure of 0mmHg, within the healing range,  Prior 36mmHg  Compared to previous duplex study on 02/20/2018 , there is progression bilaterally  Recommend repeat testing in 1year as per protocol unless otherwise indicated  SIGNATURE: Electronically Signed by: John Barajas on 2019-03-05 11:13:17 AM    Vas Lower Limb Vein Mapping Bypass Graft    Result Date: 3/18/2019  Narrative:  [facility] REPORT CLINICAL: Indications: Bilateral Other Specified Pre-Operative Examination [[id number] [id number]]  Pre-op Vein Mapping for Future Lower Extremity Bypass Graft possibly on friday the 22nd Operative History: 2018-05-17 Transluminal placement of iliac stent, percutaneous 2015-09-23 Cardiac angioplasty and stent placement 2014-02-25 Left Transluminal placement of stent, percutaneous, SFA 2012-04-02 Aortoiliac angioplasty with stent placement of iliac arteries Risk Factors The patient has history of Obesity, HTN, Diabetes (Yes), HLD, CKD, PAD, CAD and previous smoking (quit >10yrs ago)    FINDINGS:  Segment         Right        Left                            Diameter AP  Diameter AP  DVT                  GSV Inguinal            6 4          4 5                       GSV Prox Thigh          4 2          2 9                       GSV Mid Thigh           3 3          3 1                       GSV Dist Thigh          2 8               Chronic - Occlusive  GSV Knee                3 0               Chronic - Occlusive  GSV Prox Calf           3 1               Chronic - Occlusive  GSV Mid Calf            3 4               Chronic - Occlusive  GSV Dist Calf           2 8          2 8                       GSV Ankle               3 5          3 4                          CONCLUSION: Impression: RIGHT LOWER LIMB: The great saphenous vein is patent  from the groin to the ankle  The vein is of adequate caliber and quality for graft conduit with intraluminal diameters ranging from 2 8mm to 4 2mm throughout the leg  There are several branches coming off of the greater saphenous vein at the proximal thigh, distal thigh, proximal and mid calf  LEFT LOWER LIMB: The great saphenous vein is patent from the groin to the mid thigh  There is chronic thrombus identified in the greater saphenous from the distal thigh to the proximal calf  Tech Note:  Naida Wall RN was notified of these findings  Mar 18 2019 10:35AM  SIGNATURE: Electronically Signed by: Jackie John on 2019 12:52:33 PM    Ir Abdominal Angiography / Intervention    Result Date: 3/18/2019  Narrative: OPERATIVE REPORT PATIENT NAME: Elizabeth Eden  :  1945 MRN: [id number] Pt Location:  [facility] IR   SURGERY DATE: 3/14/2019   Surgeon:  Shreyas Anthony MD   Preoperative and postoperative diagnosis: 1  Atherosclerotic occlusive disease with rest pain and left foot ulceration 2  Atherosclerotic occlusive disease with severe right lower extremity claudication   Procedure: 1  Ultrasound-guided right common femoral artery puncture 2  Bilateral lower extremity runoff    Flouro Time:  36 4 min   Contrast:  43 cc Visipaque 320   Sedation Time:  100 min   Estimated Blood Loss: Minimal   Anesthesia Type: Conscious sedation   Operative Indications: 35-year-old with long history of severe peripheral arterial occlusive disease presents with worsening symptoms of left leg  He has progressed from claudication to rest pain with a area of ulceration on the left heel  He also has severe claudication of the right lower extremity    Operative Findings: On the left there is eccentric calcific disease of the common femoral artery with less than 50% stenosis  The deep femoral artery is occluded proximally and reconstitutes    The superficial femoral artery is occluded in the mid segment and reconstitutes in the mid portion of the distal superficial femoral artery stent  The above knee popliteal artery is then patent and relatively normal in appearance with 3 vessel runoff    On the right there is eccentric highly calcified plaque creating a 75% stenosis in the proximal deep and superficial femoral arteries  There is a segmental occlusion of the superficial femoral artery  The above knee popliteal artery is then relatively normal in appearance with a normal trifurcation    Complications: None   Procedure and Technique:   IV sedation was administered  The right groin was prepped and draped in the normal sterile fashion and Ioban drape was placed    Ultrasound guidance was utilized to puncture the right common femoral artery  A micropuncture system was utilized  The right common femoral artery was directly imaged under B-mode imaging  A Carrier IQ wire was then placed and a sheath was inserted    The left common iliac artery was then cannulated with a Contra catheter  The catheter was then advanced into the distal external iliac artery  Oblique images were obtained of the femoral bifurcation  Runoff arteriography was then performed with stepped hand injections    A 7 [place]ian sheath was then positioned in the proximal superficial femoral artery  Multiple guidewires and catheters were then used in an attempt to cross the superficial femoral artery occlusion    Attempts to re-enter the true lumen within the distal superficial femoral artery stent were made with a Outback catheter    Completion images were obtained to include runoff to the foot    The sheath was then withdrawn to the ipsilateral external iliac artery and oblique images were obtained of the femoral bifurcation and stepped images were obtained through to the level of the tibial trifurcation    A Star closure device was used to close the right femoral puncture site    Findings:   There was significant calcific disease within the common femoral artery  The artery was punctured in a region devoid of any significant disease    On the left there is eccentric calcific disease of the common femoral artery with less than 50% stenosis  The deep femoral artery is occluded proximally and reconstitutes  The superficial femoral artery is occluded in the mid segment and reconstitutes in the mid portion of the distal superficial femoral artery stent  The above knee popliteal artery is then patent and relatively normal in appearance with 3 vessel runoff    Multiple attempts to cross this segmental occlusion with multiple catheters and wires to include an Outback catheter failed to obtain re-entry into the true lumen/the lumen of the distal superficial femoral artery stent    On the right there is eccentric highly calcified plaque creating a 75% stenosis in the proximal deep and superficial femoral arteries  There is a segmental occlusion of the superficial femoral artery  The above knee popliteal artery is then relatively normal in appearance with a normal trifurcation      Conclusion: On the left secondary to the deep femoral artery occlusion and failed attempt to cross the superficial femoral artery occlusion is felt the best option for revascularization would be femoral endarterectomy to restore flow in the deep femoral artery and femoral-above knee popliteal artery bypass    Following the procedure the vascular exam of both lower extremities was unchanged from pre procedure      I was present for the entire procedure   Patient Disposition: APU   SIGNATURE: Roberto Mast MD DATE: March 14, 2019 TIME: 11:15 AM   Electronically signed by Roberto Mast MD at 3/14/2019 11:31 AM       EKG personally reviewed by Leyda Clark MD      Counseling / Coordination of Care  Total floor / unit time spent today 30 minutes    Greater than 50% of total time was spent with the patient and / or family counseling and / or coordination of care

## 2019-03-25 NOTE — PROGRESS NOTES
Pt complained of 6 out of 10 chest pain  Vitals stable  EKG done  Called Dr Iwona Bridges  Made walter aware of pt assesment and 8 beat run of vtach See orders  Pt resting with eyes closed in between nursing care  Wife at bedside  918 pt states pain free   Will continue to monitor

## 2019-03-25 NOTE — ASSESSMENT & PLAN NOTE
NSTEMI in setting of Hx of CAD w/ prior stenting 10/2018 and CHF    Plan:  --cardiology following  --per Cardiology okay to stop heparin drip this evening  --continue aspirin, Plavix, metoprolol, Lipitor

## 2019-03-25 NOTE — PROGRESS NOTES
Progress Note - Jodee Cox [place] 1945, 68 y o  male MRN: [id number]    Unit/Bed#: [facility] 508-01 Encounter: [id number]    Primary Care Provider: Atul Espinosa MD   Date and time admitted to hospital: 3/22/2019  5:33 AM        * Atherosclerosis of artery of extremity with ulceration ([address] )  Assessment & Plan  AOID/PAD with claudication and left heel wound    S/P left femoral endarterectomy with bovine patch angioplasty, left femoral to AKA pop bypass with ringed [place] graft, completion arteriogram with left EIA PTA/stent- 3/22 [place]    Plan:  --consult podiatry for left heel wound  --consult acute pain service for postop pain management  --PT/OT  --case management for dispo planning    NSTEMI (non-ST elevated myocardial infarction) [facility])  Assessment & Plan  NSTEMI in setting of Hx of CAD w/ prior stenting 10/2018 and CHF    Plan:  --cardiology following  --per Cardiology okay to stop heparin drip this evening  --continue aspirin, Plavix, metoprolol, Lipitor    CKD (chronic kidney disease) stage 3, GFR 30-59 ml/min ([facility])  Assessment & Plan  Creatinine 1 32    Plan:  --nephrology following  --Lasix held  --monitor    Acute blood loss anemia  Assessment & Plan  Plan:  --Monitor    Hyponatremia  Assessment & Plan  Plan:  --As per nephrology    Type 1 diabetes mellitus [facility])  Assessment & Plan  Lab Results   Component Value Date    HGBA1C 7 6 (H) 03/05/2019       Recent Labs     03/24/19  1103 03/24/19  1633 03/24/19  2121 03/25/19  0720   POCGLU 181* 115 164* 220*       Blood Sugar Average: Last 72 hrs:  (P) 808 [id number]     Plan:  --Endocrine management      Subjective:  Pt denies CP or SOB, c/o incisional pain corrina pain in knee, no events overnight    Vitals:  /63   Pulse 92   Temp 98 9 °F (37 2 °C) (Oral)   Resp 18   Ht 5' 7" (1 702 m)   Wt 90 8 kg (200 lb 2 8 oz)   SpO2 98%   BMI 31 35 kg/m²     I/Os:  I/O last 3 completed shifts: In: 2232 6 [P O :720;  I V :1512 6]  Out: 3615 [Urine:4750]  No intake/output data recorded      Lab Results and Cultures:   Lab Results   Component Value Date    WBC 6 30 03/25/2019    HGB 9 7 (L) 03/25/2019    HCT 29 7 (L) 03/25/2019    MCV 97 03/25/2019     (L) 03/25/2019     Lab Results   Component Value Date    GLUCOSE 207 (H) 03/22/2019    CALCIUM 8 4 03/25/2019     12/21/2015    K 4 2 03/25/2019    CO2 24 03/25/2019     03/25/2019    BUN 19 03/25/2019    CREATININE 1 32 (H) 03/25/2019     Lab Results   Component Value Date    INR 1 09 03/23/2019    INR 1 11 03/22/2019    INR 1 07 03/21/2019    PROTIME 14 2 03/23/2019    PROTIME 14 4 (H) 03/22/2019    PROTIME 13 6 03/21/2019        Blood Culture: No results found for: BLOODCX,   Urinalysis:   Lab Results   Component Value Date    COLORU Yellow 03/24/2019    CLARITYU Clear 03/24/2019    SPECGRAV 1 011 03/24/2019    PHUR 6 0 03/24/2019    PHUR 5 5 11/28/2018    LEUKOCYTESUR Negative 03/24/2019    NITRITE Negative 03/24/2019    GLUCOSEU Negative 03/24/2019    KETONESU Negative 03/24/2019    BILIRUBINUR Negative 03/24/2019    BLOODU Negative 03/24/2019   ,   Urine Culture:   Lab Results   Component Value Date    URINECX No Growth <1000 cfu/mL 03/05/2019   ,   Wound Culure: No results found for: WOUNDCULT    Medications:  Current Facility-Administered Medications   Medication Dose Route Frequency    acetaminophen (TYLENOL) tablet 975 mg  975 mg Oral Q8H [address]    aluminum-magnesium hydroxide-simethicone (MYLANTA) 200-200-20 mg/5 mL oral suspension 30 mL  30 mL Oral Q4H PRN    aspirin chewable tablet 81 mg  81 mg Oral Daily    atorvastatin (LIPITOR) tablet 40 mg  40 mg Oral Daily With Dinner    cholecalciferol (VITAMIN D3) tablet 1,000 Units  1,000 Units Oral Daily    clopidogrel (PLAVIX) tablet 75 mg  75 mg Oral Daily    docusate sodium (COLACE) capsule 100 mg  100 mg Oral BID    fentanyl citrate (PF) 100 MCG/2ML 25 mcg  25 mcg Intravenous I2S PRN    folic acid (FOLVITE) tablet 1 mg  1 mg Oral Daily    gabapentin (NEURONTIN) capsule 200 mg  200 mg Oral BID    heparin (porcine) 25,000 units in 250 mL infusion (premix)  3-20 Units/kg/hr (Order-Specific) Intravenous Titrated    HYDROmorphone (DILAUDID) injection 0 5 mg  0 5 mg Intravenous Once    insulin glargine (LANTUS) subcutaneous injection 15 Units 0 15 mL  15 Units Subcutaneous Q12H SALONI    insulin lispro (HumaLOG) 100 units/mL subcutaneous injection 1-5 Units  1-5 Units Subcutaneous HS    insulin lispro (HumaLOG) 100 units/mL subcutaneous injection 2-10 Units  2-10 Units Subcutaneous TID With Meals    levalbuterol (XOPENEX) inhalation solution 1 25 mg  1 25 mg Nebulization Q6H PRN    levothyroxine tablet 175 mcg  175 mcg Oral Early Morning    metoprolol tartrate (LOPRESSOR) partial tablet 12 5 mg  12 5 mg Oral Q12H [address]    multivitamin-minerals (CENTRUM) tablet 1 tablet  1 tablet Oral Daily    nitroGLYcerin (TRIDIL) 50 mg in 250 mL infusion (premix)  5-200 mcg/min Intravenous Titrated    ondansetron (ZOFRAN) injection 4 mg  4 mg Intravenous Q6H PRN    oxyCODONE (ROXICODONE) IR tablet 5 mg  5 mg Oral Q4H PRN    Or    oxyCODONE (ROXICODONE) IR tablet 10 mg  10 mg Oral Q4H PRN    polyethylene glycol (MIRALAX) packet 17 g  17 g Oral Daily    senna (SENOKOT) tablet 17 2 mg  2 tablet Oral HS    sodium chloride 0 9 % inhalation solution 3 mL  3 mL Nebulization Q6H PRN    thiamine (VITAMIN B1) tablet 100 mg  100 mg Oral Daily       Physical Exam:    General appearance: alert and oriented, in no acute distress  Lungs: clear to auscultation bilaterally  Heart: regular rate and rhythm, S1, S2 normal, no murmur, click, rub or gallop  Abdomen: soft, non-tender; bowel sounds normal; no masses,  no organomegaly  Extremities: Left foot hot and pink, +edema, M/S intact, heel wound stable w/o erythema ior drainage    Wound/Incision:  LLE incision clean, dry, and intact    Pulse exam:  DP:   Left: 1+  PT:  Left: 2+      Nancyann Goodell, MAGO  3/25/2019

## 2019-03-25 NOTE — OCCUPATIONAL THERAPY NOTE
[address] Evaluation     Patient Name: Owen Luis  XMMCP'Y Date: 3/25/2019  Problem List  Patient Active Problem List   Diagnosis    Acute kidney injury ([address] )    Type 1 diabetes mellitus ([address] )    Presence of drug coated stent in LAD coronary artery    Coronary artery disease of native artery of native heart with stable angina pectoris ([address] )    Presence of drug coated stent in left circumflex coronary artery    Dyslipidemia    Obstructive sleep apnea of adult    Carotid artery stenosis, asymptomatic, bilateral    Atherosclerosis of artery of extremity with ulceration ([place] [address] )    Restrictive lung disease    COPD (chronic obstructive pulmonary disease) ([address] )    Benign hypertension with chronic kidney disease, stage III ([facility])    CKD (chronic kidney disease) stage 3, GFR 30-59 ml/min ([facility])    Proteinuria    Renal artery stenosis ([place] [address] )    Renal cyst, right    Vitamin D deficiency    Aortoiliac occlusive disease ([place] [address] )    Hypothyroidism, postablative    Other specified hypothyroidism    Low back pain with sciatica    Lumbar disc herniation    Lumbar radiculopathy    Diabetic neuropathy associated with type 1 diabetes mellitus ([place] [address] )    History of Ischemic cardiomyopathy    Chronic systolic congestive heart failure ([facility])    NSTEMI (non-ST elevated myocardial infarction) ([address] )    Acute blood loss anemia    Hyponatremia     Past Medical History  Past Medical History:   Diagnosis Date    Acute kidney injury ([address] )     resolved 11/30/2015    Acute venous embolism and thrombosis of deep vessels of proximal lower extremity ([address] )     resolved 04/04/2015    DARINEL (acute kidney injury) ([address] ) 1/12/2016    Anesthesia     RESPIRATORY ISSUES DUE TO SLEEP APNEA    Cardiac disease     heart attack, stents x 4    CHF (congestive heart failure) ([facility])     Chronic cough     resolved 02/04/2016    Chronic pain disorder     intermitent claudication    COPD (chronic obstructive pulmonary disease) ([facility] )     Coronary artery disease     CPAP (continuous positive airway pressure) dependence     Diabetes mellitus ([facility] )     Disease of thyroid gland     DVT (deep venous thrombosis) (HCC)     Heart failure (HCC)     Hyperlipidemia     Hypertension     Ischemic cardiomyopathy     last assessed 09/26/2017    Myocardial infarction [facility])     MI +2 2015,2018    Pulmonary emphysema (HCC)     Pulmonary granuloma (HCC)     resolved 02/03/2017    Renal failure     Sleep apnea      Past Surgical History  Past Surgical History:   Procedure Laterality Date    BYPASS FEMORAL-POPLITEAL      initial stenosis with stent left, 7 x 100 smart stent onset 02/24/2014    CARDIAC SURGERY      cardiac stents    CATARACT EXTRACTION      COLOGUARD (HISTORICAL)  2018    CORONARY ANGIOPLASTY WITH STENT PLACEMENT      x4    IR ABDOMINAL ANGIOGRAPHY / INTERVENTION  3/14/2019    IA THROMBOENDARTECTMY FEMORAL COMMON Left 3/22/2019    Procedure: ENDARTERECTOMY ARTERIAL FEMORAL WITH PATCH ANGIOPLASTY, BALLOON ANGIOPLASTY, STENT;  Surgeon: Maurizio Tavares MD;  [facility];  Service: Vascular    IA VEIN BYPASS GRAFT,FEM-POP Left 3/22/2019    Procedure: BYPASS FEMORAL-POPLITEAL WITH COMPLETION ARTERIOGRAM;  Surgeon: Maurizio Tavares MD;  [facility];  Service: Vascular    VASCULAR SURGERY      stent placement           03/25/19 1518   Note Type   Note type Eval/Treat   Restrictions/Precautions   Weight Bearing Precautions Per Order Yes   LLE Weight Bearing Per Order WBAT  (per podiatry)   Braces or Orthoses Other (Comment)  (LLE globoped shoe)   Other Precautions Multiple lines;Telemetry; Fall Risk;Pain   Pain Assessment   Pain Assessment 0-10   Pain Score 5   Pain Type Surgical pain   Pain Location Leg   Pain Orientation Left   Patient's Stated Pain Goal No pain   Hospital Pain Intervention(s) Repositioned;Distraction   Response to Interventions tolerated; reports increased comfort w/ globoped shoe   Home Living Type of [address] Two level;Performs ADLs on one level; Able to live on main level with bedroom/bathroom  (1st floor set up; 7 SUYAPA in front, 0 SUYAPA in back )   Bathroom Shower/Tub Walk-in shower   Bathroom Toilet Raised   Bathroom Equipment Grab bars in shower; Tub transfer bench   Bathroom Accessibility Accessible   Home Equipment Electric scooter  (& rollator)   Prior Function   Level of [place] Independent with ADLs and functional mobility   Lives With Spouse   Receives Help From Family   ADL Assistance Independent   IADLs Needs assistance   Falls in the last 6 months 0   Vocational Retired   Lifestyle   Autonomy At baseline pt was completing all ADLs IND, assist from spouse for IADLs, +driving, ambulates w/o an AD in household & with scooter in community  Reciprocal Relationships supportive family   Service to Others retired   Intrinsic Gratification pt enjoys watching TV   Psychosocial   Psychosocial (WDL) WDL   ADL   Eating Assistance 5  Supervision/Setup   Grooming Assistance 4  Minimal Assistance   UB [address] 4  Minimal Assistance   LB [address] 2  Maximal Assistance   [address] 4  Minimal Assistance    [facility] 2  Maximal [address]  3  Moderate Assistance   Toileting Deficit Setup;Steadying;Supervison/safety  (use of urinal standing; assist to set up urinal, steadying assist & unilateral UE support on RW during toileting)   Bed Mobility   Supine to Sit 3  Moderate assistance   Additional items Assist x 2;HOB elevated; Bedrails; Increased time required;LE management   Sit to Supine 4  Minimal assistance   Additional items Assist x 2; Increased time required;LE management;Verbal cues   Transfers   Sit to Stand 4  Minimal assistance   Additional items Assist x 2; Increased time required;Verbal cues   Stand to Sit 4  Minimal assistance   Additional items Assist x 2; Increased time required;Verbal cues   Additional Comments RW Functional Mobility   Functional Mobility 3  Moderate assistance   Additional Comments Ax1, w/in room   Additional items Rolling walker   Balance   Static Sitting Fair +   Dynamic Sitting Fair   Static Standing Poor +   Dynamic Standing Poor   Activity Tolerance   Activity Tolerance Patient limited by pain   Nurse Made Aware Spoke to RN - pt appropriate to be seen   RUE Assessment   RUE Assessment WNL   LUE Assessment   LUE Assessment WNL   Hand Function   Gross Motor Coordination Functional   Fine Motor Coordination Functional   Sensation   Light Touch No apparent deficits  (pt reports numbness in B/L feet - intact to light touch)   Cognition   Arousal/Participation Alert; Cooperative   Attention Within functional limits   Orientation Level Oriented X4   Memory Decreased short term memory;Decreased recall of recent events   Following Commands Follows one step commands without difficulty   Assessment   Limitation Decreased ADL status; Decreased cognition;Decreased endurance;Decreased self-care trans   Prognosis Good   Assessment Pt is a 68 y o  male who was admitted to [facility] on 3/22/2019 with Atherosclerosis of artery of extremity with ulceration ([facility] )   Pt s/p ENDARTERECTOMY ARTERIAL FEMORAL WITH PATCH ANGIOPLASTY, BALLOON ANGIOPLASTY, STENT LEFT EIA BYPASS FEMORAL-POPLITEAL WITH COMPLETION ARTERIOGRAM 3/22/2019  Pt also with L heel wound for ~2 months; per podiatry LLE WBAT with Globoped Shoe  Pt's problem list also includes PMH of DM, CKD, NSTEMI, anemia, hyponatria, DARINEL, CAD, ITZEL, COPD, lumbar disc herniation, diabetic neuropathy  At baseline pt was completing all ADLs IND, assist from spouse for IADLs, +driving, ambulates w/o an AD in household & with scooter in community  Pt lives with his spouse in a house with 1st floor set up; 7 SUYAPA in front entrance, 0 SUYAPA in back; +raised toilet, tub bench, shower grab bars   Currently pt requires MAX A for LB ADLs, MIN A UB ADLs, and MIN Ax2 for functional mobility/transfers w/ RW  Pt currently presents with impairments in the following categories -steps to enter environment and difficulty performing ADLS activity tolerance, standing balance/tolerance and memory  These impairments, as well as pt's pain, fatigue, orthopedic restricitions , WBS  and risk for falls  limit pt's ability to safely engage in all baseline areas of occupation, includinggrooming, bathing, dressing, toileting and functional mobility/transfers  From OT standpoint, recommend SHORT TERM REHAB upon D/C  OT will continue to follow to address the below stated goals  Goals   Patient Goals to go home   LTG Time Frame 10-14   Long Term Goal #1 see goals below   Plan   Treatment Interventions ADL retraining;Functional transfer training; Endurance training;Cognitive reorientation;Patient/family training;Equipment evaluation/education; Energy conservation; Activityengagement   Goal Expiration Date 04/08/19   OT Frequency 3-5x/wk   Recommendation   OT Discharge Recommendation Short Term Rehab   OT - OK to Discharge Yes  (when medically appropriate)   Barthel Index   Feeding 10   Bathing 0   Grooming Score 0   Dressing Score 5   Bladder Score 10   Bowels Score 10   Toilet Use Score 5   Transfers (Bed/Chair) Score 5   Mobility (Level Surface) Score 0   Stairs Score 0   Barthel Index Score 45   Modified [place] Scale   Modified [place] Scale 4         Goals:    MOD IND toileting & clothing management with use of AD as needed  MOD IND UB ADLs with Fair+ to Good balance/safety and use of AD as needed  MIN A LB ADLs with use of AD as needed  Improve standing balance to Fair+ to Good for 8 to 10 minutes of functional activity of use of AD as needed  MOD IND functional mobility/transfers to all surfaces with Fair+ to Good balance/safety to participate in dynamic ADLs  MOD IND bed mobility with Good sitting balance EOB to engage in seated ADLs  Demo good carryover with safe use of RW      Increase activity tolerance to 40-45 minutes for participation in ADLs  Pt to demo % carryover of energy conservation strategies during functional tasks  Pt to participate in ongoing functional cognitive assessment with Good attention/participation to assist w/ safe D/C planning             Sarika Cheema, OT

## 2019-03-25 NOTE — CONSULTS
Consult - Podiatry   Tomeka Navarrete 68 y o  male MRN: [id number]  Unit/Bed#: [facility] 508-01 Encounter: [id number]    Assessment/Plan     Assessment:  1  Left heel diabetic ulceration ([facility] 1)  2  DM  3  PAD  - S/p left femoral endarterectomy, and AKA pop bypass on 3/22/19  4  CKD stage 3    Plan:  · Left heel wound is stable without any acute signs of infection  Continue with local wound care with Dermagran/DSD every other day  · Elevate left heel off bed with 2 pillows  · Will discuss plan with attending, and updated as needed  · Patient will follow up with Dr Dena Herman at [address] [place] [address] once discharged  History of Present Illness     HPI:  Tomeka Navarrete is a 68 y o  male who presents with a left heel wound  He is accompanied by his wife today  He states he is diabetic, and just had a left AKA bypass on 03/22  They state that he has had the wound for approximately 2 months  They state that the wound started out with dry skin on the heel, and eventually opened to a wound  The original went to Dr Veronica Allred for wound care treatment where she debrided the wound  He states that there was extreme pain in the left heel  He denies any ascending redness, or drainage from the left heel  He states that he is unable to walk due to the pain  He has been elevating his left lower extremity with 2 pillows  His wife is a nurse who was been changing the dressing with Neosporin, and dry sterile dressing the past couple months  The recently have been changing the dressing with Dermagran/DSD  He denies any recent nausea, vomiting, fever, chills  Consults  Review of Systems   Constitutional: Negative  HENT: Negative  Eyes: Negative  Respiratory: Negative  Cardiovascular: Negative  Gastrointestinal: Negative  Musculoskeletal:  Negative   Skin:  Left heel wound   Neurological: Negative  Psych: Negative         Historical Information   Past Medical History:   Diagnosis Date    [facility] )     resolved 11/30/2015    Acute venous embolism and thrombosis of deep vessels of proximal lower extremity ([facility] )     resolved 04/04/2015    DARINEL [facility]acute kidney injury) ([facility] ) 1/12/2016    Anesthesia     RESPIRATORY ISSUES DUE TO SLEEP APNEA    Cardiac disease     heart attack, stents x 4    CHF (congestive heart failure) ([facility])     Chronic cough     resolved 02/04/2016    Chronic pain disorder     intermitent claudication    COPD (chronic obstructive pulmonary disease) ([facility])     Coronary artery disease     CPAP (continuous positive airway pressure) dependence     Diabetes mellitus ([facility] )     Disease of thyroid gland     DVT (deep venous thrombosis) ([facility])     Heart failure ([facility])     Hyperlipidemia     Hypertension     Ischemic cardiomyopathy     last assessed 09/26/2017    Myocardial infarction [facility])     MI +2 2015,2018    Pulmonary emphysema ([facility])     Pulmonary granuloma ([facility])     resolved 02/03/2017    Renal failure     Sleep apnea      Past Surgical History:   Procedure Laterality Date    BYPASS FEMORAL-POPLITEAL      initial stenosis with stent left, 7 x 100 smart stent onset 02/24/2014    CARDIAC SURGERY      cardiac stents    CATARACT EXTRACTION      COLOGUARD (HISTORICAL)  2018    CORONARY ANGIOPLASTY WITH STENT PLACEMENT      x4    IR ABDOMINAL ANGIOGRAPHY / INTERVENTION  3/14/2019    FL THROMBOENDARTECTMY FEMORAL COMMON Left 3/22/2019    Procedure: ENDARTERECTOMY ARTERIAL FEMORAL WITH PATCH ANGIOPLASTY, BALLOON ANGIOPLASTY, STENT;  Surgeon: Aaron Hernandez MD;  Location: [facility];  Service: Vascular    FL VEIN BYPASS GRAFT,FEM-POP Left 3/22/2019    Procedure: BYPASS FEMORAL-POPLITEAL WITH COMPLETION ARTERIOGRAM;  Surgeon: Aaron Hernandez MD;  [facility];  Service: Vascular    VASCULAR SURGERY      stent placement     Social History   Social History     Substance and Sexual Activity   Alcohol Use Yes    Alcohol/week: 12 6 oz    Types: 21 Standard drinks or equivalent per week    Frequency: 4 or more times a week    Drinks per session: 1 or 2    Binge frequency: Never    Comment: 2-3 glasses of vodka daily (6 shots) (history 2 drinks per day as per allscripts     Social History     Substance and Sexual Activity   Drug Use No     Social History     Tobacco Use   Smoking Status Former Smoker    Packs/day: 4 00    Years: 48 00    Pack years: 192 00    Types: Cigarettes    Last attempt to quit:     Years since quittin 2   Smokeless Tobacco Never Used   Tobacco Comment    smoking 48 years 1 5 ppd d/c oct 2008 screening protocol as per allscripts     Family History:   Family History   Problem Relation Age of Onset    Heart disease Father         pacer placement    Hypertension Father     Kidney disease Father     Heart failure Father     Heart attack Father     Liver disease Father     Cirrhosis Mother         due to beer consumption    Liver disease Mother     Diabetes Other        Meds/Allergies   Medications Prior to Admission   Medication    aspirin 81 MG tablet    cholecalciferol (VITAMIN D3) 1,000 units tablet    clopidogrel (PLAVIX) 75 mg tablet    Coenzyme Q10 (COQ-10) 200 MG CAPS    docusate sodium (COLACE) 50 mg capsule    furosemide (LASIX) 20 mg tablet    gabapentin (NEURONTIN) 100 mg capsule    glucagon (GLUCAGON EMERGENCY) 1 MG injection    insulin glargine (LANTUS) 100 units/mL subcutaneous injection    insulin lispro (HUMALOG) 100 units/mL injection    Lactobacillus-Inulin ([facility])    levothyroxine 175 mcg tablet    metoprolol tartrate (LOPRESSOR) 25 mg tablet    NIFEdipine 0 3%-lidocaine 5% rectal ointment    rosuvastatin (CRESTOR) 20 MG tablet    silver sulfadiazine (SILVADENE,SSD) 1 % cream    levalbuterol (XOPENEX HFA) 45 mcg/act inhaler    nitroglycerin (NITROSTAT) 0 4 mg SL tablet    ULTICARE INSULIN SYRINGE 30G X 1/2" 1 ML MISC    ULTICARE INSULIN SYRINGE 30G X 5/16" 0 3 ML MISC     Allergies   Allergen Reactions    Doxazosin     Iohexol     Cardura [Doxazosin Mesylate]      Muscle cramps    Other      Iv dye for cardiac cath  Renal failure very close to dialysis  But no dialysis    Zestril [Lisinopril]      cough       Objective   First Vitals:   Blood Pressure: 118/51 (03/22/19 0554)  Pulse: 59 (03/22/19 0554)  Temperature: 97 5 °F (36 4 °C) (03/22/19 0554)  Temp Source: Tympanic Core (03/22/19 0554)  Respirations: 20 (03/22/19 0554)  Height: 5' 7" (170 2 cm) (03/22/19 0606)  Weight - Scale: 88 5 kg (195 lb) (03/20/19 1439)  SpO2: 98 % (03/22/19 0554)    Current Vitals:   Blood Pressure: 119/52 (03/25/19 1000)  Pulse: 70 (03/25/19 1000)  Temperature: 98 1 °F (36 7 °C) (03/25/19 0804)  Temp Source: Oral (03/25/19 0804)  Respirations: 18 (03/25/19 1000)  Height: 5' 7" (170 2 cm) (03/22/19 0606)  Weight - Scale: 90 8 kg (200 lb 2 8 oz) (03/23/19 0547)  SpO2: 95 % (03/25/19 1000)        /52 (BP Location: Left arm)   Pulse 70   Temp 98 1 °F (36 7 °C) (Oral)   Resp 18   Ht 5' 7" (1 702 m)   Wt 90 8 kg (200 lb 2 8 oz)   SpO2 95%   BMI 31 35 kg/m²      General Appearance:    Alert, cooperative, no distress   Head:    Normocephalic, without obvious abnormality, atraumatic   Eyes:    PERRL, conjunctiva/corneas clear, EOM's intact        Nose:   Moist mucous membranes   Neck:   Supple, symmetrical, trachea midline   Back:     Symmetric   Lungs:     Respirations unlabored   Heart:    Regular rate and rhythm, S1 and S2 normal, no murmur, rub   or gallop   Abdomen:     Soft, non-tender   Extremities:   MMT is 3/5 to all compartments of the LE, +1/4 edema B/L, mild pain on palpation to the left heel  Pulses:   Pedal pulses are nonpalpable, but dopplerable , CFT< 3sec to all digits  Pedal hair is Absent   Skin:   Left lateral heel wound noted  Wound measures approximately 0 5 x 0 1 x 0 1 cm    Wound shows no acute signs of infection; no ascending cellulitis, no active drainage  Wound bed is granular in appearance  Neurologic:   Gross sensation is Intact  Protective sensation is Diminished  Lab, Imaging and other studies:   CBC:   Lab Results   Component Value Date    WBC 6 30 03/25/2019    HGB 9 7 (L) 03/25/2019    HCT 29 7 (L) 03/25/2019    MCV 97 03/25/2019     (L) 03/25/2019    MCH 31 7 03/25/2019    MCHC 32 7 03/25/2019    RDW 13 9 03/25/2019    MPV 12 3 03/25/2019    NRBC 0 03/25/2019   , CMP:   Lab Results   Component Value Date    SODIUM 133 (L) 03/25/2019    K 4 2 03/25/2019     03/25/2019    CO2 24 03/25/2019    BUN 19 03/25/2019    CREATININE 1 32 (H) 03/25/2019    CALCIUM 8 4 03/25/2019    EGFR 53 03/25/2019         Imaging: I have personally reviewed pertinent films in PACS  EKG, Pathology, and Other Studies: I have personally reviewed pertinent reports  Portions of the record may have been created with voice recognition software  Occasional wrong word or "sound a like" substitutions may have occurred due to the inherent limitations of voice recognition software  Read the chart carefully and recognize, using context, where substitutions have occurred

## 2019-03-25 NOTE — PLAN OF CARE
Problem: Prexisting or High Potential for Compromised Skin Integrity  Goal: Skin integrity is maintained or improved  Description  INTERVENTIONS:  - Identify patients at risk for skin breakdown  - Assess and monitor skin integrity  - Assess and monitor nutrition and hydration status  - Monitor labs (i e  albumin)  - Assess for incontinence   - Turn and reposition patient  - Assist with mobility/ambulation  - Relieve pressure over bony prominences  - Avoid friction and shearing  - Provide appropriate hygiene as needed including keeping skin clean and dry  - Evaluate need for skin moisturizer/barrier cream  - Collaborate with interdisciplinary team (i e  Nutrition, Rehabilitation, etc )   - Patient/family teaching  Outcome: Progressing     Problem: Potential for Falls  Goal: Patient will remain free of falls  Description  INTERVENTIONS:  - Assess patient frequently for physical needs  -  Identify cognitive and physical deficits and behaviors that affect risk of falls    -  [place] fall precautions as indicated by assessment   - Educate patient/family on patient safety including physical limitations  - Instruct patient to call for assistance with activity based on assessment  - Modify environment to reduce risk of injury  - Consider OT/PT consult to assist with strengthening/mobility  Outcome: Progressing     Problem: PAIN - ADULT  Goal: Verbalizes/displays adequate comfort level or baseline comfort level  Description  Interventions:  - Encourage patient to monitor pain and request assistance  - Assess pain using appropriate pain scale  - Administer analgesics based on type and severity of pain and evaluate response  - Implement non-pharmacological measures as appropriate and evaluate response  - Consider cultural and social influences on pain and pain management  - Notify physician/advanced practitioner if interventions unsuccessful or patient reports new pain  Outcome: Progressing     Problem: INFECTION - ADULT  Goal: Absence or prevention of progression during hospitalization  Description  INTERVENTIONS:  - Assess and monitor for signs and symptoms of infection  - Monitor lab/diagnostic results  - Monitor all insertion sites, i e  indwelling lines, tubes, and drains  - Monitor endotracheal (as able) and nasal secretions for changes in amount and color  - [place] appropriate cooling/warming therapies per order  - Administer medications as ordered  - Instruct and encourage patient and family to use good hand hygiene technique  - Identify and instruct in appropriate isolation precautions for identified infection/condition  Outcome: Progressing  Goal: Absence of fever/infection during neutropenic period  Description  INTERVENTIONS:  - Monitor WBC  - Implement neutropenic guidelines  Outcome: Progressing     Problem: SAFETY ADULT  Goal: Maintain or return to baseline ADL function  Description  INTERVENTIONS:  -  Assess patient's ability to carry out ADLs; assess patient's baseline for ADL function and identify physical deficits which impact ability to perform ADLs (bathing, care of mouth/teeth, toileting, grooming, dressing, etc )  - Assess/evaluate cause of self-care deficits   - Assess range of motion  - Assess patient's mobility; develop plan if impaired  - Assess patient's need for assistive devices and provide as appropriate  - Encourage maximum independence but intervene and supervise when necessary  ¯ Involve family in performance of ADLs  ¯ Assess for home care needs following discharge   ¯ Request OT consult to assist with ADL evaluation and planning for discharge  ¯ Provide patient education as appropriate  Outcome: Progressing  Goal: Maintain or return mobility status to optimal level  Description  INTERVENTIONS:  - Assess patient's baseline mobility status (ambulation, transfers, stairs, etc )    - Identify cognitive and physical deficits and behaviors that affect mobility  - Identify mobility aids required to assist with transfers and/or ambulation (gait belt, sit-to-stand, lift, walker, cane, etc )  - [place] fall precautions as indicated by assessment  - Record patient progress and toleration of activity level on Mobility SBAR; progress patient to next Phase/Stage  - Instruct patient to call for assistance with activity based on assessment  - Request Rehabilitation consult to assist with strengthening/weightbearing, etc   Outcome: Progressing     Problem: DISCHARGE PLANNING  Goal: Discharge to home or other facility with appropriate resources  Description  INTERVENTIONS:  - Identify barriers to discharge w/patient and caregiver  - Arrange for needed discharge resources and transportation as appropriate  - Identify discharge learning needs (meds, wound care, etc )  - Arrange for interpretive services to assist at discharge as needed  - Refer to Case Management Department for coordinating discharge planning if the patient needs post-hospital services based on physician/advanced practitioner order or complex needs related to functional status, cognitive ability, or social support system  Outcome: Progressing     Problem: Knowledge Deficit  Goal: Patient/family/caregiver demonstrates understanding of disease process, treatment plan, medications, and discharge instructions  Description  Complete learning assessment and assess knowledge base    Interventions:  - Provide teaching at level of understanding  - Provide teaching via preferred learning methods  Outcome: Progressing     Problem: CARDIOVASCULAR - ADULT  Goal: Maintains optimal cardiac output and hemodynamic stability  Description  INTERVENTIONS:  - Monitor I/O, vital signs and rhythm  - Monitor for S/S and trends of decreased cardiac output i e  bleeding, hypotension  - Administer and titrate ordered vasoactive medications to optimize hemodynamic stability  - Assess quality of pulses, skin color and temperature  - Assess for signs of decreased coronary artery perfusion - ex   Angina  - Instruct patient to report change in severity of symptoms  Outcome: Progressing  Goal: Absence of cardiac dysrhythmias or at baseline rhythm  Description  INTERVENTIONS:  - Continuous cardiac monitoring, monitor vital signs, obtain 12 lead EKG if indicated  - Administer antiarrhythmic and heart rate control medications as ordered  - Monitor electrolytes and administer replacement therapy as ordered  Outcome: Progressing

## 2019-03-25 NOTE — PROGRESS NOTES
NEPHROLOGY PROGRESS NOTE   Aixa Alberto 68 y o  male MRN: [id number]  Unit/Bed#: [facility] 195-24 Encounter: [id number]  Reason for Consult:  Chronic kidney disease    ASSESSMENT/PLAN:  1  Chronic kidney disease, baseline creatinine 1 4-1 6  2  Status post left femoral endarterectomy with angioplasty, left femoral bypass, angioplasty and stent placement  3  History of multivessel coronary disease, currently on heparin drip, Cardiology following, possible need for cardiac catheterization  4  Cardiomyopathy, ejection fraction of 45-50%, volume status appears stable, to hold diuretics for now  5  Hypertension, blood pressure appears stable    PLAN:  · Overall renal function appears fairly stable, creatinine at 1 32  · Continue hold diuretic therapy as there was possible discussion regards to cardiac catheterization  · If cardiac catheterization plan would add IV fluids starting at midnight 50 cc/hour pre and post catheterization    SUBJECTIVE:  Seen examined  Patient awake alert  Had some chest pain earlier this morning however resolved with nitroglycerin  Currently without chest pain or shortness of breath  Denies any abdominal pain  Still with discomfort in her left lower leg given recent bypass surgery    Review of Systems    OBJECTIVE:  Current Weight: Weight - Scale: 90 8 kg (200 lb 2 8 oz)  Vitals:    03/25/19 0300 03/25/19 0510 03/25/19 0804 03/25/19 1000   BP: 140/58  139/63 119/52   BP Location:   Left arm Left arm   Pulse: 77  96 70   Resp: 18  18 18   Temp: 98 2 °F (36 8 °C) 98 9 °F (37 2 °C) 98 1 °F (36 7 °C)    TempSrc:  Oral Oral    SpO2: 98%  96% 95%   Weight:       Height:           Intake/Output Summary (Last 24 hours) at 3/25/2019 1120  Last data filed at 3/25/2019 1000  Gross per 24 hour   Intake 557 45 ml   Output 3200 ml   Net -2642 55 ml       Physical Exam   Constitutional: He is oriented to person, place, and time  No distress  HENT:   Head: Normocephalic  Eyes: No scleral icterus  Neck: Neck supple  Cardiovascular: Normal rate and regular rhythm  Pulmonary/Chest: Effort normal and breath sounds normal    Abdominal: Soft  He exhibits no distension  Musculoskeletal: He exhibits no edema  Neurological: He is alert and oriented to person, place, and time  Skin: Skin is warm and dry         Medications:    Current Facility-Administered Medications:     acetaminophen (TYLENOL) tablet 975 mg, 975 mg, Oral, Q8H [address], Bennie Setter, PA-C, 975 mg at 03/25/19 0531    aluminum-magnesium hydroxide-simethicone (MYLANTA) 200-200-20 mg/5 mL oral suspension 30 mL, 30 mL, Oral, Q4H PRN, Bennie Setter, PA-C, 30 mL at 03/25/19 0808    aspirin chewable tablet 81 mg, 81 mg, Oral, Daily, Bennie Setter, PA-C, 81 mg at 03/25/19 0804    atorvastatin (LIPITOR) tablet 40 mg, 40 mg, Oral, Daily With Dinner, Bennie Setter, PA-C, 40 mg at 03/24/19 1745    cholecalciferol (VITAMIN D3) tablet 1,000 Units, 1,000 Units, Oral, Daily, Bennie Setter, PA-C, 1,000 Units at 03/25/19 0804    clopidogrel (PLAVIX) tablet 75 mg, 75 mg, Oral, Daily, Bennie Setter, PA-C, 75 mg at 03/25/19 0805    docusate sodium (COLACE) capsule 100 mg, 100 mg, Oral, BID, Bennie Setter, PA-C, 100 mg at 03/25/19 0804    fentanyl citrate (PF) 100 MCG/2ML 25 mcg, 25 mcg, Intravenous, Q2H PRN, Bennie Setter, PA-C, 25 mcg at 64/86/82 5047    folic acid (FOLVITE) tablet 1 mg, 1 mg, Oral, Daily, Bennie Setter, PA-C, 1 mg at 03/25/19 0804    gabapentin (NEURONTIN) capsule 200 mg, 200 mg, Oral, BID, Bennie Setter, PA-C, 200 mg at 03/25/19 0804    heparin (porcine) 25,000 units in 250 mL infusion (premix), 3-20 Units/kg/hr (Order-Specific), Intravenous, Titrated, Bennie Bolaños PA-C, Last Rate: 12 6 mL/hr at 03/24/19 2359, 14 8 Units/kg/hr at 03/24/19 2359    HYDROmorphone (DILAUDID) injection 0 5 mg, 0 5 mg, Intravenous, Once, Bennie Bolaños PA-C    insulin glargine (LANTUS) subcutaneous injection 15 Units 0 15 mL, 15 Units, Subcutaneous, Q12H [address], PATRICIA Gibson-C, 15 Units at 03/25/19 0805    insulin lispro (HumaLOG) 100 units/mL subcutaneous injection 1-5 Units, 1-5 Units, Subcutaneous, HS, PATRICIA Gibson-C, 5 Units at 03/23/19 2119    insulin lispro (HumaLOG) 100 units/mL subcutaneous injection 2-10 Units, 2-10 Units, Subcutaneous, TID With Meals, Kevin Izquierdo PA-C, 6 Units at 03/25/19 0805    levalbuterol ([place] Jun) inhalation solution 1 25 mg, 1 25 mg, Nebulization, Q6H PRN, Kevin Izquierdo PA-C, 1 25 mg at 03/22/19 1602    levothyroxine tablet 175 mcg, 175 mcg, Oral, Early Morning, Kevin Izquierdo PA-C, 175 mcg at 03/25/19 0532    metoprolol tartrate (LOPRESSOR) tablet 25 mg, 25 mg, Oral, Q12H [address], Swathi Gutierrez MD    multivitamin-minerals (CENTRUM) tablet 1 tablet, 1 tablet, Oral, Daily, Kevin Izquierdo PA-C, 1 tablet at 03/25/19 0805    nitroglycerin (NITRO-BID) 2 % TD ointment 1 inch, 1 inch, Topical, Q8H, Swathi Gutierrez MD    nitroglycerin (NITROSTAT) SL tablet 0 4 mg, 0 4 mg, Sublingual, Q5 Min PRN, Swathi Gutierrez MD, 0 4 mg at 03/25/19 0914    ondansetron (ZOFRAN) injection 4 mg, 4 mg, Intravenous, Q6H PRN, PATRICIA Gibson-GEORGI    oxyCODONE (ROXICODONE) IR tablet 5 mg, 5 mg, Oral, Q4H PRN, 5 mg at 03/25/19 1109 **OR** oxyCODONE (ROXICODONE) IR tablet 10 mg, 10 mg, Oral, Q4H PRN, Kevin Izquierdo PA-C, 10 mg at 03/25/19 0646    polyethylene glycol (MIRALAX) packet 17 g, 17 g, Oral, Daily, Brown Razor, PA-C, 17 g at 03/25/19 0808    senna (SENOKOT) tablet 17 2 mg, 2 tablet, Oral, HS, Brown Razor, PA-C, 17 2 mg at 03/24/19 2140    sodium chloride 0 9 % inhalation solution 3 mL, 3 mL, Nebulization, Q6H PRN, Brown Razor, PA-C, 3 mL at 03/22/19 1602    thiamine (VITAMIN B1) tablet 100 mg, 100 mg, Oral, Daily, Brown Razor, PA-C, 100 mg at 03/25/19 6853    Laboratory Results:  Results from last 7 days   Lab Units 03/25/19  0515 03/24/19  3481 03/23/19  1750 03/23/19  0530 03/23/19  0049 03/22/19  1615 03/22/19  1614 03/22/19  1330 03/22/19  1126 03/22/19  0830  03/21/19  1418   WBC Thousand/uL 6 30 6 23  --  5 55 5 90 7 45  --   --   --   --   --   --    HEMOGLOBIN g/dL 9 7* 9 7* 9 7* 8 3* 8 7* 9 3*  --   --   --   --   --   --    I STAT HEMOGLOBIN g/dl  --   --   --   --   --   --   --  9 2* 9 9* 10 2*   < >  --    HEMATOCRIT % 29 7* 28 7* 29 6* 25 4* 27 0* 28 7*  --   --   --   --   --   --    HEMATOCRIT, ISTAT %  --   --   --   --   --   --   --  27* 29* 30*   < >  --    PLATELETS Thousands/uL 112* 106*  --  123* 124* 120*  --   --   --   --   --   --    POTASSIUM mmol/L 4 2 4 0  --  4 3  --   --  4 7  --   --   --   --  4 9   CHLORIDE mmol/L 103 106  --  109*  --   --  112*  --   --   --   --  106   CO2 mmol/L 24 23  --  24  --   --  21  --   --   --   --  28   CO2, I-STAT mmol/L  --   --   --   --   --   --   --  21 21 24  --   --    BUN mg/dL 19 23  --  30*  --   --  38*  --   --   --   --  36*   CREATININE mg/dL 1 32* 1 33*  --  1 47*  --   --  1 56*  --   --   --   --  1 59*   CALCIUM mg/dL 8 4 7 9*  --  7 9*  --   --  8 1*  --   --   --   --  9 5   MAGNESIUM mg/dL 2 2 2 3  --  2 1  --   --  2 1  --   --   --   --  2 3   PHOSPHORUS mg/dL 3 2 2 8  --  2 9  --   --  3 5  --   --   --   --  4 1   GLUCOSE, ISTAT mg/dl  --   --   --   --   --   --   --  207* 187* 178*  --   --     < > = values in this interval not displayed

## 2019-03-26 LAB
ANION GAP SERPL CALCULATED.3IONS-SCNC: 6 MMOL/L (ref 4–13)
BUN SERPL-MCNC: 19 MG/DL (ref 5–25)
CALCIUM SERPL-MCNC: 8.5 MG/DL (ref 8.3–10.1)
CHLORIDE SERPL-SCNC: 105 MMOL/L (ref 100–108)
CO2 SERPL-SCNC: 25 MMOL/L (ref 21–32)
CREAT SERPL-MCNC: 1.43 MG/DL (ref 0.6–1.3)
ERYTHROCYTE [DISTWIDTH] IN BLOOD BY AUTOMATED COUNT: 13.8 % (ref 11.6–15.1)
GFR SERPL CREATININE-BSD FRML MDRD: 48 ML/MIN/1.73SQ M
GLUCOSE SERPL-MCNC: 129 MG/DL (ref 65–140)
GLUCOSE SERPL-MCNC: 133 MG/DL (ref 65–140)
GLUCOSE SERPL-MCNC: 133 MG/DL (ref 65–140)
GLUCOSE SERPL-MCNC: 137 MG/DL (ref 65–140)
GLUCOSE SERPL-MCNC: 184 MG/DL (ref 65–140)
HCT VFR BLD AUTO: 28.9 % (ref 36.5–49.3)
HGB BLD-MCNC: 9.3 G/DL (ref 12–17)
MCH RBC QN AUTO: 31.4 PG (ref 26.8–34.3)
MCHC RBC AUTO-ENTMCNC: 32.2 G/DL (ref 31.4–37.4)
MCV RBC AUTO: 98 FL (ref 82–98)
PLATELET # BLD AUTO: 117 THOUSANDS/UL (ref 149–390)
PMV BLD AUTO: 12.3 FL (ref 8.9–12.7)
POTASSIUM SERPL-SCNC: 4.1 MMOL/L (ref 3.5–5.3)
RBC # BLD AUTO: 2.96 MILLION/UL (ref 3.88–5.62)
SODIUM SERPL-SCNC: 136 MMOL/L (ref 136–145)
WBC # BLD AUTO: 4.98 THOUSAND/UL (ref 4.31–10.16)

## 2019-03-26 PROCEDURE — G8979 MOBILITY GOAL STATUS: HCPCS

## 2019-03-26 PROCEDURE — 99232 SBSQ HOSP IP/OBS MODERATE 35: CPT | Performed by: INTERNAL MEDICINE

## 2019-03-26 PROCEDURE — 94760 N-INVAS EAR/PLS OXIMETRY 1: CPT

## 2019-03-26 PROCEDURE — G8978 MOBILITY CURRENT STATUS: HCPCS

## 2019-03-26 PROCEDURE — 85027 COMPLETE CBC AUTOMATED: CPT | Performed by: PHYSICIAN ASSISTANT

## 2019-03-26 PROCEDURE — 82948 REAGENT STRIP/BLOOD GLUCOSE: CPT

## 2019-03-26 PROCEDURE — 80048 BASIC METABOLIC PNL TOTAL CA: CPT | Performed by: PHYSICIAN ASSISTANT

## 2019-03-26 PROCEDURE — 97163 PT EVAL HIGH COMPLEX 45 MIN: CPT

## 2019-03-26 PROCEDURE — 97116 GAIT TRAINING THERAPY: CPT

## 2019-03-26 RX ORDER — HYDROMORPHONE HCL/PF 1 MG/ML
0.5 SYRINGE (ML) INJECTION ONCE
Status: COMPLETED | OUTPATIENT
Start: 2019-03-26 | End: 2019-03-26

## 2019-03-26 RX ORDER — ISOSORBIDE MONONITRATE 30 MG/1
30 TABLET, EXTENDED RELEASE ORAL DAILY
Status: DISCONTINUED | OUTPATIENT
Start: 2019-03-26 | End: 2019-03-28 | Stop reason: HOSPADM

## 2019-03-26 RX ORDER — FUROSEMIDE 20 MG/1
20 TABLET ORAL DAILY
Status: DISCONTINUED | OUTPATIENT
Start: 2019-03-26 | End: 2019-03-28 | Stop reason: HOSPADM

## 2019-03-26 RX ADMIN — Medication 1 TABLET: at 08:57

## 2019-03-26 RX ADMIN — CLOPIDOGREL BISULFATE 75 MG: 75 TABLET ORAL at 08:58

## 2019-03-26 RX ADMIN — DOCUSATE SODIUM 100 MG: 100 CAPSULE, LIQUID FILLED ORAL at 08:58

## 2019-03-26 RX ADMIN — METOPROLOL TARTRATE 25 MG: 25 TABLET, FILM COATED ORAL at 21:02

## 2019-03-26 RX ADMIN — OXYCODONE HYDROCHLORIDE 10 MG: 5 TABLET ORAL at 10:26

## 2019-03-26 RX ADMIN — ACETAMINOPHEN 975 MG: 325 TABLET ORAL at 05:07

## 2019-03-26 RX ADMIN — OXYCODONE HYDROCHLORIDE 10 MG: 5 TABLET ORAL at 21:02

## 2019-03-26 RX ADMIN — POLYETHYLENE GLYCOL 3350 17 G: 17 POWDER, FOR SOLUTION ORAL at 08:58

## 2019-03-26 RX ADMIN — ASPIRIN 81 MG 81 MG: 81 TABLET ORAL at 08:58

## 2019-03-26 RX ADMIN — FUROSEMIDE 20 MG: 20 TABLET ORAL at 12:05

## 2019-03-26 RX ADMIN — INSULIN GLARGINE 22 UNITS: 100 INJECTION, SOLUTION SUBCUTANEOUS at 08:53

## 2019-03-26 RX ADMIN — HYDROMORPHONE HYDROCHLORIDE 0.5 MG: 1 INJECTION, SOLUTION INTRAMUSCULAR; INTRAVENOUS; SUBCUTANEOUS at 01:30

## 2019-03-26 RX ADMIN — ISOSORBIDE MONONITRATE 30 MG: 30 TABLET, EXTENDED RELEASE ORAL at 08:57

## 2019-03-26 RX ADMIN — OXYCODONE HYDROCHLORIDE 10 MG: 5 TABLET ORAL at 17:07

## 2019-03-26 RX ADMIN — THIAMINE HCL TAB 100 MG 100 MG: 100 TAB at 08:57

## 2019-03-26 RX ADMIN — GABAPENTIN 300 MG: 300 CAPSULE ORAL at 08:58

## 2019-03-26 RX ADMIN — HYDROMORPHONE HYDROCHLORIDE 0.5 MG: 1 INJECTION, SOLUTION INTRAMUSCULAR; INTRAVENOUS; SUBCUTANEOUS at 07:39

## 2019-03-26 RX ADMIN — FOLIC ACID 1 MG: 1 TABLET ORAL at 08:57

## 2019-03-26 RX ADMIN — INSULIN GLARGINE 22 UNITS: 100 INJECTION, SOLUTION SUBCUTANEOUS at 21:02

## 2019-03-26 RX ADMIN — GABAPENTIN 300 MG: 300 CAPSULE ORAL at 17:07

## 2019-03-26 RX ADMIN — METOPROLOL TARTRATE 25 MG: 25 TABLET, FILM COATED ORAL at 08:58

## 2019-03-26 RX ADMIN — OXYCODONE HYDROCHLORIDE 10 MG: 5 TABLET ORAL at 06:22

## 2019-03-26 RX ADMIN — ACETAMINOPHEN 975 MG: 325 TABLET ORAL at 13:00

## 2019-03-26 RX ADMIN — LEVOTHYROXINE SODIUM 175 MCG: 125 TABLET ORAL at 05:07

## 2019-03-26 RX ADMIN — ATORVASTATIN CALCIUM 40 MG: 40 TABLET, FILM COATED ORAL at 17:07

## 2019-03-26 RX ADMIN — VITAMIN D, TAB 1000IU (100/BT) 1000 UNITS: 25 TAB at 08:57

## 2019-03-26 RX ADMIN — OXYCODONE HYDROCHLORIDE 5 MG: 5 TABLET ORAL at 12:42

## 2019-03-26 RX ADMIN — INSULIN LISPRO 1 UNITS: 100 INJECTION, SOLUTION INTRAVENOUS; SUBCUTANEOUS at 11:52

## 2019-03-26 RX ADMIN — NITROGLYCERIN 1 INCH: 20 OINTMENT TOPICAL at 01:29

## 2019-03-26 RX ADMIN — ACETAMINOPHEN 975 MG: 325 TABLET ORAL at 21:02

## 2019-03-26 NOTE — PROGRESS NOTES
Patient's PICC flushes and has great blood return  Patient complaining of mild pain, tylenol given  Patient lying comfortably in bed and call button is within reach

## 2019-03-26 NOTE — PROGRESS NOTES
NEPHROLOGY PROGRESS NOTE   Conchita Avila 68 y o  male MRN: [id number]  Unit/Bed#: [facility] 508-01 Encounter: [id number]      ASSESSMENT/PLAN:  1  CKD stage 3:  Baseline creatinine 1 4-1 6  Creatinine is 1 4 today which is in his baseline (although slightly worse than yesterday which was 1 3)   -continue to trend BMP   -currently no plan for cardiac catheterization so consider restarting diuretics   2  PAD with L heel ulcer: Status post left femoral endarterectomy with angioplasty, left femoral bypass, completion angiogram and stent placed  3  History of multivessel CAD:  Per Cardiology no plan for cardiac catheterization and will manage medically at this point in time   -started on imdur today by cardiology   4  Cardiomyopathy:  Ejection fraction 45-50%  Patient does have some edema which is related to the procedures but otherwise volume status is good    -restart home lasix 20mg daily today vs tomorrow (will d/w attending)    5  Hypertension:  Blood pressure occasionally a little low but for the most part is acceptable    6  Anemia: hgb 9 3 which is fairly stable         SUBJECTIVE:  Patient is feeling okay  He has no further chest pain or shortness of Breath  His leg pain is getting better      OBJECTIVE:  Current Weight: Weight - Scale: 90 8 kg (200 lb 2 8 oz)  Vitals:    03/26/19 0800   BP:    Pulse:    Resp:    Temp:    SpO2: 96%       Intake/Output Summary (Last 24 hours) at 3/26/2019 1122  Last data filed at 3/26/2019 1026  Gross per 24 hour   Intake 1622 28 ml   Output 2200 ml   Net -577 72 ml     General: no acute distress   Skin: no rash   Eyes: sclera anicteric   ENT: moist mucous membranes   Neck:  Supple, symmetric  Chest:  Clear to auscultation bilaterally   CVS:  Regular rate and rhythm  Abdomen:  Soft, nontender, nondistended  Extremities:  Left lower extremity edema  Neuro:  Awake and alert  Psych:  Appropriate affect     Medications:  Scheduled Meds:  Current Facility-Administered Medications:  acetaminophen 975 mg Oral Q8H [address] Toshia Merchant PA-C   aluminum-magnesium hydroxide-simethicone 30 mL Oral Q4H PRN Vivienne Nichole PA-C   aspirin 81 mg Oral Daily Vivienne Nichole PA-C   atorvastatin 40 mg Oral Daily With Geri Durbin PA-C   cholecalciferol 1,000 Units Oral Daily Vivienne Nichole PA-C   clopidogrel 75 mg Oral Daily Vivienne Nichole PA-C   docusate sodium 100 mg Oral BID Vivienne Nichole PA-C   folic acid 1 mg Oral Daily Vivienne Nichole PA-C   gabapentin 300 mg Oral BID Riley AndrewMAGO liao   insulin glargine 22 Units Subcutaneous Q12H [address] Yannick Yanes MD   insulin lispro 1-10 Units Subcutaneous TID With Meals Yannick Yanes MD   insulin lispro 1-5 Units Subcutaneous HS Vivienne Nichole PA-C   insulin lispro 2-15 Units Subcutaneous TID With Meals Yannick Yanes MD   isosorbide mononitrate 30 mg Oral Daily Debbie Robison MD   levalbuterol 1 25 mg Nebulization Q6H PRN Vivienne Nichole PA-C   levothyroxine 175 mcg Oral Early Morning Vivienne Nichole PA-C   metoprolol tartrate 25 mg Oral Q12H [address] Debbie Robison MD   multivitamin-minerals 1 tablet Oral Daily Vivienne Nichole PA-C   nitroglycerin 0 4 mg Sublingual Q5 Min PRN Debbie Robison MD   ondansetron 4 mg Intravenous Q6H PRN Vivienne Nichole PA-C   oxyCODONE 5 mg Oral Q4H PRN Vivienne Nichole PA-C   Or       oxyCODONE 10 mg Oral Q4H PRN Vivienne Nichole PA-C   polyethylene glycol 17 g Oral Daily Vivienne Nichole PA-C   senna 2 tablet Oral HS Vivienne Nichole PA-C   sodium chloride 3 mL Nebulization Q6H PRN Vivienne Nichole PA-C   thiamine 100 mg Oral Daily Vivienne Nichole PA-C       PRN Meds: aluminum-magnesium hydroxide-simethicone    levalbuterol    nitroglycerin    ondansetron    oxyCODONE **OR** oxyCODONE    sodium chloride    Laboratory Results:  Results from last 7 days   Lab Units 03/26/19  0514 03/25/19  0539 03/24/19  0336 03/23/19  1750 03/23/19  0530 03/23/19  0049 03/22/19  1615 03/22/19  1614 03/22/19  1330 03/22/19  1126 03/22/19  0830  03/21/19  1418   WBC Thousand/uL 4 98 6 30 6 23  --  5 55 5 90 7 45  --   --   --   --   --   --    HEMOGLOBIN g/dL 9 3* 9 7* 9 7* 9 7* 8 3* 8 7* 9 3*  --   --   --   --   --   --    I STAT HEMOGLOBIN g/dl  --   --   --   --   --   --   --   --  9 2* 9 9* 10 2*   < >  --    HEMATOCRIT % 28 9* 29 7* 28 7* 29 6* 25 4* 27 0* 28 7*  --   --   --   --   --   --    HEMATOCRIT, ISTAT %  --   --   --   --   --   --   --   --  27* 29* 30*   < >  --    PLATELETS Thousands/uL 117* 112* 106*  --  123* 124* 120*  --   --   --   --   --   --    SODIUM mmol/L 136 133* 137  --  138  --   --  139  --   --   --   --  142   POTASSIUM mmol/L 4 1 4 2 4 0  --  4 3  --   --  4 7  --   --   --   --  4 9   CHLORIDE mmol/L 105 103 106  --  109*  --   --  112*  --   --   --   --  106   CO2 mmol/L 25 24 23  --  24  --   --  21  --   --   --   --  28   CO2, I-STAT mmol/L  --   --   --   --   --   --   --   --  21 21 24   < >  --    BUN mg/dL 19 19 23  --  30*  --   --  38*  --   --   --   --  36*   CREATININE mg/dL 1 43* 1 32* 1 33*  --  1 47*  --   --  1 56*  --   --   --   --  1 59*   CALCIUM mg/dL 8 5 8 4 7 9*  --  7 9*  --   --  8 1*  --   --   --   --  9 5   MAGNESIUM mg/dL  --  2 2 2 3  --  2 1  --   --  2 1  --   --   --   --  2 3   PHOSPHORUS mg/dL  --  3 2 2 8  --  2 9  --   --  3 5  --   --   --   --  4 1   GLUCOSE, ISTAT mg/dl  --   --   --   --   --   --   --   --  207* 187* 178*  --   --     < > = values in this interval not displayed

## 2019-03-26 NOTE — PLAN OF CARE
Problem: PHYSICAL THERAPY ADULT  Goal: Performs mobility at highest level of function for planned discharge setting  See evaluation for individualized goals  Description  Treatment/Interventions: Functional transfer training, LE strengthening/ROM, Elevations, Endurance training, Patient/family training, Bed mobility, Gait training, Spoke to nursing, Family  Equipment Recommended: Walker(RED)       See flowsheet documentation for full assessment, interventions and recommendations  Note:   Prognosis: Good  Problem List: Decreased strength, Decreased endurance, Impaired balance, Decreased mobility, Decreased coordination, Decreased safety awareness, Obesity, Decreased skin integrity, Orthopedic restrictions, Pain  Assessment: Pt is 68 y o  male seen for PT evaluation s/p admit to [facility] on 3/22/19  Two pt identifiers were used to confirm  Pt presented s/p endarterectomy, femoral-popliteal bypass with completion arteriogram which was performed on 3/22/19  Pt was admitted with a primary dx of: atherosclerosis of artery of extremity with ulceration  PT now consulted for assessment of mobility and d/c needs  Pts current co morbidities effecting treatment include: DM, CKD, hyponatremia, acute kidney injury, CAD,HTN, dyslipidemia and personal factors including [employer] home environment  Pt currently lives at home with his wife  Pts current clinical presentation is Unstable/ Unpredictable (high complexity) due to Ongoing medical management for primary dx, Increased reliance on more restrictive AD compared to baseline, decreased activity tolerance compared to baseline, fall risk, increased assistance needed from caregiver at current time, continuous pulse oximetry monitoring, multiple lines, change in functional mobility secondary to globoped orthotic, abnormal lab values, decline in overall functional mobility status   Prior to admission, pt was mod I with ambulation with the use of a rollator at times as per pt  Upon evaluation, pt currently is requiring supervision for bed mobility; supervision for transfers and min A for ambulation w/ RW  Pt displays decreased step and stride length, decreased gait speed, antalgic gait pattern, improper weight shift, inconsistent mellisa  Pt presents at PT eval functioning below baseline and currently w/ overall mobility deficits secondary to: decreased strength, decreased endurance, impaired balance, decreased coordination, pain  Pt currently at a fall risk secondary to impairments listed above  Based on PT evaluation, pt will continue to benefit from skilled acute PT interventions to address stated impairments; to maximize functional mobility; for ongoing pt/ family training; and DME needs  At conclusion of PT session pt was left seated in bedside chair, all needs within reach  Provided pt education regarding PT plan to improve functional mobility status  PT is currently recommending home with family support and home PT  Pt agreeable to plan and goals as stated on evaluation  PT will continue to follow during hospital stay  Recommendation: Home with family support, Home PT     PT - OK to Discharge: Yes    See flowsheet documentation for full assessment

## 2019-03-26 NOTE — PROGRESS NOTES
Cardiology Progress Note - Kieran Solomon 68 y o  male MRN: [id number]    Unit/Bed#: [facility] 508-01 Encounter: [id number]      Assessment:  Principal Problem: Atherosclerosis of artery of extremity with ulceration ([facility])  Active Problems:    Type 1 diabetes mellitus ([facility])    CKD (chronic kidney disease) stage 3, GFR 30-59 ml/min ([facility])    NSTEMI (non-ST elevated myocardial infarction) ([facility] [address] )    Acute blood loss anemia    Hyponatremia      Plan:  Patient feels better today  He is out of bed ambulating  His leg discomfort is less  He has no chest pain or significant dyspnea  He is off IV heparin  His blood pressure is stable today  Will discontinue nitropaste and start Imdur or 30 mg twice a day  His wife did further investigation in reference to catheterization data from October and he was found to have a 50% right coronary lesion at that time  This was in addition to placement of an LAD stent for re-stenosis  Will manage patient's coronary disease medically for now unless he has further discomfort  His wife who is an RN and prior educator in the medical field was in attendance  Subjective:   Patient seen and examined  No significant events overnight   negative  Objective:     Vitals: Blood pressure 117/61, pulse 84, temperature 98 9 °F (37 2 °C), temperature source Oral, resp  rate 18, height 5' 7" (1 702 m), weight 90 8 kg (200 lb 2 8 oz), SpO2 95 %  , Body mass index is 31 35 kg/m² ,   Orthostatic Blood Pressures      Most Recent Value   Blood Pressure  117/61 filed at 03/26/2019 0733   Patient Position - Orthostatic VS  Sitting filed at 03/26/2019 0733      ,      Intake/Output Summary (Last 24 hours) at 3/26/2019 0742  Last data filed at 3/26/2019 0700  Gross per 24 hour   Intake 1423 48 ml   Output 2300 ml   Net -876 52 ml       No significant arrhythmias seen on telemetry review         Physical Exam:    GEN: Kieran Solomon appears well, alert and oriented x 3, pleasant and cooperative   NECK: supple, no carotid bruits, no JVD or HJR  HEART: normal rate, regular rhythm, normal S1 and S2, no murmurs, clicks, gallops or rubs   LUNGS: clear to auscultation bilaterally; no wheezes, rales, or rhonchi   ABDOMEN: normal bowel sounds, soft, no tenderness, no distention    Labs & Results:    Admission on 03/22/2019   No results displayed because visit has over 200 results  Xr Or Angio Leg Lt    Result Date: 3/25/2019  Narrative: C-ARM - intraoperative arteriogram INDICATION:  Atherosclerosis of artery of extremity with ulceration ([facility] [address] ) [I70 299, L97 909]  Procedure guidance  Occlusion of superficial femoral artery in a patient with previously placed superficial femoral artery and popliteal artery stents COMPARISON:  Arteriogram from March 14, 2019 TECHNIQUE: FLUOROSCOPY TIME:   13 minutes 34 seconds 34 FLUOROSCOPIC IMAGES FINDINGS: Fluoroscopic guidance provided for surgical procedure  A synthetic bypass graft has been placed  The proximal anastomosis, at the level of the obturator foramen, opacifies well  The distal anastomosis, just above the knee, shows no technical issue Balloon angioplasty is been performed of the inflow, with subsequent stent placement Osseous and soft tissue detail limited by technique  Impression: Fluoroscopic guidance provided for surgical procedure  Please refer to the separate procedure notes for additional details  Combined iliac stenting and femoral-popliteal bypass graft Workstation performed: [id number]     Vas Abdominal Aorta/iliacs; Complete Study    Result Date: 3/5/2019  Narrative:  [facility] REPORT [facility]INICAL: Indications:  Evaluate for progression of aortoiliac occlusive disease  Patient is complains of bilateral leg cramping, and now presents with a left heel ulcer   Patient states there is minimal change in his walking as he still can only walk short distances, and needs to rest  Operative History: 2018-05-17 Transluminal placement of iliac stent, percutaneous 2015-09-23 Cardiac angioplasty and stent placement 2014-02-25 Left Transluminal placement of stent, percutaneous, SFA 2012-04-02 Aortoiliac angioplasty with stent placement of iliac arteries Risk Factors: Obesity, HTN, Diabetes (Yes), HLD, CKD, PAD, CAD and previous smoking (quit >10yrs ago)  The patient current BMI is 28 73, Weight is 178 lb and height is 66 in  Clinical Right Pressure:  154/ mm Hg, Left Pressure:  152/ mm Hg  FINDINGS:  Unilateral     Impression  PSV (cm/s)  EDV (cm/s)  Sup-Aneta Ao                         78              Px Inf-Julian Ao                      60          11  Ds Inf-Julian Ao                      37          11  Celiac                             91          16  Prox  SMA      >70%               268          24   Right              Impression  PSV (cm/s)  EDV (cm/s)  Prox ISIAH - Stent                      205          28  Dist ISIAH - Stent                      151          21  Prox  EIA - Stent  50-75%             301          46  Dist EIA - Stent                      145          26   Left               Impression  PSV (cm/s)  EDV (cm/s)  Prox ISIAH - Stent                      174          28  Dist ISIAH - Stent                      148          23  Internal Iliac     >70%               449          93  Prox  EIA - Stent                     140          26  Dist EIA - Stent                      188          24     CONCLUSION:  Impression The abdominal aorta appears to be patent  The right and left common iliac arteries and stents appear to be patent  The left external artery and stent appear to be patent  Findings suggest a >75% stenosis of the left internal iliac artery  Findings suggest a 50-75% stenosis of the proximal right external iliac artery and stent  There is a known >70% stenosis of the superior mesenteric artery  The bilateral renal arteries were not visualized    Ankle/Brachial indices are RT- 0 32 ( Prior 0 54), and LT- 0 22 ( Prior 0 47)  In comparison to the study of 08/15/2018, there is stenosis noted in the right EIA, the left IIA, and a significant decrease in TERRI's  SIGNATURE: Electronically Signed by: Tyesha Andrews on 2019-03-05 11:11:30 AM    Vas Carotid Complete Study    Result Date: 3/5/2019  Narrative:  [facility] REPORT CLINICAL: Indications:  6 month surveillance of carotid artery disease  Patient is asymptomatic at this time  Operative History: 2018-05-17 Transluminal placement of iliac stent, percutaneous 2015-09-23 Cardiac angioplasty and stent placement 2014-02-25 Left Transluminal placement of stent, percutaneous, SFA 2012-04-02 Aortoiliac angioplasty with stent placement of iliac arteries Risk Factors: Obesity, HTN, Diabetes (Yes), HLD, CKD, PAD, CAD and previous smoking (quit >10yrs ago)  The patient current BMI is 28 73, Weight is 178 lb and height is 66 in  Clinical Right Pressure:  154/ mm Hg, Left Pressure:  152/ mm Hg  FINDINGS:  Right        Impression  PSV  EDV (cm/s)  Direction of Flow  Ratio  Dist  ICA                109          25                      2 23  Mid  ICA                 136          32                            Prox  ICA    70%+        281          64                      5 73  Dist CCA                  56          13                            Mid CCA                   49          11                      0 85  Prox CCA                  57          12                            Ext Carotid              110          17                      2 24  Prox Vert                 39          11  Antegrade                 Subclavian                94           0                             Left         Impression  PSV  EDV (cm/s)  Direction of Flow  Ratio  Dist  ICA                 80          23                      1 08  Mid  ICA                 121          28                      1 64  Prox   ICA    50 - 69%    165          37                      2 24  Dist CCA                  66           9                            Mid CCA                   74          12                      1 15  Prox CCA                  64          12                            Ext Carotid  Moderate    258           0                      3 51  Prox Vert                 70          10  Antegrade                 Subclavian               150           0                               CONCLUSION:  Impression RIGHT: There is >70% stenosis noted in the internal carotid artery  Plaque is calcified and irregular  Vertebral artery flow is antegrade  Findings suggestive of subclavian artery disease  LEFT: There is 50-69% stenosis noted in the internal carotid artery  Plaque is calcified and irregular  Moderate external carotid artery disease is noted  Vertebral artery flow is antegrade  Findings suggestive of subclavian artery disease  Compared to previous study on 5/29/2018, there is no significant change noted  Tech note: This exam was technically limited due to patient's body habitus, and continuous snoring  Recommend repeat testing in 6month as per protocol unless otherwise indicated  Internal carotid artery stenosis determination by consensus criteria from: Yohannes Bowens et al  Carotid Artery Stenosis: Gray-Scale and Doppler US Diagnosis - [employer] in [address] [place] [address] [address], Radiology 2003; [phone]  SIGNATURE: Electronically Signed by: Lilliam Sun on 2019-03-05 12:39:40 PM    Vas Lower Limb Arterial Duplex, Complete Bilateral    Result Date: 3/5/2019  Narrative:  [facility] REPORT CLINICAL: Indications: Atherosclerosis with Claudication [I70 219]  Evaluate for progression of peripheral arterial disease  Patient is complains of bilateral leg cramping, and now presents with a left heel ulcer   Patient states there is minimal change in his walking as he still can only walk short distances, and needs to rest  Operative History: 2018-05-17 Transluminal placement of iliac stent, percutaneous 2015-09-23 Cardiac angioplasty and stent placement 2014-02-25 Left Transluminal placement of stent, percutaneous, SFA 2012-04-02 Aortoiliac angioplasty with stent placement of iliac arteries Risk Factors: Obesity, HTN, Diabetes (Yes), HLD, CKD, PAD, CAD and previous smoking (quit >10yrs ago)  The patient current BMI is 28 73, Weight is 178 lb and height is 66 in  Clinical Right Pressure:  154/ mm Hg, Left Pressure:  152/ mm Hg  FINDINGS:  Right                  Impression  Waveform    PSV  EDV  Post  Tibial                       Continuous            Ant  Tibial                        Continuous            Common Femoral Artery                          111   27  Prox Profunda          >75%                    653   49  Prox SFA               >75%                    379   77  Mid SFA                Occluded                  0    0  Dist SFA                                       120   45  Proximal Pop                                    68   28  Distal Pop                                      48   17  Tibioperoneal                                   74       Dist Post Tibial                                37   13  Dist  Ant  Tibial                               31   13  Dist Peroneal                                   16    0   Left                   Impression  Waveform    PSV  EDV  Post  Tibial                       Continuous            Ant  Tibial                        Continuous            Common Femoral Artery  50-75%                  243   48  Prox Profunda                                  192   29  Prox SFA                                       153   29  Mid SFA                                         19    0  Dist SFA               Occluded                  0    0  Proximal Pop                                    31   15  Distal Pop                                      37   18  Tibioperoneal                                   39       Dist Post Tibial                                38   22  Dist  Ant   Tibial                               15 10     CONCLUSION:  Impression: RIGHT LOWER LIMB: There is an occlusion of the mid superficial femoral artery  There is a >75% stenosis of the profunda femoris, and proximal superficial femoral artery  Findings suggest tibioperoneal disease  Ankle/Brachial index: 0 32 Prior 0 54 PVR/ PPG tracings are severely dampened  Metatarsal pressure of 29mmHg Great toe pressure of  0mmHg, within the healing range, Prior 64mmHg  LEFT LOWER LIMB: There is an occlusion of the distal superficial femoral artery  There is a 50-75% stenosis of the common femoral artery  Findings suggest tibioperoneal disease  Ankle/Brachial index: 0 22 , Prior 0 47 PVR/ PPG tracings are severely dampened  Metatarsal pressure of 25mmHg Great toe pressure of 0mmHg, within the healing range,  Prior 36mmHg  Compared to previous duplex study on 02/20/2018 , there is progression bilaterally  Recommend repeat testing in 1year as per protocol unless otherwise indicated  SIGNATURE: Electronically Signed by: Iona Allen on 2019-03-05 11:13:17 AM    Vas Lower Limb Vein Mapping Bypass Graft    Result Date: 3/18/2019  Narrative:  [facility] REPORT CLINICAL: Indications: Bilateral Other Specified Pre-Operative Examination [Z01 [id number]]  Pre-op Vein Mapping for Future Lower Extremity Bypass Graft possibly on friday the 22nd Operative History: 2018-05-17 Transluminal placement of iliac stent, percutaneous 2015-09-23 Cardiac angioplasty and stent placement 2014-02-25 Left Transluminal placement of stent, percutaneous, SFA 2012-04-02 Aortoiliac angioplasty with stent placement of iliac arteries Risk Factors The patient has history of Obesity, HTN, Diabetes (Yes), HLD, CKD, PAD, CAD and previous smoking (quit >10yrs ago)    FINDINGS:  Segment         Right        Left                            Diameter AP  Diameter AP  DVT                  GSV Inguinal            6 4          4 5                       GSV Prox Thigh          4 2          2 9 GSV Mid Thigh           3 3          3 1                       GSV Dist Thigh          2 8               Chronic - Occlusive  GSV Knee                3 0               Chronic - Occlusive  GSV Prox Calf           3 1               Chronic - Occlusive  GSV Mid Calf            3 4               Chronic - Occlusive  GSV Dist Calf           2 8          2 8                       GSV Ankle               3 5          3 4                          CONCLUSION: Impression: RIGHT LOWER LIMB: The great saphenous vein is patent  from the groin to the ankle  The vein is of adequate caliber and quality for graft conduit with intraluminal diameters ranging from 2 8mm to 4 2mm throughout the leg  There are several branches coming off of the greater saphenous vein at the proximal thigh, distal thigh, proximal and mid calf  LEFT LOWER LIMB: The great saphenous vein is patent from the groin to the mid thigh  There is chronic thrombus identified in the greater saphenous from the distal thigh to the proximal calf  Tech Note:  Carlito Danielle RN was notified of these findings  Mar 18 2019 10:35AM  SIGNATURE: Electronically Signed by: Gerardo Cordero on 2019 12:52:33 PM    Ir Abdominal Angiography / Intervention    Result Date: 3/18/2019  Narrative: OPERATIVE REPORT PATIENT NAME: Jade Vanegas  :  1945 MRN: [id number] Pt Location:  [facility] IR   SURGERY DATE: 3/14/2019   Surgeon:  Jamal Etienne MD   Preoperative and postoperative diagnosis: 1  Atherosclerotic occlusive disease with rest pain and left foot ulceration 2  Atherosclerotic occlusive disease with severe right lower extremity claudication   Procedure: 1  Ultrasound-guided right common femoral artery puncture 2   Bilateral lower extremity runoff    Flouro Time:  36 4 min   Contrast:  43 cc Visipaque 320   Sedation Time:  100 min   Estimated Blood Loss: Minimal   Anesthesia Type: Conscious sedation   Operative Indications: 68year-old with long history of severe peripheral arterial occlusive disease presents with worsening symptoms of left leg  He has progressed from claudication to rest pain with a area of ulceration on the left heel  He also has severe claudication of the right lower extremity    Operative Findings: On the left there is eccentric calcific disease of the common femoral artery with less than 50% stenosis  The deep femoral artery is occluded proximally and reconstitutes  The superficial femoral artery is occluded in the mid segment and reconstitutes in the mid portion of the distal superficial femoral artery stent  The above knee popliteal artery is then patent and relatively normal in appearance with 3 vessel runoff    On the right there is eccentric highly calcified plaque creating a 75% stenosis in the proximal deep and superficial femoral arteries  There is a segmental occlusion of the superficial femoral artery  The above knee popliteal artery is then relatively normal in appearance with a normal trifurcation    Complications: None   Procedure and Technique:   IV sedation was administered  The right groin was prepped and draped in the normal sterile fashion and Ioban drape was placed    Ultrasound guidance was utilized to puncture the right common femoral artery  A micropuncture system was utilized  The right common femoral artery was directly imaged under B-mode imaging  A Vidtel wire was then placed and a sheath was inserted    The left common iliac artery was then cannulated with a Contra catheter  The catheter was then advanced into the distal external iliac artery  Oblique images were obtained of the femoral bifurcation  Runoff arteriography was then performed with stepped hand injections    A 7 Kiswahili sheath was then positioned in the proximal superficial femoral artery  Multiple guidewires and catheters were then used in an attempt to cross the superficial femoral artery occlusion    Attempts to re-enter the true lumen within the distal superficial femoral artery stent were made with a Outback catheter    Completion images were obtained to include runoff to the foot    The sheath was then withdrawn to the ipsilateral external iliac artery and oblique images were obtained of the femoral bifurcation and stepped images were obtained through to the level of the tibial trifurcation    A Star closure device was used to close the right femoral puncture site    Findings:   There was significant calcific disease within the common femoral artery  The artery was punctured in a region devoid of any significant disease    On the left there is eccentric calcific disease of the common femoral artery with less than 50% stenosis  The deep femoral artery is occluded proximally and reconstitutes  The superficial femoral artery is occluded in the mid segment and reconstitutes in the mid portion of the distal superficial femoral artery stent  The above knee popliteal artery is then patent and relatively normal in appearance with 3 vessel runoff    Multiple attempts to cross this segmental occlusion with multiple catheters and wires to include an Outback catheter failed to obtain re-entry into the true lumen/the lumen of the distal superficial femoral artery stent    On the right there is eccentric highly calcified plaque creating a 75% stenosis in the proximal deep and superficial femoral arteries  There is a segmental occlusion of the superficial femoral artery    The above knee popliteal artery is then relatively normal in appearance with a normal trifurcation      Conclusion: On the left secondary to the deep femoral artery occlusion and failed attempt to cross the superficial femoral artery occlusion is felt the best option for revascularization would be femoral endarterectomy to restore flow in the deep femoral artery and femoral-above knee popliteal artery bypass    Following the procedure the vascular exam of both lower extremities was unchanged from pre procedure      I was present for the entire procedure   Patient Disposition: APU   SIGNATURE: Reza Freeman MD DATE: March 14, 2019 TIME: 11:15 AM   Electronically signed by Reza Freeman MD at 3/14/2019 11:31 AM       EKG personally reviewed by Delia Lowe MD      Counseling / Coordination of Care  Total floor / unit time spent today 30 minutes  Greater than 50% of total time was spent with the patient and / or family counseling and / or coordination of care

## 2019-03-26 NOTE — PROGRESS NOTES
Progress Note - Ely Pill 1945, 68 y o  male MRN: [id number]    Unit/Bed#: [facility] 508-01 Encounter: [id number]    Primary Care Provider: Shirley Ware MD   Date and time admitted to hospital: 3/22/2019  5:33 AM      Atherosclerosis of artery of extremity with ulceration [facility])  Assessment & Plan  AOID/PAD with claudication and left heel wound     S/P left femoral endarterectomy with bovine patch angioplasty, left femoral to AKA pop bypass with ringed [place] graft, completion arteriogram with left EIA PTA/stent- 3/22 [place]     Plan:  --[facility] per podiatry  --PT/OT recommending STR  --case management for dispo planning     NSTEMI (non-ST elevated myocardial infarction) [facility])  Assessment & Plan  NSTEMI in setting of Hx of CAD w/ prior stenting 10/2018 and CHF     Plan:  --cardiology following, possible cath  --d/c hep gtt per cards  --continue aspirin, Plavix, metoprolol, Lipitor      Subjective:  No acute events overnight  Doing well this AM  No new complaints, notes increased ROM of LLE  Vitals:  /66   Pulse 76   Temp 98 2 °F (36 8 °C)   Resp 18   Ht 5' 7" (1 702 m)   Wt 90 8 kg (200 lb 2 8 oz)   SpO2 96%   BMI 31 35 kg/m²     I/Os:  I/O last 3 completed shifts: In: 2029 3 [P O :1399; I V :630 3]  Out: 4700 [Urine:4700]  I/O this shift:   In: 480 [P O :480]  Out: 450 [Urine:450]    Lab Results and Cultures:   Lab Results   Component Value Date    WBC 6 30 03/25/2019    HGB 9 7 (L) 03/25/2019    HCT 29 7 (L) 03/25/2019    MCV 97 03/25/2019     (L) 03/25/2019     Lab Results   Component Value Date    GLUCOSE 207 (H) 03/22/2019    CALCIUM 8 4 03/25/2019     12/21/2015    K 4 2 03/25/2019    CO2 24 03/25/2019     03/25/2019    BUN 19 03/25/2019    CREATININE 1 32 (H) 03/25/2019     Lab Results   Component Value Date    INR 1 09 03/23/2019    INR 1 11 03/22/2019    INR 1 07 03/21/2019    PROTIME 14 2 03/23/2019    PROTIME 14 4 (H) 03/22/2019    PROTIME 13 6 03/21/2019 Blood Culture: No results found for: BLOODCX,   Urinalysis:   Lab Results   Component Value Date    COLORU Yellow 03/24/2019    CLARITYU Clear 03/24/2019    SPECGRAV 1 011 03/24/2019    PHUR 6 0 03/24/2019    PHUR 5 5 11/28/2018    LEUKOCYTESUR Negative 03/24/2019    NITRITE Negative 03/24/2019    GLUCOSEU Negative 03/24/2019    KETONESU Negative 03/24/2019    BILIRUBINUR Negative 03/24/2019    BLOODU Negative 03/24/2019   ,   Urine Culture:   Lab Results   Component Value Date    URINECX No Growth <1000 cfu/mL 03/05/2019   ,   Wound Culure: No results found for: WOUNDCULT    Medications:  Current Facility-Administered Medications   Medication Dose Route Frequency    acetaminophen (TYLENOL) tablet 975 mg  975 mg Oral Q8H [address]    aluminum-magnesium hydroxide-simethicone (MYLANTA) 200-200-20 mg/5 mL oral suspension 30 mL  30 mL Oral Q4H PRN    aspirin chewable tablet 81 mg  81 mg Oral Daily    atorvastatin (LIPITOR) tablet 40 mg  40 mg Oral Daily With Dinner    cholecalciferol (VITAMIN D3) tablet 1,000 Units  1,000 Units Oral Daily    clopidogrel (PLAVIX) tablet 75 mg  75 mg Oral Daily    docusate sodium (COLACE) capsule 100 mg  100 mg Oral BID    folic acid (FOLVITE) tablet 1 mg  1 mg Oral Daily    gabapentin (NEURONTIN) capsule 300 mg  300 mg Oral BID    insulin glargine (LANTUS) subcutaneous injection 22 Units 0 22 mL  22 Units Subcutaneous Q12H [address]    insulin lispro (HumaLOG) 100 units/mL subcutaneous injection 1-10 Units  1-10 Units Subcutaneous TID With Meals    insulin lispro (HumaLOG) 100 units/mL subcutaneous injection 1-5 Units  1-5 Units Subcutaneous HS    insulin lispro (HumaLOG) 100 units/mL subcutaneous injection 2-15 Units  2-15 Units Subcutaneous TID With Meals    levalbuterol (XOPENEX) inhalation solution 1 25 mg  1 25 mg Nebulization Q6H PRN    levothyroxine tablet 175 mcg  175 mcg Oral Early Morning    metoprolol tartrate (LOPRESSOR) tablet 25 mg  25 mg Oral Q12H [address]    multivitamin-minerals (CENTRUM) tablet 1 tablet  1 tablet Oral Daily    nitroglycerin (NITRO-BID) 2 % TD ointment 1 inch  1 inch Topical Q8H    nitroglycerin (NITROSTAT) SL tablet 0 4 mg  0 4 mg Sublingual Q5 Min PRN    ondansetron (ZOFRAN) injection 4 mg  4 mg Intravenous Q6H PRN    oxyCODONE (ROXICODONE) IR tablet 5 mg  5 mg Oral Q4H PRN    Or    oxyCODONE (ROXICODONE) IR tablet 10 mg  10 mg Oral Q4H PRN    polyethylene glycol (MIRALAX) packet 17 g  17 g Oral Daily    senna (SENOKOT) tablet 17 2 mg  2 tablet Oral HS    sodium chloride 0 9 % inhalation solution 3 mL  3 mL Nebulization Q6H PRN    thiamine (VITAMIN B1) tablet 100 mg  100 mg Oral Daily       Physical Exam:    GEN: NAD  HEENT: MMM  CPAP mask in place  CV: RRR  Lung: normal effort  Ab: Soft, NT/ND  Extrem: LLE incisions c/d/i  + Graft signal  + L AT/peroneal signals  Neuro:  A+Ox3, motor and sensation grossly intact      Jesus Sanz MD  3/26/2019

## 2019-03-26 NOTE — SOCIAL WORK
Cm spoke with pt and spouse Rey Cornejo regarding scheduling a family meeting for tomorrow  Cm spoke with Dr Hailee Agee from cardiology, Dr Flavio Skinner from nephrology, Dr Gerhard Syed from endocrinology, Winnie Villafuerte with pain management and the vascular team  Family meeting scheduled for 1:30pm 3/27  Message sent to therapy department requesting a representative for the meeting as per patient and spouse

## 2019-03-26 NOTE — PHYSICAL THERAPY NOTE
Physical Therapy Evaluation    Patient Name: Alex Gallegos    [id number] Date: 3/26/2019     Time In: 2:00 PM  Time Out: 2:15 PM     Problem List  Patient Active Problem List   Diagnosis    Acute kidney injury ([place] [address] )    Type 1 diabetes mellitus ([place] [address] )    Presence of drug coated stent in LAD coronary artery    Coronary artery disease of native artery of native heart with stable angina pectoris ([facility])    Presence of drug coated stent in left circumflex coronary artery    Dyslipidemia    Obstructive sleep apnea of adult    Carotid artery stenosis, asymptomatic, bilateral    Atherosclerosis of artery of extremity with ulceration ([place] [address] )    Restrictive lung disease    COPD (chronic obstructive pulmonary disease) ([place] [address] )    Benign hypertension with chronic kidney disease, stage III ([facility])    CKD (chronic kidney disease) stage 3, GFR 30-59 ml/min ([facility])    Proteinuria    Renal artery stenosis ([facility])    Renal cyst, right    Vitamin D deficiency    Aortoiliac occlusive disease ([place] [address] )    Hypothyroidism, postablative    Other specified hypothyroidism    Low back pain with sciatica    Lumbar disc herniation    Lumbar radiculopathy    Diabetic neuropathy associated with type 1 diabetes mellitus ([place] [address] )    History of Ischemic cardiomyopathy    Chronic systolic congestive heart failure ([facility])    NSTEMI (non-ST elevated myocardial infarction) ([place] [address] )    Acute blood loss anemia    Hyponatremia        Past Medical History  Past Medical History:   Diagnosis Date    Acute kidney injury ([place] [address] )     resolved 11/30/2015    Acute venous embolism and thrombosis of deep vessels of proximal lower extremity ([place] [address] )     resolved 04/04/2015    DARINEL (acute kidney injury) ([place] [address] ) 1/12/2016    Anesthesia     RESPIRATORY ISSUES DUE TO SLEEP APNEA    Cardiac disease     heart attack, stents x 4    CHF (congestive heart failure) ([facility])     Chronic cough     resolved 02/04/2016    Chronic pain disorder     intermitent claudication    COPD (chronic obstructive pulmonary disease) (HCC)     Coronary artery disease     CPAP (continuous positive airway pressure) dependence     Diabetes mellitus ([address] )     Disease of thyroid gland     DVT (deep venous thrombosis) (HCC)     Heart failure (HCC)     Hyperlipidemia     Hypertension     Ischemic cardiomyopathy     last assessed 09/26/2017    Myocardial infarction [facility])     MI +2 2015,2018    Pulmonary emphysema (HCC)     Pulmonary granuloma (HCC)     resolved 02/03/2017    Renal failure     Sleep apnea         Past Surgical History  Past Surgical History:   Procedure Laterality Date    BYPASS FEMORAL-POPLITEAL      initial stenosis with stent left, 7 x 100 smart stent onset 02/24/2014    CARDIAC SURGERY      cardiac stents    CATARACT EXTRACTION      COLOGUARD (HISTORICAL)  2018    CORONARY ANGIOPLASTY WITH STENT PLACEMENT      x4    IR ABDOMINAL ANGIOGRAPHY / INTERVENTION  3/14/2019    WI THROMBOENDARTECTMY FEMORAL COMMON Left 3/22/2019    Procedure: ENDARTERECTOMY ARTERIAL FEMORAL WITH PATCH ANGIOPLASTY, BALLOON ANGIOPLASTY, STENT;  Surgeon: Breanna Bryan MD;  [facility];  Service: Vascular    WI VEIN BYPASS GRAFT,FEM-POP Left 3/22/2019    Procedure: BYPASS FEMORAL-POPLITEAL WITH COMPLETION ARTERIOGRAM;  Surgeon: Breanna Bryan MD;  [facility];  Service: Vascular    VASCULAR SURGERY      stent placement         03/26/19 1425   Note Type   Note type Eval/Treat   Pain Assessment   Pain Assessment 0-10   Pain Score 3   Pain Type Acute pain;Surgical pain   Pain Location Leg   Pain Orientation Left   Hospital Pain Intervention(s) Repositioned; Ambulation/increased activity   Response to Interventions tolerated   Home Living   Type of [address] Two level; Able to live on main level with bedroom/bathroom;Stairs to enter with rails  (7 SUYAPA with R handrail vs 0 SUYAPA in back)   [facility] [place]ESH[facility] shower   Bathroom Toilet Raised   Bathroom Equipment Grab bars in shower   [address] scooter  (rollator)   Prior Function   Level of [place] Independent with ADLs and functional mobility   Lives With Spouse   Receives Help From Family   ADL Assistance Independent   IADLs Needs assistance   Falls in the last 6 months 1 to 4   Vocational Retired   Restrictions/Precautions   [place] Bearing Precautions Per Order Yes   LLE [place] Bearing Per Order WBAT   Braces or Orthoses Other (Comment)  (LLE globoped shoe)   Other Precautions Multiple lines; Fall Risk;Pain;WBS   General   Family/Caregiver Present Yes  (pt wife)   Cognition   Overall Cognitive Status WFL   Arousal/Participation Alert   Attention Within functional limits   Orientation Level Oriented X4   Following Commands Follows all commands and directions without difficulty   RUE Assessment   RUE Assessment WFL   LUE Assessment   LUE Assessment WFL   RLE Assessment   RLE Assessment X   Strength RLE   RLE Overall Strength 4/5   LLE Assessment   LLE Assessment X   Strength LLE   LLE Overall Strength 4/5   Coordination   Movements are Fluid and Coordinated 0   Coordination and Movement Description slow movements, antalgic LE instability   Bed Mobility   Supine to Sit 5  Supervision   Additional items Increased time required;Verbal cues   Additional Comments Pt left sitting in bedside chair at termination of session, all needs within reach   Transfers   Sit to Stand 5  Supervision   Additional items Increased time required;Verbal cues   Stand to Sit 4  Minimal assistance   Additional items Increased time required;Verbal cues   Additional Comments RW used for all   Ambulation/Elevation   Gait pattern Improper Weight shift; Antalgic; Forward Flexion;Decreased foot clearance;Decreased L stance; Inconsistent mellisa; Short stride; Excessively slow   Gait Assistance 4  Minimal assist   Additional items Verbal cues; Tactile cues Assistive Device Rolling walker   Distance 50' x 1    Stair Management Assistance 4  Minimal assist   Additional items Verbal cues; Tactile cues; Increased time required   Stair Management Technique One rail R;Step to pattern; Foreward   Number of Stairs 7   Balance   Static Sitting Good   Dynamic Sitting Fair +   Static Standing Fair +  (RW)   Dynamic Standing Fair  (RW)   Ambulatory Fair  (RW)   Endurance Deficit   Endurance Deficit Yes   Endurance Deficit Description pt required multiple seated rest breaks throughout session   Activity Tolerance   Activity Tolerance Patient limited by fatigue;Patient limited by pain   Medical Staff Made Aware Debbie Simon; spoke with CM about discharge planning   Nurse Made Aware RN cleared pt for PT evaluation   Assessment   Prognosis Good   Problem List Decreased strength;Decreased endurance; Impaired balance;Decreased mobility; Decreased coordination;Decreased safety awareness; Obesity; Decreased skin integrity;Orthopedic restrictions;Pain   Assessment Pt is 68 y o  male seen for PT evaluation s/p admit [facility] 3/22/19  Two pt identifiers were used to confirm  Pt presented s/p endarterectomy, femoral-popliteal bypass with completion arteriogram which was performed on 3/22/19  Pt was admitted with a primary dx of: atherosclerosis of artery of extremity with ulceration  PT now consulted for assessment of mobility and d/c needs  Pts current co morbidities effecting treatment include: DM, CKD, hyponatremia, acute kidney injury, CAD,HTN, dyslipidemia and personal factors including 7 SUYAPA home environment  Pt currently lives at home with his wife   Pts current clinical presentation is Unstable/ Unpredictable (high complexity) due to Ongoing medical management for primary dx, Increased reliance on more restrictive AD compared to baseline, decreased activity tolerance compared to baseline, fall risk, increased assistance needed from caregiver at current time, continuous pulse oximetry monitoring, multiple lines, change in functional mobility secondary to globoped orthotic, abnormal lab values, decline in overall functional mobility status  Prior to admission, pt was mod I with ambulation with the use of a rollator at times as per pt  Upon evaluation, pt currently is requiring supervision for bed mobility; supervision for transfers and min A for ambulation w/ RW  Pt displays decreased step and stride length, decreased gait speed, antalgic gait pattern, improper weight shift, inconsistent mellisa  Pt presents at PT eval functioning below baseline and currently w/ overall mobility deficits secondary to: decreased strength, decreased endurance, impaired balance, decreased coordination, pain  Pt currently at a fall risk secondary to impairments listed above  Based on PT evaluation, pt will continue to benefit from skilled acute PT interventions to address stated impairments; to maximize functional mobility; for ongoing pt/ family training; and DME needs  At conclusion of PT session pt was left seated in bedside chair, all needs within reach  Provided pt education regarding PT plan to improve functional mobility status  PT is currently recommending home with family support and home PT  Pt agreeable to plan and goals as stated on evaluation  PT will continue to follow during hospital stay  Goals   Patient Goals play golf again   STG Expiration Date 04/05/19   Short Term Goal #1 1  Pt will increased strength by 1 grade in order to increase functional independence with transfers  2  Pt will increase balance grade by 1 in order to improve safety  3  Pt will improve transfer ability to mod I to increase functional independence and decrease caregiver burden  4  Pt will perform bed mobility independently to decrease caregiver burden  5  Pt will be able to amb 150 ft with RW and mod I to increase functional independence in home and community environments   6  Pt will be able to ascend and descend 7 stairs mod I to increase functional independence within the home environment  7  Pt will be independent with HEP  Treatment Day 1   Plan   Treatment/Interventions Functional transfer training;LE strengthening/ROM; Elevations; Endurance training;Patient/family training;Bed mobility;Gait training;Spoke to nursing;Family   PT Frequency Other (Comment)  (3-5x/wk)   Recommendation   Recommendation Home with family support;Home PT   Equipment Recommended Walker  (RW)   PT - OK to Discharge Yes   Additional Comments when medically stable   Modified H[place] Scale   Modified [place] Scale 3   Barthel Index   Feeding 10   Bathing 0   Grooming Score 0   Dressing Score 5   Bladder Score 10   Bowels Score 10   Toilet Use Score 5   Transfers (Bed/Chair) Score 10   Mobility (Level Surface) Score 0   Stairs Score 5   Barthel Index Score 55     Additional Therapy Session:    Time In: 2:15 PM  Time Out: 2:30 PM    Patient agreeable to participate in additional therapy session focusing on education and functional mobility tasks  Pt ambulated 50' x 1 with RW and min A and verbal cues for sequencing and safety  Pt displays decreased step and stride length, decreased gait speed, antalgic gait speed, improper weight shift  Provided pt and pt family member regarding disposition planning, goals of therapy session, benefits of home PT vs outpatient PT  Skilled physical therapy is indicated to address listed functional deficits focusing on strength, endurance and functional mobility      Madeline Taveras PT, DPT

## 2019-03-26 NOTE — PROGRESS NOTES
Progress Note - Zia Crowe 68 y o  male MRN: [id number]    Unit/Bed#: [facility] 311-72 Encounter: [id number]      CC: diabetes f/u    Subjective:   Zia Crowe is a 68y o  year old male with type 1  diabetes  Feels well  No complaints  No hypoglycemia  Objective:     Vitals: Blood pressure 117/61, pulse 84, temperature 98 9 °F (37 2 °C), temperature source Oral, resp  rate 18, height 5' 7" (1 702 m), weight 90 8 kg (200 lb 2 8 oz), SpO2 96 %  ,Body mass index is 31 35 kg/m²  Intake/Output Summary (Last 24 hours) at 3/26/2019 1404  Last data filed at 3/26/2019 1026  Gross per 24 hour   Intake 1090 15 ml   Output 1400 ml   Net -309 85 ml       Physical Exam:  General Appearance: awake, appears stated age and cooperative  Head: Normocephalic, without obvious abnormality, atraumatic  Extremities: moves all extremities  Skin: Skin color and temperature normal    Pulm: no labored breathing    Lab, Imaging and other studies: I have personally reviewed pertinent reports  Results from last 7 days   Lab Units 03/26/19  0514  03/22/19  1330   SODIUM mmol/L 136   < >  --    POTASSIUM mmol/L 4 1   < >  --    CHLORIDE mmol/L 105   < >  --    CO2 mmol/L 25   < >  --    CO2, I-STAT mmol/L  --   --  21   BUN mg/dL 19   < >  --    CREATININE mg/dL 1 43*   < >  --    GLUCOSE, ISTAT mg/dl  --   --  207*   CALCIUM mg/dL 8 5   < >  --     < > = values in this interval not displayed  POC Glucose (mg/dl)   Date Value   03/26/2019 184 (H)   03/26/2019 133   03/25/2019 156 (H)   03/25/2019 185 (H)   03/25/2019 314 (H)   03/25/2019 220 (H)   03/24/2019 164 (H)   03/24/2019 115   03/24/2019 181 (H)   03/24/2019 150 (H)       Assessment:  Type 1 diabetes with hyperglycemia  Hypothyroidism  Peripheral arterial disease    Plan:  Sugars fairly stable on current regimen-continue Lantus 22 units twice a day    Continue insulin to carb ratio of 1 unit for every 5 g of carbs   Continue correction scale of 1 unit for every 30 mg/dL above a target of 140 before meals  Correction of 1 unit for every 40 mg/dL above a target of 140 at bedtime  Continue to monitor blood sugars before meals and bedtime    On discharge patient can go back on his insulin regimen and follow up with his outpatient endocrinologist 1-2 weeks after discharge      Portions of the record may have been created with voice recognition software

## 2019-03-26 NOTE — PROGRESS NOTES
Called into room by patient complaining of 7/10 pain in his LLE  Offered the patient oxycodone and patient stated he wanted his IV dilaudid  The dilaudid had just been discontinued in the computer at [facility] and was only ordered as breakthrough  I explained to the patient that the dilaudid was for breakthrough only and that it had , and that we needed to try the oxycodone first before I called the doctor to get the dilaudid reordered  Patient and wife were frustrated because they stated that they were told he could have dilaudid every 4 hours, and not for breakthrough  Patient and his wife wanted to speak to the charge nurse  The charge nurse came in and talked to the patient and his wife  The wife then asked to speak with the CC on the floor  CC is not yet here but will speak to the patient and his wife when she arrives  I spoke with Sundeep Ibrahim with vascular surgery and I took a verbal order over the phone for 0 5mg of dilaudid as a one time dose, and to only give it to him if he asked for it  Patient did not ask for it before I left, and this information has been passed on to the next nurse

## 2019-03-26 NOTE — PROGRESS NOTES
Progress Note - Anesthesia Acute Pain Management    Elizabeth Eden 68 y o  male MRN: [id number]  Unit/Bed#: [facility] 508-01 Encounter: [id number]      Assessment:   Principal Problem: Atherosclerosis of artery of extremity with ulceration ([facility])  Active Problems:    Type 1 diabetes mellitus ([facility])    CKD (chronic kidney disease) stage 3, GFR 30-59 ml/min ([facility])    NSTEMI (non-ST elevated myocardial infarction) ([facility])    Acute blood loss anemia    Hyponatremia      Plan:   - The patient reports that overall his pain is currently well controlled with his pain regimen  He understands that IV Dilaudid is no longer available  For the most part, he has no pain but when the pain occurs, it is a severe burning pain at one or all of his incision sites  Often that pain resolves without intervention but, if not, taking pain medication does help  - After detailed discussion with the pt and his wife regarding options for PO pain control, they agree to leave his regimen as ordered for now  Continue Tylenol, Gabapentin and Oxycodone as currently ordered  Pt and his wife understand that the long term goal is to wean Oxycodone to off over the next week or so  If needed, PO Oxycodone can be changed to Dilaudid 2-4mg PO q4 hours prn mod - severe pain, respectively  But this is only if the patient feels the PO Oxycodone is no longer helping, which is not currently the case  - Continue Bowel Regimen    - APS will be able to attend the family meeting tomorrow to discuss patient goals      Pain History  Current pain location(s): RLE: groin wound, knee wound and heel wound  Pain Scale: currently 0/10; 9-10/10 at its worst    Meds/Allergies   all current active meds have been reviewed    Allergies   Allergen Reactions    Doxazosin     Iohexol     Cardura [Doxazosin Mesylate]      Muscle cramps    Other      Iv dye for cardiac cath  Renal failure very close to dialysis  But no dialysis    Zestril [Lisinopril]      cough       Objective Temp:  [97 8 °F (36 6 °C)-98 9 °F (37 2 °C)] 98 9 °F (37 2 °C)  HR:  [69-92] 84  Resp:  [18-20] 18  BP: ()/(52-71) 117/61      Intake/Output Summary (Last 24 hours) at 3/26/2019 1343  Last data filed at 3/26/2019 1026  Gross per 24 hour   Intake 1090 15 ml   Output 1400 ml   Net -309 85 ml                 Physical Exam: /61 (BP Location: Right arm)   Pulse 84   Temp 98 9 °F (37 2 °C) (Oral)   Resp 18   Ht 5' 7" (1 702 m)   Wt 90 8 kg (200 lb 2 8 oz)   SpO2 96%   BMI 31 35 kg/m²     Gen: NAD, talkative/joking   HEENT:  PER, EOMI  CV: deferred  Pul: nonlabored, on room air  Abd: deferred  Ext:  Dressings C/D/I  Neuro: AAOx3; CN II-XII grossly intact; moves all extremities    Lab Results:  Lab Results   Component Value Date    WBC 4 98 03/26/2019    HGB 9 3 (L) 03/26/2019    HCT 28 9 (L) 03/26/2019    MCV 98 03/26/2019     (L) 03/26/2019     Lab Results   Component Value Date    GLUCOSE 207 (H) 03/22/2019    CALCIUM 8 5 03/26/2019     12/21/2015    K 4 1 03/26/2019    CO2 25 03/26/2019     03/26/2019    BUN 19 03/26/2019    CREATININE 1 43 (H) 03/26/2019     Imaging Studies: I have personally reviewed pertinent reports  EKG, Pathology, and Other Studies: I have personally reviewed pertinent reports        SIGNATURE: González Nair MD  DATE: March 26, 2019  TIME: 1:43 PM

## 2019-03-26 NOTE — DISCHARGE INSTRUCTIONS
DISCHARGE INSTRUCTIONS  LEG SURGERY    Following discharge from the hospital, you may have some questions about your operation, your activities or your general condition  These instructions may answer some of your questions and help you adjust during the first few weeks following your operation  ACTIVITY:  Limit your physical activity to slow walking  Avoid climbing or heavy lifting for the first four weeks after surgery  Initially some discomfort will be felt when walking as the skin and muscle tissues heal  You may require help with walking or feel more secure with someone to lean on  Walking up steps and normal activities may be resumed, as you feel ready  You should not drive a car for one week following discharge from the hospital   You may ride in a car for short trips upon discharge  DIET:  Resume your normal diet  Try to eat low fat and low cholesterol foods  Good nutrition is important for healing of your incision  INCISION:  You may include the operated area in a shower on the third day following surgery  Wash your incision daily with soap and water  Pat the incision dry and leave open to air  Do not apply lotions, ointments, creams or powders to incision  Sitting in a tub is not recommended for the first two weeks following surgery or if you have any open wounds  It is normal to have swelling or discoloration around the incision  If increasing redness or pain develops, call our office immediately  Numbness in the region of the incision may occur following the surgery  This normally resolves in six to twelve months  If any of your incisions are open and require dressing changes, you will be given instruction for your daily incision care  If you are not able to change the dressings, a visiting nurse will be arranged  Your physician may have chosen to use a type of adhesive glue to close your incision  There are stitches present under the skin which will absorb on their own  The glue is used to cover the incision, assist in closure, and prevent contamination  This adhesive will darken and peel away on its own within one to two weeks  Alternatively, your surgeon may have chosen to use skin staples to close your incision which will be removed in the office at the time of your follow-up appointment  You may wash the incision with the staples present  LEG SWELLING: Most patients have noticeable leg swelling after leg surgery  This usually disappears within a few weeks  If swelling is present, elevate the leg whenever possible  Avoid sitting with the leg hanging down for prolonged time periods  Walking is beneficial   An ACE bandage or support stocking may be helpful, but this should be discussed with your physician prior to use  FOLLOW UP STUDIES:  Doppler ultrasound studies are very important to your post-operative care  Your surgeon will arrange for them at your first postoperative visit  Repeat studies are then scheduled every three months for the first year and periodically after this  Appt w/ Dr Ree Clark: 4/4/19 at [facility] office  [address] [place] [address] THE OFFICE IF YOU HAVE ANY QUESTIONS    [phone] Katie Jacobson [phone] [facility] [phone]  [address] [place] [address] , [address] [facility], [address], [facility], [address]  [address], [facility], [address]  [address] [facility], [address]  [address], [address]st [address], [address]utta [address]  [facility], [address] [facility], [address] [facility] [address]   [address] [facility], [address] [facility], [address] [facility]  [address] [facility], [address] [facility], [address], Cody Lopes 6      Discharge Instructions - Podiatry    Weight Bearing Status:   Weight-bearing as tolerated                                          F/u appointment Instructions: Please make an appointment within one week of discharge with Dr Dunaway Speak in office or at wound care center if wound reopens  Contact sooner if any increase in pain, or signs of infection occur    Wound Care:  Monitor left heel for signs of reopening    May dressed with dry sterile dressing for cushion daily

## 2019-03-27 LAB
ANION GAP SERPL CALCULATED.3IONS-SCNC: 5 MMOL/L (ref 4–13)
BUN SERPL-MCNC: 20 MG/DL (ref 5–25)
CALCIUM SERPL-MCNC: 8.8 MG/DL (ref 8.3–10.1)
CHLORIDE SERPL-SCNC: 105 MMOL/L (ref 100–108)
CO2 SERPL-SCNC: 25 MMOL/L (ref 21–32)
CREAT SERPL-MCNC: 1.51 MG/DL (ref 0.6–1.3)
ERYTHROCYTE [DISTWIDTH] IN BLOOD BY AUTOMATED COUNT: 13.6 % (ref 11.6–15.1)
GFR SERPL CREATININE-BSD FRML MDRD: 45 ML/MIN/1.73SQ M
GLUCOSE SERPL-MCNC: 166 MG/DL (ref 65–140)
GLUCOSE SERPL-MCNC: 179 MG/DL (ref 65–140)
GLUCOSE SERPL-MCNC: 200 MG/DL (ref 65–140)
GLUCOSE SERPL-MCNC: 266 MG/DL (ref 65–140)
GLUCOSE SERPL-MCNC: 84 MG/DL (ref 65–140)
HCT VFR BLD AUTO: 28.9 % (ref 36.5–49.3)
HGB BLD-MCNC: 9.2 G/DL (ref 12–17)
MCH RBC QN AUTO: 31 PG (ref 26.8–34.3)
MCHC RBC AUTO-ENTMCNC: 31.8 G/DL (ref 31.4–37.4)
MCV RBC AUTO: 97 FL (ref 82–98)
PLATELET # BLD AUTO: 112 THOUSANDS/UL (ref 149–390)
PMV BLD AUTO: 12.1 FL (ref 8.9–12.7)
POTASSIUM SERPL-SCNC: 4.6 MMOL/L (ref 3.5–5.3)
RBC # BLD AUTO: 2.97 MILLION/UL (ref 3.88–5.62)
SODIUM SERPL-SCNC: 135 MMOL/L (ref 136–145)
WBC # BLD AUTO: 4.96 THOUSAND/UL (ref 4.31–10.16)

## 2019-03-27 PROCEDURE — 97535 SELF CARE MNGMENT TRAINING: CPT

## 2019-03-27 PROCEDURE — 80048 BASIC METABOLIC PNL TOTAL CA: CPT | Performed by: PHYSICIAN ASSISTANT

## 2019-03-27 PROCEDURE — 99232 SBSQ HOSP IP/OBS MODERATE 35: CPT | Performed by: INTERNAL MEDICINE

## 2019-03-27 PROCEDURE — 97530 THERAPEUTIC ACTIVITIES: CPT | Performed by: PHYSICAL THERAPIST

## 2019-03-27 PROCEDURE — 97535 SELF CARE MNGMENT TRAINING: CPT | Performed by: PHYSICAL THERAPIST

## 2019-03-27 PROCEDURE — 82948 REAGENT STRIP/BLOOD GLUCOSE: CPT

## 2019-03-27 PROCEDURE — 97116 GAIT TRAINING THERAPY: CPT | Performed by: PHYSICAL THERAPIST

## 2019-03-27 PROCEDURE — 85027 COMPLETE CBC AUTOMATED: CPT | Performed by: PHYSICIAN ASSISTANT

## 2019-03-27 PROCEDURE — 99024 POSTOP FOLLOW-UP VISIT: CPT | Performed by: PHYSICIAN ASSISTANT

## 2019-03-27 PROCEDURE — 97110 THERAPEUTIC EXERCISES: CPT | Performed by: PHYSICAL THERAPIST

## 2019-03-27 RX ORDER — MAGNESIUM HYDROXIDE/ALUMINUM HYDROXICE/SIMETHICONE 120; 1200; 1200 MG/30ML; MG/30ML; MG/30ML
30 SUSPENSION ORAL EVERY 4 HOURS PRN
Status: DISCONTINUED | OUTPATIENT
Start: 2019-03-27 | End: 2019-03-28 | Stop reason: HOSPADM

## 2019-03-27 RX ORDER — METHOCARBAMOL 500 MG/1
250 TABLET, FILM COATED ORAL EVERY 8 HOURS PRN
Status: DISCONTINUED | OUTPATIENT
Start: 2019-03-27 | End: 2019-03-28 | Stop reason: HOSPADM

## 2019-03-27 RX ORDER — HYDROMORPHONE HCL/PF 1 MG/ML
0.5 SYRINGE (ML) INJECTION EVERY 4 HOURS PRN
Status: DISCONTINUED | OUTPATIENT
Start: 2019-03-27 | End: 2019-03-27

## 2019-03-27 RX ORDER — ACETAMINOPHEN 325 MG/1
975 TABLET ORAL EVERY 8 HOURS PRN
Status: DISCONTINUED | OUTPATIENT
Start: 2019-03-27 | End: 2019-03-28 | Stop reason: HOSPADM

## 2019-03-27 RX ORDER — HYDROMORPHONE HCL/PF 1 MG/ML
0.5 SYRINGE (ML) INJECTION EVERY 4 HOURS PRN
Status: DISCONTINUED | OUTPATIENT
Start: 2019-03-27 | End: 2019-03-28 | Stop reason: HOSPADM

## 2019-03-27 RX ORDER — VENLAFAXINE HYDROCHLORIDE 37.5 MG/1
37.5 CAPSULE, EXTENDED RELEASE ORAL DAILY
Status: DISCONTINUED | OUTPATIENT
Start: 2019-03-28 | End: 2019-03-28 | Stop reason: HOSPADM

## 2019-03-27 RX ADMIN — Medication 1 TABLET: at 09:04

## 2019-03-27 RX ADMIN — METOPROLOL TARTRATE 25 MG: 25 TABLET, FILM COATED ORAL at 21:46

## 2019-03-27 RX ADMIN — GABAPENTIN 300 MG: 300 CAPSULE ORAL at 09:05

## 2019-03-27 RX ADMIN — ATORVASTATIN CALCIUM 40 MG: 40 TABLET, FILM COATED ORAL at 16:56

## 2019-03-27 RX ADMIN — METHOCARBAMOL 250 MG: 500 TABLET, FILM COATED ORAL at 14:37

## 2019-03-27 RX ADMIN — OXYCODONE HYDROCHLORIDE 10 MG: 5 TABLET ORAL at 21:46

## 2019-03-27 RX ADMIN — HYDROMORPHONE HYDROCHLORIDE 0.5 MG: 1 INJECTION, SOLUTION INTRAMUSCULAR; INTRAVENOUS; SUBCUTANEOUS at 06:11

## 2019-03-27 RX ADMIN — INSULIN GLARGINE 22 UNITS: 100 INJECTION, SOLUTION SUBCUTANEOUS at 21:46

## 2019-03-27 RX ADMIN — THIAMINE HCL TAB 100 MG 100 MG: 100 TAB at 09:05

## 2019-03-27 RX ADMIN — LEVOTHYROXINE SODIUM 175 MCG: 125 TABLET ORAL at 05:23

## 2019-03-27 RX ADMIN — GABAPENTIN 300 MG: 300 CAPSULE ORAL at 16:57

## 2019-03-27 RX ADMIN — HYDROMORPHONE HYDROCHLORIDE 0.5 MG: 1 INJECTION, SOLUTION INTRAMUSCULAR; INTRAVENOUS; SUBCUTANEOUS at 12:03

## 2019-03-27 RX ADMIN — ASPIRIN 81 MG 81 MG: 81 TABLET ORAL at 09:04

## 2019-03-27 RX ADMIN — CLOPIDOGREL BISULFATE 75 MG: 75 TABLET ORAL at 09:04

## 2019-03-27 RX ADMIN — INSULIN LISPRO 2 UNITS: 100 INJECTION, SOLUTION INTRAVENOUS; SUBCUTANEOUS at 08:49

## 2019-03-27 RX ADMIN — INSULIN LISPRO 4 UNITS: 100 INJECTION, SOLUTION INTRAVENOUS; SUBCUTANEOUS at 11:43

## 2019-03-27 RX ADMIN — OXYCODONE HYDROCHLORIDE 10 MG: 5 TABLET ORAL at 11:21

## 2019-03-27 RX ADMIN — OXYCODONE HYDROCHLORIDE 5 MG: 5 TABLET ORAL at 03:43

## 2019-03-27 RX ADMIN — FUROSEMIDE 20 MG: 20 TABLET ORAL at 09:04

## 2019-03-27 RX ADMIN — ACETAMINOPHEN 975 MG: 325 TABLET ORAL at 05:23

## 2019-03-27 RX ADMIN — FOLIC ACID 1 MG: 1 TABLET ORAL at 09:04

## 2019-03-27 RX ADMIN — POLYETHYLENE GLYCOL 3350 17 G: 17 POWDER, FOR SOLUTION ORAL at 09:05

## 2019-03-27 RX ADMIN — DOCUSATE SODIUM 100 MG: 100 CAPSULE, LIQUID FILLED ORAL at 16:57

## 2019-03-27 RX ADMIN — ISOSORBIDE MONONITRATE 30 MG: 30 TABLET, EXTENDED RELEASE ORAL at 09:04

## 2019-03-27 RX ADMIN — OXYCODONE HYDROCHLORIDE 10 MG: 5 TABLET ORAL at 16:56

## 2019-03-27 RX ADMIN — SENNOSIDES 17.2 MG: 8.6 TABLET, FILM COATED ORAL at 21:46

## 2019-03-27 RX ADMIN — ALUMINUM HYDROXIDE, MAGNESIUM HYDROXIDE, AND SIMETHICONE 30 ML: 200; 200; 20 SUSPENSION ORAL at 09:16

## 2019-03-27 RX ADMIN — VITAMIN D, TAB 1000IU (100/BT) 1000 UNITS: 25 TAB at 09:04

## 2019-03-27 RX ADMIN — INSULIN GLARGINE 22 UNITS: 100 INJECTION, SOLUTION SUBCUTANEOUS at 09:05

## 2019-03-27 RX ADMIN — METOPROLOL TARTRATE 25 MG: 25 TABLET, FILM COATED ORAL at 09:05

## 2019-03-27 NOTE — OCCUPATIONAL THERAPY NOTE
OT PROGRESS NOTE    Attended family meeting w/ pt, his spouse, and medical team regarding collaboration of care and timing of pain medication w/ therapy sessions while in the hospital and upon d/c  Discussed w/ pt, spouse, and team the need for bathroom DME and/or home OT services  Pt and spouse agreed that they have the equipment needed for home and spouse is available to assist as needed  OT will cont to follow to address previously determine goals while in the hospital  OT rec upon d/c is home w/ increased family support           Renny Kan, OTR/L

## 2019-03-27 NOTE — ASSESSMENT & PLAN NOTE
Lab Results   Component Value Date    HGBA1C 7 6 (H) 03/05/2019       Recent Labs     03/26/19  0634 03/26/19  1049 03/26/19  1641 03/26/19 2051   POCGLU 133 184* 129 137       Blood Sugar Average: Last 72 hrs:  (P) 070 [id number]     Plan:  --Endocrine management

## 2019-03-27 NOTE — ASSESSMENT & PLAN NOTE
AOID/PAD with claudication and left heel wound    S/P left femoral endarterectomy with bovine patch angioplasty, left femoral to AKA pop bypass with ringed [place]-Steve graft, completion arteriogram with left EIA PTA/stent- 3/22 [facility]    Plan:  --Family Mtg today at 1:30PM   --Podiatry following  --APS following; pain not well controlled this AM despite pain regimen Dilaudid 0 5mg IV q 4 added for now; APS to re-evaluate on rounds  --PT/OT  --case management for dispo planning

## 2019-03-27 NOTE — PROGRESS NOTES
Vascular Surgery      Progress Note - Palmira Shukla 1945, 68 y o  male MRN: [id number]    Unit/Bed#: [facility] 508-01 Encounter: [id number]    Primary Care Provider: Deanna Lovell MD   Date and time admitted to hospital: 3/22/2019  5:33 AM        * Atherosclerosis of artery of extremity with ulceration ([facility] )  Assessment & Plan  AOID/PAD with claudication and left heel wound    S/P left femoral endarterectomy with bovine patch angioplasty, left femoral to AKA pop bypass with ringed [place]-Steve graft, completion arteriogram with left EIA PTA/stent- 3/22 [facility]    Plan:  --Family Mtg today at 1:30PM   --Podiatry following  --APS following; pain not well controlled this AM despite pain regimen Dilaudid 0 5mg IV q 4 added for now; APS to re-evaluate on rounds  --PT/OT  --case management for dispo planning    NSTEMI (non-ST elevated myocardial infarction) [facility])  Assessment & Plan  NSTEMI in setting of Hx of CAD w/ prior stenting 10/2018 and CHF    Plan:  --Cardiology following  --Continue aspirin, Plavix, metoprolol, Lipitor, and Imdur  --Medical management of CAD    CKD (chronic kidney disease) stage 3, GFR 30-59 ml/min ([place] [address] )  Assessment & Plan    Plan:  --Nephrology following  --Lasix resumed  --Monitor BMP    Acute blood loss anemia  Assessment & Plan  Plan:  --Monitor    Type 1 diabetes mellitus ([place] [address] )  Assessment & Plan  Lab Results   Component Value Date    HGBA1C 7 6 (H) 03/05/2019       Recent Labs     03/26/19  0634 03/26/19  1049 03/26/19  1641 03/26/19  2051   POCGLU 133 184* 129 137       Blood Sugar Average: Last 72 hrs:  (P) 936 [id number]     Plan:  --Endocrine management      Subjective: On my arrival to patient's room this AM the patient was found to be inappropriately yelling at the nursing staff in his room  After introducing myself I asked him how he was doing today and he responded, "I'm terrible, Don't ask me how I'm doing"   He proceed to verbally attack the nurses as they attempted to relay to me that the patient had received his available pain regimen but was still complaining of pain in the Left groin incision site  He eventually relayed this information himself and denied any CP or SOB  Vitals:  /58   Pulse 78   Temp 98 1 °F (36 7 °C) (Axillary)   Resp 16   Ht 5' 7" (1 702 m)   Wt 93 3 kg (205 lb 11 oz)   SpO2 98%   BMI 32 22 kg/m²     I/Os:  I/O last 3 completed shifts:   In: 2219 5 [P O :2075; I V :144 5]  Out: 2416 [QHYDB:6505]  I/O this shift:  In: -   Out: 1250 [Urine:1250]    Lab Results and Cultures:   Lab Results   Component Value Date    WBC 4 98 03/26/2019    HGB 9 3 (L) 03/26/2019    HCT 28 9 (L) 03/26/2019    MCV 98 03/26/2019     (L) 03/26/2019     Lab Results   Component Value Date    GLUCOSE 207 (H) 03/22/2019    CALCIUM 8 5 03/26/2019     12/21/2015    K 4 1 03/26/2019    CO2 25 03/26/2019     03/26/2019    BUN 19 03/26/2019    CREATININE 1 43 (H) 03/26/2019     Lab Results   Component Value Date    INR 1 09 03/23/2019    INR 1 11 03/22/2019    INR 1 07 03/21/2019    PROTIME 14 2 03/23/2019    PROTIME 14 4 (H) 03/22/2019    PROTIME 13 6 03/21/2019        Blood Culture: No results found for: BLOODCX,   Urinalysis:   Lab Results   Component Value Date    COLORU Yellow 03/24/2019    CLARITYU Clear 03/24/2019    SPECGRAV 1 011 03/24/2019    PHUR 6 0 03/24/2019    PHUR 5 5 11/28/2018    LEUKOCYTESUR Negative 03/24/2019    NITRITE Negative 03/24/2019    GLUCOSEU Negative 03/24/2019    KETONESU Negative 03/24/2019    BILIRUBINUR Negative 03/24/2019    BLOODU Negative 03/24/2019   ,   Urine Culture:   Lab Results   Component Value Date    URINECX No Growth <1000 cfu/mL 03/05/2019   ,   Wound Culure: No results found for: WOUNDCULT    Medications:  Current Facility-Administered Medications   Medication Dose Route Frequency    acetaminophen (TYLENOL) tablet 975 mg  975 mg Oral Q8H [address]    aluminum-magnesium hydroxide-simethicone (MYLANTA) 200-200-20 mg/5 mL oral suspension 30 mL  30 mL Oral Q4H PRN    aspirin chewable tablet 81 mg  81 mg Oral Daily    atorvastatin (LIPITOR) tablet 40 mg  40 mg Oral Daily With Dinner    cholecalciferol (VITAMIN D3) tablet 1,000 Units  1,000 Units Oral Daily    clopidogrel (PLAVIX) tablet 75 mg  75 mg Oral Daily    docusate sodium (COLACE) capsule 100 mg  100 mg Oral BID    folic acid (FOLVITE) tablet 1 mg  1 mg Oral Daily    furosemide (LASIX) tablet 20 mg  20 mg Oral Daily    gabapentin (NEURONTIN) capsule 300 mg  300 mg Oral BID    HYDROmorphone (DILAUDID) injection 0 5 mg  0 5 mg Intravenous Q4H PRN    insulin glargine (LANTUS) subcutaneous injection 22 Units 0 22 mL  22 Units Subcutaneous Q12H [address]    insulin lispro (HumaLOG) 100 units/mL subcutaneous injection 1-10 Units  1-10 Units Subcutaneous TID With Meals    insulin lispro (HumaLOG) 100 units/mL subcutaneous injection 1-5 Units  1-5 Units Subcutaneous HS    insulin lispro (HumaLOG) 100 units/mL subcutaneous injection 2-15 Units  2-15 Units Subcutaneous TID With Meals    isosorbide mononitrate (IMDUR) 24 hr tablet 30 mg  30 mg Oral Daily    levalbuterol (XOPENEX) inhalation solution 1 25 mg  1 25 mg Nebulization Q6H PRN    levothyroxine tablet 175 mcg  175 mcg Oral Early Morning    metoprolol tartrate (LOPRESSOR) tablet 25 mg  25 mg Oral Q12H [address]    multivitamin-minerals (CENTRUM) tablet 1 tablet  1 tablet Oral Daily    nitroglycerin (NITROSTAT) SL tablet 0 4 mg  0 4 mg Sublingual Q5 Min PRN    ondansetron (ZOFRAN) injection 4 mg  4 mg Intravenous Q6H PRN    oxyCODONE (ROXICODONE) IR tablet 5 mg  5 mg Oral Q4H PRN    Or    oxyCODONE (ROXICODONE) IR tablet 10 mg  10 mg Oral Q4H PRN    polyethylene glycol (MIRALAX) packet 17 g  17 g Oral Daily    senna (SENOKOT) tablet 17 2 mg  2 tablet Oral HS    sodium chloride 0 9 % inhalation solution 3 mL  3 mL Nebulization Q6H PRN    thiamine (VITAMIN B1) tablet 100 mg  100 mg Oral Daily       Physical Exam:    General appearance: alert and oriented, in no acute distress  Lungs: clear to auscultation bilaterally  Heart: regular rate and rhythm, S1, S2 normal, no murmur, click, rub or gallop  Abdomen: soft, non-tender; bowel sounds normal; no masses,  no organomegaly  Extremities: Left foot warm, M/S intact, heel wound clean and dry, minimal edema    Wound/Incision:  LLE incision clean, dry, and intact    Pulse exam:  Graft: Left: doppler signal  DP: Left: 2+ and doppler signal  PT:  Left: 1+ and doppler signal      Melina Tavares, PA-C  3/27/2019

## 2019-03-27 NOTE — PLAN OF CARE
Problem: Prexisting or High Potential for Compromised Skin Integrity  Goal: Skin integrity is maintained or improved  Description  INTERVENTIONS:  - Identify patients at risk for skin breakdown  - Assess and monitor skin integrity  - Assess and monitor nutrition and hydration status  - Monitor labs (i e  albumin)  - Assess for incontinence   - Turn and reposition patient  - Assist with mobility/ambulation  - Relieve pressure over bony prominences  - Avoid friction and shearing  - Provide appropriate hygiene as needed including keeping skin clean and dry  - Evaluate need for skin moisturizer/barrier cream  - Collaborate with interdisciplinary team (i e  Nutrition, Rehabilitation, etc )   - Patient/family teaching  Outcome: Progressing     Problem: Potential for Falls  Goal: Patient will remain free of falls  Description  INTERVENTIONS:  - Assess patient frequently for physical needs  -  Identify cognitive and physical deficits and behaviors that affect risk of falls    -  [place] fall precautions as indicated by assessment   - Educate patient/family on patient safety including physical limitations  - Instruct patient to call for assistance with activity based on assessment  - Modify environment to reduce risk of injury  - Consider OT/PT consult to assist with strengthening/mobility  Outcome: Progressing     Problem: INFECTION - ADULT  Goal: Absence or prevention of progression during hospitalization  Description  INTERVENTIONS:  - Assess and monitor for signs and symptoms of infection  - Monitor lab/diagnostic results  - Monitor all insertion sites, i e  indwelling lines, tubes, and drains  - Monitor endotracheal (as able) and nasal secretions for changes in amount and color  - [place] appropriate cooling/warming therapies per order  - Administer medications as ordered  - Instruct and encourage patient and family to use good hand hygiene technique  - Identify and instruct in appropriate isolation precautions for identified infection/condition  Outcome: Progressing  Goal: Absence of fever/infection during neutropenic period  Description  INTERVENTIONS:  - Monitor WBC  - Implement neutropenic guidelines  Outcome: Progressing     Problem: SAFETY ADULT  Goal: Maintain or return to baseline ADL function  Description  INTERVENTIONS:  -  Assess patient's ability to carry out ADLs; assess patient's baseline for ADL function and identify physical deficits which impact ability to perform ADLs (bathing, care of mouth/teeth, toileting, grooming, dressing, etc )  - Assess/evaluate cause of self-care deficits   - Assess range of motion  - Assess patient's mobility; develop plan if impaired  - Assess patient's need for assistive devices and provide as appropriate  - Encourage maximum independence but intervene and supervise when necessary  ¯ Involve family in performance of ADLs  ¯ Assess for home care needs following discharge   ¯ Request OT consult to assist with ADL evaluation and planning for discharge  ¯ Provide patient education as appropriate  Outcome: Progressing  Goal: Maintain or return mobility status to optimal level  Description  INTERVENTIONS:  - Assess patient's baseline mobility status (ambulation, transfers, stairs, etc )    - Identify cognitive and physical deficits and behaviors that affect mobility  - Identify mobility aids required to assist with transfers and/or ambulation (gait belt, sit-to-stand, lift, walker, cane, etc )  - [place] fall precautions as indicated by assessment  - Record patient progress and toleration of activity level on Mobility SBAR; progress patient to next Phase/Stage  - Instruct patient to call for assistance with activity based on assessment  - Request Rehabilitation consult to assist with strengthening/weightbearing, etc   Outcome: Progressing     Problem: DISCHARGE PLANNING  Goal: Discharge to home or other facility with appropriate resources  Description  INTERVENTIONS:  - Identify barriers to discharge w/patient and caregiver  - Arrange for needed discharge resources and transportation as appropriate  - Identify discharge learning needs (meds, wound care, etc )  - Arrange for interpretive services to assist at discharge as needed  - Refer to Case Management Department for coordinating discharge planning if the patient needs post-hospital services based on physician/advanced practitioner order or complex needs related to functional status, cognitive ability, or social support system  Outcome: Progressing     Problem: Knowledge Deficit  Goal: Patient/family/caregiver demonstrates understanding of disease process, treatment plan, medications, and discharge instructions  Description  Complete learning assessment and assess knowledge base  Interventions:  - Provide teaching at level of understanding  - Provide teaching via preferred learning methods  Outcome: Progressing     Problem: CARDIOVASCULAR - ADULT  Goal: Maintains optimal cardiac output and hemodynamic stability  Description  INTERVENTIONS:  - Monitor I/O, vital signs and rhythm  - Monitor for S/S and trends of decreased cardiac output i e  bleeding, hypotension  - Administer and titrate ordered vasoactive medications to optimize hemodynamic stability  - Assess quality of pulses, skin color and temperature  - Assess for signs of decreased coronary artery perfusion - ex   Angina  - Instruct patient to report change in severity of symptoms  Outcome: Progressing  Goal: Absence of cardiac dysrhythmias or at baseline rhythm  Description  INTERVENTIONS:  - Continuous cardiac monitoring, monitor vital signs, obtain 12 lead EKG if indicated  - Administer antiarrhythmic and heart rate control medications as ordered  - Monitor electrolytes and administer replacement therapy as ordered  Outcome: Progressing     Problem: DISCHARGE PLANNING - CARE MANAGEMENT  Goal: Discharge to post-acute care or home with appropriate resources  Description  INTERVENTIONS:  - Conduct assessment to determine patient/family and health care team treatment goals, and need for post-acute services based on payer coverage, community resources, and patient preferences, and barriers to discharge  - Address psychosocial, clinical, and financial barriers to discharge as identified in assessment in conjunction with the patient/family and health care team  - Arrange appropriate level of post-acute services according to patient's   needs and preference and payer coverage in collaboration with the physician and health care team  - Communicate with and update the patient/family, physician, and health care team regarding progress on the discharge plan  - Arrange appropriate transportation to post-acute venues   Outcome: Progressing     Problem: Nutrition/Hydration-ADULT  Goal: Nutrient/Hydration intake appropriate for improving, restoring or maintaining nutritional needs  Description  Monitor and assess patient's nutrition/hydration status for malnutrition (ex- brittle hair, bruises, dry skin, pale skin and conjunctiva, muscle wasting, smooth red tongue, and disorientation)  Collaborate with interdisciplinary team and initiate plan and interventions as ordered  Monitor patient's weight and dietary intake as ordered or per policy  Utilize nutrition screening tool and intervene per policy  Determine patient's food preferences and provide high-protein, high-caloric foods as appropriate       INTERVENTIONS:  - Monitor oral intake, urinary output, labs, and treatment plans  - Assess nutrition and hydration status and recommend course of action  - Evaluate amount of meals eaten  - Assist patient with eating if necessary   - Allow adequate time for meals  - Recommend/ encourage appropriate diets, oral nutritional supplements, and vitamin/mineral supplements  - Order, calculate, and assess calorie counts as needed  - Recommend, monitor, and adjust tube feedings and TPN/PPN based on assessed needs  - Assess need for intravenous fluids  - Provide specific nutrition/hydration education as appropriate  - Include patient/family/caregiver in decisions related to nutrition  Outcome: Progressing

## 2019-03-27 NOTE — PROGRESS NOTES
NEPHROLOGY PROGRESS NOTE   Kailyn Peterson 68 y o  male MRN: [id number]  Unit/Bed#: [facility] 508-01 Encounter: [id number]      ASSESSMENT/PLAN:  1  CKD stage 3:  Baseline creatinine 1 2-1 6 and seems to fluctuate based on volume  Creatinine 1 5 today from 1 4 yesterday likely due to restarting diuretics yesterday    -continue to trend BMP   -currently no plan for cardiac catheterization so consider restarting diuretics    -follows outpatient with Dr Dragan Zelaya   2  PAD with L heel ulcer: Status post left femoral endarterectomy with angioplasty, left femoral bypass, completion angiogram and stent placed  3  History of multivessel CAD:  Per Cardiology no plan for cardiac catheterization and will manage medically at this point in time   -started on imdur today by cardiology   4  Cardiomyopathy:  Ejection fraction 45-50%  Patient does have some edema which is related to the procedures but otherwise volume status is good    -restarted home lasix 20mg daily yesterday    5  Hypertension:  Blood pressure occasionally a little low but for the most part is acceptable    6  Anemia: hgb 9 2 which is stable         SUBJECTIVE:  Complains of heart burn  Had Mylanta with no improvement  No shortness of breath       OBJECTIVE:  Current Weight: Weight - Scale: 93 3 kg (205 lb 11 oz)  Vitals:    03/27/19 0724   BP: 128/63   Pulse: 71   Resp: 18   Temp: 97 9 °F (36 6 °C)   SpO2: 96%       Intake/Output Summary (Last 24 hours) at 3/27/2019 5718  Last data filed at 3/27/2019 8120  Gross per 24 hour   Intake 572 ml   Output 3175 ml   Net -2603 ml     General: no acute distress   Skin: no rash   Eyes: sclera anicteric   ENT: moist mucous membranes   Neck: supple, symmetric   Chest: clear to auscultation    CVS: regular rate and rhythm  Abdomen: soft, non-tender, non-distended   Extremities: +LLE edema, no RLE edema    Neuro: awake and alert  Psych: appropriate affect    Medications:  Scheduled Meds:    Current Facility-Administered Medications:  acetaminophen 975 mg Oral Q8H [facility] Toshia Mayur, PA-GEORGI   aluminum-magnesium hydroxide-simethicone 30 mL Oral Q4H PRN Linda Layer, PA-C   aluminum-magnesium hydroxide-simethicone 30 mL Oral Q4H PRN Marianna Hobbs, PA-C   aspirin 81 mg Oral Daily Linda Layer, PA-C   atorvastatin 40 mg Oral Daily With Gupta Soup, PA-C   cholecalciferol 1,000 Units Oral Daily Linda Layer, PA-C   clopidogrel 75 mg Oral Daily Linda Layer, PA-C   docusate sodium 100 mg Oral BID Linda Layer, PA-C   folic acid 1 mg Oral Daily Linda Layer, PA-C   furosemide 20 mg Oral Daily Olayinka Damascus, PA-C   gabapentin 300 mg Oral BID Roxianne Bun, PA-C   HYDROmorphone 0 5 mg Intravenous Q4H PRN Marianna Hobbs, PA-GEORGI   insulin glargine 22 Units Subcutaneous Q12H [facility] Leandro Juarez MD   insulin lispro 1-10 Units Subcutaneous TID With Meals Leandro Juarez MD   insulin lispro 1-5 Units Subcutaneous HS Linda Layer, MAGO   insulin lispro 2-15 Units Subcutaneous TID With Meals Leandro Juarez MD   isosorbide mononitrate 30 mg Oral Daily Sherice Gandhi MD   levalbuterol 1 25 mg Nebulization Q6H PRN Linda Layer, PA-GEORGI   levothyroxine 175 mcg Oral Early Morning Linda Layer, PA-GEORGI   metoprolol tartrate 25 mg Oral Q12H [facility] Sherice Gandhi MD   multivitamin-minerals 1 tablet Oral Daily Linda Layer, MAGO   nitroglycerin 0 4 mg Sublingual Q5 Min PRN Sherice Gandhi MD   ondansetron 4 mg Intravenous Q6H PRN Linda Layer, MAGO   oxyCODONE 5 mg Oral Q4H PRN Linda Layer, MAGO   Or       oxyCODONE 10 mg Oral Q4H PRN Linda Layer, PA-C   polyethylene glycol 17 g Oral Daily Linda Layer, PA-C   senna 2 tablet Oral HS Lnida Layer, PA-C   sodium chloride 3 mL Nebulization Q6H PRN Linda Layer, PA-C   thiamine 100 mg Oral Daily Linda Layer, PA-C       PRN Meds: aluminum-magnesium hydroxide-simethicone    aluminum-magnesium hydroxide-simethicone   HYDROmorphone    levalbuterol    nitroglycerin    ondansetron    oxyCODONE **OR** oxyCODONE    sodium chloride    Laboratory Results:  Results from last 7 days   Lab Units 03/27/19  0549 03/26/19  0514 03/25/19  0539 03/24/19  0336 03/23/19  1750 03/23/19  0530 03/23/19  0049 03/22/19  1615  03/22/19  1614 03/22/19  1330 03/22/19  1126 03/22/19  0830  03/21/19  1418   WBC Thousand/uL 4 96 4 98 6 30 6 23  --  5 55 5 90 7 45  --   --   --   --   --   --   --    HEMOGLOBIN g/dL 9 2* 9 3* 9 7* 9 7* 9 7* 8 3* 8 7* 9 3*   < >  --   --   --   --   --   --    I STAT HEMOGLOBIN g/dl  --   --   --   --   --   --   --   --   --   --  9 2* 9 9* 10 2*   < >  --    HEMATOCRIT % 28 9* 28 9* 29 7* 28 7* 29 6* 25 4* 27 0* 28 7*   < >  --   --   --   --   --   --    HEMATOCRIT, ISTAT %  --   --   --   --   --   --   --   --   --   --  27* 29* 30*   < >  --    PLATELETS Thousands/uL 112* 117* 112* 106*  --  123* 124* 120*  --   --   --   --   --   --   --    SODIUM mmol/L 135* 136 133* 137  --  138  --   --   --  139  --   --   --   --  142   POTASSIUM mmol/L 4 6 4 1 4 2 4 0  --  4 3  --   --   --  4 7  --   --   --   --  4 9   CHLORIDE mmol/L 105 105 103 106  --  109*  --   --   --  112*  --   --   --   --  106   CO2 mmol/L 25 25 24 23  --  24  --   --   --  21  --   --   --   --  28   CO2, I-STAT mmol/L  --   --   --   --   --   --   --   --   --   --  21 21 24   < >  --    BUN mg/dL 20 19 19 23  --  30*  --   --   --  38*  --   --   --   --  36*   CREATININE mg/dL 1 51* 1 43* 1 32* 1 33*  --  1 47*  --   --   --  1 56*  --   --   --   --  1 59*   CALCIUM mg/dL 8 8 8 5 8 4 7 9*  --  7 9*  --   --   --  8 1*  --   --   --   --  9 5   MAGNESIUM mg/dL  --   --  2 2 2 3  --  2 1  --   --   --  2 1  --   --   --   --  2 3   PHOSPHORUS mg/dL  --   --  3 2 2 8  --  2 9  --   --   --  3 5  --   --   --   --  4 1   GLUCOSE, ISTAT mg/dl  --   --   --   --   --   --   --   --   --   --  207* 187* 178*  --   --     < > = values in this interval not displayed

## 2019-03-27 NOTE — ASSESSMENT & PLAN NOTE
NSTEMI in setting of Hx of CAD w/ prior stenting 10/2018 and CHF    Plan:  --Cardiology following  --Continue aspirin, Plavix, metoprolol, Lipitor, and Imdur  --Medical management of CAD

## 2019-03-27 NOTE — PHYSICAL THERAPY NOTE
Physical Therapy Progress Note     03/27/19 1058   Pain Assessment   Pain Assessment FLACC   Pain Location Knee   Pain Orientation Left   Pain Rating: FLACC (Rest) - Face 1   Pain Rating: FLACC (Rest) - Legs 0   Pain Rating: FLACC (Rest) - Activity 0   Pain Rating: FLACC (Rest) - Cry 1   Pain Rating: FLACC (Rest) - Consolability 1   Score: FLACC (Rest) 3   Restrictions/Precautions   Weight Bearing Precautions Per Order Yes   LLE Weight Bearing Per Order WBAT   Braces or Orthoses   (globopedi shoe L)   Other Precautions Fall Risk;Pain   General   Chart Reviewed Yes   Response to Previous Treatment Patient with no complaints from previous session  Family/Caregiver Present Yes   Cognition   Overall Cognitive Status WFL   Orientation Level Oriented X4   Subjective   Subjective feels off balance with shoe on  is walking to bathroom without device  Bed Mobility   Supine to Sit 5  Supervision   Sit to Supine 5  Supervision   Transfers   Sit to Stand 5  Supervision   Stand to Sit 5  Supervision   Ambulation/Elevation   Gait pattern Excessively slow; Inconsistent mellisa   Gait Assistance 5  Supervision   Assistive Device Straight cane   Distance 50' x2   Stair Management Assistance 5  Supervision   Additional items Verbal cues   Stair Management Technique Step to pattern; One rail R;With cane; Foreward   Number of Stairs 7   Balance   Static Sitting Normal   Dynamic Sitting Good   Static Standing Fair +   Dynamic Standing Fair   Ambulatory Fair   Endurance Deficit   Endurance Deficit Yes   Endurance Deficit Description l knee joint pain- medial joint line   Activity Tolerance   Activity Tolerance Patient limited by pain; Patient limited by fatigue;Patient tolerated treatment well   Exercises   Knee AROM Long Arc Quad Sitting;10 reps;AROM; Bilateral   Ankle Pumps Sitting;10 reps;AROM; Bilateral   Marching Sitting;10 reps;AROM; Bilateral   Assessment   Prognosis Good   Problem List Decreased strength;Decreased range of motion;Decreased endurance; Impaired balance;Decreased mobility; Decreased safety awareness;Decreased skin integrity;Pain   Assessment pt progressed to spc for amb  pt and wife instructed in use on level surfaces and stairs  pt  also performed limited ther ex in chair  tolerated well  pt provided with handout  pt tolerated all activity well and reports being able to amb farther than prior to surgery due to claudication  pt did report point tenderness medial joint line L knee, short duration but new since yesterday  knee was examined, stable and point tender at joint line, no effusion  pt will be able to return home with wife, recommend PT   Barriers to Discharge None   Goals   Patient Goals get back to playing golf   STG Expiration Date 04/05/19   Treatment Day 2   Plan   Treatment/Interventions LE strengthening/ROM; Elevations; Therapeutic exercise; Endurance training;Patient/family training;Gait training;Spoke to nursing;Spoke to case management   Progress Improving as expected   PT Frequency   (3-5x/week)   Recommendation   Recommendation Home with family support;Home PT;Outpatient PT   PT - OK to Discharge Yes     Nayely Lane, PT

## 2019-03-27 NOTE — PROGRESS NOTES
Cardiology Progress Note - Roopa March 68 y o  male MRN: [id number]    Unit/Bed#: [facility] 508-01 Encounter: [id number]      Assessment:  Principal Problem: Atherosclerosis of artery of extremity with ulceration ([facility])  Active Problems:    Type 1 diabetes mellitus ([facility])    CKD (chronic kidney disease) stage 3, GFR 30-59 ml/min ([facility])    NSTEMI (non-ST elevated myocardial infarction) ([facility] [address] )    Acute blood loss anemia    Hyponatremia      Plan:  Patient is comfortable today  He has no chest pain or significant dyspnea  He is in sinus rhythm on telemetry  Nephrology note appreciated  Agree with current medical regimen  Patient continues on dual anti-platelet therapy and statin  He is now on Imdur and increase dose metoprolol  Vital signs are stable today  BMP today shows a potassium of 4 6 with a creatinine of 1 5  Subjective:   Patient seen and examined  No significant events overnight   negative  Objective:     Vitals: Blood pressure 128/63, pulse 71, temperature 97 9 °F (36 6 °C), temperature source Axillary, resp  rate 18, height 5' 7" (1 702 m), weight 93 3 kg (205 lb 11 oz), SpO2 96 %  , Body mass index is 32 22 kg/m² ,   Orthostatic Blood Pressures      Most Recent Value   Blood Pressure  128/63 filed at 03/27/2019 0724   Patient Position - Orthostatic VS  Lying filed at 03/27/2019 0724      ,      Intake/Output Summary (Last 24 hours) at 3/27/2019 1009  Last data filed at 3/27/2019 0919  Gross per 24 hour   Intake 572 ml   Output 3175 ml   Net -2603 ml       No significant arrhythmias seen on telemetry review         Physical Exam:    GEN: Roopa March appears well, alert and oriented x 3, pleasant and cooperative   NECK: supple, no carotid bruits, no JVD or HJR  HEART: normal rate, regular rhythm, normal S1 and S2, no murmurs, clicks, gallops or rubs   LUNGS: clear to auscultation bilaterally; no wheezes, rales, or rhonchi   Labs & Results:    Admission on 03/22/2019   No results displayed because visit has over 200 results  Xr Or Angio Leg Lt    Result Date: 3/25/2019  Narrative: C-ARM - intraoperative arteriogram INDICATION:  Atherosclerosis of artery of extremity with ulceration ([address] ) [I70 299, L98 151]  Procedure guidance  Occlusion of superficial femoral artery in a patient with previously placed superficial femoral artery and popliteal artery stents COMPARISON:  Arteriogram from March 14, 2019 TECHNIQUE: FLUOROSCOPY TIME:   13 minutes 34 seconds 34 FLUOROSCOPIC IMAGES FINDINGS: Fluoroscopic guidance provided for surgical procedure  A synthetic bypass graft has been placed  The proximal anastomosis, at the level of the obturator foramen, opacifies well  The distal anastomosis, just above the knee, shows no technical issue Balloon angioplasty is been performed of the inflow, with subsequent stent placement Osseous and soft tissue detail limited by technique  Impression: Fluoroscopic guidance provided for surgical procedure  Please refer to the separate procedure notes for additional details  Combined iliac stenting and femoral-popliteal bypass graft Workstation performed: [id number]     Vas Abdominal Aorta/iliacs; Complete Study    Result Date: 3/5/2019  Narrative:  [facility] REPORT CLINICAL: Indications:  Evaluate for progression of aortoiliac occlusive disease  Patient is complains of bilateral leg cramping, and now presents with a left heel ulcer  Patient states there is minimal change in his walking as he still can only walk short distances, and needs to rest  Operative History: 2018-05-17 Transluminal placement of iliac stent, percutaneous 2015-09-23 Cardiac angioplasty and stent placement 2014-02-25 Left Transluminal placement of stent, percutaneous, SFA 2012-04-02 Aortoiliac angioplasty with stent placement of iliac arteries Risk Factors: Obesity, HTN, Diabetes (Yes), HLD, CKD, PAD, CAD and previous smoking (quit >10yrs ago)   The patient current BMI is 28 73, Weight is 178 lb and height is 66 in  Clinical Right Pressure:  154/ mm Hg, Left Pressure:  152/ mm Hg  FINDINGS:  Unilateral     Impression  PSV (cm/s)  EDV (cm/s)  Sup-Aneta Ao                         78              Px Inf-Julian Ao                      60          11  Ds Inf-Julian Ao                      37          11  Celiac                             91          16  Prox  SMA      >70%               268          24   Right              Impression  PSV (cm/s)  EDV (cm/s)  Prox ISIAH - Stent                      205          28  Dist ISIAH - Stent                      151          21  Prox  EIA - Stent  50-75%             301          46  Dist EIA - Stent                      145          26   Left               Impression  PSV (cm/s)  EDV (cm/s)  Prox ISIAH - Stent                      174          28  Dist ISIAH - Stent                      148          23  Internal Iliac     >70%               449          93  Prox  EIA - Stent                     140          26  Dist EIA - Stent                      188          24     CONCLUSION:  Impression The abdominal aorta appears to be patent  The right and left common iliac arteries and stents appear to be patent  The left external artery and stent appear to be patent  Findings suggest a >75% stenosis of the left internal iliac artery  Findings suggest a 50-75% stenosis of the proximal right external iliac artery and stent  There is a known >70% stenosis of the superior mesenteric artery  The bilateral renal arteries were not visualized  Ankle/Brachial indices are RT- 0 32 ( Prior 0 54), and LT- 0 22 ( Prior 0 47)  In comparison to the study of 08/15/2018, there is stenosis noted in the right EIA, the left IIA, and a significant decrease in TERRI's    SIGNATURE: Electronically Signed by: Maral Riggins on 2019-03-05 11:11:30 AM    Vas Carotid Complete Study    Result Date: 3/5/2019  Narrative:  [facility] REPORT CLINICAL: Indications:  6 month surveillance of carotid artery disease  Patient is asymptomatic at this time  Operative History: 2018-05-17 Transluminal placement of iliac stent, percutaneous 2015-09-23 Cardiac angioplasty and stent placement 2014-02-25 Left Transluminal placement of stent, percutaneous, SFA 2012-04-02 Aortoiliac angioplasty with stent placement of iliac arteries Risk Factors: Obesity, HTN, Diabetes (Yes), HLD, CKD, PAD, CAD and previous smoking (quit >10yrs ago)  The patient current BMI is 28 73, Weight is 178 lb and height is 66 in  Clinical Right Pressure:  154/ mm Hg, Left Pressure:  152/ mm Hg  FINDINGS:  Right        Impression  PSV  EDV (cm/s)  Direction of Flow  Ratio  Dist  ICA                109          25                      2 23  Mid  ICA                 136          32                            Prox  ICA    70%+        281          64                      5 73  Dist CCA                  56          13                            Mid CCA                   49          11                      0 85  Prox CCA                  57          12                            Ext Carotid              110          17                      2 24  Prox Vert                 39          11  Antegrade                 Subclavian                94           0                             Left         Impression  PSV  EDV (cm/s)  Direction of Flow  Ratio  Dist  ICA                 80          23                      1 08  Mid  ICA                 121          28                      1 64  Prox   ICA    50 - 69%    165          37                      2 24  Dist CCA                  66           9                            Mid CCA                   74          12                      1 15  Prox CCA                  64          12                            Ext Carotid  Moderate    258           0                      3 51  Prox Vert                 70          10  Antegrade                 Subclavian               150           0 CONCLUSION:  Impression RIGHT: There is >70% stenosis noted in the internal carotid artery  Plaque is calcified and irregular  Vertebral artery flow is antegrade  Findings suggestive of subclavian artery disease  LEFT: There is 50-69% stenosis noted in the internal carotid artery  Plaque is calcified and irregular  Moderate external carotid artery disease is noted  Vertebral artery flow is antegrade  Findings suggestive of subclavian artery disease  Compared to previous study on 5/29/2018, there is no significant change noted  Tech note: This exam was technically limited due to patient's body habitus, and continuous snoring  Recommend repeat testing in 6month as per protocol unless otherwise indicated  Internal carotid artery stenosis determination by consensus criteria from: Ananda Steward , et al  Carotid Artery Stenosis: Gray-Scale and Doppler US Diagnosis - Society of Radiologists in [facility] [address], Radiology 2003; [phone]  SIGNATURE: Electronically Signed by: Jewel Acosta on 2019-03-05 12:39:40 PM    Vas Lower Limb Arterial Duplex, Complete Bilateral    Result Date: 3/5/2019  Narrative:  [facility] REPORT CLINICAL: Indications: Atherosclerosis with Claudication [I70 219]  Evaluate for progression of peripheral arterial disease  Patient is complains of bilateral leg cramping, and now presents with a left heel ulcer  Patient states there is minimal change in his walking as he still can only walk short distances, and needs to rest  Operative History: 2018-05-17 Transluminal placement of iliac stent, percutaneous 2015-09-23 Cardiac angioplasty and stent placement 2014-02-25 Left Transluminal placement of stent, percutaneous, SFA 2012-04-02 Aortoiliac angioplasty with stent placement of iliac arteries Risk Factors: Obesity, HTN, Diabetes (Yes), HLD, CKD, PAD, CAD and previous smoking (quit >10yrs ago)  The patient current BMI is 28 73, Weight is 178 lb and height is 66 in  Clinical Right Pressure:  154/ mm Hg, Left Pressure:  152/ mm Hg  FINDINGS:  Right                  Impression  Waveform    PSV  EDV  Post  Tibial                       Continuous            Ant  Tibial                        Continuous            Common Femoral Artery                          111   27  Prox Profunda          >75%                    653   49  Prox SFA               >75%                    379   77  Mid SFA                Occluded                  0    0  Dist SFA                                       120   45  Proximal Pop                                    68   28  Distal Pop                                      48   17  Tibioperoneal                                   74       Dist Post Tibial                                37   13  Dist  Ant  Tibial                               31   13  Dist Peroneal                                   16    0   Left                   Impression  Waveform    PSV  EDV  Post  Tibial                       Continuous            Ant  Tibial                        Continuous            Common Femoral Artery  50-75%                  243   48  Prox Profunda                                  192   29  Prox SFA                                       153   29  Mid SFA                                         19    0  Dist SFA               Occluded                  0    0  Proximal Pop                                    31   15  Distal Pop                                      37   18  Tibioperoneal                                   39       Dist Post Tibial                                38   22  Dist  Ant  Tibial                               15   10     CONCLUSION:  Impression: RIGHT LOWER LIMB: There is an occlusion of the mid superficial femoral artery  There is a >75% stenosis of the profunda femoris, and proximal superficial femoral artery  Findings suggest tibioperoneal disease  Ankle/Brachial index: 0 32 Prior 0 54 PVR/ PPG tracings are severely dampened   Metatarsal pressure of 29mmHg Great toe pressure of  0mmHg, within the healing range, Prior 64mmHg  LEFT LOWER LIMB: There is an occlusion of the distal superficial femoral artery  There is a 50-75% stenosis of the common femoral artery  Findings suggest tibioperoneal disease  Ankle/Brachial index: 0 22 , Prior 0 47 PVR/ PPG tracings are severely dampened  Metatarsal pressure of 25mmHg Great toe pressure of 0mmHg, within the healing range,  Prior 36mmHg  Compared to previous duplex study on 02/20/2018 , there is progression bilaterally  Recommend repeat testing in 1year as per protocol unless otherwise indicated  SIGNATURE: Electronically Signed by: Renetta Sanchez on 2019-03-05 11:13:17 AM    Vas Lower Limb Vein Mapping Bypass Graft    Result Date: 3/18/2019  Narrative:  [facility] REPORT CLINICAL: Indications: Bilateral Other Specified Pre-Operative Examination [Z01 818]  Pre-op Vein Mapping for Future Lower Extremity Bypass Graft possibly on friday the 22nd Operative History: 2018-05-17 Transluminal placement of iliac stent, percutaneous 2015-09-23 Cardiac angioplasty and stent placement 2014-02-25 Left Transluminal placement of stent, percutaneous, SFA 2012-04-02 Aortoiliac angioplasty with stent placement of iliac arteries Risk Factors The patient has history of Obesity, HTN, Diabetes (Yes), HLD, CKD, PAD, CAD and previous smoking (quit >10yrs ago)    FINDINGS:  Segment         Right        Left                            Diameter AP  Diameter AP  DVT                  GSV Inguinal            6 4          4 5                       GSV Prox Thigh          4 2          2 9                       GSV Mid Thigh           3 3          3 1                       GSV Dist Thigh          2 8               Chronic - Occlusive  GSV Knee                3 0               Chronic - Occlusive  GSV Prox Calf           3 1               Chronic - Occlusive  GSV Mid Calf            3 4               Chronic - Occlusive  GSV Dist Calf           2 8          2 8                       GSV Ankle               3 5          3 4                          CONCLUSION: Impression: RIGHT LOWER LIMB: The great saphenous vein is patent  from the groin to the ankle  The vein is of adequate caliber and quality for graft conduit with intraluminal diameters ranging from 2 8mm to 4 2mm throughout the leg  There are several branches coming off of the greater saphenous vein at the proximal thigh, distal thigh, proximal and mid calf  LEFT LOWER LIMB: The great saphenous vein is patent from the groin to the mid thigh  There is chronic thrombus identified in the greater saphenous from the distal thigh to the proximal calf  Tech Note:  Walter Jones RN was notified of these findings  Mar 18 2019 10:35AM  SIGNATURE: Electronically Signed by: Iona Allen on 2019 12:52:33 PM    Ir Abdominal Angiography / Intervention    Result Date: 3/18/2019  Narrative: OPERATIVE REPORT PATIENT NAME: Tomeka Navarrete  :  1945 MRN: [id number] Pt Location:  [facility]   SURGERY DATE: 3/14/2019   Surgeon:  Raffy Wilson MD   Preoperative and postoperative diagnosis: 1  Atherosclerotic occlusive disease with rest pain and left foot ulceration 2  Atherosclerotic occlusive disease with severe right lower extremity claudication   Procedure: 1  Ultrasound-guided right common femoral artery puncture 2  Bilateral lower extremity runoff    Flouro Time:  36 4 min   Contrast:  43 cc Visipaque 320   Sedation Time:  100 min   Estimated Blood Loss: Minimal   Anesthesia Type: Conscious sedation   Operative Indications: 51-year-old with long history of severe peripheral arterial occlusive disease presents with worsening symptoms of left leg  He has progressed from claudication to rest pain with a area of ulceration on the left heel  He also has severe claudication of the right lower extremity    Operative Findings:  On the left there is eccentric calcific disease of the common femoral artery with less than 50% stenosis  The deep femoral artery is occluded proximally and reconstitutes  The superficial femoral artery is occluded in the mid segment and reconstitutes in the mid portion of the distal superficial femoral artery stent  The above knee popliteal artery is then patent and relatively normal in appearance with 3 vessel runoff    On the right there is eccentric highly calcified plaque creating a 75% stenosis in the proximal deep and superficial femoral arteries  There is a segmental occlusion of the superficial femoral artery  The above knee popliteal artery is then relatively normal in appearance with a normal trifurcation    Complications: None   Procedure and Technique:   IV sedation was administered  The right groin was prepped and draped in the normal sterile fashion and Ioban drape was placed    Ultrasound guidance was utilized to puncture the right common femoral artery  A micropuncture system was utilized  The right common femoral artery was directly imaged under B-mode imaging  A Datanomic wire was then placed and a sheath was inserted    The left common iliac artery was then cannulated with a Contra catheter  The catheter was then advanced into the distal external iliac artery  Oblique images were obtained of the femoral bifurcation  Runoff arteriography was then performed with stepped hand injections    A 7 Mexican sheath was then positioned in the proximal superficial femoral artery  Multiple guidewires and catheters were then used in an attempt to cross the superficial femoral artery occlusion    Attempts to re-enter the true lumen within the distal superficial femoral artery stent were made with a Outback catheter    Completion images were obtained to include runoff to the foot    The sheath was then withdrawn to the ipsilateral external iliac artery and oblique images were obtained of the femoral bifurcation and stepped images were obtained through to the level of the tibial trifurcation    A Star closure device was used to close the right femoral puncture site    Findings:   There was significant calcific disease within the common femoral artery  The artery was punctured in a region devoid of any significant disease    On the left there is eccentric calcific disease of the common femoral artery with less than 50% stenosis  The deep femoral artery is occluded proximally and reconstitutes  The superficial femoral artery is occluded in the mid segment and reconstitutes in the mid portion of the distal superficial femoral artery stent  The above knee popliteal artery is then patent and relatively normal in appearance with 3 vessel runoff    Multiple attempts to cross this segmental occlusion with multiple catheters and wires to include an Outback catheter failed to obtain re-entry into the true lumen/the lumen of the distal superficial femoral artery stent    On the right there is eccentric highly calcified plaque creating a 75% stenosis in the proximal deep and superficial femoral arteries  There is a segmental occlusion of the superficial femoral artery    The above knee popliteal artery is then relatively normal in appearance with a normal trifurcation      Conclusion: On the left secondary to the deep femoral artery occlusion and failed attempt to cross the superficial femoral artery occlusion is felt the best option for revascularization would be femoral endarterectomy to restore flow in the deep femoral artery and femoral-above knee popliteal artery bypass    Following the procedure the vascular exam of both lower extremities was unchanged from pre procedure      I was present for the entire procedure   Patient Disposition: APU   SIGNATURE: Jamal Etienne MD DATE: March 14, 2019 TIME: 11:15 AM   Electronically signed by Jamal Etienne MD at 3/14/2019 11:31 AM       EKG personally reviewed by Ernesto Danielle MD      Counseling / Coordination of Care  Total floor / unit time spent today 30 minutes  Greater than 50% of total time was spent with the patient and / or family counseling and / or coordination of care

## 2019-03-27 NOTE — PHYSICAL THERAPY NOTE
PHYSICAL THERAPY NOTE          Patient Name: Mansi Triplett  LUJXT'L Date: 3/27/2019   Attended family meeting with pt and wife, garnered input regarding their concerns about pain medications and timing with PT and daily activities  Pt provided with written handout for HEP,to reviewed next PT session  Pt and wife in agreement for d/c home with home PT  Time in 1330, time out 1405    Aiyana Roy, PT

## 2019-03-27 NOTE — SOCIAL WORK
Family meeting held with pt, spouse Roopa Dc, Dr Lynne , Dr Juan Singh, PA Julius Girard and Griselda Kat, [place] [id number], therapist Norberto Dolan and Antonio Gillespie and Cm  Questions regarding care and medication regimen at home were addressed  Pt to be on oxycodone, gabapentin with PRN tylenol at home and encouraged to premedicate prior to activity  Pt and spouse agreeable to [place] and would like referral to [facility] for nursing and home PT  Referral made to [facility] and pt and spouse encouraged to ask questions if anything else arises

## 2019-03-27 NOTE — CONSULTS
Progress Note - Anesthesia Acute Pain Management    Zia Crowe 68 y o  male MRN: [id number]  Unit/Bed#: [facility] 508-01 Encounter: [id number]      Assessment:   Principal Problem: Atherosclerosis of artery of extremity with ulceration ([facility])  Active Problems:    Type 1 diabetes mellitus ([facility])    CKD (chronic kidney disease) stage 3, GFR 30-59 ml/min ([facility])    NSTEMI (non-ST elevated myocardial infarction) ([facility])    Acute blood loss anemia    Hyponatremia      Pain History  Current pain location(s): LLE  Pain Scale:   2/10  Current Analgesic regimen: Tylenol 975mg q8h, gabapentin 300mg q12h, oxycodone 5-10mg q4h PRN, dilaudid IV 0 5mg q4h PRN  24 hour history: Patient reports good pain control this morning  He was walking up and down the hallway this morning and stated that he was comfortable doing so  His pain is well controlled with PO oxycodone, he did have to use one dose of IV dilaudid last night stating that his pain prevented him from sleeping  After his dose he was able to sleep well      Meds/Allergies     Allergies   Allergen Reactions    Doxazosin     Iohexol     Cardura [Doxazosin Mesylate]      Muscle cramps    Other      Iv dye for cardiac cath  Renal failure very close to dialysis  But no dialysis    Zestril [Lisinopril]      cough       Objective     Temp:  [97 9 °F (36 6 °C)-98 6 °F (37 °C)] 97 9 °F (36 6 °C)  HR:  [69-78] 71  Resp:  [16-18] 18  BP: ()/(51-63) 128/63      Intake/Output Summary (Last 24 hours) at 3/27/2019 1123  Last data filed at 3/27/2019 0919  Gross per 24 hour   Intake 572 ml   Output 3075 ml   Net -2503 ml                 Physical Exam: /63 (BP Location: Left arm)   Pulse 71   Temp 97 9 °F (36 6 °C) (Axillary)   Resp 18   Ht 5' 7" (1 702 m)   Wt 93 3 kg (205 lb 11 oz)   SpO2 96%   BMI 32 22 kg/m²   Gen: Well appearing and well nourished, NAD  Skin: Warm, Dry   HEENT:  PERRLA, EOMI  CV: RRR, +s1/s2, no M/R/G  Pul: Lungs CTAB  Abd: Soft, non-tender, non-distended  Ext:  No cyanosis or edema  Neuro: AAOx3; CN II-XII grossly intact; 5/5 BLUE, 5/5 BLLE; Sensation intact grossly     Lab Results:  Lab Results   Component Value Date    WBC 4 96 03/27/2019    HGB 9 2 (L) 03/27/2019    HCT 28 9 (L) 03/27/2019    MCV 97 03/27/2019     (L) 03/27/2019     Lab Results   Component Value Date    GLUCOSE 207 (H) 03/22/2019    CALCIUM 8 8 03/27/2019     12/21/2015    K 4 6 03/27/2019    CO2 25 03/27/2019     03/27/2019    BUN 20 03/27/2019    CREATININE 1 51 (H) 03/27/2019     Plan:   Patient has well controlled postop pain  The plan is to continue to wean him from IV dilaudid and I expressed to him that he should only ask for it if his pain is not relieved by a previous dose of oxycodone and he continues to be in uncontrolled pain  Once he is off IV dilaudid we can assess his pain and space out his oxycodone dosing as he continues to improve        SIGNATURE: Andry Portillo MD  DATE: March 27, 2019  TIME: 11:23 AM

## 2019-03-27 NOTE — PROGRESS NOTES
Progress Note - Dylon Parrish 68 y o  male MRN: [id number]    Unit/Bed#: [facility] 796-33 Encounter: [id number]      CC: diabetes f/u    Subjective:   Dylon Parrish is a 68y o  year old male with type 1  diabetes  Good appetite, nausea or vomiting    sugar high before lunch however he received prebreakfast dose 1 hours after breakfast No hypoglycemia  Complains of leg pain    Objective:     Vitals: Blood pressure 132/66, pulse 77, temperature 97 6 °F (36 4 °C), temperature source Oral, resp  rate 18, height 5' 7" (1 702 m), weight 93 3 kg (205 lb 11 oz), SpO2 96 %  ,Body mass index is 32 22 kg/m²  Intake/Output Summary (Last 24 hours) at 3/27/2019 1418  Last data filed at 3/27/2019 1121  Gross per 24 hour   Intake 987 ml   Output 3275 ml   Net -2288 ml       Physical Exam:  General Appearance: awake, appears stated age and cooperative  Head: Normocephalic, without obvious abnormality, atraumatic  Extremities: moves all extremities  Skin: Skin color and temperature normal    Pulm: no labored breathing    Lab, Imaging and other studies: I have personally reviewed pertinent reports  POC Glucose (mg/dl)   Date Value   03/27/2019 266 (H)   03/27/2019 179 (H)   03/26/2019 137   03/26/2019 129   03/26/2019 184 (H)   03/26/2019 133   03/25/2019 156 (H)   03/25/2019 185 (H)   03/25/2019 314 (H)   03/25/2019 220 (H)       Assessment:  type 1 diabetes with hyperglycemia  Hypothyroidism  Peripheral arterial disease    Plan:   majority of the sugars are in range-continue current regimen      Portions of the record may have been created with voice recognition software

## 2019-03-27 NOTE — PROGRESS NOTES
Brief pain management follow-up note:    Family meeting done at bedside  Additional pain recommendations are as follows:  · Change Tylenol to 975 mg 3 times a day as needed to use for breakthrough pain  · Discontinue IV Dilaudid  · Okay to use 1 time doses of IV Dilaudid if patient fails oxycodone and Tylenol therapy  · Trial low-dose muscle relaxant for adjunct pain management  · Monitor for level of sedation and mental status  · Add Robaxin 250 mg every 8 hr as needed 1st dose now  · Continue oxycodone as ordered  · Continue gabapentin as ordered  · Would also consider the addition of Effexor daily for adjunct analgesic management and neuropathic pain as well as underlying anxiety management    Reviewed the above recommendations with patient, wife, primary care service and RN staff  Acute pain service will continue to follow      PEDRO LUIS Beard  Acute Pain Service

## 2019-03-28 VITALS
HEART RATE: 66 BPM | HEIGHT: 67 IN | TEMPERATURE: 98.2 F | RESPIRATION RATE: 18 BRPM | SYSTOLIC BLOOD PRESSURE: 154 MMHG | WEIGHT: 207.67 LBS | OXYGEN SATURATION: 100 % | BODY MASS INDEX: 32.6 KG/M2 | DIASTOLIC BLOOD PRESSURE: 72 MMHG

## 2019-03-28 LAB
ANION GAP SERPL CALCULATED.3IONS-SCNC: 5 MMOL/L (ref 4–13)
BUN SERPL-MCNC: 21 MG/DL (ref 5–25)
CALCIUM SERPL-MCNC: 8.8 MG/DL (ref 8.3–10.1)
CHLORIDE SERPL-SCNC: 104 MMOL/L (ref 100–108)
CO2 SERPL-SCNC: 26 MMOL/L (ref 21–32)
CREAT SERPL-MCNC: 1.47 MG/DL (ref 0.6–1.3)
ERYTHROCYTE [DISTWIDTH] IN BLOOD BY AUTOMATED COUNT: 13.6 % (ref 11.6–15.1)
GFR SERPL CREATININE-BSD FRML MDRD: 47 ML/MIN/1.73SQ M
GLUCOSE SERPL-MCNC: 148 MG/DL (ref 65–140)
GLUCOSE SERPL-MCNC: 149 MG/DL (ref 65–140)
GLUCOSE SERPL-MCNC: 202 MG/DL (ref 65–140)
HCT VFR BLD AUTO: 29.3 % (ref 36.5–49.3)
HGB BLD-MCNC: 9.4 G/DL (ref 12–17)
MCH RBC QN AUTO: 31.5 PG (ref 26.8–34.3)
MCHC RBC AUTO-ENTMCNC: 32.1 G/DL (ref 31.4–37.4)
MCV RBC AUTO: 98 FL (ref 82–98)
PLATELET # BLD AUTO: 128 THOUSANDS/UL (ref 149–390)
PMV BLD AUTO: 11.9 FL (ref 8.9–12.7)
POTASSIUM SERPL-SCNC: 4.5 MMOL/L (ref 3.5–5.3)
RBC # BLD AUTO: 2.98 MILLION/UL (ref 3.88–5.62)
SODIUM SERPL-SCNC: 135 MMOL/L (ref 136–145)
WBC # BLD AUTO: 5.59 THOUSAND/UL (ref 4.31–10.16)

## 2019-03-28 PROCEDURE — 94760 N-INVAS EAR/PLS OXIMETRY 1: CPT

## 2019-03-28 PROCEDURE — 99232 SBSQ HOSP IP/OBS MODERATE 35: CPT | Performed by: INTERNAL MEDICINE

## 2019-03-28 PROCEDURE — 85027 COMPLETE CBC AUTOMATED: CPT | Performed by: PHYSICIAN ASSISTANT

## 2019-03-28 PROCEDURE — 99024 POSTOP FOLLOW-UP VISIT: CPT | Performed by: PHYSICIAN ASSISTANT

## 2019-03-28 PROCEDURE — 99024 POSTOP FOLLOW-UP VISIT: CPT | Performed by: SURGERY

## 2019-03-28 PROCEDURE — 80048 BASIC METABOLIC PNL TOTAL CA: CPT | Performed by: PHYSICIAN ASSISTANT

## 2019-03-28 PROCEDURE — 82948 REAGENT STRIP/BLOOD GLUCOSE: CPT

## 2019-03-28 RX ORDER — ACETAMINOPHEN 325 MG/1
650 TABLET ORAL EVERY 6 HOURS PRN
Qty: 30 TABLET | Refills: 0 | COMMUNITY
Start: 2019-03-28 | End: 2021-03-09 | Stop reason: ALTCHOICE

## 2019-03-28 RX ORDER — METHOCARBAMOL 500 MG/1
250 TABLET, FILM COATED ORAL EVERY 8 HOURS PRN
Qty: 30 TABLET | Refills: 0 | Status: SHIPPED | OUTPATIENT
Start: 2019-03-28 | End: 2019-09-27 | Stop reason: ALTCHOICE

## 2019-03-28 RX ORDER — OXYCODONE HYDROCHLORIDE 5 MG/1
5 TABLET ORAL EVERY 4 HOURS PRN
Qty: 30 TABLET | Refills: 0 | Status: SHIPPED | OUTPATIENT
Start: 2019-03-28 | End: 2019-04-07

## 2019-03-28 RX ORDER — VENLAFAXINE HYDROCHLORIDE 37.5 MG/1
37.5 CAPSULE, EXTENDED RELEASE ORAL DAILY
Qty: 30 CAPSULE | Refills: 0 | Status: SHIPPED | OUTPATIENT
Start: 2019-03-29 | End: 2019-05-01 | Stop reason: SDUPTHER

## 2019-03-28 RX ORDER — ISOSORBIDE MONONITRATE 30 MG/1
30 TABLET, EXTENDED RELEASE ORAL DAILY
Qty: 30 TABLET | Refills: 0 | Status: ON HOLD | OUTPATIENT
Start: 2019-03-29 | End: 2019-04-18 | Stop reason: SDUPTHER

## 2019-03-28 RX ADMIN — VITAMIN D, TAB 1000IU (100/BT) 1000 UNITS: 25 TAB at 08:36

## 2019-03-28 RX ADMIN — METHOCARBAMOL 250 MG: 500 TABLET, FILM COATED ORAL at 01:11

## 2019-03-28 RX ADMIN — GABAPENTIN 300 MG: 300 CAPSULE ORAL at 08:36

## 2019-03-28 RX ADMIN — INSULIN GLARGINE 22 UNITS: 100 INJECTION, SOLUTION SUBCUTANEOUS at 08:37

## 2019-03-28 RX ADMIN — INSULIN LISPRO 2 UNITS: 100 INJECTION, SOLUTION INTRAVENOUS; SUBCUTANEOUS at 11:39

## 2019-03-28 RX ADMIN — CLOPIDOGREL BISULFATE 75 MG: 75 TABLET ORAL at 08:36

## 2019-03-28 RX ADMIN — FOLIC ACID 1 MG: 1 TABLET ORAL at 08:37

## 2019-03-28 RX ADMIN — VENLAFAXINE HYDROCHLORIDE 37.5 MG: 37.5 CAPSULE, EXTENDED RELEASE ORAL at 08:36

## 2019-03-28 RX ADMIN — LEVOTHYROXINE SODIUM 175 MCG: 125 TABLET ORAL at 05:18

## 2019-03-28 RX ADMIN — OXYCODONE HYDROCHLORIDE 10 MG: 5 TABLET ORAL at 03:11

## 2019-03-28 RX ADMIN — METOPROLOL TARTRATE 25 MG: 25 TABLET, FILM COATED ORAL at 08:38

## 2019-03-28 RX ADMIN — Medication 1 TABLET: at 08:36

## 2019-03-28 RX ADMIN — POLYETHYLENE GLYCOL 3350 17 G: 17 POWDER, FOR SOLUTION ORAL at 08:36

## 2019-03-28 RX ADMIN — OXYCODONE HYDROCHLORIDE 10 MG: 5 TABLET ORAL at 09:03

## 2019-03-28 RX ADMIN — ISOSORBIDE MONONITRATE 30 MG: 30 TABLET, EXTENDED RELEASE ORAL at 08:36

## 2019-03-28 RX ADMIN — ASPIRIN 81 MG 81 MG: 81 TABLET ORAL at 08:36

## 2019-03-28 RX ADMIN — THIAMINE HCL TAB 100 MG 100 MG: 100 TAB at 08:36

## 2019-03-28 RX ADMIN — DOCUSATE SODIUM 100 MG: 100 CAPSULE, LIQUID FILLED ORAL at 08:36

## 2019-03-28 RX ADMIN — FUROSEMIDE 20 MG: 20 TABLET ORAL at 08:36

## 2019-03-28 NOTE — DISCHARGE SUMMARY
Vascular Surgery      Discharge- Noel Willams 1945, 68 y o  male MRN: [id number]    Unit/Bed#: [facility] 508-01 Encounter: [id number]    Primary Care Provider: Ruth Greenwood MD   Date and time admitted to hospital: 3/22/2019  5:33 AM    * Atherosclerosis of artery of extremity with ulceration ([facility] )  Assessment & Plan  AOID/PAD with claudication and left heel wound    S/P left femoral endarterectomy with bovine patch angioplasty, left femoral to AKA pop bypass with ringed [place]-Steve graft, completion arteriogram with left EIA PTA/stent- 3/22 [facility]    Plan:  --Outpatient follow-up in the vascular center  --Continue ASA, Plavix and statin    NSTEMI (non-ST elevated myocardial infarction) [facility])  Assessment & Plan  NSTEMI in setting of Hx of CAD w/ prior stenting 10/2018 and CHF    Plan:  --Outpatient follow-up with Dr Gage Delarosa  --Continue aspirin, Plavix, metoprolol, Lipitor, and Imdur  --Medical management of CAD    CKD (chronic kidney disease) stage 3, GFR 30-59 ml/min ([place] [address] )  Assessment & Plan    Plan:  --Outpatient follow-up with Dr Jean-Paul Bassett  --BMP prior to office visit    Acute blood loss anemia  Assessment & Plan  Stable    Type 1 diabetes mellitus [facility])  Assessment & Plan  Lab Results   Component Value Date    HGBA1C 7 6 (H) 03/05/2019       Recent Labs     03/27/19  1630 03/27/19  2106 03/28/19  0618 03/28/19  1100   POCGLU 84 166* 149* 202*       Blood Sugar Average: Last 72 hrs:  (P) 178 [id number]     Plan:  --Outpatient Endocrine management follow-up in 1-2 weeks  --Resume home medication regimen        Secondary Diagnosis:  Past Medical History:   Diagnosis Date    Acute kidney injury ([facility] )     resolved 11/30/2015    Acute venous embolism and thrombosis of deep vessels of proximal lower extremity ([facility] )     resolved 04/04/2015    DARINEL (acute kidney injury) ([place] [address] ) 1/12/2016    Anesthesia     RESPIRATORY ISSUES DUE TO SLEEP APNEA    Cardiac disease     heart attack, stents x 4    CHF (congestive heart failure) (HCC)     Chronic cough     resolved 02/04/2016    Chronic pain disorder     intermitent claudication    COPD (chronic obstructive pulmonary disease) (HCC)     Coronary artery disease     CPAP (continuous positive airway pressure) dependence     Diabetes mellitus ([facility] )     Disease of thyroid gland     DVT (deep venous thrombosis) (HCC)     Heart failure (HCC)     Hyperlipidemia     Hypertension     Ischemic cardiomyopathy     last assessed 09/26/2017    Myocardial infarction [facility])     MI +2 2015,2018    Pulmonary emphysema (HCC)     Pulmonary granuloma ([facility])     resolved 02/03/2017    Renal failure     Sleep apnea      Past Surgical History:   Procedure Laterality Date    BYPASS FEMORAL-POPLITEAL      initial stenosis with stent left, 7 x 100 smart stent onset 02/24/2014    CARDIAC SURGERY      cardiac stents    CATARACT EXTRACTION      COLOGUARD (HISTORICAL)  2018    CORONARY ANGIOPLASTY WITH STENT PLACEMENT      x4    IR ABDOMINAL ANGIOGRAPHY / INTERVENTION  3/14/2019    AZ THROMBOENDARTECTMY FEMORAL COMMON Left 3/22/2019    Procedure: ENDARTERECTOMY ARTERIAL FEMORAL WITH PATCH ANGIOPLASTY, BALLOON ANGIOPLASTY, STENT;  Surgeon: Mana Araujo MD;  Location: [facility];  Service: Vascular    AZ VEIN BYPASS GRAFT,FEM-POP Left 3/22/2019    Procedure: BYPASS FEMORAL-POPLITEAL WITH COMPLETION ARTERIOGRAM;  Surgeon: Mana Araujo MD;  Location: [facility];  Service: Vascular    VASCULAR SURGERY      stent placement        Admitting Diagnosis: Atherosclerosis of artery of extremity with ulceration ([facility] ) [I70 299, E22 030]    Attending: Dr Maira Wilder    Consultants:   [facility]  Nephrology  Acute pain service  [facility]ocrin[facility]  [facility]    Procedures Performed: S/P left femoral endarterectomy with bovine patch angioplasty, left femoral to [place] pop bypass with ringed [place]-Steve graft, completion arteriogram with left EIA PTA/stent- 3/22 [facility]     Discharge Medications:  See after visit summary for reconciled discharge medications provided to patient and family  HPI: 77-year-old with long history of diabetes mellitus and severe peripheral arterial occlusive disease has a history of bilateral iliac stenting and left superficial femoral artery stent placement  He has had a long history of bilateral lower extremity claudication which has been treated conservatively  He notes his claudication has worsened but most recently has noticed a wound on the left heel which is painful and has been nonhealing despite local wound care by Podiatry  He cannot identify any specific inciting event  He notes no specific position or activity which alleviates or worsens his heel pain but does state he is only able to walk very short distances before he experiences bilateral calf claudication which will only resolve with prolonged periods of rest   He feels this symptom has worsened significantly over the past months  Hospital Course:  77-year-old gentleman admitted to [facility] on 3/22/19 for treatment of peripheral arterial disease with left heel tissue loss and claudication  He has a known history of CAD with prior NSTEMI and LAD stent, chronic congestive heart failure, hypertension, hyperlipidemia, ITZEL, DM type 1 and bilateral renal artery stenosis  He underwent left femoral endarterectomy with bovine patch angioplasty, left femoral to AK pop bypass with ringed [place]-Steve graft, completion arteriogram with left EIA PTA/stent by Dr Sheth  on the day of admission  Intraoperatively he was noted to have abnormal EKG  Nitroglycerin drip was initiated intraoperatively  Troponins were performed and found to be elevated  He was transferred to the intensive care unit postoperatively  The Cardiology was consulted at that time  In the early postoperative he developed chest pain    Cardiology was notified and he was placed on heparin drip   Due to hypotension he required Levophed drip for period of time while in ICU  He was treated for acute blood loss anemia with blood transfusion  Following transfusion his blood pressure is improved and Levophed was able to be discontinued  Peak troponin was noted to be 10 4  Decision was made by Cardiology to defer cardiac catheterization and treat the patient medically for CAD  He was diagnosed with lesli-operative NSTEMI  His chest pain resolved and he remained hemodynamically stable  Heparin drip was discontinued without any further chest pain  The patient was transition from nitro to imdur therapy  He will have a outpatient follow-up with Dr Hina Barker following discharge  Throughout his admission he complained of significant postoperative pain and was followed by the acute Pain service  Prior to discharge he was comfortable on pain regimen including gabapentin, Robaxin, Effexor, oxycodone and Tylenol  He was seen in consultation by Podiatry for recommendations regarding left heel wound  Wound care with Dermagran and dry sterile dressing was recommended as well as outpatient follow-up in the [address] [place] [address] with Dr Andrei Groves  He has a history of CKD stage 3 and due to risk of perioperative DARINEL nephrology was consulted for management  He was followed throughout his stay by the nephrology team   His creatinine remained within his normal baseline and was 1 4 on the day of discharge  He will have follow-up with Dr Khalida Chavarria as an outpatient on 04/11 and BMP will be obtained prior to this office visit  Due to type 1 diabetes endocrinology was consulted  Endocrinology managed the patient's blood sugars perioperatively throughout his admission  He was recommended to be discharged on his previously prescribed home regimen and for outpatient follow up with his endocrinologist in 1-2 weeks      The patient is neurovascular exam following surgery included left dopplerable graft signal and palpable distal pulses  He received routine local wound care which will be continued as an outpatient  He was seen and evaluated by the Physical therapy and Occupational therapy teams and recommended for discharge home with home therapy  He was deemed appropriate for discharge home on postoperative day#6  At the time of discharge his pain was well controlled on pain regimen  He was ambulating without difficulty  He was able to tolerate a diet and void without difficulty  Outpatient follow-up in the vascular Center was arranged prior to his discharge  He was instructed to call with any questions concerns or changes in his condition  Condition at Discharge: stable     Provisions for Follow-Up Care:  See after visit summary for information related to follow-up care and any pertinent home health orders  Disposition: Home    Discharge instructions/Information to patient and family:   See after visit summary for information provided to patient and family  Planned Readmission: No    Discharge Statement   I spent 30 minutes discharging the patient  This time was spent on the day of discharge  I had direct contact with the patient on the day of discharge  Additional documentation is required if more than 30 minutes were spent on discharge

## 2019-03-28 NOTE — ASSESSMENT & PLAN NOTE
AOID/PAD with claudication and left heel wound    S/P left femoral endarterectomy with bovine patch angioplasty, left femoral to AKA pop bypass with ringed [place]-Steve graft, completion arteriogram with left EIA PTA/stent- 3/22 [facility]    Plan:  --Podiatry following  --APS following recs appreciated  --PT/OT  --case management for dispo planning

## 2019-03-28 NOTE — ASSESSMENT & PLAN NOTE
Lab Results   Component Value Date    HGBA1C 7 6 (H) 03/05/2019       Recent Labs     03/27/19  1630 03/27/19  2106 03/28/19  0618 03/28/19  1100   POCGLU 84 166* 149* 202*       Blood Sugar Average: Last 72 hrs:  (P) 178 [id number]     Plan:  --Outpatient Endocrine management follow-up in 1-2 weeks  --Resume home medication regimen

## 2019-03-28 NOTE — ASSESSMENT & PLAN NOTE
AOID/PAD with claudication and left heel wound    S/P left femoral endarterectomy with bovine patch angioplasty, left femoral to AKA pop bypass with ringed [place]-Steve graft, completion arteriogram with left EIA PTA/stent- 3/22 [facility]    Plan:  --Outpatient follow-up in the vascular center  --Continue ASA, Plavix and statin

## 2019-03-28 NOTE — ASSESSMENT & PLAN NOTE
Lab Results   Component Value Date    HGBA1C 7 6 (H) 03/05/2019       Recent Labs     03/27/19  1100 03/27/19  1630 03/27/19  2106 03/28/19  0618   POCGLU 266* 84 166* 149*       Blood Sugar Average: Last 72 hrs:  (P) 779 [id number]     Plan:  --Endocrine management

## 2019-03-28 NOTE — PROGRESS NOTES
Cardiology Progress Note - Saul Fowler 68 y o  male MRN: [id number]    Unit/Bed#: [facility] 508-01 Encounter: [id number]      Assessment:  Principal Problem: Atherosclerosis of artery of extremity with ulceration ([facility])  Active Problems:    Type 1 diabetes mellitus ([facility])    CKD (chronic kidney disease) stage 3, GFR 30-59 ml/min ([facility])    NSTEMI (non-ST elevated myocardial infarction) ([address] )    Acute blood loss anemia    Hyponatremia      Plan:  Patient is comfortable this morning  He has no chest pain or significant dyspnea  He did well overnight  BMP today shows a potassium of 4 5 with a creatinine of 1 47  Nephrology notes are appreciated  Will continue his current cardiac regimen  He will follow up with Dr Shelbie Jimenez as an outpatient  Telemetry was reviewed and demonstrates sinus rhythm  Subjective:   Patient seen and examined  No significant events overnight   negative  Objective:     Vitals: Blood pressure 156/70, pulse 67, temperature 98 1 °F (36 7 °C), temperature source Oral, resp  rate 18, height 5' 7" (1 702 m), weight 94 2 kg (207 lb 10 8 oz), SpO2 100 %  , Body mass index is 32 53 kg/m² ,   Orthostatic Blood Pressures      Most Recent Value   Blood Pressure  156/70 filed at 03/28/2019 3259   Patient Position - Orthostatic VS  Lying filed at 03/28/2019 1377      ,      Intake/Output Summary (Last 24 hours) at 3/28/2019 0948  Last data filed at 3/28/2019 6438  Gross per 24 hour   Intake 415 ml   Output 1475 ml   Net -1060 ml             Physical Exam:    GEN: Saul Fowler appears well, alert and oriented x 3, pleasant and cooperative   NECK: supple, no carotid bruits, no JVD or HJR  HEART: normal rate, regular rhythm, normal S1 and S2, no murmurs, clicks, gallops or rubs   LUNGS: clear to auscultation bilaterally; no wheezes, rales, or rhonchi   ABDOMEN: normal bowel sounds, soft, no tenderness, no distention  EXTREMITIES: peripheral pulses normal; no clubbing, cyanosis, or edema  SKIN: warm and well perfused, no suspicious lesions on exposed skin    Labs & Results:    Admission on 03/22/2019   No results displayed because visit has over 200 results  Xr Or Angio Leg Lt    Result Date: 3/25/2019  Narrative: C-ARM - intraoperative arteriogram INDICATION:  Atherosclerosis of artery of extremity with ulceration ([address] ) [I70 299, L97 339]  Procedure guidance  Occlusion of superficial femoral artery in a patient with previously placed superficial femoral artery and popliteal artery stents COMPARISON:  Arteriogram from March 14, 2019 TECHNIQUE: FLUOROSCOPY TIME:   13 minutes 34 seconds 34 FLUOROSCOPIC IMAGES FINDINGS: Fluoroscopic guidance provided for surgical procedure  A synthetic bypass graft has been placed  The proximal anastomosis, at the level of the obturator foramen, opacifies well  The distal anastomosis, just above the knee, shows no technical issue Balloon angioplasty is been performed of the inflow, with subsequent stent placement Osseous and soft tissue detail limited by technique  Impression: Fluoroscopic guidance provided for surgical procedure  Please refer to the separate procedure notes for additional details  Combined iliac stenting and femoral-popliteal bypass graft Workstation performed: [id number]     Vas Abdominal Aorta/iliacs; Complete Study    Result Date: 3/5/2019  Narrative:  [facility] REPORT [facility]INICAL: Indications:  Evaluate for progression of aortoiliac occlusive disease  Patient is complains of bilateral leg cramping, and now presents with a left heel ulcer   Patient states there is minimal change in his walking as he still can only walk short distances, and needs to rest  Operative History: 2018-05-17 Transluminal placement of iliac stent, percutaneous 2015-09-23 Cardiac angioplasty and stent placement 2014-02-25 Left Transluminal placement of stent, percutaneous, SFA 2012-04-02 Aortoiliac angioplasty with stent placement of iliac arteries Risk Factors: Obesity, HTN, Diabetes (Yes), HLD, CKD, PAD, CAD and previous smoking (quit >10yrs ago)  The patient current BMI is 28 73, Weight is 178 lb and height is 66 in  Clinical Right Pressure:  154/ mm Hg, Left Pressure:  152/ mm Hg  FINDINGS:  Unilateral     Impression  PSV (cm/s)  EDV (cm/s)  Sup-Aneta Ao                         78              Px Inf-Julian Ao                      60          11  Ds Inf-Julian Ao                      37          11  Celiac                             91          16  Prox  SMA      >70%               268          24   Right              Impression  PSV (cm/s)  EDV (cm/s)  Prox ISIAH - Stent                      205          28  Dist ISIAH - Stent                      151          21  Prox  EIA - Stent  50-75%             301          46  Dist EIA - Stent                      145          26   Left               Impression  PSV (cm/s)  EDV (cm/s)  Prox ISIAH - Stent                      174          28  Dist ISIAH - Stent                      148          23  Internal Iliac     >70%               449          93  Prox  EIA - Stent                     140          26  Dist EIA - Stent                      188          24     CONCLUSION:  Impression The abdominal aorta appears to be patent  The right and left common iliac arteries and stents appear to be patent  The left external artery and stent appear to be patent  Findings suggest a >75% stenosis of the left internal iliac artery  Findings suggest a 50-75% stenosis of the proximal right external iliac artery and stent  There is a known >70% stenosis of the superior mesenteric artery  The bilateral renal arteries were not visualized  Ankle/Brachial indices are RT- 0 32 ( Prior 0 54), and LT- 0 22 ( Prior 0 47)  In comparison to the study of 08/15/2018, there is stenosis noted in the right EIA, the left IIA, and a significant decrease in TERRI's    SIGNATURE: Electronically Signed by: Karina Sullivan on 2019-03-05 11:11:30 AM    Yulisa Carotid Complete Study    Result Date: 3/5/2019  Narrative:  [facility] REPORT CLINICAL: Indications:  6 month surveillance of carotid artery disease  Patient is asymptomatic at this time  Operative History: 2018-05-17 Transluminal placement of iliac stent, percutaneous 2015-09-23 Cardiac angioplasty and stent placement 2014-02-25 Left Transluminal placement of stent, percutaneous, SFA 2012-04-02 Aortoiliac angioplasty with stent placement of iliac arteries Risk Factors: Obesity, HTN, Diabetes (Yes), HLD, CKD, PAD, CAD and previous smoking (quit >10yrs ago)  The patient current BMI is 28 73, Weight is 178 lb and height is 66 in  Clinical Right Pressure:  154/ mm Hg, Left Pressure:  152/ mm Hg  FINDINGS:  Right        Impression  PSV  EDV (cm/s)  Direction of Flow  Ratio  Dist  ICA                109          25                      2 23  Mid  ICA                 136          32                            Prox  ICA    70%+        281          64                      5 73  Dist CCA                  56          13                            Mid CCA                   49          11                      0 85  Prox CCA                  57          12                            Ext Carotid              110          17                      2 24  Prox Vert                 39          11  Antegrade                 Subclavian                94           0                             Left         Impression  PSV  EDV (cm/s)  Direction of Flow  Ratio  Dist  ICA                 80          23                      1 08  Mid  ICA                 121          28                      1 64  Prox   ICA    50 - 69%    165          37                      2 24  Dist CCA                  66           9                            Mid CCA                   74          12                      1 15  Prox CCA                  64          12                            Ext Carotid  Moderate    258           0                      3 51  Prox Vert                 70          10  Antegrade                 Subclavian               150           0                               CONCLUSION:  Impression RIGHT: There is >70% stenosis noted in the internal carotid artery  Plaque is calcified and irregular  Vertebral artery flow is antegrade  Findings suggestive of subclavian artery disease  LEFT: There is 50-69% stenosis noted in the internal carotid artery  Plaque is calcified and irregular  Moderate external carotid artery disease is noted  Vertebral artery flow is antegrade  Findings suggestive of subclavian artery disease  Compared to previous study on 5/29/2018, there is no significant change noted  Tech note: This exam was technically limited due to patient's body habitus, and continuous snoring  Recommend repeat testing in 6month as per protocol unless otherwise indicated  Internal carotid artery stenosis determination by consensus criteria from: Tara Huston et al  Carotid Artery Stenosis: Gray-Scale and Doppler US Diagnosis - Society of Radiologists in [address] [place] [address] [facility], Radiology 2003; [phone]  SIGNATURE: Electronically Signed by: Aida Mayo on 2019-03-05 12:39:40 PM    Vas Lower Limb Arterial Duplex, Complete Bilateral    Result Date: 3/5/2019  Narrative:  [facility] REPORT CLINICAL: Indications: Atherosclerosis with Claudication [I70 219]  Evaluate for progression of peripheral arterial disease  Patient is complains of bilateral leg cramping, and now presents with a left heel ulcer   Patient states there is minimal change in his walking as he still can only walk short distances, and needs to rest  Operative History: 2018-05-17 Transluminal placement of iliac stent, percutaneous 2015-09-23 Cardiac angioplasty and stent placement 2014-02-25 Left Transluminal placement of stent, percutaneous, SFA 2012-04-02 Aortoiliac angioplasty with stent placement of iliac arteries Risk Factors: Obesity, HTN, Diabetes (Yes), HLD, CKD, PAD, CAD and previous smoking (quit >10yrs ago)  The patient current BMI is 28 73, Weight is 178 lb and height is 66 in  Clinical Right Pressure:  154/ mm Hg, Left Pressure:  152/ mm Hg  FINDINGS:  Right                  Impression  Waveform    PSV  EDV  Post  Tibial                       Continuous            Ant  Tibial                        Continuous            Common Femoral Artery                          111   27  Prox Profunda          >75%                    653   49  Prox SFA               >75%                    379   77  Mid SFA                Occluded                  0    0  Dist SFA                                       120   45  Proximal Pop                                    68   28  Distal Pop                                      48   17  Tibioperoneal                                   74       Dist Post Tibial                                37   13  Dist  Ant  Tibial                               31   13  Dist Peroneal                                   16    0   Left                   Impression  Waveform    PSV  EDV  Post  Tibial                       Continuous            Ant  Tibial                        Continuous            Common Femoral Artery  50-75%                  243   48  Prox Profunda                                  192   29  Prox SFA                                       153   29  Mid SFA                                         19    0  Dist SFA               Occluded                  0    0  Proximal Pop                                    31   15  Distal Pop                                      37   18  Tibioperoneal                                   39       Dist Post Tibial                                38   22  Dist  Ant  Tibial                               15   10     CONCLUSION:  Impression: RIGHT LOWER LIMB: There is an occlusion of the mid superficial femoral artery  There is a >75% stenosis of the profunda femoris, and proximal superficial femoral artery  Findings suggest tibioperoneal disease  Ankle/Brachial index: 0 32 Prior 0 54 PVR/ PPG tracings are severely dampened  Metatarsal pressure of 29mmHg Great toe pressure of  0mmHg, within the healing range, Prior 64mmHg  LEFT LOWER LIMB: There is an occlusion of the distal superficial femoral artery  There is a 50-75% stenosis of the common femoral artery  Findings suggest tibioperoneal disease  Ankle/Brachial index: 0 22 , Prior 0 47 PVR/ PPG tracings are severely dampened  Metatarsal pressure of 25mmHg Great toe pressure of 0mmHg, within the healing range,  Prior 36mmHg  Compared to previous duplex study on 02/20/2018 , there is progression bilaterally  Recommend repeat testing in 1year as per protocol unless otherwise indicated  SIGNATURE: Electronically Signed by: Lucho Bush on 2019-03-05 11:13:17 AM    Vas Lower Limb Vein Mapping Bypass Graft    Result Date: 3/18/2019  Narrative:  [facility] REPORT CLINICAL: Indications: Bilateral Other Specified Pre-Operative Examination [Z01 818]  Pre-op Vein Mapping for Future Lower Extremity Bypass Graft possibly on friday the 22nd Operative History: 2018-05-17 Transluminal placement of iliac stent, percutaneous 2015-09-23 Cardiac angioplasty and stent placement 2014-02-25 Left Transluminal placement of stent, percutaneous, SFA 2012-04-02 Aortoiliac angioplasty with stent placement of iliac arteries Risk Factors The patient has history of Obesity, HTN, Diabetes (Yes), HLD, CKD, PAD, CAD and previous smoking (quit >10yrs ago)    FINDINGS:  Segment         Right        Left                            Diameter AP  Diameter AP  DVT                  GSV Inguinal            6 4          4 5                       GSV Prox Thigh          4 2          2 9                       GSV Mid Thigh           3 3          3 1                       GSV Dist Thigh          2 8               Chronic - Occlusive  GSV Knee                3 0               Chronic - Occlusive GSV Prox Calf           3 1               Chronic - Occlusive  GSV Mid Calf            3 4               Chronic - Occlusive  GSV Dist Calf           2 8          2 8                       GSV Ankle               3 5          3 4                          CONCLUSION: Impression: RIGHT LOWER LIMB: The great saphenous vein is patent  from the groin to the ankle  The vein is of adequate caliber and quality for graft conduit with intraluminal diameters ranging from 2 8mm to 4 2mm throughout the leg  There are several branches coming off of the greater saphenous vein at the proximal thigh, distal thigh, proximal and mid calf  LEFT LOWER LIMB: The great saphenous vein is patent from the groin to the mid thigh  There is chronic thrombus identified in the greater saphenous from the distal thigh to the proximal calf  Tech Note:  Natacha Castillo RN was notified of these findings  Mar 18 2019 10:35AM  SIGNATURE: Electronically Signed by: Rubia Jimenez on 2019 12:52:33 PM    Ir Abdominal Angiography / Intervention    Result Date: 3/18/2019  Narrative: OPERATIVE REPORT PATIENT NAME: Jocelin Miller  :  1945 MRN: [id number] Pt Location:  [facility]   SURGERY DATE: 3/14/2019   Surgeon:  Maral Rocha MD   Preoperative and postoperative diagnosis: 1  Atherosclerotic occlusive disease with rest pain and left foot ulceration 2  Atherosclerotic occlusive disease with severe right lower extremity claudication   Procedure: 1  Ultrasound-guided right common femoral artery puncture 2  Bilateral lower extremity runoff    Flouro Time:  36 4 min   Contrast:  43 cc Visipaque 320   Sedation Time:  100 min   Estimated Blood Loss: Minimal   Anesthesia Type: Conscious sedation   Operative Indications: 75-year-old with long history of severe peripheral arterial occlusive disease presents with worsening symptoms of left leg  He has progressed from claudication to rest pain with a area of ulceration on the left heel  He also has severe claudication of the right lower extremity    Operative Findings: On the left there is eccentric calcific disease of the common femoral artery with less than 50% stenosis  The deep femoral artery is occluded proximally and reconstitutes  The superficial femoral artery is occluded in the mid segment and reconstitutes in the mid portion of the distal superficial femoral artery stent  The above knee popliteal artery is then patent and relatively normal in appearance with 3 vessel runoff    On the right there is eccentric highly calcified plaque creating a 75% stenosis in the proximal deep and superficial femoral arteries  There is a segmental occlusion of the superficial femoral artery  The above knee popliteal artery is then relatively normal in appearance with a normal trifurcation    Complications: None   Procedure and Technique:   IV sedation was administered  The right groin was prepped and draped in the normal sterile fashion and I[place]an drape was placed    Ultrasound guidance was utilized to puncture the right common femoral artery  A micropuncture system was utilized  The right common femoral artery was directly imaged under B-mode imaging  A [employer] wire was then placed and a sheath was inserted    The left common iliac artery was then cannulated with a Contra catheter  The catheter was then advanced into the distal external iliac artery  Oblique images were obtained of the femoral bifurcation  Runoff arteriography was then performed with stepped hand injections    A 7 [place] sheath was then positioned in the proximal superficial femoral artery  Multiple guidewires and catheters were then used in an attempt to cross the superficial femoral artery occlusion    Attempts to re-enter the true lumen within the distal superficial femoral artery stent were made with a Outback catheter    Completion images were obtained to include runoff to the foot    The sheath was then withdrawn to the ipsilateral external iliac artery and oblique images were obtained of the femoral bifurcation and stepped images were obtained through to the level of the tibial trifurcation    A Star closure device was used to close the right femoral puncture site    Findings:   There was significant calcific disease within the common femoral artery  The artery was punctured in a region devoid of any significant disease    On the left there is eccentric calcific disease of the common femoral artery with less than 50% stenosis  The deep femoral artery is occluded proximally and reconstitutes  The superficial femoral artery is occluded in the mid segment and reconstitutes in the mid portion of the distal superficial femoral artery stent  The above knee popliteal artery is then patent and relatively normal in appearance with 3 vessel runoff    Multiple attempts to cross this segmental occlusion with multiple catheters and wires to include an Outback catheter failed to obtain re-entry into the true lumen/the lumen of the distal superficial femoral artery stent    On the right there is eccentric highly calcified plaque creating a 75% stenosis in the proximal deep and superficial femoral arteries  There is a segmental occlusion of the superficial femoral artery    The above knee popliteal artery is then relatively normal in appearance with a normal trifurcation      Conclusion: On the left secondary to the deep femoral artery occlusion and failed attempt to cross the superficial femoral artery occlusion is felt the best option for revascularization would be femoral endarterectomy to restore flow in the deep femoral artery and femoral-above knee popliteal artery bypass    Following the procedure the vascular exam of both lower extremities was unchanged from pre procedure      I was present for the entire procedure   Patient Disposition: APU   SIGNATURE: Larissa Aaron MD DATE: March 14, 2019 TIME: 11:15 AM   Electronically signed by Mendel Angel, MD at 3/14/2019 11:31 AM       EKG personally reviewed by Juana Cagle MD      Counseling / Coordination of Care  Total floor / unit time spent today 30 minutes  Greater than 50% of total time was spent with the patient and / or family counseling and / or coordination of care

## 2019-03-28 NOTE — PROGRESS NOTES
NEPHROLOGY PROGRESS NOTE   Narcisa Martinez 68 y o  male MRN: [id number]  Unit/Bed#: [facility] 508-01 Encounter: [id number]      ASSESSMENT/PLAN:  1  CKD stage 3:  Baseline creatinine 1 2-1 6 and seems to fluctuate based on volume  Creatinine stable around 1 5   -continue to trend BMP   -follows outpatient with Dr Mirza Mtz   2  PAD with L heel ulcer: Status post left femoral endarterectomy with angioplasty, left femoral bypass, completion angiogram and stent placed  3  History of multivessel CAD:  Per Cardiology no plan for cardiac catheterization and will manage medically at this point in time   -started on imdur by cardiology   4  Cardiomyopathy:  Ejection fraction 45-50%  Patient does have some edema which is related to the procedures but otherwise volume status is good  -continue lasix 20mg daily   -follow weights at home   5  Hypertension:  Blood pressure on average is acceptable   6  Anemia: hgb 9 4 which is stable     Patient being d/c today  Has follow up 4/11 with Dr Mirza Mtz in our office  Would repeat BMP prior to that appointment       SUBJECTIVE:  Feeling well today  No chest pain or shortness of breath  No nausea, vomiting or diarrhea       OBJECTIVE:  Current Weight: Weight - Scale: 94 2 kg (207 lb 10 8 oz)  Vitals:    03/28/19 0838   BP: 156/70   Pulse: 67   Resp: 18   Temp: 98 1 °F (36 7 °C)   SpO2: 100%       Intake/Output Summary (Last 24 hours) at 3/28/2019 1058  Last data filed at 3/28/2019 0881  Gross per 24 hour   Intake 415 ml   Output 1475 ml   Net -1060 ml     General: no acute distress   Skin: no rash   Eyes: sclera anicteric   ENT: moist mucous membranes   Neck: supple, symmetric   Chest: clear to auscultation    CVS: regular rate and rhythm  Abdomen: soft, non-tender, non-distended  Extremities: LLE edema, no RLE edema    Neuro: awake and alert  Psych: appropriate affect    Medications:  Scheduled Meds:    Current Facility-Administered Medications:  acetaminophen 975 mg Oral Q8H PRN Carry Docker MAGO Martinez   aluminum-magnesium hydroxide-simethicone 30 mL Oral Q4H PRN Sharla Jones PA-C   aspirin 81 mg Oral Daily Aleyda Weaver PA-C   atorvastatin 40 mg Oral Daily With Gupta SoupMAGO   cholecalciferol 1,000 Units Oral Daily Aleyda Weaver PA-C   clopidogrel 75 mg Oral Daily Aleyda Weaver PA-C   docusate sodium 100 mg Oral BID Aleyda Weaver PA-C   folic acid 1 mg Oral Daily Aleyda Weaver PA-C   furosemide 20 mg Oral Daily Rajni Smith PA-C   gabapentin 300 mg Oral BID Gabby Yañez PA-C   HYDROmorphone 0 5 mg Intravenous Q4H PRN Marianna Hobbs PA-C   insulin glargine 22 Units Subcutaneous Q12H [address] Christoper Moritz, MD   insulin lispro 1-10 Units Subcutaneous TID With Meals Christoper Moritz, MD   insulin lispro 1-5 Units Subcutaneous HS Aleyda Weaver PA-C   insulin lispro 2-15 Units Subcutaneous TID With Meals Christoper Moritz, MD   isosorbide mononitrate 30 mg Oral Daily Juana Cagle MD   levalbuterol 1 25 mg Nebulization Q6H PRN Aleyda Weaver PA-C   levothyroxine 175 mcg Oral Early Morning Aleyda Weaver PA-C   methocarbamol 250 mg Oral Q8H PRN Gabby Yañez PA-C   metoprolol tartrate 25 mg Oral Q12H [address] Juana Cagle MD   multivitamin-minerals 1 tablet Oral Daily Aleyda RisMAGO mckeon   nitroglycerin 0 4 mg Sublingual Q5 Min PRN Juana Cagle MD   ondansetron 4 mg Intravenous Q6H PRN Aleyda RisMAGO mckeon   oxyCODONE 5 mg Oral Q4H PRN Aleyda Weaver PA-C   Or       oxyCODONE 10 mg Oral Q4H PRN Aleyda RisMAGO mckeon   polyethylene glycol 17 g Oral Daily Aleyda Weaver PA-C   senna 2 tablet Oral HS Aleyda Weaver PA-C   sodium chloride 3 mL Nebulization Q6H PRN Aleyda RisMAGO mckeon   thiamine 100 mg Oral Daily Aleyda Weaver PA-C   venlafaxine 37 5 mg Oral Daily Gabby Yañez PA-C       PRN Meds:   acetaminophen    aluminum-magnesium hydroxide-simethicone    HYDROmorphone    levalbuterol   methocarbamol    nitroglycerin    ondansetron    oxyCODONE **OR** oxyCODONE    sodium chloride    Laboratory Results:  Results from last 7 days   Lab Units 03/28/19  0517 03/27/19  0549 03/26/19  0514 03/25/19  0539 03/24/19  0336 03/23/19  1750 03/23/19  0530 03/23/19  0049  03/22/19  1614 03/22/19  1330 03/22/19  1126 03/22/19  0830  03/21/19  1418   WBC Thousand/uL 5 59 4 96 4 98 6 30 6 23  --  5 55 5 90   < >  --   --   --   --   --   --    HEMOGLOBIN g/dL 9 4* 9 2* 9 3* 9 7* 9 7* 9 7* 8 3* 8 7*   < >  --   --   --   --   --   --    I STAT HEMOGLOBIN g/dl  --   --   --   --   --   --   --   --   --   --  9 2* 9 9* 10 2*   < >  --    HEMATOCRIT % 29 3* 28 9* 28 9* 29 7* 28 7* 29 6* 25 4* 27 0*   < >  --   --   --   --   --   --    HEMATOCRIT, ISTAT %  --   --   --   --   --   --   --   --   --   --  27* 29* 30*   < >  --    PLATELETS Thousands/uL 128* 112* 117* 112* 106*  --  123* 124*   < >  --   --   --   --   --   --    SODIUM mmol/L 135* 135* 136 133* 137  --  138  --   --  139  --   --   --   --  142   POTASSIUM mmol/L 4 5 4 6 4 1 4 2 4 0  --  4 3  --   --  4 7  --   --   --   --  4 9   CHLORIDE mmol/L 104 105 105 103 106  --  109*  --   --  112*  --   --   --   --  106   CO2 mmol/L 26 25 25 24 23  --  24  --   --  21  --   --   --   --  28   CO2, I-STAT mmol/L  --   --   --   --   --   --   --   --   --   --  21 21 24   < >  --    BUN mg/dL 21 20 19 19 23  --  30*  --   --  38*  --   --   --   --  36*   CREATININE mg/dL 1 47* 1 51* 1 43* 1 32* 1 33*  --  1 47*  --   --  1 56*  --   --   --   --  1 59*   CALCIUM mg/dL 8 8 8 8 8 5 8 4 7 9*  --  7 9*  --   --  8 1*  --   --   --   --  9 5   MAGNESIUM mg/dL  --   --   --  2 2 2 3  --  2 1  --   --  2 1  --   --   --   --  2 3   PHOSPHORUS mg/dL  --   --   --  3 2 2 8  --  2 9  --   --  3 5  --   --   --   --  4 1   GLUCOSE, ISTAT mg/dl  --   --   --   --   --   --   --   --   --   --  207* 187* 178*  --   --     < > = values in this interval not displayed

## 2019-03-28 NOTE — PLAN OF CARE
Problem: Prexisting or High Potential for Compromised Skin Integrity  Goal: Skin integrity is maintained or improved  Description  INTERVENTIONS:  - Identify patients at risk for skin breakdown  - Assess and monitor skin integrity  - Assess and monitor nutrition and hydration status  - Monitor labs (i e  albumin)  - Assess for incontinence   - Turn and reposition patient  - Assist with mobility/ambulation  - Relieve pressure over bony prominences  - Avoid friction and shearing  - Provide appropriate hygiene as needed including keeping skin clean and dry  - Evaluate need for skin moisturizer/barrier cream  - Collaborate with interdisciplinary team (i e  Nutrition, Rehabilitation, etc )   - Patient/family teaching  Outcome: Progressing     Problem: Potential for Falls  Goal: Patient will remain free of falls  Description  INTERVENTIONS:  - Assess patient frequently for physical needs  -  Identify cognitive and physical deficits and behaviors that affect risk of falls    -  [place] fall precautions as indicated by assessment   - Educate patient/family on patient safety including physical limitations  - Instruct patient to call for assistance with activity based on assessment  - Modify environment to reduce risk of injury  - Consider OT/PT consult to assist with strengthening/mobility  Outcome: Progressing     Problem: PAIN - ADULT  Goal: Verbalizes/displays adequate comfort level or baseline comfort level  Description  Interventions:  - Encourage patient to monitor pain and request assistance  - Assess pain using appropriate pain scale  - Administer analgesics based on type and severity of pain and evaluate response  - Implement non-pharmacological measures as appropriate and evaluate response  - Consider cultural and social influences on pain and pain management  - Notify physician/advanced practitioner if interventions unsuccessful or patient reports new pain  Outcome: Progressing     Problem: INFECTION - ADULT  Goal: Absence or prevention of progression during hospitalization  Description  INTERVENTIONS:  - Assess and monitor for signs and symptoms of infection  - Monitor lab/diagnostic results  - Monitor all insertion sites, i e  indwelling lines, tubes, and drains  - Monitor endotracheal (as able) and nasal secretions for changes in amount and color  - [place] appropriate cooling/warming therapies per order  - Administer medications as ordered  - Instruct and encourage patient and family to use good hand hygiene technique  - Identify and instruct in appropriate isolation precautions for identified infection/condition  Outcome: Progressing  Goal: Absence of fever/infection during neutropenic period  Description  INTERVENTIONS:  - Monitor WBC  - Implement neutropenic guidelines  Outcome: Progressing     Problem: SAFETY ADULT  Goal: Maintain or return to baseline ADL function  Description  INTERVENTIONS:  -  Assess patient's ability to carry out ADLs; assess patient's baseline for ADL function and identify physical deficits which impact ability to perform ADLs (bathing, care of mouth/teeth, toileting, grooming, dressing, etc )  - Assess/evaluate cause of self-care deficits   - Assess range of motion  - Assess patient's mobility; develop plan if impaired  - Assess patient's need for assistive devices and provide as appropriate  - Encourage maximum independence but intervene and supervise when necessary  ¯ Involve family in performance of ADLs  ¯ Assess for home care needs following discharge   ¯ Request OT consult to assist with ADL evaluation and planning for discharge  ¯ Provide patient education as appropriate  Outcome: Progressing  Goal: Maintain or return mobility status to optimal level  Description  INTERVENTIONS:  - Assess patient's baseline mobility status (ambulation, transfers, stairs, etc )    - Identify cognitive and physical deficits and behaviors that affect mobility  - Identify mobility aids required to assist with transfers and/or ambulation (gait belt, sit-to-stand, lift, walker, cane, etc )  - [place] fall precautions as indicated by assessment  - Record patient progress and toleration of activity level on Mobility SBAR; progress patient to next Phase/Stage  - Instruct patient to call for assistance with activity based on assessment  - Request Rehabilitation consult to assist with strengthening/weightbearing, etc   Outcome: Progressing     Problem: DISCHARGE PLANNING  Goal: Discharge to home or other facility with appropriate resources  Description  INTERVENTIONS:  - Identify barriers to discharge w/patient and caregiver  - Arrange for needed discharge resources and transportation as appropriate  - Identify discharge learning needs (meds, wound care, etc )  - Arrange for interpretive services to assist at discharge as needed  - Refer to Case Management Department for coordinating discharge planning if the patient needs post-hospital services based on physician/advanced practitioner order or complex needs related to functional status, cognitive ability, or social support system  Outcome: Progressing     Problem: Knowledge Deficit  Goal: Patient/family/caregiver demonstrates understanding of disease process, treatment plan, medications, and discharge instructions  Description  Complete learning assessment and assess knowledge base    Interventions:  - Provide teaching at level of understanding  - Provide teaching via preferred learning methods  Outcome: Progressing     Problem: CARDIOVASCULAR - ADULT  Goal: Maintains optimal cardiac output and hemodynamic stability  Description  INTERVENTIONS:  - Monitor I/O, vital signs and rhythm  - Monitor for S/S and trends of decreased cardiac output i e  bleeding, hypotension  - Administer and titrate ordered vasoactive medications to optimize hemodynamic stability  - Assess quality of pulses, skin color and temperature  - Assess for signs of decreased coronary artery perfusion - ex  Angina  - Instruct patient to report change in severity of symptoms  Outcome: Progressing  Goal: Absence of cardiac dysrhythmias or at baseline rhythm  Description  INTERVENTIONS:  - Continuous cardiac monitoring, monitor vital signs, obtain 12 lead EKG if indicated  - Administer antiarrhythmic and heart rate control medications as ordered  - Monitor electrolytes and administer replacement therapy as ordered  Outcome: Progressing     Problem: DISCHARGE PLANNING - CARE MANAGEMENT  Goal: Discharge to post-acute care or home with appropriate resources  Description  INTERVENTIONS:  - Conduct assessment to determine patient/family and health care team treatment goals, and need for post-acute services based on payer coverage, community resources, and patient preferences, and barriers to discharge  - Address psychosocial, clinical, and financial barriers to discharge as identified in assessment in conjunction with the patient/family and health care team  - Arrange appropriate level of post-acute services according to patient's   needs and preference and payer coverage in collaboration with the physician and health care team  - Communicate with and update the patient/family, physician, and health care team regarding progress on the discharge plan  - Arrange appropriate transportation to post-acute venues   Outcome: Progressing     Problem: Nutrition/Hydration-ADULT  Goal: Nutrient/Hydration intake appropriate for improving, restoring or maintaining nutritional needs  Description  Monitor and assess patient's nutrition/hydration status for malnutrition (ex- brittle hair, bruises, dry skin, pale skin and conjunctiva, muscle wasting, smooth red tongue, and disorientation)  Collaborate with interdisciplinary team and initiate plan and interventions as ordered  Monitor patient's weight and dietary intake as ordered or per policy  Utilize nutrition screening tool and intervene per policy   Determine patient's food preferences and provide high-protein, high-caloric foods as appropriate       INTERVENTIONS:  - Monitor oral intake, urinary output, labs, and treatment plans  - Assess nutrition and hydration status and recommend course of action  - Evaluate amount of meals eaten  - Assist patient with eating if necessary   - Allow adequate time for meals  - Recommend/ encourage appropriate diets, oral nutritional supplements, and vitamin/mineral supplements  - Order, calculate, and assess calorie counts as needed  - Recommend, monitor, and adjust tube feedings and TPN/PPN based on assessed needs  - Assess need for intravenous fluids  - Provide specific nutrition/hydration education as appropriate  - Include patient/family/caregiver in decisions related to nutrition  Outcome: Progressing

## 2019-03-28 NOTE — ASSESSMENT & PLAN NOTE
NSTEMI in setting of Hx of CAD w/ prior stenting 10/2018 and CHF    Plan:  --Outpatient follow-up with Dr Mary Randall  --Continue aspirin, Plavix, metoprolol, Lipitor, and Imdur  --Medical management of CAD

## 2019-03-28 NOTE — PROGRESS NOTES
Vascular Surgery    Progress Note - Ankush Jay 1945, 68 y o  male MRN: [id number]    Unit/Bed#: [facility] 508-01 Encounter: [id number]    Primary Care Provider: Aisha Harvey MD   Date and time admitted to hospital: 3/22/2019  5:33 AM    * Atherosclerosis of artery of extremity with ulceration ([address] )  Assessment & Plan  AOID/PAD with claudication and left heel wound    S/P left femoral endarterectomy with bovine patch angioplasty, left femoral to AKA pop bypass with ringed Davilla-Steve graft, completion arteriogram with left EIA PTA/stent- 3/22 [place]    Plan:  --Podiatry following  --APS following recs appreciated  --PT/OT  --case management for dispo planning    NSTEMI (non-ST elevated myocardial infarction) [facility])  Assessment & Plan  NSTEMI in setting of Hx of CAD w/ prior stenting 10/2018 and CHF    Plan:  --Cardiology following  --Continue aspirin, Plavix, metoprolol, Lipitor, and Imdur  --Medical management of CAD    CKD (chronic kidney disease) stage 3, GFR 30-59 ml/min ([facility])  Assessment & Plan    Plan:  --Nephrology following  --Lasix resumed  --Monitor BMP    Acute blood loss anemia  Assessment & Plan  Plan:  --Monitor    Hyponatremia  Assessment & Plan  Plan:  --As per nephrology    Type 1 diabetes mellitus [facility])  Assessment & Plan  Lab Results   Component Value Date    HGBA1C 7 6 (H) 03/05/2019       Recent Labs     03/27/19  1100 03/27/19  1630 03/27/19  2106 03/28/19  0618   POCGLU 266* 84 166* 149*       Blood Sugar Average: Last 72 hrs:  (P) 433 [id number]     Plan:  --Endocrine management      Subjective:  Pt doing well, resting comfortably this AM    Vitals:  /56   Pulse 66   Temp 98 1 °F (36 7 °C) (Axillary)   Resp 18   Ht 5' 7" (1 702 m)   Wt 94 2 kg (207 lb 10 8 oz)   SpO2 99%   BMI 32 53 kg/m²     I/Os:  I/O last 3 completed shifts:   In: 3710 [P O :1211]  Out: 6094 [CVPCS:4252]  I/O this shift:  In: -   Out: 5127 [Urine:1275]    Lab Results and Cultures:   Lab Results Component Value Date    WBC 4 96 03/27/2019    HGB 9 2 (L) 03/27/2019    HCT 28 9 (L) 03/27/2019    MCV 97 03/27/2019     (L) 03/27/2019     Lab Results   Component Value Date    GLUCOSE 207 (H) 03/22/2019    CALCIUM 8 8 03/27/2019     12/21/2015    K 4 6 03/27/2019    CO2 25 03/27/2019     03/27/2019    BUN 20 03/27/2019    CREATININE 1 51 (H) 03/27/2019     Lab Results   Component Value Date    INR 1 09 03/23/2019    INR 1 11 03/22/2019    INR 1 07 03/21/2019    PROTIME 14 2 03/23/2019    PROTIME 14 4 (H) 03/22/2019    PROTIME 13 6 03/21/2019        Blood Culture: No results found for: BLOODCX,   Urinalysis:   Lab Results   Component Value Date    COLORU Yellow 03/24/2019    CLARITYU Clear 03/24/2019    SPECGRAV 1 011 03/24/2019    PHUR 6 0 03/24/2019    PHUR 5 5 11/28/2018    LEUKOCYTESUR Negative 03/24/2019    NITRITE Negative 03/24/2019    GLUCOSEU Negative 03/24/2019    KETONESU Negative 03/24/2019    BILIRUBINUR Negative 03/24/2019    BLOODU Negative 03/24/2019   ,   Urine Culture:   Lab Results   Component Value Date    URINECX No Growth <1000 cfu/mL 03/05/2019   ,   Wound [place]ure: No results found for: WOUNDCULT    Medications:  Current Facility-Administered Medications   Medication Dose Route Frequency    acetaminophen (TYLENOL) tablet 975 mg  975 mg Oral Q8H PRN    aluminum-magnesium hydroxide-simethicone (MYLANTA) 200-200-20 mg/5 mL oral suspension 30 mL  30 mL Oral Q4H PRN    aspirin chewable tablet 81 mg  81 mg Oral Daily    atorvastatin (LIPITOR) tablet 40 mg  40 mg Oral Daily With Dinner    cholecalciferol (VITAMIN D3) tablet 1,000 Units  1,000 Units Oral Daily    clopidogrel (PLAVIX) tablet 75 mg  75 mg Oral Daily    docusate sodium (COLACE) capsule 100 mg  100 mg Oral BID    folic acid (FOLVITE) tablet 1 mg  1 mg Oral Daily    furosemide (LASIX) tablet 20 mg  20 mg Oral Daily    gabapentin (NEURONTIN) capsule 300 mg  300 mg Oral BID    HYDROmorphone (DILAUDID) injection 0 5 mg  0 5 mg Intravenous Q4H PRN    insulin glargine (LANTUS) subcutaneous injection 22 Units 0 22 mL  22 Units Subcutaneous Q12H [address]    insulin lispro (HumaLOG) 100 units/mL subcutaneous injection 1-10 Units  1-10 Units Subcutaneous TID With Meals    insulin lispro (HumaLOG) 100 units/mL subcutaneous injection 1-5 Units  1-5 Units Subcutaneous HS    insulin lispro (HumaLOG) 100 units/mL subcutaneous injection 2-15 Units  2-15 Units Subcutaneous TID With Meals    isosorbide mononitrate (IMDUR) 24 hr tablet 30 mg  30 mg Oral Daily    levalbuterol (XOPENEX) inhalation solution 1 25 mg  1 25 mg Nebulization Q6H PRN    levothyroxine tablet 175 mcg  175 mcg Oral Early Morning    methocarbamol (ROBAXIN) tablet 250 mg  250 mg Oral Q8H PRN    metoprolol tartrate (LOPRESSOR) tablet 25 mg  25 mg Oral Q12H [address]    multivitamin-minerals (CENTRUM) tablet 1 tablet  1 tablet Oral Daily    nitroglycerin (NITROSTAT) SL tablet 0 4 mg  0 4 mg Sublingual Q5 Min PRN    ondansetron (ZOFRAN) injection 4 mg  4 mg Intravenous Q6H PRN    oxyCODONE (ROXICODONE) IR tablet 5 mg  5 mg Oral Q4H PRN    Or    oxyCODONE (ROXICODONE) IR tablet 10 mg  10 mg Oral Q4H PRN    polyethylene glycol (MIRALAX) packet 17 g  17 g Oral Daily    senna (SENOKOT) tablet 17 2 mg  2 tablet Oral HS    sodium chloride 0 9 % inhalation solution 3 mL  3 mL Nebulization Q6H PRN    thiamine (VITAMIN B1) tablet 100 mg  100 mg Oral Daily    venlafaxine (EFFEXOR-XR) 24 hr capsule 37 5 mg  37 5 mg Oral Daily     Physical Exam:    General appearance: alert and oriented, in no acute distress  Lungs: clear to auscultation bilaterally  Heart: regular rate and rhythm, S1, S2 normal, no murmur, click, rub or gallop  Abdomen: soft, non-tender; bowel sounds normal; no masses,  no organomegaly  Extremities: Left foot warm, M/S intact     Wound/Incision:  LLE incision clean, dry, and intact    Pulse exam:  Graft:  Left: doppler signal  DP:  Left: 1+ and doppler signal  PT: Left: 2+ and doppler signal      June Sosa PA-C  3/28/2019

## 2019-03-28 NOTE — PROGRESS NOTES
Progress Note - Acute Pain Service    Alex Gallegos 68 y o  male MRN: [id number]  Unit/Bed#: [facility] 508-01 Encounter: [id number]      Assessment:   Principal Problem: Atherosclerosis of artery of extremity with ulceration ([facility])  Active Problems:    Type 1 diabetes mellitus ([facility])    CKD (chronic kidney disease) stage 3, GFR 30-59 ml/min ([facility])    NSTEMI (non-ST elevated myocardial infarction) ([facility])    Acute blood loss anemia    Hyponatremia      Plan:   Acute lower extremity pain  · Gabapentin 300 mg twice a day  · Tylenol 975 mg 3 times a day as needed  · Discontinue IV Dilaudid  · Robaxin 250 mg every 8 hours as needed for adjunct pain management spasms  · Oxycodone 5 mg every 4 hours as needed for moderate pain  · Oxycodone 10 mg every 4 hours as needed for severe pain  · Effexor 37 5 mg daily for adjunct pain management and anxiety    Reviewed the above recommendations and treatment plan with primary care service  Plan is for discharge today; continue in the above regimen on discharge  APS sign off  Thank you for the Consult  Please call YUSUF / Dillon Kumar ( [facility] [phone]) with any further questions    Pain History  Current pain location(s):  Left leg  Pain Scale:   0-10  24 hour history:  Patient is sitting on side of bed, appears comfortable  Reporting intermittent episodes of severe left lower extremity pain  Episode of severe pain overnight, now improved  Received 2 doses of Robaxin, denies drowsiness or extensive sedation with dosing  Patient is tolerating current analgesic regimen, Effexor was started this morning for adjunct pain management and anxiety  Patient states overall he feels well and is eager for discharge home today  Wife at bedside      Opioid requirement previous 24 hours:  Oxycodone total 40 mg    Meds/Allergies   all current active meds have been reviewed and current meds:   Current Facility-Administered Medications   Medication Dose Route Frequency    acetaminophen (TYLENOL) tablet 975 mg  975 mg Oral Q8H PRN    aluminum-magnesium hydroxide-simethicone (MYLANTA) 200-200-20 mg/5 mL oral suspension 30 mL  30 mL Oral Q4H PRN    aspirin chewable tablet 81 mg  81 mg Oral Daily    atorvastatin (LIPITOR) tablet 40 mg  40 mg Oral Daily With Dinner    cholecalciferol (VITAMIN D3) tablet 1,000 Units  1,000 Units Oral Daily    clopidogrel (PLAVIX) tablet 75 mg  75 mg Oral Daily    docusate sodium (COLACE) capsule 100 mg  100 mg Oral BID    folic acid (FOLVITE) tablet 1 mg  1 mg Oral Daily    furosemide (LASIX) tablet 20 mg  20 mg Oral Daily    gabapentin (NEURONTIN) capsule 300 mg  300 mg Oral BID    HYDROmorphone (DILAUDID) injection 0 5 mg  0 5 mg Intravenous Q4H PRN    insulin glargine (LANTUS) subcutaneous injection 22 Units 0 22 mL  22 Units Subcutaneous Q12H [address]    insulin lispro (HumaLOG) 100 units/mL subcutaneous injection 1-10 Units  1-10 Units Subcutaneous TID With Meals    insulin lispro (HumaLOG) 100 units/mL subcutaneous injection 1-5 Units  1-5 Units Subcutaneous HS    insulin lispro (HumaLOG) 100 units/mL subcutaneous injection 2-15 Units  2-15 Units Subcutaneous TID With Meals    isosorbide mononitrate (IMDUR) 24 hr tablet 30 mg  30 mg Oral Daily    levalbuterol (XOPENEX) inhalation solution 1 25 mg  1 25 mg Nebulization Q6H PRN    levothyroxine tablet 175 mcg  175 mcg Oral Early Morning    methocarbamol (ROBAXIN) tablet 250 mg  250 mg Oral Q8H PRN    metoprolol tartrate (LOPRESSOR) tablet 25 mg  25 mg Oral Q12H [address]    multivitamin-minerals (CENTRUM) tablet 1 tablet  1 tablet Oral Daily    nitroglycerin (NITROSTAT) SL tablet 0 4 mg  0 4 mg Sublingual Q5 Min PRN    ondansetron (ZOFRAN) injection 4 mg  4 mg Intravenous Q6H PRN    oxyCODONE (ROXICODONE) IR tablet 5 mg  5 mg Oral Q4H PRN    Or    oxyCODONE (ROXICODONE) IR tablet 10 mg  10 mg Oral Q4H PRN    polyethylene glycol (MIRALAX) packet 17 g  17 g Oral Daily    senna (SENOKOT) tablet 17 2 mg  2 tablet Oral HS    sodium chloride 0 9 % inhalation solution 3 mL  3 mL Nebulization Q6H PRN    thiamine (VITAMIN B1) tablet 100 mg  100 mg Oral Daily    venlafaxine (EFFEXOR-XR) 24 hr capsule 37 5 mg  37 5 mg Oral Daily       Allergies   Allergen Reactions    Doxazosin     Iohexol     Cardura [Doxazosin Mesylate]      Muscle cramps    Other      Iv dye for cardiac cath  Renal failure very close to dialysis  But no dialysis    Zestril [Lisinopril]      cough       Objective     Temp:  [97 7 °F (36 5 °C)-98 6 °F (37 °C)] 98 2 °F (36 8 °C)  HR:  [66-74] 66  Resp:  [18] 18  BP: (112-156)/(52-72) 154/72    Physical Exam   Constitutional: He is oriented to person, place, and time  He appears well-developed and well-nourished  No distress  HENT:   Head: Normocephalic and atraumatic  Eyes: EOM are normal    Neck: Normal range of motion  Pulmonary/Chest: Effort normal  No respiratory distress  Neurological: He is alert and oriented to person, place, and time  Skin: No rash noted  He is not diaphoretic  No erythema  Psychiatric: His mood appears anxious  Nursing note and vitals reviewed  Lab Results:   Results from last 7 days   Lab Units 03/28/19  0517   WBC Thousand/uL 5 59   HEMOGLOBIN g/dL 9 4*   HEMATOCRIT % 29 3*   PLATELETS Thousands/uL 128*      Results from last 7 days   Lab Units 03/28/19  0517  03/22/19  1614 03/22/19  1330   POTASSIUM mmol/L 4 5   < > 4 7  --    CHLORIDE mmol/L 104   < > 112*  --    CO2 mmol/L 26   < > 21  --    CO2, I-STAT mmol/L  --   --   --  21   BUN mg/dL 21   < > 38*  --    CREATININE mg/dL 1 47*   < > 1 56*  --    CALCIUM mg/dL 8 8   < > 8 1*  --    ALK PHOS U/L  --   --  70  --    ALT U/L  --   --  17  --    AST U/L  --   --  18  --    GLUCOSE, ISTAT mg/dl  --   --   --  207*    < > = values in this interval not displayed  Counseling / Coordination of Care  Total floor / unit time spent today 15 minutes   Greater than 50% of total time was spent with the patient and / or family counseling and / or coordination of care  A description of the counseling / coordination of care:  Reviewed plan of care medications with patient, RN staff, and Primary Care Service      PEDRO LUIS Rivas  Acute Pain Service

## 2019-03-28 NOTE — SOCIAL WORK
Cm received call from PATRICIA Fay who advised pt is stable for discharge and pt and spouse was made aware of discharge  Cm made pt's bedside nurse and EUSEBIA aware

## 2019-03-29 ENCOUNTER — TRANSITIONAL CARE MANAGEMENT (OUTPATIENT)
Dept: FAMILY MEDICINE CLINIC | Facility: CLINIC | Age: 74
End: 2019-03-29

## 2019-03-30 ENCOUNTER — TELEPHONE (OUTPATIENT)
Dept: OTHER | Facility: OTHER | Age: 74
End: 2019-03-30

## 2019-04-01 DIAGNOSIS — E10.43 DIABETIC AUTONOMIC NEUROPATHY ASSOCIATED WITH TYPE 1 DIABETES MELLITUS (HCC): ICD-10-CM

## 2019-04-01 DIAGNOSIS — E10.8 TYPE 1 DIABETES MELLITUS WITH COMPLICATION (HCC): ICD-10-CM

## 2019-04-02 RX ORDER — GABAPENTIN 300 MG/1
CAPSULE ORAL
Qty: 180 CAPSULE | Refills: 1 | Status: SHIPPED | OUTPATIENT
Start: 2019-04-02 | End: 2019-04-18 | Stop reason: HOSPADM

## 2019-04-03 ENCOUNTER — TELEPHONE (OUTPATIENT)
Dept: VASCULAR SURGERY | Facility: CLINIC | Age: 74
End: 2019-04-03

## 2019-04-04 ENCOUNTER — OFFICE VISIT (OUTPATIENT)
Dept: FAMILY MEDICINE CLINIC | Facility: CLINIC | Age: 74
End: 2019-04-04
Payer: MEDICARE

## 2019-04-04 ENCOUNTER — TELEPHONE (OUTPATIENT)
Dept: ADMINISTRATIVE | Facility: OTHER | Age: 74
End: 2019-04-04

## 2019-04-04 ENCOUNTER — OFFICE VISIT (OUTPATIENT)
Dept: VASCULAR SURGERY | Facility: CLINIC | Age: 74
End: 2019-04-04

## 2019-04-04 VITALS
WEIGHT: 200 LBS | HEART RATE: 72 BPM | DIASTOLIC BLOOD PRESSURE: 68 MMHG | BODY MASS INDEX: 31.39 KG/M2 | HEIGHT: 67 IN | SYSTOLIC BLOOD PRESSURE: 118 MMHG

## 2019-04-04 VITALS — SYSTOLIC BLOOD PRESSURE: 122 MMHG | DIASTOLIC BLOOD PRESSURE: 64 MMHG | HEIGHT: 67 IN | BODY MASS INDEX: 32.53 KG/M2

## 2019-04-04 DIAGNOSIS — I70.299 ATHEROSCLEROSIS OF ARTERY OF EXTREMITY WITH ULCERATION (HCC): Primary | Chronic | ICD-10-CM

## 2019-04-04 DIAGNOSIS — F41.8 ANXIETY WITH DEPRESSION: ICD-10-CM

## 2019-04-04 DIAGNOSIS — E89.0 HYPOTHYROIDISM, POSTABLATIVE: ICD-10-CM

## 2019-04-04 DIAGNOSIS — L97.909 ATHEROSCLEROSIS OF ARTERY OF EXTREMITY WITH ULCERATION (HCC): Primary | Chronic | ICD-10-CM

## 2019-04-04 DIAGNOSIS — I21.4 NSTEMI (NON-ST ELEVATED MYOCARDIAL INFARCTION) (HCC): ICD-10-CM

## 2019-04-04 DIAGNOSIS — I12.9 BENIGN HYPERTENSION WITH CHRONIC KIDNEY DISEASE, STAGE III (HCC): ICD-10-CM

## 2019-04-04 DIAGNOSIS — E10.40 TYPE 1 DIABETES MELLITUS WITH DIABETIC NEUROPATHY (HCC): ICD-10-CM

## 2019-04-04 DIAGNOSIS — D62 ACUTE BLOOD LOSS ANEMIA: ICD-10-CM

## 2019-04-04 DIAGNOSIS — N18.30 BENIGN HYPERTENSION WITH CHRONIC KIDNEY DISEASE, STAGE III (HCC): ICD-10-CM

## 2019-04-04 DIAGNOSIS — I50.22 CHRONIC SYSTOLIC CONGESTIVE HEART FAILURE (HCC): ICD-10-CM

## 2019-04-04 PROCEDURE — 99496 TRANSJ CARE MGMT HIGH F2F 7D: CPT | Performed by: FAMILY MEDICINE

## 2019-04-04 PROCEDURE — 99024 POSTOP FOLLOW-UP VISIT: CPT | Performed by: SURGERY

## 2019-04-07 PROBLEM — E03.8 OTHER SPECIFIED HYPOTHYROIDISM: Status: RESOLVED | Noted: 2018-05-24 | Resolved: 2019-04-07

## 2019-04-07 PROBLEM — F41.8 ANXIETY WITH DEPRESSION: Status: ACTIVE | Noted: 2019-04-07

## 2019-04-08 ENCOUNTER — LAB (OUTPATIENT)
Dept: LAB | Facility: CLINIC | Age: 74
End: 2019-04-08
Payer: MEDICARE

## 2019-04-08 DIAGNOSIS — I70.1 RENAL ARTERY STENOSIS (HCC): ICD-10-CM

## 2019-04-08 DIAGNOSIS — N18.30 CKD (CHRONIC KIDNEY DISEASE) STAGE 3, GFR 30-59 ML/MIN (HCC): ICD-10-CM

## 2019-04-08 DIAGNOSIS — I50.22 CHRONIC SYSTOLIC CONGESTIVE HEART FAILURE (HCC): ICD-10-CM

## 2019-04-08 DIAGNOSIS — I12.9 BENIGN HYPERTENSION WITH CHRONIC KIDNEY DISEASE, STAGE III (HCC): ICD-10-CM

## 2019-04-08 DIAGNOSIS — R80.9 PROTEINURIA, UNSPECIFIED TYPE: ICD-10-CM

## 2019-04-08 DIAGNOSIS — N18.30 BENIGN HYPERTENSION WITH CHRONIC KIDNEY DISEASE, STAGE III (HCC): ICD-10-CM

## 2019-04-08 DIAGNOSIS — N28.1 RENAL CYST, RIGHT: ICD-10-CM

## 2019-04-08 LAB
ALBUMIN SERPL BCP-MCNC: 3.6 G/DL (ref 3.5–5)
ALP SERPL-CCNC: 91 U/L (ref 46–116)
ALT SERPL W P-5'-P-CCNC: 18 U/L (ref 12–78)
ANION GAP SERPL CALCULATED.3IONS-SCNC: 3 MMOL/L (ref 4–13)
AST SERPL W P-5'-P-CCNC: 14 U/L (ref 5–45)
BASOPHILS # BLD AUTO: 0.04 THOUSANDS/ΜL (ref 0–0.1)
BASOPHILS NFR BLD AUTO: 1 % (ref 0–1)
BILIRUB SERPL-MCNC: 0.5 MG/DL (ref 0.2–1)
BUN SERPL-MCNC: 30 MG/DL (ref 5–25)
CALCIUM SERPL-MCNC: 9 MG/DL (ref 8.3–10.1)
CHLORIDE SERPL-SCNC: 109 MMOL/L (ref 100–108)
CO2 SERPL-SCNC: 26 MMOL/L (ref 21–32)
CREAT SERPL-MCNC: 1.59 MG/DL (ref 0.6–1.3)
EOSINOPHIL # BLD AUTO: 0.34 THOUSAND/ΜL (ref 0–0.61)
EOSINOPHIL NFR BLD AUTO: 6 % (ref 0–6)
ERYTHROCYTE [DISTWIDTH] IN BLOOD BY AUTOMATED COUNT: 13.6 % (ref 11.6–15.1)
GFR SERPL CREATININE-BSD FRML MDRD: 42 ML/MIN/1.73SQ M
GLUCOSE P FAST SERPL-MCNC: 252 MG/DL (ref 65–99)
HCT VFR BLD AUTO: 34.9 % (ref 36.5–49.3)
HGB BLD-MCNC: 11.3 G/DL (ref 12–17)
IMM GRANULOCYTES # BLD AUTO: 0.02 THOUSAND/UL (ref 0–0.2)
IMM GRANULOCYTES NFR BLD AUTO: 0 % (ref 0–2)
LYMPHOCYTES # BLD AUTO: 1.72 THOUSANDS/ΜL (ref 0.6–4.47)
LYMPHOCYTES NFR BLD AUTO: 28 % (ref 14–44)
MAGNESIUM SERPL-MCNC: 2 MG/DL (ref 1.6–2.6)
MCH RBC QN AUTO: 31.4 PG (ref 26.8–34.3)
MCHC RBC AUTO-ENTMCNC: 32.4 G/DL (ref 31.4–37.4)
MCV RBC AUTO: 97 FL (ref 82–98)
MONOCYTES # BLD AUTO: 0.68 THOUSAND/ΜL (ref 0.17–1.22)
MONOCYTES NFR BLD AUTO: 11 % (ref 4–12)
NEUTROPHILS # BLD AUTO: 3.37 THOUSANDS/ΜL (ref 1.85–7.62)
NEUTS SEG NFR BLD AUTO: 54 % (ref 43–75)
NRBC BLD AUTO-RTO: 0 /100 WBCS
PHOSPHATE SERPL-MCNC: 2.9 MG/DL (ref 2.3–4.1)
PLATELET # BLD AUTO: 204 THOUSANDS/UL (ref 149–390)
PMV BLD AUTO: 12.1 FL (ref 8.9–12.7)
POTASSIUM SERPL-SCNC: 4.6 MMOL/L (ref 3.5–5.3)
PROT SERPL-MCNC: 7.3 G/DL (ref 6.4–8.2)
RBC # BLD AUTO: 3.6 MILLION/UL (ref 3.88–5.62)
SODIUM SERPL-SCNC: 138 MMOL/L (ref 136–145)
WBC # BLD AUTO: 6.17 THOUSAND/UL (ref 4.31–10.16)

## 2019-04-08 PROCEDURE — 80053 COMPREHEN METABOLIC PANEL: CPT | Performed by: SURGERY

## 2019-04-08 PROCEDURE — 83735 ASSAY OF MAGNESIUM: CPT | Performed by: SURGERY

## 2019-04-08 PROCEDURE — 85025 COMPLETE CBC W/AUTO DIFF WBC: CPT

## 2019-04-08 PROCEDURE — 36415 COLL VENOUS BLD VENIPUNCTURE: CPT

## 2019-04-08 PROCEDURE — 84100 ASSAY OF PHOSPHORUS: CPT | Performed by: SURGERY

## 2019-04-10 ENCOUNTER — TELEPHONE (OUTPATIENT)
Dept: NEPHROLOGY | Facility: CLINIC | Age: 74
End: 2019-04-10

## 2019-04-11 ENCOUNTER — OFFICE VISIT (OUTPATIENT)
Dept: NEPHROLOGY | Facility: CLINIC | Age: 74
End: 2019-04-11
Payer: MEDICARE

## 2019-04-11 VITALS
BODY MASS INDEX: 30.95 KG/M2 | HEIGHT: 67 IN | HEART RATE: 77 BPM | SYSTOLIC BLOOD PRESSURE: 126 MMHG | DIASTOLIC BLOOD PRESSURE: 53 MMHG | WEIGHT: 197.2 LBS

## 2019-04-11 DIAGNOSIS — I70.1 RENAL ARTERY STENOSIS (HCC): ICD-10-CM

## 2019-04-11 DIAGNOSIS — N18.30 CKD (CHRONIC KIDNEY DISEASE) STAGE 3, GFR 30-59 ML/MIN (HCC): ICD-10-CM

## 2019-04-11 DIAGNOSIS — R80.9 PROTEINURIA, UNSPECIFIED TYPE: ICD-10-CM

## 2019-04-11 DIAGNOSIS — I12.9 BENIGN HYPERTENSION WITH CHRONIC KIDNEY DISEASE, STAGE III (HCC): Primary | ICD-10-CM

## 2019-04-11 DIAGNOSIS — I50.22 CHRONIC SYSTOLIC CONGESTIVE HEART FAILURE (HCC): ICD-10-CM

## 2019-04-11 DIAGNOSIS — E87.1 HYPONATREMIA: ICD-10-CM

## 2019-04-11 DIAGNOSIS — N18.30 BENIGN HYPERTENSION WITH CHRONIC KIDNEY DISEASE, STAGE III (HCC): Primary | ICD-10-CM

## 2019-04-11 DIAGNOSIS — D62 ACUTE BLOOD LOSS ANEMIA: ICD-10-CM

## 2019-04-11 PROCEDURE — 99214 OFFICE O/P EST MOD 30 MIN: CPT | Performed by: INTERNAL MEDICINE

## 2019-04-12 ENCOUNTER — OFFICE VISIT (OUTPATIENT)
Dept: VASCULAR SURGERY | Facility: CLINIC | Age: 74
End: 2019-04-12

## 2019-04-12 ENCOUNTER — HOSPITAL ENCOUNTER (INPATIENT)
Facility: HOSPITAL | Age: 74
LOS: 6 days | Discharge: HOME HEALTH | DRG: 857 | End: 2019-04-18
Attending: SURGERY | Admitting: SURGERY
Payer: MEDICARE

## 2019-04-12 ENCOUNTER — TELEPHONE (OUTPATIENT)
Dept: VASCULAR SURGERY | Facility: CLINIC | Age: 74
End: 2019-04-12

## 2019-04-12 VITALS
HEART RATE: 88 BPM | SYSTOLIC BLOOD PRESSURE: 150 MMHG | BODY MASS INDEX: 31.23 KG/M2 | HEIGHT: 67 IN | DIASTOLIC BLOOD PRESSURE: 80 MMHG | WEIGHT: 199 LBS | TEMPERATURE: 96.1 F

## 2019-04-12 DIAGNOSIS — L97.909 ATHEROSCLEROSIS OF ARTERY OF EXTREMITY WITH ULCERATION (HCC): Primary | ICD-10-CM

## 2019-04-12 DIAGNOSIS — E10.40 TYPE 1 DIABETES MELLITUS WITH DIABETIC NEUROPATHY (HCC): ICD-10-CM

## 2019-04-12 DIAGNOSIS — I21.4 NSTEMI (NON-ST ELEVATED MYOCARDIAL INFARCTION) (HCC): ICD-10-CM

## 2019-04-12 DIAGNOSIS — E10.29 TYPE 1 DIABETES MELLITUS WITH MICROALBUMINURIA (HCC): ICD-10-CM

## 2019-04-12 DIAGNOSIS — E10.8 TYPE 1 DIABETES MELLITUS WITH COMPLICATION (HCC): ICD-10-CM

## 2019-04-12 DIAGNOSIS — L97.909 ATHEROSCLEROSIS OF ARTERY OF EXTREMITY WITH ULCERATION (HCC): Primary | Chronic | ICD-10-CM

## 2019-04-12 DIAGNOSIS — I70.299 ATHEROSCLEROSIS OF ARTERY OF EXTREMITY WITH ULCERATION (HCC): Primary | ICD-10-CM

## 2019-04-12 DIAGNOSIS — I74.09 AORTOILIAC OCCLUSIVE DISEASE (HCC): Chronic | ICD-10-CM

## 2019-04-12 DIAGNOSIS — I70.299 ATHEROSCLEROSIS OF ARTERY OF EXTREMITY WITH ULCERATION (HCC): Primary | Chronic | ICD-10-CM

## 2019-04-12 DIAGNOSIS — R80.9 TYPE 1 DIABETES MELLITUS WITH MICROALBUMINURIA (HCC): ICD-10-CM

## 2019-04-12 PROCEDURE — NC001 PR NO CHARGE: Performed by: SURGERY

## 2019-04-12 PROCEDURE — 80048 BASIC METABOLIC PNL TOTAL CA: CPT | Performed by: STUDENT IN AN ORGANIZED HEALTH CARE EDUCATION/TRAINING PROGRAM

## 2019-04-12 PROCEDURE — 85027 COMPLETE CBC AUTOMATED: CPT | Performed by: STUDENT IN AN ORGANIZED HEALTH CARE EDUCATION/TRAINING PROGRAM

## 2019-04-12 PROCEDURE — 99024 POSTOP FOLLOW-UP VISIT: CPT | Performed by: NURSE PRACTITIONER

## 2019-04-12 PROCEDURE — 83735 ASSAY OF MAGNESIUM: CPT | Performed by: STUDENT IN AN ORGANIZED HEALTH CARE EDUCATION/TRAINING PROGRAM

## 2019-04-12 RX ORDER — INSULIN GLARGINE 100 [IU]/ML
40 INJECTION, SOLUTION SUBCUTANEOUS DAILY
Status: DISCONTINUED | OUTPATIENT
Start: 2019-04-13 | End: 2019-04-13

## 2019-04-12 RX ORDER — GABAPENTIN 300 MG/1
300 CAPSULE ORAL 2 TIMES DAILY
Status: DISCONTINUED | OUTPATIENT
Start: 2019-04-13 | End: 2019-04-12

## 2019-04-12 RX ORDER — FUROSEMIDE 20 MG/1
20 TABLET ORAL DAILY
Status: DISCONTINUED | OUTPATIENT
Start: 2019-04-13 | End: 2019-04-18 | Stop reason: HOSPADM

## 2019-04-12 RX ORDER — PRAVASTATIN SODIUM 40 MG
40 TABLET ORAL
Status: DISCONTINUED | OUTPATIENT
Start: 2019-04-12 | End: 2019-04-18 | Stop reason: HOSPADM

## 2019-04-12 RX ORDER — HEPARIN SODIUM 5000 [USP'U]/ML
5000 INJECTION, SOLUTION INTRAVENOUS; SUBCUTANEOUS EVERY 8 HOURS SCHEDULED
Status: DISCONTINUED | OUTPATIENT
Start: 2019-04-12 | End: 2019-04-18 | Stop reason: HOSPADM

## 2019-04-12 RX ORDER — ASPIRIN 81 MG/1
81 TABLET, CHEWABLE ORAL DAILY
Status: DISCONTINUED | OUTPATIENT
Start: 2019-04-13 | End: 2019-04-12

## 2019-04-12 RX ORDER — ALBUTEROL SULFATE 90 UG/1
1 AEROSOL, METERED RESPIRATORY (INHALATION) EVERY 4 HOURS PRN
Status: DISCONTINUED | OUTPATIENT
Start: 2019-04-12 | End: 2019-04-18 | Stop reason: HOSPADM

## 2019-04-12 RX ORDER — CHOLECALCIFEROL (VITAMIN D3) 125 MCG
200 CAPSULE ORAL
Status: DISCONTINUED | OUTPATIENT
Start: 2019-04-12 | End: 2019-04-18 | Stop reason: HOSPADM

## 2019-04-12 RX ORDER — DOCUSATE SODIUM 100 MG/1
100 CAPSULE, LIQUID FILLED ORAL 2 TIMES DAILY
Status: DISCONTINUED | OUTPATIENT
Start: 2019-04-13 | End: 2019-04-12

## 2019-04-12 RX ORDER — ISOSORBIDE MONONITRATE 30 MG/1
30 TABLET, EXTENDED RELEASE ORAL DAILY
Status: DISCONTINUED | OUTPATIENT
Start: 2019-04-13 | End: 2019-04-18 | Stop reason: HOSPADM

## 2019-04-12 RX ORDER — DOCUSATE SODIUM 100 MG/1
100 CAPSULE, LIQUID FILLED ORAL DAILY
Status: DISCONTINUED | OUTPATIENT
Start: 2019-04-13 | End: 2019-04-12

## 2019-04-12 RX ORDER — SODIUM CHLORIDE 9 MG/ML
20 INJECTION, SOLUTION INTRAVENOUS CONTINUOUS
Status: DISCONTINUED | OUTPATIENT
Start: 2019-04-12 | End: 2019-04-15

## 2019-04-12 RX ORDER — METHOCARBAMOL 500 MG/1
250 TABLET, FILM COATED ORAL EVERY 8 HOURS PRN
Status: DISCONTINUED | OUTPATIENT
Start: 2019-04-12 | End: 2019-04-18 | Stop reason: HOSPADM

## 2019-04-12 RX ORDER — GABAPENTIN 300 MG/1
300 CAPSULE ORAL 2 TIMES DAILY
Status: DISCONTINUED | OUTPATIENT
Start: 2019-04-12 | End: 2019-04-13

## 2019-04-12 RX ORDER — MELATONIN
1000 DAILY
Status: DISCONTINUED | OUTPATIENT
Start: 2019-04-13 | End: 2019-04-18 | Stop reason: HOSPADM

## 2019-04-12 RX ORDER — CHOLECALCIFEROL (VITAMIN D3) 125 MCG
200 CAPSULE ORAL DAILY
Status: DISCONTINUED | OUTPATIENT
Start: 2019-04-13 | End: 2019-04-12

## 2019-04-12 RX ORDER — ASPIRIN 81 MG/1
81 TABLET, CHEWABLE ORAL
Status: DISCONTINUED | OUTPATIENT
Start: 2019-04-12 | End: 2019-04-18 | Stop reason: HOSPADM

## 2019-04-12 RX ORDER — ACETAMINOPHEN 325 MG/1
650 TABLET ORAL EVERY 6 HOURS PRN
Status: DISCONTINUED | OUTPATIENT
Start: 2019-04-12 | End: 2019-04-18 | Stop reason: HOSPADM

## 2019-04-12 RX ORDER — OXYCODONE HYDROCHLORIDE 5 MG/1
5 TABLET ORAL EVERY 4 HOURS PRN
Status: DISCONTINUED | OUTPATIENT
Start: 2019-04-12 | End: 2019-04-18 | Stop reason: HOSPADM

## 2019-04-12 RX ORDER — CLOPIDOGREL BISULFATE 75 MG/1
75 TABLET ORAL DAILY
Status: DISCONTINUED | OUTPATIENT
Start: 2019-04-13 | End: 2019-04-18 | Stop reason: HOSPADM

## 2019-04-12 RX ORDER — GABAPENTIN 300 MG/1
300 CAPSULE ORAL DAILY
Status: DISCONTINUED | OUTPATIENT
Start: 2019-04-13 | End: 2019-04-12

## 2019-04-12 RX ORDER — PRAVASTATIN SODIUM 40 MG
40 TABLET ORAL
Status: DISCONTINUED | OUTPATIENT
Start: 2019-04-13 | End: 2019-04-12

## 2019-04-12 RX ORDER — VENLAFAXINE HYDROCHLORIDE 37.5 MG/1
37.5 CAPSULE, EXTENDED RELEASE ORAL DAILY
Status: DISCONTINUED | OUTPATIENT
Start: 2019-04-13 | End: 2019-04-18 | Stop reason: HOSPADM

## 2019-04-12 RX ORDER — LACTOBACILLUS RHAMNOSUS GG 10B CELL
CAPSULE ORAL DAILY
Status: DISCONTINUED | OUTPATIENT
Start: 2019-04-13 | End: 2019-04-12

## 2019-04-12 RX ORDER — DOCUSATE SODIUM 100 MG/1
100 CAPSULE, LIQUID FILLED ORAL 2 TIMES DAILY
Status: DISCONTINUED | OUTPATIENT
Start: 2019-04-12 | End: 2019-04-13

## 2019-04-12 RX ORDER — INSULIN GLARGINE 100 [IU]/ML
18 INJECTION, SOLUTION SUBCUTANEOUS
Status: DISCONTINUED | OUTPATIENT
Start: 2019-04-12 | End: 2019-04-13

## 2019-04-12 RX ADMIN — DOCUSATE SODIUM 100 MG: 100 CAPSULE, LIQUID FILLED ORAL at 23:54

## 2019-04-12 RX ADMIN — ASPIRIN 81 MG 81 MG: 81 TABLET ORAL at 23:54

## 2019-04-12 RX ADMIN — METOPROLOL TARTRATE 25 MG: 25 TABLET ORAL at 22:51

## 2019-04-12 RX ADMIN — SODIUM CHLORIDE 100 ML/HR: 0.9 INJECTION, SOLUTION INTRAVENOUS at 23:50

## 2019-04-12 RX ADMIN — Medication 200 MG: at 23:54

## 2019-04-12 RX ADMIN — INSULIN GLARGINE 18 UNITS: 100 INJECTION, SOLUTION SUBCUTANEOUS at 23:00

## 2019-04-12 RX ADMIN — GABAPENTIN 300 MG: 300 CAPSULE ORAL at 23:54

## 2019-04-12 RX ADMIN — ACETAMINOPHEN 650 MG: 325 TABLET ORAL at 23:59

## 2019-04-12 RX ADMIN — PRAVASTATIN SODIUM 40 MG: 40 TABLET ORAL at 23:54

## 2019-04-12 RX ADMIN — HEPARIN SODIUM 5000 UNITS: 5000 INJECTION INTRAVENOUS; SUBCUTANEOUS at 22:51

## 2019-04-13 PROBLEM — L24.A9 WOUND DRAINAGE: Status: ACTIVE | Noted: 2019-04-13

## 2019-04-13 LAB
ANION GAP SERPL CALCULATED.3IONS-SCNC: 6 MMOL/L (ref 4–13)
ANION GAP SERPL CALCULATED.3IONS-SCNC: 7 MMOL/L (ref 4–13)
BASOPHILS # BLD AUTO: 0.02 THOUSANDS/ΜL (ref 0–0.1)
BASOPHILS NFR BLD AUTO: 0 % (ref 0–1)
BUN SERPL-MCNC: 25 MG/DL (ref 5–25)
BUN SERPL-MCNC: 26 MG/DL (ref 5–25)
CALCIUM SERPL-MCNC: 8.3 MG/DL (ref 8.3–10.1)
CALCIUM SERPL-MCNC: 8.6 MG/DL (ref 8.3–10.1)
CHLORIDE SERPL-SCNC: 109 MMOL/L (ref 100–108)
CHLORIDE SERPL-SCNC: 110 MMOL/L (ref 100–108)
CO2 SERPL-SCNC: 23 MMOL/L (ref 21–32)
CO2 SERPL-SCNC: 23 MMOL/L (ref 21–32)
CREAT SERPL-MCNC: 1.38 MG/DL (ref 0.6–1.3)
CREAT SERPL-MCNC: 1.41 MG/DL (ref 0.6–1.3)
EOSINOPHIL # BLD AUTO: 0.28 THOUSAND/ΜL (ref 0–0.61)
EOSINOPHIL NFR BLD AUTO: 5 % (ref 0–6)
ERYTHROCYTE [DISTWIDTH] IN BLOOD BY AUTOMATED COUNT: 14 % (ref 11.6–15.1)
ERYTHROCYTE [DISTWIDTH] IN BLOOD BY AUTOMATED COUNT: 14.1 % (ref 11.6–15.1)
GFR SERPL CREATININE-BSD FRML MDRD: 49 ML/MIN/1.73SQ M
GFR SERPL CREATININE-BSD FRML MDRD: 50 ML/MIN/1.73SQ M
GLUCOSE SERPL-MCNC: 111 MG/DL (ref 65–140)
GLUCOSE SERPL-MCNC: 133 MG/DL (ref 65–140)
GLUCOSE SERPL-MCNC: 147 MG/DL (ref 65–140)
GLUCOSE SERPL-MCNC: 179 MG/DL (ref 65–140)
GLUCOSE SERPL-MCNC: 75 MG/DL (ref 65–140)
GLUCOSE SERPL-MCNC: 79 MG/DL (ref 65–140)
GLUCOSE SERPL-MCNC: 98 MG/DL (ref 65–140)
HCT VFR BLD AUTO: 32.7 % (ref 36.5–49.3)
HCT VFR BLD AUTO: 34.1 % (ref 36.5–49.3)
HGB BLD-MCNC: 10.3 G/DL (ref 12–17)
HGB BLD-MCNC: 10.8 G/DL (ref 12–17)
IMM GRANULOCYTES # BLD AUTO: 0.01 THOUSAND/UL (ref 0–0.2)
IMM GRANULOCYTES NFR BLD AUTO: 0 % (ref 0–2)
LYMPHOCYTES # BLD AUTO: 1.92 THOUSANDS/ΜL (ref 0.6–4.47)
LYMPHOCYTES NFR BLD AUTO: 33 % (ref 14–44)
MAGNESIUM SERPL-MCNC: 2.3 MG/DL (ref 1.6–2.6)
MCH RBC QN AUTO: 30.8 PG (ref 26.8–34.3)
MCH RBC QN AUTO: 31.4 PG (ref 26.8–34.3)
MCHC RBC AUTO-ENTMCNC: 31.5 G/DL (ref 31.4–37.4)
MCHC RBC AUTO-ENTMCNC: 31.7 G/DL (ref 31.4–37.4)
MCV RBC AUTO: 98 FL (ref 82–98)
MCV RBC AUTO: 99 FL (ref 82–98)
MONOCYTES # BLD AUTO: 0.91 THOUSAND/ΜL (ref 0.17–1.22)
MONOCYTES NFR BLD AUTO: 15 % (ref 4–12)
NEUTROPHILS # BLD AUTO: 2.77 THOUSANDS/ΜL (ref 1.85–7.62)
NEUTS SEG NFR BLD AUTO: 47 % (ref 43–75)
NRBC BLD AUTO-RTO: 0 /100 WBCS
PLATELET # BLD AUTO: 136 THOUSANDS/UL (ref 149–390)
PLATELET # BLD AUTO: 137 THOUSANDS/UL (ref 149–390)
PMV BLD AUTO: 12.2 FL (ref 8.9–12.7)
PMV BLD AUTO: 12.3 FL (ref 8.9–12.7)
POTASSIUM SERPL-SCNC: 4 MMOL/L (ref 3.5–5.3)
POTASSIUM SERPL-SCNC: 5.1 MMOL/L (ref 3.5–5.3)
RBC # BLD AUTO: 3.34 MILLION/UL (ref 3.88–5.62)
RBC # BLD AUTO: 3.44 MILLION/UL (ref 3.88–5.62)
SODIUM SERPL-SCNC: 138 MMOL/L (ref 136–145)
SODIUM SERPL-SCNC: 140 MMOL/L (ref 136–145)
WBC # BLD AUTO: 5.91 THOUSAND/UL (ref 4.31–10.16)
WBC # BLD AUTO: 6.26 THOUSAND/UL (ref 4.31–10.16)

## 2019-04-13 PROCEDURE — 82948 REAGENT STRIP/BLOOD GLUCOSE: CPT

## 2019-04-13 PROCEDURE — 36569 INSJ PICC 5 YR+ W/O IMAGING: CPT

## 2019-04-13 PROCEDURE — 87070 CULTURE OTHR SPECIMN AEROBIC: CPT | Performed by: INTERNAL MEDICINE

## 2019-04-13 PROCEDURE — 99024 POSTOP FOLLOW-UP VISIT: CPT | Performed by: SURGERY

## 2019-04-13 PROCEDURE — 85025 COMPLETE CBC W/AUTO DIFF WBC: CPT | Performed by: STUDENT IN AN ORGANIZED HEALTH CARE EDUCATION/TRAINING PROGRAM

## 2019-04-13 PROCEDURE — 99222 1ST HOSP IP/OBS MODERATE 55: CPT | Performed by: INTERNAL MEDICINE

## 2019-04-13 PROCEDURE — 99223 1ST HOSP IP/OBS HIGH 75: CPT | Performed by: INTERNAL MEDICINE

## 2019-04-13 PROCEDURE — 80048 BASIC METABOLIC PNL TOTAL CA: CPT | Performed by: STUDENT IN AN ORGANIZED HEALTH CARE EDUCATION/TRAINING PROGRAM

## 2019-04-13 PROCEDURE — 02HV33Z INSERTION OF INFUSION DEVICE INTO SUPERIOR VENA CAVA, PERCUTANEOUS APPROACH: ICD-10-PCS | Performed by: SURGERY

## 2019-04-13 PROCEDURE — 87205 SMEAR GRAM STAIN: CPT | Performed by: INTERNAL MEDICINE

## 2019-04-13 PROCEDURE — C1751 CATH, INF, PER/CENT/MIDLINE: HCPCS

## 2019-04-13 RX ORDER — INSULIN GLARGINE 100 [IU]/ML
30 INJECTION, SOLUTION SUBCUTANEOUS DAILY
Status: DISCONTINUED | OUTPATIENT
Start: 2019-04-14 | End: 2019-04-16

## 2019-04-13 RX ORDER — AMOXICILLIN 250 MG
1 CAPSULE ORAL
Status: DISCONTINUED | OUTPATIENT
Start: 2019-04-13 | End: 2019-04-15

## 2019-04-13 RX ORDER — DEXTROSE MONOHYDRATE 25 G/50ML
25 INJECTION, SOLUTION INTRAVENOUS ONCE
Status: DISCONTINUED | OUTPATIENT
Start: 2019-04-13 | End: 2019-04-15

## 2019-04-13 RX ORDER — POLYETHYLENE GLYCOL 3350 17 G/17G
17 POWDER, FOR SOLUTION ORAL DAILY PRN
Status: DISCONTINUED | OUTPATIENT
Start: 2019-04-13 | End: 2019-04-18 | Stop reason: HOSPADM

## 2019-04-13 RX ORDER — INSULIN GLARGINE 100 [IU]/ML
15 INJECTION, SOLUTION SUBCUTANEOUS
Status: DISCONTINUED | OUTPATIENT
Start: 2019-04-13 | End: 2019-04-16

## 2019-04-13 RX ORDER — GABAPENTIN 300 MG/1
300 CAPSULE ORAL 3 TIMES DAILY
Status: DISCONTINUED | OUTPATIENT
Start: 2019-04-13 | End: 2019-04-18 | Stop reason: HOSPADM

## 2019-04-13 RX ADMIN — ISOSORBIDE MONONITRATE 30 MG: 30 TABLET, EXTENDED RELEASE ORAL at 09:00

## 2019-04-13 RX ADMIN — INSULIN GLARGINE 40 UNITS: 100 INJECTION, SOLUTION SUBCUTANEOUS at 08:11

## 2019-04-13 RX ADMIN — Medication 200 MG: at 21:07

## 2019-04-13 RX ADMIN — GABAPENTIN 300 MG: 300 CAPSULE ORAL at 09:00

## 2019-04-13 RX ADMIN — METHOCARBAMOL 250 MG: 500 TABLET ORAL at 00:00

## 2019-04-13 RX ADMIN — VANCOMYCIN HYDROCHLORIDE 1250 MG: 10 INJECTION, POWDER, LYOPHILIZED, FOR SOLUTION INTRAVENOUS at 00:02

## 2019-04-13 RX ADMIN — SODIUM CHLORIDE 100 ML/HR: 0.9 INJECTION, SOLUTION INTRAVENOUS at 13:12

## 2019-04-13 RX ADMIN — CLOPIDOGREL BISULFATE 75 MG: 75 TABLET ORAL at 09:00

## 2019-04-13 RX ADMIN — INSULIN GLARGINE 15 UNITS: 100 INJECTION, SOLUTION SUBCUTANEOUS at 22:06

## 2019-04-13 RX ADMIN — INSULIN LISPRO 12 UNITS: 100 INJECTION, SOLUTION INTRAVENOUS; SUBCUTANEOUS at 17:08

## 2019-04-13 RX ADMIN — FUROSEMIDE 20 MG: 20 TABLET ORAL at 09:00

## 2019-04-13 RX ADMIN — METOPROLOL TARTRATE 25 MG: 25 TABLET ORAL at 09:00

## 2019-04-13 RX ADMIN — GABAPENTIN 300 MG: 300 CAPSULE ORAL at 21:07

## 2019-04-13 RX ADMIN — HEPARIN SODIUM 5000 UNITS: 5000 INJECTION INTRAVENOUS; SUBCUTANEOUS at 13:25

## 2019-04-13 RX ADMIN — OXYCODONE HYDROCHLORIDE 5 MG: 5 TABLET ORAL at 01:23

## 2019-04-13 RX ADMIN — INSULIN LISPRO 14 UNITS: 100 INJECTION, SOLUTION INTRAVENOUS; SUBCUTANEOUS at 12:40

## 2019-04-13 RX ADMIN — VITAMIN D, TAB 1000IU (100/BT) 1000 UNITS: 25 TAB at 09:00

## 2019-04-13 RX ADMIN — METOPROLOL TARTRATE 25 MG: 25 TABLET ORAL at 21:07

## 2019-04-13 RX ADMIN — PRAVASTATIN SODIUM 40 MG: 40 TABLET ORAL at 17:09

## 2019-04-13 RX ADMIN — DOCUSATE SODIUM 100 MG: 100 CAPSULE, LIQUID FILLED ORAL at 09:00

## 2019-04-13 RX ADMIN — HEPARIN SODIUM 5000 UNITS: 5000 INJECTION INTRAVENOUS; SUBCUTANEOUS at 21:07

## 2019-04-13 RX ADMIN — VANCOMYCIN HYDROCHLORIDE 750 MG: 750 INJECTION, SOLUTION INTRAVENOUS at 13:17

## 2019-04-13 RX ADMIN — INSULIN LISPRO 1 UNITS: 100 INJECTION, SOLUTION INTRAVENOUS; SUBCUTANEOUS at 08:11

## 2019-04-13 RX ADMIN — VENLAFAXINE HYDROCHLORIDE 37.5 MG: 37.5 CAPSULE, EXTENDED RELEASE ORAL at 09:01

## 2019-04-13 RX ADMIN — SENNOSIDES AND DOCUSATE SODIUM 1 TABLET: 8.6; 5 TABLET ORAL at 21:07

## 2019-04-13 RX ADMIN — LEVOTHYROXINE SODIUM 175 MCG: 125 TABLET ORAL at 06:41

## 2019-04-13 RX ADMIN — ASPIRIN 81 MG 81 MG: 81 TABLET ORAL at 21:07

## 2019-04-13 RX ADMIN — GABAPENTIN 300 MG: 300 CAPSULE ORAL at 17:08

## 2019-04-13 RX ADMIN — HEPARIN SODIUM 5000 UNITS: 5000 INJECTION INTRAVENOUS; SUBCUTANEOUS at 06:41

## 2019-04-14 ENCOUNTER — APPOINTMENT (INPATIENT)
Dept: RADIOLOGY | Facility: HOSPITAL | Age: 74
DRG: 857 | End: 2019-04-14
Payer: MEDICARE

## 2019-04-14 LAB
ANION GAP SERPL CALCULATED.3IONS-SCNC: 6 MMOL/L (ref 4–13)
BASOPHILS # BLD AUTO: 0.02 THOUSANDS/ΜL (ref 0–0.1)
BASOPHILS NFR BLD AUTO: 1 % (ref 0–1)
BUN SERPL-MCNC: 24 MG/DL (ref 5–25)
CALCIUM SERPL-MCNC: 8.4 MG/DL (ref 8.3–10.1)
CHLORIDE SERPL-SCNC: 108 MMOL/L (ref 100–108)
CO2 SERPL-SCNC: 24 MMOL/L (ref 21–32)
CREAT SERPL-MCNC: 1.37 MG/DL (ref 0.6–1.3)
EOSINOPHIL # BLD AUTO: 0.28 THOUSAND/ΜL (ref 0–0.61)
EOSINOPHIL NFR BLD AUTO: 6 % (ref 0–6)
ERYTHROCYTE [DISTWIDTH] IN BLOOD BY AUTOMATED COUNT: 14 % (ref 11.6–15.1)
EST. AVERAGE GLUCOSE BLD GHB EST-MCNC: 146 MG/DL
GFR SERPL CREATININE-BSD FRML MDRD: 51 ML/MIN/1.73SQ M
GLUCOSE SERPL-MCNC: 130 MG/DL (ref 65–140)
GLUCOSE SERPL-MCNC: 180 MG/DL (ref 65–140)
GLUCOSE SERPL-MCNC: 245 MG/DL (ref 65–140)
GLUCOSE SERPL-MCNC: 259 MG/DL (ref 65–140)
GLUCOSE SERPL-MCNC: 58 MG/DL (ref 65–140)
GLUCOSE SERPL-MCNC: 76 MG/DL (ref 65–140)
HBA1C MFR BLD: 6.7 % (ref 4.2–6.3)
HCT VFR BLD AUTO: 31.1 % (ref 36.5–49.3)
HGB BLD-MCNC: 10 G/DL (ref 12–17)
IMM GRANULOCYTES # BLD AUTO: 0.01 THOUSAND/UL (ref 0–0.2)
IMM GRANULOCYTES NFR BLD AUTO: 0 % (ref 0–2)
LYMPHOCYTES # BLD AUTO: 1.6 THOUSANDS/ΜL (ref 0.6–4.47)
LYMPHOCYTES NFR BLD AUTO: 36 % (ref 14–44)
MCH RBC QN AUTO: 31.1 PG (ref 26.8–34.3)
MCHC RBC AUTO-ENTMCNC: 32.2 G/DL (ref 31.4–37.4)
MCV RBC AUTO: 97 FL (ref 82–98)
MONOCYTES # BLD AUTO: 0.56 THOUSAND/ΜL (ref 0.17–1.22)
MONOCYTES NFR BLD AUTO: 13 % (ref 4–12)
NEUTROPHILS # BLD AUTO: 1.97 THOUSANDS/ΜL (ref 1.85–7.62)
NEUTS SEG NFR BLD AUTO: 44 % (ref 43–75)
NRBC BLD AUTO-RTO: 0 /100 WBCS
PLATELET # BLD AUTO: 116 THOUSANDS/UL (ref 149–390)
PMV BLD AUTO: 12.2 FL (ref 8.9–12.7)
POTASSIUM SERPL-SCNC: 4.2 MMOL/L (ref 3.5–5.3)
PROCALCITONIN SERPL-MCNC: <0.05 NG/ML
RBC # BLD AUTO: 3.22 MILLION/UL (ref 3.88–5.62)
SODIUM SERPL-SCNC: 138 MMOL/L (ref 136–145)
VANCOMYCIN TROUGH SERPL-MCNC: 20.6 UG/ML (ref 10–20)
WBC # BLD AUTO: 4.44 THOUSAND/UL (ref 4.31–10.16)

## 2019-04-14 PROCEDURE — 80048 BASIC METABOLIC PNL TOTAL CA: CPT | Performed by: INTERNAL MEDICINE

## 2019-04-14 PROCEDURE — 99232 SBSQ HOSP IP/OBS MODERATE 35: CPT | Performed by: INTERNAL MEDICINE

## 2019-04-14 PROCEDURE — 83036 HEMOGLOBIN GLYCOSYLATED A1C: CPT | Performed by: INTERNAL MEDICINE

## 2019-04-14 PROCEDURE — 85025 COMPLETE CBC W/AUTO DIFF WBC: CPT | Performed by: INTERNAL MEDICINE

## 2019-04-14 PROCEDURE — 84145 PROCALCITONIN (PCT): CPT | Performed by: INTERNAL MEDICINE

## 2019-04-14 PROCEDURE — 99024 POSTOP FOLLOW-UP VISIT: CPT | Performed by: SURGERY

## 2019-04-14 PROCEDURE — 82948 REAGENT STRIP/BLOOD GLUCOSE: CPT

## 2019-04-14 PROCEDURE — 80202 ASSAY OF VANCOMYCIN: CPT | Performed by: INTERNAL MEDICINE

## 2019-04-14 PROCEDURE — 73700 CT LOWER EXTREMITY W/O DYE: CPT

## 2019-04-14 RX ORDER — SODIUM CHLORIDE 9 MG/ML
100 INJECTION, SOLUTION INTRAVENOUS CONTINUOUS
Status: DISCONTINUED | OUTPATIENT
Start: 2019-04-15 | End: 2019-04-15

## 2019-04-14 RX ORDER — POLYETHYLENE GLYCOL 3350 17 G/17G
17 POWDER, FOR SOLUTION ORAL DAILY
Status: DISCONTINUED | OUTPATIENT
Start: 2019-04-14 | End: 2019-04-18 | Stop reason: HOSPADM

## 2019-04-14 RX ADMIN — HEPARIN SODIUM 5000 UNITS: 5000 INJECTION INTRAVENOUS; SUBCUTANEOUS at 14:56

## 2019-04-14 RX ADMIN — INSULIN LISPRO 1 UNITS: 100 INJECTION, SOLUTION INTRAVENOUS; SUBCUTANEOUS at 11:46

## 2019-04-14 RX ADMIN — HEPARIN SODIUM 5000 UNITS: 5000 INJECTION INTRAVENOUS; SUBCUTANEOUS at 05:56

## 2019-04-14 RX ADMIN — LEVOTHYROXINE SODIUM 175 MCG: 125 TABLET ORAL at 05:56

## 2019-04-14 RX ADMIN — POLYETHYLENE GLYCOL 3350 17 G: 17 POWDER, FOR SOLUTION ORAL at 11:45

## 2019-04-14 RX ADMIN — VITAMIN D, TAB 1000IU (100/BT) 1000 UNITS: 25 TAB at 09:02

## 2019-04-14 RX ADMIN — GABAPENTIN 300 MG: 300 CAPSULE ORAL at 21:17

## 2019-04-14 RX ADMIN — PRAVASTATIN SODIUM 40 MG: 40 TABLET ORAL at 17:38

## 2019-04-14 RX ADMIN — GABAPENTIN 300 MG: 300 CAPSULE ORAL at 09:02

## 2019-04-14 RX ADMIN — FUROSEMIDE 20 MG: 20 TABLET ORAL at 09:02

## 2019-04-14 RX ADMIN — INSULIN LISPRO 4 UNITS: 100 INJECTION, SOLUTION INTRAVENOUS; SUBCUTANEOUS at 07:30

## 2019-04-14 RX ADMIN — VANCOMYCIN HYDROCHLORIDE 1500 MG: 10 INJECTION, POWDER, LYOPHILIZED, FOR SOLUTION INTRAVENOUS at 14:49

## 2019-04-14 RX ADMIN — INSULIN LISPRO 8 UNITS: 100 INJECTION, SOLUTION INTRAVENOUS; SUBCUTANEOUS at 07:30

## 2019-04-14 RX ADMIN — METOPROLOL TARTRATE 25 MG: 25 TABLET ORAL at 21:18

## 2019-04-14 RX ADMIN — VENLAFAXINE HYDROCHLORIDE 37.5 MG: 37.5 CAPSULE, EXTENDED RELEASE ORAL at 09:03

## 2019-04-14 RX ADMIN — ASPIRIN 81 MG 81 MG: 81 TABLET ORAL at 21:19

## 2019-04-14 RX ADMIN — HEPARIN SODIUM 5000 UNITS: 5000 INJECTION INTRAVENOUS; SUBCUTANEOUS at 21:19

## 2019-04-14 RX ADMIN — Medication 200 MG: at 21:17

## 2019-04-14 RX ADMIN — INSULIN GLARGINE 30 UNITS: 100 INJECTION, SOLUTION SUBCUTANEOUS at 09:01

## 2019-04-14 RX ADMIN — VANCOMYCIN HYDROCHLORIDE 750 MG: 750 INJECTION, SOLUTION INTRAVENOUS at 00:01

## 2019-04-14 RX ADMIN — ISOSORBIDE MONONITRATE 30 MG: 30 TABLET, EXTENDED RELEASE ORAL at 09:02

## 2019-04-14 RX ADMIN — METOPROLOL TARTRATE 25 MG: 25 TABLET ORAL at 09:02

## 2019-04-14 RX ADMIN — SILVER SULFADIAZINE: 10 CREAM TOPICAL at 09:03

## 2019-04-14 RX ADMIN — GABAPENTIN 300 MG: 300 CAPSULE ORAL at 17:43

## 2019-04-14 RX ADMIN — CLOPIDOGREL BISULFATE 75 MG: 75 TABLET ORAL at 09:02

## 2019-04-14 RX ADMIN — SENNOSIDES AND DOCUSATE SODIUM 1 TABLET: 8.6; 5 TABLET ORAL at 21:19

## 2019-04-14 RX ADMIN — INSULIN LISPRO 10 UNITS: 100 INJECTION, SOLUTION INTRAVENOUS; SUBCUTANEOUS at 17:32

## 2019-04-14 RX ADMIN — INSULIN LISPRO 12 UNITS: 100 INJECTION, SOLUTION INTRAVENOUS; SUBCUTANEOUS at 11:30

## 2019-04-15 ENCOUNTER — ANESTHESIA EVENT (INPATIENT)
Dept: PERIOP | Facility: HOSPITAL | Age: 74
DRG: 857 | End: 2019-04-15
Payer: MEDICARE

## 2019-04-15 ENCOUNTER — ANESTHESIA (INPATIENT)
Dept: PERIOP | Facility: HOSPITAL | Age: 74
DRG: 857 | End: 2019-04-15
Payer: MEDICARE

## 2019-04-15 LAB
ANION GAP SERPL CALCULATED.3IONS-SCNC: 2 MMOL/L (ref 4–13)
BASOPHILS # BLD AUTO: 0.02 THOUSANDS/ΜL (ref 0–0.1)
BASOPHILS NFR BLD AUTO: 1 % (ref 0–1)
BUN SERPL-MCNC: 24 MG/DL (ref 5–25)
CALCIUM SERPL-MCNC: 7.8 MG/DL (ref 8.3–10.1)
CHLORIDE SERPL-SCNC: 109 MMOL/L (ref 100–108)
CO2 SERPL-SCNC: 26 MMOL/L (ref 21–32)
CREAT SERPL-MCNC: 1.36 MG/DL (ref 0.6–1.3)
EOSINOPHIL # BLD AUTO: 0.25 THOUSAND/ΜL (ref 0–0.61)
EOSINOPHIL NFR BLD AUTO: 6 % (ref 0–6)
ERYTHROCYTE [DISTWIDTH] IN BLOOD BY AUTOMATED COUNT: 14.2 % (ref 11.6–15.1)
GFR SERPL CREATININE-BSD FRML MDRD: 51 ML/MIN/1.73SQ M
GLUCOSE SERPL-MCNC: 147 MG/DL (ref 65–140)
GLUCOSE SERPL-MCNC: 160 MG/DL (ref 65–140)
GLUCOSE SERPL-MCNC: 210 MG/DL (ref 65–140)
GLUCOSE SERPL-MCNC: 255 MG/DL (ref 65–140)
GLUCOSE SERPL-MCNC: 259 MG/DL (ref 65–140)
GLUCOSE SERPL-MCNC: 271 MG/DL (ref 65–140)
GLUCOSE SERPL-MCNC: 281 MG/DL (ref 65–140)
HCT VFR BLD AUTO: 30.6 % (ref 36.5–49.3)
HGB BLD-MCNC: 9.7 G/DL (ref 12–17)
IMM GRANULOCYTES # BLD AUTO: 0.01 THOUSAND/UL (ref 0–0.2)
IMM GRANULOCYTES NFR BLD AUTO: 0 % (ref 0–2)
LYMPHOCYTES # BLD AUTO: 1.44 THOUSANDS/ΜL (ref 0.6–4.47)
LYMPHOCYTES NFR BLD AUTO: 34 % (ref 14–44)
MCH RBC QN AUTO: 31.1 PG (ref 26.8–34.3)
MCHC RBC AUTO-ENTMCNC: 31.7 G/DL (ref 31.4–37.4)
MCV RBC AUTO: 98 FL (ref 82–98)
MONOCYTES # BLD AUTO: 0.59 THOUSAND/ΜL (ref 0.17–1.22)
MONOCYTES NFR BLD AUTO: 14 % (ref 4–12)
NEUTROPHILS # BLD AUTO: 1.98 THOUSANDS/ΜL (ref 1.85–7.62)
NEUTS SEG NFR BLD AUTO: 45 % (ref 43–75)
NRBC BLD AUTO-RTO: 0 /100 WBCS
PLATELET # BLD AUTO: 102 THOUSANDS/UL (ref 149–390)
PMV BLD AUTO: 12.2 FL (ref 8.9–12.7)
POTASSIUM SERPL-SCNC: 4.5 MMOL/L (ref 3.5–5.3)
RBC # BLD AUTO: 3.12 MILLION/UL (ref 3.88–5.62)
SODIUM SERPL-SCNC: 137 MMOL/L (ref 136–145)
WBC # BLD AUTO: 4.29 THOUSAND/UL (ref 4.31–10.16)

## 2019-04-15 PROCEDURE — 97605 NEG PRS WND THER DME<=50SQCM: CPT | Performed by: SURGERY

## 2019-04-15 PROCEDURE — 87205 SMEAR GRAM STAIN: CPT | Performed by: SURGERY

## 2019-04-15 PROCEDURE — 80048 BASIC METABOLIC PNL TOTAL CA: CPT | Performed by: STUDENT IN AN ORGANIZED HEALTH CARE EDUCATION/TRAINING PROGRAM

## 2019-04-15 PROCEDURE — NC001 PR NO CHARGE: Performed by: PHYSICIAN ASSISTANT

## 2019-04-15 PROCEDURE — 2W17X6Z COMPRESSION OF LEFT INGUINAL REGION USING PRESSURE DRESSING: ICD-10-PCS | Performed by: SURGERY

## 2019-04-15 PROCEDURE — 82948 REAGENT STRIP/BLOOD GLUCOSE: CPT

## 2019-04-15 PROCEDURE — 11042 DBRDMT SUBQ TIS 1ST 20SQCM/<: CPT | Performed by: SURGERY

## 2019-04-15 PROCEDURE — 99232 SBSQ HOSP IP/OBS MODERATE 35: CPT | Performed by: INTERNAL MEDICINE

## 2019-04-15 PROCEDURE — 85025 COMPLETE CBC W/AUTO DIFF WBC: CPT | Performed by: STUDENT IN AN ORGANIZED HEALTH CARE EDUCATION/TRAINING PROGRAM

## 2019-04-15 PROCEDURE — 87070 CULTURE OTHR SPECIMN AEROBIC: CPT | Performed by: SURGERY

## 2019-04-15 PROCEDURE — 0JBM0ZZ EXCISION OF LEFT UPPER LEG SUBCUTANEOUS TISSUE AND FASCIA, OPEN APPROACH: ICD-10-PCS | Performed by: SURGERY

## 2019-04-15 PROCEDURE — 87075 CULTR BACTERIA EXCEPT BLOOD: CPT | Performed by: SURGERY

## 2019-04-15 PROCEDURE — 10140 I&D HMTMA SEROMA/FLUID COLLJ: CPT | Performed by: SURGERY

## 2019-04-15 RX ORDER — LANOLIN ALCOHOL/MO/W.PET/CERES
3 CREAM (GRAM) TOPICAL
Status: DISCONTINUED | OUTPATIENT
Start: 2019-04-15 | End: 2019-04-18 | Stop reason: HOSPADM

## 2019-04-15 RX ORDER — EPHEDRINE SULFATE 50 MG/ML
INJECTION INTRAVENOUS AS NEEDED
Status: DISCONTINUED | OUTPATIENT
Start: 2019-04-15 | End: 2019-04-15 | Stop reason: SURG

## 2019-04-15 RX ORDER — PROPOFOL 10 MG/ML
INJECTION, EMULSION INTRAVENOUS AS NEEDED
Status: DISCONTINUED | OUTPATIENT
Start: 2019-04-15 | End: 2019-04-15 | Stop reason: SURG

## 2019-04-15 RX ORDER — INSULIN GLARGINE 100 [IU]/ML
15 INJECTION, SOLUTION SUBCUTANEOUS ONCE
Status: DISCONTINUED | OUTPATIENT
Start: 2019-04-15 | End: 2019-04-15 | Stop reason: HOSPADM

## 2019-04-15 RX ORDER — MAGNESIUM HYDROXIDE 1200 MG/15ML
LIQUID ORAL AS NEEDED
Status: DISCONTINUED | OUTPATIENT
Start: 2019-04-15 | End: 2019-04-15 | Stop reason: HOSPADM

## 2019-04-15 RX ORDER — FENTANYL CITRATE 50 UG/ML
INJECTION, SOLUTION INTRAMUSCULAR; INTRAVENOUS AS NEEDED
Status: DISCONTINUED | OUTPATIENT
Start: 2019-04-15 | End: 2019-04-15 | Stop reason: SURG

## 2019-04-15 RX ORDER — ONDANSETRON 2 MG/ML
4 INJECTION INTRAMUSCULAR; INTRAVENOUS ONCE AS NEEDED
Status: DISCONTINUED | OUTPATIENT
Start: 2019-04-15 | End: 2019-04-15 | Stop reason: HOSPADM

## 2019-04-15 RX ORDER — SENNOSIDES 8.6 MG
1 TABLET ORAL
Status: DISCONTINUED | OUTPATIENT
Start: 2019-04-15 | End: 2019-04-18 | Stop reason: HOSPADM

## 2019-04-15 RX ORDER — DOCUSATE SODIUM 100 MG/1
100 CAPSULE, LIQUID FILLED ORAL 2 TIMES DAILY
Status: DISCONTINUED | OUTPATIENT
Start: 2019-04-15 | End: 2019-04-18 | Stop reason: HOSPADM

## 2019-04-15 RX ORDER — ONDANSETRON 2 MG/ML
INJECTION INTRAMUSCULAR; INTRAVENOUS AS NEEDED
Status: DISCONTINUED | OUTPATIENT
Start: 2019-04-15 | End: 2019-04-15 | Stop reason: SURG

## 2019-04-15 RX ORDER — FENTANYL CITRATE/PF 50 MCG/ML
25 SYRINGE (ML) INJECTION
Status: DISCONTINUED | OUTPATIENT
Start: 2019-04-15 | End: 2019-04-15 | Stop reason: HOSPADM

## 2019-04-15 RX ORDER — LIDOCAINE HYDROCHLORIDE 10 MG/ML
INJECTION, SOLUTION INFILTRATION; PERINEURAL AS NEEDED
Status: DISCONTINUED | OUTPATIENT
Start: 2019-04-15 | End: 2019-04-15 | Stop reason: SURG

## 2019-04-15 RX ADMIN — INSULIN HUMAN 6 UNITS: 100 INJECTION, SOLUTION PARENTERAL at 08:13

## 2019-04-15 RX ADMIN — INSULIN GLARGINE 15 UNITS: 100 INJECTION, SOLUTION SUBCUTANEOUS at 09:15

## 2019-04-15 RX ADMIN — INSULIN GLARGINE 15 UNITS: 100 INJECTION, SOLUTION SUBCUTANEOUS at 21:52

## 2019-04-15 RX ADMIN — POLYETHYLENE GLYCOL 3350 17 G: 17 POWDER, FOR SOLUTION ORAL at 18:10

## 2019-04-15 RX ADMIN — ONDANSETRON 4 MG: 2 INJECTION INTRAMUSCULAR; INTRAVENOUS at 09:24

## 2019-04-15 RX ADMIN — EPHEDRINE SULFATE 10 MG: 50 INJECTION, SOLUTION INTRAVENOUS at 09:22

## 2019-04-15 RX ADMIN — Medication 200 MG: at 21:49

## 2019-04-15 RX ADMIN — PROPOFOL 150 MG: 10 INJECTION, EMULSION INTRAVENOUS at 09:18

## 2019-04-15 RX ADMIN — FENTANYL CITRATE 50 MCG: 50 INJECTION, SOLUTION INTRAMUSCULAR; INTRAVENOUS at 09:19

## 2019-04-15 RX ADMIN — SODIUM CHLORIDE 100 ML/HR: 0.9 INJECTION, SOLUTION INTRAVENOUS at 00:00

## 2019-04-15 RX ADMIN — GABAPENTIN 300 MG: 300 CAPSULE ORAL at 16:32

## 2019-04-15 RX ADMIN — PRAVASTATIN SODIUM 40 MG: 40 TABLET ORAL at 16:32

## 2019-04-15 RX ADMIN — METOPROLOL TARTRATE 25 MG: 25 TABLET ORAL at 21:49

## 2019-04-15 RX ADMIN — HEPARIN SODIUM 5000 UNITS: 5000 INJECTION INTRAVENOUS; SUBCUTANEOUS at 21:52

## 2019-04-15 RX ADMIN — SODIUM CHLORIDE: 0.9 INJECTION, SOLUTION INTRAVENOUS at 08:54

## 2019-04-15 RX ADMIN — ASPIRIN 81 MG 81 MG: 81 TABLET ORAL at 21:51

## 2019-04-15 RX ADMIN — VANCOMYCIN HYDROCHLORIDE 1500 MG: 10 INJECTION, POWDER, LYOPHILIZED, FOR SOLUTION INTRAVENOUS at 14:27

## 2019-04-15 RX ADMIN — MELATONIN 3 MG: at 00:41

## 2019-04-15 RX ADMIN — DOCUSATE SODIUM 100 MG: 100 CAPSULE, LIQUID FILLED ORAL at 21:51

## 2019-04-15 RX ADMIN — MELATONIN 3 MG: at 21:53

## 2019-04-15 RX ADMIN — EPHEDRINE SULFATE 10 MG: 50 INJECTION, SOLUTION INTRAVENOUS at 09:28

## 2019-04-15 RX ADMIN — FENTANYL CITRATE 25 MCG: 50 INJECTION, SOLUTION INTRAMUSCULAR; INTRAVENOUS at 09:25

## 2019-04-15 RX ADMIN — SENNOSIDES 8.6 MG: 8.6 TABLET, FILM COATED ORAL at 21:53

## 2019-04-15 RX ADMIN — INSULIN HUMAN 6 UNITS: 100 INJECTION, SOLUTION PARENTERAL at 08:53

## 2019-04-15 RX ADMIN — INSULIN LISPRO 14 UNITS: 100 INJECTION, SOLUTION INTRAVENOUS; SUBCUTANEOUS at 11:52

## 2019-04-15 RX ADMIN — GABAPENTIN 300 MG: 300 CAPSULE ORAL at 21:52

## 2019-04-15 RX ADMIN — HEPARIN SODIUM 5000 UNITS: 5000 INJECTION INTRAVENOUS; SUBCUTANEOUS at 05:30

## 2019-04-15 RX ADMIN — INSULIN LISPRO 2 UNITS: 100 INJECTION, SOLUTION INTRAVENOUS; SUBCUTANEOUS at 21:55

## 2019-04-15 RX ADMIN — INSULIN LISPRO 17 UNITS: 100 INJECTION, SOLUTION INTRAVENOUS; SUBCUTANEOUS at 17:21

## 2019-04-15 RX ADMIN — FENTANYL CITRATE 25 MCG: 50 INJECTION, SOLUTION INTRAMUSCULAR; INTRAVENOUS at 09:31

## 2019-04-15 RX ADMIN — METOPROLOL TARTRATE 25 MG: 25 TABLET ORAL at 08:13

## 2019-04-15 RX ADMIN — GABAPENTIN 300 MG: 300 CAPSULE ORAL at 11:10

## 2019-04-15 RX ADMIN — LIDOCAINE HYDROCHLORIDE 50 MG: 10 INJECTION, SOLUTION INFILTRATION; PERINEURAL at 09:18

## 2019-04-15 RX ADMIN — LEVOTHYROXINE SODIUM 175 MCG: 125 TABLET ORAL at 05:30

## 2019-04-15 RX ADMIN — HEPARIN SODIUM 5000 UNITS: 5000 INJECTION INTRAVENOUS; SUBCUTANEOUS at 14:27

## 2019-04-16 LAB
ANION GAP SERPL CALCULATED.3IONS-SCNC: 5 MMOL/L (ref 4–13)
BACTERIA WND AEROBE CULT: NORMAL
BUN SERPL-MCNC: 20 MG/DL (ref 5–25)
CALCIUM SERPL-MCNC: 8.5 MG/DL (ref 8.3–10.1)
CHLORIDE SERPL-SCNC: 111 MMOL/L (ref 100–108)
CO2 SERPL-SCNC: 26 MMOL/L (ref 21–32)
CREAT SERPL-MCNC: 1.35 MG/DL (ref 0.6–1.3)
ERYTHROCYTE [DISTWIDTH] IN BLOOD BY AUTOMATED COUNT: 14.1 % (ref 11.6–15.1)
GFR SERPL CREATININE-BSD FRML MDRD: 52 ML/MIN/1.73SQ M
GLUCOSE SERPL-MCNC: 104 MG/DL (ref 65–140)
GLUCOSE SERPL-MCNC: 133 MG/DL (ref 65–140)
GLUCOSE SERPL-MCNC: 186 MG/DL (ref 65–140)
GLUCOSE SERPL-MCNC: 266 MG/DL (ref 65–140)
GLUCOSE SERPL-MCNC: 77 MG/DL (ref 65–140)
GRAM STN SPEC: NORMAL
HCT VFR BLD AUTO: 32.3 % (ref 36.5–49.3)
HGB BLD-MCNC: 10.2 G/DL (ref 12–17)
MCH RBC QN AUTO: 31 PG (ref 26.8–34.3)
MCHC RBC AUTO-ENTMCNC: 31.6 G/DL (ref 31.4–37.4)
MCV RBC AUTO: 98 FL (ref 82–98)
PLATELET # BLD AUTO: 103 THOUSANDS/UL (ref 149–390)
PMV BLD AUTO: 12.2 FL (ref 8.9–12.7)
POTASSIUM SERPL-SCNC: 4.3 MMOL/L (ref 3.5–5.3)
PREALB SERPL-MCNC: 21 MG/DL (ref 18–40)
RBC # BLD AUTO: 3.29 MILLION/UL (ref 3.88–5.62)
SODIUM SERPL-SCNC: 142 MMOL/L (ref 136–145)
VANCOMYCIN TROUGH SERPL-MCNC: 15.9 UG/ML (ref 10–20)
WBC # BLD AUTO: 5.28 THOUSAND/UL (ref 4.31–10.16)

## 2019-04-16 PROCEDURE — 99232 SBSQ HOSP IP/OBS MODERATE 35: CPT | Performed by: INTERNAL MEDICINE

## 2019-04-16 PROCEDURE — 80048 BASIC METABOLIC PNL TOTAL CA: CPT | Performed by: SURGERY

## 2019-04-16 PROCEDURE — 80202 ASSAY OF VANCOMYCIN: CPT | Performed by: SURGERY

## 2019-04-16 PROCEDURE — 85027 COMPLETE CBC AUTOMATED: CPT | Performed by: SURGERY

## 2019-04-16 PROCEDURE — 84134 ASSAY OF PREALBUMIN: CPT | Performed by: PHYSICIAN ASSISTANT

## 2019-04-16 PROCEDURE — 82948 REAGENT STRIP/BLOOD GLUCOSE: CPT

## 2019-04-16 PROCEDURE — 99024 POSTOP FOLLOW-UP VISIT: CPT | Performed by: PHYSICIAN ASSISTANT

## 2019-04-16 RX ORDER — INSULIN GLARGINE 100 [IU]/ML
35 INJECTION, SOLUTION SUBCUTANEOUS DAILY
Status: DISCONTINUED | OUTPATIENT
Start: 2019-04-17 | End: 2019-04-18 | Stop reason: HOSPADM

## 2019-04-16 RX ORDER — INSULIN GLARGINE 100 [IU]/ML
10 INJECTION, SOLUTION SUBCUTANEOUS
Status: DISCONTINUED | OUTPATIENT
Start: 2019-04-16 | End: 2019-04-17

## 2019-04-16 RX ADMIN — INSULIN GLARGINE 10 UNITS: 100 INJECTION, SOLUTION SUBCUTANEOUS at 21:10

## 2019-04-16 RX ADMIN — METOPROLOL TARTRATE 25 MG: 25 TABLET ORAL at 08:50

## 2019-04-16 RX ADMIN — ISOSORBIDE MONONITRATE 30 MG: 30 TABLET, EXTENDED RELEASE ORAL at 08:50

## 2019-04-16 RX ADMIN — GABAPENTIN 300 MG: 300 CAPSULE ORAL at 21:12

## 2019-04-16 RX ADMIN — GABAPENTIN 300 MG: 300 CAPSULE ORAL at 08:50

## 2019-04-16 RX ADMIN — Medication 200 MG: at 21:12

## 2019-04-16 RX ADMIN — ASPIRIN 81 MG 81 MG: 81 TABLET ORAL at 21:12

## 2019-04-16 RX ADMIN — INSULIN LISPRO 12 UNITS: 100 INJECTION, SOLUTION INTRAVENOUS; SUBCUTANEOUS at 11:39

## 2019-04-16 RX ADMIN — VITAMIN D, TAB 1000IU (100/BT) 1000 UNITS: 25 TAB at 08:50

## 2019-04-16 RX ADMIN — SENNOSIDES 8.6 MG: 8.6 TABLET, FILM COATED ORAL at 22:32

## 2019-04-16 RX ADMIN — PRAVASTATIN SODIUM 40 MG: 40 TABLET ORAL at 17:35

## 2019-04-16 RX ADMIN — FUROSEMIDE 20 MG: 20 TABLET ORAL at 08:50

## 2019-04-16 RX ADMIN — GABAPENTIN 300 MG: 300 CAPSULE ORAL at 17:35

## 2019-04-16 RX ADMIN — VENLAFAXINE HYDROCHLORIDE 37.5 MG: 37.5 CAPSULE, EXTENDED RELEASE ORAL at 08:49

## 2019-04-16 RX ADMIN — POLYETHYLENE GLYCOL 3350 17 G: 17 POWDER, FOR SOLUTION ORAL at 07:39

## 2019-04-16 RX ADMIN — DOCUSATE SODIUM 100 MG: 100 CAPSULE, LIQUID FILLED ORAL at 17:35

## 2019-04-16 RX ADMIN — INSULIN LISPRO 15 UNITS: 100 INJECTION, SOLUTION INTRAVENOUS; SUBCUTANEOUS at 17:36

## 2019-04-16 RX ADMIN — LEVOTHYROXINE SODIUM 175 MCG: 125 TABLET ORAL at 06:15

## 2019-04-16 RX ADMIN — INSULIN LISPRO 14 UNITS: 100 INJECTION, SOLUTION INTRAVENOUS; SUBCUTANEOUS at 07:32

## 2019-04-16 RX ADMIN — HEPARIN SODIUM 5000 UNITS: 5000 INJECTION INTRAVENOUS; SUBCUTANEOUS at 21:13

## 2019-04-16 RX ADMIN — OXYCODONE HYDROCHLORIDE 5 MG: 5 TABLET ORAL at 13:27

## 2019-04-16 RX ADMIN — INSULIN LISPRO 4 UNITS: 100 INJECTION, SOLUTION INTRAVENOUS; SUBCUTANEOUS at 11:38

## 2019-04-16 RX ADMIN — HEPARIN SODIUM 5000 UNITS: 5000 INJECTION INTRAVENOUS; SUBCUTANEOUS at 06:17

## 2019-04-16 RX ADMIN — VANCOMYCIN HYDROCHLORIDE 1500 MG: 10 INJECTION, POWDER, LYOPHILIZED, FOR SOLUTION INTRAVENOUS at 15:09

## 2019-04-16 RX ADMIN — METOPROLOL TARTRATE 25 MG: 25 TABLET ORAL at 21:12

## 2019-04-16 RX ADMIN — INSULIN GLARGINE 30 UNITS: 100 INJECTION, SOLUTION SUBCUTANEOUS at 09:10

## 2019-04-16 RX ADMIN — CLOPIDOGREL BISULFATE 75 MG: 75 TABLET ORAL at 08:45

## 2019-04-16 RX ADMIN — INSULIN LISPRO 1 UNITS: 100 INJECTION, SOLUTION INTRAVENOUS; SUBCUTANEOUS at 21:11

## 2019-04-16 RX ADMIN — HEPARIN SODIUM 5000 UNITS: 5000 INJECTION INTRAVENOUS; SUBCUTANEOUS at 13:27

## 2019-04-16 RX ADMIN — DOCUSATE SODIUM 100 MG: 100 CAPSULE, LIQUID FILLED ORAL at 08:50

## 2019-04-17 LAB
ANION GAP SERPL CALCULATED.3IONS-SCNC: 6 MMOL/L (ref 4–13)
BUN SERPL-MCNC: 20 MG/DL (ref 5–25)
CALCIUM SERPL-MCNC: 8.3 MG/DL (ref 8.3–10.1)
CHLORIDE SERPL-SCNC: 111 MMOL/L (ref 100–108)
CO2 SERPL-SCNC: 25 MMOL/L (ref 21–32)
CREAT SERPL-MCNC: 1.3 MG/DL (ref 0.6–1.3)
ERYTHROCYTE [DISTWIDTH] IN BLOOD BY AUTOMATED COUNT: 14.2 % (ref 11.6–15.1)
GFR SERPL CREATININE-BSD FRML MDRD: 54 ML/MIN/1.73SQ M
GLUCOSE SERPL-MCNC: 109 MG/DL (ref 65–140)
GLUCOSE SERPL-MCNC: 110 MG/DL (ref 65–140)
GLUCOSE SERPL-MCNC: 112 MG/DL (ref 65–140)
GLUCOSE SERPL-MCNC: 134 MG/DL (ref 65–140)
GLUCOSE SERPL-MCNC: 158 MG/DL (ref 65–140)
GLUCOSE SERPL-MCNC: 53 MG/DL (ref 65–140)
GLUCOSE SERPL-MCNC: 96 MG/DL (ref 65–140)
HCT VFR BLD AUTO: 32.9 % (ref 36.5–49.3)
HGB BLD-MCNC: 10.5 G/DL (ref 12–17)
MCH RBC QN AUTO: 31.7 PG (ref 26.8–34.3)
MCHC RBC AUTO-ENTMCNC: 31.9 G/DL (ref 31.4–37.4)
MCV RBC AUTO: 99 FL (ref 82–98)
PLATELET # BLD AUTO: 102 THOUSANDS/UL (ref 149–390)
PMV BLD AUTO: 12.4 FL (ref 8.9–12.7)
POTASSIUM SERPL-SCNC: 4.1 MMOL/L (ref 3.5–5.3)
RBC # BLD AUTO: 3.31 MILLION/UL (ref 3.88–5.62)
SODIUM SERPL-SCNC: 142 MMOL/L (ref 136–145)
WBC # BLD AUTO: 4.83 THOUSAND/UL (ref 4.31–10.16)

## 2019-04-17 PROCEDURE — NC001 PR NO CHARGE: Performed by: SURGERY

## 2019-04-17 PROCEDURE — 2W07X6Z CHANGE PRESSURE DRESSING ON LEFT INGUINAL REGION: ICD-10-PCS | Performed by: SURGERY

## 2019-04-17 PROCEDURE — 99024 POSTOP FOLLOW-UP VISIT: CPT | Performed by: SURGERY

## 2019-04-17 PROCEDURE — 80048 BASIC METABOLIC PNL TOTAL CA: CPT | Performed by: PHYSICIAN ASSISTANT

## 2019-04-17 PROCEDURE — 99232 SBSQ HOSP IP/OBS MODERATE 35: CPT | Performed by: INTERNAL MEDICINE

## 2019-04-17 PROCEDURE — 85027 COMPLETE CBC AUTOMATED: CPT | Performed by: PHYSICIAN ASSISTANT

## 2019-04-17 PROCEDURE — 82948 REAGENT STRIP/BLOOD GLUCOSE: CPT

## 2019-04-17 RX ADMIN — LEVOTHYROXINE SODIUM 175 MCG: 125 TABLET ORAL at 06:05

## 2019-04-17 RX ADMIN — FUROSEMIDE 20 MG: 20 TABLET ORAL at 08:11

## 2019-04-17 RX ADMIN — VENLAFAXINE HYDROCHLORIDE 37.5 MG: 37.5 CAPSULE, EXTENDED RELEASE ORAL at 08:12

## 2019-04-17 RX ADMIN — INSULIN LISPRO 12 UNITS: 100 INJECTION, SOLUTION INTRAVENOUS; SUBCUTANEOUS at 07:52

## 2019-04-17 RX ADMIN — INSULIN LISPRO 17 UNITS: 100 INJECTION, SOLUTION INTRAVENOUS; SUBCUTANEOUS at 12:11

## 2019-04-17 RX ADMIN — GABAPENTIN 300 MG: 300 CAPSULE ORAL at 08:11

## 2019-04-17 RX ADMIN — PRAVASTATIN SODIUM 40 MG: 40 TABLET ORAL at 16:33

## 2019-04-17 RX ADMIN — VITAMIN D, TAB 1000IU (100/BT) 1000 UNITS: 25 TAB at 08:11

## 2019-04-17 RX ADMIN — OXYCODONE HYDROCHLORIDE 5 MG: 5 TABLET ORAL at 09:11

## 2019-04-17 RX ADMIN — METOPROLOL TARTRATE 25 MG: 25 TABLET ORAL at 08:11

## 2019-04-17 RX ADMIN — CLOPIDOGREL BISULFATE 75 MG: 75 TABLET ORAL at 08:11

## 2019-04-17 RX ADMIN — ASPIRIN 81 MG 81 MG: 81 TABLET ORAL at 21:38

## 2019-04-17 RX ADMIN — DOCUSATE SODIUM 100 MG: 100 CAPSULE, LIQUID FILLED ORAL at 08:12

## 2019-04-17 RX ADMIN — ISOSORBIDE MONONITRATE 30 MG: 30 TABLET, EXTENDED RELEASE ORAL at 08:11

## 2019-04-17 RX ADMIN — GABAPENTIN 300 MG: 300 CAPSULE ORAL at 21:38

## 2019-04-17 RX ADMIN — DOCUSATE SODIUM 100 MG: 100 CAPSULE, LIQUID FILLED ORAL at 17:07

## 2019-04-17 RX ADMIN — VANCOMYCIN HYDROCHLORIDE 1500 MG: 10 INJECTION, POWDER, LYOPHILIZED, FOR SOLUTION INTRAVENOUS at 14:37

## 2019-04-17 RX ADMIN — Medication 200 MG: at 21:38

## 2019-04-17 RX ADMIN — INSULIN GLARGINE 35 UNITS: 100 INJECTION, SOLUTION SUBCUTANEOUS at 08:12

## 2019-04-17 RX ADMIN — HEPARIN SODIUM 5000 UNITS: 5000 INJECTION INTRAVENOUS; SUBCUTANEOUS at 14:34

## 2019-04-17 RX ADMIN — SENNOSIDES 8.6 MG: 8.6 TABLET, FILM COATED ORAL at 21:38

## 2019-04-17 RX ADMIN — POLYETHYLENE GLYCOL 3350 17 G: 17 POWDER, FOR SOLUTION ORAL at 08:12

## 2019-04-17 RX ADMIN — INSULIN LISPRO 15 UNITS: 100 INJECTION, SOLUTION INTRAVENOUS; SUBCUTANEOUS at 17:07

## 2019-04-17 RX ADMIN — METOPROLOL TARTRATE 25 MG: 25 TABLET ORAL at 21:41

## 2019-04-17 RX ADMIN — GABAPENTIN 300 MG: 300 CAPSULE ORAL at 16:33

## 2019-04-17 RX ADMIN — HEPARIN SODIUM 5000 UNITS: 5000 INJECTION INTRAVENOUS; SUBCUTANEOUS at 06:05

## 2019-04-17 RX ADMIN — HEPARIN SODIUM 5000 UNITS: 5000 INJECTION INTRAVENOUS; SUBCUTANEOUS at 21:39

## 2019-04-18 VITALS
HEIGHT: 67 IN | WEIGHT: 198.85 LBS | TEMPERATURE: 98.5 F | RESPIRATION RATE: 18 BRPM | BODY MASS INDEX: 31.21 KG/M2 | OXYGEN SATURATION: 96 % | SYSTOLIC BLOOD PRESSURE: 137 MMHG | HEART RATE: 66 BPM | DIASTOLIC BLOOD PRESSURE: 63 MMHG

## 2019-04-18 LAB
ANION GAP SERPL CALCULATED.3IONS-SCNC: 5 MMOL/L (ref 4–13)
BACTERIA SPEC ANAEROBE CULT: NO GROWTH
BACTERIA WND AEROBE CULT: NO GROWTH
BASOPHILS # BLD AUTO: 0.01 THOUSANDS/ΜL (ref 0–0.1)
BASOPHILS NFR BLD AUTO: 0 % (ref 0–1)
BUN SERPL-MCNC: 21 MG/DL (ref 5–25)
CALCIUM SERPL-MCNC: 8.4 MG/DL (ref 8.3–10.1)
CHLORIDE SERPL-SCNC: 108 MMOL/L (ref 100–108)
CO2 SERPL-SCNC: 27 MMOL/L (ref 21–32)
CREAT SERPL-MCNC: 1.23 MG/DL (ref 0.6–1.3)
EOSINOPHIL # BLD AUTO: 0.2 THOUSAND/ΜL (ref 0–0.61)
EOSINOPHIL NFR BLD AUTO: 4 % (ref 0–6)
ERYTHROCYTE [DISTWIDTH] IN BLOOD BY AUTOMATED COUNT: 13.9 % (ref 11.6–15.1)
GFR SERPL CREATININE-BSD FRML MDRD: 58 ML/MIN/1.73SQ M
GLUCOSE SERPL-MCNC: 249 MG/DL (ref 65–140)
GLUCOSE SERPL-MCNC: 263 MG/DL (ref 65–140)
GLUCOSE SERPL-MCNC: 266 MG/DL (ref 65–140)
GRAM STN SPEC: NORMAL
HCT VFR BLD AUTO: 30.4 % (ref 36.5–49.3)
HGB BLD-MCNC: 9.9 G/DL (ref 12–17)
IMM GRANULOCYTES # BLD AUTO: 0.01 THOUSAND/UL (ref 0–0.2)
IMM GRANULOCYTES NFR BLD AUTO: 0 % (ref 0–2)
LYMPHOCYTES # BLD AUTO: 1.51 THOUSANDS/ΜL (ref 0.6–4.47)
LYMPHOCYTES NFR BLD AUTO: 33 % (ref 14–44)
MCH RBC QN AUTO: 31.5 PG (ref 26.8–34.3)
MCHC RBC AUTO-ENTMCNC: 32.6 G/DL (ref 31.4–37.4)
MCV RBC AUTO: 97 FL (ref 82–98)
MONOCYTES # BLD AUTO: 0.74 THOUSAND/ΜL (ref 0.17–1.22)
MONOCYTES NFR BLD AUTO: 16 % (ref 4–12)
NEUTROPHILS # BLD AUTO: 2.05 THOUSANDS/ΜL (ref 1.85–7.62)
NEUTS SEG NFR BLD AUTO: 47 % (ref 43–75)
NRBC BLD AUTO-RTO: 0 /100 WBCS
PLATELET # BLD AUTO: 96 THOUSANDS/UL (ref 149–390)
PMV BLD AUTO: 12.4 FL (ref 8.9–12.7)
POTASSIUM SERPL-SCNC: 4.3 MMOL/L (ref 3.5–5.3)
RBC # BLD AUTO: 3.14 MILLION/UL (ref 3.88–5.62)
SODIUM SERPL-SCNC: 140 MMOL/L (ref 136–145)
WBC # BLD AUTO: 4.52 THOUSAND/UL (ref 4.31–10.16)

## 2019-04-18 PROCEDURE — 82948 REAGENT STRIP/BLOOD GLUCOSE: CPT

## 2019-04-18 PROCEDURE — 80048 BASIC METABOLIC PNL TOTAL CA: CPT | Performed by: SURGERY

## 2019-04-18 PROCEDURE — NC001 PR NO CHARGE: Performed by: SURGERY

## 2019-04-18 PROCEDURE — 99232 SBSQ HOSP IP/OBS MODERATE 35: CPT | Performed by: INTERNAL MEDICINE

## 2019-04-18 PROCEDURE — 85025 COMPLETE CBC W/AUTO DIFF WBC: CPT | Performed by: SURGERY

## 2019-04-18 PROCEDURE — 99024 POSTOP FOLLOW-UP VISIT: CPT | Performed by: SURGERY

## 2019-04-18 PROCEDURE — NC001 PR NO CHARGE: Performed by: INTERNAL MEDICINE

## 2019-04-18 RX ORDER — DOXYCYCLINE HYCLATE 100 MG/1
100 CAPSULE ORAL EVERY 12 HOURS SCHEDULED
Qty: 14 CAPSULE | Refills: 0 | Status: SHIPPED | OUTPATIENT
Start: 2019-04-18 | End: 2019-04-29 | Stop reason: ALTCHOICE

## 2019-04-18 RX ORDER — ISOSORBIDE MONONITRATE 30 MG/1
30 TABLET, EXTENDED RELEASE ORAL DAILY
Qty: 30 TABLET | Refills: 0 | Status: SHIPPED | OUTPATIENT
Start: 2019-04-18 | End: 2019-05-01 | Stop reason: SDUPTHER

## 2019-04-18 RX ORDER — GABAPENTIN 300 MG/1
300 CAPSULE ORAL 3 TIMES DAILY
Refills: 0 | COMMUNITY
Start: 2019-04-18 | End: 2019-04-18

## 2019-04-18 RX ORDER — CEPHALEXIN 500 MG/1
500 CAPSULE ORAL EVERY 8 HOURS SCHEDULED
Status: DISCONTINUED | OUTPATIENT
Start: 2019-04-18 | End: 2019-04-18 | Stop reason: HOSPADM

## 2019-04-18 RX ORDER — ISOSORBIDE MONONITRATE 30 MG/1
30 TABLET, EXTENDED RELEASE ORAL DAILY
Qty: 30 TABLET | Refills: 1 | Status: SHIPPED | OUTPATIENT
Start: 2019-04-18 | End: 2019-04-18 | Stop reason: SDUPTHER

## 2019-04-18 RX ORDER — INSULIN GLARGINE 100 [IU]/ML
40 INJECTION, SOLUTION SUBCUTANEOUS
Qty: 10 ML | Refills: 0 | COMMUNITY
Start: 2019-04-18 | End: 2020-06-15 | Stop reason: SDUPTHER

## 2019-04-18 RX ORDER — DOXYCYCLINE HYCLATE 100 MG/1
100 CAPSULE ORAL EVERY 12 HOURS SCHEDULED
Status: DISCONTINUED | OUTPATIENT
Start: 2019-04-18 | End: 2019-04-18 | Stop reason: HOSPADM

## 2019-04-18 RX ORDER — CEPHALEXIN 500 MG/1
500 CAPSULE ORAL EVERY 8 HOURS SCHEDULED
Qty: 21 CAPSULE | Refills: 0 | Status: SHIPPED | OUTPATIENT
Start: 2019-04-18 | End: 2019-04-29 | Stop reason: ALTCHOICE

## 2019-04-18 RX ORDER — GABAPENTIN 300 MG/1
300 CAPSULE ORAL 2 TIMES DAILY
COMMUNITY
Start: 2019-04-18 | End: 2020-06-02

## 2019-04-18 RX ORDER — OXYCODONE HYDROCHLORIDE 5 MG/1
5 TABLET ORAL EVERY 4 HOURS PRN
Qty: 30 TABLET | Refills: 0 | Status: SHIPPED | OUTPATIENT
Start: 2019-04-18 | End: 2019-04-28

## 2019-04-18 RX ADMIN — DOCUSATE SODIUM 100 MG: 100 CAPSULE, LIQUID FILLED ORAL at 08:12

## 2019-04-18 RX ADMIN — ACETAMINOPHEN 650 MG: 325 TABLET ORAL at 12:59

## 2019-04-18 RX ADMIN — INSULIN GLARGINE 35 UNITS: 100 INJECTION, SOLUTION SUBCUTANEOUS at 08:13

## 2019-04-18 RX ADMIN — VENLAFAXINE HYDROCHLORIDE 37.5 MG: 37.5 CAPSULE, EXTENDED RELEASE ORAL at 08:14

## 2019-04-18 RX ADMIN — LEVOTHYROXINE SODIUM 175 MCG: 125 TABLET ORAL at 05:07

## 2019-04-18 RX ADMIN — ISOSORBIDE MONONITRATE 30 MG: 30 TABLET, EXTENDED RELEASE ORAL at 08:12

## 2019-04-18 RX ADMIN — INSULIN LISPRO 12 UNITS: 100 INJECTION, SOLUTION INTRAVENOUS; SUBCUTANEOUS at 08:21

## 2019-04-18 RX ADMIN — INSULIN LISPRO 4 UNITS: 100 INJECTION, SOLUTION INTRAVENOUS; SUBCUTANEOUS at 08:22

## 2019-04-18 RX ADMIN — DOXYCYCLINE HYCLATE 100 MG: 100 CAPSULE ORAL at 11:56

## 2019-04-18 RX ADMIN — GABAPENTIN 300 MG: 300 CAPSULE ORAL at 08:12

## 2019-04-18 RX ADMIN — METOPROLOL TARTRATE 25 MG: 25 TABLET ORAL at 08:12

## 2019-04-18 RX ADMIN — CLOPIDOGREL BISULFATE 75 MG: 75 TABLET ORAL at 08:12

## 2019-04-18 RX ADMIN — HEPARIN SODIUM 5000 UNITS: 5000 INJECTION INTRAVENOUS; SUBCUTANEOUS at 05:08

## 2019-04-18 RX ADMIN — FUROSEMIDE 20 MG: 20 TABLET ORAL at 08:12

## 2019-04-18 RX ADMIN — INSULIN LISPRO 14 UNITS: 100 INJECTION, SOLUTION INTRAVENOUS; SUBCUTANEOUS at 11:49

## 2019-04-18 RX ADMIN — VITAMIN D, TAB 1000IU (100/BT) 1000 UNITS: 25 TAB at 08:12

## 2019-04-18 RX ADMIN — POLYETHYLENE GLYCOL 3350 17 G: 17 POWDER, FOR SOLUTION ORAL at 08:13

## 2019-04-18 RX ADMIN — INSULIN LISPRO 4 UNITS: 100 INJECTION, SOLUTION INTRAVENOUS; SUBCUTANEOUS at 11:50

## 2019-04-19 ENCOUNTER — TRANSITIONAL CARE MANAGEMENT (OUTPATIENT)
Dept: FAMILY MEDICINE CLINIC | Facility: CLINIC | Age: 74
End: 2019-04-19

## 2019-04-19 ENCOUNTER — OFFICE VISIT (OUTPATIENT)
Dept: VASCULAR SURGERY | Facility: CLINIC | Age: 74
End: 2019-04-19

## 2019-04-19 DIAGNOSIS — Z98.890 POST-OPERATIVE STATE: Primary | ICD-10-CM

## 2019-04-19 PROCEDURE — 99024 POSTOP FOLLOW-UP VISIT: CPT | Performed by: NURSE PRACTITIONER

## 2019-04-22 ENCOUNTER — TELEPHONE (OUTPATIENT)
Dept: VASCULAR SURGERY | Facility: CLINIC | Age: 74
End: 2019-04-22

## 2019-04-24 ENCOUNTER — TRANSCRIBE ORDERS (OUTPATIENT)
Dept: LAB | Facility: CLINIC | Age: 74
End: 2019-04-24

## 2019-04-24 ENCOUNTER — LAB (OUTPATIENT)
Dept: LAB | Facility: CLINIC | Age: 74
End: 2019-04-24
Payer: MEDICARE

## 2019-04-24 DIAGNOSIS — D62 ACUTE BLOOD LOSS ANEMIA: ICD-10-CM

## 2019-04-24 DIAGNOSIS — I70.1 RENAL ARTERY STENOSIS (HCC): ICD-10-CM

## 2019-04-24 DIAGNOSIS — I12.9 BENIGN HYPERTENSION WITH CHRONIC KIDNEY DISEASE, STAGE III (HCC): ICD-10-CM

## 2019-04-24 DIAGNOSIS — N18.30 BENIGN HYPERTENSION WITH CHRONIC KIDNEY DISEASE, STAGE III (HCC): ICD-10-CM

## 2019-04-24 DIAGNOSIS — N18.30 CKD (CHRONIC KIDNEY DISEASE) STAGE 3, GFR 30-59 ML/MIN (HCC): ICD-10-CM

## 2019-04-24 DIAGNOSIS — I50.22 CHRONIC SYSTOLIC CONGESTIVE HEART FAILURE (HCC): ICD-10-CM

## 2019-04-24 DIAGNOSIS — I70.299 ATHEROSCLEROSIS OF ARTERY OF EXTREMITY WITH ULCERATION (HCC): ICD-10-CM

## 2019-04-24 DIAGNOSIS — R80.9 PROTEINURIA, UNSPECIFIED TYPE: ICD-10-CM

## 2019-04-24 DIAGNOSIS — E87.1 HYPONATREMIA: ICD-10-CM

## 2019-04-24 DIAGNOSIS — L97.909 ATHEROSCLEROSIS OF ARTERY OF EXTREMITY WITH ULCERATION (HCC): ICD-10-CM

## 2019-04-24 LAB
ANION GAP SERPL CALCULATED.3IONS-SCNC: 4 MMOL/L (ref 4–13)
BASOPHILS # BLD AUTO: 0.04 THOUSANDS/ΜL (ref 0–0.1)
BASOPHILS NFR BLD AUTO: 1 % (ref 0–1)
BUN SERPL-MCNC: 35 MG/DL (ref 5–25)
CALCIUM SERPL-MCNC: 8.9 MG/DL (ref 8.3–10.1)
CHLORIDE SERPL-SCNC: 107 MMOL/L (ref 100–108)
CO2 SERPL-SCNC: 28 MMOL/L (ref 21–32)
CREAT SERPL-MCNC: 1.49 MG/DL (ref 0.6–1.3)
EOSINOPHIL # BLD AUTO: 0.26 THOUSAND/ΜL (ref 0–0.61)
EOSINOPHIL NFR BLD AUTO: 4 % (ref 0–6)
ERYTHROCYTE [DISTWIDTH] IN BLOOD BY AUTOMATED COUNT: 14 % (ref 11.6–15.1)
GFR SERPL CREATININE-BSD FRML MDRD: 46 ML/MIN/1.73SQ M
GLUCOSE P FAST SERPL-MCNC: 69 MG/DL (ref 65–99)
HCT VFR BLD AUTO: 35.3 % (ref 36.5–49.3)
HGB BLD-MCNC: 11.3 G/DL (ref 12–17)
IMM GRANULOCYTES # BLD AUTO: 0.02 THOUSAND/UL (ref 0–0.2)
IMM GRANULOCYTES NFR BLD AUTO: 0 % (ref 0–2)
LYMPHOCYTES # BLD AUTO: 2.68 THOUSANDS/ΜL (ref 0.6–4.47)
LYMPHOCYTES NFR BLD AUTO: 38 % (ref 14–44)
MCH RBC QN AUTO: 31.6 PG (ref 26.8–34.3)
MCHC RBC AUTO-ENTMCNC: 32 G/DL (ref 31.4–37.4)
MCV RBC AUTO: 99 FL (ref 82–98)
MONOCYTES # BLD AUTO: 1.18 THOUSAND/ΜL (ref 0.17–1.22)
MONOCYTES NFR BLD AUTO: 17 % (ref 4–12)
NEUTROPHILS # BLD AUTO: 2.9 THOUSANDS/ΜL (ref 1.85–7.62)
NEUTS SEG NFR BLD AUTO: 40 % (ref 43–75)
NRBC BLD AUTO-RTO: 0 /100 WBCS
PLATELET # BLD AUTO: 133 THOUSANDS/UL (ref 149–390)
PMV BLD AUTO: 12.6 FL (ref 8.9–12.7)
POTASSIUM SERPL-SCNC: 4.6 MMOL/L (ref 3.5–5.3)
RBC # BLD AUTO: 3.58 MILLION/UL (ref 3.88–5.62)
SODIUM SERPL-SCNC: 139 MMOL/L (ref 136–145)
WBC # BLD AUTO: 7.08 THOUSAND/UL (ref 4.31–10.16)

## 2019-04-24 PROCEDURE — 80048 BASIC METABOLIC PNL TOTAL CA: CPT

## 2019-04-24 PROCEDURE — 85025 COMPLETE CBC W/AUTO DIFF WBC: CPT

## 2019-04-24 PROCEDURE — 36415 COLL VENOUS BLD VENIPUNCTURE: CPT

## 2019-04-25 ENCOUNTER — OFFICE VISIT (OUTPATIENT)
Dept: FAMILY MEDICINE CLINIC | Facility: CLINIC | Age: 74
End: 2019-04-25
Payer: MEDICARE

## 2019-04-25 VITALS
RESPIRATION RATE: 18 BRPM | TEMPERATURE: 95.7 F | SYSTOLIC BLOOD PRESSURE: 108 MMHG | BODY MASS INDEX: 31.27 KG/M2 | DIASTOLIC BLOOD PRESSURE: 56 MMHG | WEIGHT: 199.2 LBS | HEART RATE: 64 BPM | HEIGHT: 67 IN

## 2019-04-25 DIAGNOSIS — I50.22 CHRONIC SYSTOLIC CONGESTIVE HEART FAILURE (HCC): ICD-10-CM

## 2019-04-25 DIAGNOSIS — N18.30 CKD (CHRONIC KIDNEY DISEASE) STAGE 3, GFR 30-59 ML/MIN (HCC): ICD-10-CM

## 2019-04-25 DIAGNOSIS — L24.A9 WOUND DRAINAGE: Primary | ICD-10-CM

## 2019-04-25 DIAGNOSIS — L97.909 ATHEROSCLEROSIS OF ARTERY OF EXTREMITY WITH ULCERATION (HCC): Chronic | ICD-10-CM

## 2019-04-25 DIAGNOSIS — N18.30 ANEMIA DUE TO STAGE 3 CHRONIC KIDNEY DISEASE (HCC): ICD-10-CM

## 2019-04-25 DIAGNOSIS — N18.30 BENIGN HYPERTENSION WITH CHRONIC KIDNEY DISEASE, STAGE III (HCC): ICD-10-CM

## 2019-04-25 DIAGNOSIS — I12.9 BENIGN HYPERTENSION WITH CHRONIC KIDNEY DISEASE, STAGE III (HCC): ICD-10-CM

## 2019-04-25 DIAGNOSIS — E10.29 TYPE 1 DIABETES MELLITUS WITH MICROALBUMINURIA (HCC): ICD-10-CM

## 2019-04-25 DIAGNOSIS — R80.9 TYPE 1 DIABETES MELLITUS WITH MICROALBUMINURIA (HCC): ICD-10-CM

## 2019-04-25 DIAGNOSIS — I70.299 ATHEROSCLEROSIS OF ARTERY OF EXTREMITY WITH ULCERATION (HCC): Chronic | ICD-10-CM

## 2019-04-25 DIAGNOSIS — D63.1 ANEMIA DUE TO STAGE 3 CHRONIC KIDNEY DISEASE (HCC): ICD-10-CM

## 2019-04-25 PROCEDURE — 99214 OFFICE O/P EST MOD 30 MIN: CPT | Performed by: FAMILY MEDICINE

## 2019-04-26 ENCOUNTER — OFFICE VISIT (OUTPATIENT)
Dept: CARDIOLOGY CLINIC | Facility: CLINIC | Age: 74
End: 2019-04-26
Payer: MEDICARE

## 2019-04-26 VITALS
OXYGEN SATURATION: 97 % | HEART RATE: 67 BPM | BODY MASS INDEX: 30.92 KG/M2 | WEIGHT: 197 LBS | SYSTOLIC BLOOD PRESSURE: 130 MMHG | HEIGHT: 67 IN | DIASTOLIC BLOOD PRESSURE: 64 MMHG

## 2019-04-26 DIAGNOSIS — I25.10 CORONARY ARTERY DISEASE INVOLVING NATIVE CORONARY ARTERY OF NATIVE HEART WITHOUT ANGINA PECTORIS: Primary | ICD-10-CM

## 2019-04-26 DIAGNOSIS — E78.5 HYPERLIPIDEMIA LDL GOAL <70: ICD-10-CM

## 2019-04-26 DIAGNOSIS — I50.32 CHRONIC HEART FAILURE WITH PRESERVED EJECTION FRACTION (HCC): ICD-10-CM

## 2019-04-26 DIAGNOSIS — G47.33 OSA ON CPAP: ICD-10-CM

## 2019-04-26 DIAGNOSIS — I10 HTN (HYPERTENSION), BENIGN: ICD-10-CM

## 2019-04-26 DIAGNOSIS — Z99.89 OSA ON CPAP: ICD-10-CM

## 2019-04-26 PROCEDURE — 99214 OFFICE O/P EST MOD 30 MIN: CPT | Performed by: INTERNAL MEDICINE

## 2019-05-01 ENCOUNTER — OFFICE VISIT (OUTPATIENT)
Dept: VASCULAR SURGERY | Facility: CLINIC | Age: 74
End: 2019-05-01

## 2019-05-01 VITALS
DIASTOLIC BLOOD PRESSURE: 80 MMHG | HEART RATE: 70 BPM | WEIGHT: 195 LBS | HEIGHT: 67 IN | TEMPERATURE: 97.4 F | BODY MASS INDEX: 30.61 KG/M2 | SYSTOLIC BLOOD PRESSURE: 140 MMHG

## 2019-05-01 DIAGNOSIS — L97.909 ATHEROSCLEROSIS OF ARTERY OF EXTREMITY WITH ULCERATION (HCC): Chronic | ICD-10-CM

## 2019-05-01 DIAGNOSIS — I70.299 ATHEROSCLEROSIS OF ARTERY OF EXTREMITY WITH ULCERATION (HCC): Chronic | ICD-10-CM

## 2019-05-01 DIAGNOSIS — I21.4 NSTEMI (NON-ST ELEVATED MYOCARDIAL INFARCTION) (HCC): ICD-10-CM

## 2019-05-01 PROCEDURE — 99024 POSTOP FOLLOW-UP VISIT: CPT | Performed by: SURGERY

## 2019-05-01 RX ORDER — ISOSORBIDE MONONITRATE 30 MG/1
30 TABLET, EXTENDED RELEASE ORAL DAILY
Qty: 30 TABLET | Refills: 0 | Status: SHIPPED | OUTPATIENT
Start: 2019-05-01 | End: 2019-05-29 | Stop reason: SDUPTHER

## 2019-05-01 RX ORDER — VENLAFAXINE HYDROCHLORIDE 37.5 MG/1
37.5 CAPSULE, EXTENDED RELEASE ORAL DAILY
Qty: 30 CAPSULE | Refills: 0 | Status: SHIPPED | OUTPATIENT
Start: 2019-05-01 | End: 2019-05-29 | Stop reason: SDUPTHER

## 2019-05-09 ENCOUNTER — OFFICE VISIT (OUTPATIENT)
Dept: VASCULAR SURGERY | Facility: CLINIC | Age: 74
End: 2019-05-09

## 2019-05-09 ENCOUNTER — TELEPHONE (OUTPATIENT)
Dept: VASCULAR SURGERY | Facility: CLINIC | Age: 74
End: 2019-05-09

## 2019-05-09 VITALS
DIASTOLIC BLOOD PRESSURE: 66 MMHG | WEIGHT: 200 LBS | TEMPERATURE: 97.1 F | HEART RATE: 88 BPM | BODY MASS INDEX: 31.39 KG/M2 | HEIGHT: 67 IN | SYSTOLIC BLOOD PRESSURE: 142 MMHG

## 2019-05-09 DIAGNOSIS — I70.219 ATHEROSCLEROSIS OF ARTERY OF EXTREMITY WITH INTERMITTENT CLAUDICATION (HCC): Primary | Chronic | ICD-10-CM

## 2019-05-09 PROCEDURE — 99024 POSTOP FOLLOW-UP VISIT: CPT | Performed by: SURGERY

## 2019-05-10 ENCOUNTER — TELEPHONE (OUTPATIENT)
Dept: VASCULAR SURGERY | Facility: CLINIC | Age: 74
End: 2019-05-10

## 2019-05-29 DIAGNOSIS — L97.909 ATHEROSCLEROSIS OF ARTERY OF EXTREMITY WITH ULCERATION (HCC): Chronic | ICD-10-CM

## 2019-05-29 DIAGNOSIS — I21.4 NSTEMI (NON-ST ELEVATED MYOCARDIAL INFARCTION) (HCC): ICD-10-CM

## 2019-05-29 DIAGNOSIS — I70.299 ATHEROSCLEROSIS OF ARTERY OF EXTREMITY WITH ULCERATION (HCC): Chronic | ICD-10-CM

## 2019-05-29 RX ORDER — VENLAFAXINE HYDROCHLORIDE 37.5 MG/1
37.5 CAPSULE, EXTENDED RELEASE ORAL DAILY
Qty: 90 CAPSULE | Refills: 1 | Status: SHIPPED | OUTPATIENT
Start: 2019-05-29 | End: 2019-09-11 | Stop reason: ALTCHOICE

## 2019-05-29 RX ORDER — ISOSORBIDE MONONITRATE 30 MG/1
30 TABLET, EXTENDED RELEASE ORAL DAILY
Qty: 90 TABLET | Refills: 1 | Status: SHIPPED | OUTPATIENT
Start: 2019-05-29 | End: 2019-12-02 | Stop reason: SDUPTHER

## 2019-05-31 ENCOUNTER — TELEPHONE (OUTPATIENT)
Dept: NEPHROLOGY | Facility: CLINIC | Age: 74
End: 2019-05-31

## 2019-06-05 ENCOUNTER — HOSPITAL ENCOUNTER (OUTPATIENT)
Dept: NON INVASIVE DIAGNOSTICS | Facility: CLINIC | Age: 74
Discharge: HOME | End: 2019-06-05
Payer: MEDICARE

## 2019-06-05 ENCOUNTER — OFFICE VISIT (OUTPATIENT)
Dept: VASCULAR SURGERY | Facility: CLINIC | Age: 74
End: 2019-06-05
Payer: MEDICARE

## 2019-06-05 VITALS
WEIGHT: 200 LBS | DIASTOLIC BLOOD PRESSURE: 70 MMHG | HEART RATE: 91 BPM | HEIGHT: 67 IN | TEMPERATURE: 96.8 F | SYSTOLIC BLOOD PRESSURE: 142 MMHG | BODY MASS INDEX: 31.39 KG/M2

## 2019-06-05 DIAGNOSIS — I70.299 ATHEROSCLEROSIS OF ARTERY OF EXTREMITY WITH ULCERATION (HCC): Chronic | ICD-10-CM

## 2019-06-05 DIAGNOSIS — I65.23 CAROTID ARTERY STENOSIS, ASYMPTOMATIC, BILATERAL: ICD-10-CM

## 2019-06-05 DIAGNOSIS — I70.219 ATHEROSCLEROSIS OF ARTERY OF EXTREMITY WITH INTERMITTENT CLAUDICATION (HCC): Primary | Chronic | ICD-10-CM

## 2019-06-05 DIAGNOSIS — L97.909 ATHEROSCLEROSIS OF ARTERY OF EXTREMITY WITH ULCERATION (HCC): Chronic | ICD-10-CM

## 2019-06-05 PROCEDURE — 99214 OFFICE O/P EST MOD 30 MIN: CPT | Performed by: SURGERY

## 2019-06-05 PROCEDURE — 93926 LOWER EXTREMITY STUDY: CPT

## 2019-06-05 PROCEDURE — 93926 LOWER EXTREMITY STUDY: CPT | Performed by: SURGERY

## 2019-06-05 PROCEDURE — 93922 UPR/L XTREMITY ART 2 LEVELS: CPT | Performed by: SURGERY

## 2019-06-07 ENCOUNTER — TRANSCRIBE ORDERS (OUTPATIENT)
Dept: LAB | Facility: CLINIC | Age: 74
End: 2019-06-07

## 2019-06-07 ENCOUNTER — LAB (OUTPATIENT)
Dept: LAB | Facility: CLINIC | Age: 74
End: 2019-06-07
Payer: MEDICARE

## 2019-06-07 DIAGNOSIS — E10.22 TYPE 1 DIABETES MELLITUS WITH DIABETIC CHRONIC KIDNEY DISEASE, UNSPECIFIED CKD STAGE (HCC): ICD-10-CM

## 2019-06-07 DIAGNOSIS — E10.22 TYPE 1 DIABETES MELLITUS WITH DIABETIC CHRONIC KIDNEY DISEASE, UNSPECIFIED CKD STAGE (HCC): Primary | ICD-10-CM

## 2019-06-07 DIAGNOSIS — N18.30 ANEMIA DUE TO STAGE 3 CHRONIC KIDNEY DISEASE (HCC): ICD-10-CM

## 2019-06-07 DIAGNOSIS — I70.1 RENAL ARTERY STENOSIS (HCC): ICD-10-CM

## 2019-06-07 DIAGNOSIS — D63.1 ANEMIA DUE TO STAGE 3 CHRONIC KIDNEY DISEASE (HCC): ICD-10-CM

## 2019-06-07 DIAGNOSIS — N18.30 CKD (CHRONIC KIDNEY DISEASE) STAGE 3, GFR 30-59 ML/MIN (HCC): ICD-10-CM

## 2019-06-07 LAB
ALBUMIN SERPL BCP-MCNC: 3.4 G/DL (ref 3.5–5)
ALP SERPL-CCNC: 103 U/L (ref 46–116)
ALT SERPL W P-5'-P-CCNC: 16 U/L (ref 12–78)
ANION GAP SERPL CALCULATED.3IONS-SCNC: 6 MMOL/L (ref 4–13)
AST SERPL W P-5'-P-CCNC: 15 U/L (ref 5–45)
BACTERIA UR QL AUTO: ABNORMAL /HPF
BASOPHILS # BLD AUTO: 0.02 THOUSANDS/ΜL (ref 0–0.1)
BASOPHILS NFR BLD AUTO: 0 % (ref 0–1)
BILIRUB SERPL-MCNC: 0.35 MG/DL (ref 0.2–1)
BILIRUB UR QL STRIP: NEGATIVE
BUN SERPL-MCNC: 28 MG/DL (ref 5–25)
CALCIUM SERPL-MCNC: 9.7 MG/DL (ref 8.3–10.1)
CHLORIDE SERPL-SCNC: 108 MMOL/L (ref 100–108)
CLARITY UR: CLEAR
CO2 SERPL-SCNC: 27 MMOL/L (ref 21–32)
COLOR UR: YELLOW
CREAT SERPL-MCNC: 1.46 MG/DL (ref 0.6–1.3)
EOSINOPHIL # BLD AUTO: 0.32 THOUSAND/ΜL (ref 0–0.61)
EOSINOPHIL NFR BLD AUTO: 5 % (ref 0–6)
ERYTHROCYTE [DISTWIDTH] IN BLOOD BY AUTOMATED COUNT: 13.7 % (ref 11.6–15.1)
EST. AVERAGE GLUCOSE BLD GHB EST-MCNC: 163 MG/DL
GFR SERPL CREATININE-BSD FRML MDRD: 47 ML/MIN/1.73SQ M
GLUCOSE P FAST SERPL-MCNC: 203 MG/DL (ref 65–99)
GLUCOSE UR STRIP-MCNC: NEGATIVE MG/DL
HBA1C MFR BLD: 7.3 % (ref 4.2–6.3)
HCT VFR BLD AUTO: 38.8 % (ref 36.5–49.3)
HGB BLD-MCNC: 12.1 G/DL (ref 12–17)
HGB UR QL STRIP.AUTO: NEGATIVE
HYALINE CASTS #/AREA URNS LPF: ABNORMAL /LPF
IMM GRANULOCYTES # BLD AUTO: 0.02 THOUSAND/UL (ref 0–0.2)
IMM GRANULOCYTES NFR BLD AUTO: 0 % (ref 0–2)
KETONES UR STRIP-MCNC: NEGATIVE MG/DL
LEUKOCYTE ESTERASE UR QL STRIP: NEGATIVE
LYMPHOCYTES # BLD AUTO: 1.85 THOUSANDS/ΜL (ref 0.6–4.47)
LYMPHOCYTES NFR BLD AUTO: 29 % (ref 14–44)
MAGNESIUM SERPL-MCNC: 2.2 MG/DL (ref 1.6–2.6)
MCH RBC QN AUTO: 30.3 PG (ref 26.8–34.3)
MCHC RBC AUTO-ENTMCNC: 31.2 G/DL (ref 31.4–37.4)
MCV RBC AUTO: 97 FL (ref 82–98)
MONOCYTES # BLD AUTO: 0.9 THOUSAND/ΜL (ref 0.17–1.22)
MONOCYTES NFR BLD AUTO: 14 % (ref 4–12)
NEUTROPHILS # BLD AUTO: 3.2 THOUSANDS/ΜL (ref 1.85–7.62)
NEUTS SEG NFR BLD AUTO: 52 % (ref 43–75)
NITRITE UR QL STRIP: NEGATIVE
NON-SQ EPI CELLS URNS QL MICRO: ABNORMAL /HPF
NRBC BLD AUTO-RTO: 0 /100 WBCS
PH UR STRIP.AUTO: 6 [PH]
PHOSPHATE SERPL-MCNC: 4.7 MG/DL (ref 2.3–4.1)
PLATELET # BLD AUTO: 157 THOUSANDS/UL (ref 149–390)
PMV BLD AUTO: 12.5 FL (ref 8.9–12.7)
POTASSIUM SERPL-SCNC: 4.5 MMOL/L (ref 3.5–5.3)
PROT SERPL-MCNC: 7.4 G/DL (ref 6.4–8.2)
PROT UR STRIP-MCNC: ABNORMAL MG/DL
RBC # BLD AUTO: 4 MILLION/UL (ref 3.88–5.62)
RBC #/AREA URNS AUTO: ABNORMAL /HPF
SODIUM SERPL-SCNC: 141 MMOL/L (ref 136–145)
SP GR UR STRIP.AUTO: 1.02 (ref 1–1.03)
UROBILINOGEN UR QL STRIP.AUTO: 0.2 E.U./DL
WBC # BLD AUTO: 6.31 THOUSAND/UL (ref 4.31–10.16)
WBC #/AREA URNS AUTO: ABNORMAL /HPF

## 2019-06-07 PROCEDURE — 80053 COMPREHEN METABOLIC PANEL: CPT | Performed by: INTERNAL MEDICINE

## 2019-06-07 PROCEDURE — 83735 ASSAY OF MAGNESIUM: CPT | Performed by: INTERNAL MEDICINE

## 2019-06-07 PROCEDURE — 83036 HEMOGLOBIN GLYCOSYLATED A1C: CPT

## 2019-06-07 PROCEDURE — 36415 COLL VENOUS BLD VENIPUNCTURE: CPT | Performed by: INTERNAL MEDICINE

## 2019-06-07 PROCEDURE — 85025 COMPLETE CBC W/AUTO DIFF WBC: CPT

## 2019-06-07 PROCEDURE — 84100 ASSAY OF PHOSPHORUS: CPT | Performed by: INTERNAL MEDICINE

## 2019-06-07 PROCEDURE — 81001 URINALYSIS AUTO W/SCOPE: CPT | Performed by: INTERNAL MEDICINE

## 2019-06-10 ENCOUNTER — TELEPHONE (OUTPATIENT)
Dept: FAMILY MEDICINE CLINIC | Facility: CLINIC | Age: 74
End: 2019-06-10

## 2019-06-10 ENCOUNTER — TELEPHONE (OUTPATIENT)
Dept: NEPHROLOGY | Facility: HOSPITAL | Age: 74
End: 2019-06-10

## 2019-06-12 ENCOUNTER — OFFICE VISIT (OUTPATIENT)
Dept: NEPHROLOGY | Facility: HOSPITAL | Age: 74
End: 2019-06-12
Payer: MEDICARE

## 2019-06-12 VITALS
BODY MASS INDEX: 31.41 KG/M2 | WEIGHT: 200.13 LBS | HEART RATE: 64 BPM | RESPIRATION RATE: 18 BRPM | HEIGHT: 67 IN | SYSTOLIC BLOOD PRESSURE: 136 MMHG | DIASTOLIC BLOOD PRESSURE: 56 MMHG

## 2019-06-12 DIAGNOSIS — I70.1 RENAL ARTERY STENOSIS, NATIVE, BILATERAL (HCC): Primary | ICD-10-CM

## 2019-06-12 DIAGNOSIS — N18.30 CKD (CHRONIC KIDNEY DISEASE) STAGE 3, GFR 30-59 ML/MIN (HCC): ICD-10-CM

## 2019-06-12 DIAGNOSIS — R80.9 PROTEINURIA, UNSPECIFIED TYPE: ICD-10-CM

## 2019-06-12 DIAGNOSIS — I70.1 RENAL ARTERY STENOSIS (HCC): ICD-10-CM

## 2019-06-12 DIAGNOSIS — N18.30 BENIGN HYPERTENSION WITH CHRONIC KIDNEY DISEASE, STAGE III (HCC): ICD-10-CM

## 2019-06-12 DIAGNOSIS — R80.9 TYPE 1 DIABETES MELLITUS WITH MICROALBUMINURIA (HCC): ICD-10-CM

## 2019-06-12 DIAGNOSIS — E10.29 TYPE 1 DIABETES MELLITUS WITH MICROALBUMINURIA (HCC): ICD-10-CM

## 2019-06-12 DIAGNOSIS — E87.1 HYPONATREMIA: ICD-10-CM

## 2019-06-12 DIAGNOSIS — I12.9 BENIGN HYPERTENSION WITH CHRONIC KIDNEY DISEASE, STAGE III (HCC): ICD-10-CM

## 2019-06-12 PROCEDURE — 99214 OFFICE O/P EST MOD 30 MIN: CPT | Performed by: INTERNAL MEDICINE

## 2019-07-01 ENCOUNTER — OFFICE VISIT (OUTPATIENT)
Dept: CARDIOLOGY CLINIC | Facility: CLINIC | Age: 74
End: 2019-07-01
Payer: MEDICARE

## 2019-07-01 VITALS
HEART RATE: 85 BPM | OXYGEN SATURATION: 94 % | WEIGHT: 206.1 LBS | HEIGHT: 67 IN | DIASTOLIC BLOOD PRESSURE: 60 MMHG | SYSTOLIC BLOOD PRESSURE: 120 MMHG | BODY MASS INDEX: 32.35 KG/M2

## 2019-07-01 DIAGNOSIS — I25.10 CORONARY ARTERY DISEASE INVOLVING NATIVE CORONARY ARTERY OF NATIVE HEART WITHOUT ANGINA PECTORIS: ICD-10-CM

## 2019-07-01 DIAGNOSIS — I50.32 CHRONIC HEART FAILURE WITH PRESERVED EJECTION FRACTION (HCC): Primary | ICD-10-CM

## 2019-07-01 DIAGNOSIS — I73.9 PVD (PERIPHERAL VASCULAR DISEASE) (HCC): ICD-10-CM

## 2019-07-01 DIAGNOSIS — E78.5 HYPERLIPIDEMIA LDL GOAL <100: ICD-10-CM

## 2019-07-01 PROCEDURE — 99214 OFFICE O/P EST MOD 30 MIN: CPT | Performed by: INTERNAL MEDICINE

## 2019-07-01 NOTE — PROGRESS NOTES
Cardiology Outpatient Progress Note - Arnold Archer 76 y o  male MRN: [id number]    @ Encounter: [id number]      Patient Active Problem List    Diagnosis Date Noted    Post-operative state 04/19/2019    Wound drainage 04/13/2019    Anxiety with depression 04/07/2019    Acute blood loss anemia 03/25/2019    Hyponatremia 03/25/2019    NSTEMI (non-ST elevated myocardial infarction) ([facility] ) 03/23/2019    Chronic systolic congestive heart failure ([place] [address] ) 03/01/2019    History of Ischemic cardiomyopathy 01/30/2019    Diabetic neuropathy associated with type 1 diabetes mellitus ([place] [address] ) 01/14/2019    Lumbar disc herniation 08/01/2018    Lumbar radiculopathy 08/01/2018    Aortoiliac occlusive disease ([facility] ) 03/01/2018    Restrictive lung disease 01/30/2018    COPD (chronic obstructive pulmonary disease) ([place] [address] ) 01/30/2018    Atherosclerosis of artery of extremity with intermittent claudication ([place] [address] ) 01/29/2018    Presence of drug coated stent in LAD coronary artery 01/24/2018    Coronary artery disease of native artery of native heart with stable angina pectoris ([place] [address] ) 01/24/2018    Presence of drug coated stent in left circumflex coronary artery 01/24/2018    Dyslipidemia 01/24/2018    Obstructive sleep apnea of adult 01/24/2018    Carotid artery stenosis, asymptomatic, bilateral 01/24/2018    CKD (chronic kidney disease) stage 3, GFR 30-59 ml/min ([facility]) 12/13/2016    Low back pain with sciatica 08/22/2016    Type 1 diabetes mellitus ([place] [address] ) 01/12/2016    Benign hypertension with chronic kidney disease, stage III ([place] [address] ) 08/12/2015    Renal cyst, right 08/12/2015    Proteinuria 08/11/2015    Vitamin D deficiency 05/22/2015    Renal artery stenosis ([place] [address] ) 05/09/2015    Hypothyroidism, postablative 04/02/2013       Assessment:  # HFpEF, mildy reduced systolic function, Stage C, NYHA 2-3  Diuretic: lasix 20 mg daily  Weight: 206 lbs up from 197 lbs   NT pro BNP: 3/5/19: 9562   # CAD- hx of LAD stent, recently in [place] and had restenosis of LAD with stenting again, 50% LCx (in setting on NSTEMI) seams like chest pressure brought him to the hospital  Echo 3/23/19: EF: 45-50%, LVEDd 5 8 cm  # Hyperlipidemia- on Crestor; LDL 46, HDL 39 3/5/19  # HTN- metoprolol 12 5 mg BID   mmHg  # Carotid artery disease  # PVD- stenting to LE  Still with claudication as 6 minute walk limited by leg pain  VAS abdominal aorta: 8/15/18: external iliac arteries and stents are patent bilaterally  50-75% stenosis noted in the left common femoral arter  > 70% stenosis superior mesenteric artery  S/P left femoral endarterectomy with bovin patch and left femoral to above knee bypass with left external iliac artery stent by Dr Alfredo on 3/22/19  Left groin infection- debridement and wound VAD 4/15/19 by Dr Andressa Castro  # COPD-   6 minute walk test 11/28/18: only 2 minutes limited by leg pain, oxygen saturation remained above 94%  # ITZEL on CPAP  # CKD, Cr 1 46 on 6/7, GFR around 45- 60  # Bilateral renal artery stenosis  2/27/18: right renal artery < 60%, left renal artery > 60%  # DM1 with diabetic neuropathy  HgA1C 7 3% on 6/7/19    TODAY'S PLAN:  Take an extra diuretic tomorrow, fluid weight is creeping up   I would allow laser cataract surgery    HPI:     75 yo male with hx of CAD, hyperlipidemia, HTN, CVD, PVD with bypass and claudication, ITZEL on CPAP who presents for follow up after hospitalization for multiple diagnosis one of which NSTEMI with classic chest pressure and underwent repeat stent to previous LAD  It also sounds like he was admitted for COPD exacerbation and maybe some diuretic  Wife, who is nurse, provides history  Reports at one point his EF was recorded at 35% and then follow up echo 50%  Last time stopped Brilanta and put on Plavix to see if contributing to his dyspnea   Was not taking lasix daily  S/P left femoral endarterectomy with bovin patch and left femoral to above knee bypass with left external iliac artery stent by Dr Hemanth Wu on 3/22/19  Left groin infection- debridement and wound VAD 4/15/19 by Dr Nimo Seymour    Interval History:   Did not send to rehab as wounds needed to heel  Offers no complaints today    Review of Systems   Constitutional: Negative for activity change, appetite change, fatigue and unexpected weight change  HENT: Negative for congestion and nosebleeds  Eyes: Negative  Respiratory: Negative for cough, chest tightness and shortness of breath  Cardiovascular: Positive for leg swelling (mild left leg)  Negative for chest pain and palpitations  Claudication right leg   Gastrointestinal: Positive for abdominal distention (mild)  Endocrine: Negative  Genitourinary: Negative  Musculoskeletal: Negative  Skin: Negative  Neurological: Negative for dizziness, syncope and weakness  Hematological: Negative  Psychiatric/Behavioral: Negative          Past Medical History:   Diagnosis Date    Acute kidney injury ([facility] )     resolved 11/30/2015    Acute venous embolism and thrombosis of deep vessels of proximal lower extremity ([facility] )     resolved 04/04/2015    DARINEL (acute kidney injury) ([place] [address] ) 1/12/2016    Anesthesia     RESPIRATORY ISSUES DUE TO SLEEP APNEA    Cardiac disease     heart attack, stents x 4    CHF (congestive heart failure) ([facility])     Chronic cough     resolved 02/04/2016    Chronic pain disorder     intermitent claudication    COPD (chronic obstructive pulmonary disease) ([facility])     Coronary artery disease     CPAP (continuous positive airway pressure) dependence     Diabetes mellitus ([place] [address] )     Disease of thyroid gland     DVT (deep venous thrombosis) ([facility])     Heart failure ([facility] )     Hyperlipidemia     Hypertension     Ischemic cardiomyopathy     last assessed 09/26/2017    Myocardial infarction [facility])     MI +2 2015,2018    Pulmonary emphysema ([facility])     Pulmonary granuloma ([facility] )     resolved 02/03/2017    Renal failure     Sleep apnea          Allergies Allergen Reactions    Doxazosin      UNKNOWN    Iohexol      UNKNOWN    Cardura [Doxazosin Mesylate]      Muscle cramps    Other      Iv dye for cardiac cath  Renal failure very close to dialysis  But no dialysis    Zestril [Lisinopril]      cough       Current Outpatient Medications:     aspirin 81 MG tablet, Take 81 mg by mouth daily  , Disp: , Rfl:     cholecalciferol (VITAMIN D3) 1,000 units tablet, Take 1,000 Units by mouth daily  , Disp: , Rfl:     clopidogrel (PLAVIX) 75 mg tablet, Take 1 tablet (75 mg total) by mouth daily, Disp: 30 tablet, Rfl: 4    Coenzyme Q10 (COQ-10) 200 MG CAPS, Take 200 mg by mouth daily, Disp: , Rfl:     docusate sodium (COLACE) 50 mg capsule, Take by mouth daily, Disp: , Rfl:     furosemide (LASIX) 20 mg tablet, Take 1 tablet (20 mg total) by mouth daily, Disp: 90 tablet, Rfl: 3    gabapentin (NEURONTIN) 300 mg capsule, Take 1 capsule (300 mg total) by mouth 3 (three) times a day, Disp: , Rfl:     glucagon (GLUCAGON EMERGENCY) 1 MG injection, 1 mg, Disp: , Rfl:     insulin glargine (LANTUS) 100 units/mL subcutaneous injection, Inject 35 Units under the skin daily, Disp: 10 mL, Rfl: 0    insulin lispro (HUMALOG KWIKPEN) 100 units/mL injection pen, Inject under the skin, Disp: , Rfl:     insulin lispro (HUMALOG) 100 units/mL injection, Inject 3 units with breakfast, 6 units at lunch, and 6 units at dinner (Patient taking differently: 4 (four) times a day Inject 3 units with breakfast, 6 units at lunch, and 6 units at dinner), Disp: 10 mL, Rfl: 1    isosorbide mononitrate (IMDUR) 30 mg 24 hr tablet, Take 1 tablet (30 mg total) by mouth daily, Disp: 90 tablet, Rfl: 1    Lactobacillus-Inulin (CULTUREVitronet Group ), Take 1 capsule by mouth daily, Disp: , Rfl:     levothyroxine 175 mcg tablet, Take 1 tablet (175 mcg total) by mouth daily in the early morning, Disp: 90 tablet, Rfl: 3    methocarbamol (ROBAXIN) 500 mg tablet, Take 0 5 tablets (250 mg total) by mouth every 8 (eight) hours as needed for muscle spasms, Disp: 30 tablet, Rfl: 0    metoprolol tartrate (LOPRESSOR) 25 mg tablet, Take 1 tablet (25 mg total) by mouth every 12 (twelve) hours, Disp: , Rfl: 0    rosuvastatin (CRESTOR) 20 MG tablet, Take 1 tablet (20 mg total) by mouth daily, Disp: 90 tablet, Rfl: 3    ULTICARE INSULIN SYRINGE 30G X 1/2" 1 ML MISC, Use as directed, Disp: , Rfl: 0    ULTICARE INSULIN SYRINGE 30G X 5/16" 0 3 ML MISC, Use as directed, Disp: , Rfl: 0    venlafaxine (EFFEXOR-XR) 37 5 mg 24 hr capsule, Take 1 capsule (37 5 mg total) by mouth daily, Disp: 90 capsule, Rfl: 1    acetaminophen (TYLENOL) 325 mg tablet, Take 2 tablets (650 mg total) by mouth every 6 (six) hours as needed for mild pain (Patient not taking: Reported on 4/26/2019), Disp: 30 tablet, Rfl: 0    levalbuterol (XOPENEX HFA) 45 mcg/act inhaler, Inhale 1-2 puffs, Disp: , Rfl:     NIFEdipine 0 3%-lidocaine 5% rectal ointment, Apply 1 application topically every 4 (four) hours as needed for discomfort or pain Apply a small amount to anal opening 4-6 times per day   (Patient not taking: Reported on 7/1/2019), Disp: 2 oz, Rfl: 0    nitroglycerin (NITROSTAT) 0 4 mg SL tablet, Place 1 tablet (0 4 mg total) under the tongue every 5 (five) minutes as needed for chest pain (Patient not taking: Reported on 7/1/2019), Disp: 25 tablet, Rfl: 3    silver sulfadiazine (SILVADENE,SSD) 1 % cream, Apply topically daily, Disp: , Rfl:     Social History     Socioeconomic History    Marital status: /Civil Union     Spouse name: Not on file    Number of children: Not on file    Years of education: Not on file    Highest education level: Not on file   Occupational History    Occupation: Retired    Occupation: Desk work    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   Social Needs    Financial resource strain: Not on file   [employer] insecurity:     Worry: Not on file     Inability: Not on file   [employer] needs: Medical: Not on file     Non-medical: Not on file   Tobacco Use    Smoking status: Former Smoker     Packs/day: 4 00     Years: 48 00     Pack years: 192 00     Types: Cigarettes     Last attempt to quit:      Years since quittin 5    Smokeless tobacco: Never Used    Tobacco comment: smoking 48 years 1 5 ppd d/c oct 2008 screening protocol as per allscripts   Substance and Sexual Activity    Alcohol use: Yes     Alcohol/week: 21 0 standard drinks     Types: 21 Standard drinks or equivalent per week     Frequency: 4 or more times a week     Drinks per session: 1 or 2     Binge frequency: Never     Comment: 2-3 glasses of vodka daily (6 shots) (history 2 drinks per day as per allscripts    Drug use: No    Sexual activity: Not Currently   Lifestyle    Physical activity:     Days per week: Not on file     Minutes per session: Not on file    Stress: Not on file   Relationships    Social connections:     Talks on phone: Not on file     Gets together: Not on file     Attends Yarsanism service: Not on file     Active member of club or organization: Not on file     Attends meetings of clubs or organizations: Not on file     Relationship status: Not on file    Intimate partner violence:     Fear of current or ex partner: Not on file     Emotionally abused: Not on file     Physically abused: Not on file     Forced sexual activity: Not on file   Other Topics Concern    Not on file   Social History Narrative    Not on file       Family History   Problem Relation Age of Onset    Heart disease Father         pacer placement    Hypertension Father     Kidney disease Father     Heart failure Father     Heart attack Father     Liver disease Father     Cirrhosis Mother         due to beer consumption    Liver disease Mother     Diabetes Other        Physical Exam:    Vitals: Blood pressure 120/60, pulse 85, height 5' 7" (1 702 m), weight 93 5 kg (206 lb 1 6 oz), SpO2 94 %  , Body mass index is 32 28 kg/m² ,   Wt Readings from Last 3 Encounters:   07/01/19 93 5 kg (206 lb 1 6 oz)   06/12/19 90 8 kg (200 lb 2 oz)   06/05/19 90 7 kg (200 lb)       Physical Exam:    Physical Exam   Constitutional: He is oriented to person, place, and time  He appears well-developed and well-nourished  HENT:   Head: Normocephalic and atraumatic  Eyes: Pupils are equal, round, and reactive to light  EOM are normal    Neck: Normal range of motion  No JVD present  Cardiovascular: Normal rate, regular rhythm and normal heart sounds  No murmur heard  Pulmonary/Chest: Effort normal and breath sounds normal  He has no rales  Abdominal: Soft  Bowel sounds are normal  Distention: mild  Musculoskeletal: Normal range of motion  He exhibits edema (mild left leg)  Neurological: He is alert and oriented to person, place, and time  Skin: Skin is warm and dry  He is not diaphoretic  Psychiatric: He has a normal mood and affect  Labs & Results:    Lab Results   Component Value Date    GLUCOSE 207 (H) 03/22/2019    CALCIUM 9 7 06/07/2019     12/21/2015    K 4 5 06/07/2019    CO2 27 06/07/2019     06/07/2019    BUN 28 (H) 06/07/2019    CREATININE 1 46 (H) 06/07/2019     Lab Results   Component Value Date    WBC 6 31 06/07/2019    HGB 12 1 06/07/2019    HCT 38 8 06/07/2019    MCV 97 06/07/2019     06/07/2019     Lab Results   Component Value Date     18 (H) 05/17/2016      Lab Results   Component Value Date    CHOL 129 10/01/2015     Lab Results   Component Value Date    HDL 39 (L) 03/05/2019    HDL 25 (L) 11/28/2018    HDL 46 06/04/2018     Lab Results   Component Value Date    LDLCALC 46 03/05/2019    LDLCALC 19 11/28/2018    LDLCALC 59 06/04/2018     Lab Results   Component Value Date    TRIG 140 03/05/2019    TRIG 240 (H) 11/28/2018    TRIG 190 (H) 06/04/2018     No results found for: CHOLHDL      EKG personally reviewed by Stewart Hernandez DO       Counseling / Coordination of Care  Total floor / unit time spent today 25 minutes  Greater than 50% of total time was spent with the patient and / or family counseling and / or coordination of care  A description of the counseling / coordination of care: 15  Thank you for the opportunity to participate in the care of this patient    TALITA SUTTON [address]

## 2019-07-08 ENCOUNTER — OFFICE VISIT (OUTPATIENT)
Dept: FAMILY MEDICINE CLINIC | Facility: CLINIC | Age: 74
End: 2019-07-08
Payer: MEDICARE

## 2019-07-08 VITALS
DIASTOLIC BLOOD PRESSURE: 70 MMHG | HEIGHT: 67 IN | RESPIRATION RATE: 16 BRPM | TEMPERATURE: 96 F | BODY MASS INDEX: 32.57 KG/M2 | HEART RATE: 63 BPM | SYSTOLIC BLOOD PRESSURE: 130 MMHG | WEIGHT: 207.5 LBS | OXYGEN SATURATION: 95 %

## 2019-07-08 DIAGNOSIS — I87.2 VENOUS STASIS DERMATITIS OF LEFT LOWER EXTREMITY: Primary | ICD-10-CM

## 2019-07-08 PROCEDURE — 99212 OFFICE O/P EST SF 10 MIN: CPT | Performed by: FAMILY MEDICINE

## 2019-07-08 RX ORDER — MOMETASONE FUROATE 1 MG/ML
SOLUTION TOPICAL DAILY
Qty: 60 ML | Refills: 1 | Status: SHIPPED | OUTPATIENT
Start: 2019-07-08 | End: 2019-09-11 | Stop reason: ALTCHOICE

## 2019-07-08 RX ORDER — LACTOBACILLUS RHAMNOSUS GG 10B CELL
1 CAPSULE ORAL DAILY
COMMUNITY
End: 2021-06-03

## 2019-07-08 RX ORDER — POLYETHYLENE GLYCOL 3350 17 G/17G
17 POWDER, FOR SOLUTION ORAL DAILY
COMMUNITY

## 2019-07-08 NOTE — PROGRESS NOTES
FAMILY PRACTICE OFFICE VISIT       NAME: Albertina Artis  AGE: 76 y o  SEX: male       : 1945        MRN: [id number]        Assessment and Plan     Problem List Items Addressed This Visit     None      Visit Diagnoses     Venous stasis dermatitis of left lower extremity    -  Primary    Relevant Medications    mometasone (ELOCON) 0 1 % lotion       Patient presents for evaluation of rash  Exam is consistent with stasis dermatitis  Will start him on Elocon lotion  I advised patient/wife to contact me in a few days if symptoms are not improving  I reminded patient to proceed with Cologuard    There are no Patient Instructions on file for this visit  Discussed with the patient and all questioned fully answered  He will call me if any problems arise  M*Modal software was used to dictate this note  It may contain errors with dictating incorrect words/spelling  Please contact provider directly with any questions  Chief Complaint     Chief Complaint   Patient presents with    rash  on l leg      red  x 3 days        History of Present Illness     Patient presents for evaluation of nonpruritic dry rash left lower extremity  He developed symptoms within last 5-6 days  Left leg has been always slightly more swollen than the right after recent left lower extremity femoral popliteal left lower extremity bypass surgery  Patient remains under ongoing care of vascular surgery  He denies any numbness, tingling, paresthesias or pain  Review of Systems   Review of Systems   Constitutional: Negative  Respiratory: Positive for shortness of breath (Chronic dyspnea on exertion)  Cardiovascular: Positive for leg swelling ( left lower extremity, mild, chronic, as outlined in HPI)  Negative for chest pain and palpitations  Skin: Positive for rash         Active Problem List     Patient Active Problem List   Diagnosis    Type 1 diabetes mellitus (HCC)    Presence of drug coated stent in LAD coronary artery    Coronary artery disease of native artery of native heart with stable angina pectoris ([facility])    Presence of drug coated stent in left circumflex coronary artery    Dyslipidemia    Obstructive sleep apnea of adult    Carotid artery stenosis, asymptomatic, bilateral    Atherosclerosis of artery of extremity with intermittent claudication ([facility])    Restrictive lung disease    COPD (chronic obstructive pulmonary disease) ([facility])    Benign hypertension with chronic kidney disease, stage III ([facility])    CKD (chronic kidney disease) stage 3, GFR 30-59 ml/min ([facility])    Proteinuria    Renal artery stenosis ([facility])    Renal cyst, right    Vitamin D deficiency    Aortoiliac occlusive disease ([facility])    Hypothyroidism, postablative    Low back pain with sciatica    Lumbar disc herniation    Lumbar radiculopathy    Diabetic neuropathy associated with type 1 diabetes mellitus ([facility]    History of Ischemic cardiomyopathy    [facility] systolic [facility]ive [facility] failure [facility]    NSTEMI (non-ST elevated myocardial infarction) ([facility]    Acute blood loss anemia    Hyponatremia    Anxiety with depression    Wound drainage    Post-operative state       Past Medical History:  Past Medical History:   Diagnosis Date    Acute kidney injury ([facility]     resolved 11/30/2015    Acute venous embolism and thrombosis of deep vessels of proximal lower extremity ([facility] )     resolved 04/04/2015    DARINEL (acute kidney injury) ([facility] ) 1/12/2016    Anesthesia     RESPIRATORY ISSUES DUE TO SLEEP APNEA    Cardiac disease     heart attack, stents x 4    CHF (congestive heart failure) ([facility])     Chronic cough     resolved 02/04/2016    Chronic pain disorder     intermitent claudication    COPD (chronic obstructive pulmonary disease) ([facility])     Coronary artery disease     CPAP (continuous positive airway pressure) dependence     Diabetes mellitus ([facility]     Disease of thyroid gland     DVT (deep venous thrombosis) ([facility]     Heart failure ([facility]     Hyperlipidemia     Hypertension     Ischemic cardiomyopathy     last assessed 09/26/2017    Myocardial infarction [facility])     MI +2 2015,2018    Pulmonary emphysema (HCC)     Pulmonary granuloma (HCC)     resolved 02/03/2017    Renal failure     Sleep apnea        Past Surgical History:  Past Surgical History:   Procedure Laterality Date    BYPASS FEMORAL-POPLITEAL      initial stenosis with stent left, 7 x 100 smart stent onset 02/24/2014    CARDIAC SURGERY      cardiac stents    CATARACT EXTRACTION      COLOGUARD (HISTORICAL)  2018    CORONARY ANGIOPLASTY WITH STENT PLACEMENT      x4    IR ABDOMINAL ANGIOGRAPHY / INTERVENTION  3/14/2019    NH THROMBOENDARTECTMY FEMORAL COMMON Left 3/22/2019    Procedure: ENDARTERECTOMY ARTERIAL FEMORAL WITH PATCH ANGIOPLASTY, BALLOON ANGIOPLASTY, STENT;  Surgeon: Kam Molina MD;  [facility];  Service: Vascular    NH VEIN BYPASS GRAFT,FEM-POP Left 3/22/2019    Procedure: BYPASS FEMORAL-POPLITEAL WITH COMPLETION ARTERIOGRAM;  Surgeon: Kam Molina MD;  [facility];  Service: Vascular    VASCULAR SURGERY      stent placement    WOUND DEBRIDEMENT Left 4/15/2019    Procedure: DEBRIDEMENT WOUND AND DRESSING CHANGE ([address]) left groin with VAC;  Surgeon: Maria G Piedra DO;  [facility];  Service: Vascular       Family History:  Family History   Problem Relation Age of Onset    Heart disease Father         pacer placement    Hypertension Father     Kidney disease Father     Heart failure Father     Heart attack Father     Liver disease Father     Cirrhosis Mother         due to beer consumption    Liver disease Mother     Diabetes Other        Social History:  Social History     Socioeconomic History    Marital status: /Civil Union     Spouse name: Not on file    Number of children: Not on file    Years of education: Not on file    Highest education level: Not on file   Occupational History  Occupation: Retired    Occupation: Desk work    Occupation: [employer]   Social Needs    Financial resource strain: Not on file   Sarina-Lou insecurity:     Worry: Not on file     Inability: Not on file   [place] needs:     Medical: Not on file     Non-medical: Not on file   Tobacco Use    Smoking status: Former Smoker     Packs/day: 4 00     Years: 48 00     Pack years: 192 00     Types: Cigarettes     Last attempt to quit:      Years since quittin 5    Smokeless tobacco: Never Used    Tobacco comment: smoking 48 years 1 5 ppd d/c oct 2008 screening protocol as per allscripts   Substance and Sexual Activity    Alcohol use:  Yes     Alcohol/week: 21 0 standard drinks     Types: 21 Standard drinks or equivalent per week     Frequency: 4 or more times a week     Drinks per session: 1 or 2     Binge frequency: Never     Comment: 2-3 glasses of vodka daily (6 shots) (history 2 drinks per day as per allscripts    Drug use: No    Sexual activity: Not Currently   Lifestyle    Physical activity:     Days per week: Not on file     Minutes per session: Not on file    Stress: Not on file   Relationships    Social connections:     Talks on phone: Not on file     Gets together: Not on file     Attends [employer] service: Not on file     Active member of club or organization: Not on file     Attends meetings of clubs or organizations: Not on file     Relationship status: Not on file    Intimate partner violence:     Fear of current or ex partner: Not on file     Emotionally abused: Not on file     Physically abused: Not on file     Forced sexual activity: Not on file   Other Topics Concern    Not on file   Social History Narrative    Not on file       Objective     Vitals:    19 1000   BP: 130/70   BP Location: Left arm   Patient Position: Sitting   Cuff Size: Large   Pulse: 63   Resp: 16   Temp: (!) 96 °F (35 6 °C)   TempSrc: Tympanic   SpO2: 95%   Weight: 94 1 kg (207 lb 8 oz) Height: 5' 7" (1 702 m)     Wt Readings from Last 3 Encounters:   07/08/19 94 1 kg (207 lb 8 oz)   07/01/19 93 5 kg (206 lb 1 6 oz)   06/12/19 90 8 kg (200 lb 2 oz)       Physical Exam   Constitutional: He appears well-developed and well-nourished  Musculoskeletal: He exhibits edema ( trace left lower extremity edema, no warmth, no calf tenderness)  Neurological: He is alert  Skin: Rash noted  Rash is macular  Erythema: Eczema/stasis  dermatitis rash left mid/distal shin and ankle, dry coalescent macular patches, no cellulitic changes  Nursing note and vitals reviewed        Pertinent Laboratory/Diagnostic Studies:  Lab Results   Component Value Date    GLUCOSE 207 (H) 03/22/2019    BUN 28 (H) 06/07/2019    CREATININE 1 46 (H) 06/07/2019    CALCIUM 9 7 06/07/2019     12/21/2015    K 4 5 06/07/2019    CO2 27 06/07/2019     06/07/2019     Lab Results   Component Value Date    ALT 16 06/07/2019    AST 15 06/07/2019    ALKPHOS 103 06/07/2019    BILITOT 0 33 10/01/2015       Lab Results   Component Value Date    WBC 6 31 06/07/2019    HGB 12 1 06/07/2019    HCT 38 8 06/07/2019    MCV 97 06/07/2019     06/07/2019       No results found for: TSH    Lab Results   Component Value Date    CHOL 129 10/01/2015     Lab Results   Component Value Date    TRIG 140 03/05/2019     Lab Results   Component Value Date    HDL 39 (L) 03/05/2019     Lab Results   Component Value Date    LDLCALC 46 03/05/2019     Lab Results   Component Value Date    HGBA1C 7 3 (H) 06/07/2019       Results for orders placed or performed in visit on 06/07/19   CBC and differential   Result Value Ref Range    WBC 6 31 4 31 - 10 16 Thousand/uL    RBC 4 00 3 88 - 5 62 Million/uL    Hemoglobin 12 1 12 0 - 17 0 g/dL    Hematocrit 38 8 36 5 - 49 3 %    MCV 97 82 - 98 fL    MCH 30 3 26 8 - 34 3 pg    MCHC 31 2 (L) 31 4 - 37 4 g/dL    RDW 13 7 11 6 - 15 1 %    MPV 12 5 8 9 - 12 7 fL    Platelets 141 240 - 288 Thousands/uL    nRBC 0 /100 WBCs    Neutrophils Relative 52 43 - 75 %    Immat GRANS % 0 0 - 2 %    Lymphocytes Relative 29 14 - 44 %    Monocytes Relative 14 (H) 4 - 12 %    Eosinophils Relative 5 0 - 6 %    Basophils Relative 0 0 - 1 %    Neutrophils Absolute 3 20 1 85 - 7 62 Thousands/µL    Immature Grans Absolute 0 02 0 00 - 0 20 Thousand/uL    Lymphocytes Absolute 1 85 0 60 - 4 47 Thousands/µL    Monocytes Absolute 0 90 0 17 - 1 22 Thousand/µL    Eosinophils Absolute 0 32 0 00 - 0 61 Thousand/µL    Basophils Absolute 0 02 0 00 - 0 10 Thousands/µL   Hemoglobin A1C   Result Value Ref Range    Hemoglobin A1C 7 3 (H) 4 2 - 6 3 %     mg/dl       No orders of the defined types were placed in this encounter  ALLERGIES:  Allergies   Allergen Reactions    Doxazosin      UNKNOWN    Iohexol      UNKNOWN    Cardura [Doxazosin Mesylate]      Muscle cramps    Other      Iv dye for cardiac cath  Renal failure very close to dialysis  But no dialysis    Zestril [Lisinopril]      cough       Current Medications     Current Outpatient Medications   Medication Sig Dispense Refill    acetaminophen (TYLENOL) 325 mg tablet Take 2 tablets (650 mg total) by mouth every 6 (six) hours as needed for mild pain 30 tablet 0    aspirin 81 MG tablet Take 81 mg by mouth daily        cholecalciferol (VITAMIN D3) 1,000 units tablet Take 1,000 Units by mouth daily        clopidogrel (PLAVIX) 75 mg tablet Take 1 tablet (75 mg total) by mouth daily 30 tablet 4    Coenzyme Q10 (COQ-10) 200 MG CAPS Take 200 mg by mouth daily      docusate sodium (COLACE) 50 mg capsule Take by mouth daily      furosemide (LASIX) 20 mg tablet Take 1 tablet (20 mg total) by mouth daily 90 tablet 3    gabapentin (NEURONTIN) 300 mg capsule Take 1 capsule (300 mg total) by mouth 3 (three) times a day      glucagon (GLUCAGON EMERGENCY) 1 MG injection 1 mg      insulin glargine (LANTUS) 100 units/mL subcutaneous injection Inject 35 Units under the skin daily 10 mL 0    insulin lispro (HUMALOG KWIKPEN) 100 units/mL injection pen Inject under the skin      insulin lispro (HUMALOG) 100 units/mL injection Inject 3 units with breakfast, 6 units at lunch, and 6 units at dinner (Patient taking differently: 4 (four) times a day Inject 3 units with breakfast, 6 units at lunch, and 6 units at dinner) 10 mL 1    isosorbide mononitrate (IMDUR) 30 mg 24 hr tablet Take 1 tablet (30 mg total) by mouth daily 90 tablet 1    Lactobacillus Rhamnosus, GG, (CULTURELLE) CAPS Take by mouth      Lactobacillus-Inulin (CULTURELLE DIGESTIVE HEALTH PO) Take 1 capsule by mouth daily      levalbuterol (XOPENEX HFA) 45 mcg/act inhaler Inhale 1-2 puffs      levothyroxine 175 mcg tablet Take 1 tablet (175 mcg total) by mouth daily in the early morning 90 tablet 3    metoprolol tartrate (LOPRESSOR) 25 mg tablet Take 1 tablet (25 mg total) by mouth every 12 (twelve) hours  0    nepafenac (NEVANAC) 0 1 % ophthalmic suspension 3 drops 3 (three) times a day      NIFEdipine 0 3%-lidocaine 5% rectal ointment Apply 1 application topically every 4 (four) hours as needed for discomfort or pain Apply a small amount to anal opening 4-6 times per day   2 oz 0    nitroglycerin (NITROSTAT) 0 4 mg SL tablet Place 1 tablet (0 4 mg total) under the tongue every 5 (five) minutes as needed for chest pain 25 tablet 3    polyethylene glycol (MIRALAX) 17 g packet Take 17 g by mouth daily      rosuvastatin (CRESTOR) 20 MG tablet Take 1 tablet (20 mg total) by mouth daily 90 tablet 3    ULTICARE INSULIN SYRINGE 30G X 1/2" 1 ML MISC Use as directed  0    ULTICARE INSULIN SYRINGE 30G X 5/16" 0 3 ML MISC Use as directed  0    venlafaxine (EFFEXOR-XR) 37 5 mg 24 hr capsule Take 1 capsule (37 5 mg total) by mouth daily 90 capsule 1    methocarbamol (ROBAXIN) 500 mg tablet Take 0 5 tablets (250 mg total) by mouth every 8 (eight) hours as needed for muscle spasms (Patient not taking: Reported on 7/8/2019) 30 tablet 0    mometasone (ELOCON) 0 1 % lotion Apply topically daily 60 mL 1    silver sulfadiazine (SILVADENE,SSD) 1 % cream Apply topically daily       No current facility-administered medications for this visit  There are no discontinued medications      Health Maintenance     Health Maintenance   Topic Date Due    Hepatitis C Screening  1945   [facility] Medicare Annual Wellness Visit (AWV)  1945    SLP PLAN OF CARE  1945    CRC Screening: Colonoscopy  1945    BMI: Followup Plan  04/27/1963    HEPATITIS B VACCINES (1 of 3 - Risk 3-dose series) 04/27/1964    URINE MICROALBUMIN  10/01/2016    INFLUENZA VACCINE  07/01/2019    DM Eye Exam  09/19/2019    Depression Screening PHQ  11/28/2019    HEMOGLOBIN A1C  12/07/2019    Diabetic Foot Exam  01/28/2020    Fall Risk  04/25/2020    BMI: Adult  07/08/2020    DTaP,Tdap,and Td Vaccines (2 - Td) 05/05/2023    Pneumococcal Vaccine: 65+ Years  Completed    Pneumococcal Vaccine: Pediatrics (0 to 5 Years) and At-Risk Patients (6 to 59 Years)  Aged [employer] History   Administered Date(s) Administered    DT (pediatric) 12/01/2009    H1N1, All Formulations 12/01/2009    INFLUENZA 10/01/2008, 10/06/2009, 09/01/2010, 11/04/2013, 08/29/2018    Influenza Split High Dose Preservative Free IM 08/28/2014, 10/03/2016, 08/29/2018    Influenza TIV (IM) 10/01/2008, 10/19/2010, 09/20/2011, 08/01/2012, 09/13/2013, 09/15/2015, 09/03/2017    Pneumococcal Conjugate 13-Valent 08/11/2015    Pneumococcal Polysaccharide PPV23 10/01/2008, 06/04/2009, 03/15/2017    Tdap 05/05/2013    Zoster 10/01/2009       Carisa Peck MD

## 2019-07-12 ENCOUNTER — TELEPHONE (OUTPATIENT)
Dept: FAMILY MEDICINE CLINIC | Facility: CLINIC | Age: 74
End: 2019-07-12

## 2019-07-17 DIAGNOSIS — Z12.11 SCREENING FOR COLON CANCER: Primary | ICD-10-CM

## 2019-08-09 DIAGNOSIS — I25.10 CORONARY ARTERY DISEASE INVOLVING NATIVE CORONARY ARTERY OF NATIVE HEART WITHOUT ANGINA PECTORIS: ICD-10-CM

## 2019-08-09 RX ORDER — CLOPIDOGREL BISULFATE 75 MG/1
TABLET ORAL
Qty: 30 TABLET | Refills: 4 | Status: SHIPPED | OUTPATIENT
Start: 2019-08-09 | End: 2020-01-07 | Stop reason: SDUPTHER

## 2019-08-26 ENCOUNTER — TELEPHONE (OUTPATIENT)
Dept: CARDIOLOGY CLINIC | Facility: CLINIC | Age: 74
End: 2019-08-26

## 2019-08-26 ENCOUNTER — TELEPHONE (OUTPATIENT)
Dept: FAMILY MEDICINE CLINIC | Facility: CLINIC | Age: 74
End: 2019-08-26

## 2019-08-26 NOTE — TELEPHONE ENCOUNTER
Advised pt  He is not presently taking potassium  Did you want him to take potassium? He has 4 mth F/U on 9/5 did you want to see him before?         Please advise

## 2019-08-26 NOTE — TELEPHONE ENCOUNTER
----- Message from Rosa Isela Godwin MD sent at 8/24/2019  1:03 PM EDT -----  Please contact patient or his wife, Cologuard testing was negative    Thank you

## 2019-08-26 NOTE — TELEPHONE ENCOUNTER
Wife called states pt has had 10 lb wt gain in the past few weeks  Did call pt back  No answer, LMOM to call us back

## 2019-08-28 NOTE — PROGRESS NOTES
Cardiology Follow Up    Kathrine Nguyen  1945  [id number]  [facility] KENDRA Juarez [address]  [phone]    1  Coronary artery disease involving native coronary artery of native heart without angina pectoris     2  Chronic heart failure with preserved ejection fraction ([address] )     3  Hyperlipidemia LDL goal <100     4  Essential (primary) hypertension         Interval History:  Patient is here for a follow-up visit  He was most recently seen by me during his hospitalization in March  He was in on that occasion with peripheral vascular disease with left femoral endarterectomy with bovine patch and left femoral to above the knee bypass with left external iliac artery stent by Dr Galdino Rendon March 22, 2019  He did have intermittent angina during that postoperative period  Thereafter he required debridement in reference to a left groin infection by Dr Vinita Christopher 4/15/19  Patient has known history of coronary artery disease with remote cardiac catheterization and intervention performed at [address] [place] [address] with placement of four stents at that time in 2015  He had a second cardiac catheterization October 2018 with re-stenosis of the LAD and stent placement in [address]  A 50% left circumflex lesion was also noted  Patient had an echocardiogram performed March 23rd which demonstrated a mild reduction in LV systolic function in the setting of a mid apical anterior inferior and inferolateral wall motion abnormality  The resting left ventricular ejection fraction was 45-50%  There was no significant valvular heart disease  He was most recently seen by his primary cardiologist Dr Ryan Jeans in July  Recently the patient gained weight and the dose of his diuretic was increased  He renal insufficiency  as been taking 60 mg per day and limiting his fluid intake    His wife is a medical educator  The patient apparently has gained about 10 lb since discharge from the hospital in March  He feels it in his belly and groin  I will increase the dose of his furosemide to 40 mg twice a day and he will continue to monitor his weight at home  His body weight today is about 209      Patient Active Problem List   Diagnosis    Type 1 diabetes mellitus ([facility])    Presence of drug coated stent in LAD coronary artery    Coronary artery disease of native artery of native heart with stable angina pectoris ([facility])    Presence of drug coated stent in left circumflex coronary artery    Dyslipidemia    Obstructive sleep apnea of adult    Carotid artery stenosis, asymptomatic, bilateral    Atherosclerosis of artery of extremity with intermittent claudication ([facility])    Restrictive lung disease    COPD (chronic obstructive pulmonary disease) [facility]    Benign hypertension with chronic kidney disease, stage III ([facility])    CKD (chronic kidney disease) stage 3, GFR 30-59 ml/min ([facility])    Proteinuria    Renal artery stenosis ([facility])    Renal cyst, right    Vitamin D deficiency    Aortoiliac occlusive disease ([facility] )    Hypothyroidism, postablative    Low back pain with sciatica    Lumbar disc herniation    Lumbar radiculopathy    Diabetic neuropathy associated with type 1 diabetes mellitus ([facility] )    History of Ischemic cardiomyopathy    Chronic systolic congestive heart failure ([facility])    NSTEMI (non-ST elevated myocardial infarction) ([address] )    Acute blood loss anemia    Hyponatremia    Anxiety with depression    Wound drainage    Post-operative state     Past Medical History:   Diagnosis Date    Acute kidney injury ([address] )     resolved 11/30/2015    Acute venous embolism and thrombosis of deep vessels of proximal lower extremity ([address] )     resolved 04/04/2015    DARINEL (acute kidney injury) ([facility] ) 1/12/2016    Anesthesia     RESPIRATORY ISSUES DUE TO SLEEP APNEA    Cardiac disease     heart attack, stents x 4  CHF (congestive heart failure) (HCC)     Chronic cough     resolved 2016    Chronic pain disorder     intermitent claudication    COPD (chronic obstructive pulmonary disease) (HCC)     Coronary artery disease     CPAP (continuous positive airway pressure) dependence     Diabetes mellitus ([address] )     Disease of thyroid gland     DVT (deep venous thrombosis) (HCC)     Heart failure (HCC)     Hyperlipidemia     Hypertension     Ischemic cardiomyopathy     last assessed 2017    Myocardial infarction [facility])     MI +2     Pulmonary emphysema (HCC)     Pulmonary granuloma (HCC)     resolved 2017    Renal failure     Sleep apnea      Social History     Socioeconomic History    Marital status: /Civil Union     Spouse name: Not on file    Number of children: Not on file    Years of education: Not on file    Highest education level: Not on file   Occupational History    Occupation: Retired    Occupation: Desk work    Occupation: Department [employer]   Social Needs    Financial resource strain: Not on file    Food insecurity:     Worry: Not on file     Inability: Not on file   [employer] needs:     Medical: Not on file     Non-medical: Not on file   Tobacco Use    Smoking status: Former Smoker     Packs/day: 4 00     Years: 48 00     Pack years: 192 00     Types: Cigarettes     Last attempt to quit:      Years since quittin 6    Smokeless tobacco: Never Used    Tobacco comment: smoking 48 years 1 5 ppd d/c oct 2008 screening protocol as per allscripts   Substance and Sexual Activity    Alcohol use:  Yes     Alcohol/week: 21 0 standard drinks     Types: 21 Standard drinks or equivalent per week     Frequency: 4 or more times a week     Drinks per session: 1 or 2     Binge frequency: Never     Comment: 2-3 glasses of vodka daily (6 shots) (history 2 drinks per day as per allscripts    Drug use: No    Sexual activity: Not Currently   Lifestyle  Physical activity:     Days per week: Not on file     Minutes per session: Not on file    Stress: Not on file   Relationships    Social connections:     Talks on phone: Not on file     Gets together: Not on file     Attends [employer] service: Not on file     Active member of club or organization: Not on file     Attends meetings of clubs or organizations: Not on file     Relationship status: Not on file    Intimate partner violence:     Fear of current or ex partner: Not on file     Emotionally abused: Not on file     Physically abused: Not on file     Forced sexual activity: Not on file   Other Topics Concern    Not on file   Social History Narrative    Not on file      Family History   Problem Relation Age of Onset    Heart disease Father         pacer placement    Hypertension Father     Kidney disease Father     Heart failure Father     Heart attack Father     Liver disease Father     Cirrhosis Mother         due to beer consumption    Liver disease Mother     Diabetes Other      Past Surgical History:   Procedure Laterality Date    BYPASS FEMORAL-POPLITEAL      initial stenosis with stent left, 7 x 100 smart stent onset 02/24/2014    CARDIAC SURGERY      cardiac stents    CATARACT EXTRACTION      COLOGUARD (HISTORICAL)  2018    CORONARY ANGIOPLASTY WITH STENT PLACEMENT      x4    IR ABDOMINAL ANGIOGRAPHY / INTERVENTION  3/14/2019    NH THROMBOENDARTECTMY FEMORAL COMMON Left 3/22/2019    Procedure: ENDARTERECTOMY ARTERIAL FEMORAL WITH PATCH ANGIOPLASTY, BALLOON ANGIOPLASTY, STENT;  Surgeon: Hilton Harada, MD;  [facility]: [facility];  Service: Vascular    NH VEIN BYPASS GRAFT,FEM-POP Left 3/22/2019    Procedure: BYPASS FEMORAL-POPLITEAL WITH COMPLETION ARTERIOGRAM;  Surgeon: Hilton Harada, MD;  [facility];  Service: Vascular    VASCULAR SURGERY      stent placement    WOUND DEBRIDEMENT Left 4/15/2019    Procedure: DEBRIDEMENT WOUND AND DRESSING CHANGE ([address]) left groin with VAC;  Surgeon: Ronit Ochoa DO;  [facility];  Service: Vascular       Current Outpatient Medications:     acetaminophen (TYLENOL) 325 mg tablet, Take 2 tablets (650 mg total) by mouth every 6 (six) hours as needed for mild pain, Disp: 30 tablet, Rfl: 0    aspirin 81 MG tablet, Take 81 mg by mouth daily  , Disp: , Rfl:     cholecalciferol (VITAMIN D3) 1,000 units tablet, Take 1,000 Units by mouth daily  , Disp: , Rfl:     clopidogrel (PLAVIX) 75 mg tablet, TAKE ONE TABLET BY MOUTH EVERY DAY, Disp: 30 tablet, Rfl: 4    Coenzyme Q10 (COQ-10) 200 MG CAPS, Take 200 mg by mouth daily, Disp: , Rfl:     docusate sodium (COLACE) 50 mg capsule, Take by mouth daily, Disp: , Rfl:     furosemide (LASIX) 20 mg tablet, Take 1 tablet (20 mg total) by mouth daily, Disp: 90 tablet, Rfl: 3    gabapentin (NEURONTIN) 300 mg capsule, Take 1 capsule (300 mg total) by mouth 3 (three) times a day, Disp: , Rfl:     glucagon (GLUCAGON EMERGENCY) 1 MG injection, 1 mg, Disp: , Rfl:     insulin glargine (LANTUS) 100 units/mL subcutaneous injection, Inject 35 Units under the skin daily, Disp: 10 mL, Rfl: 0    insulin lispro (HUMALOG KWIKPEN) 100 units/mL injection pen, Inject under the skin, Disp: , Rfl:     insulin lispro (HUMALOG) 100 units/mL injection, Inject 3 units with breakfast, 6 units at lunch, and 6 units at dinner (Patient taking differently: 4 (four) times a day Inject 3 units with breakfast, 6 units at lunch, and 6 units at dinner), Disp: 10 mL, Rfl: 1    isosorbide mononitrate (IMDUR) 30 mg 24 hr tablet, Take 1 tablet (30 mg total) by mouth daily, Disp: 90 tablet, Rfl: 1    Lactobacillus Rhamnosus, GG, (CULTURELLE) CAPS, Take by mouth, Disp: , Rfl:     Lactobacillus-Inulin (CULTURELLE DIGESTIVE HEALTH PO), Take 1 capsule by mouth daily, Disp: , Rfl:     levalbuterol (XOPENEX HFA) 45 mcg/act inhaler, Inhale 1-2 puffs, Disp: , Rfl:     levothyroxine 175 mcg tablet, Take 1 tablet (175 mcg total) by mouth daily in the early morning, Disp: 90 tablet, Rfl: 3    metoprolol tartrate (LOPRESSOR) 25 mg tablet, Take 1 tablet (25 mg total) by mouth every 12 (twelve) hours, Disp: , Rfl: 0    mometasone (ELOCON) 0 1 % lotion, Apply topically daily, Disp: 60 mL, Rfl: 1    nepafenac (NEVANAC) 0 1 % ophthalmic suspension, 3 drops 3 (three) times a day, Disp: , Rfl:     NIFEdipine 0 3%-lidocaine 5% rectal ointment, Apply 1 application topically every 4 (four) hours as needed for discomfort or pain Apply a small amount to anal opening 4-6 times per day , Disp: 2 oz, Rfl: 0    nitroglycerin (NITROSTAT) 0 4 mg SL tablet, Place 1 tablet (0 4 mg total) under the tongue every 5 (five) minutes as needed for chest pain, Disp: 25 tablet, Rfl: 3    polyethylene glycol (MIRALAX) 17 g packet, Take 17 g by mouth daily, Disp: , Rfl:     rosuvastatin (CRESTOR) 20 MG tablet, Take 1 tablet (20 mg total) by mouth daily, Disp: 90 tablet, Rfl: 3    silver sulfadiazine (SILVADENE,SSD) 1 % cream, Apply topically daily, Disp: , Rfl:     ULTICARE INSULIN SYRINGE 30G X 1/2" 1 ML MISC, Use as directed, Disp: , Rfl: 0    ULTICARE INSULIN SYRINGE 30G X 5/16" 0 3 ML MISC, Use as directed, Disp: , Rfl: 0    venlafaxine (EFFEXOR-XR) 37 5 mg 24 hr capsule, Take 1 capsule (37 5 mg total) by mouth daily, Disp: 90 capsule, Rfl: 1    methocarbamol (ROBAXIN) 500 mg tablet, Take 0 5 tablets (250 mg total) by mouth every 8 (eight) hours as needed for muscle spasms (Patient not taking: Reported on 7/8/2019), Disp: 30 tablet, Rfl: 0  Allergies   Allergen Reactions    Doxazosin      UNKNOWN    Iohexol      UNKNOWN    Cardura [Doxazosin Mesylate]      Muscle cramps    Other      Iv dye for cardiac cath  Renal failure very close to dialysis  But no dialysis    Zestril [Lisinopril]      cough       Labs:not applicable  Imaging: No results found  Review of Systems:  Review of Systems   All other systems reviewed and are negative        Physical Exam:  /78 (BP Location: Right arm, Patient Position: Sitting, Cuff Size: Standard)   Pulse 78   Ht 5' 7" (1 702 m)   Wt 97 5 kg (215 lb)   SpO2 94%   BMI 33 67 kg/m²   Physical Exam   Constitutional: He is oriented to person, place, and time  He appears well-developed and well-nourished  HENT:   Head: Normocephalic and atraumatic  Eyes: Pupils are equal, round, and reactive to light  Conjunctivae are normal    Neck: Normal range of motion  Neck supple  Cardiovascular: Normal rate and normal heart sounds  Pulmonary/Chest: Effort normal and breath sounds normal    Neurological: He is alert and oriented to person, place, and time  Skin: Skin is warm and dry  Psychiatric: He has a normal mood and affect  Vitals reviewed  Discussion/Summary:  I will increase furosemide to 40 mg twice a day  He will monitor his weight  I will check a BMP in about a week  I have asked he and his wife to call if there is a problem in the interim otherwise I will see him in follow-up in about four months time

## 2019-08-29 ENCOUNTER — OFFICE VISIT (OUTPATIENT)
Dept: CARDIOLOGY CLINIC | Facility: CLINIC | Age: 74
End: 2019-08-29
Payer: MEDICARE

## 2019-08-29 VITALS
HEIGHT: 67 IN | HEART RATE: 78 BPM | WEIGHT: 215 LBS | SYSTOLIC BLOOD PRESSURE: 130 MMHG | BODY MASS INDEX: 33.74 KG/M2 | OXYGEN SATURATION: 94 % | DIASTOLIC BLOOD PRESSURE: 78 MMHG

## 2019-08-29 DIAGNOSIS — I25.10 CORONARY ARTERY DISEASE INVOLVING NATIVE CORONARY ARTERY OF NATIVE HEART WITHOUT ANGINA PECTORIS: Primary | ICD-10-CM

## 2019-08-29 DIAGNOSIS — E78.5 HYPERLIPIDEMIA LDL GOAL <100: ICD-10-CM

## 2019-08-29 DIAGNOSIS — I50.22 CHRONIC SYSTOLIC CONGESTIVE HEART FAILURE (HCC): ICD-10-CM

## 2019-08-29 DIAGNOSIS — I50.32 CHRONIC HEART FAILURE WITH PRESERVED EJECTION FRACTION (HCC): ICD-10-CM

## 2019-08-29 DIAGNOSIS — I10 ESSENTIAL (PRIMARY) HYPERTENSION: ICD-10-CM

## 2019-08-29 PROCEDURE — 1123F ACP DISCUSS/DSCN MKR DOCD: CPT | Performed by: INTERNAL MEDICINE

## 2019-08-29 PROCEDURE — 99214 OFFICE O/P EST MOD 30 MIN: CPT | Performed by: INTERNAL MEDICINE

## 2019-08-29 RX ORDER — FUROSEMIDE 20 MG/1
40 TABLET ORAL 2 TIMES DAILY
Qty: 180 TABLET | Refills: 3 | Status: SHIPPED | OUTPATIENT
Start: 2019-08-29 | End: 2019-09-18

## 2019-08-29 NOTE — PATIENT INSTRUCTIONS
Please increase dose of furosemide to 40 mg twice a day  Continue to keep track of your weights and call me with updates  We will check a BMP in a week  I will see you in four months time

## 2019-09-05 ENCOUNTER — LAB (OUTPATIENT)
Dept: LAB | Facility: CLINIC | Age: 74
End: 2019-09-05
Payer: MEDICARE

## 2019-09-05 ENCOUNTER — TELEPHONE (OUTPATIENT)
Dept: CARDIOLOGY CLINIC | Facility: CLINIC | Age: 74
End: 2019-09-05

## 2019-09-05 DIAGNOSIS — I25.10 CORONARY ARTERY DISEASE INVOLVING NATIVE CORONARY ARTERY OF NATIVE HEART WITHOUT ANGINA PECTORIS: ICD-10-CM

## 2019-09-05 DIAGNOSIS — I50.32 CHRONIC HEART FAILURE WITH PRESERVED EJECTION FRACTION (HCC): ICD-10-CM

## 2019-09-05 DIAGNOSIS — I50.32 CHRONIC HEART FAILURE WITH PRESERVED EJECTION FRACTION (HCC): Primary | ICD-10-CM

## 2019-09-05 DIAGNOSIS — N18.30 CKD (CHRONIC KIDNEY DISEASE) STAGE 3, GFR 30-59 ML/MIN (HCC): ICD-10-CM

## 2019-09-05 DIAGNOSIS — I70.1 RENAL ARTERY STENOSIS, NATIVE, BILATERAL (HCC): ICD-10-CM

## 2019-09-05 DIAGNOSIS — I10 ESSENTIAL (PRIMARY) HYPERTENSION: ICD-10-CM

## 2019-09-05 DIAGNOSIS — E78.5 HYPERLIPIDEMIA LDL GOAL <100: ICD-10-CM

## 2019-09-05 LAB
ALBUMIN SERPL BCP-MCNC: 3.8 G/DL (ref 3.5–5)
ALP SERPL-CCNC: 111 U/L (ref 46–116)
ALT SERPL W P-5'-P-CCNC: 28 U/L (ref 12–78)
ANION GAP SERPL CALCULATED.3IONS-SCNC: 7 MMOL/L (ref 4–13)
AST SERPL W P-5'-P-CCNC: 28 U/L (ref 5–45)
BACTERIA UR QL AUTO: NORMAL /HPF
BILIRUB SERPL-MCNC: 0.56 MG/DL (ref 0.2–1)
BILIRUB UR QL STRIP: NEGATIVE
BUN SERPL-MCNC: 46 MG/DL (ref 5–25)
CALCIUM SERPL-MCNC: 9.5 MG/DL (ref 8.3–10.1)
CHLORIDE SERPL-SCNC: 104 MMOL/L (ref 100–108)
CLARITY UR: CLEAR
CO2 SERPL-SCNC: 26 MMOL/L (ref 21–32)
COLOR UR: YELLOW
CREAT SERPL-MCNC: 1.76 MG/DL (ref 0.6–1.3)
CREAT UR-MCNC: 88.1 MG/DL
ERYTHROCYTE [DISTWIDTH] IN BLOOD BY AUTOMATED COUNT: 14.6 % (ref 11.6–15.1)
GFR SERPL CREATININE-BSD FRML MDRD: 37 ML/MIN/1.73SQ M
GLUCOSE P FAST SERPL-MCNC: 88 MG/DL (ref 65–99)
GLUCOSE UR STRIP-MCNC: NEGATIVE MG/DL
HCT VFR BLD AUTO: 43.6 % (ref 36.5–49.3)
HGB BLD-MCNC: 14.3 G/DL (ref 12–17)
HGB UR QL STRIP.AUTO: NEGATIVE
HYALINE CASTS #/AREA URNS LPF: NORMAL /LPF
KETONES UR STRIP-MCNC: NEGATIVE MG/DL
LEUKOCYTE ESTERASE UR QL STRIP: NEGATIVE
MAGNESIUM SERPL-MCNC: 2.6 MG/DL (ref 1.6–2.6)
MCH RBC QN AUTO: 32 PG (ref 26.8–34.3)
MCHC RBC AUTO-ENTMCNC: 32.8 G/DL (ref 31.4–37.4)
MCV RBC AUTO: 98 FL (ref 82–98)
NITRITE UR QL STRIP: NEGATIVE
NON-SQ EPI CELLS URNS QL MICRO: NORMAL /HPF
PH UR STRIP.AUTO: 6 [PH]
PHOSPHATE SERPL-MCNC: 3.9 MG/DL (ref 2.3–4.1)
PLATELET # BLD AUTO: 133 THOUSANDS/UL (ref 149–390)
PMV BLD AUTO: 12.2 FL (ref 8.9–12.7)
POTASSIUM SERPL-SCNC: 4.2 MMOL/L (ref 3.5–5.3)
PROT SERPL-MCNC: 8.3 G/DL (ref 6.4–8.2)
PROT UR STRIP-MCNC: ABNORMAL MG/DL
PROT UR-MCNC: 32 MG/DL
PROT/CREAT UR: 0.36 MG/G{CREAT} (ref 0–0.1)
RBC # BLD AUTO: 4.47 MILLION/UL (ref 3.88–5.62)
RBC #/AREA URNS AUTO: NORMAL /HPF
SODIUM SERPL-SCNC: 137 MMOL/L (ref 136–145)
SP GR UR STRIP.AUTO: 1.01 (ref 1–1.03)
UROBILINOGEN UR QL STRIP.AUTO: 0.2 E.U./DL
WBC # BLD AUTO: 5.8 THOUSAND/UL (ref 4.31–10.16)
WBC #/AREA URNS AUTO: NORMAL /HPF

## 2019-09-05 PROCEDURE — 83735 ASSAY OF MAGNESIUM: CPT

## 2019-09-05 PROCEDURE — 82570 ASSAY OF URINE CREATININE: CPT | Performed by: INTERNAL MEDICINE

## 2019-09-05 PROCEDURE — 84100 ASSAY OF PHOSPHORUS: CPT

## 2019-09-05 PROCEDURE — 84156 ASSAY OF PROTEIN URINE: CPT | Performed by: INTERNAL MEDICINE

## 2019-09-05 PROCEDURE — 80053 COMPREHEN METABOLIC PANEL: CPT

## 2019-09-05 PROCEDURE — 85027 COMPLETE CBC AUTOMATED: CPT

## 2019-09-05 PROCEDURE — 36415 COLL VENOUS BLD VENIPUNCTURE: CPT

## 2019-09-05 PROCEDURE — 81001 URINALYSIS AUTO W/SCOPE: CPT | Performed by: INTERNAL MEDICINE

## 2019-09-05 NOTE — TELEPHONE ENCOUNTER
----- Message from Britton Antonio MD sent at 9/5/2019  3:14 PM EDT -----  Based on BMP would reduce dose of furosemide to 40 mg once a day  Would check a BMP in 2-3 weeks    Thank you       ----- Message -----  From: Lab, Background User  Sent: 9/5/2019   3:05 PM EDT  To: Britton Antonio MD

## 2019-09-06 ENCOUNTER — TELEPHONE (OUTPATIENT)
Dept: NEPHROLOGY | Facility: CLINIC | Age: 74
End: 2019-09-06

## 2019-09-06 DIAGNOSIS — N18.30 STAGE 3 CHRONIC KIDNEY DISEASE (HCC): Primary | ICD-10-CM

## 2019-09-06 NOTE — TELEPHONE ENCOUNTER
Creatinine increased mildly to 1 7 from baseline of 1 3-1 6, if he feels ok no complaints or concerns no recent changes in medications or weights we can continue current medications but lets repeat a BMP before he sees jennifer on 9/18

## 2019-09-06 NOTE — TELEPHONE ENCOUNTER
Wife Vel Rhoades called this afternoon with an update on Chris's weight  After ov last week, and increase in lasix to 40 mg bid, he has lost 3 6 lbs  Wt was 210 at home when he started the increase and today it is 206 4  Vel Rhoades said you discussed possibly changing to a different diuretic if not enough wt removed  Please advise

## 2019-09-06 NOTE — TELEPHONE ENCOUNTER
I called and spoke with Octavio Sylvester (wife) as Ferny Hoyt was unavailable  She stated that recently he has been feeling tired & wants to sleep a lot  Ferny Hoyt currently has lost 3 4lbs since Lasix had been increased to 80mg BID  However, as of today, the Cardiologist reduced the Lasix to 40mg daily  She understands to have Ferny Hoyt repeat a BMP prior to his follow up appointment on 9/18/19 with Rodríguez Bangura  BMP has been mailed to his home  Please advise if you would like to make any changes - thanks!

## 2019-09-09 DIAGNOSIS — E10.8 TYPE 1 DIABETES MELLITUS WITH COMPLICATION (HCC): ICD-10-CM

## 2019-09-09 DIAGNOSIS — E10.43 DIABETIC AUTONOMIC NEUROPATHY ASSOCIATED WITH TYPE 1 DIABETES MELLITUS (HCC): ICD-10-CM

## 2019-09-10 ENCOUNTER — HOSPITAL ENCOUNTER (OUTPATIENT)
Dept: NON INVASIVE DIAGNOSTICS | Facility: CLINIC | Age: 74
Discharge: HOME | End: 2019-09-10
Payer: MEDICARE

## 2019-09-10 DIAGNOSIS — I65.23 CAROTID ARTERY STENOSIS, ASYMPTOMATIC, BILATERAL: ICD-10-CM

## 2019-09-10 DIAGNOSIS — I70.219 ATHEROSCLEROSIS OF ARTERY OF EXTREMITY WITH INTERMITTENT CLAUDICATION (HCC): Chronic | ICD-10-CM

## 2019-09-10 PROCEDURE — 93923 UPR/LXTR ART STDY 3+ LVLS: CPT

## 2019-09-10 PROCEDURE — 93925 LOWER EXTREMITY STUDY: CPT

## 2019-09-10 PROCEDURE — 93922 UPR/L XTREMITY ART 2 LEVELS: CPT | Performed by: SURGERY

## 2019-09-10 PROCEDURE — 93880 EXTRACRANIAL BILAT STUDY: CPT | Performed by: SURGERY

## 2019-09-10 PROCEDURE — 93880 EXTRACRANIAL BILAT STUDY: CPT

## 2019-09-10 PROCEDURE — 93925 LOWER EXTREMITY STUDY: CPT | Performed by: SURGERY

## 2019-09-10 RX ORDER — GABAPENTIN 300 MG/1
CAPSULE ORAL
Qty: 270 CAPSULE | Refills: 1 | Status: SHIPPED | OUTPATIENT
Start: 2019-09-10 | End: 2019-09-11 | Stop reason: SDUPTHER

## 2019-09-11 ENCOUNTER — OFFICE VISIT (OUTPATIENT)
Dept: VASCULAR SURGERY | Facility: CLINIC | Age: 74
End: 2019-09-11
Payer: MEDICARE

## 2019-09-11 VITALS
TEMPERATURE: 97.4 F | BODY MASS INDEX: 34.06 KG/M2 | HEART RATE: 58 BPM | HEIGHT: 67 IN | SYSTOLIC BLOOD PRESSURE: 140 MMHG | DIASTOLIC BLOOD PRESSURE: 80 MMHG | WEIGHT: 217 LBS

## 2019-09-11 DIAGNOSIS — I70.219 ATHEROSCLEROSIS OF ARTERY OF EXTREMITY WITH INTERMITTENT CLAUDICATION (HCC): Primary | Chronic | ICD-10-CM

## 2019-09-11 DIAGNOSIS — I65.23 CAROTID ARTERY STENOSIS, ASYMPTOMATIC, BILATERAL: ICD-10-CM

## 2019-09-11 DIAGNOSIS — I74.09 AORTOILIAC OCCLUSIVE DISEASE (HCC): Chronic | ICD-10-CM

## 2019-09-11 PROCEDURE — 99214 OFFICE O/P EST MOD 30 MIN: CPT | Performed by: SURGERY

## 2019-09-11 NOTE — PROGRESS NOTES
Assessment/Plan:    Atherosclerosis of artery of extremity with intermittent claudication (HCC)    Aortoiliac and infrainguinal arterial occlusive disease with lower extremity claudication  1  On the left he is in general doing well following femoral endarterectomy and femoral -popliteal bypass with [place]-Steve graft  His current complaints are consistent with his underlying neuropathy  We did discuss the findings on recent duplex evaluation which do show some evidence of restenosis within the external iliac, common femoral and proximal graft anastomosis but the ankle-brachial index is maintained in the normal range  At this time no further intervention is necessary other than 3 month duplex follow-up  2  On the right there is known underlying severe femoral-popliteal occlusive disease  His symptoms are minimal at this time mostly because his walking is limited because of back pain and his severe underlying peripheral neuropathy  We will continue standard duplex follow-up as noted above  Carotid artery stenosis, asymptomatic, bilateral    Asymptomatic bilateral greater than 70 percent carotid artery stenosis  There has been no significant change on recent duplex evaluation  We will continue current medical management for which he is already on appropriate antiplatelet and statin therapy  Diagnoses and all orders for this visit:    Atherosclerosis of artery of extremity with intermittent claudication (HCC)  -     VAS lower limb arterial duplex, complete bilateral; Future    Aortoiliac occlusive disease (HCC)  -     VAS lower limb arterial duplex, complete bilateral; Future    Carotid artery stenosis, asymptomatic, bilateral  -     VAS carotid complete study; Future          Subjective:      Patient ID: Venkat Vásquez is a 76 y o  male  Patient presents today for results review of carotid study and SYLVESTER, s/p revascularization   Patient reports BLE with walking at all times, though he believes it is secondary to back pain  Patient also reports numbness in feet  Denies rest pain  No tissue loss  Denies all TIA/CVA symptoms at this time  22-year-old diabetic with long history of severe peripheral arterial occlusive disease presents in follow-up after recent duplex evaluation  His history includes left  Lower extremity revascularization with femoral endarterectomy and femoral to popliteal artery bypass with [place]-Steve graft approximately 6 months ago  This procedure was complicated by significant postoperative pain, an MI and prolonged recovery with superficial wound debridement  On evaluation today he denies any specific claudication symptoms but does state his walking is severely limited secondary to back pain and as well severe peripheral neuropathy in which his feet are constantly numb and he in fact has difficulty feeling the ground when walking  Arterial duplex 09/10/2019 with right greater than 75 percent proximal superficial femoral and deep femoral artery stenosis with segmental occlusion of the mid superficial femoral artery with ankle-brachial index is 0 45 which is unchanged  On the left the femoral endarterectomy and femoral to popliteal artery bypass are patent though there is elevated velocity in the external iliac, common femoral and proximal graft consistent with some restenosis  Ankle-brachial index remains normal at 0 95  Carotid duplex with bilateral greater than 70 percent carotid artery stenosis  The following portions of the patient's history were reviewed and updated as appropriate: allergies, current medications, past family history, past medical history, past social history, past surgical history and problem list     The ROS as trung was reviewed and changes made as indictated       Review of Systems   Constitutional: Negative  HENT: Negative  Eyes: Negative  Respiratory: Negative  Cardiovascular: Positive for leg swelling  Gastrointestinal: Negative  Endocrine: Negative  Genitourinary: Negative  Musculoskeletal: Positive for back pain  Leg pain     Skin: Negative  Allergic/Immunologic: Negative  Neurological: Positive for weakness and numbness  Hematological: Negative  Psychiatric/Behavioral: Negative            Objective:      /80 (BP Location: Right arm, Patient Position: Sitting)   Pulse 58   Temp (!) 97 4 °F (36 3 °C) (Tympanic)   Ht 5' 7" (1 702 m)   Wt 98 4 kg (217 lb)   BMI 33 99 kg/m²          Physical Exam

## 2019-09-11 NOTE — ASSESSMENT & PLAN NOTE
Asymptomatic bilateral greater than 70 percent carotid artery stenosis  There has been no significant change on recent duplex evaluation  We will continue current medical management for which he is already on appropriate antiplatelet and statin therapy

## 2019-09-11 NOTE — LETTER
September 11, 2019     Oriana Jon MD  [address] [facility] [address]    Patient: Arnold Archer   YOB: 1945   Date of Visit: 9/11/2019       Dear Dr Jonny Gallegos: Thank you for referring Andimita Reynoso to me for evaluation  Below are the relevant portions of my assessment and plan of care  Atherosclerosis of artery of extremity with intermittent claudication (HCC)    Aortoiliac and infrainguinal arterial occlusive disease with lower extremity claudication  1  On the left he is in general doing well following femoral endarterectomy and femoral -popliteal bypass with [place]-Steve graft  His current complaints are consistent with his underlying neuropathy  We did discuss the findings on recent duplex evaluation which do show some evidence of restenosis within the external iliac, common femoral and proximal graft anastomosis but the ankle-brachial index is maintained in the normal range  At this time no further intervention is necessary other than 3 month duplex follow-up  2  On the right there is known underlying severe femoral-popliteal occlusive disease  His symptoms are minimal at this time mostly because his walking is limited because of back pain and his severe underlying peripheral neuropathy  We will continue standard duplex follow-up as noted above  Carotid artery stenosis, asymptomatic, bilateral    Asymptomatic bilateral greater than 70 percent carotid artery stenosis  There has been no significant change on recent duplex evaluation  We will continue current medical management for which he is already on appropriate antiplatelet and statin therapy  If you have questions, please do not hesitate to call me  I look forward to following Angelo Plye along with you           Sincerely,        Tavo Reynolds MD        CC: No Recipients

## 2019-09-11 NOTE — ASSESSMENT & PLAN NOTE
Aortoiliac and infrainguinal arterial occlusive disease with lower extremity claudication  1  On the left he is in general doing well following femoral endarterectomy and femoral -popliteal bypass with [place]-Steve graft  His current complaints are consistent with his underlying neuropathy  We did discuss the findings on recent duplex evaluation which do show some evidence of restenosis within the external iliac, common femoral and proximal graft anastomosis but the ankle-brachial index is maintained in the normal range  At this time no further intervention is necessary other than 3 month duplex follow-up  2  On the right there is known underlying severe femoral-popliteal occlusive disease  His symptoms are minimal at this time mostly because his walking is limited because of back pain and his severe underlying peripheral neuropathy  We will continue standard duplex follow-up as noted above

## 2019-09-11 NOTE — PATIENT INSTRUCTIONS
Atherosclerosis of artery of extremity with intermittent claudication (HCC)    Aortoiliac and infrainguinal arterial occlusive disease with lower extremity claudication  1  On the left he is in general doing well following femoral endarterectomy and femoral -popliteal bypass with [place]-Steve graft  His current complaints are consistent with his underlying neuropathy  We did discuss the findings on recent duplex evaluation which do show some evidence of restenosis within the external iliac, common femoral and proximal graft anastomosis but the ankle-brachial index is maintained in the normal range  At this time no further intervention is necessary other than 3 month duplex follow-up  2  On the right there is known underlying severe femoral-popliteal occlusive disease  His symptoms are minimal at this time mostly because his walking is limited because of back pain and his severe underlying peripheral neuropathy  We will continue standard duplex follow-up as noted above  Carotid artery stenosis, asymptomatic, bilateral    Asymptomatic bilateral greater than 70 percent carotid artery stenosis  There has been no significant change on recent duplex evaluation  We will continue current medical management for which he is already on appropriate antiplatelet and statin therapy

## 2019-09-12 ENCOUNTER — OFFICE VISIT (OUTPATIENT)
Dept: PULMONOLOGY | Facility: CLINIC | Age: 74
End: 2019-09-12
Payer: MEDICARE

## 2019-09-12 VITALS
HEIGHT: 67 IN | SYSTOLIC BLOOD PRESSURE: 118 MMHG | DIASTOLIC BLOOD PRESSURE: 60 MMHG | HEART RATE: 64 BPM | WEIGHT: 217.4 LBS | BODY MASS INDEX: 34.12 KG/M2 | TEMPERATURE: 97.7 F | OXYGEN SATURATION: 96 %

## 2019-09-12 DIAGNOSIS — G47.33 OBSTRUCTIVE SLEEP APNEA OF ADULT: ICD-10-CM

## 2019-09-12 DIAGNOSIS — J98.4 RESTRICTIVE LUNG DISEASE: ICD-10-CM

## 2019-09-12 DIAGNOSIS — R06.00 EXERTIONAL DYSPNEA: Primary | ICD-10-CM

## 2019-09-12 DIAGNOSIS — R63.5 WEIGHT GAIN, ABNORMAL: ICD-10-CM

## 2019-09-12 DIAGNOSIS — J43.2 CENTRILOBULAR EMPHYSEMA (HCC): ICD-10-CM

## 2019-09-12 DIAGNOSIS — I82.5Z1 CHRONIC DEEP VEIN THROMBOSIS (DVT) OF DISTAL VEIN OF RIGHT LOWER EXTREMITY (HCC): ICD-10-CM

## 2019-09-12 PROBLEM — R06.09 EXERTIONAL DYSPNEA: Status: ACTIVE | Noted: 2018-01-24

## 2019-09-12 PROCEDURE — 99215 OFFICE O/P EST HI 40 MIN: CPT | Performed by: INTERNAL MEDICINE

## 2019-09-12 NOTE — LETTER
September 12, 2019     Lusi M Andrade MD  [address] [facility] [address]    Patient: Judson Light   YOB: 1945   Date of Visit: 9/12/2019       Dear Dr Alia Velez: Thank you for referring Lisa Llanos to me for evaluation  Below are my notes for this consultation  If you have questions, please do not hesitate to call me  I look forward to following your patient along with you  Sincerely,        Dav Lawton MD        CC: MD Yuri Serna, MD Dav Rodriguez MD  9/12/2019  8:25 PM  Sign at close encounter    Progress note - Pulmonary Medicine   Judson Light 76 y o  male MRN: [id number]       Impression & Plan:     Exertional dyspnea  · This is multifactorial  · Has had significant weight gain, difficulty with his kidneys in the setting of diuretic use, underlying COPD which is at least moderate at baseline  · Largest component of change however has been his weight gain  · Given the abnormal lower extremity Doppler with distal DVT and exertional shortness of breath, I will have him undergo ventilation perfusion scan  Creatinine will currently not permit performance of a pulmonary embolism CT scan    Restrictive lung disease  · Spirometry shows a predominantly restrictive deficit on most recent pulmonary function tests  · He does have moderate emphysema by CT imaging underlying they restrictive deficit on spirometry    Centrilobular emphysema (HCC)  · He has tried multiple inhalers in the past which have not been of benefit to him  He remains off inhaler therapy and does not wish to retry any bronchodilator agents    Weight gain, abnormal  · 25 lb weight gain in a short interval since vascular surgery which he attributes to fluid retention  · By examination he does not have a lot of peripheral fluid but has clearly had a very significant weight gain    · Further diuretic management I will defer to his cardiologist    Obstructive sleep apnea of adult  · Continue CPAP  · He reports he is well controlled currently with the current settings despite 25 lb weight gain  In the past when we attempted to increase his pressure, this resulted in intolerance  · For now will observe on the current pressure settings without escalation    Follow-up will be dependent upon the results of the ventilation perfusion scan  If notable for pulmonary embolism, will likely need to transition anticoagulants  Currently on aspirin and Plavix for vascular disease  Would likely need transition to Eliquis if renal function will allow  ______________________________________________________________________    HPI:    Esequiel Garcia presents today for follow-up of COPD and obstructive sleep apnea  He recently underwent vascular surgery with Dr Keshav Maza in  Since that time he has had some complications  He has also gained 25 lb await  Today he is somewhat angry and frustrated with his medical condition  He is more short of breath  He has fatigue  He overall just does not feel well  He is short of breath with exertion  He has not had any change in his CPAP  He feels like he is sleeping well  The weight gain has not resulted in increased snoring or increased sleep-related symptoms  It has resulted in increased shortness of breath  No wheezing  No cough  No chest pain  He has had some lower extremity swelling of the left leg  He recently underwent Doppler evaluations of the legs bilaterally for Pac doctor os can  He was noted to have a small segment of anterior tibial acute on chronic DVT    He has been most consent and however with the 25 lb weight gain since the surgery  He has not felt that he has been eating that much  He has had become much more sedentary however  He feels like he is retaining a significant amount of fluid    He did have increasing his diuretic medications but unfortunately this resulted in some increase in his kidney function  He does not take inhalers  He has tried multiple inhalers in the past and never derive benefit from this  He has not had any interest in resuming inhalers  He does have at least moderate emphysema by imaging but has never really had wheezing, chronic cough, or other symptoms to suggest that he has a contributory component of COPD    Current Medications:    Current Outpatient Medications:     acetaminophen (TYLENOL) 325 mg tablet, Take 2 tablets (650 mg total) by mouth every 6 (six) hours as needed for mild pain, Disp: 30 tablet, Rfl: 0    aspirin 81 MG tablet, Take 81 mg by mouth daily  , Disp: , Rfl:     cholecalciferol (VITAMIN D3) 1,000 units tablet, Take 1,000 Units by mouth daily  , Disp: , Rfl:     clopidogrel (PLAVIX) 75 mg tablet, TAKE ONE TABLET BY MOUTH EVERY DAY, Disp: 30 tablet, Rfl: 4    Coenzyme Q10 (COQ-10) 200 MG CAPS, Take 200 mg by mouth daily, Disp: , Rfl:     docusate sodium (COLACE) 50 mg capsule, Take by mouth daily, Disp: , Rfl:     furosemide (LASIX) 20 mg tablet, Take 2 tablets (40 mg total) by mouth 2 (two) times a day, Disp: 180 tablet, Rfl: 3    gabapentin (NEURONTIN) 300 mg capsule, Take 1 capsule (300 mg total) by mouth 3 (three) times a day, Disp: , Rfl:     glucagon (GLUCAGON EMERGENCY) 1 MG injection, 1 mg, Disp: , Rfl:     insulin glargine (LANTUS) 100 units/mL subcutaneous injection, Inject 35 Units under the skin daily, Disp: 10 mL, Rfl: 0    insulin lispro (HUMALOG) 100 units/mL injection, Inject 3 units with breakfast, 6 units at lunch, and 6 units at dinner (Patient taking differently: 4 (four) times a day Inject 3 units with breakfast, 6 units at lunch, and 6 units at dinner), Disp: 10 mL, Rfl: 1    isosorbide mononitrate (IMDUR) 30 mg 24 hr tablet, Take 1 tablet (30 mg total) by mouth daily, Disp: 90 tablet, Rfl: 1    levothyroxine 175 mcg tablet, Take 1 tablet (175 mcg total) by mouth daily in the early morning, Disp: 90 tablet, Rfl: 3    metoprolol tartrate (LOPRESSOR) 25 mg tablet, Take 1 tablet (25 mg total) by mouth every 12 (twelve) hours, Disp: , Rfl: 0    polyethylene glycol (MIRALAX) 17 g packet, Take 17 g by mouth daily, Disp: , Rfl:     rosuvastatin (CRESTOR) 20 MG tablet, Take 1 tablet (20 mg total) by mouth daily, Disp: 90 tablet, Rfl: 3    ULTICARE INSULIN SYRINGE 30G X 1/2" 1 ML MISC, Use as directed, Disp: , Rfl: 0    ULTICARE INSULIN SYRINGE 30G X 5/16" 0 3 ML MISC, Use as directed, Disp: , Rfl: 0    Lactobacillus Rhamnosus, GG, (CULTURELLE) CAPS, Take by mouth, Disp: , Rfl:     methocarbamol (ROBAXIN) 500 mg tablet, Take 0 5 tablets (250 mg total) by mouth every 8 (eight) hours as needed for muscle spasms (Patient not taking: Reported on 2019), Disp: 30 tablet, Rfl: 0    nitroglycerin (NITROSTAT) 0 4 mg SL tablet, Place 1 tablet (0 4 mg total) under the tongue every 5 (five) minutes as needed for chest pain, Disp: 25 tablet, Rfl: 3    Review of Systems:  Review of Systems    Past medical history, surgical history, and family history were reviewed and updated as appropriate    Social history updates:  Social History     Tobacco Use   Smoking Status Former Smoker    Packs/day: 4 00    Years: 48 00    Pack years: 192 00    Types: Cigarettes    Last attempt to quit:     Years since quittin 7   Smokeless Tobacco Never Used   Tobacco Comment    smoking 48 years 1 5 ppd d/c oct 2008 screening protocol as per allscripts       PhysicalExamination:  Vitals:   /60 (BP Location: Left arm, Patient Position: Sitting)   Pulse 64   Temp 97 7 °F (36 5 °C) (Tympanic)   Ht 5' 7" (1 702 m)   Wt 98 6 kg (217 lb 6 4 oz)   SpO2 96%   BMI 34 05 kg/m²    Gen:  Obese with very noticeable weight gain since last visit, comfortable on room air  HEENT: PERRL  Oropharynx is crowded with a low-lying palate  There is no erythema or exudate  Neck: Stout  I do not appreciate any JVD or lymphadenopathy   Trachea is midline  No thyromegaly  Chest: Breath sounds are distant but clear to auscultation  There are no wheezes, rales or rhonchi  Cardiac: Regular rate and rhythm  There are no murmurs, rubs or gallops  Abdomen: Obese  Soft and nontender  Extremities:  Mild left leg distal ankle and pedal edema  No significant right-sided edema however  Neurologic: No focal deficits  Skin: No appreciable rashes  Diagnostic Data:  Labs: I personally reviewed the most recent laboratory data pertinent to today's visit    Lab Results   Component Value Date    WBC 5 80 09/05/2019    HGB 14 3 09/05/2019    HCT 43 6 09/05/2019    MCV 98 09/05/2019     (L) 09/05/2019     Lab Results   Component Value Date    SODIUM 137 09/05/2019    K 4 2 09/05/2019    CO2 26 09/05/2019     09/05/2019    BUN 46 (H) 09/05/2019    CREATININE 1 76 (H) 09/05/2019    CALCIUM 9 5 09/05/2019     Last complete pulmonary function test was in February 2018  He had moderate restriction on spirometry  Moderately reduced diffusing capacity    Imaging:  I personally reviewed the images on the [facility] system pertinent to today's visit  Last chest x-ray was predominantly clear this was from September 2016 February 2016 CT scan of the chest did show moderate to severe emphysema  No other parenchymal pulmonary findings however      Lower extremity vascular evaluation evaluation performed September 10, 2019 showed Acute on chronic DVT was noted in the anterior tibial veins at the level of the ankle on the right    Tamar Lomeli MD

## 2019-09-13 ENCOUNTER — HOSPITAL ENCOUNTER (OUTPATIENT)
Dept: RADIOLOGY | Facility: HOSPITAL | Age: 74
Discharge: HOME | End: 2019-09-13
Attending: INTERNAL MEDICINE
Payer: MEDICARE

## 2019-09-13 ENCOUNTER — HOSPITAL ENCOUNTER (OUTPATIENT)
Dept: NUCLEAR MEDICINE | Facility: HOSPITAL | Age: 74
Discharge: HOME | End: 2019-09-13
Attending: INTERNAL MEDICINE
Payer: MEDICARE

## 2019-09-13 DIAGNOSIS — R06.00 EXERTIONAL DYSPNEA: ICD-10-CM

## 2019-09-13 DIAGNOSIS — I82.5Z1 CHRONIC DEEP VEIN THROMBOSIS (DVT) OF DISTAL VEIN OF RIGHT LOWER EXTREMITY (HCC): ICD-10-CM

## 2019-09-13 PROCEDURE — A9558 XE133 XENON 10MCI: HCPCS

## 2019-09-13 PROCEDURE — 78582 LUNG VENTILAT&PERFUS IMAGING: CPT

## 2019-09-13 PROCEDURE — A9540 TC99M MAA: HCPCS

## 2019-09-13 PROCEDURE — 71046 X-RAY EXAM CHEST 2 VIEWS: CPT

## 2019-09-13 NOTE — PROGRESS NOTES
Progress note - Pulmonary Medicine   Jocelin Certain 76 y o  male MRN: [id number]       Impression & Plan:     Exertional dyspnea  · This is multifactorial  · Has had significant weight gain, difficulty with his kidneys in the setting of diuretic use, underlying COPD which is at least moderate at baseline  · Largest component of change however has been his weight gain  · Given the abnormal lower extremity Doppler with distal DVT and exertional shortness of breath, I will have him undergo ventilation perfusion scan  Creatinine will currently not permit performance of a pulmonary embolism CT scan    Restrictive lung disease  · Spirometry shows a predominantly restrictive deficit on most recent pulmonary function tests  · He does have moderate emphysema by CT imaging underlying they restrictive deficit on spirometry    Centrilobular emphysema (HCC)  · He has tried multiple inhalers in the past which have not been of benefit to him  He remains off inhaler therapy and does not wish to retry any bronchodilator agents    Weight gain, abnormal  · 25 lb weight gain in a short interval since vascular surgery which he attributes to fluid retention  · By examination he does not have a lot of peripheral fluid but has clearly had a very significant weight gain  · Further diuretic management I will defer to his cardiologist    Obstructive sleep apnea of adult  · Continue CPAP  · He reports he is well controlled currently with the current settings despite 25 lb weight gain  In the past when we attempted to increase his pressure, this resulted in intolerance  · For now will observe on the current pressure settings without escalation    Follow-up will be dependent upon the results of the ventilation perfusion scan  If notable for pulmonary embolism, will likely need to transition anticoagulants  Currently on aspirin and Plavix for vascular disease    Would likely need transition to Eliquis if renal function will allow ______________________________________________________________________    HPI:    Esequiel Garcia presents today for follow-up of COPD and obstructive sleep apnea  He recently underwent vascular surgery with Dr Keshav Maza in  Since that time he has had some complications  He has also gained 25 lb await  Today he is somewhat angry and frustrated with his medical condition  He is more short of breath  He has fatigue  He overall just does not feel well  He is short of breath with exertion  He has not had any change in his CPAP  He feels like he is sleeping well  The weight gain has not resulted in increased snoring or increased sleep-related symptoms  It has resulted in increased shortness of breath  No wheezing  No cough  No chest pain  He has had some lower extremity swelling of the left leg  He recently underwent Doppler evaluations of the legs bilaterally for Pac doctor os can  He was noted to have a small segment of anterior tibial acute on chronic DVT    He has been most consent and however with the 25 lb weight gain since the surgery  He has not felt that he has been eating that much  He has had become much more sedentary however  He feels like he is retaining a significant amount of fluid  He did have increasing his diuretic medications but unfortunately this resulted in some increase in his kidney function  He does not take inhalers  He has tried multiple inhalers in the past and never derive benefit from this  He has not had any interest in resuming inhalers    He does have at least moderate emphysema by imaging but has never really had wheezing, chronic cough, or other symptoms to suggest that he has a contributory component of COPD    Current Medications:    Current Outpatient Medications:     acetaminophen (TYLENOL) 325 mg tablet, Take 2 tablets (650 mg total) by mouth every 6 (six) hours as needed for mild pain, Disp: 30 tablet, Rfl: 0    aspirin 81 MG tablet, Take 81 mg by mouth daily  , Disp: , Rfl:     cholecalciferol (VITAMIN D3) 1,000 units tablet, Take 1,000 Units by mouth daily  , Disp: , Rfl:     clopidogrel (PLAVIX) 75 mg tablet, TAKE ONE TABLET BY MOUTH EVERY DAY, Disp: 30 tablet, Rfl: 4    Coenzyme Q10 (COQ-10) 200 MG CAPS, Take 200 mg by mouth daily, Disp: , Rfl:     docusate sodium (COLACE) 50 mg capsule, Take by mouth daily, Disp: , Rfl:     furosemide (LASIX) 20 mg tablet, Take 2 tablets (40 mg total) by mouth 2 (two) times a day, Disp: 180 tablet, Rfl: 3    gabapentin (NEURONTIN) 300 mg capsule, Take 1 capsule (300 mg total) by mouth 3 (three) times a day, Disp: , Rfl:     glucagon (GLUCAGON EMERGENCY) 1 MG injection, 1 mg, Disp: , Rfl:     insulin glargine (LANTUS) 100 units/mL subcutaneous injection, Inject 35 Units under the skin daily, Disp: 10 mL, Rfl: 0    insulin lispro (HUMALOG) 100 units/mL injection, Inject 3 units with breakfast, 6 units at lunch, and 6 units at dinner (Patient taking differently: 4 (four) times a day Inject 3 units with breakfast, 6 units at lunch, and 6 units at dinner), Disp: 10 mL, Rfl: 1    isosorbide mononitrate (IMDUR) 30 mg 24 hr tablet, Take 1 tablet (30 mg total) by mouth daily, Disp: 90 tablet, Rfl: 1    levothyroxine 175 mcg tablet, Take 1 tablet (175 mcg total) by mouth daily in the early morning, Disp: 90 tablet, Rfl: 3    metoprolol tartrate (LOPRESSOR) 25 mg tablet, Take 1 tablet (25 mg total) by mouth every 12 (twelve) hours, Disp: , Rfl: 0    polyethylene glycol (MIRALAX) 17 g packet, Take 17 g by mouth daily, Disp: , Rfl:     rosuvastatin (CRESTOR) 20 MG tablet, Take 1 tablet (20 mg total) by mouth daily, Disp: 90 tablet, Rfl: 3    ULTICARE INSULIN SYRINGE 30G X 1/2" 1 ML MISC, Use as directed, Disp: , Rfl: 0    ULTICARE INSULIN SYRINGE 30G X 5/16" 0 3 ML MISC, Use as directed, Disp: , Rfl: 0    Lactobacillus Rhamnosus, GG, (CULTURELLE) CAPS, Take by mouth, Disp: , Rfl:     methocarbamol (ROBAXIN) 500 mg tablet, Take 0 5 tablets (250 mg total) by mouth every 8 (eight) hours as needed for muscle spasms (Patient not taking: Reported on 2019), Disp: 30 tablet, Rfl: 0    nitroglycerin (NITROSTAT) 0 4 mg SL tablet, Place 1 tablet (0 4 mg total) under the tongue every 5 (five) minutes as needed for chest pain, Disp: 25 tablet, Rfl: 3    Review of Systems:  Review of Systems    Past medical history, surgical history, and family history were reviewed and updated as appropriate    Social history updates:  Social History     Tobacco Use   Smoking Status Former Smoker    Packs/day: 4 00    Years: 48 00    Pack years: 192 00    Types: Cigarettes    Last attempt to quit:     Years since quittin 7   Smokeless Tobacco Never Used   Tobacco Comment    smoking 48 years 1 5 ppd d/c oct 2008 screening protocol as per allscripts       PhysicalExamination:  Vitals:   /60 (BP Location: Left arm, Patient Position: Sitting)   Pulse 64   Temp 97 7 °F (36 5 °C) (Tympanic)   Ht 5' 7" (1 702 m)   Wt 98 6 kg (217 lb 6 4 oz)   SpO2 96%   BMI 34 05 kg/m²   Gen:  Obese with very noticeable weight gain since last visit, comfortable on room air  HEENT: PERRL  Oropharynx is crowded with a low-lying palate  There is no erythema or exudate  Neck: Stout  I do not appreciate any JVD or lymphadenopathy  Trachea is midline  No thyromegaly  Chest: Breath sounds are distant but clear to auscultation  There are no wheezes, rales or rhonchi  Cardiac: Regular rate and rhythm  There are no murmurs, rubs or gallops  Abdomen: Obese  Soft and nontender  Extremities:  Mild left leg distal ankle and pedal edema  No significant right-sided edema however  Neurologic: No focal deficits  Skin: No appreciable rashes  Diagnostic Data:  Labs:   I personally reviewed the most recent laboratory data pertinent to today's visit    Lab Results   Component Value Date    WBC 5 80 2019    HGB 14 3 2019    HCT 43 6 2019 MCV 98 09/05/2019     (L) 09/05/2019     Lab Results   Component Value Date    SODIUM 137 09/05/2019    K 4 2 09/05/2019    CO2 26 09/05/2019     09/05/2019    BUN 46 (H) 09/05/2019    CREATININE 1 76 (H) 09/05/2019    CALCIUM 9 5 09/05/2019     Last complete pulmonary function test was in February 2018  He had moderate restriction on spirometry  Moderately reduced diffusing capacity    Imaging:  I personally reviewed the images on the [facility] system pertinent to today's visit  Last chest x-ray was predominantly clear this was from September 2016 February 2016 CT scan of the chest did show moderate to severe emphysema  No other parenchymal pulmonary findings however      Lower extremity vascular evaluation evaluation performed September 10, 2019 showed Acute on chronic DVT was noted in the anterior tibial veins at the level of the ankle on the right    Jenifer Ayala MD

## 2019-09-13 NOTE — ASSESSMENT & PLAN NOTE
· Continue CPAP  · He reports he is well controlled currently with the current settings despite 25 lb weight gain  In the past when we attempted to increase his pressure, this resulted in intolerance    · For now will observe on the current pressure settings without escalation

## 2019-09-13 NOTE — ASSESSMENT & PLAN NOTE
· He has tried multiple inhalers in the past which have not been of benefit to him    He remains off inhaler therapy and does not wish to retry any bronchodilator agents

## 2019-09-13 NOTE — RESULT ENCOUNTER NOTE
Low probability ventilation perfusion scan was reviewed with the patient's wife by phone  I reviewed the chest x-ray personally but the radiology report has not yet been finalized  There does not appear to be any edema, effusion, or pneumonia  There is cardiomegaly  Slightly hyperinflated consistent with COPD    This finding was also related to the patient's wife

## 2019-09-13 NOTE — ASSESSMENT & PLAN NOTE
· 25 lb weight gain in a short interval since vascular surgery which he attributes to fluid retention  · By examination he does not have a lot of peripheral fluid but has clearly had a very significant weight gain    · Further diuretic management I will defer to his cardiologist

## 2019-09-13 NOTE — ASSESSMENT & PLAN NOTE
· Spirometry shows a predominantly restrictive deficit on most recent pulmonary function tests  · He does have moderate emphysema by CT imaging underlying they restrictive deficit on spirometry

## 2019-09-17 ENCOUNTER — TRANSCRIBE ORDERS (OUTPATIENT)
Dept: LAB | Facility: CLINIC | Age: 74
End: 2019-09-17

## 2019-09-17 ENCOUNTER — LAB (OUTPATIENT)
Dept: LAB | Facility: CLINIC | Age: 74
End: 2019-09-17
Payer: MEDICARE

## 2019-09-17 DIAGNOSIS — E10.8 TYPE 1 DIABETES MELLITUS WITH COMPLICATION (HCC): Primary | ICD-10-CM

## 2019-09-17 DIAGNOSIS — I50.32 CHRONIC HEART FAILURE WITH PRESERVED EJECTION FRACTION (HCC): ICD-10-CM

## 2019-09-17 DIAGNOSIS — E10.8 TYPE 1 DIABETES MELLITUS WITH COMPLICATION (HCC): ICD-10-CM

## 2019-09-17 DIAGNOSIS — N18.30 STAGE 3 CHRONIC KIDNEY DISEASE (HCC): ICD-10-CM

## 2019-09-17 LAB
ANION GAP SERPL CALCULATED.3IONS-SCNC: 6 MMOL/L (ref 4–13)
BUN SERPL-MCNC: 34 MG/DL (ref 5–25)
CALCIUM SERPL-MCNC: 9.4 MG/DL (ref 8.3–10.1)
CHLORIDE SERPL-SCNC: 106 MMOL/L (ref 100–108)
CO2 SERPL-SCNC: 24 MMOL/L (ref 21–32)
CREAT SERPL-MCNC: 1.63 MG/DL (ref 0.6–1.3)
EST. AVERAGE GLUCOSE BLD GHB EST-MCNC: 171 MG/DL
GFR SERPL CREATININE-BSD FRML MDRD: 41 ML/MIN/1.73SQ M
GLUCOSE P FAST SERPL-MCNC: 279 MG/DL (ref 65–99)
HBA1C MFR BLD: 7.6 % (ref 4.2–6.3)
POTASSIUM SERPL-SCNC: 4.3 MMOL/L (ref 3.5–5.3)
SODIUM SERPL-SCNC: 136 MMOL/L (ref 136–145)
T4 FREE SERPL-MCNC: 0.96 NG/DL (ref 0.76–1.46)
TSH SERPL DL<=0.05 MIU/L-ACNC: 14.7 UIU/ML (ref 0.36–3.74)

## 2019-09-17 PROCEDURE — 36415 COLL VENOUS BLD VENIPUNCTURE: CPT

## 2019-09-17 PROCEDURE — 84443 ASSAY THYROID STIM HORMONE: CPT

## 2019-09-17 PROCEDURE — 83036 HEMOGLOBIN GLYCOSYLATED A1C: CPT

## 2019-09-17 PROCEDURE — 80048 BASIC METABOLIC PNL TOTAL CA: CPT

## 2019-09-17 PROCEDURE — 84439 ASSAY OF FREE THYROXINE: CPT

## 2019-09-18 ENCOUNTER — OFFICE VISIT (OUTPATIENT)
Dept: NEPHROLOGY | Facility: CLINIC | Age: 74
End: 2019-09-18
Payer: MEDICARE

## 2019-09-18 VITALS
BODY MASS INDEX: 34.26 KG/M2 | WEIGHT: 218.25 LBS | SYSTOLIC BLOOD PRESSURE: 122 MMHG | RESPIRATION RATE: 16 BRPM | DIASTOLIC BLOOD PRESSURE: 56 MMHG | HEART RATE: 52 BPM | HEIGHT: 67 IN

## 2019-09-18 DIAGNOSIS — R80.9 PROTEINURIA, UNSPECIFIED TYPE: ICD-10-CM

## 2019-09-18 DIAGNOSIS — N18.30 BENIGN HYPERTENSION WITH CHRONIC KIDNEY DISEASE, STAGE III (HCC): ICD-10-CM

## 2019-09-18 DIAGNOSIS — I12.9 BENIGN HYPERTENSION WITH CHRONIC KIDNEY DISEASE, STAGE III (HCC): ICD-10-CM

## 2019-09-18 DIAGNOSIS — N18.30 CKD (CHRONIC KIDNEY DISEASE) STAGE 3, GFR 30-59 ML/MIN (HCC): Primary | ICD-10-CM

## 2019-09-18 DIAGNOSIS — R60.0 LOCALIZED EDEMA: ICD-10-CM

## 2019-09-18 DIAGNOSIS — I70.1 RENAL ARTERY STENOSIS (HCC): ICD-10-CM

## 2019-09-18 PROCEDURE — 99214 OFFICE O/P EST MOD 30 MIN: CPT | Performed by: PHYSICIAN ASSISTANT

## 2019-09-18 RX ORDER — TORSEMIDE 20 MG/1
20 TABLET ORAL DAILY
Qty: 30 TABLET | Refills: 6 | Status: SHIPPED | OUTPATIENT
Start: 2019-09-18 | End: 2019-10-01

## 2019-09-18 NOTE — PROGRESS NOTES
OFFICE FOLLOW UP - Nephrology   Winlock Mass 76 y o  male MRN: [id number]       ASSESSMENT/PLAN:  1  CKD III: baseline creatinine 1 3-1 6  Creatinine did increase slightly to 1 7 recently but then cardiology decreased lasix from 80mg bid to 40mg daily and creatinine now improved to 1 6   Creatinine   · We discussed the importance of controlling his blood pressure and diabetes to help prevent the progression of CKD  · Recommend avoiding NSAIDs  · Last UA with +1 protein otherwise bland  · Will repeat BMP next week with diuretic adjustment  · Discussed that we may need to accept a slightly higher baseline creatinine to maintain euvolemia  2  Hypertension:   Blood pressure acceptable  3  Proteinuria: last UPC ratio 0 36g   4  Shortness of breath: following with pulmonology   Recent V/Q scan low probability for PE  According to pulmonology note chest x-ray showed no pulmonary edema or pleural effusions  5  Renal artery stenosis: repeat surveillance renal artery duplex ordered last visit by Dr Romario Dillon   6  Volume overload:  Patient does have some peripheral edema worse in the left leg along with an 18 lb weight gain and abdominal distention  · He was on a max of Lasix 80 b i d  with no change in his weight and currently was decreased to 40 mg daily by Cardiology due to increased creatinine  · Discussed with Dr Romario Dillon and we are going to start torsemide 20 mg daily  · Continue low-sodium diet  · Call with weight on Friday and will up titrate torsemide as needed  · Repeat BMP next week  7  Diabetes: A1C 7 6  Blood sugar very uncontrolled in the 300s right now he is following closely with endocrinology for medication adjustment    Follow-up repeat BMP next week and weights on Friday  Return to office in 2 weeks for weight/edema check      HPI: Winlock Mass is a 76 y o  male who is here for CKD follow up  Patient is frustrated because he has gained about 18 lb since his vascular surgery in June    He feels his abdomen is very large and distended and also feels edematous in his groin  His wife also notes that his abdomen is becoming much larger  He does have some lower extremity edema but thinks is about the same as usual   He is following a low-sodium diet  He was on Lasix up to 80 mg b i d  and did not notice any improvement in his weight  His creatinine worsened so his cardiologist recently decreased his Lasix to 40 mg daily and his weight has started trending up over the past few days at home  He does have some shortness of breath and has been following with pulmonology for this  He had a recent V/Q scan and chest x-ray  His wife is concerned because they are supposed to be traveling October 9th and she is worried they will not be able to if he still feels so volume overloaded  He denies any change in his urine output does not feel he is retaining urine  He has no recent nausea, vomiting, diarrhea he does not use any NSAIDs  His glucose has been very uncontrolled at home recently in the 300s  He has a continuous glucose monitor which recently broke and he now received a new monitor and is supposed to send his readings to endocrinology so medications can be adjusted    ROS:   A complete review of systems was done  Pertinent positives and negatives as noted in the HPI, otherwise the review of systems is negative  Allergies: Doxazosin; Iohexol; Cardura [doxazosin mesylate]; Other; and Zestril [lisinopril]    Medications:   Current Outpatient Medications:     acetaminophen (TYLENOL) 325 mg tablet, Take 2 tablets (650 mg total) by mouth every 6 (six) hours as needed for mild pain, Disp: 30 tablet, Rfl: 0    aspirin 81 MG tablet, Take 81 mg by mouth daily  , Disp: , Rfl:     cholecalciferol (VITAMIN D3) 1,000 units tablet, Take 1,000 Units by mouth daily  , Disp: , Rfl:     clopidogrel (PLAVIX) 75 mg tablet, TAKE ONE TABLET BY MOUTH EVERY DAY, Disp: 30 tablet, Rfl: 4    Coenzyme Q10 (COQ-10) 200 MG CAPS, Take 200 mg by mouth daily, Disp: , Rfl:     docusate sodium (COLACE) 50 mg capsule, Take by mouth daily, Disp: , Rfl:     gabapentin (NEURONTIN) 300 mg capsule, Take 1 capsule (300 mg total) by mouth 3 (three) times a day, Disp: , Rfl:     glucagon (GLUCAGON EMERGENCY) 1 MG injection, 1 mg, Disp: , Rfl:     insulin glargine (LANTUS) 100 units/mL subcutaneous injection, Inject 35 Units under the skin daily, Disp: 10 mL, Rfl: 0    insulin lispro (HUMALOG) 100 units/mL injection, Inject 3 units with breakfast, 6 units at lunch, and 6 units at dinner (Patient taking differently: 4 (four) times a day Inject 3 units with breakfast, 6 units at lunch, and 6 units at dinner), Disp: 10 mL, Rfl: 1    isosorbide mononitrate (IMDUR) 30 mg 24 hr tablet, Take 1 tablet (30 mg total) by mouth daily, Disp: 90 tablet, Rfl: 1    Lactobacillus Rhamnosus, GG, (CULTURELLE) CAPS, Take by mouth, Disp: , Rfl:     levothyroxine 175 mcg tablet, Take 1 tablet (175 mcg total) by mouth daily in the early morning, Disp: 90 tablet, Rfl: 3    metoprolol tartrate (LOPRESSOR) 25 mg tablet, Take 1 tablet (25 mg total) by mouth every 12 (twelve) hours, Disp: , Rfl: 0    nitroglycerin (NITROSTAT) 0 4 mg SL tablet, Place 1 tablet (0 4 mg total) under the tongue every 5 (five) minutes as needed for chest pain, Disp: 25 tablet, Rfl: 3    polyethylene glycol (MIRALAX) 17 g packet, Take 17 g by mouth daily, Disp: , Rfl:     rosuvastatin (CRESTOR) 20 MG tablet, Take 1 tablet (20 mg total) by mouth daily, Disp: 90 tablet, Rfl: 3    ULTICARE INSULIN SYRINGE 30G X 1/2" 1 ML MISC, Use as directed, Disp: , Rfl: 0    ULTICARE INSULIN SYRINGE 30G X 5/16" 0 3 ML MISC, Use as directed, Disp: , Rfl: 0    methocarbamol (ROBAXIN) 500 mg tablet, Take 0 5 tablets (250 mg total) by mouth every 8 (eight) hours as needed for muscle spasms (Patient not taking: Reported on 9/12/2019), Disp: 30 tablet, Rfl: 0    torsemide (DEMADEX) 20 mg tablet, Take 1 tablet (20 mg total) by mouth daily, Disp: 30 tablet, Rfl: 6    Past Medical History:   Diagnosis Date    Acute kidney injury ([facility] )     resolved 11/30/2015    Acute venous embolism and thrombosis of deep vessels of proximal lower extremity ([place] [address] )     resolved 04/04/2015    DARINEL (acute kidney injury) ([place] [address] ) 1/12/2016    Anesthesia     RESPIRATORY ISSUES DUE TO SLEEP APNEA    Cardiac disease     heart attack, stents x 4    [facility]est[facility] failure[facility]     Chronic cough     resolved 02/04/2016    Chronic pain disorder     intermitent claudication    COPD (chronic obstructive pulmonary disease) ([facility])     Coronary artery disease     CPAP (continuous positive airway pressure) dependence     Diabetes mellitus ([place] [address] )     Disease of thyroid gland     DVT (deep venous thrombosis) ([facility])     Heart failure ([place] [address] )     Hyperlipidemia     Hypertension     Ischemic cardiomyopathy     last assessed 09/26/2017    Myocardial infarction [facility])     MI +2 2015,2018    Pulmonary emphysema ([facility])     Pulmonary granuloma ([facility])     resolved 02/03/2017    Renal failure     Sleep apnea      Past Surgical History:   Procedure Laterality Date    BYPASS FEMORAL-POPLITEAL      initial stenosis with stent left, 7 x 100 smart stent onset 02/24/2014    CARDIAC SURGERY      cardiac stents    CATARACT EXTRACTION      COLOGUARD (HISTORICAL)  2018    CORONARY ANGIOPLASTY WITH STENT PLACEMENT      x4    IR ABDOMINAL ANGIOGRAPHY / INTERVENTION  3/14/2019    IA THROMBOENDARTECTMY FEMORAL COMMON Left 3/22/2019    Procedure: ENDARTERECTOMY ARTERIAL FEMORAL WITH PATCH ANGIOPLASTY, BALLOON ANGIOPLASTY, STENT;  Surgeon: Melissa Arroyo MD;  [facility];  Service: Vascular    IA VEIN BYPASS GRAFT,FEM-POP Left 3/22/2019    Procedure: BYPASS FEMORAL-POPLITEAL WITH COMPLETION ARTERIOGRAM;  Surgeon: Melissa Arroyo MD;  [facility];  Service: Vascular    VASCULAR SURGERY      stent placement    WOUND DEBRIDEMENT Left 4/15/2019 Procedure: DEBRIDEMENT WOUND AND DRESSING CHANGE [facility] OUT) left groin with VAC;  Surgeon: Bita Mercer DO;  [facility];  Service: Vascular     Family History   Problem Relation Age of Onset    Heart disease Father         pacer placement    Hypertension Father     Kidney disease Father     Heart failure Father     Heart attack Father     Liver disease Father     Cirrhosis Mother         due to beer consumption    Liver disease Mother     Diabetes Other       reports that he quit smoking about 11 years ago  His smoking use included cigarettes  He has a 192 00 pack-year smoking history  He has never used smokeless tobacco  He reports that he drinks about 21 0 standard drinks of alcohol per week  He reports that he does not use drugs  Physical Exam:   Vitals:    09/18/19 1004   BP: 122/56   BP Location: Left arm   Patient Position: Sitting   Cuff Size: Large   Pulse: (!) 52   Resp: 16   Weight: 99 kg (218 lb 4 oz)   Height: 5' 7" (1 702 m)     Body mass index is 34 18 kg/m²      General: no acute distress   Eyes: conjunctivae pink, anicteric sclerae  ENT: mucous membranes moist  Neck: supple, no JVD  Chest: clear to auscultation bilaterally with no wheezes, rale or rhochi  CVS: regular rate and rhythm   Abdomen:   Firm and distended  Extremities:   Bilateral pretibial edema left greater than right  Skin: no rash  Neuro: awake and alert       Lab Results:  Results for orders placed or performed in visit on 09/17/19   TSH, 3rd generation with Free T4 reflex   Result Value Ref Range    TSH 3RD GENERATON 14 700 (H) 0 358 - 3 740 uIU/mL   Hemoglobin A1C   Result Value Ref Range    Hemoglobin A1C 7 6 (H) 4 2 - 6 3 %     mg/dl   Basic metabolic panel   Result Value Ref Range    Sodium 136 136 - 145 mmol/L    Potassium 4 3 3 5 - 5 3 mmol/L    Chloride 106 100 - 108 mmol/L    CO2 24 21 - 32 mmol/L    ANION GAP 6 4 - 13 mmol/L    BUN 34 (H) 5 - 25 mg/dL    Creatinine 1 63 (H) 0 60 - 1 30 mg/dL    Glucose, Fasting 279 (H) 65 - 99 mg/dL    Calcium 9 4 8 3 - 10 1 mg/dL    eGFR 41 ml/min/1 73sq m   T4, free   Result Value Ref Range    Free T4 0 96 0 76 - 1 46 ng/dL       Results from last 7 days   Lab Units 09/17/19  0856   SODIUM mmol/L 136   POTASSIUM mmol/L 4 3   CHLORIDE mmol/L 106   CO2 mmol/L 24   BUN mg/dL 34*   CREATININE mg/dL 1 63*   CALCIUM mg/dL 9 4         Portions of the record may have been created with voice recognition software  Occasional wrong word or "sound a like" substitutions may have occurred due to the inherent limitations of voice recognition software  Read the chart carefully and recognize, using context, where substitutions have occurred  If you have any questions, please contact the dictating provider

## 2019-09-19 ENCOUNTER — OFFICE VISIT (OUTPATIENT)
Dept: FAMILY MEDICINE CLINIC | Facility: CLINIC | Age: 74
End: 2019-09-19
Payer: MEDICARE

## 2019-09-19 VITALS
BODY MASS INDEX: 33.67 KG/M2 | SYSTOLIC BLOOD PRESSURE: 120 MMHG | TEMPERATURE: 95.1 F | OXYGEN SATURATION: 99 % | WEIGHT: 214.5 LBS | RESPIRATION RATE: 16 BRPM | HEART RATE: 70 BPM | HEIGHT: 67 IN | DIASTOLIC BLOOD PRESSURE: 70 MMHG

## 2019-09-19 DIAGNOSIS — Z01.818 PREOPERATIVE EVALUATION OF A MEDICAL CONDITION TO RULE OUT SURGICAL CONTRAINDICATIONS (TAR REQUIRED): Primary | ICD-10-CM

## 2019-09-19 PROCEDURE — 99213 OFFICE O/P EST LOW 20 MIN: CPT | Performed by: FAMILY MEDICINE

## 2019-09-19 PROCEDURE — 1124F ACP DISCUSS-NO DSCNMKR DOCD: CPT | Performed by: FAMILY MEDICINE

## 2019-09-19 PROCEDURE — G0439 PPPS, SUBSEQ VISIT: HCPCS | Performed by: FAMILY MEDICINE

## 2019-09-19 NOTE — PROGRESS NOTES
Assessment and Plan:     Problem List Items Addressed This Visit     None           Preventive health issues were discussed with patient, and age appropriate screening tests were ordered as noted in patient's After Visit Summary  Personalized health advice and appropriate referrals for health education or preventive services given if needed, as noted in patient's After Visit Summary       History of Present Illness:     Patient presents for Medicare Annual Wellness visit    Patient Care Team:  Cira Fleischer, MD as PCP - General  DO Sushil Jeffrey MD as Consulting Physician (Pulmonary Disease)  Harlan Mullins MD as Consulting Physician (Pulmonology)  Tamera Zavala MD (Pain Medicine)  MD Denise Aponte (Vascular Surgery)  Nathaly Peguero MD as Consulting Physician (Vascular Surgery)  Sandy Franco DO as Consulting Physician (Nephrology)  Parul Pollard MD as Consulting Physician (Cardiology)     Problem List:     Patient Active Problem List   Diagnosis    Type 1 diabetes mellitus ([facility] )    Presence of drug coated stent in LAD coronary artery    Coronary artery disease of native artery of native heart with stable angina pectoris ([facility] )    Presence of drug coated stent in left circumflex coronary artery    Dyslipidemia    Obstructive sleep apnea of adult    Carotid artery stenosis, asymptomatic, bilateral    Exertional dyspnea    Atherosclerosis of artery of extremity with intermittent claudication ([facility] )    Restrictive lung disease    Centrilobular emphysema ([address] )    Benign hypertension with chronic kidney disease, stage III ([facility] )    CKD (chronic kidney disease) stage 3, GFR 30-59 ml/min ([facility])    Proteinuria    Renal artery stenosis ([address] )    Renal cyst, right    Vitamin D deficiency    Aortoiliac occlusive disease ([facility] )    Hypothyroidism, postablative    Low back pain with sciatica    Lumbar disc herniation    Lumbar radiculopathy    Diabetic neuropathy associated with type 1 diabetes mellitus ([place] [address] )    History of Ischemic cardiomyopathy    Chronic systolic congestive heart failure ([place] [address] )    NSTEMI (non-ST elevated myocardial infarction) ([place] [address] )    Acute blood loss anemia    Hyponatremia    Anxiety with depression    Wound drainage    Post-operative state    Chronic deep vein thrombosis (DVT) of distal vein of right lower extremity ([facility])    Weight gain, abnormal      Past Medical and Surgical History:     Past Medical History:   Diagnosis Date    Acute kidney injury ([place] [address] )     resolved 11/30/2015    Acute venous embolism and thrombosis of deep vessels of proximal lower extremity ([place] [address] )     resolved 04/04/2015    DARINEL (acute kidney injury) ([place] [address] ) 1/12/2016    Anesthesia     RESPIRATORY ISSUES DUE TO SLEEP APNEA    Cardiac disease     heart attack, stents x 4    CHF (congestive heart failure) ([facility])     Chronic cough     resolved 02/04/2016    Chronic pain disorder     intermitent claudication    COPD (chronic obstructive pulmonary disease) ([facility])     Coronary artery disease     CPAP (continuous positive airway pressure) dependence     Diabetes mellitus ([place] [address] )     Disease of thyroid gland     DVT (deep venous thrombosis) ([facility])     Heart failure ([place] [address] )     Hyperlipidemia     Hypertension     Ischemic cardiomyopathy     last assessed 09/26/2017    Myocardial infarction [facility])     MI +2 2015,2018    Pulmonary emphysema ([facility])     Pulmonary granuloma ([facility])     resolved 02/03/2017    Renal failure     Sleep apnea      Past Surgical History:   Procedure Laterality Date    BYPASS FEMORAL-POPLITEAL      initial stenosis with stent left, 7 x 100 smart stent onset 02/24/2014    CARDIAC SURGERY      cardiac stents    CATARACT EXTRACTION      COLOGUARD (HISTORICAL)  2018    CORONARY ANGIOPLASTY WITH STENT PLACEMENT      x4    IR ABDOMINAL ANGIOGRAPHY / INTERVENTION  3/14/2019    DC THROMBOENDARTECTMY FEMORAL COMMON Left 3/22/2019 Procedure: ENDARTERECTOMY ARTERIAL FEMORAL WITH PATCH ANGIOPLASTY, BALLOON ANGIOPLASTY, STENT;  Surgeon: Melissa Arroyo MD;  [facility];  Service: Vascular    UT VEIN BYPASS GRAFT,FEM-POP Left 3/22/2019    Procedure: BYPASS FEMORAL-POPLITEAL WITH COMPLETION ARTERIOGRAM;  Surgeon: Melissa Arroyo MD;  [facility];  Service: Vascular    VASCULAR SURGERY      stent placement    WOUND DEBRIDEMENT Left 4/15/2019    Procedure: DEBRIDEMENT WOUND AND DRESSING CHANGE ([facility] OUT) left groin with VAC;  Surgeon: Myra Bang DO;  [facility];  Service: Vascular      Family History:     Family History   Problem Relation Age of Onset    Heart disease Father         pacer placement    Hypertension Father     Kidney disease Father     Heart failure Father     Heart attack Father     Liver disease Father     Cirrhosis Mother         due to beer consumption    Liver disease Mother     Diabetes Other       Social History:     Social History     Socioeconomic History    Marital status: /Civil Union     Spouse name: None    Number of children: None    Years of education: None    Highest education level: None   Occupational History    Occupation: Retired    Occupation: Desk work    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   Social Needs    Financial resource strain: None    Food insecurity:     Worry: None     Inability: None    Transportation needs:     Medical: None     Non-medical: None   Tobacco Use    Smoking status: Former Smoker     Packs/day: 4 00     Years: 48 00     Pack years: 192 00     Types: Cigarettes     Last attempt to quit:      Years since quittin 7    Smokeless tobacco: Never Used    Tobacco comment: smoking 48 years 1 5 ppd d/c oct 2008 screening protocol as per allscripts   Substance and Sexual Activity    Alcohol use:  Yes     Alcohol/week: 21 0 standard drinks     Types: 21 Standard drinks or equivalent per week     Frequency: 4 or more times a week     Drinks per session: 1 or 2     Binge frequency: Never     Comment: 2-3 glasses of vodka daily (6 shots) (history 2 drinks per day as per allscripts    Drug use: No    Sexual activity: Not Currently   Lifestyle    Physical activity:     Days per week: None     Minutes per session: None    Stress: None   Relationships    Social connections:     Talks on phone: None     Gets together: None     Attends Bahai service: None     Active member of club or organization: None     Attends meetings of clubs or organizations: None     Relationship status: None    Intimate partner violence:     Fear of current or ex partner: None     Emotionally abused: None     Physically abused: None     Forced sexual activity: None   Other Topics Concern    None   Social History Narrative    None       Medications and Allergies:     Current Outpatient Medications   Medication Sig Dispense Refill    acetaminophen (TYLENOL) 325 mg tablet Take 2 tablets (650 mg total) by mouth every 6 (six) hours as needed for mild pain 30 tablet 0    aspirin 81 MG tablet Take 81 mg by mouth daily        cholecalciferol (VITAMIN D3) 1,000 units tablet Take 1,000 Units by mouth daily        clopidogrel (PLAVIX) 75 mg tablet TAKE ONE TABLET BY MOUTH EVERY DAY 30 tablet 4    Coenzyme Q10 (COQ-10) 200 MG CAPS Take 200 mg by mouth daily      docusate sodium (COLACE) 50 mg capsule Take by mouth daily      gabapentin (NEURONTIN) 300 mg capsule Take 1 capsule (300 mg total) by mouth 3 (three) times a day      glucagon (GLUCAGON EMERGENCY) 1 MG injection 1 mg      insulin glargine (LANTUS) 100 units/mL subcutaneous injection Inject 40 Units under the skin daily  10 mL 0    insulin lispro (HUMALOG) 100 units/mL injection Inject 3 units with breakfast, 6 units at lunch, and 6 units at dinner (Patient taking differently: 4 (four) times a day Inject 3 units with breakfast, 6 units at lunch, and 6 units at dinner) 10 mL 1    isosorbide mononitrate (IMDUR) 30 mg 24 hr tablet Take 1 tablet (30 mg total) by mouth daily 90 tablet 1    Lactobacillus Rhamnosus, GG, (CULTURELLE) CAPS Take by mouth      levothyroxine 175 mcg tablet Take 1 tablet (175 mcg total) by mouth daily in the early morning 90 tablet 3    metoprolol tartrate (LOPRESSOR) 25 mg tablet Take 1 tablet (25 mg total) by mouth every 12 (twelve) hours  0    nitroglycerin (NITROSTAT) 0 4 mg SL tablet Place 1 tablet (0 4 mg total) under the tongue every 5 (five) minutes as needed for chest pain 25 tablet 3    polyethylene glycol (MIRALAX) 17 g packet Take 17 g by mouth daily      rosuvastatin (CRESTOR) 20 MG tablet Take 1 tablet (20 mg total) by mouth daily 90 tablet 3    torsemide (DEMADEX) 20 mg tablet Take 1 tablet (20 mg total) by mouth daily 30 tablet 6    ULTICARE INSULIN SYRINGE 30G X 1/2" 1 ML MISC Use as directed  0    ULTICARE INSULIN SYRINGE 30G X 5/16" 0 3 ML MISC Use as directed  0    methocarbamol (ROBAXIN) 500 mg tablet Take 0 5 tablets (250 mg total) by mouth every 8 (eight) hours as needed for muscle spasms (Patient not taking: Reported on 9/12/2019) 30 tablet 0     No current facility-administered medications for this visit        Allergies   Allergen Reactions    Doxazosin      UNKNOWN    Iohexol      UNKNOWN    Cardura [Doxazosin Mesylate]      Muscle cramps    Other      Iv dye for cardiac cath  Renal failure very close to dialysis  But no dialysis    Zestril [Lisinopril]      cough      Immunizations:     Immunization History   Administered Date(s) Administered    DT (pediatric) 12/01/2009    H1N1, All Formulations 12/01/2009    INFLUENZA 10/01/2008, 10/06/2009, 09/01/2010, 11/04/2013, 08/29/2018    Influenza Split High Dose Preservative Free IM 08/28/2014, 10/03/2016, 08/29/2018    Influenza TIV (IM) 10/01/2008, 10/19/2010, 09/20/2011, 08/01/2012, 09/13/2013, 09/15/2015, 09/03/2017    Pneumococcal Conjugate 13-Valent 08/11/2015    Pneumococcal Polysaccharide PPV23 10/01/2008, 06/04/2009, 03/15/2017    Tdap 05/05/2013    Zoster 10/01/2009    influenza, trivalent, adjuvanted 09/05/2019      Health Maintenance:         Topic Date Due    Hepatitis C Screening  1945    CRC Screening: Colonoscopy  1945    CRC Screening: Cologuard  08/18/2022         Topic Date Due    HEPATITIS B VACCINES (1 of 3 - Risk 3-dose series) 04/27/1964      Medicare Health Risk Assessment:     /70 (BP Location: Right arm, Patient Position: Sitting, Cuff Size: Large)   Pulse 70   Temp (!) 95 1 °F (35 1 °C) (Tympanic)   Resp 16   Ht 5' 7" (1 702 m)   Wt 97 3 kg (214 lb 8 oz)   SpO2 99%   BMI 33 60 kg/m²      Dl Wu is here for his Subsequent Wellness visit  Health Risk Assessment:   Patient rates overall health as very good  Patient feels that their physical health rating is slightly better  Eyesight was rated as slightly worse  Hearing was rated as same  Patient feels that their emotional and mental health rating is same  Pain experienced in the last 7 days has been a lot  Patient's pain rating has been 8/10  Depression Screening:   PHQ-2 Score: 0      Fall Risk Screening: In the past year, patient has experienced: history of falling in past year    Number of falls: 2 or more    Home Safety:  Patient has trouble with stairs inside or outside of their home  Patient has working smoke alarms and has working carbon monoxide detector  Home safety hazards include: none  Nutrition:   Current diet is Diabetic, No Added Salt and Low Carb  Medications:   Patient is currently taking over-the-counter supplements  OTC medications include: see medication list  Patient is able to manage medications  Activities of Daily Living (ADLs)/Instrumental Activities of Daily Living (IADLs):   Walk and transfer into and out of bed and chair?: Yes  Dress and groom yourself?: Yes    Bathe or shower yourself?: Yes    Feed yourself?  Yes  Do your laundry/housekeeping?: Yes  Manage your money, pay your bills and track your expenses?: Yes  Make your own meals?: Yes    Do your own shopping?: Yes    Previous Hospitalizations:   Any hospitalizations or ED visits within the last 12 months?: Yes    How many hospitalizations have you had in the last year?: 3-4    Advance Care Planning:   Living will: Yes    Durable POA for healthcare:  Yes    Advanced directive: Yes      Cognitive Screening:   Provider or family/friend/caregiver concerned regarding cognition?: No    PREVENTIVE SCREENINGS      Cardiovascular Screening:    General: Screening Not Indicated, History Lipid Disorder and Screening Current      Diabetes Screening:     General: Screening Not Indicated and History Diabetes      Colorectal Cancer Screening:     General: Screening Current      Abdominal Aortic Aneurysm (AAA) Screening:    Risk factors include: age between 73-67 yo and tobacco use        Lung Cancer Screening:     General: Screening Not Indicated      Sancho Ruiz MD

## 2019-09-19 NOTE — PATIENT INSTRUCTIONS
Medicare Preventive Visit Patient Instructions  Thank you for completing your Welcome to Medicare Visit or Medicare Annual Wellness Visit today  Your next wellness visit will be due in one year (9/19/2020)  The screening/preventive services that you may require over the next 5-10 years are detailed below  Some tests may not apply to you based off risk factors and/or age  Screening tests ordered at today's visit but not completed yet may show as past due  Also, please note that scanned in results may not display below  Preventive Screenings:  Service Recommendations Previous Testing/Comments   Colorectal Cancer Screening  · Colonoscopy    · Fecal Occult Blood Test (FOBT)/Fecal Immunochemical Test (FIT)  · Fecal DNA/Cologuard Test  · Flexible Sigmoidoscopy Age: 54-65 years old   Colonoscopy: every 10 years (May be performed more frequently if at higher risk)  OR  FOBT/FIT: every 1 year  OR  Cologuard: every 3 years  OR  Sigmoidoscopy: every 5 years  Screening may be recommended earlier than age 48 if at higher risk for colorectal cancer  Also, an individualized decision between you and your healthcare provider will decide whether screening between the ages of 74-80 would be appropriate   Colonoscopy: Not on file  FOBT/FIT: Not on file  Cologuard: 08/18/2019  Sigmoidoscopy: Not on file    Screening Current     Prostate Cancer Screening Individualized decision between patient and health care provider in men between ages of 53-78   Medicare will cover every 12 months beginning on the day after your 50th birthday PSA: 0 6 ng/mL          Hepatitis C Screening Once for adults born between 1945 and 1965  More frequently in patients at high risk for Hepatitis C Hep C Antibody: Not on file       Diabetes Screening 1-2 times per year if you're at risk for diabetes or have pre-diabetes Fasting glucose: 279 mg/dL   A1C: 7 6 %    Screening Not Indicated  History Diabetes   Cholesterol Screening Once every 5 years if you don't have a lipid disorder  May order more often based on risk factors  Lipid panel: 03/05/2019    Screening Not Indicated  History Lipid Disorder      Other Preventive Screenings Covered by Medicare:  1  Abdominal Aortic Aneurysm (AAA) Screening: covered once if your at risk  You're considered to be at risk if you have a family history of AAA or a male between the age of 73-68 who smoking at least 100 cigarettes in your lifetime  2  Lung Cancer Screening: covers low dose CT scan once per year if you meet all of the following conditions: (1) Age 50-69; (2) No signs or symptoms of lung cancer; (3) Current smoker or have quit smoking within the last 15 years; (4) You have a tobacco smoking history of at least 30 pack years (packs per day x number of years you smoked); (5) You get a written order from a healthcare provider  3  Glaucoma Screening: covered annually if you're considered high risk: (1) You have diabetes OR (2) Family history of glaucoma OR (3)  aged 48 and older OR (3)  American aged 72 and older  3  Osteoporosis Screening: covered every 2 years if you meet one of the following conditions: (1) Have a vertebral abnormality; (2) On glucocorticoid therapy for more than 3 months; (3) Have primary hyperparathyroidism; (4) On osteoporosis medications and need to assess response to drug therapy  5  HIV Screening: covered annually if you're between the age of 12-76  Also covered annually if you are younger than 13 and older than 72 with risk factors for HIV infection  For pregnant patients, it is covered up to 3 times per pregnancy      Immunizations:  Immunization Recommendations   Influenza Vaccine Annual influenza vaccination during flu season is recommended for all persons aged >= 6 months who do not have contraindications   Pneumococcal Vaccine (Prevnar and Pneumovax)  * Prevnar = PCV13  * Pneumovax = PPSV23 Adults 25-60 years old: 1-3 doses may be recommended based on certain risk factors  Adults 72 years old: Prevnar (PCV13) vaccine recommended followed by Pneumovax (PPSV23) vaccine  If already received PPSV23 since turning 65, then PCV13 recommended at least one year after PPSV23 dose  Hepatitis B Vaccine 3 dose series if at intermediate or high risk (ex: diabetes, end stage renal disease, liver disease)   Tetanus (Td) Vaccine - COST NOT COVERED BY MEDICARE PART B Following completion of primary series, a booster dose should be given every 10 years to maintain immunity against tetanus  Td may also be given as tetanus wound prophylaxis  Tdap Vaccine - COST NOT COVERED BY MEDICARE PART B Recommended at least once for all adults  For pregnant patients, recommended with each pregnancy  Shingles Vaccine (Shingrix) - COST NOT COVERED BY MEDICARE PART B  2 shot series recommended in those aged 48 and above     Health Maintenance Due:      Topic Date Due    Hepatitis C Screening  1945    CRC Screening: Colonoscopy  1945    CRC Screening: Cologuard  08/18/2022     Immunizations Due:      Topic Date Due    HEPATITIS B VACCINES (1 of 3 - Risk 3-dose series) 04/27/1964     Advance Directives   What are advance directives? Advance directives are legal documents that state your wishes and plans for medical care  These plans are made ahead of time in case you lose your ability to make decisions for yourself  Advance directives can apply to any medical decision, such as the treatments you want, and if you want to donate organs  What are the types of advance directives? There are many types of advance directives, and each state has rules about how to use them  You may choose a combination of any of the following:  · Living will: This is a written record of the treatment you want  You can also choose which treatments you do not want, which to limit, and which to stop at a certain time  This includes surgery, medicine, IV fluid, and tube feedings     · Durable power of  for [facility]): This is a written record that states who you want to make healthcare choices for you when you are unable to make them for yourself  This person, called a proxy, is usually a family member or a friend  You may choose more than 1 proxy  · Do not resuscitate (DNR) order:  A DNR order is used in case your heart stops beating or you stop breathing  It is a request not to have certain forms of treatment, such as CPR  A DNR order may be included in other types of advance directives  · Medical directive: This covers the care that you want if you are in a coma, near death, or unable to make decisions for yourself  You can list the treatments you want for each condition  Treatment may include pain medicine, surgery, blood transfusions, dialysis, IV or tube feedings, and a ventilator (breathing machine)  · Values history: This document has questions about your views, beliefs, and how you feel and think about life  This information can help others choose the care that you would choose  Why are advance directives important? An advance directive helps you control your care  Although spoken wishes may be used, it is better to have your wishes written down  Spoken wishes can be misunderstood, or not followed  Treatments may be given even if you do not want them  An advance directive may make it easier for your family to make difficult choices about your care  Fall Prevention    Fall prevention  includes ways to make your home and other areas safer  It also includes ways you can move more carefully to prevent a fall  Health conditions that cause changes in your blood pressure, vision, or muscle strength and coordination may increase your risk for falls  Medicines may also increase your risk for falls if they make you dizzy, weak, or sleepy  Fall prevention tips:   · Stand or sit up slowly  · Use assistive devices as directed  · Wear shoes that fit well and have soles that       · Wear a personal alarm  · Stay active  · Manage your medical conditions  Home Safety Tips:  · Add items to prevent falls in the bathroom  · Keep paths clear  · Install bright lights in your home  · Keep items you use often on shelves within reach  · Paint or place reflective tape on the edges of your stairs  Weight Management   Why it is important to manage your weight:  Being overweight increases your risk of health conditions such as heart disease, high blood pressure, type 2 diabetes, and certain types of cancer  It can also increase your risk for osteoarthritis, sleep apnea, and other respiratory problems  Aim for a slow, steady weight loss  Even a small amount of weight loss can lower your risk of health problems  How to lose weight safely:  A safe and healthy way to lose weight is to eat fewer calories and get regular exercise  You can lose up about 1 pound a week by decreasing the number of calories you eat by 500 calories each day  Healthy meal plan for weight management:  A healthy meal plan includes a variety of foods, contains fewer calories, and helps you stay healthy  A healthy meal plan includes the following:  · Eat whole-grain foods more often  A healthy meal plan should contain fiber  Fiber is the part of grains, fruits, and vegetables that is not broken down by your body  Whole-grain foods are healthy and provide extra fiber in your diet  Some examples of whole-grain foods are whole-wheat breads and pastas, oatmeal, brown rice, and bulgur  · Eat a variety of vegetables every day  Include dark, leafy greens such as spinach, kale, danisha greens, and mustard greens  Eat yellow and orange vegetables such as carrots, sweet potatoes, and winter squash  · Eat a variety of fruits every day  Choose fresh or canned fruit (canned in its own juice or light syrup) instead of juice  Fruit juice has very little or no fiber  · Eat low-fat dairy foods    Drink fat-free (skim) milk or 1% milk  Eat fat-free yogurt and low-fat cottage cheese  Try low-fat cheeses such as mozzarella and other reduced-fat cheeses  · Choose meat and other protein foods that are low in fat  Choose beans or other legumes such as split peas or lentils  Choose fish, skinless poultry (chicken or turkey), or lean cuts of red meat (beef or pork)  Before you cook meat or poultry, cut off any visible fat  · Use less fat and oil  Try baking foods instead of frying them  Add less fat, such as margarine, sour cream, regular salad dressing and mayonnaise to foods  Eat fewer high-fat foods  Some examples of high-fat foods include french fries, doughnuts, ice cream, and cakes  · Eat fewer sweets  Limit foods and drinks that are high in sugar  This includes candy, cookies, regular soda, and sweetened drinks  Exercise:  Exercise at least 30 minutes per day on most days of the week  Some examples of exercise include walking, biking, dancing, and swimming  You can also fit in more physical activity by taking the stairs instead of the elevator or parking farther away from stores  Ask your healthcare provider about the best exercise plan for you  © Copyright [employer] 2018 Information is for End User's use only and may not be sold, redistributed or otherwise used for commercial purposes   All illustrations and images included in CareNotes® are the copyrighted property of [employer]  or [address] [place] [address] [employer]

## 2019-09-20 ENCOUNTER — TELEPHONE (OUTPATIENT)
Dept: NEPHROLOGY | Facility: CLINIC | Age: 74
End: 2019-09-20

## 2019-09-20 PROBLEM — Z01.818 PREOPERATIVE EVALUATION OF A MEDICAL CONDITION TO RULE OUT SURGICAL CONTRAINDICATIONS (TAR REQUIRED): Status: ACTIVE | Noted: 2019-09-20

## 2019-09-20 NOTE — ASSESSMENT & PLAN NOTE
Preoperative consultation  Overall the patient appears to be in stable health  He is medically cleared to have upcoming cataract surgery as described

## 2019-09-20 NOTE — PROGRESS NOTES
FAMILY PRACTICE OFFICE VISIT       NAME: Penny Artis  AGE: 76 y o  SEX: male       : 1945        MRN: [id number]    DATE: 2019  TIME: 6:35 AM    Assessment and Plan     Problem List Items Addressed This Visit        Other    Preoperative evaluation of a medical condition to rule out surgical contraindications (TAR required) - Primary     Preoperative consultation  Overall the patient appears to be in stable health  He is medically cleared to have upcoming cataract surgery as described  Chief Complaint     Chief Complaint   Patient presents with   [facility] Wellness Visit     Subsequent    Pre-op Exam     on 19 cataract surgery with dr Alee Novak        History of Present Illness     Patient scheduled to have cataract surgery on  by Rudi Bermudez  He denies any recent illness  He does have significant decreased visual acuity of his eye due to clotting of his legs  He denies any recent illness  He a previous EKG that was stable for patient      Review of Systems   Review of Systems   Constitutional: Negative  HENT: Negative  Eyes:        As per HPI   Respiratory: Negative  Cardiovascular: Negative  Gastrointestinal: Negative  Genitourinary: Negative  Musculoskeletal:        Chronic intermittent back pain from degenerative joint disease   Neurological: Negative  Psychiatric/Behavioral: Negative          Active Problem List     Patient Active Problem List   Diagnosis    Type 1 diabetes mellitus (HCC)    Presence of drug coated stent in LAD coronary artery    Coronary artery disease of native artery of native heart with stable angina pectoris (HCC)    Presence of drug coated stent in left circumflex coronary artery    Dyslipidemia    Obstructive sleep apnea of adult    Carotid artery stenosis, asymptomatic, bilateral    Exertional dyspnea    Atherosclerosis of artery of extremity with intermittent claudication (HCC)    Restrictive lung disease    Centrilobular emphysema (HCC)    Benign hypertension with chronic kidney disease, stage III (HCC)    CKD (chronic kidney disease) stage 3, GFR 30-59 ml/min (HCC)    Proteinuria    Renal artery stenosis (HCC)    Renal cyst, right    Vitamin D deficiency    Aortoiliac occlusive disease (HCC)    Hypothyroidism, postablative    Low back pain with sciatica    Lumbar disc herniation    Lumbar radiculopathy    Diabetic neuropathy associated with type 1 diabetes mellitus (HCC)    History of Ischemic cardiomyopathy    Chronic systolic congestive heart failure (HCC)    NSTEMI (non-ST elevated myocardial infarction) ([facility])    Acute blood loss anemia    Hyponatremia    Anxiety with depression    Wound drainage    Post-operative state    Chronic deep vein thrombosis (DVT) of distal vein of right lower extremity ([facility])    Weight gain, abnormal    Preoperative evaluation of a medical condition to rule out surgical contraindications (TAR required)       Past Medical History:  Past Medical History:   Diagnosis Date    Acute kidney injury ([facility] [address] )     resolved 11/30/2015    Acute venous embolism and thrombosis of deep vessels of proximal lower extremity ([facility] [address] )     resolved 04/04/2015    DARINEL (acute kidney injury) ([facility] [address] ) 1/12/2016    Anesthesia     RESPIRATORY ISSUES DUE TO SLEEP APNEA    Cardiac disease     heart attack, stents x 4    CHF (congestive heart failure) ([facility])     Chronic cough     resolved 02/04/2016    Chronic pain disorder     intermitent claudication    COPD (chronic obstructive pulmonary disease) ([facility] [address] )     Coronary artery disease     CPAP (continuous positive airway pressure) dependence     Diabetes mellitus ([address] )     Disease of thyroid gland     DVT (deep venous thrombosis) ([address] )     Heart failure ([facility] [address] )     Hyperlipidemia     Hypertension     Ischemic cardiomyopathy     last assessed 09/26/2017    Myocardial infarction [facility])     MI [phone]    Pulmonary emphysema ([facility] [address] )     Pulmonary granuloma ([facility] [address] )     resolved 02/03/2017    Renal failure     Sleep apnea        Past Surgical History:  Past Surgical History:   Procedure Laterality Date    BYPASS FEMORAL-POPLITEAL      initial stenosis with stent left, 7 x 100 smart stent onset 02/24/2014    CARDIAC SURGERY      cardiac stents    CATARACT EXTRACTION      COLOGUARD (HISTORICAL)  2018    CORONARY ANGIOPLASTY WITH STENT PLACEMENT      x4    IR ABDOMINAL ANGIOGRAPHY / INTERVENTION  3/14/2019    TX THROMBOENDARTECTMY FEMORAL COMMON Left 3/22/2019    Procedure: ENDARTERECTOMY ARTERIAL FEMORAL WITH PATCH ANGIOPLASTY, BALLOON ANGIOPLASTY, STENT;  Surgeon: Zhanna Aguilar MD;  Location: [facility];  Service: Vascular    TX VEIN BYPASS GRAFT,FEM-POP Left 3/22/2019    Procedure: BYPASS FEMORAL-POPLITEAL WITH COMPLETION ARTERIOGRAM;  Surgeon: Zhanna Aguilar MD;  [facility];  Service: Vascular    VASCULAR SURGERY      stent placement    WOUND DEBRIDEMENT Left 4/15/2019    Procedure: DEBRIDEMENT WOUND AND DRESSING CHANGE ([facility]) left groin with VAC;  Surgeon: Ronit Ochoa DO;  Location: [facility];  Service: Vascular       Family History:  Family History   Problem Relation Age of Onset    Heart disease Father         pacer placement    Hypertension Father     Kidney disease Father     Heart failure Father     Heart attack Father     Liver disease Father     Cirrhosis Mother         due to beer consumption    Liver disease Mother     Diabetes Other        Social History:  Social History     Socioeconomic History    Marital status: /Civil Union     Spouse name: Not on file    Number of children: Not on file    Years of education: Not on file    Highest education level: Not on file   Occupational History    Occupation: Retired    Occupation: Desk work    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   Social Needs    Financial resource strain: Not on file    Food insecurity:     Worry: Not on file     Inability: Not on file    Transportation needs:     Medical: Not on file     Non-medical: Not on file   Tobacco Use    Smoking status: Former Smoker     Packs/day: 4 00     Years: 48 00     Pack years: 192 00     Types: Cigarettes     Last attempt to quit:      Years since quittin 7    Smokeless tobacco: Never Used    Tobacco comment: smoking 48 years 1 5 ppd d/c oct 2008 screening protocol as per allscripts   Substance and Sexual Activity    Alcohol use: Yes     Alcohol/week: 21 0 standard drinks     Types: 21 Standard drinks or equivalent per week     Frequency: 4 or more times a week     Drinks per session: 1 or 2     Binge frequency: Never     Comment: 2-3 glasses of vodka daily (6 shots) (history 2 drinks per day as per allscripts    Drug use: No    Sexual activity: Not Currently   Lifestyle    Physical activity:     Days per week: Not on file     Minutes per session: Not on file    Stress: Not on file   Relationships    Social connections:     Talks on phone: Not on file     Gets together: Not on file     Attends Rastafarian service: Not on file     Active member of club or organization: Not on file     Attends meetings of clubs or organizations: Not on file     Relationship status: Not on file    Intimate partner violence:     Fear of current or ex partner: Not on file     Emotionally abused: Not on file     Physically abused: Not on file     Forced sexual activity: Not on file   Other Topics Concern    Not on file   Social History Narrative    Not on file       Objective     Vitals:    19 1144   BP: 120/70   Pulse: 70   Resp: 16   Temp: (!) 95 1 °F (35 1 °C)   SpO2: 99%     Wt Readings from Last 3 Encounters:   19 97 3 kg (214 lb 8 oz)   19 99 kg (218 lb 4 oz)   19 98 6 kg (217 lb 6 4 oz)       Physical Exam   Constitutional: He is oriented to person, place, and time  No distress     HENT:   Right Ear: External ear normal    Left Ear: External ear normal    Mouth/Throat: Oropharynx is clear and moist  No oropharyngeal exudate  Tympanic membranes within normal limits bilaterally   Neck:   No carotid bruits   Pulmonary/Chest: Breath sounds normal    Lungs are clear to auscultation without wheezes,rales, or rhonchi   Abdominal:   Abdomen is soft, nontender with positive bowel sounds  There is no rebound or guarding  No masses palpated   Musculoskeletal: He exhibits no edema  Lymphadenopathy:     He has no cervical adenopathy  Neurological: He is alert and oriented to person, place, and time  No cranial nerve deficit  Psychiatric: He has a normal mood and affect   His behavior is normal  Judgment and thought content normal        Pertinent Laboratory/Diagnostic Studies:  Lab Results   Component Value Date    GLUCOSE 207 (H) 03/22/2019    BUN 34 (H) 09/17/2019    CREATININE 1 63 (H) 09/17/2019    CALCIUM 9 4 09/17/2019     12/21/2015    K 4 3 09/17/2019    CO2 24 09/17/2019     09/17/2019     Lab Results   Component Value Date    ALT 28 09/05/2019    AST 28 09/05/2019    ALKPHOS 111 09/05/2019    BILITOT 0 33 10/01/2015       Lab Results   Component Value Date    WBC 5 80 09/05/2019    HGB 14 3 09/05/2019    HCT 43 6 09/05/2019    MCV 98 09/05/2019     (L) 09/05/2019       No results found for: TSH    Lab Results   Component Value Date    CHOL 129 10/01/2015     Lab Results   Component Value Date    TRIG 140 03/05/2019     Lab Results   Component Value Date    HDL 39 (L) 03/05/2019     Lab Results   Component Value Date    LDLCALC 46 03/05/2019     Lab Results   Component Value Date    HGBA1C 7 6 (H) 09/17/2019       Results for orders placed or performed in visit on 09/17/19   TSH, 3rd generation with Free T4 reflex   Result Value Ref Range    TSH 3RD GENERATON 14 700 (H) 0 358 - 3 740 uIU/mL   Hemoglobin A1C   Result Value Ref Range    Hemoglobin A1C 7 6 (H) 4 2 - 6 3 %     mg/dl   Basic metabolic panel   Result Value Ref Range Sodium 136 136 - 145 mmol/L    Potassium 4 3 3 5 - 5 3 mmol/L    Chloride 106 100 - 108 mmol/L    CO2 24 21 - 32 mmol/L    ANION GAP 6 4 - 13 mmol/L    BUN 34 (H) 5 - 25 mg/dL    Creatinine 1 63 (H) 0 60 - 1 30 mg/dL    Glucose, Fasting 279 (H) 65 - 99 mg/dL    Calcium 9 4 8 3 - 10 1 mg/dL    eGFR 41 ml/min/1 73sq m   T4, free   Result Value Ref Range    Free T4 0 96 0 76 - 1 46 ng/dL       No orders of the defined types were placed in this encounter  ALLERGIES:  Allergies   Allergen Reactions    Doxazosin      UNKNOWN    Iohexol      UNKNOWN    Cardura [Doxazosin Mesylate]      Muscle cramps    Other      Iv dye for cardiac cath  Renal failure very close to dialysis  But no dialysis    Zestril [Lisinopril]      cough       Current Medications     Current Outpatient Medications   Medication Sig Dispense Refill    acetaminophen (TYLENOL) 325 mg tablet Take 2 tablets (650 mg total) by mouth every 6 (six) hours as needed for mild pain 30 tablet 0    aspirin 81 MG tablet Take 81 mg by mouth daily        cholecalciferol (VITAMIN D3) 1,000 units tablet Take 1,000 Units by mouth daily        clopidogrel (PLAVIX) 75 mg tablet TAKE ONE TABLET BY MOUTH EVERY DAY 30 tablet 4    Coenzyme Q10 (COQ-10) 200 MG CAPS Take 200 mg by mouth daily      docusate sodium (COLACE) 50 mg capsule Take by mouth daily      gabapentin (NEURONTIN) 300 mg capsule Take 1 capsule (300 mg total) by mouth 3 (three) times a day      glucagon (GLUCAGON EMERGENCY) 1 MG injection 1 mg      insulin glargine (LANTUS) 100 units/mL subcutaneous injection Inject 40 Units under the skin daily  10 mL 0    insulin lispro (HUMALOG) 100 units/mL injection Inject 3 units with breakfast, 6 units at lunch, and 6 units at dinner (Patient taking differently: 4 (four) times a day Inject 3 units with breakfast, 6 units at lunch, and 6 units at dinner) 10 mL 1    isosorbide mononitrate (IMDUR) 30 mg 24 hr tablet Take 1 tablet (30 mg total) by mouth daily 90 tablet 1    Lactobacillus Rhamnosus, GG, (CULTURELLE) CAPS Take by mouth      levothyroxine 175 mcg tablet Take 1 tablet (175 mcg total) by mouth daily in the early morning 90 tablet 3    metoprolol tartrate (LOPRESSOR) 25 mg tablet Take 1 tablet (25 mg total) by mouth every 12 (twelve) hours  0    nitroglycerin (NITROSTAT) 0 4 mg SL tablet Place 1 tablet (0 4 mg total) under the tongue every 5 (five) minutes as needed for chest pain 25 tablet 3    polyethylene glycol (MIRALAX) 17 g packet Take 17 g by mouth daily      rosuvastatin (CRESTOR) 20 MG tablet Take 1 tablet (20 mg total) by mouth daily 90 tablet 3    torsemide (DEMADEX) 20 mg tablet Take 1 tablet (20 mg total) by mouth daily 30 tablet 6    ULTICARE INSULIN SYRINGE 30G X 1/2" 1 ML MISC Use as directed  0    ULTICARE INSULIN SYRINGE 30G X 5/16" 0 3 ML MISC Use as directed  0    methocarbamol (ROBAXIN) 500 mg tablet Take 0 5 tablets (250 mg total) by mouth every 8 (eight) hours as needed for muscle spasms (Patient not taking: Reported on 9/12/2019) 30 tablet 0     No current facility-administered medications for this visit            Health Maintenance     Health Maintenance   Topic Date Due    Hepatitis C Screening  1945   [facility] Medicare Annual Wellness Visit (AWV)  1945    SLP PLAN OF CARE  1945    CRC Screening: Colonoscopy  1945    BMI: Followup Plan  04/27/1963    HEPATITIS B VACCINES (1 of 3 - Risk 3-dose series) 04/27/1964    DM Eye Exam  09/19/2019    Diabetic Foot Exam  01/28/2020    HEMOGLOBIN A1C  03/17/2020    URINE MICROALBUMIN  09/05/2020    Fall Risk  09/19/2020    Depression Screening PHQ  09/19/2020    BMI: Adult  09/19/2020    CRC Screening: Cologuard  08/18/2022    DTaP,Tdap,and Td Vaccines (2 - Td) 05/05/2023    INFLUENZA VACCINE  Completed    Pneumococcal Vaccine: 65+ Years  Completed    Pneumococcal Vaccine: Pediatrics (0 to 5 Years) and At-Risk Patients (6 to 59 Years)  Aged [place] Immunization History   Administered Date(s) Administered    DT (pediatric) 12/01/2009    H1N1, All Formulations 12/01/2009    INFLUENZA 10/01/2008, 10/06/2009, 09/01/2010, 11/04/2013, 08/29/2018    Influenza Split High Dose Preservative Free IM 08/28/2014, 10/03/2016, 08/29/2018    Influenza TIV (IM) 10/01/2008, 10/19/2010, 09/20/2011, 08/01/2012, 09/13/2013, 09/15/2015, 09/03/2017    Pneumococcal Conjugate 13-Valent 08/11/2015    Pneumococcal Polysaccharide PPV23 10/01/2008, 06/04/2009, 03/15/2017    Tdap 05/05/2013    Zoster 10/01/2009    influenza, trivalent, adjuvanted 68/56/6417       Stacy Hinojosa MD

## 2019-09-20 NOTE — TELEPHONE ENCOUNTER
Paige Howell called in stating that he would like to speak with you regarding his weight  I offered to assist him on the phone as you are currently seeing patients however Paige Howell preferred to speak with you directly  Paige Howell can be reached at [phone]  He understood that you are seeing patients at the moment and will await your phone call

## 2019-09-20 NOTE — TELEPHONE ENCOUNTER
Spoke with patient and wife  The day of our appointment he took the lasix in the am and then torsemide 20mg in pm and the next morning he lost 3 5lb  The next day he just took torsemide 20mg once and weight increased 0 2lb  Will increase torsemide to 40mg daily and he will call back next week to check in on weights and symptoms   Has a BMP to do next week

## 2019-09-23 ENCOUNTER — TELEPHONE (OUTPATIENT)
Dept: CARDIOLOGY CLINIC | Facility: CLINIC | Age: 74
End: 2019-09-23

## 2019-09-23 NOTE — TELEPHONE ENCOUNTER
Routed OV notes to [employer] # [phone]  doctors office where pt is having Cataract surgery next Wednesday

## 2019-09-27 ENCOUNTER — LAB (OUTPATIENT)
Dept: LAB | Facility: CLINIC | Age: 74
End: 2019-09-27
Payer: MEDICARE

## 2019-09-27 DIAGNOSIS — N18.30 CKD (CHRONIC KIDNEY DISEASE) STAGE 3, GFR 30-59 ML/MIN (HCC): ICD-10-CM

## 2019-09-27 DIAGNOSIS — R94.6 NONSPECIFIC ABNORMAL RESULTS OF THYROID FUNCTION STUDY: Primary | ICD-10-CM

## 2019-09-27 LAB
ANION GAP SERPL CALCULATED.3IONS-SCNC: 7 MMOL/L (ref 4–13)
BUN SERPL-MCNC: 35 MG/DL (ref 5–25)
CALCIUM SERPL-MCNC: 9.8 MG/DL (ref 8.3–10.1)
CHLORIDE SERPL-SCNC: 109 MMOL/L (ref 100–108)
CO2 SERPL-SCNC: 28 MMOL/L (ref 21–32)
CREAT SERPL-MCNC: 1.91 MG/DL (ref 0.6–1.3)
GFR SERPL CREATININE-BSD FRML MDRD: 34 ML/MIN/1.73SQ M
GLUCOSE SERPL-MCNC: 240 MG/DL (ref 65–140)
POTASSIUM SERPL-SCNC: 4.3 MMOL/L (ref 3.5–5.3)
SODIUM SERPL-SCNC: 144 MMOL/L (ref 136–145)
TSH SERPL DL<=0.05 MIU/L-ACNC: 13.2 UIU/ML (ref 0.36–3.74)

## 2019-09-27 PROCEDURE — 80048 BASIC METABOLIC PNL TOTAL CA: CPT

## 2019-09-27 PROCEDURE — 36415 COLL VENOUS BLD VENIPUNCTURE: CPT

## 2019-09-27 PROCEDURE — 84443 ASSAY THYROID STIM HORMONE: CPT

## 2019-09-30 ENCOUNTER — OFFICE VISIT (OUTPATIENT)
Dept: FAMILY MEDICINE CLINIC | Facility: CLINIC | Age: 74
End: 2019-09-30
Payer: MEDICARE

## 2019-09-30 ENCOUNTER — HOSPITAL ENCOUNTER (OUTPATIENT)
Dept: RADIOLOGY | Facility: HOSPITAL | Age: 74
Discharge: HOME | End: 2019-09-30
Payer: MEDICARE

## 2019-09-30 VITALS
SYSTOLIC BLOOD PRESSURE: 130 MMHG | HEIGHT: 67 IN | OXYGEN SATURATION: 96 % | HEART RATE: 67 BPM | TEMPERATURE: 95.3 F | RESPIRATION RATE: 16 BRPM | DIASTOLIC BLOOD PRESSURE: 68 MMHG | BODY MASS INDEX: 33.74 KG/M2 | WEIGHT: 215 LBS

## 2019-09-30 DIAGNOSIS — E10.29 TYPE 1 DIABETES MELLITUS WITH MICROALBUMINURIA (HCC): ICD-10-CM

## 2019-09-30 DIAGNOSIS — R80.9 TYPE 1 DIABETES MELLITUS WITH MICROALBUMINURIA (HCC): ICD-10-CM

## 2019-09-30 DIAGNOSIS — M25.552 PAIN OF BOTH HIP JOINTS: ICD-10-CM

## 2019-09-30 DIAGNOSIS — G89.29 CHRONIC LOW BACK PAIN WITH BILATERAL SCIATICA, UNSPECIFIED BACK PAIN LATERALITY: ICD-10-CM

## 2019-09-30 DIAGNOSIS — I25.118 CORONARY ARTERY DISEASE OF NATIVE ARTERY OF NATIVE HEART WITH STABLE ANGINA PECTORIS (HCC): ICD-10-CM

## 2019-09-30 DIAGNOSIS — M54.41 CHRONIC LOW BACK PAIN WITH BILATERAL SCIATICA, UNSPECIFIED BACK PAIN LATERALITY: ICD-10-CM

## 2019-09-30 DIAGNOSIS — M79.605 PAIN IN BOTH LOWER EXTREMITIES: Primary | ICD-10-CM

## 2019-09-30 DIAGNOSIS — M54.42 CHRONIC LOW BACK PAIN WITH BILATERAL SCIATICA, UNSPECIFIED BACK PAIN LATERALITY: ICD-10-CM

## 2019-09-30 DIAGNOSIS — E89.0 HYPOTHYROIDISM, POSTABLATIVE: ICD-10-CM

## 2019-09-30 DIAGNOSIS — G47.33 OBSTRUCTIVE SLEEP APNEA OF ADULT: ICD-10-CM

## 2019-09-30 DIAGNOSIS — I70.219 ATHEROSCLEROSIS OF ARTERY OF EXTREMITY WITH INTERMITTENT CLAUDICATION (HCC): Chronic | ICD-10-CM

## 2019-09-30 DIAGNOSIS — M25.551 PAIN OF BOTH HIP JOINTS: ICD-10-CM

## 2019-09-30 DIAGNOSIS — I50.22 CHRONIC SYSTOLIC CONGESTIVE HEART FAILURE (HCC): ICD-10-CM

## 2019-09-30 DIAGNOSIS — M79.604 PAIN IN BOTH LOWER EXTREMITIES: Primary | ICD-10-CM

## 2019-09-30 PROCEDURE — 73522 X-RAY EXAM HIPS BI 3-4 VIEWS: CPT

## 2019-09-30 PROCEDURE — 99215 OFFICE O/P EST HI 40 MIN: CPT | Performed by: FAMILY MEDICINE

## 2019-09-30 RX ORDER — LOTEPREDNOL ETABONATE 5 MG/ML
1 SUSPENSION/ DROPS OPHTHALMIC 4 TIMES DAILY
COMMUNITY
End: 2020-04-17 | Stop reason: ALTCHOICE

## 2019-09-30 NOTE — PROGRESS NOTES
FAMILY PRACTICE OFFICE VISIT       NAME: Ángel Artis  AGE: 76 y o  SEX: male       : 1945        MRN: [id number]        Assessment and Plan     Problem List Items Addressed This Visit        Endocrine    Type 1 diabetes mellitus ([address] )       Lab Results   Component Value Date    HGBA1C 7 6 (H) 2019 ·    Patient remains under care of endocrinology, Dr Linsey Vail         Hypothyroidism, postablative     · Recent TSH is elevated, patient is experiencing symptoms of fatigue and weight gain  · Dose of levothyroxine will likely be increased from 175 mcg daily to 200 mcg daily  · Endocrinology follows            Respiratory    Obstructive sleep apnea of adult     · [facility] pulmonology, Juan Miguel Gonzalez, follows, patient uses CPAP as directed  · Known emphysema, patient has tried multiple inhalers without improvement, no Rx at present time            Cardiovascular and Mediastinum    Atherosclerosis of artery of extremity with intermittent claudication (HCC) (Chronic)     · Patient remains under ongoing care of [facility] vascular surgery, Dr Zoe Yadav  · Recent evaluation and arterial duplex in early September, status post left femoral endarterectomy and femoral -popliteal bypass with Eugene-Steve graft  Severe disease on the right, patient's symptoms I fairly minimal and primarily related to neurogenic claudication and peripheral neuropathy, vascular surgery advises ongoing observation and re-evaluation in 3 months         Coronary artery disease of native artery of native heart with stable angina pectoris (HCC)     · Continue regimen of aspirin, Plavix, Crestor, isosorbide and metoprolol  · Patient remains under care of [facility] Cardiology           Relevant Orders    CBC    Comprehensive metabolic panel    Lipid Panel with Direct LDL reflex    Chronic systolic congestive heart failure (HCC)     Wt Readings from Last 3 Encounters:   10/01/19 97 3 kg (214 lb 9 6 oz)   19 97 5 kg (215 lb)   19 97 3 kg (214 lb 8 oz)     · Patient clinically appears to be euvolemic on exam today  · Recent weight gain is likely related to hypothyroidism  · I advised him to decrease dose of furosemide from 40-20 mg daily, pending follow-up with Nephrology who follows BMP                Nervous and Auditory    Low back pain with sciatica     · Known multilevel disc disease of lumbar sacral spine and spinal stenosis  · Patient denies symptoms of sciatica  · Referral to physical therapy and pain management for re-evaluation         Relevant Orders    Ambulatory referral to Physical Therapy      Other Visit Diagnoses     Pain in both lower extremities    -  Primary    Relevant Orders    VAS lower limb venous duplex study, complete bilateral (Completed)    Pain of both hip joints        Relevant Orders    XR hips bilateral 3-4 vw w pelvis if performed (Completed)        Patient presents for follow-up of chronic medical conditions  · Recent development of fatigue likely due to uncontrolled hypothyroidism  TSH has been elevated at 13-14 range, patient will be in touch with endocrinology for adjustment of levothyroxine dose  · Patient has been under ongoing care of [facility] Cardiology and pulmonology  · Recent V/Q scan low probability of PE   · Dose of furosemide was increased by Cardiology from 20-40 mg daily resulting in spike of creatinine  Patient is scheduled for follow-up with [facility] Nephrology tomorrow  · Incidental finding of acute distal lower extremity DVT on recent arterial lower extremity duplex  · I advised patient to proceed with stat venous duplex of right lower extremity for re-evaluation  · Worsening of chronic lower back pain; known lumbar sacral disc disease/stenosis, patient complains of bilateral hip pain but denies symptoms of sciatica  Will proceed with x-ray of bilateral hips  I advised patient to schedule re-evaluation with [facility] Pain Management  Referral to physical therapy    · Worsening of peripheral neuropathy symptoms  Patient remains on gabapentin  Will hold off med changes for the time being and will follow closely  · Patient remains on regimen of aspirin, Plavix, Crestor, isosorbide and metoprolol for treatment of coronary artery disease/hyperlipidemia/ hypertension  Will repeat blood work including CBC, CMP and lipid panel prior to his next follow-up in December  · Patient is up-to-date with pneumococcal vaccinations and has received flu vaccine this fall  · Recent Cologuard is negative  BMI Counseling: Body mass index is 33 67 kg/m²  The BMI is above normal  Nutrition recommendations include reducing portion sizes, 3-5 servings of fruits/vegetables daily, consuming healthier snacks, moderation in carbohydrate intake, reducing intake of saturated fat and trans fat and reducing intake of cholesterol  I have spent 45 minutes with Patient and family today in which greater than 50% of this time was spent in counseling/coordination of care regarding Diagnostic results, Prognosis, Risks and benefits of tx options, Intructions for management, Patient and family education, Importance of tx compliance, Risk factor reductions and Impressions  Discussed with the patient and all questioned fully answered  He will call me if any problems arise  M*Modal software was used to dictate this note  It may contain errors with dictating incorrect words/spelling  Please contact provider directly with any questions  Chief Complaint     Chief Complaint   Patient presents with    Follow-up     Pt is here for f/u overall check up       History of Present Illness     Patient presents for follow-up  He is accompanied by his wife  Recent left cataract surgery at [facility] in [place] went well, patient is pleased with results    He will follow up with Dr Majano       Recent OV with  Dr Jameson at [facility] Cardiology   12 lb weight  and worsening of [place]   Dose of furosemide was increased from 20-40 mg daily  9/10/10  Arterial dupplex    possible incidental  Finding of possible DVT Right LE   Patient was subsequently seen by pulmonology, Mariea Castleman was concerned with this finding along with increased dyspnea on exertion and ordered V/Q scan  Study revealed low probability of PE  Recent blood work results discussed with patient and wife  Persistent stable elevation of TSH 13-14  Patient has been compliant with levothyroxine  He will be getting in touch with his endocrinologist later today and dose of Synthroid will be increased  Patient does admit to significant worsening of fatigued within past few months  Creatinine on September 5th was 1 76, and  went up to 1 91 on September 27th  Patient remains under care of Endocrinology for treatment of type 1 diabetes  He is under care of vascular surgery for treatment of PVD  Patient follows up with [facility]'s Cardiology due to coronary artery disease  No symptoms of chest pain  He is complaining of persistent pain in his lower back and bilateral hips, no sciatica  Patient remains on gabapentin 300 mg 3 times a day  Worsening of neuropathy symptoms lately    Prior lower back imaging:Lumbar spondylytic degenerative change  Moderately large broad-based central disc protrusion at L5-S1  Mild to moderate canal stenosis and foraminal narrowing   Mild canal stenosis and foraminal narrowing at L2-3, L3-4 and L4-5      Recent Cologuard screening in August was negative                 Review of Systems   Review of Systems   Constitutional: Positive for activity change, fatigue and unexpected weight change  HENT: Negative  Eyes: Negative  Respiratory: Positive for shortness of breath  Cardiovascular: Negative for chest pain, palpitations and leg swelling  Gastrointestinal: Negative  Endocrine: Negative  Genitourinary: Negative  Musculoskeletal: Positive for arthralgias and back pain  Skin: Negative      Allergic/Immunologic: Negative  Neurological: Negative  Worsening symptoms of peripheral/diabetic neuropathy   Hematological: Negative  Psychiatric/Behavioral: The patient is nervous/anxious          Active Problem List     Patient Active Problem List   Diagnosis    Type 1 diabetes mellitus ([facility])    Presence of drug coated stent in LAD coronary artery    Coronary artery disease of native artery of native heart with stable angina pectoris ([facility])    Presence of drug coated stent in left circumflex coronary artery    Dyslipidemia    Obstructive sleep apnea of adult    Carotid artery stenosis, asymptomatic, bilateral    Exertional dyspnea    Atherosclerosis of artery of extremity with intermittent claudication ([facility])    Restrictive lung disease    Centrilobular emphysema ([facility] )    Benign hypertension with chronic kidney disease, stage III ([facility])    CKD (chronic kidney disease) stage 3, GFR 30-59 ml/min ([facility])    Proteinuria    Renal artery stenosis ([facility])    Renal cyst, right    Vitamin D deficiency    Aortoiliac occlusive disease ([facility] )    Hypothyroidism, postablative    Low back pain with sciatica    Lumbar disc herniation    Lumbar radiculopathy    Diabetic neuropathy associated with type 1 diabetes mellitus ([facility] )    History of Ischemic cardiomyopathy    Chronic systolic congestive heart failure ([facility])    NSTEMI (non-ST elevated myocardial infarction) ([facility] )    Hyponatremia    Anxiety with depression    Wound drainage    Post-operative state    Chronic deep vein thrombosis (DVT) of distal vein of right lower extremity ([facility])    Weight gain, abnormal    Preoperative evaluation of a medical condition to rule out surgical contraindications (TAR required)       Past Medical History:  Past Medical History:   Diagnosis Date    Acute kidney injury ([facility] )     resolved 11/30/2015    Acute venous embolism and thrombosis of deep vessels of proximal lower extremity ([facility] )     resolved 04/04/2015    DARINEL (acute kidney injury) ([facility] ) 1/12/2016    Anesthesia     RESPIRATORY ISSUES DUE TO SLEEP APNEA    Cardiac disease     heart attack, stents x 4    CHF (congestive heart failure) ([facility])     Chronic cough     resolved 02/04/2016    Chronic pain disorder     intermitent claudication    COPD (chronic obstructive pulmonary disease) ([facility])     Coronary artery disease     CPAP (continuous positive airway pressure) dependence     Diabetes mellitus ([facility] )     Disease of thyroid gland     DVT (deep venous thrombosis) ([facility])     Heart failure ([facility])     Hyperlipidemia     Hypertension     Ischemic cardiomyopathy     last assessed 09/26/2017    Myocardial infarction [facility])     MI +2 2015,2018    Pulmonary emphysema ([facility])     Pulmonary granuloma ([facility])     resolved 02/03/2017    Renal failure     Sleep apnea        Past Surgical History:  Past Surgical History:   Procedure Laterality Date    BYPASS FEMORAL-POPLITEAL      initial stenosis with stent left, 7 x 100 smart stent onset 02/24/2014    CARDIAC SURGERY      cardiac stents    CATARACT EXTRACTION      COLOGUARD (HISTORICAL)  2018    CORONARY ANGIOPLASTY WITH STENT PLACEMENT      x4    IR ABDOMINAL ANGIOGRAPHY / INTERVENTION  3/14/2019    ND THROMBOENDARTECTMY FEMORAL COMMON Left 3/22/2019    Procedure: ENDARTERECTOMY ARTERIAL FEMORAL WITH PATCH ANGIOPLASTY, BALLOON ANGIOPLASTY, STENT;  Surgeon: Eddi Donnelly MD;  [facility];  Service: Vascular    ND VEIN BYPASS GRAFT,FEM-POP Left 3/22/2019    Procedure: BYPASS FEMORAL-POPLITEAL WITH COMPLETION ARTERIOGRAM;  Surgeon: dEdi Donnelly MD;  [facility];  Service: Vascular    VASCULAR SURGERY      stent placement    WOUND DEBRIDEMENT Left 4/15/2019    Procedure: DEBRIDEMENT WOUND AND DRESSING CHANGE ([address]) left groin with VAC;  Surgeon: Martínez Davidson DO;  [facility];  Service: Vascular       Family History:  Family History   Problem Relation Age of Onset    Heart disease Father         pacer placement    Hypertension Father     Kidney disease Father     Heart failure Father     Heart attack Father     Liver disease Father     Cirrhosis Mother         due to beer consumption    Liver disease Mother     Diabetes Other        Social History:  Social History     Socioeconomic History    Marital status: /Civil Union     Spouse name: Not on file    Number of children: Not on file    Years of education: Not on file    Highest education level: Not on file   Occupational History    Occupation: Retired    Occupation: Desk work    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   Social Needs    Financial resource strain: Not on file   Sarina-Lou insecurity:     Worry: Not on file     Inability: Not on file   Becual needs:     Medical: Not on file     Non-medical: Not on file   Tobacco Use    Smoking status: Former Smoker     Packs/day: 4 00     Years: 48 00     Pack years: 192 00     Types: Cigarettes     Last attempt to quit:      Years since quittin 7    Smokeless tobacco: Never Used    Tobacco comment: smoking 48 years 1 5 ppd d/c oct 2008 screening protocol as per allscripts   Substance and Sexual Activity    Alcohol use:  Yes     Alcohol/week: 21 0 standard drinks     Types: 21 Standard drinks or equivalent per week     Frequency: 4 or more times a week     Drinks per session: 1 or 2     Binge frequency: Never     Comment: 2-3 glasses of vodka daily (6 shots) (history 2 drinks per day as per allscripts    Drug use: No    Sexual activity: Not Currently   Lifestyle    Physical activity:     Days per week: Not on file     Minutes per session: Not on file    Stress: Not on file   Relationships    Social connections:     Talks on phone: Not on file     Gets together: Not on file     Attends Sabianism service: Not on file     Active member of club or organization: Not on file     Attends meetings of clubs or organizations: Not on file     Relationship status: Not on file    Intimate partner violence:     Fear of current or ex partner: Not on file     Emotionally abused: Not on file     Physically abused: Not on file     Forced sexual activity: Not on file   Other Topics Concern    Not on file   Social History Narrative    Not on file       Objective     Vitals:    09/30/19 1344   BP: 130/68   Pulse: 67   Resp: 16   Temp: (!) 95 3 °F (35 2 °C)   TempSrc: Tympanic   SpO2: 96%   Weight: 97 5 kg (215 lb)   Height: 5' 7" (1 702 m)     Wt Readings from Last 3 Encounters:   10/01/19 97 3 kg (214 lb 9 6 oz)   09/30/19 97 5 kg (215 lb)   09/19/19 97 3 kg (214 lb 8 oz)       Physical Exam   Constitutional: He is oriented to person, place, and time  He appears well-developed and well-nourished  HENT:   Head: Normocephalic and atraumatic  Eyes: Conjunctivae are normal    Neck: Neck supple  No JVD present  Carotid bruit is not present  Cardiovascular: Normal rate, regular rhythm and normal heart sounds  No murmur heard  Pulmonary/Chest: Effort normal and breath sounds normal  No respiratory distress  He has no wheezes  He has no rales  Abdominal: Bowel sounds are normal  He exhibits no distension and no abdominal bruit  Musculoskeletal: Normal range of motion  He exhibits no edema  Lower extremities:  No calf tenderness, discoloration or edema   Neurological: He is alert and oriented to person, place, and time  No cranial nerve deficit  Psychiatric: He has a normal mood and affect  His behavior is normal    Nursing note and vitals reviewed        Pertinent Laboratory/Diagnostic Studies:  Lab Results   Component Value Date    GLUCOSE 207 (H) 03/22/2019    BUN 35 (H) 09/27/2019    CREATININE 1 91 (H) 09/27/2019    CALCIUM 9 8 09/27/2019     12/21/2015    K 4 3 09/27/2019    CO2 28 09/27/2019     (H) 09/27/2019     Lab Results   Component Value Date    ALT 28 09/05/2019    AST 28 09/05/2019    ALKPHOS 111 09/05/2019    BILITOT 0 33 10/01/2015 Lab Results   Component Value Date    WBC 5 80 09/05/2019    HGB 14 3 09/05/2019    HCT 43 6 09/05/2019    MCV 98 09/05/2019     (L) 09/05/2019       No results found for: TSH    Lab Results   Component Value Date    CHOL 129 10/01/2015     Lab Results   Component Value Date    TRIG 140 03/05/2019     Lab Results   Component Value Date    HDL 39 (L) 03/05/2019     Lab Results   Component Value Date    LDLCALC 46 03/05/2019     Lab Results   Component Value Date    HGBA1C 7 6 (H) 09/17/2019       Results for orders placed or performed in visit on 68/69/47   Basic metabolic panel   Result Value Ref Range    Sodium 144 136 - 145 mmol/L    Potassium 4 3 3 5 - 5 3 mmol/L    Chloride 109 (H) 100 - 108 mmol/L    CO2 28 21 - 32 mmol/L    ANION GAP 7 4 - 13 mmol/L    BUN 35 (H) 5 - 25 mg/dL    Creatinine 1 91 (H) 0 60 - 1 30 mg/dL    Glucose 240 (H) 65 - 140 mg/dL    Calcium 9 8 8 3 - 10 1 mg/dL    eGFR 34 ml/min/1 73sq m   TSH, 3rd generation   Result Value Ref Range    TSH 3RD GENERATON 13 200 (H) 0 358 - 3 740 uIU/mL       Orders Placed This Encounter   Procedures    XR hips bilateral 3-4 vw w pelvis if performed    CBC    Comprehensive metabolic panel    Lipid Panel with Direct LDL reflex    Ambulatory referral to Physical Therapy       ALLERGIES:  Allergies   Allergen Reactions    Doxazosin      UNKNOWN    Iohexol      UNKNOWN    Cardura [Doxazosin Mesylate]      Muscle cramps    Other      Iv dye for cardiac cath  Renal failure very close to dialysis  But no dialysis    Zestril [Lisinopril]      cough       Current Medications     Current Outpatient Medications   Medication Sig Dispense Refill    acetaminophen (TYLENOL) 325 mg tablet Take 2 tablets (650 mg total) by mouth every 6 (six) hours as needed for mild pain 30 tablet 0    aspirin 81 MG tablet Take 81 mg by mouth daily        cholecalciferol (VITAMIN D3) 1,000 units tablet Take 1,000 Units by mouth daily        clopidogrel (PLAVIX) 75 mg tablet TAKE ONE TABLET BY MOUTH EVERY DAY 30 tablet 4    Coenzyme Q10 (COQ-10) 200 MG CAPS Take 200 mg by mouth daily      docusate sodium (COLACE) 50 mg capsule Take by mouth daily      gabapentin (NEURONTIN) 300 mg capsule Take 1 capsule (300 mg total) by mouth 3 (three) times a day      glucagon (GLUCAGON EMERGENCY) 1 MG injection 1 mg      insulin glargine (LANTUS) 100 units/mL subcutaneous injection Inject 40 Units under the skin daily  10 mL 0    insulin lispro (HUMALOG) 100 units/mL injection Inject 3 units with breakfast, 6 units at lunch, and 6 units at dinner (Patient taking differently: 4 (four) times a day Inject 3 units with breakfast, 6 units at lunch, and 6 units at dinner) 10 mL 1    isosorbide mononitrate (IMDUR) 30 mg 24 hr tablet Take 1 tablet (30 mg total) by mouth daily 90 tablet 1    Lactobacillus Rhamnosus, GG, (CULTURELLE) CAPS Take by mouth daily       levothyroxine 175 mcg tablet Take 1 tablet (175 mcg total) by mouth daily in the early morning (Patient taking differently: Take 200 mcg by mouth daily in the early morning ) 90 tablet 3    loteprednol etabonate (LOTEMAX) 0 5 % ophthalmic suspension 1 drop 4 (four) times a day      metoprolol tartrate (LOPRESSOR) 25 mg tablet Take 1 tablet (25 mg total) by mouth every 12 (twelve) hours  0    Nepafenac (ILEVRO) 0 3 % SUSP Apply to eye daily      nitroglycerin (NITROSTAT) 0 4 mg SL tablet Place 1 tablet (0 4 mg total) under the tongue every 5 (five) minutes as needed for chest pain 25 tablet 3    polyethylene glycol (MIRALAX) 17 g packet Take 17 g by mouth daily      rosuvastatin (CRESTOR) 20 MG tablet Take 1 tablet (20 mg total) by mouth daily 90 tablet 3    ULTICARE INSULIN SYRINGE 30G X 1/2" 1 ML MISC Use as directed  0    torsemide (DEMADEX) 20 mg tablet Take 1 tablet (20 mg total) by mouth daily 30 tablet 6     No current facility-administered medications for this visit          Medications Discontinued During This Encounter   Medication Reason   Jose Bongo INSULIN SYRINGE 30G X 5/16" 0 3 ML [address] Maintenance   Topic Date Due    Hepatitis C Screening  1945    SLP PLAN OF CARE  1945    BMI: Followup Plan  04/27/1963    HEPATITIS B VACCINES (1 of 3 - Risk 3-dose series) 04/27/1964    Falls: Plan of Care  04/27/2010    DM Eye Exam  09/19/2019    Diabetic Foot Exam  01/28/2020    HEMOGLOBIN A1C  03/17/2020    URINE MICROALBUMIN  09/05/2020    Fall Risk  09/19/2020    Medicare Annual Wellness Visit (AWV)  09/19/2020    BMI: Adult  10/01/2020    CRC Screening: Cologuard  08/18/2022    DTaP,Tdap,and Td Vaccines (2 - Td) 05/05/2023    INFLUENZA VACCINE  Completed    Pneumococcal Vaccine: 65+ Years  Completed    Pneumococcal Vaccine: Pediatrics (0 to 5 Years) and At-Risk Patients (6 to 59 Years)  Aged [employer] History   Administered Date(s) Administered    DT (pediatric) 12/01/2009    H1N1, All Formulations 12/01/2009    INFLUENZA 10/01/2008, 10/06/2009, 09/01/2010, 11/04/2013, 08/29/2018    Influenza Split High Dose Preservative Free IM 08/28/2014, 10/03/2016, 08/29/2018    Influenza TIV (IM) 10/01/2008, 10/19/2010, 09/20/2011, 08/01/2012, 09/13/2013, 09/15/2015, 09/03/2017    Pneumococcal Conjugate 13-Valent 08/11/2015    Pneumococcal Polysaccharide PPV23 10/01/2008, 06/04/2009, 03/15/2017    Tdap 05/05/2013    Zoster 10/01/2009    influenza, trivalent, adjuvanted 09/05/2019       Saira Coppola MD

## 2019-10-01 ENCOUNTER — HOSPITAL ENCOUNTER (OUTPATIENT)
Dept: NON INVASIVE DIAGNOSTICS | Facility: CLINIC | Age: 74
Discharge: HOME | End: 2019-10-01
Payer: MEDICARE

## 2019-10-01 ENCOUNTER — OFFICE VISIT (OUTPATIENT)
Dept: NEPHROLOGY | Facility: CLINIC | Age: 74
End: 2019-10-01
Payer: MEDICARE

## 2019-10-01 VITALS — HEIGHT: 67 IN | BODY MASS INDEX: 33.68 KG/M2 | WEIGHT: 214.6 LBS

## 2019-10-01 DIAGNOSIS — M79.605 PAIN IN BOTH LOWER EXTREMITIES: ICD-10-CM

## 2019-10-01 DIAGNOSIS — M79.604 PAIN IN BOTH LOWER EXTREMITIES: ICD-10-CM

## 2019-10-01 DIAGNOSIS — N18.30 CKD (CHRONIC KIDNEY DISEASE) STAGE 3, GFR 30-59 ML/MIN (HCC): Primary | ICD-10-CM

## 2019-10-01 DIAGNOSIS — E87.0 HYPERNATREMIA: ICD-10-CM

## 2019-10-01 PROCEDURE — 99213 OFFICE O/P EST LOW 20 MIN: CPT | Performed by: PHYSICIAN ASSISTANT

## 2019-10-01 PROCEDURE — 93970 EXTREMITY STUDY: CPT

## 2019-10-01 PROCEDURE — 93970 EXTREMITY STUDY: CPT | Performed by: SURGERY

## 2019-10-01 RX ORDER — TORSEMIDE 20 MG/1
20 TABLET ORAL DAILY
Qty: 30 TABLET | Refills: 6
Start: 2019-10-01 | End: 2019-10-07 | Stop reason: SDUPTHER

## 2019-10-01 NOTE — PROGRESS NOTES
OFFICE FOLLOW UP - Nephrology   Wilma Mathis 76 y o  male MRN: [id number]       ASSESSMENT/PLAN:  1  Acute kidney injury on CKD stage 3:  Baseline creatinine 1 3-1 6  Creatinine has been trending up with diuresis  Sodium is also elevated so most likely he is being over diuresed  · Agree with decreasing torsemide 20 mg daily as done by PCP  · Repeat BMP in 1 week  2  Weight gain:   12 lb in about 3 months  · Suspect that his weight gain is actually thyroid mediated instead of volume overload as on exam his left edema is chronic from vascular surgery  He feels all of his weight gain is in his abdomen but he does not have a fluid wave  · Thyroid medication adjusted by Endocrine  3  Hypernatremia:  Due to increased diuretics  Will decrease back to torsemide 20 mg daily  4  Shortness of breath: Following with pulmonology and recent chest x-ray showed no acute cardiopulmonary disease    Follow-up in December with Dr Tony Farfan:  Patient Instructions   Continue torsemide 20mg daily   Recheck blood work in 1 week       HPI: Wilma Mathis is a 76 y o  male who is here for edema/weight recheck  Patient has been complaining of weight gain and feels he has fluid in his belly  At maximum he was on Lasix 80 b i d  which was not working so last visit we changed to torsemide and he still has no change in his weight  His BMP however did worsen  His wife also notes that he recently found out his thyroid levels were very high  Patient reports having significant fatigue and also having some loose stools recently  He also has shortness of breath which is chronic  ROS:   A complete review of systems was done  Pertinent positives and negatives as noted in the HPI, otherwise the review of systems is negative  Allergies: Doxazosin; Iohexol; Cardura [doxazosin mesylate];  Other; and Zestril [lisinopril]    Medications:   Current Outpatient Medications:     acetaminophen (TYLENOL) 325 mg tablet, Take 2 tablets (650 mg total) by mouth every 6 (six) hours as needed for mild pain, Disp: 30 tablet, Rfl: 0    aspirin 81 MG tablet, Take 81 mg by mouth daily  , Disp: , Rfl:     cholecalciferol (VITAMIN D3) 1,000 units tablet, Take 1,000 Units by mouth daily  , Disp: , Rfl:     clopidogrel (PLAVIX) 75 mg tablet, TAKE ONE TABLET BY MOUTH EVERY DAY, Disp: 30 tablet, Rfl: 4    Coenzyme Q10 (COQ-10) 200 MG CAPS, Take 200 mg by mouth daily, Disp: , Rfl:     docusate sodium (COLACE) 50 mg capsule, Take by mouth daily, Disp: , Rfl:     gabapentin (NEURONTIN) 300 mg capsule, Take 1 capsule (300 mg total) by mouth 3 (three) times a day, Disp: , Rfl:     glucagon (GLUCAGON EMERGENCY) 1 MG injection, 1 mg, Disp: , Rfl:     insulin glargine (LANTUS) 100 units/mL subcutaneous injection, Inject 40 Units under the skin daily , Disp: 10 mL, Rfl: 0    insulin lispro (HUMALOG) 100 units/mL injection, Inject 3 units with breakfast, 6 units at lunch, and 6 units at dinner (Patient taking differently: 4 (four) times a day Inject 3 units with breakfast, 6 units at lunch, and 6 units at dinner), Disp: 10 mL, Rfl: 1    isosorbide mononitrate (IMDUR) 30 mg 24 hr tablet, Take 1 tablet (30 mg total) by mouth daily, Disp: 90 tablet, Rfl: 1    Lactobacillus Rhamnosus, GG, (CULTURELLE) CAPS, Take by mouth daily , Disp: , Rfl:     levothyroxine 175 mcg tablet, Take 1 tablet (175 mcg total) by mouth daily in the early morning (Patient taking differently: Take 200 mcg by mouth daily in the early morning ), Disp: 90 tablet, Rfl: 3    loteprednol etabonate (LOTEMAX) 0 5 % ophthalmic suspension, 1 drop 4 (four) times a day, Disp: , Rfl:     metoprolol tartrate (LOPRESSOR) 25 mg tablet, Take 1 tablet (25 mg total) by mouth every 12 (twelve) hours, Disp: , Rfl: 0    Nepafenac (ILEVRO) 0 3 % SUSP, Apply to eye daily, Disp: , Rfl:     nitroglycerin (NITROSTAT) 0 4 mg SL tablet, Place 1 tablet (0 4 mg total) under the tongue every 5 (five) minutes as needed for chest pain, Disp: 25 tablet, Rfl: 3    polyethylene glycol (MIRALAX) 17 g packet, Take 17 g by mouth daily, Disp: , Rfl:     rosuvastatin (CRESTOR) 20 MG tablet, Take 1 tablet (20 mg total) by mouth daily, Disp: 90 tablet, Rfl: 3    torsemide (DEMADEX) 20 mg tablet, Take 1 tablet (20 mg total) by mouth daily, Disp: 30 tablet, Rfl: 6    ULTICARE INSULIN SYRINGE 30G X 1/2" 1 ML MISC, Use as directed, Disp: , Rfl: 0    Past Medical History:   Diagnosis Date    Acute kidney injury ([facility] )     resolved 11/30/2015    Acute venous embolism and thrombosis of deep vessels of proximal lower extremity ([facility] )     resolved 04/04/2015    DARINEL (acute kidney injury) ([facility] ) 1/12/2016    Anesthesia     RESPIRATORY ISSUES DUE TO SLEEP APNEA    Cardiac disease     heart attack, stents x 4    CHF (congestive heart failure) ([facility])     Chronic cough     resolved 02/04/2016    Chronic pain disorder     intermitent claudication    COPD (chronic obstructive pulmonary disease) ([facility])     Coronary artery disease     CPAP (continuous positive airway pressure) dependence     Diabetes mellitus ([facility] )     Disease of thyroid gland     DVT (deep venous thrombosis) ([facility]     Heart failure ([facility] )     Hyperlipidemia     Hypertension     Ischemic cardiomyopathy     last assessed 09/26/2017    Myocardial infarction [facility])     MI +2 2015,2018    Pulmonary emphysema ([facility] )     Pulmonary granuloma ([facility] )     resolved 02/03/2017    Renal failure     Sleep apnea      Past Surgical History:   Procedure Laterality Date    BYPASS FEMORAL-POPLITEAL      initial stenosis with stent left, 7 x 100 smart stent onset 02/24/2014    CARDIAC SURGERY      cardiac stents    CATARACT EXTRACTION      COLOGUARD (HISTORICAL)  2018    CORONARY ANGIOPLASTY WITH STENT PLACEMENT      x4    IR ABDOMINAL ANGIOGRAPHY / INTERVENTION  3/14/2019    AL THROMBOENDARTECTMY FEMORAL COMMON Left 3/22/2019    Procedure: ENDARTERECTOMY ARTERIAL FEMORAL WITH PATCH ANGIOPLASTY, BALLOON ANGIOPLASTY, STENT;  Surgeon: Mirta Pino MD;  [facility];  Service: Vascular    TN VEIN BYPASS GRAFT,FEM-POP Left 3/22/2019    Procedure: BYPASS FEMORAL-POPLITEAL WITH COMPLETION ARTERIOGRAM;  Surgeon: Mirta Pino MD;  [facility];  Service: Vascular    VASCULAR SURGERY      stent placement    WOUND DEBRIDEMENT Left 4/15/2019    Procedure: DEBRIDEMENT WOUND AND DRESSING CHANGE Yves PEREZ) left groin with VAC;  Surgeon: Adi Jang DO;  [facility];  Service: Vascular     Family History   Problem Relation Age of Onset    Heart disease Father         pacer placement    Hypertension Father     Kidney disease Father     Heart failure Father     Heart attack Father     Liver disease Father     Cirrhosis Mother         due to beer consumption    Liver disease Mother     Diabetes Other       reports that he quit smoking about 11 years ago  His smoking use included cigarettes  He has a 192 00 pack-year smoking history  He has never used smokeless tobacco  He reports that he drinks about 21 0 standard drinks of alcohol per week  He reports that he does not use drugs  Physical Exam:   Vitals:    10/01/19 1507   Weight: 97 3 kg (214 lb 9 6 oz)   Height: 5' 7" (1 702 m)     Body mass index is 33 61 kg/m²      General: no acute distress   Eyes: conjunctivae pink, anicteric sclerae  ENT: mucous membranes moist  Neck: supple, no JVD  Chest: clear to auscultation bilaterally with no wheezes, rale or rhochi  CVS: regular rate and rhythm   Abdomen: soft, obese, no fluid wave  Extremities:  Chronic +1 left lower extremity edema, no right lower extremity  Skin: no rash  Neuro: awake and alert       Lab Results:  Results for orders placed or performed in visit on 36/21/99   Basic metabolic panel   Result Value Ref Range    Sodium 144 136 - 145 mmol/L    Potassium 4 3 3 5 - 5 3 mmol/L    Chloride 109 (H) 100 - 108 mmol/L    CO2 28 21 - 32 mmol/L    ANION GAP 7 4 - 13 mmol/L    BUN 35 (H) 5 - 25 mg/dL    Creatinine 1 91 (H) 0 60 - 1 30 mg/dL    Glucose 240 (H) 65 - 140 mg/dL    Calcium 9 8 8 3 - 10 1 mg/dL    eGFR 34 ml/min/1 73sq m   TSH, 3rd generation   Result Value Ref Range    TSH 3RD GENERATON 13 200 (H) 0 358 - 3 740 uIU/mL       Results from last 7 days   Lab Units 09/27/19  1239   SODIUM mmol/L 144   POTASSIUM mmol/L 4 3   CHLORIDE mmol/L 109*   CO2 mmol/L 28   BUN mg/dL 35*   CREATININE mg/dL 1 91*   CALCIUM mg/dL 9 8         Portions of the record may have been created with voice recognition software  Occasional wrong word or "sound a like" substitutions may have occurred due to the inherent limitations of voice recognition software  Read the chart carefully and recognize, using context, where substitutions have occurred  If you have any questions, please contact the dictating provider

## 2019-10-02 ENCOUNTER — TELEPHONE (OUTPATIENT)
Dept: FAMILY MEDICINE CLINIC | Facility: CLINIC | Age: 74
End: 2019-10-02

## 2019-10-02 NOTE — TELEPHONE ENCOUNTER
I reviewed results of venous duplex  It indicates subacute/chronic DVT identified in 1 of the paired anterior tibial veins on the right  Left lower extremity duplex is unremarkable  I forward results to [facility]'s vascular surgery and spoke with physician assistant who will review results with Dr Ren Cano and will get back to me

## 2019-10-03 NOTE — TELEPHONE ENCOUNTER
I spoke with Tracie Garcia, nurse practitioner at vascular office with Dr Meron Lares  They reviewed patient's recent vascular study and do not believe that any treatment for reported subacute/chronic DVT in right tibial vein is warranted at present time  They do not have objections with patient's forthcoming  travel to [place]  They recommending proper hydration, stretching exercises throughout the trip, compression stockings

## 2019-10-03 NOTE — TELEPHONE ENCOUNTER
I spoke with patient's wife regarding results of recent venous duplex and bilateral hip x-ray  Message from vascular surgery related to wife, she understands and agrees  X-ray of bilateral hips indicates mild arthritic change  Most likely his symptoms are triggered by chronic lumbar degenerative disc disease/stenosis  He should follow up with pain management as we have discussed

## 2019-10-05 ENCOUNTER — TELEPHONE (OUTPATIENT)
Dept: FAMILY MEDICINE CLINIC | Facility: CLINIC | Age: 74
End: 2019-10-05

## 2019-10-05 PROBLEM — D62 ACUTE BLOOD LOSS ANEMIA: Status: RESOLVED | Noted: 2019-03-25 | Resolved: 2019-10-05

## 2019-10-06 NOTE — TELEPHONE ENCOUNTER
Please mail blood work slip to patient  Please pedro that this blood work needs to be done in December prior to his next office visit with me    Thank you

## 2019-10-07 ENCOUNTER — LAB (OUTPATIENT)
Dept: LAB | Facility: CLINIC | Age: 74
End: 2019-10-07
Payer: MEDICARE

## 2019-10-07 ENCOUNTER — TELEPHONE (OUTPATIENT)
Dept: CARDIOLOGY CLINIC | Facility: CLINIC | Age: 74
End: 2019-10-07

## 2019-10-07 DIAGNOSIS — N18.30 CKD (CHRONIC KIDNEY DISEASE) STAGE 3, GFR 30-59 ML/MIN (HCC): ICD-10-CM

## 2019-10-07 LAB
ANION GAP SERPL CALCULATED.3IONS-SCNC: 7 MMOL/L (ref 4–13)
BUN SERPL-MCNC: 36 MG/DL (ref 5–25)
CALCIUM SERPL-MCNC: 9.4 MG/DL (ref 8.3–10.1)
CHLORIDE SERPL-SCNC: 108 MMOL/L (ref 100–108)
CO2 SERPL-SCNC: 27 MMOL/L (ref 21–32)
CREAT SERPL-MCNC: 1.83 MG/DL (ref 0.6–1.3)
GFR SERPL CREATININE-BSD FRML MDRD: 36 ML/MIN/1.73SQ M
GLUCOSE P FAST SERPL-MCNC: 203 MG/DL (ref 65–99)
POTASSIUM SERPL-SCNC: 4.4 MMOL/L (ref 3.5–5.3)
SODIUM SERPL-SCNC: 142 MMOL/L (ref 136–145)

## 2019-10-07 PROCEDURE — 36415 COLL VENOUS BLD VENIPUNCTURE: CPT

## 2019-10-07 PROCEDURE — 80048 BASIC METABOLIC PNL TOTAL CA: CPT

## 2019-10-07 RX ORDER — TORSEMIDE 20 MG/1
20 TABLET ORAL DAILY
Qty: 30 TABLET | Refills: 5 | Status: SHIPPED | OUTPATIENT
Start: 2019-10-07 | End: 2019-11-05 | Stop reason: SDUPTHER

## 2019-10-07 NOTE — TELEPHONE ENCOUNTER
Patient was discontinued from Furosemide 40 mg Daily and placed on torsemide 20 mg daily by nephrology on 9/18/2019  He is requesting a refill from us, is it ok to refill for patient?

## 2019-10-10 ENCOUNTER — TELEPHONE (OUTPATIENT)
Dept: NEPHROLOGY | Facility: CLINIC | Age: 74
End: 2019-10-10

## 2019-10-10 DIAGNOSIS — N17.9 AKI (ACUTE KIDNEY INJURY) (HCC): Primary | ICD-10-CM

## 2019-10-10 NOTE — TELEPHONE ENCOUNTER
----- Message from Krissy Nolan PA-C sent at 10/9/2019  2:03 PM EDT -----  Please let patient know his creatinine is improving a little down to 1 8   Continue current diuretic dosing

## 2019-10-10 NOTE — TELEPHONE ENCOUNTER
Chris's wife returned Joellen's call  She is aware of his lab results and understands that he should continue on his current diuretic dosing  She would like to speak with you personally in regards to the next steps  Please call Mitchell Leonardo at [phone] at your earliest convenience  Thanks!

## 2019-10-10 NOTE — TELEPHONE ENCOUNTER
Returned wife's call  They are currently in [place] so were wondering about repeat labs  I placed order for BMP in 3 weeks   Please mail to [address] [place] [address], [facility] [id number]  Wife also noted that previously he was not on any diuretic and wonders if he should decrease to everyother day since we think his weight gain is body mass instead of fluid  We discussed that they can decrease to qod but if he develops edema or weight increases 3lb in a day or 5lb in a week would go back to daily  Wife agrees

## 2019-10-25 DIAGNOSIS — E10.43 DIABETIC AUTONOMIC NEUROPATHY ASSOCIATED WITH TYPE 1 DIABETES MELLITUS (HCC): ICD-10-CM

## 2019-10-25 DIAGNOSIS — E10.8 TYPE 1 DIABETES MELLITUS WITH COMPLICATION (HCC): ICD-10-CM

## 2019-10-25 NOTE — TELEPHONE ENCOUNTER
Spoke with pts wife, she does not need any further instructions, has figured this out with the pharmacist

## 2019-10-25 NOTE — TELEPHONE ENCOUNTER
Mrs Kalyn Youngblood is calling because her  is out of his Gabapentin medication & they are still in [place]  She states that the order that the pharmacy sent over to us is incorrect  He should be taking 1 100 mg tab 3 times a day  Wants us to confirm this with Dr Munira Aponte & have the correct refill request sent to the pharmacy in [place]  Would like for someone to call her back today

## 2019-10-25 NOTE — TELEPHONE ENCOUNTER
According to our med list and my last interview with patient he was taking gabapentin 300 mg capsules as 1 cap TID  Please double check with patient what his actual dose of gabapentin, as it has changed many times  I will be happy to send correct Rx  to his pharmacy in [place] once dose is clarified    Thank you

## 2019-10-26 RX ORDER — GABAPENTIN 100 MG/1
CAPSULE ORAL
Qty: 180 CAPSULE | Refills: 2 | OUTPATIENT
Start: 2019-10-26

## 2019-11-04 DIAGNOSIS — I12.9 BENIGN HYPERTENSION WITH CHRONIC KIDNEY DISEASE, STAGE III (HCC): Primary | ICD-10-CM

## 2019-11-04 DIAGNOSIS — N18.30 BENIGN HYPERTENSION WITH CHRONIC KIDNEY DISEASE, STAGE III (HCC): Primary | ICD-10-CM

## 2019-11-05 DIAGNOSIS — N18.30 CKD (CHRONIC KIDNEY DISEASE) STAGE 3, GFR 30-59 ML/MIN (HCC): ICD-10-CM

## 2019-11-05 RX ORDER — TORSEMIDE 20 MG/1
20 TABLET ORAL DAILY
Qty: 90 TABLET | Refills: 1 | Status: SHIPPED | OUTPATIENT
Start: 2019-11-05 | End: 2019-12-17

## 2019-12-01 NOTE — PROGRESS NOTES
Cardiology Follow Up    Ralf Vidales  1945  [id number]  [facility] KENDRA Ju[facility] [id number] [facility]  [phone]    1  Essential (primary) hypertension     2  Pure hypercholesterolemia     3  Coronary artery disease involving native coronary artery of native heart without angina pectoris     4  Chronic systolic congestive heart failure (HCC)         Interval History:  Patient is here for a follow-up visit  He was in the hospital in March 2019  for peripheral vascular disease with left femoral endarterectomy with bovine patch and left femoral to above the knee bypass with left external iliac artery stent by Dr Reyes Zamora March 22, 2019  He did have intermittent angina during that postoperative period  Thereafter he required debridement in reference to a left groin infection by Dr Sesar Nolan 4/15/19  Patient has known history of coronary artery disease with remote cardiac catheterization and intervention performed at [facility] with placement of four stents at that time in 2015  He had a second cardiac catheterization October 2018 with re-stenosis of the LAD and stent placement in [address]  A 50% left circumflex lesion was also noted  Patient had an echocardiogram performed March 23, 2019 which demonstrated a mild reduction in LV systolic function in the setting of a mid apical anterior inferior and inferolateral wall motion abnormality  The resting left ventricular ejection fraction was 45-50%  There was no significant valvular heart disease  He has chronic renal insufficiency  He is followed by Nephrology service and was last seen October 2019  He is on torsemide  Patient had a lipid profile done December 2, 2019  The total cholesterol was 143 with an HDL of 34 and a direct LDL of 44  Triglyceride level was 324   Creatinine was 1 74 compared to 1 83 obtained October 7, 2019   His vital signs are stable today  Apparently is due to have thyroid function testing done in requests that I obtain this  In general he has been reasonably well  He has had some fatigue but he has had no chest pain      Patient Active Problem List   Diagnosis    Type 1 diabetes mellitus (HCC)    Presence of drug coated stent in LAD coronary artery    Coronary artery disease of native artery of native heart with stable angina pectoris (HCC)    Presence of drug coated stent in left circumflex coronary artery    Dyslipidemia    Obstructive sleep apnea of adult    Carotid artery stenosis, asymptomatic, bilateral    Exertional dyspnea    Atherosclerosis of artery of extremity with intermittent claudication ([facility])    Restrictive lung disease    Centrilobular emphysema (HCC)    Benign hypertension with chronic kidney disease, stage III ([facility])    CKD (chronic kidney disease) stage 3, GFR 30-59 ml/min ([facility])    Proteinuria    Renal artery stenosis ([facility])    Renal cyst, right    Vitamin D deficiency    Aortoiliac occlusive disease ([address] )    Hypothyroidism, postablative    Low back pain with sciatica    Lumbar disc herniation    Lumbar radiculopathy    Diabetic neuropathy associated with type 1 diabetes mellitus ([facility])    History of Ischemic cardiomyopathy    Chronic systolic congestive heart failure ([facility])    NSTEMI (non-ST elevated myocardial infarction) ([address] )    Hyponatremia    Anxiety with depression    Wound drainage    Post-operative state    Chronic deep vein thrombosis (DVT) of distal vein of right lower extremity ([facility])    Weight gain, abnormal    Preoperative evaluation of a medical condition to rule out surgical contraindications (TAR required)     Past Medical History:   Diagnosis Date    Acute kidney injury ([address] )     resolved 11/30/2015    Acute venous embolism and thrombosis of deep vessels of proximal lower extremity ([address] )     resolved 04/04/2015    [facility]INEL [facility]) [facility] 2016    Anesthesia     RESPIRATORY ISSUES DUE TO SLEEP APNEA    Cardiac disease     heart attack, stents x 4    CHF (congestive heart failure) ([facility]     Chronic cough     resolved 2016    Chronic pain disorder     intermitent claudication    COPD (chronic obstructive pulmonary disease) ([facility])     Coronary artery disease     CPAP (continuous positive airway pressure) dependence     Diabetes mellitus [facility]     Disease of thyroid gland     DVT (deep venous thrombosis) ([facility]     Heart failure ([facility])     Hyperlipidemia     Hypertension     Ischemic cardiomyopathy     last assessed 2017    Myocardial infarction [facility])     MI +2 ,    Pulmonary emphysema (HCC)     Pulmonary granuloma ([facility])     resolved 2017    Renal failure     Sleep apnea      Social History     Socioeconomic History    Marital status: /Civil Union     Spouse name: Not on file    Number of children: Not on file    Years of education: Not on file    Highest education level: Not on file   Occupational History    Occupation: Retired    Occupation: Desk work    Occupation: [employer]   Social Needs    Financial resource strain: Not on file    Food insecurity:     Worry: Not on file     Inability: Not on file   [employer] needs:     Medical: Not on file     Non-medical: Not on file   Tobacco Use    Smoking status: Former Smoker     Packs/day: 4 00     Years: 48 00     Pack years: 192 00     Types: Cigarettes     Last attempt to quit:      Years since quittin 9    Smokeless tobacco: Never Used    Tobacco comment: smoking 48 years 1 5 ppd d/c oct 2008 screening protocol as per allscripts   Substance and Sexual Activity    Alcohol use:  Yes     Alcohol/week: 21 0 standard drinks     Types: 21 Standard drinks or equivalent per week     Frequency: 4 or more times a week     Drinks per session: 1 or 2     Binge frequency: Never     Comment: 2-3 glasses of vodka daily (6 shots) (history 2 drinks per day as per allscripts    Drug use: No    Sexual activity: Not Currently   Lifestyle    Physical activity:     Days per week: Not on file     Minutes per session: Not on file    Stress: Not on file   Relationships    Social connections:     Talks on phone: Not on file     Gets together: Not on file     Attends [employer] service: Not on file     Active member of club or organization: Not on file     Attends meetings of clubs or organizations: Not on file     Relationship status: Not on file    Intimate partner violence:     Fear of current or ex partner: Not on file     Emotionally abused: Not on file     Physically abused: Not on file     Forced sexual activity: Not on file   Other Topics Concern    Not on file   Social History Narrative    Not on file      Family History   Problem Relation Age of Onset    Heart disease Father         pacer placement    Hypertension Father     Kidney disease Father     Heart failure Father     Heart attack Father     Liver disease Father     Cirrhosis Mother         due to beer consumption    Liver disease Mother     Diabetes Other      Past Surgical History:   Procedure Laterality Date    BYPASS FEMORAL-POPLITEAL      initial stenosis with stent left, 7 x 100 smart stent onset 02/24/2014    CARDIAC SURGERY      cardiac stents    CATARACT EXTRACTION      COLOGUARD (HISTORICAL)  2018    CORONARY ANGIOPLASTY WITH STENT PLACEMENT      x4    IR ABDOMINAL ANGIOGRAPHY / INTERVENTION  3/14/2019    NJ THROMBOENDARTECTMY FEMORAL COMMON Left 3/22/2019    Procedure: ENDARTERECTOMY ARTERIAL FEMORAL WITH PATCH ANGIOPLASTY, BALLOON ANGIOPLASTY, STENT;  Surgeon: Lennox Rankin, MD;  [facility];  Service: Vascular    NJ VEIN BYPASS GRAFT,FEM-POP Left 3/22/2019    Procedure: BYPASS FEMORAL-POPLITEAL WITH COMPLETION ARTERIOGRAM;  Surgeon: Lennox Rankin, MD;  [facility];  Service: Vascular    VASCULAR SURGERY      stent placement    WOUND DEBRIDEMENT Left 4/15/2019    Procedure: DEBRIDEMENT WOUND AND DRESSING CHANGE [facility]) left groin with VAC;  Surgeon: Erendira Bowling DO;  Location: [facility];  Service: Vascular       Current Outpatient Medications:     acetaminophen (TYLENOL) 325 mg tablet, Take 2 tablets (650 mg total) by mouth every 6 (six) hours as needed for mild pain, Disp: 30 tablet, Rfl: 0    aspirin 81 MG tablet, Take 81 mg by mouth daily  , Disp: , Rfl:     cholecalciferol (VITAMIN D3) 1,000 units tablet, Take 1,000 Units by mouth daily  , Disp: , Rfl:     clopidogrel (PLAVIX) 75 mg tablet, TAKE ONE TABLET BY MOUTH EVERY DAY, Disp: 30 tablet, Rfl: 4    Coenzyme Q10 (COQ-10) 200 MG CAPS, Take 200 mg by mouth daily, Disp: , Rfl:     docusate sodium (COLACE) 50 mg capsule, Take by mouth 2 (two) times a day as needed , Disp: , Rfl:     gabapentin (NEURONTIN) 300 mg capsule, Take 300 mg by mouth 2 (two) times a day , Disp: , Rfl:     glucagon (GLUCAGON EMERGENCY) 1 MG injection, 1 mg, Disp: , Rfl:     insulin glargine (LANTUS) 100 units/mL subcutaneous injection, Inject 40 Units under the skin daily in the early morning , Disp: 10 mL, Rfl: 0    insulin lispro (HUMALOG) 100 units/mL injection, Inject 3 units with breakfast, 6 units at lunch, and 6 units at dinner (Patient taking differently: 4 (four) times a day Inject 3 units with breakfast, 6 units at lunch, and 6 units at dinner Carbs counting), Disp: 10 mL, Rfl: 1    isosorbide mononitrate (IMDUR) 30 mg 24 hr tablet, TAKE ONE TABLET BY MOUTH EVERY DAY, Disp: 90 tablet, Rfl: 1    Lactobacillus Rhamnosus, GG, (CULTURELLE) CAPS, Take 1 capsule by mouth daily , Disp: , Rfl:     levothyroxine 200 mcg tablet, Take 200 mcg by mouth daily, Disp: , Rfl: 0    metoprolol tartrate (LOPRESSOR) 25 mg tablet, Take 1 tablet (25 mg total) by mouth every 12 (twelve) hours, Disp: 180 tablet, Rfl: 1    nitroglycerin (NITROSTAT) 0 4 mg SL tablet, Place 1 tablet (0 4 mg total) under the tongue every 5 (five) minutes as needed for chest pain, Disp: 25 tablet, Rfl: 3    polyethylene glycol (MIRALAX) 17 g packet, Take 17 g by mouth daily, Disp: , Rfl:     prednisoLONE acetate (PRED FORTE) 1 % ophthalmic suspension, INSTILL 1 DROP INTO THE LEFT EYE 3 TIMES DAILY, Disp: , Rfl: 1    rosuvastatin (CRESTOR) 20 MG tablet, Take 1 tablet (20 mg total) by mouth daily, Disp: 90 tablet, Rfl: 3    torsemide (DEMADEX) 20 mg tablet, Take 1 tablet (20 mg total) by mouth daily, Disp: 90 tablet, Rfl: 1    loteprednol etabonate (LOTEMAX) 0 5 % ophthalmic suspension, 1 drop 4 (four) times a day, Disp: , Rfl:     mupirocin (BACTROBAN) 2 % ointment, Apply topically 2 (two) times a day as needed , Disp: , Rfl:     Nepafenac (ILEVRO) 0 3 % SUSP, Apply to eye daily, Disp: , Rfl:     ULTICARE INSULIN SYRINGE 30G X 1/2" 1 ML MISC, Use as directed, Disp: , Rfl: 0  Allergies   Allergen Reactions    Doxazosin      UNKNOWN    Iohexol      UNKNOWN    Cardura [Doxazosin Mesylate]      Muscle cramps    Other      Iv dye for cardiac cath  Renal failure very close to dialysis  But no dialysis    Zestril [Lisinopril]      cough       Labs:not applicable  Imaging: No results found  Review of Systems:  Review of Systems   All other systems reviewed and are negative  Physical Exam:  /68 (BP Location: Right arm, Patient Position: Sitting, Cuff Size: Large)   Pulse 60   Ht 5' 7" (1 702 m)   Wt 97 1 kg (214 lb)   BMI 33 52 kg/m²   Physical Exam   Constitutional: He is oriented to person, place, and time  He appears well-developed and well-nourished  HENT:   Head: Normocephalic and atraumatic  Eyes: Pupils are equal, round, and reactive to light  Conjunctivae are normal    Neck: Normal range of motion  Neck supple  Cardiovascular: Normal rate and normal heart sounds     Pulmonary/Chest: Effort normal and breath sounds normal    Neurological: He is alert and oriented to person, place, and time    Skin: Skin is warm and dry  Psychiatric: He has a normal mood and affect  Vitals reviewed  Discussion/Summary:  I will continue the patient's present medical regimen  The patient appears well compensated  I have asked the patient to call if there is a problem in the interim otherwise I will see the patient in six months time  I will give him a slip for a TSH and T4 but this will be managed by his endocrinologist Dr Linsey Vail

## 2019-12-02 ENCOUNTER — LAB (OUTPATIENT)
Dept: LAB | Facility: CLINIC | Age: 74
End: 2019-12-02
Payer: MEDICARE

## 2019-12-02 DIAGNOSIS — N17.9 AKI (ACUTE KIDNEY INJURY) (HCC): ICD-10-CM

## 2019-12-02 DIAGNOSIS — I21.4 NSTEMI (NON-ST ELEVATED MYOCARDIAL INFARCTION) (HCC): ICD-10-CM

## 2019-12-02 DIAGNOSIS — I25.118 CORONARY ARTERY DISEASE OF NATIVE ARTERY OF NATIVE HEART WITH STABLE ANGINA PECTORIS (HCC): ICD-10-CM

## 2019-12-02 LAB
ALBUMIN SERPL BCP-MCNC: 4.3 G/DL (ref 3.5–5)
ALP SERPL-CCNC: 104 U/L (ref 46–116)
ALT SERPL W P-5'-P-CCNC: 20 U/L (ref 12–78)
ANION GAP SERPL CALCULATED.3IONS-SCNC: 7 MMOL/L (ref 4–13)
AST SERPL W P-5'-P-CCNC: 15 U/L (ref 5–45)
BILIRUB SERPL-MCNC: 0.7 MG/DL (ref 0.2–1)
BUN SERPL-MCNC: 37 MG/DL (ref 5–25)
CALCIUM SERPL-MCNC: 9.6 MG/DL (ref 8.3–10.1)
CHLORIDE SERPL-SCNC: 105 MMOL/L (ref 100–108)
CHOLEST SERPL-MCNC: 143 MG/DL (ref 50–200)
CO2 SERPL-SCNC: 30 MMOL/L (ref 21–32)
CREAT SERPL-MCNC: 1.74 MG/DL (ref 0.6–1.3)
ERYTHROCYTE [DISTWIDTH] IN BLOOD BY AUTOMATED COUNT: 12.9 % (ref 11.6–15.1)
GFR SERPL CREATININE-BSD FRML MDRD: 38 ML/MIN/1.73SQ M
GLUCOSE P FAST SERPL-MCNC: 138 MG/DL (ref 65–99)
HCT VFR BLD AUTO: 39.5 % (ref 36.5–49.3)
HDLC SERPL-MCNC: 34 MG/DL
HGB BLD-MCNC: 12.7 G/DL (ref 12–17)
LDLC SERPL CALC-MCNC: 44 MG/DL (ref 0–100)
LEFT EYE DIABETIC RETINOPATHY: NORMAL
LEFT EYE DIABETIC RETINOPATHY: NORMAL
MCH RBC QN AUTO: 32.2 PG (ref 26.8–34.3)
MCHC RBC AUTO-ENTMCNC: 32.2 G/DL (ref 31.4–37.4)
MCV RBC AUTO: 100 FL (ref 82–98)
PLATELET # BLD AUTO: 115 THOUSANDS/UL (ref 149–390)
PMV BLD AUTO: 12.2 FL (ref 8.9–12.7)
POTASSIUM SERPL-SCNC: 4 MMOL/L (ref 3.5–5.3)
PROT SERPL-MCNC: 8.1 G/DL (ref 6.4–8.2)
RBC # BLD AUTO: 3.94 MILLION/UL (ref 3.88–5.62)
RIGHT EYE DIABETIC RETINOPATHY: NORMAL
RIGHT EYE DIABETIC RETINOPATHY: NORMAL
SODIUM SERPL-SCNC: 142 MMOL/L (ref 136–145)
TRIGL SERPL-MCNC: 324 MG/DL
WBC # BLD AUTO: 5.65 THOUSAND/UL (ref 4.31–10.16)

## 2019-12-02 PROCEDURE — 85027 COMPLETE CBC AUTOMATED: CPT

## 2019-12-02 PROCEDURE — 80053 COMPREHEN METABOLIC PANEL: CPT

## 2019-12-02 PROCEDURE — 80061 LIPID PANEL: CPT

## 2019-12-02 PROCEDURE — 36415 COLL VENOUS BLD VENIPUNCTURE: CPT

## 2019-12-02 RX ORDER — ISOSORBIDE MONONITRATE 30 MG/1
TABLET, EXTENDED RELEASE ORAL
Qty: 90 TABLET | Refills: 1 | Status: SHIPPED | OUTPATIENT
Start: 2019-12-02 | End: 2020-04-17 | Stop reason: ALTCHOICE

## 2019-12-03 ENCOUNTER — HOSPITAL ENCOUNTER (OUTPATIENT)
Dept: NON INVASIVE DIAGNOSTICS | Facility: CLINIC | Age: 74
Discharge: HOME | End: 2019-12-03
Payer: MEDICARE

## 2019-12-03 DIAGNOSIS — I70.1 RENAL ARTERY STENOSIS, NATIVE, BILATERAL (HCC): ICD-10-CM

## 2019-12-03 PROCEDURE — 93975 VASCULAR STUDY: CPT | Performed by: SURGERY

## 2019-12-03 PROCEDURE — 93975 VASCULAR STUDY: CPT

## 2019-12-05 RX ORDER — LEVOTHYROXINE SODIUM 0.2 MG/1
200 TABLET ORAL DAILY
Refills: 0 | COMMUNITY
Start: 2019-11-26 | End: 2022-05-17 | Stop reason: DRUGHIGH

## 2019-12-06 ENCOUNTER — OFFICE VISIT (OUTPATIENT)
Dept: CARDIOLOGY CLINIC | Facility: CLINIC | Age: 74
End: 2019-12-06
Payer: MEDICARE

## 2019-12-06 VITALS
BODY MASS INDEX: 33.59 KG/M2 | HEART RATE: 60 BPM | HEIGHT: 67 IN | WEIGHT: 214 LBS | SYSTOLIC BLOOD PRESSURE: 122 MMHG | DIASTOLIC BLOOD PRESSURE: 68 MMHG

## 2019-12-06 DIAGNOSIS — I25.10 CORONARY ARTERY DISEASE INVOLVING NATIVE CORONARY ARTERY OF NATIVE HEART WITHOUT ANGINA PECTORIS: ICD-10-CM

## 2019-12-06 DIAGNOSIS — I50.22 CHRONIC SYSTOLIC CONGESTIVE HEART FAILURE (HCC): ICD-10-CM

## 2019-12-06 DIAGNOSIS — I10 ESSENTIAL (PRIMARY) HYPERTENSION: Primary | ICD-10-CM

## 2019-12-06 DIAGNOSIS — E78.00 PURE HYPERCHOLESTEROLEMIA: ICD-10-CM

## 2019-12-06 PROCEDURE — 99214 OFFICE O/P EST MOD 30 MIN: CPT | Performed by: INTERNAL MEDICINE

## 2019-12-06 RX ORDER — PREDNISOLONE ACETATE 10 MG/ML
SUSPENSION/ DROPS OPHTHALMIC
Refills: 1 | COMMUNITY
Start: 2019-12-02 | End: 2020-04-17 | Stop reason: ALTCHOICE

## 2019-12-10 ENCOUNTER — OFFICE VISIT (OUTPATIENT)
Dept: FAMILY MEDICINE CLINIC | Facility: CLINIC | Age: 74
End: 2019-12-10
Payer: MEDICARE

## 2019-12-10 VITALS
OXYGEN SATURATION: 98 % | BODY MASS INDEX: 33.53 KG/M2 | RESPIRATION RATE: 18 BRPM | TEMPERATURE: 97.8 F | SYSTOLIC BLOOD PRESSURE: 124 MMHG | DIASTOLIC BLOOD PRESSURE: 80 MMHG | HEIGHT: 67 IN | WEIGHT: 213.6 LBS | HEART RATE: 75 BPM

## 2019-12-10 DIAGNOSIS — M54.42 CHRONIC LOW BACK PAIN WITH BILATERAL SCIATICA, UNSPECIFIED BACK PAIN LATERALITY: ICD-10-CM

## 2019-12-10 DIAGNOSIS — E89.0 HYPOTHYROIDISM, POSTABLATIVE: ICD-10-CM

## 2019-12-10 DIAGNOSIS — E10.49 OTHER DIABETIC NEUROLOGICAL COMPLICATION ASSOCIATED WITH TYPE 1 DIABETES MELLITUS (HCC): ICD-10-CM

## 2019-12-10 DIAGNOSIS — I70.219 ATHEROSCLEROSIS OF ARTERY OF EXTREMITY WITH INTERMITTENT CLAUDICATION (HCC): Chronic | ICD-10-CM

## 2019-12-10 DIAGNOSIS — I12.9 BENIGN HYPERTENSION WITH CHRONIC KIDNEY DISEASE, STAGE III (HCC): ICD-10-CM

## 2019-12-10 DIAGNOSIS — E10.29 TYPE 1 DIABETES MELLITUS WITH MICROALBUMINURIA (HCC): ICD-10-CM

## 2019-12-10 DIAGNOSIS — N18.30 BENIGN HYPERTENSION WITH CHRONIC KIDNEY DISEASE, STAGE III (HCC): ICD-10-CM

## 2019-12-10 DIAGNOSIS — M54.16 LUMBAR RADICULOPATHY: ICD-10-CM

## 2019-12-10 DIAGNOSIS — M54.41 CHRONIC LOW BACK PAIN WITH BILATERAL SCIATICA, UNSPECIFIED BACK PAIN LATERALITY: ICD-10-CM

## 2019-12-10 DIAGNOSIS — G47.33 OBSTRUCTIVE SLEEP APNEA OF ADULT: ICD-10-CM

## 2019-12-10 DIAGNOSIS — R80.9 TYPE 1 DIABETES MELLITUS WITH MICROALBUMINURIA (HCC): ICD-10-CM

## 2019-12-10 DIAGNOSIS — I25.10 CORONARY ARTERY DISEASE INVOLVING NATIVE CORONARY ARTERY OF NATIVE HEART WITHOUT ANGINA PECTORIS: Primary | ICD-10-CM

## 2019-12-10 DIAGNOSIS — G89.29 CHRONIC LOW BACK PAIN WITH BILATERAL SCIATICA, UNSPECIFIED BACK PAIN LATERALITY: ICD-10-CM

## 2019-12-10 PROCEDURE — 99214 OFFICE O/P EST MOD 30 MIN: CPT | Performed by: FAMILY MEDICINE

## 2019-12-10 NOTE — PATIENT INSTRUCTIONS
· Please take torsemide 2 days on 1 day off  · Please discuss further dosing of torsemide with Nephrology  · Please repeat nonfasting BMP on thyroid testing 1 day prior to your appointment with Nephrology

## 2019-12-10 NOTE — PROGRESS NOTES
FAMILY PRACTICE OFFICE VISIT       NAME: Dena Artis  AGE: 76 y o  SEX: male       : 1945        MRN: [id number]        Assessment and Plan     Problem List Items Addressed This Visit        Endocrine    Type 1 diabetes mellitus ([facility] )    Hypothyroidism, postablative     · Patient has been experiencing symptoms of fatigue  · Most recent TSH performed in late October was 7 9, no further dose adjustment by endocrinology, current dose of levothyroxine is 200 mcg daily  · Patient will proceed with repeat TSH next week  · Endocrinology, Dr Josh Alpers           Relevant Medications    levothyroxine 200 mcg tablet    Diabetic neuropathy associated with type 1 diabetes mellitus ([facility] )       Lab Results   Component Value Date    HGBA1C 7 6 (H) 2019 ·    Patient remains on gabapentin 300 mg twice a day            Respiratory    Obstructive sleep apnea of adult     Patient is compliant with CPAP, [facility] pulmonology follows            Cardiovascular and Mediastinum    Atherosclerosis of artery of extremity with intermittent claudication ([facility] ) (Chronic)     · Patient has been experiencing recurrent symptoms of claudication, right lower extremity is worse than left  · Pending aortic duplex of lower extremities later this week  · [facility] vascular surgery follows  · Patient remains on Crestor 20 mg daily and aspirin  · Left he is in general doing well following femoral endarterectomy and femoral -popliteal bypass with Irving-Steve graft 3/2019, Dr Quiros  · Right there is known underlying severe femoral-popliteal occlusive disease         Coronary artery disease of native artery of native heart with stable angina pectoris ([facility] ) - Primary     · Patient denies symptoms of angina or palpitations  · Chronic unchanged dyspnea on exertion, history of restrictive lung disease    · Current regimen includes Plavix, aspirin, statin, beta-blocker and isosorbide  · Patient remains under care of [facility] Cardiology, Dr Gerard Rosa         Benign hypertension with chronic kidney disease, stage III [facility])     · Patient remains under care of [facility] Nephrology  · Recent BMP reveals stable/mildly improved GFR and creatinine, current creatinine 1 74, prior value 1 83   · Patient remains on torsemide 20 mg daily, I advised to reduce dose of torsemide and take it 2 days on 1 day off as long as okay with Nephrology         Relevant Orders    Basic metabolic panel       Nervous and Auditory    Low back pain with sciatica     · Worsening of low back pain and neurogenic claudication  · Referral to [facility] Pain Management for re-evaluation and consideration for corticosteroid injection         Relevant Orders    Ambulatory referral to Pain Management    Lumbar radiculopathy    Relevant Orders    Ambulatory referral to Pain Management       Patient presents for follow-up of chronic medical conditions  Assessment and plan as outlined above  · Pending arterial duplex of lower extremities  · Persistent symptoms of painful lower extremity diabetic neuropathy, as well as neurogenic and vascular claudication  · Referral to Pain Management to reassess low back pain/worsening of sciatica, consideration for epidural injections  · Pending TSH and follow-up with Endocrinology regarding type 1 diabetes and hypothyroidism  · I advised patient to reduce dose of torsemide as per patient's instructions, he will repeat nonfasting BMP prior to follow-up with Nephrology next week  · Recent consultation with [facility] Cardiology, no med changes    Follow-up 3 months    Patient Instructions   · Please take torsemide 2 days on 1 day off  · Please discuss further dosing of torsemide with Nephrology  · Please repeat nonfasting BMP on thyroid testing 1 day prior to your appointment with Nephrology      Discussed with the patient and all questioned fully answered  He will call me if any problems arise  M*Modal software was used to dictate this note  It may contain errors with dictating incorrect words/spelling  Please contact provider directly with any questions  Chief Complaint   No chief complaint on file  History of Present Illness     Patient presents for follow-up  He just returned from [place] vacation  He is complaining of persistent fatigue  Reportedly his TSH was elevated in late October at 7 9  No adjustments were made by Endocrinology so far  Current dose of levothyroxine is 200 mcg daily  Patient denies symptoms of chest pain, palpitations or dizziness  He has chronic dyspnea on exertion, he was diagnosed with restrictive lung disease and COPD/emphysema  He is not using any inhalers at present time but has been compliant with CPAP for treatment of known obstructive sleep apnea  Patient remains under care of [facility], Nephrology and vascular surgery  Recent cardiology consult with Dr Munoz Ahr reviewed  Blood work on 12/02/2019 reveals overall stable/improved creatinine at 1 74 with elevated BUN at 37 and GFR of 38  Triglycerides are high 324, total cholesterol is 143 with LDL of 44  TSH was unfortunately not performed but was ordered by Cardiology  Most recent hemoglobin A1c 7 6 performed on September 17th  Patient is up-to-date with diabetic eye exam, he follows up with [facility], Dr Majano    Chronic symptoms of neuropathy, bilateral feet numbness and tingling  Patient felt worse on gabapentin 300 mg t i d  But noticed that symptoms are better controlled with b i d  Dosing  He has been experiencing worsening of chronic low back pain and sciatica  He also has chronic claudication right more than left  Pending arterial duplex within next few days  Pending nephrology consult and endocrinology follow-up within next few weeks  Patient remains on insulin for treatment of type 1 diabetes      Review of Systems   Review of Systems   Constitutional: Positive for fatigue  HENT: Negative      Eyes: Negative  Respiratory: Positive for shortness of breath (Chronic dyspnea on exertion)  Cardiovascular: Negative  Gastrointestinal: Negative  Endocrine: Negative  Genitourinary: Negative  Musculoskeletal: Positive for arthralgias and back pain  Skin: Negative  Negative for color change and rash  Allergic/Immunologic: Negative  Neurological: Negative  Painful lower extremity neuropathy   Hematological: Negative  Psychiatric/Behavioral: Positive for sleep disturbance  The patient is nervous/anxious          Active Problem List     Patient Active Problem List   Diagnosis    Type 1 diabetes mellitus ([facility])    Presence of drug coated stent in LAD coronary artery    Coronary artery disease of native artery of native heart with stable angina pectoris ([facility])    Presence of drug coated stent in left circumflex coronary artery    Dyslipidemia    Obstructive sleep apnea of adult    Carotid artery stenosis, asymptomatic, bilateral    Atherosclerosis of artery of extremity with intermittent claudication ([facility])    Restrictive lung disease    Centrilobular emphysema ([facility] )    Benign hypertension with chronic kidney disease, stage III ([facility])    CKD (chronic kidney disease) stage 3, GFR 30-59 ml/min ([facility])    Renal artery stenosis ([facility] )    Renal cyst, right    Vitamin D deficiency    Aortoiliac occlusive disease ([facility] )    Hypothyroidism, postablative    Low back pain with sciatica    Lumbar disc herniation    Lumbar radiculopathy    Diabetic neuropathy associated with type 1 diabetes mellitus ([facility] )    History of Ischemic cardiomyopathy    Chronic systolic congestive heart failure ([facility] )    NSTEMI (non-ST elevated myocardial infarction) ([facility] )    Anxiety with depression    Chronic deep vein thrombosis (DVT) of distal vein of right lower extremity ([facility])    Preoperative evaluation of a medical condition to rule out surgical contraindications (TAR required)       Past Medical History:  Past Medical History:   Diagnosis Date    Acute kidney injury ([facility] )     resolved 11/30/2015    Acute venous embolism and thrombosis of deep vessels of proximal lower extremity ([facility] )     resolved 04/04/2015    DARINEL (acute kidney [facility]) [facility] ) 1/12/2016    Anesthesia     RESPIRATORY ISSUES DUE TO SLEEP APNEA    Cardiac disease     heart attack, stents x 4    CHF (congestive heart failure) ([facility]     Chronic cough     resolved 02/04/2016    Chronic pain disorder     intermitent claudication    COPD (chronic obstructive pulmonary disease) ([facility]     Coronary artery disease     CPAP (continuous positive airway pressure) dependence     Diabetes mellitus ([facility] )     Disease of thyroid gland     DVT (deep venous thrombosis) ([facility]     Heart failure ([facility])     Hyperlipidemia     Hypertension     Ischemic cardiomyopathy     last assessed 09/26/2017    Myocardial infarction [facility])     MI +2 2015,2018    Pulmonary emphysema ([facility])     Pulmonary granuloma ([facility])     resolved 02/03/2017    Renal failure     Sleep apnea        Past Surgical History:  Past Surgical History:   Procedure Laterality Date    BYPASS FEMORAL-POPLITEAL      initial stenosis with stent left, 7 x 100 smart stent onset 02/24/2014    CARDIAC SURGERY      cardiac stents    CATARACT EXTRACTION      COLOGUARD (HISTORICAL)  2018    CORONARY ANGIOPLASTY WITH STENT PLACEMENT      x4    IR ABDOMINAL ANGIOGRAPHY / INTERVENTION  3/14/2019    SD THROMBOENDARTECTMY FEMORAL COMMON Left 3/22/2019    Procedure: ENDARTERECTOMY ARTERIAL FEMORAL WITH PATCH ANGIOPLASTY, BALLOON ANGIOPLASTY, STENT;  Surgeon: Nas Layton MD;  [facility];  Service: Vascular    SD VEIN BYPASS GRAFT,FEM-POP Left 3/22/2019    Procedure: BYPASS FEMORAL-POPLITEAL WITH COMPLETION ARTERIOGRAM;  Surgeon: Nas Layton MD;  [facility];  Service: Vascular    VASCULAR SURGERY      stent placement    WOUND DEBRIDEMENT Left 4/15/2019    Procedure: DEBRIDEMENT WOUND AND DRESSING CHANGE Yves PEREZ) left groin with VAC;  Surgeon: Lise Osborne DO;  [facility];  Service: Vascular       Family History:  Family History   Problem Relation Age of Onset    Heart disease Father         pacer placement    Hypertension Father     Kidney disease Father     Heart failure Father     Heart attack Father     Liver disease Father     Cirrhosis Mother         due to beer consumption    Liver disease Mother     Diabetes Other        Social History:  Social History     Socioeconomic History    Marital status: /Civil Union     Spouse name: Not on file    Number of children: Not on file    Years of education: Not on file    Highest education level: Not on file   Occupational History    Occupation: Retired    Occupation: Desk work    Occupation: [employer]   Social Needs    Financial resource strain: Not on file   [place] insecurity:     Worry: Not on file     Inability: Not on file   [place] needs:     Medical: Not on file     Non-medical: Not on file   Tobacco Use    Smoking status: Former Smoker     Packs/day: 4 00     Years: 48 00     Pack years: 192 00     Types: Cigarettes     Last attempt to quit:      Years since quittin 9    Smokeless tobacco: Never Used    Tobacco comment: smoking 48 years 1 5 ppd d/c oct 2008 screening protocol as per allscripts   Substance and Sexual Activity    Alcohol use:  Yes     Alcohol/week: 21 0 standard drinks     Types: 21 Standard drinks or equivalent per week     Frequency: 4 or more times a week     Drinks per session: 1 or 2     Binge frequency: Never     Comment: 2-3 glasses of vodka daily (6 shots) (history 2 drinks per day as per allscripts    Drug use: No    Sexual activity: Not Currently   Lifestyle    Physical activity:     Days per week: Not on file     Minutes per session: Not on file    Stress: Not on file   Relationships    Social connections:     Talks on phone: Not on file     Gets together: Not on file     Attends Buddhism service: Not on file     Active member of club or organization: Not on file     Attends meetings of clubs or organizations: Not on file     Relationship status: Not on file    Intimate partner violence:     Fear of current or ex partner: Not on file     Emotionally abused: Not on file     Physically abused: Not on file     Forced sexual activity: Not on file   Other Topics Concern    Not on file   Social History Narrative    Not on file           Objective     Vitals:    12/10/19 1329   BP: 124/80   BP Location: Left arm   Patient Position: Sitting   Cuff Size: Adult   Pulse: 75   Resp: 18   Temp: 97 8 °F (36 6 °C)   TempSrc: Tympanic   SpO2: 98%   Weight: 96 9 kg (213 lb 9 6 oz)   Height: 5' 7" (1 702 m)     Wt Readings from Last 3 Encounters:   12/10/19 96 9 kg (213 lb 9 6 oz)   12/06/19 97 1 kg (214 lb)   10/01/19 97 3 kg (214 lb 9 6 oz)       Physical Exam   Constitutional: He is oriented to person, place, and time  He appears well-developed and well-nourished  HENT:   Head: Normocephalic and atraumatic  Eyes: Conjunctivae are normal    Neck: Neck supple  Carotid bruit is not present  Cardiovascular: Normal rate, regular rhythm and normal heart sounds  Pulses are no weak pulses  No murmur heard  Pulses:       Dorsalis pedis pulses are 0 on the right side, and 1+ on the left side  Pulmonary/Chest: Effort normal and breath sounds normal  No respiratory distress  He has no wheezes  He has no rales  Abdominal: Bowel sounds are normal  He exhibits no distension and no abdominal bruit  Musculoskeletal: Normal range of motion  He exhibits no edema  Feet:   Right Foot:   Skin Integrity: Positive for erythema and dry skin  Negative for ulcer, skin breakdown, warmth or callus  Left Foot:   Skin Integrity: Positive for erythema and dry skin  Negative for ulcer, skin breakdown, warmth or callus     Neurological: He is alert and oriented to person, place, and time  No cranial nerve deficit  Psychiatric: He has a normal mood and affect  His behavior is normal    Nursing note and vitals reviewed  Patient's shoes and socks removed  Right Foot/Ankle   Right Foot Inspection  Skin Exam: skin normal, skin intact, dry skin and erythema no warmth, no callus, no maceration, no abnormal color, no pre-ulcer, no ulcer and no callus                          Toe Exam: ROM and strength within normal limits and erythema  Sensory   Vibration: diminished  Proprioception: intact   Monofilament testing: diminished  Vascular    The right DP pulse is 0  Left Foot/Ankle  Left Foot Inspection  Skin Exam: skin normal, skin intact, dry skin and erythemano warmth, no maceration, normal color, no pre-ulcer, no ulcer and no callus                         Toe Exam: ROM and strength within normal limits                   Sensory   Vibration: diminished  Proprioception: intact  Monofilament: diminished  Vascular    The left DP pulse is 1+  Assign Risk Category:  No deformity present; Loss of protective sensation;  No weak pulses       Risk: 2      Pertinent Laboratory/Diagnostic Studies:  Lab Results   Component Value Date    GLUCOSE 207 (H) 03/22/2019    BUN 37 (H) 12/02/2019    CREATININE 1 74 (H) 12/02/2019    CALCIUM 9 6 12/02/2019     12/21/2015    K 4 0 12/02/2019    CO2 30 12/02/2019     12/02/2019     Lab Results   Component Value Date    ALT 20 12/02/2019    AST 15 12/02/2019    ALKPHOS 104 12/02/2019    BILITOT 0 33 10/01/2015       Lab Results   Component Value Date    WBC 5 65 12/02/2019    HGB 12 7 12/02/2019    HCT 39 5 12/02/2019     (H) 12/02/2019     (L) 12/02/2019       No results found for: TSH    Lab Results   Component Value Date    CHOL 129 10/01/2015     Lab Results   Component Value Date    TRIG 324 (H) 12/02/2019     Lab Results   Component Value Date    HDL 34 (L) 12/02/2019     Lab Results   Component Value Date [facility] 12/02/2019     Lab Results   Component Value Date    HGBA1C 7 6 (H) 09/17/2019       Results for orders placed or performed in visit on 12/02/19   CBC   Result Value Ref Range    WBC 5 65 4 31 - 10 16 Thousand/uL    RBC 3 94 3 88 - 5 62 Million/uL    Hemoglobin 12 7 12 0 - 17 0 g/dL    Hematocrit 39 5 36 5 - 49 3 %     (H) 82 - 98 fL    MCH 32 2 26 8 - 34 3 pg    MCHC 32 2 31 4 - 37 4 g/dL    RDW 12 9 11 6 - 15 1 %    Platelets 902 (L) 451 - 390 Thousands/uL    MPV 12 2 8 9 - 12 7 fL   Comprehensive metabolic panel   Result Value Ref Range    Sodium 142 136 - 145 mmol/L    Potassium 4 0 3 5 - 5 3 mmol/L    Chloride 105 100 - 108 mmol/L    CO2 30 21 - 32 mmol/L    ANION GAP 7 4 - 13 mmol/L    BUN 37 (H) 5 - 25 mg/dL    Creatinine 1 74 (H) 0 60 - 1 30 mg/dL    Glucose, Fasting 138 (H) 65 - 99 mg/dL    Calcium 9 6 8 3 - 10 1 mg/dL    AST 15 5 - 45 U/L    ALT 20 12 - 78 U/L    Alkaline Phosphatase 104 46 - 116 U/L    Total Protein 8 1 6 4 - 8 2 g/dL    Albumin 4 3 3 5 - 5 0 g/dL    Total Bilirubin 0 70 0 20 - 1 00 mg/dL    eGFR 38 ml/min/1 73sq m   Lipid Panel with Direct LDL reflex   Result Value Ref Range    Cholesterol 143 50 - 200 mg/dL    Triglycerides 324 (H) <=150 mg/dL    HDL, Direct 34 (L) >=40 mg/dL    LDL Calculated 44 0 - 100 mg/dL       Orders Placed This Encounter   Procedures    Basic metabolic panel    Ambulatory referral to Pain Management       ALLERGIES:  Allergies   Allergen Reactions    Doxazosin      UNKNOWN    Iohexol      UNKNOWN    Cardura [Doxazosin Mesylate]      Muscle cramps    Other      Iv dye for cardiac cath  Renal failure very close to dialysis  But no dialysis    Zestril [Lisinopril]      cough       Current Medications     Current Outpatient Medications   Medication Sig Dispense Refill    acetaminophen (TYLENOL) 325 mg tablet Take 2 tablets (650 mg total) by mouth every 6 (six) hours as needed for mild pain 30 tablet 0    aspirin 81 MG tablet Take 81 mg by mouth daily        cholecalciferol (VITAMIN D3) 1,000 units tablet Take 1,000 Units by mouth daily        clopidogrel (PLAVIX) 75 mg tablet TAKE ONE TABLET BY MOUTH EVERY DAY 30 tablet 4    Coenzyme Q10 (COQ-10) 200 MG CAPS Take 200 mg by mouth daily      docusate sodium (COLACE) 50 mg capsule Take by mouth 2 (two) times a day as needed       gabapentin (NEURONTIN) 300 mg capsule Take 300 mg by mouth 2 (two) times a day       glucagon (GLUCAGON EMERGENCY) 1 MG injection 1 mg      insulin glargine (LANTUS) 100 units/mL subcutaneous injection Inject 40 Units under the skin daily in the early morning  10 mL 0    insulin lispro (HUMALOG) 100 units/mL injection Inject 3 units with breakfast, 6 units at lunch, and 6 units at dinner (Patient taking differently: 4 (four) times a day Inject 3 units with breakfast, 6 units at lunch, and 6 units at dinner  Carbs counting) 10 mL 1    isosorbide mononitrate (IMDUR) 30 mg 24 hr tablet TAKE ONE TABLET BY MOUTH EVERY DAY 90 tablet 1    Lactobacillus Rhamnosus, GG, (CULTURELLE) CAPS Take 1 capsule by mouth daily       levothyroxine 200 mcg tablet Take 200 mcg by mouth daily  0    loteprednol etabonate (LOTEMAX) 0 5 % ophthalmic suspension 1 drop 4 (four) times a day      metoprolol tartrate (LOPRESSOR) 25 mg tablet Take 1 tablet (25 mg total) by mouth every 12 (twelve) hours 180 tablet 1    mupirocin (BACTROBAN) 2 % ointment Apply topically 2 (two) times a day as needed       Nepafenac (ILEVRO) 0 3 % SUSP Apply to eye daily      nitroglycerin (NITROSTAT) 0 4 mg SL tablet Place 1 tablet (0 4 mg total) under the tongue every 5 (five) minutes as needed for chest pain 25 tablet 3    polyethylene glycol (MIRALAX) 17 g packet Take 17 g by mouth daily      prednisoLONE acetate (PRED FORTE) 1 % ophthalmic suspension INSTILL 1 DROP INTO THE LEFT EYE 3 TIMES DAILY  1    rosuvastatin (CRESTOR) 20 MG tablet Take 1 tablet (20 mg total) by mouth daily 90 tablet 3    torsemide (DEMADEX) 20 mg tablet Take 1 tablet (20 mg total) by mouth daily 90 tablet 1    ULTICARE INSULIN SYRINGE 30G X 1/2" 1 ML MISC Use as directed  0     No current facility-administered medications for this visit  There are no discontinued medications      Health Maintenance     Health Maintenance   Topic Date Due    Hepatitis C Screening  1945    SLP PLAN OF CARE  1945    Hepatitis B Vaccine (1 of 3 - Risk 3-dose series) 04/27/1964    Falls: Plan of Care  04/27/2010    DM Eye Exam  09/19/2019    Diabetic Foot Exam  01/28/2020    HEMOGLOBIN A1C  03/17/2020    URINE MICROALBUMIN  09/05/2020    Fall Risk  09/19/2020    Medicare Annual Wellness Visit (AWV)  09/19/2020    BMI: Followup Plan  10/05/2020    BMI: Adult  12/10/2020    CRC Screening: Cologuard  08/18/2022    DTaP,Tdap,and Td Vaccines (2 - Td) 05/05/2023    Influenza Vaccine  Completed    Pneumococcal Vaccine: 65+ Years  Completed    Pneumococcal Vaccine: Pediatrics (0 to 5 Years) and At-Risk Patients (6 to 59 Years)  Aged Out    HIB Vaccine  Aged Out    IPV Vaccine  Aged Out    Hepatitis A Vaccine  Aged Out    Meningococcal ACWY Vaccine  Aged Out    HPV Vaccine  Aged [employer] History   Administered Date(s) Administered    DT (pediatric) 12/01/2009    H1N1, All Formulations 12/01/2009    INFLUENZA 10/01/2008, 10/06/2009, 09/01/2010, 11/04/2013, 08/29/2018    Influenza Split High Dose Preservative Free IM 08/28/2014, 10/03/2016, 08/29/2018    Influenza TIV (IM) 10/01/2008, 10/19/2010, 09/20/2011, 08/01/2012, 09/13/2013, 09/15/2015, 09/03/2017    Pneumococcal Conjugate 13-Valent 08/11/2015    Pneumococcal Polysaccharide PPV23 10/01/2008, 06/04/2009, 03/15/2017    Tdap 05/05/2013    Zoster 10/01/2009    influenza, trivalent, adjuvanted 09/05/2019       Maricruz Tan MD

## 2019-12-11 ENCOUNTER — HOSPITAL ENCOUNTER (OUTPATIENT)
Dept: NON INVASIVE DIAGNOSTICS | Facility: CLINIC | Age: 74
Discharge: HOME | End: 2019-12-11
Payer: MEDICARE

## 2019-12-11 DIAGNOSIS — I74.09 AORTOILIAC OCCLUSIVE DISEASE (HCC): Chronic | ICD-10-CM

## 2019-12-11 DIAGNOSIS — I70.219 ATHEROSCLEROSIS OF ARTERY OF EXTREMITY WITH INTERMITTENT CLAUDICATION (HCC): Chronic | ICD-10-CM

## 2019-12-11 PROCEDURE — 93923 UPR/LXTR ART STDY 3+ LVLS: CPT

## 2019-12-11 PROCEDURE — 93925 LOWER EXTREMITY STUDY: CPT

## 2019-12-11 PROCEDURE — 93925 LOWER EXTREMITY STUDY: CPT | Performed by: SURGERY

## 2019-12-11 PROCEDURE — 93922 UPR/L XTREMITY ART 2 LEVELS: CPT | Performed by: SURGERY

## 2019-12-15 PROBLEM — Z98.890 POST-OPERATIVE STATE: Status: RESOLVED | Noted: 2019-04-19 | Resolved: 2019-12-15

## 2019-12-15 PROBLEM — R06.00 EXERTIONAL DYSPNEA: Status: RESOLVED | Noted: 2018-01-24 | Resolved: 2019-12-15

## 2019-12-15 PROBLEM — R63.5 WEIGHT GAIN, ABNORMAL: Status: RESOLVED | Noted: 2019-09-12 | Resolved: 2019-12-15

## 2019-12-15 PROBLEM — R06.09 EXERTIONAL DYSPNEA: Status: RESOLVED | Noted: 2018-01-24 | Resolved: 2019-12-15

## 2019-12-15 PROBLEM — L24.A9 WOUND DRAINAGE: Status: RESOLVED | Noted: 2019-04-13 | Resolved: 2019-12-15

## 2019-12-15 PROBLEM — E87.1 HYPONATREMIA: Status: RESOLVED | Noted: 2019-03-25 | Resolved: 2019-12-15

## 2019-12-16 ENCOUNTER — TRANSCRIBE ORDERS (OUTPATIENT)
Dept: LAB | Facility: CLINIC | Age: 74
End: 2019-12-16

## 2019-12-16 ENCOUNTER — LAB (OUTPATIENT)
Dept: LAB | Facility: CLINIC | Age: 74
End: 2019-12-16
Payer: MEDICARE

## 2019-12-16 DIAGNOSIS — E10.51 TYPE 1 DIABETES MELLITUS WITH PERIPHERAL VASCULAR DISEASE (HCC): ICD-10-CM

## 2019-12-16 DIAGNOSIS — I50.22 CHRONIC SYSTOLIC CONGESTIVE HEART FAILURE (HCC): ICD-10-CM

## 2019-12-16 DIAGNOSIS — E03.9 PRIMARY HYPOTHYROIDISM: ICD-10-CM

## 2019-12-16 DIAGNOSIS — N18.30 BENIGN HYPERTENSION WITH CHRONIC KIDNEY DISEASE, STAGE III (HCC): ICD-10-CM

## 2019-12-16 DIAGNOSIS — I10 ESSENTIAL (PRIMARY) HYPERTENSION: ICD-10-CM

## 2019-12-16 DIAGNOSIS — I25.10 CORONARY ARTERY DISEASE INVOLVING NATIVE CORONARY ARTERY OF NATIVE HEART WITHOUT ANGINA PECTORIS: ICD-10-CM

## 2019-12-16 DIAGNOSIS — E03.9 PRIMARY HYPOTHYROIDISM: Primary | ICD-10-CM

## 2019-12-16 DIAGNOSIS — E78.00 PURE HYPERCHOLESTEROLEMIA: ICD-10-CM

## 2019-12-16 DIAGNOSIS — I12.9 BENIGN HYPERTENSION WITH CHRONIC KIDNEY DISEASE, STAGE III (HCC): ICD-10-CM

## 2019-12-16 LAB
ANION GAP SERPL CALCULATED.3IONS-SCNC: 5 MMOL/L (ref 4–13)
BUN SERPL-MCNC: 38 MG/DL (ref 5–25)
CALCIUM SERPL-MCNC: 9.7 MG/DL (ref 8.3–10.1)
CHLORIDE SERPL-SCNC: 106 MMOL/L (ref 100–108)
CO2 SERPL-SCNC: 28 MMOL/L (ref 21–32)
CREAT SERPL-MCNC: 1.64 MG/DL (ref 0.6–1.3)
EST. AVERAGE GLUCOSE BLD GHB EST-MCNC: 171 MG/DL
GFR SERPL CREATININE-BSD FRML MDRD: 41 ML/MIN/1.73SQ M
GLUCOSE SERPL-MCNC: 241 MG/DL (ref 65–140)
HBA1C MFR BLD: 7.6 % (ref 4.2–6.3)
POTASSIUM SERPL-SCNC: 4.6 MMOL/L (ref 3.5–5.3)
SODIUM SERPL-SCNC: 139 MMOL/L (ref 136–145)
T4 FREE SERPL-MCNC: 1.17 NG/DL (ref 0.76–1.46)
TSH SERPL DL<=0.05 MIU/L-ACNC: 3.05 UIU/ML (ref 0.36–3.74)

## 2019-12-16 PROCEDURE — 36415 COLL VENOUS BLD VENIPUNCTURE: CPT

## 2019-12-16 PROCEDURE — 83036 HEMOGLOBIN GLYCOSYLATED A1C: CPT

## 2019-12-16 PROCEDURE — 84439 ASSAY OF FREE THYROXINE: CPT

## 2019-12-16 PROCEDURE — 84443 ASSAY THYROID STIM HORMONE: CPT

## 2019-12-16 PROCEDURE — 80048 BASIC METABOLIC PNL TOTAL CA: CPT

## 2019-12-17 ENCOUNTER — OFFICE VISIT (OUTPATIENT)
Dept: NEPHROLOGY | Facility: CLINIC | Age: 74
End: 2019-12-17
Payer: MEDICARE

## 2019-12-17 VITALS
BODY MASS INDEX: 32.49 KG/M2 | SYSTOLIC BLOOD PRESSURE: 145 MMHG | HEART RATE: 61 BPM | RESPIRATION RATE: 16 BRPM | DIASTOLIC BLOOD PRESSURE: 72 MMHG | WEIGHT: 214.4 LBS | HEIGHT: 68 IN

## 2019-12-17 DIAGNOSIS — N18.30 CKD (CHRONIC KIDNEY DISEASE) STAGE 3, GFR 30-59 ML/MIN (HCC): ICD-10-CM

## 2019-12-17 DIAGNOSIS — I70.1 RENAL ARTERY STENOSIS (HCC): Primary | ICD-10-CM

## 2019-12-17 PROCEDURE — 99214 OFFICE O/P EST MOD 30 MIN: CPT | Performed by: INTERNAL MEDICINE

## 2019-12-17 RX ORDER — TORSEMIDE 20 MG/1
TABLET ORAL
Qty: 120 TABLET | Refills: 3 | Status: SHIPPED | OUTPATIENT
Start: 2019-12-17 | End: 2019-12-23 | Stop reason: SDUPTHER

## 2019-12-17 NOTE — PATIENT INSTRUCTIONS
Chronic Kidney Disease   WHAT YOU NEED TO KNOW:   Chronic kidney disease (CKD) is the gradual and permanent loss of kidney function  It is also called chronic kidney failure, or chronic renal insufficiency  Normally, the kidneys remove fluid, chemicals, and waste from your blood  These wastes are turned into urine by your kidneys  CKD may worsen over time and lead to kidney failure  DISCHARGE INSTRUCTIONS:   Return to the emergency department if:   · You are confused and very drowsy  · You have a seizure  · You have shortness of breath  Contact your healthcare provider if:   · You suddenly gain or lose more weight than your healthcare provider has told you is okay  · You have itchy skin or a rash  · You urinate more or less than you normally do  · You have blood in your urine  · You have nausea and repeated vomiting  · You have fatigue or muscle weakness  · You have hiccups that will not stop  · You have questions or concerns about your condition or care  Medicines:   · Medicines  may be given to decrease blood pressure and get rid of extra fluid  You may also receive medicine to manage health conditions that may occur with CKD, such as anemia, diabetes, and heart disease  · Take your medicine as directed  Contact your healthcare provider if you think your medicine is not helping or if you have side effects  Tell him or her if you are allergic to any medicine  Keep a list of the medicines, vitamins, and herbs you take  Include the amounts, and when and why you take them  Bring the list or the pill bottles to follow-up visits  Carry your medicine list with you in case of an emergency  Follow up with your healthcare provider as directed: You will need to return for tests to monitor your kidney function  You may also be referred to a kidney specialist  Write down your questions so you remember to ask them during your visits  Manage other health conditions:   Follow your healthcare provider's directions on how to manage diabetes, high blood pressure, and heart disease  These conditions can make CKD worse  Talk to your healthcare provider before you take over-the-counter medicine  Medicines such as NSAIDs, stomach medicine, or laxatives may harm your kidneys  Weigh yourself daily:  Ask your healthcare provider what your weight should be  Ask how much liquid you should drink each day  CKD may cause you to gain or lose weight rapidly  Weigh yourself every day  Write down your weight, how much liquid you drink or eat, and how much you urinate each day  Contact your healthcare provider if your weight is higher or lower than it should be  Manage CKD:   · Maintain a healthy weight  Ask your healthcare provider how much you should weigh  Ask him to help you create a weight loss plan if you are overweight  · Exercise 30 to 60 minutes a day, 4 to 7 times a week, or as directed  Ask about the best exercise plan for you  Regular exercise can help you manage CKD, high blood pressure, and diabetes  · Follow your healthcare provider's advice about what to eat and drink  He may tell you to eat food low in sodium (salt), potassium, phosphorus, or protein  You may need to see a dietitian if you need help planning meals  Ask how much liquid to drink each day and which liquids are best for you  · Limit alcohol  Ask how much alcohol is safe for you to drink  A drink of alcohol is 12 ounces of beer, 5 ounces of wine, or 1½ ounces of liquor  · Do not smoke  Nicotine and other chemicals in cigarettes and cigars can cause lung and kidney damage  Ask your healthcare provider for information if you currently smoke and need help to quit  E-cigarettes or smokeless tobacco still contain nicotine  Talk to your healthcare provider before you use these products  · Ask your healthcare provider if you need vaccines    Infections such as pneumonia, influenza, and hepatitis can be more harmful or more likely to occur in a person who has CKD  Vaccines reduce your risk of infection with these viruses  © 2017 [facility] Information is for End User's use only and may not be sold, redistributed or otherwise used for commercial purposes  All illustrations and images included in CareNotes® are the copyrighted property of A D A M [employer]  or Dylon Lambert  The above information is an  only  It is not intended as medical advice for individual conditions or treatments  Talk to your doctor, nurse or pharmacist before following any medical regimen to see if it is safe and effective for you

## 2019-12-17 NOTE — PROGRESS NOTES
OFFICE FOLLOW UP - Nephrology   Kevin Martinez 76 y o  male MRN: [id number]    Encounter: [id number]      ASSESSMENT and PLAN:  Diagnoses and all orders for this visit:    He is 76years old with a longstanding history of type 2 diabetes mellitus, renal artery stenosis and hypertension, peripheral vascular disease and carotid artery stenosis who presents for follow-up of stage 3 chronic kidney disease    CKD (chronic kidney disease) stage 3, GFR 30-59 ml/min  -his hospitalization events were noted his most recent creatinine is have been between 1 3 in 1 6 his estimated GFR is likely around 40-50  -medications are appropriately dosed  -continue cardiovascular risk reduction measures    Type 1 diabetes mellitus with complication ([facility] )  - continue  Glycemic control he has seen Endocrinology as well    Renal artery stenosis (HCC)  - renal artery Doppler remains unchanged he has a right renal artery stenosis less than 60% and a left that is greater than 60%  -blood pressures are now slightly elevated in the 829 systolic range  -his wife did mention his alcohol consumption and increased, patient has 2 large martini's we discussed how this can raise blood pressure is well today and limiting alcohol intake    Benign hypertension with chronic kidney disease, stage III  - as above regarding his blood pressure  - blood pressures are currently stable avoid hypotension  -continue blood pressure control he is currently on metoprolol 25 mg twice daily, lisinopril 20 mg daily    DARINEL (acute kidney injury) ([facility] )  -had a recent acute kidney injury after cardiac catheterization in [place] now creatinine is better stable around 1 4    Renal cyst, right  - renal ultrasound was checked in February of 2018 which showed a stable simple cyst in the upper pole of the right kidney and a normal left kidney and bladder, this was checked in November of 2018 in [place] and is stable in size    Proteinuria, unspecified type  - 1+ proteinuria on urinalysis longstanding history of diabetes not anemic, thrombocytopenic, no hematuria associated with this in creatinine is stable will monitor    Shortness of breath with coronary artery disease  -he is now on torsemide 20 mg daily, his blood pressure remains elevated in the 140s over 70, his weight is increasing by about 10 lb, he has more dyspnea on exertion-no JVD lungs are clear no peripheral edema  -hard to assess volume status, will increase his torsemide to 20 mg alternating with 40 mg S for 1 week and monitor response check a repeat BMP and assess symptoms to see if there is any improved    Peripheral vascular disease  -ongoing follow-up with vascular surgery    He is doing well renal function remained stable given his relative stability will follow up in 6 months    HPI: Bronwyn Gandhi is a 76 y o  male who is here for  Chronic kidney disease    Since his last office visit he has been doing relatively well he did have a intervention done on his right leg with good outcomes he is following up with vascular still with pain, he has been feeling okay he denies any acute chest pain or shortness of Breath fevers chills no nausea vomiting diarrhea no constipation no foamy or bloody urine his renal function has remained stable will his torsemide was decreased to every other day, his weight is increased    His wife is concerned about his alcohol consumption today, we discussed this, he drinks 2 large martini's daily    Otherwise continues to have a significant fatigue    ROS:   All the systems were reviewed and were negative except as documented on the HPI  Allergies: Doxazosin; Iohexol; Cardura [doxazosin mesylate]; Other; and Zestril [lisinopril]    Medications:   Current Outpatient Medications:     acetaminophen (TYLENOL) 325 mg tablet, Take 2 tablets (650 mg total) by mouth every 6 (six) hours as needed for mild pain, Disp: 30 tablet, Rfl: 0    aspirin 81 MG tablet, Take 81 mg by mouth daily  , Disp: , Rfl:     cholecalciferol (VITAMIN D3) 1,000 units tablet, Take 1,000 Units by mouth daily  , Disp: , Rfl:     clopidogrel (PLAVIX) 75 mg tablet, TAKE ONE TABLET BY MOUTH EVERY DAY, Disp: 30 tablet, Rfl: 4    Coenzyme Q10 (COQ-10) 200 MG CAPS, Take 200 mg by mouth daily, Disp: , Rfl:     docusate sodium (COLACE) 50 mg capsule, Take by mouth 2 (two) times a day as needed , Disp: , Rfl:     gabapentin (NEURONTIN) 300 mg capsule, Take 300 mg by mouth 2 (two) times a day , Disp: , Rfl:     glucagon (GLUCAGON EMERGENCY) 1 MG injection, 1 mg, Disp: , Rfl:     insulin glargine (LANTUS) 100 units/mL subcutaneous injection, Inject 40 Units under the skin daily in the early morning , Disp: 10 mL, Rfl: 0    insulin lispro (HUMALOG) 100 units/mL injection, Inject 3 units with breakfast, 6 units at lunch, and 6 units at dinner (Patient taking differently: 4 (four) times a day Inject 3 units with breakfast, 6 units at lunch, and 6 units at dinner Carbs counting), Disp: 10 mL, Rfl: 1    isosorbide mononitrate (IMDUR) 30 mg 24 hr tablet, TAKE ONE TABLET BY MOUTH EVERY DAY, Disp: 90 tablet, Rfl: 1    Lactobacillus Rhamnosus, GG, (CULTURELLE) CAPS, Take 1 capsule by mouth daily , Disp: , Rfl:     levothyroxine 200 mcg tablet, Take 200 mcg by mouth daily, Disp: , Rfl: 0    metoprolol tartrate (LOPRESSOR) 25 mg tablet, Take 1 tablet (25 mg total) by mouth every 12 (twelve) hours, Disp: 180 tablet, Rfl: 1    nitroglycerin (NITROSTAT) 0 4 mg SL tablet, Place 1 tablet (0 4 mg total) under the tongue every 5 (five) minutes as needed for chest pain, Disp: 25 tablet, Rfl: 3    polyethylene glycol (MIRALAX) 17 g packet, Take 17 g by mouth daily, Disp: , Rfl:     prednisoLONE acetate (PRED FORTE) 1 % ophthalmic suspension, INSTILL 1 DROP INTO THE LEFT EYE 3 TIMES DAILY, Disp: , Rfl: 1    rosuvastatin (CRESTOR) 20 MG tablet, Take 1 tablet (20 mg total) by mouth daily, Disp: 90 tablet, Rfl: 3    torsemide (DEMADEX) 20 mg tablet, Take 20 mg alternating every other day with 40 mg daily, Disp: 120 tablet, Rfl: 3    ULTICARE INSULIN SYRINGE 30G X 1/2" 1 ML MISC, Use as directed, Disp: , Rfl: 0    loteprednol etabonate (LOTEMAX) 0 5 % ophthalmic suspension, 1 drop 4 (four) times a day, Disp: , Rfl:     mupirocin (BACTROBAN) 2 % ointment, Apply topically 2 (two) times a day as needed , Disp: , Rfl:     Nepafenac (ILEVRO) 0 3 % SUSP, Apply to eye daily, Disp: , Rfl:     Past Medical History:   Diagnosis Date    [facility] )     resolved 11/30/2015    Acute venous embolism and thrombosis of deep vessels of proximal lower extremity ([facility] )     resolved 04/04/2015    DARINEL (acute kidney injury) ([facility] ) 1/12/2016    Anesthesia     RESPIRATORY ISSUES DUE TO SLEEP APNEA    Cardiac disease     heart attack, stents x 4    [facility]congestive [facility] failure) [facility]     Chronic cough     resolved 02/04/2016    Chronic pain disorder     intermitent claudication    COPD (chronic obstructive pulmonary disease) ([facility]     Coronary artery disease     CPAP (continuous positive airway pressure) dependence     Diabetes mellitus ([facility] )     Disease of thyroid gland     DVT (deep venous thrombosis) ([facility] )     Heart failure ([facility] )     Hyperlipidemia     Hypertension     Ischemic cardiomyopathy     last assessed 09/26/2017    Myocardial infarction [facility])     MI +2 2015,2018    Pulmonary emphysema ([facility] )     Pulmonary granuloma ([facility] )     resolved 02/03/2017    Renal failure     Sleep apnea      Past Surgical History:   Procedure Laterality Date    BYPASS FEMORAL-POPLITEAL      initial stenosis with stent left, 7 x 100 smart stent onset 02/24/2014    CARDIAC SURGERY      cardiac stents    CATARACT EXTRACTION      COLOGUARD (HISTORICAL)  2018    CORONARY ANGIOPLASTY WITH STENT PLACEMENT      x4    IR ABDOMINAL ANGIOGRAPHY / INTERVENTION  3/14/2019    [place] THROMBOENDARTECTMY FEMORAL COMMON Left 3/22/2019    Procedure: ENDARTERECTOMY ARTERIAL FEMORAL WITH PATCH ANGIOPLASTY, BALLOON ANGIOPLASTY, STENT;  Surgeon: Hanane Cheung MD;  [facility];  Service: Vascular    DC VEIN BYPASS GRAFT,FEM-POP Left 3/22/2019    Procedure: BYPASS FEMORAL-POPLITEAL WITH COMPLETION ARTERIOGRAM;  Surgeon: Hanane Cheung MD;  [facility];  Service: Vascular    VASCULAR SURGERY      stent placement    WOUND DEBRIDEMENT Left 4/15/2019    Procedure: DEBRIDEMENT WOUND AND DRESSING CHANGE Yves PEREZ) left groin with VAC;  Surgeon: Blanche March DO;  [facility];  Service: Vascular     Family History   Problem Relation Age of Onset    Heart disease Father         pacer placement    Hypertension Father     Kidney disease Father     Heart failure Father     Heart attack Father     Liver disease Father     Cirrhosis Mother         due to beer consumption    Liver disease Mother     Diabetes Other       reports that he quit smoking about 11 years ago  His smoking use included cigarettes  He has a 192 00 pack-year smoking history  He has never used smokeless tobacco  He reports that he drinks about 21 0 standard drinks of alcohol per week  He reports that he does not use drugs  Physical Exam:   Vitals:    12/17/19 1422   BP: 145/72   BP Location: Left arm   Patient Position: Sitting   Cuff Size: Large   Pulse: 61   Resp: 16   Weight: 97 3 kg (214 lb 6 4 oz)   Height: 5' 8" (1 727 m)     Body mass index is 32 6 kg/m²    General: conscious, cooperative, in no acute distress  Eyes: conjunctivae pink, anicteric sclerae  ENT: lips and mucous membranes moist  Neck: supple, no JVD  Chest: clear breath sounds bilateral, no crackles, ronchus or wheezings  CVS: normal rate, regular rhythm  Abdomen: soft, non-tender, non-distended, normoactive bowel sounds  Extremities: no edema of both legs  Skin: no rash  Neuro: awake, alert, oriented  Psych:  Pleasant affect         renal imaging:    A renal artery Doppler was done 2/27/2018 which shows that his right renal artery has less than 60% stenosis and in the main renal artery and a patent renal veins  The left renal artery has greater than 60% stenosis in the main renal artery and a patent renal veins-repeat renal artery Doppler unchanged from December 2019    an ultrasound of the kidney and bladder was done on February 27 which shows a right kidney that was 9 3 x 5 2 cm and a left kidney that was 9 5 x 5 8 cm with normal echogenicity and contour-     Lab Results:  Results from last 7 days   Lab Units 12/16/19  1239   POTASSIUM mmol/L 4 6   CHLORIDE mmol/L 106   CO2 mmol/L 28   BUN mg/dL 38*   CREATININE mg/dL 1 64*   CALCIUM mg/dL 9 7     Results for orders placed or performed in visit on 12/16/19   TSH, 3rd generation   Result Value Ref Range    TSH 3RD GENERATON 3 050 0 358 - 3 740 uIU/mL   T4, free   Result Value Ref Range    Free T4 1 17 0 76 - 1 46 ng/dL   Basic metabolic panel   Result Value Ref Range    Sodium 139 136 - 145 mmol/L    Potassium 4 6 3 5 - 5 3 mmol/L    Chloride 106 100 - 108 mmol/L    CO2 28 21 - 32 mmol/L    ANION GAP 5 4 - 13 mmol/L    BUN 38 (H) 5 - 25 mg/dL    Creatinine 1 64 (H) 0 60 - 1 30 mg/dL    Glucose 241 (H) 65 - 140 mg/dL    Calcium 9 7 8 3 - 10 1 mg/dL    eGFR 41 ml/min/1 73sq m   Hemoglobin A1C   Result Value Ref Range    Hemoglobin A1C 7 6 (H) 4 2 - 6 3 %     mg/dl     Portions of the record may have been created with voice recognition software  Occasional wrong word or "sound a like" substitutions may have occurred due to the inherent limitations of voice recognition software  Read the chart carefully and recognize, using context, where substitutions have occurred  If you have any questions, please contact the dictating provider

## 2019-12-17 NOTE — Clinical Note
Bishop Nichole, I'm not sure if Serenity Rodriguez has extra volume  His volume status is always difficult to determine, with his JOSÉ I wanted to give him a diuretic challenge (I know we have done in the past as well) I increased the torsemide to 20 mg alternating with 40 mg daily and I am repeating blood work in 1 week  Wanted to see if his symptoms improved at all and then we can back off if needed  Thanks

## 2019-12-23 ENCOUNTER — TELEPHONE (OUTPATIENT)
Dept: OTHER | Facility: HOSPITAL | Age: 74
End: 2019-12-23

## 2019-12-23 ENCOUNTER — LAB (OUTPATIENT)
Dept: LAB | Facility: CLINIC | Age: 74
End: 2019-12-23
Payer: MEDICARE

## 2019-12-23 DIAGNOSIS — I70.1 RENAL ARTERY STENOSIS (HCC): ICD-10-CM

## 2019-12-23 DIAGNOSIS — N18.30 CKD (CHRONIC KIDNEY DISEASE) STAGE 3, GFR 30-59 ML/MIN (HCC): ICD-10-CM

## 2019-12-23 LAB
ALBUMIN SERPL BCP-MCNC: 3.8 G/DL (ref 3.5–5)
ALP SERPL-CCNC: 105 U/L (ref 46–116)
ALT SERPL W P-5'-P-CCNC: 27 U/L (ref 12–78)
ANION GAP SERPL CALCULATED.3IONS-SCNC: 7 MMOL/L (ref 4–13)
AST SERPL W P-5'-P-CCNC: 18 U/L (ref 5–45)
BASOPHILS # BLD AUTO: 0.01 THOUSANDS/ΜL (ref 0–0.1)
BASOPHILS NFR BLD AUTO: 0 % (ref 0–1)
BILIRUB SERPL-MCNC: 0.51 MG/DL (ref 0.2–1)
BILIRUB UR QL STRIP: NEGATIVE
BUN SERPL-MCNC: 48 MG/DL (ref 5–25)
CALCIUM SERPL-MCNC: 9.3 MG/DL (ref 8.3–10.1)
CHLORIDE SERPL-SCNC: 107 MMOL/L (ref 100–108)
CLARITY UR: CLEAR
CO2 SERPL-SCNC: 29 MMOL/L (ref 21–32)
COLOR UR: YELLOW
CREAT SERPL-MCNC: 2.05 MG/DL (ref 0.6–1.3)
CREAT UR-MCNC: 28.5 MG/DL
EOSINOPHIL # BLD AUTO: 0.22 THOUSAND/ΜL (ref 0–0.61)
EOSINOPHIL NFR BLD AUTO: 4 % (ref 0–6)
ERYTHROCYTE [DISTWIDTH] IN BLOOD BY AUTOMATED COUNT: 13 % (ref 11.6–15.1)
GFR SERPL CREATININE-BSD FRML MDRD: 31 ML/MIN/1.73SQ M
GLUCOSE SERPL-MCNC: 167 MG/DL (ref 65–140)
GLUCOSE UR STRIP-MCNC: NEGATIVE MG/DL
HCT VFR BLD AUTO: 40.6 % (ref 36.5–49.3)
HGB BLD-MCNC: 13.3 G/DL (ref 12–17)
HGB UR QL STRIP.AUTO: NEGATIVE
IMM GRANULOCYTES # BLD AUTO: 0.02 THOUSAND/UL (ref 0–0.2)
IMM GRANULOCYTES NFR BLD AUTO: 0 % (ref 0–2)
KETONES UR STRIP-MCNC: NEGATIVE MG/DL
LEUKOCYTE ESTERASE UR QL STRIP: NEGATIVE
LYMPHOCYTES # BLD AUTO: 1.77 THOUSANDS/ΜL (ref 0.6–4.47)
LYMPHOCYTES NFR BLD AUTO: 31 % (ref 14–44)
MAGNESIUM SERPL-MCNC: 2.4 MG/DL (ref 1.6–2.6)
MCH RBC QN AUTO: 32.7 PG (ref 26.8–34.3)
MCHC RBC AUTO-ENTMCNC: 32.8 G/DL (ref 31.4–37.4)
MCV RBC AUTO: 100 FL (ref 82–98)
MONOCYTES # BLD AUTO: 0.7 THOUSAND/ΜL (ref 0.17–1.22)
MONOCYTES NFR BLD AUTO: 12 % (ref 4–12)
NEUTROPHILS # BLD AUTO: 2.92 THOUSANDS/ΜL (ref 1.85–7.62)
NEUTS SEG NFR BLD AUTO: 53 % (ref 43–75)
NITRITE UR QL STRIP: NEGATIVE
NRBC BLD AUTO-RTO: 0 /100 WBCS
PH UR STRIP.AUTO: 6.5 [PH]
PHOSPHATE SERPL-MCNC: 3.8 MG/DL (ref 2.3–4.1)
PLATELET # BLD AUTO: 126 THOUSANDS/UL (ref 149–390)
PMV BLD AUTO: 12.5 FL (ref 8.9–12.7)
POTASSIUM SERPL-SCNC: 4 MMOL/L (ref 3.5–5.3)
PROT SERPL-MCNC: 7.9 G/DL (ref 6.4–8.2)
PROT UR STRIP-MCNC: NEGATIVE MG/DL
PROT UR-MCNC: 9 MG/DL
PROT/CREAT UR: 0.32 MG/G{CREAT} (ref 0–0.1)
PTH-INTACT SERPL-MCNC: 93.7 PG/ML (ref 18.4–80.1)
RBC # BLD AUTO: 4.07 MILLION/UL (ref 3.88–5.62)
SODIUM SERPL-SCNC: 143 MMOL/L (ref 136–145)
SP GR UR STRIP.AUTO: 1.01 (ref 1–1.03)
URATE SERPL-MCNC: 8.8 MG/DL (ref 4.2–8)
UROBILINOGEN UR QL STRIP.AUTO: 0.2 E.U./DL
WBC # BLD AUTO: 5.64 THOUSAND/UL (ref 4.31–10.16)

## 2019-12-23 PROCEDURE — 84156 ASSAY OF PROTEIN URINE: CPT | Performed by: INTERNAL MEDICINE

## 2019-12-23 PROCEDURE — 36415 COLL VENOUS BLD VENIPUNCTURE: CPT

## 2019-12-23 PROCEDURE — 82570 ASSAY OF URINE CREATININE: CPT | Performed by: INTERNAL MEDICINE

## 2019-12-23 PROCEDURE — 80053 COMPREHEN METABOLIC PANEL: CPT

## 2019-12-23 PROCEDURE — 83970 ASSAY OF PARATHORMONE: CPT

## 2019-12-23 PROCEDURE — 81003 URINALYSIS AUTO W/O SCOPE: CPT | Performed by: INTERNAL MEDICINE

## 2019-12-23 PROCEDURE — 84100 ASSAY OF PHOSPHORUS: CPT

## 2019-12-23 PROCEDURE — 84550 ASSAY OF BLOOD/URIC ACID: CPT

## 2019-12-23 PROCEDURE — 83735 ASSAY OF MAGNESIUM: CPT

## 2019-12-23 PROCEDURE — 85025 COMPLETE CBC W/AUTO DIFF WBC: CPT

## 2019-12-23 RX ORDER — TORSEMIDE 20 MG/1
20 TABLET ORAL DAILY
Qty: 120 TABLET | Refills: 0 | Status: SHIPPED | OUTPATIENT
Start: 2019-12-23 | End: 2020-04-24 | Stop reason: SDUPTHER

## 2019-12-23 NOTE — TELEPHONE ENCOUNTER
Labs reviewed creatinine increased and no change in symptoms  Will decrease back to torsemide 20 mg daily, continue to  monitor weight, check a bmp in 1 month  Please send script for blood work and most recent lab results to patient  Results were discussed with patient and patient's wife    Thanks

## 2019-12-27 ENCOUNTER — TELEPHONE (OUTPATIENT)
Dept: VASCULAR SURGERY | Facility: CLINIC | Age: 74
End: 2019-12-27

## 2019-12-27 NOTE — TELEPHONE ENCOUNTER
Patient called for doppler results and said he is claudicating with right leg  Patient said he was waiting for his doppler letter or call  But never received  Will make an appt with dr Terence Lanier to discuss doppler results

## 2020-01-07 DIAGNOSIS — I25.10 CORONARY ARTERY DISEASE INVOLVING NATIVE CORONARY ARTERY OF NATIVE HEART WITHOUT ANGINA PECTORIS: ICD-10-CM

## 2020-01-07 RX ORDER — CLOPIDOGREL BISULFATE 75 MG/1
75 TABLET ORAL DAILY
Qty: 30 TABLET | Refills: 8 | Status: SHIPPED | OUTPATIENT
Start: 2020-01-07 | End: 2020-05-18 | Stop reason: SDUPTHER

## 2020-01-10 ENCOUNTER — OFFICE VISIT (OUTPATIENT)
Dept: PAIN MEDICINE | Facility: CLINIC | Age: 75
End: 2020-01-10
Payer: MEDICARE

## 2020-01-10 ENCOUNTER — TELEPHONE (OUTPATIENT)
Dept: PAIN MEDICINE | Facility: CLINIC | Age: 75
End: 2020-01-10

## 2020-01-10 VITALS
SYSTOLIC BLOOD PRESSURE: 140 MMHG | WEIGHT: 214 LBS | DIASTOLIC BLOOD PRESSURE: 68 MMHG | HEIGHT: 68 IN | HEART RATE: 67 BPM | BODY MASS INDEX: 32.43 KG/M2

## 2020-01-10 DIAGNOSIS — M54.16 LUMBAR RADICULOPATHY: ICD-10-CM

## 2020-01-10 DIAGNOSIS — M47.816 LUMBAR SPONDYLOSIS: ICD-10-CM

## 2020-01-10 DIAGNOSIS — M48.062 SPINAL STENOSIS OF LUMBAR REGION WITH NEUROGENIC CLAUDICATION: Primary | ICD-10-CM

## 2020-01-10 PROCEDURE — 99214 OFFICE O/P EST MOD 30 MIN: CPT | Performed by: ANESTHESIOLOGY

## 2020-01-10 RX ORDER — LEVALBUTEROL TARTRATE 45 UG/1
1-2 AEROSOL, METERED ORAL
COMMUNITY
Start: 2018-11-16 | End: 2020-04-17 | Stop reason: ALTCHOICE

## 2020-01-10 NOTE — PROGRESS NOTES
Assessment  1  Spinal stenosis of lumbar region with neurogenic claudication    2  Lumbar radiculopathy    3  Lumbar spondylosis        Plan  Patient was seen approximately 1/2 years ago treated with lumbar transforaminal epidural steroid injection would help with his leg pain  Patient presents with primarily low back pain with occasional radiation down his right leg  Patient has a history of vascular disease  This point time I think as opposed to a transforaminal approach will pursue an interlaminar approach with bilateral spread to also cover his low back pain  As the patient is on anticoagulation which is contraindicated in neuroaxial techniques, we will obtain permission if medically appropriate to hold the anticoagulation for an appropriate length of time  Given the patient's history of diabetes, there may be an increased risk of infection in association with the procedure although the overall risk is low  In addition, following the injection there may be a transient elevation in serum glucose levels for several days  The patient understands that this may require additional glycemic coverage in coordination with the treating physician  Depending on his response to the above procedure 1 May consider diagnostic medial branch blocks but will re-evaluate if needed  In the office today, we reviewed the nature of the patient's pathology in depth using  diagrams and models  I discussed the approach I would use for the epidural steroid injection and provided literature for home review  The patient understands the risks associated with the procedure including but not limited to bleeding, infection, tissue injury, exacerbation of symptoms, allergic reaction, spinal headache, and paralysis and provided written and verbal consent  today  My impressions and treatment recommendations were discussed in detail with the patient who verbalized understanding and had no further questions    Discharge instructions were provided  I personally saw and examined the patient and I agree with the above discussed plan of care  This note is created using dictation transcription  It may contain typographical errors, grammatical errors, improperly dictated words, background noise and other errors  Orders Placed This Encounter   Procedures    [place] spine and pain procedure     Standing Status:   Future     Standing Expiration Date:   1/10/2024     Order Specific Question:   Reason for Exam:     Answer:   LESI     Order Specific Question:   Anticoagulant hold needed? Answer:   Yes-Plavix     New Medications Ordered This Visit   Medications    levalbuterol (XOPENEX HFA) 45 mcg/act inhaler     Sig: Inhale 1-2 puffs       History of Present Illness    Kevin Martinez is a 76 y o  male last saw in August of 2018 underwent a lumbar transforaminal epidural steroid injection for right leg pain  He did well until approximately one year ago his pain returned primarily in his low back  He does have significant history of diabetes as well as vascular disease  Currently rates his pain is nine to 10/10 on the visual analog scale is intermittent described as throbbing and cramping  Patient is on Plavix  Patient's pain significant interferes with daily living activities worse with standing and walking relieved with rest and relaxation  I have personally reviewed and/or updated the patient's past medical history, past surgical history, family history, social history, current medications, allergies, and vital signs today  Review of Systems   Constitutional: Negative for fever and unexpected weight change  HENT: Negative for trouble swallowing  Eyes: Negative for visual disturbance  Respiratory: Negative for shortness of breath and wheezing  Cardiovascular: Negative for chest pain and palpitations  Gastrointestinal: Negative for constipation, diarrhea, nausea and vomiting     Endocrine: Negative for cold intolerance, heat intolerance and polydipsia  Genitourinary: Negative for difficulty urinating and frequency  Musculoskeletal: Positive for gait problem (difficulty walking decreased rom ) and joint swelling (joint stiffness)  Negative for arthralgias and myalgias  Skin: Negative for rash  Neurological: Negative for dizziness, seizures, syncope, weakness and headaches  Hematological: Does not bruise/bleed easily  Psychiatric/Behavioral: Negative for dysphoric mood  All other systems reviewed and are negative        Patient Active Problem List   Diagnosis    Type 1 diabetes mellitus ([facility])    Presence of drug coated stent in LAD coronary artery    Coronary artery disease of native artery of native heart with stable angina pectoris ([facility])    Presence of drug coated stent in left circumflex coronary artery    Dyslipidemia    Obstructive sleep apnea of adult    Carotid artery stenosis, asymptomatic, bilateral    Atherosclerosis of artery of extremity with intermittent claudication ([facility])    Restrictive lung disease    Centrilobular emphysema ([facility] )    Benign hypertension with chronic kidney disease, stage III ([facility])    CKD (chronic kidney disease) stage 3, GFR 30-59 ml/min ([facility])    Renal artery stenosis ([facility] )    Renal cyst, right    Vitamin D deficiency    Aortoiliac occlusive disease ([facility] )    Hypothyroidism, postablative    Low back pain with sciatica    Lumbar disc herniation    Lumbar radiculopathy    Diabetic neuropathy associated with type 1 diabetes mellitus ([facility])    History of Ischemic cardiomyopathy    Chronic systolic congestive heart failure ([facility])    NSTEMI (non-ST elevated myocardial infarction) ([facility] )    Anxiety with depression    Chronic deep vein thrombosis (DVT) of distal vein of right lower extremity ([facility])    Preoperative evaluation of a medical condition to rule out surgical contraindications (TAR required)       Past Medical History:   Diagnosis Date    Acute kidney injury [facility])     resolved 11/30/2015    Acute venous embolism and thrombosis of deep vessels of proximal lower extremity ([facility] )     resolved 04/04/2015    DARINEL (acute kidney injury) ([facility] ) 1/12/2016    Anesthesia     RESPIRATORY ISSUES DUE TO SLEEP APNEA    Cardiac disease     heart attack, stents x 4    CHF (congestive heart failure) ([facility])     Chronic cough     resolved 02/04/2016    Chronic pain disorder     intermitent claudication    COPD (chronic obstructive pulmonary disease) ([facility])     Coronary artery disease     CPAP (continuous positive airway pressure) dependence     Diabetes mellitus ([place] [address] )     Disease of thyroid gland     DVT (deep venous thrombosis) ([facility])     Heart failure ([facility])     Hyperlipidemia     Hypertension     Ischemic cardiomyopathy     last assessed 09/26/2017    Myocardial infarction [facility])     MI +2 2015,2018    Pulmonary emphysema ([facility])     Pulmonary granuloma ([facility])     resolved 02/03/2017    Renal failure     Sleep apnea        Past Surgical History:   Procedure Laterality Date    BYPASS FEMORAL-POPLITEAL      initial stenosis with stent left, 7 x 100 smart stent onset 02/24/2014    CARDIAC SURGERY      cardiac stents    CATARACT EXTRACTION      COLOGUARD (HISTORICAL)  2018    CORONARY ANGIOPLASTY WITH STENT PLACEMENT      x4    IR ABDOMINAL ANGIOGRAPHY / INTERVENTION  3/14/2019    ND THROMBOENDARTECTMY FEMORAL COMMON Left 3/22/2019    Procedure: ENDARTERECTOMY ARTERIAL FEMORAL WITH PATCH ANGIOPLASTY, BALLOON ANGIOPLASTY, STENT;  Surgeon: Hanane Cheung MD;  [facility];  Service: Vascular    ND VEIN BYPASS GRAFT,FEM-POP Left 3/22/2019    Procedure: BYPASS FEMORAL-POPLITEAL WITH COMPLETION ARTERIOGRAM;  Surgeon: Hanane Cheung MD;  [facility];  Service: Vascular    VASCULAR SURGERY      stent placement    WOUND DEBRIDEMENT Left 4/15/2019    Procedure: DEBRIDEMENT WOUND AND DRESSING CHANGE ([address]) left groin with VAC;  Surgeon: George Bui DO [place];  [facility];  Service: Vascular       Family History   Problem Relation Age of Onset    Heart disease Father         pacer placement    Hypertension Father     Kidney disease Father     Heart failure Father     Heart attack Father     Liver disease Father     Cirrhosis Mother         due to beer consumption    Liver disease Mother     Diabetes Other        Social History     Occupational History    Occupation: Retired    Occupation: Desk work    Occupation: [employer]   Tobacco Use    Smoking status: Former Smoker     Packs/day: 4 00     Years: 48 00     Pack years: 192 00     Types: Cigarettes     Last attempt to quit:      Years since quittin 0    Smokeless tobacco: Never Used    Tobacco comment: smoking 48 years 1 5 ppd d/c oct 2008 screening protocol as per allscripts   Substance and Sexual Activity    Alcohol use: Yes     Alcohol/week: 21 0 standard drinks     Types: 21 Standard drinks or equivalent per week     Frequency: 4 or more times a week     Drinks per session: 1 or 2     Binge frequency: Never     Comment: 2-3 glasses of vodka daily (6 shots) (history 2 drinks per day as per allscripts    Drug use: No    Sexual activity: Not Currently       Current Outpatient Medications on File Prior to Visit   Medication Sig    aspirin 81 MG tablet Take 81 mg by mouth daily      cholecalciferol (VITAMIN D3) 1,000 units tablet Take 1,000 Units by mouth daily      clopidogrel (PLAVIX) 75 mg tablet Take 1 tablet (75 mg total) by mouth daily    Coenzyme Q10 (COQ-10) 200 MG CAPS Take 200 mg by mouth daily    docusate sodium (COLACE) 50 mg capsule Take by mouth 2 (two) times a day as needed     gabapentin (NEURONTIN) 300 mg capsule Take 300 mg by mouth 2 (two) times a day     glucagon (GLUCAGON EMERGENCY) 1 MG injection 1 mg    insulin glargine (LANTUS) 100 units/mL subcutaneous injection Inject 40 Units under the skin daily in the early morning     insulin lispro (HUMALOG) 100 units/mL injection Inject 3 units with breakfast, 6 units at lunch, and 6 units at dinner (Patient taking differently: 4 (four) times a day Inject 3 units with breakfast, 6 units at lunch, and 6 units at dinner  Carbs counting)    isosorbide mononitrate (IMDUR) 30 mg 24 hr tablet TAKE ONE TABLET BY MOUTH EVERY DAY    Lactobacillus Rhamnosus, GG, (CULTURELLE) CAPS Take 1 capsule by mouth daily     levothyroxine 200 mcg tablet Take 200 mcg by mouth daily    metoprolol tartrate (LOPRESSOR) 25 mg tablet Take 1 tablet (25 mg total) by mouth every 12 (twelve) hours    nitroglycerin (NITROSTAT) 0 4 mg SL tablet Place 1 tablet (0 4 mg total) under the tongue every 5 (five) minutes as needed for chest pain    polyethylene glycol (MIRALAX) 17 g packet Take 17 g by mouth daily    rosuvastatin (CRESTOR) 20 MG tablet Take 1 tablet (20 mg total) by mouth daily    torsemide (DEMADEX) 20 mg tablet Take 1 tablet (20 mg total) by mouth daily    ULTICARE INSULIN SYRINGE 30G X 1/2" 1 ML MISC Use as directed    acetaminophen (TYLENOL) 325 mg tablet Take 2 tablets (650 mg total) by mouth every 6 (six) hours as needed for mild pain (Patient not taking: Reported on 1/10/2020)    levalbuterol (XOPENEX HFA) 45 mcg/act inhaler Inhale 1-2 puffs    loteprednol etabonate (LOTEMAX) 0 5 % ophthalmic suspension 1 drop 4 (four) times a day    mupirocin (BACTROBAN) 2 % ointment Apply topically 2 (two) times a day as needed     Nepafenac (ILEVRO) 0 3 % SUSP Apply to eye daily    prednisoLONE acetate (PRED FORTE) 1 % ophthalmic suspension INSTILL 1 DROP INTO THE LEFT EYE 3 TIMES DAILY     No current facility-administered medications on file prior to visit          Allergies   Allergen Reactions    Doxazosin Myalgia     UNKNOWN  UNKNOWN  Muscle cramps    Iohexol      UNKNOWN    Lisinopril Cough     cough  Other reaction(s): CAMELLA SINENSIS (ZESTRIL) (cough)  cough    Cardura [Doxazosin Mesylate] Muscle cramps    Other      Iv dye for cardiac cath  Renal failure very close to dialysis  But no dialysis  Iv dye for cardiac cath  Renal failure very close to dialysis  But no dialysis  Iv dye for cardiac cath  Renal failure very close to dialysis  But no dialysis       Physical Exam    /68   Pulse 67   Ht 5' 8" (1 727 m)   Wt 97 1 kg (214 lb)   BMI 32 54 kg/m²     Constitutional: normal, well developed, well nourished, alert, in no distress and non-toxic and no overt pain behavior  and obese  Eyes: anicteric  HEENT: grossly intact  Neck: supple, symmetric, trachea midline and no masses   Pulmonary:even and unlabored  Cardiovascular:  Peripheral lower extremity pulses distant  Skin:Normal without rashes or lesions and well hydrated  Psychiatric:Mood and affect appropriate  Neurologic:Cranial Nerves II-XII grossly intact  Musculoskeletal:  Difficulty going from sitting to standing to sitting position,Difficulty going from sitting to standing to sitting position no obvious skin lesions or erythema lumbar sacral spine no tenderness to palpation lumbar sacral spine spinous process sacroiliac joint or greater trochanter bilateral, deep tendon reflexes diminished but symmetrical bilateral patella Achilles positive straight leg raising on the right negative on the left, no focal motor deficit appreciated lower limbs  Imaging  MRI LUMBAR SPINE WITHOUT CONTRAST     INDICATION:  Leg claudication      COMPARISON:  None      TECHNIQUE:  Sagittal T1, sagittal T2, sagittal inversion recovery, axial T1 and axial T2, coronal T2        IMAGE QUALITY:  Diagnostic     FINDINGS:     ALIGNMENT:  Slight dextroscoliosis of the lumbar spine centered at L2  Straightening of the normal lumbar lordosis  No compression fracture  No spondylolisthesis or spondylolysis      MARROW SIGNAL:  Normal marrow signal is identified within the visualized bony structures    No discrete marrow lesion      DISTAL CORD AND CONUS:  Normal size and signal within the distal cord and conus  The conus ends at the L1 level      PARASPINAL SOFT TISSUES:  Paraspinal soft tissues are unremarkable      SACRUM:  Normal signal within the sacrum  No evidence of insufficiency or stress fracture      LOWER THORACIC DISC SPACES:  Normal disc height and signal   No disc herniation, canal stenosis or foraminal narrowing      LUMBAR DISC SPACES:          L1-L2:  Normal      L2-L3:  Disc desiccation and loss of disc height  Diffuse annular bulging  Broad-based left foraminal and lateral disc protrusion with associated endplate hypertrophic osteophyte formation  Minimal canal stenosis without cauda equina compression  Mild left foraminal narrowing      L3-L4:  Mild diffuse annular bulging  Mild endplate and facet degenerative change  Mild canal stenosis  Mild bilateral foraminal narrowing      L4-L5:  Mild diffuse annular bulging  Small central disc protrusion  Mild canal stenosis  Minimal foraminal narrowing without nerve impingement      L5-S1:  Disc desiccation and loss of disc height  Diffuse annular bulging with broad-based central disc protrusion, moderately large in size  Mild facet degenerative change  Mild to moderate canal stenosis and bilateral foraminal narrowing      IMPRESSION:     Lumbar spondylytic degenerative change  Moderately large broad-based central disc protrusion at L5-S1  Mild to moderate canal stenosis and foraminal narrowing      Mild canal stenosis and foraminal narrowing at L2-3, L3-4 and L4-5  I have personally reviewed pertinent films in PACS

## 2020-01-13 ENCOUNTER — TELEPHONE (OUTPATIENT)
Dept: CARDIOLOGY CLINIC | Facility: CLINIC | Age: 75
End: 2020-01-13

## 2020-01-13 NOTE — TELEPHONE ENCOUNTER
Received a fax from [facility] asking permission to hold Plavix 7 days prior to an epidural injection?

## 2020-01-13 NOTE — TELEPHONE ENCOUNTER
Casey Madrigal,  and RN S/W pt and wife on speaker phone  Advised them the hold form came back for plavix  Scheduled pt for his procedure on 1/21 at 1:00 and SOVS on 3/2 at 2:00 w/ DG  Pt instructed last dose of plavix is today 1/13 and to start holding it 1/14  Pt denies taking other anticoagulants  He takes ASA 81 mg  Advised him he does not need to hold that and to continue it  Pt and wife verbalized understanding of preoperative instructions and pt will contact [facility] if he gets sick or starts taking antibiotics  Casey Madrigal put appts in [address] [place] [address] Rd

## 2020-01-13 NOTE — TELEPHONE ENCOUNTER
Copied from other task:    med hold     DILLON routed conversation to [facility] [place] [facility] 1 hour ago (1:56 PM)       1 hour ago (1:55 PM)        Forwarded to Itz William         Documentation       MD DILLON Nieves 1 hour ago (1:54 PM)        Okay to hold Plavix seven days prior to epidural injection  Thank you  Documentation       DILLON routed conversation to Leo Donald MD 1 hour ago (1:36 PM)      DILLON 1 hour ago (1:36 PM)        Received a fax from [facility] asking permission to hold Plavix 7 days prior to an epidural injection?

## 2020-01-13 NOTE — TELEPHONE ENCOUNTER
Attempted to call the patient in order to clarify the previous message and s/w the patient and his wife via speaker phone  The patient stated he has no idea what I am inquiring about and to speak with Dr Atul Montaño  They stated check the chart and abruptly hung up the phone  Looks like a Plavix hold needs to faxed out  Please send a fax out today to hold   Thanks

## 2020-01-21 ENCOUNTER — HOSPITAL ENCOUNTER (OUTPATIENT)
Dept: RADIOLOGY | Facility: CLINIC | Age: 75
Discharge: HOME | End: 2020-01-21
Attending: ANESTHESIOLOGY | Admitting: ANESTHESIOLOGY
Payer: MEDICARE

## 2020-01-21 VITALS
TEMPERATURE: 97.9 F | RESPIRATION RATE: 18 BRPM | DIASTOLIC BLOOD PRESSURE: 61 MMHG | SYSTOLIC BLOOD PRESSURE: 144 MMHG | OXYGEN SATURATION: 94 % | HEART RATE: 66 BPM

## 2020-01-21 DIAGNOSIS — M54.16 LUMBAR RADICULOPATHY: ICD-10-CM

## 2020-01-21 DIAGNOSIS — M48.062 SPINAL STENOSIS OF LUMBAR REGION WITH NEUROGENIC CLAUDICATION: ICD-10-CM

## 2020-01-21 PROCEDURE — 62323 NJX INTERLAMINAR LMBR/SAC: CPT | Performed by: ANESTHESIOLOGY

## 2020-01-21 RX ORDER — METHYLPREDNISOLONE ACETATE 80 MG/ML
160 INJECTION, SUSPENSION INTRA-ARTICULAR; INTRALESIONAL; INTRAMUSCULAR; PARENTERAL; SOFT TISSUE ONCE
Status: COMPLETED | OUTPATIENT
Start: 2020-01-21 | End: 2020-01-21

## 2020-01-21 RX ORDER — LIDOCAINE HYDROCHLORIDE 10 MG/ML
5 INJECTION, SOLUTION EPIDURAL; INFILTRATION; INTRACAUDAL; PERINEURAL ONCE
Status: COMPLETED | OUTPATIENT
Start: 2020-01-21 | End: 2020-01-21

## 2020-01-21 RX ADMIN — IOHEXOL 1 ML: 300 INJECTION, SOLUTION INTRAVENOUS at 13:10

## 2020-01-21 RX ADMIN — METHYLPREDNISOLONE ACETATE 160 MG: 80 INJECTION, SUSPENSION INTRA-ARTICULAR; INTRALESIONAL; INTRAMUSCULAR; SOFT TISSUE at 13:10

## 2020-01-21 RX ADMIN — LIDOCAINE HYDROCHLORIDE 3 ML: 10 INJECTION, SOLUTION EPIDURAL; INFILTRATION; INTRACAUDAL; PERINEURAL at 13:09

## 2020-01-21 NOTE — DISCHARGE INSTR - LAB
Epidural Steroid Injection   WHAT YOU NEED TO KNOW:   An epidural steroid injection (ISMAEL) is a procedure to inject steroid medicine into the epidural space  The epidural space is between your spinal cord and vertebrae  Steroids reduce inflammation and fluid buildup in your spine that may be causing pain  You may be given pain medicine along with the steroids  ACTIVITY  · Do not drive or operate machinery today  · No strenuous activity today  bending, lifting, etc   · You may resume normal activites starting tomorrow  start slowly and as tolerated  · You may shower today, but no tub baths or hot tubs  · You may have numbness for several hours from the local anesthetic  Please use caution and common sense, especially with weight-bearing activities  CARE OF THE INJECTION SITE  · If you have soreness or pain, apply ice to the area today (20 minutes on/20 minutes off)  · Starting tomorrow, you may use warm, moist heat or ice if needed  · You may have an increase or change in your discomfort for 36-48 hours after your treatment  · Apply ice and continue with any pain medication you have been prescribed  · Notify the [facility] if you have any of the following: redness, drainage, swelling, headache, stiff neck or fever above 100°F     SPECIAL INSTRUCTIONS  · Our office will contact you in approximately 7 days for a progress report  MEDICATIONS  · Continue to take all routine medications  · Our office may have instructed you to hold some medications  If you have a problem specifically related to your procedure, please call our office at [phone]  Problems not related to your procedure should be directed to your primary care physician

## 2020-01-21 NOTE — H&P
History of Present Illness: The patient is a 76 y o  male who presents with complaints of low back and leg pain      Patient Active Problem List   Diagnosis    Type 1 diabetes mellitus ([facility])    Presence of drug coated stent in LAD coronary artery    Coronary artery disease of native artery of native heart with stable angina pectoris ([facility])    Presence of drug coated stent in left circumflex coronary artery    Dyslipidemia    Obstructive sleep apnea of adult    Carotid artery stenosis, asymptomatic, bilateral    Atherosclerosis of artery of extremity with intermittent claudication ([facility])    Restrictive lung disease    Centrilobular emphysema ([facility] )    Benign hypertension with chronic kidney disease, stage III ([facility])    CKD (chronic kidney disease) stage 3, GFR 30-59 ml/min ([facility])    Renal artery stenosis ([facility] )    Renal cyst, right    Vitamin D deficiency    Aortoiliac occlusive disease ([facility] [address] )    Hypothyroidism, postablative    Low back pain with sciatica    Lumbar disc herniation    Lumbar radiculopathy    Diabetic neuropathy associated with type 1 diabetes mellitus ([facility] [address] )    History of Ischemic cardiomyopathy    Chronic systolic congestive heart failure ([facility])    NSTEMI (non-ST elevated myocardial infarction) ([facility] [facility] )    Anxiety with depression    Chronic deep vein thrombosis (DVT) of distal vein of right lower extremity ([facility])    Preoperative evaluation of a medical condition to rule out surgical contraindications (TAR required)       Past Medical History:   Diagnosis Date    Acute kidney injury ([address] )     resolved 11/30/2015    Acute venous embolism and thrombosis of deep vessels of proximal lower extremity ([address] )     resolved 04/04/2015    DARINEL (acute kidney injury) ([facility] [address] ) 1/12/2016    Anesthesia     RESPIRATORY ISSUES DUE TO SLEEP APNEA    Cardiac disease     heart attack, stents x 4    CHF (congestive heart failure) ([facility])     Chronic cough     resolved 02/04/2016    Chronic pain disorder intermitent claudication    COPD (chronic obstructive pulmonary disease) ([facility])     Coronary artery disease     CPAP (continuous positive airway pressure) dependence     Diabetes mellitus ([address] )     Disease of thyroid gland     DVT (deep venous thrombosis) (H[facility])     Heart failure (HCC)     Hyperlipidemia     Hypertension     Ischemic cardiomyopathy     last assessed 09/26/2017    Myocardial infarction [facility])     MI +2 2015,2018    Pulmonary emphysema (HCC)     Pulmonary granuloma (HCC)     resolved 02/03/2017    Renal failure     Sleep apnea        Past Surgical History:   Procedure Laterality Date    BYPASS FEMORAL-POPLITEAL      initial stenosis with stent left, 7 x 100 smart stent onset 02/24/2014    CARDIAC SURGERY      cardiac stents    CATARACT EXTRACTION      COLOGUARD (HISTORICAL)  2018    CORONARY ANGIOPLASTY WITH STENT PLACEMENT      x4    IR ABDOMINAL ANGIOGRAPHY / INTERVENTION  3/14/2019    [place] THROMBOENDARTECTMY FEMORAL COMMON Left 3/22/2019    Procedure: ENDARTERECTOMY ARTERIAL FEMORAL WITH PATCH ANGIOPLASTY, BALLOON ANGIOPLASTY, STENT;  Surgeon: Matthew Alcazar MD;  [facility];  Service: Vascular    [place] VEIN BYPASS GRAFT,FEM-POP Left 3/22/2019    Procedure: BYPASS FEMORAL-POPLITEAL WITH COMPLETION ARTERIOGRAM;  Surgeon: Matthew Alcazar MD;  [facility];  Service: Vascular    VASCULAR SURGERY      stent placement    WOUND DEBRIDEMENT Left 4/15/2019    Procedure: DEBRIDEMENT WOUND AND DRESSING CHANGE ([address]) left groin with VAC;  Surgeon: Tho Montiel DO;  Location: [facility];  Service: Vascular         Current Outpatient Medications:     acetaminophen (TYLENOL) 325 mg tablet, Take 2 tablets (650 mg total) by mouth every 6 (six) hours as needed for mild pain (Patient not taking: Reported on 1/10/2020), Disp: 30 tablet, Rfl: 0    aspirin 81 MG tablet, Take 81 mg by mouth daily  , Disp: , Rfl:     cholecalciferol (VITAMIN D3) 1,000 units tablet, Take 1,000 Units by mouth daily  , Disp: , Rfl:     clopidogrel (PLAVIX) 75 mg tablet, Take 1 tablet (75 mg total) by mouth daily, Disp: 30 tablet, Rfl: 8    Coenzyme Q10 (COQ-10) 200 MG CAPS, Take 200 mg by mouth daily, Disp: , Rfl:     docusate sodium (COLACE) 50 mg capsule, Take by mouth 2 (two) times a day as needed , Disp: , Rfl:     gabapentin (NEURONTIN) 300 mg capsule, Take 300 mg by mouth 2 (two) times a day , Disp: , Rfl:     glucagon (GLUCAGON EMERGENCY) 1 MG injection, 1 mg, Disp: , Rfl:     insulin glargine (LANTUS) 100 units/mL subcutaneous injection, Inject 40 Units under the skin daily in the early morning , Disp: 10 mL, Rfl: 0    insulin lispro (HUMALOG) 100 units/mL injection, Inject 3 units with breakfast, 6 units at lunch, and 6 units at dinner (Patient taking differently: 4 (four) times a day Inject 3 units with breakfast, 6 units at lunch, and 6 units at dinner Carbs counting), Disp: 10 mL, Rfl: 1    isosorbide mononitrate (IMDUR) 30 mg 24 hr tablet, TAKE ONE TABLET BY MOUTH EVERY DAY, Disp: 90 tablet, Rfl: 1    Lactobacillus Rhamnosus, GG, (CULTURELLE) CAPS, Take 1 capsule by mouth daily , Disp: , Rfl:     levalbuterol (XOPENEX HFA) 45 mcg/act inhaler, Inhale 1-2 puffs, Disp: , Rfl:     levothyroxine 200 mcg tablet, Take 200 mcg by mouth daily, Disp: , Rfl: 0    loteprednol etabonate (LOTEMAX) 0 5 % ophthalmic suspension, 1 drop 4 (four) times a day, Disp: , Rfl:     metoprolol tartrate (LOPRESSOR) 25 mg tablet, Take 1 tablet (25 mg total) by mouth every 12 (twelve) hours, Disp: 180 tablet, Rfl: 1    mupirocin (BACTROBAN) 2 % ointment, Apply topically 2 (two) times a day as needed , Disp: , Rfl:     Nepafenac (ILEVRO) 0 3 % SUSP, Apply to eye daily, Disp: , Rfl:     nitroglycerin (NITROSTAT) 0 4 mg SL tablet, Place 1 tablet (0 4 mg total) under the tongue every 5 (five) minutes as needed for chest pain, Disp: 25 tablet, Rfl: 3    polyethylene glycol (MIRALAX) 17 g packet, Take 17 g by mouth daily, Disp: , Rfl:     prednisoLONE acetate (PRED FORTE) 1 % ophthalmic suspension, INSTILL 1 DROP INTO THE LEFT EYE 3 TIMES DAILY, Disp: , Rfl: 1    rosuvastatin (CRESTOR) 20 MG tablet, Take 1 tablet (20 mg total) by mouth daily, Disp: 90 tablet, Rfl: 3    torsemide (DEMADEX) 20 mg tablet, Take 1 tablet (20 mg total) by mouth daily, Disp: 120 tablet, Rfl: 0    ULTICARE INSULIN SYRINGE 30G X 1/2" 1 ML MISC, Use as directed, Disp: , Rfl: 0    Current Facility-Administered Medications:     iohexol (OMNIPAQUE) 300 mg/mL injection 50 mL, 50 mL, Epidural, Once, Fortunato Urena,     lidocaine (PF) (XYLOCAINE-MPF) 1 % injection 5 mL, 5 mL, Other, Once, Fortunato Urena DO    methylPREDNISolone acetate (DEPO-MEDROL) injection 160 mg, 160 mg, Epidural, Once, [employer], DO    Allergies   Allergen Reactions    Doxazosin Myalgia     UNKNOWN  UNKNOWN  Muscle cramps    Iohexol      UNKNOWN    Lisinopril Cough     cough  Other reaction(s): CAMELLA SINENSIS (ZESTRIL) (cough)  cough    Cardura [Doxazosin Mesylate]      Muscle cramps    Other      Iv dye for cardiac cath  Renal failure very close to dialysis  But no dialysis  Iv dye for cardiac cath  Renal failure very close to dialysis  But no dialysis  Iv dye for cardiac cath  Renal failure very close to dialysis  But no dialysis       Physical Exam:   Vitals:    01/21/20 1252   BP: 150/73   Pulse: 62   Resp: 20   Temp: 97 9 °F (36 6 °C)   SpO2: 98%     General: Awake, Alert, Oriented x 3, Mood and affect appropriate  Respiratory: Respirations even and unlabored  Cardiovascular: Peripheral pulses intact; no edema  Musculoskeletal Exam:  Decreased range of motion lumbar spine    ASA Score: II    Patient/Chart Verification  Patient ID Verified: Verbal  ID Band Applied: No  Consents Confirmed: Procedural, To be obtained in the Pre-Procedure area  H&P( within 30 days) Verified: To be obtained in the Pre-Procedure area  Allergies Reviewed:  Yes  Anticoag/NSAID held?: Yes(plavix held 7 days)  Currently on antibiotics?: No    Assessment:   1  Spinal stenosis of lumbar region with neurogenic claudication    2   Lumbar radiculopathy        Plan: PADILLA

## 2020-01-23 ENCOUNTER — APPOINTMENT (OUTPATIENT)
Dept: LAB | Facility: CLINIC | Age: 75
End: 2020-01-23
Payer: MEDICARE

## 2020-01-23 DIAGNOSIS — N18.30 CKD (CHRONIC KIDNEY DISEASE) STAGE 3, GFR 30-59 ML/MIN (HCC): ICD-10-CM

## 2020-01-23 LAB
ANION GAP SERPL CALCULATED.3IONS-SCNC: 6 MMOL/L (ref 4–13)
BUN SERPL-MCNC: 44 MG/DL (ref 5–25)
CALCIUM SERPL-MCNC: 9.7 MG/DL (ref 8.3–10.1)
CHLORIDE SERPL-SCNC: 106 MMOL/L (ref 100–108)
CO2 SERPL-SCNC: 28 MMOL/L (ref 21–32)
CREAT SERPL-MCNC: 1.79 MG/DL (ref 0.6–1.3)
GFR SERPL CREATININE-BSD FRML MDRD: 37 ML/MIN/1.73SQ M
GLUCOSE SERPL-MCNC: 236 MG/DL (ref 65–140)
POTASSIUM SERPL-SCNC: 4.2 MMOL/L (ref 3.5–5.3)
SODIUM SERPL-SCNC: 140 MMOL/L (ref 136–145)

## 2020-01-23 PROCEDURE — 36415 COLL VENOUS BLD VENIPUNCTURE: CPT

## 2020-01-23 PROCEDURE — 80048 BASIC METABOLIC PNL TOTAL CA: CPT

## 2020-01-28 ENCOUNTER — TELEPHONE (OUTPATIENT)
Dept: NEPHROLOGY | Facility: CLINIC | Age: 75
End: 2020-01-28

## 2020-01-28 ENCOUNTER — TELEPHONE (OUTPATIENT)
Dept: RADIOLOGY | Facility: CLINIC | Age: 75
End: 2020-01-28

## 2020-01-28 NOTE — TELEPHONE ENCOUNTER
I spoke with pt wife and made her aware of lab results   She is requesting that Dr Xander Pang give her a call to discuss a CT that pt is having done with contrast

## 2020-01-28 NOTE — TELEPHONE ENCOUNTER
Patient states that he is ok  Patient states that he still has a  cramping feeling in his right side of the back that radiates into right leg  Patient states that he has no pain while sitting  Patient states that it is when he walks  Pain scale is 5/10 with 0% relief

## 2020-01-28 NOTE — TELEPHONE ENCOUNTER
Spoke with patient, the patient was told he did not need any intravenous fluids pre and post CT angiogram at [facility]  This is likely a CO2 diagnostic angiogram and so should be ok without IV fluids for ANGEL Prophylaxis but if IV iodinated contrast being administered then would postpone and would give hydration pre and post procedure  He is going to confirm with radiology department and will let me know   I have asked them to call me back if there are any other questions or concerns

## 2020-01-28 NOTE — TELEPHONE ENCOUNTER
----- Message from Jacqui Flores DO sent at 1/28/2020  1:50 PM EST -----  Please let Manny Montalvo know that his creatinine is stable within his normal limits at 1 79 electrolytes are stable no changes to his medication

## 2020-01-28 NOTE — TELEPHONE ENCOUNTER
SAL-        RN s/w pt  Pt states he would like to repeat procedure but not right now  Per pt, he will call us when he is ready to schedule   Pt states " I have some other stuff going on right now "

## 2020-02-01 DIAGNOSIS — E78.5 DYSLIPIDEMIA: ICD-10-CM

## 2020-02-02 RX ORDER — ROSUVASTATIN CALCIUM 20 MG/1
TABLET, COATED ORAL
Qty: 90 TABLET | Refills: 0 | OUTPATIENT
Start: 2020-02-02

## 2020-02-10 DIAGNOSIS — E78.5 DYSLIPIDEMIA: ICD-10-CM

## 2020-02-10 RX ORDER — ROSUVASTATIN CALCIUM 20 MG/1
20 TABLET, COATED ORAL DAILY
Qty: 90 TABLET | Refills: 3 | Status: SHIPPED | OUTPATIENT
Start: 2020-02-10 | End: 2021-02-24 | Stop reason: SDUPTHER

## 2020-02-18 ENCOUNTER — TELEPHONE (OUTPATIENT)
Dept: VASCULAR SURGERY | Facility: CLINIC | Age: 75
End: 2020-02-18

## 2020-02-18 NOTE — TELEPHONE ENCOUNTER
Left message to schedule ov     REV: SYLVESTER 12/11/19 /CV 3/11/20, 6 month follow up    6 month follow up

## 2020-03-10 ENCOUNTER — OFFICE VISIT (OUTPATIENT)
Dept: FAMILY MEDICINE CLINIC | Facility: CLINIC | Age: 75
End: 2020-03-10
Payer: MEDICARE

## 2020-03-10 ENCOUNTER — LAB (OUTPATIENT)
Dept: LAB | Facility: CLINIC | Age: 75
End: 2020-03-10
Payer: MEDICARE

## 2020-03-10 VITALS
SYSTOLIC BLOOD PRESSURE: 120 MMHG | HEIGHT: 68 IN | DIASTOLIC BLOOD PRESSURE: 68 MMHG | RESPIRATION RATE: 16 BRPM | BODY MASS INDEX: 32.92 KG/M2 | OXYGEN SATURATION: 98 % | WEIGHT: 217.2 LBS | TEMPERATURE: 97.8 F | HEART RATE: 66 BPM

## 2020-03-10 DIAGNOSIS — E10.29 TYPE 1 DIABETES MELLITUS WITH MICROALBUMINURIA (HCC): ICD-10-CM

## 2020-03-10 DIAGNOSIS — I70.1 RENAL ARTERY STENOSIS (HCC): ICD-10-CM

## 2020-03-10 DIAGNOSIS — N18.30 BENIGN HYPERTENSION WITH CHRONIC KIDNEY DISEASE, STAGE III (HCC): ICD-10-CM

## 2020-03-10 DIAGNOSIS — J43.2 CENTRILOBULAR EMPHYSEMA (HCC): ICD-10-CM

## 2020-03-10 DIAGNOSIS — E89.0 HYPOTHYROIDISM, POSTABLATIVE: ICD-10-CM

## 2020-03-10 DIAGNOSIS — E10.51 TYPE 1 DIABETES MELLITUS WITH PERIPHERAL VASCULAR DISEASE (HCC): ICD-10-CM

## 2020-03-10 DIAGNOSIS — G47.33 OBSTRUCTIVE SLEEP APNEA OF ADULT: ICD-10-CM

## 2020-03-10 DIAGNOSIS — R80.9 TYPE 1 DIABETES MELLITUS WITH MICROALBUMINURIA (HCC): ICD-10-CM

## 2020-03-10 DIAGNOSIS — N18.30 CKD (CHRONIC KIDNEY DISEASE) STAGE 3, GFR 30-59 ML/MIN (HCC): ICD-10-CM

## 2020-03-10 DIAGNOSIS — I74.09 AORTOILIAC OCCLUSIVE DISEASE (HCC): Primary | ICD-10-CM

## 2020-03-10 DIAGNOSIS — I12.9 BENIGN HYPERTENSION WITH CHRONIC KIDNEY DISEASE, STAGE III (HCC): ICD-10-CM

## 2020-03-10 DIAGNOSIS — I25.118 CORONARY ARTERY DISEASE OF NATIVE ARTERY OF NATIVE HEART WITH STABLE ANGINA PECTORIS (HCC): ICD-10-CM

## 2020-03-10 DIAGNOSIS — D69.6 THROMBOCYTOPENIA, UNSPECIFIED (HCC): ICD-10-CM

## 2020-03-10 DIAGNOSIS — M48.062 SPINAL STENOSIS OF LUMBAR REGION WITH NEUROGENIC CLAUDICATION: ICD-10-CM

## 2020-03-10 LAB
ALBUMIN SERPL BCP-MCNC: 3.8 G/DL (ref 3.5–5)
ALP SERPL-CCNC: 96 U/L (ref 46–116)
ALT SERPL W P-5'-P-CCNC: 24 U/L (ref 12–78)
ANION GAP SERPL CALCULATED.3IONS-SCNC: 4 MMOL/L (ref 4–13)
AST SERPL W P-5'-P-CCNC: 16 U/L (ref 5–45)
BILIRUB SERPL-MCNC: 0.42 MG/DL (ref 0.2–1)
BUN SERPL-MCNC: 37 MG/DL (ref 5–25)
CALCIUM SERPL-MCNC: 9.3 MG/DL (ref 8.3–10.1)
CHLORIDE SERPL-SCNC: 110 MMOL/L (ref 100–108)
CO2 SERPL-SCNC: 29 MMOL/L (ref 21–32)
CREAT SERPL-MCNC: 1.68 MG/DL (ref 0.6–1.3)
ERYTHROCYTE [DISTWIDTH] IN BLOOD BY AUTOMATED COUNT: 13.2 % (ref 11.6–15.1)
GFR SERPL CREATININE-BSD FRML MDRD: 39 ML/MIN/1.73SQ M
GLUCOSE SERPL-MCNC: 180 MG/DL (ref 65–140)
HCT VFR BLD AUTO: 38.5 % (ref 36.5–49.3)
HGB BLD-MCNC: 12.1 G/DL (ref 12–17)
MCH RBC QN AUTO: 31.7 PG (ref 26.8–34.3)
MCHC RBC AUTO-ENTMCNC: 31.4 G/DL (ref 31.4–37.4)
MCV RBC AUTO: 101 FL (ref 82–98)
PLATELET # BLD AUTO: 132 THOUSANDS/UL (ref 149–390)
PMV BLD AUTO: 12.4 FL (ref 8.9–12.7)
POTASSIUM SERPL-SCNC: 4.5 MMOL/L (ref 3.5–5.3)
PROT SERPL-MCNC: 7.5 G/DL (ref 6.4–8.2)
RBC # BLD AUTO: 3.82 MILLION/UL (ref 3.88–5.62)
SODIUM SERPL-SCNC: 143 MMOL/L (ref 136–145)
TSH SERPL DL<=0.05 MIU/L-ACNC: 3.02 UIU/ML (ref 0.36–3.74)
WBC # BLD AUTO: 6.32 THOUSAND/UL (ref 4.31–10.16)

## 2020-03-10 PROCEDURE — 4040F PNEUMOC VAC/ADMIN/RCVD: CPT | Performed by: FAMILY MEDICINE

## 2020-03-10 PROCEDURE — 1160F RVW MEDS BY RX/DR IN RCRD: CPT | Performed by: FAMILY MEDICINE

## 2020-03-10 PROCEDURE — 85027 COMPLETE CBC AUTOMATED: CPT

## 2020-03-10 PROCEDURE — 3066F NEPHROPATHY DOC TX: CPT | Performed by: FAMILY MEDICINE

## 2020-03-10 PROCEDURE — 3078F DIAST BP <80 MM HG: CPT | Performed by: FAMILY MEDICINE

## 2020-03-10 PROCEDURE — 36415 COLL VENOUS BLD VENIPUNCTURE: CPT

## 2020-03-10 PROCEDURE — 1036F TOBACCO NON-USER: CPT | Performed by: FAMILY MEDICINE

## 2020-03-10 PROCEDURE — 84443 ASSAY THYROID STIM HORMONE: CPT

## 2020-03-10 PROCEDURE — 80053 COMPREHEN METABOLIC PANEL: CPT

## 2020-03-10 PROCEDURE — 3074F SYST BP LT 130 MM HG: CPT | Performed by: FAMILY MEDICINE

## 2020-03-10 PROCEDURE — 99215 OFFICE O/P EST HI 40 MIN: CPT | Performed by: FAMILY MEDICINE

## 2020-03-10 NOTE — PROGRESS NOTES
FAMILY PRACTICE OFFICE VISIT       NAME: Arnoldo Artis  AGE: 76 y o  SEX: male       : 1945        MRN: [id number]        Assessment and Plan     Problem List Items Addressed This Visit        Endocrine    Type 1 diabetes mellitus with peripheral vascular disease ([facility] )       Lab Results   Component Value Date    HGBA1C 7 6 (H) 2019   Patient remains under care of Endocrinology, Dr Rebecca Gauthier         Hypothyroidism, postablative     · Continue levothyroxine, endocrinology follows, will check TSH today prior to surgery         Relevant Orders    TSH, 3rd generation (Completed)       Respiratory    Obstructive sleep apnea of adult     Patient is compliant with CPAP, [facility]         Centrilobular emphysema ([facility] )     · Patient was evaluated by [facility] pulmonology  · He has tried multiple inhalers without improvement of symptoms  · Chronic unchanged dyspnea on exertion, patient denies symptoms of cough or wheezing  · Patient quit tobacco in   · Unremarkable chest x-ray in 2019            Cardiovascular and Mediastinum    Aortoiliac occlusive disease ([facility] ) - Primary (Chronic)     · Status post angioplasty and stenting of bilateral external iliac artery stenoses 2018 Dr Cody Avalos, [facility], marked improvement in left lower extremity symptoms  · Worsening of claudication symptoms right lower extremity  · Recent extensive evaluation by [address] [place]  · Pending SELECTIVE CATHETERIZATION EA 1ST ORDER ABDOMINAL PELVIC/LOWER EXTREMITY ART BRANCH on  by Dr Yvette Arredondo at [facility]         Coronary artery disease of native artery of native heart with stable angina pectoris [facility])     · Recent cardiac evaluation at [facility] [address], patient had stress test positive for ischemia followed by cardiac catheterization  · Cardiac catheterization revealing: There is evidence of coronary artery disease in a co-dominant system   The right coronary artery is a small co-dominant vessel with 70% proximal and   50% mid stenoses  The LAD has a 40-50% stenosis proximally before widely   patent stents  The LCX gives rise to 3 OM branches  The distal LCX has a   75% in stent restenosis just before the small LPDA  The OM3 is  with   left to left collaterals  · Patient was cleared for forthcoming vascular surgery by Cardiology at [facility]  · He remains on regimen of statin, isosorbide, beta-blocker, aspirin and Plavix  Benign hypertension with chronic kidney disease, stage III ([facility] )     · Patient remains under care of [facility] Nephrology  · Continue metoprolol for hypertension  · Patient remains on torsemide 20 mg daily  · Will proceed with CMP to reassess GFR         Relevant Orders    Comprehensive metabolic panel (Completed)    Renal artery stenosis (HCC)     · Vascular surgery follows            Genitourinary    CKD (chronic kidney disease) stage 3, GFR 30-59 ml/min (HCC)       Other    Spinal stenosis of lumbar region with neurogenic claudication     · Worsening of low back pain and neurogenic claudication  · Recent re-evaluation by [facility] Pain Management, Dr Daisy Salazar  · Status post Lumbar epidural steroid injection under fluoroscopic guidance  L5-S1 directed towards right on January 21, 2020         Thrombocytopenia, unspecified ([facility] )    Relevant Orders    CBC (Completed)      Patient with complex medical history presents for follow-up of chronic medical conditions  Assessment and plan as outlined above  Recent extensive evaluation at [address] with pending vascular surgery due to worsening of claudication symptoms on the right  Patient will continue same medication regimen  He is meeting his vascular surgeon in a few days to finalize surgical plans for March 18th  Patient denies symptoms of chest pain, palpitations or dizziness    He has unchanged dyspnea on exertion  Recent cardiac catheterization did not warrant any new stent placements  Patient remains on medical therapy as outlined above  Will proceed with blood work to reassess platelet count, GFR/creatinine and TSH  Patient remains under ongoing care of vascular surgery, Cardiology, Endocrinology, pulmonology and Spine and Pain management   Follow-up 3 months and earlier if needed  I have spent 40 minutes with Patient and family today in which greater than 50% of this time was spent in counseling/coordination of care regarding Diagnostic results, Risks and benefits of tx options, Intructions for management, Patient and family education, Importance of tx compliance, Risk factor reductions and Impressions  There are no Patient Instructions on file for this visit  Discussed with the patient and all questioned fully answered  He will call me if any problems arise  M*Modal software was used to dictate this note  It may contain errors with dictating incorrect words/spelling  Please contact provider directly with any questions  Chief Complaint     Chief Complaint   Patient presents with    Follow-up     3 months       History of Present Illness     Patient presents for follow-up of chronic medical conditions  He is being treated for type 1 diabetes, hypothyroidism, chronic renal insufficiency, coronary artery disease, peripheral vascular disease  Patient has been experiencing worsening of claudication on the right side  He was evaluated by vascular surgery at [address] [place], [place] [address]  and had extensive cardiac and vascular workup  Patient has extensive vascular surgery history including prior iliac stents, and left common femoral endarterectomy patch angioplasty and left femoral to above knee popliteal artery bypass   Results reviewed today with patient and wife in detail    Patient recently underwent cardiac catheterization at [address] [place] [address] after his nuclear stress test was positive for ischemia  Reportedly, patient is cleared by cardiology for forthcoming vascular surgery  Patient is complaining of persisting resting pain in his right foot, ankle and calf which is significantly worse at night  Patient also complains of severe symptoms of claudication, he can barely walk from exam room to the parking lot without pain  Patient states that walking significantly triggers his symptoms  On the other hand at night, he gets out of the bed and walks his pain off  Patient complains of persistent low back pain  He did receive epidural steroid injection by [facility] with no significant or lasting relief  Patient remains under care of Endocrinology, Dr Shira Reeves for treatment of type 1 diabetes  Blood sugars have been fluctuating  Patient is trying to avoid hypoglycemia  No recent hypoglycemic episodes  Patient denies symptoms of chest pain, dizziness or palpitations  He admits to chronic dyspnea on exertion  Patient has been compliant with daily medications, updated today   Patient is scheduled for preop vascular consult with Dr Duane Capes  at [address] [facility] PELVIC/LOWER EXTREMITY ART BRANCH  Recent diagnostic studies:    Blood work performed at [address] [place] [address] reveals hemoglobin of 13 and platelet count of 783  No recent TSH  Patient's wife is concerned about his creatinine/GFR after recent CTA  Cardiac cath - Dr Sawyer  There is evidence of coronary artery disease in a co-dominant system  The   right coronary artery is a small co-dominant vessel with 70% proximal and   50% mid stenoses  The LAD has a 40-50% stenosis proximally before widely   patent stents  The LCX gives rise to 3 OM branches  The distal LCX has a   75% in stent restenosis just before the small LPDA  The OM3 is  with   left to left collaterals  TTE  The study quality was diagnostic for the question posed  · The left ventricle is normal in size  There is mildly increased left   ventricular wall thickness consistent with mild concentric hypertrophy  Microbubble left ventricular contrast was given to assess wall motion and   ejection fraction  Global hypokinesis with relative hypokinesis of the   inferior wall and inferolateral wall from midwall to apex  Mildly   decreased left ventricular ejection fraction  The left ventricular   ejection fraction is 46% by biplane method of discs  · There is grade I (mild) LV diastolic dysfunction  · The right ventricle is normal in size  There is normal function of the   right ventricle  · The left atrium is mildly dilated  · There is trivial mitral valve leaflet thickening  There is trace mitral   regurgitation  · The aortic valve is trileaflet  There is trivial thickening of the   aortic valve  There is no aortic stenosis  There is no aortic   regurgitation  · There is no tricuspid regurgitation  The pulmonary artery systolic   pressure is unable to be estimated due to an absence of tricuspid   regurgitation  · The inferior vena cava is normal in size (diameter <21 mm) and decreases   >50% in size with inspiration, suggesting a normal right atrial pressure   of 3 mmHg (range 0-5 mm Hg)  NST  A SPECT pharmacologic stress test was performed using regadenoson  · A perfusion 1-day rest/stress protocol was performed  · Myocardial perfusion is abnormal   · There is scintigraphic evidence of inducible ischemia   · There is scintigraphic evidence of myocardial infarction   · The end diastolic cavity size is mildly enlarged  · LV ejection fraction: 41%  · Global left ventricular function is mildly decreased  · Unable to assess coronary calcification status  · There is no prior study available for comparison  CTA  1     Inflow: Considerable plaque in the infrarenal aorta   Kissing stents extend into both common iliac arteries  The stents are patent, though there is a distal 50% stenosis in the left common iliac artery and mild external iliac disease  2   SFA-popliteal: Occluded on the left with femoral popliteal bypass graft showing moderate neointimal hyperplasia  The reconstituted popliteal artery has 70% stenosis above the knee joint  On the right, multifocal segmental occlusions in the right   coronary artery and moderate popliteal stenosis  3  Run off: Probably single-vessel runoff bilaterally through the posterior tibial artery  The PT shows 70% stenosis on the right           Review of Systems   Review of Systems   Constitutional: Positive for fatigue  HENT: Negative  Eyes: Negative  Respiratory: Negative  Cardiovascular: Negative  Negative for chest pain, palpitations and leg swelling  Worsening of claudication symptoms right lower extremity   Gastrointestinal: Negative  Endocrine: Negative  Genitourinary: Negative  Musculoskeletal: Positive for arthralgias, back pain and myalgias  Skin: Negative  Allergic/Immunologic: Negative  Neurological: Negative  Hematological: Negative  Psychiatric/Behavioral: Negative          Active Problem List     Patient Active Problem List   Diagnosis    Type 1 diabetes mellitus with peripheral vascular disease ([facility])    Presence of drug coated stent in LAD coronary artery    Coronary artery disease of native artery of native heart with stable angina pectoris ([facility])    Presence of drug coated stent in left circumflex coronary artery    Dyslipidemia    Obstructive sleep apnea of adult    Carotid artery stenosis, asymptomatic, bilateral    Atherosclerosis of artery of extremity with intermittent claudication ([facility] )    Restrictive lung disease    Centrilobular emphysema ([facility] )    Benign hypertension with chronic kidney disease, stage III ([facility])    CKD (chronic kidney disease) stage 3, GFR 30-59 ml/min ([facility])    Renal artery stenosis [facility])    Renal cyst, right    Vitamin D deficiency    Aortoiliac occlusive disease ([place] [address] )    Hypothyroidism, postablative    Low back pain with sciatica    Lumbar disc herniation    Lumbar radiculopathy    Diabetic neuropathy associated with type 1 diabetes mellitus ([place] [address] )    History of Ischemic cardiomyopathy    Chronic systolic congestive heart failure ([facility])    NSTEMI (non-ST elevated myocardial infarction) ([place] [address] )    Anxiety with depression    Chronic deep vein thrombosis (DVT) of distal vein of right lower extremity ([facility])    Preoperative evaluation of a medical condition to rule out surgical contraindications (TAR required)    Spinal stenosis of lumbar region with neurogenic claudication    Thrombocytopenia, unspecified ([place] [address] )       Past Medical History:  Past Medical History:   Diagnosis Date    Acute kidney injury ([place] [address] )     resolved 11/30/2015    Acute venous embolism and thrombosis of deep vessels of proximal lower extremity ([place] [address] )     resolved 04/04/2015    DARINEL (acute kidney injury) ([place] [address] ) 1/12/2016    Anesthesia     RESPIRATORY ISSUES DUE TO SLEEP APNEA    Cardiac disease     heart attack, stents x 4    CHF (congestive heart failure) ([facility])     Chronic cough     resolved 02/04/2016    Chronic pain disorder     intermitent claudication    COPD (chronic obstructive pulmonary disease) ([facility])     Coronary artery disease     CPAP (continuous positive airway pressure) dependence     Diabetes mellitus ([place] [address] )     Disease of thyroid gland     DVT (deep venous thrombosis) ([place] [address] )     Heart failure ([place] [address] )     Hyperlipidemia     Hypertension     Ischemic cardiomyopathy     last assessed 09/26/2017    Myocardial infarction [facility])     MI +2 2015,2018    Pulmonary emphysema ([place] [address] )     Pulmonary granuloma ([facility])     resolved 02/03/2017    Renal failure     Sleep apnea        Past Surgical History:  Past Surgical History:   Procedure Laterality Date    BYPASS FEMORAL-POPLITEAL      initial stenosis with stent left, 7 x 100 smart stent onset 02/24/2014    CARDIAC SURGERY      cardiac stents    CATARACT EXTRACTION      COLOGUARD (HISTORICAL)  2018    CORONARY ANGIOPLASTY WITH STENT PLACEMENT      x4    IR ABDOMINAL ANGIOGRAPHY / INTERVENTION  3/14/2019    ND THROMBOENDARTECTMY FEMORAL COMMON Left 3/22/2019    Procedure: ENDARTERECTOMY ARTERIAL FEMORAL WITH PATCH ANGIOPLASTY, BALLOON ANGIOPLASTY, STENT;  Surgeon: Aym Andrade MD;  Location: [facility];  Service: Vascular    ND VEIN BYPASS GRAFT,FEM-POP Left 3/22/2019    Procedure: BYPASS FEMORAL-POPLITEAL WITH COMPLETION ARTERIOGRAM;  Surgeon: Amy Andrade MD;  [facility];  Service: Vascular    VASCULAR SURGERY      stent placement    WOUND DEBRIDEMENT Left 4/15/2019    Procedure: DEBRIDEMENT WOUND AND DRESSING CHANGE ([address]) left groin with VAC;  Surgeon: [facility] DO;  Location: [facility];  Service: Vascular       Family History:  Family History   Problem Relation Age of Onset    Heart disease Father         pacer placement    Hypertension Father     Kidney disease Father     Heart failure Father     Heart attack Father     Liver disease Father     Cirrhosis Mother         due to beer consumption    Liver disease Mother     Diabetes Other        Social History:  Social History     Socioeconomic History    Marital status: /Civil Union     Spouse name: Not on file    Number of children: Not on file    Years of education: Not on file    Highest education level: Not on file   Occupational History    Occupation: Retired    Occupation: Desk work    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   Social Needs    Financial resource strain: Not on file    Food insecurity:     Worry: Not on file     Inability: Not on file   [employer] needs:     Medical: Not on file     Non-medical: Not on file   Tobacco Use    Smoking status: Former Smoker     Packs/day: 4 00     Years: 48 00     Pack years: 192 00     Types: Cigarettes     Last attempt to quit:      Years since quittin 2    Smokeless tobacco: Never Used    Tobacco comment: smoking 48 years 1 5 ppd d/c oct 2008 screening protocol as per allscripts   Substance and Sexual Activity    Alcohol use: Yes     Alcohol/week: 21 0 standard drinks     Types: 21 Standard drinks or equivalent per week     Frequency: 4 or more times a week     Drinks per session: 1 or 2     Binge frequency: Never     Comment: 2-3 glasses of vodka daily (6 shots) (history 2 drinks per day as per allscripts    Drug use: No    Sexual activity: Not Currently   Lifestyle    Physical activity:     Days per week: Not on file     Minutes per session: Not on file    Stress: Not on file   Relationships    Social connections:     Talks on phone: Not on file     Gets together: Not on file     Attends [employer] service: Not on file     Active member of club or organization: Not on file     Attends meetings of clubs or organizations: Not on file     Relationship status: Not on file    Intimate partner violence:     Fear of current or ex partner: Not on file     Emotionally abused: Not on file     Physically abused: Not on file     Forced sexual activity: Not on file   Other Topics Concern    Not on file   Social History Narrative    Not on file           Objective     Vitals:    03/10/20 1102   BP: 120/68   BP Location: Left arm   Patient Position: Sitting   Cuff Size: Adult   Pulse: 66   Resp: 16   Temp: 97 8 °F (36 6 °C)   TempSrc: Tympanic   SpO2: 98%   Weight: 98 5 kg (217 lb 3 2 oz)   Height: 5' 8" (1 727 m)     Wt Readings from Last 3 Encounters:   03/10/20 98 5 kg (217 lb 3 2 oz)   01/10/20 97 1 kg (214 lb)   19 97 3 kg (214 lb 6 4 oz)       Physical Exam   Constitutional: He is oriented to person, place, and time  He appears well-developed and well-nourished  HENT:   Head: Normocephalic and atraumatic  Eyes: Conjunctivae are normal    Neck: Neck supple  No JVD present   Carotid bruit is not present  Cardiovascular: Normal rate, regular rhythm and normal heart sounds  No murmur heard  Pulmonary/Chest: Effort normal and breath sounds normal  No respiratory distress  He has no wheezes  He has no rales  Abdominal: Bowel sounds are normal  He exhibits no distension and no abdominal bruit  Musculoskeletal: Normal range of motion  He exhibits no edema  Bilateral feet:  Purplish discoloration, both feet are warm to touch  Decreased pulses bilaterally  Right foot is somewhat smaller than left  Negative straight leg rising bilaterally  Antalgic gait   Neurological: He is alert and oriented to person, place, and time  No cranial nerve deficit  Psychiatric: He has a normal mood and affect  His behavior is normal    Nursing note and vitals reviewed        Pertinent Laboratory/Diagnostic Studies:  Lab Results   Component Value Date    GLUCOSE 207 (H) 03/22/2019    BUN 37 (H) 03/10/2020    CREATININE 1 68 (H) 03/10/2020    CALCIUM 9 3 03/10/2020     12/21/2015    K 4 5 03/10/2020    CO2 29 03/10/2020     (H) 03/10/2020     Lab Results   Component Value Date    ALT 24 03/10/2020    AST 16 03/10/2020    ALKPHOS 96 03/10/2020    BILITOT 0 33 10/01/2015       Lab Results   Component Value Date    WBC 6 32 03/10/2020    HGB 12 1 03/10/2020    HCT 38 5 03/10/2020     (H) 03/10/2020     (L) 03/10/2020       No results found for: TSH    Lab Results   Component Value Date    CHOL 129 10/01/2015     Lab Results   Component Value Date    TRIG 324 (H) 12/02/2019     Lab Results   Component Value Date    HDL 34 (L) 12/02/2019     Lab Results   Component Value Date    LDLCALC 44 12/02/2019     Lab Results   Component Value Date    HGBA1C 7 6 (H) 12/16/2019       Results for orders placed or performed in visit on [birth year]   Basic metabolic panel   Result Value Ref Range    Sodium 140 136 - 145 mmol/L    Potassium 4 2 3 5 - 5 3 mmol/L    Chloride 106 100 - 108 mmol/L    CO2 28 21 - 32 mmol/L    ANION GAP 6 4 - 13 mmol/L    BUN 44 (H) 5 - 25 mg/dL    Creatinine 1 79 (H) 0 60 - 1 30 mg/dL    Glucose 236 (H) 65 - 140 mg/dL    Calcium 9 7 8 3 - 10 1 mg/dL    eGFR 37 ml/min/1 73sq m       Orders Placed This Encounter   Procedures    Comprehensive metabolic panel    TSH, 3rd generation    CBC       ALLERGIES:  Allergies   Allergen Reactions    Doxazosin Myalgia     UNKNOWN  UNKNOWN  Muscle cramps    Iohexol      UNKNOWN    Lisinopril Cough     cough  Other reaction(s): CAMELLA SINENSIS (ZESTRIL) (cough)  cough    Cardura [Doxazosin Mesylate]      Muscle cramps    Other      Iv dye for cardiac cath  Renal failure very close to dialysis  But no dialysis  Iv dye for cardiac cath  Renal failure very close to dialysis  But no dialysis  Iv dye for cardiac cath  Renal failure very close to dialysis  But no dialysis       Current Medications     Current Outpatient Medications   Medication Sig Dispense Refill    aspirin 81 MG tablet Take 81 mg by mouth daily        cholecalciferol (VITAMIN D3) 1,000 units tablet Take 1,000 Units by mouth daily        clopidogrel (PLAVIX) 75 mg tablet Take 1 tablet (75 mg total) by mouth daily 30 tablet 8    Coenzyme Q10 (COQ-10) 200 MG CAPS Take 200 mg by mouth daily      docusate sodium (COLACE) 50 mg capsule Take by mouth 2 (two) times a day as needed       gabapentin (NEURONTIN) 300 mg capsule Take 300 mg by mouth 2 (two) times a day       glucagon (GLUCAGON EMERGENCY) 1 MG injection 1 mg      insulin glargine (LANTUS) 100 units/mL subcutaneous injection Inject 40 Units under the skin daily in the early morning  10 mL 0    insulin lispro (HUMALOG) 100 units/mL injection Inject 3 units with breakfast, 6 units at lunch, and 6 units at dinner (Patient taking differently: 4 (four) times a day Inject 3 units with breakfast, 6 units at lunch, and 6 units at dinner  Carbs counting) 10 mL 1    isosorbide mononitrate (IMDUR) 30 mg 24 hr tablet TAKE ONE TABLET BY MOUTH EVERY DAY 90 tablet 1    Lactobacillus Rhamnosus, GG, (CULTURELLE) CAPS Take 1 capsule by mouth daily       levalbuterol (XOPENEX HFA) 45 mcg/act inhaler Inhale 1-2 puffs      levothyroxine 200 mcg tablet Take 200 mcg by mouth daily  0    loteprednol etabonate (LOTEMAX) 0 5 % ophthalmic suspension 1 drop 4 (four) times a day      metoprolol tartrate (LOPRESSOR) 25 mg tablet Take 1 tablet (25 mg total) by mouth every 12 (twelve) hours 180 tablet 1    mupirocin (BACTROBAN) 2 % ointment Apply topically 2 (two) times a day as needed       Nepafenac (ILEVRO) 0 3 % SUSP Apply to eye daily      nitroglycerin (NITROSTAT) 0 4 mg SL tablet Place 1 tablet (0 4 mg total) under the tongue every 5 (five) minutes as needed for chest pain 25 tablet 3    polyethylene glycol (MIRALAX) 17 g packet Take 17 g by mouth daily      prednisoLONE acetate (PRED FORTE) 1 % ophthalmic suspension INSTILL 1 DROP INTO THE LEFT EYE 3 TIMES DAILY  1    rosuvastatin (CRESTOR) 20 MG tablet Take 1 tablet (20 mg total) by mouth daily 90 tablet 3    torsemide (DEMADEX) 20 mg tablet Take 1 tablet (20 mg total) by mouth daily 120 tablet 0    ULTICARE INSULIN SYRINGE 30G X 1/2" 1 ML MISC Use as directed  0    acetaminophen (TYLENOL) 325 mg tablet Take 2 tablets (650 mg total) by mouth every 6 (six) hours as needed for mild pain (Patient not taking: Reported on 1/10/2020) 30 tablet 0     No current facility-administered medications for this visit  There are no discontinued medications      Health Maintenance     Health Maintenance   Topic Date Due    Hepatitis C Screening  1945    SLP PLAN OF CARE  1945    Falls: Plan of Care  04/27/2010    DM Eye Exam  09/19/2019    HEMOGLOBIN A1C  06/16/2020    Fall Risk  09/19/2020    Medicare Annual Wellness Visit (AWV)  09/19/2020    BMI: Followup Plan  10/05/2020    Diabetic Foot Exam  12/15/2020    URINE MICROALBUMIN  12/23/2020    BMI: Adult  03/10/2021    CRC Screening: Cologuard  08/18/2022    DTaP,Tdap,and Td Vaccines (2 - Td) 05/05/2023    Influenza Vaccine  Completed    Pneumococcal Vaccine: 65+ Years  Completed    Pneumococcal Vaccine: Pediatrics (0 to 5 Years) and At-Risk Patients (6 to 59 Years)  Aged Out    HIB Vaccine  Aged Out    Hepatitis B Vaccine  Aged Out    IPV Vaccine  Aged Out    Hepatitis A Vaccine  Aged Out    Meningococcal ACWY Vaccine  Aged Out    HPV Vaccine  Aged [employer] History   Administered Date(s) Administered    DT (pediatric) 12/01/2009    H1N1, All Formulations 12/01/2009    INFLUENZA 10/01/2008, 10/06/2009, 09/01/2010, 11/04/2013, 08/29/2018, 09/09/2019    Influenza Split High Dose Preservative Free IM 08/28/2014, 10/03/2016, 08/29/2018    Influenza TIV (IM) 10/01/2008, 10/19/2010, 08/26/2011, 09/20/2011, 08/01/2012, 09/01/2012, 09/13/2013, 09/15/2015, 09/03/2017    Meningococcal C/Y-HIB PRP 12/01/2009    Pneumococcal Conjugate 13-Valent 08/11/2015    Pneumococcal Polysaccharide PPV23 10/01/2008, 06/04/2009, 03/15/2017    Td (adult), adsorbed 03/04/2011    Tdap 05/05/2013    Zoster 10/01/2009    influenza, trivalent, adjuvanted 09/05/2019       Orly March MD

## 2020-03-11 ENCOUNTER — HOSPITAL ENCOUNTER (OUTPATIENT)
Dept: NON INVASIVE DIAGNOSTICS | Facility: CLINIC | Age: 75
Discharge: HOME | End: 2020-03-11
Payer: MEDICARE

## 2020-03-11 DIAGNOSIS — I65.23 CAROTID ARTERY STENOSIS, ASYMPTOMATIC, BILATERAL: ICD-10-CM

## 2020-03-11 PROCEDURE — 93880 EXTRACRANIAL BILAT STUDY: CPT

## 2020-03-12 PROCEDURE — 93880 EXTRACRANIAL BILAT STUDY: CPT | Performed by: SURGERY

## 2020-03-15 NOTE — ASSESSMENT & PLAN NOTE
· Recent cardiac evaluation at [facility], patient had stress test positive for ischemia followed by cardiac catheterization  · Cardiac catheterization revealing: There is evidence of coronary artery disease in a co-dominant system  The   right coronary artery is a small co-dominant vessel with 70% proximal and   50% mid stenoses  The LAD has a 40-50% stenosis proximally before widely   patent stents  The LCX gives rise to 3 OM branches  The distal LCX has a   75% in stent restenosis just before the small LPDA  The OM3 is  with   left to left collaterals  · Patient was cleared for forthcoming vascular surgery by Cardiology at [facility]  · He remains on regimen of statin, isosorbide, beta-blocker, aspirin and Plavix

## 2020-03-15 NOTE — ASSESSMENT & PLAN NOTE
· Patient was evaluated by [facility]'s pulmonology  · He has tried multiple inhalers without improvement of symptoms    · Chronic unchanged dyspnea on exertion, patient denies symptoms of cough or wheezing  · Patient quit tobacco in 2008  · Unremarkable chest x-ray in September of 2019

## 2020-03-15 NOTE — ASSESSMENT & PLAN NOTE
· Worsening of low back pain and neurogenic claudication  · Recent re-evaluation by [facility], Dr Donato Grubbs  · Status post Lumbar epidural steroid injection under fluoroscopic guidance    L5-S1 directed towards right on January 21, 2020

## 2020-03-15 NOTE — ASSESSMENT & PLAN NOTE
· Patient remains under care of [facility]'s Nephrology  · Continue metoprolol for hypertension    · Patient remains on torsemide 20 mg daily  · Will proceed with CMP to reassess GFR

## 2020-03-15 NOTE — ASSESSMENT & PLAN NOTE
· Status post angioplasty and stenting of bilateral external iliac artery stenoses 05/17/2018 Dr Tyler Kuhn, [facility]'s, marked improvement in left lower extremity symptoms  · Worsening of claudication symptoms right lower extremity  · Recent extensive evaluation by [address] [place]    · Pending SELECTIVE CATHETERIZATION EA 1ST ORDER ABDOMINAL PELVIC/LOWER EXTREMITY ART BRANCH on March 18th by Dr Husam Carrera at [facility]

## 2020-03-15 NOTE — ASSESSMENT & PLAN NOTE
Lab Results   Component Value Date    HGBA1C 7 6 (H) 12/16/2019   Patient remains under care of Endocrinology, Dr Ifeanyi Cotton

## 2020-03-24 ENCOUNTER — OFFICE VISIT (OUTPATIENT)
Dept: FAMILY MEDICINE CLINIC | Facility: CLINIC | Age: 75
End: 2020-03-24
Payer: MEDICARE

## 2020-03-24 DIAGNOSIS — I74.09 AORTOILIAC OCCLUSIVE DISEASE (HCC): Chronic | ICD-10-CM

## 2020-03-24 DIAGNOSIS — L97.909 ATHEROSCLEROSIS OF ARTERY OF EXTREMITY WITH ULCERATION (HCC): ICD-10-CM

## 2020-03-24 DIAGNOSIS — I70.299 ATHEROSCLEROSIS OF ARTERY OF EXTREMITY WITH ULCERATION (HCC): ICD-10-CM

## 2020-03-24 DIAGNOSIS — S90.511A ABRASION OF RIGHT ANKLE, INITIAL ENCOUNTER: Primary | ICD-10-CM

## 2020-03-24 PROCEDURE — 99214 OFFICE O/P EST MOD 30 MIN: CPT | Performed by: FAMILY MEDICINE

## 2020-03-24 RX ORDER — CEPHALEXIN 500 MG/1
500 CAPSULE ORAL
Qty: 21 CAPSULE | Refills: 0 | Status: SHIPPED | OUTPATIENT
Start: 2020-03-24 | End: 2020-03-31

## 2020-03-24 NOTE — PROGRESS NOTES
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)    Aortoiliac occlusive disease ([address] ) (Chronic)      Other Visit Diagnoses     Abrasion of right ankle, initial encounter    -  Primary    Relevant Medications    cephalexin (KEFLEX) 500 mg capsule    mupirocin (BACTROBAN) 2 % ointment            Patient with history of peripheral vascular disease with recent worsening of right lower extremity claudication  He underwent extensive diagnostic workup at [address] [place] [address]  He was supposed to proceed with catheterization of right lower extremity at [address] [place] [address] and his procedure was canceled due  COVID 19 situation  Patient developed 3 painless ulcerations in the right lower ankle  Video visit today  No obvious signs of cellulitis  Patient denies any pain or discomfort  He has been feeling stably otherwise  Patient's wife is reasonably concern given his significant risk factors including PVD, type 1 diabetes, hyperlipidemia and coronary artery disease  Patient's wife will start using bacitracin ointment and keep a abrasions/ulcerations covered for 12 hours a day and keep them open for 12 hours a day  Will start patient on Keflex 500 mg t i d  for 7 days preemptively to prevent cellulitis, I believe it is reasonable since patient has multiple risk factors  Patient and his wife will follow-up with vascular surgery at [facility], I advised him to explore possible alternatives for procedure to be performed locally  I strongly advised patient and his wife to contact me at any time if symptoms change or worsen a or they have any questions or concerns  Reason for visit is 3 ulceration/abrasion right lower extremity    Patient with history of PVD who was supposed to proceed with arteriogram on March 18th, surgery cancel by [facility] due to [place] [id number] situation    Encounter provider Mitchel Sebastian MD    Provider located at [address] [place] [address]  [address] [place] [address]      Recent Visits  No visits were found meeting these conditions  Showing recent visits within past 7 days and meeting all other requirements     Today's Visits  Date Type Provider Dept   03/24/20 Office Visit Mitchel Sebastian MD [facility] today's visits and meeting all other requirements     Future Appointments  Date Type Provider Dept   03/24/20 Office Visit Mitchel Sebastian  [facility]   Showing future appointments within next 150 days and meeting all other requirements        After connecting through [employer], the patient was identified by name and date of birth  Saul Feng was informed that this is a telemedicine visit and that the visit is being conducted through [email or web address] [address] [employer] which may not be secure and therefore, might not be HIPAA-compliant  My office door was closed  No one else was in the room  He acknowledged consent and understanding of privacy and security of the video platform  The patient has agreed to participate and understands they can discontinue the visit at any time  Subjective  Saul Feng is a 76 y o  male  with complex medical history including type 1 diabetes, peripheral vascular disease, coronary artery disease, chronic renal insufficiency, hyperlipidemia, hypothyroidism  Patient was scheduled for selective catheterization no worries at [address] on March 18th  Surgery was canceled  Prior to procedure patient had no lesions on his right lower extremity  Wife and patient have noticed 3 abrasion/superficial lacerations, 2 on the left lateral lower ankle and 1 medial inner ankle surface  They painless  Patient feels generally well  No fever  No other complaints    Patient's wife has been monitoring his blood pressure and heart rate, she states that vital signs are normal   Patient's wife is a retired registered nurse              Past Medical History:   Diagnosis Date    Acute kidney injury ([facility] )     resolved 11/30/2015    Acute venous embolism and thrombosis of deep vessels of proximal lower extremity ([facility] )     resolved 04/04/2015    [facility]) [facility] ) 1/12/2016    Anesthesia     RESPIRATORY ISSUES DUE TO SLEEP APNEA    Cardiac disease     heart attack, stents x 4    CH[facility]congestive heart failure) [facility])     Chronic cough     resolved 02/04/2016    Chronic pain disorder     intermitent claudication    COPD (chronic obstructive pulmonary disease) ([facility])     Coronary artery disease     CPAP (continuous positive airway pressure) dependence     Diabetes mellitus ([facility] )     Disease of thyroid gland     DVT (deep venous thrombosis) ([facility])     Heart failure ([facility])     Hyperlipidemia     Hypertension     Ischemic cardiomyopathy     last assessed 09/26/2017    Myocardial infarction [facility])     MI +2 2015,2018    Pulmonary emphysema ([facility])     Pulmonary granuloma ([facility])     resolved 02/03/2017    Renal failure     Sleep apnea        Past Surgical History:   Procedure Laterality Date    BYPASS FEMORAL-POPLITEAL      initial stenosis with stent left, 7 x 100 smart stent onset 02/24/2014    CARDIAC SURGERY      cardiac stents    CATARACT EXTRACTION      COLOGUARD (HISTORICAL)  2018    CORONARY ANGIOPLASTY WITH STENT PLACEMENT      x4    IR ABDOMINAL ANGIOGRAPHY / INTERVENTION  3/14/2019    VA THROMBOENDARTECTMY FEMORAL COMMON Left 3/22/2019    Procedure: ENDARTERECTOMY ARTERIAL FEMORAL WITH PATCH ANGIOPLASTY, BALLOON ANGIOPLASTY, STENT;  Surgeon: Lennox Rankin, MD;  Location: [facility];  Service: Vascular    VA VEIN BYPASS GRAFT,FEM-POP Left 3/22/2019    Procedure: BYPASS FEMORAL-POPLITEAL WITH COMPLETION ARTERIOGRAM;  Surgeon: Lennox Rankin, MD;  Location[facility];  Service: Vascular  VASCULAR SURGERY      stent placement    WOUND DEBRIDEMENT Left 4/15/2019    Procedure: DEBRIDEMENT WOUND AND DRESSING CHANGE [facility] OUT) left groin with VAC;  Surgeon: Kati Spangler DO;  Location: [facility];  Service: Vascular       Current Outpatient Medications   Medication Sig Dispense Refill    acetaminophen (TYLENOL) 325 mg tablet Take 2 tablets (650 mg total) by mouth every 6 (six) hours as needed for mild pain (Patient not taking: Reported on 1/10/2020) 30 tablet 0    aspirin 81 MG tablet Take 81 mg by mouth daily        cephalexin (KEFLEX) 500 mg capsule Take 1 capsule (500 mg total) by mouth 3 (three) times daily after meals for 7 days 21 capsule 0    cholecalciferol (VITAMIN D3) 1,000 units tablet Take 1,000 Units by mouth daily        clopidogrel (PLAVIX) 75 mg tablet Take 1 tablet (75 mg total) by mouth daily 30 tablet 8    Coenzyme Q10 (COQ-10) 200 MG CAPS Take 200 mg by mouth daily      docusate sodium (COLACE) 50 mg capsule Take by mouth 2 (two) times a day as needed       gabapentin (NEURONTIN) 300 mg capsule Take 300 mg by mouth 2 (two) times a day       glucagon (GLUCAGON EMERGENCY) 1 MG injection 1 mg      insulin glargine (LANTUS) 100 units/mL subcutaneous injection Inject 40 Units under the skin daily in the early morning  10 mL 0    insulin lispro (HUMALOG) 100 units/mL injection Inject 3 units with breakfast, 6 units at lunch, and 6 units at dinner (Patient taking differently: 4 (four) times a day Inject 3 units with breakfast, 6 units at lunch, and 6 units at dinner  Carbs counting) 10 mL 1    isosorbide mononitrate (IMDUR) 30 mg 24 hr tablet TAKE ONE TABLET BY MOUTH EVERY DAY 90 tablet 1    Lactobacillus Rhamnosus, GG, (CULTURELLE) CAPS Take 1 capsule by mouth daily       levalbuterol (XOPENEX HFA) 45 mcg/act inhaler Inhale 1-2 puffs      levothyroxine 200 mcg tablet Take 200 mcg by mouth daily  0    loteprednol etabonate (LOTEMAX) 0 5 % ophthalmic suspension 1 drop 4 (four) times a day      metoprolol tartrate (LOPRESSOR) 25 mg tablet Take 1 tablet (25 mg total) by mouth every 12 (twelve) hours 180 tablet 1    mupirocin (BACTROBAN) 2 % ointment Apply topically 2 (two) times a day as needed       mupirocin (BACTROBAN) 2 % ointment Apply topically 3 (three) times a day 30 g 1    Nepafenac (ILEVRO) 0 3 % SUSP Apply to eye daily      nitroglycerin (NITROSTAT) 0 4 mg SL tablet Place 1 tablet (0 4 mg total) under the tongue every 5 (five) minutes as needed for chest pain 25 tablet 3    polyethylene glycol (MIRALAX) 17 g packet Take 17 g by mouth daily      prednisoLONE acetate (PRED FORTE) 1 % ophthalmic suspension INSTILL 1 DROP INTO THE LEFT EYE 3 TIMES DAILY  1    rosuvastatin (CRESTOR) 20 MG tablet Take 1 tablet (20 mg total) by mouth daily 90 tablet 3    torsemide (DEMADEX) 20 mg tablet Take 1 tablet (20 mg total) by mouth daily 120 tablet 0    ULTICARE INSULIN SYRINGE 30G X 1/2" 1 ML MISC Use as directed  0     No current facility-administered medications for this visit  Allergies   Allergen Reactions    Doxazosin Myalgia     UNKNOWN  UNKNOWN  Muscle cramps    Iohexol      UNKNOWN    Lisinopril Cough     cough  Other reaction(s): CAMELLA SINENSIS (ZESTRIL) (cough)  cough    Cardura [Doxazosin Mesylate]      Muscle cramps    Other      Iv dye for cardiac cath  Renal failure very close to dialysis  But no dialysis  Iv dye for cardiac cath  Renal failure very close to dialysis  But no dialysis  Iv dye for cardiac cath  Renal failure very close to dialysis  But no dialysis       Review of Systems   Constitutional: Negative  Respiratory: Negative  Cardiovascular: Negative  Skin: Positive for wound  As outlined in HPI   Neurological: Negative  Psychiatric/Behavioral: Negative  Physical Exam   Constitutional: He appears well-developed and well-nourished  Musculoskeletal: Normal range of motion  Feet:     Three superficial abrasions/ulcerations  No surrounding edema or erythema  Psychiatric: He has a normal mood and affect  His behavior is normal         I spent 12  minutes with the patient during this visit

## 2020-04-08 ENCOUNTER — TELEPHONE (OUTPATIENT)
Dept: FAMILY MEDICINE CLINIC | Facility: CLINIC | Age: 75
End: 2020-04-08

## 2020-04-17 ENCOUNTER — TRANSITIONAL CARE MANAGEMENT (OUTPATIENT)
Dept: FAMILY MEDICINE CLINIC | Facility: CLINIC | Age: 75
End: 2020-04-17

## 2020-04-17 ENCOUNTER — TELEMEDICINE (OUTPATIENT)
Dept: FAMILY MEDICINE CLINIC | Facility: CLINIC | Age: 75
End: 2020-04-17
Payer: MEDICARE

## 2020-04-17 VITALS
SYSTOLIC BLOOD PRESSURE: 89 MMHG | WEIGHT: 217 LBS | BODY MASS INDEX: 32.89 KG/M2 | HEIGHT: 68 IN | DIASTOLIC BLOOD PRESSURE: 40 MMHG | HEART RATE: 82 BPM | TEMPERATURE: 96.7 F

## 2020-04-17 DIAGNOSIS — L97.909 ATHEROSCLEROSIS OF ARTERY OF EXTREMITY WITH ULCERATION (HCC): Primary | Chronic | ICD-10-CM

## 2020-04-17 DIAGNOSIS — I70.299 ATHEROSCLEROSIS OF ARTERY OF EXTREMITY WITH ULCERATION (HCC): Primary | Chronic | ICD-10-CM

## 2020-04-17 DIAGNOSIS — E89.0 HYPOTHYROIDISM, POSTABLATIVE: ICD-10-CM

## 2020-04-17 DIAGNOSIS — I25.118 CORONARY ARTERY DISEASE OF NATIVE ARTERY OF NATIVE HEART WITH STABLE ANGINA PECTORIS (HCC): ICD-10-CM

## 2020-04-17 DIAGNOSIS — L30.9 DERMATITIS: ICD-10-CM

## 2020-04-17 DIAGNOSIS — I74.09 AORTOILIAC OCCLUSIVE DISEASE (HCC): Chronic | ICD-10-CM

## 2020-04-17 DIAGNOSIS — I50.22 CHRONIC SYSTOLIC CONGESTIVE HEART FAILURE (HCC): ICD-10-CM

## 2020-04-17 DIAGNOSIS — E10.51 TYPE 1 DIABETES MELLITUS WITH PERIPHERAL VASCULAR DISEASE (HCC): ICD-10-CM

## 2020-04-17 PROCEDURE — 99496 TRANSJ CARE MGMT HIGH F2F 7D: CPT | Performed by: FAMILY MEDICINE

## 2020-04-17 RX ORDER — CLOTRIMAZOLE AND BETAMETHASONE DIPROPIONATE 10; .64 MG/G; MG/G
CREAM TOPICAL 2 TIMES DAILY
Qty: 45 G | Refills: 0 | Status: SHIPPED | OUTPATIENT
Start: 2020-04-17 | End: 2020-04-24 | Stop reason: ALTCHOICE

## 2020-04-17 RX ORDER — DOXYCYCLINE 100 MG/1
1 TABLET ORAL 2 TIMES DAILY
COMMUNITY
Start: 2020-04-15 | End: 2020-04-24 | Stop reason: ALTCHOICE

## 2020-04-17 RX ORDER — MULTIVIT WITH MINERALS/LUTEIN
250 TABLET ORAL 2 TIMES DAILY
COMMUNITY
Start: 2020-04-16 | End: 2020-04-30

## 2020-04-17 RX ORDER — AMMONIUM LACTATE 12 G/100G
LOTION TOPICAL 2 TIMES DAILY PRN
COMMUNITY
End: 2020-10-26 | Stop reason: SDUPTHER

## 2020-04-17 RX ORDER — ICOSAPENT ETHYL 1000 MG/1
1 CAPSULE ORAL 2 TIMES DAILY
COMMUNITY
End: 2020-08-24

## 2020-04-17 RX ORDER — CHLORHEXIDINE GLUCONATE 4 G/100ML
SOLUTION TOPICAL DAILY PRN
COMMUNITY
End: 2020-08-24

## 2020-04-17 RX ORDER — CEPHALEXIN 500 MG/1
500 CAPSULE ORAL 2 TIMES DAILY
COMMUNITY
Start: 2020-04-15 | End: 2020-04-23

## 2020-04-17 RX ORDER — METOPROLOL SUCCINATE 50 MG/1
3 TABLET, EXTENDED RELEASE ORAL EVERY MORNING
COMMUNITY
Start: 2020-04-15 | End: 2020-04-24

## 2020-04-20 ENCOUNTER — APPOINTMENT (OUTPATIENT)
Dept: LAB | Facility: CLINIC | Age: 75
End: 2020-04-20
Payer: MEDICARE

## 2020-04-20 ENCOUNTER — TRANSCRIBE ORDERS (OUTPATIENT)
Dept: LAB | Facility: CLINIC | Age: 75
End: 2020-04-20

## 2020-04-20 ENCOUNTER — LAB (OUTPATIENT)
Dept: LAB | Facility: CLINIC | Age: 75
End: 2020-04-20
Payer: MEDICARE

## 2020-04-20 ENCOUNTER — TELEPHONE (OUTPATIENT)
Dept: FAMILY MEDICINE CLINIC | Facility: CLINIC | Age: 75
End: 2020-04-20

## 2020-04-20 DIAGNOSIS — I74.09 AORTOILIAC OCCLUSIVE DISEASE (HCC): Chronic | ICD-10-CM

## 2020-04-20 LAB
ANION GAP SERPL CALCULATED.3IONS-SCNC: 5 MMOL/L (ref 4–13)
BUN SERPL-MCNC: 38 MG/DL (ref 5–25)
CALCIUM SERPL-MCNC: 9.3 MG/DL (ref 8.3–10.1)
CHLORIDE SERPL-SCNC: 111 MMOL/L (ref 100–108)
CO2 SERPL-SCNC: 24 MMOL/L (ref 21–32)
CREAT SERPL-MCNC: 1.67 MG/DL (ref 0.6–1.3)
ERYTHROCYTE [DISTWIDTH] IN BLOOD BY AUTOMATED COUNT: 15.8 % (ref 11.6–15.1)
GFR SERPL CREATININE-BSD FRML MDRD: 40 ML/MIN/1.73SQ M
GLUCOSE SERPL-MCNC: 339 MG/DL (ref 65–140)
HCT VFR BLD AUTO: 32.8 % (ref 36.5–49.3)
HGB BLD-MCNC: 10.1 G/DL (ref 12–17)
MCH RBC QN AUTO: 31 PG (ref 26.8–34.3)
MCHC RBC AUTO-ENTMCNC: 30.8 G/DL (ref 31.4–37.4)
MCV RBC AUTO: 101 FL (ref 82–98)
PLATELET # BLD AUTO: 175 THOUSANDS/UL (ref 149–390)
PMV BLD AUTO: 12.2 FL (ref 8.9–12.7)
POTASSIUM SERPL-SCNC: 5.4 MMOL/L (ref 3.5–5.3)
RBC # BLD AUTO: 3.26 MILLION/UL (ref 3.88–5.62)
SODIUM SERPL-SCNC: 140 MMOL/L (ref 136–145)
WBC # BLD AUTO: 6.17 THOUSAND/UL (ref 4.31–10.16)

## 2020-04-20 PROCEDURE — 80048 BASIC METABOLIC PNL TOTAL CA: CPT

## 2020-04-20 PROCEDURE — 36415 COLL VENOUS BLD VENIPUNCTURE: CPT

## 2020-04-20 PROCEDURE — 85027 COMPLETE CBC AUTOMATED: CPT

## 2020-04-21 ENCOUNTER — TELEMEDICINE (OUTPATIENT)
Dept: CARDIOLOGY CLINIC | Facility: CLINIC | Age: 75
End: 2020-04-21
Payer: MEDICARE

## 2020-04-21 ENCOUNTER — TELEPHONE (OUTPATIENT)
Dept: FAMILY MEDICINE CLINIC | Facility: CLINIC | Age: 75
End: 2020-04-21

## 2020-04-21 VITALS
WEIGHT: 206 LBS | HEART RATE: 72 BPM | DIASTOLIC BLOOD PRESSURE: 58 MMHG | SYSTOLIC BLOOD PRESSURE: 108 MMHG | BODY MASS INDEX: 31.22 KG/M2 | HEIGHT: 68 IN

## 2020-04-21 DIAGNOSIS — I73.9 PVD (PERIPHERAL VASCULAR DISEASE) (HCC): ICD-10-CM

## 2020-04-21 DIAGNOSIS — N18.30 BENIGN HYPERTENSION WITH CHRONIC KIDNEY DISEASE, STAGE III (HCC): Primary | ICD-10-CM

## 2020-04-21 DIAGNOSIS — I10 ESSENTIAL (PRIMARY) HYPERTENSION: Primary | ICD-10-CM

## 2020-04-21 DIAGNOSIS — E78.00 PURE HYPERCHOLESTEROLEMIA: ICD-10-CM

## 2020-04-21 DIAGNOSIS — I50.22 CHRONIC SYSTOLIC CONGESTIVE HEART FAILURE (HCC): ICD-10-CM

## 2020-04-21 DIAGNOSIS — I12.9 BENIGN HYPERTENSION WITH CHRONIC KIDNEY DISEASE, STAGE III (HCC): Primary | ICD-10-CM

## 2020-04-21 PROCEDURE — 99442 PR PHYS/QHP TELEPHONE EVALUATION 11-20 MIN: CPT | Performed by: INTERNAL MEDICINE

## 2020-04-22 ENCOUNTER — APPOINTMENT (OUTPATIENT)
Dept: LAB | Facility: CLINIC | Age: 75
End: 2020-04-22
Payer: MEDICARE

## 2020-04-22 DIAGNOSIS — N18.30 BENIGN HYPERTENSION WITH CHRONIC KIDNEY DISEASE, STAGE III (HCC): ICD-10-CM

## 2020-04-22 DIAGNOSIS — I12.9 BENIGN HYPERTENSION WITH CHRONIC KIDNEY DISEASE, STAGE III (HCC): ICD-10-CM

## 2020-04-22 LAB
ANION GAP SERPL CALCULATED.3IONS-SCNC: 6 MMOL/L (ref 4–13)
BUN SERPL-MCNC: 34 MG/DL (ref 5–25)
CALCIUM SERPL-MCNC: 9.6 MG/DL (ref 8.3–10.1)
CHLORIDE SERPL-SCNC: 110 MMOL/L (ref 100–108)
CO2 SERPL-SCNC: 25 MMOL/L (ref 21–32)
CREAT SERPL-MCNC: 1.66 MG/DL (ref 0.6–1.3)
GFR SERPL CREATININE-BSD FRML MDRD: 40 ML/MIN/1.73SQ M
GLUCOSE SERPL-MCNC: 203 MG/DL (ref 65–140)
POTASSIUM SERPL-SCNC: 4.8 MMOL/L (ref 3.5–5.3)
SODIUM SERPL-SCNC: 141 MMOL/L (ref 136–145)

## 2020-04-22 PROCEDURE — 36415 COLL VENOUS BLD VENIPUNCTURE: CPT

## 2020-04-22 PROCEDURE — 80048 BASIC METABOLIC PNL TOTAL CA: CPT

## 2020-04-23 ENCOUNTER — TELEPHONE (OUTPATIENT)
Dept: NEPHROLOGY | Facility: CLINIC | Age: 75
End: 2020-04-23

## 2020-04-23 ENCOUNTER — TELEPHONE (OUTPATIENT)
Dept: FAMILY MEDICINE CLINIC | Facility: CLINIC | Age: 75
End: 2020-04-23

## 2020-04-24 ENCOUNTER — TELEMEDICINE (OUTPATIENT)
Dept: FAMILY MEDICINE CLINIC | Facility: CLINIC | Age: 75
End: 2020-04-24
Payer: MEDICARE

## 2020-04-24 VITALS
WEIGHT: 208.6 LBS | TEMPERATURE: 96.2 F | HEIGHT: 68 IN | SYSTOLIC BLOOD PRESSURE: 108 MMHG | HEART RATE: 66 BPM | BODY MASS INDEX: 31.61 KG/M2 | DIASTOLIC BLOOD PRESSURE: 59 MMHG

## 2020-04-24 DIAGNOSIS — L97.909 ATHEROSCLEROSIS OF ARTERY OF EXTREMITY WITH ULCERATION (HCC): Chronic | ICD-10-CM

## 2020-04-24 DIAGNOSIS — I70.299 ATHEROSCLEROSIS OF ARTERY OF EXTREMITY WITH ULCERATION (HCC): Chronic | ICD-10-CM

## 2020-04-24 DIAGNOSIS — N18.30 CKD (CHRONIC KIDNEY DISEASE) STAGE 3, GFR 30-59 ML/MIN (HCC): ICD-10-CM

## 2020-04-24 DIAGNOSIS — I12.9 BENIGN HYPERTENSION WITH CHRONIC KIDNEY DISEASE, STAGE III (HCC): ICD-10-CM

## 2020-04-24 DIAGNOSIS — I50.22 CHRONIC SYSTOLIC CONGESTIVE HEART FAILURE (HCC): ICD-10-CM

## 2020-04-24 DIAGNOSIS — N18.30 BENIGN HYPERTENSION WITH CHRONIC KIDNEY DISEASE, STAGE III (HCC): ICD-10-CM

## 2020-04-24 DIAGNOSIS — I25.118 CORONARY ARTERY DISEASE OF NATIVE ARTERY OF NATIVE HEART WITH STABLE ANGINA PECTORIS (HCC): Primary | ICD-10-CM

## 2020-04-24 PROCEDURE — 99214 OFFICE O/P EST MOD 30 MIN: CPT | Performed by: FAMILY MEDICINE

## 2020-04-24 RX ORDER — TORSEMIDE 10 MG/1
TABLET ORAL
Qty: 30 TABLET | Refills: 3 | Status: SHIPPED | OUTPATIENT
Start: 2020-04-24 | End: 2020-05-01

## 2020-04-24 RX ORDER — 1.1% SODIUM FLUORIDE 11 MG/G
GEL DENTAL
COMMUNITY
Start: 2020-04-21 | End: 2022-06-04

## 2020-04-30 ENCOUNTER — TELEPHONE (OUTPATIENT)
Dept: NEPHROLOGY | Facility: CLINIC | Age: 75
End: 2020-04-30

## 2020-04-30 ENCOUNTER — APPOINTMENT (OUTPATIENT)
Dept: LAB | Facility: CLINIC | Age: 75
End: 2020-04-30
Payer: MEDICARE

## 2020-04-30 ENCOUNTER — TRANSCRIBE ORDERS (OUTPATIENT)
Dept: LAB | Facility: CLINIC | Age: 75
End: 2020-04-30

## 2020-04-30 DIAGNOSIS — E03.9 MYXEDEMA HEART DISEASE: Primary | ICD-10-CM

## 2020-04-30 DIAGNOSIS — E03.9 MYXEDEMA HEART DISEASE: ICD-10-CM

## 2020-04-30 DIAGNOSIS — E10.69 TYPE 1 DIABETES MELLITUS WITH OTHER SPECIFIED COMPLICATION (HCC): ICD-10-CM

## 2020-04-30 DIAGNOSIS — I51.9 MYXEDEMA HEART DISEASE: ICD-10-CM

## 2020-04-30 DIAGNOSIS — I51.9 MYXEDEMA HEART DISEASE: Primary | ICD-10-CM

## 2020-04-30 LAB
EST. AVERAGE GLUCOSE BLD GHB EST-MCNC: 131 MG/DL
HBA1C MFR BLD: 6.2 %
TSH SERPL DL<=0.05 MIU/L-ACNC: 2.42 UIU/ML (ref 0.36–3.74)

## 2020-04-30 PROCEDURE — 84443 ASSAY THYROID STIM HORMONE: CPT

## 2020-04-30 PROCEDURE — 36415 COLL VENOUS BLD VENIPUNCTURE: CPT

## 2020-04-30 PROCEDURE — 83036 HEMOGLOBIN GLYCOSYLATED A1C: CPT

## 2020-05-01 ENCOUNTER — TELEMEDICINE (OUTPATIENT)
Dept: NEPHROLOGY | Facility: CLINIC | Age: 75
End: 2020-05-01
Payer: MEDICARE

## 2020-05-01 DIAGNOSIS — I12.9 BENIGN HYPERTENSION WITH CHRONIC KIDNEY DISEASE, STAGE III (HCC): ICD-10-CM

## 2020-05-01 DIAGNOSIS — I70.1 RENAL ARTERY STENOSIS (HCC): Primary | ICD-10-CM

## 2020-05-01 DIAGNOSIS — N18.30 CKD (CHRONIC KIDNEY DISEASE) STAGE 3, GFR 30-59 ML/MIN (HCC): ICD-10-CM

## 2020-05-01 DIAGNOSIS — I50.22 CHRONIC SYSTOLIC CONGESTIVE HEART FAILURE (HCC): ICD-10-CM

## 2020-05-01 DIAGNOSIS — I25.5 ISCHEMIC CARDIOMYOPATHY: ICD-10-CM

## 2020-05-01 DIAGNOSIS — E10.51 TYPE 1 DIABETES MELLITUS WITH PERIPHERAL VASCULAR DISEASE (HCC): ICD-10-CM

## 2020-05-01 DIAGNOSIS — N28.1 RENAL CYST, RIGHT: ICD-10-CM

## 2020-05-01 DIAGNOSIS — N18.30 BENIGN HYPERTENSION WITH CHRONIC KIDNEY DISEASE, STAGE III (HCC): ICD-10-CM

## 2020-05-01 PROCEDURE — 99214 OFFICE O/P EST MOD 30 MIN: CPT | Performed by: INTERNAL MEDICINE

## 2020-05-01 RX ORDER — TORSEMIDE 10 MG/1
TABLET ORAL
Qty: 30 TABLET | Refills: 0 | Status: SHIPPED | OUTPATIENT
Start: 2020-05-01 | End: 2020-06-09

## 2020-05-05 ENCOUNTER — TELEMEDICINE (OUTPATIENT)
Dept: FAMILY MEDICINE CLINIC | Facility: CLINIC | Age: 75
End: 2020-05-05
Payer: MEDICARE

## 2020-05-05 VITALS
HEART RATE: 86 BPM | BODY MASS INDEX: 31.22 KG/M2 | TEMPERATURE: 97.4 F | HEIGHT: 68 IN | DIASTOLIC BLOOD PRESSURE: 58 MMHG | SYSTOLIC BLOOD PRESSURE: 103 MMHG | WEIGHT: 206 LBS

## 2020-05-05 DIAGNOSIS — E10.51 TYPE 1 DIABETES MELLITUS WITH PERIPHERAL VASCULAR DISEASE (HCC): ICD-10-CM

## 2020-05-05 DIAGNOSIS — L97.909 ATHEROSCLEROSIS OF ARTERY OF EXTREMITY WITH ULCERATION (HCC): Chronic | ICD-10-CM

## 2020-05-05 DIAGNOSIS — E10.49 OTHER DIABETIC NEUROLOGICAL COMPLICATION ASSOCIATED WITH TYPE 1 DIABETES MELLITUS (HCC): ICD-10-CM

## 2020-05-05 DIAGNOSIS — I12.9 BENIGN HYPERTENSION WITH CHRONIC KIDNEY DISEASE, STAGE III (HCC): Primary | ICD-10-CM

## 2020-05-05 DIAGNOSIS — N18.30 BENIGN HYPERTENSION WITH CHRONIC KIDNEY DISEASE, STAGE III (HCC): Primary | ICD-10-CM

## 2020-05-05 DIAGNOSIS — I70.299 ATHEROSCLEROSIS OF ARTERY OF EXTREMITY WITH ULCERATION (HCC): Chronic | ICD-10-CM

## 2020-05-05 DIAGNOSIS — I25.118 CORONARY ARTERY DISEASE OF NATIVE ARTERY OF NATIVE HEART WITH STABLE ANGINA PECTORIS (HCC): ICD-10-CM

## 2020-05-05 PROCEDURE — 99214 OFFICE O/P EST MOD 30 MIN: CPT | Performed by: FAMILY MEDICINE

## 2020-05-18 ENCOUNTER — OFFICE VISIT (OUTPATIENT)
Dept: CARDIOLOGY CLINIC | Facility: CLINIC | Age: 75
End: 2020-05-18
Payer: MEDICARE

## 2020-05-18 VITALS
WEIGHT: 213.7 LBS | BODY MASS INDEX: 33.54 KG/M2 | DIASTOLIC BLOOD PRESSURE: 76 MMHG | SYSTOLIC BLOOD PRESSURE: 126 MMHG | HEIGHT: 67 IN | HEART RATE: 72 BPM

## 2020-05-18 DIAGNOSIS — I73.9 PVD (PERIPHERAL VASCULAR DISEASE) (HCC): ICD-10-CM

## 2020-05-18 DIAGNOSIS — E78.00 PURE HYPERCHOLESTEROLEMIA: ICD-10-CM

## 2020-05-18 DIAGNOSIS — I25.10 CORONARY ARTERY DISEASE INVOLVING NATIVE CORONARY ARTERY OF NATIVE HEART WITHOUT ANGINA PECTORIS: ICD-10-CM

## 2020-05-18 DIAGNOSIS — I10 ESSENTIAL (PRIMARY) HYPERTENSION: Primary | ICD-10-CM

## 2020-05-18 PROCEDURE — 3066F NEPHROPATHY DOC TX: CPT | Performed by: INTERNAL MEDICINE

## 2020-05-18 PROCEDURE — 3008F BODY MASS INDEX DOCD: CPT | Performed by: INTERNAL MEDICINE

## 2020-05-18 PROCEDURE — 3044F HG A1C LEVEL LT 7.0%: CPT | Performed by: INTERNAL MEDICINE

## 2020-05-18 PROCEDURE — 1160F RVW MEDS BY RX/DR IN RCRD: CPT | Performed by: INTERNAL MEDICINE

## 2020-05-18 PROCEDURE — 2022F DILAT RTA XM EVC RTNOPTHY: CPT | Performed by: INTERNAL MEDICINE

## 2020-05-18 PROCEDURE — 1036F TOBACCO NON-USER: CPT | Performed by: INTERNAL MEDICINE

## 2020-05-18 PROCEDURE — 3078F DIAST BP <80 MM HG: CPT | Performed by: INTERNAL MEDICINE

## 2020-05-18 PROCEDURE — 4040F PNEUMOC VAC/ADMIN/RCVD: CPT | Performed by: INTERNAL MEDICINE

## 2020-05-18 PROCEDURE — 99214 OFFICE O/P EST MOD 30 MIN: CPT | Performed by: INTERNAL MEDICINE

## 2020-05-18 PROCEDURE — 3074F SYST BP LT 130 MM HG: CPT | Performed by: INTERNAL MEDICINE

## 2020-05-18 RX ORDER — CLOPIDOGREL BISULFATE 75 MG/1
75 TABLET ORAL DAILY
Qty: 90 TABLET | Refills: 3 | Status: SHIPPED | OUTPATIENT
Start: 2020-05-18 | End: 2021-05-18 | Stop reason: SDUPTHER

## 2020-05-18 RX ORDER — MULTIVIT WITH MINERALS/LUTEIN
250 TABLET ORAL DAILY
COMMUNITY
End: 2021-06-01

## 2020-05-26 ENCOUNTER — APPOINTMENT (OUTPATIENT)
Dept: LAB | Facility: CLINIC | Age: 75
End: 2020-05-26
Payer: MEDICARE

## 2020-05-26 DIAGNOSIS — E78.00 PURE HYPERCHOLESTEROLEMIA: ICD-10-CM

## 2020-05-26 DIAGNOSIS — I25.10 CORONARY ARTERY DISEASE INVOLVING NATIVE CORONARY ARTERY OF NATIVE HEART WITHOUT ANGINA PECTORIS: ICD-10-CM

## 2020-05-26 DIAGNOSIS — I73.9 PVD (PERIPHERAL VASCULAR DISEASE) (HCC): ICD-10-CM

## 2020-05-26 DIAGNOSIS — I10 ESSENTIAL (PRIMARY) HYPERTENSION: ICD-10-CM

## 2020-05-26 LAB
ANION GAP SERPL CALCULATED.3IONS-SCNC: 7 MMOL/L (ref 4–13)
BUN SERPL-MCNC: 31 MG/DL (ref 5–25)
CALCIUM SERPL-MCNC: 9.8 MG/DL (ref 8.3–10.1)
CHLORIDE SERPL-SCNC: 109 MMOL/L (ref 100–108)
CO2 SERPL-SCNC: 25 MMOL/L (ref 21–32)
CREAT SERPL-MCNC: 1.45 MG/DL (ref 0.6–1.3)
GFR SERPL CREATININE-BSD FRML MDRD: 47 ML/MIN/1.73SQ M
GLUCOSE P FAST SERPL-MCNC: 145 MG/DL (ref 65–99)
POTASSIUM SERPL-SCNC: 3.9 MMOL/L (ref 3.5–5.3)
SODIUM SERPL-SCNC: 141 MMOL/L (ref 136–145)

## 2020-05-26 PROCEDURE — 36415 COLL VENOUS BLD VENIPUNCTURE: CPT

## 2020-05-26 PROCEDURE — 80048 BASIC METABOLIC PNL TOTAL CA: CPT

## 2020-05-28 DIAGNOSIS — I10 ESSENTIAL (PRIMARY) HYPERTENSION: Primary | ICD-10-CM

## 2020-05-29 ENCOUNTER — TELEPHONE (OUTPATIENT)
Dept: CARDIOLOGY CLINIC | Facility: CLINIC | Age: 75
End: 2020-05-29

## 2020-06-01 DIAGNOSIS — M54.41 CHRONIC LOW BACK PAIN WITH BILATERAL SCIATICA, UNSPECIFIED BACK PAIN LATERALITY: Primary | ICD-10-CM

## 2020-06-01 DIAGNOSIS — M54.42 CHRONIC LOW BACK PAIN WITH BILATERAL SCIATICA, UNSPECIFIED BACK PAIN LATERALITY: Primary | ICD-10-CM

## 2020-06-01 DIAGNOSIS — G89.29 CHRONIC LOW BACK PAIN WITH BILATERAL SCIATICA, UNSPECIFIED BACK PAIN LATERALITY: Primary | ICD-10-CM

## 2020-06-02 RX ORDER — GABAPENTIN 300 MG/1
300 CAPSULE ORAL 2 TIMES DAILY
Qty: 180 CAPSULE | Refills: 1 | Status: SHIPPED | OUTPATIENT
Start: 2020-06-02 | End: 2020-06-08 | Stop reason: SDUPTHER

## 2020-06-05 ENCOUNTER — DOCUMENTATION (OUTPATIENT)
Dept: PULMONOLOGY | Facility: CLINIC | Age: 75
End: 2020-06-05

## 2020-06-05 ENCOUNTER — TELEPHONE (OUTPATIENT)
Dept: FAMILY MEDICINE CLINIC | Facility: CLINIC | Age: 75
End: 2020-06-05

## 2020-06-07 ENCOUNTER — APPOINTMENT (OUTPATIENT)
Dept: LAB | Facility: CLINIC | Age: 75
End: 2020-06-07
Payer: MEDICARE

## 2020-06-07 ENCOUNTER — TRANSCRIBE ORDERS (OUTPATIENT)
Dept: LAB | Facility: CLINIC | Age: 75
End: 2020-06-07

## 2020-06-07 DIAGNOSIS — I25.5 ISCHEMIC CARDIOMYOPATHY: ICD-10-CM

## 2020-06-07 DIAGNOSIS — I25.118 CORONARY ARTERY DISEASE OF NATIVE ARTERY OF NATIVE HEART WITH STABLE ANGINA PECTORIS (HCC): ICD-10-CM

## 2020-06-07 DIAGNOSIS — N28.1 RENAL CYST, RIGHT: ICD-10-CM

## 2020-06-07 DIAGNOSIS — N18.30 BENIGN HYPERTENSION WITH CHRONIC KIDNEY DISEASE, STAGE III (HCC): ICD-10-CM

## 2020-06-07 DIAGNOSIS — I12.9 BENIGN HYPERTENSION WITH CHRONIC KIDNEY DISEASE, STAGE III (HCC): ICD-10-CM

## 2020-06-07 DIAGNOSIS — I70.1 RENAL ARTERY STENOSIS (HCC): ICD-10-CM

## 2020-06-07 DIAGNOSIS — N18.30 CKD (CHRONIC KIDNEY DISEASE) STAGE 3, GFR 30-59 ML/MIN (HCC): ICD-10-CM

## 2020-06-07 DIAGNOSIS — I50.22 CHRONIC SYSTOLIC CONGESTIVE HEART FAILURE (HCC): ICD-10-CM

## 2020-06-07 DIAGNOSIS — E10.51 TYPE 1 DIABETES MELLITUS WITH PERIPHERAL VASCULAR DISEASE (HCC): ICD-10-CM

## 2020-06-07 DIAGNOSIS — I10 ESSENTIAL (PRIMARY) HYPERTENSION: ICD-10-CM

## 2020-06-07 LAB
ALBUMIN SERPL BCP-MCNC: 3.5 G/DL (ref 3.5–5)
ALP SERPL-CCNC: 93 U/L (ref 46–116)
ALT SERPL W P-5'-P-CCNC: 14 U/L (ref 12–78)
ANION GAP SERPL CALCULATED.3IONS-SCNC: 7 MMOL/L (ref 4–13)
AST SERPL W P-5'-P-CCNC: 18 U/L (ref 5–45)
BASOPHILS # BLD AUTO: 0.03 THOUSANDS/ΜL (ref 0–0.1)
BASOPHILS NFR BLD AUTO: 1 % (ref 0–1)
BILIRUB SERPL-MCNC: 0.47 MG/DL (ref 0.2–1)
BILIRUB UR QL STRIP: NEGATIVE
BUN SERPL-MCNC: 32 MG/DL (ref 5–25)
CALCIUM SERPL-MCNC: 9 MG/DL (ref 8.3–10.1)
CHLORIDE SERPL-SCNC: 108 MMOL/L (ref 100–108)
CLARITY UR: CLEAR
CO2 SERPL-SCNC: 28 MMOL/L (ref 21–32)
COLOR UR: YELLOW
CREAT SERPL-MCNC: 1.76 MG/DL (ref 0.6–1.3)
CREAT UR-MCNC: 47 MG/DL
EOSINOPHIL # BLD AUTO: 0.25 THOUSAND/ΜL (ref 0–0.61)
EOSINOPHIL NFR BLD AUTO: 5 % (ref 0–6)
ERYTHROCYTE [DISTWIDTH] IN BLOOD BY AUTOMATED COUNT: 13.7 % (ref 11.6–15.1)
GFR SERPL CREATININE-BSD FRML MDRD: 37 ML/MIN/1.73SQ M
GLUCOSE P FAST SERPL-MCNC: 119 MG/DL (ref 65–99)
GLUCOSE UR STRIP-MCNC: NEGATIVE MG/DL
HCT VFR BLD AUTO: 37.4 % (ref 36.5–49.3)
HGB BLD-MCNC: 11.7 G/DL (ref 12–17)
HGB UR QL STRIP.AUTO: NEGATIVE
IMM GRANULOCYTES # BLD AUTO: 0.01 THOUSAND/UL (ref 0–0.2)
IMM GRANULOCYTES NFR BLD AUTO: 0 % (ref 0–2)
KETONES UR STRIP-MCNC: NEGATIVE MG/DL
LEUKOCYTE ESTERASE UR QL STRIP: NEGATIVE
LYMPHOCYTES # BLD AUTO: 1.71 THOUSANDS/ΜL (ref 0.6–4.47)
LYMPHOCYTES NFR BLD AUTO: 31 % (ref 14–44)
MAGNESIUM SERPL-MCNC: 2.2 MG/DL (ref 1.6–2.6)
MCH RBC QN AUTO: 30.9 PG (ref 26.8–34.3)
MCHC RBC AUTO-ENTMCNC: 31.3 G/DL (ref 31.4–37.4)
MCV RBC AUTO: 99 FL (ref 82–98)
MONOCYTES # BLD AUTO: 0.79 THOUSAND/ΜL (ref 0.17–1.22)
MONOCYTES NFR BLD AUTO: 14 % (ref 4–12)
NEUTROPHILS # BLD AUTO: 2.71 THOUSANDS/ΜL (ref 1.85–7.62)
NEUTS SEG NFR BLD AUTO: 49 % (ref 43–75)
NITRITE UR QL STRIP: NEGATIVE
NRBC BLD AUTO-RTO: 0 /100 WBCS
PH UR STRIP.AUTO: 5.5 [PH]
PHOSPHATE SERPL-MCNC: 3.5 MG/DL (ref 2.3–4.1)
PLATELET # BLD AUTO: 137 THOUSANDS/UL (ref 149–390)
PMV BLD AUTO: 11.8 FL (ref 8.9–12.7)
POTASSIUM SERPL-SCNC: 4.4 MMOL/L (ref 3.5–5.3)
PROT SERPL-MCNC: 7.4 G/DL (ref 6.4–8.2)
PROT UR STRIP-MCNC: NEGATIVE MG/DL
PROT UR-MCNC: 22 MG/DL
PROT/CREAT UR: 0.47 MG/G{CREAT} (ref 0–0.1)
PTH-INTACT SERPL-MCNC: 59.4 PG/ML (ref 18.4–80.1)
RBC # BLD AUTO: 3.79 MILLION/UL (ref 3.88–5.62)
SODIUM SERPL-SCNC: 143 MMOL/L (ref 136–145)
SP GR UR STRIP.AUTO: 1.01 (ref 1–1.03)
URATE SERPL-MCNC: 7.7 MG/DL (ref 4.2–8)
UROBILINOGEN UR QL STRIP.AUTO: 0.2 E.U./DL
WBC # BLD AUTO: 5.5 THOUSAND/UL (ref 4.31–10.16)

## 2020-06-07 PROCEDURE — 36415 COLL VENOUS BLD VENIPUNCTURE: CPT

## 2020-06-07 PROCEDURE — 83970 ASSAY OF PARATHORMONE: CPT

## 2020-06-07 PROCEDURE — 84550 ASSAY OF BLOOD/URIC ACID: CPT

## 2020-06-07 PROCEDURE — 84100 ASSAY OF PHOSPHORUS: CPT

## 2020-06-07 PROCEDURE — 83735 ASSAY OF MAGNESIUM: CPT

## 2020-06-07 PROCEDURE — 84156 ASSAY OF PROTEIN URINE: CPT | Performed by: INTERNAL MEDICINE

## 2020-06-07 PROCEDURE — 82570 ASSAY OF URINE CREATININE: CPT | Performed by: INTERNAL MEDICINE

## 2020-06-07 PROCEDURE — 81003 URINALYSIS AUTO W/O SCOPE: CPT | Performed by: INTERNAL MEDICINE

## 2020-06-07 PROCEDURE — 85025 COMPLETE CBC W/AUTO DIFF WBC: CPT

## 2020-06-07 PROCEDURE — 80053 COMPREHEN METABOLIC PANEL: CPT

## 2020-06-08 ENCOUNTER — TELEMEDICINE (OUTPATIENT)
Dept: FAMILY MEDICINE CLINIC | Facility: CLINIC | Age: 75
End: 2020-06-08
Payer: MEDICARE

## 2020-06-08 ENCOUNTER — TELEPHONE (OUTPATIENT)
Dept: FAMILY MEDICINE CLINIC | Facility: CLINIC | Age: 75
End: 2020-06-08

## 2020-06-08 ENCOUNTER — OFFICE VISIT (OUTPATIENT)
Dept: PULMONOLOGY | Facility: CLINIC | Age: 75
End: 2020-06-08
Payer: MEDICARE

## 2020-06-08 VITALS
HEIGHT: 67 IN | TEMPERATURE: 95.8 F | SYSTOLIC BLOOD PRESSURE: 113 MMHG | DIASTOLIC BLOOD PRESSURE: 64 MMHG | OXYGEN SATURATION: 96 % | WEIGHT: 209.04 LBS | BODY MASS INDEX: 32.81 KG/M2 | HEART RATE: 82 BPM

## 2020-06-08 VITALS
SYSTOLIC BLOOD PRESSURE: 112 MMHG | HEIGHT: 67 IN | OXYGEN SATURATION: 98 % | BODY MASS INDEX: 34.21 KG/M2 | TEMPERATURE: 98 F | WEIGHT: 218 LBS | HEART RATE: 85 BPM | DIASTOLIC BLOOD PRESSURE: 70 MMHG

## 2020-06-08 DIAGNOSIS — I50.22 CHRONIC SYSTOLIC CONGESTIVE HEART FAILURE (HCC): ICD-10-CM

## 2020-06-08 DIAGNOSIS — R06.02 SHORTNESS OF BREATH: Primary | ICD-10-CM

## 2020-06-08 DIAGNOSIS — R06.00 DYSPNEA ON EXERTION: Primary | ICD-10-CM

## 2020-06-08 DIAGNOSIS — I12.9 BENIGN HYPERTENSION WITH CHRONIC KIDNEY DISEASE, STAGE III (HCC): ICD-10-CM

## 2020-06-08 DIAGNOSIS — J43.2 CENTRILOBULAR EMPHYSEMA (HCC): ICD-10-CM

## 2020-06-08 DIAGNOSIS — R06.00 DYSPNEA ON EXERTION: ICD-10-CM

## 2020-06-08 DIAGNOSIS — G89.29 CHRONIC LOW BACK PAIN WITH BILATERAL SCIATICA, UNSPECIFIED BACK PAIN LATERALITY: ICD-10-CM

## 2020-06-08 DIAGNOSIS — N18.30 CKD (CHRONIC KIDNEY DISEASE) STAGE 3, GFR 30-59 ML/MIN (HCC): ICD-10-CM

## 2020-06-08 DIAGNOSIS — T81.31XS DEHISCENCE OF OPERATIVE WOUND, SEQUELA: ICD-10-CM

## 2020-06-08 DIAGNOSIS — M54.41 CHRONIC LOW BACK PAIN WITH BILATERAL SCIATICA, UNSPECIFIED BACK PAIN LATERALITY: ICD-10-CM

## 2020-06-08 DIAGNOSIS — G47.33 OBSTRUCTIVE SLEEP APNEA OF ADULT: ICD-10-CM

## 2020-06-08 DIAGNOSIS — N18.30 BENIGN HYPERTENSION WITH CHRONIC KIDNEY DISEASE, STAGE III (HCC): ICD-10-CM

## 2020-06-08 DIAGNOSIS — J98.4 RESTRICTIVE LUNG DISEASE: ICD-10-CM

## 2020-06-08 DIAGNOSIS — M54.42 CHRONIC LOW BACK PAIN WITH BILATERAL SCIATICA, UNSPECIFIED BACK PAIN LATERALITY: ICD-10-CM

## 2020-06-08 DIAGNOSIS — I74.09 AORTOILIAC OCCLUSIVE DISEASE (HCC): Chronic | ICD-10-CM

## 2020-06-08 PROBLEM — Z01.818 PREOPERATIVE EVALUATION OF A MEDICAL CONDITION TO RULE OUT SURGICAL CONTRAINDICATIONS (TAR REQUIRED): Status: RESOLVED | Noted: 2019-09-20 | Resolved: 2020-06-08

## 2020-06-08 PROCEDURE — 2022F DILAT RTA XM EVC RTNOPTHY: CPT | Performed by: PHYSICIAN ASSISTANT

## 2020-06-08 PROCEDURE — 3066F NEPHROPATHY DOC TX: CPT | Performed by: PHYSICIAN ASSISTANT

## 2020-06-08 PROCEDURE — 99214 OFFICE O/P EST MOD 30 MIN: CPT | Performed by: FAMILY MEDICINE

## 2020-06-08 PROCEDURE — 3044F HG A1C LEVEL LT 7.0%: CPT | Performed by: PHYSICIAN ASSISTANT

## 2020-06-08 PROCEDURE — 99214 OFFICE O/P EST MOD 30 MIN: CPT | Performed by: PHYSICIAN ASSISTANT

## 2020-06-08 PROCEDURE — 3078F DIAST BP <80 MM HG: CPT | Performed by: PHYSICIAN ASSISTANT

## 2020-06-08 PROCEDURE — 3008F BODY MASS INDEX DOCD: CPT | Performed by: PHYSICIAN ASSISTANT

## 2020-06-08 PROCEDURE — 4040F PNEUMOC VAC/ADMIN/RCVD: CPT | Performed by: PHYSICIAN ASSISTANT

## 2020-06-08 PROCEDURE — 3074F SYST BP LT 130 MM HG: CPT | Performed by: PHYSICIAN ASSISTANT

## 2020-06-08 PROCEDURE — 1036F TOBACCO NON-USER: CPT | Performed by: PHYSICIAN ASSISTANT

## 2020-06-08 PROCEDURE — 1160F RVW MEDS BY RX/DR IN RCRD: CPT | Performed by: PHYSICIAN ASSISTANT

## 2020-06-08 RX ORDER — GABAPENTIN 300 MG/1
300 CAPSULE ORAL 3 TIMES DAILY
Qty: 270 CAPSULE | Refills: 1 | Status: SHIPPED | OUTPATIENT
Start: 2020-06-08 | End: 2020-07-07 | Stop reason: SDUPTHER

## 2020-06-09 ENCOUNTER — HOSPITAL ENCOUNTER (OUTPATIENT)
Dept: RADIOLOGY | Facility: HOSPITAL | Age: 75
Discharge: HOME | End: 2020-06-09
Payer: MEDICARE

## 2020-06-09 ENCOUNTER — TELEPHONE (OUTPATIENT)
Dept: NEPHROLOGY | Facility: CLINIC | Age: 75
End: 2020-06-09

## 2020-06-09 DIAGNOSIS — N18.30 CKD (CHRONIC KIDNEY DISEASE) STAGE 3, GFR 30-59 ML/MIN (HCC): ICD-10-CM

## 2020-06-09 DIAGNOSIS — R06.02 SHORTNESS OF BREATH: ICD-10-CM

## 2020-06-09 PROCEDURE — 71046 X-RAY EXAM CHEST 2 VIEWS: CPT

## 2020-06-09 RX ORDER — METOLAZONE 2.5 MG/1
2.5 TABLET ORAL 3 TIMES WEEKLY
Qty: 15 TABLET | Refills: 0 | Status: SHIPPED | OUTPATIENT
Start: 2020-06-10 | End: 2020-07-02

## 2020-06-09 RX ORDER — TORSEMIDE 10 MG/1
10 TABLET ORAL DAILY
Qty: 30 TABLET | Refills: 0 | Status: SHIPPED | OUTPATIENT
Start: 2020-06-09 | End: 2020-08-18

## 2020-06-11 ENCOUNTER — TELEPHONE (OUTPATIENT)
Dept: CARDIOLOGY CLINIC | Facility: CLINIC | Age: 75
End: 2020-06-11

## 2020-06-15 ENCOUNTER — OFFICE VISIT (OUTPATIENT)
Dept: CARDIOLOGY CLINIC | Facility: CLINIC | Age: 75
End: 2020-06-15
Payer: MEDICARE

## 2020-06-15 VITALS
BODY MASS INDEX: 33.84 KG/M2 | DIASTOLIC BLOOD PRESSURE: 72 MMHG | HEART RATE: 68 BPM | SYSTOLIC BLOOD PRESSURE: 138 MMHG | WEIGHT: 215.6 LBS | TEMPERATURE: 97.1 F | HEIGHT: 67 IN

## 2020-06-15 DIAGNOSIS — I50.22 CHRONIC SYSTOLIC CONGESTIVE HEART FAILURE (HCC): ICD-10-CM

## 2020-06-15 DIAGNOSIS — I25.10 CORONARY ARTERY DISEASE INVOLVING NATIVE CORONARY ARTERY OF NATIVE HEART WITHOUT ANGINA PECTORIS: ICD-10-CM

## 2020-06-15 DIAGNOSIS — E78.00 PURE HYPERCHOLESTEROLEMIA: ICD-10-CM

## 2020-06-15 DIAGNOSIS — I10 ESSENTIAL (PRIMARY) HYPERTENSION: Primary | ICD-10-CM

## 2020-06-15 PROCEDURE — 3075F SYST BP GE 130 - 139MM HG: CPT | Performed by: INTERNAL MEDICINE

## 2020-06-15 PROCEDURE — 1160F RVW MEDS BY RX/DR IN RCRD: CPT | Performed by: INTERNAL MEDICINE

## 2020-06-15 PROCEDURE — 2022F DILAT RTA XM EVC RTNOPTHY: CPT | Performed by: INTERNAL MEDICINE

## 2020-06-15 PROCEDURE — 3008F BODY MASS INDEX DOCD: CPT | Performed by: INTERNAL MEDICINE

## 2020-06-15 PROCEDURE — 99214 OFFICE O/P EST MOD 30 MIN: CPT | Performed by: INTERNAL MEDICINE

## 2020-06-15 PROCEDURE — 3066F NEPHROPATHY DOC TX: CPT | Performed by: INTERNAL MEDICINE

## 2020-06-15 PROCEDURE — 93000 ELECTROCARDIOGRAM COMPLETE: CPT | Performed by: INTERNAL MEDICINE

## 2020-06-15 PROCEDURE — 3044F HG A1C LEVEL LT 7.0%: CPT | Performed by: INTERNAL MEDICINE

## 2020-06-15 PROCEDURE — 1036F TOBACCO NON-USER: CPT | Performed by: INTERNAL MEDICINE

## 2020-06-15 PROCEDURE — 4040F PNEUMOC VAC/ADMIN/RCVD: CPT | Performed by: INTERNAL MEDICINE

## 2020-06-15 PROCEDURE — 3078F DIAST BP <80 MM HG: CPT | Performed by: INTERNAL MEDICINE

## 2020-06-15 RX ORDER — INSULIN GLARGINE 100 [IU]/ML
INJECTION, SOLUTION SUBCUTANEOUS
Status: ON HOLD | COMMUNITY
End: 2022-06-06 | Stop reason: SDUPTHER

## 2020-06-17 ENCOUNTER — LAB (OUTPATIENT)
Dept: LAB | Facility: HOSPITAL | Age: 75
End: 2020-06-17
Payer: MEDICARE

## 2020-06-17 ENCOUNTER — APPOINTMENT (OUTPATIENT)
Dept: WOUND CARE | Facility: HOSPITAL | Age: 75
End: 2020-06-17
Payer: MEDICARE

## 2020-06-17 DIAGNOSIS — N18.30 CKD (CHRONIC KIDNEY DISEASE) STAGE 3, GFR 30-59 ML/MIN (HCC): ICD-10-CM

## 2020-06-17 LAB
ANION GAP SERPL CALCULATED.3IONS-SCNC: 11 MMOL/L (ref 4–13)
BUN SERPL-MCNC: 35 MG/DL (ref 5–25)
CALCIUM SERPL-MCNC: 9.3 MG/DL (ref 8.3–10.1)
CHLORIDE SERPL-SCNC: 103 MMOL/L (ref 100–108)
CO2 SERPL-SCNC: 27 MMOL/L (ref 21–32)
CREAT SERPL-MCNC: 1.62 MG/DL (ref 0.6–1.3)
GFR SERPL CREATININE-BSD FRML MDRD: 41 ML/MIN/1.73SQ M
GLUCOSE SERPL-MCNC: 187 MG/DL (ref 65–140)
POTASSIUM SERPL-SCNC: 4 MMOL/L (ref 3.5–5.3)
SODIUM SERPL-SCNC: 141 MMOL/L (ref 136–145)

## 2020-06-17 PROCEDURE — 80048 BASIC METABOLIC PNL TOTAL CA: CPT

## 2020-06-17 PROCEDURE — 99213 OFFICE O/P EST LOW 20 MIN: CPT

## 2020-06-17 PROCEDURE — 11042 DBRDMT SUBQ TIS 1ST 20SQCM/<: CPT

## 2020-06-17 PROCEDURE — 36415 COLL VENOUS BLD VENIPUNCTURE: CPT

## 2020-06-18 ENCOUNTER — TELEPHONE (OUTPATIENT)
Dept: FAMILY MEDICINE CLINIC | Facility: CLINIC | Age: 75
End: 2020-06-18

## 2020-06-19 ENCOUNTER — TELEPHONE (OUTPATIENT)
Dept: NEPHROLOGY | Facility: CLINIC | Age: 75
End: 2020-06-19

## 2020-06-25 ENCOUNTER — APPOINTMENT (OUTPATIENT)
Dept: WOUND CARE | Facility: HOSPITAL | Age: 75
End: 2020-06-25
Payer: MEDICARE

## 2020-06-25 PROCEDURE — 97597 DBRDMT OPN WND 1ST 20 CM/<: CPT

## 2020-07-01 ENCOUNTER — APPOINTMENT (OUTPATIENT)
Dept: LAB | Facility: CLINIC | Age: 75
End: 2020-07-01
Payer: MEDICARE

## 2020-07-01 DIAGNOSIS — N18.30 CKD (CHRONIC KIDNEY DISEASE) STAGE 3, GFR 30-59 ML/MIN (HCC): Primary | ICD-10-CM

## 2020-07-01 DIAGNOSIS — N18.30 CKD (CHRONIC KIDNEY DISEASE) STAGE 3, GFR 30-59 ML/MIN (HCC): ICD-10-CM

## 2020-07-01 LAB
ANION GAP SERPL CALCULATED.3IONS-SCNC: 10 MMOL/L (ref 4–13)
BUN SERPL-MCNC: 36 MG/DL (ref 5–25)
CALCIUM SERPL-MCNC: 8.8 MG/DL (ref 8.3–10.1)
CHLORIDE SERPL-SCNC: 104 MMOL/L (ref 100–108)
CO2 SERPL-SCNC: 26 MMOL/L (ref 21–32)
CREAT SERPL-MCNC: 1.56 MG/DL (ref 0.6–1.3)
GFR SERPL CREATININE-BSD FRML MDRD: 43 ML/MIN/1.73SQ M
GLUCOSE P FAST SERPL-MCNC: 111 MG/DL (ref 65–99)
POTASSIUM SERPL-SCNC: 4.4 MMOL/L (ref 3.5–5.3)
SODIUM SERPL-SCNC: 140 MMOL/L (ref 136–145)

## 2020-07-01 PROCEDURE — 36415 COLL VENOUS BLD VENIPUNCTURE: CPT

## 2020-07-01 PROCEDURE — 80048 BASIC METABOLIC PNL TOTAL CA: CPT

## 2020-07-02 ENCOUNTER — APPOINTMENT (OUTPATIENT)
Dept: WOUND CARE | Facility: HOSPITAL | Age: 75
End: 2020-07-02
Payer: MEDICARE

## 2020-07-02 ENCOUNTER — OFFICE VISIT (OUTPATIENT)
Dept: NEPHROLOGY | Facility: CLINIC | Age: 75
End: 2020-07-02
Payer: MEDICARE

## 2020-07-02 ENCOUNTER — TELEPHONE (OUTPATIENT)
Dept: PULMONOLOGY | Facility: CLINIC | Age: 75
End: 2020-07-02

## 2020-07-02 VITALS
SYSTOLIC BLOOD PRESSURE: 138 MMHG | HEART RATE: 70 BPM | HEIGHT: 67 IN | BODY MASS INDEX: 34.21 KG/M2 | DIASTOLIC BLOOD PRESSURE: 80 MMHG | TEMPERATURE: 97.6 F | WEIGHT: 218 LBS

## 2020-07-02 DIAGNOSIS — I12.9 BENIGN HYPERTENSION WITH CHRONIC KIDNEY DISEASE, STAGE III (HCC): ICD-10-CM

## 2020-07-02 DIAGNOSIS — E10.49 OTHER DIABETIC NEUROLOGICAL COMPLICATION ASSOCIATED WITH TYPE 1 DIABETES MELLITUS (HCC): ICD-10-CM

## 2020-07-02 DIAGNOSIS — N18.30 CKD (CHRONIC KIDNEY DISEASE) STAGE 3, GFR 30-59 ML/MIN (HCC): ICD-10-CM

## 2020-07-02 DIAGNOSIS — E10.51 TYPE 1 DIABETES MELLITUS WITH PERIPHERAL VASCULAR DISEASE (HCC): ICD-10-CM

## 2020-07-02 DIAGNOSIS — N18.30 BENIGN HYPERTENSION WITH CHRONIC KIDNEY DISEASE, STAGE III (HCC): ICD-10-CM

## 2020-07-02 DIAGNOSIS — J43.2 CENTRILOBULAR EMPHYSEMA (HCC): Primary | ICD-10-CM

## 2020-07-02 DIAGNOSIS — N18.30 STAGE 3 CHRONIC KIDNEY DISEASE (HCC): Primary | ICD-10-CM

## 2020-07-02 DIAGNOSIS — I70.1 RENAL ARTERY STENOSIS (HCC): ICD-10-CM

## 2020-07-02 DIAGNOSIS — G47.33 OBSTRUCTIVE SLEEP APNEA OF ADULT: ICD-10-CM

## 2020-07-02 PROCEDURE — 3008F BODY MASS INDEX DOCD: CPT | Performed by: INTERNAL MEDICINE

## 2020-07-02 PROCEDURE — 97597 DBRDMT OPN WND 1ST 20 CM/<: CPT

## 2020-07-02 PROCEDURE — 2022F DILAT RTA XM EVC RTNOPTHY: CPT | Performed by: INTERNAL MEDICINE

## 2020-07-02 PROCEDURE — 3044F HG A1C LEVEL LT 7.0%: CPT | Performed by: INTERNAL MEDICINE

## 2020-07-02 PROCEDURE — 3066F NEPHROPATHY DOC TX: CPT | Performed by: INTERNAL MEDICINE

## 2020-07-02 PROCEDURE — 1036F TOBACCO NON-USER: CPT | Performed by: INTERNAL MEDICINE

## 2020-07-02 PROCEDURE — 4040F PNEUMOC VAC/ADMIN/RCVD: CPT | Performed by: INTERNAL MEDICINE

## 2020-07-02 PROCEDURE — 3079F DIAST BP 80-89 MM HG: CPT | Performed by: INTERNAL MEDICINE

## 2020-07-02 PROCEDURE — 99214 OFFICE O/P EST MOD 30 MIN: CPT | Performed by: INTERNAL MEDICINE

## 2020-07-02 PROCEDURE — 3075F SYST BP GE 130 - 139MM HG: CPT | Performed by: INTERNAL MEDICINE

## 2020-07-02 PROCEDURE — 1160F RVW MEDS BY RX/DR IN RCRD: CPT | Performed by: INTERNAL MEDICINE

## 2020-07-02 RX ORDER — LEVALBUTEROL TARTRATE 45 UG/1
1-2 AEROSOL, METERED ORAL EVERY 4 HOURS PRN
Qty: 1 INHALER | Refills: 6 | Status: SHIPPED | OUTPATIENT
Start: 2020-07-02 | End: 2021-03-09 | Stop reason: ALTCHOICE

## 2020-07-02 NOTE — Clinical Note
I forgot to mention a renal artery Doppler was ordered, can you remind him to complete this prior to his next appointment in October thank you

## 2020-07-02 NOTE — PROGRESS NOTES
Telephone note- Pulmonary Medicine   Davis Fatimah 76 y o  male MRN: [id number]    Reason for call:  Spoke with Freedom Franco and his wife by phone regarding shortness of breath and potential inhaler use      Pertinent details: In the past, Freedom Franco has not had responsiveness to bronchodilators  Given his recent shortness of breath however, his nephrologist and cardiologist suggested he discussed with me regarding trying inhaler therapy again  In the hospital he did uses Xopenex nebulizer and in the past and this was somewhat effective  He has also had difficulty with albuterol secondary to side effects of tremulousness and tachycardia  I have therefore ordered him a Xopenex HFA inhaler  He will try this  If there is any problem or if this is not effective, he was encouraged to let me know early next week  Prescription does not appear to have coverage in his formulary    We reviewed good Rx pricing and prescription will be sent to [facility] aid for pricing around $38     Stas Smith MD

## 2020-07-02 NOTE — TELEPHONE ENCOUNTER
Called to talk with wife regarding her concerns with a medication  I asked what exactly she wanted to discuss with Dr Jeremiah Christensen so I could give him a message  She said Chris's PCP and cardiologist told him to call us to start a medication for his COPD because he is SOB  He came in to see Jean Paul Marin on 6/8 and a CXR was ordered  She said they never got a call with the results and his Nephrologist had to tell them the results  She is not happy they she called here for Dex Cerda to see Dr Jeremiah Christensen and he had to see Jean Paul Marin  She is not happy that "I need to talk to 3 people first in order to talk to Dr Jeremiah Christensen"  I was about to explain to her that is our process, to collect as much information as possible  But she said someone else was calling and she hung up on me

## 2020-07-02 NOTE — PATIENT INSTRUCTIONS
Chronic Kidney Disease   WHAT YOU NEED TO KNOW:   Chronic kidney disease (CKD) is the gradual and permanent loss of kidney function  It is also called chronic kidney failure, or chronic renal insufficiency  Normally, the kidneys remove fluid, chemicals, and waste from your blood  These wastes are turned into urine by your kidneys  CKD may worsen over time and lead to kidney failure  DISCHARGE INSTRUCTIONS:   Return to the emergency department if:   · You are confused and very drowsy  · You have a seizure  · You have shortness of breath  Contact your healthcare provider if:   · You suddenly gain or lose more weight than your healthcare provider has told you is okay  · You have itchy skin or a rash  · You urinate more or less than you normally do  · You have blood in your urine  · You have nausea and repeated vomiting  · You have fatigue or muscle weakness  · You have hiccups that will not stop  · You have questions or concerns about your condition or care  Medicines:   · Medicines  may be given to decrease blood pressure and get rid of extra fluid  You may also receive medicine to manage health conditions that may occur with CKD, such as anemia, diabetes, and heart disease  · Take your medicine as directed  Contact your healthcare provider if you think your medicine is not helping or if you have side effects  Tell him or her if you are allergic to any medicine  Keep a list of the medicines, vitamins, and herbs you take  Include the amounts, and when and why you take them  Bring the list or the pill bottles to follow-up visits  Carry your medicine list with you in case of an emergency  Follow up with your healthcare provider as directed: You will need to return for tests to monitor your kidney function  You may also be referred to a kidney specialist  Write down your questions so you remember to ask them during your visits  Manage other health conditions:   Follow your healthcare provider's directions on how to manage diabetes, high blood pressure, and heart disease  These conditions can make CKD worse  Talk to your healthcare provider before you take over-the-counter medicine  Medicines such as NSAIDs, stomach medicine, or laxatives may harm your kidneys  Weigh yourself daily:  Ask your healthcare provider what your weight should be  Ask how much liquid you should drink each day  CKD may cause you to gain or lose weight rapidly  Weigh yourself every day  Write down your weight, how much liquid you drink or eat, and how much you urinate each day  Contact your healthcare provider if your weight is higher or lower than it should be  Manage CKD:   · Maintain a healthy weight  Ask your healthcare provider how much you should weigh  Ask him to help you create a weight loss plan if you are overweight  · Exercise 30 to 60 minutes a day, 4 to 7 times a week, or as directed  Ask about the best exercise plan for you  Regular exercise can help you manage CKD, high blood pressure, and diabetes  · Follow your healthcare provider's advice about what to eat and drink  He may tell you to eat food low in sodium (salt), potassium, phosphorus, or protein  You may need to see a dietitian if you need help planning meals  Ask how much liquid to drink each day and which liquids are best for you  · Limit alcohol  Ask how much alcohol is safe for you to drink  A drink of alcohol is 12 ounces of beer, 5 ounces of wine, or 1½ ounces of liquor  · Do not smoke  Nicotine and other chemicals in cigarettes and cigars can cause lung and kidney damage  Ask your healthcare provider for information if you currently smoke and need help to quit  E-cigarettes or smokeless tobacco still contain nicotine  Talk to your healthcare provider before you use these products  · Ask your healthcare provider if you need vaccines    Infections such as pneumonia, influenza, and hepatitis can be more harmful or more likely to occur in a person who has CKD  Vaccines reduce your risk of infection with these viruses  © 2017 [address] [facility] Information is for End User's use only and may not be sold, redistributed or otherwise used for commercial purposes  All illustrations and images included in CareNotes® are the copyrighted property of A D A M [employer] Inc  or Dylon Lambert  The above information is an  only  It is not intended as medical advice for individual conditions or treatments  Talk to your doctor, nurse or pharmacist before following any medical regimen to see if it is safe and effective for you

## 2020-07-02 NOTE — PROGRESS NOTES
OFFICE FOLLOW UP - Nephrology   Juan Carlos Anderson 76 y o  male MRN: [id number]    Encounter: [id number]        ASSESSMENT & PLAN    CKD (chronic kidney disease) stage 3, GFR 30-59 ml/min  -his hospitalization events were noted from the [facility] and reviewed with them -baseline creatinine around 1 6 his estimated GFR is likely around 40-50  -medications are appropriately dosed  -continue cardiovascular risk reduction measures  -his last creatinine was 1 5-1 6     Type 1 diabetes mellitus with complication [facility])  - continue  Glycemic control he has seen Endocrinology as well     Renal artery stenosis ([facility] )  - renal artery Doppler remains unchanged he has a right renal artery stenosis less than 60% and a left that is greater than 60%-repeat before his appointment in October     Benign hypertension with chronic kidney disease, stage III  - blood pressures are now low, holding metoprolol unless systolic blood pressure greater than 12 5 mg twice daily  -now on torso times daily     DARINEL (acute kidney injury) ([facility] [address] )  -creatinine has been in the 2s previously now stable around 1 6     Renal cyst, right  - renal ultrasound was checked in February of 2018 which showed a stable simple cyst in the upper pole of the right kidney and a normal left kidney and bladder, this was checked in November of 2018 in [place] and is stable in size     Proteinuria, unspecified type  - 1+ proteinuria on urinalysis longstanding history of diabetes not anemic, thrombocytopenic, no hematuria associated with this in creatinine is stable will monitor     Shortness of breath with coronary artery disease, COPD, systolic congestive heart failure, chronic kidney disease  -beta-blocker as tolerated  -continue torsemide 10 mg daily chest x-ray was reviewed will 1 diuretics increased creatinine increases with no effective improvements in weight or shortness of breath    We can do a diuretic challenge of torsemide 20 mg daily to see if there is any improvement in his urine output if no improvement would just take 10 mg daily  -with the addition of metolazone 2 5 mg 3 times weekly patient had no affective diuresis as well  -at this point given that there is relative stability in his weight will monitor     Peripheral vascular disease  -ongoing follow-up with vascular surgery    Follow-up in 3 months    HPI: Giovanna Maria is a 76 y o  male who is here for Follow-up; Hypertension; and Chronic Kidney Disease    He denies any acute complaints no chest pain fevers or chills, his weight has remained stable, unfortunately he continues to have this chronic shortness of breath on exertion, he does have systolic congestive heart failure, sleep apnea, emphysema the S likely contributed Lora Sheppard followed up with Pulmonary to see if there is any inhaler that could help with his symptoms    He otherwise was initiated on metolazone 2 5 mg 3 times weekly to see if there is any affective diuresis patient did not respond to this      ROS:   All the systems were reviewed and were negative except as documented on the HPI  Allergies: Doxazosin; Iohexol; Lisinopril; Vancomycin; Adhesive [medical tape]; Cardura [doxazosin mesylate]; and Other    Medications:   Current Outpatient Medications:     acetaminophen (TYLENOL) 325 mg tablet, Take 2 tablets (650 mg total) by mouth every 6 (six) hours as needed for mild pain (Patient taking differently: Take 500 mg by mouth every 8 (eight) hours as needed for mild pain Take 1,000 mg every 8 hours PRN), Disp: 30 tablet, Rfl: 0    ammonium lactate (LAC-HYDRIN) 12 % lotion, Apply topically 2 (two) times a day as needed for dry skin Apply to dry skin between the toes in the AM & in the PM, Disp: , Rfl:     ascorbic acid (VITAMIN C) 250 mg tablet, Take 250 mg by mouth daily, Disp: , Rfl:     aspirin 81 MG tablet, Take 81 mg by mouth daily  , Disp: , Rfl:     cholecalciferol (VITAMIN D3) 1,000 units tablet, Take 1,000 Units by mouth daily , Disp: , Rfl:     clopidogrel (PLAVIX) 75 mg tablet, Take 1 tablet (75 mg total) by mouth daily, Disp: 90 tablet, Rfl: 3    Coenzyme Q10 (COQ-10) 200 MG CAPS, Take 200 mg by mouth daily, Disp: , Rfl:     docusate sodium (COLACE) 50 mg capsule, Take by mouth 2 (two) times a day as needed , Disp: , Rfl:     gabapentin (NEURONTIN) 300 mg capsule, Take 1 capsule (300 mg total) by mouth 3 (three) times a day, Disp: 270 capsule, Rfl: 1    glucagon (GLUCAGON EMERGENCY) 1 MG injection, 1 mg, Disp: , Rfl:     Icosapent Ethyl (Vascepa) 1 g CAPS, Take 1 g by mouth 2 (two) times a day Take one capsule QAM & QHS, Disp: , Rfl:     insulin glargine (Lantus) 100 units/mL subcutaneous injection, Inject under the skin 40 units am- 26 units pm, Disp: , Rfl:     insulin lispro (HUMALOG) 100 units/mL injection, Inject 3 units with breakfast, 6 units at lunch, and 6 units at dinner (Patient taking differently: 4 (four) times a day Inject 3 units with breakfast, 6 units at lunch, and 6 units at dinner Carbs counting), Disp: 10 mL, Rfl: 1    Lactobacillus Rhamnosus, GG, (CULTURELLE) CAPS, Take 1 capsule by mouth daily , Disp: , Rfl:     levothyroxine 200 mcg tablet, Take 200 mcg by mouth daily, Disp: , Rfl: 0    Multiple Vitamins-Minerals (CENTRUM MEN) TABS, Take by mouth Take one tablet in the morning, Disp: , Rfl:     nitroglycerin (NITROSTAT) 0 4 mg SL tablet, Place 1 tablet (0 4 mg total) under the tongue every 5 (five) minutes as needed for chest pain, Disp: 25 tablet, Rfl: 3    polyethylene glycol (MIRALAX) 17 g packet, Take 17 g by mouth daily, Disp: , Rfl:     rosuvastatin (CRESTOR) 20 MG tablet, Take 1 tablet (20 mg total) by mouth daily, Disp: 90 tablet, Rfl: 3    SF 1 1 % GEL, BRUCH ONCE DAILY AT BEDTIME, BRUSH OF 1 MINUTE AS DIRECTED, Disp: , Rfl:     torsemide (DEMADEX) 10 mg tablet, Take 1 tablet (10 mg total) by mouth daily, Disp: 30 tablet, Rfl: 0    ULTICARE INSULIN SYRINGE 30G X 1/2" 1 ML MISC, Use as directed, Disp: , Rfl: 0    Wound Dressings (MEDIHONEY WOUND/BURN DRESSING) GEL, Apply topically daily at bedtime, Disp: , Rfl:     chlorhexidine (HIBICLENS) 4 % external liquid, Apply topically daily as needed Clean incision site in the AM and in the PM  , Disp: , Rfl:     metoprolol tartrate (LOPRESSOR) 25 mg tablet, Take half a tablet or 1 tablet twice a day as directed by physician (Patient not taking: Reported on 7/2/2020), Disp: 60 tablet, Rfl: 5    Past Medical History:   Diagnosis Date    Acute kidney injury ([facility] )     resolved 11/30/2015    Acute venous embolism and thrombosis of deep vessels of proximal lower extremity [facility] )     resolved 04/04/2015    DARINEL (acute kidney injury) ([facility] ) 1/12/2016    Anesthesia     RESPIRATORY ISSUES DUE TO SLEEP APNEA    Cardiac disease     heart attack, stents x 4    CH[facility]congestive heart failure) ([facility])     Chronic cough     resolved 02/04/2016    Chronic pain disorder     intermitent claudication    COPD (chronic obstructive pulmonary disease) ([facility])     Coronary artery disease     CPAP (continuous positive airway pressure) dependence     Diabetes mellitus ([facility] )     Disease of thyroid gland     DVT (deep venous thrombosis) ([facility] )     Heart failure ([facility] )     Hyperlipidemia     Hypertension     Ischemic cardiomyopathy     last assessed 09/26/2017    Myocardial infarction [facility])     MI +2 2015,2018    Pulmonary emphysema ([facility] )     Pulmonary granuloma ([facility] )     resolved 02/03/2017    Renal failure     Sleep apnea      Past Surgical History:   Procedure Laterality Date    BYPASS FEMORAL-POPLITEAL      initial stenosis with stent left, 7 x 100 smart stent onset 02/24/2014    CARDIAC SURGERY      cardiac stents    CATARACT EXTRACTION      COLOGJOSE (HISTORICAL)  2018    CORONARY ANGIOPLASTY WITH STENT PLACEMENT      x4    IR ABDOMINAL ANGIOGRAPHY / INTERVENTION  3/14/2019    ID THROMBOENDARTECTMY FEMORAL COMMON Left 3/22/2019    Procedure: ENDARTERECTOMY ARTERIAL FEMORAL WITH PATCH ANGIOPLASTY, BALLOON ANGIOPLASTY, STENT;  Surgeon: Amber Peterson MD;  [facility];  Service: Vascular    SC VEIN BYPASS GRAFT,FEM-POP Left 3/22/2019    Procedure: BYPASS FEMORAL-POPLITEAL WITH COMPLETION ARTERIOGRAM;  Surgeon: Amber Peterson MD;  [facility];  Service: Vascular    VASCULAR SURGERY      stent placement    WOUND DEBRIDEMENT Left 4/15/2019    Procedure: DEBRIDEMENT WOUND AND DRESSING CHANGE ([address]) left groin with VAC;  Surgeon: Alba Jones DO;  [facility];  Service: Vascular     Family History   Problem Relation Age of Onset    Heart disease Father         pacer placement    Hypertension Father     Kidney disease Father     Heart failure Father     Heart attack Father     Liver disease Father     Cirrhosis Mother         due to beer consumption    Liver disease Mother     Diabetes Other       reports that he quit smoking about 12 years ago  His smoking use included cigarettes  He has a 192 00 pack-year smoking history  He has never used smokeless tobacco  He reports that he drinks about 21 0 standard drinks of alcohol per week  He reports that he does not use drugs  Physical Exam:   Vitals:    07/02/20 1250   BP: 138/80   BP Location: Left arm   Patient Position: Sitting   Cuff Size: Adult   Pulse: 70   Temp: 97 6 °F (36 4 °C)   TempSrc: Oral   Weight: 98 9 kg (218 lb)   Height: 5' 7" (1 702 m)     Body mass index is 34 14 kg/m²      General: conscious, cooperative, in not acute distress  Eyes: conjunctivae pink, anicteric sclerae  ENT: lips and mucous membranes moist  Neck: supple, no JVD  Chest: clear breath sounds bilateral, no crackles, ronchus or wheezings  CVS: distinct S1 & S2, normal rate, regular rhythm  Abdomen: soft, non-tender, non-distended, normoactive bowel sounds  Extremities: no edema of both legs  Skin: no rash  Neuro: awake, alert, oriented      Lab Results:  Results from last 7 days Lab Units 07/01/20  1457   POTASSIUM mmol/L 4 4   CHLORIDE mmol/L 104   CO2 mmol/L 26   BUN mg/dL 36*   CREATININE mg/dL 1 56*   CALCIUM mg/dL 8 8         Portions of the record may have been created with voice recognition software  Occasional wrong word or "sound a like" substitutions may have occurred due to the inherent limitations of voice recognition software  Read the chart carefully and recognize, using context, where substitutions have occurred  If you have any questions, please contact the dictating provider

## 2020-07-02 NOTE — TELEPHONE ENCOUNTER
Pt's wife called to request a call back from Dr Bello in regards to a medication the Pt's PCP suggested that the Pt talk to Dr Bello about, wife would not give much detail but said that they don't want to speak with the 's assistant since they did not get much resolved when they last spoke to her

## 2020-07-07 ENCOUNTER — TELEPHONE (OUTPATIENT)
Dept: NEPHROLOGY | Facility: CLINIC | Age: 75
End: 2020-07-07

## 2020-07-07 DIAGNOSIS — M54.41 CHRONIC LOW BACK PAIN WITH BILATERAL SCIATICA, UNSPECIFIED BACK PAIN LATERALITY: ICD-10-CM

## 2020-07-07 DIAGNOSIS — G89.29 CHRONIC LOW BACK PAIN WITH BILATERAL SCIATICA, UNSPECIFIED BACK PAIN LATERALITY: ICD-10-CM

## 2020-07-07 DIAGNOSIS — M54.42 CHRONIC LOW BACK PAIN WITH BILATERAL SCIATICA, UNSPECIFIED BACK PAIN LATERALITY: ICD-10-CM

## 2020-07-07 RX ORDER — GABAPENTIN 300 MG/1
300 CAPSULE ORAL 3 TIMES DAILY
Qty: 270 CAPSULE | Refills: 1 | Status: SHIPPED | OUTPATIENT
Start: 2020-07-07 | End: 2021-02-04

## 2020-07-16 ENCOUNTER — APPOINTMENT (OUTPATIENT)
Dept: WOUND CARE | Facility: HOSPITAL | Age: 75
End: 2020-07-16
Payer: MEDICARE

## 2020-07-16 PROCEDURE — 97597 DBRDMT OPN WND 1ST 20 CM/<: CPT

## 2020-07-22 ENCOUNTER — TELEPHONE (OUTPATIENT)
Dept: NEPHROLOGY | Facility: CLINIC | Age: 75
End: 2020-07-22

## 2020-07-22 ENCOUNTER — TELEPHONE (OUTPATIENT)
Dept: OTHER | Facility: HOSPITAL | Age: 75
End: 2020-07-22

## 2020-07-22 NOTE — TELEPHONE ENCOUNTER
Spoke with patient and wife  He reports 10 pound weight gain since last seen on 7/02/2020 since he has been on torsemide 10 mg daily with 5 lb weight gain in one day  He reports not changing any diet or increase in oral liquids  Instructed patient to increase torsemide to 30 mg daily for three days, then decrease to 20 mg for two days  Call office on Monday with report on lower extremity edema, weights and blood pressure  If not responsive to increase in oral diuretics, or having continued increase in weight and lower extremity edema as well as shortness of breath, instructed patient to go to ER to be evaluated for he may need IV diuretics  Encourage low-sodium diet and limiting fluids

## 2020-07-22 NOTE — TELEPHONE ENCOUNTER
I spoke to the patient's wife, the patient is on Torsemide 10mg daily  He decreased on 7/17 from 20mg daily to 10mg daily  On 7/17 he weighed 209 8lbs  Today he weighed 218lbs  He is swollen in legs and feet, and the patient said he feels swollen in belly and groin as well  The patient's wife states he is having some SOB  He recently had surgery on his leg and that leg is more swollen due to the surgery

## 2020-07-23 ENCOUNTER — HOSPITAL ENCOUNTER (OUTPATIENT)
Dept: NON INVASIVE DIAGNOSTICS | Facility: CLINIC | Age: 75
Discharge: HOME | End: 2020-07-23
Payer: MEDICARE

## 2020-07-23 DIAGNOSIS — I50.22 CHRONIC SYSTOLIC CONGESTIVE HEART FAILURE (HCC): ICD-10-CM

## 2020-07-23 DIAGNOSIS — I10 ESSENTIAL (PRIMARY) HYPERTENSION: ICD-10-CM

## 2020-07-23 DIAGNOSIS — I25.10 CORONARY ARTERY DISEASE INVOLVING NATIVE CORONARY ARTERY OF NATIVE HEART WITHOUT ANGINA PECTORIS: ICD-10-CM

## 2020-07-23 DIAGNOSIS — E78.00 PURE HYPERCHOLESTEROLEMIA: ICD-10-CM

## 2020-07-23 PROCEDURE — C8929 TTE W OR WO FOL WCON,DOPPLER: HCPCS

## 2020-07-23 PROCEDURE — 93306 TTE W/DOPPLER COMPLETE: CPT | Performed by: INTERNAL MEDICINE

## 2020-07-23 RX ADMIN — PERFLUTREN 0.6 ML/MIN: 6.52 INJECTION, SUSPENSION INTRAVENOUS at 14:25

## 2020-07-27 ENCOUNTER — TELEPHONE (OUTPATIENT)
Dept: NEPHROLOGY | Facility: CLINIC | Age: 75
End: 2020-07-27

## 2020-07-27 DIAGNOSIS — N18.30 STAGE 3 CHRONIC KIDNEY DISEASE (HCC): Primary | ICD-10-CM

## 2020-07-27 NOTE — TELEPHONE ENCOUNTER
Pt called and stated on 7/22 he was 218lbs and today he is 213 6lbs  He was 30mg of torsemide on 7/22 and 20mg by 7/25

## 2020-07-27 NOTE — TELEPHONE ENCOUNTER
Returned patient call  Has lost 5 pounds with increase in diuretics  Instructed to keep taking Torsemide 20 mg for now  Get BMP Wednesday to ensure stability of renal function and electrolytes    Patient and wife agreed

## 2020-07-29 ENCOUNTER — TRANSCRIBE ORDERS (OUTPATIENT)
Dept: LAB | Facility: CLINIC | Age: 75
End: 2020-07-29

## 2020-07-29 ENCOUNTER — APPOINTMENT (OUTPATIENT)
Dept: LAB | Facility: CLINIC | Age: 75
End: 2020-07-29
Payer: MEDICARE

## 2020-07-29 ENCOUNTER — TELEPHONE (OUTPATIENT)
Dept: NEPHROLOGY | Facility: CLINIC | Age: 75
End: 2020-07-29

## 2020-07-29 DIAGNOSIS — E03.9 PRIMARY HYPOTHYROIDISM: ICD-10-CM

## 2020-07-29 DIAGNOSIS — N18.30 STAGE 3 CHRONIC KIDNEY DISEASE (HCC): ICD-10-CM

## 2020-07-29 DIAGNOSIS — E10.9 TYPE 1 DIABETES MELLITUS WITHOUT COMPLICATION (HCC): Primary | ICD-10-CM

## 2020-07-29 DIAGNOSIS — N18.30 STAGE 3 CHRONIC KIDNEY DISEASE (HCC): Primary | ICD-10-CM

## 2020-07-29 LAB
ANION GAP SERPL CALCULATED.3IONS-SCNC: 4 MMOL/L (ref 4–13)
BUN SERPL-MCNC: 36 MG/DL (ref 5–25)
CALCIUM SERPL-MCNC: 10.1 MG/DL (ref 8.3–10.1)
CHLORIDE SERPL-SCNC: 107 MMOL/L (ref 100–108)
CO2 SERPL-SCNC: 29 MMOL/L (ref 21–32)
CREAT SERPL-MCNC: 1.74 MG/DL (ref 0.6–1.3)
GFR SERPL CREATININE-BSD FRML MDRD: 38 ML/MIN/1.73SQ M
GLUCOSE SERPL-MCNC: 190 MG/DL (ref 65–140)
POTASSIUM SERPL-SCNC: 4.8 MMOL/L (ref 3.5–5.3)
SODIUM SERPL-SCNC: 140 MMOL/L (ref 136–145)

## 2020-07-29 PROCEDURE — 36415 COLL VENOUS BLD VENIPUNCTURE: CPT | Performed by: INTERNAL MEDICINE

## 2020-07-29 PROCEDURE — 80048 BASIC METABOLIC PNL TOTAL CA: CPT

## 2020-07-29 NOTE — TELEPHONE ENCOUNTER
Spoke with patient and wife  Today weight 211 8   And continues to loose weight slowly  Patient reports he is caring the weight in his hips, abdomen and scrotum area and difficulty at times to bend over to put shoes on  Most recent blood work from today revealed slight increased creatinine 1 74 with BUN 36  Baseline creatinine 1 5-1 6  Discussed with patient possibly decreasing back to 10 mg per day however he is interested in maintaining 20 mg at this time  Will continue torsemide 20 mg daily for now with slow weight loss  Will repeat BMP on Monday  Patient to continue to weigh himself daily    Once lab work reviewed on Monday further recommendations will be forthcoming

## 2020-08-03 ENCOUNTER — LAB (OUTPATIENT)
Dept: LAB | Facility: CLINIC | Age: 75
End: 2020-08-03
Payer: MEDICARE

## 2020-08-03 DIAGNOSIS — E03.9 PRIMARY HYPOTHYROIDISM: ICD-10-CM

## 2020-08-03 DIAGNOSIS — N18.30 STAGE 3 CHRONIC KIDNEY DISEASE (HCC): ICD-10-CM

## 2020-08-03 DIAGNOSIS — E10.9 TYPE 1 DIABETES MELLITUS WITHOUT COMPLICATION (HCC): ICD-10-CM

## 2020-08-03 DIAGNOSIS — N18.30 CKD (CHRONIC KIDNEY DISEASE) STAGE 3, GFR 30-59 ML/MIN (HCC): ICD-10-CM

## 2020-08-03 LAB
ALBUMIN SERPL BCP-MCNC: 3.6 G/DL (ref 3.5–5)
ALP SERPL-CCNC: 106 U/L (ref 46–116)
ALT SERPL W P-5'-P-CCNC: 22 U/L (ref 12–78)
ANION GAP SERPL CALCULATED.3IONS-SCNC: 6 MMOL/L (ref 4–13)
AST SERPL W P-5'-P-CCNC: 22 U/L (ref 5–45)
BACTERIA UR QL AUTO: ABNORMAL /HPF
BILIRUB SERPL-MCNC: 0.24 MG/DL (ref 0.2–1)
BILIRUB UR QL STRIP: NEGATIVE
BUN SERPL-MCNC: 41 MG/DL (ref 5–25)
CALCIUM SERPL-MCNC: 9.7 MG/DL (ref 8.3–10.1)
CHLORIDE SERPL-SCNC: 106 MMOL/L (ref 100–108)
CLARITY UR: CLEAR
CO2 SERPL-SCNC: 28 MMOL/L (ref 21–32)
COLOR UR: YELLOW
CREAT SERPL-MCNC: 1.75 MG/DL (ref 0.6–1.3)
ERYTHROCYTE [DISTWIDTH] IN BLOOD BY AUTOMATED COUNT: 14.1 % (ref 11.6–15.1)
EST. AVERAGE GLUCOSE BLD GHB EST-MCNC: 151 MG/DL
GFR SERPL CREATININE-BSD FRML MDRD: 37 ML/MIN/1.73SQ M
GLUCOSE SERPL-MCNC: 153 MG/DL (ref 65–140)
GLUCOSE UR STRIP-MCNC: NEGATIVE MG/DL
HBA1C MFR BLD: 6.9 %
HCT VFR BLD AUTO: 41.5 % (ref 36.5–49.3)
HGB BLD-MCNC: 13.6 G/DL (ref 12–17)
HGB UR QL STRIP.AUTO: NEGATIVE
KETONES UR STRIP-MCNC: NEGATIVE MG/DL
LEUKOCYTE ESTERASE UR QL STRIP: NEGATIVE
MAGNESIUM SERPL-MCNC: 2.6 MG/DL (ref 1.6–2.6)
MCH RBC QN AUTO: 31.3 PG (ref 26.8–34.3)
MCHC RBC AUTO-ENTMCNC: 32.8 G/DL (ref 31.4–37.4)
MCV RBC AUTO: 95 FL (ref 82–98)
NITRITE UR QL STRIP: NEGATIVE
NON-SQ EPI CELLS URNS QL MICRO: ABNORMAL /HPF
OTHER STN SPEC: ABNORMAL
PH UR STRIP.AUTO: 6 [PH]
PHOSPHATE SERPL-MCNC: 3.2 MG/DL (ref 2.3–4.1)
PLATELET # BLD AUTO: 133 THOUSANDS/UL (ref 149–390)
PMV BLD AUTO: 12.6 FL (ref 8.9–12.7)
POTASSIUM SERPL-SCNC: 4.6 MMOL/L (ref 3.5–5.3)
PROT SERPL-MCNC: 7.7 G/DL (ref 6.4–8.2)
PROT UR STRIP-MCNC: ABNORMAL MG/DL
PTH-INTACT SERPL-MCNC: 97.1 PG/ML (ref 18.4–80.1)
RBC # BLD AUTO: 4.35 MILLION/UL (ref 3.88–5.62)
RBC #/AREA URNS AUTO: ABNORMAL /HPF
SODIUM SERPL-SCNC: 140 MMOL/L (ref 136–145)
SP GR UR STRIP.AUTO: 1.01 (ref 1–1.03)
TSH SERPL DL<=0.05 MIU/L-ACNC: 3.09 UIU/ML (ref 0.36–3.74)
UROBILINOGEN UR QL STRIP.AUTO: 0.2 E.U./DL
WBC # BLD AUTO: 7.32 THOUSAND/UL (ref 4.31–10.16)
WBC #/AREA URNS AUTO: ABNORMAL /HPF

## 2020-08-03 PROCEDURE — 80053 COMPREHEN METABOLIC PANEL: CPT | Performed by: INTERNAL MEDICINE

## 2020-08-03 PROCEDURE — 83036 HEMOGLOBIN GLYCOSYLATED A1C: CPT

## 2020-08-03 PROCEDURE — 85027 COMPLETE CBC AUTOMATED: CPT

## 2020-08-03 PROCEDURE — 36415 COLL VENOUS BLD VENIPUNCTURE: CPT | Performed by: INTERNAL MEDICINE

## 2020-08-03 PROCEDURE — 84443 ASSAY THYROID STIM HORMONE: CPT

## 2020-08-03 PROCEDURE — 83735 ASSAY OF MAGNESIUM: CPT | Performed by: INTERNAL MEDICINE

## 2020-08-03 PROCEDURE — 84100 ASSAY OF PHOSPHORUS: CPT | Performed by: INTERNAL MEDICINE

## 2020-08-03 PROCEDURE — 81001 URINALYSIS AUTO W/SCOPE: CPT | Performed by: INTERNAL MEDICINE

## 2020-08-03 PROCEDURE — 83970 ASSAY OF PARATHORMONE: CPT | Performed by: INTERNAL MEDICINE

## 2020-08-04 ENCOUNTER — TELEPHONE (OUTPATIENT)
Dept: NEPHROLOGY | Facility: CLINIC | Age: 75
End: 2020-08-04

## 2020-08-04 NOTE — TELEPHONE ENCOUNTER
----- Message from Lynne Smart DO sent at 8/4/2020 11:00 AM EDT -----  Labs reviewed and creatinine on higher side of normal   If he is on 20 mg of torsemide have him decrease to 10 mg if on 10 mg of torsemide he can continue and let me know  Thanks

## 2020-08-05 NOTE — TELEPHONE ENCOUNTER
I spoke with both pt and his wife regarding his most recent lab results  Per Dr Prince Michaud  Scr is on the higher side at 1 75  Pt is currently taking 20 mg of torsemide Per Dr Prince Michaud pt should decrease to 10 mg daily  Pt states he was told on 7/29 by Eri Muñiz that we would be contacting him to schedule a f/u with Dr Prince Michaud  Both pt and his wife were concerned and upset because they have not been contacted for scheduling  Pt was last seen on 7/2 by Dr Prince Michaud and is not due back until Oct per office note  I did review Serina's note from 7/29 and do not see that she recommended f/u in office within in the next two weeks  Pt is frustrated with miscommunication, I did apologize for any confusion  He has been scheduled for Monday 8/10 at 9:30  I have made Dr Prince Michaud aware, he will try reaching out to pt as well to address any additional concerns they may have

## 2020-08-07 ENCOUNTER — TELEPHONE (OUTPATIENT)
Dept: NEPHROLOGY | Facility: CLINIC | Age: 75
End: 2020-08-07

## 2020-08-14 ENCOUNTER — OFFICE VISIT (OUTPATIENT)
Dept: FAMILY MEDICINE CLINIC | Facility: CLINIC | Age: 75
End: 2020-08-14
Payer: MEDICARE

## 2020-08-14 VITALS
DIASTOLIC BLOOD PRESSURE: 84 MMHG | OXYGEN SATURATION: 96 % | WEIGHT: 220 LBS | HEIGHT: 67 IN | BODY MASS INDEX: 34.53 KG/M2 | SYSTOLIC BLOOD PRESSURE: 128 MMHG | RESPIRATION RATE: 17 BRPM | HEART RATE: 81 BPM | TEMPERATURE: 98.6 F

## 2020-08-14 DIAGNOSIS — M51.26 LUMBAR DISC HERNIATION: ICD-10-CM

## 2020-08-14 DIAGNOSIS — L97.909 ATHEROSCLEROSIS OF ARTERY OF EXTREMITY WITH ULCERATION (HCC): Chronic | ICD-10-CM

## 2020-08-14 DIAGNOSIS — G47.33 OBSTRUCTIVE SLEEP APNEA OF ADULT: ICD-10-CM

## 2020-08-14 DIAGNOSIS — I74.09 AORTOILIAC OCCLUSIVE DISEASE (HCC): Chronic | ICD-10-CM

## 2020-08-14 DIAGNOSIS — I12.9 BENIGN HYPERTENSION WITH CHRONIC KIDNEY DISEASE, STAGE III (HCC): Primary | ICD-10-CM

## 2020-08-14 DIAGNOSIS — I70.299 ATHEROSCLEROSIS OF ARTERY OF EXTREMITY WITH ULCERATION (HCC): Chronic | ICD-10-CM

## 2020-08-14 DIAGNOSIS — E89.0 HYPOTHYROIDISM, POSTABLATIVE: ICD-10-CM

## 2020-08-14 DIAGNOSIS — E10.51 TYPE 1 DIABETES MELLITUS WITH PERIPHERAL VASCULAR DISEASE (HCC): ICD-10-CM

## 2020-08-14 DIAGNOSIS — I50.22 CHRONIC SYSTOLIC CONGESTIVE HEART FAILURE (HCC): ICD-10-CM

## 2020-08-14 DIAGNOSIS — N18.30 BENIGN HYPERTENSION WITH CHRONIC KIDNEY DISEASE, STAGE III (HCC): Primary | ICD-10-CM

## 2020-08-14 DIAGNOSIS — I25.10 CORONARY ARTERY DISEASE INVOLVING NATIVE CORONARY ARTERY OF NATIVE HEART WITHOUT ANGINA PECTORIS: ICD-10-CM

## 2020-08-14 PROCEDURE — 4040F PNEUMOC VAC/ADMIN/RCVD: CPT | Performed by: FAMILY MEDICINE

## 2020-08-14 PROCEDURE — 2022F DILAT RTA XM EVC RTNOPTHY: CPT | Performed by: FAMILY MEDICINE

## 2020-08-14 PROCEDURE — 3066F NEPHROPATHY DOC TX: CPT | Performed by: FAMILY MEDICINE

## 2020-08-14 PROCEDURE — 99214 OFFICE O/P EST MOD 30 MIN: CPT | Performed by: FAMILY MEDICINE

## 2020-08-14 PROCEDURE — 3074F SYST BP LT 130 MM HG: CPT | Performed by: FAMILY MEDICINE

## 2020-08-14 PROCEDURE — 1160F RVW MEDS BY RX/DR IN RCRD: CPT | Performed by: FAMILY MEDICINE

## 2020-08-14 PROCEDURE — 1036F TOBACCO NON-USER: CPT | Performed by: FAMILY MEDICINE

## 2020-08-14 PROCEDURE — 3079F DIAST BP 80-89 MM HG: CPT | Performed by: FAMILY MEDICINE

## 2020-08-14 PROCEDURE — 3044F HG A1C LEVEL LT 7.0%: CPT | Performed by: FAMILY MEDICINE

## 2020-08-14 NOTE — PROGRESS NOTES
FAMILY PRACTICE OFFICE VISIT       NAME: Leesa Artis  AGE: 76 y o  SEX: male       : 1945        MRN: [id number]        Assessment and Plan     Problem List Items Addressed This Visit        Endocrine    Type 1 diabetes mellitus with peripheral vascular disease ([facility] )       Lab Results   Component Value Date    HGBA1C 6 9 (H) 2020 ·   Good glycemic control  · Patient is under care of Endocrinology, Dr Li Barraza  · He uses basal insulin  · Patient is reluctant to consider pump  · I suggested that some of his weight gain and appetite increase could be related to current insulin regimen, patient will review with endocrinologist at forthcoming office visit         Hypothyroidism, postablative     · Recent TSH is within normal limits  · Continue levothyroxine 200 mcg is daily  · Endocrinology follows            Respiratory    Obstructive sleep apnea of adult     · Patient has discontinued CPAP or BiPAP, could not tolerate treatment  · Patient admits to good quality of sleep and denies symptoms of morning fatigue  · He does admit chronic symptoms of fatigue and inability to lose weight, he is not interested in sleep apnea treatment at present time            Cardiovascular and Mediastinum    Aortoiliac occlusive disease (HCC) (Chronic)    RESOLVED: Atherosclerosis of artery of extremity with ulceration (HCC) (Chronic)    Coronary artery disease of native artery of native heart with stable angina pectoris ([facility] )     · Patient remains on regimen of aspirin, Plavix, Crestor  · Metoprolol was discontinued due to low blood pressures    · [facility] follows  · Patient denies symptoms of chest pain, palpitations or dizziness, chronic/ unchanged dyspnea on exertion         Benign hypertension with chronic kidney disease, stage III ([facility] ) - Primary    Relevant Orders    Comprehensive metabolic panel    Chronic systolic congestive heart failure ([facility] )     Wt Readings from Last 3 Encounters: 08/14/20 99 8 kg (220 lb)   07/02/20 98 9 kg (218 lb)   06/15/20 97 8 kg (215 lb 9 6 oz)       ·  Patient clinically appears euvolemic on exam today  · Dose of torsemide was recently reduced from 20-10 milligrams daily by Nephrology due to BUN/creatinine fluctuations  · Patient reports essentially unchanged dyspnea on exertion that he has experienced for many years  · Continue low-sodium diet and weight monitoring                Musculoskeletal and Integument    Lumbar disc herniation     · Chronic low back pain  · I advised patient to schedule follow-up with [facility]            Patient with complex medical history presents for follow-up of chronic medical conditions  Assessment and plan as outlined above and as per patient's instructions  · He denies symptoms of angina, dizziness or palpitations and remains under close care of endocrinology Nephrology and Cardiology  · Patient reports significant improvement of chronic claudication symptoms after recent vascular bypass surgery at [address] [place] [address]  · Patient has been experiencing symptoms of chronic fatigue and dyspnea on exertion  He is bit frustrated with inability to lose weight  Etiology of his symptoms is likely multifactorial due to occasional hypoglycemia restrictive lung disease, COPD, obstructive sleep apnea, diabetic neuropathy, chronic pain, underlying CAD and PVD  · Patient has been compliant with medication regimen  · I suggested to discuss insulin pump to minimize episodes of hypoglycemia, appetite cravings and possibly weight gain  Unfortunately, patient is not considering another trial of CPAP BiPAP or inhalers for COPD  Patient will repeat blood work in 4 weeks  I recommended flu vaccine this fall  Follow-up 3-4 months  BMI Counseling: Body mass index is 34 46 kg/m²   The BMI is above normal  Nutrition recommendations include decreasing portion sizes, encouraging healthy choices of fruits and vegetables, consuming healthier snacks, moderation in carbohydrate intake, reducing intake of saturated and trans fat and reducing intake of cholesterol  Patient Instructions   · Please decrease dose of gabapentin from 3 times a day to twice a day, you may add 3rd dose of gabapentin at any time  · Weight in December was 214 with creatinine of 1 75  · Please repeat blood work including CMP and lipid panel prior to your office visit with Nephrology in September  · Please schedule follow-up with Dr Cachorro Guerrero  · Please consider discussing switch of your insulin regimen with Dr Marquis Ruvalcaba      Discussed with the patient and all questioned fully answered  He will call me if any problems arise  [employer] software was used to dictate this note  It may contain errors with dictating incorrect words/spelling  Please contact provider directly with any questions  Chief Complaint     Chief Complaint   Patient presents with    Follow-up    Fatigue     weak and weight gain    Medication Problem     possible replace        History of Present Illness     Patient presents for follow-up of chronic medical conditions  He is accompanied by his wife  He has been feeling generally stably  He denies symptoms of chest pain, palpitations, dizziness or claudication  Patient admits to chronic dyspnea on exertion and fatigue that essentially have not changed  No leg edema  Patient remains under care of Cardiology, Nephrology, endocrinology and vascular surgery  Current dose of torsemide 10 milligrams daily, dose was recently decreased from 20 milligrams daily to 10 milligrams daily by Nephrology due to creatinine 1 75 and BUN 41 noted on CMP performed on August 3rd  Hemoglobin A1c was 6 9  LFTs are normal   CBC is normal   TSH is 3 5  Patient reportedly has gained 5-6 pound since vascular bypass surgery in spring of 2020  Patient is complaining of worsening of chronic low back pain lately    No recent follow-up with [facility]  Patient is bit frustrated with inability to lose weight and fatigue  He feels that he has no energy  Diagnosis of sleep apnea:  Patient could not tolerate CPAP or BiPAP  Dara Soulier Patient states that he sleeps well and denies any fatigue in the morning upon awakening  He does take a nap on a daily basis  COPD:  He has tried multiple inhalers and was evaluated by [facility], patient reports no improvement with any inhalers and currently uses none  Type 1 diabetes:  Patient is under care of Endocrinology  He uses basal insulin  Patient has been reluctant to try insulin pump  He admits to pretty hefty appetite and sensation of hunger  Episodes of hypoglycemia are overall rare          Fatigue   Associated symptoms include arthralgias and fatigue  Review of Systems   Review of Systems   Constitutional: Positive for fatigue  Negative for activity change and appetite change  Eyes: Negative  Respiratory: Positive for shortness of breath (Chronic dyspnea on exertion)  Cardiovascular: Negative  Gastrointestinal: Negative  Endocrine: Negative  Genitourinary: Negative  Musculoskeletal: Positive for arthralgias and back pain  Skin: Negative  Allergic/Immunologic: Negative  Neurological: Negative  Hematological: Negative  Psychiatric/Behavioral: Negative          Active Problem List     Patient Active Problem List   Diagnosis    Type 1 diabetes mellitus with peripheral vascular disease (HCC)    Presence of drug coated stent in LAD coronary artery    Coronary artery disease of native artery of native heart with stable angina pectoris (HCC)    Presence of drug coated stent in left circumflex coronary artery    Dyslipidemia    Obstructive sleep apnea of adult    Carotid artery stenosis, asymptomatic, bilateral    Dyspnea on exertion    Restrictive lung disease    Centrilobular emphysema ([address] )    Benign hypertension with chronic kidney disease, stage III ([facility])    CKD (chronic kidney disease) stage 3, GFR 30-59 ml/min ([facility])    Renal artery stenosis ([facility])    Renal cyst, right    Vitamin D deficiency    Aortoiliac occlusive disease ([facility])    Hypothyroidism, postablative    Low back pain with sciatica    Lumbar disc herniation    Lumbar radiculopathy    Diabetic neuropathy associated with type 1 diabetes mellitus [facility]    History of Ischemic cardiomyopathy    Chronic systolic congestive heart failure ([facility])    NSTEMI (non-ST elevated myocardial infarction) ([facility] )    Anxiety with depression    Spinal stenosis of lumbar region with neurogenic claudication    Thrombocytopenia, unspecified ([facility] )       Past Medical History:  Past Medical History:   Diagnosis Date    Acute kidney injury ([facility] )     resolved 11/30/2015    Acute venous embolism and thrombosis of deep vessels of proximal lower extremity ([facility] )     resolved 04/04/2015    DARINEL (acute kidney injury) ([facility] ) 1/12/2016    Anesthesia     RESPIRATORY ISSUES DUE TO SLEEP APNEA    Cardiac disease     heart attack, stents x 4    CHF (congestive heart failure) ([facility])     Chronic cough     resolved 02/04/2016    Chronic pain disorder     intermitent claudication    COPD (chronic obstructive pulmonary disease) ([facility])     Coronary artery disease     CPAP (continuous positive airway pressure) dependence     Diabetes mellitus ([facility] )     Disease of thyroid gland     DVT (deep venous thrombosis) ([facility])     Heart failure ([facility] )     Hyperlipidemia     Hypertension     Ischemic cardiomyopathy     last assessed 09/26/2017    Myocardial infarction [facility])     MI +2 2015,2018    Pulmonary emphysema ([facility]     Pulmonary granuloma ([facility] )     resolved 02/03/2017    Renal failure     Sleep apnea        Past Surgical History:  Past Surgical History:   Procedure Laterality Date    BYPASS FEMORAL-POPLITEAL      initial stenosis with stent left, 7 x 100 smart stent onset 02/24/2014   [facility] CARDIAC SURGERY cardiac stents    CATARACT EXTRACTION      COLOGUARD (HISTORICAL)      CORONARY ANGIOPLASTY WITH STENT PLACEMENT      x4    IR ABDOMINAL ANGIOGRAPHY / INTERVENTION  3/14/2019    TN THROMBOENDARTECTMY FEMORAL COMMON Left 3/22/2019    Procedure: ENDARTERECTOMY ARTERIAL FEMORAL WITH PATCH ANGIOPLASTY, BALLOON ANGIOPLASTY, STENT;  Surgeon: Shayla Ruiz MD;  [facility]: [facility];  Service: Vascular    TN VEIN BYPASS GRAFT,FEM-POP Left 3/22/2019    Procedure: BYPASS FEMORAL-POPLITEAL WITH COMPLETION ARTERIOGRAM;  Surgeon: Shayla Ruiz MD;  [facility];  Service: Vascular    VASCULAR SURGERY      stent placement    WOUND DEBRIDEMENT Left 4/15/2019    Procedure: DEBRIDEMENT WOUND AND DRESSING CHANGE ([address]) left groin with VAC;  Surgeon: Schuyler Frausto DO;  Location: [facility];  Service: Vascular       Family History:  Family History   Problem Relation Age of Onset    Heart disease Father         pacer placement    Hypertension Father     Kidney disease Father     Heart failure Father     Heart attack Father     Liver disease Father     Cirrhosis Mother         due to beer consumption    Liver disease Mother     Diabetes Other        Social History:  Social History     Socioeconomic History    Marital status: /Civil Union     Spouse name: Not on file    Number of children: Not on file    Years of education: Not on file    Highest education level: Not on file   Occupational History    Occupation: Retired    Occupation: Desk work    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   Social Needs    Financial resource strain: Not on file    Food insecurity     Worry: Not on file     Inability: Not on file   [employer] needs     Medical: Not on file     Non-medical: Not on file   Tobacco Use    Smoking status: Former Smoker     Packs/day: 4 00     Years: 48 00     Pack years: 192 00     Types: Cigarettes     Last attempt to quit:      Years since quittin 6    Smokeless tobacco: Never Used    Tobacco comment: smoking 48 years 1 5 ppd d/c oct 2008 screening protocol as per allscripts   Substance and Sexual Activity    Alcohol use: Yes     Alcohol/week: 21 0 standard drinks     Types: 21 Standard drinks or equivalent per week     Frequency: 4 or more times a week     Drinks per session: 1 or 2     Binge frequency: Never     Comment: 2-3 glasses of vodka daily (6 shots) (history 2 drinks per day as per allscripts    Drug use: No    Sexual activity: Not Currently   Lifestyle    Physical activity     Days per week: Not on file     Minutes per session: Not on file    Stress: Not on file   Relationships    Social connections     Talks on phone: Not on file     Gets together: Not on file     Attends [employer]ology service: Not on file     Active member of club or organization: Not on file     Attends meetings of clubs or organizations: Not on file     Relationship status: Not on file    Intimate partner violence     Fear of current or ex partner: Not on file     Emotionally abused: Not on file     Physically abused: Not on file     Forced sexual activity: Not on file   Other Topics Concern    Not on file   Social History Narrative    Not on file           Objective     Vitals:    08/14/20 1506   BP: 128/84   BP Location: Left arm   Patient Position: Sitting   Cuff Size: Adult   Pulse: 81   Resp: 17   Temp: 98 6 °F (37 °C)   TempSrc: Temporal   SpO2: 96%   Weight: 99 8 kg (220 lb)   Height: 5' 7" (1 702 m)     Wt Readings from Last 3 Encounters:   08/14/20 99 8 kg (220 lb)   07/02/20 98 9 kg (218 lb)   06/15/20 97 8 kg (215 lb 9 6 oz)       Physical Exam  Vitals signs and nursing note reviewed  Constitutional:       Appearance: Normal appearance  He is well-developed  He is obese  He is not ill-appearing  HENT:      Head: Normocephalic and atraumatic  Eyes:      Conjunctiva/sclera: Conjunctivae normal    Neck:      Musculoskeletal: Neck supple        Vascular: No carotid bruit  Cardiovascular:      Rate and Rhythm: Normal rate and regular rhythm  Heart sounds: Normal heart sounds  No murmur  Pulmonary:      Effort: Pulmonary effort is normal  No respiratory distress  Breath sounds: Normal breath sounds  No wheezing or rales  Abdominal:      General: Bowel sounds are normal  There is no distension or abdominal bruit  Musculoskeletal: Normal range of motion  Right lower leg: No edema  Left lower leg: No edema  Neurological:      General: No focal deficit present  Mental Status: He is alert and oriented to person, place, and time  Cranial Nerves: No cranial nerve deficit  Psychiatric:         Attention and Perception: Attention normal          Mood and Affect: Mood normal          Speech: Speech normal          Behavior: Behavior normal          Thought Content:  Thought content normal          Pertinent Laboratory/Diagnostic Studies:  Lab Results   Component Value Date    GLUCOSE 207 (H) 03/22/2019    BUN 41 (H) 08/03/2020    CREATININE 1 75 (H) 08/03/2020    CALCIUM 9 7 08/03/2020     12/21/2015    K 4 6 08/03/2020    CO2 28 08/03/2020     08/03/2020     Lab Results   Component Value Date    ALT 22 08/03/2020    AST 22 08/03/2020    ALKPHOS 106 08/03/2020    BILITOT 0 33 10/01/2015       Lab Results   Component Value Date    WBC 7 32 08/03/2020    HGB 13 6 08/03/2020    HCT 41 5 08/03/2020    MCV 95 08/03/2020     (L) 08/03/2020       No results found for: TSH    Lab Results   Component Value Date    CHOL 129 10/01/2015     Lab Results   Component Value Date    TRIG 324 (H) 12/02/2019     Lab Results   Component Value Date    HDL 34 (L) 12/02/2019     Lab Results   Component Value Date    LDLCALC 44 12/02/2019     Lab Results   Component Value Date    HGBA1C 6 9 (H) 08/03/2020       Results for orders placed or performed in visit on 08/03/20   TSH, 3rd generation with Free T4 reflex   Result Value Ref Range    TSH 3RD MEGAN 3 090 0 358 - 3 740 uIU/mL   Hemoglobin A1C   Result Value Ref Range    Hemoglobin A1C 6 9 (H) Normal 3 8-5 6%; PreDiabetic 5 7-6 4%; Diabetic >=6 5%; Glycemic control for adults with diabetes <7 0% %     mg/dl       Orders Placed This Encounter   Procedures    Lipid Panel with Direct LDL reflex    Comprehensive metabolic panel       ALLERGIES:  Allergies   Allergen Reactions    Doxazosin Myalgia     UNKNOWN  UNKNOWN  Muscle cramps    Iohexol      UNKNOWN    Lisinopril Cough     cough  Other reaction(s): CAMELLA SINENSIS (ZESTRIL) (cough)  cough    Vancomycin Hives    Adhesive [Medical Tape]      Skin Breakdown    Cardura [Doxazosin Mesylate]      Muscle cramps    Other      Iv dye for cardiac cath  Renal failure very close to dialysis  But no dialysis  Iv dye for cardiac cath  Renal failure very close to dialysis  But no dialysis  Iv dye for cardiac cath  Renal failure very close to dialysis  But no dialysis       Current Medications     Current Outpatient Medications   Medication Sig Dispense Refill    acetaminophen (TYLENOL) 325 mg tablet Take 2 tablets (650 mg total) by mouth every 6 (six) hours as needed for mild pain (Patient taking differently: Take 500 mg by mouth every 8 (eight) hours as needed for mild pain Take 1,000 mg every 8 hours PRN) 30 tablet 0    ammonium lactate (LAC-HYDRIN) 12 % lotion Apply topically 2 (two) times a day as needed for dry skin Apply to dry skin between the toes in the AM & in the PM      ascorbic acid (VITAMIN C) 250 mg tablet Take 250 mg by mouth daily      aspirin 81 MG tablet Take 81 mg by mouth daily        chlorhexidine (HIBICLENS) 4 % external liquid Apply topically daily as needed Clean incision site in the AM and in the PM        cholecalciferol (VITAMIN D3) 1,000 units tablet Take 1,000 Units by mouth daily       clopidogrel (PLAVIX) 75 mg tablet Take 1 tablet (75 mg total) by mouth daily 90 tablet 3    Coenzyme Q10 (COQ-10) 200 MG CAPS Take 200 mg by mouth daily      docusate sodium (COLACE) 50 mg capsule Take by mouth 2 (two) times a day as needed       gabapentin (NEURONTIN) 300 mg capsule Take 1 capsule (300 mg total) by mouth 3 (three) times a day 270 capsule 1    glucagon (GLUCAGON EMERGENCY) 1 MG injection 1 mg      Icosapent Ethyl (Vascepa) 1 g CAPS Take 1 g by mouth 2 (two) times a day Take one capsule QAM & QHS      insulin glargine (Lantus) 100 units/mL subcutaneous injection Inject under the skin 40 units am- 26 units pm      insulin lispro (HUMALOG) 100 units/mL injection Inject 3 units with breakfast, 6 units at lunch, and 6 units at dinner (Patient taking differently: 4 (four) times a day Inject 3 units with breakfast, 6 units at lunch, and 6 units at dinner  Carbs counting) 10 mL 1    levalbuterol (XOPENEX HFA) 45 mcg/act inhaler Inhale 1-2 puffs every 4 (four) hours as needed for wheezing 1 Inhaler 6    levothyroxine 200 mcg tablet Take 200 mcg by mouth daily  0    Multiple Vitamins-Minerals (CENTRUM MEN) TABS Take by mouth Take one tablet in the morning      nitroglycerin (NITROSTAT) 0 4 mg SL tablet Place 1 tablet (0 4 mg total) under the tongue every 5 (five) minutes as needed for chest pain 25 tablet 3    polyethylene glycol (MIRALAX) 17 g packet Take 17 g by mouth daily      rosuvastatin (CRESTOR) 20 MG tablet Take 1 tablet (20 mg total) by mouth daily 90 tablet 3    SF 1 1 % GEL BRUCH ONCE DAILY AT BEDTIME, BRUSH OF 1 MINUTE AS DIRECTED      ULTICARE INSULIN SYRINGE 30G X 1/2" 1 ML MISC Use as directed  0    Wound Dressings ([facility] WOUND/BURN DRESSING) GEL Apply topically daily at bedtime      Lactobacillus Rhamnosus, GG, (CULTURELLE) CAPS Take 1 capsule by mouth daily       torsemide (DEMADEX) 10 mg tablet Please take 20 mg every Monday Wednesday Friday and 10 mg Tuesday Thursday Saturday Sunday 90 tablet 0     No current facility-administered medications for this visit          Medications Discontinued During This Encounter   Medication Reason    metoprolol tartrate (LOPRESSOR) 25 mg tablet Therapy completed       Health Maintenance     Health Maintenance   Topic Date Due    Hepatitis C Screening  1945    SLP PLAN OF CARE  1945    Falls: Plan of Care  04/27/2010    Influenza Vaccine  07/01/2020    Fall Risk  09/19/2020    Medicare Annual Wellness Visit (AWV)  09/19/2020    BMI: Followup Plan  10/05/2020    Diabetic Foot Exam  12/15/2020    HEMOGLOBIN A1C  02/03/2021    URINE MICROALBUMIN  06/07/2021    BMI: Adult  08/14/2021    DM Eye Exam  12/02/2021    Colorectal Cancer Screening  08/18/2022    DTaP,Tdap,and Td Vaccines (2 - Td) 05/05/2023    Pneumococcal Vaccine: 65+ Years  Completed    HIB Vaccine  Aged Out    Hepatitis B Vaccine  Aged Out    IPV Vaccine  Aged Out    Hepatitis A Vaccine  Aged Out    Meningococcal ACWY Vaccine  Aged Out    HPV Vaccine  Aged [employer] History   Administered Date(s) Administered    DT (pediatric) 12/01/2009    H1N1, All Formulations 12/01/2009    INFLUENZA 10/01/2008, 10/06/2009, 09/01/2010, 11/04/2013, 08/29/2018, 09/09/2019    Influenza Split High Dose Preservative Free IM 08/28/2014, 10/03/2016, 08/29/2018    Influenza TIV (IM) 10/01/2008, 10/19/2010, 08/26/2011, 09/20/2011, 08/01/2012, 09/01/2012, 09/13/2013, 09/15/2015, 09/03/2017    Meningococcal C/Y-HIB PRP 12/01/2009    Pneumococcal Conjugate 13-Valent 08/11/2015    Pneumococcal Polysaccharide PPV23 10/01/2008, 06/04/2009, 03/15/2017    Td (adult), adsorbed 03/04/2011    Tdap 05/05/2013    Zoster 10/01/2009    influenza, trivalent, adjuvanted 09/05/2019       Nita Holland MD

## 2020-08-14 NOTE — PATIENT INSTRUCTIONS
· Please decrease dose of gabapentin from 3 times a day to twice a day, you may add 3rd dose of gabapentin at any time  · Weight in December was 214 with creatinine of 1 75  · Please repeat blood work including CMP and lipid panel prior to your office visit with Nephrology in September  · Please schedule follow-up with Dr Annetta Dandy  · Please consider discussing switch of your insulin regimen with Dr Femi Chávez

## 2020-08-18 DIAGNOSIS — N18.30 CKD (CHRONIC KIDNEY DISEASE) STAGE 3, GFR 30-59 ML/MIN (HCC): ICD-10-CM

## 2020-08-18 PROBLEM — I82.5Z1 CHRONIC DEEP VEIN THROMBOSIS (DVT) OF DISTAL VEIN OF RIGHT LOWER EXTREMITY (HCC): Status: RESOLVED | Noted: 2019-09-12 | Resolved: 2020-08-18

## 2020-08-18 PROBLEM — L97.909 ATHEROSCLEROSIS OF ARTERY OF EXTREMITY WITH ULCERATION (HCC): Chronic | Status: RESOLVED | Noted: 2018-01-29 | Resolved: 2020-08-18

## 2020-08-18 PROBLEM — I70.299 ATHEROSCLEROSIS OF ARTERY OF EXTREMITY WITH ULCERATION (HCC): Chronic | Status: RESOLVED | Noted: 2018-01-29 | Resolved: 2020-08-18

## 2020-08-18 RX ORDER — TORSEMIDE 10 MG/1
TABLET ORAL
Qty: 90 TABLET | Refills: 0 | Status: SHIPPED | OUTPATIENT
Start: 2020-08-18 | End: 2021-01-14

## 2020-08-19 NOTE — ASSESSMENT & PLAN NOTE
Wt Readings from Last 3 Encounters:   08/14/20 99 8 kg (220 lb)   07/02/20 98 9 kg (218 lb)   06/15/20 97 8 kg (215 lb 9 6 oz)       ·  Patient clinically appears euvolemic on exam today  · Dose of torsemide was recently reduced from 20-10 milligrams daily by Nephrology due to BUN/creatinine fluctuations  · Patient reports essentially unchanged dyspnea on exertion that he has experienced for many years    · Continue low-sodium diet and weight monitoring

## 2020-08-19 NOTE — ASSESSMENT & PLAN NOTE
· Chronic low back pain    · I advised patient to schedule follow-up with [facility]

## 2020-08-19 NOTE — ASSESSMENT & PLAN NOTE
Lab Results   Component Value Date    HGBA1C 6 9 (H) 08/03/2020   · Good glycemic control  · Patient is under care of Endocrinology, Dr Eric Hooks  · He uses basal insulin  · Patient is reluctant to consider pump    · I suggested that some of his weight gain and appetite increase could be related to current insulin regimen, patient will review with endocrinologist at forthcoming office visit

## 2020-08-19 NOTE — ASSESSMENT & PLAN NOTE
· Patient has discontinued CPAP or BiPAP, could not tolerate treatment  · Patient admits to good quality of sleep and denies symptoms of morning fatigue    · He does admit chronic symptoms of fatigue and inability to lose weight, he is not interested in sleep apnea treatment at present time

## 2020-08-19 NOTE — ASSESSMENT & PLAN NOTE
· Recent TSH is within normal limits  · Continue levothyroxine 200 mcg is daily    · Endocrinology follows

## 2020-08-19 NOTE — ASSESSMENT & PLAN NOTE
· Patient remains on regimen of aspirin, Plavix, Crestor  · Metoprolol was discontinued due to low blood pressures    · [facility] follows  · Patient denies symptoms of chest pain, palpitations or dizziness, chronic/ unchanged dyspnea on exertion

## 2020-08-24 ENCOUNTER — OFFICE VISIT (OUTPATIENT)
Dept: PULMONOLOGY | Facility: CLINIC | Age: 75
End: 2020-08-24
Payer: MEDICARE

## 2020-08-24 VITALS
WEIGHT: 220 LBS | HEIGHT: 67 IN | SYSTOLIC BLOOD PRESSURE: 116 MMHG | BODY MASS INDEX: 34.53 KG/M2 | DIASTOLIC BLOOD PRESSURE: 74 MMHG | TEMPERATURE: 97.4 F | HEART RATE: 98 BPM | OXYGEN SATURATION: 94 %

## 2020-08-24 DIAGNOSIS — J98.4 RESTRICTIVE LUNG DISEASE: ICD-10-CM

## 2020-08-24 DIAGNOSIS — G47.33 OBSTRUCTIVE SLEEP APNEA OF ADULT: ICD-10-CM

## 2020-08-24 DIAGNOSIS — J43.2 CENTRILOBULAR EMPHYSEMA (HCC): Primary | ICD-10-CM

## 2020-08-24 DIAGNOSIS — I50.22 CHRONIC SYSTOLIC CONGESTIVE HEART FAILURE (HCC): ICD-10-CM

## 2020-08-24 DIAGNOSIS — R06.00 DYSPNEA ON EXERTION: ICD-10-CM

## 2020-08-24 PROBLEM — I73.9 PERIPHERAL VASCULAR DISEASE (HCC): Status: ACTIVE | Noted: 2020-01-09

## 2020-08-24 PROBLEM — Z95.828 S/P FEMORAL-POPLITEAL BYPASS SURGERY: Status: ACTIVE | Noted: 2020-01-09

## 2020-08-24 PROBLEM — I65.29 STENOSIS OF CAROTID ARTERY: Status: ACTIVE | Noted: 2020-08-24

## 2020-08-24 PROCEDURE — 3044F HG A1C LEVEL LT 7.0%: CPT | Performed by: INTERNAL MEDICINE

## 2020-08-24 PROCEDURE — 99215 OFFICE O/P EST HI 40 MIN: CPT | Performed by: INTERNAL MEDICINE

## 2020-08-24 PROCEDURE — 94618 PULMONARY STRESS TESTING: CPT | Performed by: INTERNAL MEDICINE

## 2020-08-24 PROCEDURE — 3066F NEPHROPATHY DOC TX: CPT | Performed by: INTERNAL MEDICINE

## 2020-08-24 PROCEDURE — 3074F SYST BP LT 130 MM HG: CPT | Performed by: INTERNAL MEDICINE

## 2020-08-24 PROCEDURE — 1036F TOBACCO NON-USER: CPT | Performed by: INTERNAL MEDICINE

## 2020-08-24 PROCEDURE — 3008F BODY MASS INDEX DOCD: CPT | Performed by: INTERNAL MEDICINE

## 2020-08-24 PROCEDURE — 2022F DILAT RTA XM EVC RTNOPTHY: CPT | Performed by: INTERNAL MEDICINE

## 2020-08-24 PROCEDURE — 1160F RVW MEDS BY RX/DR IN RCRD: CPT | Performed by: INTERNAL MEDICINE

## 2020-08-24 PROCEDURE — 4040F PNEUMOC VAC/ADMIN/RCVD: CPT | Performed by: INTERNAL MEDICINE

## 2020-08-24 PROCEDURE — 3078F DIAST BP <80 MM HG: CPT | Performed by: INTERNAL MEDICINE

## 2020-08-24 NOTE — ASSESSMENT & PLAN NOTE
Wt Readings from Last 3 Encounters:   08/24/20 99 8 kg (220 lb)   08/14/20 99 8 kg (220 lb)   07/02/20 98 9 kg (218 lb)     · Managed by Dr Brenda Mckoy with upcoming appointment  · Sam Evans feels that fluid retention is a huge contributing factor although he does not demonstrate peripheral edema and has worsening renal function with attempts at more aggressive diuresis

## 2020-08-24 NOTE — ASSESSMENT & PLAN NOTE
· Has been effectively treated with CPAP at 12 cm of water  · Reports no daytime somnolence and excellent sleep quality  · He feels the best when he first wakes up and feels like he gets worse throughout the rest of the day  · Addendum 01/13/2021:  I personally reviewed the compliance report on 8/24/20 as noted in that note under diagnostic data  Compliance is excellent  Additionally patient is in need of CPAP supplies    He has decided to transition his CPAP therapy from [facility] to [facility]

## 2020-08-24 NOTE — ASSESSMENT & PLAN NOTE
· Multifactorial due to restrictive lung disease, emphysema, and systolic congestive heart failure  · Recently, additional component of substantial weight gain has contributed to his shortness of breath  He has become more sedentary and has an increased element of cardiovascular deconditioning as well  · Discussed potentially referral to cardiac or pulmonary rehabilitation  · 6 minutes walk test today did not show any exertional desaturation    He did stop at 5 minutes due to hip pain from arthritis

## 2020-08-24 NOTE — ASSESSMENT & PLAN NOTE
· It has been quite some time since the patient had pulmonary function tests and I have recommended that we repeat these to determine whether there has been any change  · He has tried bronchodilators on multiple occasions in the past   Recently tried Xopenex HFA inhaler with no benefit  · He has never been bronchodilator responsive

## 2020-08-24 NOTE — ASSESSMENT & PLAN NOTE
· Primarily secondary to obesity  · Potentially could have a contribution related to basilar lung scarring    Will repeat pulmonary function tests and if substantial restriction and diffusing impairment, may consider high-resolution CT to rule out a component of interstitial lung disease

## 2020-08-24 NOTE — PROGRESS NOTES
Progress note - Pulmonary Medicine   Ely Solis 76 y o  male MRN: [id number]       Impression & Plan:     Dyspnea on exertion  · Multifactorial due to restrictive lung disease, emphysema, and systolic congestive heart failure  · Recently, additional component of substantial weight gain has contributed to his shortness of breath  He has become more sedentary and has an increased element of cardiovascular deconditioning as well  · Discussed potentially referral to cardiac or pulmonary rehabilitation  · 6 minutes walk test today did not show any exertional desaturation  He did stop at 5 minutes due to hip pain from arthritis    Centrilobular emphysema (HCC)  · It has been quite some time since the patient had pulmonary function tests and I have recommended that we repeat these to determine whether there has been any change  · He has tried bronchodilators on multiple occasions in the past   Recently tried Xopenex HFA inhaler with no benefit  · He has never been bronchodilator responsive  Obstructive sleep apnea of adult  · Has been effectively treated with CPAP at 12 cm of water  · Reports no daytime somnolence and excellent sleep quality  · He feels the best when he first wakes up and feels like he gets worse throughout the rest of the day    Restrictive lung disease  · Primarily secondary to obesity  · Potentially could have a contribution related to basilar lung scarring    Will repeat pulmonary function tests and if substantial restriction and diffusing impairment, may consider high-resolution CT to rule out a component of interstitial lung disease    Chronic systolic congestive heart failure (HCC)  Wt Readings from Last 3 Encounters:   08/24/20 99 8 kg (220 lb)   08/14/20 99 8 kg (220 lb)   07/02/20 98 9 kg (218 lb)     · Managed by Dr Guera Perez with upcoming appointment  · Vanessa Cole feels that fluid retention is a huge contributing factor although he does not demonstrate peripheral edema and has worsening renal function with attempts at more aggressive diuresis       Outpatient office follow-up will be dependent upon the upcoming pulmonary function study    ______________________________________________________________________    HPI:    Zach Murrieta presents today for follow-up of emphysema and shortness of breath  He has restrictive lung disease, chronic systolic congestive heart failure, and recent substantial weight gain which is contributing to his shortness of breath symptoms  He feels extremely fatigued with limited activity and becomes more sedentary as a result of this  He is severely cardiovascularly deconditioned  He has had attempts at weight reduction with diuresis given his chronic kidney disease and systolic congestive heart failure  He has only been able to tolerate limited doses without having worsening renal function  He denies cough  He does not have phlegm  He denies wheezing  He does not report any chest pain  No leg swelling  He does feel that he holds onto a lot of fluid in his abdomen and has abdominal bloating  His claudication symptoms have improved after surgery but he does have hip arthritis and back pain that often limit his activities as well  He recently tried Xopenex inhaler  He was using this 3 times daily  In the past he has had no benefit from bronchodilator and therapy  He has tried multiple inhalers with no benefit  Xopenex did not result in any improvement in his symptoms currently    He continues to use CPAP  He finds that the CPAP is working very well for him  He did not tolerate higher pressures but gets a good night's sleep in feels very well refreshed in the morning    He is currently using CPAP 12 cm of water with excellent compliance    Current Medications:    Current Outpatient Medications:     acetaminophen (TYLENOL) 325 mg tablet, Take 2 tablets (650 mg total) by mouth every 6 (six) hours as needed for mild pain (Patient taking differently: Take 500 mg by mouth every 8 (eight) hours as needed for mild pain Take 1,000 mg every 8 hours PRN), Disp: 30 tablet, Rfl: 0    ammonium lactate (LAC-HYDRIN) 12 % lotion, Apply topically 2 (two) times a day as needed for dry skin Apply to dry skin between the toes in the AM & in the PM, Disp: , Rfl:     ascorbic acid (VITAMIN C) 250 mg tablet, Take 250 mg by mouth daily, Disp: , Rfl:     aspirin 81 MG tablet, Take 81 mg by mouth daily  , Disp: , Rfl:     cholecalciferol (VITAMIN D3) 1,000 units tablet, Take 1,000 Units by mouth daily , Disp: , Rfl:     clopidogrel (PLAVIX) 75 mg tablet, Take 1 tablet (75 mg total) by mouth daily, Disp: 90 tablet, Rfl: 3    Coenzyme Q10 (COQ-10) 200 MG CAPS, Take 200 mg by mouth daily, Disp: , Rfl:     docusate sodium (COLACE) 50 mg capsule, Take by mouth 2 (two) times a day as needed , Disp: , Rfl:     gabapentin (NEURONTIN) 300 mg capsule, Take 1 capsule (300 mg total) by mouth 3 (three) times a day, Disp: 270 capsule, Rfl: 1    insulin glargine (Lantus) 100 units/mL subcutaneous injection, Inject under the skin 40 units am- 26 units pm, Disp: , Rfl:     insulin lispro (HUMALOG) 100 units/mL injection, Inject 3 units with breakfast, 6 units at lunch, and 6 units at dinner (Patient taking differently: 4 (four) times a day Inject 3 units with breakfast, 6 units at lunch, and 6 units at dinner Carbs counting), Disp: 10 mL, Rfl: 1    Lactobacillus Rhamnosus, GG, (CULTURELLE) CAPS, Take 1 capsule by mouth daily , Disp: , Rfl:     levalbuterol (XOPENEX HFA) 45 mcg/act inhaler, Inhale 1-2 puffs every 4 (four) hours as needed for wheezing, Disp: 1 Inhaler, Rfl: 6    levothyroxine 200 mcg tablet, Take 200 mcg by mouth daily, Disp: , Rfl: 0    Multiple Vitamins-Minerals (CENTRUM MEN) TABS, Take by mouth Take one tablet in the morning, Disp: , Rfl:     nitroglycerin (NITROSTAT) 0 4 mg SL tablet, Place 1 tablet (0 4 mg total) under the tongue every 5 (five) minutes as needed for chest pain, Disp: 25 tablet, Rfl: 3    polyethylene glycol (MIRALAX) 17 g packet, Take 17 g by mouth daily, Disp: , Rfl:     rosuvastatin (CRESTOR) 20 MG tablet, Take 1 tablet (20 mg total) by mouth daily, Disp: 90 tablet, Rfl: 3    torsemide (DEMADEX) 10 mg tablet, Please take 20 mg every  and 10 mg , Disp: 90 tablet, Rfl: 0    ULTICARE INSULIN SYRINGE 30G X 1/2" 1 ML MISC, Use as directed, Disp: , Rfl: 0    Wound Dressings (MEDIHONEY WOUND/BURN DRESSING) GEL, Apply topically daily at bedtime, Disp: , Rfl:     glucagon (GLUCAGON EMERGENCY) 1 MG injection, 1 mg, Disp: , Rfl:     SF 1 1 % GEL, BRUCH ONCE DAILY AT BEDTIME, BRUSH OF 1 MINUTE AS DIRECTED, Disp: , Rfl:     Review of Systems:  Aside from what is mentioned in the HPI, the review of systems is otherwise negative    Past medical history, surgical history, and family history were reviewed and updated as appropriate    Social history updates:  Social History     Tobacco Use   Smoking Status Former Smoker    Packs/day: 4 00    Years: 48 00    Pack years: 192 00    Types: Cigarettes    Start date:     Last attempt to quit:     Years since quittin 6   Smokeless Tobacco Never Used   Tobacco Comment    smoking 48 years 1 5 ppd d/c oct 2008 [facility]       PhysicalExamination:  Vitals:   /74 (BP Location: Left arm, Patient Position: Sitting, Cuff Size: Standard)   Pulse 98   Temp (!) 97 4 °F (36 3 °C) (Temporal)   Ht 5' 7" (1 702 m)   Wt 99 8 kg (220 lb)   SpO2 94%   BMI 34 46 kg/m²   Gen:  Obese, comfortable on room air at rest  HEENT: PERRL  Oropharynx is crowded with a low-lying palate  There is no erythema or exudate  Neck: Stout  I do not appreciate any JVD or lymphadenopathy  Trachea is midline  No thyromegaly  Chest: Breath sounds are distant but clear to auscultation  There are no wheezes, rales or rhonchi    Cardiac: Regular rate and rhythm  There are no murmurs, rubs or gallops  Heart tones are distant  Abdomen: Obese  Soft and nontender  Extremities: No clubbing, cyanosis or edema  Neurologic: No focal deficits  Skin: No appreciable rashes  Diagnostic Data:  Labs: I personally reviewed the most recent laboratory data pertinent to today's visit    Lab Results   Component Value Date    WBC 7 32 08/03/2020    HGB 13 6 08/03/2020    HCT 41 5 08/03/2020    MCV 95 08/03/2020     (L) 08/03/2020     Lab Results   Component Value Date    SODIUM 140 08/03/2020    K 4 6 08/03/2020    CO2 28 08/03/2020     08/03/2020    BUN 41 (H) 08/03/2020    CREATININE 1 75 (H) 08/03/2020    CALCIUM 9 7 08/03/2020       PFT results: The most recent pulmonary function tests were reviewed  Most recent pulmonary function tests from February 2018 and at that time showed moderate restriction on spirometry  There was concerned that the lung volumes were not performed correctly or consistent with spirometry  There was moderate reduction in diffusing capacity  Imaging:  I personally reviewed the images on the [facility] system pertinent to today's visit  Chest x-ray from June shows clear lung fields bilaterally  It is notable for emphysema change however    Other studies:  CPAP compliance report from June was reviewed and compliance is excellent    100% of the days he is using his device    Lidia Joseph MD

## 2020-08-31 ENCOUNTER — HOSPITAL ENCOUNTER (OUTPATIENT)
Dept: PULMONOLOGY | Facility: HOSPITAL | Age: 75
Discharge: HOME | End: 2020-08-31
Attending: INTERNAL MEDICINE
Payer: MEDICARE

## 2020-08-31 DIAGNOSIS — J43.2 CENTRILOBULAR EMPHYSEMA (HCC): ICD-10-CM

## 2020-08-31 PROCEDURE — 94726 PLETHYSMOGRAPHY LUNG VOLUMES: CPT

## 2020-08-31 PROCEDURE — 94010 BREATHING CAPACITY TEST: CPT | Performed by: INTERNAL MEDICINE

## 2020-08-31 PROCEDURE — 94760 N-INVAS EAR/PLS OXIMETRY 1: CPT

## 2020-08-31 PROCEDURE — 94729 DIFFUSING CAPACITY: CPT

## 2020-08-31 PROCEDURE — 94726 PLETHYSMOGRAPHY LUNG VOLUMES: CPT | Performed by: INTERNAL MEDICINE

## 2020-08-31 PROCEDURE — 94729 DIFFUSING CAPACITY: CPT | Performed by: INTERNAL MEDICINE

## 2020-08-31 PROCEDURE — 94010 BREATHING CAPACITY TEST: CPT

## 2020-09-03 DIAGNOSIS — J84.9 ILD (INTERSTITIAL LUNG DISEASE) (HCC): ICD-10-CM

## 2020-09-03 DIAGNOSIS — J98.4 RESTRICTIVE LUNG DISEASE: Primary | ICD-10-CM

## 2020-09-03 NOTE — PROGRESS NOTES
Results note - Pulmonary Medicine   Noel Willams 76 y o  male MRN: [id number]    Result discussed:  Pulmonary function studies which demonstrates severe restriction and reduced diffusing capacity  I left a message on [email or web address]  Pertinent details:  Based on the results of the study, I have recommended a high-resolution CT scan  His last CT scan was in 2016  This showed minimal scarring at that time and showed severe emphysema  He also had some tiny granulomas      I do suspect that his pulmonary function test is due to emphysema and obesity but given his persistent symptoms and complaints of shortness of breath, ruling out concomitant interstitial lung disease is necessary    Edward Green MD

## 2020-09-09 ENCOUNTER — APPOINTMENT (OUTPATIENT)
Dept: LAB | Facility: CLINIC | Age: 75
End: 2020-09-09
Payer: MEDICARE

## 2020-09-09 DIAGNOSIS — I25.10 CORONARY ARTERY DISEASE INVOLVING NATIVE CORONARY ARTERY OF NATIVE HEART WITHOUT ANGINA PECTORIS: ICD-10-CM

## 2020-09-09 DIAGNOSIS — N18.30 BENIGN HYPERTENSION WITH CHRONIC KIDNEY DISEASE, STAGE III (HCC): ICD-10-CM

## 2020-09-09 DIAGNOSIS — I12.9 BENIGN HYPERTENSION WITH CHRONIC KIDNEY DISEASE, STAGE III (HCC): ICD-10-CM

## 2020-09-09 LAB
ALBUMIN SERPL BCP-MCNC: 3.7 G/DL (ref 3.5–5)
ALP SERPL-CCNC: 105 U/L (ref 46–116)
ALT SERPL W P-5'-P-CCNC: 26 U/L (ref 12–78)
ANION GAP SERPL CALCULATED.3IONS-SCNC: 9 MMOL/L (ref 4–13)
AST SERPL W P-5'-P-CCNC: 21 U/L (ref 5–45)
BILIRUB SERPL-MCNC: 0.59 MG/DL (ref 0.2–1)
BUN SERPL-MCNC: 29 MG/DL (ref 5–25)
CALCIUM SERPL-MCNC: 9.2 MG/DL (ref 8.3–10.1)
CHLORIDE SERPL-SCNC: 110 MMOL/L (ref 100–108)
CHOLEST SERPL-MCNC: 139 MG/DL (ref 50–200)
CO2 SERPL-SCNC: 24 MMOL/L (ref 21–32)
CREAT SERPL-MCNC: 1.57 MG/DL (ref 0.6–1.3)
GFR SERPL CREATININE-BSD FRML MDRD: 42 ML/MIN/1.73SQ M
GLUCOSE P FAST SERPL-MCNC: 112 MG/DL (ref 65–99)
HDLC SERPL-MCNC: 40 MG/DL
LDLC SERPL DIRECT ASSAY-MCNC: 65 MG/DL (ref 0–100)
POTASSIUM SERPL-SCNC: 4 MMOL/L (ref 3.5–5.3)
PROT SERPL-MCNC: 7.9 G/DL (ref 6.4–8.2)
SODIUM SERPL-SCNC: 143 MMOL/L (ref 136–145)
TRIGL SERPL-MCNC: 406 MG/DL

## 2020-09-09 PROCEDURE — 83721 ASSAY OF BLOOD LIPOPROTEIN: CPT

## 2020-09-09 PROCEDURE — 80053 COMPREHEN METABOLIC PANEL: CPT

## 2020-09-09 PROCEDURE — 80061 LIPID PANEL: CPT

## 2020-09-09 PROCEDURE — 36415 COLL VENOUS BLD VENIPUNCTURE: CPT

## 2020-09-11 ENCOUNTER — TELEPHONE (OUTPATIENT)
Dept: FAMILY MEDICINE CLINIC | Facility: CLINIC | Age: 75
End: 2020-09-11

## 2020-09-11 NOTE — PROGRESS NOTES
Cardiology Follow Up    [facility]  1945  [id number]  [facility] KENDRA Juarez [address] [facility]  [phone]    1  Essential (primary) hypertension     2  Pure hypercholesterolemia     3  Coronary artery disease involving native coronary artery of native heart without angina pectoris     4  PVD (peripheral vascular disease) ([address] )     5  Type 1 diabetes mellitus with complication (HCC)         Interval History: Interval History: Patient is here for a follow-up visit  Vasu Euceda was hospitalized March 2019 for peripheral vascular disease with left femoral endarterectomy with bovine patch and left femoral to above the knee bypass with left external iliac artery stent by Dr Roya Otero March 22, 2019  Vasu Euceda did have intermittent angina during that postoperative period  Thereafter he required debridement in reference to a left groin infection 4/15/19   Patient has known history of CAD with remote cardiac catheterization and intervention performed at [facility] with placement of 4 stents  in 2015  He had a second cardiac catheterization October 2018 with re-stenosis of the LAD and stent placement in [place]   A 50% left circumflex lesion was also noted   Patient had an echocardiogram performed March 23, 2019 which demonstrated a mild reduction in LV systolic function in the setting of a mid apical anterior inferior and inferolateral wall motion abnormality   The resting left ventricular ejection fraction was 45-50%   There was no significant valvular heart disease   He has chronic renal insufficiency   He is followed by Nephrology service  Julyelyse Guerrero was seen by Cardiology in February 2020 at [facility] underwent cardiac catheterization March 6, 2020 by the radial approach in the setting of an abnormal pharmacologic nuclear stress test done February 6, 2020   He was found to have a 50 and 70% stenosis in a small non dominant right coronary vessel   A short left main with a left dominant system was noted  A 40% stenosis in LAD and thereafter widely patent stents   A patent obtuse OMB 1 and 2 with a large OMB 3 which is a  that receives left-to-left collaterals with distal disease in that vessel noted  He was managed medically and cleared for surgery   Echocardiogram done February 6, 2020 demonstrated global hypokinesis of the left ventricle with more striking hypokinesis of the inferior and inferolateral segment of the left ventricle   The ejection fraction was 46%   Mild left atrial cavity enlargement was noted   There was no significant valvular heart disease  Mild left ventricular hypertrophy was noted   Patient was  hospitalized at [facility] 4/2020 in reference to need for lower extremity revascularization   He had an urgent right common femoral endarterectomy as well as right femoral to above the knee popliteal artery bypass   Patient developed heart burn like chest discomfort  postoperatively  Machelle Henry was a minimal troponin elevation   His baseline dry weight is 208 according to his wife who is a retired nurse   Most recent BUN and creatinine were 29/1 57 obtained September 2020  He is followed by Pulmonary service in reference to COPD  He has at least 48 pack years and perhaps more but discontinued smoking around 2008  The patient's vital signs today are stable  Patient's weight is up a little bit but some of this may be related to dietary indiscretion given COVID-19  His wife would like him to go back on Vascepa for hypertriglyceridemia  Will order this and do repeat blood work  Patient does have some fatigue and would benefit from cardiac rehab but they would prefer to defer on this for now      Patient Active Problem List   Diagnosis    Type 1 diabetes mellitus with peripheral vascular disease (HCC)    Presence of drug coated stent in LAD coronary artery    Coronary artery disease of native artery of native heart with stable angina pectoris ([facility]    Presence of drug coated stent in left circumflex coronary artery    Dyslipidemia    Obstructive sleep apnea of adult    Carotid artery stenosis, asymptomatic, bilateral    Dyspnea on exertion    Restrictive lung disease    Centrilobular emphysema ([facility])    Benign hypertension with chronic kidney disease, stage III ([facility])    CKD (chronic kidney disease) stage 3, GFR 30-59 ml/min ([facility])    Renal artery stenosis ([facility])    Renal cyst, right    Vitamin D deficiency    Aortoiliac occlusive disease ([facility])    Hypothyroidism, postablative    Low back pain with sciatica    Lumbar disc herniation    Lumbar radiculopathy    Diabetic neuropathy associated with type 1 diabetes mellitus ([facility] )    History of Ischemic cardiomyopathy    Chronic systolic congestive heart failure ([facility])    NSTEMI (non-ST elevated myocardial infarction) ([facility] )    Anxiety with depression    Spinal stenosis of lumbar region with neurogenic claudication    Thrombocytopenia, unspecified ([facility] )    S/P femoral-popliteal bypass surgery    Stenosis of carotid artery    Peripheral vascular disease ([facility] )     Past Medical History:   Diagnosis Date    Acute kidney injury ([facility] )     resolved 11/30/2015    Acute venous embolism and thrombosis of deep vessels of proximal lower extremity ([facility] )     resolved 04/04/2015    DARINEL (acute kidney injury) ([facility] ) 1/12/2016    Anesthesia     RESPIRATORY ISSUES DUE TO SLEEP APNEA    Cardiac disease     heart attack, stents x 4    CHF (congestive heart failure) ([facility])     Chronic cough     resolved 02/04/2016    Chronic pain disorder     intermitent claudication    COPD (chronic obstructive pulmonary disease) [facility]     Coronary artery disease     CPAP (continuous positive airway pressure) dependence     Diabetes mellitus ([facility] )     Disease of thyroid gland     DVT (deep venous thrombosis) ([facility] )     Heart failure ([facility])     Hyperlipidemia     Hypertension     Ischemic cardiomyopathy     last assessed 2017    Myocardial infarction [facility])     MI +2 ,    Pulmonary emphysema (HCC)     Pulmonary granuloma (HCC)     resolved 2017    Renal failure     Sleep apnea      Social History     Socioeconomic History    Marital status: /Civil Union     Spouse name: Not on file    Number of children: Not on file    Years of education: Not on file    Highest education level: Not on file   Occupational History    Occupation: Retired    Occupation: Desk work    Occupation: [employer]   Social Needs    Financial resource strain: Not on file    Food insecurity     Worry: Not on file     Inability: Not on file   [employer] needs     Medical: Not on file     Non-medical: Not on file   Tobacco Use    Smoking status: Former Smoker     Packs/day: 4 00     Years: 48 00     Pack years: 192 00     Types: Cigarettes     Start date:      Last attempt to quit:      Years since quittin 7    Smokeless tobacco: Never Used    Tobacco comment: smoking 48 years 1 5 ppd d/c oct 2008 screening protocol as per allscripts   Substance and Sexual Activity    Alcohol use:  Yes     Alcohol/week: 21 0 standard drinks     Types: 21 Standard drinks or equivalent per week     Frequency: 4 or more times a week     Drinks per session: 1 or 2     Binge frequency: Never     Comment: 2-3 glasses of vodka daily (6 shots) (history 2 drinks per day as per allscripts    Drug use: No    Sexual activity: Not Currently   Lifestyle    Physical activity     Days per week: Not on file     Minutes per session: Not on file    Stress: Not on file   Relationships    Social connections     Talks on phone: Not on file     Gets together: Not on file     Attends Caodaism service: Not on file     Active member of club or organization: Not on file     Attends meetings of clubs or organizations: Not on file     Relationship status: Not on file    Intimate partner violence     Fear of current or ex partner: Not on file     Emotionally abused: Not on file     Physically abused: Not on file     Forced sexual activity: Not on file   Other Topics Concern    Not on file   Social History Narrative    Not on file      Family History   Problem Relation Age of Onset    Heart disease Father         pacer placement    Hypertension Father     Kidney disease Father     Heart failure Father     Heart attack Father     Liver disease Father     Cirrhosis Mother         due to beer consumption    Liver disease Mother     Diabetes Other      Past Surgical History:   Procedure Laterality Date    BYPASS FEMORAL-POPLITEAL      initial stenosis with stent left, 7 x 100 smart stent onset 02/24/2014    CARDIAC SURGERY      cardiac stents    CATARACT EXTRACTION      COLOGUARD (HISTORICAL)  2018    CORONARY ANGIOPLASTY WITH STENT PLACEMENT      x4    IR AORTAGRAM WITH RUN-OFF  3/14/2019    GA THROMBOENDARTECTMY FEMORAL COMMON Left 3/22/2019    Procedure: ENDARTERECTOMY ARTERIAL FEMORAL WITH PATCH ANGIOPLASTY, BALLOON ANGIOPLASTY, STENT;  Surgeon: Margarita Lira MD;  [facility];  Service: Vascular    GA VEIN BYPASS GRAFT,FEM-POP Left 3/22/2019    Procedure: BYPASS FEMORAL-POPLITEAL WITH COMPLETION ARTERIOGRAM;  Surgeon: Margarita Lira MD;  [facility];  Service: Vascular    VASCULAR SURGERY      stent placement    WOUND DEBRIDEMENT Left 4/15/2019    Procedure: DEBRIDEMENT WOUND AND DRESSING CHANGE ([address]) left groin with VAC;  Surgeon: Cooper Parnell DO;  [facility];  Service: Vascular       Current Outpatient Medications:     acetaminophen (TYLENOL) 325 mg tablet, Take 2 tablets (650 mg total) by mouth every 6 (six) hours as needed for mild pain (Patient taking differently: Take 500 mg by mouth every 8 (eight) hours as needed for mild pain Take 1,000 mg every 8 hours PRN), Disp: 30 tablet, Rfl: 0    ammonium lactate (LAC-HYDRIN) 12 % lotion, Apply topically 2 (two) times a day as needed for dry skin Apply to dry skin between the toes in the AM & in the PM, Disp: , Rfl:     ascorbic acid (VITAMIN C) 250 mg tablet, Take 250 mg by mouth daily, Disp: , Rfl:     aspirin 81 MG tablet, Take 81 mg by mouth daily  , Disp: , Rfl:     cholecalciferol (VITAMIN D3) 1,000 units tablet, Take 1,000 Units by mouth daily , Disp: , Rfl:     clopidogrel (PLAVIX) 75 mg tablet, Take 1 tablet (75 mg total) by mouth daily, Disp: 90 tablet, Rfl: 3    Coenzyme Q10 (COQ-10) 200 MG CAPS, Take 200 mg by mouth daily, Disp: , Rfl:     docusate sodium (COLACE) 50 mg capsule, Take by mouth 2 (two) times a day as needed , Disp: , Rfl:     gabapentin (NEURONTIN) 300 mg capsule, Take 1 capsule (300 mg total) by mouth 3 (three) times a day, Disp: 270 capsule, Rfl: 1    Glucagon (Baqsimi One Pack) 3 MG/DOSE POWD, 1 Dose into each nostril as needed, Disp: , Rfl:     insulin glargine (Lantus) 100 units/mL subcutaneous injection, Inject under the skin 40 units am- 26 units pm, Disp: , Rfl:     insulin lispro (HUMALOG) 100 units/mL injection, Inject 3 units with breakfast, 6 units at lunch, and 6 units at dinner (Patient taking differently: 4 (four) times a day Inject 3 units with breakfast, 6 units at lunch, and 6 units at dinner Carbs counting), Disp: 10 mL, Rfl: 1    Lactobacillus Rhamnosus, GG, (CULTURELLE) CAPS, Take 1 capsule by mouth daily , Disp: , Rfl:     levalbuterol (XOPENEX HFA) 45 mcg/act inhaler, Inhale 1-2 puffs every 4 (four) hours as needed for wheezing, Disp: 1 Inhaler, Rfl: 6    levothyroxine 200 mcg tablet, Take 200 mcg by mouth daily, Disp: , Rfl: 0    Multiple Vitamins-Minerals (CENTRUM MEN) TABS, Take by mouth Take one tablet in the morning, Disp: , Rfl:     nitroglycerin (NITROSTAT) 0 4 mg SL tablet, Place 1 tablet (0 4 mg total) under the tongue every 5 (five) minutes as needed for chest pain, Disp: 25 tablet, Rfl: 3    polyethylene glycol (MIRALAX) 17 g packet, Take 17 g by mouth daily, Disp: , Rfl:     rosuvastatin (CRESTOR) 20 MG tablet, Take 1 tablet (20 mg total) by mouth daily, Disp: 90 tablet, Rfl: 3    torsemide (DEMADEX) 10 mg tablet, Please take 20 mg every Monday Wednesday Friday and 10 mg Tuesday Thursday Saturday Sunday, Disp: 90 tablet, Rfl: 0    SF 1 1 % GEL, BRUCH ONCE DAILY AT BEDTIME, BRUSH OF 1 MINUTE AS DIRECTED, Disp: , Rfl:     ULTICARE INSULIN SYRINGE 30G X 1/2" 1 ML MISC, Use as directed, Disp: , Rfl: 0    Wound Dressings ([facility] WOUND/BURN DRESSING) GEL, Apply topically daily at bedtime, Disp: , Rfl:   Allergies   Allergen Reactions    Doxazosin Myalgia     UNKNOWN  UNKNOWN  Muscle cramps    Iohexol      UNKNOWN    Lisinopril Cough     cough  Other reaction(s): CAMELLA SINENSIS (ZESTRIL) (cough)  cough    Vancomycin Hives    Adhesive [Medical Tape]      Skin Breakdown    Cardura [Doxazosin Mesylate]      Muscle cramps    Other      Iv dye for cardiac cath  Renal failure very close to dialysis  But no dialysis  Iv dye for cardiac cath  Renal failure very close to dialysis  But no dialysis  Iv dye for cardiac cath  Renal failure very close to dialysis  But no dialysis       Labs:not applicable  Imaging: No results found  Review of Systems:  Review of Systems   All other systems reviewed and are negative  Physical Exam:  /74 (BP Location: Right arm, Patient Position: Sitting, Cuff Size: Large)   Pulse 85   Temp (!) 97 1 °F (36 2 °C)   Ht 5' 7" (1 702 m)   Wt 98 8 kg (217 lb 12 8 oz)   SpO2 98%   BMI 34 11 kg/m²   Physical Exam  Vitals signs reviewed  Constitutional:       Appearance: He is well-developed  HENT:      Head: Normocephalic and atraumatic  Eyes:      Conjunctiva/sclera: Conjunctivae normal       Pupils: Pupils are equal, round, and reactive to light  Neck:      Musculoskeletal: Normal range of motion and neck supple  Cardiovascular:      Rate and Rhythm: Normal rate        Heart sounds: Normal heart sounds  Pulmonary:      Effort: Pulmonary effort is normal       Breath sounds: Normal breath sounds  Skin:     General: Skin is warm and dry  Neurological:      Mental Status: He is alert and oriented to person, place, and time  Discussion/Summary:I will continue the patient's present medical regimen except for adding Vascepa  Will check lipids in about 6-8 weeks time  The patient appears well compensated  I have asked the patient to call if there is a problem in the interim otherwise I will see the patient in six months time

## 2020-09-11 NOTE — TELEPHONE ENCOUNTER
Spoke with Pt's wife and she is aware of his B/W results and MD's directions  Will C/B with Fish oil mg  No further questions at this time

## 2020-09-11 NOTE — TELEPHONE ENCOUNTER
Please contact patient or his wife  I reviewed blood work results  His cholesterol is 139 with LDL of 65 which is excellent but his triglyceride level is high at 406  Target for triglycerides is to be below 150  Creatinine, is stable, improved, at 1 57  Patient should continue on Crestor 20 mg daily and follow low-fat and diabetic diet  I believe he used to take fish oil supplements in the past, please double check with patient or wife is he is still on any fish oil/fatty 3 Omega acids and what is current daily dose?     Please let me know  Thank you

## 2020-09-15 ENCOUNTER — OFFICE VISIT (OUTPATIENT)
Dept: CARDIOLOGY CLINIC | Facility: CLINIC | Age: 75
End: 2020-09-15
Payer: MEDICARE

## 2020-09-15 VITALS
WEIGHT: 217.8 LBS | BODY MASS INDEX: 34.18 KG/M2 | TEMPERATURE: 97.1 F | SYSTOLIC BLOOD PRESSURE: 130 MMHG | HEART RATE: 85 BPM | HEIGHT: 67 IN | OXYGEN SATURATION: 98 % | DIASTOLIC BLOOD PRESSURE: 74 MMHG

## 2020-09-15 DIAGNOSIS — I10 ESSENTIAL (PRIMARY) HYPERTENSION: Primary | ICD-10-CM

## 2020-09-15 DIAGNOSIS — I25.10 CORONARY ARTERY DISEASE INVOLVING NATIVE CORONARY ARTERY OF NATIVE HEART WITHOUT ANGINA PECTORIS: ICD-10-CM

## 2020-09-15 DIAGNOSIS — I73.9 PVD (PERIPHERAL VASCULAR DISEASE) (HCC): ICD-10-CM

## 2020-09-15 DIAGNOSIS — E78.00 PURE HYPERCHOLESTEROLEMIA: ICD-10-CM

## 2020-09-15 DIAGNOSIS — E10.8 TYPE 1 DIABETES MELLITUS WITH COMPLICATION (HCC): ICD-10-CM

## 2020-09-15 PROCEDURE — 99214 OFFICE O/P EST MOD 30 MIN: CPT | Performed by: INTERNAL MEDICINE

## 2020-09-15 RX ORDER — GLUCAGON 3 MG/1
1 POWDER NASAL AS NEEDED
COMMUNITY

## 2020-09-15 RX ORDER — ICOSAPENT ETHYL 1000 MG/1
1 CAPSULE ORAL 2 TIMES DAILY
Qty: 180 CAPSULE | Refills: 3 | Status: SHIPPED | OUTPATIENT
Start: 2020-09-15 | End: 2021-09-22 | Stop reason: SDUPTHER

## 2020-09-15 NOTE — PATIENT INSTRUCTIONS
Will call in [facility]  Have blood work done in about 6-8 weeks time  Will see you at the follow-up visit

## 2020-09-17 NOTE — TELEPHONE ENCOUNTER
Attempted to contact patient to provide Basaglar orders. Automated message received stating patient's voicemail box has not yet been set up. Unable to leave message.   Will attempt to reach patient again tomorrow after 4 PM. He can have the angio- he is just volume overloaded- was not taking diuretic every day- I agree with the lasix daily

## 2020-09-18 ENCOUNTER — TELEPHONE (OUTPATIENT)
Dept: NEPHROLOGY | Facility: CLINIC | Age: 75
End: 2020-09-18

## 2020-09-18 ENCOUNTER — HOSPITAL ENCOUNTER (OUTPATIENT)
Dept: RADIOLOGY | Age: 75
Discharge: HOME | End: 2020-09-18
Payer: MEDICARE

## 2020-09-18 DIAGNOSIS — J84.9 ILD (INTERSTITIAL LUNG DISEASE) (HCC): ICD-10-CM

## 2020-09-18 DIAGNOSIS — J98.4 RESTRICTIVE LUNG DISEASE: ICD-10-CM

## 2020-09-18 PROCEDURE — 71250 CT THORAX DX C-: CPT

## 2020-09-18 PROCEDURE — G1004 CDSM NDSC: HCPCS

## 2020-09-18 NOTE — TELEPHONE ENCOUNTER
Left message to confirm patient appointment, demographics, and review covid screening questions   Please make patient aware to call office from parking lot upon arrival

## 2020-09-21 ENCOUNTER — OFFICE VISIT (OUTPATIENT)
Dept: NEPHROLOGY | Facility: CLINIC | Age: 75
End: 2020-09-21
Payer: MEDICARE

## 2020-09-21 VITALS
DIASTOLIC BLOOD PRESSURE: 64 MMHG | HEIGHT: 67 IN | TEMPERATURE: 97.6 F | RESPIRATION RATE: 22 BRPM | HEART RATE: 80 BPM | WEIGHT: 217 LBS | SYSTOLIC BLOOD PRESSURE: 110 MMHG | BODY MASS INDEX: 34.06 KG/M2

## 2020-09-21 DIAGNOSIS — I70.1 RENAL ARTERY STENOSIS (HCC): ICD-10-CM

## 2020-09-21 DIAGNOSIS — N18.30 CKD (CHRONIC KIDNEY DISEASE) STAGE 3, GFR 30-59 ML/MIN (HCC): Primary | ICD-10-CM

## 2020-09-21 DIAGNOSIS — I50.22 CHRONIC SYSTOLIC CONGESTIVE HEART FAILURE (HCC): ICD-10-CM

## 2020-09-21 PROCEDURE — 99214 OFFICE O/P EST MOD 30 MIN: CPT | Performed by: INTERNAL MEDICINE

## 2020-09-21 NOTE — PATIENT INSTRUCTIONS
Chronic Kidney Disease   WHAT YOU NEED TO KNOW:   Chronic kidney disease (CKD) is the gradual and permanent loss of kidney function  It is also called chronic kidney failure, or chronic renal insufficiency  Normally, the kidneys remove fluid, chemicals, and waste from your blood  These wastes are turned into urine by your kidneys  CKD may worsen over time and lead to kidney failure  DISCHARGE INSTRUCTIONS:   Return to the emergency department if:   · You are confused and very drowsy  · You have a seizure  · You have shortness of breath  Contact your healthcare provider if:   · You suddenly gain or lose more weight than your healthcare provider has told you is okay  · You have itchy skin or a rash  · You urinate more or less than you normally do  · You have blood in your urine  · You have nausea and repeated vomiting  · You have fatigue or muscle weakness  · You have hiccups that will not stop  · You have questions or concerns about your condition or care  Medicines:   · Medicines  may be given to decrease blood pressure and get rid of extra fluid  You may also receive medicine to manage health conditions that may occur with CKD, such as anemia, diabetes, and heart disease  · Take your medicine as directed  Contact your healthcare provider if you think your medicine is not helping or if you have side effects  Tell him or her if you are allergic to any medicine  Keep a list of the medicines, vitamins, and herbs you take  Include the amounts, and when and why you take them  Bring the list or the pill bottles to follow-up visits  Carry your medicine list with you in case of an emergency  Follow up with your healthcare provider as directed: You will need to return for tests to monitor your kidney function  You may also be referred to a kidney specialist  Write down your questions so you remember to ask them during your visits  Manage other health conditions:   Follow your healthcare provider's directions on how to manage diabetes, high blood pressure, and heart disease  These conditions can make CKD worse  Talk to your healthcare provider before you take over-the-counter medicine  Medicines such as NSAIDs, stomach medicine, or laxatives may harm your kidneys  Weigh yourself daily:  Ask your healthcare provider what your weight should be  Ask how much liquid you should drink each day  CKD may cause you to gain or lose weight rapidly  Weigh yourself every day  Write down your weight, how much liquid you drink or eat, and how much you urinate each day  Contact your healthcare provider if your weight is higher or lower than it should be  Manage CKD:   · Maintain a healthy weight  Ask your healthcare provider how much you should weigh  Ask him to help you create a weight loss plan if you are overweight  · Exercise 30 to 60 minutes a day, 4 to 7 times a week, or as directed  Ask about the best exercise plan for you  Regular exercise can help you manage CKD, high blood pressure, and diabetes  · Follow your healthcare provider's advice about what to eat and drink  He may tell you to eat food low in sodium (salt), potassium, phosphorus, or protein  You may need to see a dietitian if you need help planning meals  Ask how much liquid to drink each day and which liquids are best for you  · Limit alcohol  Ask how much alcohol is safe for you to drink  A drink of alcohol is 12 ounces of beer, 5 ounces of wine, or 1½ ounces of liquor  · Do not smoke  Nicotine and other chemicals in cigarettes and cigars can cause lung and kidney damage  Ask your healthcare provider for information if you currently smoke and need help to quit  E-cigarettes or smokeless tobacco still contain nicotine  Talk to your healthcare provider before you use these products  · Ask your healthcare provider if you need vaccines    Infections such as pneumonia, influenza, and hepatitis can be more harmful or more likely to occur in a person who has CKD  Vaccines reduce your risk of infection with these viruses  © 2017 [employer] Information is for End User's use only and may not be sold, redistributed or otherwise used for commercial purposes  All illustrations and images included in CareNotes® are the copyrighted property of [employer]  or Dylon Lambert  The above information is an  only  It is not intended as medical advice for individual conditions or treatments  Talk to your doctor, nurse or pharmacist before following any medical regimen to see if it is safe and effective for you

## 2020-09-21 NOTE — PROGRESS NOTES
OFFICE FOLLOW UP - Nephrology   Noel Willams 76 y o  male MRN: [id number]    Encounter: [id number]        ASSESSMENT & PLAN    CKD (chronic kidney disease) stage 3, GFR 30-59 ml/min  -his hospitalization events were noted from the [facility] [place] and reviewed with them -baseline creatinine around 1 6 his estimated GFR is likely around 40-50  -medications are appropriately dosed  -continue cardiovascular risk reduction measures  -his last creatinine was 1 5-1 6     Type 1 diabetes mellitus with complication [facility])  - continue  Glycemic control he has seen Endocrinology as well     Renal artery stenosis ([facility] [address] )  - renal artery Doppler remains unchanged he has a right renal artery stenosis less than 60% and a left that is greater than 60%-repeating in October     Benign hypertension with chronic kidney disease, stage III  -  currently stable now metoprolol     DARINEL (acute kidney injury) ([facility] [address] )  -creatinine has been in the 2s previously now stable around 1 6     Renal cyst, right  - renal ultrasound was checked in February of 2018 which showed a stable simple cyst in the upper pole of the right kidney and a normal left kidney and bladder, this was checked in November of 2018 in [place] and is stable in size and checked in 2019 stable     Proteinuria, unspecified type  - 1+ proteinuria on urinalysis longstanding history of diabetes not anemic, no thrombocytopenic, no hematuria associated with this in creatinine is stable will monitor     Shortness of breath with coronary artery disease, COPD, systolic congestive heart failure, chronic kidney disease  -this seems to be multifactorial, getting a high-resolution CT scan and following with pulmonary  -now on torsemide 10 mg alternating with 20 mg daily     Peripheral vascular disease  -ongoing follow-up with vascular surgery    Follow-up blood work in 2 months and follow-up in 4 months    HPI: Noel Willams is a 76 y o  male who is here for Follow-up and Chronic Kidney Disease    Your remains short of breath a baseline weight is remained stable he denies any acute complaints he did have a toe contusion and open wound currently healing otherwise no acute complaints no chest pain or shortness of breath that is worse no fevers no chills      ROS:   All the systems were reviewed and were negative except as documented on the HPI  Allergies: Doxazosin; Iohexol; Lisinopril; Vancomycin; Adhesive [medical tape]; Cardura [doxazosin mesylate]; and Other    Medications:   Current Outpatient Medications:     acetaminophen (TYLENOL) 325 mg tablet, Take 2 tablets (650 mg total) by mouth every 6 (six) hours as needed for mild pain (Patient taking differently: Take 500 mg by mouth every 8 (eight) hours as needed for mild pain Take 1,000 mg every 8 hours PRN), Disp: 30 tablet, Rfl: 0    ammonium lactate (LAC-HYDRIN) 12 % lotion, Apply topically 2 (two) times a day as needed for dry skin Apply to dry skin between the toes in the AM & in the PM, Disp: , Rfl:     ascorbic acid (VITAMIN C) 250 mg tablet, Take 250 mg by mouth daily, Disp: , Rfl:     aspirin 81 MG tablet, Take 81 mg by mouth daily  , Disp: , Rfl:     cholecalciferol (VITAMIN D3) 1,000 units tablet, Take 1,000 Units by mouth daily , Disp: , Rfl:     clopidogrel (PLAVIX) 75 mg tablet, Take 1 tablet (75 mg total) by mouth daily, Disp: 90 tablet, Rfl: 3    Coenzyme Q10 (COQ-10) 200 MG CAPS, Take 200 mg by mouth daily, Disp: , Rfl:     docusate sodium (COLACE) 50 mg capsule, Take by mouth 2 (two) times a day as needed , Disp: , Rfl:     gabapentin (NEURONTIN) 300 mg capsule, Take 1 capsule (300 mg total) by mouth 3 (three) times a day, Disp: 270 capsule, Rfl: 1    Glucagon (Baqsimi One Pack) 3 MG/DOSE POWD, 1 Dose into each nostril as needed, Disp: , Rfl:     Icosapent Ethyl (Vascepa) 1 g CAPS, Take 1 capsule (1 g total) by mouth 2 (two) times a day, Disp: 180 capsule, Rfl: 3    insulin glargine (Lantus) 100 units/mL subcutaneous injection, Inject under the skin 40 units am- 20 units pm, Disp: , Rfl:     insulin lispro (HUMALOG) 100 units/mL injection, Inject 3 units with breakfast, 6 units at lunch, and 6 units at dinner (Patient taking differently: 4 (four) times a day Inject 3 units with breakfast, 6 units at lunch, and 6 units at dinner Carbs counting), Disp: 10 mL, Rfl: 1    Lactobacillus Rhamnosus, GG, (CULTURELLE) CAPS, Take 1 capsule by mouth daily , Disp: , Rfl:     levalbuterol (XOPENEX HFA) 45 mcg/act inhaler, Inhale 1-2 puffs every 4 (four) hours as needed for wheezing, Disp: 1 Inhaler, Rfl: 6    levothyroxine 200 mcg tablet, Take 200 mcg by mouth daily, Disp: , Rfl: 0    Multiple Vitamins-Minerals (CENTRUM MEN) TABS, Take by mouth Take one tablet in the morning, Disp: , Rfl:     nitroglycerin (NITROSTAT) 0 4 mg SL tablet, Place 1 tablet (0 4 mg total) under the tongue every 5 (five) minutes as needed for chest pain, Disp: 25 tablet, Rfl: 3    polyethylene glycol (MIRALAX) 17 g packet, Take 17 g by mouth daily, Disp: , Rfl:     rosuvastatin (CRESTOR) 20 MG tablet, Take 1 tablet (20 mg total) by mouth daily, Disp: 90 tablet, Rfl: 3    torsemide (DEMADEX) 10 mg tablet, Please take 20 mg every Monday Wednesday Friday and 10 mg Tuesday Thursday Saturday Sunday, Disp: 90 tablet, Rfl: 0    ULTICARE INSULIN SYRINGE 30G X 1/2" 1 ML MISC, Use as directed, Disp: , Rfl: 0    Wound Dressings (MEDIHONEY WOUND/BURN DRESSING) GEL, Apply topically daily at bedtime, Disp: , Rfl:     SF 1 1 % GEL, BRUCH ONCE DAILY AT BEDTIME, BRUSH OF 1 MINUTE AS DIRECTED, Disp: , Rfl:     Past Medical History:   Diagnosis Date    Acute kidney injury ([facility] [address] )     resolved 11/30/2015    Acute venous embolism and thrombosis of deep vessels of proximal lower extremity ([address] )     resolved 04/04/2015    DARINEL (acute kidney injury) ([facility] [address] ) 1/12/2016    Anesthesia     RESPIRATORY ISSUES DUE TO SLEEP APNEA    Cardiac disease     heart attack, stents x 4    CHF (congestive heart failure) (HCC)     Chronic cough     resolved 02/04/2016    Chronic pain disorder     intermitent claudication    COPD (chronic obstructive pulmonary disease) (HCC)     Coronary artery disease     CPAP (continuous positive airway pressure) dependence     Diabetes mellitus ([address] )     Disease of thyroid gland     DVT (deep venous thrombosis) (HCC)     Heart failure (HCC)     Hyperlipidemia     Hypertension     Ischemic cardiomyopathy     last assessed 09/26/2017    Myocardial infarction [facility])     MI +2 2015,2018    Pulmonary emphysema (HCC)     Pulmonary granuloma (HCC)     resolved 02/03/2017    Renal failure     Sleep apnea      Past Surgical History:   Procedure Laterality Date    BYPASS FEMORAL-POPLITEAL      initial stenosis with stent left, 7 x 100 smart stent onset 02/24/2014    CARDIAC SURGERY      cardiac stents    CATARACT EXTRACTION      COLOGUARD (HISTORICAL)  2018    CORONARY ANGIOPLASTY WITH STENT PLACEMENT      x4    IR AORTAGRAM WITH RUN-OFF  3/14/2019    ND THROMBOENDARTECTMY FEMORAL COMMON Left 3/22/2019    Procedure: ENDARTERECTOMY ARTERIAL FEMORAL WITH PATCH ANGIOPLASTY, BALLOON ANGIOPLASTY, STENT;  Surgeon: Dene Carrel, MD;  [facility]: [facility];  Service: Vascular    ND VEIN BYPASS GRAFT,FEM-POP Left 3/22/2019    Procedure: BYPASS FEMORAL-POPLITEAL WITH COMPLETION ARTERIOGRAM;  Surgeon: Dene Carrel, MD;  [facility];  Service: Vascular    VASCULAR SURGERY      stent placement    WOUND DEBRIDEMENT Left 4/15/2019    Procedure: DEBRIDEMENT WOUND AND DRESSING CHANGE ([address]) left groin with VAC;  Surgeon: Susy Castro DO;  [facility];  Service: Vascular     Family History   Problem Relation Age of Onset    Heart disease Father         pacer placement    Hypertension Father     Kidney disease Father     Heart failure Father     Heart attack Father     Liver disease Father     Cirrhosis Mother         due to beer consumption    Liver disease Mother     Diabetes Other       reports that he quit smoking about 12 years ago  His smoking use included cigarettes  He started smoking about 59 years ago  He has a 192 00 pack-year smoking history  He has never used smokeless tobacco  He reports current alcohol use of about 21 0 standard drinks of alcohol per week  He reports that he does not use drugs  Physical Exam:   Vitals:    09/21/20 1438   BP: 110/64   BP Location: Left arm   Patient Position: Sitting   Cuff Size: Large   Pulse: 80   Resp: 22   Temp: 97 6 °F (36 4 °C)   TempSrc: Temporal   Weight: 98 4 kg (217 lb)   Height: 5' 7" (1 702 m)     Body mass index is 33 99 kg/m²      General: conscious, cooperative, in not acute distress  Eyes: conjunctivae pink, anicteric sclerae  ENT: lips and mucous membranes moist  Neck: supple, no JVD  Chest: clear breath sounds bilateral, no crackles, ronchus or wheezings  CVS: distinct S1 & S2, normal rate, regular rhythm  Abdomen: soft, non-tender, non-distended, normoactive bowel sounds  Extremities: no edema of both legs  Skin: no rash  Neuro: awake, alert, oriented      Lab Results:    Results for orders placed or performed in visit on 09/09/20   Lipid Panel with Direct LDL reflex   Result Value Ref Range    Cholesterol 139 50 - 200 mg/dL    Triglycerides 406 (H) <=150 mg/dL    HDL, Direct 40 >=40 mg/dL    LDL Calculated     Comprehensive metabolic panel   Result Value Ref Range    Sodium 143 136 - 145 mmol/L    Potassium 4 0 3 5 - 5 3 mmol/L    Chloride 110 (H) 100 - 108 mmol/L    CO2 24 21 - 32 mmol/L    ANION GAP 9 4 - 13 mmol/L    BUN 29 (H) 5 - 25 mg/dL    Creatinine 1 57 (H) 0 60 - 1 30 mg/dL    Glucose, Fasting 112 (H) 65 - 99 mg/dL    Calcium 9 2 8 3 - 10 1 mg/dL    AST 21 5 - 45 U/L    ALT 26 12 - 78 U/L    Alkaline Phosphatase 105 46 - 116 U/L    Total Protein 7 9 6 4 - 8 2 g/dL    Albumin 3 7 3 5 - 5 0 g/dL    Total Bilirubin 0 59 0 20 - 1 00 mg/dL    eGFR 42 ml/min/1 73sq m   LDL cholesterol, direct   Result Value Ref Range    LDL Direct 65 0 - 100 mg/dl         Portions of the record may have been created with voice recognition software  Occasional wrong word or "sound a like" substitutions may have occurred due to the inherent limitations of voice recognition software  Read the chart carefully and recognize, using context, where substitutions have occurred  If you have any questions, please contact the dictating provider

## 2020-09-28 NOTE — RESULT ENCOUNTER NOTE
I spoke with the patient and his wife about this study  He does have some fibrotic change notable in the extreme right base  No honeycombing or other progressive findings  Mild changes in the lingula as well  Have recommended continued surveillance in 1 year with imaging    He will follow up with me in the office in 6 months

## 2020-10-15 ENCOUNTER — HOSPITAL ENCOUNTER (OUTPATIENT)
Dept: NON INVASIVE DIAGNOSTICS | Facility: CLINIC | Age: 75
Discharge: HOME | End: 2020-10-15
Payer: MEDICARE

## 2020-10-15 DIAGNOSIS — N28.1 RENAL CYST, RIGHT: ICD-10-CM

## 2020-10-15 DIAGNOSIS — N18.30 CKD (CHRONIC KIDNEY DISEASE) STAGE 3, GFR 30-59 ML/MIN (HCC): ICD-10-CM

## 2020-10-15 DIAGNOSIS — I70.1 RENAL ARTERY STENOSIS (HCC): ICD-10-CM

## 2020-10-15 DIAGNOSIS — I25.5 ISCHEMIC CARDIOMYOPATHY: ICD-10-CM

## 2020-10-15 DIAGNOSIS — I12.9 BENIGN HYPERTENSION WITH CHRONIC KIDNEY DISEASE, STAGE III (HCC): ICD-10-CM

## 2020-10-15 DIAGNOSIS — E10.51 TYPE 1 DIABETES MELLITUS WITH PERIPHERAL VASCULAR DISEASE (HCC): ICD-10-CM

## 2020-10-15 DIAGNOSIS — N18.30 BENIGN HYPERTENSION WITH CHRONIC KIDNEY DISEASE, STAGE III (HCC): ICD-10-CM

## 2020-10-15 DIAGNOSIS — I50.22 CHRONIC SYSTOLIC CONGESTIVE HEART FAILURE (HCC): ICD-10-CM

## 2020-10-15 PROCEDURE — 93975 VASCULAR STUDY: CPT

## 2020-10-16 PROCEDURE — 93975 VASCULAR STUDY: CPT | Performed by: SURGERY

## 2020-10-20 ENCOUNTER — TELEPHONE (OUTPATIENT)
Dept: NEPHROLOGY | Facility: CLINIC | Age: 75
End: 2020-10-20

## 2020-10-22 ENCOUNTER — LAB (OUTPATIENT)
Dept: LAB | Facility: CLINIC | Age: 75
End: 2020-10-22
Payer: MEDICARE

## 2020-10-22 ENCOUNTER — TRANSCRIBE ORDERS (OUTPATIENT)
Dept: LAB | Facility: CLINIC | Age: 75
End: 2020-10-22

## 2020-10-22 ENCOUNTER — TELEPHONE (OUTPATIENT)
Dept: FAMILY MEDICINE CLINIC | Facility: CLINIC | Age: 75
End: 2020-10-22

## 2020-10-22 DIAGNOSIS — I73.9 PERIPHERAL VASCULAR DISEASE, UNSPECIFIED (HCC): Primary | ICD-10-CM

## 2020-10-22 DIAGNOSIS — I73.9 PERIPHERAL VASCULAR DISEASE, UNSPECIFIED (HCC): ICD-10-CM

## 2020-10-22 LAB
ALBUMIN SERPL BCP-MCNC: 3.6 G/DL (ref 3.5–5)
ALP SERPL-CCNC: 103 U/L (ref 46–116)
ALT SERPL W P-5'-P-CCNC: 25 U/L (ref 12–78)
ANION GAP SERPL CALCULATED.3IONS-SCNC: 6 MMOL/L (ref 4–13)
AST SERPL W P-5'-P-CCNC: 23 U/L (ref 5–45)
BILIRUB SERPL-MCNC: 0.53 MG/DL (ref 0.2–1)
BUN SERPL-MCNC: 30 MG/DL (ref 5–25)
CALCIUM SERPL-MCNC: 9.8 MG/DL (ref 8.3–10.1)
CHLORIDE SERPL-SCNC: 104 MMOL/L (ref 100–108)
CO2 SERPL-SCNC: 32 MMOL/L (ref 21–32)
CREAT SERPL-MCNC: 1.77 MG/DL (ref 0.6–1.3)
GFR SERPL CREATININE-BSD FRML MDRD: 37 ML/MIN/1.73SQ M
GLUCOSE SERPL-MCNC: 113 MG/DL (ref 65–140)
POTASSIUM SERPL-SCNC: 4.2 MMOL/L (ref 3.5–5.3)
PROT SERPL-MCNC: 7.8 G/DL (ref 6.4–8.2)
SODIUM SERPL-SCNC: 142 MMOL/L (ref 136–145)

## 2020-10-22 PROCEDURE — 36415 COLL VENOUS BLD VENIPUNCTURE: CPT

## 2020-10-22 PROCEDURE — 80053 COMPREHEN METABOLIC PANEL: CPT

## 2020-10-26 ENCOUNTER — OFFICE VISIT (OUTPATIENT)
Dept: FAMILY MEDICINE CLINIC | Facility: CLINIC | Age: 75
End: 2020-10-26
Payer: MEDICARE

## 2020-10-26 VITALS
HEIGHT: 67 IN | HEART RATE: 83 BPM | RESPIRATION RATE: 16 BRPM | OXYGEN SATURATION: 97 % | SYSTOLIC BLOOD PRESSURE: 118 MMHG | TEMPERATURE: 97.1 F | WEIGHT: 218.25 LBS | DIASTOLIC BLOOD PRESSURE: 80 MMHG | BODY MASS INDEX: 34.26 KG/M2

## 2020-10-26 DIAGNOSIS — J43.9 PULMONARY EMPHYSEMA, UNSPECIFIED EMPHYSEMA TYPE (HCC): ICD-10-CM

## 2020-10-26 DIAGNOSIS — E10.51 TYPE 1 DIABETES MELLITUS WITH PERIPHERAL VASCULAR DISEASE (HCC): ICD-10-CM

## 2020-10-26 DIAGNOSIS — Z11.59 NEED FOR HEPATITIS C SCREENING TEST: ICD-10-CM

## 2020-10-26 DIAGNOSIS — J98.4 RESTRICTIVE LUNG DISEASE: ICD-10-CM

## 2020-10-26 DIAGNOSIS — R06.00 DOE (DYSPNEA ON EXERTION): Primary | ICD-10-CM

## 2020-10-26 DIAGNOSIS — I50.22 CHRONIC SYSTOLIC CONGESTIVE HEART FAILURE (HCC): ICD-10-CM

## 2020-10-26 DIAGNOSIS — G47.33 OBSTRUCTIVE SLEEP APNEA OF ADULT: ICD-10-CM

## 2020-10-26 DIAGNOSIS — I74.09 AORTOILIAC OCCLUSIVE DISEASE (HCC): Chronic | ICD-10-CM

## 2020-10-26 DIAGNOSIS — L85.3 XEROSIS OF SKIN: ICD-10-CM

## 2020-10-26 DIAGNOSIS — Z12.5 ENCOUNTER FOR PROSTATE CANCER SCREENING: ICD-10-CM

## 2020-10-26 DIAGNOSIS — Z00.00 MEDICARE ANNUAL WELLNESS VISIT, SUBSEQUENT: ICD-10-CM

## 2020-10-26 PROCEDURE — G0438 PPPS, INITIAL VISIT: HCPCS | Performed by: FAMILY MEDICINE

## 2020-10-26 PROCEDURE — 99214 OFFICE O/P EST MOD 30 MIN: CPT | Performed by: FAMILY MEDICINE

## 2020-10-26 RX ORDER — AMMONIUM LACTATE 12 G/100G
LOTION TOPICAL 2 TIMES DAILY PRN
Qty: 400 G | Refills: 5 | Status: SHIPPED | OUTPATIENT
Start: 2020-10-26 | End: 2022-01-03 | Stop reason: ALTCHOICE

## 2020-10-26 RX ORDER — BUDESONIDE AND FORMOTEROL FUMARATE DIHYDRATE 160; 4.5 UG/1; UG/1
2 AEROSOL RESPIRATORY (INHALATION) 2 TIMES DAILY
Qty: 1 INHALER | Refills: 1 | Status: SHIPPED | OUTPATIENT
Start: 2020-10-26 | End: 2021-03-09 | Stop reason: ALTCHOICE

## 2020-11-12 ENCOUNTER — TRANSCRIBE ORDERS (OUTPATIENT)
Dept: ADMINISTRATIVE | Facility: HOSPITAL | Age: 75
End: 2020-11-12

## 2020-11-13 ENCOUNTER — TRANSCRIBE ORDERS (OUTPATIENT)
Dept: ADMINISTRATIVE | Facility: HOSPITAL | Age: 75
End: 2020-11-13

## 2020-11-13 DIAGNOSIS — H11.419: Primary | ICD-10-CM

## 2020-11-17 ENCOUNTER — LAB (OUTPATIENT)
Dept: LAB | Facility: CLINIC | Age: 75
End: 2020-11-17
Payer: MEDICARE

## 2020-11-17 DIAGNOSIS — E10.69 TYPE 1 DIABETES MELLITUS WITH OTHER SPECIFIED COMPLICATION (HCC): ICD-10-CM

## 2020-11-17 DIAGNOSIS — Z12.5 ENCOUNTER FOR PROSTATE CANCER SCREENING: ICD-10-CM

## 2020-11-17 DIAGNOSIS — Z11.59 NEED FOR HEPATITIS C SCREENING TEST: ICD-10-CM

## 2020-11-17 DIAGNOSIS — N18.30 CKD (CHRONIC KIDNEY DISEASE) STAGE 3, GFR 30-59 ML/MIN (HCC): ICD-10-CM

## 2020-11-17 DIAGNOSIS — E03.9 PRIMARY HYPOTHYROIDISM: Primary | ICD-10-CM

## 2020-11-17 LAB
ALBUMIN SERPL BCP-MCNC: 3.7 G/DL (ref 3.5–5)
ALP SERPL-CCNC: 101 U/L (ref 46–116)
ALT SERPL W P-5'-P-CCNC: 25 U/L (ref 12–78)
ANION GAP SERPL CALCULATED.3IONS-SCNC: 7 MMOL/L (ref 4–13)
AST SERPL W P-5'-P-CCNC: 21 U/L (ref 5–45)
BACTERIA UR QL AUTO: NORMAL /HPF
BASOPHILS # BLD AUTO: 0.01 THOUSANDS/ΜL (ref 0–0.1)
BASOPHILS NFR BLD AUTO: 0 % (ref 0–1)
BILIRUB SERPL-MCNC: 0.59 MG/DL (ref 0.2–1)
BILIRUB UR QL STRIP: NEGATIVE
BUN SERPL-MCNC: 38 MG/DL (ref 5–25)
CALCIUM SERPL-MCNC: 9.4 MG/DL (ref 8.3–10.1)
CHLORIDE SERPL-SCNC: 109 MMOL/L (ref 100–108)
CLARITY UR: CLEAR
CO2 SERPL-SCNC: 26 MMOL/L (ref 21–32)
COLOR UR: YELLOW
CREAT SERPL-MCNC: 1.82 MG/DL (ref 0.6–1.3)
CREAT UR-MCNC: 157 MG/DL
EOSINOPHIL # BLD AUTO: 0.21 THOUSAND/ΜL (ref 0–0.61)
EOSINOPHIL NFR BLD AUTO: 4 % (ref 0–6)
ERYTHROCYTE [DISTWIDTH] IN BLOOD BY AUTOMATED COUNT: 14 % (ref 11.6–15.1)
GFR SERPL CREATININE-BSD FRML MDRD: 36 ML/MIN/1.73SQ M
GLUCOSE P FAST SERPL-MCNC: 161 MG/DL (ref 65–99)
GLUCOSE UR STRIP-MCNC: NEGATIVE MG/DL
HCT VFR BLD AUTO: 41.4 % (ref 36.5–49.3)
HCV AB SER QL: NORMAL
HGB BLD-MCNC: 13.3 G/DL (ref 12–17)
HGB UR QL STRIP.AUTO: NEGATIVE
HYALINE CASTS #/AREA URNS LPF: NORMAL /LPF
IMM GRANULOCYTES # BLD AUTO: 0.01 THOUSAND/UL (ref 0–0.2)
IMM GRANULOCYTES NFR BLD AUTO: 0 % (ref 0–2)
KETONES UR STRIP-MCNC: NEGATIVE MG/DL
LEUKOCYTE ESTERASE UR QL STRIP: NEGATIVE
LYMPHOCYTES # BLD AUTO: 1.78 THOUSANDS/ΜL (ref 0.6–4.47)
LYMPHOCYTES NFR BLD AUTO: 34 % (ref 14–44)
MAGNESIUM SERPL-MCNC: 2.6 MG/DL (ref 1.6–2.6)
MCH RBC QN AUTO: 32.8 PG (ref 26.8–34.3)
MCHC RBC AUTO-ENTMCNC: 32.1 G/DL (ref 31.4–37.4)
MCV RBC AUTO: 102 FL (ref 82–98)
MONOCYTES # BLD AUTO: 0.72 THOUSAND/ΜL (ref 0.17–1.22)
MONOCYTES NFR BLD AUTO: 14 % (ref 4–12)
NEUTROPHILS # BLD AUTO: 2.53 THOUSANDS/ΜL (ref 1.85–7.62)
NEUTS SEG NFR BLD AUTO: 48 % (ref 43–75)
NITRITE UR QL STRIP: NEGATIVE
NON-SQ EPI CELLS URNS QL MICRO: NORMAL /HPF
NRBC BLD AUTO-RTO: 0 /100 WBCS
PH UR STRIP.AUTO: 6 [PH]
PHOSPHATE SERPL-MCNC: 4.2 MG/DL (ref 2.3–4.1)
PLATELET # BLD AUTO: 111 THOUSANDS/UL (ref 149–390)
PMV BLD AUTO: 12.1 FL (ref 8.9–12.7)
POTASSIUM SERPL-SCNC: 3.9 MMOL/L (ref 3.5–5.3)
PROT SERPL-MCNC: 7.7 G/DL (ref 6.4–8.2)
PROT UR STRIP-MCNC: ABNORMAL MG/DL
PROT UR-MCNC: 53 MG/DL
PROT/CREAT UR: 0.34 MG/G{CREAT} (ref 0–0.1)
PSA SERPL-MCNC: 0.7 NG/ML (ref 0–4)
PTH-INTACT SERPL-MCNC: 85.3 PG/ML (ref 18.4–80.1)
RBC # BLD AUTO: 4.05 MILLION/UL (ref 3.88–5.62)
RBC #/AREA URNS AUTO: NORMAL /HPF
SODIUM SERPL-SCNC: 142 MMOL/L (ref 136–145)
SP GR UR STRIP.AUTO: 1.02 (ref 1–1.03)
TSH SERPL DL<=0.05 MIU/L-ACNC: 2.84 UIU/ML (ref 0.36–3.74)
UROBILINOGEN UR QL STRIP.AUTO: 1 E.U./DL
WBC # BLD AUTO: 5.26 THOUSAND/UL (ref 4.31–10.16)
WBC #/AREA URNS AUTO: NORMAL /HPF

## 2020-11-17 PROCEDURE — 84443 ASSAY THYROID STIM HORMONE: CPT

## 2020-11-17 PROCEDURE — 36415 COLL VENOUS BLD VENIPUNCTURE: CPT

## 2020-11-17 PROCEDURE — 83735 ASSAY OF MAGNESIUM: CPT

## 2020-11-17 PROCEDURE — 85025 COMPLETE CBC W/AUTO DIFF WBC: CPT

## 2020-11-17 PROCEDURE — 83036 HEMOGLOBIN GLYCOSYLATED A1C: CPT

## 2020-11-17 PROCEDURE — 81001 URINALYSIS AUTO W/SCOPE: CPT | Performed by: INTERNAL MEDICINE

## 2020-11-17 PROCEDURE — 83970 ASSAY OF PARATHORMONE: CPT

## 2020-11-17 PROCEDURE — 84100 ASSAY OF PHOSPHORUS: CPT

## 2020-11-17 PROCEDURE — 84156 ASSAY OF PROTEIN URINE: CPT | Performed by: INTERNAL MEDICINE

## 2020-11-17 PROCEDURE — G0103 PSA SCREENING: HCPCS

## 2020-11-17 PROCEDURE — 82570 ASSAY OF URINE CREATININE: CPT | Performed by: INTERNAL MEDICINE

## 2020-11-17 PROCEDURE — 80053 COMPREHEN METABOLIC PANEL: CPT

## 2020-11-17 PROCEDURE — 86803 HEPATITIS C AB TEST: CPT

## 2020-11-18 LAB
EST. AVERAGE GLUCOSE BLD GHB EST-MCNC: 143 MG/DL
HBA1C MFR BLD: 6.6 %

## 2020-11-19 ENCOUNTER — TELEPHONE (OUTPATIENT)
Dept: NEPHROLOGY | Facility: CLINIC | Age: 75
End: 2020-11-19

## 2021-01-05 ENCOUNTER — PATIENT MESSAGE (OUTPATIENT)
Dept: PULMONOLOGY | Facility: CLINIC | Age: 76
End: 2021-01-05

## 2021-01-11 ENCOUNTER — DOCUMENTATION (OUTPATIENT)
Dept: PULMONOLOGY | Facility: CLINIC | Age: 76
End: 2021-01-11

## 2021-01-11 NOTE — TELEPHONE ENCOUNTER
Wife LM on refill line checking on the status of patients Cpap order and it being sent to [facility]

## 2021-01-13 ENCOUNTER — TELEPHONE (OUTPATIENT)
Dept: PULMONOLOGY | Facility: CLINIC | Age: 76
End: 2021-01-13

## 2021-01-13 NOTE — TELEPHONE ENCOUNTER
Bree Lara from [facility] calling stated she received an order for CPAP supplies  Bree Lara needs the note from 8/24/20 to have a  addendum  to  include CPAP compliance and stating  that CPAP supplies are being ordered and note needs to be signed by provider   When completed please fax to : [phone]  Bree Lara [phone]

## 2021-01-14 DIAGNOSIS — N18.30 CKD (CHRONIC KIDNEY DISEASE) STAGE 3, GFR 30-59 ML/MIN (HCC): ICD-10-CM

## 2021-01-14 RX ORDER — TORSEMIDE 10 MG/1
TABLET ORAL
Qty: 90 TABLET | Refills: 0 | Status: SHIPPED | OUTPATIENT
Start: 2021-01-14 | End: 2021-01-26

## 2021-01-19 ENCOUNTER — TRANSCRIBE ORDERS (OUTPATIENT)
Dept: LAB | Facility: CLINIC | Age: 76
End: 2021-01-19

## 2021-01-19 ENCOUNTER — LAB (OUTPATIENT)
Dept: LAB | Facility: CLINIC | Age: 76
End: 2021-01-19
Payer: MEDICARE

## 2021-01-19 DIAGNOSIS — I12.9 BENIGN HYPERTENSION WITH CHRONIC KIDNEY DISEASE, STAGE III (HCC): ICD-10-CM

## 2021-01-19 DIAGNOSIS — I70.1 RENAL ARTERY STENOSIS (HCC): ICD-10-CM

## 2021-01-19 DIAGNOSIS — N18.30 BENIGN HYPERTENSION WITH CHRONIC KIDNEY DISEASE, STAGE III (HCC): ICD-10-CM

## 2021-01-19 DIAGNOSIS — I70.1 RENAL ARTERY STENOSIS (HCC): Primary | ICD-10-CM

## 2021-01-19 LAB
ANION GAP SERPL CALCULATED.3IONS-SCNC: 6 MMOL/L (ref 4–13)
BUN SERPL-MCNC: 26 MG/DL (ref 5–25)
CALCIUM SERPL-MCNC: 9.8 MG/DL (ref 8.3–10.1)
CHLORIDE SERPL-SCNC: 107 MMOL/L (ref 100–108)
CO2 SERPL-SCNC: 27 MMOL/L (ref 21–32)
CREAT SERPL-MCNC: 1.65 MG/DL (ref 0.6–1.3)
GFR SERPL CREATININE-BSD FRML MDRD: 40 ML/MIN/1.73SQ M
GLUCOSE P FAST SERPL-MCNC: 230 MG/DL (ref 65–99)
POTASSIUM SERPL-SCNC: 4.5 MMOL/L (ref 3.5–5.3)
SODIUM SERPL-SCNC: 140 MMOL/L (ref 136–145)

## 2021-01-19 PROCEDURE — 36415 COLL VENOUS BLD VENIPUNCTURE: CPT

## 2021-01-19 PROCEDURE — 80048 BASIC METABOLIC PNL TOTAL CA: CPT

## 2021-01-20 DIAGNOSIS — Z23 ENCOUNTER FOR IMMUNIZATION: ICD-10-CM

## 2021-01-25 ENCOUNTER — TELEPHONE (OUTPATIENT)
Dept: NEPHROLOGY | Facility: CLINIC | Age: 76
End: 2021-01-25

## 2021-01-26 ENCOUNTER — TELEMEDICINE (OUTPATIENT)
Dept: NEPHROLOGY | Facility: CLINIC | Age: 76
End: 2021-01-26
Payer: MEDICARE

## 2021-01-26 ENCOUNTER — TELEPHONE (OUTPATIENT)
Dept: NEPHROLOGY | Facility: CLINIC | Age: 76
End: 2021-01-26

## 2021-01-26 DIAGNOSIS — N18.30 BENIGN HYPERTENSION WITH CHRONIC KIDNEY DISEASE, STAGE III (HCC): ICD-10-CM

## 2021-01-26 DIAGNOSIS — N18.30 CKD (CHRONIC KIDNEY DISEASE) STAGE 3, GFR 30-59 ML/MIN (HCC): ICD-10-CM

## 2021-01-26 DIAGNOSIS — E10.51 TYPE 1 DIABETES MELLITUS WITH PERIPHERAL VASCULAR DISEASE (HCC): Primary | ICD-10-CM

## 2021-01-26 DIAGNOSIS — G47.33 OBSTRUCTIVE SLEEP APNEA OF ADULT: ICD-10-CM

## 2021-01-26 DIAGNOSIS — I12.9 BENIGN HYPERTENSION WITH CHRONIC KIDNEY DISEASE, STAGE III (HCC): ICD-10-CM

## 2021-01-26 DIAGNOSIS — J43.2 CENTRILOBULAR EMPHYSEMA (HCC): ICD-10-CM

## 2021-01-26 DIAGNOSIS — N28.1 RENAL CYST, RIGHT: ICD-10-CM

## 2021-01-26 DIAGNOSIS — I70.1 RENAL ARTERY STENOSIS (HCC): ICD-10-CM

## 2021-01-26 PROCEDURE — 99214 OFFICE O/P EST MOD 30 MIN: CPT | Performed by: INTERNAL MEDICINE

## 2021-01-26 RX ORDER — TORSEMIDE 10 MG/1
TABLET ORAL
Qty: 90 TABLET | Refills: 0
Start: 2021-01-26 | End: 2021-05-18 | Stop reason: SDUPTHER

## 2021-01-26 NOTE — TELEPHONE ENCOUNTER
A message has been left asking pt to contact the office  Pt is scheduled for a virtual visit at 1 pm with Dr Kennedi Pena, I would like to review pt medications prior to this visit

## 2021-01-26 NOTE — PATIENT INSTRUCTIONS
Chronic Kidney Disease   WHAT YOU NEED TO KNOW:   Chronic kidney disease (CKD) is the gradual and permanent loss of kidney function  It is also called chronic kidney failure, or chronic renal insufficiency  Normally, the kidneys remove fluid, chemicals, and waste from your blood  These wastes are turned into urine by your kidneys  CKD may worsen over time and lead to kidney failure  Your CKD team will help you and your family plan for your care at home  The team will help you create goals and find ways to meet your goals  Your care plan may change over time as your needs change  DISCHARGE INSTRUCTIONS:   Call your local emergency number (911 in the [address]) if:   · You have a seizure  · You have shortness of breath  Call your doctor or nephrologist if:   · You are confused and very drowsy  · You suddenly gain or lose more weight than your healthcare provider has told you is okay  · You have itchy skin or a rash  · You urinate more or less than you normally do  · You have blood in your urine  · You have nausea and are vomiting  · You have fatigue or muscle weakness  · You have hiccups that will not stop  · You have questions or concerns about your condition or care  Medicines:   · Medicines  may be given to decrease blood pressure and get rid of extra fluid  You may also receive medicine to manage health conditions that may occur with CKD, such as anemia, diabetes, and heart disease  · Take your medicine as directed  Contact your healthcare provider if you think your medicine is not helping or if you have side effects  Tell him or her if you are allergic to any medicine  Keep a list of the medicines, vitamins, and herbs you take  Include the amounts, and when and why you take them  Bring the list or the pill bottles to follow-up visits  Carry your medicine list with you in case of an emergency  What you can do to manage CKD: Management may include making some lifestyle changes  Tell your healthcare provider if you have any concerns about being able to make the changes  He or she can help you find solutions, including working with specialists  Ask for help creating a plan to break large goals into smaller steps  Your plan may include any of the following:  · Manage other health conditions  Your healthcare provider will work with you to make a care plan that meets your needs  You will be checked regularly for heart disease or other conditions that can make CKD worse, such as diabetes  Your blood pressure will be closely monitored  You will also get a target blood pressure and help making a plan to reach your target  This may include taking your blood pressure at home  · Maintain a healthy weight  Extra weight can strain your kidneys  Ask what a healthy weight is for you  Your provider can help you create a weight loss plan if you are overweight  · Create an exercise plan  Regular exercise can help you manage CKD, high blood pressure, and diabetes  Exercise also helps control weight  Your provider can help you create exercise goals and a plan to reach those goals  For example, your goal may be to exercise for 30 minutes in a day  Your plan can include breaking exercise into 10 minute sessions, 3 times during the day  · Create a healthy eating plan  Your provider may tell you to eat food low in sodium (salt), potassium, phosphorus, or protein  A dietitian can help you plan meals if needed  Ask how much liquid to drink each day and which liquids are best for you  · Limit alcohol as directed  Alcohol can cause fluid retention and can affect your kidneys  Ask how much alcohol is safe for you  A drink of alcohol is 12 ounces of beer, 5 ounces of wine, or 1½ ounces of liquor  · Do not smoke  Nicotine and other chemicals in cigarettes and cigars can cause kidney damage  Ask your provider for information if you currently smoke and need help to quit   E-cigarettes or smokeless tobacco still contain nicotine  Talk to your provider before you use these products  · Ask about over-the-counter medicines  Medicines such as NSAIDs and laxatives may harm your kidneys  Some cough and cold medicines can raise your blood pressure  Always ask if a medicine is safe before you take it  · Ask about vaccines you may need  Infections such as pneumonia, influenza, and hepatitis can be more harmful or more likely to occur in a person who has CKD  Vaccines lower your risk for infection  Follow up with your doctor as directed: You will need to return for tests to monitor your kidney and nerve function, and your parathyroid hormone level  Your medicines may be changed, based on certain test results  You may also be referred to a nephrologist (kidney specialist)  Write down your questions so you remember to ask them during your visits  © Copyright [address] Information is for End User's use only and may not be sold, redistributed or otherwise used for commercial purposes  All illustrations and images included in CareNotes® are the copyrighted property of A D A M , Inc  or [facility] Katie Cole   The above information is an  only  It is not intended as medical advice for individual conditions or treatments  Talk to your doctor, nurse or pharmacist before following any medical regimen to see if it is safe and effective for you

## 2021-01-26 NOTE — PROGRESS NOTES
Virtual Regular Visit      Assessment/Plan:    Problem List Items Addressed This Visit        Genitourinary    CKD (chronic kidney disease) stage 3, GFR 30-59 ml/min ([facility])    Relevant Medications    torsemide (DEMADEX) 10 mg tablet    Other Relevant Orders    CBC and differential    Magnesium    PTH, intact    Renal function panel    Uric acid    Protein / creatinine ratio, urine    Urinalysis        Ce Ross is a 76year old male with a PMHx of CAD, Chronic Systolic CHF, CKD III, JOSÉ, COPD who presents for follow up regarding his CKD  1  Stage IIIA CKD with nephritic range proteinuria  -with hx of DM, HTN, CAD/CHF likely all contributing  -creatinine sensitive to diuretics  -now on torsemide 10 mg every other day (on even days)  -Urinalysis with 2+ proteinuria in September  -continue survaillance at this point  -Blood work re-ordered for may  -can follow up in may/june/july time period    2  Type I DM with PVD  -A1C was 6 5 last  -Blood glucose has been reasonably controlled    3  Chronic SOB with ITZEL/Emphysema, CHF, PVD  -not any worse  -now on separate nebulizer  -follows with Pulmonary    4  JOSÉ  -Last Renal Artery Doppler was October 2020  -Right proximal elevated velocities consistant with >60% stenosis  -Right Cyst of 2 8 cm  -Left renal artery was not visualized    5  Hypertension hx now hypotensive  -Systolic BP low but MAP ok  -only on torsemide every other day so will monitor    6   Right renal cyst  -can be monitored every 1-2 years for now 2 8 cm and stable in size     overall Ce Ross is stable still has his chronic conditions but these are relatively stable blood sugars are well controlled will continue our surveillance protocol will discuss with him about further imaging at his next appointment in the setting of renal artery stenosis and a right renal cyst follow-up in 4-6 months       Reason for visit is   Chief Complaint   Patient presents with    Virtual Regular Visit        Encounter provider [facility] Reva Theodore DO    Provider located at [address] [id number]      Recent Visits  Date Type Provider Dept   01/25/21 Telephone Myla Kenyon, [address] recent visits within past 7 days and meeting all other requirements     Today's Visits  Date Type Provider Dept   01/26/21 Telephone Loretta Stephens, [address] today's visits and meeting all other requirements     Future Appointments  No visits were found meeting these conditions  Showing future appointments within next 150 days and meeting all other requirements        The patient was identified by name and date of birth  Merritt Samuels was informed that this is a telemedicine visit and that the visit is being conducted through [employer] and patient was informed that this is a secure, HIPAA-compliant platform  He agrees to proceed     My office door was closed  Other methods to assure confidentiality were taken  No one else was in the room  He acknowledged consent and understanding of privacy and security of the video platform  The patient has agreed to participate and understands they can discontinue the visit at any time  Patient is aware this is a billable service  Subjective  Merritt Samuels is a 76 y o  male  Who is presenting for follow-up he currently denies any acute complaints no chest pain or shortness of breath that is any worse no fevers or chills no nausea or vomiting no diarrhea or constipation he has good urine output he has decreased his torsemide to 10 milligrams every other day on even days and he is tolerating this well         Past Medical History:   Diagnosis Date    Acute kidney injury ([address] )     resolved 11/30/2015    Acute venous embolism and thrombosis of deep vessels of proximal lower extremity ([address] )     resolved 04/04/2015    DARINEL (acute kidney injury) ([address] ) 1/12/2016    Anesthesia     RESPIRATORY ISSUES DUE TO SLEEP APNEA    Cardiac disease     heart attack, stents x 4    CHF (congestive heart failure) ([facility])     Chronic cough     resolved 02/04/2016    Chronic pain disorder     intermitent claudication    COPD (chronic obstructive pulmonary disease) ([facility])     Coronary artery disease     CPAP (continuous positive airway pressure) dependence     Diabetes mellitus ([address] )     Disease of thyroid gland     DVT (deep venous thrombosis) ([facility])     Heart failure ([facility])     Hyperlipidemia     Hypertension     Ischemic cardiomyopathy     last assessed 09/26/2017    Myocardial infarction [facility])     MI +2 2015,2018    Pulmonary emphysema ([facility])     Pulmonary granuloma ([facility])     resolved 02/03/2017    Renal failure     Sleep apnea        Past Surgical History:   Procedure Laterality Date    BYPASS FEMORAL-POPLITEAL      initial stenosis with stent left, 7 x 100 smart stent onset 02/24/2014    CARDIAC SURGERY      cardiac stents    CATARACT EXTRACTION      COLOGUARD (HISTORICAL)  2018    CORONARY ANGIOPLASTY WITH STENT PLACEMENT      x4    IR AORTAGRAM WITH RUN-OFF  3/14/2019    UT THROMBOENDARTECTMY FEMORAL COMMON Left 3/22/2019    Procedure: ENDARTERECTOMY ARTERIAL FEMORAL WITH PATCH ANGIOPLASTY, BALLOON ANGIOPLASTY, STENT;  Surgeon: Niraj Singh MD;  [facility]: [facility];  Service: Vascular    UT VEIN BYPASS GRAFT,FEM-POP Left 3/22/2019    Procedure: BYPASS FEMORAL-POPLITEAL WITH COMPLETION ARTERIOGRAM;  Surgeon: Niraj Singh MD;  [facility];  Service: Vascular    VASCULAR SURGERY      stent placement    WOUND DEBRIDEMENT Left 4/15/2019    Procedure: DEBRIDEMENT WOUND AND DRESSING CHANGE ([address]) left groin with VAC;  Surgeon: Erendira Bowling DO;  Location: [facility];  Service: Vascular       Current Outpatient Medications   Medication Sig Dispense Refill    acetaminophen (TYLENOL) 325 mg tablet Take 2 tablets (650 mg total) by mouth every 6 (six) hours as needed for mild pain (Patient taking differently: Take 500 mg by mouth every 8 (eight) hours as needed for mild pain Take 1,000 mg every 8 hours PRN) 30 tablet 0    ammonium lactate (LAC-HYDRIN) 12 % lotion Apply topically 2 (two) times a day as needed for dry skin Apply to dry skin between the toes in the AM & in the  g 5    ascorbic acid (VITAMIN C) 250 mg tablet Take 250 mg by mouth daily      aspirin 81 MG tablet Take 81 mg by mouth daily   budesonide-formoterol (SYMBICORT) 160-4 5 mcg/act inhaler Inhale 2 puffs 2 (two) times a day Rinse mouth after use   1 Inhaler 1    cholecalciferol (VITAMIN D3) 1,000 units tablet Take 1,000 Units by mouth daily       clopidogrel (PLAVIX) 75 mg tablet Take 1 tablet (75 mg total) by mouth daily 90 tablet 3    Coenzyme Q10 (COQ-10) 200 MG CAPS Take 200 mg by mouth daily      docusate sodium (COLACE) 50 mg capsule Take by mouth 2 (two) times a day as needed       gabapentin (NEURONTIN) 300 mg capsule Take 1 capsule (300 mg total) by mouth 3 (three) times a day 270 capsule 1    Glucagon (Baqsimi One Pack) 3 MG/DOSE POWD 1 Dose into each nostril as needed      Icosapent Ethyl (Vascepa) 1 g CAPS Take 1 capsule (1 g total) by mouth 2 (two) times a day 180 capsule 3    insulin glargine (Lantus) 100 units/mL subcutaneous injection Inject under the skin 40 units am- 20 units pm      insulin lispro (HUMALOG) 100 units/mL injection Inject 3 units with breakfast, 6 units at lunch, and 6 units at dinner (Patient taking differently: 4 (four) times a day Inject 3 units with breakfast, 6 units at lunch, and 6 units at dinner  Carbs counting) 10 mL 1    Lactobacillus Rhamnosus, GG, (CULTURELLE PO) Take by mouth      Lactobacillus Rhamnosus, GG, (CULTURELLE) CAPS Take 1 capsule by mouth daily       levalbuterol (XOPENEX HFA) 45 mcg/act inhaler Inhale 1-2 puffs every 4 (four) hours as needed for wheezing 1 Inhaler 6    levothyroxine 200 mcg tablet Take 200 mcg by mouth daily  0    Multiple Vitamins-Minerals (CENTRUM MEN) TABS Take by mouth Take one tablet in the morning      nitroglycerin (NITROSTAT) 0 4 mg SL tablet Place 1 tablet (0 4 mg total) under the tongue every 5 (five) minutes as needed for chest pain 25 tablet 3    polyethylene glycol (MIRALAX) 17 g packet Take 17 g by mouth daily      rosuvastatin (CRESTOR) 20 MG tablet Take 1 tablet (20 mg total) by mouth daily 90 tablet 3    SF 1 1 % GEL BRUCH ONCE DAILY AT BEDTIME, BRUSH OF 1 MINUTE AS DIRECTED      torsemide (DEMADEX) 10 mg tablet Take 10 mg every other day 90 tablet 0    ULTICARE INSULIN SYRINGE 30G X 1/2" 1 ML MISC Use as directed  0    Wound Dressings (Medihoney Wound/Burn Dressing) GEL Use as directed 1 Tube 5     No current facility-administered medications for this visit  Allergies   Allergen Reactions    Doxazosin Myalgia     UNKNOWN  UNKNOWN  Muscle cramps    Iohexol      UNKNOWN    Lisinopril Cough     cough  Other reaction(s): CAMELLA SINENSIS (ZESTRIL) (cough)  cough    Vancomycin Hives    Adhesive [Medical Tape]      Skin Breakdown    Cardura [Doxazosin Mesylate]      Muscle cramps    Other      Iv dye for cardiac cath  Renal failure very close to dialysis  But no dialysis  Iv dye for cardiac cath  Renal failure very close to dialysis  But no dialysis  Iv dye for cardiac cath  Renal failure very close to dialysis  But no dialysis       Review of Systems   All other systems reviewed and are negative  Video Exam    There were no vitals filed for this visit  Physical Exam  Vitals signs reviewed  Constitutional:       Appearance: Normal appearance  HENT:      Head: Normocephalic and atraumatic  Nose: Nose normal       Mouth/Throat:      Mouth: Mucous membranes are dry  Pharynx: Oropharynx is clear  Eyes:      Pupils: Pupils are equal, round, and reactive to light  Neck:      Musculoskeletal: Normal range of motion     Pulmonary:      Effort: Pulmonary effort is normal  No respiratory distress  Abdominal:      General: There is no distension  Musculoskeletal: Normal range of motion  Skin:     General: Skin is dry  Neurological:      Mental Status: He is alert and oriented to person, place, and time  Psychiatric:         Mood and Affect: Mood normal          Behavior: Behavior normal          Thought Content: Thought content normal          Judgment: Judgment normal           I spent 3 minutes with patient today in which greater than 50% of the time was spent in counseling/coordination of care regarding ckd      VIRTUAL VISIT 19 Hung Dylan acknowledges that he has consented to an online visit or consultation  He understands that the online visit is based solely on information provided by him, and that, in the absence of a face-to-face physical evaluation by the physician, the diagnosis he receives is both limited and provisional in terms of accuracy and completeness  This is not intended to replace a full medical face-to-face evaluation by the physician  Michelle Singh understands and accepts these terms

## 2021-02-04 DIAGNOSIS — M54.42 CHRONIC LOW BACK PAIN WITH BILATERAL SCIATICA, UNSPECIFIED BACK PAIN LATERALITY: ICD-10-CM

## 2021-02-04 DIAGNOSIS — M54.41 CHRONIC LOW BACK PAIN WITH BILATERAL SCIATICA, UNSPECIFIED BACK PAIN LATERALITY: ICD-10-CM

## 2021-02-04 DIAGNOSIS — G89.29 CHRONIC LOW BACK PAIN WITH BILATERAL SCIATICA, UNSPECIFIED BACK PAIN LATERALITY: ICD-10-CM

## 2021-02-04 RX ORDER — GABAPENTIN 300 MG/1
CAPSULE ORAL
Qty: 270 CAPSULE | Refills: 1 | Status: SHIPPED | OUTPATIENT
Start: 2021-02-04 | End: 2021-12-16

## 2021-02-12 ENCOUNTER — TELEPHONE (OUTPATIENT)
Dept: FAMILY MEDICINE CLINIC | Facility: CLINIC | Age: 76
End: 2021-02-12

## 2021-02-12 DIAGNOSIS — E10.51 TYPE 1 DIABETES MELLITUS WITH PERIPHERAL VASCULAR DISEASE (HCC): ICD-10-CM

## 2021-02-12 DIAGNOSIS — I25.118 CORONARY ARTERY DISEASE OF NATIVE ARTERY OF NATIVE HEART WITH STABLE ANGINA PECTORIS (HCC): ICD-10-CM

## 2021-02-12 DIAGNOSIS — E89.0 HYPOTHYROIDISM, POSTABLATIVE: Primary | ICD-10-CM

## 2021-02-12 NOTE — TELEPHONE ENCOUNTER
Patients wife stopped by wants to know if patient needs any blood work for his appointment next week  Patient's wife was very rude said that we don't know how to do our job that we need to get it together  I did tell patient that we didn't have anything ordered in the system from us for bloodwork

## 2021-02-17 ENCOUNTER — LAB (OUTPATIENT)
Dept: LAB | Facility: CLINIC | Age: 76
End: 2021-02-17
Payer: MEDICARE

## 2021-02-17 DIAGNOSIS — N18.30 CKD (CHRONIC KIDNEY DISEASE) STAGE 3, GFR 30-59 ML/MIN (HCC): ICD-10-CM

## 2021-02-17 DIAGNOSIS — I25.118 CORONARY ARTERY DISEASE OF NATIVE ARTERY OF NATIVE HEART WITH STABLE ANGINA PECTORIS (HCC): ICD-10-CM

## 2021-02-17 DIAGNOSIS — E89.0 HYPOTHYROIDISM, POSTABLATIVE: ICD-10-CM

## 2021-02-17 DIAGNOSIS — E10.51 TYPE 1 DIABETES MELLITUS WITH PERIPHERAL VASCULAR DISEASE (HCC): ICD-10-CM

## 2021-02-17 LAB
ALBUMIN SERPL BCP-MCNC: 3.6 G/DL (ref 3.5–5)
ALP SERPL-CCNC: 100 U/L (ref 46–116)
ALT SERPL W P-5'-P-CCNC: 23 U/L (ref 12–78)
ANION GAP SERPL CALCULATED.3IONS-SCNC: 5 MMOL/L (ref 4–13)
AST SERPL W P-5'-P-CCNC: 21 U/L (ref 5–45)
BACTERIA UR QL AUTO: NORMAL /HPF
BASOPHILS # BLD AUTO: 0.02 THOUSANDS/ΜL (ref 0–0.1)
BASOPHILS NFR BLD AUTO: 0 % (ref 0–1)
BILIRUB SERPL-MCNC: 0.72 MG/DL (ref 0.2–1)
BILIRUB UR QL STRIP: NEGATIVE
BUN SERPL-MCNC: 31 MG/DL (ref 5–25)
CALCIUM SERPL-MCNC: 9.6 MG/DL (ref 8.3–10.1)
CHLORIDE SERPL-SCNC: 110 MMOL/L (ref 100–108)
CHOLEST SERPL-MCNC: 144 MG/DL (ref 50–200)
CLARITY UR: CLEAR
CO2 SERPL-SCNC: 27 MMOL/L (ref 21–32)
COLOR UR: YELLOW
CREAT SERPL-MCNC: 1.67 MG/DL (ref 0.6–1.3)
CREAT UR-MCNC: 184 MG/DL
EOSINOPHIL # BLD AUTO: 0.22 THOUSAND/ΜL (ref 0–0.61)
EOSINOPHIL NFR BLD AUTO: 4 % (ref 0–6)
ERYTHROCYTE [DISTWIDTH] IN BLOOD BY AUTOMATED COUNT: 13.2 % (ref 11.6–15.1)
EST. AVERAGE GLUCOSE BLD GHB EST-MCNC: 148 MG/DL
GFR SERPL CREATININE-BSD FRML MDRD: 39 ML/MIN/1.73SQ M
GLUCOSE P FAST SERPL-MCNC: 174 MG/DL (ref 65–99)
GLUCOSE UR STRIP-MCNC: NEGATIVE MG/DL
HBA1C MFR BLD: 6.8 %
HCT VFR BLD AUTO: 41.3 % (ref 36.5–49.3)
HDLC SERPL-MCNC: 46 MG/DL
HGB BLD-MCNC: 13.4 G/DL (ref 12–17)
HGB UR QL STRIP.AUTO: NEGATIVE
HYALINE CASTS #/AREA URNS LPF: NORMAL /LPF
IMM GRANULOCYTES # BLD AUTO: 0.02 THOUSAND/UL (ref 0–0.2)
IMM GRANULOCYTES NFR BLD AUTO: 0 % (ref 0–2)
KETONES UR STRIP-MCNC: NEGATIVE MG/DL
LDLC SERPL CALC-MCNC: 65 MG/DL (ref 0–100)
LEUKOCYTE ESTERASE UR QL STRIP: NEGATIVE
LYMPHOCYTES # BLD AUTO: 1.84 THOUSANDS/ΜL (ref 0.6–4.47)
LYMPHOCYTES NFR BLD AUTO: 34 % (ref 14–44)
MAGNESIUM SERPL-MCNC: 2.6 MG/DL (ref 1.6–2.6)
MCH RBC QN AUTO: 32.7 PG (ref 26.8–34.3)
MCHC RBC AUTO-ENTMCNC: 32.4 G/DL (ref 31.4–37.4)
MCV RBC AUTO: 101 FL (ref 82–98)
MONOCYTES # BLD AUTO: 0.74 THOUSAND/ΜL (ref 0.17–1.22)
MONOCYTES NFR BLD AUTO: 14 % (ref 4–12)
NEUTROPHILS # BLD AUTO: 2.53 THOUSANDS/ΜL (ref 1.85–7.62)
NEUTS SEG NFR BLD AUTO: 48 % (ref 43–75)
NITRITE UR QL STRIP: NEGATIVE
NON-SQ EPI CELLS URNS QL MICRO: NORMAL /HPF
NRBC BLD AUTO-RTO: 0 /100 WBCS
PH UR STRIP.AUTO: 6 [PH]
PHOSPHATE SERPL-MCNC: 3.8 MG/DL (ref 2.3–4.1)
PLATELET # BLD AUTO: 111 THOUSANDS/UL (ref 149–390)
PMV BLD AUTO: 12.4 FL (ref 8.9–12.7)
POTASSIUM SERPL-SCNC: 4.4 MMOL/L (ref 3.5–5.3)
PROT SERPL-MCNC: 7.5 G/DL (ref 6.4–8.2)
PROT UR STRIP-MCNC: ABNORMAL MG/DL
PROT UR-MCNC: 88 MG/DL
PROT/CREAT UR: 0.48 MG/G{CREAT} (ref 0–0.1)
PTH-INTACT SERPL-MCNC: 38.3 PG/ML (ref 18.4–80.1)
RBC # BLD AUTO: 4.1 MILLION/UL (ref 3.88–5.62)
RBC #/AREA URNS AUTO: NORMAL /HPF
SODIUM SERPL-SCNC: 142 MMOL/L (ref 136–145)
SP GR UR STRIP.AUTO: 1.02 (ref 1–1.03)
TRIGL SERPL-MCNC: 165 MG/DL
TSH SERPL DL<=0.05 MIU/L-ACNC: 4.97 UIU/ML (ref 0.36–3.74)
URATE SERPL-MCNC: 7.2 MG/DL (ref 4.2–8)
UROBILINOGEN UR QL STRIP.AUTO: 0.2 E.U./DL
WBC # BLD AUTO: 5.37 THOUSAND/UL (ref 4.31–10.16)
WBC #/AREA URNS AUTO: NORMAL /HPF

## 2021-02-17 PROCEDURE — 85025 COMPLETE CBC W/AUTO DIFF WBC: CPT

## 2021-02-17 PROCEDURE — 80061 LIPID PANEL: CPT

## 2021-02-17 PROCEDURE — 83735 ASSAY OF MAGNESIUM: CPT

## 2021-02-17 PROCEDURE — 84156 ASSAY OF PROTEIN URINE: CPT | Performed by: INTERNAL MEDICINE

## 2021-02-17 PROCEDURE — 36415 COLL VENOUS BLD VENIPUNCTURE: CPT

## 2021-02-17 PROCEDURE — 83970 ASSAY OF PARATHORMONE: CPT

## 2021-02-17 PROCEDURE — 83036 HEMOGLOBIN GLYCOSYLATED A1C: CPT

## 2021-02-17 PROCEDURE — 84550 ASSAY OF BLOOD/URIC ACID: CPT

## 2021-02-17 PROCEDURE — 80053 COMPREHEN METABOLIC PANEL: CPT

## 2021-02-17 PROCEDURE — 84100 ASSAY OF PHOSPHORUS: CPT

## 2021-02-17 PROCEDURE — 81001 URINALYSIS AUTO W/SCOPE: CPT | Performed by: INTERNAL MEDICINE

## 2021-02-17 PROCEDURE — 84443 ASSAY THYROID STIM HORMONE: CPT

## 2021-02-17 PROCEDURE — 82570 ASSAY OF URINE CREATININE: CPT | Performed by: INTERNAL MEDICINE

## 2021-02-22 ENCOUNTER — OFFICE VISIT (OUTPATIENT)
Dept: FAMILY MEDICINE CLINIC | Facility: CLINIC | Age: 76
End: 2021-02-22
Payer: MEDICARE

## 2021-02-22 ENCOUNTER — TELEPHONE (OUTPATIENT)
Dept: NEPHROLOGY | Facility: CLINIC | Age: 76
End: 2021-02-22

## 2021-02-22 VITALS
TEMPERATURE: 97.6 F | RESPIRATION RATE: 18 BRPM | WEIGHT: 220.25 LBS | DIASTOLIC BLOOD PRESSURE: 60 MMHG | SYSTOLIC BLOOD PRESSURE: 110 MMHG | BODY MASS INDEX: 34.57 KG/M2 | HEART RATE: 73 BPM | HEIGHT: 67 IN | OXYGEN SATURATION: 97 %

## 2021-02-22 DIAGNOSIS — E10.51 TYPE 1 DIABETES MELLITUS WITH DIABETIC PERIPHERAL ANGIOPATHY WITHOUT GANGRENE (HCC): ICD-10-CM

## 2021-02-22 DIAGNOSIS — J43.2 CENTRILOBULAR EMPHYSEMA (HCC): ICD-10-CM

## 2021-02-22 DIAGNOSIS — I25.118 CORONARY ARTERY DISEASE OF NATIVE ARTERY OF NATIVE HEART WITH STABLE ANGINA PECTORIS (HCC): ICD-10-CM

## 2021-02-22 DIAGNOSIS — E89.0 HYPOTHYROIDISM, POSTABLATIVE: ICD-10-CM

## 2021-02-22 DIAGNOSIS — I50.22 CHRONIC SYSTOLIC CONGESTIVE HEART FAILURE (HCC): ICD-10-CM

## 2021-02-22 DIAGNOSIS — I12.9 BENIGN HYPERTENSION WITH CHRONIC KIDNEY DISEASE, STAGE III (HCC): ICD-10-CM

## 2021-02-22 DIAGNOSIS — I70.1 RENAL ARTERY STENOSIS (HCC): ICD-10-CM

## 2021-02-22 DIAGNOSIS — E10.51 TYPE 1 DIABETES MELLITUS WITH PERIPHERAL VASCULAR DISEASE (HCC): Primary | ICD-10-CM

## 2021-02-22 DIAGNOSIS — E10.49 OTHER DIABETIC NEUROLOGICAL COMPLICATION ASSOCIATED WITH TYPE 1 DIABETES MELLITUS (HCC): ICD-10-CM

## 2021-02-22 DIAGNOSIS — N18.30 BENIGN HYPERTENSION WITH CHRONIC KIDNEY DISEASE, STAGE III (HCC): ICD-10-CM

## 2021-02-22 DIAGNOSIS — D69.6 THROMBOCYTOPENIA, UNSPECIFIED (HCC): ICD-10-CM

## 2021-02-22 DIAGNOSIS — I74.09 AORTOILIAC OCCLUSIVE DISEASE (HCC): ICD-10-CM

## 2021-02-22 PROCEDURE — 99215 OFFICE O/P EST HI 40 MIN: CPT | Performed by: FAMILY MEDICINE

## 2021-02-22 NOTE — TELEPHONE ENCOUNTER
We can do a trial without it, continue to monitor weights    If weight increases or any worsening edema we can always restart

## 2021-02-22 NOTE — PROGRESS NOTES
FAMILY PRACTICE OFFICE VISIT       NAME: Froilan Artis  AGE: 76 y o  SEX: male       : 1945        MRN: [id number]        Assessment and Plan     Problem List Items Addressed This Visit        Endocrine    Type 1 diabetes mellitus with diabetic peripheral angiopathy without gangrene ([facility] ) - Primary       Lab Results   Component Value Date    HGBA1C 6 8 (H) 2021 ·    good glycemic control  · Patient is under care of Endocrinology         Hypothyroidism, postablative     ·  Recent TSH is elevated  · Patient is complaining of recurrent symptoms of fatigue  · Will discuss gradual titration of Synthroid dose pending evaluation with [facility] due to recent onset of chest pain  · Patient remains on levothyroxine 200 mcg daily and is following up with endocrinology, Dr Karey Ross         Diabetic neuropathy associated with type 1 diabetes mellitus ([facility] )       Lab Results   Component Value Date    HGBA1C 6 8 (H) 2021 ·    good glycemic control  · Patient remains on gabapentin  · Chronic unchanged symptoms of neuropathy  ·  foot exam performed today            Respiratory    Centrilobular emphysema ([facility] ) (Chronic)     ·  Patient is under care of [facility] pulmonology  · He has tried numerous inhalers without improvement of chronic dyspnea on exertion  Cardiovascular and Mediastinum    Aortoiliac occlusive disease ([facility] ) (Chronic)     ·  Patient remains under care of vascular surgery at [address]         Coronary artery disease of native artery of native heart with stable angina pectoris [facility])     ·  Patient reports recent recurrence of chest pains on exertion ( taking stairs) within past 1-2 weeks  ·  patient is chest pain-free today  EKG performed in the office reveals no acute ischemic changes  I will coordinate ASAP follow-up with [facility]    Patient will use nitroglycerin if needed and will proceed to emergency room if chest pain recurs persists or worsens  Relevant Orders    POCT ECG (Completed)    Benign hypertension with chronic kidney disease, stage III     Lab Results   Component Value Date    EGFR 39 02/17/2021    EGFR 40 01/19/2021    EGFR 36 11/17/2020    CREATININE 1 67 (H) 02/17/2021    CREATININE 1 65 (H) 01/19/2021    CREATININE 1 82 (H) 11/17/2020 ·   Blood pressure is on the lower side of normal, GFR/ creatinine are stable  · Patient is currently not on any medications for hypertension  · Patient remains on low-dose of torsemide 10 mg every other day           Renal artery stenosis [facility])     ·   Patient is under care of [facility] Nephrology         Chronic systolic congestive heart failure ([facility] )     Wt Readings from Last 3 Encounters:   02/24/21 97 9 kg (215 lb 12 8 oz)   02/22/21 99 9 kg (220 lb 4 oz)   10/26/20 99 kg (218 lb 4 oz) ·     Patient denies symptoms of leg edema or unusual dyspnea  · He appears to be euvolemic on exam today  · Continue torsemide 10 mg every other day  · Patient will follow-up with Cardiology on an urgent basis due to recent recurrence of chest pain on exertion                  Other    Thrombocytopenia, unspecified ([facility] )     ·  Recent blood work indicates stable thrombocytopenia, platelet count 106,197           Patient with multiple co morbidities as outlined above presents for follow-up of chronic medical conditions  Recent blood work reveals elevated TSH but otherwise reveals stable GFR/ creatinine and normal hemoglobin A1c  Patient is complaining of persistent chronic fatigue and exhaustion  He also reports new onset of angina described as chest pain on exertion within past 1-2 weeks  Patient did not contact his cardiologist   Patient is chest pain free today  EKG performed in the office is unremarkable with no evidence of acute ischemia      Patient has received 1st COVID vaccine and is awaiting for his 2nd vaccination which is unfortunately delayed due to interruption with Moderna vaccine supply  Patient remains under ongoing care of Cardiology, Endocrinology, Nephrology, vascular surgery and PCP  Plan:  1  Patient should use nitroglycerin with recurrence of chest pain and should proceed to emergency room if chest pain is persistent or worsening  2   We will arrange ASAP follow-up with [facility]   3  Once cardiac status is established -we may discuss slow increase of levothyroxine with endocrinology due to patient's symptoms of fatigue which could be related to uncontrolled hypothyroidism  4   Patient will continue same daily medications for the time being  5   Follow-up with PCP in 3 months and earlier if needed      There are no Patient Instructions on file for this visit  Discussed with the patient and all questioned fully answered  He will call me if any problems arise  M*Vitasoft software was used to dictate this note  It may contain errors with dictating incorrect words/spelling  Please contact provider directly with any questions  Chief Complaint     Chief Complaint   Patient presents with    Follow-up     6 month     Medicare Wellness Visit       History of Present Illness     Patient presents for follow-up of chronic medical conditions  He is accompanied by his wife  Patient with multiple chronic medical conditions including coronary artery disease, peripheral vascular disease, diabetic neuropathy, vascular neurogenic claudication, type 1 diabetes, hypothyroidism, emphysema, obstructive sleep apnea, chronic renal insufficiency  Patient is under ongoing care of vascular surgery, Cardiology, endocrinology and Nephrology  Patient is complaining of significant fatigue, he describes his symptoms as feeling exhausted on a daily basis  Patient denies any unusual dyspnea on exertion but reports intermittent symptoms of chest pain with taking stairs within past 1-2 weeks    Pain is anginal in nature, occurs with exercise, resolves with rest, no associated diaphoresis, dyspnea or radiation of discomfort  Patient denies leg edema or any unusual shortness of breath  We reviewed results of recent blood work performed on February 17th  Patient's TSH is elevated at 4 97  He is on levothyroxine 200 mcg daily  Patient was just evaluated by his endocrinologist, Dr Freddie Forrester, who was hesitant to increase dose of levothyroxine, likely due to patient's cardiac status  Total cholesterol was 144 with LDL of 65  Hemoglobin A1c is excellent at 6 8  CMP reveals stable elevation of creatinine at 1 67 and stable GFR of 39  CBC is normal with hemoglobin of 13 4, mild stable thrombocytopenia with platelet count of 058,533  patient's blood pressures at home a rather low, ranging 98/60 up to 110/64  Patient's wife is concerned about possible impact of low-dose of torsemide on patient's low BP  Patient is not on any blood pressure medications  He has been off metoprolol for quite a while due to lower B Ps  Patient remains on torsemide 10 mg every other day  He denies leg edema  GFR has been stable  Nephrology follows closely  Patient will be proceeding with follow-up by pulmonology within next few days  He has tried updraft nebulizer for chronic dyspnea symptoms and has not noticed any improvement  Numerous attempts to try inhalers were unsuccessful as well  Review of Systems   Review of Systems   Constitutional: Positive for fatigue  Negative for activity change and appetite change  HENT: Negative  Eyes: Negative  Respiratory: Positive for shortness of breath ( chronic dyspnea on exertion)  Cardiovascular: Positive for chest pain ( as outlined in HPI)  Negative for palpitations  Gastrointestinal: Negative  Endocrine: Negative  Genitourinary: Negative  Musculoskeletal: Positive for arthralgias and back pain  Allergic/Immunologic: Negative      Neurological: Positive for numbness ( symptoms of peripheral neuropathy)  Psychiatric/Behavioral: The patient is nervous/anxious          Active Problem List     Patient Active Problem List   Diagnosis    Type 1 diabetes mellitus with diabetic peripheral angiopathy without gangrene ([facility])    Presence of drug coated stent in LAD coronary artery    Coronary artery disease of native artery of native heart with stable angina pectoris ([facility])    Presence of drug coated stent in left circumflex coronary artery    Dyslipidemia    Obstructive sleep apnea of adult    Carotid artery stenosis, asymptomatic, bilateral    Restrictive lung disease    Centrilobular emphysema ([facility] )    Benign hypertension with chronic kidney disease, stage III    CKD (chronic kidney disease) stage 3, GFR 30-59 ml/min ([facility])    Renal artery stenosis ([facility] )    Renal cyst, right    Vitamin D deficiency    Aortoiliac occlusive disease ([facility] )    Hypothyroidism, postablative    Low back pain with sciatica    Lumbar disc herniation    Lumbar radiculopathy    Diabetic neuropathy associated with type 1 diabetes mellitus ([facility])    Chronic systolic congestive heart failure ([facility])    Anxiety with depression    Spinal stenosis of lumbar region with neurogenic claudication    Thrombocytopenia, unspecified ([facility] )    S/P femoral-popliteal bypass surgery    Stenosis of carotid artery       Past Medical History:  Past Medical History:   Diagnosis Date    Acute kidney injury ([facility] )     resolved 11/30/2015    Acute venous embolism and thrombosis of deep vessels of proximal lower extremity ([facility] )     resolved 04/04/2015    DARINEL (acute kidney injury) ([facility] ) 1/12/2016    Anesthesia     RESPIRATORY ISSUES DUE TO SLEEP APNEA    Cardiac disease     heart attack, stents x 4    CHF (congestive heart failure) ([facility])     Chronic cough     resolved 02/04/2016    Chronic pain disorder     intermitent claudication    COPD (chronic obstructive pulmonary disease) ([facility])     Coronary artery disease  CPAP (continuous positive airway pressure) dependence     Diabetes mellitus ([facility]     Disease of thyroid gland     DVT (deep venous thrombosis) (HCC)     Heart failure (HCC)     Hyperlipidemia     Hypertension     Ischemic cardiomyopathy     last assessed 09/26/2017    Myocardial infarction [facility])     MI +2 2015,2018    NSTEMI (non-ST elevated myocardial infarction) ([facility] ) 3/23/2019    Pulmonary emphysema (HCC)     Pulmonary granuloma (HCC)     resolved 02/03/2017    Renal failure     Sleep apnea        Past Surgical History:  Past Surgical History:   Procedure Laterality Date    BYPASS FEMORAL-POPLITEAL      initial stenosis with stent left, 7 x 100 smart stent onset 02/24/2014    CARDIAC SURGERY      cardiac stents    CATARACT EXTRACTION      COLOGUARD (HISTORICAL)  2018    CORONARY ANGIOPLASTY WITH STENT PLACEMENT      x4    IR AORTAGRAM WITH RUN-OFF  3/14/2019    WY THROMBOENDARTECTMY FEMORAL COMMON Left 3/22/2019    Procedure: ENDARTERECTOMY ARTERIAL FEMORAL WITH PATCH ANGIOPLASTY, BALLOON ANGIOPLASTY, STENT;  Surgeon: Sp Zarco MD;  [facility]: [facility];  Service: Vascular    [place] VEIN BYPASS GRAFT,FEM-POP Left 3/22/2019    Procedure: BYPASS FEMORAL-POPLITEAL WITH COMPLETION ARTERIOGRAM;  Surgeon: Sp Zarco MD;  [facility];  Service: Vascular    VASCULAR SURGERY      stent placement    WOUND DEBRIDEMENT Left 4/15/2019    Procedure: DEBRIDEMENT WOUND AND DRESSING CHANGE ([address]) left groin with VAC;  Surgeon: Ghanshyam Hall DO;  [facility]: [facility];  Service: Vascular       Family History:  Family History   Problem Relation Age of Onset    Heart disease Father         pacer placement    Hypertension Father     Kidney disease Father     Heart failure Father     Heart attack Father     Liver disease Father     Cirrhosis Mother         due to beer consumption    Liver disease Mother     Diabetes Other        Social History:  Social History Socioeconomic History    Marital status: /Civil Union     Spouse name: Not on file    Number of children: Not on file    Years of education: Not on file    Highest education level: Not on file   Occupational History    Occupation: Retired    Occupation: Desk work    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   Social Needs    Financial resource strain: Not on file    Food insecurity     Worry: Not on file     Inability: Not on file   [employer] needs     Medical: Not on file     Non-medical: Not on file   Tobacco Use    Smoking status: Former Smoker     Packs/day: 4 00     Years: 48 00     Pack years: 192 00     Types: Cigarettes     Start date:      Quit date:      Years since quittin 1    Smokeless tobacco: Never Used    Tobacco comment: smoking 48 years 1 5 ppd d/c oct 2008 screening protocol as per allscripts   Substance and Sexual Activity    Alcohol use:  Yes     Alcohol/week: 21 0 standard drinks     Types: 21 Standard drinks or equivalent per week     Frequency: 4 or more times a week     Drinks per session: 1 or 2     Binge frequency: Never     Comment: 2-3 glasses of vodka daily (6 shots) (history 2 drinks per day as per allscripts    Drug use: No    Sexual activity: Not Currently   Lifestyle    Physical activity     Days per week: Not on file     Minutes per session: Not on file    Stress: Not on file   Relationships    Social connections     Talks on phone: Not on file     Gets together: Not on file     Attends Alevism service: Not on file     Active member of club or organization: Not on file     Attends meetings of clubs or organizations: Not on file     Relationship status: Not on file    Intimate partner violence     Fear of current or ex partner: Not on file     Emotionally abused: Not on file     Physically abused: Not on file     Forced sexual activity: Not on file   Other Topics Concern    Not on file   Social History Narrative    Not on file Objective     Vitals:    02/22/21 1414   BP: 110/60   Pulse: 73   Resp: 18   Temp: 97 6 °F (36 4 °C)   SpO2: 97%   Weight: 99 9 kg (220 lb 4 oz)   Height: 5' 7" (1 702 m)     Wt Readings from Last 3 Encounters:   02/24/21 97 9 kg (215 lb 12 8 oz)   02/22/21 99 9 kg (220 lb 4 oz)   10/26/20 99 kg (218 lb 4 oz)       Physical Exam  Vitals signs and nursing note reviewed  Constitutional:       General: He is not in acute distress  Appearance: Normal appearance  He is well-developed  Comments: Chronically ill and fatigued   HENT:      Head: Normocephalic and atraumatic  Eyes:      Conjunctiva/sclera: Conjunctivae normal    Neck:      Musculoskeletal: Neck supple  Vascular: No carotid bruit  Cardiovascular:      Rate and Rhythm: Normal rate and regular rhythm  Pulses: no weak pulses          Dorsalis pedis pulses are 1+ on the right side and 1+ on the left side  Heart sounds: Normal heart sounds  No murmur  Pulmonary:      Effort: Pulmonary effort is normal  No respiratory distress  Breath sounds: Normal breath sounds  No wheezing or rales  Abdominal:      General: Bowel sounds are normal  There is no distension or abdominal bruit  Musculoskeletal: Normal range of motion  Right lower leg: No edema  Left lower leg: No edema  Feet:      Right foot:      Skin integrity: No ulcer, skin breakdown, erythema, warmth, callus or dry skin  Left foot:      Skin integrity: No ulcer, skin breakdown, erythema, warmth, callus or dry skin  Neurological:      General: No focal deficit present  Mental Status: He is alert and oriented to person, place, and time  Cranial Nerves: No cranial nerve deficit  Psychiatric:         Mood and Affect: Mood normal          Behavior: Behavior normal          Thought Content: Thought content normal        Patient's shoes and socks removed  Right Foot/Ankle   Right Foot Inspection  Skin Exam: skin normal, skin intact and abnormal color ( purplish discoloration) no dry skin, no warmth, no callus, no erythema, no maceration, no pre-ulcer, no ulcer and no callus                          Toe Exam: ROM and strength within normal limits  Sensory   Vibration: diminished  Proprioception: diminished   Monofilament testing: diminished  Vascular    The right DP pulse is 1+  Left Foot/Ankle  Left Foot Inspection  Skin Exam: skin normal, skin intact and abnormal color ( purplish discoloration)no dry skin, no warmth, no erythema, no maceration, no pre-ulcer, no ulcer and no callus                         Toe Exam: ROM and strength within normal limits                   Sensory   Vibration: diminished  Proprioception: absent  Monofilament: absent  Vascular    The left DP pulse is 1+  Assign Risk Category:  No deformity present; No loss of protective sensation;  No weak pulses       Risk: 2    Results for orders placed or performed in visit on 02/22/21   POCT ECG    Impression     Normal sinus rhythm, no acute ischemic changes, no changes since previous study in June of 2020       Pertinent Laboratory/Diagnostic Studies:  Lab Results   Component Value Date    GLUCOSE 207 (H) 03/22/2019    BUN 31 (H) 02/17/2021    CREATININE 1 67 (H) 02/17/2021    CALCIUM 9 6 02/17/2021     12/21/2015    K 4 4 02/17/2021    CO2 27 02/17/2021     (H) 02/17/2021     Lab Results   Component Value Date    ALT 23 02/17/2021    AST 21 02/17/2021    ALKPHOS 100 02/17/2021    BILITOT 0 33 10/01/2015       Lab Results   Component Value Date    WBC 5 37 02/17/2021    HGB 13 4 02/17/2021    HCT 41 3 02/17/2021     (H) 02/17/2021     (L) 02/17/2021       No results found for: TSH    Lab Results   Component Value Date    CHOL 129 10/01/2015     Lab Results   Component Value Date    TRIG 165 (H) 02/17/2021     Lab Results   Component Value Date    HDL 46 02/17/2021     Lab Results   Component Value Date    LDLCALC 65 02/17/2021     Lab Results Component Value Date    HGBA1C 6 8 (H) 02/17/2021       Results for orders placed or performed in visit on 02/17/21   CBC and differential   Result Value Ref Range    WBC 5 37 4 31 - 10 16 Thousand/uL    RBC 4 10 3 88 - 5 62 Million/uL    Hemoglobin 13 4 12 0 - 17 0 g/dL    Hematocrit 41 3 36 5 - 49 3 %     (H) 82 - 98 fL    MCH 32 7 26 8 - 34 3 pg    MCHC 32 4 31 4 - 37 4 g/dL    RDW 13 2 11 6 - 15 1 %    MPV 12 4 8 9 - 12 7 fL    Platelets 129 (L) 374 - 390 Thousands/uL    nRBC 0 /100 WBCs    Neutrophils Relative 48 43 - 75 %    Immat GRANS % 0 0 - 2 %    Lymphocytes Relative 34 14 - 44 %    Monocytes Relative 14 (H) 4 - 12 %    Eosinophils Relative 4 0 - 6 %    Basophils Relative 0 0 - 1 %    Neutrophils Absolute 2 53 1 85 - 7 62 Thousands/µL    Immature Grans Absolute 0 02 0 00 - 0 20 Thousand/uL    Lymphocytes Absolute 1 84 0 60 - 4 47 Thousands/µL    Monocytes Absolute 0 74 0 17 - 1 22 Thousand/µL    Eosinophils Absolute 0 22 0 00 - 0 61 Thousand/µL    Basophils Absolute 0 02 0 00 - 0 10 Thousands/µL   Magnesium   Result Value Ref Range    Magnesium 2 6 1 6 - 2 6 mg/dL   PTH, intact   Result Value Ref Range    PTH 38 3 18 4 - 80 1 pg/mL   Uric acid   Result Value Ref Range    Uric Acid 7 2 4 2 - 8 0 mg/dL   Comprehensive metabolic panel   Result Value Ref Range    Sodium 142 136 - 145 mmol/L    Potassium 4 4 3 5 - 5 3 mmol/L    Chloride 110 (H) 100 - 108 mmol/L    CO2 27 21 - 32 mmol/L    ANION GAP 5 4 - 13 mmol/L    BUN 31 (H) 5 - 25 mg/dL    Creatinine 1 67 (H) 0 60 - 1 30 mg/dL    Glucose, Fasting 174 (H) 65 - 99 mg/dL    Calcium 9 6 8 3 - 10 1 mg/dL    AST 21 5 - 45 U/L    ALT 23 12 - 78 U/L    Alkaline Phosphatase 100 46 - 116 U/L    Total Protein 7 5 6 4 - 8 2 g/dL    Albumin 3 6 3 5 - 5 0 g/dL    Total Bilirubin 0 72 0 20 - 1 00 mg/dL    eGFR 39 ml/min/1 73sq m   Hemoglobin A1C   Result Value Ref Range    Hemoglobin A1C 6 8 (H) Normal 3 8-5 6%; PreDiabetic 5 7-6 4%;  Diabetic >=6 5%; Glycemic control for adults with diabetes <7 0% %     mg/dl   Lipid Panel with Direct LDL reflex   Result Value Ref Range    Cholesterol 144 50 - 200 mg/dL    Triglycerides 165 (H) <=150 mg/dL    HDL, Direct 46 >=40 mg/dL    LDL Calculated 65 0 - 100 mg/dL   TSH, 3rd generation   Result Value Ref Range    TSH 3RD GENERATON 4 970 (H) 0 358 - 3 740 uIU/mL   Phosphorus   Result Value Ref Range    Phosphorus 3 8 2 3 - 4 1 mg/dL       Orders Placed This Encounter   Procedures    POCT ECG       ALLERGIES:  Allergies   Allergen Reactions    Doxazosin Myalgia     UNKNOWN  UNKNOWN  Muscle cramps    Iohexol      UNKNOWN    Lisinopril Cough     cough  Other reaction(s): CAMELLA SINENSIS (ZESTRIL) (cough)  cough    Vancomycin Hives    Adhesive [Medical Tape]      Skin Breakdown    Cardura [Doxazosin Mesylate]      Muscle cramps    Other      Iv dye for cardiac cath  Renal failure very close to dialysis  But no dialysis  Iv dye for cardiac cath  Renal failure very close to dialysis  But no dialysis  Iv dye for cardiac cath  Renal failure very close to dialysis  But no dialysis       Current Medications     Current Outpatient Medications   Medication Sig Dispense Refill    acetaminophen (TYLENOL) 325 mg tablet Take 2 tablets (650 mg total) by mouth every 6 (six) hours as needed for mild pain (Patient taking differently: Take 500 mg by mouth every 8 (eight) hours as needed for mild pain Take 1,000 mg every 8 hours PRN) 30 tablet 0    ascorbic acid (VITAMIN C) 250 mg tablet Take 250 mg by mouth daily      aspirin 81 MG tablet Take 81 mg by mouth daily        cholecalciferol (VITAMIN D3) 1,000 units tablet Take 1,000 Units by mouth daily       clopidogrel (PLAVIX) 75 mg tablet Take 1 tablet (75 mg total) by mouth daily 90 tablet 3    Coenzyme Q10 (COQ-10) 200 MG CAPS Take 200 mg by mouth daily      docusate sodium (COLACE) 50 mg capsule Take by mouth 2 (two) times a day as needed       gabapentin (NEURONTIN) 300 mg capsule TAKE ONE CAPSULE BY MOUTH THREE TIMES A  capsule 1    Icosapent Ethyl (Vascepa) 1 g CAPS Take 1 capsule (1 g total) by mouth 2 (two) times a day 180 capsule 3    insulin glargine (Lantus) 100 units/mL subcutaneous injection Inject under the skin 40 units am- 20 units pm      insulin lispro (HUMALOG) 100 units/mL injection Inject 3 units with breakfast, 6 units at lunch, and 6 units at dinner (Patient taking differently: 4 (four) times a day Inject 3 units with breakfast, 6 units at lunch, and 6 units at dinner  Carbs counting) 10 mL 1    Lactobacillus Rhamnosus, GG, (CULTURELLE) CAPS Take 1 capsule by mouth daily       levalbuterol (XOPENEX HFA) 45 mcg/act inhaler Inhale 1-2 puffs every 4 (four) hours as needed for wheezing (Patient not taking: Reported on 2/24/2021) 1 Inhaler 6    levothyroxine 200 mcg tablet Take 200 mcg by mouth daily  0    Multiple Vitamins-Minerals (CENTRUM MEN) TABS Take by mouth Take one tablet in the morning      polyethylene glycol (MIRALAX) 17 g packet Take 17 g by mouth daily      torsemide (DEMADEX) 10 mg tablet Take 10 mg every other day 90 tablet 0    ULTICARE INSULIN SYRINGE 30G X 1/2" 1 ML MISC Use as directed  0    ammonium lactate (LAC-HYDRIN) 12 % lotion Apply topically 2 (two) times a day as needed for dry skin Apply to dry skin between the toes in the AM & in the  g 5    budesonide-formoterol (SYMBICORT) 160-4 5 mcg/act inhaler Inhale 2 puffs 2 (two) times a day Rinse mouth after use   (Patient not taking: Reported on 2/22/2021) 1 Inhaler 1    Glucagon (Baqsimi One Pack) 3 MG/DOSE POWD 1 Dose into each nostril as needed      isosorbide mononitrate (IMDUR) 30 mg 24 hr tablet Take 1 tablet (30 mg total) by mouth daily 30 tablet 3    Lactobacillus Rhamnosus, GG, (CULTURELLE PO) Take by mouth      nitroglycerin (NITROSTAT) 0 4 mg SL tablet Place 1 tablet (0 4 mg total) under the tongue every 5 (five) minutes as needed for chest pain 25 tablet 3  rosuvastatin (CRESTOR) 20 MG tablet Take 1 tablet (20 mg total) by mouth daily 90 tablet 3    SF 1 1 % GEL BRUCH ONCE DAILY AT BEDTIME, BRUSH OF 1 MINUTE AS DIRECTED      Wound Dressings (Medihoney Wound/Burn Dressing) GEL Use as directed 1 Tube 5     No current facility-administered medications for this visit  There are no discontinued medications      Health Maintenance     Health Maintenance   Topic Date Due    SLP PLAN OF CARE  1945    Lung Cancer Screening  04/27/2000    Falls: Plan of Care  04/27/2010    Diabetic Foot Exam  12/15/2020    BMI: Followup Plan  08/18/2021    HEMOGLOBIN A1C  08/17/2021    Medicare Annual Wellness Visit (AWV)  10/26/2021    Fall Risk  11/02/2021    DM Eye Exam  12/02/2021    URINE MICROALBUMIN  02/17/2022    BMI: Adult  02/24/2022    Colorectal Cancer Screening  08/18/2022    DTaP,Tdap,and Td Vaccines (2 - Td) 05/05/2023    Hepatitis C Screening  Completed    Pneumococcal Vaccine: 65+ Years  Completed    Influenza Vaccine  Completed    COVID-19 Vaccine  Completed    HIB Vaccine  Aged Out    Hepatitis B Vaccine  Aged Out    IPV Vaccine  Aged Out    Hepatitis A Vaccine  Aged Out    Meningococcal ACWY Vaccine  Aged Out    HPV Vaccine  Aged Out       Immunization History   Administered Date(s) Administered    DT (pediatric) 12/01/2009    H1N1, All Formulations 12/01/2009    INFLUENZA 10/01/2008, 10/06/2009, 09/01/2010, 11/04/2013, 08/29/2018, 09/09/2019, 09/05/2020    Influenza Split High Dose Preservative Free IM 08/28/2014, 10/03/2016, 08/29/2018    Influenza, seasonal, injectable 10/01/2008, 10/19/2010, 08/26/2011, 09/20/2011, 08/01/2012, 09/01/2012, 09/13/2013, 09/15/2015, 09/03/2017    Meningococcal C/Y-HIB PRP 12/01/2009    Pneumococcal Conjugate 13-Valent 08/11/2015    Pneumococcal Polysaccharide PPV23 10/01/2008, 06/04/2009, 03/15/2017    SARS-CoV-2 / COVID-19 mRNA IM (Moderna) 01/19/2021    Td (adult), adsorbed 03/04/2011    Tdap 05/05/2013    Zoster 10/01/2009    influenza, trivalent, adjuvanted 09/05/2019       Amber Patton MD

## 2021-02-22 NOTE — TELEPHONE ENCOUNTER
----- Message from Haritha Patel DO sent at 2/19/2021  2:04 PM EST -----  Labs are stable from my end  No changes to current medications  Phosphorus slightly better creatinine stable

## 2021-02-22 NOTE — TELEPHONE ENCOUNTER
Spoke with pt wife regarding most recent lab results  She is requesting that pt torsemide be decreased (he is currently taking 10 mg every other day) due to low BP  She states that BP's have been averaging 100/60  Pt is c/o fatigue, he denies any other symptoms  Pt weights have been stable and he is not having any swelling  Dr Kennedi Pena please advise

## 2021-02-23 ENCOUNTER — TELEPHONE (OUTPATIENT)
Dept: FAMILY MEDICINE CLINIC | Facility: CLINIC | Age: 76
End: 2021-02-23

## 2021-02-23 ENCOUNTER — TELEPHONE (OUTPATIENT)
Dept: CARDIOLOGY CLINIC | Facility: CLINIC | Age: 76
End: 2021-02-23

## 2021-02-23 NOTE — TELEPHONE ENCOUNTER
[facility] called to confirm that they can schedule an appointment at their office for patient   Cardio wanted to know if a stress test should be ordered or any other specific tests since the EKG that was done was normal

## 2021-02-23 NOTE — TELEPHONE ENCOUNTER
Pt has been made aware  He will d/c the torsemide and continue to monitor weights and edema  Should pt weight increase then we will restart it

## 2021-02-23 NOTE — TELEPHONE ENCOUNTER
Pt's PCP spoke to Dr Micaela Westfall, pt has been having chest pain-per Dr Micaela Westfall arrange OV for this week  *Appt is arranged for tomorrow with PEDRO LUIS

## 2021-02-24 ENCOUNTER — OFFICE VISIT (OUTPATIENT)
Dept: CARDIOLOGY CLINIC | Facility: CLINIC | Age: 76
End: 2021-02-24
Payer: MEDICARE

## 2021-02-24 VITALS
DIASTOLIC BLOOD PRESSURE: 72 MMHG | OXYGEN SATURATION: 99 % | SYSTOLIC BLOOD PRESSURE: 102 MMHG | HEART RATE: 102 BPM | WEIGHT: 215.8 LBS | HEIGHT: 67 IN | BODY MASS INDEX: 33.87 KG/M2

## 2021-02-24 DIAGNOSIS — E78.5 DYSLIPIDEMIA: ICD-10-CM

## 2021-02-24 DIAGNOSIS — I25.10 CORONARY ARTERY DISEASE INVOLVING NATIVE CORONARY ARTERY OF NATIVE HEART WITHOUT ANGINA PECTORIS: ICD-10-CM

## 2021-02-24 PROCEDURE — 99215 OFFICE O/P EST HI 40 MIN: CPT | Performed by: NURSE PRACTITIONER

## 2021-02-24 RX ORDER — ROSUVASTATIN CALCIUM 20 MG/1
20 TABLET, COATED ORAL DAILY
Qty: 90 TABLET | Refills: 3 | Status: SHIPPED | OUTPATIENT
Start: 2021-02-24 | End: 2021-02-24 | Stop reason: SDUPTHER

## 2021-02-24 RX ORDER — ISOSORBIDE MONONITRATE 30 MG/1
30 TABLET, EXTENDED RELEASE ORAL DAILY
Qty: 30 TABLET | Refills: 3 | Status: SHIPPED | OUTPATIENT
Start: 2021-02-24 | End: 2021-03-09 | Stop reason: ALTCHOICE

## 2021-02-24 RX ORDER — ROSUVASTATIN CALCIUM 20 MG/1
20 TABLET, COATED ORAL DAILY
Qty: 90 TABLET | Refills: 3 | Status: SHIPPED | OUTPATIENT
Start: 2021-02-24 | End: 2022-02-03 | Stop reason: SDUPTHER

## 2021-02-24 RX ORDER — NITROGLYCERIN 0.4 MG/1
0.4 TABLET SUBLINGUAL
Qty: 25 TABLET | Refills: 3 | Status: SHIPPED | OUTPATIENT
Start: 2021-02-24 | End: 2021-06-03 | Stop reason: SDUPTHER

## 2021-02-24 NOTE — PATIENT INSTRUCTIONS
Maintain a 2 gram daily sodium diet and 1500 ml daily fluid restriction  Check daily weights  If you gained 3 pounds in one day, 5 pounds in one week, or experience worsening shortness of breath or increasing lower leg swelling  Please call the heart failure office at [phone]        Imdur 30mg daily  PRN Torsemide

## 2021-02-24 NOTE — PROGRESS NOTES
Cardiology Follow Up    Kevin Martinez  1945  [id number]  [facility] [place]  [address]  [address]  [place] [facility] [address] [phone]    Chest pain and shortness of breath    Interval History:   Mr Ryan Gilbert presents to our office for worsening shortness of breath with exertion for the past couple of months  According to his wife shortness of breath has been worse for the past 6 months  Radha Mercado admits to an experience mid sternal chest pain with exertion, dull ache/burning, 3/10, non radiating, took SL NTG with relief  HPI:  CAD, 2014 stents x 4 LVH, 10/2018 re stenosis of LAD stent with stent placement  3/2020 C prox RCA 70% stenosis, mid RCA 50% stenosis, LAD 40-50% stenosis, LCV 75% stenosis with in stent stenosis      Hypertension  Hyperlipidemia 2/17/21 , , HDL 46, LDL 65   Chronic systolic heart failure   ICM LVEF 40%  7/30/20 TTE LVEF 40%, grade 1 DD, mild MR,   CKD III baseline creat 1 65 - 1 77  DM1 HgbA1C 6 5  ITZEL complaint with CPAP at night   PVD  Chronic shortness of breath  RAD   Patient Active Problem List   Diagnosis    Type 1 diabetes mellitus with peripheral vascular disease (HCC)    Presence of drug coated stent in LAD coronary artery    Coronary artery disease of native artery of native heart with stable angina pectoris (HCC)    Presence of drug coated stent in left circumflex coronary artery    Dyslipidemia    Obstructive sleep apnea of adult    Carotid artery stenosis, asymptomatic, bilateral    Dyspnea on exertion    Restrictive lung disease    Centrilobular emphysema (HCC)    Benign hypertension with chronic kidney disease, stage III    CKD (chronic kidney disease) stage 3, GFR 30-59 ml/min (HCC)    Renal artery stenosis (HCC)    Renal cyst, right    Vitamin D deficiency    Aortoiliac occlusive disease (HCC)    Hypothyroidism, postablative    Low back pain with sciatica    Lumbar disc herniation    Lumbar radiculopathy    Diabetic neuropathy associated with type 1 diabetes mellitus ([place] [address] )    History of Ischemic cardiomyopathy    Chronic systolic congestive heart failure ([facility])    NSTEMI (non-ST elevated myocardial infarction) ([place] [address] )    Anxiety with depression    Spinal stenosis of lumbar region with neurogenic claudication    Thrombocytopenia, unspecified ([place] [address] )    S/P femoral-popliteal bypass surgery    Stenosis of carotid artery    Peripheral vascular disease ([place] [address] )     Past Medical History:   Diagnosis Date    Acute kidney injury ([place] [address] )     resolved 11/30/2015    Acute venous embolism and thrombosis of deep vessels of proximal lower extremity ([place] [address] )     resolved 04/04/2015    DARINEL (acute kidney injury) ([place] [address] ) 1/12/2016    Anesthesia     RESPIRATORY ISSUES DUE TO SLEEP APNEA    Cardiac disease     heart attack, stents x 4    CHF (congestive heart failure) ([facility])     Chronic cough     resolved 02/04/2016    Chronic pain disorder     intermitent claudication    COPD (chronic obstructive pulmonary disease) ([place] [address] )     Coronary artery disease     CPAP (continuous positive airway pressure) dependence     Diabetes mellitus ([place] [address] )     Disease of thyroid gland     DVT (deep venous thrombosis) ([facility])     Heart failure ([facility])     Hyperlipidemia     Hypertension     Ischemic cardiomyopathy     last assessed 09/26/2017    Myocardial infarction [facility])     MI +2 2015,2018    Pulmonary emphysema ([facility])     Pulmonary granuloma ([place] [address] )     resolved 02/03/2017    Renal failure     Sleep apnea      Social History     Socioeconomic History    Marital status: /Civil Union     Spouse name: Not on file    Number of children: Not on file    Years of education: Not on file    Highest education level: Not on file   Occupational History    Occupation: Retired    Occupation: Desk work    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   Social Needs    Financial resource strain: Not on file    Food insecurity     Worry: Not on file     Inability: Not on file    Transportation needs     Medical: Not on file     Non-medical: Not on file   Tobacco Use    Smoking status: Former Smoker     Packs/day: 4 00     Years: 48 00     Pack years: 192 00     Types: Cigarettes     Start date: 65     Quit date:      Years since quittin 1    Smokeless tobacco: Never Used    Tobacco comment: smoking 48 years 1 5 ppd d/c oct 2008 screening protocol as per allscripts   Substance and Sexual Activity    Alcohol use:  Yes     Alcohol/week: 21 0 standard drinks     Types: 21 Standard drinks or equivalent per week     Frequency: 4 or more times a week     Drinks per session: 1 or 2     Binge frequency: Never     Comment: 2-3 glasses of vodka daily (6 shots) (history 2 drinks per day as per allscripts    Drug use: No    Sexual activity: Not Currently   Lifestyle    Physical activity     Days per week: Not on file     Minutes per session: Not on file    Stress: Not on file   Relationships    Social connections     Talks on phone: Not on file     Gets together: Not on file     Attends Yazidi service: Not on file     Active member of club or organization: Not on file     Attends meetings of clubs or organizations: Not on file     Relationship status: Not on file    Intimate partner violence     Fear of current or ex partner: Not on file     Emotionally abused: Not on file     Physically abused: Not on file     Forced sexual activity: Not on file   Other Topics Concern    Not on file   Social History Narrative    Not on file      Family History   Problem Relation Age of Onset    Heart disease Father         pacer placement    Hypertension Father     Kidney disease Father     Heart failure Father     Heart attack Father     Liver disease Father     Cirrhosis Mother         due to beer consumption    Liver disease Mother     Diabetes Other      Past Surgical History:   Procedure Laterality Date    BYPASS FEMORAL-POPLITEAL      initial stenosis with stent left, 7 x 100 smart stent onset 02/24/2014    CARDIAC SURGERY      cardiac stents    CATARACT EXTRACTION      COLOGUARD (HISTORICAL)  2018    CORONARY ANGIOPLASTY WITH STENT PLACEMENT      x4    IR AORTAGRAM WITH RUN-OFF  3/14/2019    TN THROMBOENDARTECTMY FEMORAL COMMON Left 3/22/2019    Procedure: ENDARTERECTOMY ARTERIAL FEMORAL WITH PATCH ANGIOPLASTY, BALLOON ANGIOPLASTY, STENT;  Surgeon: Gabi Roger MD;  [facility];  Service: Vascular    TN VEIN BYPASS GRAFT,FEM-POP Left 3/22/2019    Procedure: BYPASS FEMORAL-POPLITEAL WITH COMPLETION ARTERIOGRAM;  Surgeon: Gabi Roger MD;  [facility];  Service: Vascular    VASCULAR SURGERY      stent placement    WOUND DEBRIDEMENT Left 4/15/2019    Procedure: DEBRIDEMENT WOUND AND DRESSING CHANGE ([facility]) left groin with VAC;  Surgeon: Ivana Cortez DO;  [facility]: [facility];  Service: Vascular       Current Outpatient Medications:     acetaminophen (TYLENOL) 325 mg tablet, Take 2 tablets (650 mg total) by mouth every 6 (six) hours as needed for mild pain (Patient taking differently: Take 500 mg by mouth every 8 (eight) hours as needed for mild pain Take 1,000 mg every 8 hours PRN), Disp: 30 tablet, Rfl: 0    ammonium lactate (LAC-HYDRIN) 12 % lotion, Apply topically 2 (two) times a day as needed for dry skin Apply to dry skin between the toes in the AM & in the PM, Disp: 400 g, Rfl: 5    ascorbic acid (VITAMIN C) 250 mg tablet, Take 250 mg by mouth daily, Disp: , Rfl:     aspirin 81 MG tablet, Take 81 mg by mouth daily  , Disp: , Rfl:     cholecalciferol (VITAMIN D3) 1,000 units tablet, Take 1,000 Units by mouth daily , Disp: , Rfl:     clopidogrel (PLAVIX) 75 mg tablet, Take 1 tablet (75 mg total) by mouth daily, Disp: 90 tablet, Rfl: 3    Coenzyme Q10 (COQ-10) 200 MG CAPS, Take 200 mg by mouth daily, Disp: , Rfl:     docusate sodium (COLACE) 50 mg capsule, Take by mouth 2 (two) times a day as needed , Disp: , Rfl:     gabapentin (NEURONTIN) 300 mg capsule, TAKE ONE CAPSULE BY MOUTH THREE TIMES A DAY, Disp: 270 capsule, Rfl: 1    Glucagon (Baqsimi One Pack) 3 MG/DOSE POWD, 1 Dose into each nostril as needed, Disp: , Rfl:     Icosapent Ethyl (Vascepa) 1 g CAPS, Take 1 capsule (1 g total) by mouth 2 (two) times a day, Disp: 180 capsule, Rfl: 3    insulin glargine (Lantus) 100 units/mL subcutaneous injection, Inject under the skin 40 units am- 20 units pm, Disp: , Rfl:     insulin lispro (HUMALOG) 100 units/mL injection, Inject 3 units with breakfast, 6 units at lunch, and 6 units at dinner (Patient taking differently: 4 (four) times a day Inject 3 units with breakfast, 6 units at lunch, and 6 units at dinner Carbs counting), Disp: 10 mL, Rfl: 1    Lactobacillus Rhamnosus, GG, (CULTURELLE) CAPS, Take 1 capsule by mouth daily , Disp: , Rfl:     levothyroxine 200 mcg tablet, Take 200 mcg by mouth daily, Disp: , Rfl: 0    Multiple Vitamins-Minerals (CENTRUM MEN) TABS, Take by mouth Take one tablet in the morning, Disp: , Rfl:     nitroglycerin (NITROSTAT) 0 4 mg SL tablet, Place 1 tablet (0 4 mg total) under the tongue every 5 (five) minutes as needed for chest pain, Disp: 25 tablet, Rfl: 3    polyethylene glycol (MIRALAX) 17 g packet, Take 17 g by mouth daily, Disp: , Rfl:     rosuvastatin (CRESTOR) 20 MG tablet, Take 1 tablet (20 mg total) by mouth daily, Disp: 90 tablet, Rfl: 3    SF 1 1 % GEL, BRUCH ONCE DAILY AT BEDTIME, BRUSH OF 1 MINUTE AS DIRECTED, Disp: , Rfl:     torsemide (DEMADEX) 10 mg tablet, Take 10 mg every other day, Disp: 90 tablet, Rfl: 0    ULTICARE INSULIN SYRINGE 30G X 1/2" 1 ML MISC, Use as directed, Disp: , Rfl: 0    Wound Dressings (Medihoney Wound/Burn Dressing) GEL, Use as directed, Disp: 1 Tube, Rfl: 5    budesonide-formoterol (SYMBICORT) 160-4 5 mcg/act inhaler, Inhale 2 puffs 2 (two) times a day Rinse mouth after use  (Patient not taking: Reported on 2/22/2021), Disp: 1 Inhaler, Rfl: 1    Lactobacillus Rhamnosus, GG, (CULTURELLE PO), Take by mouth, Disp: , Rfl:     levalbuterol (XOPENEX HFA) 45 mcg/act inhaler, Inhale 1-2 puffs every 4 (four) hours as needed for wheezing (Patient not taking: Reported on 2/24/2021), Disp: 1 Inhaler, Rfl: 6  Allergies   Allergen Reactions    Doxazosin Myalgia     UNKNOWN  UNKNOWN  Muscle cramps    Iohexol      UNKNOWN    Lisinopril Cough     cough  Other reaction(s): CAMELLA SINENSIS (ZESTRIL) (cough)  cough    Vancomycin Hives    Adhesive [Medical Tape]      Skin Breakdown    Cardura [Doxazosin Mesylate]      Muscle cramps    Other      Iv dye for cardiac cath  Renal failure very close to dialysis  But no dialysis  Iv dye for cardiac cath  Renal failure very close to dialysis  But no dialysis  Iv dye for cardiac cath  Renal failure very close to dialysis  But no dialysis       Labs:  Lab on 02/17/2021   Component Date Value    WBC 02/17/2021 5 37     RBC 02/17/2021 4 10     Hemoglobin 02/17/2021 13 4     Hematocrit 02/17/2021 41 3     MCV 02/17/2021 101*    MCH 02/17/2021 32 7     MCHC 02/17/2021 32 4     RDW 02/17/2021 13 2     MPV 02/17/2021 12 4     Platelets 54/99/9434 111*    nRBC 02/17/2021 0     Neutrophils Relative 02/17/2021 48     Immat GRANS % 02/17/2021 0     Lymphocytes Relative 02/17/2021 34     Monocytes Relative 02/17/2021 14*    Eosinophils Relative 02/17/2021 4     Basophils Relative 02/17/2021 0     Neutrophils Absolute 02/17/2021 2 53     Immature Grans Absolute 02/17/2021 0 02     Lymphocytes Absolute 02/17/2021 1 84     Monocytes Absolute 02/17/2021 0 74     Eosinophils Absolute 02/17/2021 0 22     Basophils Absolute 02/17/2021 0 02     Magnesium 02/17/2021 2 6     PTH 02/17/2021 38 3     Uric Acid 02/17/2021 7 2     Sodium 02/17/2021 142     Potassium 02/17/2021 4 4     Chloride 02/17/2021 110*    CO2 02/17/2021 27     ANION GAP 02/17/2021 5     BUN 02/17/2021 31*    Creatinine 02/17/2021 1 67*    Glucose, Fasting 02/17/2021 174*    Calcium 02/17/2021 9 6     AST 02/17/2021 21     ALT 02/17/2021 23     Alkaline Phosphatase 02/17/2021 100     Total Protein 02/17/2021 7 5     Albumin 02/17/2021 3 6     Total Bilirubin 02/17/2021 0 72     eGFR 02/17/2021 39     Hemoglobin A1C 02/17/2021 6 8*    EAG 02/17/2021 148     Cholesterol 02/17/2021 144     Triglycerides 02/17/2021 165*    HDL, Direct 02/17/2021 46     LDL Calculated 02/17/2021 65     TSH 3RD GENERATON 02/17/2021 4 970*    Phosphorus 02/17/2021 3 8    Telemedicine on 01/26/2021   Component Date Value    Creatinine, Ur 02/17/2021 184 0     Protein Urine Random 02/17/2021 88     Prot/Creat Ratio, Ur 02/17/2021 0 48*    Color, UA 02/17/2021 Yellow     Clarity, UA 02/17/2021 Clear     Specific Gravity, UA 02/17/2021 1 021     pH, UA 02/17/2021 6 0     Leukocytes, UA 02/17/2021 Negative     Nitrite, UA 02/17/2021 Negative     Protein, UA 02/17/2021 100 (2+)*    Glucose, UA 02/17/2021 Negative     Ketones, UA 02/17/2021 Negative     Urobilinogen, UA 02/17/2021 0 2     Bilirubin, UA 02/17/2021 Negative     Blood, UA 02/17/2021 Negative     RBC, UA 02/17/2021 None Seen     WBC, UA 02/17/2021 None Seen     Epithelial Cells 02/17/2021 None Seen     Bacteria, UA 02/17/2021 None Seen     Hyaline Casts, UA 02/17/2021 None Seen    Lab on 01/19/2021   Component Date Value    Sodium 01/19/2021 140     Potassium 01/19/2021 4 5     Chloride 01/19/2021 107     CO2 01/19/2021 27     ANION GAP 01/19/2021 6     BUN 01/19/2021 26*    Creatinine 01/19/2021 1 65*    Glucose, Fasting 01/19/2021 230*    Calcium 01/19/2021 9 8     eGFR 01/19/2021 40      Imaging: No results found  Review of Systems:  Review of Systems   Constitutional: Positive for fatigue  Respiratory: Positive for shortness of breath  Cardiovascular: Positive for chest pain  Musculoskeletal: Positive for arthralgias  All other systems reviewed and are negative  Physical Exam:  Physical Exam  Vitals signs reviewed  Constitutional:       Appearance: He is obese  Cardiovascular:      Rate and Rhythm: Regular rhythm  Tachycardia present  Heart sounds: Normal heart sounds  Comments: ectopoic beat  Pulmonary:      Effort: Tachypnea present  Breath sounds: Normal breath sounds  Abdominal:      General: Bowel sounds are normal       Palpations: Abdomen is soft  Musculoskeletal: Normal range of motion  Right lower leg: No edema  Left lower leg: No edema  Skin:     General: Skin is warm and dry  Capillary Refill: Capillary refill takes less than 2 seconds  Neurological:      General: No focal deficit present  Mental Status: He is alert and oriented to person, place, and time  Psychiatric:         Mood and Affect: Mood normal          Discussion/Summary:  1  Chest pain with worsening shortness of breath  CP relieved with SL NTG  Possible progression of CAD,   BPM and /70  He will not tolerate addition of BB, I have discussed further testing  Lola Mcclendon does not want further testing at this time  I have added Imdur 30mg daily  He will continue with home BP monitoring and keep a log  He would not be a good candidate for [facility] given his CKD III creat 1 67     2  Hyperlipidemia 2/17/21 , , HDL 46, LDL 65   3  Chronic systolic heart failure NYHA class III stage C- On PE eu volemic and compensated,  BPM, /70  Not tolerating BB, ACE or ARB due to CKD III  Torsemide 10mg daily change to PRN per nephrology  4  ICM LVEF 40%, unable to tolerate GDMT  5  CKD III baseline creat 1 65 - 1 77- creat 1 67, followed by Nephrology   6   ITZEL complaint with CPAP at night

## 2021-02-26 LAB
LEFT EYE DIABETIC RETINOPATHY: NORMAL
RIGHT EYE DIABETIC RETINOPATHY: NORMAL

## 2021-02-27 PROBLEM — R06.09 DYSPNEA ON EXERTION: Status: RESOLVED | Noted: 2018-01-24 | Resolved: 2021-02-27

## 2021-02-27 PROBLEM — J43.2 CENTRILOBULAR EMPHYSEMA (HCC): Chronic | Status: ACTIVE | Noted: 2018-01-30

## 2021-02-27 PROBLEM — I73.9 PERIPHERAL VASCULAR DISEASE (HCC): Status: RESOLVED | Noted: 2020-01-09 | Resolved: 2021-02-27

## 2021-02-27 PROBLEM — I25.5 ISCHEMIC CARDIOMYOPATHY: Status: RESOLVED | Noted: 2019-01-30 | Resolved: 2021-02-27

## 2021-02-27 PROBLEM — R06.00 DYSPNEA ON EXERTION: Status: RESOLVED | Noted: 2018-01-24 | Resolved: 2021-02-27

## 2021-02-27 PROBLEM — I21.4 NSTEMI (NON-ST ELEVATED MYOCARDIAL INFARCTION) (HCC): Status: RESOLVED | Noted: 2019-03-23 | Resolved: 2021-02-27

## 2021-02-27 PROCEDURE — 93000 ELECTROCARDIOGRAM COMPLETE: CPT | Performed by: FAMILY MEDICINE

## 2021-02-27 NOTE — ASSESSMENT & PLAN NOTE
Wt Readings from Last 3 Encounters:   02/24/21 97 9 kg (215 lb 12 8 oz)   02/22/21 99 9 kg (220 lb 4 oz)   10/26/20 99 kg (218 lb 4 oz)   ·   Patient denies symptoms of leg edema or unusual dyspnea  · He appears to be euvolemic on exam today      · Continue torsemide 10 mg every other day  · Patient will follow-up with Cardiology on an urgent basis due to recent recurrence of chest pain on exertion

## 2021-02-27 NOTE — ASSESSMENT & PLAN NOTE
Lab Results   Component Value Date    EGFR 39 02/17/2021    EGFR 40 01/19/2021    EGFR 36 11/17/2020    CREATININE 1 67 (H) 02/17/2021    CREATININE 1 65 (H) 01/19/2021    CREATININE 1 82 (H) 11/17/2020   · Blood pressure is on the lower side of normal, GFR/ creatinine are stable  · Patient is currently not on any medications for hypertension      · Patient remains on low-dose of torsemide 10 mg every other day

## 2021-02-27 NOTE — ASSESSMENT & PLAN NOTE
Lab Results   Component Value Date    HGBA1C 6 8 (H) 02/17/2021   ·  good glycemic control  · Patient remains on gabapentin      · Chronic unchanged symptoms of neuropathy  ·  foot exam performed today

## 2021-02-27 NOTE — ASSESSMENT & PLAN NOTE
Lab Results   Component Value Date    HGBA1C 6 8 (H) 02/17/2021   ·  good glycemic control      · Patient is under care of Endocrinology

## 2021-02-27 NOTE — ASSESSMENT & PLAN NOTE
·  Patient is under care of [facility]'s pulmonology  · He has tried numerous inhalers without improvement of chronic dyspnea on exertion

## 2021-02-27 NOTE — ASSESSMENT & PLAN NOTE
·  Recent TSH is elevated  · Patient is complaining of recurrent symptoms of fatigue  · Will discuss gradual titration of Synthroid dose pending evaluation with [facility] due to recent onset of chest pain      · Patient remains on levothyroxine 200 mcg daily and is following up with endocrinology, Dr Price Dear

## 2021-02-27 NOTE — ASSESSMENT & PLAN NOTE
·  Patient reports recent recurrence of chest pains on exertion ( taking stairs) within past 1-2 weeks  ·  patient is chest pain-free today  EKG performed in the office reveals no acute ischemic changes  I will coordinate ASAP follow-up with [facility]  Patient will use nitroglycerin if needed and will proceed to emergency room if chest pain recurs persists or worsens

## 2021-03-09 ENCOUNTER — OFFICE VISIT (OUTPATIENT)
Dept: PULMONOLOGY | Facility: CLINIC | Age: 76
End: 2021-03-09
Payer: MEDICARE

## 2021-03-09 VITALS
HEIGHT: 67 IN | TEMPERATURE: 96.4 F | DIASTOLIC BLOOD PRESSURE: 60 MMHG | RESPIRATION RATE: 18 BRPM | BODY MASS INDEX: 34.69 KG/M2 | WEIGHT: 221 LBS | HEART RATE: 93 BPM | SYSTOLIC BLOOD PRESSURE: 122 MMHG | OXYGEN SATURATION: 94 %

## 2021-03-09 DIAGNOSIS — J43.2 CENTRILOBULAR EMPHYSEMA (HCC): Primary | Chronic | ICD-10-CM

## 2021-03-09 DIAGNOSIS — I50.22 CHRONIC SYSTOLIC CONGESTIVE HEART FAILURE (HCC): ICD-10-CM

## 2021-03-09 DIAGNOSIS — J98.4 RESTRICTIVE LUNG DISEASE: ICD-10-CM

## 2021-03-09 DIAGNOSIS — G47.33 OBSTRUCTIVE SLEEP APNEA OF ADULT: ICD-10-CM

## 2021-03-09 PROCEDURE — 99214 OFFICE O/P EST MOD 30 MIN: CPT | Performed by: INTERNAL MEDICINE

## 2021-03-09 NOTE — PROGRESS NOTES
Progress note - Pulmonary Medicine   Jamaal Correa 76 y o  male MRN: [id number]       Impression & Plan:     Centrilobular emphysema [facility])  ·   CT imaging September 2020 with upper lobe predominant emphysema that is moderate  ·   Continue current therapy   · Has not benefited from the use of Symbicort or other inhalers  · Has albuterol if needed  He has never been bronchodilator responsive  · Was briefly in pulmonary rehab previously and did not feel that it was beneficial    Obstructive sleep apnea of adult  ·   He has been using his CPAP nightly and feels like he is sleeping well   · He has an AutoSet device but is set at 12 cm of water  He does feel occasionally that there is not enough pressure currently  Previously was intolerant to higher  Pressures  ·  he has been very upset with his [employer] company and has requested to switch to [facility]   He is at his 5 year contract completion for [employer]  I have reordered a new CPAP machine for him to go to [facility]     · Given that he still had a high AHI, I suggested that we transition his AutoSet to 8-14  We will eliminate C flex  ·   He will need close follow-up after the adjustments  For a compliance check    Restrictive lung disease  ·  Total lung capacity 50% predicted  · He is obese and has a weight related contribution to his shortness of breath  · Diet and weight loss encouraged but he has very limited exercise tolerance    Has recently gained  A small amount of weight with the elimination of his torsemide    Chronic systolic congestive heart failure (HCC)  Wt Readings from Last 3 Encounters:   03/09/21 100 kg (221 lb)   02/24/21 97 9 kg (215 lb 12 8 oz)   02/22/21 99 9 kg (220 lb 4 oz)     ·  followed by Dr Sindy Carlson   · Will need close follow-up with his elimination of torsemide   · Did not tolerate isosorbide and discontinued this therapy  · Has had no further episodes of chest pain       I will see Ce Ross back in 3 months for follow-up of his CPAP  ______________________________________________________________________    HPI:    Darline Anderson presents today for follow-up of  Obstructive sleep apnea, and multifocal atrial shortness of breath  He has combined obstructive and restrictive lung disease with emphysema on CT imaging  He also has chronic congestive heart failure and vascular disease the contribute to his shortness of breath  He has gained a substantial amount of weight over the last several years and has had increasing shortness of breath, decreased exercise tolerance and significant frustration with his respiratory symptomatology  he reports that he has been doing well with his CPAP  His compliance reports indicate excellent compliance  He has also gotten more than 8 hours of sleep nightly on therapy  His report suggests that he does still have a fairly high AHI and possibly some snoring through the therapy  He still reports substantial daytime fatigue but attributes this to his other medical issues  Recently he has had a borderline high TSH  He is followed by endocrinology  They elected to follow this in a short interval   They are hesitant to increase his thyroid medication  From a cardiac perspective, he is followed by Dr Kathy Romero  He recently had complained of some episodes of atypical chest pain  He was placed on isosorbide  In the past he did have a rash with this  Recently he had intolerance again to this medication and discontinued it  Due to borderline low blood pressures, institution of isosorbide therapy, and chronic kidney disease, his torsemide was held at the time of institution of isosorbide  He has not reinstituted this therapy  He reports that recently he has gained a couple of lb      Current Medications:    Current Outpatient Medications:     ammonium lactate (LAC-HYDRIN) 12 % lotion, Apply topically 2 (two) times a day as needed for dry skin Apply to dry skin between the toes in the AM & in the PM, Disp: 400 g, Rfl: 5    ascorbic acid (VITAMIN C) 250 mg tablet, Take 250 mg by mouth daily, Disp: , Rfl:     aspirin 81 MG tablet, Take 81 mg by mouth daily  , Disp: , Rfl:     cholecalciferol (VITAMIN D3) 1,000 units tablet, Take 1,000 Units by mouth daily , Disp: , Rfl:     clopidogrel (PLAVIX) 75 mg tablet, Take 1 tablet (75 mg total) by mouth daily, Disp: 90 tablet, Rfl: 3    Coenzyme Q10 (COQ-10) 200 MG CAPS, Take 200 mg by mouth daily, Disp: , Rfl:     docusate sodium (COLACE) 50 mg capsule, Take by mouth 2 (two) times a day as needed , Disp: , Rfl:     gabapentin (NEURONTIN) 300 mg capsule, TAKE ONE CAPSULE BY MOUTH THREE TIMES A DAY, Disp: 270 capsule, Rfl: 1    Glucagon (Baqsimi One Pack) 3 MG/DOSE POWD, 1 Dose into each nostril as needed, Disp: , Rfl:     Icosapent Ethyl (Vascepa) 1 g CAPS, Take 1 capsule (1 g total) by mouth 2 (two) times a day, Disp: 180 capsule, Rfl: 3    insulin glargine (Lantus) 100 units/mL subcutaneous injection, Inject under the skin 40 units am- 20 units pm, Disp: , Rfl:     insulin lispro (HUMALOG) 100 units/mL injection, Inject 3 units with breakfast, 6 units at lunch, and 6 units at dinner (Patient taking differently: 4 (four) times a day Inject 3 units with breakfast, 6 units at lunch, and 6 units at dinner Carbs counting), Disp: 10 mL, Rfl: 1    Lactobacillus Rhamnosus, GG, (CULTURELLE) CAPS, Take 1 capsule by mouth daily , Disp: , Rfl:     levothyroxine 200 mcg tablet, Take 200 mcg by mouth daily, Disp: , Rfl: 0    Multiple Vitamins-Minerals (CENTRUM MEN) TABS, Take by mouth Take one tablet in the morning, Disp: , Rfl:     nitroglycerin (NITROSTAT) 0 4 mg SL tablet, Place 1 tablet (0 4 mg total) under the tongue every 5 (five) minutes as needed for chest pain, Disp: 25 tablet, Rfl: 3    polyethylene glycol (MIRALAX) 17 g packet, Take 17 g by mouth daily, Disp: , Rfl:     rosuvastatin (CRESTOR) 20 MG tablet, Take 1 tablet (20 mg total) by mouth daily, Disp: 90 tablet, Rfl: 3    ULTICARE INSULIN SYRINGE 30G X 1/2" 1 ML MISC, Use as directed, Disp: , Rfl: 0    Lactobacillus Rhamnosus, GG, (CULTURELLE PO), Take by mouth, Disp: , Rfl:     SF 1 1 % GEL, BRUCH ONCE DAILY AT BEDTIME, BRUSH OF 1 MINUTE AS DIRECTED, Disp: , Rfl:     torsemide (DEMADEX) 10 mg tablet, Take 10 mg every other day (Patient not taking: Reported on 3/9/2021), Disp: 90 tablet, Rfl: 0    Review of Systems:  Aside from what is mentioned in the HPI, the review of systems is otherwise negative    Past medical history, surgical history, and family history were reviewed and updated as appropriate    Social history updates:  Social History     Tobacco Use   Smoking Status Former Smoker    Packs/day: 4 00    Years: 47 00    Pack years: 188 00    Types: Cigarettes    Start date:     Quit date:     Years since quittin 1   Smokeless Tobacco Never Used       PhysicalExamination:  Vitals:   /60   Pulse 93   Temp (!) 96 4 °F (35 8 °C)   Resp 18   Ht 5' 7" (1 702 m)   Wt 100 kg (221 lb)   SpO2 94%   BMI 34 61 kg/m²     Gen:    Morbidly Obese, comfortable  At rest on room air  HEENT: PERRL  Oropharynx exam deferred due to COVID-19 and face mask  Neck: Stout  I do not appreciate any JVD or lymphadenopathy  Trachea is midline  No thyromegaly  Chest: Breath sounds are distant but clear to auscultation  There are no wheezes, rales or rhonchi  Cardiac: Regular rate and rhythm  There are no murmurs, rubs or gallops  Abdomen: Obese  Soft and nontender  Extremities: No clubbing, cyanosis  Trace peripheral edema  Neurologic: No focal deficits  Skin: No appreciable rashes  Diagnostic Data:  Labs:   I personally reviewed the most recent laboratory data pertinent to today's visit    Lab Results   Component Value Date    WBC 5 37 2021    HGB 13 4 2021    HCT 41 3 2021     (H) 2021     (L) 2021     Lab Results   Component Value Date    SODIUM 142 02/17/2021    K 4 4 02/17/2021    CO2 27 02/17/2021     (H) 02/17/2021    BUN 31 (H) 02/17/2021    CREATININE 1 67 (H) 02/17/2021    CALCIUM 9 6 02/17/2021       PFT results: The most recent pulmonary function tests were reviewed  last testing August 2020  Moderate restriction on spirometry  Total lung capacity 50% predicted  DLCO 40% predicted  Imaging:  I personally reviewed the images on the [facility] system pertinent to today's visit    Last CT imaging August 2020   High-resolution CT scan of the chest with moderate upper lobe emphysema   He does have some reticular scarring at the bases bilaterally, slightly greater on the right and left        Javier Mortensen MD

## 2021-03-09 NOTE — LETTER
March 9, 2021     Leo Donald, [address]  [address] [address]    Patient: Fahad East   YOB: 1945   Date of Visit: 3/9/2021       Dear Dr Gerard Rosa: Thank you for referring Cristiane Kang to me for evaluation  Below are my notes for this consultation  If you have questions, please do not hesitate to call me  I look forward to following your patient along with you  Sincerely,        Trixie Delgado MD        CC: No Recipients  Trixie Delgado MD  3/9/2021  5:02 PM  Sign when Signing Visit    Progress note - Pulmonary Medicine   Fahad East 76 y o  male MRN: [id number]       Impression & Plan:     Centrilobular emphysema [facility])  ·   CT imaging September 2020 with upper lobe predominant emphysema that is moderate  ·   Continue current therapy   · Has not benefited from the use of Symbicort or other inhalers  · Has albuterol if needed  He has never been bronchodilator responsive  · Was briefly in pulmonary rehab previously and did not feel that it was beneficial    Obstructive sleep apnea of adult  ·   He has been using his CPAP nightly and feels like he is sleeping well   · He has an AutoSet device but is set at 12 cm of water  He does feel occasionally that there is not enough pressure currently  Previously was intolerant to higher  Pressures  ·  he has been very upset with his [employer] company and has requested to switch to [facility]   He is at his 5 year contract completion for [employer]  I have reordered a new CPAP machine for him to go to [facility]     · Given that he still had a high AHI, I suggested that we transition his AutoSet to 8-14  We will eliminate C flex  ·   He will need close follow-up after the adjustments  For a compliance check    Restrictive lung disease  ·  Total lung capacity 50% predicted  · He is obese and has a weight related contribution to his shortness of breath    · Diet and weight loss encouraged but he has very limited exercise tolerance  Has recently gained  A small amount of weight with the elimination of his torsemide    Chronic systolic congestive heart failure (HCC)  Wt Readings from Last 3 Encounters:   03/09/21 100 kg (221 lb)   02/24/21 97 9 kg (215 lb 12 8 oz)   02/22/21 99 9 kg (220 lb 4 oz)     ·  followed by Dr Julee Garcia   · Will need close follow-up with his elimination of torsemide   · Did not tolerate isosorbide and discontinued this therapy  · Has had no further episodes of chest pain       I will see Maribel Alegria back in 3 months for follow-up of his CPAP  ______________________________________________________________________    HPI:    Tristian Romero presents today for follow-up of  Obstructive sleep apnea, and multifocal atrial shortness of breath  He has combined obstructive and restrictive lung disease with emphysema on CT imaging  He also has chronic congestive heart failure and vascular disease the contribute to his shortness of breath  He has gained a substantial amount of weight over the last several years and has had increasing shortness of breath, decreased exercise tolerance and significant frustration with his respiratory symptomatology  he reports that he has been doing well with his CPAP  His compliance reports indicate excellent compliance  He has also gotten more than 8 hours of sleep nightly on therapy  His report suggests that he does still have a fairly high AHI and possibly some snoring through the therapy  He still reports substantial daytime fatigue but attributes this to his other medical issues  Recently he has had a borderline high TSH  He is followed by endocrinology  They elected to follow this in a short interval   They are hesitant to increase his thyroid medication  From a cardiac perspective, he is followed by Dr Julee Garcia  He recently had complained of some episodes of atypical chest pain  He was placed on isosorbide    In the past he did have a rash with this   Recently he had intolerance again to this medication and discontinued it  Due to borderline low blood pressures, institution of isosorbide therapy, and chronic kidney disease, his torsemide was held at the time of institution of isosorbide  He has not reinstituted this therapy  He reports that recently he has gained a couple of lb  Current Medications:    Current Outpatient Medications:     ammonium lactate (LAC-HYDRIN) 12 % lotion, Apply topically 2 (two) times a day as needed for dry skin Apply to dry skin between the toes in the AM & in the PM, Disp: 400 g, Rfl: 5    ascorbic acid (VITAMIN C) 250 mg tablet, Take 250 mg by mouth daily, Disp: , Rfl:     aspirin 81 MG tablet, Take 81 mg by mouth daily  , Disp: , Rfl:     cholecalciferol (VITAMIN D3) 1,000 units tablet, Take 1,000 Units by mouth daily , Disp: , Rfl:     clopidogrel (PLAVIX) 75 mg tablet, Take 1 tablet (75 mg total) by mouth daily, Disp: 90 tablet, Rfl: 3    Coenzyme Q10 (COQ-10) 200 MG CAPS, Take 200 mg by mouth daily, Disp: , Rfl:     docusate sodium (COLACE) 50 mg capsule, Take by mouth 2 (two) times a day as needed , Disp: , Rfl:     gabapentin (NEURONTIN) 300 mg capsule, TAKE ONE CAPSULE BY MOUTH THREE TIMES A DAY, Disp: 270 capsule, Rfl: 1    Glucagon (Baqsimi One Pack) 3 MG/DOSE POWD, 1 Dose into each nostril as needed, Disp: , Rfl:     Icosapent Ethyl (Vascepa) 1 g CAPS, Take 1 capsule (1 g total) by mouth 2 (two) times a day, Disp: 180 capsule, Rfl: 3    insulin glargine (Lantus) 100 units/mL subcutaneous injection, Inject under the skin 40 units am- 20 units pm, Disp: , Rfl:     insulin lispro (HUMALOG) 100 units/mL injection, Inject 3 units with breakfast, 6 units at lunch, and 6 units at dinner (Patient taking differently: 4 (four) times a day Inject 3 units with breakfast, 6 units at lunch, and 6 units at dinner Carbs counting), Disp: 10 mL, Rfl: 1    Lactobacillus Rhamnosus, GG, (CULTURELLE) CAPS, Take 1 capsule by mouth daily , Disp: , Rfl:     levothyroxine 200 mcg tablet, Take 200 mcg by mouth daily, Disp: , Rfl: 0    Multiple Vitamins-Minerals (CENTRUM MEN) TABS, Take by mouth Take one tablet in the morning, Disp: , Rfl:     nitroglycerin (NITROSTAT) 0 4 mg SL tablet, Place 1 tablet (0 4 mg total) under the tongue every 5 (five) minutes as needed for chest pain, Disp: 25 tablet, Rfl: 3    polyethylene glycol (MIRALAX) 17 g packet, Take 17 g by mouth daily, Disp: , Rfl:     rosuvastatin (CRESTOR) 20 MG tablet, Take 1 tablet (20 mg total) by mouth daily, Disp: 90 tablet, Rfl: 3    ULTICARE INSULIN SYRINGE 30G X 1/2" 1 ML MISC, Use as directed, Disp: , Rfl: 0    Lactobacillus Rhamnosus, GG, (CULTURELLE PO), Take by mouth, Disp: , Rfl:     SF 1 1 % GEL, BRUCH ONCE DAILY AT BEDTIME, BRUSH OF 1 MINUTE AS DIRECTED, Disp: , Rfl:     torsemide (DEMADEX) 10 mg tablet, Take 10 mg every other day (Patient not taking: Reported on 3/9/2021), Disp: 90 tablet, Rfl: 0    Review of Systems:  Aside from what is mentioned in the HPI, the review of systems is otherwise negative    Past medical history, surgical history, and family history were reviewed and updated as appropriate    Social history updates:  Social History     Tobacco Use   Smoking Status Former Smoker    Packs/day: 4 00    Years: 47 00    Pack years: 188 00    Types: Cigarettes    Start date:     Quit date:     Years since quittin 1   Smokeless Tobacco Never Used       PhysicalExamination:  Vitals:   /60   Pulse 93   Temp (!) 96 4 °F (35 8 °C)   Resp 18   Ht 5' 7" (1 702 m)   Wt 100 kg (221 lb)   SpO2 94%   BMI 34 61 kg/m²     Gen:    Morbidly Obese, comfortable  At rest on room air  HEENT: PERRL  Oropharynx exam deferred due to COVID-19 and face mask  Neck: Stout  I do not appreciate any JVD or lymphadenopathy  Trachea is midline  No thyromegaly  Chest: Breath sounds are distant but clear to auscultation   There are no wheezes, rales or rhonchi  Cardiac: Regular rate and rhythm  There are no murmurs, rubs or gallops  Abdomen: Obese  Soft and nontender  Extremities: No clubbing, cyanosis  Trace peripheral edema  Neurologic: No focal deficits  Skin: No appreciable rashes  Diagnostic Data:  Labs: I personally reviewed the most recent laboratory data pertinent to today's visit    Lab Results   Component Value Date    WBC 5 37 02/17/2021    HGB 13 4 02/17/2021    HCT 41 3 02/17/2021     (H) 02/17/2021     (L) 02/17/2021     Lab Results   Component Value Date    SODIUM 142 02/17/2021    K 4 4 02/17/2021    CO2 27 02/17/2021     (H) 02/17/2021    BUN 31 (H) 02/17/2021    CREATININE 1 67 (H) 02/17/2021    CALCIUM 9 6 02/17/2021       PFT results: The most recent pulmonary function tests were reviewed  last testing August 2020  Moderate restriction on spirometry  Total lung capacity 50% predicted  DLCO 40% predicted  Imaging:  I personally reviewed the images on the [facility] system pertinent to today's visit    Last CT imaging August 2020   High-resolution CT scan of the chest with moderate upper lobe emphysema   He does have some reticular scarring at the bases bilaterally, slightly greater on the right and left        Daryle Mose, MD

## 2021-03-09 NOTE — ASSESSMENT & PLAN NOTE
Wt Readings from Last 3 Encounters:   03/09/21 100 kg (221 lb)   02/24/21 97 9 kg (215 lb 12 8 oz)   02/22/21 99 9 kg (220 lb 4 oz)     ·  followed by Dr Wendie Layton   · Will need close follow-up with his elimination of torsemide   · Did not tolerate isosorbide and discontinued this therapy  · Has had no further episodes of chest pain

## 2021-03-09 NOTE — ASSESSMENT & PLAN NOTE
·   CT imaging September 2020 with upper lobe predominant emphysema that is moderate  ·   Continue current therapy   · Has not benefited from the use of Symbicort or other inhalers  · Has albuterol if needed  He has never been bronchodilator responsive    · Was briefly in pulmonary rehab previously and did not feel that it was beneficial

## 2021-03-09 NOTE — ASSESSMENT & PLAN NOTE
·   He has been using his CPAP nightly and feels like he is sleeping well   · He has an AutoSet device but is set at 12 cm of water  He does feel occasionally that there is not enough pressure currently  Previously was intolerant to higher  Pressures  ·  he has been very upset with his [employer] company and has requested to switch to [facility]   He is at his 5 year contract completion for [employer]  I have reordered a new CPAP machine for him to go to [facility]     · Given that he still had a high AHI, I suggested that we transition his AutoSet to 8-14  We will eliminate C flex    ·   He will need close follow-up after the adjustments  For a compliance check

## 2021-03-09 NOTE — ASSESSMENT & PLAN NOTE
·  Total lung capacity 50% predicted  · He is obese and has a weight related contribution to his shortness of breath  · Diet and weight loss encouraged but he has very limited exercise tolerance    Has recently gained  A small amount of weight with the elimination of his torsemide

## 2021-03-10 ENCOUNTER — DOCUMENTATION (OUTPATIENT)
Dept: PULMONOLOGY | Facility: CLINIC | Age: 76
End: 2021-03-10

## 2021-03-10 NOTE — PROGRESS NOTES
New order for pap faxed to [facility] [phone]  Office notes demographics and sleep study sent as well

## 2021-03-11 ENCOUNTER — OFFICE VISIT (OUTPATIENT)
Dept: CARDIOLOGY CLINIC | Facility: CLINIC | Age: 76
End: 2021-03-11
Payer: MEDICARE

## 2021-03-11 VITALS
WEIGHT: 219 LBS | DIASTOLIC BLOOD PRESSURE: 82 MMHG | HEART RATE: 76 BPM | SYSTOLIC BLOOD PRESSURE: 120 MMHG | HEIGHT: 67 IN | OXYGEN SATURATION: 96 % | BODY MASS INDEX: 34.37 KG/M2

## 2021-03-11 DIAGNOSIS — E78.00 PURE HYPERCHOLESTEROLEMIA: Primary | ICD-10-CM

## 2021-03-11 DIAGNOSIS — I50.22 CHRONIC SYSTOLIC CONGESTIVE HEART FAILURE (HCC): ICD-10-CM

## 2021-03-11 DIAGNOSIS — I10 ESSENTIAL (PRIMARY) HYPERTENSION: ICD-10-CM

## 2021-03-11 DIAGNOSIS — I73.9 PVD (PERIPHERAL VASCULAR DISEASE) (HCC): ICD-10-CM

## 2021-03-11 DIAGNOSIS — I25.10 CORONARY ARTERY DISEASE INVOLVING NATIVE CORONARY ARTERY OF NATIVE HEART WITHOUT ANGINA PECTORIS: ICD-10-CM

## 2021-03-11 PROCEDURE — 99214 OFFICE O/P EST MOD 30 MIN: CPT | Performed by: INTERNAL MEDICINE

## 2021-03-11 NOTE — PROGRESS NOTES
Cardiology Follow Up    Dasha Bandar  1945  [id number]  [facility] KENDRA [id number] [facility]  [phone]    1  Pure hypercholesterolemia  Basic metabolic panel   2  Essential (primary) hypertension  Basic metabolic panel   3  PVD (peripheral vascular disease) (HCC)  Basic metabolic panel   4  Chronic systolic congestive heart failure (HCC)  Basic metabolic panel   5  Coronary artery disease involving native coronary artery of native heart without angina pectoris  Basic metabolic panel       Interval History:  Patient is here for a follow-up visit  Li Stahl was hospitalized March 2019 for PVD with left femoral endarterectomy with bovine patch and left femoral to above the knee bypass with left external iliac artery stent by Dr Wilder Wetzel March 2019  Li Stahl did have intermittent angina during that postoperative period  Thereafter he required debridement in reference to a left groin infection 4/15/19   Patient has known history of CAD with remote cardiac catheterization and intervention performed at [facility] with placement of 4 stents in 2015  He had a second cardiac catheterization October 2018 with re-stenosis of the LAD and stent placement in [place]   A 50% left circumflex lesion was also noted   Patient had an echocardiogram performed March 2019 which demonstrated a mild reduction in LV systolic function in the setting of a mid apical anterior inferior and inferolateral wall motion abnormality   The resting left ventricular ejection fraction was 45-50%   There was no significant valvular heart disease   He has CRI   He is followed by Nephrology service  Sally Betancur was seen by Cardiology in February 2020 at [facility] underwent cardiac catheterization March 6, 2020 by the radial approach in the setting of an abnormal pharmacologic nuclear stress test done February 6, 2020   He was found to have a 50 and 70% stenosis in a small non dominant right coronary vessel   A short left main with a left dominant system was noted  A 40% stenosis in LAD and thereafter widely patent stents   A patent obtuse OMB 1 and 2 with a large OMB 3 which is a  that receives left-to-left collaterals with distal disease in that vessel noted  He was managed medically and cleared for surgery   Echocardiogram done July 2020 demonstrated a left ventricular ejection fraction of 46% with segmental wall motion abnormality noted  There was mild LVH and mild mitral regurgitation   Patient was  hospitalized at [facility] 4/2020 in reference to need for lower extremity revascularization   He had an urgent right common femoral endarterectomy as well as right femoral to above the knee popliteal artery bypass  His baseline dry weight is 208 according to his wife who is a retired RN  Viridiana Bendernacho is followed by Pulmonary service in reference to COPD and obstructive sleep apnea   He has at least 48 pack years and perhaps more but discontinued smoking around 2008  He was seen by our nurse practitioner February 2021 with shortness of breath  He was placed on Imdur in reference to chest pain and shortness of breath  He did not tolerate this in the setting of uriticaria and nausea  It was discontinued  Lipid profile done February 2021 demonstrated total cholesterol of 144 with an HDL of 46 and a calculated LDL of 65  Torsemide is on hold per Nephrology unless weight gain develops  Vital signs are stable today  Patient has had some dyspnea  On exam today he does have crackles at the right base  Will start torsemide 10 mg every day until Monday and the patient's wife will call with an update  His O2 sat today is 96%  He has had fatigue and is concerned that this is related to mild hypothyroidism  I have no objection of his endocrinologist wants to titrate the dose of his Synthroid  I can monitor his cardiac status      Patient Active Problem List   Diagnosis  Type 1 diabetes mellitus with diabetic peripheral angiopathy without gangrene ([facility])    Presence of drug coated stent in LAD coronary artery    Coronary artery disease of native artery of native heart with stable angina pectoris ([facility])    Presence of drug coated stent in left circumflex coronary artery    Dyslipidemia    Obstructive sleep apnea of adult    Carotid artery stenosis, asymptomatic, bilateral    Restrictive lung disease    Centrilobular emphysema ([facility] )    Benign hypertension with chronic kidney disease, stage III    CKD (chronic kidney disease) stage 3, GFR 30-59 ml/min ([facility])    Renal artery stenosis ([facility])    Renal cyst, right    Vitamin D deficiency    Aortoiliac occlusive disease ([facility])    Hypothyroidism, postablative    Low back pain with sciatica    Lumbar disc herniation    Lumbar radiculopathy    Diabetic neuropathy associated with type 1 diabetes mellitus ([facility])    Chronic systolic congestive heart failure ([facility])    Anxiety with depression    Spinal stenosis of lumbar region with neurogenic claudication    Thrombocytopenia, unspecified ([facility] )    S/P femoral-popliteal bypass surgery    Stenosis of carotid artery     Past Medical History:   Diagnosis Date    Acute kidney injury ([facility] )     resolved 11/30/2015    Acute venous embolism and thrombosis of deep vessels of proximal lower extremity ([facility] )     resolved 04/04/2015    DARINEL (acute kidney injury) ([facility] ) 1/12/2016    Anesthesia     RESPIRATORY ISSUES DUE TO SLEEP APNEA    Cardiac disease     heart attack, stents x 4    CHF (congestive heart failure) ([facility])     Chronic cough     resolved 02/04/2016    Chronic pain disorder     intermitent claudication    COPD (chronic obstructive pulmonary disease) ([facility])     Coronary artery disease     CPAP (continuous positive airway pressure) dependence     Diabetes mellitus ([address] )     Disease of thyroid gland     DVT (deep venous thrombosis) ([facility]     Heart failure ([facility] )     Hyperlipidemia     Hypertension     Ischemic cardiomyopathy     last assessed 2017    Myocardial infarction [facility])     MI +2     NSTEMI (non-ST elevated myocardial infarction) ([facility] ) 3/23/2019    Pulmonary emphysema (HCC)     Pulmonary granuloma (HCC)     resolved 2017    Renal failure     Sleep apnea      Social History     Socioeconomic History    Marital status: /Civil Union     Spouse name: Not on file    Number of children: Not on file    Years of education: Not on file    Highest education level: Not on file   Occupational History    Occupation: Retired    Occupation: Desk work    Occupation: [employer]   Social Needs    Financial resource strain: Not on file    Food insecurity     Worry: Not on file     Inability: Not on file   [place] needs     Medical: Not on file     Non-medical: Not on file   Tobacco Use    Smoking status: Former Smoker     Packs/day: 4 00     Years: 47 00     Pack years: 188 00     Types: Cigarettes     Start date:      Quit date:      Years since quittin 2    Smokeless tobacco: Never Used   Substance and Sexual Activity    Alcohol use:  Yes     Alcohol/week: 21 0 standard drinks     Types: 21 Standard drinks or equivalent per week     Frequency: 4 or more times a week     Drinks per session: 1 or 2     Binge frequency: Never     Comment: 2-3 glasses of vodka daily (6 shots) (history 2 drinks per day as per allscripts    Drug use: No    Sexual activity: Not Currently   Lifestyle    Physical activity     Days per week: Not on file     Minutes per session: Not on file    Stress: Not on file   Relationships    Social connections     Talks on phone: Not on file     Gets together: Not on file     Attends Mosque service: Not on file     Active member of club or organization: Not on file     Attends meetings of clubs or organizations: Not on file     Relationship status: Not on file    Intimate partner violence     Fear of current or ex partner: Not on file     Emotionally abused: Not on file     Physically abused: Not on file     Forced sexual activity: Not on file   Other Topics Concern    Not on file   Social History Narrative    Not on file      Family History   Problem Relation Age of Onset    Heart disease Father         pacer placement    Hypertension Father     Kidney disease Father     Heart failure Father     Heart attack Father     Liver disease Father     Cirrhosis Mother         due to beer consumption    Liver disease Mother     Diabetes Other      Past Surgical History:   Procedure Laterality Date    BYPASS FEMORAL-POPLITEAL      initial stenosis with stent left, 7 x 100 smart stent onset 02/24/2014    CARDIAC SURGERY      cardiac stents    CATARACT EXTRACTION      COLOGUARD (HISTORICAL)  2018    CORONARY ANGIOPLASTY WITH STENT PLACEMENT      x4    IR AORTAGRAM WITH RUN-OFF  3/14/2019    NE THROMBOENDARTECTMY FEMORAL COMMON Left 3/22/2019    Procedure: ENDARTERECTOMY ARTERIAL FEMORAL WITH PATCH ANGIOPLASTY, BALLOON ANGIOPLASTY, STENT;  Surgeon: Natali Hernandez MD;  [facility]: [facility];  Service: Vascular    NE VEIN BYPASS GRAFT,FEM-POP Left 3/22/2019    Procedure: BYPASS FEMORAL-POPLITEAL WITH COMPLETION ARTERIOGRAM;  Surgeon: Natali Hernandez MD;  [facility];  Service: Vascular    VASCULAR SURGERY      stent placement    WOUND DEBRIDEMENT Left 4/15/2019    Procedure: DEBRIDEMENT WOUND AND DRESSING CHANGE ([address]) left groin with VAC;  Surgeon: Aleksandra Beatty DO;  [facility];  Service: Vascular       Current Outpatient Medications:     ammonium lactate (LAC-HYDRIN) 12 % lotion, Apply topically 2 (two) times a day as needed for dry skin Apply to dry skin between the toes in the AM & in the PM, Disp: 400 g, Rfl: 5    ascorbic acid (VITAMIN C) 250 mg tablet, Take 250 mg by mouth daily, Disp: , Rfl:     aspirin 81 MG tablet, Take 81 mg by mouth daily  , Disp: , Rfl:     cholecalciferol (VITAMIN D3) 1,000 units tablet, Take 1,000 Units by mouth daily , Disp: , Rfl:     clopidogrel (PLAVIX) 75 mg tablet, Take 1 tablet (75 mg total) by mouth daily, Disp: 90 tablet, Rfl: 3    Coenzyme Q10 (COQ-10) 200 MG CAPS, Take 200 mg by mouth daily, Disp: , Rfl:     docusate sodium (COLACE) 50 mg capsule, Take by mouth 2 (two) times a day as needed , Disp: , Rfl:     gabapentin (NEURONTIN) 300 mg capsule, TAKE ONE CAPSULE BY MOUTH THREE TIMES A DAY, Disp: 270 capsule, Rfl: 1    Glucagon (Baqsimi One Pack) 3 MG/DOSE POWD, 1 Dose into each nostril as needed, Disp: , Rfl:     Icosapent Ethyl (Vascepa) 1 g CAPS, Take 1 capsule (1 g total) by mouth 2 (two) times a day, Disp: 180 capsule, Rfl: 3    insulin glargine (Lantus) 100 units/mL subcutaneous injection, Inject under the skin 40 units am- 20 units pm, Disp: , Rfl:     insulin lispro (HUMALOG) 100 units/mL injection, Inject 3 units with breakfast, 6 units at lunch, and 6 units at dinner (Patient taking differently: 4 (four) times a day Inject 3 units with breakfast, 6 units at lunch, and 6 units at dinner Carbs counting), Disp: 10 mL, Rfl: 1    Lactobacillus Rhamnosus, GG, (CULTURELLE) CAPS, Take 1 capsule by mouth daily , Disp: , Rfl:     levothyroxine 200 mcg tablet, Take 200 mcg by mouth daily, Disp: , Rfl: 0    Multiple Vitamins-Minerals (CENTRUM MEN) TABS, Take by mouth Take one tablet in the morning, Disp: , Rfl:     nitroglycerin (NITROSTAT) 0 4 mg SL tablet, Place 1 tablet (0 4 mg total) under the tongue every 5 (five) minutes as needed for chest pain, Disp: 25 tablet, Rfl: 3    polyethylene glycol (MIRALAX) 17 g packet, Take 17 g by mouth daily, Disp: , Rfl:     rosuvastatin (CRESTOR) 20 MG tablet, Take 1 tablet (20 mg total) by mouth daily, Disp: 90 tablet, Rfl: 3    SF 1 1 % GEL, BRUCH ONCE DAILY AT BEDTIME, BRUSH OF 1 MINUTE AS DIRECTED, Disp: , Rfl:     Lactobacillus Rhamnosus, GG, (CULTURELLE PO), Take by mouth, Disp: , Rfl:     torsemide (DEMADEX) 10 mg tablet, Take 10 mg every other day (Patient not taking: Reported on 3/9/2021), Disp: 90 tablet, Rfl: 0    ULTICARE INSULIN SYRINGE 30G X 1/2" 1 ML MISC, Use as directed, Disp: , Rfl: 0  Allergies   Allergen Reactions    Doxazosin Myalgia     UNKNOWN  UNKNOWN  Muscle cramps    Iohexol      UNKNOWN    Lisinopril Cough     cough  Other reaction(s): CAMELLA SINENSIS (ZESTRIL) (cough)  cough    Vancomycin Hives    Adhesive [Medical Tape]      Skin Breakdown    Cardura [Doxazosin Mesylate]      Muscle cramps    Isosorbide Rash    Other      Iv dye for cardiac cath  Renal failure very close to dialysis  But no dialysis  Iv dye for cardiac cath  Renal failure very close to dialysis  But no dialysis  Iv dye for cardiac cath  Renal failure very close to dialysis  But no dialysis       Labs:not applicable  Imaging: No results found  Review of Systems:  Review of Systems   Constitutional: Positive for fatigue  Respiratory: Positive for shortness of breath  All other systems reviewed and are negative  Physical Exam:  /82 (BP Location: Right arm, Patient Position: Sitting, Cuff Size: Large)   Pulse 76   Ht 5' 7" (1 702 m)   Wt 99 3 kg (219 lb)   SpO2 96%   BMI 34 30 kg/m²   Physical Exam  Vitals signs reviewed  Constitutional:       Appearance: He is well-developed  HENT:      Head: Normocephalic and atraumatic  Eyes:      Conjunctiva/sclera: Conjunctivae normal       Pupils: Pupils are equal, round, and reactive to light  Neck:      Musculoskeletal: Normal range of motion and neck supple  Cardiovascular:      Rate and Rhythm: Normal rate  Heart sounds: Normal heart sounds  Pulmonary:      Effort: Pulmonary effort is normal       Comments:   Crackles at the right base>left base  Skin:     General: Skin is warm and dry  Neurological:      Mental Status: He is alert and oriented to person, place, and time  Discussion/Summary: Given shortness of breath and an exam suggesting crackles at the right lung base will initiate torsemide 10 mg per day to Monday  Wife will call with an update  Will check a BMP next week  If this is helpful we may need to continue diuretic at the expense of some elevation in creatinine  In reference to hypothyroidism I have told the patient and his wife I have no objection to his endocrinologist titrating the dose of Synthroid as he would ordinarily  I will be happy to monitor his cardiac status in that setting  I have asked the patient and his wife to call in general if there is a problem in the interim otherwise I will see him in follow-up in 4-6 months time in sooner as is necessary

## 2021-03-11 NOTE — PATIENT INSTRUCTIONS
Please start torsemide 10 mg per day  Please call Monday with an update  Have provided a slip for a BMP to be done next week  If any change in status please do not hesitate to call  I will see you in follow-up

## 2021-03-12 ENCOUNTER — APPOINTMENT (OUTPATIENT)
Dept: LAB | Facility: CLINIC | Age: 76
End: 2021-03-12
Payer: MEDICARE

## 2021-03-12 DIAGNOSIS — E03.9 ACQUIRED HYPOTHYROIDISM: Primary | ICD-10-CM

## 2021-03-12 DIAGNOSIS — I10 ESSENTIAL (PRIMARY) HYPERTENSION: ICD-10-CM

## 2021-03-12 DIAGNOSIS — I73.9 PVD (PERIPHERAL VASCULAR DISEASE) (HCC): ICD-10-CM

## 2021-03-12 DIAGNOSIS — I25.10 CORONARY ARTERY DISEASE INVOLVING NATIVE CORONARY ARTERY OF NATIVE HEART WITHOUT ANGINA PECTORIS: ICD-10-CM

## 2021-03-12 DIAGNOSIS — I50.22 CHRONIC SYSTOLIC CONGESTIVE HEART FAILURE (HCC): ICD-10-CM

## 2021-03-12 DIAGNOSIS — E78.00 PURE HYPERCHOLESTEROLEMIA: ICD-10-CM

## 2021-03-12 LAB
ANION GAP SERPL CALCULATED.3IONS-SCNC: 13 MMOL/L (ref 4–13)
BUN SERPL-MCNC: 29 MG/DL (ref 5–25)
CALCIUM SERPL-MCNC: 9.3 MG/DL (ref 8.3–10.1)
CHLORIDE SERPL-SCNC: 111 MMOL/L (ref 100–108)
CO2 SERPL-SCNC: 20 MMOL/L (ref 21–32)
CREAT SERPL-MCNC: 1.68 MG/DL (ref 0.6–1.3)
GFR SERPL CREATININE-BSD FRML MDRD: 39 ML/MIN/1.73SQ M
GLUCOSE P FAST SERPL-MCNC: 96 MG/DL (ref 65–99)
POTASSIUM SERPL-SCNC: 3.9 MMOL/L (ref 3.5–5.3)
SODIUM SERPL-SCNC: 144 MMOL/L (ref 136–145)
T4 FREE SERPL-MCNC: 1.08 NG/DL (ref 0.76–1.46)
TSH SERPL DL<=0.05 MIU/L-ACNC: 4.77 UIU/ML (ref 0.36–3.74)

## 2021-03-12 PROCEDURE — 36415 COLL VENOUS BLD VENIPUNCTURE: CPT

## 2021-03-12 PROCEDURE — 84439 ASSAY OF FREE THYROXINE: CPT

## 2021-03-12 PROCEDURE — 84443 ASSAY THYROID STIM HORMONE: CPT

## 2021-03-12 PROCEDURE — 80048 BASIC METABOLIC PNL TOTAL CA: CPT

## 2021-03-16 ENCOUNTER — TELEPHONE (OUTPATIENT)
Dept: CARDIOLOGY CLINIC | Facility: CLINIC | Age: 76
End: 2021-03-16

## 2021-03-16 DIAGNOSIS — N18.30 STAGE 3 CHRONIC KIDNEY DISEASE, UNSPECIFIED WHETHER STAGE 3A OR 3B CKD (HCC): ICD-10-CM

## 2021-03-16 DIAGNOSIS — I10 ESSENTIAL (PRIMARY) HYPERTENSION: Primary | ICD-10-CM

## 2021-03-16 DIAGNOSIS — I50.32 CHRONIC HEART FAILURE WITH PRESERVED EJECTION FRACTION (HCC): ICD-10-CM

## 2021-03-16 NOTE — TELEPHONE ENCOUNTER
Wife called stating pt has finished his course of Torsemide 10mg X5days  Dr Reva Theodore Nephrology previously stopped Torsemide 2/22/21due to elevated creatinine level: Previous dose 10mg QOD  3/12/21 1 68  2/17/21 1 67  1/19/21 1 65    No edema noted  SOB at pts baseline  Wts:  3/11/21 214lbs  3/12/21 211 6  3/13/21 211  3/14/21 209 4  3/15/21 207 8 Take out soup meal  3/16/21 210 8    BP ranges 103//64     Please advise

## 2021-03-16 NOTE — TELEPHONE ENCOUNTER
Spoke with wife, MD instructions given and verbalized understanding    Order for BW placed in chart and mailed to pt per wife request

## 2021-03-30 ENCOUNTER — TELEPHONE (OUTPATIENT)
Dept: CARDIOLOGY CLINIC | Facility: CLINIC | Age: 76
End: 2021-03-30

## 2021-03-30 DIAGNOSIS — I50.22 CHRONIC SYSTOLIC CONGESTIVE HEART FAILURE (HCC): Primary | ICD-10-CM

## 2021-03-30 NOTE — TELEPHONE ENCOUNTER
Wife called and states pt is sob and wt up 3 lbs( 213 8) from yesterday  Denies edema  Bp 104/55 Hr 89     BS elevated today 409        Labs 3/12/21  Cr 1 68 ( 1 67 on 2/17/210 ) K-3 9( 4 4)      Taking torsemide 10 mg even days  Took this am       Please advise -Continue drops as prescribed.

## 2021-03-31 ENCOUNTER — APPOINTMENT (OUTPATIENT)
Dept: LAB | Facility: CLINIC | Age: 76
End: 2021-03-31
Payer: MEDICARE

## 2021-03-31 DIAGNOSIS — I50.22 CHRONIC SYSTOLIC CONGESTIVE HEART FAILURE (HCC): ICD-10-CM

## 2021-03-31 DIAGNOSIS — I10 ESSENTIAL (PRIMARY) HYPERTENSION: ICD-10-CM

## 2021-03-31 DIAGNOSIS — I50.32 CHRONIC HEART FAILURE WITH PRESERVED EJECTION FRACTION (HCC): ICD-10-CM

## 2021-03-31 DIAGNOSIS — N18.30 STAGE 3 CHRONIC KIDNEY DISEASE, UNSPECIFIED WHETHER STAGE 3A OR 3B CKD (HCC): ICD-10-CM

## 2021-03-31 LAB
ANION GAP SERPL CALCULATED.3IONS-SCNC: 6 MMOL/L (ref 4–13)
BUN SERPL-MCNC: 27 MG/DL (ref 5–25)
CALCIUM SERPL-MCNC: 9.2 MG/DL (ref 8.3–10.1)
CHLORIDE SERPL-SCNC: 107 MMOL/L (ref 100–108)
CO2 SERPL-SCNC: 26 MMOL/L (ref 21–32)
CREAT SERPL-MCNC: 1.56 MG/DL (ref 0.6–1.3)
GFR SERPL CREATININE-BSD FRML MDRD: 43 ML/MIN/1.73SQ M
GLUCOSE P FAST SERPL-MCNC: 155 MG/DL (ref 65–99)
POTASSIUM SERPL-SCNC: 4 MMOL/L (ref 3.5–5.3)
SODIUM SERPL-SCNC: 139 MMOL/L (ref 136–145)

## 2021-03-31 PROCEDURE — 80048 BASIC METABOLIC PNL TOTAL CA: CPT

## 2021-03-31 PROCEDURE — 36415 COLL VENOUS BLD VENIPUNCTURE: CPT

## 2021-03-31 NOTE — PROGRESS NOTES
Left message on patients phone informing that [employer] from [facility] explained both Lucila Mortimer and wife spoke with Dariel on 1/13/21  They have orders for supplies and are just waiting for when patient is ready to make the order  When [employer] called on 1/13/21 they have documentation stating patient was not ready for supplies just yet and will call when he is ready  If anything else is needed they can give the office a call and ask for me  All patient needs to do is call [facility] order

## 2021-04-01 DIAGNOSIS — I50.22 CHRONIC SYSTOLIC CONGESTIVE HEART FAILURE (HCC): Primary | ICD-10-CM

## 2021-04-02 ENCOUNTER — TELEPHONE (OUTPATIENT)
Dept: CARDIOLOGY CLINIC | Facility: CLINIC | Age: 76
End: 2021-04-02

## 2021-04-20 NOTE — PROGRESS NOTES
Per [facility] from [facility] it was mentioned to patient and wife prior that he was not eligible for new pap machine until the end of 11/2021  Confirmed it with pt and wife today 4/20/21

## 2021-05-07 ENCOUNTER — TELEPHONE (OUTPATIENT)
Dept: PULMONOLOGY | Facility: CLINIC | Age: 76
End: 2021-05-07

## 2021-05-07 NOTE — TELEPHONE ENCOUNTER
Spoke with wife  She said Angelita Jacobo was seen in the ER in [place]  She said they did nothing  She said someone needs to listen to him  He has been SOB on exertion  It takes him a long time to recover  He has a little cough that is non productive  She said he denies chest tightness but she knows he has minimal heartburn  Denies wheeze, fevers and chills  She said he did have an Echo in [place] and there were no changes  His oxygen levels have been in the low 90's  He was only int he 80's once and that is when she took him to the ER  They are flying home tomorrow and she wants him seen  Scheduled a sick visit on Monday with Dr Jessica Garcia

## 2021-05-07 NOTE — TELEPHONE ENCOUNTER
Patients wife Sergio Parnell calling saying Winter Weir was seen in the ED in [place] 2 weeks ago because his O2 levels dropped to 89%  She states the only thing that gave him was Lasix  She said he has been SOB since then  They are flying home tomorrow from [place]   She would like to make an appt for Winter Weir to come in and discuss O2  [phone]

## 2021-05-10 ENCOUNTER — OFFICE VISIT (OUTPATIENT)
Dept: PULMONOLOGY | Facility: CLINIC | Age: 76
End: 2021-05-10
Payer: MEDICARE

## 2021-05-10 ENCOUNTER — APPOINTMENT (OUTPATIENT)
Dept: LAB | Facility: CLINIC | Age: 76
End: 2021-05-10
Payer: MEDICARE

## 2021-05-10 VITALS
HEART RATE: 72 BPM | TEMPERATURE: 97.2 F | HEIGHT: 67 IN | WEIGHT: 211 LBS | RESPIRATION RATE: 18 BRPM | DIASTOLIC BLOOD PRESSURE: 80 MMHG | OXYGEN SATURATION: 99 % | BODY MASS INDEX: 33.12 KG/M2 | SYSTOLIC BLOOD PRESSURE: 120 MMHG

## 2021-05-10 DIAGNOSIS — J43.2 CENTRILOBULAR EMPHYSEMA (HCC): Chronic | ICD-10-CM

## 2021-05-10 DIAGNOSIS — J96.11 HYPOXEMIC RESPIRATORY FAILURE, CHRONIC (HCC): ICD-10-CM

## 2021-05-10 DIAGNOSIS — I50.22 CHRONIC SYSTOLIC CONGESTIVE HEART FAILURE (HCC): ICD-10-CM

## 2021-05-10 DIAGNOSIS — N18.30 STAGE 3 CHRONIC KIDNEY DISEASE, UNSPECIFIED WHETHER STAGE 3A OR 3B CKD (HCC): Primary | ICD-10-CM

## 2021-05-10 DIAGNOSIS — G47.33 OBSTRUCTIVE SLEEP APNEA OF ADULT: Primary | ICD-10-CM

## 2021-05-10 LAB
ALBUMIN SERPL BCP-MCNC: 3.7 G/DL (ref 3.5–5)
ANION GAP SERPL CALCULATED.3IONS-SCNC: 7 MMOL/L (ref 4–13)
BASOPHILS # BLD AUTO: 0.03 THOUSANDS/ΜL (ref 0–0.1)
BASOPHILS NFR BLD AUTO: 0 % (ref 0–1)
BUN SERPL-MCNC: 32 MG/DL (ref 5–25)
CALCIUM SERPL-MCNC: 9.6 MG/DL (ref 8.3–10.1)
CHLORIDE SERPL-SCNC: 106 MMOL/L (ref 100–108)
CO2 SERPL-SCNC: 28 MMOL/L (ref 21–32)
CREAT SERPL-MCNC: 1.73 MG/DL (ref 0.6–1.3)
EOSINOPHIL # BLD AUTO: 0.63 THOUSAND/ΜL (ref 0–0.61)
EOSINOPHIL NFR BLD AUTO: 9 % (ref 0–6)
ERYTHROCYTE [DISTWIDTH] IN BLOOD BY AUTOMATED COUNT: 13.5 % (ref 11.6–15.1)
EST. AVERAGE GLUCOSE BLD GHB EST-MCNC: 166 MG/DL
GFR SERPL CREATININE-BSD FRML MDRD: 38 ML/MIN/1.73SQ M
GLUCOSE SERPL-MCNC: 138 MG/DL (ref 65–140)
HBA1C MFR BLD: 7.4 %
HCT VFR BLD AUTO: 40.8 % (ref 36.5–49.3)
HGB BLD-MCNC: 13.1 G/DL (ref 12–17)
IMM GRANULOCYTES # BLD AUTO: 0.02 THOUSAND/UL (ref 0–0.2)
IMM GRANULOCYTES NFR BLD AUTO: 0 % (ref 0–2)
LYMPHOCYTES # BLD AUTO: 2 THOUSANDS/ΜL (ref 0.6–4.47)
LYMPHOCYTES NFR BLD AUTO: 30 % (ref 14–44)
MAGNESIUM SERPL-MCNC: 2.3 MG/DL (ref 1.6–2.6)
MCH RBC QN AUTO: 32.3 PG (ref 26.8–34.3)
MCHC RBC AUTO-ENTMCNC: 32.1 G/DL (ref 31.4–37.4)
MCV RBC AUTO: 101 FL (ref 82–98)
MONOCYTES # BLD AUTO: 0.86 THOUSAND/ΜL (ref 0.17–1.22)
MONOCYTES NFR BLD AUTO: 13 % (ref 4–12)
NEUTROPHILS # BLD AUTO: 3.25 THOUSANDS/ΜL (ref 1.85–7.62)
NEUTS SEG NFR BLD AUTO: 48 % (ref 43–75)
NRBC BLD AUTO-RTO: 0 /100 WBCS
PHOSPHATE SERPL-MCNC: 3.7 MG/DL (ref 2.3–4.1)
PLATELET # BLD AUTO: 122 THOUSANDS/UL (ref 149–390)
PMV BLD AUTO: 12.9 FL (ref 8.9–12.7)
POTASSIUM SERPL-SCNC: 4.2 MMOL/L (ref 3.5–5.3)
PTH-INTACT SERPL-MCNC: 52.5 PG/ML (ref 18.4–80.1)
RBC # BLD AUTO: 4.06 MILLION/UL (ref 3.88–5.62)
SODIUM SERPL-SCNC: 141 MMOL/L (ref 136–145)
TSH SERPL DL<=0.05 MIU/L-ACNC: 1.87 UIU/ML (ref 0.36–3.74)
URATE SERPL-MCNC: 8.3 MG/DL (ref 4.2–8)
WBC # BLD AUTO: 6.79 THOUSAND/UL (ref 4.31–10.16)

## 2021-05-10 PROCEDURE — 85025 COMPLETE CBC W/AUTO DIFF WBC: CPT

## 2021-05-10 PROCEDURE — 84443 ASSAY THYROID STIM HORMONE: CPT

## 2021-05-10 PROCEDURE — 94618 PULMONARY STRESS TESTING: CPT | Performed by: INTERNAL MEDICINE

## 2021-05-10 PROCEDURE — 99215 OFFICE O/P EST HI 40 MIN: CPT | Performed by: INTERNAL MEDICINE

## 2021-05-10 PROCEDURE — 80069 RENAL FUNCTION PANEL: CPT

## 2021-05-10 PROCEDURE — 83970 ASSAY OF PARATHORMONE: CPT

## 2021-05-10 PROCEDURE — 83735 ASSAY OF MAGNESIUM: CPT

## 2021-05-10 PROCEDURE — 83036 HEMOGLOBIN GLYCOSYLATED A1C: CPT

## 2021-05-10 PROCEDURE — 84550 ASSAY OF BLOOD/URIC ACID: CPT

## 2021-05-10 PROCEDURE — 36415 COLL VENOUS BLD VENIPUNCTURE: CPT

## 2021-05-10 RX ORDER — LEVOTHYROXINE SODIUM 0.03 MG/1
TABLET ORAL
COMMUNITY
Start: 2021-03-22 | End: 2021-10-04

## 2021-05-10 NOTE — PROGRESS NOTES
Pulmonary/Sleep Follow Up Note   Conchita Avila 68 y o  male MRN: [id number]  5/10/2021      Assessment and Plan:    1  Obstructive sleep apnea of adult  Assessment & Plan:  ·  Ish De La Vega has been diagnosed about 5 years ago with moderate to severe obstructive sleep apnea with an average apnea-hypopnea index of 23 7  ·  unfortunately, his compliance report today shows that he is compliant using the CPAP every night on a CPAP of 12 cm H2O he has a residual AHI of 16 7 majority of which were obstructive hypopneas in nature with minimal central treatment emergent apneas  ·  I would like to switch him to an auto titrating CPAP with a pressure range of 12-20 cm H2O    Orders:  -     PAP DME Resupply/Reorder    2  Centrilobular emphysema ([facility] [address] )  Assessment & Plan:   The patient has been tried on bronchodilator therapy in the past with no success, he has also tried pulmonary Hap with no success    Orders:  -     POCT 6 minute walk    3  Hypoxemic respiratory failure, chronic ([address] )  Assessment & Plan:  ·  Ish De La Vega underwent a 6 minutes walk test today dropping down his oxygen saturation to 85% on room air requiring 2 L of nasal cannula  ·  oxygen order was sent to DME    Orders:  -     Home Oxygen with Portability    4  Chronic systolic congestive heart failure ([facility] [address] )  Assessment & Plan:  Wt Readings from Last 3 Encounters:   05/10/21 95 7 kg (211 lb)   03/11/21 99 3 kg (219 lb)   03/09/21 100 kg (221 lb)      he follows up with Cardiology Dr Karen Khanna   he is currently taking 10 mg of torsemide every day   he has increased it to 20 after his recent ER visit in [place] however that caused an increase in the of his creatinine he had a follow-up appointment with Cardiology that lower the dose back to 10   he is advised to take an extra tablet of 10 mg if he notices an increase of 5 lb of weight or excessive bilateral lower extremity edema              Return in about 6 weeks (around 6/21/2021)    With Dr Deb Huston    History of Present Illness   HPI:  Chelita Cisneros is a 68 y o  male who  Here for sick visit home he has a past medical history as detailed below he has been diagnosed with emphysema, congestive heart failure and has been following up with Carley Mahan  he was in [place] recently and last month on April 8th he had an acute episode of oxygen desaturation associated with significant shortness of breath that required an emergency department visit after 40 mg of Lasix IV the patient felt better and he was discharged home he has persistent still dyspnea on exertion with walking for 20-30 feet, and he has associated dry cough  He has significant smoking history for more than 48 years and he quit smoking in 2008  he has also history of moderate to severe obstructive sleep apnea he has been wearing CPAP every night   he has been trying to switch his [employer] from [facility] to [address] [place] [address]   All other systems reviewed and are negative        Historical Information   Past Medical History:   Diagnosis Date    Acute kidney injury ([facility] )     resolved 11/30/2015    Acute venous embolism and thrombosis of deep vessels of proximal lower extremity ([facility] )     resolved 04/04/2015    DARINEL (acute kidney injury) ([facility] ) 1/12/2016    Anesthesia     RESPIRATORY ISSUES DUE TO SLEEP APNEA    Cardiac disease     heart attack, stents x 4    CHF (congestive heart failure) ([facility])     Chronic cough     resolved 02/04/2016    Chronic pain disorder     intermitent claudication    COPD (chronic obstructive pulmonary disease) ([facility])     Coronary artery disease     CPAP (continuous positive airway pressure) dependence     Diabetes mellitus ([facility] )     Disease of thyroid gland     DVT (deep venous thrombosis) ([facility])     Heart failure ([facility] )     Hyperlipidemia     Hypertension     Ischemic cardiomyopathy     last assessed 09/26/2017    Myocardial infarction [facility])     MI +2 2015,2018    NSTEMI (non-ST elevated myocardial infarction) ([facility] ) 3/23/2019  Pulmonary emphysema (HCC)     Pulmonary granuloma ([facility] )     resolved 02/03/2017    Renal failure     Sleep apnea      Past Surgical History:   Procedure Laterality Date    BYPASS FEMORAL-POPLITEAL      initial stenosis with stent left, 7 x 100 smart stent onset 02/24/2014    CARDIAC SURGERY      cardiac stents    CATARACT EXTRACTION      COLOGUARD (HISTORICAL)  2018    CORONARY ANGIOPLASTY WITH STENT PLACEMENT      x4    IR AORTAGRAM WITH RUN-OFF  3/14/2019    HI THROMBOENDARTECTMY FEMORAL COMMON Left 3/22/2019    Procedure: ENDARTERECTOMY ARTERIAL FEMORAL WITH PATCH ANGIOPLASTY, BALLOON ANGIOPLASTY, STENT;  Surgeon: Daryn Palacios MD;  [facility]: [facility];  Service: Vascular    HI VEIN BYPASS GRAFT,FEM-POP Left 3/22/2019    Procedure: BYPASS FEMORAL-POPLITEAL WITH COMPLETION ARTERIOGRAM;  Surgeon: Daryn Palacios MD;  [facility];  Service: Vascular    VASCULAR SURGERY      stent placement    WOUND DEBRIDEMENT Left 4/15/2019    Procedure: DEBRIDEMENT WOUND AND DRESSING CHANGE ([address]) left groin with VAC;  Surgeon: Gavin Story DO;  [facility];  Service: Vascular     Family History   Problem Relation Age of Onset    Heart disease Father         pacer placement    Hypertension Father     Kidney disease Father     Heart failure Father     Heart attack Father     Liver disease Father     Cirrhosis Mother         due to beer consumption    Liver disease Mother     Diabetes Other          Meds/Allergies     Current Outpatient Medications:     ammonium lactate (LAC-HYDRIN) 12 % lotion, Apply topically 2 (two) times a day as needed for dry skin Apply to dry skin between the toes in the AM & in the PM, Disp: 400 g, Rfl: 5    ascorbic acid (VITAMIN C) 250 mg tablet, Take 250 mg by mouth daily, Disp: , Rfl:     aspirin 81 MG tablet, Take 81 mg by mouth daily  , Disp: , Rfl:     cholecalciferol (VITAMIN D3) 1,000 units tablet, Take 1,000 Units by mouth daily , Disp: , Rfl:     clopidogrel (PLAVIX) 75 mg tablet, Take 1 tablet (75 mg total) by mouth daily, Disp: 90 tablet, Rfl: 3    Coenzyme Q10 (COQ-10) 200 MG CAPS, Take 200 mg by mouth daily, Disp: , Rfl:     docusate sodium (COLACE) 50 mg capsule, Take by mouth 2 (two) times a day as needed , Disp: , Rfl:     gabapentin (NEURONTIN) 300 mg capsule, TAKE ONE CAPSULE BY MOUTH THREE TIMES A DAY, Disp: 270 capsule, Rfl: 1    Glucagon (Baqsimi One Pack) 3 MG/DOSE POWD, 1 Dose into each nostril as needed, Disp: , Rfl:     Icosapent Ethyl (Vascepa) 1 g CAPS, Take 1 capsule (1 g total) by mouth 2 (two) times a day, Disp: 180 capsule, Rfl: 3    insulin glargine (Lantus) 100 units/mL subcutaneous injection, Inject under the skin 40 units am- 20 units pm, Disp: , Rfl:     insulin lispro (HUMALOG) 100 units/mL injection, Inject 3 units with breakfast, 6 units at lunch, and 6 units at dinner (Patient taking differently: 4 (four) times a day Inject 3 units with breakfast, 6 units at lunch, and 6 units at dinner Carbs counting), Disp: 10 mL, Rfl: 1    Lactobacillus Rhamnosus, GG, (CULTURELLE PO), Take by mouth, Disp: , Rfl:     levothyroxine 200 mcg tablet, Take 200 mcg by mouth daily, Disp: , Rfl: 0    levothyroxine 25 mcg tablet, TAKE ONE TABLET BY MOUTH EVERY DAY ALONG WITH 200MCG TABLET, Disp: , Rfl:     Multiple Vitamins-Minerals (CENTRUM MEN) TABS, Take by mouth Take one tablet in the morning, Disp: , Rfl:     nitroglycerin (NITROSTAT) 0 4 mg SL tablet, Place 1 tablet (0 4 mg total) under the tongue every 5 (five) minutes as needed for chest pain, Disp: 25 tablet, Rfl: 3    polyethylene glycol (MIRALAX) 17 g packet, Take 17 g by mouth daily, Disp: , Rfl:     rosuvastatin (CRESTOR) 20 MG tablet, Take 1 tablet (20 mg total) by mouth daily, Disp: 90 tablet, Rfl: 3    SF 1 1 % GEL, BRUCH ONCE DAILY AT BEDTIME, BRUSH OF 1 MINUTE AS DIRECTED, Disp: , Rfl:     torsemide (DEMADEX) 10 mg tablet, Take 10 mg every other day, Disp: 90 tablet, Rfl: 0    ULTICARE INSULIN SYRINGE 30G X 1/2" 1 ML MISC, Use as directed, Disp: , Rfl: 0    Lactobacillus Rhamnosus, GG, (CULTURELLE) CAPS, Take 1 capsule by mouth daily , Disp: , Rfl:   Allergies   Allergen Reactions    Doxazosin Myalgia     UNKNOWN  UNKNOWN  Muscle cramps    Iohexol      UNKNOWN    Lisinopril Cough     cough  Other reaction(s): CAMELLA SINENSIS (ZESTRIL) (cough)  cough    Vancomycin Hives    Adhesive [Medical Tape]      Skin Breakdown    Cardura [Doxazosin Mesylate]      Muscle cramps    Isosorbide Rash    Other      Iv dye for cardiac cath  Renal failure very close to dialysis  But no dialysis  Iv dye for cardiac cath  Renal failure very close to dialysis  But no dialysis  Iv dye for cardiac cath  Renal failure very close to dialysis  But no dialysis       Vitals: Blood pressure 120/80, pulse 72, temperature (!) 97 2 °F (36 2 °C), temperature source Tympanic, resp  rate 18, height 5' 7" (1 702 m), weight 95 7 kg (211 lb), SpO2 99 %  Body mass index is 33 05 kg/m²  Oxygen Therapy  SpO2: 99 %  Oxygen Therapy: None (Room air)      Physical Exam  Physical Exam  Constitutional:       Appearance: Normal appearance  HENT:      Head: Normocephalic and atraumatic  Nose: No congestion or rhinorrhea  Mouth/Throat:      Mouth: Mucous membranes are moist       Pharynx: Oropharynx is clear  No oropharyngeal exudate or posterior oropharyngeal erythema  Eyes:      General: No scleral icterus  Right eye: No discharge  Left eye: No discharge  Extraocular Movements: Extraocular movements intact  Conjunctiva/sclera: Conjunctivae normal    Neck:      Musculoskeletal: Normal range of motion and neck supple  No neck rigidity  Cardiovascular:      Rate and Rhythm: Normal rate and regular rhythm  Pulses: Normal pulses  Heart sounds: Normal heart sounds  No murmur  No gallop      Pulmonary:      Effort: Pulmonary effort is normal  No respiratory distress  Breath sounds: Normal breath sounds  No wheezing, rhonchi or rales  Abdominal:      General: Abdomen is flat  Bowel sounds are normal  There is no distension  Palpations: Abdomen is soft  Tenderness: There is no abdominal tenderness  Musculoskeletal:         General: No swelling, tenderness or deformity  Right lower leg: Edema present  Left lower leg: Edema present  Skin:     General: Skin is warm and dry  Neurological:      General: No focal deficit present  Mental Status: He is alert and oriented to person, place, and time  Mental status is at baseline  Psychiatric:         Mood and Affect: Mood normal          Behavior: Behavior normal          Thought Content: Thought content normal          Judgment: Judgment normal          Labs: I have personally reviewed pertinent lab results  , ABG: No results found for: PHART, NNN0IIN, PO2ART, TMD8FQV, H8JIFDUB, BEART, SOURCE, BNP: No results found for: BNP, CBC:   Lab Results   Component Value Date    WBC 6 79 05/10/2021    HGB 13 1 05/10/2021    HCT 40 8 05/10/2021     (H) 05/10/2021     (L) 05/10/2021    MCH 32 3 05/10/2021    MCHC 32 1 05/10/2021    RDW 13 5 05/10/2021    MPV 12 9 (H) 05/10/2021    NRBC 0 05/10/2021   , CMP:   Lab Results   Component Value Date    SODIUM 141 05/10/2021    K 4 2 05/10/2021     05/10/2021    CO2 28 05/10/2021    BUN 32 (H) 05/10/2021    CREATININE 1 73 (H) 05/10/2021    CALCIUM 9 6 05/10/2021    EGFR 38 05/10/2021   , PT/INR: No results found for: PT, INR, Troponin: No results found for: TROPONINI  Lab Results   Component Value Date    WBC 6 79 05/10/2021    HGB 13 1 05/10/2021    HCT 40 8 05/10/2021     (H) 05/10/2021     (L) 05/10/2021     Lab Results   Component Value Date    GLUCOSE 207 (H) 03/22/2019    CALCIUM 9 6 05/10/2021     12/21/2015    K 4 2 05/10/2021    CO2 28 05/10/2021     05/10/2021    BUN 32 (H) 05/10/2021    CREATININE 1 73 (H) 05/10/2021     No results found for: IGE  Lab Results   Component Value Date    ALT 23 02/17/2021    AST 21 02/17/2021    ALKPHOS 100 02/17/2021    BILITOT 0 33 10/01/2015         Imaging and other studies: I have personally reviewed pertinent films in PACS  CT chest 9/2020     LUNGS:  Moderate upper lobe predominant panlobular emphysema      Mild juxtapleural lower lung reticulation, slightly more extensive on the right  No definite traction bronchiolectasis or honeycombing  No significant air trapping on expiration       Benign calcified granulomas      No significant filling defects in the trachea and bronchi      PLEURA:  Unremarkable  Pulmonary function testing 8/2020:   · The reduced total lung capacity at 50% predicted and residual volume at 34% predicted  · Restrictive pattern on the spirometry  · Reduced diffusion capacity  · Normal airway resistance as indicated by the specific airway conductance      Sleep studies: I have personally reviewed pertinent reports  Split night study   AHI 23 7 events per hour consistent with moderate obstructive sleep apnea    Compliance report:I have personally reviewed pertinent reports  4/2021-5/2021   residual AHI of 16 7 events per hour on a CPAP pressure of 12 cm H2O      Raciel Garrido MD  [facility]       Portions of the record may have been created with voice recognition software  Occasional wrong word or "sound a like" substitutions may have occurred due to the inherent limitations of voice recognition software  Read the chart carefully and recognize, using context, where substitutions have occurred

## 2021-05-10 NOTE — ASSESSMENT & PLAN NOTE
·  Lola Hoang has been diagnosed about 5 years ago with moderate to severe obstructive sleep apnea with an average apnea-hypopnea index of 23 7  ·  unfortunately, his compliance report today shows that he is compliant using the CPAP every night on a CPAP of 12 cm H2O he has a residual AHI of 16 7 majority of which were obstructive hypopneas in nature with minimal central treatment emergent apneas  ·  I would like to switch him to an auto titrating CPAP with a pressure range of 12-20 cm H2O

## 2021-05-10 NOTE — ASSESSMENT & PLAN NOTE
·  Mary [place] underwent a 6 minutes walk test today dropping down his oxygen saturation to 85% on room air requiring 2 L of nasal cannula  ·  oxygen order was sent to DME

## 2021-05-10 NOTE — ASSESSMENT & PLAN NOTE
The patient has been tried on bronchodilator therapy in the past with no success, he has also tried pulmonary Hap with no success

## 2021-05-12 ENCOUNTER — TELEPHONE (OUTPATIENT)
Dept: NEPHROLOGY | Facility: CLINIC | Age: 76
End: 2021-05-12

## 2021-05-12 NOTE — TELEPHONE ENCOUNTER
----- Message from Corazon Sánchez DO sent at 5/11/2021 11:26 AM EDT -----  Lab stable has an appointment in 1 month will discuss in detail at his follow-up

## 2021-05-12 NOTE — TELEPHONE ENCOUNTER
I spoke to the patient and he is aware his labs are stable and there are no medication changes at this time

## 2021-05-18 ENCOUNTER — OFFICE VISIT (OUTPATIENT)
Dept: FAMILY MEDICINE CLINIC | Facility: CLINIC | Age: 76
End: 2021-05-18
Payer: MEDICARE

## 2021-05-18 ENCOUNTER — OFFICE VISIT (OUTPATIENT)
Dept: CARDIOLOGY CLINIC | Facility: CLINIC | Age: 76
End: 2021-05-18
Payer: MEDICARE

## 2021-05-18 ENCOUNTER — DOCUMENTATION (OUTPATIENT)
Dept: CARDIOLOGY CLINIC | Facility: CLINIC | Age: 76
End: 2021-05-18

## 2021-05-18 VITALS
TEMPERATURE: 97.3 F | DIASTOLIC BLOOD PRESSURE: 60 MMHG | BODY MASS INDEX: 32.87 KG/M2 | HEIGHT: 67 IN | HEART RATE: 83 BPM | SYSTOLIC BLOOD PRESSURE: 92 MMHG | WEIGHT: 209.4 LBS | RESPIRATION RATE: 23 BRPM | OXYGEN SATURATION: 92 %

## 2021-05-18 VITALS
WEIGHT: 210 LBS | DIASTOLIC BLOOD PRESSURE: 58 MMHG | OXYGEN SATURATION: 93 % | SYSTOLIC BLOOD PRESSURE: 94 MMHG | HEIGHT: 67 IN | HEART RATE: 92 BPM | BODY MASS INDEX: 32.96 KG/M2

## 2021-05-18 DIAGNOSIS — J96.11 HYPOXEMIC RESPIRATORY FAILURE, CHRONIC (HCC): ICD-10-CM

## 2021-05-18 DIAGNOSIS — N18.32 STAGE 3B CHRONIC KIDNEY DISEASE (HCC): ICD-10-CM

## 2021-05-18 DIAGNOSIS — G47.33 OBSTRUCTIVE SLEEP APNEA OF ADULT: ICD-10-CM

## 2021-05-18 DIAGNOSIS — Z95.5 PRESENCE OF DRUG COATED STENT IN LAD CORONARY ARTERY: ICD-10-CM

## 2021-05-18 DIAGNOSIS — I50.22 CHRONIC SYSTOLIC CONGESTIVE HEART FAILURE (HCC): Primary | ICD-10-CM

## 2021-05-18 DIAGNOSIS — I25.118 CORONARY ARTERY DISEASE OF NATIVE ARTERY OF NATIVE HEART WITH STABLE ANGINA PECTORIS (HCC): ICD-10-CM

## 2021-05-18 DIAGNOSIS — I25.5 ISCHEMIC CARDIOMYOPATHY: ICD-10-CM

## 2021-05-18 DIAGNOSIS — N18.30 CKD (CHRONIC KIDNEY DISEASE) STAGE 3, GFR 30-59 ML/MIN (HCC): ICD-10-CM

## 2021-05-18 DIAGNOSIS — J43.2 CENTRILOBULAR EMPHYSEMA (HCC): Chronic | ICD-10-CM

## 2021-05-18 DIAGNOSIS — E10.51 TYPE 1 DIABETES MELLITUS WITH DIABETIC PERIPHERAL ANGIOPATHY WITHOUT GANGRENE (HCC): ICD-10-CM

## 2021-05-18 DIAGNOSIS — I50.22 CHRONIC SYSTOLIC HEART FAILURE (HCC): Primary | ICD-10-CM

## 2021-05-18 DIAGNOSIS — I25.10 CORONARY ARTERY DISEASE INVOLVING NATIVE HEART WITHOUT ANGINA PECTORIS, UNSPECIFIED VESSEL OR LESION TYPE: ICD-10-CM

## 2021-05-18 PROCEDURE — 99214 OFFICE O/P EST MOD 30 MIN: CPT | Performed by: FAMILY MEDICINE

## 2021-05-18 PROCEDURE — 99215 OFFICE O/P EST HI 40 MIN: CPT | Performed by: NURSE PRACTITIONER

## 2021-05-18 RX ORDER — TORSEMIDE 10 MG/1
TABLET ORAL
Qty: 90 TABLET | Refills: 3 | Status: SHIPPED | OUTPATIENT
Start: 2021-05-18 | End: 2021-06-01

## 2021-05-18 RX ORDER — CLOPIDOGREL BISULFATE 75 MG/1
75 TABLET ORAL DAILY
Qty: 90 TABLET | Refills: 3 | Status: SHIPPED | OUTPATIENT
Start: 2021-05-18 | End: 2022-05-18 | Stop reason: SDUPTHER

## 2021-05-18 RX ORDER — FUROSEMIDE 10 MG/ML
40 INJECTION INTRAMUSCULAR; INTRAVENOUS ONCE
Status: COMPLETED | OUTPATIENT
Start: 2021-05-18 | End: 2021-05-18

## 2021-05-18 RX ADMIN — FUROSEMIDE 40 MG: 10 INJECTION INTRAMUSCULAR; INTRAVENOUS at 16:30

## 2021-05-18 NOTE — PATIENT INSTRUCTIONS
Maintain a 2 gram daily sodium diet and 1500 ml daily fluid restriction  Check daily weights  If you gained 3 pounds in one day, 5 pounds in one week, or experience worsening shortness of breath or increasing lower leg swelling  Please call the heart failure office at [phone]    Please bring a  list of your current medications and daily weights to the office visit      Lab studies one week

## 2021-05-18 NOTE — ASSESSMENT & PLAN NOTE
[facility] pulmonology follows   Previous trials of multiple inhalers were unsuccessful  Patient uses oxygen at home with some relief of symptoms    He remains on CPAP for treatment of obstructive sleep apnea

## 2021-05-18 NOTE — PROGRESS NOTES
40 mg iv lasix given in Right hand without difficulty  Iv d/c'd, cath intact  Post Bp 86/54  Pt denies dizziness or lightheadedness  UO- 150 clear yellow urine    Talk about diet and fluid restriction  Advised will call pt tomorrow for update  Verbally understood

## 2021-05-18 NOTE — ASSESSMENT & PLAN NOTE
Wt Readings from Last 3 Encounters:   05/18/21 95 kg (209 lb 6 4 oz)   05/10/21 95 7 kg (211 lb)   03/11/21 99 3 kg (219 lb)    Worsening of dyspnea on exertion within past few weeks   Patient reported significant improvement after 1 dose IV Lasix   No improvement after dose of torsemide was increased from 10 mg daily to 20 mg daily for 2 weeks   Pending follow-up with Cardiology today, I recommended official follow-up with [facility] CH[facility]   Proceed with chest x-ray   Patient appears to be euvolemic on exam today

## 2021-05-18 NOTE — ASSESSMENT & PLAN NOTE
Lab Results   Component Value Date    EGFR 38 05/10/2021    EGFR 43 03/31/2021    EGFR 39 03/12/2021    CREATININE 1 73 (H) 05/10/2021    CREATININE 1 56 (H) 03/31/2021    CREATININE 1 68 (H) 03/12/2021     Overall stable BUN and creatinine   [facility] Nephrology follows  Current dose of torsemide as 10 mg daily

## 2021-05-18 NOTE — ASSESSMENT & PLAN NOTE
Lab Results   Component Value Date    HGBA1C 7 4 (H) 05/10/2021   Good glycemic control   Endocrinology follows

## 2021-05-18 NOTE — PROGRESS NOTES
FAMILY PRACTICE OFFICE VISIT       NAME: Thaddeus Artis  AGE: 68 y o  SEX: male       : 1945        MRN: [id number]        Assessment and Plan     1  Chronic systolic congestive heart failure (HCC)  Assessment & Plan:  Wt Readings from Last 3 Encounters:   21 95 kg (209 lb 6 4 oz)   05/10/21 95 7 kg (211 lb)   21 99 3 kg (219 lb)    Worsening of dyspnea on exertion within past few weeks   Patient reported significant improvement after 1 dose IV Lasix   No improvement after dose of torsemide was increased from 10 mg daily to 20 mg daily for 2 weeks   Pending follow-up with Cardiology today, I recommended official follow-up with [facility]   Proceed with chest x-ray   Patient appears to be euvolemic on exam today          Orders:  -     XR chest pa & lateral; Future; Expected date: 2021    2  Presence of drug coated stent in LAD coronary artery  Assessment & Plan:   Pending follow-up with [facility]  I wonder if patient may require cardiac catheterization  Concerned about persistent symptoms of dyspnea and  hypertension      3  Centrilobular emphysema ([address] )  Assessment & Plan:   [facility] pulmonology follows   Previous trials of multiple inhalers were unsuccessful  Patient uses oxygen at home with some relief of symptoms  He remains on CPAP for treatment of obstructive sleep apnea       4  Type 1 diabetes mellitus with diabetic peripheral angiopathy without gangrene [facility])  Assessment & Plan:    Lab Results   Component Value Date    HGBA1C 7 4 (H) 05/10/2021   Good glycemic control   Endocrinology follows      5  Coronary artery disease of native artery of native heart with stable angina pectoris [facility])  Assessment & Plan:   Most recent cardiac catheterization performed at [facility]  in spring of 2020, no interventions at that time  Patient remains on regimen of statin and aspirin      Beta-blocker was discontinued due to low blood pressures  Persistent symptoms of dyspnea on exertion, I am concerned that this represents angina equivalent  Patient denies symptoms of chest pain or dizziness  Pending follow-up with Cardiology today  6  Stage 3b chronic kidney disease [facility])  Assessment & Plan:  Lab Results   Component Value Date    EGFR 38 05/10/2021    EGFR 43 03/31/2021    EGFR 39 03/12/2021    CREATININE 1 73 (H) 05/10/2021    CREATININE 1 56 (H) 03/31/2021    CREATININE 1 68 (H) 03/12/2021     Overall stable BUN and creatinine   [facility] follows  Current dose of torsemide as 10 mg daily           There are no Patient Instructions on file for this visit  No follow-ups on file  Discussed with the patient and all questioned fully answered  He will call me if any problems arise  [employer] software was used to dictate this note  It may contain errors with dictating incorrect words/spelling  Please contact provider directly with any questions  Chief Complaint     Chief Complaint   Patient presents with    Shortness of Breath       History of Present Illness      Patient presents for follow-up  He is here today accompanied by his wife  He is complaining of persistent dyspnea  Patient and wife chest pain few weeks in [place]  Patient has notice worsening of dyspnea symptoms while in [place] and proceeded to emergency room for further evaluation  According to patient and his wife, chest x-ray performed in the ED showed signs of congestive heart failure  Patient has received 40 mg of Lasix IV  He has declined inpatient treatment and left ED  Patient reports that his breathing symptoms significantly improved after IV diuretic  Currently he is on torsemide 10 mg twice a day  Patient was seen by cardiologist while in [place]  Limited examination  Echocardiogram performed by cardiologist revealed ejection fraction of so 45%    Patient has double torsemide up to 20 mg daily following that with it for 2 weeks but has not noticed any further improvement in his breathing symptoms  His weight has been stable, actually he lost few lb since last office visit in March  Patient denies symptoms of leg edema  He denies symptoms of chest pain or dizziness  Patient's wife is concerned about rather low blood pressures, especially in the morning with readings of high 70s over 30s or 80s over 40s  Patient is not on any blood pressure lowering medications  Metoprolol was discontinued few months ago  Most recent blood work was performed at [facility] on May 10th  Creatinine was elevated at 1 73  Patient remains under care of [facility] Nephrology, he will be following up with labs and Dr Darrius Flores in 1 month     Recent evaluation by [facility] pulmonology  Patient started O2 on an as-needed basis with exertion and dyspnea  He is using CPAP for obstructive sleep apnea  Patient states that he sleeps great and feels the best overnight  He develops dyspnea with minimal exertion, such as walking up to 20 ft  Most recent cardiac catheterization performed at [facility] in 2018     Ccardia cath  3/ 2020 HUP:     There is evidence of coronary artery disease in a co-dominant system  The   right coronary artery is a small co-dominant vessel with 70% proximal and   50% mid stenoses  The LAD has a 40-50% stenosis proximally before widely   patent stents  The LCX gives rise to 3 OM branches  The distal LCX has a   75% in stent restenosis just before the small LPDA  The OM3 is  with   left to left collaterals  Access: Right radial artery     Shortness of Breath  Pertinent negatives include no chest pain, leg swelling or wheezing  Review of Systems   Review of Systems   Constitutional: Positive for fatigue (chronic)  HENT: Negative  Eyes: Negative  Respiratory: Positive for shortness of breath  Negative for cough and wheezing  Cardiovascular: Negative  Negative for chest pain, palpitations and leg swelling  Gastrointestinal: Negative  Endocrine: Negative  Genitourinary: Negative  Musculoskeletal: Positive for arthralgias  Allergic/Immunologic: Negative  Neurological: Negative  Hematological: Negative  Psychiatric/Behavioral: Negative          Active Problem List     Patient Active Problem List   Diagnosis    Type 1 diabetes mellitus with diabetic peripheral angiopathy without gangrene ([facility])    Presence of drug coated stent in LAD coronary artery    Coronary artery disease of native artery of native heart with stable angina pectoris ([facility])    Presence of drug coated stent in left circumflex coronary artery    Dyslipidemia    Obstructive sleep apnea of adult    Carotid artery stenosis, asymptomatic, bilateral    Restrictive lung disease    Centrilobular emphysema ([address] )    Benign hypertension with chronic kidney disease, stage III ([facility])    CKD (chronic kidney disease) stage 3, GFR 30-59 ml/min ([facility])    Renal artery stenosis ([address] )    Renal cyst, right    Vitamin D deficiency    Aortoiliac occlusive disease ([address] )    Hypothyroidism, postablative    Low back pain with sciatica    Lumbar disc herniation    Lumbar radiculopathy    Diabetic neuropathy associated with type 1 diabetes mellitus ([facility])    Chronic systolic congestive heart failure ([facility])    Anxiety with depression    Spinal stenosis of lumbar region with neurogenic claudication    Thrombocytopenia, unspecified ([address] )    S/P femoral-popliteal bypass surgery    Stenosis of carotid artery    Hypoxemic respiratory failure, chronic ([address] )       Past Medical History:  Past Medical History:   Diagnosis Date    Acute kidney injury ([address] )     resolved 11/30/2015    Acute venous embolism and thrombosis of deep vessels of proximal lower extremity ([address] )     resolved 04/04/2015    DARINEL [facility]) [facility] ) 1/12/2016    Anesthesia     RESPIRATORY ISSUES DUE TO SLEEP APNEA    Cardiac disease     heart attack, stents x 4    CH[facility] (congestive heart failure) [facility]     Chronic cough     resolved 02/04/2016    Chronic pain disorder     intermitent claudication    COPD (chronic obstructive pulmonary disease) ([facility])     Coronary artery disease     CPAP (continuous positive airway pressure) dependence     Diabetes mellitus ([address] )     Disease of thyroid gland     DVT (deep venous thrombosis) ([facility])     Heart failure (HCC)     Hyperlipidemia     Hypertension     Ischemic cardiomyopathy     last assessed 09/26/2017    Myocardial infarction [facility])     MI +2 2015,2018    NSTEMI (non-ST elevated myocardial infarction) ([address] ) 3/23/2019    Pulmonary emphysema (HCC)     Pulmonary granuloma (HCC)     resolved 02/03/2017    Renal failure     Sleep apnea        Past Surgical History:  Past Surgical History:   Procedure Laterality Date    BYPASS FEMORAL-POPLITEAL      initial stenosis with stent left, 7 x 100 smart stent onset 02/24/2014    CARDIAC SURGERY      cardiac stents    CATARACT EXTRACTION      COLOGUARD (HISTORICAL)  2018    CORONARY ANGIOPLASTY WITH STENT PLACEMENT      x4    IR AORTAGRAM WITH RUN-OFF  3/14/2019    MO THROMBOENDARTECTMY FEMORAL COMMON Left 3/22/2019    Procedure: ENDARTERECTOMY ARTERIAL FEMORAL WITH PATCH ANGIOPLASTY, BALLOON ANGIOPLASTY, STENT;  Surgeon: Bard Andreia MD;  Location[facility];  Service: Vascular    MO VEIN BYPASS GRAFT,FEM-POP Left 3/22/2019    Procedure: BYPASS FEMORAL-POPLITEAL WITH COMPLETION ARTERIOGRAM;  Surgeon: Bard Andreia MD;  [facility];  Service: Vascular    VASCULAR SURGERY      stent placement    WOUND DEBRIDEMENT Left 4/15/2019    Procedure: DEBRIDEMENT WOUND AND DRESSING CHANGE ([address]) left groin with VAC;  Surgeon: Ny Condon DO;  [facility];  Service: Vascular       Family History:  Family History   Problem Relation Age of Onset    Heart disease Father         pacer placement    Hypertension Father     Kidney disease Father     Heart failure Father     Heart attack Father     Liver disease Father     Cirrhosis Mother         due to beer consumption    Liver disease Mother     Diabetes Other        Social History:  Social History     Socioeconomic History    Marital status: /Civil Union     Spouse name: Not on file    Number of children: Not on file    Years of education: Not on file    Highest education level: Not on file   Occupational History    Occupation: Retired    Occupation: Desk work    Occupation: [employer]   Social Needs    Financial resource strain: Not on file    Food insecurity     Worry: Not on file     Inability: Not on file   [employer] needs     Medical: Not on file     Non-medical: Not on file   Tobacco Use    Smoking status: Former Smoker     Packs/day: 4 00     Years: 47 00     Pack years: 188 00     Types: Cigarettes     Start date:      Quit date:      Years since quittin 3    Smokeless tobacco: Never Used   Substance and Sexual Activity    Alcohol use:  Yes     Alcohol/week: 21 0 standard drinks     Types: 21 Standard drinks or equivalent per week     Frequency: 4 or more times a week     Drinks per session: 1 or 2     Binge frequency: Never     Comment: 2-3 glasses of vodka daily (6 shots) (history 2 drinks per day as per allscripts    Drug use: No    Sexual activity: Not Currently   Lifestyle    Physical activity     Days per week: Not on file     Minutes per session: Not on file    Stress: Not on file   Relationships    Social connections     Talks on phone: Not on file     Gets together: Not on file     Attends Alevism service: Not on file     Active member of club or organization: Not on file     Attends meetings of clubs or organizations: Not on file     Relationship status: Not on file    Intimate partner violence     Fear of current or ex partner: Not on file     Emotionally abused: Not on file     Physically abused: Not on file     Forced sexual activity: Not on file   Other Topics Concern    Not on file   Social History Narrative    Not on file         Objective     Vitals:    05/18/21 1255 05/18/21 1337   BP: 106/68 92/60   BP Location: Left arm    Patient Position: Sitting    Cuff Size: Large    Pulse: 83    Resp: (!) 23    Temp: (!) 97 3 °F (36 3 °C)    TempSrc: Temporal    SpO2: 92%    Weight: 95 kg (209 lb 6 4 oz)    Height: 5' 7" (1 702 m)        Wt Readings from Last 3 Encounters:   05/18/21 95 kg (209 lb 6 4 oz)   05/10/21 95 7 kg (211 lb)   03/11/21 99 3 kg (219 lb)       Physical Exam  Vitals signs and nursing note reviewed  Constitutional:       General: He is not in acute distress  Appearance: Normal appearance  He is well-developed and normal weight  He is not ill-appearing  HENT:      Head: Normocephalic and atraumatic  Eyes:      General: No scleral icterus  Conjunctiva/sclera: Conjunctivae normal    Neck:      Musculoskeletal: Neck supple  Vascular: No carotid bruit  Comments:  No JVD  Cardiovascular:      Rate and Rhythm: Normal rate and regular rhythm  Heart sounds: Normal heart sounds  No murmur  Pulmonary:      Effort: Pulmonary effort is normal  No respiratory distress  Breath sounds: Normal breath sounds  No wheezing or rales  Abdominal:      General: Bowel sounds are normal  There is no distension or abdominal bruit  Palpations: Abdomen is soft  Musculoskeletal: Normal range of motion  Right lower leg: No edema  Left lower leg: No edema  Neurological:      General: No focal deficit present  Mental Status: He is alert and oriented to person, place, and time  Cranial Nerves: No cranial nerve deficit  Psychiatric:         Mood and Affect: Mood normal          Behavior: Behavior normal          Thought Content:  Thought content normal           Pertinent Laboratory/Diagnostic Studies:    Lab Results   Component Value Date    WBC 6 79 05/10/2021    HGB 13 1 05/10/2021 HCT 40 8 05/10/2021     (H) 05/10/2021     (L) 05/10/2021       No results found for: TSH    Lab Results   Component Value Date    CHOL 129 10/01/2015     Lab Results   Component Value Date    TRIG 165 (H) 02/17/2021     Lab Results   Component Value Date    HDL 46 02/17/2021     Lab Results   Component Value Date    LDLCALC 65 02/17/2021     Lab Results   Component Value Date    HGBA1C 7 4 (H) 05/10/2021     Lab Results   Component Value Date    SODIUM 141 05/10/2021    K 4 2 05/10/2021     05/10/2021    CO2 28 05/10/2021    ANIONGAP 6 12/21/2015    AGAP 7 05/10/2021    BUN 32 (H) 05/10/2021    CREATININE 1 73 (H) 05/10/2021    GLUC 138 05/10/2021    GLUF 155 (H) 03/31/2021    CALCIUM 9 6 05/10/2021    AST 21 02/17/2021    ALT 23 02/17/2021    ALKPHOS 100 02/17/2021    PROT 7 6 10/01/2015    TP 7 5 02/17/2021    BILITOT 0 33 10/01/2015    TBILI 0 72 02/17/2021    EGFR 38 05/10/2021       Orders Placed This Encounter   Procedures    XR chest pa & lateral       ALLERGIES:  Allergies   Allergen Reactions    Doxazosin Myalgia     UNKNOWN  UNKNOWN  Muscle cramps    Iohexol      UNKNOWN    Lisinopril Cough     cough  Other reaction(s): CAMELLA SINENSIS (ZESTRIL) (cough)  cough    Vancomycin Hives    Adhesive [Medical Tape]      Skin Breakdown    Cardura [Doxazosin Mesylate]      Muscle cramps    Isosorbide Rash    Other      Iv dye for cardiac cath  Renal failure very close to dialysis  But no dialysis  Iv dye for cardiac cath  Renal failure very close to dialysis  But no dialysis  Iv dye for cardiac cath  Renal failure very close to dialysis  But no dialysis       Current Medications     Current Outpatient Medications   Medication Sig Dispense Refill    ascorbic acid (VITAMIN C) 250 mg tablet Take 250 mg by mouth daily      aspirin 81 MG tablet Take 81 mg by mouth daily        cholecalciferol (VITAMIN D3) 1,000 units tablet Take 1,000 Units by mouth daily       clopidogrel (PLAVIX) 75 mg tablet Take 1 tablet (75 mg total) by mouth daily 90 tablet 3    Coenzyme Q10 (COQ-10) 200 MG CAPS Take 200 mg by mouth daily      docusate sodium (COLACE) 50 mg capsule Take by mouth 2 (two) times a day as needed       gabapentin (NEURONTIN) 300 mg capsule TAKE ONE CAPSULE BY MOUTH THREE TIMES A DAY (Patient taking differently: daily ) 270 capsule 1    Glucagon (Baqsimi One Pack) 3 MG/DOSE POWD 1 Dose into each nostril as needed      Icosapent Ethyl (Vascepa) 1 g CAPS Take 1 capsule (1 g total) by mouth 2 (two) times a day 180 capsule 3    insulin glargine (Lantus) 100 units/mL subcutaneous injection Inject under the skin 40 units am- 20 units pm      insulin lispro (HUMALOG) 100 units/mL injection Inject 3 units with breakfast, 6 units at lunch, and 6 units at dinner (Patient taking differently: 4 (four) times a day Inject 3 units with breakfast, 6 units at lunch, and 6 units at dinner  Carbs counting) 10 mL 1    Lactobacillus Rhamnosus, GG, (CULTURELLE PO) Take by mouth      levothyroxine 200 mcg tablet Take 200 mcg by mouth daily  0    levothyroxine 25 mcg tablet TAKE ONE TABLET BY MOUTH EVERY DAY ALONG WITH 200MCG TABLET      Multiple Vitamins-Minerals (CENTRUM MEN) TABS Take by mouth Take one tablet in the morning      nitroglycerin (NITROSTAT) 0 4 mg SL tablet Place 1 tablet (0 4 mg total) under the tongue every 5 (five) minutes as needed for chest pain 25 tablet 3    polyethylene glycol (MIRALAX) 17 g packet Take 17 g by mouth daily      rosuvastatin (CRESTOR) 20 MG tablet Take 1 tablet (20 mg total) by mouth daily 90 tablet 3    torsemide (DEMADEX) 10 mg tablet Take 10 mg every other day 90 tablet 0    ULTICARE INSULIN SYRINGE 30G X 1/2" 1 ML MISC Use as directed  0    ammonium lactate (LAC-HYDRIN) 12 % lotion Apply topically 2 (two) times a day as needed for dry skin Apply to dry skin between the toes in the AM & in the PM (Patient not taking: Reported on 5/18/2021) 400 g 5    Lactobacillus Rhamnosus, GG, (CULTURELLE) CAPS Take 1 capsule by mouth daily       SF 1 1 % GEL BRUCH ONCE DAILY AT BEDTIME, BRUSH OF 1 MINUTE AS DIRECTED       No current facility-administered medications for this visit  There are no discontinued medications      Health Maintenance     Health Maintenance   Topic Date Due    SLP PLAN OF CARE  Never done    Lung Cancer Screening  Never done    Falls: Plan of Care  Never done    BMI: Followup Plan  08/18/2021    Medicare Annual Wellness Visit (AWV)  10/26/2021    Fall Risk  11/02/2021    HEMOGLOBIN A1C  11/10/2021    URINE MICROALBUMIN  02/17/2022    Diabetic Foot Exam  02/27/2022    BMI: Adult  05/18/2022    DM Eye Exam  02/26/2023    DTaP,Tdap,and Td Vaccines (2 - Td) 05/05/2023    Hepatitis C Screening  Completed    Pneumococcal Vaccine: 65+ Years  Completed    Influenza Vaccine  Completed    COVID-19 Vaccine  Completed    HIB Vaccine  Aged Out    Hepatitis B Vaccine  Aged Out    IPV Vaccine  Aged Out    Hepatitis A Vaccine  Aged Out    Meningococcal ACWY Vaccine  Aged Out    HPV Vaccine  Aged Out       Immunization History   Administered Date(s) Administered    DT (pediatric) 12/01/2009    H1N1, All Formulations 12/01/2009    INFLUENZA 10/01/2008, 10/06/2009, 09/01/2010, 11/04/2013, 08/29/2018, 09/09/2019, 09/05/2020    Influenza Split High Dose Preservative Free IM 08/28/2014, 10/03/2016, 08/29/2018    Influenza, seasonal, injectable 10/01/2008, 10/19/2010, 08/26/2011, 09/20/2011, 08/01/2012, 09/01/2012, 09/13/2013, 09/15/2015, 09/03/2017    Meningococcal C/Y-HIB PRP 12/01/2009    Pneumococcal Conjugate 13-Valent 08/11/2015    Pneumococcal Polysaccharide PPV23 10/01/2008, 06/04/2009, 03/15/2017    SARS-CoV-2 / COVID-19 mRNA IM (Darline Paz) 01/19/2021, 02/23/2021    Td (adult), adsorbed 03/04/2011    Tdap 05/05/2013    Zoster 10/01/2009    influenza, trivalent, adjuvanted 09/05/2019       Staci Oquendo MD

## 2021-05-18 NOTE — PROGRESS NOTES
Cardiology Follow Up    Nicole Pro  1945  [id number]  [facility] [place]  [address] [address] [phone]    No diagnosis found  Interval History:   Mr Fabiana Gonzalez was in [place] and exeprienced CP  He presented to the ED in [place]  CXR showed  Vascular congestion  He was treated with IV Lasix  He declined inpatient admission and was discharged  Mr Fabiana Gonzalez presents to our office with complaints of worsening shortness of breath with minimal exertion  He was seen by Dr Laina Conroy, Pulmonary  6 minutes walk test showed desaturation to 85% on room air  He required oxygen 2 L nasal cannula with exertion  Sam Evans received the oxygen at home  He does not have the oxygen with him at this office visit  Sam Evans denies CP,   Palpitations lightheadedness or dizziness  He admits to abdominal bloating  Sam Evans is not active and mostly sits throughout the day  He denies dietary indiscretion  Sam Evans is getting agitated and upset with asking questions in regard to his diet  He is not forthcoming and neither is his wife  Sam Evans admitted to eating Telugu onion soup for lunch today  CAD, 2014 stents x 4 LVH, 10/2018 re stenosis of LAD stent with stent placement  3/2020 [facility] prox RCA 70% stenosis, mid RCA 50% stenosis, LAD 40-50% stenosis, LCV 75% stenosis with in stent stenosis      Hypertension  Hyperlipidemia 2/17/21 , , HDL 46, LDL 65   Chronic systolic heart failure   ICM LVEF 40%  7/23/20 TTE LVEF 40%, grade 1 DD, mild MR,   CKD III baseline creat 1 65 - 1 77 follows with Dr Nell Abrams   DM1 HgbA1C 7 4 on 5/10/21  ITZEL complaint with CPAP at night   Chronic shortness of breath  PAD with hx of  Femoral popliteal bypass surgery, bilateral iliac stenting, right common femoral endarterectomy, right femoral above knee popliteal artery bypass  3/02/21 carotid doppler: 04% LICA stenosis , >43% Right subclavian artery stenosis  MR abdominal aorta and iliofemoral runoff angio with and without IV contrast:  Kissing iliac stents with multifocal high-grade and critical in stent  Restenoses  SFA popliteal patent popliteal arteries and right SFA patent stented extra anatomic bypass of the left S day runoff dissected 2 vessel runoff bilateral without clear patent in the peroneal arteries        Patient Active Problem List   Diagnosis    Type 1 diabetes mellitus with diabetic peripheral angiopathy without gangrene ([address] )    Presence of drug coated stent in LAD coronary artery    Coronary artery disease of native artery of native heart with stable angina pectoris ([facility])    Presence of drug coated stent in left circumflex coronary artery    Dyslipidemia    Obstructive sleep apnea of adult    Carotid artery stenosis, asymptomatic, bilateral    Restrictive lung disease    Centrilobular emphysema ([address] )    Benign hypertension with chronic kidney disease, stage III ([address] )    CKD (chronic kidney disease) stage 3, GFR 30-59 ml/min ([facility])    Renal artery stenosis ([address] )    Renal cyst, right    Vitamin D deficiency    Aortoiliac occlusive disease ([address] )    Hypothyroidism, postablative    Low back pain with sciatica    Lumbar disc herniation    Lumbar radiculopathy    Diabetic neuropathy associated with type 1 diabetes mellitus ([facility])    Chronic systolic congestive heart failure ([facility])    Anxiety with depression    Spinal stenosis of lumbar region with neurogenic claudication    Thrombocytopenia, unspecified ([address] )    S/P femoral-popliteal bypass surgery    Stenosis of carotid artery    Hypoxemic respiratory failure, chronic ([address] )     Past Medical History:   Diagnosis Date    Acute kidney injury ([address] )     resolved 11/30/2015    Acute venous embolism and thrombosis of deep vessels of proximal lower extremity ([address] )     resolved 04/04/2015    DARINEL (acute kidney injury) ([address] ) 1/12/2016    Anesthesia RESPIRATORY ISSUES DUE TO SLEEP APNEA    Cardiac disease     heart attack, stents x 4    CHF (congestive heart failure) ([facility])     Chronic cough     resolved 2016    Chronic pain disorder     intermitent claudication    COPD (chronic obstructive pulmonary disease) ([facility])     Coronary artery disease     CPAP (continuous positive airway pressure) dependence     Diabetes mellitus ([facility] )     Disease of thyroid gland     DVT (deep venous thrombosis) ([facility])     Heart failure ([facility])     Hyperlipidemia     Hypertension     Ischemic cardiomyopathy     last assessed 2017    Myocardial infarction [facility])     MI +2     NSTEMI (non-ST elevated myocardial infarction) ([facility] ) 3/23/2019    Pulmonary emphysema ([facility])     Pulmonary granuloma ([facility])     resolved 2017    Renal failure     Sleep apnea      Social History     Socioeconomic History    Marital status: /Civil Union     Spouse name: Not on file    Number of children: Not on file    Years of education: Not on file    Highest education level: Not on file   Occupational History    Occupation: Retired    Occupation: Desk work    Occupation: [employer]   Social Needs    Financial resource strain: Not on file    Food insecurity     Worry: Not on file     Inability: Not on file   [employer] needs     Medical: Not on file     Non-medical: Not on file   Tobacco Use    Smoking status: Former Smoker     Packs/day: 4 00     Years: 47 00     Pack years: 188 00     Types: Cigarettes     Start date:      Quit date:      Years since quittin 3    Smokeless tobacco: Never Used   Substance and Sexual Activity    Alcohol use:  Yes     Alcohol/week: 21 0 standard drinks     Types: 21 Standard drinks or equivalent per week     Frequency: 4 or more times a week     Drinks per session: 1 or 2     Binge frequency: Never     Comment: 2-3 glasses of vodka daily (6 shots) (history 2 drinks per day as per allscripts  Drug use: No    Sexual activity: Not Currently   Lifestyle    Physical activity     Days per week: Not on file     Minutes per session: Not on file    Stress: Not on file   Relationships    Social connections     Talks on phone: Not on file     Gets together: Not on file     Attends Alevism service: Not on file     Active member of club or organization: Not on file     Attends meetings of clubs or organizations: Not on file     Relationship status: Not on file    Intimate partner violence     Fear of current or ex partner: Not on file     Emotionally abused: Not on file     Physically abused: Not on file     Forced sexual activity: Not on file   Other Topics Concern    Not on file   Social History Narrative    Not on file      Family History   Problem Relation Age of Onset    Heart disease Father         pacer placement    Hypertension Father     Kidney disease Father     Heart failure Father     Heart attack Father     Liver disease Father     Cirrhosis Mother         due to beer consumption    Liver disease Mother     Diabetes Other      Past Surgical History:   Procedure Laterality Date    BYPASS FEMORAL-POPLITEAL      initial stenosis with stent left, 7 x 100 smart stent onset 02/24/2014    CARDIAC SURGERY      cardiac stents    CATARACT EXTRACTION      COLOGUARD (HISTORICAL)  2018    CORONARY ANGIOPLASTY WITH STENT PLACEMENT      x4    IR AORTAGRAM WITH RUN-OFF  3/14/2019    VT THROMBOENDARTECTMY FEMORAL COMMON Left 3/22/2019    Procedure: ENDARTERECTOMY ARTERIAL FEMORAL WITH PATCH ANGIOPLASTY, BALLOON ANGIOPLASTY, STENT;  Surgeon: Aaron Hernandez MD;  [facility];  Service: Vascular    VT VEIN BYPASS GRAFT,FEM-POP Left 3/22/2019    Procedure: BYPASS FEMORAL-POPLITEAL WITH COMPLETION ARTERIOGRAM;  Surgeon: Aaron Hernandez MD;  [facility];  Service: Vascular    VASCULAR SURGERY      stent placement    WOUND DEBRIDEMENT Left 4/15/2019    Procedure: DEBRIDEMENT WOUND AND DRESSING CHANGE [facility]) left groin with VAC;  Surgeon: Page Reynoso DO;  [facility];  Service: Vascular       Current Outpatient Medications:     ammonium lactate (LAC-HYDRIN) 12 % lotion, Apply topically 2 (two) times a day as needed for dry skin Apply to dry skin between the toes in the AM & in the PM (Patient not taking: Reported on 5/18/2021), Disp: 400 g, Rfl: 5    ascorbic acid (VITAMIN C) 250 mg tablet, Take 250 mg by mouth daily, Disp: , Rfl:     aspirin 81 MG tablet, Take 81 mg by mouth daily  , Disp: , Rfl:     cholecalciferol (VITAMIN D3) 1,000 units tablet, Take 1,000 Units by mouth daily , Disp: , Rfl:     clopidogrel (PLAVIX) 75 mg tablet, Take 1 tablet (75 mg total) by mouth daily, Disp: 90 tablet, Rfl: 3    Coenzyme Q10 (COQ-10) 200 MG CAPS, Take 200 mg by mouth daily, Disp: , Rfl:     docusate sodium (COLACE) 50 mg capsule, Take by mouth 2 (two) times a day as needed , Disp: , Rfl:     gabapentin (NEURONTIN) 300 mg capsule, TAKE ONE CAPSULE BY MOUTH THREE TIMES A DAY (Patient taking differently: daily ), Disp: 270 capsule, Rfl: 1    Glucagon (Baqsimi One Pack) 3 MG/DOSE POWD, 1 Dose into each nostril as needed, Disp: , Rfl:     Icosapent Ethyl (Vascepa) 1 g CAPS, Take 1 capsule (1 g total) by mouth 2 (two) times a day, Disp: 180 capsule, Rfl: 3    insulin glargine (Lantus) 100 units/mL subcutaneous injection, Inject under the skin 40 units am- 20 units pm, Disp: , Rfl:     insulin lispro (HUMALOG) 100 units/mL injection, Inject 3 units with breakfast, 6 units at lunch, and 6 units at dinner (Patient taking differently: 4 (four) times a day Inject 3 units with breakfast, 6 units at lunch, and 6 units at dinner Carbs counting), Disp: 10 mL, Rfl: 1    Lactobacillus Rhamnosus, GG, (CULTURELLE PO), Take by mouth, Disp: , Rfl:     Lactobacillus Rhamnosus, GG, (CULTURELLE) CAPS, Take 1 capsule by mouth daily , Disp: , Rfl:     levothyroxine 200 mcg tablet, Take 200 mcg by mouth daily, Disp: , Rfl: 0    levothyroxine 25 mcg tablet, TAKE ONE TABLET BY MOUTH EVERY DAY ALONG WITH 200MCG TABLET, Disp: , Rfl:     Multiple Vitamins-Minerals (CENTRUM MEN) TABS, Take by mouth Take one tablet in the morning, Disp: , Rfl:     nitroglycerin (NITROSTAT) 0 4 mg SL tablet, Place 1 tablet (0 4 mg total) under the tongue every 5 (five) minutes as needed for chest pain, Disp: 25 tablet, Rfl: 3    polyethylene glycol (MIRALAX) 17 g packet, Take 17 g by mouth daily, Disp: , Rfl:     rosuvastatin (CRESTOR) 20 MG tablet, Take 1 tablet (20 mg total) by mouth daily, Disp: 90 tablet, Rfl: 3    SF 1 1 % GEL, BRUCH ONCE DAILY AT BEDTIME, BRUSH OF 1 MINUTE AS DIRECTED, Disp: , Rfl:     torsemide (DEMADEX) 10 mg tablet, Take 10 mg every other day, Disp: 90 tablet, Rfl: 0    ULTICARE INSULIN SYRINGE 30G X 1/2" 1 ML MISC, Use as directed, Disp: , Rfl: 0  Allergies   Allergen Reactions    Doxazosin Myalgia     UNKNOWN  UNKNOWN  Muscle cramps    Iohexol      UNKNOWN    Lisinopril Cough     cough  Other reaction(s): CAMELLA SINENSIS (ZESTRIL) (cough)  cough    Vancomycin Hives    Adhesive [Medical Tape]      Skin Breakdown    Cardura [Doxazosin Mesylate]      Muscle cramps    Isosorbide Rash    Other      Iv dye for cardiac cath  Renal failure very close to dialysis  But no dialysis  Iv dye for cardiac cath  Renal failure very close to dialysis  But no dialysis  Iv dye for cardiac cath  Renal failure very close to dialysis  But no dialysis       Labs:  Appointment on 05/10/2021   Component Date Value    PTH 05/10/2021 52 5     Magnesium 05/10/2021 2 3     WBC 05/10/2021 6 79     RBC 05/10/2021 4 06     Hemoglobin 05/10/2021 13 1     Hematocrit 05/10/2021 40 8     MCV 05/10/2021 101*    MCH 05/10/2021 32 3     MCHC 05/10/2021 32 1     RDW 05/10/2021 13 5     MPV 05/10/2021 12 9*    Platelets 56/76/5966 122*    nRBC 05/10/2021 0     Neutrophils Relative 05/10/2021 48     Immat GRANS % 05/10/2021 0     Lymphocytes Relative 05/10/2021 30     Monocytes Relative 05/10/2021 13*    Eosinophils Relative 05/10/2021 9*    Basophils Relative 05/10/2021 0     Neutrophils Absolute 05/10/2021 3 25     Immature Grans Absolute 05/10/2021 0 02     Lymphocytes Absolute 05/10/2021 2 00     Monocytes Absolute 05/10/2021 0 86     Eosinophils Absolute 05/10/2021 0 63*    Basophils Absolute 05/10/2021 0 03     Uric Acid 05/10/2021 8 3*    Albumin 05/10/2021 3 7     Calcium 05/10/2021 9 6     Phosphorus 05/10/2021 3 7     Glucose 05/10/2021 138     BUN 05/10/2021 32*    Creatinine 05/10/2021 1 73*    Sodium 05/10/2021 141     Potassium 05/10/2021 4 2     Chloride 05/10/2021 106     CO2 05/10/2021 28     ANION GAP 05/10/2021 7     eGFR 05/10/2021 38     TSH 3RD GENERATON 05/10/2021 1 870     Hemoglobin A1C 05/10/2021 7 4*    EAG 05/10/2021 166    Appointment on 03/31/2021   Component Date Value    Sodium 03/31/2021 139     Potassium 03/31/2021 4 0     Chloride 03/31/2021 107     CO2 03/31/2021 26     ANION GAP 03/31/2021 6     BUN 03/31/2021 27*    Creatinine 03/31/2021 1 56*    Glucose, Fasting 03/31/2021 155*    Calcium 03/31/2021 9 2     eGFR 03/31/2021 43      Imaging: No results found  Review of Systems:  Review of Systems   Constitutional: Positive for fatigue  Respiratory: Positive for shortness of breath  Musculoskeletal: Positive for arthralgias and myalgias  All other systems reviewed and are negative  Physical Exam:  Physical Exam  Vitals signs reviewed  Constitutional:       Appearance: Normal appearance  HENT:      Head: Normocephalic  Eyes:      Pupils: Pupils are equal, round, and reactive to light  Neck:      Musculoskeletal: Normal range of motion  Comments: Slight + JVD   Cardiovascular:      Rate and Rhythm: Normal rate and regular rhythm  Pulses: Normal pulses  Heart sounds: Normal heart sounds     Pulmonary: Effort: Pulmonary effort is normal       Breath sounds: Rales present  Comments: Fine bibasilar rales   Abdominal:      General: Bowel sounds are normal       Palpations: Abdomen is soft  Musculoskeletal: Normal range of motion  Right lower leg: No edema  Left lower leg: No edema  Skin:     General: Skin is warm and dry  Capillary Refill: Capillary refill takes less than 2 seconds  Neurological:      Mental Status: He is alert and oriented to person, place, and time  Mental status is at baseline  Psychiatric:         Mood and Affect: Mood normal          Discussion/Summary:  1  Chronic systolic heart failure NYHA Class III to IV stage C- weight down from 219 pounds in March to 210 pounds today  On PE with slight bibasilar rales, slight JVD  Dietary indiscretion  On torsemide 10mg daily, Chris received IV Lasix 40mg in the office today  /60 and HR 96 BPM   He is not on GDMT  He is requesting to follow up with Dr Adi Welch  SBP after IV Lasix 80  Hold off on adding Toprol or isosorbide dinitrate at this time  Not on ACE/ARB/ARNI due to CKDIIIb  I have re enforced a 2gm sodium diet, 1500 cc fluid restriction  BMP and NT pro BNP to be done in one week  2  ICM LVEFI 40% LVIDd 5 72cm - not on GDMT  3  CAD, 2014 stents x 4 LVH, 10/2018 re stenosis of LAD stent with stent placement  3/2020 [facility] prox RCA 70% stenosis, mid RCA 50% stenosis, LAD 40-50% stenosis, LCV 75% stenosis with in stent stenosis- Denies CP, possible progression of CAD contributing to shortness of breath  Ramya Lantigua is refusing stress test  Continue on Plavix 75mg daily and Crestor 20mg daily    4  Chronic hypoxic respiratory failure requiring oxygen 2 LNC with ambulation  Ramya Lantigua is not using oxygen today  He does not have a porable tank and find the large tank too cumbersome  I have re enforced the need to use oxygen 2LNC with ambulation  I have suggested pulmonary rehabilitation  Ramya Lantigua is refusing     5  CKD IIIb baseline creat 1 65 - 1 77 follows with KARIME Cohn in one week   6  IZTEL complaint with CPAP at night

## 2021-05-18 NOTE — ASSESSMENT & PLAN NOTE
Most recent cardiac catheterization performed at [facility] in spring of 2020, no interventions at that time  Patient remains on regimen of statin and aspirin  Beta-blocker was discontinued due to low blood pressures  Persistent symptoms of dyspnea on exertion, I am concerned that this represents angina equivalent  Patient denies symptoms of chest pain or dizziness  Pending follow-up with Cardiology today

## 2021-05-18 NOTE — ASSESSMENT & PLAN NOTE
Pending follow-up with [facility]      I wonder if patient may require cardiac catheterization  Concerned about persistent symptoms of dyspnea and  hypertension

## 2021-05-19 ENCOUNTER — TELEPHONE (OUTPATIENT)
Dept: CARDIOLOGY CLINIC | Facility: CLINIC | Age: 76
End: 2021-05-19

## 2021-05-19 NOTE — TELEPHONE ENCOUNTER
Spoke with pt  His wt is 204 8 today up from 204 2 yesterday  His breathing is no better, no worse  Took his torsemide 10 mg QOD today  He feels like he is urinating more today  Will call him tomorrow for update        Please advise

## 2021-05-20 ENCOUNTER — APPOINTMENT (OUTPATIENT)
Dept: RADIOLOGY | Age: 76
End: 2021-05-20
Payer: MEDICARE

## 2021-05-20 DIAGNOSIS — I50.22 CHRONIC SYSTOLIC CONGESTIVE HEART FAILURE (HCC): ICD-10-CM

## 2021-05-20 PROCEDURE — 71046 X-RAY EXAM CHEST 2 VIEWS: CPT

## 2021-05-21 NOTE — TELEPHONE ENCOUNTER
Spoke with pt and wife  Wt down 1 6 lbs  Breathing has improved and he has F/u with Dr Johnathan Ramsey on Monday

## 2021-05-24 ENCOUNTER — OFFICE VISIT (OUTPATIENT)
Dept: CARDIOLOGY CLINIC | Facility: CLINIC | Age: 76
End: 2021-05-24
Payer: MEDICARE

## 2021-05-24 VITALS
HEIGHT: 67 IN | BODY MASS INDEX: 32.6 KG/M2 | OXYGEN SATURATION: 98 % | HEART RATE: 88 BPM | WEIGHT: 207.7 LBS | SYSTOLIC BLOOD PRESSURE: 104 MMHG | DIASTOLIC BLOOD PRESSURE: 60 MMHG

## 2021-05-24 DIAGNOSIS — G47.33 OBSTRUCTIVE SLEEP APNEA OF ADULT: ICD-10-CM

## 2021-05-24 DIAGNOSIS — Z95.828 S/P FEMORAL-POPLITEAL BYPASS SURGERY: ICD-10-CM

## 2021-05-24 DIAGNOSIS — I50.22 CHRONIC SYSTOLIC CONGESTIVE HEART FAILURE (HCC): Chronic | ICD-10-CM

## 2021-05-24 DIAGNOSIS — J96.11 HYPOXEMIC RESPIRATORY FAILURE, CHRONIC (HCC): ICD-10-CM

## 2021-05-24 DIAGNOSIS — I25.118 CORONARY ARTERY DISEASE OF NATIVE ARTERY OF NATIVE HEART WITH STABLE ANGINA PECTORIS (HCC): ICD-10-CM

## 2021-05-24 DIAGNOSIS — J98.4 RESTRICTIVE LUNG DISEASE: ICD-10-CM

## 2021-05-24 DIAGNOSIS — I50.22 CHRONIC SYSTOLIC HEART FAILURE (HCC): Primary | ICD-10-CM

## 2021-05-24 PROCEDURE — 99204 OFFICE O/P NEW MOD 45 MIN: CPT | Performed by: INTERNAL MEDICINE

## 2021-05-24 RX ORDER — METOPROLOL SUCCINATE 25 MG/1
12.5 TABLET, EXTENDED RELEASE ORAL DAILY
Qty: 30 TABLET | Refills: 1 | Status: SHIPPED | OUTPATIENT
Start: 2021-05-24 | End: 2021-07-12 | Stop reason: SDUPTHER

## 2021-05-24 NOTE — PROGRESS NOTES
Advanced Heart Failure Outpatient Consult Note - Madi Villalba 68 y o  male MRN: [id number]    Encounter: [id number]      Assessment/Plan:    Patient Active Problem List    Diagnosis Date Noted    Hypoxemic respiratory failure, chronic ([facility] ) 05/10/2021     Priority: Low    Stenosis of carotid artery 08/24/2020     Priority: Low    Thrombocytopenia, unspecified ([facility] ) 03/10/2020     Priority: Low    Spinal stenosis of lumbar region with neurogenic claudication      Priority: Low    S/P femoral-popliteal bypass surgery 01/09/2020     Priority: Low    Anxiety with depression 04/07/2019     Priority: Low    Chronic systolic congestive heart failure ([facility] ) 03/01/2019     Priority: Low    Diabetic neuropathy associated with type 1 diabetes mellitus ([facility] ) 01/14/2019     Priority: Low    Lumbar disc herniation 08/01/2018     Priority: Low    Lumbar radiculopathy 08/01/2018     Priority: Low    Aortoiliac occlusive disease ([facility] ) 03/01/2018     Priority: Low    Restrictive lung disease 01/30/2018     Priority: Low    Centrilobular emphysema ([facility] ) 01/30/2018     Priority: Low    Presence of drug coated stent in LAD coronary artery 01/24/2018     Priority: Low    Coronary artery disease of native artery of native heart with stable angina pectoris ([facility] ) 01/24/2018     Priority: Low    Presence of drug coated stent in left circumflex coronary artery 01/24/2018     Priority: Low    Dyslipidemia 01/24/2018     Priority: Low    Obstructive sleep apnea of adult 01/24/2018     Priority: Low    Carotid artery stenosis, asymptomatic, bilateral 01/24/2018     Priority: Low    CKD (chronic kidney disease) stage 3, GFR 30-59 ml/min ([facility]) 12/13/2016     Priority: Low    Low back pain with sciatica 08/22/2016     Priority: Low    Type 1 diabetes mellitus with diabetic peripheral angiopathy without gangrene ([facility] ) 01/12/2016     Priority: Low    Benign hypertension with chronic kidney disease, stage III ([facility] ) 08/12/2015 Priority: Low    Renal cyst, right 08/12/2015     Priority: Low    Vitamin D deficiency 05/22/2015     Priority: Low    Renal artery stenosis ([address] ) 05/09/2015     Priority: Low    Hypothyroidism, postablative 04/02/2013     Priority: Low         Heart failure with reduced EF, Stage C, NYHA III/IV  Etiology: ischemic  Mildly volume up to euvolemic on exam    Weight: 207 lbs <<210 << 211  NT proBNP:     Studies- personally reviewed by me    TTE 4/22/21:  LVEF 45-50%  Grade 1 diastology  LVIDd 6 7cm  Grade 1 diastology    Echocardiogram 7/23/20  LVEF: 40%, hypokinesis of inferoseptal, inferior and inferolateral walls  LVIDd: 5 7cm  RV: normal size and function  MR: mild  PASP: inadequate TR jet for estimation  RVOT: probable midsystolic notching although no interventricular septal flattening  Other: Grade 1 diastology    C [facility] 3/6/20: RCA with 70% proximal and 50% mid stenoses  LAD 40-50% stenosis proximally before widely patent stents  Distal left Cx 75% in stent restenosis just before the small LPDA  OM 3 is  with left to left collaterals  Co-dominant RCA  Medically managed (done preop for LE vascular surgery for very severe claudication)  CHF, hypotension, 16 days, exposned to covid    TTE 2/6/20 [facility]: LVEF 56%  LVIDD 5 6cm    TTE 3/23/2019: LVEF 45%  LVIDD 6cm  Normal RV size and function  PASP 57mmHg    6MWT 5/10/21:  Oxygen saturations 97% on room air at rest, with ambulating for 4 minutes oxygen saturation drops down to 85% on room air requiring 2 L nasal cannula   Oxygen saturation is 90 8% with 2 L nasal cannula   Total walk distance is 346 meters   Resting heart rate is 76 beats per minute upon ambulation maximum heart rate was 100 beats per minute    9/18/20 HRCT:   VESSELS:  Unremarkable for patient's age  IMPRESSION:  Moderate upper lobe predominant panlobular emphysema, without significant change since 2015    Mild lower lung predominant juxtapleural reticulation due to fibrosis, slightly more extensive on the right, with no traction bronchiolectasis or honeycombing     8/24/20 PFT:   The reduced total lung capacity at 50% predicted and residual volume at 34% predicted  Restrictive pattern on the spirometry  Reduced diffusion capacity  (40% predicted)  Normal airway resistance as indicated by the specific airway conductance  Diet:  2 g sodium diet  2000 mL fluid restriction    Neurohormonal Blockade:  --Beta-Blocker:  --ACEi, ARB or ARNi:  --Aldosterone Receptor Blocker:  --SGLT2 Inhibitor:  --Diuretic: torsemide 10mg daily    Sudden Cardiac Death Risk Reduction:  --ICD:     Electrical Resynchronization:  --Candidacy for BiV device:    Advanced Therapies (if appropriate): --Inotrope:  --LVAD/Transplant Candidacy:    Coronary artery disease, 2014 stents x 4 at [facility], 10/2018 re stenosis of LAD stent with stent placement  Rx: aspirin, plavix, rosuvastatin 20mg  Chronic hypoxic respiratory failure on home oxygen  CKD stage 3  Baseline creatinine 1 6-1 7  ITZEL on CPAP  DM type  PAD with hx of  Femoral popliteal bypass surgery, bilateral iliac stenting, right common femoral endarterectomy, right femoral above knee popliteal artery bypass  Carotid artery stenosis  Hypertension  Hyperlipidemia    Today's Plan:  Trial of low dose metoprolol succinate 12 5mg daily  BP low for optimization of guideline directed medical therapy  Continue torsemide 10mg daily  Use oxygen as prescribed, 2L/NC on exertion  Follow up when patient returns from [place]  To consider [address] if without improvement of symptoms on oxygen supplementation    HPI:   68year old male with PMH as above who is here for heart failure evaluation  Per Dr Loco Saleh on 3/11/21: He was hospitalized March 2019 for PVD with left femoral endarterectomy with bovine patch and left femoral to above the knee bypass with left external iliac artery stent by Dr Mare Miller [facility] did have intermittent angina during that postoperative period   Thereafter he required debridement in reference to a left groin infection 4/15/19   Patient has known history of CAD with remote cardiac catheterization and intervention performed at [facility] with placement of 4 stents in 2015  He had a second cardiac catheterization October 2018 with re-stenosis of the LAD and stent placement in [place]   A 50% left circumflex lesion was also noted   Patient had an echocardiogram performed March 2019 which demonstrated a mild reduction in LV systolic function in the setting of a mid apical anterior inferior and inferolateral wall motion abnormality   The resting left ventricular ejection fraction was 45-50%  There was no significant valvular heart disease   He has CRI   He is followed by Nephrology service  Yudy Castro was seen by Cardiology in February 2020 at [facility] underwent cardiac catheterization March 6, 2020 by the radial approach in the setting of an abnormal pharmacologic nuclear stress test done February 6, 2020   He was found to have a 50 and 70% stenosis in a small non dominant right coronary vessel   A short left main with a left dominant system was noted  A 40% stenosis in LAD and thereafter widely patent stents   A patent obtuse OMB 1 and 2 with a large OMB 3 which is a  that receives left-to-left collaterals with distal disease in that vessel noted  He was managed medically and cleared for surgery   Echocardiogram done July 2020 demonstrated a left ventricular ejection fraction of 46% with segmental wall motion abnormality noted  There was mild LVH and mild mitral regurgitation   Patient was  hospitalized at [facility] 4/2020 in reference to need for lower extremity revascularization   He had an urgent right common femoral endarterectomy as well as right femoral to above the knee popliteal artery bypass   His baseline dry weight is 208 according to his wife who is a retired RN  Fermín Mayo is followed by Pulmonary service in reference to COPD and obstructive sleep apnea  Fermín Mayo has at least 48 pack years and perhaps more but discontinued smoking around 2008  He was seen by our nurse practitioner February 2021 with shortness of breath  He was placed on Imdur in reference to chest pain and shortness of breath  He did not tolerate this in the setting of uriticaria and nausea  It was discontinued  Lipid profile done February 2021 demonstrated total cholesterol of 144 with an HDL of 46 and a calculated LDL of 65  Torsemide is on hold per Nephrology unless weight gain develops  He was seen in the ED in [place] for CHF exacerbation and was given a dose of IV lasix and was discharged home    Echocardiogram on 4/22/21 showed EF 45-50%, dilated LV and normal right heart size and function  Seen in follow up by Vickie Mike on 5/18/21  BP was limiting for addition of medications  C/o dyspnea at that time and does not want to undergo stress testing  He had recent 6MWT which showed desaturation on exertion and was prescribed 2L on exertion but only uses when symptomatic      Past Medical History:   Diagnosis Date    Acute kidney injury ([facility] )     resolved 11/30/2015    Acute venous embolism and thrombosis of deep vessels of proximal lower extremity ([address] )     resolved 04/04/2015    DARINEL (acute kidney injury) ([facility] ) 1/12/2016    Anesthesia     RESPIRATORY ISSUES DUE TO SLEEP APNEA    Cardiac disease     heart attack, stents x 4    [facility]est[facility] failure) [facility]     Chronic cough     resolved 02/04/2016    Chronic pain disorder     intermitent claudication    COPD (chronic obstructive pulmonary disease) ([facility]     Coronary artery disease     CPAP (continuous positive airway pressure) dependence     Diabetes mellitus ([address] )     Disease of thyroid gland     DVT (deep venous thrombosis) ([facility]     Heart failure ([address] )     Hyperlipidemia     Hypertension     Ischemic cardiomyopathy     last assessed 09/26/2017    Myocardial infarction [facility])     MI +[phone]    NSTEMI (non-ST elevated myocardial infarction) ([facility] ) 3/23/2019    Pulmonary emphysema (HCC)     Pulmonary granuloma ([facility] )     resolved 02/03/2017    Renal failure     Sleep apnea        Review of Systems   Constitutional: Positive for fatigue  Negative for chills and fever  HENT: Negative for ear pain and sore throat  Eyes: Negative for pain and visual disturbance  Respiratory: Positive for shortness of breath  Negative for cough  Cardiovascular: Negative for chest pain, palpitations and leg swelling  Gastrointestinal: Negative for abdominal pain and vomiting  Genitourinary: Negative for dysuria and hematuria  Musculoskeletal: Negative for arthralgias and back pain  Skin: Negative for color change and rash  Neurological: Negative for dizziness, seizures, syncope and light-headedness  All other systems reviewed and are negative  Allergies   Allergen Reactions    Doxazosin Myalgia     UNKNOWN  UNKNOWN  Muscle cramps    Iohexol      UNKNOWN    Lisinopril Cough     cough  Other reaction(s): CAMELLA SINENSIS (ZESTRIL) (cough)  cough    Vancomycin Hives    Adhesive [Medical Tape]      Skin Breakdown    Cardura [Doxazosin Mesylate]      Muscle cramps    Isosorbide Rash    Other      Iv dye for cardiac cath  Renal failure very close to dialysis  But no dialysis  Iv dye for cardiac cath  Renal failure very close to dialysis  But no dialysis  Iv dye for cardiac cath  Renal failure very close to dialysis  But no dialysis       Current Outpatient Medications:     ascorbic acid (VITAMIN C) 250 mg tablet, Take 250 mg by mouth daily, Disp: , Rfl:     aspirin 81 MG tablet, Take 81 mg by mouth daily  , Disp: , Rfl:     cholecalciferol (VITAMIN D3) 1,000 units tablet, Take 1,000 Units by mouth daily , Disp: , Rfl:     clopidogrel (PLAVIX) 75 mg tablet, Take 1 tablet (75 mg total) by mouth daily, Disp: 90 tablet, Rfl: 3    Coenzyme Q10 (COQ-10) 200 MG CAPS, Take 200 mg by mouth daily, Disp: , Rfl:    docusate sodium (COLACE) 50 mg capsule, Take by mouth 2 (two) times a day as needed , Disp: , Rfl:     gabapentin (NEURONTIN) 300 mg capsule, TAKE ONE CAPSULE BY MOUTH THREE TIMES A DAY (Patient taking differently: 2 (two) times a day ), Disp: 270 capsule, Rfl: 1    Glucagon (Baqsimi One Pack) 3 MG/DOSE POWD, 1 Dose into each nostril as needed, Disp: , Rfl:     Icosapent Ethyl (Vascepa) 1 g CAPS, Take 1 capsule (1 g total) by mouth 2 (two) times a day, Disp: 180 capsule, Rfl: 3    insulin glargine (Lantus) 100 units/mL subcutaneous injection, Inject under the skin 40 units am- 8 units pm, Disp: , Rfl:     insulin lispro (HUMALOG) 100 units/mL injection, Inject 3 units with breakfast, 6 units at lunch, and 6 units at dinner (Patient taking differently: 4 (four) times a day Inject 3 units with breakfast, 6 units at lunch, and 6 units at dinner Carbs counting), Disp: 10 mL, Rfl: 1    Lactobacillus Rhamnosus, GG, (CULTURELLE PO), Take by mouth, Disp: , Rfl:     levothyroxine 200 mcg tablet, Take 200 mcg by mouth daily, Disp: , Rfl: 0    levothyroxine 25 mcg tablet, TAKE ONE TABLET BY MOUTH EVERY DAY ALONG WITH 200MCG TABLET, Disp: , Rfl:     Multiple Vitamins-Minerals (CENTRUM MEN) TABS, Take by mouth Take one tablet in the morning, Disp: , Rfl:     polyethylene glycol (MIRALAX) 17 g packet, Take 17 g by mouth daily, Disp: , Rfl:     rosuvastatin (CRESTOR) 20 MG tablet, Take 1 tablet (20 mg total) by mouth daily, Disp: 90 tablet, Rfl: 3    SF 1 1 % GEL, BRUCH ONCE DAILY AT BEDTIME, BRUSH OF 1 MINUTE AS DIRECTED, Disp: , Rfl:     torsemide (DEMADEX) 10 mg tablet, Take 10 mg every other day (Patient taking differently: 10 mg daily ), Disp: 90 tablet, Rfl: 3    ULTICARE INSULIN SYRINGE 30G X 1/2" 1 ML MISC, Use as directed, Disp: , Rfl: 0    ammonium lactate (LAC-HYDRIN) 12 % lotion, Apply topically 2 (two) times a day as needed for dry skin Apply to dry skin between the toes in the AM & in the PM (Patient not taking: Reported on 2021), Disp: 400 g, Rfl: 5    Lactobacillus Rhamnosus, GG, (CULTURELLE) CAPS, Take 1 capsule by mouth daily , Disp: , Rfl:     metoprolol succinate (TOPROL-XL) 25 mg 24 hr tablet, Take 0 5 tablets (12 5 mg total) by mouth daily, Disp: 30 tablet, Rfl: 1    nitroglycerin (NITROSTAT) 0 4 mg SL tablet, Place 1 tablet (0 4 mg total) under the tongue every 5 (five) minutes as needed for chest pain (Patient not taking: Reported on 2021), Disp: 25 tablet, Rfl: 3    Social History     Socioeconomic History    Marital status: /Civil Union     Spouse name: Not on file    Number of children: Not on file    Years of education: Not on file    Highest education level: Not on file   Occupational History    Occupation: Retired    Occupation: Desk work    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[employer]   Social Needs    Financial resource strain: Not on file    Food insecurity     Worry: Not on file     Inability: Not on file   StudyMax needs     Medical: Not on file     Non-medical: Not on file   Tobacco Use    Smoking status: Former Smoker     Packs/day: 4 00     Years: 47 00     Pack years: 188 00     Types: Cigarettes     Start date:      Quit date:      Years since quittin 4    Smokeless tobacco: Never Used   Substance and Sexual Activity    Alcohol use:  Yes     Alcohol/week: 21 0 standard drinks     Types: 21 Standard drinks or equivalent per week     Frequency: 4 or more times a week     Drinks per session: 1 or 2     Binge frequency: Never     Comment: 2-3 glasses of vodka daily (6 shots) (history 2 drinks per day as per allscripts    Drug use: No    Sexual activity: Not Currently   Lifestyle    Physical activity     Days per week: Not on file     Minutes per session: Not on file    Stress: Not on file   Relationships    Social connections     Talks on phone: Not on file     Gets together: Not on file     Attends Buddhism service: Not on file     Active member of club or organization: Not on file     Attends meetings of clubs or organizations: Not on file     Relationship status: Not on file    Intimate partner violence     Fear of current or ex partner: Not on file     Emotionally abused: Not on file     Physically abused: Not on file     Forced sexual activity: Not on file   Other Topics Concern    Not on file   Social History Narrative    Not on file       Family History   Problem Relation Age of Onset    Heart disease Father         pacer placement    Hypertension Father     Kidney disease Father     Heart failure Father     Heart attack Father     Liver disease Father     Cirrhosis Mother         due to beer consumption    Liver disease Mother     Diabetes Other        Physical Exam:    Vitals: Blood pressure 104/60, pulse 88, height 5' 7" (1 702 m), weight 94 2 kg (207 lb 11 2 oz), SpO2 98 %  , Body mass index is 32 53 kg/m² ,   Wt Readings from Last 3 Encounters:   05/24/21 94 2 kg (207 lb 11 2 oz)   05/18/21 95 3 kg (210 lb)   05/18/21 95 kg (209 lb 6 4 oz)       Physical Exam    Labs & Results:    Lab Results   Component Value Date    WBC 6 79 05/10/2021    HGB 13 1 05/10/2021    HCT 40 8 05/10/2021     (H) 05/10/2021     (L) 05/10/2021     Lab Results   Component Value Date    SODIUM 141 05/10/2021    K 4 2 05/10/2021     05/10/2021    CO2 28 05/10/2021    BUN 32 (H) 05/10/2021    CREATININE 1 73 (H) 05/10/2021    GLUC 138 05/10/2021    CALCIUM 9 6 05/10/2021     Lab Results   Component Value Date    NTBNP 2,679 (H) 03/05/2019      Lab Results   Component Value Date    CHOLESTEROL 144 02/17/2021    CHOLESTEROL 139 09/09/2020    CHOLESTEROL 143 12/02/2019     Lab Results   Component Value Date    HDL 46 02/17/2021    HDL 40 09/09/2020    HDL 34 (L) 12/02/2019     Lab Results   Component Value Date    TRIG 165 (H) 02/17/2021    TRIG 406 (H) 09/09/2020    TRIG 324 (H) 12/02/2019     Lab Results   Component Value Date    [place] 67 11/28/2018    [facility] 77 04/24/2018       EKG personally reviewed by Alonzo Rivero MD      Counseling / Coordination of Care  Time spent today 40 minutes  Greater than 50% of total time was spent with the patient and / or family counseling and / or coordination of care  We discussed diagnoses, most recent studies and any changes in treatment    Thank you for the opportunity to participate in the care of this patient      Luis Herrmann MD  ADVANCED HEART FAILURE AND MECHANICAL CIRCULATORY SUPPORT  [facility]

## 2021-05-24 NOTE — PATIENT INSTRUCTIONS
Start metoprolol succinate 12 5mg once a day  Nutrition consult  Daily weights  Monitor blood pressure at home  Use oxygen on exertion      Low-Salt Choices  Eating salt (sodium) can make your body retain too much water  Excess water makes your heart work harder  Canned, packaged, and frozen foods are easy to prepare  But they are often high in sodium  Here are some ideas for low-salt foods you can easily make yourself  For breakfast  · Fruit or 100% fruit juice  · Whole-wheat bread or an English muffin  Look for sodium content on Nutrition Facts labels    · Low-fat milk or yogurt  · Unsalted eggs  · Shredded wheat  · Corn tortillas  · Unsalted steamed rice  · Regular (not instant) hot cereal, made without salt    Stay away from:  · Sausage, hernandez, and ham  · Flour tortillas  · Packaged muffins, pancakes, and biscuits  · Instant hot cereals  · Cottage cheese    Good Choices:  · Fresh fish, chicken, turkey, or meat--baked, broiled, or roasted without salt  · Dry beans, cooked without salt  · Tofu, stir-fried without salt  · Unsalted fresh fruit and vegetables, or frozen or canned fruit and vegetables with no added salt    Stay away from:  · Lunch or deli meat that is cured or smoked  · Cheese  · Tomato juice and ketchup  · Canned vegetables, soups, and fish not labeled as no-salt-added or reduced sodium  · Packaged gravies and sauces  · Olives, pickles, and relish  · Bottled salad dressings    For snacks and desserts  · Yogurt  · Unsalted, air-popped popcorn  · Unsalted nuts or seeds    Stay away from:  · Pies and cakes  · Packaged dessert mixes  · Pizza  · Canned and packaged puddings  · Pretzels, chips, crackers, and nuts--unless the label says unsalted

## 2021-05-25 ENCOUNTER — APPOINTMENT (OUTPATIENT)
Dept: LAB | Facility: CLINIC | Age: 76
End: 2021-05-25
Payer: MEDICARE

## 2021-05-25 DIAGNOSIS — I50.22 CHRONIC SYSTOLIC HEART FAILURE (HCC): ICD-10-CM

## 2021-05-25 LAB
ANION GAP SERPL CALCULATED.3IONS-SCNC: 8 MMOL/L (ref 4–13)
BUN SERPL-MCNC: 31 MG/DL (ref 5–25)
CALCIUM SERPL-MCNC: 9.6 MG/DL (ref 8.3–10.1)
CHLORIDE SERPL-SCNC: 112 MMOL/L (ref 100–108)
CO2 SERPL-SCNC: 23 MMOL/L (ref 21–32)
CREAT SERPL-MCNC: 1.38 MG/DL (ref 0.6–1.3)
GFR SERPL CREATININE-BSD FRML MDRD: 49 ML/MIN/1.73SQ M
GLUCOSE P FAST SERPL-MCNC: 138 MG/DL (ref 65–99)
NT-PROBNP SERPL-MCNC: 2152 PG/ML
POTASSIUM SERPL-SCNC: 4 MMOL/L (ref 3.5–5.3)
SODIUM SERPL-SCNC: 143 MMOL/L (ref 136–145)

## 2021-05-25 PROCEDURE — 80048 BASIC METABOLIC PNL TOTAL CA: CPT

## 2021-05-25 PROCEDURE — 36415 COLL VENOUS BLD VENIPUNCTURE: CPT

## 2021-05-25 PROCEDURE — 83880 ASSAY OF NATRIURETIC PEPTIDE: CPT

## 2021-06-01 ENCOUNTER — OFFICE VISIT (OUTPATIENT)
Dept: NEPHROLOGY | Facility: CLINIC | Age: 76
End: 2021-06-01
Payer: MEDICARE

## 2021-06-01 VITALS
SYSTOLIC BLOOD PRESSURE: 122 MMHG | DIASTOLIC BLOOD PRESSURE: 66 MMHG | BODY MASS INDEX: 33.24 KG/M2 | HEIGHT: 67 IN | HEART RATE: 80 BPM | WEIGHT: 211.8 LBS

## 2021-06-01 DIAGNOSIS — I12.9 BENIGN HYPERTENSION WITH CHRONIC KIDNEY DISEASE, STAGE III (HCC): Primary | ICD-10-CM

## 2021-06-01 DIAGNOSIS — G47.33 OBSTRUCTIVE SLEEP APNEA OF ADULT: ICD-10-CM

## 2021-06-01 DIAGNOSIS — N18.30 CKD (CHRONIC KIDNEY DISEASE) STAGE 3, GFR 30-59 ML/MIN (HCC): ICD-10-CM

## 2021-06-01 DIAGNOSIS — N18.30 BENIGN HYPERTENSION WITH CHRONIC KIDNEY DISEASE, STAGE III (HCC): Primary | ICD-10-CM

## 2021-06-01 DIAGNOSIS — N28.1 RENAL CYST, RIGHT: ICD-10-CM

## 2021-06-01 PROCEDURE — 99214 OFFICE O/P EST MOD 30 MIN: CPT | Performed by: INTERNAL MEDICINE

## 2021-06-01 RX ORDER — TORSEMIDE 10 MG/1
10 TABLET ORAL DAILY
Qty: 90 TABLET | Refills: 3 | Status: SHIPPED | OUTPATIENT
Start: 2021-06-01 | End: 2021-06-25 | Stop reason: SDUPTHER

## 2021-06-01 NOTE — PATIENT INSTRUCTIONS
Chronic Kidney Disease   WHAT YOU NEED TO KNOW:   Chronic kidney disease (CKD) is the gradual and permanent loss of kidney function  It is also called chronic kidney failure, or chronic renal insufficiency  Normally, the kidneys remove fluid, chemicals, and waste from your blood  These wastes are turned into urine by your kidneys  CKD may worsen over time and lead to kidney failure  Your CKD team will help you and your family plan for your care at home  The team will help you create goals and find ways to meet your goals  Your care plan may change over time as your needs change  DISCHARGE INSTRUCTIONS:   Call your local emergency number (911 in the [phone] beds at the [facility] [address]) if:   · You have a seizure  · You have shortness of breath  Call your doctor or nephrologist if:   · You are confused and very drowsy  · You suddenly gain or lose more weight than your healthcare provider has told you is okay  · You have itchy skin or a rash  · You urinate more or less than you normally do  · You have blood in your urine  · You have nausea and are vomiting  · You have fatigue or muscle weakness  · You have hiccups that will not stop  · You have questions or concerns about your condition or care  Medicines:   · Medicines  may be given to decrease blood pressure and get rid of extra fluid  You may also receive medicine to manage health conditions that may occur with CKD, such as anemia, diabetes, and heart disease  · Take your medicine as directed  Contact your healthcare provider if you think your medicine is not helping or if you have side effects  Tell him or her if you are allergic to any medicine  Keep a list of the medicines, vitamins, and herbs you take  Include the amounts, and when and why you take them  Bring the list or the pill bottles to follow-up visits  Carry your medicine list with you in case of an emergency  What you can do to manage CKD: Management may include making some lifestyle changes  Tell your healthcare provider if you have any concerns about being able to make the changes  He or she can help you find solutions, including working with specialists  Ask for help creating a plan to break large goals into smaller steps  Your plan may include any of the following:  · Manage other health conditions  Your healthcare provider will work with you to make a care plan that meets your needs  You will be checked regularly for heart disease or other conditions that can make CKD worse, such as diabetes  Your blood pressure will be closely monitored  You will also get a target blood pressure and help making a plan to reach your target  This may include taking your blood pressure at home  · Maintain a healthy weight  Extra weight can strain your kidneys  Ask what a healthy weight is for you  Your provider can help you create a weight loss plan if you are overweight  · Create an exercise plan  Regular exercise can help you manage CKD, high blood pressure, and diabetes  Exercise also helps control weight  Your provider can help you create exercise goals and a plan to reach those goals  For example, your goal may be to exercise for 30 minutes in a day  Your plan can include breaking exercise into 10 minute sessions, 3 times during the day  · Create a healthy eating plan  Your provider may tell you to eat food low in sodium (salt), potassium, phosphorus, or protein  A dietitian can help you plan meals if needed  Ask how much liquid to drink each day and which liquids are best for you  · Limit alcohol as directed  Alcohol can cause fluid retention and can affect your kidneys  Ask how much alcohol is safe for you  A drink of alcohol is 12 ounces of beer, 5 ounces of wine, or 1½ ounces of liquor  · Do not smoke  Nicotine and other chemicals in cigarettes and cigars can cause kidney damage  Ask your provider for information if you currently smoke and need help to quit   E-cigarettes or smokeless tobacco still contain nicotine  Talk to your provider before you use these products  · Ask about over-the-counter medicines  Medicines such as NSAIDs and laxatives may harm your kidneys  Some cough and cold medicines can raise your blood pressure  Always ask if a medicine is safe before you take it  · Ask about vaccines you may need  Infections such as pneumonia, influenza, and hepatitis can be more harmful or more likely to occur in a person who has CKD  Vaccines lower your risk for infection  Follow up with your doctor as directed: You will need to return for tests to monitor your kidney and nerve function, and your parathyroid hormone level  Your medicines may be changed, based on certain test results  You may also be referred to a nephrologist (kidney specialist)  Write down your questions so you remember to ask them during your visits  © Copyright [address] Information is for End User's use only and may not be sold, redistributed or otherwise used for commercial purposes  All illustrations and images included in CareNotes® are the copyrighted property of A D A M , Inc  or [facility] Katie Cole   The above information is an  only  It is not intended as medical advice for individual conditions or treatments  Talk to your doctor, nurse or pharmacist before following any medical regimen to see if it is safe and effective for you

## 2021-06-01 NOTE — PROGRESS NOTES
OFFICE FOLLOW UP - Nephrology   Aixa Alberto 68 y o  male MRN: [id number]    Encounter: [id number]        ASSESSMENT & PLAN    CKD (chronic kidney disease) stage 3, GFR 30-59 ml/min  -his hospitalization events were noted from the [address] and reviewed with them -baseline creatinine around 1 6 his estimated GFR is likely around 50 mL/min  -medications are appropriately dosed  -continue cardiovascular risk reduction measures  -his last creatininewas was around 1 4     Type 1 diabetes mellitus with complication [facility])  - continue  Glycemic control he has seen Endocrinology as well last A1c was 7 4     Renal artery stenosis ([facility] [address] )  - he does have renal artery stenosis right renal artery with a greater than 60% elevated velocity the left renal artery was not filled July his blood pressures have been stable  -we will have him repeat a renal artery Doppler at his next office appointment     Benign hypertension with chronic kidney disease, stage III  -  currently stable now metoprolol     DARINEL (acute kidney injury) ([facility] [address] )  -previously when diuretics were increased his creatinine did increase as well he is tolerating the torsemide 10 mg daily     Renal cyst, right  - renal ultrasound was checked in February of 2018 which showed a stable simple cyst in the upper pole of the right kidney and a normal left kidney and bladder, this was checked in November of 2018 in [place] and is stable in size and checked in 2019 stable     Proteinuria, unspecified type  - 1+ proteinuria on urinalysis longstanding history of diabetes not anemic, no thrombocytopenic, no hematuria associated with this in creatinine is stable will monitor     Shortness of breath with coronary artery disease, COPD, systolic congestive heart failure, chronic kidney disease  -this seems to be multifactorial, getting a high-resolution CT scan and following with pulmonary and Heart failure team  -now on oxygen any time he exerts himself  -on low-dose metoprolol  -considering right heart catheterization     Peripheral vascular disease  -ongoing follow-up with vascular surgery    Overall relatively stable will follow-up in 4 months      HPI: Raman Oneil is a 68 y o  male who is here for Follow-up    His shortness of breath remains present he denies any acute chest pain no fevers or chills no nausea vomiting no diarrhea, he has significant shortness of breath exertion and he is following up with the Heart failure team no chest pain fevers or chills no nausea vomiting diarrhea    ROS:   All the systems were reviewed and were negative except as documented on the HPI  Allergies: Doxazosin, Iohexol, Lisinopril, Vancomycin, Adhesive [medical tape], Cardura [doxazosin mesylate], Isosorbide, and Other    Medications:   Current Outpatient Medications:     aspirin 81 MG tablet, Take 81 mg by mouth daily  , Disp: , Rfl:     cholecalciferol (VITAMIN D3) 1,000 units tablet, Take 1,000 Units by mouth daily , Disp: , Rfl:     clopidogrel (PLAVIX) 75 mg tablet, Take 1 tablet (75 mg total) by mouth daily, Disp: 90 tablet, Rfl: 3    Coenzyme Q10 (COQ-10) 200 MG CAPS, Take 200 mg by mouth daily, Disp: , Rfl:     docusate sodium (COLACE) 50 mg capsule, Take by mouth 2 (two) times a day as needed , Disp: , Rfl:     gabapentin (NEURONTIN) 300 mg capsule, TAKE ONE CAPSULE BY MOUTH THREE TIMES A DAY (Patient taking differently: 2 (two) times a day ), Disp: 270 capsule, Rfl: 1    Glucagon (Baqsimi One Pack) 3 MG/DOSE POWD, 1 Dose into each nostril as needed, Disp: , Rfl:     Icosapent Ethyl (Vascepa) 1 g CAPS, Take 1 capsule (1 g total) by mouth 2 (two) times a day, Disp: 180 capsule, Rfl: 3    insulin glargine (Lantus) 100 units/mL subcutaneous injection, Inject under the skin 40 units am- 8 units pm, Disp: , Rfl:     insulin lispro (HUMALOG) 100 units/mL injection, Inject 3 units with breakfast, 6 units at lunch, and 6 units at dinner (Patient taking differently: 4 (four) times a day Inject 3 units with breakfast, 6 units at lunch, and 6 units at dinner Carbs counting), Disp: 10 mL, Rfl: 1    Lactobacillus Rhamnosus, GG, (CULTURELLE PO), Take by mouth, Disp: , Rfl:     Lactobacillus Rhamnosus, GG, (CULTURELLE) CAPS, Take 1 capsule by mouth daily , Disp: , Rfl:     levothyroxine 200 mcg tablet, Take 200 mcg by mouth daily, Disp: , Rfl: 0    levothyroxine 25 mcg tablet, TAKE ONE TABLET BY MOUTH EVERY DAY ALONG WITH 200MCG TABLET, Disp: , Rfl:     metoprolol succinate (TOPROL-XL) 25 mg 24 hr tablet, Take 0 5 tablets (12 5 mg total) by mouth daily, Disp: 30 tablet, Rfl: 1    Multiple Vitamins-Minerals (CENTRUM MEN) TABS, Take by mouth Take one tablet in the morning, Disp: , Rfl:     rosuvastatin (CRESTOR) 20 MG tablet, Take 1 tablet (20 mg total) by mouth daily, Disp: 90 tablet, Rfl: 3    SF 1 1 % GEL, BRUCH ONCE DAILY AT BEDTIME, BRUSH OF 1 MINUTE AS DIRECTED, Disp: , Rfl:     torsemide (DEMADEX) 10 mg tablet, Take 1 tablet (10 mg total) by mouth daily, Disp: 90 tablet, Rfl: 3    ULTICARE INSULIN SYRINGE 30G X 1/2" 1 ML MISC, Use as directed, Disp: , Rfl: 0    ammonium lactate (LAC-HYDRIN) 12 % lotion, Apply topically 2 (two) times a day as needed for dry skin Apply to dry skin between the toes in the AM & in the PM (Patient not taking: Reported on 5/18/2021), Disp: 400 g, Rfl: 5    nitroglycerin (NITROSTAT) 0 4 mg SL tablet, Place 1 tablet (0 4 mg total) under the tongue every 5 (five) minutes as needed for chest pain (Patient not taking: Reported on 5/24/2021), Disp: 25 tablet, Rfl: 3    polyethylene glycol (MIRALAX) 17 g packet, Take 17 g by mouth daily, Disp: , Rfl:     Past Medical History:   Diagnosis Date    Acute kidney injury ([facility] [address] )     resolved 11/30/2015    Acute venous embolism and thrombosis of deep vessels of proximal lower extremity ([address] )     resolved 04/04/2015    DARINEL (acute kidney injury) ([facility] [address] ) 1/12/2016    Anesthesia     RESPIRATORY ISSUES DUE TO SLEEP APNEA    Cardiac disease     heart attack, stents x 4    [facility]est[facility] failure) [facility])     Chronic cough     resolved 02/04/2016    Chronic pain disorder     intermitent claudication    COPD (chronic obstructive pulmonary disease) ([facility])     Coronary artery disease     CPAP (continuous positive airway pressure) dependence     Diabetes mellitus ([facility] [address] )     Disease of thyroid gland     DVT (deep venous thrombosis) ([facility])     Heart failure ([facility])     Hyperlipidemia     Hypertension     Ischemic cardiomyopathy     last assessed 09/26/2017    Myocardial infarction [facility])     MI +2 2015,2018    NSTEMI (non-ST elevated myocardial infarction) ([facility] [address] ) 3/23/2019    Pulmonary emphysema ([facility])     Pulmonary granuloma ([facility])     resolved 02/03/2017    Renal failure     Sleep apnea      Past Surgical History:   Procedure Laterality Date    BYPASS FEMORAL-POPLITEAL      initial stenosis with stent left, 7 x 100 smart stent onset 02/24/2014    CARDIAC SURGERY      cardiac stents    CATARACT EXTRACTION      COLOGUARD (HISTORICAL)  2018    CORONARY ANGIOPLASTY WITH STENT PLACEMENT      x4    IR AORTAGRAM WITH RUN-OFF  3/14/2019    NV THROMBOENDARTECTMY FEMORAL COMMON Left 3/22/2019    Procedure: ENDARTERECTOMY ARTERIAL FEMORAL WITH PATCH ANGIOPLASTY, BALLOON ANGIOPLASTY, STENT;  Surgeon: Luz Marina Colorado MD;  [facility]: [facility];  Service: Vascular    NV VEIN BYPASS GRAFT,FEM-POP Left 3/22/2019    Procedure: BYPASS FEMORAL-POPLITEAL WITH COMPLETION ARTERIOGRAM;  Surgeon: Luz Marina Colorado MD;  [facility];  Service: Vascular    VASCULAR SURGERY      stent placement    WOUND DEBRIDEMENT Left 4/15/2019    Procedure: DEBRIDEMENT WOUND AND DRESSING CHANGE ([address]) left groin with VAC;  Surgeon: Dyan Peterson DO;  [facility];  Service: Vascular     Family History   Problem Relation Age of Onset    Heart disease Father         pacer placement    Hypertension Father     Kidney disease Father     Heart failure Father     Heart attack Father     Liver disease Father     Cirrhosis Mother         due to beer consumption    Liver disease Mother     Diabetes Other       reports that he quit smoking about 13 years ago  His smoking use included cigarettes  He started smoking about 60 years ago  He has a 188 00 pack-year smoking history  He has never used smokeless tobacco  He reports current alcohol use of about 21 0 standard drinks of alcohol per week  He reports that he does not use drugs  Physical Exam:   Vitals:    06/01/21 1415   BP: 122/66   Pulse: 80   Weight: 96 1 kg (211 lb 12 8 oz)   Height: 5' 7" (1 702 m)     Body mass index is 33 17 kg/m²  General: conscious, cooperative, in not acute distress  Eyes: conjunctivae pink, anicteric sclerae  ENT: lips and mucous membranes moist  Neck: supple, no JVD  Chest: clear breath sounds bilateral, no crackles, ronchus or wheezings  CVS: distinct S1 & S2, normal rate, regular rhythm  Abdomen: soft, non-tender, non-distended, normoactive bowel sounds  Extremities:  1+ edema  Skin: no rash  Neuro: awake, alert, oriented      Lab Results:    Results for orders placed or performed in visit on 73/72/15   Basic metabolic panel   Result Value Ref Range    Sodium 143 136 - 145 mmol/L    Potassium 4 0 3 5 - 5 3 mmol/L    Chloride 112 (H) 100 - 108 mmol/L    CO2 23 21 - 32 mmol/L    ANION GAP 8 4 - 13 mmol/L    BUN 31 (H) 5 - 25 mg/dL    Creatinine 1 38 (H) 0 60 - 1 30 mg/dL    Glucose, Fasting 138 (H) 65 - 99 mg/dL    Calcium 9 6 8 3 - 10 1 mg/dL    eGFR 49 ml/min/1 73sq m   NT-BNP PRO   Result Value Ref Range    NT-proBNP 2,152 (H) <450 pg/mL         Portions of the record may have been created with voice recognition software  Occasional wrong word or "sound a like" substitutions may have occurred due to the inherent limitations of voice recognition software   Read the chart carefully and recognize, using context, where substitutions have occurred  If you have any questions, please contact the dictating provider

## 2021-06-03 ENCOUNTER — OFFICE VISIT (OUTPATIENT)
Dept: FAMILY MEDICINE CLINIC | Facility: CLINIC | Age: 76
End: 2021-06-03
Payer: MEDICARE

## 2021-06-03 VITALS
HEART RATE: 62 BPM | DIASTOLIC BLOOD PRESSURE: 58 MMHG | BODY MASS INDEX: 33.18 KG/M2 | OXYGEN SATURATION: 97 % | HEIGHT: 67 IN | RESPIRATION RATE: 16 BRPM | SYSTOLIC BLOOD PRESSURE: 106 MMHG | WEIGHT: 211.4 LBS | TEMPERATURE: 97.5 F

## 2021-06-03 DIAGNOSIS — I50.22 CHRONIC SYSTOLIC CONGESTIVE HEART FAILURE (HCC): Chronic | ICD-10-CM

## 2021-06-03 DIAGNOSIS — J43.2 CENTRILOBULAR EMPHYSEMA (HCC): Chronic | ICD-10-CM

## 2021-06-03 DIAGNOSIS — E89.0 HYPOTHYROIDISM, POSTABLATIVE: ICD-10-CM

## 2021-06-03 DIAGNOSIS — I25.10 CORONARY ARTERY DISEASE INVOLVING NATIVE CORONARY ARTERY OF NATIVE HEART WITHOUT ANGINA PECTORIS: Primary | ICD-10-CM

## 2021-06-03 DIAGNOSIS — E10.51 TYPE 1 DIABETES MELLITUS WITH DIABETIC PERIPHERAL ANGIOPATHY WITHOUT GANGRENE (HCC): ICD-10-CM

## 2021-06-03 PROCEDURE — 99213 OFFICE O/P EST LOW 20 MIN: CPT | Performed by: FAMILY MEDICINE

## 2021-06-03 RX ORDER — NITROGLYCERIN 0.4 MG/1
0.4 TABLET SUBLINGUAL
Qty: 25 TABLET | Refills: 3 | Status: SHIPPED | OUTPATIENT
Start: 2021-06-03

## 2021-06-03 NOTE — PROGRESS NOTES
FAMILY PRACTICE OFFICE VISIT       NAME: Vanessa Artis  AGE: 68 y o  SEX: male       : 1945        MRN: [id number]        Assessment and Plan     1  Coronary artery disease involving native coronary artery of native heart without angina pectoris  -     nitroglycerin (NITROSTAT) 0 4 mg SL tablet; Place 1 tablet (0 4 mg total) under the tongue every 5 (five) minutes as needed for chest pain  -     Comprehensive metabolic panel; Future  -     Lipid Panel with Direct LDL reflex; Future    2  Hypothyroidism, postablative  -     TSH, 3rd generation; Future    3  Type 1 diabetes mellitus with diabetic peripheral angiopathy without gangrene (HCC)  -     Hemoglobin A1C; Future    4  Centrilobular emphysema ([facility] [address] )  Assessment & Plan:   Chronic dyspnea on exertion  Symptoms improved with p r n  oxygen use  Patient has not responded to previous inhaler therapy      5  Chronic systolic congestive heart failure [facility])  Assessment & Plan:  Wt Readings from Last 3 Encounters:   21 95 9 kg (211 lb 6 4 oz)   21 96 1 kg (211 lb 12 8 oz)   21 94 2 kg (207 lb 11 2 oz)    most recent echocardiogram performed in 2020 revealed ejection fraction of 40%  Patient is under care of [facility]'s CHF team     Continue regimen of torsemide 10 mg daily   Patient has restarted metoprolol ER 12 5 mg daily          Patient presents for follow-up  He will continue same daily medications for chronic medical conditions  He remains under ongoing care of Cardiology, Nephrology, endocrinology  Patient will be due for repeat blood work in 3-4 months  Symptoms of chronic dyspnea on exertion have improved with start of chronic oxygen therapy  Follow-up 4 months  BMI Counseling: Body mass index is 33 11 kg/m²  The BMI is above normal  Nutrition recommendations include encouraging healthy choices of fruits and vegetables, consuming healthier snacks, moderation in carbohydrate intake and reducing intake of cholesterol  Exercise recommendations include exercising 3-5 times per week  No pharmacotherapy was ordered  There are no Patient Instructions on file for this visit  No follow-ups on file  Discussed with the patient and all questioned fully answered  He will call me if any problems arise  M*Modal software was used to dictate this note  It may contain errors with dictating incorrect words/spelling  Please contact provider directly with any questions  Chief Complaint     Chief Complaint   Patient presents with    Follow-up     3 month follow up        History of Present Illness     Patient presents for follow-up  He is accompanied by his wife  He reports overall improved symptoms of dyspnea on exertion as he has been using oxygen rather regularly  We reviewed results of recent cardiology consult  Patient has received another dose of Lasix IV but has not noticed any improvement  He will be following up with CHF team   Cardiology has recommended to restart low-dose of metoprolol  Patient's blood pressures remain low but he denies any symptoms of orthostasis  Patient maintains healthy diet, he weighs himself daily  Medications updated  He remains under ongoing care of Nephrology and endocrinology  Review of Systems   Review of Systems   Constitutional: Negative  HENT: Negative  Respiratory: Positive for shortness of breath (  Chronic dyspnea on exertion, improved)  Cardiovascular: Negative  Gastrointestinal: Negative  Genitourinary: Negative  Musculoskeletal: Positive for arthralgias  Skin: Negative  Neurological: Negative  Psychiatric/Behavioral: Negative          Active Problem List     Patient Active Problem List   Diagnosis    Type 1 diabetes mellitus with diabetic peripheral angiopathy without gangrene ([facility] [address] )    Presence of drug coated stent in LAD coronary artery    Coronary artery disease involving native coronary artery of native heart without angina pectoris    Presence of drug coated stent in left circumflex coronary artery    Dyslipidemia    Obstructive sleep apnea of adult    Carotid artery stenosis, asymptomatic, bilateral    Restrictive lung disease    Centrilobular emphysema ([address] )    Benign hypertension with chronic kidney disease, stage III ([facility])    CKD (chronic kidney disease) stage 3, GFR 30-59 ml/min ([facility])    Renal artery stenosis ([facility])    Renal cyst, right    Vitamin D deficiency    Aortoiliac occlusive disease ([facility])    Hypothyroidism, postablative    Low back pain with sciatica    Lumbar disc herniation    Lumbar radiculopathy    Diabetic neuropathy associated with type 1 diabetes mellitus ([facility])    Chronic systolic congestive heart failure ([facility])    Anxiety with depression    Spinal stenosis of lumbar region with neurogenic claudication    Thrombocytopenia, unspecified ([facility] [address] )    S/P femoral-popliteal bypass surgery    Stenosis of carotid artery    Hypoxemic respiratory failure, chronic ([facility] [address] )       Past Medical History:  Past Medical History:   Diagnosis Date    Acute kidney injury ([address] )     resolved 11/30/2015    Acute venous embolism and thrombosis of deep vessels of proximal lower extremity ([address] )     resolved 04/04/2015    DARINEL (acute kidney injury) ([facility] [address] ) 1/12/2016    Anesthesia     RESPIRATORY ISSUES DUE TO SLEEP APNEA    Cardiac disease     heart attack, stents x 4    CHF (congestive heart failure) ([facility])     Chronic cough     resolved 02/04/2016    Chronic pain disorder     intermitent claudication    COPD (chronic obstructive pulmonary disease) ([address] )     Coronary artery disease     CPAP (continuous positive airway pressure) dependence     Diabetes mellitus ([address] )     Disease of thyroid gland     DVT (deep venous thrombosis) ([address] )     Heart failure ([address] )     Hyperlipidemia     Hypertension     Ischemic cardiomyopathy     last assessed 09/26/2017    Myocardial infarction [facility])     MI +2 2015,2018    NSTEMI (non-ST elevated myocardial infarction) ([facility] [address] ) 3/23/2019    Pulmonary emphysema (HCC)     Pulmonary granuloma (HCC)     resolved 02/03/2017    Renal failure     Sleep apnea        Past Surgical History:  Past Surgical History:   Procedure Laterality Date    BYPASS FEMORAL-POPLITEAL      initial stenosis with stent left, 7 x 100 smart stent onset 02/24/2014    CARDIAC SURGERY      cardiac stents    CATARACT EXTRACTION      COLOGUARD (HISTORICAL)  2018    CORONARY ANGIOPLASTY WITH STENT PLACEMENT      x4    IR AORTAGRAM WITH RUN-OFF  3/14/2019    IN THROMBOENDARTECTMY FEMORAL COMMON Left 3/22/2019    Procedure: ENDARTERECTOMY ARTERIAL FEMORAL WITH PATCH ANGIOPLASTY, BALLOON ANGIOPLASTY, STENT;  Surgeon: Maurizio Tavares MD;  [facility]: [facility];  Service: Vascular    IN VEIN BYPASS GRAFT,FEM-POP Left 3/22/2019    Procedure: BYPASS FEMORAL-POPLITEAL WITH COMPLETION ARTERIOGRAM;  Surgeon: Maurizio Tavares MD;  [facility];  Service: Vascular    VASCULAR SURGERY      stent placement    WOUND DEBRIDEMENT Left 4/15/2019    Procedure: DEBRIDEMENT WOUND AND DRESSING CHANGE ([facility]) left groin with VAC;  Surgeon: Betti Essex, DO;  Location: [facility];  Service: Vascular       Family History:  Family History   Problem Relation Age of Onset    Heart disease Father         pacer placement    Hypertension Father     Kidney disease Father     Heart failure Father     Heart attack Father     Liver disease Father     Cirrhosis Mother         due to beer consumption    Liver disease Mother     Diabetes Other        Social History:  Social History     Socioeconomic History    Marital status: /Civil Union     Spouse name: Not on file    Number of children: Not on file    Years of education: Not on file    Highest education level: Not on file   Occupational History    Occupation: Retired    Occupation: Desk work    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   Social Needs    Financial resource strain: Not on file    Food insecurity     Worry: Not on file     Inability: Not on file    Transportation needs     Medical: Not on file     Non-medical: Not on file   Tobacco Use    Smoking status: Former Smoker     Packs/day: 4 00     Years: 47 00     Pack years: 188 00     Types: Cigarettes     Start date:      Quit date:      Years since quittin 4    Smokeless tobacco: Never Used   Substance and Sexual Activity    Alcohol use:  Yes     Alcohol/week: 21 0 standard drinks     Types: 21 Standard drinks or equivalent per week     Frequency: 4 or more times a week     Drinks per session: 1 or 2     Binge frequency: Never     Comment: 2-3 glasses of vodka daily (6 shots) (history 2 drinks per day as per allscripts    Drug use: No    Sexual activity: Not Currently   Lifestyle    Physical activity     Days per week: Not on file     Minutes per session: Not on file    Stress: Not on file   Relationships    Social connections     Talks on phone: Not on file     Gets together: Not on file     Attends Cheondoism service: Not on file     Active member of club or organization: Not on file     Attends meetings of clubs or organizations: Not on file     Relationship status: Not on file    Intimate partner violence     Fear of current or ex partner: Not on file     Emotionally abused: Not on file     Physically abused: Not on file     Forced sexual activity: Not on file   Other Topics Concern    Not on file   Social History Narrative    Not on file         Objective     Vitals:    21 1303   BP: 106/58   BP Location: Left arm   Patient Position: Sitting   Cuff Size: Large   Pulse: 62   Resp: 16   Temp: 97 5 °F (36 4 °C)   TempSrc: Temporal   SpO2: 97%   Weight: 95 9 kg (211 lb 6 4 oz)   Height: 5' 7" (1 702 m)       Wt Readings from Last 3 Encounters:   21 95 9 kg (211 lb 6 4 oz)   21 96 1 kg (211 lb 12 8 oz)   21 94 2 kg (207 lb 11 2 oz)       Physical Exam  Vitals signs and nursing note reviewed  Constitutional:       General: He is not in acute distress  Appearance: Normal appearance  He is well-developed  HENT:      Head: Normocephalic and atraumatic  Eyes:      Conjunctiva/sclera: Conjunctivae normal    Neck:      Musculoskeletal: Neck supple  Vascular: No carotid bruit  Comments: No JVD  Cardiovascular:      Rate and Rhythm: Normal rate and regular rhythm  Heart sounds: Normal heart sounds  No murmur  Pulmonary:      Effort: Pulmonary effort is normal  No respiratory distress  Breath sounds: Normal breath sounds  No wheezing or rales  Abdominal:      General: Bowel sounds are normal  There is no distension or abdominal bruit  Musculoskeletal: Normal range of motion  Right lower leg: No edema  Left lower leg: No edema  Neurological:      Mental Status: He is alert and oriented to person, place, and time  Cranial Nerves: No cranial nerve deficit     Psychiatric:         Behavior: Behavior normal           Pertinent Laboratory/Diagnostic Studies:    Lab Results   Component Value Date    WBC 6 79 05/10/2021    HGB 13 1 05/10/2021    HCT 40 8 05/10/2021     (H) 05/10/2021     (L) 05/10/2021       No results found for: TSH    Lab Results   Component Value Date    CHOL 129 10/01/2015     Lab Results   Component Value Date    TRIG 165 (H) 02/17/2021     Lab Results   Component Value Date    HDL 46 02/17/2021     Lab Results   Component Value Date    LDLCALC 65 02/17/2021     Lab Results   Component Value Date    HGBA1C 7 4 (H) 05/10/2021     Lab Results   Component Value Date    SODIUM 143 05/25/2021    K 4 0 05/25/2021     (H) 05/25/2021    CO2 23 05/25/2021    ANIONGAP 6 12/21/2015    AGAP 8 05/25/2021    BUN 31 (H) 05/25/2021    CREATININE 1 38 (H) 05/25/2021    GLUC 138 05/10/2021    GLUF 138 (H) 05/25/2021    CALCIUM 9 6 05/25/2021    AST 21 02/17/2021    ALT 23 02/17/2021    ALKPHOS 100 02/17/2021    PROT 7 6 10/01/2015    TP 7 5 02/17/2021    BILITOT 0 33 10/01/2015    TBILI 0 72 02/17/2021    EGFR 49 05/25/2021       Orders Placed This Encounter   Procedures    Hemoglobin A1C    TSH, 3rd generation    Comprehensive metabolic panel    Lipid Panel with Direct LDL reflex       ALLERGIES:  Allergies   Allergen Reactions    Doxazosin Myalgia     UNKNOWN  UNKNOWN  Muscle cramps    Iohexol      UNKNOWN    Lisinopril Cough     cough  Other reaction(s): CAMELLA SINENSIS (ZESTRIL) (cough)  cough    Vancomycin Hives    Adhesive [Medical Tape]      Skin Breakdown    Cardura [Doxazosin Mesylate]      Muscle cramps    Isosorbide Rash    Other      Iv dye for cardiac cath  Renal failure very close to dialysis  But no dialysis  Iv dye for cardiac cath  Renal failure very close to dialysis  But no dialysis  Iv dye for cardiac cath  Renal failure very close to dialysis  But no dialysis       Current Medications     Current Outpatient Medications   Medication Sig Dispense Refill    aspirin 81 MG tablet Take 81 mg by mouth daily        cholecalciferol (VITAMIN D3) 1,000 units tablet Take 1,000 Units by mouth daily       clopidogrel (PLAVIX) 75 mg tablet Take 1 tablet (75 mg total) by mouth daily 90 tablet 3    Coenzyme Q10 (COQ-10) 200 MG CAPS Take 200 mg by mouth daily      docusate sodium (COLACE) 50 mg capsule Take by mouth 2 (two) times a day as needed       gabapentin (NEURONTIN) 300 mg capsule TAKE ONE CAPSULE BY MOUTH THREE TIMES A DAY (Patient taking differently: 2 (two) times a day ) 270 capsule 1    Glucagon (Baqsimi One Pack) 3 MG/DOSE POWD 1 Dose into each nostril as needed      Icosapent Ethyl (Vascepa) 1 g CAPS Take 1 capsule (1 g total) by mouth 2 (two) times a day 180 capsule 3    insulin glargine (Lantus) 100 units/mL subcutaneous injection Inject under the skin 40 units am- 8 units pm      insulin lispro (HUMALOG) 100 units/mL injection Inject 3 units with breakfast, 6 units at lunch, and 6 units at dinner (Patient taking differently: 4 (four) times a day Inject 3 units with breakfast, 6 units at lunch, and 6 units at dinner  Carbs counting) 10 mL 1    levothyroxine 200 mcg tablet Take 200 mcg by mouth daily  0    levothyroxine 25 mcg tablet TAKE ONE TABLET BY MOUTH EVERY DAY ALONG WITH 200MCG TABLET      metoprolol succinate (TOPROL-XL) 25 mg 24 hr tablet Take 0 5 tablets (12 5 mg total) by mouth daily 30 tablet 1    polyethylene glycol (MIRALAX) 17 g packet Take 17 g by mouth daily      rosuvastatin (CRESTOR) 20 MG tablet Take 1 tablet (20 mg total) by mouth daily 90 tablet 3    torsemide (DEMADEX) 10 mg tablet Take 1 tablet (10 mg total) by mouth daily 90 tablet 3    ULTICARE INSULIN SYRINGE 30G X 1/2" 1 ML MISC Use as directed  0    ammonium lactate (LAC-HYDRIN) 12 % lotion Apply topically 2 (two) times a day as needed for dry skin Apply to dry skin between the toes in the AM & in the PM (Patient not taking: Reported on 5/18/2021) 400 g 5    nitroglycerin (NITROSTAT) 0 4 mg SL tablet Place 1 tablet (0 4 mg total) under the tongue every 5 (five) minutes as needed for chest pain 25 tablet 3    SF 1 1 % GEL BRUCH ONCE DAILY AT BEDTIME, BRUSH OF 1 MINUTE AS DIRECTED       No current facility-administered medications for this visit          Medications Discontinued During This Encounter   Medication Reason    Lactobacillus Rhamnosus, GG, (CULTURELLE PO)     Lactobacillus Rhamnosus, GG, (CULTURELLE) CAPS     Multiple Vitamins-Minerals (CENTRUM MEN) TABS     nitroglycerin (NITROSTAT) 0 4 mg SL tablet Reorder       Health Maintenance     Health Maintenance   Topic Date Due    SLP PLAN OF CARE  Never done    Lung Cancer Screening  Never done    Falls: Plan of Care  Never done    BMI: Followup Plan  08/18/2021    Medicare Annual Wellness Visit (AWV)  10/26/2021    Fall Risk  11/02/2021    HEMOGLOBIN A1C  11/10/2021    URINE MICROALBUMIN  02/17/2022    Diabetic Foot Exam 02/27/2022    BMI: Adult  06/03/2022    DM Eye Exam  02/26/2023    DTaP,Tdap,and Td Vaccines (2 - Td) 05/05/2023    Hepatitis C Screening  Completed    Pneumococcal Vaccine: 65+ Years  Completed    Influenza Vaccine  Completed    COVID-19 Vaccine  Completed    HIB Vaccine  Aged Out    Hepatitis B Vaccine  Aged Out    IPV Vaccine  Aged Out    Hepatitis A Vaccine  Aged Out    Meningococcal ACWY Vaccine  Aged Out    HPV Vaccine  Aged Out       Immunization History   Administered Date(s) Administered    DT (pediatric) 12/01/2009    H1N1, All Formulations 12/01/2009    INFLUENZA 10/01/2008, 10/06/2009, 09/01/2010, 11/04/2013, 08/29/2018, 09/09/2019, 09/05/2020    Influenza Split High Dose Preservative Free IM 08/28/2014, 10/03/2016, 08/29/2018    Influenza, seasonal, injectable 10/01/2008, 10/19/2010, 08/26/2011, 09/20/2011, 08/01/2012, 09/01/2012, 09/13/2013, 09/15/2015, 09/03/2017    Meningococcal C/Y-HIB PRP 12/01/2009    Pneumococcal Conjugate 13-Valent 08/11/2015    Pneumococcal Polysaccharide PPV23 10/01/2008, 06/04/2009, 03/15/2017    SARS-CoV-2 / COVID-19 mRNA IM ([facility]) 01/19/2021, 02/23/2021    Td (adult), adsorbed 03/04/2011    Tdap 05/05/2013    Zoster 10/01/2009    influenza, trivalent, adjuvanted 09/05/2019       Saray Miramontes MD

## 2021-06-07 NOTE — ASSESSMENT & PLAN NOTE
Chronic dyspnea on exertion  Symptoms improved with p r n  oxygen use      Patient has not responded to previous inhaler therapy

## 2021-06-25 DIAGNOSIS — N18.30 CKD (CHRONIC KIDNEY DISEASE) STAGE 3, GFR 30-59 ML/MIN (HCC): ICD-10-CM

## 2021-06-25 RX ORDER — TORSEMIDE 10 MG/1
10 TABLET ORAL DAILY
Qty: 90 TABLET | Refills: 0 | Status: SHIPPED | OUTPATIENT
Start: 2021-06-25 | End: 2021-09-27

## 2021-07-12 DIAGNOSIS — I50.22 CHRONIC SYSTOLIC HEART FAILURE (HCC): ICD-10-CM

## 2021-07-12 RX ORDER — METOPROLOL SUCCINATE 25 MG/1
12.5 TABLET, EXTENDED RELEASE ORAL DAILY
Qty: 45 TABLET | Refills: 3 | Status: SHIPPED | OUTPATIENT
Start: 2021-07-12 | End: 2021-07-26 | Stop reason: SDUPTHER

## 2021-07-26 ENCOUNTER — TELEPHONE (OUTPATIENT)
Dept: PULMONOLOGY | Facility: CLINIC | Age: 76
End: 2021-07-26

## 2021-07-26 ENCOUNTER — OFFICE VISIT (OUTPATIENT)
Dept: CARDIOLOGY CLINIC | Facility: CLINIC | Age: 76
End: 2021-07-26
Payer: MEDICARE

## 2021-07-26 VITALS
HEART RATE: 92 BPM | SYSTOLIC BLOOD PRESSURE: 100 MMHG | HEIGHT: 67 IN | BODY MASS INDEX: 32 KG/M2 | OXYGEN SATURATION: 95 % | DIASTOLIC BLOOD PRESSURE: 68 MMHG | WEIGHT: 203.9 LBS

## 2021-07-26 DIAGNOSIS — G47.33 OBSTRUCTIVE SLEEP APNEA OF ADULT: ICD-10-CM

## 2021-07-26 DIAGNOSIS — I73.9 PVD (PERIPHERAL VASCULAR DISEASE) (HCC): ICD-10-CM

## 2021-07-26 DIAGNOSIS — N18.30 STAGE 3 CHRONIC KIDNEY DISEASE, UNSPECIFIED WHETHER STAGE 3A OR 3B CKD (HCC): ICD-10-CM

## 2021-07-26 DIAGNOSIS — I50.22 CHRONIC SYSTOLIC HEART FAILURE (HCC): Primary | ICD-10-CM

## 2021-07-26 DIAGNOSIS — I25.10 CORONARY ARTERY DISEASE INVOLVING NATIVE CORONARY ARTERY OF NATIVE HEART WITHOUT ANGINA PECTORIS: ICD-10-CM

## 2021-07-26 PROCEDURE — 99214 OFFICE O/P EST MOD 30 MIN: CPT | Performed by: INTERNAL MEDICINE

## 2021-07-26 RX ORDER — METOPROLOL SUCCINATE 25 MG/1
25 TABLET, EXTENDED RELEASE ORAL DAILY
Qty: 30 TABLET | Refills: 1 | Status: SHIPPED | OUTPATIENT
Start: 2021-07-26 | End: 2021-09-08 | Stop reason: SDUPTHER

## 2021-07-26 NOTE — PATIENT INSTRUCTIONS
Increase metoprolol succinate to 25mg at night  Continue to monitor blood pressure  Follow up with pulmonary

## 2021-07-26 NOTE — TELEPHONE ENCOUNTER
Patients wife Maryann Abraham calling saying she would like to make an appt sooner than his scheduled appt on 8/19  She states Chely Wandy is having shortness of breath   [phone]

## 2021-07-26 NOTE — PROGRESS NOTES
Advanced Heart Failure Outpatient Progress Note - [place] 68 y o  male MRN: [id number]    Encounter: [id number]      Assessment/Plan:    Patient Active Problem List    Diagnosis Date Noted    Hypoxemic respiratory failure, chronic ([place] [id number] ) 05/10/2021    Stenosis of carotid artery 08/24/2020    Thrombocytopenia, unspecified ([place] [address] ) 03/10/2020    Spinal stenosis of lumbar region with neurogenic claudication     S/P femoral-popliteal bypass surgery 01/09/2020    Anxiety with depression 04/07/2019    Chronic systolic congestive heart failure ([place] [id number] ) 03/01/2019    Diabetic neuropathy associated with type 1 diabetes mellitus ([place] [address] ) 01/14/2019    Lumbar disc herniation 08/01/2018    Lumbar radiculopathy 08/01/2018    Aortoiliac occlusive disease ([place] [address] ) 03/01/2018    Restrictive lung disease 01/30/2018    Centrilobular emphysema ([place] [address] ) 01/30/2018    Presence of drug coated stent in LAD coronary artery 01/24/2018    Coronary artery disease involving native coronary artery of native heart without angina pectoris 01/24/2018    Presence of drug coated stent in left circumflex coronary artery 01/24/2018    Dyslipidemia 01/24/2018    Obstructive sleep apnea of adult 01/24/2018    Carotid artery stenosis, asymptomatic, bilateral 01/24/2018    CKD (chronic kidney disease) stage 3, GFR 30-59 ml/min ([facility]) 12/13/2016    Low back pain with sciatica 08/22/2016    Type 1 diabetes mellitus with diabetic peripheral angiopathy without gangrene ([place] [address] ) 01/12/2016    Benign hypertension with chronic kidney disease, stage III ([place] [id number] ) 08/12/2015    Renal cyst, right 08/12/2015    Vitamin D deficiency 05/22/2015    Renal artery stenosis ([place] [id number] ) 05/09/2015    Hypothyroidism, postablative 04/02/2013         Heart failure with reduced EF, Stage C, NYHA III/IV  Etiology: ischemic  Euvolemic, warm and well perfused    Weight: 203 << 207 lbs <<210 << 211  NT proBNP:  2152 5/25/21    Studies- personally reviewed by me    TTE 4/22/21:  LVEF 45-50%  Grade 1 diastology  LVIDd 6 7cm  Grade 1 diastology    Echocardiogram 7/23/20  LVEF: 40%, hypokinesis of inferoseptal, inferior and inferolateral walls  LVIDd: 5 7cm  RV: normal size and function  MR: mild  PASP: inadequate TR jet for estimation  RVOT: probable midsystolic notching although no interventricular septal flattening  Other: Grade 1 diastology    [facility] 3/6/20: RCA with 70% proximal and 50% mid stenoses  LAD 40-50% stenosis proximally before widely patent stents  Distal left Cx 75% in stent restenosis just before the small LPDA  OM 3 is  with left to left collaterals  Co-dominant RCA  Medically managed (done preop for LE vascular surgery for very severe claudication)  CHF, hypotension, 16 days, exposned to covid    TTE 2/6/20 [facility]: LVEF 56%  LVIDD 5 6cm    TTE 3/23/2019: LVEF 45%  LVIDD 6cm  Normal RV size and function  PASP 57mmHg    6MWT 5/10/21:  Oxygen saturations 97% on room air at rest, with ambulating for 4 minutes oxygen saturation drops down to 85% on room air requiring 2 L nasal cannula   Oxygen saturation is 90 8% with 2 L nasal cannula   Total walk distance is 346 meters   Resting heart rate is 76 beats per minute upon ambulation maximum heart rate was 100 beats per minute    9/18/20 HRCT:   VESSELS:  Unremarkable for patient's age  IMPRESSION:  Moderate upper lobe predominant panlobular emphysema, without significant change since 2015  Mild lower lung predominant juxtapleural reticulation due to fibrosis, slightly more extensive on the right, with no traction bronchiolectasis or honeycombing     8/24/20 PFT:   The reduced total lung capacity at 50% predicted and residual volume at 34% predicted  Restrictive pattern on the spirometry  Reduced diffusion capacity  (40% predicted)  Normal airway resistance as indicated by the specific airway conductance      Diet:  2 g sodium diet  2000 mL fluid restriction    Neurohormonal Blockade:  --Beta-Blocker: metoprolol succinate 12 5mg daily  --ACEi, ARB or ARNi:  --Aldosterone Receptor Blocker:  --SGLT2 Inhibitor:  --Diuretic: torsemide 10mg daily    Sudden Cardiac Death Risk Reduction:  --ICD:     Electrical Resynchronization:  --Candidacy for BiV device:    Advanced Therapies (if appropriate): --Inotrope:  --LVAD/Transplant Candidacy:    Coronary artery disease, 2014 stents x 4 at [facility], 10/2018 re stenosis of LAD stent with stent placement  Rx: aspirin, plavix, rosuvastatin 20mg  Chronic hypoxic respiratory failure on home oxygen  CKD stage 3  Baseline creatinine 1 6-1 7  ITZEL on CPAP - machine recalled, using oxygen at night  DM    PAD with hx of  Femoral popliteal bypass surgery, bilateral iliac stenting, right common femoral endarterectomy, right femoral above knee popliteal artery bypass  Carotid artery stenosis  Hypertension  Hyperlipidemia    Today's Plan:  Increase metoprolol succinate to 25mg nightly  BP low for optimization of guideline directed medical therapy  Continue to monitor blood pressure at home  Continue torsemide 10mg daily  Use oxygen as prescribed, 2L/NC on exertion  Follow up with pulmonary      HPI:   68year old male with PMH as above who is here for heart failure evaluation  Per Dr Reza Kurtz on 3/11/21: He was hospitalized March 2019 for PVD with left femoral endarterectomy with bovine patch and left femoral to above the knee bypass with left external iliac artery stent [facility] Dr [facility] during that postoperative period  Thereafter he required debridement in reference to a left groin infection 4/15/19   Patient has known history of CAD with remote cardiac catheterization and intervention performed at [facility] with placement of 4 stents in 2015   He had a second cardiac catheterization October 2018 with re-stenosis of the LAD and stent placement in [place]   A 50% left circumflex lesion was also noted   Patient had an echocardiogram performed March 2019 which demonstrated a mild reduction in LV systolic function in the setting of a mid apical anterior inferior and inferolateral wall motion abnormality   The resting left ventricular ejection fraction was 45-50%  There was no significant valvular heart disease   He has CRI   He is followed by Nephrology service  Kiarra Yarbrough was seen by Cardiology in February 2020 at [facility] underwent cardiac catheterization March 6, 2020 by the radial approach in the setting of an abnormal pharmacologic nuclear stress test done February 6, 2020   He was found to have a 50 and 70% stenosis in a small non dominant right coronary vessel   A short left main with a left dominant system was noted  A 40% stenosis in LAD and thereafter widely patent stents   A patent obtuse OMB 1 and 2 with a large OMB 3 which is a  that receives left-to-left collaterals with distal disease in that vessel noted  He was managed medically and cleared for surgery   Echocardiogram done July 2020 demonstrated a left ventricular ejection fraction of 46% with segmental wall motion abnormality noted  There was mild LVH and mild mitral regurgitation   Patient was  hospital[facility] 4/2020 in reference to need for lower extremity revascularization   He had an urgent right common femoral endarterectomy as well as right femoral to above the knee popliteal artery bypass  His baseline dry weight is 208 according to his wife who is a retired RN  Ivan Ambrose is followed by Pulmonary service in reference to COPD and obstructive sleep apnea   He has at least 48 pack years and perhaps more but discontinued smoking around 2008  He was seen by our nurse practitioner February 2021 with shortness of breath  He was placed on Imdur in reference to chest pain and shortness of breath  He did not tolerate this in the setting of uriticaria and nausea  It was discontinued   Lipid profile done February 2021 demonstrated total cholesterol of 144 with an HDL of 46 and a calculated LDL of 65  Torsemide is on hold per Nephrology unless weight gain develops  He was seen in the ED in [place] for CHF exacerbation and was given a dose of IV lasix and was discharged home    Echocardiogram on 4/22/21 showed EF 45-50%, dilated LV and normal right heart size and function  Seen in follow up by Robb Villalobos on 5/18/21  BP was limiting for addition of medications  C/o dyspnea at that time and does not want to undergo stress testing  He had recent 6MWT which showed desaturation on exertion and was prescribed 2L on exertion but only uses when symptomatic  Interval History:  7/26/21: Here for follow up  Started on low dose metoprolol on last visit  No significant change in blood pressure and heart rate  CPAP machine has been recalled, using oxygen at night  No lower extremity edema  No lightheadedness  No palpitations  Stable shortness of breath on exertion improved with use of oxygen  No chest pain/pressure  He does not want to undergo right heart catheterization at this time         Past Medical History:   Diagnosis Date    [facility] )     resolved 11/30/2015    Acute venous embolism and thrombosis of deep vessels of proximal lower extremity ([facility] )     resolved 04/04/2015    DARINEL (acute kidney injury) ([facility] ) 1/12/2016    Anesthesia     RESPIRATORY ISSUES DUE TO SLEEP APNEA    Cardiac disease     heart attack, stents x 4    CH[facility]est[facility] failure) [facility])     Chronic cough     resolved 02/04/2016    Chronic pain disorder     intermitent claudication    COPD (chronic obstructive pulmonary disease) ([facility])     Coronary artery disease     CPAP (continuous positive airway pressure) dependence     Diabetes mellitus ([facility] )     Disease of thyroid gland     DVT (deep venous thrombosis) ([facility])     Heart failure ([facility] )     Hyperlipidemia     Hypertension     Ischemic cardiomyopathy     last assessed 09/26/2017    Myocardial infarction [facility])     MI [phone]    NSTEMI (non-ST elevated myocardial infarction) ([facility] ) 3/23/2019    Pulmonary emphysema ([facility])     Pulmonary granuloma ([facility] )     resolved 02/03/2017    Renal failure     Sleep apnea        Review of Systems   Constitutional: Positive for fatigue  Negative for chills and fever  HENT: Negative for ear pain and sore throat  Eyes: Negative for pain and visual disturbance  Respiratory: Positive for shortness of breath  Negative for cough  Cardiovascular: Negative for chest pain, palpitations and leg swelling  Gastrointestinal: Negative for abdominal pain and vomiting  Genitourinary: Negative for dysuria and hematuria  Musculoskeletal: Negative for arthralgias and back pain  Skin: Negative for color change and rash  Neurological: Negative for dizziness, seizures, syncope and light-headedness  All other systems reviewed and are negative  Allergies   Allergen Reactions    Doxazosin Myalgia     UNKNOWN  UNKNOWN  Muscle cramps    Iohexol      UNKNOWN    Lisinopril Cough     cough  Other reaction(s): CAMELLA SINENSIS (ZESTRIL) (cough)  cough    Vancomycin Hives    Adhesive [Medical Tape]      Skin Breakdown    Cardura [Doxazosin Mesylate]      Muscle cramps    Isosorbide Rash    Other      Iv dye for cardiac cath  Renal failure very close to dialysis  But no dialysis  Iv dye for cardiac cath  Renal failure very close to dialysis  But no dialysis  Iv dye for cardiac cath  Renal failure very close to dialysis  But no dialysis       Current Outpatient Medications:     aspirin 81 MG tablet, Take 81 mg by mouth daily  , Disp: , Rfl:     cholecalciferol (VITAMIN D3) 1,000 units tablet, Take 1,000 Units by mouth daily , Disp: , Rfl:     clopidogrel (PLAVIX) 75 mg tablet, Take 1 tablet (75 mg total) by mouth daily, Disp: 90 tablet, Rfl: 3    Coenzyme Q10 (COQ-10) 200 MG CAPS, Take 200 mg by mouth daily, Disp: , Rfl:     docusate sodium (COLACE) 50 mg capsule, Take by mouth 2 (two) times a day as needed , Disp: , Rfl:     gabapentin (NEURONTIN) 300 mg capsule, TAKE ONE CAPSULE BY MOUTH THREE TIMES A DAY (Patient taking differently: 2 (two) times a day ), Disp: 270 capsule, Rfl: 1    Icosapent Ethyl (Vascepa) 1 g CAPS, Take 1 capsule (1 g total) by mouth 2 (two) times a day, Disp: 180 capsule, Rfl: 3    insulin glargine (Lantus) 100 units/mL subcutaneous injection, Inject under the skin 40 units am- 8 units pm, Disp: , Rfl:     insulin lispro (HUMALOG) 100 units/mL injection, Inject 3 units with breakfast, 6 units at lunch, and 6 units at dinner (Patient taking differently: 4 (four) times a day Inject 3 units with breakfast, 6 units at lunch, and 6 units at dinner Carbs counting), Disp: 10 mL, Rfl: 1    levothyroxine 200 mcg tablet, Take 200 mcg by mouth daily, Disp: , Rfl: 0    levothyroxine 25 mcg tablet, TAKE ONE TABLET BY MOUTH EVERY DAY ALONG WITH 200MCG TABLET, Disp: , Rfl:     metoprolol succinate (TOPROL-XL) 25 mg 24 hr tablet, Take 1 tablet (25 mg total) by mouth daily, Disp: 30 tablet, Rfl: 1    polyethylene glycol (MIRALAX) 17 g packet, Take 17 g by mouth daily, Disp: , Rfl:     rosuvastatin (CRESTOR) 20 MG tablet, Take 1 tablet (20 mg total) by mouth daily, Disp: 90 tablet, Rfl: 3    SF 1 1 % GEL, BRUCH ONCE DAILY AT BEDTIME, BRUSH OF 1 MINUTE AS DIRECTED, Disp: , Rfl:     torsemide (DEMADEX) 10 mg tablet, Take 1 tablet (10 mg total) by mouth daily, Disp: 90 tablet, Rfl: 0    ULTICARE INSULIN SYRINGE 30G X 1/2" 1 ML MISC, Use as directed, Disp: , Rfl: 0    ammonium lactate (LAC-HYDRIN) 12 % lotion, Apply topically 2 (two) times a day as needed for dry skin Apply to dry skin between the toes in the AM & in the PM (Patient not taking: Reported on 7/26/2021), Disp: 400 g, Rfl: 5    Glucagon (Baqsimi One Pack) 3 MG/DOSE POWD, 1 Dose into each nostril as needed (Patient not taking: Reported on 7/26/2021), Disp: , Rfl:     nitroglycerin (NITROSTAT) 0 4 mg SL tablet, Place 1 tablet (0 4 mg total) under the tongue every 5 (five) minutes as needed for chest pain (Patient not taking: Reported on 2021), Disp: 25 tablet, Rfl: 3    Social History     Socioeconomic History    Marital status: /Civil Union     Spouse name: Not on file    Number of children: Not on file    Years of education: Not on file    Highest education level: Not on file   Occupational History    Occupation: Retired    Occupation: Desk work    Occupation: Department of [employer]   Tobacco Use    Smoking status: Former Smoker     Packs/day: 4 00     Years: 47 00     Pack years: 188 00     Types: Cigarettes     Start date:      Quit date:      Years since quittin 5    Smokeless tobacco: Never Used   Vaping Use    Vaping Use: Never used   Substance and Sexual Activity    Alcohol use: Yes     Alcohol/week: 21 0 standard drinks     Types: 21 Standard drinks or equivalent per week     Comment: 2-3 glasses of vodka daily (6 shots) (history 2 drinks per day as per allscripts    Drug use: No    Sexual activity: Not Currently   Other Topics Concern    Not on file   Social History Narrative    Not on file     Social Determinants of Health     Financial Resource Strain:     Difficulty of Paying Living Expenses:    Food Insecurity:     Worried About Running Out of Food in the Last Year:     Ran Out of Food in the Last Year:    Transportation Needs:     Lack of Transportation (Medical):      Lack of Transportation (Non-Medical):    Physical Activity:     Days of Exercise per Week:     Minutes of Exercise per Session:    Stress:     Feeling of Stress :    Social Connections:     Frequency of Communication with Friends and Family:     Frequency of Social Gatherings with Friends and Family:     Attends Restoration Services:     Active Member of Clubs or Organizations:     Attends Club or Organization Meetings:     Marital Status:    Intimate Partner Violence:     Fear of Current or Ex-Partner:     Emotionally Abused:     Physically Abused:     Sexually Abused:        Family History   Problem Relation Age of Onset    Heart disease Father         pacer placement    Hypertension Father     Kidney disease Father     Heart failure Father     Heart attack Father     Liver disease Father     Cirrhosis Mother         due to beer consumption    Liver disease Mother     Diabetes Other        Physical Exam:    Vitals: Blood pressure 100/68, pulse 92, height 5' 7" (1 702 m), weight 92 5 kg (203 lb 14 4 oz), SpO2 95 %  , Body mass index is 31 94 kg/m² ,   Wt Readings from Last 3 Encounters:   07/26/21 92 5 kg (203 lb 14 4 oz)   06/03/21 95 9 kg (211 lb 6 4 oz)   06/01/21 96 1 kg (211 lb 12 8 oz)       Physical Exam  Constitutional:       General: He is not in acute distress  Appearance: Normal appearance  HENT:      Head: Normocephalic and atraumatic  Mouth/Throat:      Mouth: Mucous membranes are moist    Eyes:      General: No scleral icterus  Extraocular Movements: Extraocular movements intact  Cardiovascular:      Rate and Rhythm: Normal rate and regular rhythm  Pulses: Normal pulses  Heart sounds: S1 normal and S2 normal  No murmur heard  No friction rub  No gallop  Comments: Nonelevated JVP  Pulmonary:      Breath sounds: Normal breath sounds  Abdominal:      General: There is no distension  Palpations: Abdomen is soft  Tenderness: There is no abdominal tenderness  There is no guarding or rebound  Musculoskeletal:         General: Normal range of motion  Cervical back: Neck supple  Right lower leg: No edema  Left lower leg: No edema  Skin:     General: Skin is warm and dry  Capillary Refill: Capillary refill takes less than 2 seconds  Neurological:      General: No focal deficit present  Mental Status: He is alert and oriented to person, place, and time     Psychiatric:         Mood and Affect: Mood normal          Labs & Results:    Lab Results   Component Value Date    WBC 6 79 05/10/2021    HGB 13 1 05/10/2021    HCT 40 8 05/10/2021     (H) 05/10/2021     (L) 05/10/2021     Lab Results   Component Value Date    SODIUM 143 05/25/2021    K 4 0 05/25/2021     (H) 05/25/2021    CO2 23 05/25/2021    BUN 31 (H) 05/25/2021    CREATININE 1 38 (H) 05/25/2021    GLUC 138 05/10/2021    CALCIUM 9 6 05/25/2021     Lab Results   Component Value Date    NTBNP 2,152 (H) 05/25/2021      Lab Results   Component Value Date    CHOLESTEROL 144 02/17/2021    CHOLESTEROL 139 09/09/2020    CHOLESTEROL 143 12/02/2019     Lab Results   Component Value Date    HDL 46 02/17/2021    HDL 40 09/09/2020    HDL 34 (L) 12/02/2019     Lab Results   Component Value Date    TRIG 165 (H) 02/17/2021    TRIG 406 (H) 09/09/2020    TRIG 324 (H) 12/02/2019     Lab Results   Component Value Date    [facility] 11/28/2018    [facility] 77 04/24/2018       EKG personally reviewed by Stefanie Dickens MD      Counseling / Coordination of Care  Time spent today 25 minutes  Greater than 50% of total time was spent with the patient and / or family counseling and / or coordination of care  We discussed diagnoses, most recent studies and any changes in treatment    Thank you for the opportunity to participate in the care of this patient      Antwon Hutchinson MD  ADVANCED HEART FAILURE AND MECHANICAL CIRCULATORY SUPPORT  [facility]

## 2021-07-26 NOTE — TELEPHONE ENCOUNTER
Patients wife Rox Sacks calling saying she wanted to make an appt sooner with Dr Bernice Pizarro as she wanted guidance for the cpap that was recalled  She stated she called 2 weeks ago and has not received a call back  I advised her that we sent out an e-mail to all patients who has a cpap which stated what they should do  She said she never got that e-mail as well  She began to get very upset and say she was very unhappy with our practice  I began to try and tell her what the e-mail stated and asked if she registered his cpap and she said "of course I did " I then started to tell her that unfortunately the doctors cannot say whether they should continue or discontinue the machine because it would be a liability  She cut me off and started saying "not only are they going to anahi the company, but they can anahi the physicians"  I told her that is why the doctors do not want to tell the patients what to do in the meantime  She was being very rude and said "don't tell me  Yes they can  I'm a nurse, 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

## 2021-07-26 NOTE — TELEPHONE ENCOUNTER
Spoke with wife and patient  He said he has been SOB for weeks  It is more on exertion  Wife said she has heard some wheezing  He has been using oxygen since his last visit  He uses it as needed  Wife says he is also not sleeping well because he cannot use his CPAP  His pulse ox today was 94%  Wife says it can be in the [de-identified] while he is sitting and ambulating  Denies cough, chest tightness, fevers and chills  She also went on to say she also wants to know what to do about his Cpap  I advised her another note is being started regarding that  Scheduled him a sick visit for Wednesday as wife was requesting him to be seen

## 2021-07-28 ENCOUNTER — OFFICE VISIT (OUTPATIENT)
Dept: PULMONOLOGY | Facility: HOSPITAL | Age: 76
End: 2021-07-28
Payer: MEDICARE

## 2021-07-28 VITALS
TEMPERATURE: 98.6 F | DIASTOLIC BLOOD PRESSURE: 64 MMHG | OXYGEN SATURATION: 97 % | HEART RATE: 86 BPM | SYSTOLIC BLOOD PRESSURE: 100 MMHG | WEIGHT: 202.6 LBS | BODY MASS INDEX: 31.8 KG/M2 | RESPIRATION RATE: 16 BRPM | HEIGHT: 67 IN

## 2021-07-28 DIAGNOSIS — G47.33 OBSTRUCTIVE SLEEP APNEA OF ADULT: Primary | ICD-10-CM

## 2021-07-28 DIAGNOSIS — J43.2 CENTRILOBULAR EMPHYSEMA (HCC): Chronic | ICD-10-CM

## 2021-07-28 DIAGNOSIS — I50.22 CHRONIC SYSTOLIC CONGESTIVE HEART FAILURE (HCC): Chronic | ICD-10-CM

## 2021-07-28 DIAGNOSIS — J96.11 HYPOXEMIC RESPIRATORY FAILURE, CHRONIC (HCC): ICD-10-CM

## 2021-07-28 DIAGNOSIS — J98.4 RESTRICTIVE LUNG DISEASE: ICD-10-CM

## 2021-07-28 PROCEDURE — 99214 OFFICE O/P EST MOD 30 MIN: CPT | Performed by: INTERNAL MEDICINE

## 2021-07-28 NOTE — PROGRESS NOTES
Pulmonary/Sleep Follow Up Note   Missy Cantrell 68 y o  male MRN: [id number]  7/28/2021      Assessment and Plan:    1  Obstructive sleep apnea of adult  Assessment & Plan:  · Moderate obstructive sleep apnea with apnea-hypopnea index of 23 7 events per hour  ·  his machine is almost 11years old, I would like to order a new machine for him today auto titrating with a pressure range of 12-20 cm H2O    Orders:  -     CPAP Auto New DME    2  Centrilobular emphysema ([address] )  Assessment & Plan:   Chronic dyspnea on exertion, he does not respond to inhalers and he is not willing to try it again, although he has Xopenex nebulizer as needed      3  Restrictive lung disease  Assessment & Plan:  ·  Follows up with Dr Saundra Noyola  · predominantly obesity related restrictive lung disease      4  Hypoxemic respiratory failure, chronic (HCC)  Assessment & Plan:   Does not wear his exertional oxygen 2 L, he does not feel it helps him although sometimes he gets short of breath and when he is using it it helps him, will continue it for now      5  Chronic systolic congestive heart failure [facility])  Assessment & Plan:  Wt Readings from Last 3 Encounters:   07/28/21 91 9 kg (202 lb 9 6 oz)   07/26/21 92 5 kg (203 lb 14 4 oz)   06/03/21 95 9 kg (211 lb 6 4 oz)        most recent echo in July 2020 with EF of 40%, he follows up with Cardiology team, continues to be on torsemide 10 mg, his metoprolol ER was increased to 25 mg 2 days ago            Return in about 3 months (around 10/28/2021)  History of Present Illness   HPI:  Missy Cantrell is a 68 y o  male who  Has past medical history of obesity, strict of lung disease, obstructive sleep apnea here for follow-up  Patient has had an old CPAP machine and he was concerned about Zuri recall he has called our call center few days ago and he wanted to discuss options about ordering a new machine  He has been using his CPAP since he got it 5 years ago he cannot sleep without it    He has underlying COPD and emphysema and restrictive lung disease has been well controlled without any medications he has tried to use albuterol in the past which did not go well with his atrial fibrillation and he was switched to Xopenex nebulizer that he is rarely using  His activity levels is not high any ways and he feels that his breathing is okay without anything  Review of Systems   All other systems reviewed and are negative        Historical Information   Past Medical History:   Diagnosis Date    [facility] )     resolved 11/30/2015    Acute venous embolism and thrombosis of deep vessels of proximal lower extremity ([facility] )     resolved 04/04/2015    DARINEL (acute kidney injury) ([facility] ) 1/12/2016    Anesthesia     RESPIRATORY ISSUES DUE TO SLEEP APNEA    Cardiac disease     heart attack, stents x 4    CH[facility]congestive heart failure) ([facility])     Chronic cough     resolved 02/04/2016    Chronic pain disorder     intermitent claudication    COPD (chronic obstructive pulmonary disease) ([facility])     Coronary artery disease     CPAP (continuous positive airway pressure) dependence     Diabetes mellitus ([place] [address] )     Disease of thyroid gland     DVT (deep venous thrombosis) ([facility])     Heart failure ([facility])     Hyperlipidemia     Hypertension     Ischemic cardiomyopathy     last assessed 09/26/2017    Myocardial infarction [facility])     MI +2 2015,2018    NSTEMI (non-ST elevated myocardial infarction) ([place] [address] ) 3/23/2019    Pulmonary emphysema ([facility])     Pulmonary granuloma ([facility])     resolved 02/03/2017    Renal failure     Sleep apnea      Past Surgical History:   Procedure Laterality Date    BYPASS FEMORAL-POPLITEAL      initial stenosis with stent left, 7 x 100 smart stent onset 02/24/2014    CARDIAC SURGERY      cardiac stents    CATARACT EXTRACTION      COLOGUARD (HISTORICAL)  2018    CORONARY ANGIOPLASTY WITH STENT PLACEMENT      x4    IR AORTAGRAM WITH RUN-OFF  3/14/2019    FL THROMBOENDARTECTMY FEMORAL COMMON Left 3/22/2019    Procedure: ENDARTERECTOMY ARTERIAL FEMORAL WITH PATCH ANGIOPLASTY, BALLOON ANGIOPLASTY, STENT;  Surgeon: Dyoln Dominique MD;  [facility];  Service: Vascular    AZ VEIN BYPASS GRAFT,FEM-POP Left 3/22/2019    Procedure: BYPASS FEMORAL-POPLITEAL WITH COMPLETION ARTERIOGRAM;  Surgeon: Dylon Dominique MD;  [facility];  Service: Vascular    VASCULAR SURGERY      stent placement    WOUND DEBRIDEMENT Left 4/15/2019    Procedure: DEBRIDEMENT WOUND AND DRESSING CHANGE ([address]) left groin with VAC;  Surgeon: Neel Luis DO;  [facility];  Service: Vascular     Family History   Problem Relation Age of Onset    Heart disease Father         pacer placement    Hypertension Father     Kidney disease Father     Heart failure Father     Heart attack Father     Liver disease Father     Cirrhosis Mother         due to beer consumption    Liver disease Mother     Diabetes Other          Meds/Allergies     Current Outpatient Medications:     aspirin 81 MG tablet, Take 81 mg by mouth daily  , Disp: , Rfl:     cholecalciferol (VITAMIN D3) 1,000 units tablet, Take 1,000 Units by mouth daily , Disp: , Rfl:     clopidogrel (PLAVIX) 75 mg tablet, Take 1 tablet (75 mg total) by mouth daily, Disp: 90 tablet, Rfl: 3    Coenzyme Q10 (COQ-10) 200 MG CAPS, Take 200 mg by mouth daily, Disp: , Rfl:     docusate sodium (COLACE) 50 mg capsule, Take by mouth 2 (two) times a day as needed , Disp: , Rfl:     gabapentin (NEURONTIN) 300 mg capsule, TAKE ONE CAPSULE BY MOUTH THREE TIMES A DAY (Patient taking differently: 2 (two) times a day ), Disp: 270 capsule, Rfl: 1    Glucagon (Baqsimi One Pack) 3 MG/DOSE POWD, 1 Dose into each nostril as needed , Disp: , Rfl:     Icosapent Ethyl (Vascepa) 1 g CAPS, Take 1 capsule (1 g total) by mouth 2 (two) times a day, Disp: 180 capsule, Rfl: 3    insulin glargine (Lantus) 100 units/mL subcutaneous injection, Inject under the skin 40 units am- 8 units pm, Disp: , Rfl:     insulin lispro (HUMALOG) 100 units/mL injection, Inject 3 units with breakfast, 6 units at lunch, and 6 units at dinner (Patient taking differently: 4 (four) times a day Inject 3 units with breakfast, 6 units at lunch, and 6 units at dinner Carbs counting), Disp: 10 mL, Rfl: 1    levothyroxine 200 mcg tablet, Take 200 mcg by mouth daily, Disp: , Rfl: 0    levothyroxine 25 mcg tablet, TAKE ONE TABLET BY MOUTH EVERY DAY ALONG WITH 200MCG TABLET, Disp: , Rfl:     metoprolol succinate (TOPROL-XL) 25 mg 24 hr tablet, Take 1 tablet (25 mg total) by mouth daily, Disp: 30 tablet, Rfl: 1    polyethylene glycol (MIRALAX) 17 g packet, Take 17 g by mouth daily, Disp: , Rfl:     rosuvastatin (CRESTOR) 20 MG tablet, Take 1 tablet (20 mg total) by mouth daily, Disp: 90 tablet, Rfl: 3    SF 1 1 % GEL, BRUCH ONCE DAILY AT BEDTIME, BRUSH OF 1 MINUTE AS DIRECTED, Disp: , Rfl:     torsemide (DEMADEX) 10 mg tablet, Take 1 tablet (10 mg total) by mouth daily, Disp: 90 tablet, Rfl: 0    ULTICARE INSULIN SYRINGE 30G X 1/2" 1 ML MISC, Use as directed, Disp: , Rfl: 0    ammonium lactate (LAC-HYDRIN) 12 % lotion, Apply topically 2 (two) times a day as needed for dry skin Apply to dry skin between the toes in the AM & in the PM (Patient not taking: Reported on 7/26/2021), Disp: 400 g, Rfl: 5    nitroglycerin (NITROSTAT) 0 4 mg SL tablet, Place 1 tablet (0 4 mg total) under the tongue every 5 (five) minutes as needed for chest pain (Patient not taking: Reported on 7/26/2021), Disp: 25 tablet, Rfl: 3  Allergies   Allergen Reactions    Doxazosin Myalgia     UNKNOWN  UNKNOWN  Muscle cramps    Iohexol      UNKNOWN    Lisinopril Cough     cough  Other reaction(s): CAMELLA SINENSIS (ZESTRIL) (cough)  cough    Vancomycin Hives    Adhesive [Medical Tape]      Skin Breakdown    Cardura [Doxazosin Mesylate]      Muscle cramps    Isosorbide Rash    Other      Iv dye for cardiac cath  Renal failure very close to dialysis  But no dialysis  Iv dye for cardiac cath  Renal failure very close to dialysis  But no dialysis  Iv dye for cardiac cath  Renal failure very close to dialysis  But no dialysis       Vitals: Blood pressure 100/64, pulse 86, temperature 98 6 °F (37 °C), resp  rate 16, height 5' 7" (1 702 m), weight 91 9 kg (202 lb 9 6 oz), SpO2 97 %  Body mass index is 31 73 kg/m²  Oxygen Therapy  SpO2: 97 %      Physical Exam  Physical Exam  Constitutional:       Appearance: Normal appearance  He is obese  He is not ill-appearing or diaphoretic  HENT:      Head: Normocephalic and atraumatic  Nose: No congestion or rhinorrhea  Mouth/Throat:      Mouth: Mucous membranes are moist       Pharynx: Oropharynx is clear  No oropharyngeal exudate or posterior oropharyngeal erythema  Eyes:      General: No scleral icterus  Right eye: No discharge  Left eye: No discharge  Extraocular Movements: Extraocular movements intact  Conjunctiva/sclera: Conjunctivae normal    Cardiovascular:      Rate and Rhythm: Normal rate and regular rhythm  Pulses: Normal pulses  Heart sounds: Normal heart sounds  No murmur heard  No gallop  Pulmonary:      Effort: Pulmonary effort is normal  No respiratory distress  Breath sounds: Normal breath sounds  No wheezing, rhonchi or rales  Abdominal:      General: Abdomen is flat  There is no distension  Palpations: Abdomen is soft  There is no mass  Tenderness: There is no abdominal tenderness  Musculoskeletal:         General: No swelling, tenderness or deformity  Cervical back: Normal range of motion and neck supple  No rigidity  Right lower leg: Edema present  Left lower leg: Edema present  Skin:     General: Skin is warm and dry  Coloration: Skin is not pale  Findings: No erythema  Neurological:      General: No focal deficit present        Mental Status: He is alert and oriented to person, place, and time  Mental status is at baseline  Psychiatric:         Mood and Affect: Mood normal          Behavior: Behavior normal          Thought Content: Thought content normal          Judgment: Judgment normal          Labs: I have personally reviewed pertinent lab results  , ABG: No results found for: PHART, VAU3YUE, PO2ART, TWE3ZCM, F3KKCROT, BEART, SOURCE, BNP: No results found for: BNP, CBC: No results found for: WBC, HGB, HCT, MCV, PLT, ADJUSTEDWBC, MCH, MCHC, RDW, MPV, NRBC, CMP: No results found for: SODIUM, K, CL, CO2, ANIONGAP, BUN, CREATININE, GLUCOSE, CALCIUM, AST, ALT, ALKPHOS, PROT, BILITOT, EGFR, PT/INR: No results found for: PT, INR, Troponin: No results found for: TROPONINI  Lab Results   Component Value Date    WBC 6 79 05/10/2021    HGB 13 1 05/10/2021    HCT 40 8 05/10/2021     (H) 05/10/2021     (L) 05/10/2021     Lab Results   Component Value Date    GLUCOSE 207 (H) 03/22/2019    CALCIUM 9 6 05/25/2021     12/21/2015    K 4 0 05/25/2021    CO2 23 05/25/2021     (H) 05/25/2021    BUN 31 (H) 05/25/2021    CREATININE 1 38 (H) 05/25/2021     No results found for: IGE  Lab Results   Component Value Date    ALT 23 02/17/2021    AST 21 02/17/2021    ALKPHOS 100 02/17/2021    BILITOT 0 33 10/01/2015         Imaging and other studies: I have personally reviewed pertinent films in PACS  CT chest 9/2020     LUNGS:  Moderate upper lobe predominant panlobular emphysema      Mild juxtapleural lower lung reticulation, slightly more extensive on the right   No definite traction bronchiolectasis or honeycombing   No significant air trapping on expiration       Benign calcified granulomas      No significant filling defects in the trachea and bronchi      PLEURA:  Unremarkable      Pulmonary function testing 8/2020:   · The reduced total lung capacity at 50% predicted and residual volume at 34% predicted  · Restrictive pattern on the spirometry    · Reduced diffusion capacity  · Normal airway resistance as indicated by the specific airway conductance      Sleep studies: I have personally reviewed pertinent reports  Split night study   AHI 23 7 events per hour consistent with moderate obstructive sleep apnea     Compliance report:I have personally reviewed pertinent reports  4/2021-5/2021   residual AHI of 16 7 events per hour on a CPAP pressure of 12 cm H2O        Gunnar Hill MD  [facility]       Portions of the record may have been created with voice recognition software  Occasional wrong word or "sound a like" substitutions may have occurred due to the inherent limitations of voice recognition software  Read the chart carefully and recognize, using context, where substitutions have occurred

## 2021-07-28 NOTE — ASSESSMENT & PLAN NOTE
Wt Readings from Last 3 Encounters:   07/28/21 91 9 kg (202 lb 9 6 oz)   07/26/21 92 5 kg (203 lb 14 4 oz)   06/03/21 95 9 kg (211 lb 6 4 oz)        most recent echo in July 2020 with EF of 40%, he follows up with Cardiology team, continues to be on torsemide 10 mg, his metoprolol ER was increased to 25 mg 2 days ago

## 2021-07-28 NOTE — ASSESSMENT & PLAN NOTE
· Moderate obstructive sleep apnea with apnea-hypopnea index of 23 7 events per hour  ·  his machine is almost 11years old, I would like to order a new machine for him today auto titrating with a pressure range of 12-20 cm H2O

## 2021-07-28 NOTE — ASSESSMENT & PLAN NOTE
Does not wear his exertional oxygen 2 L, he does not feel it helps him although sometimes he gets short of breath and when he is using it it helps him, will continue it for now

## 2021-07-28 NOTE — PATIENT INSTRUCTIONS
CPAP   AMBULATORY CARE:   CPAP  (continuous positive airway pressure) is a treatment that uses air pressure to keep your airways open while you sleep  CPAP is used to treat obstructive sleep apnea (ITZEL)  A CPAP machine connects the mask to the machine with a hose  Air pressure prevents your airway from collapsing or becoming blocked during sleep  Use your CPAP machine when you sleep, even when you nap  You may need to use a CPAP machine for the rest of your life  Make CPAP easier to use:   · At first, try to use your CPAP for a few hours every night  Then slowly increase the length of time you use your machine  It takes time to adjust to CPAP treatment  · You may need to use a special moisturizer made for CPAP users  You may need a mask that is a different size, shape, or material  Talk to your healthcare provider if your mask feels uncomfortable or irritates your skin  · Use a saline nasal spray at bedtime to help relieve nasal irritation  A chin strap to help keep your mouth closed  A different type of mask can help dry mouth  Some machines come with a heated humidifier to help relieve these symptoms  · Talk to your healthcare provider if you are having problems adjusting to the air pressure  He or she can tell you how to adjust the air pressure on your CPAP  You may need to start at a lower pressure and slowly increase it over time  Call your healthcare provider if:   · You continue to feel very sleepy during the day, even after you wear your CPAP device as directed  · Your CPAP is causing a problem, such as a rash, that does not improve  · You have questions or concerns about your condition, care, or equipment  Follow up with your healthcare provider as directed:  Tell your healthcare provider if your mask no longer fits properly  Write down your questions so you remember to ask them during your visits     © Copyright [address] 2021 Information is for End User's use only and may not be sold, redistributed or otherwise used for commercial purposes  All illustrations and images included in CareNotes® are the copyrighted property of A D A M , Inc  or Najma John  The above information is an  only  It is not intended as medical advice for individual conditions or treatments  Talk to your doctor, nurse or pharmacist before following any medical regimen to see if it is safe and effective for you

## 2021-07-28 NOTE — ASSESSMENT & PLAN NOTE
Chronic dyspnea on exertion, he does not respond to inhalers and he is not willing to try it again, although he has Xopenex nebulizer as needed

## 2021-08-11 ENCOUNTER — TELEPHONE (OUTPATIENT)
Dept: PULMONOLOGY | Facility: CLINIC | Age: 76
End: 2021-08-11

## 2021-08-11 NOTE — TELEPHONE ENCOUNTER
Spoke with pts wife on the phone and she was looking to get a new machine for the pt because his is under the recall  I informed the pts wife that we will not be able to cover a new machine because the PAP is less than five years old and will not be covered by his insurance  She would still like to continue with getting a new machine  She will contact the office back and inform which DME company to send the new script too

## 2021-08-13 NOTE — TELEPHONE ENCOUNTER
Patients wife calling saying [facility] Maya Mu but they cannot send it without his sleep study  She would like this to be sent today  She states his sleep study was done on 10/15/15  Please fax to [phone] Attn: [employer] Dept and call Benji Jacobson once it is sent

## 2021-08-19 NOTE — TELEPHONE ENCOUNTER
PTs wife called back stating [facility] had never received sleep study  She gave me a new fax number of [phone]  Sent baseline study to [place]

## 2021-09-08 DIAGNOSIS — I50.22 CHRONIC SYSTOLIC HEART FAILURE (HCC): ICD-10-CM

## 2021-09-08 RX ORDER — METOPROLOL SUCCINATE 25 MG/1
25 TABLET, EXTENDED RELEASE ORAL DAILY
Qty: 30 TABLET | Refills: 3 | Status: SHIPPED | OUTPATIENT
Start: 2021-09-08 | End: 2022-01-21

## 2021-09-14 ENCOUNTER — TELEPHONE (OUTPATIENT)
Dept: PULMONOLOGY | Facility: CLINIC | Age: 76
End: 2021-09-14

## 2021-09-14 DIAGNOSIS — G47.33 OBSTRUCTIVE SLEEP APNEA OF ADULT: Primary | ICD-10-CM

## 2021-09-14 NOTE — TELEPHONE ENCOUNTER
Pts wife called and asked if Jilda Nageotte can have a full face mask ordered for his CPAP   Please advise

## 2021-09-22 ENCOUNTER — TELEPHONE (OUTPATIENT)
Dept: PULMONOLOGY | Facility: CLINIC | Age: 76
End: 2021-09-22

## 2021-09-22 DIAGNOSIS — I25.10 CORONARY ARTERY DISEASE INVOLVING NATIVE CORONARY ARTERY OF NATIVE HEART WITHOUT ANGINA PECTORIS: ICD-10-CM

## 2021-09-22 DIAGNOSIS — I73.9 PVD (PERIPHERAL VASCULAR DISEASE) (HCC): ICD-10-CM

## 2021-09-22 DIAGNOSIS — E10.8 TYPE 1 DIABETES MELLITUS WITH COMPLICATION (HCC): ICD-10-CM

## 2021-09-22 DIAGNOSIS — I10 ESSENTIAL (PRIMARY) HYPERTENSION: ICD-10-CM

## 2021-09-22 DIAGNOSIS — E78.00 PURE HYPERCHOLESTEROLEMIA: ICD-10-CM

## 2021-09-22 RX ORDER — ICOSAPENT ETHYL 1000 MG/1
1 CAPSULE ORAL 2 TIMES DAILY
Qty: 180 CAPSULE | Refills: 3 | Status: SHIPPED | OUTPATIENT
Start: 2021-09-22 | End: 2022-05-18 | Stop reason: SDUPTHER

## 2021-09-27 ENCOUNTER — OFFICE VISIT (OUTPATIENT)
Dept: CARDIOLOGY CLINIC | Facility: CLINIC | Age: 76
End: 2021-09-27
Payer: MEDICARE

## 2021-09-27 VITALS
HEIGHT: 67 IN | OXYGEN SATURATION: 99 % | HEART RATE: 64 BPM | WEIGHT: 204.2 LBS | SYSTOLIC BLOOD PRESSURE: 108 MMHG | DIASTOLIC BLOOD PRESSURE: 62 MMHG | BODY MASS INDEX: 32.05 KG/M2

## 2021-09-27 DIAGNOSIS — I50.9 HEART FAILURE WITH MID-RANGE EJECTION FRACTION (HCC): Primary | ICD-10-CM

## 2021-09-27 DIAGNOSIS — G47.33 OBSTRUCTIVE SLEEP APNEA OF ADULT: ICD-10-CM

## 2021-09-27 DIAGNOSIS — I25.10 CORONARY ARTERY DISEASE INVOLVING NATIVE CORONARY ARTERY OF NATIVE HEART WITHOUT ANGINA PECTORIS: ICD-10-CM

## 2021-09-27 DIAGNOSIS — I10 ESSENTIAL (PRIMARY) HYPERTENSION: ICD-10-CM

## 2021-09-27 PROCEDURE — 99214 OFFICE O/P EST MOD 30 MIN: CPT | Performed by: INTERNAL MEDICINE

## 2021-09-27 NOTE — PROGRESS NOTES
Advanced Heart Failure Outpatient Progress Note - Venkat Vásquez 68 y o  male MRN: [id number]    Encounter: [id number]      Assessment/Plan:    Patient Active Problem List    Diagnosis Date Noted    Hypoxemic respiratory failure, chronic ([place] [address] ) 05/10/2021    Stenosis of carotid artery 08/24/2020    Thrombocytopenia, unspecified ([place] [address] ) 03/10/2020    Spinal stenosis of lumbar region with neurogenic claudication     S/P femoral-popliteal bypass surgery 01/09/2020    Anxiety with depression 04/07/2019    Chronic systolic congestive heart failure ([place] [address] ) 03/01/2019    Diabetic neuropathy associated with type 1 diabetes mellitus ([place] [address] ) 01/14/2019    Lumbar disc herniation 08/01/2018    Lumbar radiculopathy 08/01/2018    Aortoiliac occlusive disease ([place] [address] ) 03/01/2018    Restrictive lung disease 01/30/2018    Centrilobular emphysema ([place] [address] ) 01/30/2018    Presence of drug coated stent in LAD coronary artery 01/24/2018    Coronary artery disease involving native coronary artery of native heart without angina pectoris 01/24/2018    Presence of drug coated stent in left circumflex coronary artery 01/24/2018    Dyslipidemia 01/24/2018    Obstructive sleep apnea of adult 01/24/2018    Carotid artery stenosis, asymptomatic, bilateral 01/24/2018    CKD (chronic kidney disease) stage 3, GFR 30-59 ml/min ([facility]) 12/13/2016    Low back pain with sciatica 08/22/2016    Type 1 diabetes mellitus with diabetic peripheral angiopathy without gangrene ([place] [address] ) 01/12/2016    Benign hypertension with chronic kidney disease, stage III ([place] [address] ) 08/12/2015    Renal cyst, right 08/12/2015    Vitamin D deficiency 05/22/2015    Renal artery stenosis ([place] [address] ) 05/09/2015    Hypothyroidism, postablative 04/02/2013         Heart failure with mid range EF, Stage C, NYHA III  Etiology: ischemic  Euvolemic, warm and well perfused    Weight:  204 lbs 9/27/21; 203 << 207 lbs <<210 << 211  NT proBNP:  2152 5/25/21    Studies- personally reviewed by me    TTE 4/22/21:  LVEF 45-50%  Grade 1 diastology  LVIDd 6 7cm  Grade 1 diastology    Echocardiogram 7/23/20  LVEF: 40%, hypokinesis of inferoseptal, inferior and inferolateral walls  LVIDd: 5 7cm  RV: normal size and function  MR: mild  PASP: inadequate TR jet for estimation  RVOT: probable midsystolic notching although no interventricular septal flattening  Other: Grade 1 diastology    C [facility] 3/6/20: RCA with 70% proximal and 50% mid stenoses  LAD 40-50% stenosis proximally before widely patent stents  Distal left Cx 75% in stent restenosis just before the small LPDA  OM 3 is  with left to left collaterals  Co-dominant RCA  Medically managed (done preop for LE vascular surgery for very severe claudication)  CHF, hypotension, 16 days, exposned to covid    TTE 2/6/20 [facility]: LVEF 56%  LVIDD 5 6cm    TTE 3/23/2019: LVEF 45%  LVIDD 6cm  Normal RV size and function  PASP 57mmHg    6MWT 5/10/21:  Oxygen saturations 97% on room air at rest, with ambulating for 4 minutes oxygen saturation drops down to 85% on room air requiring 2 L nasal cannula   Oxygen saturation is 90 8% with 2 L nasal cannula   Total walk distance is 346 meters   Resting heart rate is 76 beats per minute upon ambulation maximum heart rate was 100 beats per minute    9/18/20 HRCT:   VESSELS:  Unremarkable for patient's age  IMPRESSION:  Moderate upper lobe predominant panlobular emphysema, without significant change since 2015  Mild lower lung predominant juxtapleural reticulation due to fibrosis, slightly more extensive on the right, with no traction bronchiolectasis or honeycombing     8/24/20 PFT:   The reduced total lung capacity at 50% predicted and residual volume at 34% predicted  Restrictive pattern on the spirometry  Reduced diffusion capacity  (40% predicted)  Normal airway resistance as indicated by the specific airway conductance      Diet:  2 g sodium diet  2000 mL fluid restriction    Neurohormonal Blockade:  --Beta-Blocker: metoprolol succinate 25mg nightly  --ACEi, ARB or ARNi:  --Aldosterone Receptor Blocker:  --SGLT2 Inhibitor:  --Diuretic: torsemide 10mg daily    Sudden Cardiac Death Risk Reduction:  --ICD:     Electrical Resynchronization:  --Candidacy for BiV device:    Advanced Therapies (if appropriate): --Inotrope:  --LVAD/Transplant Candidacy:    Coronary artery disease, 2014 stents x 4 at [facility], 10/2018 re stenosis of LAD stent with stent placement  Rx: aspirin, plavix, rosuvastatin 20mg  Chronic hypoxic respiratory failure on home oxygen  CKD stage 3  Baseline creatinine 1 6-1 7  ITZEL on CPAP - machine recalled, got new machine and now back on CPAP  DM    PAD with hx of  Femoral popliteal bypass surgery, bilateral iliac stenting, right common femoral endarterectomy, right femoral above knee popliteal artery bypass  Carotid artery stenosis  Hypertension, controlled  Hyperlipidemia    Today's Plan:  No medication changes at this time  BP low for further optimization of medical therapy  CMP and lipid panel as ordered by PCP  Daily weights  Salt and fluid restriction  Increase physical activities as tolerated, let us know if interested in cardiac rehab        HPI:   68year old male with PMH as above who is here for heart failure evaluation  Per Dr Mustapha Loja on 3/11/21: He was hospitalized March 2019 for PVD with left femoral endarterectomy with bovine patch and left femoral to above the knee bypass with left external iliac artery stent by Dr Luis M [facility] did have intermittent angina during that postoperative period  Thereafter he required debridement in reference to a left groin infection 4/15/19   Patient has known history of CAD with remote cardiac catheterization and intervention performed at [facility] with placement of 4 stents in 2015   He had a second cardiac catheterization October 2018 with re-stenosis of the LAD and stent placement in [address]   A 50% left circumflex lesion was also noted   Patient had an echocardiogram performed March 2019 which demonstrated a mild reduction in LV systolic function in the setting of a mid apical anterior inferior and inferolateral wall motion abnormality   The resting left ventricular ejection fraction was 45-50%  There was no significant valvular heart disease   He has CRI   He is followed by Nephrology service  Ritchiejossy Cotto was seen by Cardiology in February 2020 at [facility] underwent cardiac catheterization March 6, 2020 by the radial approach in the setting of an abnormal pharmacologic nuclear stress test done February 6, 2020   He was found to have a 50 and 70% stenosis in a small non dominant right coronary vessel   A short left main with a left dominant system was noted  A 40% stenosis in LAD and thereafter widely patent stents   A patent obtuse OMB 1 and 2 with a large OMB 3 which is a  that receives left-to-left collaterals with distal disease in that vessel noted  He was managed medically and cleared for surgery   Echocardiogram done July 2020 demonstrated a left ventricular ejection fraction of 46% with segmental wall motion abnormality noted  There was mild LVH and mild mitral regurgitation   Patient was  hospitalized at [facility] 4/2020 in reference to need for lower extremity revascularization   He had an urgent right common femoral endarterectomy as well as right femoral to above the knee popliteal artery bypass  His baseline dry weight is 208 according to his wife who is a retired RN  [facility] is followed by Pulmonary service in reference to COPD and obstructive sleep apnea   He has at least 48 pack years and perhaps more but discontinued smoking around 2008  He was seen by our nurse practitioner February 2021 with shortness of breath  He was placed on Imdur in reference to chest pain and shortness of breath  He did not tolerate this in the setting of uriticaria and nausea  It was discontinued   Lipid profile done February 2021 demonstrated total cholesterol of 144 with an HDL of 46 and a calculated LDL of 65  Torsemide is on hold per Nephrology unless weight gain develops  He was seen in the ED in [place] for CHF exacerbation and was given a dose of IV lasix and was discharged home    Echocardiogram on 4/22/21 showed EF 45-50%, dilated LV and normal right heart size and function  Seen in follow up by Ashli Knapp on 5/18/21  BP was limiting for addition of medications  C/o dyspnea at that time and does not want to undergo stress testing  He had recent 6MWT which showed desaturation on exertion and was prescribed 2L on exertion but only uses when symptomatic     7/26/21: Here for follow up  Started on low dose metoprolol on last visit  No significant change in blood pressure and heart rate  CPAP machine has been recalled, using oxygen at night  No lower extremity edema  No lightheadedness  No palpitations  Stable shortness of breath on exertion improved with use of oxygen  No chest pain/pressure  He does not want to undergo right heart catheterization at this time  Interval History:  9/27/2021: Patient is here for follow-up  Overall stable shortness of breath on exertion  Reports short of breath walking from waiting to examination  Weight overall stable around 202 lb and has been as low as 199 lb on home scale  Denies chest pain, PND orthopnea or lower extremity edema  No palpitations or lightheadedness  He is trying to do regular walking program   He is not interested in cardiac rehab at this time        Past Medical History:   Diagnosis Date    Acute kidney injury ([facility] )     resolved 11/30/2015    Acute venous embolism and thrombosis of deep vessels of proximal lower extremity ([address] )     resolved 04/04/2015    DARINEL (acute kidney injury) ([address] ) 1/12/2016    Anesthesia     RESPIRATORY ISSUES DUE TO SLEEP APNEA    Cardiac disease     heart attack, stents x 4    [facility]est[facility] heart failure) ([facility])     Chronic cough     resolved 02/04/2016    Chronic pain disorder     intermitent claudication    COPD (chronic obstructive pulmonary disease) ([facility])     Coronary artery disease     CPAP (continuous positive airway pressure) dependence     [facility]     Disease of thyroid gland     D[facility] venous thrombosis) ([facility])     Heart failure ([facility] )     Hyperlipidemia     Hypertension     Ischemic cardiomyopathy     last assessed 09/26/2017    Myocardial infarction [facility])     MI +2 2015,2018    NSTEMI (non-ST elevated myocardial infarction) ([facility] ) 3/23/2019    Pulmonary emphysema ([facility])     Pulmonary granuloma ([facility])     resolved 02/03/2017    Renal failure     Sleep apnea        Review of Systems   Constitutional: Positive for fatigue  Negative for chills and fever  HENT: Negative for ear pain and sore throat  Eyes: Negative for pain and visual disturbance  Respiratory: Positive for shortness of breath  Negative for cough  Cardiovascular: Negative for chest pain, palpitations and leg swelling  Gastrointestinal: Negative for abdominal pain and vomiting  Genitourinary: Negative for dysuria and hematuria  Musculoskeletal: Negative for arthralgias and back pain  Skin: Negative for color change and rash  Neurological: Negative for dizziness, seizures, syncope and light-headedness  All other systems reviewed and are negative  Allergies   Allergen Reactions    Doxazosin Myalgia     UNKNOWN  UNKNOWN  Muscle cramps    Iohexol      UNKNOWN    Lisinopril Cough     cough  Other reaction(s): CAMELLA SINENSIS (ZESTRIL) (cough)  cough    Vancomycin Hives    Adhesive [Medical Tape]      Skin Breakdown    Cardura [Doxazosin Mesylate]      Muscle cramps    Isosorbide Rash    Other      Iv dye for cardiac cath  Renal failure very close to dialysis  But no dialysis  Iv dye for cardiac cath  Renal failure very close to dialysis  But no dialysis  Iv dye for cardiac cath  Renal failure very close to dialysis  But no dialysis           Current Outpatient Medications:     aspirin 81 MG tablet, Take 81 mg by mouth daily  , Disp: , Rfl:     cholecalciferol (VITAMIN D3) 1,000 units tablet, Take 1,000 Units by mouth daily , Disp: , Rfl:     clopidogrel (PLAVIX) 75 mg tablet, Take 1 tablet (75 mg total) by mouth daily, Disp: 90 tablet, Rfl: 3    Coenzyme Q10 (COQ-10) 200 MG CAPS, Take 200 mg by mouth daily, Disp: , Rfl:     docusate sodium (COLACE) 50 mg capsule, Take by mouth 2 (two) times a day as needed , Disp: , Rfl:     gabapentin (NEURONTIN) 300 mg capsule, TAKE ONE CAPSULE BY MOUTH THREE TIMES A DAY (Patient taking differently: 2 (two) times a day ), Disp: 270 capsule, Rfl: 1    Glucagon (Baqsimi One Pack) 3 MG/DOSE POWD, 1 Dose into each nostril as needed , Disp: , Rfl:     Icosapent Ethyl (Vascepa) 1 g CAPS, Take 1 capsule (1 g total) by mouth 2 (two) times a day, Disp: 180 capsule, Rfl: 3    insulin glargine (Lantus) 100 units/mL subcutaneous injection, Inject under the skin 40 units am- 8 units pm, Disp: , Rfl:     insulin lispro (HUMALOG) 100 units/mL injection, Inject 3 units with breakfast, 6 units at lunch, and 6 units at dinner (Patient taking differently: 4 (four) times a day Inject 3 units with breakfast, 6 units at lunch, and 6 units at dinner Carbs counting), Disp: 10 mL, Rfl: 1    levothyroxine 200 mcg tablet, Take 200 mcg by mouth daily, Disp: , Rfl: 0    metoprolol succinate (TOPROL-XL) 25 mg 24 hr tablet, Take 1 tablet (25 mg total) by mouth daily, Disp: 30 tablet, Rfl: 3    polyethylene glycol (MIRALAX) 17 g packet, Take 17 g by mouth daily, Disp: , Rfl:     rosuvastatin (CRESTOR) 20 MG tablet, Take 1 tablet (20 mg total) by mouth daily, Disp: 90 tablet, Rfl: 3    SF 1 1 % GEL, BRUCH ONCE DAILY AT BEDTIME, BRUSH OF 1 MINUTE AS DIRECTED, Disp: , Rfl:     ULTICARE INSULIN SYRINGE 30G X 1/2" 1 ML MISC, Use as directed, Disp: , Rfl: 0    ammonium lactate (LAC-HYDRIN) 12 % lotion, Apply topically 2 (two) times a day as needed for dry skin Apply to dry skin between the toes in the AM & in the PM (Patient not taking: Reported on 2021), Disp: 400 g, Rfl: 5    levothyroxine 25 mcg tablet, TAKE ONE TABLET BY MOUTH EVERY DAY ALONG WITH 200MCG TABLET (Patient not taking: Reported on 2021), Disp: , Rfl:     nitroglycerin (NITROSTAT) 0 4 mg SL tablet, Place 1 tablet (0 4 mg total) under the tongue every 5 (five) minutes as needed for chest pain (Patient not taking: Reported on 2021), Disp: 25 tablet, Rfl: 3    torsemide (DEMADEX) 10 mg tablet, TAKE 1 TABLET(10 MG) BY MOUTH DAILY, Disp: 90 tablet, Rfl: 2    Social History     Socioeconomic History    Marital status: /Civil Union     Spouse name: Not on file    Number of children: Not on file    Years of education: Not on file    Highest education level: Not on file   Occupational History    Occupation: Retired    Occupation: Desk work    Occupation: [employer]   Tobacco Use    Smoking status: Former Smoker     Packs/day: 4 00     Years: 47 00     Pack years: 188 00     Types: Cigarettes     Start date:      Quit date:      Years since quittin 7    Smokeless tobacco: Never Used   Vaping Use    Vaping Use: Never used   Substance and Sexual Activity    Alcohol use: Yes     Alcohol/week: 21 0 standard drinks     Types: 21 Standard drinks or equivalent per week     Comment: 2-3 glasses of vodka daily (6 shots) (history 2 drinks per day as per allscripts    Drug use: No    Sexual activity: Not Currently   Other Topics Concern    Not on file   Social History Narrative    Not on file     Social Determinants of Health     Financial Resource Strain:     Difficulty of Paying Living Expenses:    Food Insecurity:     Worried About Running Out of Food in the Last Year:     Ran Out of Food in the Last Year:    Transportation Needs:     Lack of Transportation (Medical):      Lack of Transportation (Non-Medical):    Physical Activity:     Days of Exercise per Week:     Minutes of Exercise per Session:    Stress:     Feeling of Stress :    Social Connections:     Frequency of Communication with Friends and Family:     Frequency of Social Gatherings with Friends and Family:     Attends Congregation Services:     Active Member of Clubs or Organizations:     Attends Club or Organization Meetings:     Marital Status:    Intimate Partner Violence:     Fear of Current or Ex-Partner:     Emotionally Abused:     Physically Abused:     Sexually Abused:        Family History   Problem Relation Age of Onset    Heart disease Father         pacer placement    Hypertension Father     Kidney disease Father     Heart failure Father     Heart attack Father     Liver disease Father     Cirrhosis Mother         due to beer consumption    Liver disease Mother     Diabetes Other        Physical Exam:    Vitals: Blood pressure 108/62, pulse 64, height 5' 7" (1 702 m), weight 92 6 kg (204 lb 3 2 oz), SpO2 99 %  , Body mass index is 31 98 kg/m² ,   Wt Readings from Last 3 Encounters:   09/27/21 92 6 kg (204 lb 3 2 oz)   07/28/21 91 9 kg (202 lb 9 6 oz)   07/26/21 92 5 kg (203 lb 14 4 oz)       Physical Exam  Constitutional:       General: He is not in acute distress  Appearance: Normal appearance  HENT:      Head: Normocephalic and atraumatic  Mouth/Throat:      Mouth: Mucous membranes are moist    Eyes:      General: No scleral icterus  Extraocular Movements: Extraocular movements intact  Cardiovascular:      Rate and Rhythm: Normal rate and regular rhythm  Pulses: Normal pulses  Heart sounds: S1 normal and S2 normal  No murmur heard  No friction rub  No gallop  Comments: Nonelevated JVP  Pulmonary:      Breath sounds: Normal breath sounds  Abdominal:      General: There is no distension  Palpations: Abdomen is soft  Tenderness: There is no abdominal tenderness  There is no guarding or rebound  Musculoskeletal:         General: Normal range of motion  Cervical back: Neck supple  Right lower leg: No edema  Left lower leg: No edema  Skin:     General: Skin is warm and dry  Capillary Refill: Capillary refill takes less than 2 seconds  Neurological:      General: No focal deficit present  Mental Status: He is alert and oriented to person, place, and time  Psychiatric:         Mood and Affect: Mood normal          Labs & Results:    Lab Results   Component Value Date    WBC 6 79 05/10/2021    HGB 13 1 05/10/2021    HCT 40 8 05/10/2021     (H) 05/10/2021     (L) 05/10/2021     Lab Results   Component Value Date    SODIUM 143 05/25/2021    K 4 0 05/25/2021     (H) 05/25/2021    CO2 23 05/25/2021    BUN 31 (H) 05/25/2021    CREATININE 1 38 (H) 05/25/2021    GLUC 138 05/10/2021    CALCIUM 9 6 05/25/2021     Lab Results   Component Value Date    NTBNP 2,152 (H) 05/25/2021      Lab Results   Component Value Date    CHOLESTEROL 144 02/17/2021    CHOLESTEROL 139 09/09/2020    CHOLESTEROL 143 12/02/2019     Lab Results   Component Value Date    HDL 46 02/17/2021    HDL 40 09/09/2020    HDL 34 (L) 12/02/2019     Lab Results   Component Value Date    TRIG 165 (H) 02/17/2021    TRIG 406 (H) 09/09/2020    TRIG 324 (H) 12/02/2019     Lab Results   Component Value Date    [place] 11/28/2018    [place] 04/24/2018       EKG personally reviewed by Linda Hammer MD      Counseling / Coordination of Care  Time spent today 25 minutes  Greater than 50% of total time was spent with the patient and / or family counseling and / or coordination of care  We discussed diagnoses, most recent studies and any changes in treatment    Thank you for the opportunity to participate in the care of this patient      Devin Givens MD  ADVANCED HEART FAILURE AND MECHANICAL CIRCULATORY SUPPORT  [facility]

## 2021-09-27 NOTE — PATIENT INSTRUCTIONS
No medication changes at this time  Obtain comprehensive metabolic and lipid panel as ordered by PCP  Daily weights  Salt and fluid restriction  Increase physical activities as tolerated, let us know if interested in cardiac rehab

## 2021-09-29 ENCOUNTER — TELEPHONE (OUTPATIENT)
Dept: NEPHROLOGY | Facility: HOSPITAL | Age: 76
End: 2021-09-29

## 2021-09-29 NOTE — TELEPHONE ENCOUNTER
I spoke to the patient's wife and she is aware of the appointment on 10/5 and he will have labs done at [place] [id number]

## 2021-10-01 ENCOUNTER — APPOINTMENT (OUTPATIENT)
Dept: LAB | Facility: CLINIC | Age: 76
End: 2021-10-01
Payer: MEDICARE

## 2021-10-01 DIAGNOSIS — E10.51 TYPE 1 DIABETES MELLITUS WITH DIABETIC PERIPHERAL ANGIOPATHY WITHOUT GANGRENE (HCC): ICD-10-CM

## 2021-10-01 DIAGNOSIS — I25.10 CORONARY ARTERY DISEASE INVOLVING NATIVE CORONARY ARTERY OF NATIVE HEART WITHOUT ANGINA PECTORIS: ICD-10-CM

## 2021-10-01 DIAGNOSIS — E89.0 HYPOTHYROIDISM, POSTABLATIVE: ICD-10-CM

## 2021-10-01 DIAGNOSIS — N18.30 CKD (CHRONIC KIDNEY DISEASE) STAGE 3, GFR 30-59 ML/MIN (HCC): ICD-10-CM

## 2021-10-01 LAB
BASOPHILS # BLD AUTO: 0.02 THOUSANDS/ΜL (ref 0–0.1)
BASOPHILS NFR BLD AUTO: 0 % (ref 0–1)
BILIRUB UR QL STRIP: NEGATIVE
CHOLEST SERPL-MCNC: 135 MG/DL (ref 50–200)
CLARITY UR: CLEAR
COLOR UR: YELLOW
CREAT UR-MCNC: 14.6 MG/DL
EOSINOPHIL # BLD AUTO: 0.31 THOUSAND/ΜL (ref 0–0.61)
EOSINOPHIL NFR BLD AUTO: 5 % (ref 0–6)
ERYTHROCYTE [DISTWIDTH] IN BLOOD BY AUTOMATED COUNT: 13.5 % (ref 11.6–15.1)
EST. AVERAGE GLUCOSE BLD GHB EST-MCNC: 160 MG/DL
GLUCOSE UR STRIP-MCNC: NEGATIVE MG/DL
HBA1C MFR BLD: 7.2 %
HCT VFR BLD AUTO: 41.4 % (ref 36.5–49.3)
HDLC SERPL-MCNC: 38 MG/DL
HGB BLD-MCNC: 13.5 G/DL (ref 12–17)
HGB UR QL STRIP.AUTO: NEGATIVE
IMM GRANULOCYTES # BLD AUTO: 0.02 THOUSAND/UL (ref 0–0.2)
IMM GRANULOCYTES NFR BLD AUTO: 0 % (ref 0–2)
KETONES UR STRIP-MCNC: NEGATIVE MG/DL
LDLC SERPL CALC-MCNC: 56 MG/DL (ref 0–100)
LEUKOCYTE ESTERASE UR QL STRIP: NEGATIVE
LYMPHOCYTES # BLD AUTO: 1.88 THOUSANDS/ΜL (ref 0.6–4.47)
LYMPHOCYTES NFR BLD AUTO: 30 % (ref 14–44)
MAGNESIUM SERPL-MCNC: 2.3 MG/DL (ref 1.6–2.6)
MCH RBC QN AUTO: 32.2 PG (ref 26.8–34.3)
MCHC RBC AUTO-ENTMCNC: 32.6 G/DL (ref 31.4–37.4)
MCV RBC AUTO: 99 FL (ref 82–98)
MONOCYTES # BLD AUTO: 0.81 THOUSAND/ΜL (ref 0.17–1.22)
MONOCYTES NFR BLD AUTO: 13 % (ref 4–12)
NEUTROPHILS # BLD AUTO: 3.26 THOUSANDS/ΜL (ref 1.85–7.62)
NEUTS SEG NFR BLD AUTO: 52 % (ref 43–75)
NITRITE UR QL STRIP: NEGATIVE
NRBC BLD AUTO-RTO: 0 /100 WBCS
PH UR STRIP.AUTO: 6 [PH]
PHOSPHATE SERPL-MCNC: 3.1 MG/DL (ref 2.3–4.1)
PLATELET # BLD AUTO: 136 THOUSANDS/UL (ref 149–390)
PMV BLD AUTO: 12.9 FL (ref 8.9–12.7)
PROT UR STRIP-MCNC: NEGATIVE MG/DL
PROT UR-MCNC: 8 MG/DL
PROT/CREAT UR: 0.55 MG/G{CREAT} (ref 0–0.1)
PTH-INTACT SERPL-MCNC: 35.3 PG/ML (ref 18.4–80.1)
RBC # BLD AUTO: 4.19 MILLION/UL (ref 3.88–5.62)
SP GR UR STRIP.AUTO: 1.01 (ref 1–1.03)
TRIGL SERPL-MCNC: 203 MG/DL
URATE SERPL-MCNC: 7 MG/DL (ref 4.2–8)
UROBILINOGEN UR QL STRIP.AUTO: 0.2 E.U./DL
WBC # BLD AUTO: 6.3 THOUSAND/UL (ref 4.31–10.16)

## 2021-10-01 PROCEDURE — 83970 ASSAY OF PARATHORMONE: CPT

## 2021-10-01 PROCEDURE — 80061 LIPID PANEL: CPT

## 2021-10-01 PROCEDURE — 83036 HEMOGLOBIN GLYCOSYLATED A1C: CPT

## 2021-10-01 PROCEDURE — 84100 ASSAY OF PHOSPHORUS: CPT

## 2021-10-01 PROCEDURE — 82570 ASSAY OF URINE CREATININE: CPT | Performed by: INTERNAL MEDICINE

## 2021-10-01 PROCEDURE — 81003 URINALYSIS AUTO W/O SCOPE: CPT | Performed by: INTERNAL MEDICINE

## 2021-10-01 PROCEDURE — 84156 ASSAY OF PROTEIN URINE: CPT | Performed by: INTERNAL MEDICINE

## 2021-10-01 PROCEDURE — 84550 ASSAY OF BLOOD/URIC ACID: CPT

## 2021-10-01 PROCEDURE — 83735 ASSAY OF MAGNESIUM: CPT

## 2021-10-01 PROCEDURE — 85025 COMPLETE CBC W/AUTO DIFF WBC: CPT

## 2021-10-04 ENCOUNTER — OFFICE VISIT (OUTPATIENT)
Dept: FAMILY MEDICINE CLINIC | Facility: CLINIC | Age: 76
End: 2021-10-04
Payer: MEDICARE

## 2021-10-04 VITALS
WEIGHT: 204.6 LBS | HEIGHT: 67 IN | BODY MASS INDEX: 32.11 KG/M2 | OXYGEN SATURATION: 98 % | HEART RATE: 75 BPM | TEMPERATURE: 97.3 F | DIASTOLIC BLOOD PRESSURE: 70 MMHG | RESPIRATION RATE: 20 BRPM | SYSTOLIC BLOOD PRESSURE: 110 MMHG

## 2021-10-04 DIAGNOSIS — M48.062 SPINAL STENOSIS OF LUMBAR REGION WITH NEUROGENIC CLAUDICATION: ICD-10-CM

## 2021-10-04 DIAGNOSIS — I25.10 CORONARY ARTERY DISEASE INVOLVING NATIVE CORONARY ARTERY OF NATIVE HEART WITHOUT ANGINA PECTORIS: ICD-10-CM

## 2021-10-04 DIAGNOSIS — J96.11 HYPOXEMIC RESPIRATORY FAILURE, CHRONIC (HCC): ICD-10-CM

## 2021-10-04 DIAGNOSIS — E10.51 TYPE 1 DIABETES MELLITUS WITH DIABETIC PERIPHERAL ANGIOPATHY WITHOUT GANGRENE (HCC): ICD-10-CM

## 2021-10-04 DIAGNOSIS — D69.6 THROMBOCYTOPENIA, UNSPECIFIED (HCC): ICD-10-CM

## 2021-10-04 DIAGNOSIS — E78.5 DYSLIPIDEMIA: ICD-10-CM

## 2021-10-04 DIAGNOSIS — I74.09 AORTOILIAC OCCLUSIVE DISEASE (HCC): Chronic | ICD-10-CM

## 2021-10-04 DIAGNOSIS — E89.0 HYPOTHYROIDISM, POSTABLATIVE: ICD-10-CM

## 2021-10-04 DIAGNOSIS — F41.8 ANXIETY WITH DEPRESSION: ICD-10-CM

## 2021-10-04 DIAGNOSIS — Z00.00 MEDICARE ANNUAL WELLNESS VISIT, SUBSEQUENT: Primary | ICD-10-CM

## 2021-10-04 DIAGNOSIS — N18.32 STAGE 3B CHRONIC KIDNEY DISEASE (HCC): Chronic | ICD-10-CM

## 2021-10-04 PROCEDURE — 1123F ACP DISCUSS/DSCN MKR DOCD: CPT | Performed by: FAMILY MEDICINE

## 2021-10-04 PROCEDURE — G0439 PPPS, SUBSEQ VISIT: HCPCS | Performed by: FAMILY MEDICINE

## 2021-10-04 PROCEDURE — 99214 OFFICE O/P EST MOD 30 MIN: CPT | Performed by: FAMILY MEDICINE

## 2021-10-05 ENCOUNTER — OFFICE VISIT (OUTPATIENT)
Dept: NEPHROLOGY | Facility: HOSPITAL | Age: 76
End: 2021-10-05
Payer: MEDICARE

## 2021-10-05 VITALS
WEIGHT: 206 LBS | HEART RATE: 77 BPM | SYSTOLIC BLOOD PRESSURE: 108 MMHG | HEIGHT: 67 IN | RESPIRATION RATE: 18 BRPM | DIASTOLIC BLOOD PRESSURE: 80 MMHG | BODY MASS INDEX: 32.33 KG/M2

## 2021-10-05 DIAGNOSIS — N18.32 STAGE 3B CHRONIC KIDNEY DISEASE (HCC): ICD-10-CM

## 2021-10-05 DIAGNOSIS — I70.1 RAS (RENAL ARTERY STENOSIS) (HCC): Primary | ICD-10-CM

## 2021-10-05 PROCEDURE — 99214 OFFICE O/P EST MOD 30 MIN: CPT | Performed by: INTERNAL MEDICINE

## 2021-10-10 PROBLEM — F41.8 ANXIETY WITH DEPRESSION: Chronic | Status: ACTIVE | Noted: 2019-04-07

## 2021-10-10 PROBLEM — F41.8 ANXIETY WITH DEPRESSION: Status: RESOLVED | Noted: 2019-04-07 | Resolved: 2021-10-10

## 2021-10-14 ENCOUNTER — IMMUNIZATIONS (OUTPATIENT)
Dept: FAMILY MEDICINE CLINIC | Facility: CLINIC | Age: 76
End: 2021-10-14

## 2021-10-14 DIAGNOSIS — Z23 ENCOUNTER FOR IMMUNIZATION: Primary | ICD-10-CM

## 2021-10-14 PROCEDURE — 91301 SARSCOV2 VAC 100MCG/0.5ML IM: CPT | Performed by: NURSE PRACTITIONER

## 2021-11-05 ENCOUNTER — OFFICE VISIT (OUTPATIENT)
Dept: PULMONOLOGY | Facility: HOSPITAL | Age: 76
End: 2021-11-05
Payer: MEDICARE

## 2021-11-05 VITALS
HEIGHT: 67 IN | RESPIRATION RATE: 18 BRPM | WEIGHT: 198 LBS | BODY MASS INDEX: 31.08 KG/M2 | OXYGEN SATURATION: 94 % | HEART RATE: 68 BPM | SYSTOLIC BLOOD PRESSURE: 130 MMHG | DIASTOLIC BLOOD PRESSURE: 74 MMHG

## 2021-11-05 DIAGNOSIS — G47.33 OBSTRUCTIVE SLEEP APNEA OF ADULT: Primary | ICD-10-CM

## 2021-11-05 DIAGNOSIS — E66.9 CLASS 1 OBESITY WITHOUT SERIOUS COMORBIDITY WITH BODY MASS INDEX (BMI) OF 31.0 TO 31.9 IN ADULT, UNSPECIFIED OBESITY TYPE: ICD-10-CM

## 2021-11-05 DIAGNOSIS — J43.2 CENTRILOBULAR EMPHYSEMA (HCC): Chronic | ICD-10-CM

## 2021-11-05 DIAGNOSIS — J96.11 HYPOXEMIC RESPIRATORY FAILURE, CHRONIC (HCC): ICD-10-CM

## 2021-11-05 DIAGNOSIS — J98.4 RESTRICTIVE LUNG DISEASE: ICD-10-CM

## 2021-11-05 PROCEDURE — 99214 OFFICE O/P EST MOD 30 MIN: CPT | Performed by: INTERNAL MEDICINE

## 2021-11-09 ENCOUNTER — HOSPITAL ENCOUNTER (OUTPATIENT)
Dept: NON INVASIVE DIAGNOSTICS | Facility: CLINIC | Age: 76
Discharge: HOME | End: 2021-11-09
Payer: MEDICARE

## 2021-11-09 DIAGNOSIS — I70.1 RAS (RENAL ARTERY STENOSIS) (HCC): ICD-10-CM

## 2021-11-09 PROCEDURE — 93975 VASCULAR STUDY: CPT

## 2021-11-09 PROCEDURE — 93975 VASCULAR STUDY: CPT | Performed by: SURGERY

## 2021-11-17 ENCOUNTER — TELEPHONE (OUTPATIENT)
Dept: NEPHROLOGY | Facility: CLINIC | Age: 76
End: 2021-11-17

## 2021-12-16 DIAGNOSIS — G89.29 CHRONIC LOW BACK PAIN WITH BILATERAL SCIATICA, UNSPECIFIED BACK PAIN LATERALITY: ICD-10-CM

## 2021-12-16 DIAGNOSIS — M54.42 CHRONIC LOW BACK PAIN WITH BILATERAL SCIATICA, UNSPECIFIED BACK PAIN LATERALITY: ICD-10-CM

## 2021-12-16 DIAGNOSIS — M54.41 CHRONIC LOW BACK PAIN WITH BILATERAL SCIATICA, UNSPECIFIED BACK PAIN LATERALITY: ICD-10-CM

## 2021-12-16 RX ORDER — GABAPENTIN 300 MG/1
CAPSULE ORAL
Qty: 270 CAPSULE | Refills: 1 | Status: SHIPPED | OUTPATIENT
Start: 2021-12-16 | End: 2022-05-17 | Stop reason: SDUPTHER

## 2021-12-20 ENCOUNTER — OFFICE VISIT (OUTPATIENT)
Dept: CARDIOLOGY CLINIC | Facility: CLINIC | Age: 76
End: 2021-12-20
Payer: MEDICARE

## 2021-12-20 VITALS
HEART RATE: 63 BPM | SYSTOLIC BLOOD PRESSURE: 112 MMHG | OXYGEN SATURATION: 99 % | DIASTOLIC BLOOD PRESSURE: 58 MMHG | HEIGHT: 67 IN | BODY MASS INDEX: 31.8 KG/M2 | WEIGHT: 202.6 LBS

## 2021-12-20 DIAGNOSIS — I50.9 HEART FAILURE WITH MID-RANGE EJECTION FRACTION (HCC): Primary | ICD-10-CM

## 2021-12-20 DIAGNOSIS — G47.33 OBSTRUCTIVE SLEEP APNEA OF ADULT: ICD-10-CM

## 2021-12-20 DIAGNOSIS — I10 ESSENTIAL (PRIMARY) HYPERTENSION: ICD-10-CM

## 2021-12-20 DIAGNOSIS — I25.10 CORONARY ARTERY DISEASE INVOLVING NATIVE CORONARY ARTERY OF NATIVE HEART WITHOUT ANGINA PECTORIS: ICD-10-CM

## 2021-12-20 PROCEDURE — 99214 OFFICE O/P EST MOD 30 MIN: CPT | Performed by: INTERNAL MEDICINE

## 2022-01-03 ENCOUNTER — OFFICE VISIT (OUTPATIENT)
Dept: FAMILY MEDICINE CLINIC | Facility: CLINIC | Age: 77
End: 2022-01-03
Payer: MEDICARE

## 2022-01-03 VITALS
SYSTOLIC BLOOD PRESSURE: 92 MMHG | TEMPERATURE: 97 F | WEIGHT: 201 LBS | RESPIRATION RATE: 20 BRPM | DIASTOLIC BLOOD PRESSURE: 50 MMHG | HEIGHT: 67 IN | OXYGEN SATURATION: 98 % | HEART RATE: 72 BPM | BODY MASS INDEX: 31.55 KG/M2

## 2022-01-03 DIAGNOSIS — J43.2 CENTRILOBULAR EMPHYSEMA (HCC): ICD-10-CM

## 2022-01-03 DIAGNOSIS — F41.8 ANXIETY WITH DEPRESSION: ICD-10-CM

## 2022-01-03 DIAGNOSIS — I25.118 CORONARY ARTERY DISEASE OF NATIVE ARTERY OF NATIVE HEART WITH STABLE ANGINA PECTORIS (HCC): ICD-10-CM

## 2022-01-03 DIAGNOSIS — I50.22 CHRONIC SYSTOLIC CONGESTIVE HEART FAILURE (HCC): Chronic | ICD-10-CM

## 2022-01-03 DIAGNOSIS — J96.11 HYPOXEMIC RESPIRATORY FAILURE, CHRONIC (HCC): ICD-10-CM

## 2022-01-03 DIAGNOSIS — E10.49 OTHER DIABETIC NEUROLOGICAL COMPLICATION ASSOCIATED WITH TYPE 1 DIABETES MELLITUS (HCC): ICD-10-CM

## 2022-01-03 DIAGNOSIS — N18.30 BENIGN HYPERTENSION WITH CHRONIC KIDNEY DISEASE, STAGE III (HCC): Primary | ICD-10-CM

## 2022-01-03 DIAGNOSIS — I74.09 AORTOILIAC OCCLUSIVE DISEASE (HCC): ICD-10-CM

## 2022-01-03 DIAGNOSIS — G47.33 OBSTRUCTIVE SLEEP APNEA OF ADULT: ICD-10-CM

## 2022-01-03 DIAGNOSIS — I12.9 BENIGN HYPERTENSION WITH CHRONIC KIDNEY DISEASE, STAGE III (HCC): Primary | ICD-10-CM

## 2022-01-03 PROCEDURE — 99214 OFFICE O/P EST MOD 30 MIN: CPT | Performed by: FAMILY MEDICINE

## 2022-01-03 RX ORDER — CITALOPRAM 20 MG/1
20 TABLET ORAL DAILY
Qty: 90 TABLET | Refills: 1 | Status: SHIPPED | OUTPATIENT
Start: 2022-01-03 | End: 2022-05-17

## 2022-01-03 NOTE — PROGRESS NOTES
FAMILY PRACTICE OFFICE VISIT       NAME: Sharon Artis  AGE: 68 y o  SEX: male       : 1945        MRN: [id number]        Assessment and Plan     1  Benign hypertension with chronic kidney disease, stage III [facility])  Assessment & Plan:  Lab Results   Component Value Date    EGFR 41 2022    EGFR 43 10/01/2021    EGFR 49 2021    CREATININE 1 60 (H) 2022    CREATININE 1 56 (H) 10/01/2021    CREATININE 1 38 (H) 2021     Likely episode of orthostatic lightheadedness last night preceeded by 2 episodes of diarrhea  Patient remains on low-dose of metoprolol ER 25 mg daily as per cardiology  Previous reduction of beta-blocker resulted in worsening of dyspnea  I would favor continuation current therapy  His blood pressure is on lower side today, he denies symptoms of lightheadedness or unsteadiness at present time  No angina, palpitations or unusual dyspnea  I strongly advised patient and wife to observe his symptoms  If orthostatic lightheadedness recurs-I will be in touch with [facility] Cardiology, Dr Omi Peraza, to discuss our options, patient may benefit from trial of midodrine question  We discussed importance of fall precautions  Patient should use walker at night  Keep up with good hydration  2  Anxiety with depression  Assessment & Plan:  Lack of improvement on Zoloft  Stop Zoloft, switch to citalopram 20 mg daily    Orders:  -     citalopram (CeleXA) 20 mg tablet; Take 1 tablet (20 mg total) by mouth daily    3  Aortoiliac occlusive disease ([facility] )  Assessment & Plan:  Vascular surgery follows  Continue statin, aspirin and Plavix      4  Centrilobular emphysema ([facility] )  Assessment & Plan:  Chronic dyspnea on exertion, lack of response to inhaler therapy  I again encouraged patient to continue p r n  oxygen use  He is under care of pulmonology at [facility]      5  Hypoxemic respiratory failure, chronic ([facility] )  Assessment & Plan:  Patient uses oxygen as needed    I encouraged him again to be liberal with p r n  oxygen use as it has helped with symptoms of chronic shortness of breath  Patient remains under care of [facility] pulmonology        6  Coronary artery disease of native artery of native heart with stable angina pectoris [facility])  Assessment & Plan:  [facility] Cardiology follows  Continue regimen of Plavix, aspirin, statin, beta-blocker  7  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 ([address] )  Assessment & Plan:    Lab Results   Component Value Date    HGBA1C 7 2 (H) 10/01/2021     Patient remains under care of endocrinology  Pending blood work including hemoglobin A1c       8  Obstructive sleep apnea of adult  Assessment & Plan:  Patient remains on CPAP at night  No nocturnal oxygen      9  Chronic systolic congestive heart failure [facility])  Assessment & Plan:  Wt Readings from Last 3 Encounters:   01/03/22 91 2 kg (201 lb)   12/20/21 91 9 kg (202 lb 9 6 oz)   11/05/21 89 8 kg (198 lb)     Patient appears euvolemic on exam today  Continue torsemide 10 mg daily and Toprol XL 25 mg daily  Patient remains under care of [facility] CHF team, Dr Herminia Rabago                     There are no Patient Instructions on file for this visit  No follow-ups on file  Discussed with the patient and all questioned fully answered  He will call me if any problems arise  M*Modal software was used to dictate this note  It may contain errors with dictating incorrect words/spelling  Please contact provider directly with any questions  Chief Complaint     Chief Complaint   Patient presents with    Balance Problem     No symptoms of vertigo Was getting up from sleeping in bed   Diarrhea     X2     Hgb A1C     Will proceed with BW       History of Present Illness     Patient presents for evaluation of balance problem last night  Reportedly he had 2 episodes of diarrhea last night  No nausea vomiting or fever    Patient's wife is concerned that patient was very unsteady/off balance as he woke up in the middle of the night and went to the bathroom  She describes his walking last night as "swaying"  Patient himself denies feeling dizzy or lightheaded, no spinning sensation, no chest pain, palpitations or unusual shortness of breath  He did not feel nauseous  He is feeling well this morning  Back to baseline  No headaches  Appetite is normal   No recurrences of diarrhea today  No dyspepsia  No chest pain or dyspnea  No palpitations  Persistent shortness of breath, he uses oxygen daytime as needed for dyspnea, quite infrequently  He has been compliant with CPAP  No reported hypoglycemia  No diaphoresis  Patient remains on low-dose of beta-blocker, metoprolol ER 25 mg daily, no other medications for hypertension  No new medications, list updated  Patient and wife reports that symptoms of anxiety and irritability did not improve was trial of Zoloft  He feels very short-tempered  He the past, he has tried Effexor ER 37 5 mg daily which has helped  Diarrhea         Review of Systems   Review of Systems   Constitutional: Negative  HENT: Negative  Eyes: Negative  Respiratory: Positive for shortness of breath (Chronic)  Cardiovascular: Negative  Gastrointestinal: Positive for diarrhea ( 2 episodes last night, no recurrences)  Musculoskeletal: Positive for back pain  Skin: Negative  Neurological: Positive for light-headedness  As per HPI   Hematological: Negative  Psychiatric/Behavioral: Positive for dysphoric mood  The patient is nervous/anxious          Active Problem List     Patient Active Problem List   Diagnosis    Type 1 diabetes mellitus with diabetic peripheral angiopathy without gangrene ([facility] )    Presence of drug coated stent in LAD coronary artery    Coronary artery disease of native artery of native heart with stable angina pectoris (HCC)    Presence of drug coated stent in left circumflex coronary artery    Dyslipidemia    Obstructive sleep apnea of adult    Carotid artery stenosis, asymptomatic, bilateral    Restrictive lung disease    Centrilobular emphysema ([facility] )    Benign hypertension with chronic kidney disease, stage III ([facility])    CKD (chronic kidney disease) stage 3, GFR 30-59 ml/min ([facility])    Renal artery stenosis ([facility])    Renal cyst, right    Vitamin D deficiency    Aortoiliac occlusive disease ([facility])    Hypothyroidism, postablative    Low back pain with sciatica    Lumbar disc herniation    Lumbar radiculopathy    Diabetic neuropathy associated with type 1 diabetes mellitus ([facility])    Chronic systolic congestive heart failure ([facility])    Anxiety with depression    Spinal stenosis of lumbar region with neurogenic claudication    Thrombocytopenia, unspecified ([facility] )    S/P femoral-popliteal bypass surgery    Stenosis of carotid artery    Hypoxemic respiratory failure, chronic ([facility])    Class 1 obesity without serious comorbidity with body mass index (BMI) of 31 0 to 31 9 in adult       Past Medical History:  Past Medical History:   Diagnosis Date    Acute kidney injury ([facility] )     resolved 11/30/2015    Acute venous embolism and thrombosis of deep vessels of proximal lower extremity ([facility] )     resolved 04/04/2015    DARINEL (acute kidney injury) ([facility] ) 1/12/2016    Anesthesia     RESPIRATORY ISSUES DUE TO SLEEP APNEA    Cardiac disease     heart attack, stents x 4    CHF (congestive heart failure) ([facility])     Chronic cough     resolved 02/04/2016    Chronic pain disorder     intermitent claudication    COPD (chronic obstructive pulmonary disease) ([facility])     Coronary artery disease     CPAP (continuous positive airway pressure) dependence     Diabetes mellitus ([facility] )     Disease of thyroid gland     DVT (deep venous thrombosis) ([facility])     Heart failure ([facility])     Hyperlipidemia     Hypertension     Ischemic cardiomyopathy     last assessed 09/26/2017    Myocardial infarction [facility])     MI +2 [id number]    NSTEMI (non-ST elevated myocardial infarction) ([facility] ) 3/23/2019    Pulmonary emphysema (HCC)     Pulmonary granuloma (HCC)     resolved 02/03/2017    Renal failure     Sleep apnea        Past Surgical History:  Past Surgical History:   Procedure Laterality Date    BYPASS FEMORAL-POPLITEAL      initial stenosis with stent left, 7 x 100 smart stent onset 02/24/2014    CARDIAC SURGERY      cardiac stents    CATARACT EXTRACTION      COLOGUARD (HISTORICAL)  2018    CORONARY ANGIOPLASTY WITH STENT PLACEMENT      x4    IR AORTAGRAM WITH RUN-OFF  3/14/2019    UT THROMBOENDARTECTMY FEMORAL COMMON Left 3/22/2019    Procedure: ENDARTERECTOMY ARTERIAL FEMORAL WITH PATCH ANGIOPLASTY, BALLOON ANGIOPLASTY, STENT;  Surgeon: Mendel Angel, MD;  Location: [facility];  Service: Vascular    UT VEIN BYPASS GRAFT,FEM-POP Left 3/22/2019    Procedure: BYPASS FEMORAL-POPLITEAL WITH COMPLETION ARTERIOGRAM;  Surgeon: Mendel Angel, MD;  [facility];  Service: Vascular    VASCULAR SURGERY      stent placement    WOUND DEBRIDEMENT Left 4/15/2019    Procedure: DEBRIDEMENT WOUND AND DRESSING CHANGE ([facility]) left groin with VAC;  Surgeon: Andrez Lehman DO;  Location: [facility];  Service: Vascular       Family History:  Family History   Problem Relation Age of Onset    Heart disease Father         pacer placement    Hypertension Father     Kidney disease Father     Heart failure Father     Heart attack Father     Liver disease Father     Cirrhosis Mother         due to beer consumption    Liver disease Mother     Diabetes Other        Social History:  Social History     Socioeconomic History    Marital status: /Civil Union     Spouse name: Not on file    Number of children: Not on file    Years of education: Not on file    Highest education level: Not on file   Occupational History    Occupation: Retired    Occupation: Desk work    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   Tobacco Use  Smoking status: Former Smoker     Packs/day: 4 00     Years: 47 00     Pack years: 188 00     Types: Cigarettes     Start date:      Quit date:      Years since quittin 0    Smokeless tobacco: Never Used   Vaping Use    Vaping Use: Never used   Substance and Sexual Activity    Alcohol use: Yes     Comment: 2 drinks per day    Drug use: No    Sexual activity: Not Currently   Other Topics Concern    Not on file   Social History Narrative    Not on file     Social Determinants of Health     Financial Resource Strain: Not on file   Food Insecurity: Not on file   Transportation Needs: Not on file   Physical Activity: Not on file   Stress: Not on file   Social Connections: Not on file   Intimate Partner Violence: Not on file   Housing Stability: Not on file         Objective     Vitals:    22 1439 22 1502 22 1504   BP: 110/60 90/60 92/50   BP Location: Right arm Right arm Left arm   Patient Position: Sitting Sitting Sitting   Cuff Size: Standard Large Large   Pulse: 72     Resp: 20     Temp: (!) 97 °F (36 1 °C)     TempSrc: Temporal     SpO2: 98%     Weight: 91 2 kg (201 lb)     Height: 5' 7" (1 702 m)         Wt Readings from Last 3 Encounters:   22 91 2 kg (201 lb)   21 91 9 kg (202 lb 9 6 oz)   21 89 8 kg (198 lb)       Physical Exam  Vitals and nursing note reviewed  Constitutional:       General: He is not in acute distress  Appearance: Normal appearance  He is well-developed  He is not ill-appearing  HENT:      Head: Normocephalic and atraumatic  Eyes:      Conjunctiva/sclera: Conjunctivae normal    Neck:      Vascular: No carotid bruit  Cardiovascular:      Rate and Rhythm: Normal rate and regular rhythm  Heart sounds: Normal heart sounds  No murmur heard  Pulmonary:      Effort: Pulmonary effort is normal  No respiratory distress  Breath sounds: Normal breath sounds  No wheezing or rales     Abdominal:      General: Bowel sounds are normal  There is no distension or abdominal bruit  Musculoskeletal:         General: Normal range of motion  Cervical back: Neck supple  No rigidity  Neurological:      General: No focal deficit present  Mental Status: He is alert and oriented to person, place, and time  Cranial Nerves: No cranial nerve deficit  Psychiatric:         Mood and Affect: Mood normal          Behavior: Behavior normal          Thought Content: Thought content normal           Pertinent Laboratory/Diagnostic Studies:    Lab Results   Component Value Date    WBC 5 50 01/06/2022    HGB 13 2 01/06/2022    HCT 40 1 01/06/2022    MCV 97 01/06/2022     01/06/2022       No results found for: TSH    Lab Results   Component Value Date    CHOL 129 10/01/2015     Lab Results   Component Value Date    TRIG 180 (H) 01/06/2022     Lab Results   Component Value Date    HDL 43 01/06/2022     Lab Results   Component Value Date    LDLCALC 74 01/06/2022     Lab Results   Component Value Date    HGBA1C 7 2 (H) 10/01/2021     Lab Results   Component Value Date    SODIUM 141 01/06/2022    K 4 2 01/06/2022     (H) 01/06/2022    CO2 24 01/06/2022    ANIONGAP 6 12/21/2015    AGAP 7 01/06/2022    BUN 36 (H) 01/06/2022    CREATININE 1 60 (H) 01/06/2022    GLUC 138 05/10/2021    GLUF 125 (H) 01/06/2022    CALCIUM 8 9 01/06/2022    AST 14 01/06/2022    ALT 20 01/06/2022    ALKPHOS 122 (H) 01/06/2022    PROT 7 6 10/01/2015    TP 8 0 01/06/2022    BILITOT 0 33 10/01/2015    TBILI 0 63 01/06/2022    EGFR 41 01/06/2022       No orders of the defined types were placed in this encounter        ALLERGIES:  Allergies   Allergen Reactions    Doxazosin Myalgia     UNKNOWN  UNKNOWN  Muscle cramps    Iohexol      UNKNOWN    Lisinopril Cough     cough  Other reaction(s): CAMELLA SINENSIS (ZESTRIL) (cough)  cough    Vancomycin Hives    Adhesive [Medical Tape]      Skin Breakdown    Cardura [Doxazosin Mesylate]      Muscle cramps    Isosorbide Rash    Other Other (See Comments)     Iv dye for cardiac cath  Renal failure very close to dialysis  But no dialysis  Iv dye for cardiac cath  Renal failure very close to dialysis  But no dialysis  Iv dye for cardiac cath  Renal failure very close to dialysis  But no dialysis       Current Medications     Current Outpatient Medications   Medication Sig Dispense Refill    aspirin 81 MG tablet Take 81 mg by mouth daily        cholecalciferol (VITAMIN D3) 1,000 units tablet Take 1,000 Units by mouth daily       clopidogrel (PLAVIX) 75 mg tablet Take 1 tablet (75 mg total) by mouth daily 90 tablet 3    Coenzyme Q10 (COQ-10) 200 MG CAPS Take 200 mg by mouth daily      docusate sodium (COLACE) 50 mg capsule Take by mouth 2 (two) times a day as needed       gabapentin (NEURONTIN) 300 mg capsule TAKE ONE CAPSULE BY MOUTH THREE TIMES A  capsule 1    Glucagon (Baqsimi One Pack) 3 MG/DOSE POWD 1 Dose into each nostril as needed       Icosapent Ethyl (Vascepa) 1 g CAPS Take 1 capsule (1 g total) by mouth 2 (two) times a day 180 capsule 3    insulin glargine (Lantus) 100 units/mL subcutaneous injection Inject under the skin 40 units am- 8 units pm      insulin lispro (HUMALOG) 100 units/mL injection Inject 3 units with breakfast, 6 units at lunch, and 6 units at dinner (Patient taking differently: 4 (four) times a day Inject 3 units with breakfast, 6 units at lunch, and 6 units at dinner  Carbs counting) 10 mL 1    levothyroxine 200 mcg tablet Take 200 mcg by mouth daily  0    metoprolol succinate (TOPROL-XL) 25 mg 24 hr tablet Take 1 tablet (25 mg total) by mouth daily 30 tablet 3    nitroglycerin (NITROSTAT) 0 4 mg SL tablet Place 1 tablet (0 4 mg total) under the tongue every 5 (five) minutes as needed for chest pain 25 tablet 3    polyethylene glycol (MIRALAX) 17 g packet Take 17 g by mouth daily      rosuvastatin (CRESTOR) 20 MG tablet Take 1 tablet (20 mg total) by mouth daily 90 tablet 3  SF 1 1 % GEL BRUCH ONCE DAILY AT BEDTIME, BRUSH OF 1 MINUTE AS DIRECTED      torsemide (DEMADEX) 10 mg tablet TAKE 1 TABLET(10 MG) BY MOUTH DAILY 90 tablet 2    ULTICARE INSULIN SYRINGE 30G X 1/2" 1 ML MISC Use as directed  0    citalopram (CeleXA) 20 mg tablet Take 1 tablet (20 mg total) by mouth daily 90 tablet 1     No current facility-administered medications for this visit         Medications Discontinued During This Encounter   Medication Reason    ammonium lactate (LAC-HYDRIN) 12 % lotion Therapy completed    sertraline (ZOLOFT) 50 mg tablet        Health Maintenance     Health Maintenance   Topic Date Due    SLP PLAN OF CARE  Never done    Lung Cancer Screening  09/18/2021    Diabetic Foot Exam  02/27/2022    HEMOGLOBIN A1C  04/01/2022    BMI: Followup Plan  06/06/2022    URINE MICROALBUMIN  10/01/2022    Fall Risk  10/04/2022    Medicare Annual Wellness Visit (AWV)  10/04/2022    BMI: Adult  01/03/2023    DTaP,Tdap,and Td Vaccines (2 - Td or Tdap) 05/05/2023    DM Eye Exam  07/27/2023    Hepatitis C Screening  Completed    Pneumococcal Vaccine: 65+ Years  Completed    Influenza Vaccine  Completed    COVID-19 Vaccine  Completed    HIB Vaccine  Aged Out    Hepatitis B Vaccine  Aged Out    IPV Vaccine  Aged Out    Hepatitis A Vaccine  Aged Out    Meningococcal ACWY Vaccine  Aged Out    HPV Vaccine  Aged Out       Immunization History   Administered Date(s) Administered    COVID-19 MODERNA VACC 0 5 ML IM 01/19/2021, 02/23/2021, 10/14/2021    DT (pediatric) 12/01/2009    H1N1, All Formulations 12/01/2009    INFLUENZA 10/01/2008, 10/06/2009, 09/01/2010, 11/04/2013, 08/29/2018, 09/09/2019, 09/05/2020, 09/13/2021    Influenza Split High Dose Preservative Free IM 08/28/2014, 10/03/2016, 08/29/2018    Influenza, seasonal, injectable 10/01/2008, 10/19/2010, 08/26/2011, 09/20/2011, 08/01/2012, 09/01/2012, 09/13/2013, 09/15/2015, 09/03/2017    Meningococcal C/Y-HIB PRP 12/01/2009    Pneumococcal Conjugate 13-Valent 08/11/2015    Pneumococcal Polysaccharide PPV23 10/01/2008, 06/04/2009, 03/15/2017    Td (adult), adsorbed 03/04/2011    Tdap 05/05/2013    Zoster 10/01/2009    influenza, trivalent, adjuvanted 09/05/2019       Shaw Flores MD

## 2022-01-06 ENCOUNTER — APPOINTMENT (OUTPATIENT)
Dept: LAB | Facility: CLINIC | Age: 77
End: 2022-01-06
Payer: MEDICARE

## 2022-01-06 DIAGNOSIS — E89.0 HYPOTHYROIDISM, POSTABLATIVE: ICD-10-CM

## 2022-01-06 DIAGNOSIS — E78.5 DYSLIPIDEMIA: ICD-10-CM

## 2022-01-06 DIAGNOSIS — E10.51 TYPE 1 DIABETES MELLITUS WITH DIABETIC PERIPHERAL ANGIOPATHY WITHOUT GANGRENE (HCC): ICD-10-CM

## 2022-01-06 DIAGNOSIS — I25.10 CORONARY ARTERY DISEASE INVOLVING NATIVE CORONARY ARTERY OF NATIVE HEART WITHOUT ANGINA PECTORIS: ICD-10-CM

## 2022-01-06 LAB
ALBUMIN SERPL BCP-MCNC: 3.4 G/DL (ref 3.5–5)
ALP SERPL-CCNC: 122 U/L (ref 46–116)
ALT SERPL W P-5'-P-CCNC: 20 U/L (ref 12–78)
ANION GAP SERPL CALCULATED.3IONS-SCNC: 7 MMOL/L (ref 4–13)
AST SERPL W P-5'-P-CCNC: 14 U/L (ref 5–45)
BASOPHILS # BLD AUTO: 0.03 THOUSANDS/ΜL (ref 0–0.1)
BASOPHILS NFR BLD AUTO: 1 % (ref 0–1)
BILIRUB SERPL-MCNC: 0.63 MG/DL (ref 0.2–1)
BUN SERPL-MCNC: 36 MG/DL (ref 5–25)
CALCIUM ALBUM COR SERPL-MCNC: 9.4 MG/DL (ref 8.3–10.1)
CALCIUM SERPL-MCNC: 8.9 MG/DL (ref 8.3–10.1)
CHLORIDE SERPL-SCNC: 110 MMOL/L (ref 100–108)
CHOLEST SERPL-MCNC: 153 MG/DL
CO2 SERPL-SCNC: 24 MMOL/L (ref 21–32)
CREAT SERPL-MCNC: 1.6 MG/DL (ref 0.6–1.3)
EOSINOPHIL # BLD AUTO: 0.23 THOUSAND/ΜL (ref 0–0.61)
EOSINOPHIL NFR BLD AUTO: 4 % (ref 0–6)
ERYTHROCYTE [DISTWIDTH] IN BLOOD BY AUTOMATED COUNT: 13.3 % (ref 11.6–15.1)
GFR SERPL CREATININE-BSD FRML MDRD: 41 ML/MIN/1.73SQ M
GLUCOSE P FAST SERPL-MCNC: 125 MG/DL (ref 65–99)
HCT VFR BLD AUTO: 40.1 % (ref 36.5–49.3)
HDLC SERPL-MCNC: 43 MG/DL
HGB BLD-MCNC: 13.2 G/DL (ref 12–17)
IMM GRANULOCYTES # BLD AUTO: 0.02 THOUSAND/UL (ref 0–0.2)
IMM GRANULOCYTES NFR BLD AUTO: 0 % (ref 0–2)
LDLC SERPL CALC-MCNC: 74 MG/DL (ref 0–100)
LYMPHOCYTES # BLD AUTO: 1.36 THOUSANDS/ΜL (ref 0.6–4.47)
LYMPHOCYTES NFR BLD AUTO: 25 % (ref 14–44)
MCH RBC QN AUTO: 31.9 PG (ref 26.8–34.3)
MCHC RBC AUTO-ENTMCNC: 32.9 G/DL (ref 31.4–37.4)
MCV RBC AUTO: 97 FL (ref 82–98)
MONOCYTES # BLD AUTO: 0.7 THOUSAND/ΜL (ref 0.17–1.22)
MONOCYTES NFR BLD AUTO: 13 % (ref 4–12)
NEUTROPHILS # BLD AUTO: 3.16 THOUSANDS/ΜL (ref 1.85–7.62)
NEUTS SEG NFR BLD AUTO: 57 % (ref 43–75)
NRBC BLD AUTO-RTO: 0 /100 WBCS
PLATELET # BLD AUTO: 154 THOUSANDS/UL (ref 149–390)
PMV BLD AUTO: 12.5 FL (ref 8.9–12.7)
POTASSIUM SERPL-SCNC: 4.2 MMOL/L (ref 3.5–5.3)
PROT SERPL-MCNC: 8 G/DL (ref 6.4–8.2)
RBC # BLD AUTO: 4.14 MILLION/UL (ref 3.88–5.62)
SODIUM SERPL-SCNC: 141 MMOL/L (ref 136–145)
TRIGL SERPL-MCNC: 180 MG/DL
TSH SERPL DL<=0.05 MIU/L-ACNC: 0.49 UIU/ML (ref 0.36–3.74)
WBC # BLD AUTO: 5.5 THOUSAND/UL (ref 4.31–10.16)

## 2022-01-06 PROCEDURE — 83036 HEMOGLOBIN GLYCOSYLATED A1C: CPT

## 2022-01-06 PROCEDURE — 80053 COMPREHEN METABOLIC PANEL: CPT

## 2022-01-06 PROCEDURE — 36415 COLL VENOUS BLD VENIPUNCTURE: CPT

## 2022-01-06 PROCEDURE — 84443 ASSAY THYROID STIM HORMONE: CPT

## 2022-01-06 PROCEDURE — 80061 LIPID PANEL: CPT

## 2022-01-06 PROCEDURE — 85025 COMPLETE CBC W/AUTO DIFF WBC: CPT

## 2022-01-07 ENCOUNTER — TELEPHONE (OUTPATIENT)
Dept: FAMILY MEDICINE CLINIC | Facility: CLINIC | Age: 77
End: 2022-01-07

## 2022-01-07 DIAGNOSIS — I25.118 CORONARY ARTERY DISEASE OF NATIVE ARTERY OF NATIVE HEART WITH STABLE ANGINA PECTORIS (HCC): ICD-10-CM

## 2022-01-07 DIAGNOSIS — E89.0 HYPOTHYROIDISM, POSTABLATIVE: ICD-10-CM

## 2022-01-07 DIAGNOSIS — E10.49 OTHER DIABETIC NEUROLOGICAL COMPLICATION ASSOCIATED WITH TYPE 1 DIABETES MELLITUS (HCC): Primary | ICD-10-CM

## 2022-01-07 LAB
EST. AVERAGE GLUCOSE BLD GHB EST-MCNC: 169 MG/DL
HBA1C MFR BLD: 7.5 %

## 2022-01-07 NOTE — ASSESSMENT & PLAN NOTE
Wt Readings from Last 3 Encounters:   01/03/22 91 2 kg (201 lb)   12/20/21 91 9 kg (202 lb 9 6 oz)   11/05/21 89 8 kg (198 lb)     Patient appears euvolemic on exam today  Continue torsemide 10 mg daily and Toprol XL 25 mg daily    Patient remains under care of [facility]'s CHF team, Dr Jamal Farfan

## 2022-01-07 NOTE — TELEPHONE ENCOUNTER
----- Message from Staci Oquendo MD sent at 1/7/2022  4:23 PM EST -----  Please contact patient or his wife  Blood work is overall stable  Please continue same medications and repeat labs in 3 months  Orders placed    Thank you

## 2022-01-07 NOTE — ASSESSMENT & PLAN NOTE
Lab Results   Component Value Date    HGBA1C 7 2 (H) 10/01/2021     Patient remains under care of endocrinology  Pending blood work including hemoglobin A1c

## 2022-01-07 NOTE — ASSESSMENT & PLAN NOTE
Lab Results   Component Value Date    EGFR 41 01/06/2022    EGFR 43 10/01/2021    EGFR 49 05/25/2021    CREATININE 1 60 (H) 01/06/2022    CREATININE 1 56 (H) 10/01/2021    CREATININE 1 38 (H) 05/25/2021     Likely episode of orthostatic lightheadedness last night preceeded by 2 episodes of diarrhea  Patient remains on low-dose of metoprolol ER 25 mg daily as per cardiology  Previous reduction of beta-blocker resulted in worsening of dyspnea  I would favor continuation current therapy  His blood pressure is on lower side today, he denies symptoms of lightheadedness or unsteadiness at present time  No angina, palpitations or unusual dyspnea  I strongly advised patient and wife to observe his symptoms  If orthostatic lightheadedness recurs-I will be in touch with [facility] Cardiology, Dr Jamal Farfan, to discuss our options, patient may benefit from trial of midodrine question  We discussed importance of fall precautions  Patient should use walker at night  Keep up with good hydration

## 2022-01-07 NOTE — ASSESSMENT & PLAN NOTE
Patient uses oxygen as needed  I encouraged him again to be liberal with p r n  oxygen use as it has helped with symptoms of chronic shortness of breath    Patient remains under care of [facility]'s pulmonology

## 2022-01-07 NOTE — ASSESSMENT & PLAN NOTE
Chronic dyspnea on exertion, lack of response to inhaler therapy  I again encouraged patient to continue p r n  oxygen use    He is under care of pulmonology at [facility]

## 2022-01-20 ENCOUNTER — TELEPHONE (OUTPATIENT)
Dept: FAMILY MEDICINE CLINIC | Facility: CLINIC | Age: 77
End: 2022-01-20

## 2022-01-20 NOTE — TELEPHONE ENCOUNTER
Patient experiencing yeast infection  Appointment offered, patients wife became agitated and declined  Stated she would like a call from you directly

## 2022-01-21 ENCOUNTER — OFFICE VISIT (OUTPATIENT)
Dept: CARDIOLOGY CLINIC | Facility: CLINIC | Age: 77
End: 2022-01-21
Payer: MEDICARE

## 2022-01-21 ENCOUNTER — TELEPHONE (OUTPATIENT)
Dept: FAMILY MEDICINE CLINIC | Facility: CLINIC | Age: 77
End: 2022-01-21

## 2022-01-21 VITALS
SYSTOLIC BLOOD PRESSURE: 90 MMHG | WEIGHT: 201.9 LBS | DIASTOLIC BLOOD PRESSURE: 58 MMHG | OXYGEN SATURATION: 98 % | HEIGHT: 67 IN | BODY MASS INDEX: 31.69 KG/M2 | HEART RATE: 57 BPM

## 2022-01-21 DIAGNOSIS — E10.21 TYPE 1 DIABETES MELLITUS WITH STAGE 3B CHRONIC KIDNEY DISEASE (HCC): ICD-10-CM

## 2022-01-21 DIAGNOSIS — N18.32 TYPE 1 DIABETES MELLITUS WITH STAGE 3B CHRONIC KIDNEY DISEASE (HCC): ICD-10-CM

## 2022-01-21 DIAGNOSIS — N18.32 STAGE 3B CHRONIC KIDNEY DISEASE (HCC): ICD-10-CM

## 2022-01-21 DIAGNOSIS — I25.10 CORONARY ARTERY DISEASE INVOLVING NATIVE CORONARY ARTERY OF NATIVE HEART WITHOUT ANGINA PECTORIS: ICD-10-CM

## 2022-01-21 DIAGNOSIS — E78.5 HYPERLIPIDEMIA, UNSPECIFIED HYPERLIPIDEMIA TYPE: ICD-10-CM

## 2022-01-21 DIAGNOSIS — I50.9 HEART FAILURE WITH MID-RANGE EJECTION FRACTION (HCC): ICD-10-CM

## 2022-01-21 DIAGNOSIS — I10 ESSENTIAL (PRIMARY) HYPERTENSION: ICD-10-CM

## 2022-01-21 DIAGNOSIS — I95.9 HYPOTENSION, UNSPECIFIED HYPOTENSION TYPE: Primary | ICD-10-CM

## 2022-01-21 DIAGNOSIS — G47.33 OBSTRUCTIVE SLEEP APNEA: ICD-10-CM

## 2022-01-21 PROCEDURE — 99214 OFFICE O/P EST MOD 30 MIN: CPT | Performed by: INTERNAL MEDICINE

## 2022-01-21 RX ORDER — METOPROLOL SUCCINATE 25 MG/1
12.5 TABLET, EXTENDED RELEASE ORAL DAILY
Qty: 30 TABLET | Refills: 3
Start: 2022-01-21 | End: 2022-02-03 | Stop reason: SDUPTHER

## 2022-01-21 NOTE — PATIENT INSTRUCTIONS
Decrease metoprolol succinate to 12 5 mg daily  Be careful with positional changes  Change positions slowly:  When you get out of bed, sit up first, then slowly move your legs to the side of the bed  If you are not having any symptoms, slowly stand up  If you have symptoms, sit down right away

## 2022-01-21 NOTE — TELEPHONE ENCOUNTER
Patient's wife called today with episode of fall and low BP  Patient was seen in the office on January 3rd  Blood pressures have been running low  Recurrent episodes of orthostatic lightheadedness  I  recall that previous discontinuation and  /or b blocker dose reduction of beta-blocker have resulted in worsening of angina so I have not changed patient's medications          I am not sure of midodrine would be the next appropriate step  I would appreciate your input  Could you please follow-up with this patient      Thank you

## 2022-01-21 NOTE — TELEPHONE ENCOUNTER
Wife Called stating pt has fallen again this am  BP was 88/47 after fall  Wife is concerned with medications  Spoke with KARSTEN Babcock to have pts wife contact the [address] as wife feels pts betablocker may need to be adjusted  Pt was last seen on 12/20/21 by Thierry Painting  Pt sees Nephrology as well  Advised to wife of above  Wife agrees to contact [facility] for follow up  Denies injuries from fall  Advised to contact office back with any concerns  Wife agrees to plan

## 2022-01-21 NOTE — PROGRESS NOTES
Advanced Heart Failure / Pulmonary Hypertension Outpatient Progress Note    Albertina Carranza 68 y o  male   MRN: [id number]  Encounter: [id number]    Assessment:  Patient Active Problem List    Diagnosis Date Noted    Class 1 obesity without serious comorbidity with body mass index (BMI) of 31 0 to 31 9 in adult 11/05/2021    Hypoxemic respiratory failure, chronic ([facility] ) 05/10/2021    Stenosis of carotid artery 08/24/2020    Thrombocytopenia, unspecified ([place] [address] ) 03/10/2020    Spinal stenosis of lumbar region with neurogenic claudication     S/P femoral-popliteal bypass surgery 01/09/2020    Anxiety with depression 04/07/2019    Chronic systolic congestive heart failure ([place] [address] ) 03/01/2019    Diabetic neuropathy associated with type 1 diabetes mellitus ([place] [address] ) 01/14/2019    Lumbar disc herniation 08/01/2018    Lumbar radiculopathy 08/01/2018    Aortoiliac occlusive disease ([facility] ) 03/01/2018    Restrictive lung disease 01/30/2018    Centrilobular emphysema ([place] [address] ) 01/30/2018    Presence of drug coated stent in LAD coronary artery 01/24/2018    Coronary artery disease of native artery of native heart with stable angina pectoris ([facility] ) 01/24/2018    Presence of drug coated stent in left circumflex coronary artery 01/24/2018    Dyslipidemia 01/24/2018    Obstructive sleep apnea of adult 01/24/2018    Carotid artery stenosis, asymptomatic, bilateral 01/24/2018    CKD (chronic kidney disease) stage 3, GFR 30-59 ml/min ([facility]) 12/13/2016    Low back pain with sciatica 08/22/2016    Type 1 diabetes mellitus with diabetic peripheral angiopathy without gangrene ([facility] ) 01/12/2016    Benign hypertension with chronic kidney disease, stage III ([facility] ) 08/12/2015    Renal cyst, right 08/12/2015    Vitamin D deficiency 05/22/2015    Renal artery stenosis ([facility] ) 05/09/2015    Hypothyroidism, postablative 04/02/2013       Today's Plan:   Borderline orthostatic hypotension in office today; SBP dropped 10 mmHg and diastolic increased by 4 mmHg   Decrease metoprolol succinate to 12 5 mg daily   Volume up on exam today; continue current torsemide dose   If above fails to improve symptoms/ BP on home cuff, can consider addition of low-dose midodrine   Advised patient to proceed with caution with sudden position changes and consider using assist device with ambulation during the night  Plan:  Chronic HFmEF; LVEF 45-50%; LVIDd 6 7 cm; NYHA II; ACC/AHA Stage B/C   Etiology: ischemic  TTE 03/2/2019: LVEF 45%  LVIDd 6 cm  Normal RV size and RVSF  PASP 57 mmHg  TTE 02/06/2020 (at [facility], per report): LVEF 56%  LVIDd 5 6 cm  TTE 07/23/2020: LVEF 40%  LVIDd 5 7 cm  Grade 1 DD  "hypokinesis of inferoseptal, inferior and inferolateral walls " Normal RV  Mild MR  TTE 04/22/2021 [facility] cardiologist; per report, see media tab): LVEF 45-50%  LVIDd 6 7 cm  Grade 1 DD  Reviewed importance of low sodium diet and fluid restriction  Weight of 202 lbs on 12/20  Today, weighs 201 lbs  Most recent BMP from 01/06/2022: sodium 141; potassium 4 2; BUN 36; creatinine 1 60; eGFR 41  Neurohormonal Blockade:  --Beta Blocker: metoprolol succinate 12 5 mg daily  --ARNi / ACEi / ARB: No (Note: allergy to ACEi)  --Aldosterone Antagonist: No    --SGLT2 Inhibitor: No    --Diuretic: torsemide 10 mg daily  Sudden Cardiac Death Risk Reduction:  --LVEF >35%  Electrical Resynchronization:  --Candidacy for BiV device: narrow QRS  Advanced Therapies: Will continue to monitor  Coronary artery disease   Without active chest pain  S/p stenting x4 in 2014 at [facility] 03/06/2020 (at [facility], per report): "RCA with 70% proximal and 50% mid stenoses  LAD 40-50% stenosis proximally before widely patent stents  Distal left Cx 75% in stent restenosis just before the small LPDA  OM 3 is  with left to left collaterals  Co-dominant RCA "   Continue on aspirin, Plavix, statin, and BB as above  PRN SL nitro prescribed      Chronic kidney disease, stage IIIb   Baseline creatinine of 1 4-1 7  Most recent BMP from 01/06/2022: sodium 141; potassium 4 2; BUN 36; creatinine 1 60; eGFR 41  Hypertension   Now trending hypotensive on home cuff  BP of 100/54 mmHg in office today  Standing BP (2 minutes) of 90/58 mmHg  Continue on medications as above  Hyperlipidemia   Most recent lipid panel from 01/06/2022: cholesterol 153; ; HDL 43; calculated LDL 74  Continue on CoQ10 200 mg daily and rosuvastatin 20 mg daily  Diabetes mellitus, type I   Insulin dependent  Most recent hemoglobin A1c of 7 5 from 01/06/2022  Peripheral arterial disease  Obstructive sleep apnea  Carotid artery stenosis  Restrictive lung disease / chronic respiratory failure with hypoxia  Spinal stenosis, lumbar  Anxiety  Depression  History of PE and DVT    HPI:   Torrie Joseph is a 66-year-old man with a PMH as above who presents to the office for an acute visit  12/20/2021 with Dr Einar Prader: "Here for follow up  Still not interested in cardiac rehab  Walking mainly inside the house  Walking up and down the flight of stairs recently due to broken smoke detector and was tired, no shortness of breath  Notes balance worse over the years  He has peripheral neuropathy and no sensation on both feet  "    Patient's wife contacted PCP on 01/21 s/p fall and hypotension on home cuff  PCP advised cardiology evaluation and assessment  01/21/2022: Patient presents with his wife for an acute visit  Last few months patient has had increased number of falls  First fall occurred in living room in late November 2020  Earlier this month on 01/02, fell upon suddenly standing from bed  Marisela Serum again this morning around midnight  Patient's wife found him around 0040 and assisted him from floor back into bed  Patient's wife (former RN) denies any signs or symptoms of stroke; BP of 87/47 with pulse of 56 at that time  Blood sugar WNL per wife   Patient has no memory of getting out of bed, falling, or receiving assistance from his wife s/p fall  Denies head strike  Patient does endorse occasional MONTAGUE and generalized fatigue  Denies any dietary changes or decreased oral intake  Did have antidepressant change to citalopram on 01/03/2022 and patient does endorse increased fatigue and restlessness with this change  Denies taking SL nitro and takes all antihypertensive medications in the morning  Past Medical History:   Diagnosis Date    Acute venous embolism and thrombosis of deep vessels of proximal lower extremity ([facility] )     resolved 04/04/2015    DARINEL (acute kidney injury) ([place] [address] ) 01/12/2016    Anesthesia     RESPIRATORY ISSUES DUE TO SLEEP APNEA    Cardiac disease     heart attack, stents x 4    Chronic cough     resolved 02/04/2016    Chronic pain disorder     intermitent claudication    [facility]ch[facility] obstructive pulmonary disease) [facility]     Coronary artery disease     CPAP (continuous positive airway pressure) dependence     Diabetes mellitus ([place] [address] )     Disease of thyroid gland     DVT (deep venous thrombosis) ([facility])     Heart failure with mid-range ejection fraction ([place] [address] )     Hyperlipidemia     Hypertension     Ischemic cardiomyopathy     last assessed 09/26/2017    Myocardial infarction [facility])     MI +2 2015,2018    NSTEMI (non-ST elevated myocardial infarction) ([place] [address] ) 03/23/2019    Pulmonary emphysema ([facility])     Pulmonary granuloma ([place] [address] )     resolved 02/03/2017    Renal failure     Sleep apnea        Review of Systems   Constitutional: Positive for fatigue  Negative for activity change, appetite change, fever and unexpected weight change  HENT: Negative for congestion, postnasal drip, rhinorrhea, sneezing, sore throat and trouble swallowing  Eyes: Negative  Respiratory: Positive for shortness of breath  Negative for cough and chest tightness  Cardiovascular: Negative for chest pain, palpitations and leg swelling     Gastrointestinal: Negative for abdominal distention, abdominal pain, diarrhea, nausea and vomiting  Endocrine: Negative  Genitourinary: Negative for decreased urine volume, difficulty urinating, dysuria and urgency  Musculoskeletal: Negative  Skin: Negative  Allergic/Immunologic: Negative  Neurological: Positive for dizziness  Negative for tremors, syncope, weakness, light-headedness and headaches  Hematological: Negative  Psychiatric/Behavioral: Negative for agitation, confusion and sleep disturbance  The patient is not nervous/anxious  14-point ROS completed and negative except as stated above and/or in the HPI  Allergies   Allergen Reactions    Doxazosin Myalgia     UNKNOWN  UNKNOWN  Muscle cramps    Iohexol      UNKNOWN    Lisinopril Cough     cough  Other reaction(s): CAMELLA SINENSIS (ZESTRIL) (cough)  cough    Vancomycin Hives    Adhesive [Medical Tape]      Skin Breakdown    Cardura [Doxazosin Mesylate]      Muscle cramps    Isosorbide Rash    Other Other (See Comments)     Iv dye for cardiac cath  Renal failure very close to dialysis  But no dialysis         Current Outpatient Medications:     aspirin 81 MG tablet, Take 81 mg by mouth daily  , Disp: , Rfl:     cholecalciferol (VITAMIN D3) 1,000 units tablet, Take 1,000 Units by mouth daily , Disp: , Rfl:     citalopram (CeleXA) 20 mg tablet, Take 1 tablet (20 mg total) by mouth daily, Disp: 90 tablet, Rfl: 1    clopidogrel (PLAVIX) 75 mg tablet, Take 1 tablet (75 mg total) by mouth daily, Disp: 90 tablet, Rfl: 3    Coenzyme Q10 (COQ-10) 200 MG CAPS, Take 200 mg by mouth daily, Disp: , Rfl:     gabapentin (NEURONTIN) 300 mg capsule, TAKE ONE CAPSULE BY MOUTH THREE TIMES A DAY (Patient taking differently: 2 (two) times a day  ), Disp: 270 capsule, Rfl: 1    Glucagon (Baqsimi One Pack) 3 MG/DOSE POWD, 1 Dose into each nostril as needed , Disp: , Rfl:     Icosapent Ethyl (Vascepa) 1 g CAPS, Take 1 capsule (1 g total) by mouth 2 (two) times a day, Disp: 180 capsule, Rfl: 3    insulin glargine (Lantus) 100 units/mL subcutaneous injection, Inject under the skin 40 units am- 8 units pm, Disp: , Rfl:     insulin lispro (HUMALOG) 100 units/mL injection, Inject 3 units with breakfast, 6 units at lunch, and 6 units at dinner (Patient taking differently: 4 (four) times a day Inject 3 units with breakfast, 6 units at lunch, and 6 units at dinner Carbs counting), Disp: 10 mL, Rfl: 1    levothyroxine 200 mcg tablet, Take 200 mcg by mouth daily, Disp: , Rfl: 0    metoprolol succinate (TOPROL-XL) 25 mg 24 hr tablet, Take 0 5 tablets (12 5 mg total) by mouth daily, Disp: 30 tablet, Rfl: 3    polyethylene glycol (MIRALAX) 17 g packet, Take 17 g by mouth daily, Disp: , Rfl:     rosuvastatin (CRESTOR) 20 MG tablet, Take 1 tablet (20 mg total) by mouth daily, Disp: 90 tablet, Rfl: 3    torsemide (DEMADEX) 10 mg tablet, TAKE 1 TABLET(10 MG) BY MOUTH DAILY, Disp: 90 tablet, Rfl: 2    docusate sodium (COLACE) 50 mg capsule, Take by mouth 2 (two) times a day as needed  (Patient not taking: Reported on 1/21/2022 ), Disp: , Rfl:     nitroglycerin (NITROSTAT) 0 4 mg SL tablet, Place 1 tablet (0 4 mg total) under the tongue every 5 (five) minutes as needed for chest pain (Patient not taking: Reported on 1/21/2022 ), Disp: 25 tablet, Rfl: 3    SF 1 1 % GEL, BRUCH ONCE DAILY AT BEDTIME, BRUSH OF 1 MINUTE AS DIRECTED (Patient not taking: Reported on 1/21/2022), Disp: , Rfl:     ULTICARE INSULIN SYRINGE 30G X 1/2" 1 ML MISC, Use as directed, Disp: , Rfl: 0    Social History     Socioeconomic History    Marital status: /Civil Union     Spouse name: Not on file    Number of children: Not on file    Years of education: Not on file    Highest education level: Not on file   Occupational History    Occupation: Retired    Occupation: Desk work    Occupation: [employer]   Tobacco Use    Smoking status: Former Smoker     Packs/day: 4 00     Years: 47 00     Pack years: 188 00     Types: Cigarettes     Start date: 65     Quit date:      Years since quittin 0    Smokeless tobacco: Never Used   Vaping Use    Vaping Use: Never used   Substance and Sexual Activity    Alcohol use: Yes     Comment: 2 drinks per day    Drug use: No    Sexual activity: Not Currently   Other Topics Concern    Not on file   Social History Narrative    Not on file     Social Determinants of Health     Financial Resource Strain: Not on file   Food Insecurity: Not on file   Transportation Needs: Not on file   Physical Activity: Not on file   Stress: Not on file   Social Connections: Not on file   Intimate Partner Violence: Not on file   Housing Stability: Not on file     Family History   Problem Relation Age of Onset    Heart disease Father         pacer placement    Hypertension Father     Kidney disease Father     Heart failure Father     Heart attack Father     Liver disease Father     Cirrhosis Mother         due to beer consumption    Liver disease Mother     Diabetes Other        Vitals:   Blood pressure 90/58, pulse 57, height 5' 7" (1 702 m), weight 91 6 kg (201 lb 14 4 oz), SpO2 98 %  Body mass index is 31 62 kg/m²  Wt Readings from Last 3 Encounters:   22 91 6 kg (201 lb 14 4 oz)   22 91 2 kg (201 lb)   21 91 9 kg (202 lb 9 6 oz)     Vitals:    22 1306 22 1342   BP: 100/54 90/58   BP Location: Right arm Left arm   Patient Position: Sitting Standing   Cuff Size: Standard Standard   Pulse: 57    SpO2: 98%    Weight: 91 6 kg (201 lb 14 4 oz)    Height: 5' 7" (1 702 m)        Physical Exam  Vitals reviewed  Constitutional:       General: He is awake  He is not in acute distress  Appearance: Normal appearance  He is well-developed and overweight  Comments: Face mask present   HENT:      Head: Normocephalic        Nose: Nose normal       Mouth/Throat:      Mouth: Mucous membranes are moist    Eyes:      General: No scleral icterus  Conjunctiva/sclera: Conjunctivae normal    Neck:      Vascular: JVD present  Trachea: No tracheal deviation  Cardiovascular:      Rate and Rhythm: Normal rate and regular rhythm  No extrasystoles are present  Heart sounds: No murmur heard  Pulmonary:      Effort: Pulmonary effort is normal  Tachypnea (after standing >1 minute) present  No bradypnea or respiratory distress  Breath sounds: Normal air entry  No decreased air movement  No decreased breath sounds, wheezing or rhonchi  Abdominal:      General: Bowel sounds are normal  There is distension  Palpations: Abdomen is soft  Tenderness: There is no abdominal tenderness  Musculoskeletal:      Cervical back: Neck supple  Right lower le+ Edema present  Left lower le+ Edema present  Skin:     General: Skin is warm and dry  Coloration: Skin is pale  Skin is not jaundiced  Neurological:      General: No focal deficit present  Mental Status: He is alert and oriented to person, place, and time  Psychiatric:         Attention and Perception: Attention normal          Mood and Affect: Affect normal  Mood is anxious  Speech: Speech normal          Behavior: Behavior normal  Behavior is cooperative  Thought Content:  Thought content normal      Labs & Results:  Lab Results   Component Value Date    WBC 5 50 2022    HGB 13 2 2022    HCT 40 1 2022    MCV 97 2022     2022     Lab Results   Component Value Date    SODIUM 141 2022    K 4 2 2022     (H) 2022    CO2 24 2022    BUN 36 (H) 2022    CREATININE 1 60 (H) 2022    GLUC 138 05/10/2021    CALCIUM 8 9 2022     Lab Results   Component Value Date    INR 1 09 2019    INR 1 11 2019    INR 1 07 2019    PROTIME 14 2 2019    PROTIME 14 4 (H) 2019    PROTIME 13 6 2019     Lab Results   Component Value Date    NTBNP 2,152 (H) 05/25/2021      EKG personally reviewed by MAGO Gonzalez PA-C

## 2022-02-02 NOTE — PROGRESS NOTES
Heart Failure Outpatient Progress Note - Dylon Parrish 68 y o  male MRN: [id number]    @ Encounter: [id number]      Assessment/Plan:    Patient Active Problem List    Diagnosis Date Noted    Class 1 obesity without serious comorbidity with body mass index (BMI) of 31 0 to 31 9 in adult 11/05/2021    Hypoxemic respiratory failure, chronic ([email or web address] ) 05/10/2021    Stenosis of carotid artery 08/24/2020    Thrombocytopenia, unspecified ([email or web address] ) 03/10/2020    Spinal stenosis of lumbar region with neurogenic claudication     S/P femoral-popliteal bypass surgery 01/09/2020    Anxiety with depression 04/07/2019    Chronic systolic congestive heart failure ([email or web address] ) 03/01/2019    Diabetic neuropathy associated with type 1 diabetes mellitus ([place] [id number] ) 01/14/2019    Lumbar disc herniation 08/01/2018    Lumbar radiculopathy 08/01/2018    Aortoiliac occlusive disease ([email or web address] ) 03/01/2018    Restrictive lung disease 01/30/2018    Centrilobular emphysema ([email or web address] ) 01/30/2018    Presence of drug coated stent in LAD coronary artery 01/24/2018    Coronary artery disease of native artery of native heart with stable angina pectoris ([email or web address] ) 01/24/2018    Presence of drug coated stent in left circumflex coronary artery 01/24/2018    Dyslipidemia 01/24/2018    Obstructive sleep apnea of adult 01/24/2018    Carotid artery stenosis, asymptomatic, bilateral 01/24/2018    CKD (chronic kidney disease) stage 3, GFR 30-59 ml/min ([facility]) 12/13/2016    Low back pain with sciatica 08/22/2016    Type 1 diabetes mellitus with diabetic peripheral angiopathy without gangrene ([email or web address] ) 01/12/2016    Benign hypertension with chronic kidney disease, stage III ([email or web address] ) 08/12/2015    Renal cyst, right 08/12/2015    Vitamin D deficiency 05/22/2015    Renal artery stenosis ([email or web address] ) 05/09/2015    Hypothyroidism, postablative 04/02/2013       Chronic HFmEF; LVEF 45-50%; LVIDd 6 7 cm; NYHA II; ACC/AHA Stage B/C Etiology: ischemic  Examines warm and euvolemic   BP on low side  No med changes today  Repeat TTE to assess biventricular function given progressively worsening SOB/MONTAGUE, activity intolerance  Weight of 202 lbs on 12/20  Today, weighs 201 lbs  TTE 03/2/2019: LVEF 45%  LVIDd 6 cm  Normal RV size and RVSF  PASP 57 mmHg  TTE 02/06/2020 (at [place], per report): LVEF 56%  LVIDd 5 6 cm  TTE 07/23/2020: LVEF 40%  LVIDd 5 7 cm  Grade 1 DD  "hypokinesis of inferoseptal, inferior and inferolateral walls " Normal RV  Mild MR  TTE 04/22/2021 [facility] cardiologist; per report, see media tab): LVEF 45-50%  LVIDd 6 7 cm  Grade 1 DD  Neurohormonal Blockade:  --Beta Blocker: metoprolol succinate 12 5 mg daily  --ARNi / ACEi / ARB: No (Note: allergy to ACEi)  --Aldosterone Antagonist: No    --SGLT2 Inhibitor: No    --Diuretic: torsemide 10 mg daily      Sudden Cardiac Death Risk Reduction:  --LVEF >35%     Electrical Resynchronization:  --Candidacy for BiV device: narrow QRS     Advanced Therapies: Will continue to monitor      Coronary artery disease  S/p stenting x4 in 2014 at [facility]  Without active chest pain  Continue on aspirin, Plavix, statin, and BB as above  PRN SL nitro prescribed  --C 03/06/2020 (at [place], per report): "RCA with 70% proximal and 50% mid stenoses  LAD 40-50% stenosis proximally before widely patent stents  Distal left Cx 75% in stent restenosis just before the small LPDA  OM 3 is  with left to left collaterals  Co-dominant RCA "                 Chronic kidney disease, stage IIIb Baseline creatinine of 1 4-1 7  Stable                  Hypertension  Recently hypotensive  Positive orthostatics at last OV  Metop decreased to 12 5 mg daily                  Hyperlipidemia  Lipid panel from 01/06/2022: cholesterol 153; ; HDL 43; calculated LDL 74  Continue on CoQ10 200 mg daily and rosuvastatin 20 mg daily       Diabetes mellitus, type I              Insulin dependent                Most recent hemoglobin A1c of 7 5 from 01/06/2022      Peripheral arterial disease  Obstructive sleep apnea  Carotid artery stenosis  COPD/Restrictive lung disease / chronic respiratory failure with hypoxia  Follows with pulm  Has supplemental oxygen 2 L at home to be used with activities  Currently only using at rest  Discussed importance of use with all activities to prevent hypoxia  --6 minute walk 5/2021  Oxygen saturations 97% on room air at rest, with ambulating for 4 minutes oxygen saturation drops down to 85% on room air requiring 2 L nasal cannula   Oxygen saturation is 90 8% with 2 L nasal cannula   Total walk distance is 346 meters   Resting heart rate is 76 beats per minute upon ambulation maximum heart rate was 100 beats per minute  --HR CT Chest 9/18/20: Moderate upper lobe predominant panlobular emphysema, without significant change since 2015  Mild lower lung predominant juxtapleural reticulation due to fibrosis, slightly more extensive on the right, with no traction bronchiolectasis or honeycombing      Spinal stenosis, lumbar  Anxiety  Depression  History of PE and DVT  VQ 9/13/19: low probability       HPI:   Torrie Joseph is a 55-year-old man with a PMH as above who presents to the office for an acute visit       12/20/2021 with Dr Einar Prader: "Here for follow up  Still not interested in cardiac rehab  Walking mainly inside the house  Walking up and down the flight of stairs recently due to broken smoke detector and was tired, no shortness of breath  Notes balance worse over the years  He has peripheral neuropathy and no sensation on both feet  "     Patient's wife contacted PCP on 01/21 s/p fall and hypotension on home cuff  PCP advised cardiology evaluation and assessment       01/21/2022: Patient presents with his wife for an acute visit  Last few months patient has had increased number of falls  First fall occurred in living room in late November 2020  Earlier this month on 01/02, fell upon suddenly standing from bed   Marisela Serum again this morning around midnight  Patient's wife found him around 0040 and assisted him from floor back into bed  Patient's wife (former RN) denies any signs or symptoms of stroke; BP of 87/47 with pulse of 56 at that time  Blood sugar WNL per wife  Patient has no memory of getting out of bed, falling, or receiving assistance from his wife s/p fall  Denies head strike  Patient does endorse occasional MONTAGUE and generalized fatigue  Denies any dietary changes or decreased oral intake  Did have antidepressant change to citalopram on 01/03/2022 and patient does endorse increased fatigue and restlessness with this change  Denies taking SL nitro and takes all antihypertensive medications in the morning  2/3/22  Presents for follow up  Still with c/o fatigue, SOB at rest and with exertion and worsening activity intolerance over the past year  His BP remains low despite cutting Metoprolol in half  He is supposed to be using supplemental oxygen with activities but has not been doing so  Only using when watching TV       Past Medical History:   Diagnosis Date    Acute venous embolism and thrombosis of deep vessels of proximal lower extremity ([facility] )     resolved 04/04/2015    DARINEL (acute kidney injury) ([facility] ) 01/12/2016    Anesthesia     RESPIRATORY ISSUES DUE TO SLEEP APNEA    Cardiac disease     heart attack, stents x 4    Chronic cough     resolved 02/04/2016    Chronic pain disorder     intermitent claudication    COPD [facility]ch[facility] obstructive pulmonary disease) ([facility])     Coronary artery disease     CPAP (continuous positive airway pressure) dependence     Diabetes mellitus ([address] )     Disease of thyroid gland     D[facility] (deep venous thrombosis) [facility]     Heart failure with mid-range ejection fraction ([facility] )     Hyperlipidemia     Hypertension     Ischemic cardiomyopathy     last assessed 09/26/2017    Myocardial infarction [facility])     MI +2 2015,2018    NSTEMI (non-ST elevated myocardial infarction) ([facility] ) 03/23/2019    Pulmonary emphysema (HCC)     Pulmonary granuloma ([facility] )     resolved 02/03/2017    Renal failure     Sleep apnea        12 point ROS negative other than that stated in HPI    Allergies   Allergen Reactions    Doxazosin Myalgia     UNKNOWN  UNKNOWN  Muscle cramps    Iohexol      UNKNOWN    Lisinopril Cough     cough  Other reaction(s): CAMELLA SINENSIS (ZESTRIL) (cough)  cough    Vancomycin Hives    Adhesive [Medical Tape]      Skin Breakdown    Cardura [Doxazosin Mesylate]      Muscle cramps    Isosorbide Rash    Other Other (See Comments)     Iv dye for cardiac cath  Renal failure very close to dialysis  But no dialysis       Current Outpatient Medications:     aspirin 81 MG tablet, Take 81 mg by mouth daily  , Disp: , Rfl:     cholecalciferol (VITAMIN D3) 1,000 units tablet, Take 1,000 Units by mouth daily , Disp: , Rfl:     citalopram (CeleXA) 20 mg tablet, Take 1 tablet (20 mg total) by mouth daily, Disp: 90 tablet, Rfl: 1    clopidogrel (PLAVIX) 75 mg tablet, Take 1 tablet (75 mg total) by mouth daily, Disp: 90 tablet, Rfl: 3    Coenzyme Q10 (COQ-10) 200 MG CAPS, Take 200 mg by mouth daily, Disp: , Rfl:     docusate sodium (COLACE) 50 mg capsule, Take by mouth 2 (two) times a day as needed  (Patient not taking: Reported on 1/21/2022 ), Disp: , Rfl:     gabapentin (NEURONTIN) 300 mg capsule, TAKE ONE CAPSULE BY MOUTH THREE TIMES A DAY (Patient taking differently: 2 (two) times a day  ), Disp: 270 capsule, Rfl: 1    Glucagon (Baqsimi One Pack) 3 MG/DOSE POWD, 1 Dose into each nostril as needed , Disp: , Rfl:     Icosapent Ethyl (Vascepa) 1 g CAPS, Take 1 capsule (1 g total) by mouth 2 (two) times a day, Disp: 180 capsule, Rfl: 3    insulin glargine (Lantus) 100 units/mL subcutaneous injection, Inject under the skin 40 units am- 8 units pm, Disp: , Rfl:     insulin lispro (HUMALOG) 100 units/mL injection, Inject 3 units with breakfast, 6 units at lunch, and 6 units at dinner (Patient taking differently: 4 (four) times a day Inject 3 units with breakfast, 6 units at lunch, and 6 units at dinner Carbs counting), Disp: 10 mL, Rfl: 1    levothyroxine 200 mcg tablet, Take 200 mcg by mouth daily, Disp: , Rfl: 0    metoprolol succinate (TOPROL-XL) 25 mg 24 hr tablet, Take 0 5 tablets (12 5 mg total) by mouth daily, Disp: 30 tablet, Rfl: 3    nitroglycerin (NITROSTAT) 0 4 mg SL tablet, Place 1 tablet (0 4 mg total) under the tongue every 5 (five) minutes as needed for chest pain (Patient not taking: Reported on 2022 ), Disp: 25 tablet, Rfl: 3    polyethylene glycol (MIRALAX) 17 g packet, Take 17 g by mouth daily, Disp: , Rfl:     rosuvastatin (CRESTOR) 20 MG tablet, Take 1 tablet (20 mg total) by mouth daily, Disp: 90 tablet, Rfl: 3    SF 1 1 % GEL, BRUCH ONCE DAILY AT BEDTIME, BRUSH OF 1 MINUTE AS DIRECTED (Patient not taking: Reported on 2022), Disp: , Rfl:     torsemide (DEMADEX) 10 mg tablet, TAKE 1 TABLET(10 MG) BY MOUTH DAILY, Disp: 90 tablet, Rfl: 2    ULTICARE INSULIN SYRINGE 30G X 1/2" 1 ML MISC, Use as directed, Disp: , Rfl: 0    Social History     Socioeconomic History    Marital status: /Civil Union     Spouse name: Not on file    Number of children: Not on file    Years of education: Not on file    Highest education level: Not on file   Occupational History    Occupation: Retired    Occupation: Desk work    Occupation: [employer]   Tobacco Use    Smoking status: Former Smoker     Packs/day: 4 00     Years: 47 00     Pack years: 188 00     Types: Cigarettes     Start date:      Quit date: 2008     Years since quittin 0    Smokeless tobacco: Never Used   Vaping Use    Vaping Use: Never used   Substance and Sexual Activity    Alcohol use: Yes     Comment: 2 drinks per day    Drug use: No    Sexual activity: Not Currently   Other Topics Concern    Not on file   Social History Narrative    Not on file     Social Determinants of Health Financial Resource Strain: Not on file   Food Insecurity: Not on file   Transportation Needs: Not on file   Physical Activity: Not on file   Stress: Not on file   Social Connections: Not on file   Intimate Partner Violence: Not on file   Housing Stability: Not on file       Family History   Problem Relation Age of Onset    Heart disease Father         pacer placement    Hypertension Father     Kidney disease Father     Heart failure Father     Heart attack Father     Liver disease Father     Cirrhosis Mother         due to beer consumption    Liver disease Mother     Diabetes Other        Physical Exam:    Vitals:/64 (BP Location: Left arm, Patient Position: Sitting, Cuff Size: Adult)   Pulse 76   Ht 5' 7" (1 702 m)   Wt 91 6 kg (202 lb)   SpO2 98%   BMI 31 64 kg/m²     Wt Readings from Last 3 Encounters:   01/21/22 91 6 kg (201 lb 14 4 oz)   01/03/22 91 2 kg (201 lb)   12/20/21 91 9 kg (202 lb 9 6 oz)           GEN: Dylon Parrish appears well, alert and oriented x 3, pleasant and cooperative   HEENT: pupils equal, round, and reactive to light; extraocular muscles intact  NECK: supple, no carotid bruits   HEART: regular rhythm, normal S1 and S2, no murmurs, clicks, gallops or rubs, JVP is flat    LUNGS: diminished, to auscultation bilaterally; no wheezes, rales, or rhonchi   ABDOMEN: normal bowel sounds, soft, no tenderness, no distention  EXTREMITIES: peripheral pulses normal; no clubbing, cyanosis, or edema  NEURO: no focal findings   SKIN: normal without suspicious lesions on exposed skin    Labs & Results:    Chemistry        Component Value Date/Time     12/21/2015 1044    K 4 2 01/06/2022 1129    K 4 9 12/21/2015 1044     (H) 01/06/2022 1129     (H) 12/21/2015 1044    CO2 24 01/06/2022 1129    CO2 21 03/22/2019 1330    BUN 36 (H) 01/06/2022 1129    BUN 19 12/21/2015 1044    CREATININE 1 60 (H) 01/06/2022 1129    CREATININE 1 29 12/21/2015 1044        Component Value Date/Time    CALCIUM 8 9 01/06/2022 1129    CALCIUM 8 7 12/21/2015 1044    ALKPHOS 122 (H) 01/06/2022 1129    ALKPHOS 99 10/01/2015 1105    AST 14 01/06/2022 1129    AST 22 10/01/2015 1105    ALT 20 01/06/2022 1129    ALT 35 10/01/2015 1105    BILITOT 0 33 10/01/2015 1105        Lab Results   Component Value Date    WBC 5 50 01/06/2022    HGB 13 2 01/06/2022    HCT 40 1 01/06/2022    MCV 97 01/06/2022     01/06/2022     Lab Results   Component Value Date    NTBNP 2,152 (H) 05/25/2021      Lab Results   Component Value Date    LDLCALC 74 01/06/2022     Lab Results   Component Value Date    QNA4VYJLPDNL 0 488 01/06/2022         EKG personally reviewed by PEDRO LUIS Pineda  No results found for this visit on 02/03/22  Counseling / Coordination of Care  Total floor / unit time spent today 40 minutes  Greater than 50% of total time was spent with the patient and / or family counseling and / or coordination of care  A description of the counseling / coordination of care: 20  Thank you for the opportunity to participate in the care of this patient      Torrie Casillas

## 2022-02-03 ENCOUNTER — OFFICE VISIT (OUTPATIENT)
Dept: CARDIOLOGY CLINIC | Facility: CLINIC | Age: 77
End: 2022-02-03
Payer: MEDICARE

## 2022-02-03 ENCOUNTER — HOSPITAL ENCOUNTER (OUTPATIENT)
Dept: NON INVASIVE DIAGNOSTICS | Facility: CLINIC | Age: 77
Discharge: HOME | End: 2022-02-03
Payer: MEDICARE

## 2022-02-03 VITALS
HEIGHT: 67 IN | DIASTOLIC BLOOD PRESSURE: 54 MMHG | BODY MASS INDEX: 31.71 KG/M2 | WEIGHT: 202 LBS | HEART RATE: 62 BPM | SYSTOLIC BLOOD PRESSURE: 100 MMHG

## 2022-02-03 VITALS
WEIGHT: 202 LBS | BODY MASS INDEX: 31.71 KG/M2 | HEIGHT: 67 IN | SYSTOLIC BLOOD PRESSURE: 108 MMHG | DIASTOLIC BLOOD PRESSURE: 64 MMHG | OXYGEN SATURATION: 98 % | HEART RATE: 76 BPM

## 2022-02-03 DIAGNOSIS — I50.20 SYSTOLIC CONGESTIVE HEART FAILURE, UNSPECIFIED HF CHRONICITY (HCC): ICD-10-CM

## 2022-02-03 DIAGNOSIS — E78.5 DYSLIPIDEMIA: ICD-10-CM

## 2022-02-03 DIAGNOSIS — I50.9 HEART FAILURE WITH MID-RANGE EJECTION FRACTION (HCC): ICD-10-CM

## 2022-02-03 DIAGNOSIS — I50.20 SYSTOLIC CONGESTIVE HEART FAILURE, UNSPECIFIED HF CHRONICITY (HCC): Primary | ICD-10-CM

## 2022-02-03 DIAGNOSIS — N18.30 CKD (CHRONIC KIDNEY DISEASE) STAGE 3, GFR 30-59 ML/MIN (HCC): ICD-10-CM

## 2022-02-03 DIAGNOSIS — I25.10 CORONARY ARTERY DISEASE INVOLVING NATIVE CORONARY ARTERY OF NATIVE HEART WITHOUT ANGINA PECTORIS: ICD-10-CM

## 2022-02-03 LAB
AORTIC ROOT: 3.1 CM
APICAL FOUR CHAMBER EJECTION FRACTION: 48 %
AV REGURGITATION PRESSURE HALF TIME: 383 MS
E WAVE DECELERATION TIME: 270 MS
FRACTIONAL SHORTENING: 21 % (ref 28–44)
INTERVENTRICULAR SEPTUM IN DIASTOLE (PARASTERNAL SHORT AXIS VIEW): 1.3 CM (ref 0.54–1.02)
LEFT ATRIUM AREA SYSTOLE SINGLE PLANE A4C: 17 CM2
LEFT ATRIUM SIZE: 4.1 CM
LEFT INTERNAL DIMENSION IN SYSTOLE: 5 CM (ref 2.1–4)
LEFT VENTRICULAR INTERNAL DIMENSION IN DIASTOLE: 6.3 CM (ref 5.42–8.07)
LEFT VENTRICULAR POSTERIOR WALL IN END DIASTOLE: 1.3 CM (ref 0.53–1.01)
LEFT VENTRICULAR STROKE VOLUME: 80 ML
MV E'TISSUE VEL-SEP: 5 CM/S
MV PEAK A VEL: 0.69 M/S
MV PEAK E VEL: 107 CM/S
MV STENOSIS PRESSURE HALF TIME: 0 MS
RIGHT ATRIUM AREA SYSTOLE A4C: 14.1 CM2
RIGHT VENTRICLE ID DIMENSION: 3.7 CM
SL CV AV DECELERATION TIME RETROGRADE: 1319 MS
SL CV AV PEAK GRADIENT RETROGRADE: 26 MMHG
SL CV LV EF: 40
SL CV PED ECHO LEFT VENTRICLE DIASTOLIC VOLUME (MOD BIPLANE) 2D: 200 ML
SL CV PED ECHO LEFT VENTRICLE SYSTOLIC VOLUME (MOD BIPLANE) 2D: 119 ML
Z-SCORE OF INTERVENTRICULAR SEPTUM IN END DIASTOLE: 4.27
Z-SCORE OF LEFT VENTRICULAR DIMENSION IN END SYSTOLE: -0.48
Z-SCORE OF LEFT VENTRICULAR POSTERIOR WALL IN END DIASTOLE: 4.38

## 2022-02-03 PROCEDURE — 93306 TTE W/DOPPLER COMPLETE: CPT | Performed by: INTERNAL MEDICINE

## 2022-02-03 PROCEDURE — 99215 OFFICE O/P EST HI 40 MIN: CPT | Performed by: NURSE PRACTITIONER

## 2022-02-03 PROCEDURE — 93306 TTE W/DOPPLER COMPLETE: CPT

## 2022-02-03 RX ORDER — METOPROLOL SUCCINATE 25 MG/1
12.5 TABLET, EXTENDED RELEASE ORAL DAILY
Qty: 30 TABLET | Refills: 3 | Status: SHIPPED | OUTPATIENT
Start: 2022-02-03 | End: 2022-05-18 | Stop reason: SDUPTHER

## 2022-02-03 RX ORDER — ROSUVASTATIN CALCIUM 20 MG/1
20 TABLET, COATED ORAL DAILY
Qty: 90 TABLET | Refills: 3 | Status: SHIPPED | OUTPATIENT
Start: 2022-02-03 | End: 2022-05-17 | Stop reason: SDUPTHER

## 2022-02-03 RX ORDER — SODIUM FLUORIDE 6 MG/ML
PASTE, DENTIFRICE DENTAL
COMMUNITY
Start: 2021-11-16 | End: 2022-06-04

## 2022-02-03 RX ORDER — TORSEMIDE 10 MG/1
10 TABLET ORAL DAILY
Qty: 90 TABLET | Refills: 2 | Status: SHIPPED | OUTPATIENT
Start: 2022-02-03 | End: 2022-05-18 | Stop reason: SDUPTHER

## 2022-02-03 NOTE — PATIENT INSTRUCTIONS
Weight yourself daily  If you gain 3 lbs in one day or 5 lbs in one week, please call the office at [phone] and ask for a nurse or the heart failure nurse  Keep your sodium intake to <2 grams, (2000 mg) per day, and fluids <2 Liters (2000 ml) per day  This is around 6-7, 8 oz glasses of fluid per day    Schedule echo today

## 2022-02-07 ENCOUNTER — TELEPHONE (OUTPATIENT)
Dept: CARDIOLOGY CLINIC | Facility: CLINIC | Age: 77
End: 2022-02-07

## 2022-03-03 ENCOUNTER — TELEPHONE (OUTPATIENT)
Dept: NEPHROLOGY | Facility: CLINIC | Age: 77
End: 2022-03-03

## 2022-03-03 NOTE — TELEPHONE ENCOUNTER
Left voice mail to schedule appointment  This is the first attempt  Dr Jamil Matthews has openings next week in [place]

## 2022-05-10 ENCOUNTER — LAB (OUTPATIENT)
Dept: LAB | Facility: CLINIC | Age: 77
End: 2022-05-10
Payer: MEDICARE

## 2022-05-10 DIAGNOSIS — I70.1 RAS (RENAL ARTERY STENOSIS) (HCC): ICD-10-CM

## 2022-05-10 DIAGNOSIS — N18.32 STAGE 3B CHRONIC KIDNEY DISEASE (HCC): ICD-10-CM

## 2022-05-10 DIAGNOSIS — E10.49 OTHER DIABETIC NEUROLOGICAL COMPLICATION ASSOCIATED WITH TYPE 1 DIABETES MELLITUS (HCC): ICD-10-CM

## 2022-05-10 DIAGNOSIS — I25.118 CORONARY ARTERY DISEASE OF NATIVE ARTERY OF NATIVE HEART WITH STABLE ANGINA PECTORIS (HCC): ICD-10-CM

## 2022-05-10 DIAGNOSIS — E89.0 HYPOTHYROIDISM, POSTABLATIVE: ICD-10-CM

## 2022-05-10 LAB
ALBUMIN SERPL BCP-MCNC: 3.5 G/DL (ref 3.5–5)
ALP SERPL-CCNC: 110 U/L (ref 46–116)
ALT SERPL W P-5'-P-CCNC: 17 U/L (ref 12–78)
ANION GAP SERPL CALCULATED.3IONS-SCNC: 8 MMOL/L (ref 4–13)
AST SERPL W P-5'-P-CCNC: 15 U/L (ref 5–45)
BASOPHILS # BLD AUTO: 0.02 THOUSANDS/ΜL (ref 0–0.1)
BASOPHILS NFR BLD AUTO: 0 % (ref 0–1)
BILIRUB SERPL-MCNC: 0.54 MG/DL (ref 0.2–1)
BILIRUB UR QL STRIP: NEGATIVE
BUN SERPL-MCNC: 32 MG/DL (ref 5–25)
CALCIUM SERPL-MCNC: 9.2 MG/DL (ref 8.3–10.1)
CHLORIDE SERPL-SCNC: 105 MMOL/L (ref 100–108)
CHOLEST SERPL-MCNC: 134 MG/DL
CLARITY UR: CLEAR
CO2 SERPL-SCNC: 25 MMOL/L (ref 21–32)
COLOR UR: COLORLESS
CREAT SERPL-MCNC: 1.56 MG/DL (ref 0.6–1.3)
CREAT UR-MCNC: <13 MG/DL
EOSINOPHIL # BLD AUTO: 0.23 THOUSAND/ΜL (ref 0–0.61)
EOSINOPHIL NFR BLD AUTO: 4 % (ref 0–6)
ERYTHROCYTE [DISTWIDTH] IN BLOOD BY AUTOMATED COUNT: 14.7 % (ref 11.6–15.1)
EST. AVERAGE GLUCOSE BLD GHB EST-MCNC: 194 MG/DL
GFR SERPL CREATININE-BSD FRML MDRD: 42 ML/MIN/1.73SQ M
GLUCOSE P FAST SERPL-MCNC: 224 MG/DL (ref 65–99)
GLUCOSE UR STRIP-MCNC: NEGATIVE MG/DL
HBA1C MFR BLD: 8.4 %
HCT VFR BLD AUTO: 40.7 % (ref 36.5–49.3)
HDLC SERPL-MCNC: 41 MG/DL
HGB BLD-MCNC: 12.8 G/DL (ref 12–17)
HGB UR QL STRIP.AUTO: NEGATIVE
IMM GRANULOCYTES # BLD AUTO: 0.01 THOUSAND/UL (ref 0–0.2)
IMM GRANULOCYTES NFR BLD AUTO: 0 % (ref 0–2)
KETONES UR STRIP-MCNC: NEGATIVE MG/DL
LDLC SERPL CALC-MCNC: 65 MG/DL (ref 0–100)
LEUKOCYTE ESTERASE UR QL STRIP: NEGATIVE
LYMPHOCYTES # BLD AUTO: 1.32 THOUSANDS/ΜL (ref 0.6–4.47)
LYMPHOCYTES NFR BLD AUTO: 23 % (ref 14–44)
MAGNESIUM SERPL-MCNC: 2.1 MG/DL (ref 1.6–2.6)
MCH RBC QN AUTO: 30.5 PG (ref 26.8–34.3)
MCHC RBC AUTO-ENTMCNC: 31.4 G/DL (ref 31.4–37.4)
MCV RBC AUTO: 97 FL (ref 82–98)
MONOCYTES # BLD AUTO: 0.71 THOUSAND/ΜL (ref 0.17–1.22)
MONOCYTES NFR BLD AUTO: 12 % (ref 4–12)
NEUTROPHILS # BLD AUTO: 3.56 THOUSANDS/ΜL (ref 1.85–7.62)
NEUTS SEG NFR BLD AUTO: 61 % (ref 43–75)
NITRITE UR QL STRIP: NEGATIVE
NRBC BLD AUTO-RTO: 0 /100 WBCS
PH UR STRIP.AUTO: 6 [PH]
PHOSPHATE SERPL-MCNC: 3.3 MG/DL (ref 2.3–4.1)
PLATELET # BLD AUTO: 123 THOUSANDS/UL (ref 149–390)
PMV BLD AUTO: 12.9 FL (ref 8.9–12.7)
POTASSIUM SERPL-SCNC: 4.4 MMOL/L (ref 3.5–5.3)
PROT SERPL-MCNC: 7.8 G/DL (ref 6.4–8.2)
PROT UR STRIP-MCNC: NEGATIVE MG/DL
PROT UR-MCNC: 12 MG/DL
PROT/CREAT UR: >0.92 MG/G{CREAT} (ref 0–0.1)
PTH-INTACT SERPL-MCNC: 74.3 PG/ML (ref 18.4–80.1)
RBC # BLD AUTO: 4.2 MILLION/UL (ref 3.88–5.62)
SODIUM SERPL-SCNC: 138 MMOL/L (ref 136–145)
SP GR UR STRIP.AUTO: 1.01 (ref 1–1.03)
T4 FREE SERPL-MCNC: 1.62 NG/DL (ref 0.76–1.46)
TRIGL SERPL-MCNC: 140 MG/DL
TSH SERPL DL<=0.05 MIU/L-ACNC: 0.36 UIU/ML (ref 0.45–4.5)
URATE SERPL-MCNC: 7.3 MG/DL (ref 4.2–8)
UROBILINOGEN UR STRIP-ACNC: <2 MG/DL
WBC # BLD AUTO: 5.85 THOUSAND/UL (ref 4.31–10.16)

## 2022-05-10 PROCEDURE — 84550 ASSAY OF BLOOD/URIC ACID: CPT

## 2022-05-10 PROCEDURE — 81003 URINALYSIS AUTO W/O SCOPE: CPT

## 2022-05-10 PROCEDURE — 36415 COLL VENOUS BLD VENIPUNCTURE: CPT

## 2022-05-10 PROCEDURE — 80053 COMPREHEN METABOLIC PANEL: CPT

## 2022-05-10 PROCEDURE — 80061 LIPID PANEL: CPT

## 2022-05-10 PROCEDURE — 84443 ASSAY THYROID STIM HORMONE: CPT

## 2022-05-10 PROCEDURE — 85025 COMPLETE CBC W/AUTO DIFF WBC: CPT

## 2022-05-10 PROCEDURE — 84439 ASSAY OF FREE THYROXINE: CPT

## 2022-05-10 PROCEDURE — 84100 ASSAY OF PHOSPHORUS: CPT

## 2022-05-10 PROCEDURE — 84156 ASSAY OF PROTEIN URINE: CPT

## 2022-05-10 PROCEDURE — 83735 ASSAY OF MAGNESIUM: CPT

## 2022-05-10 PROCEDURE — 83036 HEMOGLOBIN GLYCOSYLATED A1C: CPT

## 2022-05-10 PROCEDURE — 83970 ASSAY OF PARATHORMONE: CPT

## 2022-05-10 PROCEDURE — 82570 ASSAY OF URINE CREATININE: CPT

## 2022-05-11 ENCOUNTER — RA CDI HCC (OUTPATIENT)
Dept: OTHER | Facility: HOSPITAL | Age: 77
End: 2022-05-11

## 2022-05-11 NOTE — RESULT ENCOUNTER NOTE
Spoke with pts wife  MD instructions given  They had already called Dr Ayala's office and were waiting for instructions

## 2022-05-11 NOTE — PROGRESS NOTES
[address]  coding opportunities    E10 40 and  E10 51     Chart Reviewed number of suggestions sent to Provider: 2     Patients Insurance     Medicare Insurance: Medicare

## 2022-05-12 ENCOUNTER — DOCUMENTATION (OUTPATIENT)
Dept: WOUND CARE | Facility: CLINIC | Age: 77
End: 2022-05-12

## 2022-05-12 NOTE — PROGRESS NOTES
Patient arrived for wound care appointment for a R knee wound  He presented without a dressing over a dry partially adhered scab on the R lateral knee  Patient and wife report area has not had any drainage for "about a week"  Patient evaluated by Dr Alvarez Griffin who was able to remove scab which revealed patient to have no open wounds  He requires no treatment at this time  He and his wife left the wound center in satisfactory condition  May return if needed

## 2022-05-17 ENCOUNTER — OFFICE VISIT (OUTPATIENT)
Dept: FAMILY MEDICINE CLINIC | Facility: CLINIC | Age: 77
End: 2022-05-17
Payer: MEDICARE

## 2022-05-17 VITALS
TEMPERATURE: 97.5 F | HEIGHT: 67 IN | SYSTOLIC BLOOD PRESSURE: 110 MMHG | BODY MASS INDEX: 32.33 KG/M2 | DIASTOLIC BLOOD PRESSURE: 60 MMHG | WEIGHT: 206 LBS | HEART RATE: 93 BPM | RESPIRATION RATE: 22 BRPM | OXYGEN SATURATION: 90 %

## 2022-05-17 DIAGNOSIS — M54.41 CHRONIC LOW BACK PAIN WITH BILATERAL SCIATICA, UNSPECIFIED BACK PAIN LATERALITY: ICD-10-CM

## 2022-05-17 DIAGNOSIS — I12.9 BENIGN HYPERTENSION WITH CHRONIC KIDNEY DISEASE, STAGE III (HCC): ICD-10-CM

## 2022-05-17 DIAGNOSIS — E89.0 HYPOTHYROIDISM, POSTABLATIVE: ICD-10-CM

## 2022-05-17 DIAGNOSIS — N18.30 BENIGN HYPERTENSION WITH CHRONIC KIDNEY DISEASE, STAGE III (HCC): ICD-10-CM

## 2022-05-17 DIAGNOSIS — E10.51 TYPE 1 DIABETES MELLITUS WITH DIABETIC PERIPHERAL ANGIOPATHY WITHOUT GANGRENE (HCC): Primary | ICD-10-CM

## 2022-05-17 DIAGNOSIS — D69.6 THROMBOCYTOPENIA, UNSPECIFIED (HCC): ICD-10-CM

## 2022-05-17 DIAGNOSIS — E78.5 DYSLIPIDEMIA: ICD-10-CM

## 2022-05-17 DIAGNOSIS — E10.49 OTHER DIABETIC NEUROLOGICAL COMPLICATION ASSOCIATED WITH TYPE 1 DIABETES MELLITUS (HCC): ICD-10-CM

## 2022-05-17 DIAGNOSIS — M54.42 CHRONIC LOW BACK PAIN WITH BILATERAL SCIATICA, UNSPECIFIED BACK PAIN LATERALITY: ICD-10-CM

## 2022-05-17 DIAGNOSIS — N18.32 STAGE 3B CHRONIC KIDNEY DISEASE (HCC): Chronic | ICD-10-CM

## 2022-05-17 DIAGNOSIS — F41.8 ANXIETY WITH DEPRESSION: Chronic | ICD-10-CM

## 2022-05-17 DIAGNOSIS — I50.22 CHRONIC SYSTOLIC CONGESTIVE HEART FAILURE (HCC): Chronic | ICD-10-CM

## 2022-05-17 DIAGNOSIS — H91.93 BILATERAL HEARING LOSS, UNSPECIFIED HEARING LOSS TYPE: ICD-10-CM

## 2022-05-17 DIAGNOSIS — G89.29 CHRONIC LOW BACK PAIN WITH BILATERAL SCIATICA, UNSPECIFIED BACK PAIN LATERALITY: ICD-10-CM

## 2022-05-17 DIAGNOSIS — J43.2 CENTRILOBULAR EMPHYSEMA (HCC): Chronic | ICD-10-CM

## 2022-05-17 PROCEDURE — 99214 OFFICE O/P EST MOD 30 MIN: CPT | Performed by: FAMILY MEDICINE

## 2022-05-17 RX ORDER — DULOXETIN HYDROCHLORIDE 30 MG/1
30 CAPSULE, DELAYED RELEASE ORAL DAILY
Qty: 30 CAPSULE | Refills: 5 | Status: SHIPPED | OUTPATIENT
Start: 2022-05-17 | End: 2022-05-17 | Stop reason: SDUPTHER

## 2022-05-17 RX ORDER — GABAPENTIN 300 MG/1
300 CAPSULE ORAL 2 TIMES DAILY
Qty: 180 CAPSULE | Refills: 3 | Status: SHIPPED | OUTPATIENT
Start: 2022-05-17

## 2022-05-17 RX ORDER — ROSUVASTATIN CALCIUM 20 MG/1
20 TABLET, COATED ORAL DAILY
Qty: 90 TABLET | Refills: 3 | Status: SHIPPED | OUTPATIENT
Start: 2022-05-17 | End: 2022-05-18 | Stop reason: SDUPTHER

## 2022-05-17 RX ORDER — DULOXETIN HYDROCHLORIDE 30 MG/1
30 CAPSULE, DELAYED RELEASE ORAL DAILY
Qty: 90 CAPSULE | Refills: 3 | Status: SHIPPED | OUTPATIENT
Start: 2022-05-17

## 2022-05-17 RX ORDER — LEVOTHYROXINE SODIUM 175 UG/1
175 TABLET ORAL DAILY
COMMUNITY
Start: 2022-05-11

## 2022-05-17 NOTE — PROGRESS NOTES
FAMILY PRACTICE OFFICE VISIT       NAME: Isaiah Artis  AGE: 68 y o  SEX: male       : 1945        MRN: [id number]        Assessment and Plan     1  Type 1 diabetes mellitus with diabetic peripheral angiopathy without gangrene [facility])  Assessment & Plan:    Lab Results   Component Value Date    HGBA1C 8 4 (H) 05/10/2022     Hemoglobin A1c is elevated  Patient has been in touch with his endocrinologist, Dr Pranav Castellano, insulin rate was adjusted  Patient will be repeating hemoglobin A1c in 1 months as per endocrinology      2  Bilateral hearing loss, unspecified hearing loss type  -     Ambulatory Referral to Otolaryngology; Future    3  Thrombocytopenia, unspecified (HCC)  Assessment & Plan:  Platelet  count 197,675  Patient reports social ETOH use   recheck in 1 months   rest of CBC is normal    Orders:  -     CBC and differential; Future    4  Anxiety with depression  Assessment & Plan:   Lack of improvement on celexa  Trial of Duloxetine       Orders:  -     DULoxetine (Cymbalta) 30 mg delayed release capsule; Take 1 capsule (30 mg total) by mouth in the morning  5  Stage 3b chronic kidney disease [facility])  Assessment & Plan:  Lab Results   Component Value Date    EGFR 42 05/10/2022    EGFR 41 2022    EGFR 43 10/01/2021    CREATININE 1 56 (H) 05/10/2022    CREATININE 1 60 (H) 2022    CREATININE 1 56 (H) 10/01/2021    stable GFR/ creatinine      6  Centrilobular emphysema ([address] )  Assessment & Plan:    Chronic dyspnea on exertion  Trials of numerous inhalers were unsuccessful  Patient is under care of [facility] pulmonology  He uses oxygen daytime as needed only  Orders:  -     XR chest pa & lateral; Future; Expected date: 2022    7  Chronic low back pain with bilateral sciatica, unspecified back pain laterality  Assessment & Plan:  Chronic low back pain  Patient was evaluated by [facility]  Continue gabapentin  Trial of Cymbalta      Orders:  - gabapentin (NEURONTIN) 300 mg capsule; Take 1 capsule (300 mg total) by mouth in the morning and 1 capsule (300 mg total) in the evening  8  Dyslipidemia    9  Hypothyroidism, postablative  Assessment & Plan:  Endocrinology follows  Dose of levothyroxine was recently increased based on results of blood work      10  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 ([facility] [address] )  Assessment & Plan:    Lab Results   Component Value Date    HGBA1C 8 4 (H) 05/10/2022     Continue gabapentin 300 mg twice a day  Trial of Cymbalta for symptoms of anxiety, depression and chronic pain and neuropathy      11  Chronic systolic congestive heart failure [facility])  Assessment & Plan:  Wt Readings from Last 3 Encounters:   05/18/22 92 8 kg (204 lb 8 oz)   05/17/22 93 4 kg (206 lb)   02/03/22 91 6 kg (202 lb)     Patient appears euvolemic on exam today  [facility] follows  Continue torsemide 10 mg daily along with metoprolol 12 5 mg daily  Patient could not tolerate higher doses of beta-blocker due to fatigue and low blood pressure          12  Benign hypertension with chronic kidney disease, stage III [facility])  Assessment & Plan:  Lab Results   Component Value Date    EGFR 42 05/10/2022    EGFR 41 01/06/2022    EGFR 43 10/01/2021    CREATININE 1 56 (H) 05/10/2022    CREATININE 1 60 (H) 01/06/2022    CREATININE 1 56 (H) 10/01/2021     Stable GFR/creatinine  Patient is under care of [facility] Neph[facility]  Patient presents for follow-up  Assessment and plan as outlined above  I strongly advised patient and his wife to contact [facility] Wound Care management for surveillance of right mid shin area  Patient will be repeating hemoglobin A1c and TSH as per Dr Meaghan Maya in 1 month  I will add order for CBC to monitor mild thrombocytopenia  Patient will benefit from alcohol moderation  There are no Patient Instructions on file for this visit      Return in about 3 months (around 8/17/2022), or if symptoms worsen or fail to improve, for follow up  Discussed with the patient and all questioned fully answered  He will call me if any problems arise  M*Modal software was used to dictate this note  It may contain errors with dictating incorrect words/spelling  Please contact provider directly with any questions  Chief Complaint     Chief Complaint   Patient presents with    Follow-up     6 month    Wound Check     Stumbled in the door  RLL shin  Occurred 5/12/22    Fatigue    Shortness of Breath    Back Pain     Low middle pain continues from years ago previous injury    Hearing Problem     Would like referral for hearing test       History of Present Illness     Patient presents for follow-up  He is here today accompanied by his wife  Results of recent blood work reviewed  Elevated hemoglobin A1c and TSH  Patient and his wife were already in touch with his endocrinologist, Dr Pranav Castellano, appropriate med changes were made  Patient will be repeating hemoglobin A1c in 1 month  He has been feeling generally stably  Patient follows up with [facility] Cardiology, pulmonology and Nephrology  He admits to chronic shortness of breath but denies symptoms of angina, palpitations, dizziness or leg edema  Patient walks up to half a mile daily, he noticed some improvement in his overall stamina during winter months in [place]  He denies symptoms of claudication  No nocturnal dyspnea  He uses CPAP at night  No cough or wheezing  Patient remains on oxygen daytime as needed  Trials of numerous inhalers were unsuccessful  Patient remains under care of [facility]ound [facility] Management,  Chantale Vásquez  He developed scab in a right anterior distal shin over a week ago as he hit his leg against his wife's shoe  He will be contacting [address] [place] [address] for further directions  Results of recent blood work reviewed   Patient is complaining of ongoing symptoms of anxiety/depression  Irritability  He tried Effexor in the past, it worked but he has stop medication as he felt better  Recently he has been on Celexa  He believes medication has not been effective  Patient is concerned about hearing loss  Patient has established local  PCP while in [place]  Wound Check    Fatigue  Associated symptoms include fatigue  Shortness of Breath  Associated symptoms include fatigue  Back Pain        Review of Systems   Review of Systems   Constitutional: Positive for fatigue  HENT: Positive for hearing loss  Eyes: Negative  Respiratory: Positive for shortness of breath  Cardiovascular: Negative  Gastrointestinal: Negative  Endocrine: Negative  Genitourinary: Negative  Musculoskeletal: Positive for back pain  Allergic/Immunologic: Negative  Neurological: Negative  Hematological: Negative  Psychiatric/Behavioral: Positive for dysphoric mood  The patient is nervous/anxious          Active Problem List     Patient Active Problem List   Diagnosis    Type 1 diabetes mellitus with diabetic peripheral angiopathy without gangrene ([facility])    Presence of drug coated stent in LAD coronary artery    Coronary artery disease of native artery of native heart with stable angina pectoris ([facility])    Presence of drug coated stent in left circumflex coronary artery    Dyslipidemia    Obstructive sleep apnea of adult    Carotid artery stenosis, asymptomatic, bilateral    Restrictive lung disease    Centrilobular emphysema ([address] )    Benign hypertension with chronic kidney disease, stage III ([address] )    CKD (chronic kidney disease) stage 3, GFR 30-59 ml/min ([facility])    Renal artery stenosis ([facility])    Renal cyst, right    Vitamin D deficiency    Aortoiliac occlusive disease ([address] )    Hypothyroidism, postablative    Low back pain with sciatica    Lumbar disc herniation    Lumbar radiculopathy    Diabetic neuropathy associated with type 1 diabetes mellitus ([place] [address] )    Chronic systolic congestive heart failure (HCC)    Anxiety with depression    Spinal stenosis of lumbar region with neurogenic claudication    Thrombocytopenia, unspecified ([place] [address] )    S/P femoral-popliteal bypass surgery    Stenosis of carotid artery    Hypoxemic respiratory failure, chronic ([facility])    Class 1 obesity without serious comorbidity with body mass index (BMI) of 31 0 to 31 9 in adult       Past Medical History:  Past Medical History:   Diagnosis Date    Acute venous embolism and thrombosis of deep vessels of proximal lower extremity ([place] [address] )     resolved 04/04/2015    DARINEL (acute kidney injury) ([place] [address] ) 01/12/2016    Anesthesia     RESPIRATORY ISSUES DUE TO SLEEP APNEA    Cardiac disease     heart attack, stents x 4    Chronic cough     resolved 02/04/2016    Chronic pain disorder     intermitent claudication    COPD (chronic obstructive pulmonary disease) ([place] [address] )     Coronary artery disease     CPAP (continuous positive airway pressure) dependence     Diabetes mellitus ([place] [address] )     Disease of thyroid gland     DVT (deep venous thrombosis) ([place] [address] )     Heart failure with mid-range ejection fraction ([place] [address] )     Hyperlipidemia     Hypertension     Ischemic cardiomyopathy     last assessed 09/26/2017    Myocardial infarction [facility])     MI +2 2015,2018    NSTEMI (non-ST elevated myocardial infarction) ([place] [address] ) 03/23/2019    Pulmonary emphysema ([facility])     Pulmonary granuloma ([facility])     resolved 02/03/2017    Renal failure     Sleep apnea        Past Surgical History:  Past Surgical History:   Procedure Laterality Date    BYPASS FEMORAL-POPLITEAL      initial stenosis with stent left, 7 x 100 smart stent onset 02/24/2014    CARDIAC SURGERY      cardiac stents    CATARACT EXTRACTION      COLOGUARD (HISTORICAL)  2018    CORONARY ANGIOPLASTY WITH STENT PLACEMENT      x4    IR AORTAGRAM WITH RUN-OFF  3/14/2019    [place] THROMBOENDARTECTMY FEMORAL COMMON Left 3/22/2019    Procedure: ENDARTERECTOMY ARTERIAL FEMORAL WITH PATCH ANGIOPLASTY, BALLOON ANGIOPLASTY, STENT;  Surgeon: Sandy Portillo MD;  [facility];  Service: Vascular    MO VEIN BYPASS GRAFT,FEM-POP Left 3/22/2019    Procedure: BYPASS FEMORAL-POPLITEAL WITH COMPLETION ARTERIOGRAM;  Surgeon: Sandy Portillo MD;  [facility];  Service: Vascular    VASCULAR SURGERY      stent placement    WOUND DEBRIDEMENT Left 4/15/2019    Procedure: DEBRIDEMENT WOUND AND DRESSING CHANGE ([address]) left groin with VAC;  Surgeon: Denise Tuttle DO;  [facility];  Service: Vascular       Family History:  Family History   Problem Relation Age of Onset    Heart disease Father         pacer placement    Hypertension Father     Kidney disease Father     Heart failure Father     Heart attack Father     Liver disease Father     Cirrhosis Mother         due to beer consumption    Liver disease Mother     Diabetes Other        Social History:  Social History     Socioeconomic History    Marital status: /Civil Union     Spouse name: Not on file    Number of children: Not on file    Years of education: Not on file    Highest education level: Not on file   Occupational History    Occupation: Retired    Occupation: Desk work    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   Tobacco Use    Smoking status: Former Smoker     Packs/day: 4 00     Years: 47 00     Pack years: 188 00     Types: Cigarettes     Start date:      Quit date:      Years since quittin 3    Smokeless tobacco: Never Used   Vaping Use    Vaping Use: Never used   Substance and Sexual Activity    Alcohol use: Yes     Comment: 2 drinks per day    Drug use: No    Sexual activity: Not Currently   Other Topics Concern    Not on file   Social History Narrative    Not on file     Social Determinants of Health     Financial Resource Strain: Not on file   Food Insecurity: Not on file   Transportation Needs: Not on file   Physical Activity: Not on file Stress: Not on file   Social Connections: Not on file   Intimate Partner Violence: Not on file   Housing Stability: Not on file         Objective     Vitals:    05/17/22 1237   BP: 110/60   BP Location: Left arm   Patient Position: Sitting   Cuff Size: Standard   Pulse: 93   Resp: 22   Temp: 97 5 °F (36 4 °C)   TempSrc: Temporal   SpO2: 90%   Weight: 93 4 kg (206 lb)   Height: 5' 7" (1 702 m)       Wt Readings from Last 3 Encounters:   05/18/22 92 8 kg (204 lb 8 oz)   05/17/22 93 4 kg (206 lb)   02/03/22 91 6 kg (202 lb)       Physical Exam  Vitals and nursing note reviewed  Constitutional:       General: He is not in acute distress  Appearance: He is well-developed  He is not ill-appearing  HENT:      Head: Normocephalic and atraumatic  Right Ear: Tympanic membrane, ear canal and external ear normal       Left Ear: Tympanic membrane, ear canal and external ear normal    Eyes:      Conjunctiva/sclera: Conjunctivae normal    Neck:      Vascular: No carotid bruit  Comments: No JVD  Cardiovascular:      Rate and Rhythm: Normal rate and regular rhythm  Heart sounds: Normal heart sounds  No murmur heard  Pulmonary:      Effort: Pulmonary effort is normal  No respiratory distress  Breath sounds: Normal breath sounds  No decreased breath sounds, wheezing or rales  Abdominal:      General: Bowel sounds are normal  There is no distension or abdominal bruit  Musculoskeletal:         General: Normal range of motion  Cervical back: Neck supple  Right lower leg: No edema  Left lower leg: No edema  Skin:     Comments: Dry skin scab right mid lower shin  No cellulitic change  Dry dressing applied   Neurological:      General: No focal deficit present  Mental Status: He is alert and oriented to person, place, and time  Cranial Nerves: No cranial nerve deficit     Psychiatric:         Mood and Affect: Mood normal          Behavior: Behavior normal          Thought Content: Thought content normal           Pertinent Laboratory/Diagnostic Studies:    Lab Results   Component Value Date    WBC 5 85 05/10/2022    HGB 12 8 05/10/2022    HCT 40 7 05/10/2022    MCV 97 05/10/2022     (L) 05/10/2022       No results found for: TSH    Lab Results   Component Value Date    CHOL 129 10/01/2015     Lab Results   Component Value Date    TRIG 140 05/10/2022     Lab Results   Component Value Date    HDL 41 05/10/2022     Lab Results   Component Value Date    LDLCALC 65 05/10/2022     Lab Results   Component Value Date    HGBA1C 8 4 (H) 05/10/2022     Lab Results   Component Value Date    SODIUM 138 05/10/2022    K 4 4 05/10/2022     05/10/2022    CO2 25 05/10/2022    ANIONGAP 6 12/21/2015    AGAP 8 05/10/2022    BUN 32 (H) 05/10/2022    CREATININE 1 56 (H) 05/10/2022    GLUC 138 05/10/2021    GLUF 224 (H) 05/10/2022    CALCIUM 9 2 05/10/2022    AST 15 05/10/2022    ALT 17 05/10/2022    ALKPHOS 110 05/10/2022    PROT 7 6 10/01/2015    TP 7 8 05/10/2022    BILITOT 0 33 10/01/2015    TBILI 0 54 05/10/2022    EGFR 42 05/10/2022       Orders Placed This Encounter   Procedures    XR chest pa & lateral    CBC and differential    Ambulatory Referral to Otolaryngology       ALLERGIES:  Allergies   Allergen Reactions    Doxazosin Myalgia     UNKNOWN  UNKNOWN  Muscle cramps    Iohexol      UNKNOWN    Lisinopril Cough     cough  Other reaction(s): CAMELLA SINENSIS (ZESTRIL) (cough)  cough    Vancomycin Hives    Adhesive [Medical Tape]      Skin Breakdown    Cardura [Doxazosin Mesylate]      Muscle cramps    Isosorbide Rash    Other Other (See Comments)     Iv dye for cardiac cath  Renal failure very close to dialysis  But no dialysis       Current Medications     Current Outpatient Medications   Medication Sig Dispense Refill    aspirin 81 MG tablet Take 81 mg by mouth daily        cholecalciferol (VITAMIN D3) 1,000 units tablet Take 1,000 Units by mouth daily       Coenzyme Q10 (COQ-10) 200 MG CAPS Take 200 mg by mouth daily      docusate sodium (COLACE) 50 mg capsule Take by mouth 2 (two) times a day as needed        DULoxetine (Cymbalta) 30 mg delayed release capsule Take 1 capsule (30 mg total) by mouth in the morning  90 capsule 3    gabapentin (NEURONTIN) 300 mg capsule Take 1 capsule (300 mg total) by mouth in the morning and 1 capsule (300 mg total) in the evening  180 capsule 3    Glucagon (Baqsimi One Pack) 3 MG/DOSE POWD 1 Dose into each nostril as needed  (Patient not taking: Reported on 5/18/2022)      insulin glargine (LANTUS) 100 units/mL subcutaneous injection Inject under the skin 40 units am- 10 units pm      insulin lispro (HUMALOG) 100 units/mL injection Inject 3 units with breakfast, 6 units at lunch, and 6 units at dinner (Patient taking differently: 4 (four) times a day Inject 3 units with breakfast, 6 units at lunch, and 6 units at dinner  Carbs counting) 10 mL 1    levothyroxine 175 mcg tablet Take 175 mcg by mouth in the morning   nitroglycerin (NITROSTAT) 0 4 mg SL tablet Place 1 tablet (0 4 mg total) under the tongue every 5 (five) minutes as needed for chest pain 25 tablet 3    polyethylene glycol (MIRALAX) 17 g packet Take 17 g by mouth daily      SF 1 1 % GEL BRUCH ONCE DAILY AT BEDTIME, BRUSH OF 1 MINUTE AS DIRECTED      Sodium Fluoride 5000 PPM 1 1 % PSTE BRUSH TWO TIMES A DAY AS DIRECTED      ULTICARE INSULIN SYRINGE 30G X 1/2" 1 ML MISC Use as directed  0    clopidogrel (PLAVIX) 75 mg tablet Take 1 tablet (75 mg total) by mouth in the morning  90 tablet 3    Icosapent Ethyl (Vascepa) 1 g CAPS Take 1 capsule (1 g total) by mouth in the morning and 1 capsule (1 g total) before bedtime  180 capsule 3    metoprolol succinate (TOPROL-XL) 25 mg 24 hr tablet Take 0 5 tablets (12 5 mg total) by mouth in the morning  45 tablet 3    rosuvastatin (CRESTOR) 20 MG tablet Take 1 tablet (20 mg total) by mouth in the morning   90 tablet 3    torsemide (DEMADEX) 10 mg tablet Take 1 tablet (10 mg total) by mouth in the morning  100 tablet 2     No current facility-administered medications for this visit         Medications Discontinued During This Encounter   Medication Reason    levothyroxine 200 mcg tablet Dose adjustment    citalopram (CeleXA) 20 mg tablet     gabapentin (NEURONTIN) 300 mg capsule Reorder    DULoxetine (Cymbalta) 30 mg delayed release capsule Reorder    rosuvastatin (CRESTOR) 20 MG tablet Reorder       Health Maintenance     Health Maintenance   Topic Date Due    SLP PLAN OF CARE  Never done    Lung Cancer Screening  09/18/2021    COVID-19 Vaccine (4 - Booster for Moderna series) 02/14/2022    Diabetic Foot Exam  02/27/2022    BMI: Followup Plan  06/06/2022    Fall Risk  10/04/2022    Medicare Annual Wellness Visit (AWV)  10/04/2022    HEMOGLOBIN A1C  11/10/2022    DTaP,Tdap,and Td Vaccines (2 - Td or Tdap) 05/05/2023    URINE MICROALBUMIN  05/10/2023    BMI: Adult  05/18/2023    DM Eye Exam  07/27/2023    Hepatitis C Screening  Completed    Pneumococcal Vaccine: 65+ Years  Completed    Influenza Vaccine  Completed    HIB Vaccine  Aged Out    Hepatitis B Vaccine  Aged Out    IPV Vaccine  Aged Out    Hepatitis A Vaccine  Aged Out    Meningococcal ACWY Vaccine  Aged Out    HPV Vaccine  Aged Out       Immunization History   Administered Date(s) Administered    COVID-19 MODERNA VACC 0 5 ML IM 01/19/2021, 02/23/2021, 10/14/2021    DT (pediatric) 12/01/2009    H1N1, All Formulations 12/01/2009    INFLUENZA 10/01/2008, 10/06/2009, 09/01/2010, 11/04/2013, 08/29/2018, 09/09/2019, 09/05/2020, 09/13/2021    Influenza Split High Dose Preservative Free IM 08/28/2014, 10/03/2016, 08/29/2018    Influenza, seasonal, injectable 10/01/2008, 10/19/2010, 08/26/2011, 09/20/2011, 08/01/2012, 09/01/2012, 09/13/2013, 09/15/2015, 09/03/2017    Meningococcal C/Y-HIB PRP 12/01/2009    Pneumococcal Conjugate 13-Valent 08/11/2015    Pneumococcal Polysaccharide PPV23 10/01/2008, 06/04/2009, 03/15/2017    Td (adult), adsorbed 03/04/2011    Tdap 05/05/2013    Zoster 10/01/2009    influenza, trivalent, adjuvanted 09/05/2019, 09/05/2020       Sameer Menendez MD

## 2022-05-17 NOTE — ASSESSMENT & PLAN NOTE
Platelet  count [phone],132  Patient reports social ETOH use   recheck in 1 months   rest of CBC is normal

## 2022-05-17 NOTE — ASSESSMENT & PLAN NOTE
Lab Results   Component Value Date    EGFR 42 05/10/2022    EGFR 41 01/06/2022    EGFR 43 10/01/2021    CREATININE 1 56 (H) 05/10/2022    CREATININE 1 60 (H) 01/06/2022    CREATININE 1 56 (H) 10/01/2021    stable GFR/ creatinine

## 2022-05-18 ENCOUNTER — OFFICE VISIT (OUTPATIENT)
Dept: CARDIOLOGY CLINIC | Facility: CLINIC | Age: 77
End: 2022-05-18
Payer: MEDICARE

## 2022-05-18 VITALS
HEART RATE: 64 BPM | SYSTOLIC BLOOD PRESSURE: 102 MMHG | DIASTOLIC BLOOD PRESSURE: 64 MMHG | OXYGEN SATURATION: 97 % | HEIGHT: 67 IN | WEIGHT: 204.5 LBS | BODY MASS INDEX: 32.1 KG/M2

## 2022-05-18 DIAGNOSIS — N18.30 CKD (CHRONIC KIDNEY DISEASE) STAGE 3, GFR 30-59 ML/MIN (HCC): ICD-10-CM

## 2022-05-18 DIAGNOSIS — I25.5 ISCHEMIC CARDIOMYOPATHY: Primary | ICD-10-CM

## 2022-05-18 DIAGNOSIS — I50.9 HEART FAILURE WITH MID-RANGE EJECTION FRACTION (HCC): ICD-10-CM

## 2022-05-18 DIAGNOSIS — E78.5 DYSLIPIDEMIA: ICD-10-CM

## 2022-05-18 DIAGNOSIS — I73.9 PVD (PERIPHERAL VASCULAR DISEASE) (HCC): ICD-10-CM

## 2022-05-18 DIAGNOSIS — I25.10 CORONARY ARTERY DISEASE INVOLVING NATIVE CORONARY ARTERY OF NATIVE HEART WITHOUT ANGINA PECTORIS: ICD-10-CM

## 2022-05-18 DIAGNOSIS — E10.8 TYPE 1 DIABETES MELLITUS WITH COMPLICATION (HCC): ICD-10-CM

## 2022-05-18 DIAGNOSIS — E78.00 PURE HYPERCHOLESTEROLEMIA: ICD-10-CM

## 2022-05-18 DIAGNOSIS — I10 ESSENTIAL (PRIMARY) HYPERTENSION: ICD-10-CM

## 2022-05-18 PROCEDURE — 99214 OFFICE O/P EST MOD 30 MIN: CPT | Performed by: INTERNAL MEDICINE

## 2022-05-18 RX ORDER — CLOPIDOGREL BISULFATE 75 MG/1
75 TABLET ORAL DAILY
Qty: 90 TABLET | Refills: 3 | Status: SHIPPED | OUTPATIENT
Start: 2022-05-18 | End: 2022-06-06

## 2022-05-18 RX ORDER — ROSUVASTATIN CALCIUM 20 MG/1
20 TABLET, COATED ORAL DAILY
Qty: 90 TABLET | Refills: 3 | Status: SHIPPED | OUTPATIENT
Start: 2022-05-18

## 2022-05-18 RX ORDER — ICOSAPENT ETHYL 1000 MG/1
1 CAPSULE ORAL 2 TIMES DAILY
Qty: 180 CAPSULE | Refills: 3 | Status: SHIPPED | OUTPATIENT
Start: 2022-05-18

## 2022-05-18 RX ORDER — TORSEMIDE 10 MG/1
10 TABLET ORAL DAILY
Qty: 100 TABLET | Refills: 2 | Status: SHIPPED | OUTPATIENT
Start: 2022-05-18 | End: 2022-06-20

## 2022-05-18 RX ORDER — METOPROLOL SUCCINATE 25 MG/1
12.5 TABLET, EXTENDED RELEASE ORAL DAILY
Qty: 45 TABLET | Refills: 3 | Status: SHIPPED | OUTPATIENT
Start: 2022-05-18 | End: 2022-06-06

## 2022-05-18 NOTE — PROGRESS NOTES
Advanced Heart Failure Outpatient Progress Note - Chatnell Marking 68 y o  male MRN: [id number]    Encounter: [id number]      Assessment/Plan:    Patient Active Problem List    Diagnosis Date Noted    Hypoxemic respiratory failure, chronic ([place] [address] ) 05/10/2021    Stenosis of carotid artery 08/24/2020    Thrombocytopenia, unspecified ([place] [address] ) 03/10/2020    Spinal stenosis of lumbar region with neurogenic claudication     S/P femoral-popliteal bypass surgery 01/09/2020    Anxiety with depression 04/07/2019    Chronic systolic congestive heart failure ([place] [address] ) 03/01/2019    Diabetic neuropathy associated with type 1 diabetes mellitus ([place] [address] ) 01/14/2019    Lumbar disc herniation 08/01/2018    Lumbar radiculopathy 08/01/2018    Aortoiliac occlusive disease ([facility] ) 03/01/2018    Restrictive lung disease 01/30/2018    Centrilobular emphysema ([place] [address] ) 01/30/2018    Presence of drug coated stent in LAD coronary artery 01/24/2018    Coronary artery disease of native artery of native heart with stable angina pectoris ([place] [address] ) 01/24/2018    Presence of drug coated stent in left circumflex coronary artery 01/24/2018    Dyslipidemia 01/24/2018    Obstructive sleep apnea of adult 01/24/2018    Carotid artery stenosis, asymptomatic, bilateral 01/24/2018    CKD (chronic kidney disease) stage 3, GFR 30-59 ml/min ([facility]) 12/13/2016    Low back pain with sciatica 08/22/2016    Type 1 diabetes mellitus with diabetic peripheral angiopathy without gangrene ([place] [address] ) 01/12/2016    Benign hypertension with chronic kidney disease, stage III ([place] [address] ) 08/12/2015    Renal cyst, right 08/12/2015    Vitamin D deficiency 05/22/2015    Renal artery stenosis ([place] [address] ) 05/09/2015    Hypothyroidism, postablative 04/02/2013    Class 1 obesity without serious comorbidity with body mass index (BMI) of 31 0 to 31 9 in adult 11/05/2021         Heart failure with mid range EF, Stage C, NYHA III  Etiology: ischemic  Mildly volume up, warm and well perfused    Weight:   204 lbs 5/18/22202 lbs 12/20/21; 204 lbs 9/27/21; 203 << 207 lbs <<210 << 211  NT proBNP:  2152 5/25/21    Studies- personally reviewed by me    TTE 2/3/22: LVEF 40%  LVIDD 6 3cm  Mildly increased wall thickness  RV normal size and systolic function  Mild AI  Mild MR    TTE 4/22/21:  LVEF 45-50%  Grade 1 diastology  LVIDd 6 7cm  Grade 1 diastology    Echocardiogram 7/23/20  LVEF: 40%, hypokinesis of inferoseptal, inferior and inferolateral walls  LVIDd: 5 7cm  RV: normal size and function  MR: mild  PASP: inadequate TR jet for estimation  RVOT: probable midsystolic notching although no interventricular septal flattening  Other: Grade 1 diastology    C [facility] 3/6/20: RCA with 70% proximal and 50% mid stenoses  LAD 40-50% stenosis proximally before widely patent stents  Distal left Cx 75% in stent restenosis just before the small LPDA  OM 3 is  with left to left collaterals  Co-dominant RCA  Medically managed (done preop for LE vascular surgery for very severe claudication)  CHF, hypotension, 16 days, exposned to covid    TTE 2/6/20 [facility]: LVEF 56%  LVIDD 5 6cm    TTE 3/23/2019: LVEF 45%  LVIDD 6cm  Normal RV size and function  PASP 57mmHg    6MWT 5/10/21:  Oxygen saturations 97% on room air at rest, with ambulating for 4 minutes oxygen saturation drops down to 85% on room air requiring 2 L nasal cannula   Oxygen saturation is 90 8% with 2 L nasal cannula   Total walk distance is 346 meters   Resting heart rate is 76 beats per minute upon ambulation maximum heart rate was 100 beats per minute    9/18/20 HRCT:   VESSELS:  Unremarkable for patient's age  IMPRESSION:  Moderate upper lobe predominant panlobular emphysema, without significant change since 2015    Mild lower lung predominant juxtapleural reticulation due to fibrosis, slightly more extensive on the right, with no traction bronchiolectasis or honeycombing     8/24/20 PFT:   The reduced total lung capacity at 50% predicted and residual volume at 34% predicted  Restrictive pattern on the spirometry  Reduced diffusion capacity  (40% predicted)  Normal airway resistance as indicated by the specific airway conductance  Diet:  2 g sodium diet  2000 mL fluid restriction    Neurohormonal Blockade:  --Beta-Blocker: metoprolol succinate 25mg nightly - reduced to 12 5mg daily due to low BP  --ACEi, ARB or ARNi:  --Aldosterone Receptor Blocker:  --SGLT2 Inhibitor:  --Diuretic: torsemide 10mg daily    Sudden Cardiac Death Risk Reduction:  --ICD:     Electrical Resynchronization:  --Candidacy for BiV device:    Advanced Therapies (if appropriate): --Inotrope:  --LVAD/Transplant Candidacy:    Coronary artery disease, 2014 stents x 4 at [facility], 10/2018 re stenosis of LAD stent with stent placement  Rx: aspirin, plavix, rosuvastatin 20mg  Chronic hypoxic respiratory failure on home oxygen  CKD stage 3  Baseline creatinine 1 6-1 7  ITZEL on CPAP - machine recalled, got new machine and now back on CPAP  DM    PAD with hx of  Femoral popliteal bypass surgery, bilateral iliac stenting, right common femoral endarterectomy, right femoral above knee popliteal artery bypass  Carotid artery stenosis  Hypertension, controlled  Hyperlipidemia    Today's Plan:  No medication changes at this time  BP on th low side for further optimization of medical therapy  Daily weights  Salt and fluid restriction  Increase physical activities as tolerated, still not interested in undergoing cardiac rehab at this time      HPI:   68year old male with PMH as above who is here for heart failure evaluation  Per Dr Rikki Dolan on 3/11/21: He was hospitalized March 2019 for PVD with left femoral endarterectomy with bovine patch and left femoral to above the knee bypass with left external iliac artery stent by Dr Carlie [facility] did have intermittent angina during that postoperative period  Thereafter he required debridement in reference to a left groin infection 4/15/19   Patient has known history of CAD with remote cardiac catheterization and intervention performed at [facility] with placement of 4 stents in 2015  He had a second cardiac catheterization October 2018 with re-stenosis of the LAD and stent placement in [place]   A 50% left circumflex lesion was also noted   Patient had an echocardiogram performed March 2019 which demonstrated a mild reduction in LV systolic function in the setting of a mid apical anterior inferior and inferolateral wall motion abnormality   The resting left ventricular ejection fraction was 45-50%  There was no significant valvular heart disease   He has CRI   He is followed by Nephrology service  Bette Meneses was seen by Cardiology in February 2020 at [facility] underwent cardiac catheterization March 6, 2020 by the radial approach in the setting of an abnormal pharmacologic nuclear stress test done February 6, 2020   He was found to have a 50 and 70% stenosis in a small non dominant right coronary vessel   A short left main with a left dominant system was noted  A 40% stenosis in LAD and thereafter widely patent stents   A patent obtuse OMB 1 and 2 with a large OMB 3 which is a  that receives left-to-left collaterals with distal disease in that vessel noted  He was managed medically and cleared for surgery   Echocardiogram done July 2020 demonstrated a left ventricular ejection fraction of 46% with segmental wall motion abnormality noted  There was mild LVH and mild mitral regurgitation   Patient was  hospitalized at [facility] 4/2020 in reference to need for lower extremity revascularization   He had an urgent right common femoral endarterectomy as well as right femoral to above the knee popliteal artery bypass   His baseline dry weight is 208 according to his wife who is a retired RN  [facility] is followed by Pulmonary service in reference to COPD and obstructive sleep apnea   He has at least 48 pack years and perhaps more but discontinued smoking around 2008  He was seen by our nurse practitioner February 2021 with shortness of breath  He was placed on Imdur in reference to chest pain and shortness of breath  He did not tolerate this in the setting of uriticaria and nausea  It was discontinued  Lipid profile done February 2021 demonstrated total cholesterol of 144 with an HDL of 46 and a calculated LDL of 65  Torsemide is on hold per Nephrology unless weight gain develops  He was seen in the ED in [place] for CHF exacerbation and was given a dose of IV lasix and was discharged home    Echocardiogram on 4/22/21 showed EF 45-50%, dilated LV and normal right heart size and function  Seen in follow up by Mela Rincon on 5/18/21  BP was limiting for addition of medications  C/o dyspnea at that time and does not want to undergo stress testing  He had recent 6MWT which showed desaturation on exertion and was prescribed 2L on exertion but only uses when symptomatic     7/26/21: Here for follow up  Started on low dose metoprolol on last visit  No significant change in blood pressure and heart rate  CPAP machine has been recalled, using oxygen at night  No lower extremity edema  No lightheadedness  No palpitations  Stable shortness of breath on exertion improved with use of oxygen  No chest pain/pressure  He does not want to undergo right heart catheterization at this time  9/27/2021: Patient is here for follow-up  Overall stable shortness of breath on exertion  Reports short of breath walking from waiting to examination  Weight overall stable around 202 lb and has been as low as 199 lb on home scale  Denies chest pain, PND orthopnea or lower extremity edema  No palpitations or lightheadedness  He is trying to do regular walking program   He is not interested in cardiac rehab at this time  12/20/21: Here for follow up  Still not interested in cardiac rehab  Walking mainly inside the house   Walking up and down the flight of stairs recently due to broken smoke detector and was tired, no shortness of breath  Notes balance worse over the years  He has peripheral neuropathy and no sensation on both feet  Interval History:  5/18/22: Here for follow up  Recently came back from [place]  Getting treated for lower extremity wounds  Seen by AP in the interim  Metoprolol dose reduced due to orthostasis  Reports doing well while in [place] and was walking about half a mile  Since coming back notes more shortness of breath  Also with weight gain of about 4 lbs  Had late lunch yesterday and ate 3 chili dogs  Using oxygen supplementation on exertion only occasionally    Past Medical History:   Diagnosis Date    Acute venous embolism and thrombosis of deep vessels of proximal lower extremity ([facility] )     resolved 04/04/2015    [facility]ARINEL [facility]acute [facility]) [facility] ) 01/12/2016    Anesthesia     RESPIRATORY ISSUES DUE TO SLEEP APNEA    Cardiac disease     heart attack, stents x 4    Chronic cough     resolved 02/04/2016    Chronic pain disorder     intermitent claudication    COPD (chronic obstructive pulmonary disease) ([facility]     Coronary artery disease     CPAP (continuous positive airway pressure) dependence     Diabetes mellitus ([facility] )     Disease of thyroid gland     DVT (deep venous thrombosis) ([facility] )     Heart failure with mid-range ejection fraction ([facility] )     Hyperlipidemia     Hypertension     Ischemic cardiomyopathy     last assessed 09/26/2017    Myocardial infarction [facility])     MI +2 2015,2018    NSTEMI (non-ST elevated myocardial infarction) ([facility] ) 03/23/2019    Pulmonary emphysema ([facility])     Pulmonary granuloma ([facility] )     resolved 02/03/2017    Renal failure     Sleep apnea        Review of Systems   Constitutional: Positive for fatigue  Negative for chills and fever  HENT: Negative for ear pain and sore throat  Eyes: Negative for pain and visual disturbance  Respiratory: Positive for shortness of breath  Negative for cough  Cardiovascular: Positive for leg swelling  Negative for chest pain and palpitations  Gastrointestinal: Positive for abdominal distention  Negative for abdominal pain and vomiting  Genitourinary: Negative for dysuria and hematuria  Musculoskeletal: Negative for arthralgias and back pain  Skin: Negative for color change and rash  Neurological: Negative for dizziness, seizures, syncope and light-headedness  All other systems reviewed and are negative  Allergies   Allergen Reactions    Doxazosin Myalgia     UNKNOWN  UNKNOWN  Muscle cramps    Iohexol      UNKNOWN    Lisinopril Cough     cough  Other reaction(s): CAMELLA SINENSIS (ZESTRIL) (cough)  cough    Vancomycin Hives    Adhesive [Medical Tape]      Skin Breakdown    Cardura [Doxazosin Mesylate]      Muscle cramps    Isosorbide Rash    Other Other (See Comments)     Iv dye for cardiac cath  Renal failure very close to dialysis  But no dialysis       Current Outpatient Medications:     aspirin 81 MG tablet, Take 81 mg by mouth daily  , Disp: , Rfl:     cholecalciferol (VITAMIN D3) 1,000 units tablet, Take 1,000 Units by mouth daily , Disp: , Rfl:     clopidogrel (PLAVIX) 75 mg tablet, Take 1 tablet (75 mg total) by mouth daily, Disp: 90 tablet, Rfl: 3    Coenzyme Q10 (COQ-10) 200 MG CAPS, Take 200 mg by mouth daily, Disp: , Rfl:     docusate sodium (COLACE) 50 mg capsule, Take by mouth 2 (two) times a day as needed  , Disp: , Rfl:     DULoxetine (Cymbalta) 30 mg delayed release capsule, Take 1 capsule (30 mg total) by mouth in the morning , Disp: 90 capsule, Rfl: 3    gabapentin (NEURONTIN) 300 mg capsule, Take 1 capsule (300 mg total) by mouth in the morning and 1 capsule (300 mg total) in the evening , Disp: 180 capsule, Rfl: 3    Icosapent Ethyl (Vascepa) 1 g CAPS, Take 1 capsule (1 g total) by mouth 2 (two) times a day, Disp: 180 capsule, Rfl: 3    insulin glargine (LANTUS) 100 units/mL subcutaneous injection, Inject under the skin 40 units am- 10 units pm, Disp: , Rfl:     insulin lispro (HUMALOG) 100 units/mL injection, Inject 3 units with breakfast, 6 units at lunch, and 6 units at dinner (Patient taking differently: 4 (four) times a day Inject 3 units with breakfast, 6 units at lunch, and 6 units at dinner Carbs counting), Disp: 10 mL, Rfl: 1    levothyroxine 175 mcg tablet, Take 175 mcg by mouth in the morning , Disp: , Rfl:     metoprolol succinate (TOPROL-XL) 25 mg 24 hr tablet, Take 0 5 tablets (12 5 mg total) by mouth daily, Disp: 30 tablet, Rfl: 3    nitroglycerin (NITROSTAT) 0 4 mg SL tablet, Place 1 tablet (0 4 mg total) under the tongue every 5 (five) minutes as needed for chest pain, Disp: 25 tablet, Rfl: 3    polyethylene glycol (MIRALAX) 17 g packet, Take 17 g by mouth daily, Disp: , Rfl:     rosuvastatin (CRESTOR) 20 MG tablet, Take 1 tablet (20 mg total) by mouth in the morning , Disp: 90 tablet, Rfl: 3    SF 1 1 % GEL, BRUCH ONCE DAILY AT BEDTIME, BRUSH OF 1 MINUTE AS DIRECTED, Disp: , Rfl:     Sodium Fluoride 5000 PPM 1 1 % PSTE, BRUSH TWO TIMES A DAY AS DIRECTED, Disp: , Rfl:     torsemide (DEMADEX) 10 mg tablet, Take 1 tablet (10 mg total) by mouth daily, Disp: 90 tablet, Rfl: 2    ULTICARE INSULIN SYRINGE 30G X 1/2" 1 ML MISC, Use as directed, Disp: , Rfl: 0    Glucagon (Baqsimi One Pack) 3 MG/DOSE POWD, 1 Dose into each nostril as needed  (Patient not taking: Reported on 5/18/2022), Disp: , Rfl:     Social History     Socioeconomic History    Marital status: /Civil Union     Spouse name: Not on file    Number of children: Not on file    Years of education: Not on file    Highest education level: Not on file   Occupational History    Occupation: Retired    Occupation: Desk work    Occupation: [employer]   Tobacco Use    Smoking status: Former Smoker     Packs/day: 4 00     Years: 47 00     Pack years: 188 00     Types: Cigarettes     Start date: 1961     Quit date: 2008 Years since quittin 3    Smokeless tobacco: Never Used   Vaping Use    Vaping Use: Never used   Substance and Sexual Activity    Alcohol use: Yes     Comment: 2 drinks per day    Drug use: No    Sexual activity: Not Currently   Other Topics Concern    Not on file   Social History Narrative    Not on file     Social Determinants of Health     Financial Resource Strain: Not on file   Food Insecurity: Not on file   Transportation Needs: Not on file   Physical Activity: Not on file   Stress: Not on file   Social Connections: Not on file   Intimate Partner Violence: Not on file   Housing Stability: Not on file       Family History   Problem Relation Age of Onset    Heart disease Father         pacer placement    Hypertension Father     Kidney disease Father     Heart failure Father     Heart attack Father     Liver disease Father     Cirrhosis Mother         due to beer consumption    Liver disease Mother     Diabetes Other        Physical Exam:    Vitals: Blood pressure 102/64, pulse 64, height 5' 7" (1 702 m), weight 92 8 kg (204 lb 8 oz), SpO2 97 %  , Body mass index is 32 03 kg/m² ,   Wt Readings from Last 3 Encounters:   22 92 8 kg (204 lb 8 oz)   22 93 4 kg (206 lb)   22 91 6 kg (202 lb)       Physical Exam  Constitutional:       General: He is not in acute distress  Appearance: Normal appearance  HENT:      Head: Normocephalic and atraumatic  Mouth/Throat:      Mouth: Mucous membranes are moist    Eyes:      General: No scleral icterus  Extraocular Movements: Extraocular movements intact  Cardiovascular:      Rate and Rhythm: Normal rate and regular rhythm  Pulses: Normal pulses  Heart sounds: S1 normal and S2 normal  No murmur heard  No friction rub  No gallop  Comments: Elevated JVP  Trace to 1+ pitting edema bilaterally  Pulmonary:      Breath sounds: Normal breath sounds  Abdominal:      General: There is no distension  Palpations: Abdomen is soft  Tenderness: There is no abdominal tenderness  There is no guarding or rebound  Musculoskeletal:         General: Normal range of motion  Cervical back: Neck supple  Skin:     General: Skin is warm and dry  Capillary Refill: Capillary refill takes less than 2 seconds  Neurological:      General: No focal deficit present  Mental Status: He is alert and oriented to person, place, and time  Psychiatric:         Mood and Affect: Mood normal          Labs & Results:    Lab Results   Component Value Date    WBC 5 85 05/10/2022    HGB 12 8 05/10/2022    HCT 40 7 05/10/2022    MCV 97 05/10/2022     (L) 05/10/2022     Lab Results   Component Value Date    SODIUM 138 05/10/2022    K 4 4 05/10/2022     05/10/2022    CO2 25 05/10/2022    BUN 32 (H) 05/10/2022    CREATININE 1 56 (H) 05/10/2022    GLUC 138 05/10/2021    CALCIUM 9 2 05/10/2022     Lab Results   Component Value Date    NTBNP 2,152 (H) 05/25/2021      Lab Results   Component Value Date    CHOLESTEROL 134 05/10/2022    CHOLESTEROL 153 01/06/2022    CHOLESTEROL 135 10/01/2021     Lab Results   Component Value Date    HDL 41 05/10/2022    HDL 43 01/06/2022    HDL 38 (L) 10/01/2021     Lab Results   Component Value Date    TRIG 140 05/10/2022    TRIG 180 (H) 01/06/2022    TRIG 203 (H) 10/01/2021     Lab Results   Component Value Date    NONHDLC 67 11/28/2018    [place] 77 04/24/2018       EKG personally reviewed by Deb Adames MD      Counseling / Coordination of Care  Time spent today 25 minutes  Greater than 50% of total time was spent with the patient and / or family counseling and / or coordination of care  We discussed diagnoses, most recent studies and any changes in treatment    Thank you for the opportunity to participate in the care of this patient      Alba Muniz MD  ADVANCED HEART FAILURE AND MECHANICAL CIRCULATORY SUPPORT  [facility]

## 2022-05-18 NOTE — PATIENT INSTRUCTIONS
Increase torsemide to 20mg once a day for 1-2 days until weight down to 199 lbs  Check oxygen level on ambulation  2g sodium diet  2L fluid restriction  Daily weights  Physical activities as tolerated

## 2022-05-21 NOTE — ASSESSMENT & PLAN NOTE
Lab Results   Component Value Date    EGFR 42 05/10/2022    EGFR 41 01/06/2022    EGFR 43 10/01/2021    CREATININE 1 56 (H) 05/10/2022    CREATININE 1 60 (H) 01/06/2022    CREATININE 1 56 (H) 10/01/2021     Stable GFR/creatinine  Patient is under care of [facility] Nephrology

## 2022-05-21 NOTE — ASSESSMENT & PLAN NOTE
Chronic dyspnea on exertion  Trials of numerous inhalers were unsuccessful  Patient is under care of [facility] pulmonology  He uses oxygen daytime as needed only

## 2022-05-21 NOTE — ASSESSMENT & PLAN NOTE
Lab Results   Component Value Date    HGBA1C 8 4 (H) 05/10/2022     Hemoglobin A1c is elevated  Patient has been in touch with his endocrinologist, Dr Karey Ross, insulin rate was adjusted      Patient will be repeating hemoglobin A1c in 1 months as per endocrinology

## 2022-05-21 NOTE — ASSESSMENT & PLAN NOTE
Chronic low back pain  Patient was evaluated by [facility]  Continue gabapentin  Trial of Cymbalta

## 2022-05-21 NOTE — ASSESSMENT & PLAN NOTE
Wt Readings from Last 3 Encounters:   05/18/22 92 8 kg (204 lb 8 oz)   05/17/22 93 4 kg (206 lb)   02/03/22 91 6 kg (202 lb)     Patient appears euvolemic on exam today  [facility] follows      Continue torsemide 10 mg daily along with metoprolol 12 5 mg daily  Patient could not tolerate higher doses of beta-blocker due to fatigue and low blood pressure

## 2022-05-21 NOTE — ASSESSMENT & PLAN NOTE
Lab Results   Component Value Date    HGBA1C 8 4 (H) 05/10/2022     Continue gabapentin 300 mg twice a day      Trial of Cymbalta for symptoms of anxiety, depression and chronic pain and neuropathy

## 2022-05-23 ENCOUNTER — OFFICE VISIT (OUTPATIENT)
Dept: PULMONOLOGY | Facility: HOSPITAL | Age: 77
End: 2022-05-23
Payer: MEDICARE

## 2022-05-23 ENCOUNTER — TELEPHONE (OUTPATIENT)
Dept: NEPHROLOGY | Facility: CLINIC | Age: 77
End: 2022-05-23

## 2022-05-23 ENCOUNTER — HOSPITAL ENCOUNTER (OUTPATIENT)
Dept: RADIOLOGY | Facility: HOSPITAL | Age: 77
Discharge: HOME | End: 2022-05-23
Payer: MEDICARE

## 2022-05-23 VITALS
HEART RATE: 74 BPM | OXYGEN SATURATION: 96 % | SYSTOLIC BLOOD PRESSURE: 100 MMHG | BODY MASS INDEX: 32.21 KG/M2 | DIASTOLIC BLOOD PRESSURE: 60 MMHG | TEMPERATURE: 97.5 F | WEIGHT: 205.2 LBS | HEIGHT: 67 IN

## 2022-05-23 DIAGNOSIS — G47.33 OBSTRUCTIVE SLEEP APNEA OF ADULT: ICD-10-CM

## 2022-05-23 DIAGNOSIS — J43.2 CENTRILOBULAR EMPHYSEMA (HCC): Chronic | ICD-10-CM

## 2022-05-23 DIAGNOSIS — J98.4 RESTRICTIVE LUNG DISEASE: ICD-10-CM

## 2022-05-23 DIAGNOSIS — J43.2 CENTRILOBULAR EMPHYSEMA (HCC): ICD-10-CM

## 2022-05-23 DIAGNOSIS — E66.9 CLASS 1 OBESITY WITHOUT SERIOUS COMORBIDITY WITH BODY MASS INDEX (BMI) OF 31.0 TO 31.9 IN ADULT, UNSPECIFIED OBESITY TYPE: Primary | ICD-10-CM

## 2022-05-23 PROCEDURE — 71046 X-RAY EXAM CHEST 2 VIEWS: CPT

## 2022-05-23 PROCEDURE — 99214 OFFICE O/P EST MOD 30 MIN: CPT | Performed by: INTERNAL MEDICINE

## 2022-05-23 NOTE — PATIENT INSTRUCTIONS
Sleep Apnea   AMBULATORY CARE:   Sleep apnea  is a condition that causes you to stop breathing often during sleep  Types of sleep apnea:   Obstructive sleep apnea (ITZEL)  is the most common kind  The muscles and tissues around your throat relax and block air from passing through  Obesity, use of alcohol or cigarettes, or a family history are common causes  ITZEL may increase your risk for complications after surgery  Central sleep apnea (CSA)  means your brain does not send signals to the muscles that control breathing  You do not take a breath even though your airway is open  Common causes include medical conditions such as heart failure, being older than 40, or use of opioids  Complex (or mixed) sleep apnea  means you have both obstructive and central sleep apnea  Common signs and symptoms:   Loud snoring or long pauses in breathing    Feeling sleepy, slow, and tired during the day    Snorting, gasping, or choking while you sleep, and waking up suddenly because of these    Feeling irritable during the day    Dry mouth or a headache in the mornings    Heavy night sweating    A hard time thinking, remembering things, or focusing on your tasks the following day    Call your local emergency number (911 in the [address] [facility]) if:   You have chest pain or trouble breathing  Call your doctor if:   You have new or worsening signs or symptoms  You have questions or concerns about your condition or care  Treatment  depends on the kind of apnea you have  A mouth device  may be needed if you have mild sleep apnea  These are designed to keep your throat open  Ask your dentist or healthcare provider about the best mouth device for you  A machine  may be used to help you get more air during sleep  A mask may be placed over your nose and mouth, or just your nose  The mask is hooked to the machine  You will get air through the mask      A continuous positive airway pressure (CPAP) machine  is used to keep your airway open during sleep  The machine blows a gentle stream of air into the mask when you breathe  This helps keep your airway open so you can breathe more regularly  Extra oxygen may be given through the machine  A bilevel positive airway pressure (BiPAP) machine  gives air but lowers the pressure when you breathe out  An adaptive servo-ventilator (ASV)  is a machine that learns your usual breathing pattern  Then, it uses pressure to give you air and prevent stops in your breathing  Surgery  to expand your airway or remove extra tissues may be needed  Surgery is usually only considered if other treatments do not work  Manage or prevent sleep apnea:   Reach and maintain a healthy weight  Ask your healthcare provider what a healthy weight is for you  Ask him or her to help you create a safe weight loss plan if you are overweight  Even a small goal of a 10% weight loss can improve your symptoms  Do not smoke  Nicotine and other chemicals in cigarettes and cigars can cause lung damage  Ask your healthcare provider for information if you currently smoke and need help to quit  E-cigarettes or smokeless tobacco still contain nicotine  Talk to your healthcare provider before you use these products  Do not drink alcohol or take sedative medicine before you go to sleep  Alcohol and sedatives can relax the muscles and tissues around your throat  This can block the airflow to your lungs  Sleep on your side or use pillows designed to prevent sleep apnea  This prevents your tongue or other tissues from blocking your throat  You can also raise the head of your bed  Follow up with your doctor or specialist as directed: You may need to have blood tests during your follow-up visits  Work with your provider to find the right breathing support equipment and settings for you  Write down your questions so you remember to ask them during your visits    © Copyright "[employer] 2022 Information is for End User's use only and may not be sold, redistributed or otherwise used for commercial purposes  All illustrations and images included in CareNotes® are the copyrighted property of A [employer] A M , Inc  or Najma John  The above information is an  only  It is not intended as medical advice for individual conditions or treatments  Talk to your doctor, nurse or pharmacist before following any medical regimen to see if it is safe and effective for you

## 2022-05-23 NOTE — PROGRESS NOTES
Pulmonary/Sleep Follow Up Note   Bobby Garcia 68 y o  male MRN: [id number]  5/23/2022      Assessment and Plan:    1  Class 1 obesity without serious comorbidity with body mass index (BMI) of 31 0 to 31 9 in adult, unspecified obesity type  Assessment & Plan:  BMI of 32 14, noted      2  Restrictive lung disease  Assessment & Plan:  Multifactorial predominantly obesity related      3  Centrilobular emphysema ([address] )  Assessment & Plan:  Not using inhaler currently with chronic dyspnea on exertion failed inhalation therapy in the past      4  Obstructive sleep apnea of adult  Assessment & Plan:  · Moderate obstructive sleep apnea, has been using CPAP for many years, had a new CPAP machine recently dream station 2, set to 12-20 cm H2O however on the last compliance visit was noted to have high AHI at 6 9 events per hours so his pressure was changed to 14-20 today's compliance showing 4 8 AHI which is excellent and he is using the machine every night average 10 hours 14 minutes with no significant mask leak        Return in about 6 months (around 11/23/2022)  History of Present Illness   HPI:  Bobby Garcia is a 68 y o  male who  Has past medical history of obesity, strict of lung disease, obstructive sleep apnea here for follow-up  Patient has had an old CPAP machine and he was concerned about Zuri recall he has called our call center few days ago and he wanted to discuss options about ordering a new machine  Last visit, he received a new CPAP machine dream Station to auto CPAP it was set as his original 1 of a pressure range of 12-20 cm H2O however he was noted to have high residual AHI so it was changed to 14-20 last visit  He reports that he has been using it every night he got it and that his favorite part of the day to go to sleep, he feels refreshed when he wakes up in the morning    He has still minimal residual dyspnea on exertion, he currently denies using any inhalers or nebulizers      Review of Systems   All other systems reviewed and are negative        Historical Information   Past Medical History:   Diagnosis Date    Acute venous embolism and thrombosis of deep vessels of proximal lower [facility] )     resolved 04/04/2015    DARINEL (acute kidney injury) ([facility] ) 01/12/2016    Anesthesia     RESPIRATORY ISSUES DUE TO SLEEP APNEA    Cardiac disease     heart attack, stents x 4    Chronic cough     resolved 02/04/2016    Chronic pain disorder     intermitent claudication    COPD (chronic obstructive pulmonary disease) ([facility])     Coronary artery disease     CPAP (continuous positive airway pressure) dependence     Diabetes mellitus [facility] )     Disease of thyroid gland     DVT (deep venous thrombosis) ([facility])     Heart failure with mid-range ejection fraction ([facility])     Hyperlipidemia     Hypertension     Ischemic cardiomyopathy     last assessed 09/26/2017    Myocardial infarction [facility])     MI +2 2015,2018    NSTEMI (non-ST elevated myocardial infarction) ([facility] ) 03/23/2019    Pulmonary emphysema (HCC)     Pulmonary granuloma ([facility])     resolved 02/03/2017    Renal failure     Sleep apnea      Past Surgical History:   Procedure Laterality Date    BYPASS FEMORAL-POPLITEAL      initial stenosis with stent left, 7 x 100 smart stent onset 02/24/2014    CARDIAC SURGERY      cardiac stents    CATARACT EXTRACTION      COLOGUARD (HISTORICAL)  2018    CORONARY ANGIOPLASTY WITH STENT PLACEMENT      x4    IR AORTAGRAM WITH RUN-OFF  3/14/2019    IA THROMBOENDARTECTMY FEMORAL COMMON Left 3/22/2019    Procedure: ENDARTERECTOMY ARTERIAL FEMORAL WITH PATCH ANGIOPLASTY, BALLOON ANGIOPLASTY, STENT;  Surgeon: Gretchen Schwartz MD;  [facility];  Service: Vascular    IA VEIN BYPASS GRAFT,FEM-POP Left 3/22/2019    Procedure: BYPASS FEMORAL-POPLITEAL WITH COMPLETION ARTERIOGRAM;  Surgeon: Gretchen Schwartz MD;  [facility];  Service: Vascular    VASCULAR SURGERY      stent placement    WOUND DEBRIDEMENT Left 4/15/2019    Procedure: DEBRIDEMENT WOUND AND DRESSING CHANGE [facility] OUT) left groin with VAC;  Surgeon: Jett Rosario DO;  [facility];  Service: Vascular     Family History   Problem Relation Age of Onset    Heart disease Father         pacer placement    Hypertension Father     Kidney disease Father     Heart failure Father     Heart attack Father     Liver disease Father     Cirrhosis Mother         due to beer consumption    Liver disease Mother     Diabetes Other          Meds/Allergies     Current Outpatient Medications:     aspirin 81 MG tablet, Take 81 mg by mouth daily  , Disp: , Rfl:     cholecalciferol (VITAMIN D3) 1,000 units tablet, Take 1,000 Units by mouth daily , Disp: , Rfl:     clopidogrel (PLAVIX) 75 mg tablet, Take 1 tablet (75 mg total) by mouth in the morning , Disp: 90 tablet, Rfl: 3    Coenzyme Q10 (COQ-10) 200 MG CAPS, Take 200 mg by mouth daily, Disp: , Rfl:     docusate sodium (COLACE) 50 mg capsule, Take by mouth 2 (two) times a day as needed  , Disp: , Rfl:     DULoxetine (Cymbalta) 30 mg delayed release capsule, Take 1 capsule (30 mg total) by mouth in the morning , Disp: 90 capsule, Rfl: 3    gabapentin (NEURONTIN) 300 mg capsule, Take 1 capsule (300 mg total) by mouth in the morning and 1 capsule (300 mg total) in the evening , Disp: 180 capsule, Rfl: 3    Glucagon (Baqsimi One Pack) 3 MG/DOSE POWD, 1 Dose into each nostril as needed, Disp: , Rfl:     Icosapent Ethyl (Vascepa) 1 g CAPS, Take 1 capsule (1 g total) by mouth in the morning and 1 capsule (1 g total) before bedtime  , Disp: 180 capsule, Rfl: 3    insulin glargine (LANTUS) 100 units/mL subcutaneous injection, Inject under the skin 40 units am- 10 units pm, Disp: , Rfl:     insulin lispro (HUMALOG) 100 units/mL injection, Inject 3 units with breakfast, 6 units at lunch, and 6 units at dinner (Patient taking differently: 4 (four) times a day Inject 3 units with breakfast, 6 units at lunch, and 6 units at dinner Carbs counting), Disp: 10 mL, Rfl: 1    levothyroxine 175 mcg tablet, Take 175 mcg by mouth in the morning , Disp: , Rfl:     metoprolol succinate (TOPROL-XL) 25 mg 24 hr tablet, Take 0 5 tablets (12 5 mg total) by mouth in the morning , Disp: 45 tablet, Rfl: 3    nitroglycerin (NITROSTAT) 0 4 mg SL tablet, Place 1 tablet (0 4 mg total) under the tongue every 5 (five) minutes as needed for chest pain, Disp: 25 tablet, Rfl: 3    polyethylene glycol (MIRALAX) 17 g packet, Take 17 g by mouth daily, Disp: , Rfl:     rosuvastatin (CRESTOR) 20 MG tablet, Take 1 tablet (20 mg total) by mouth in the morning , Disp: 90 tablet, Rfl: 3    SF 1 1 % GEL, BRUCH ONCE DAILY AT BEDTIME, BRUSH OF 1 MINUTE AS DIRECTED, Disp: , Rfl:     Sodium Fluoride 5000 PPM 1 1 % PSTE, BRUSH TWO TIMES A DAY AS DIRECTED, Disp: , Rfl:     torsemide (DEMADEX) 10 mg tablet, Take 1 tablet (10 mg total) by mouth in the morning , Disp: 100 tablet, Rfl: 2    ULTICARE INSULIN SYRINGE 30G X 1/2" 1 ML MISC, Use as directed, Disp: , Rfl: 0  Allergies   Allergen Reactions    Doxazosin Myalgia     UNKNOWN  UNKNOWN  Muscle cramps    Iohexol      UNKNOWN    Lisinopril Cough     cough  Other reaction(s): CAMELLA SINENSIS (ZESTRIL) (cough)  cough    Vancomycin Hives    Adhesive [Medical Tape]      Skin Breakdown    Cardura [Doxazosin Mesylate]      Muscle cramps    Isosorbide Rash    Other Other (See Comments)     Iv dye for cardiac cath  Renal failure very close to dialysis  But no dialysis       Vitals: Blood pressure 100/60, pulse 74, temperature 97 5 °F (36 4 °C), height 5' 7" (1 702 m), weight 93 1 kg (205 lb 3 2 oz), SpO2 96 %  Body mass index is 32 14 kg/m²  Oxygen Therapy  SpO2: 96 %      Physical Exam  Physical Exam  Constitutional:       Appearance: Normal appearance  He is obese  He is not ill-appearing or diaphoretic  HENT:      Head: Normocephalic and atraumatic        Nose: No congestion or rhinorrhea  Mouth/Throat:      Mouth: Mucous membranes are moist       Pharynx: Oropharynx is clear  No oropharyngeal exudate or posterior oropharyngeal erythema  Eyes:      General: No scleral icterus  Right eye: No discharge  Left eye: No discharge  Extraocular Movements: Extraocular movements intact  Conjunctiva/sclera: Conjunctivae normal    Cardiovascular:      Rate and Rhythm: Normal rate and regular rhythm  Pulses: Normal pulses  Heart sounds: Normal heart sounds  No murmur heard  No gallop  Pulmonary:      Effort: Pulmonary effort is normal  No respiratory distress  Breath sounds: Normal breath sounds  No wheezing, rhonchi or rales  Abdominal:      General: Abdomen is flat  There is no distension  Palpations: Abdomen is soft  There is no mass  Tenderness: There is no abdominal tenderness  Musculoskeletal:         General: No swelling, tenderness or deformity  Cervical back: Normal range of motion and neck supple  No rigidity  Right lower leg: Edema present  Left lower leg: Edema present  Skin:     General: Skin is warm and dry  Coloration: Skin is not pale  Findings: No erythema  Neurological:      General: No focal deficit present  Mental Status: He is alert and oriented to person, place, and time  Mental status is at baseline  Psychiatric:         Mood and Affect: Mood normal          Behavior: Behavior normal          Thought Content: Thought content normal          Judgment: Judgment normal          Labs: I have personally reviewed pertinent lab results  , ABG: No results found for: PHART, QFX7XJR, PO2ART, FGG3ABC, N6ZHMHXT, BEART, SOURCE, BNP: No results found for: BNP, CBC: No results found for: WBC, HGB, HCT, MCV, PLT, ADJUSTEDWBC, MCH, MCHC, RDW, MPV, NRBC, CMP: No results found for: SODIUM, K, CL, CO2, ANIONGAP, BUN, CREATININE, GLUCOSE, CALCIUM, AST, ALT, ALKPHOS, PROT, BILITOT, EGFR, PT/INR: No results found for: PT, INR, Troponin: No results found for: TROPONINI  Lab Results   Component Value Date    WBC 5 85 05/10/2022    HGB 12 8 05/10/2022    HCT 40 7 05/10/2022    MCV 97 05/10/2022     (L) 05/10/2022     Lab Results   Component Value Date    GLUCOSE 207 (H) 03/22/2019    CALCIUM 9 2 05/10/2022     12/21/2015    K 4 4 05/10/2022    CO2 25 05/10/2022     05/10/2022    BUN 32 (H) 05/10/2022    CREATININE 1 56 (H) 05/10/2022     No results found for: IGE  Lab Results   Component Value Date    ALT 17 05/10/2022    AST 15 05/10/2022    ALKPHOS 110 05/10/2022    BILITOT 0 33 10/01/2015         Imaging and other studies: I have personally reviewed pertinent films in PACS  CT chest 9/2020     LUNGS:  Moderate upper lobe predominant panlobular emphysema      Mild juxtapleural lower lung reticulation, slightly more extensive on the right   No definite traction bronchiolectasis or honeycombing   No significant air trapping on expiration       Benign calcified granulomas      No significant filling defects in the trachea and bronchi      PLEURA:  Unremarkable      Pulmonary function testing 8/2020:   · The reduced total lung capacity at 50% predicted and residual volume at 34% predicted  · Restrictive pattern on the spirometry  · Reduced diffusion capacity  · Normal airway resistance as indicated by the specific airway conductance      Sleep studies: I have personally reviewed pertinent reports  Split night study   AHI 23 7 events per hour consistent with moderate obstructive sleep apnea     Compliance report:I have personally reviewed pertinent reports  4-5/2022  100% use, average 10 hours 14 minutes, residual AHI 4 8 events per hour, no significantly, pressure range 14-20 cm H2O  Ignacia Dunn MD  [address] [facility]       Portions of the record may have been created with voice recognition software   Occasional wrong word or "sound a like" substitutions may have occurred due to the inherent limitations of voice recognition software  Read the chart carefully and recognize, using context, where substitutions have occurred

## 2022-05-23 NOTE — TELEPHONE ENCOUNTER
Appointment Confirmation   Person confirmed appointment with  If not patient, name of the person Spouse    Date and time of appointment 5/24  2p   Patient acknowledged and will be at appointment? yes    Did you advise the patient that they will need a urine sample if they are a new patient?  N/A    Did you advise the patient to bring their current medications for verification? (including any OTC) Yes    Additional Information

## 2022-05-23 NOTE — ASSESSMENT & PLAN NOTE
· Moderate obstructive sleep apnea, has been using CPAP for many years, had a new CPAP machine recently dream station 2, set to 12-20 cm H2O however on the last compliance visit was noted to have high AHI at 6 9 events per hours so his pressure was changed to 14-20 today's compliance showing 4 8 AHI which is excellent and he is using the machine every night average 10 hours 14 minutes with no significant mask leak

## 2022-05-24 ENCOUNTER — OFFICE VISIT (OUTPATIENT)
Dept: NEPHROLOGY | Facility: HOSPITAL | Age: 77
End: 2022-05-24
Payer: MEDICARE

## 2022-05-24 VITALS
SYSTOLIC BLOOD PRESSURE: 102 MMHG | DIASTOLIC BLOOD PRESSURE: 62 MMHG | BODY MASS INDEX: 32.02 KG/M2 | HEIGHT: 67 IN | WEIGHT: 204 LBS | HEART RATE: 65 BPM

## 2022-05-24 DIAGNOSIS — I70.1 RENAL ARTERY STENOSIS (HCC): Primary | ICD-10-CM

## 2022-05-24 DIAGNOSIS — N18.32 STAGE 3B CHRONIC KIDNEY DISEASE (HCC): Chronic | ICD-10-CM

## 2022-05-24 DIAGNOSIS — I65.23 CAROTID ARTERY STENOSIS, ASYMPTOMATIC, BILATERAL: ICD-10-CM

## 2022-05-24 DIAGNOSIS — E10.51 TYPE 1 DIABETES MELLITUS WITH DIABETIC PERIPHERAL ANGIOPATHY WITHOUT GANGRENE (HCC): ICD-10-CM

## 2022-05-24 PROCEDURE — 99214 OFFICE O/P EST MOD 30 MIN: CPT | Performed by: INTERNAL MEDICINE

## 2022-05-24 NOTE — PATIENT INSTRUCTIONS
Chronic Kidney Disease   WHAT YOU NEED TO KNOW:   Chronic kidney disease (CKD) is the gradual and permanent loss of kidney function  It is also called chronic kidney failure, or chronic renal insufficiency  Normally, the kidneys remove fluid, chemicals, and waste from your blood  These wastes are turned into urine by your kidneys  CKD may worsen over time and lead to kidney failure  Your CKD team will help you and your family plan for your care at home  The team will help you create goals and find ways to meet your goals  Your care plan may change over time as your needs change  DISCHARGE INSTRUCTIONS:   Call your local emergency number (911 in the [address]) if:   You have a seizure  You have shortness of breath  Return to the emergency department if:   You are confused and very drowsy  Call your doctor or nephrologist if:   You suddenly gain or lose more weight than your healthcare provider has told you is okay  You have itchy skin or a rash  You urinate more or less than you normally do  You have blood in your urine  You have nausea and are vomiting  You have fatigue or muscle weakness  You have hiccups that will not stop  You have questions or concerns about your condition or care  Medicines:   Medicines  may be given to decrease blood pressure and get rid of extra fluid  You may also receive medicine to manage health conditions that may occur with CKD, such as anemia, diabetes, and heart disease  Take your medicine as directed  Contact your healthcare provider if you think your medicine is not helping or if you have side effects  Tell him or her if you are allergic to any medicine  Keep a list of the medicines, vitamins, and herbs you take  Include the amounts, and when and why you take them  Bring the list or the pill bottles to follow-up visits  Carry your medicine list with you in case of an emergency  What you can do to manage CKD:   Management may include making some lifestyle changes  Tell your healthcare provider if you have any concerns about being able to make changes  He or she can help you find solutions, including working with specialists  Ask for help creating a plan to break large goals into smaller steps  Your plan may include any of the following:  Manage other health conditions  Your healthcare provider will work with you to make a care plan that meets your needs  You will be checked regularly for heart disease or other conditions that can make CKD worse, such as diabetes  Your blood pressure will be closely monitored  You will also get a target blood pressure and help making a plan to reach your target  This may include taking your blood pressure at home  Maintain a healthy weight  Your weight and body mass index (BMI) will be checked regularly  BMI helps find if your weight is healthy for your height  Your healthcare provider will use other tests to check your muscle and protein levels  Extra weight can strain your kidneys  A low weight or low muscle mass can make you feel more tired  You may have trouble doing your daily activities  Ask your provider what a healthy weight is for you  He or she can help you create a plan to lose or gain weight safely, if needed  The plan may include keeping a food diary  This is a list of foods and liquids you have each day  Your provider will use the diary to help you make changes, if needed  Changes are based on your health and any other conditions you have, such as diabetes  Create an exercise plan  Regular exercise can help you manage CKD, high blood pressure, and diabetes  Exercise also helps control weight  Your provider can help you create exercise goals and a plan to reach those goals  For example, your goal may be to exercise for 30 minutes in a day  Your plan can include breaking exercise into 10 minute sessions, 3 times during the day  Create a healthy eating plan    Your provider may tell you to eat food low in potassium, phosphorus, or protein  Your provider may also recommend vitamin or mineral supplements  Do not take any supplements without talking to your provider  A dietitian can help you plan meals if needed  Ask how much liquid to drink each day and which liquids are best for you  Limit sodium (salt) as directed  You may need to limit sodium to less than 2,300 milligrams (mg) each day  Ask your dietitian or healthcare provider how much sodium you can have each day  The amount depends on your stage of kidney disease  Table salt, canned foods, soups, salted snacks, and processed meats, like deli meats and sausage, are high in sodium  Your provider or a dietitian can show you how to read food labels for sodium  Limit alcohol as directed  Alcohol can cause fluid retention and can affect your kidneys  Ask how much alcohol is safe for you  A drink of alcohol is 12 ounces of beer, 5 ounces of wine, or 1½ ounces of liquor  Do not smoke  Nicotine and other chemicals in cigarettes and cigars can cause kidney damage  Ask your provider for information if you currently smoke and need help to quit  E-cigarettes or smokeless tobacco still contain nicotine  Talk to your provider before you use these products  Ask about over-the-counter medicines  Medicines such as NSAIDs and laxatives may harm your kidneys  Some cough and cold medicines can raise your blood pressure  Always ask if a medicine is safe before you take it  Ask about vaccines you may need  CKD can increase your risk for infections such as pneumonia, influenza, and hepatitis  Vaccines lower your risk for infection  Your healthcare provider will tell you which vaccines you need and when to get them  Follow up with your doctor or nephrologist as directed: You will need to return for tests to monitor your kidney and nerve function, and your parathyroid hormone level   Your medicines may be changed, based on certain test results  Write down your questions so you remember to ask them during your visits  © Copyright [employer] 2022 Information is for End User's use only and may not be sold, redistributed or otherwise used for commercial purposes  All illustrations and images included in CareNotes® are the copyrighted property of [employer]  or Najma John  The above information is an  only  It is not intended as medical advice for individual conditions or treatments  Talk to your doctor, nurse or pharmacist before following any medical regimen to see if it is safe and effective for you

## 2022-05-24 NOTE — PROGRESS NOTES
OFFICE FOLLOW UP - Nephrology   Pipo Arellano 68 y o  male MRN: [id number]    Encounter: [id number]        ASSESSMENT & PLAN    CKD (chronic kidney disease) stage 3, GFR 30-59 ml/min  -his estimated GFR is likely around 40-50 ml/min  -medications are appropriately dosed  -continue cardiovascular risk reduction measures  -his last creatininewas was around 1 4-1  6     Type 1 diabetes mellitus with complication (HCC)  -continue Glycemic control he has seen Endocrinology   -last A1c was elevated at 8 4 he is seeing Dr Cody Barnard     Renal artery stenosis [facility])  -he has bilateral renal artery stenosis 60%  -checking renal artery Dopplers given his relative stability once a year will place order now patient is going to [facility] for other vascular studies so will add this on  -BP low but asymptomatic on low-dose beta-blocker and low-dose torsemide     Benign hypertension with chronic kidney disease, stage III  -  currently stable now metoprolol  -  Torsemide 10 mg daily     DARINEL (acute kidney injury) ([facility] [address] )  -previously when diuretics were increased his creatinine did increase as well he is tolerating the torsemide 10 mg daily     Renal cyst, right  - renal ultrasound was checked in February of 2018 which showed a stable simple cyst in the upper pole of the right kidney and a normal left kidney and bladder, this was checked in November of 2018 in [place] and is stable in size and checked in 2019 stable     Proteinuria, unspecified type  - 1+ proteinuria on urinalysis longstanding history of diabetes not anemic, platelet count stable no hematuria will monitor    Shortness of breath with coronary artery disease, COPD, systolic congestive heart failure, chronic kidney disease  -this seems to be multifactorial following with pulmonary and heart failure  -now on oxygen any time he exerts himself  -on low-dose metoprolol     Peripheral vascular disease  -ongoing follow-up with vascular surgery at [facility]    Overall relatively stable will follow-up in 6 months      HPI: Darline Anderson is a 68 y o  male who is here for Follow-up and Chronic Kidney Disease    Overall doing well denies any acute chest pain or shortness of breath no fevers or chills nausea vomiting diarrhea constipation foamy or bloody urine    ROS:   All the systems were reviewed and were negative except as documented on the HPI  Allergies: Doxazosin, Iohexol, Lisinopril, Vancomycin, Adhesive [medical tape], Cardura [doxazosin mesylate], Isosorbide, and Other    Medications:   Current Outpatient Medications:     aspirin 81 MG tablet, Take 81 mg by mouth daily  , Disp: , Rfl:     cholecalciferol (VITAMIN D3) 1,000 units tablet, Take 1,000 Units by mouth daily , Disp: , Rfl:     clopidogrel (PLAVIX) 75 mg tablet, Take 1 tablet (75 mg total) by mouth in the morning , Disp: 90 tablet, Rfl: 3    Coenzyme Q10 (COQ-10) 200 MG CAPS, Take 200 mg by mouth daily, Disp: , Rfl:     docusate sodium (COLACE) 50 mg capsule, Take by mouth 2 (two) times a day as needed  , Disp: , Rfl:     DULoxetine (Cymbalta) 30 mg delayed release capsule, Take 1 capsule (30 mg total) by mouth in the morning , Disp: 90 capsule, Rfl: 3    gabapentin (NEURONTIN) 300 mg capsule, Take 1 capsule (300 mg total) by mouth in the morning and 1 capsule (300 mg total) in the evening , Disp: 180 capsule, Rfl: 3    Glucagon (Baqsimi One Pack) 3 MG/DOSE POWD, 1 Dose into each nostril as needed, Disp: , Rfl:     Icosapent Ethyl (Vascepa) 1 g CAPS, Take 1 capsule (1 g total) by mouth in the morning and 1 capsule (1 g total) before bedtime  , Disp: 180 capsule, Rfl: 3    insulin glargine (LANTUS) 100 units/mL subcutaneous injection, Inject under the skin 40 units am- 10 units pm, Disp: , Rfl:     insulin lispro (HUMALOG) 100 units/mL injection, Inject 3 units with breakfast, 6 units at lunch, and 6 units at dinner (Patient taking differently: 4 (four) times a day Inject 3 units with breakfast, 6 units at lunch, and 6 units at dinner Carbs counting), Disp: 10 mL, Rfl: 1    levothyroxine 175 mcg tablet, Take 175 mcg by mouth in the morning , Disp: , Rfl:     metoprolol succinate (TOPROL-XL) 25 mg 24 hr tablet, Take 0 5 tablets (12 5 mg total) by mouth in the morning , Disp: 45 tablet, Rfl: 3    nitroglycerin (NITROSTAT) 0 4 mg SL tablet, Place 1 tablet (0 4 mg total) under the tongue every 5 (five) minutes as needed for chest pain, Disp: 25 tablet, Rfl: 3    polyethylene glycol (MIRALAX) 17 g packet, Take 17 g by mouth daily, Disp: , Rfl:     rosuvastatin (CRESTOR) 20 MG tablet, Take 1 tablet (20 mg total) by mouth in the morning , Disp: 90 tablet, Rfl: 3    SF 1 1 % GEL, BRUCH ONCE DAILY AT BEDTIME, BRUSH OF 1 MINUTE AS DIRECTED, Disp: , Rfl:     Sodium Fluoride 5000 PPM 1 1 % PSTE, BRUSH TWO TIMES A DAY AS DIRECTED, Disp: , Rfl:     torsemide (DEMADEX) 10 mg tablet, Take 1 tablet (10 mg total) by mouth in the morning , Disp: 100 tablet, Rfl: 2    ULTICARE INSULIN SYRINGE 30G X 1/2" 1 ML MISC, Use as directed, Disp: , Rfl: 0    Past Medical History:   Diagnosis Date    Acute venous embolism and thrombosis of deep vessels of proximal lower extremity (HCC)     resolved 04/04/2015    DARINEL (acute kidney injury) ([facility] [address] ) 01/12/2016    Anesthesia     RESPIRATORY ISSUES DUE TO SLEEP APNEA    Cardiac disease     heart attack, stents x 4    Chronic cough     resolved 02/04/2016    Chronic pain disorder     intermitent claudication    COPD (chronic obstructive pulmonary disease) (HCC)     Coronary artery disease     CPAP (continuous positive airway pressure) dependence     Diabetes mellitus (HCC)     Disease of thyroid gland     DVT (deep venous thrombosis) (HCC)     Heart failure with mid-range ejection fraction (HCC)     Hyperlipidemia     Hypertension     Ischemic cardiomyopathy     last assessed 09/26/2017    Myocardial infarction [facility])     MI +2 2015,2018    NSTEMI (non-ST elevated myocardial infarction) ([facility] [address] ) 03/23/2019    Pulmonary emphysema (HCC)     Pulmonary granuloma (HCC)     resolved 02/03/2017    Renal failure     Sleep apnea      Past Surgical History:   Procedure Laterality Date    BYPASS FEMORAL-POPLITEAL      initial stenosis with stent left, 7 x 100 smart stent onset 02/24/2014    CARDIAC SURGERY      cardiac stents    CATARACT EXTRACTION      COLOGUARD (HISTORICAL)  2018    CORONARY ANGIOPLASTY WITH STENT PLACEMENT      x4    IR AORTAGRAM WITH RUN-OFF  3/14/2019    WV THROMBOENDARTECTMY FEMORAL COMMON Left 3/22/2019    Procedure: ENDARTERECTOMY ARTERIAL FEMORAL WITH PATCH ANGIOPLASTY, BALLOON ANGIOPLASTY, STENT;  Surgeon: Javier Marie MD;  [facility];  Service: Vascular    WV VEIN BYPASS GRAFT,FEM-POP Left 3/22/2019    Procedure: BYPASS FEMORAL-POPLITEAL WITH COMPLETION ARTERIOGRAM;  Surgeon: Javier Marie MD;  [facility];  Service: Vascular    VASCULAR SURGERY      stent placement    WOUND DEBRIDEMENT Left 4/15/2019    Procedure: DEBRIDEMENT WOUND AND DRESSING CHANGE ([address]) left groin with VAC;  Surgeon: Keyana Johnson DO;  [facility];  Service: Vascular     Family History   Problem Relation Age of Onset    Heart disease Father         pacer placement    Hypertension Father     Kidney disease Father     Heart failure Father     Heart attack Father     Liver disease Father     Cirrhosis Mother         due to beer consumption    Liver disease Mother     Diabetes Other       reports that he quit smoking about 14 years ago  His smoking use included cigarettes  He started smoking about 61 years ago  He has a 188 00 pack-year smoking history  He has never used smokeless tobacco  He reports current alcohol use  He reports that he does not use drugs        Physical Exam:   Vitals:    05/24/22 1353   BP: 102/62   BP Location: Left arm   Patient Position: Sitting   Cuff Size: Standard   Pulse: 65   Weight: 92 5 kg (204 lb)   Height: 5' 7" (1 702 m) Body mass index is 31 95 kg/m²      General: conscious, cooperative, in not acute distress  Eyes: conjunctivae pink, anicteric sclerae  ENT: lips and mucous membranes moist  Neck: supple, no JVD  Chest: clear breath sounds bilateral, no crackles, ronchus or wheezings  CVS: distinct S1 & S2, normal rate, regular rhythm  Abdomen: soft, non-tender, non-distended, normoactive bowel sounds  Extremities:  Trace edema 1 ulcer with bandage on the right leg  Skin: no rash  Neuro: awake, alert, oriented      Lab Results:    Results for orders placed or performed in visit on 05/10/22   CBC and differential   Result Value Ref Range    WBC 5 85 4 31 - 10 16 Thousand/uL    RBC 4 20 3 88 - 5 62 Million/uL    Hemoglobin 12 8 12 0 - 17 0 g/dL    Hematocrit 40 7 36 5 - 49 3 %    MCV 97 82 - 98 fL    MCH 30 5 26 8 - 34 3 pg    MCHC 31 4 31 4 - 37 4 g/dL    RDW 14 7 11 6 - 15 1 %    MPV 12 9 (H) 8 9 - 12 7 fL    Platelets 652 (L) 435 - 390 Thousands/uL    nRBC 0 /100 WBCs    Neutrophils Relative 61 43 - 75 %    Immat GRANS % 0 0 - 2 %    Lymphocytes Relative 23 14 - 44 %    Monocytes Relative 12 4 - 12 %    Eosinophils Relative 4 0 - 6 %    Basophils Relative 0 0 - 1 %    Neutrophils Absolute 3 56 1 85 - 7 62 Thousands/µL    Immature Grans Absolute 0 01 0 00 - 0 20 Thousand/uL    Lymphocytes Absolute 1 32 0 60 - 4 47 Thousands/µL    Monocytes Absolute 0 71 0 17 - 1 22 Thousand/µL    Eosinophils Absolute 0 23 0 00 - 0 61 Thousand/µL    Basophils Absolute 0 02 0 00 - 0 10 Thousands/µL   Comprehensive metabolic panel   Result Value Ref Range    Sodium 138 136 - 145 mmol/L    Potassium 4 4 3 5 - 5 3 mmol/L    Chloride 105 100 - 108 mmol/L    CO2 25 21 - 32 mmol/L    ANION GAP 8 4 - 13 mmol/L    BUN 32 (H) 5 - 25 mg/dL    Creatinine 1 56 (H) 0 60 - 1 30 mg/dL    Glucose, Fasting 224 (H) 65 - 99 mg/dL    Calcium 9 2 8 3 - 10 1 mg/dL    AST 15 5 - 45 U/L    ALT 17 12 - 78 U/L    Alkaline Phosphatase 110 46 - 116 U/L    Total Protein 7 8 6 4 - 8 2 g/dL    Albumin 3 5 3 5 - 5 0 g/dL    Total Bilirubin 0 54 0 20 - 1 00 mg/dL    eGFR 42 ml/min/1 73sq m   Magnesium   Result Value Ref Range    Magnesium 2 1 1 6 - 2 6 mg/dL   Phosphorus   Result Value Ref Range    Phosphorus 3 3 2 3 - 4 1 mg/dL   PTH, intact   Result Value Ref Range    PTH 74 3 18 4 - 80 1 pg/mL   Uric acid   Result Value Ref Range    Uric Acid 7 3 4 2 - 8 0 mg/dL   Protein / creatinine ratio, urine   Result Value Ref Range    Creatinine, Ur <13 0 mg/dL    Protein Urine Random 12 mg/dL    Prot/Creat Ratio, Ur >0 92 (H) 0 00 - 0 10   Urinalysis   Result Value Ref Range    Color, UA Colorless     Clarity, UA Clear     Specific Gravity, UA 1 008 1 003 - 1 030    pH, UA 6 0 4 5, 5 0, 5 5, 6 0, 6 5, 7 0, 7 5, 8 0    Leukocytes, UA Negative Negative    Nitrite, UA Negative Negative    Protein, UA Negative Negative mg/dl    Glucose, UA Negative Negative mg/dl    Ketones, UA Negative Negative mg/dl    Urobilinogen, UA <2 0 <2 0 mg/dl mg/dl    Bilirubin, UA Negative Negative    Blood, UA Negative Negative   Hemoglobin A1C   Result Value Ref Range    Hemoglobin A1C 8 4 (H) Normal 3 8-5 6%; PreDiabetic 5 7-6 4%; Diabetic >=6 5%; Glycemic control for adults with diabetes <7 0% %     mg/dl   Lipid Panel with Direct LDL reflex   Result Value Ref Range    Cholesterol 134 See Comment mg/dL    Triglycerides 140 See Comment mg/dL    HDL, Direct 41 >=40 mg/dL    LDL Calculated 65 0 - 100 mg/dL   TSH, 3rd generation with Free T4 reflex   Result Value Ref Range    TSH 3RD GENERATON 0 357 (L) 0 450 - 4 500 uIU/mL   T4, free   Result Value Ref Range    Free T4 1 62 (H) 0 76 - 1 46 ng/dL         Portions of the record may have been created with voice recognition software  Occasional wrong word or "sound a like" substitutions may have occurred due to the inherent limitations of voice recognition software  Read the chart carefully and recognize, using context, where substitutions have occurred  If you have any questions, please contact the dictating provider

## 2022-05-25 ENCOUNTER — OFFICE VISIT (OUTPATIENT)
Dept: WOUND CARE | Facility: CLINIC | Age: 77
End: 2022-05-25
Payer: MEDICARE

## 2022-05-25 VITALS
DIASTOLIC BLOOD PRESSURE: 88 MMHG | RESPIRATION RATE: 18 BRPM | HEART RATE: 68 BPM | SYSTOLIC BLOOD PRESSURE: 146 MMHG | TEMPERATURE: 96 F

## 2022-05-25 DIAGNOSIS — S81.801A OPEN WOUND OF RIGHT LOWER LEG, INITIAL ENCOUNTER: Primary | ICD-10-CM

## 2022-05-25 DIAGNOSIS — E10.49 OTHER DIABETIC NEUROLOGICAL COMPLICATION ASSOCIATED WITH TYPE 1 DIABETES MELLITUS (HCC): ICD-10-CM

## 2022-05-25 PROCEDURE — 99213 OFFICE O/P EST LOW 20 MIN: CPT | Performed by: FAMILY MEDICINE

## 2022-05-25 PROCEDURE — 97597 DBRDMT OPN WND 1ST 20 CM/<: CPT | Performed by: FAMILY MEDICINE

## 2022-05-25 RX ORDER — LIDOCAINE 40 MG/G
CREAM TOPICAL ONCE
Status: COMPLETED | OUTPATIENT
Start: 2022-05-25 | End: 2022-05-25

## 2022-05-25 RX ADMIN — LIDOCAINE: 40 CREAM TOPICAL at 09:53

## 2022-05-25 NOTE — PATIENT INSTRUCTIONS
Orders Placed This Encounter   Procedures    Wound cleansing and dressings     R lower leg:  Wash your hands with soap and water  Remove old dressing, discard into plastic bag and place in trash  Cleanse the wound with mild soap (such as Dove) and rinse well with clear water prior to applying a clean dressing  Do not use tissue or cotton balls  Do not scrub the wound  Pat dry using gauze  Shower yes; cleanse as above in shower    Apply medihoney to the wound bed      Cover with gauze  Secure with tape  Change dressing daily    Follow up in 1 week Dr Manish Calabrese wound treatment note:  Cleansed with normal saline  Redressed as ordered above     Standing Status:   Future     Standing Expiration Date:   5/25/2023    Debridement     This order was created via procedure documentation

## 2022-05-25 NOTE — PROGRESS NOTES
Patient ID: Vasu Bolaños is a 68 y o  male Date of Birth 1945     Chief Complaint  Chief Complaint   Patient presents with    Follow Up Wound Care Visit     Patient last seen aporx 2 weeks ago with no open wounds  Presents today with a wound on the R pre-tibial area sustained when he struck his leg coming through his front door  Allergies  Doxazosin, Iohexol, Lisinopril, Vancomycin, Imdur [isosorbide nitrate], Adhesive [medical tape], Cardura [doxazosin mesylate], Isosorbide, and Other    Assessment:    Open wound of right lower leg  Initial evaluation  Debrided as below  Wound management with medihoney, see wound orders below  Control DM  No harsh cleanser such as alcohol, peroxide, or antibacterial soap  Do not submerge in any body of water such as bath tub, hot tub, swimming pool, etc  Never leave wound open to air  Followup in 1 week or call sooner with questions or concerns       Diagnoses and all orders for this visit:    Open wound of right lower leg, initial encounter  -     lidocaine (LMX) 4 % cream  -     Wound cleansing and dressings; Future  -     Debridement    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 [facility]              Debridement   Wound 05/25/22 Traumatic Pretibial Right    Universal Protocol:  Consent: Verbal consent obtained  Consent given by: patient  Time out: Immediately prior to procedure a "time out" was called to verify the correct patient, procedure, equipment, support staff and site/side marked as required    Timeout called at: 5/25/2022 9:30 AM   Patient understanding: patient states understanding of the procedure being performed  Patient identity confirmed: verbally with patient      Performed by: physician  Debridement type: selective  Pain control: lidocaine 4%  Post-debridement measurements  Length (cm): 0 5  Width (cm): 0 6  Depth (cm): 0 1  Percent debrided: 100%  Surface Area (cm^2): 0 3  Area debrided (cm^2): 0 3  Volume (cm^3): 0 03  Devitalized tissue debrided: biofilm, slough and eschar  Instrument(s) utilized: curette  Bleeding: small  Hemostasis obtained with: pressure  Procedural pain (0-10): 0  Post-procedural pain: 0   Response to treatment: procedure was tolerated well          Plan:     Wound 05/25/22 Traumatic Pretibial Right (Active)   Wound Image Images linked 05/25/22 0927   Wound Description White 05/25/22 0932   Aura-wound Assessment Intact;Dry;Hyperpigmented 05/25/22 0932   Wound Length (cm) 0 5 cm 05/25/22 0932   Wound Width (cm) 0 6 cm 05/25/22 0932   Wound Depth (cm) 0 1 cm 05/25/22 0932   Wound Surface Area (cm^2) 0 3 cm^2 05/25/22 0932   Wound Volume (cm^3) 0 03 cm^3 05/25/22 0932   Calculated Wound Volume (cm^3) 0 03 cm^3 05/25/22 0932   Drainage Amount Scant 05/25/22 0932   Drainage Description Tan 05/25/22 0932   Non-staged Wound Description Full thickness 05/25/22 0932       Wound 05/25/22 Traumatic Pretibial Right (Active)   Date First Assessed: 05/25/22   Primary Wound Type: Traumatic  Location: Pretibial  Wound Location Orientation: Right       [REMOVED] Wound 03/22/19 Groin Left (Removed)   Resolved Date: 05/25/22  Date First Assessed/Time First Assessed: 03/22/19 1055   Location: Groin  Wound Location Orientation: Left  Incision's 1st Dressing: ADHESIVE SKN CLSR HISTOACRYL FLEX 0 5ML LF  Wound Outcome: Other (Comment)       [REMOVED] Wound 03/22/19 Leg Left (Removed)   Resolved Date: 05/25/22  Date First Assessed/Time First Assessed: 03/22/19 1055   Location: Leg  Wound Location Orientation: Left  Incision's 1st Dressing: ADHESIVE SKN CLSR HISTOACRYL FLEX 0 5ML LF  Wound Outcome: Other (Comment)       Subjective:        Patient presents for initial evaluation of a right lower extremity traumatic wound that developed 2 weeks ago after bumping the leg while walking through his front door  Patient's wife has been using medihoney on the wound and notes overall improvement    Patient is a type 1 diabetic, most recent A1c from a few weeks ago was 8 4  The following portions of the patient's history were reviewed and updated as appropriate:   He  has a past medical history of Acute venous embolism and thrombosis of deep vessels of proximal lower extremity ([facility] ), DARINEL (acute kidney injury) ([facility] ) (01/12/2016), Anesthesia, Cardiac disease, Chronic cough, Chronic pain disorder, COPD (chronic obstructive pulmonary disease) ([facility] ), Coronary artery disease, CPAP (continuous positive airway pressure) dependence, Diabetes mellitus ([facility] ), Disease of thyroid gland, DVT (deep venous thrombosis) ([facility] ), Heart failure with mid-range ejection fraction ([facility] ), Hyperlipidemia, Hypertension, Ischemic cardiomyopathy, Myocardial infarction [facility]), NSTEMI (non-ST elevated myocardial infarction) ([facility] ) (03/23/2019), Pulmonary emphysema ([facility] ), Pulmonary granuloma ([facility] ), Renal failure, and Sleep apnea    He   Patient Active Problem List    Diagnosis Date Noted    Open wound of right lower leg 05/25/2022    Class 1 obesity without serious comorbidity with body mass index (BMI) of 31 0 to 31 9 in adult 11/05/2021    Hypoxemic respiratory failure, chronic ([facility] ) 05/10/2021    Stenosis of carotid artery 08/24/2020    Thrombocytopenia, unspecified ([facility] ) 03/10/2020    Spinal stenosis of lumbar region with neurogenic claudication     S/P femoral-popliteal bypass surgery 01/09/2020    Anxiety with depression 04/07/2019    Chronic systolic congestive heart failure [facility] ) 03/01/2019    Diabetic neuropathy associated with type 1 diabetes mellitus ([facility] ) 01/14/2019    Lumbar disc herniation 08/01/2018    Lumbar radiculopathy 08/01/2018    Aortoiliac occlusive disease ([facility] ) 03/01/2018    Restrictive lung disease 01/30/2018    Centrilobular emphysema ([facility] ) 01/30/2018    Presence of drug coated stent in LAD coronary artery 01/24/2018    Coronary artery disease of native artery of native heart with stable angina pectoris ([facility] ) 01/24/2018    Presence of drug coated stent in left circumflex coronary artery 01/24/2018    Dyslipidemia 01/24/2018    Obstructive sleep apnea of adult 01/24/2018    Carotid artery stenosis, asymptomatic, bilateral 01/24/2018    CKD (chronic kidney disease) stage 3, GFR 30-59 ml/min ([facility]) 12/13/2016    Low back pain with sciatica 08/22/2016    Type 1 diabetes mellitus with diabetic peripheral angiopathy without gangrene ([facility] ) 01/12/2016    Benign hypertension with chronic kidney disease, stage III [facility] ) 08/12/2015    Renal cyst, right 08/12/2015    Vitamin D deficiency 05/22/2015    Renal artery stenosis ([facility]) 05/09/2015    Hypothyroidism, postablative 04/02/2013     He  has a past surgical history that includes Coronary angioplasty with stent; BYPASS FEMORAL-POPLITEAL; Cologuard (Historical) (2018); Cataract extraction; Vascular surgery; IR aortagram with run-off (3/14/2019); Cardiac surgery; pr thromboendartectmy femoral common (Left, 3/22/2019); pr vein bypass graft,fem-pop (Left, 3/22/2019); and Wound debridement (Left, 4/15/2019)  He  reports that he quit smoking about 14 years ago  His smoking use included cigarettes  He started smoking about 61 years ago  He has a 188 00 pack-year smoking history  He has never used smokeless tobacco  He reports current alcohol use  He reports that he does not use drugs  Current Outpatient Medications   Medication Sig Dispense Refill    aspirin 81 MG tablet Take 81 mg by mouth daily   cholecalciferol (VITAMIN D3) 1,000 units tablet Take 1,000 Units by mouth daily       clopidogrel (PLAVIX) 75 mg tablet Take 1 tablet (75 mg total) by mouth in the morning  90 tablet 3    Coenzyme Q10 (COQ-10) 200 MG CAPS Take 200 mg by mouth daily      docusate sodium (COLACE) 50 mg capsule Take by mouth 2 (two) times a day as needed        DULoxetine (Cymbalta) 30 mg delayed release capsule Take 1 capsule (30 mg total) by mouth in the morning   90 capsule 3    gabapentin (NEURONTIN) 300 mg capsule Take 1 capsule (300 mg total) by mouth in the morning and 1 capsule (300 mg total) in the evening  180 capsule 3    Glucagon (Baqsimi One Pack) 3 MG/DOSE POWD 1 Dose into each nostril as needed      Icosapent Ethyl (Vascepa) 1 g CAPS Take 1 capsule (1 g total) by mouth in the morning and 1 capsule (1 g total) before bedtime  180 capsule 3    insulin glargine (LANTUS) 100 units/mL subcutaneous injection Inject under the skin 40 units am- 10 units pm      insulin lispro (HUMALOG) 100 units/mL injection Inject 3 units with breakfast, 6 units at lunch, and 6 units at dinner (Patient taking differently: 4 (four) times a day Inject 3 units with breakfast, 6 units at lunch, and 6 units at dinner  Carbs counting) 10 mL 1    levothyroxine 175 mcg tablet Take 175 mcg by mouth in the morning   metoprolol succinate (TOPROL-XL) 25 mg 24 hr tablet Take 0 5 tablets (12 5 mg total) by mouth in the morning  45 tablet 3    nitroglycerin (NITROSTAT) 0 4 mg SL tablet Place 1 tablet (0 4 mg total) under the tongue every 5 (five) minutes as needed for chest pain 25 tablet 3    polyethylene glycol (MIRALAX) 17 g packet Take 17 g by mouth daily      rosuvastatin (CRESTOR) 20 MG tablet Take 1 tablet (20 mg total) by mouth in the morning  90 tablet 3    SF 1 1 % GEL BRUCH ONCE DAILY AT BEDTIME, BRUSH OF 1 MINUTE AS DIRECTED      Sodium Fluoride 5000 PPM 1 1 % PSTE BRUSH TWO TIMES A DAY AS DIRECTED      torsemide (DEMADEX) 10 mg tablet Take 1 tablet (10 mg total) by mouth in the morning  100 tablet 2    ULTICARE INSULIN SYRINGE 30G X 1/2" 1 ML MISC Use as directed  0     No current facility-administered medications for this visit  He is allergic to doxazosin, iohexol, lisinopril, vancomycin, imdur [isosorbide nitrate], adhesive [medical tape], cardura [doxazosin mesylate], isosorbide, and other       Review of Systems   Constitutional: Negative for chills and fever     HENT: Negative for congestion and sneezing  Respiratory: Negative for cough  Skin: Positive for wound  Psychiatric/Behavioral: Negative for agitation  Objective:       Wound 05/25/22 Traumatic Pretibial Right (Active)   Wound Image Images linked 05/25/22 0927   Wound Description White 05/25/22 0932   Aura-wound Assessment Intact;Dry;Hyperpigmented 05/25/22 0932   Wound Length (cm) 0 5 cm 05/25/22 0932   Wound Width (cm) 0 6 cm 05/25/22 0932   Wound Depth (cm) 0 1 cm 05/25/22 0932   Wound Surface Area (cm^2) 0 3 cm^2 05/25/22 0932   Wound Volume (cm^3) 0 03 cm^3 05/25/22 0932   Calculated Wound Volume (cm^3) 0 03 cm^3 05/25/22 0932   Drainage Amount Scant 05/25/22 0932   Drainage Description Tan 05/25/22 0932   Non-staged Wound Description Full thickness 05/25/22 0932       /88   Pulse 68   Temp (!) 96 °F (35 6 °C)   Resp 18     Physical Exam  Constitutional:       General: He is not in acute distress  Appearance: Normal appearance  He is obese  He is not ill-appearing, toxic-appearing or diaphoretic  HENT:      Head: Normocephalic and atraumatic  Right Ear: External ear normal       Left Ear: External ear normal    Eyes:      Conjunctiva/sclera: Conjunctivae normal    Cardiovascular:      Pulses:           Dorsalis pedis pulses are detected w/ Doppler on the right side  Posterior tibial pulses are detected w/ Doppler on the right side  Pulmonary:      Effort: Pulmonary effort is normal  No respiratory distress  Musculoskeletal:      Cervical back: Neck supple  Right lower leg: No edema  Skin:     Comments: See wound assessment   Neurological:      Mental Status: He is alert     Psychiatric:         Mood and Affect: Mood normal          Behavior: Behavior normal              Wound 05/25/22 Traumatic Pretibial Right (Active)   Wound Image   05/25/22 0927   Wound Description White 05/25/22 0932   Aura-wound Assessment Intact;Dry;Hyperpigmented 05/25/22 0932   Wound Length (cm) 0 5 cm 05/25/22 0932   Wound Width (cm) 0 6 cm 05/25/22 0932   Wound Depth (cm) 0 1 cm 05/25/22 0932   Wound Surface Area (cm^2) 0 3 cm^2 05/25/22 0932   Wound Volume (cm^3) 0 03 cm^3 05/25/22 0932   Calculated Wound Volume (cm^3) 0 03 cm^3 05/25/22 0932   Drainage Amount Scant 05/25/22 0932   Drainage Description Tan 05/25/22 0932   Non-staged Wound Description Full thickness 05/25/22 0932                       Wound Instructions:  Orders Placed This Encounter   Procedures    Wound cleansing and dressings     R lower leg:  Wash your hands with soap and water  Remove old dressing, discard into plastic bag and place in trash  Cleanse the wound with mild soap (such as Dove) and rinse well with clear water prior to applying a clean dressing  Do not use tissue or cotton balls  Do not scrub the wound  Pat dry using gauze  Shower yes; cleanse as above in shower    Apply medihoney to the wound bed  Cover with gauze  Secure with tape  Change dressing daily    Follow up in 1 week Dr Aneta Alvarez wound treatment note:  Cleansed with normal saline  Redressed as ordered above     Standing Status:   Future     Standing Expiration Date:   5/25/2023    Debridement     This order was created via procedure documentation        Diagnosis ICD-10-CM Associated Orders   1  Open wound of right lower leg, initial encounter  S81 801A lidocaine (LMX) 4 % cream     Wound cleansing and dressings     Debridement   2   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 ([facility] )  E10 [id number]

## 2022-05-25 NOTE — ASSESSMENT & PLAN NOTE
Initial evaluation  Debrided as below  Wound management with medihoney, see wound orders below  Control DM  No harsh cleanser such as alcohol, peroxide, or antibacterial soap  Do not submerge in any body of water such as bath tub, hot tub, swimming pool, etc  Never leave wound open to air  Followup in 1 week or call sooner with questions or concerns

## 2022-05-31 DIAGNOSIS — J90 PLEURAL EFFUSION: Primary | ICD-10-CM

## 2022-06-04 ENCOUNTER — HOSPITAL ENCOUNTER (INPATIENT)
Facility: HOSPITAL | Age: 77
LOS: 2 days | Discharge: HOME | DRG: 309 | End: 2022-06-06
Attending: EMERGENCY MEDICINE | Admitting: HOSPITALIST
Payer: MEDICARE

## 2022-06-04 ENCOUNTER — APPOINTMENT (EMERGENCY)
Dept: RADIOLOGY | Facility: HOSPITAL | Age: 77
DRG: 309 | End: 2022-06-04
Payer: MEDICARE

## 2022-06-04 DIAGNOSIS — R77.8 ELEVATED TROPONIN I LEVEL: ICD-10-CM

## 2022-06-04 DIAGNOSIS — E78.5 DYSLIPIDEMIA: ICD-10-CM

## 2022-06-04 DIAGNOSIS — I48.91 ATRIAL FIBRILLATION, RAPID (HCC): Primary | ICD-10-CM

## 2022-06-04 DIAGNOSIS — J90 PLEURAL EFFUSION ON RIGHT: ICD-10-CM

## 2022-06-04 DIAGNOSIS — E10.49 OTHER DIABETIC NEUROLOGICAL COMPLICATION ASSOCIATED WITH TYPE 1 DIABETES MELLITUS (HCC): ICD-10-CM

## 2022-06-04 DIAGNOSIS — E10.51 TYPE 1 DIABETES MELLITUS WITH DIABETIC PERIPHERAL ANGIOPATHY WITHOUT GANGRENE (HCC): ICD-10-CM

## 2022-06-04 DIAGNOSIS — I48.91 ATRIAL FIBRILLATION WITH RVR (HCC): ICD-10-CM

## 2022-06-04 LAB
2HR DELTA HS TROPONIN: 151 NG/L
4HR DELTA HS TROPONIN: 308 NG/L
ALBUMIN SERPL BCP-MCNC: 4.1 G/DL (ref 3.5–5)
ALP SERPL-CCNC: 108 U/L (ref 34–104)
ALT SERPL W P-5'-P-CCNC: 11 U/L (ref 7–52)
ANION GAP SERPL CALCULATED.3IONS-SCNC: 11 MMOL/L (ref 4–13)
APTT PPP: 27 SECONDS (ref 23–37)
APTT PPP: 36 SECONDS (ref 23–37)
AST SERPL W P-5'-P-CCNC: 18 U/L (ref 13–39)
BASOPHILS # BLD AUTO: 0.04 THOUSANDS/ΜL (ref 0–0.1)
BASOPHILS NFR BLD AUTO: 1 % (ref 0–1)
BILIRUB SERPL-MCNC: 0.5 MG/DL (ref 0.2–1)
BNP SERPL-MCNC: 450 PG/ML (ref 0–100)
BUN SERPL-MCNC: 28 MG/DL (ref 5–25)
CALCIUM SERPL-MCNC: 9.5 MG/DL (ref 8.4–10.2)
CARDIAC TROPONIN I PNL SERPL HS: 217 NG/L
CARDIAC TROPONIN I PNL SERPL HS: 374 NG/L
CARDIAC TROPONIN I PNL SERPL HS: 66 NG/L
CHLORIDE SERPL-SCNC: 105 MMOL/L (ref 96–108)
CO2 SERPL-SCNC: 27 MMOL/L (ref 21–32)
CREAT SERPL-MCNC: 1.35 MG/DL (ref 0.6–1.3)
EOSINOPHIL # BLD AUTO: 0.22 THOUSAND/ΜL (ref 0–0.61)
EOSINOPHIL NFR BLD AUTO: 3 % (ref 0–6)
ERYTHROCYTE [DISTWIDTH] IN BLOOD BY AUTOMATED COUNT: 15.1 % (ref 11.6–15.1)
GFR SERPL CREATININE-BSD FRML MDRD: 50 ML/MIN/1.73SQ M
GLUCOSE SERPL-MCNC: 152 MG/DL (ref 65–140)
GLUCOSE SERPL-MCNC: 175 MG/DL (ref 65–140)
GLUCOSE SERPL-MCNC: 239 MG/DL (ref 65–140)
HCT VFR BLD AUTO: 45.7 % (ref 36.5–49.3)
HGB BLD-MCNC: 14.6 G/DL (ref 12–17)
IMM GRANULOCYTES # BLD AUTO: 0.02 THOUSAND/UL (ref 0–0.2)
IMM GRANULOCYTES NFR BLD AUTO: 0 % (ref 0–2)
INR PPP: 0.95 (ref 0.84–1.19)
LYMPHOCYTES # BLD AUTO: 1.82 THOUSANDS/ΜL (ref 0.6–4.47)
LYMPHOCYTES NFR BLD AUTO: 27 % (ref 14–44)
MCH RBC QN AUTO: 31.3 PG (ref 26.8–34.3)
MCHC RBC AUTO-ENTMCNC: 31.9 G/DL (ref 31.4–37.4)
MCV RBC AUTO: 98 FL (ref 82–98)
MONOCYTES # BLD AUTO: 0.59 THOUSAND/ΜL (ref 0.17–1.22)
MONOCYTES NFR BLD AUTO: 9 % (ref 4–12)
NEUTROPHILS # BLD AUTO: 4.09 THOUSANDS/ΜL (ref 1.85–7.62)
NEUTS SEG NFR BLD AUTO: 60 % (ref 43–75)
NRBC BLD AUTO-RTO: 0 /100 WBCS
PLATELET # BLD AUTO: 140 THOUSANDS/UL (ref 149–390)
PMV BLD AUTO: 12.2 FL (ref 8.9–12.7)
POTASSIUM SERPL-SCNC: 4.5 MMOL/L (ref 3.5–5.3)
PROT SERPL-MCNC: 8 G/DL (ref 6.4–8.4)
PROTHROMBIN TIME: 12.7 SECONDS (ref 11.6–14.5)
RBC # BLD AUTO: 4.67 MILLION/UL (ref 3.88–5.62)
SODIUM SERPL-SCNC: 143 MMOL/L (ref 135–147)
TSH SERPL DL<=0.05 MIU/L-ACNC: 0.52 UIU/ML (ref 0.45–4.5)
WBC # BLD AUTO: 6.78 THOUSAND/UL (ref 4.31–10.16)

## 2022-06-04 PROCEDURE — 84443 ASSAY THYROID STIM HORMONE: CPT | Performed by: EMERGENCY MEDICINE

## 2022-06-04 PROCEDURE — 85730 THROMBOPLASTIN TIME PARTIAL: CPT | Performed by: HOSPITALIST

## 2022-06-04 PROCEDURE — 93005 ELECTROCARDIOGRAM TRACING: CPT

## 2022-06-04 PROCEDURE — 71045 X-RAY EXAM CHEST 1 VIEW: CPT

## 2022-06-04 PROCEDURE — 82948 REAGENT STRIP/BLOOD GLUCOSE: CPT

## 2022-06-04 PROCEDURE — 99291 CRITICAL CARE FIRST HOUR: CPT | Performed by: EMERGENCY MEDICINE

## 2022-06-04 PROCEDURE — 96374 THER/PROPH/DIAG INJ IV PUSH: CPT

## 2022-06-04 PROCEDURE — 96365 THER/PROPH/DIAG IV INF INIT: CPT

## 2022-06-04 PROCEDURE — 85610 PROTHROMBIN TIME: CPT | Performed by: EMERGENCY MEDICINE

## 2022-06-04 PROCEDURE — 84484 ASSAY OF TROPONIN QUANT: CPT | Performed by: EMERGENCY MEDICINE

## 2022-06-04 PROCEDURE — 94760 N-INVAS EAR/PLS OXIMETRY 1: CPT

## 2022-06-04 PROCEDURE — 99223 1ST HOSP IP/OBS HIGH 75: CPT | Performed by: HOSPITALIST

## 2022-06-04 PROCEDURE — 99285 EMERGENCY DEPT VISIT HI MDM: CPT

## 2022-06-04 PROCEDURE — 85730 THROMBOPLASTIN TIME PARTIAL: CPT | Performed by: EMERGENCY MEDICINE

## 2022-06-04 PROCEDURE — 80053 COMPREHEN METABOLIC PANEL: CPT | Performed by: EMERGENCY MEDICINE

## 2022-06-04 PROCEDURE — 83880 ASSAY OF NATRIURETIC PEPTIDE: CPT | Performed by: EMERGENCY MEDICINE

## 2022-06-04 PROCEDURE — 36415 COLL VENOUS BLD VENIPUNCTURE: CPT | Performed by: EMERGENCY MEDICINE

## 2022-06-04 PROCEDURE — 85025 COMPLETE CBC W/AUTO DIFF WBC: CPT | Performed by: EMERGENCY MEDICINE

## 2022-06-04 PROCEDURE — 84484 ASSAY OF TROPONIN QUANT: CPT | Performed by: HOSPITALIST

## 2022-06-04 RX ORDER — LEVOTHYROXINE SODIUM 175 UG/1
175 TABLET ORAL
Status: DISCONTINUED | OUTPATIENT
Start: 2022-06-05 | End: 2022-06-06 | Stop reason: HOSPADM

## 2022-06-04 RX ORDER — HEPARIN SODIUM 1000 [USP'U]/ML
2000 INJECTION, SOLUTION INTRAVENOUS; SUBCUTANEOUS
Status: DISCONTINUED | OUTPATIENT
Start: 2022-06-04 | End: 2022-06-05

## 2022-06-04 RX ORDER — DULOXETIN HYDROCHLORIDE 30 MG/1
30 CAPSULE, DELAYED RELEASE ORAL DAILY
Status: DISCONTINUED | OUTPATIENT
Start: 2022-06-05 | End: 2022-06-06 | Stop reason: HOSPADM

## 2022-06-04 RX ORDER — INSULIN LISPRO 100 [IU]/ML
INJECTION, SOLUTION INTRAVENOUS; SUBCUTANEOUS
Status: DISCONTINUED | OUTPATIENT
Start: 2022-06-04 | End: 2022-06-06 | Stop reason: HOSPADM

## 2022-06-04 RX ORDER — METOPROLOL SUCCINATE 25 MG/1
25 TABLET, EXTENDED RELEASE ORAL 2 TIMES DAILY
Status: DISCONTINUED | OUTPATIENT
Start: 2022-06-04 | End: 2022-06-05

## 2022-06-04 RX ORDER — GABAPENTIN 300 MG/1
300 CAPSULE ORAL 2 TIMES DAILY
Status: DISCONTINUED | OUTPATIENT
Start: 2022-06-04 | End: 2022-06-06 | Stop reason: HOSPADM

## 2022-06-04 RX ORDER — CLOPIDOGREL BISULFATE 75 MG/1
75 TABLET ORAL
Status: DISCONTINUED | OUTPATIENT
Start: 2022-06-04 | End: 2022-06-05

## 2022-06-04 RX ORDER — ICOSAPENT ETHYL 1000 MG/1
1 CAPSULE ORAL 2 TIMES DAILY
Status: DISCONTINUED | OUTPATIENT
Start: 2022-06-04 | End: 2022-06-04

## 2022-06-04 RX ORDER — ATORVASTATIN CALCIUM 40 MG/1
40 TABLET, FILM COATED ORAL
Status: DISCONTINUED | OUTPATIENT
Start: 2022-06-04 | End: 2022-06-04

## 2022-06-04 RX ORDER — ASPIRIN 81 MG/1
81 TABLET, CHEWABLE ORAL
Status: DISCONTINUED | OUTPATIENT
Start: 2022-06-04 | End: 2022-06-06 | Stop reason: HOSPADM

## 2022-06-04 RX ORDER — INSULIN GLARGINE 100 [IU]/ML
8 INJECTION, SOLUTION SUBCUTANEOUS
Status: DISCONTINUED | OUTPATIENT
Start: 2022-06-04 | End: 2022-06-05

## 2022-06-04 RX ORDER — CLOPIDOGREL BISULFATE 75 MG/1
75 TABLET ORAL DAILY
Status: DISCONTINUED | OUTPATIENT
Start: 2022-06-04 | End: 2022-06-04

## 2022-06-04 RX ORDER — ROSUVASTATIN CALCIUM 20 MG/1
20 TABLET, COATED ORAL
Status: DISCONTINUED | OUTPATIENT
Start: 2022-06-05 | End: 2022-06-06 | Stop reason: HOSPADM

## 2022-06-04 RX ORDER — INSULIN GLARGINE 100 [IU]/ML
40 INJECTION, SOLUTION SUBCUTANEOUS EVERY MORNING
Status: DISCONTINUED | OUTPATIENT
Start: 2022-06-05 | End: 2022-06-06 | Stop reason: HOSPADM

## 2022-06-04 RX ORDER — ASPIRIN 81 MG/1
81 TABLET, CHEWABLE ORAL DAILY
Status: DISCONTINUED | OUTPATIENT
Start: 2022-06-04 | End: 2022-06-04

## 2022-06-04 RX ORDER — TORSEMIDE 10 MG/1
10 TABLET ORAL DAILY
Status: DISCONTINUED | OUTPATIENT
Start: 2022-06-05 | End: 2022-06-05

## 2022-06-04 RX ORDER — CALCIUM CARBONATE 200(500)MG
500 TABLET,CHEWABLE ORAL 3 TIMES DAILY PRN
Status: DISCONTINUED | OUTPATIENT
Start: 2022-06-04 | End: 2022-06-06 | Stop reason: HOSPADM

## 2022-06-04 RX ORDER — CHLORAL HYDRATE 500 MG
1000 CAPSULE ORAL EVERY 12 HOURS
Status: DISCONTINUED | OUTPATIENT
Start: 2022-06-04 | End: 2022-06-06 | Stop reason: HOSPADM

## 2022-06-04 RX ORDER — HEPARIN SODIUM 10000 [USP'U]/100ML
3-20 INJECTION, SOLUTION INTRAVENOUS
Status: DISCONTINUED | OUTPATIENT
Start: 2022-06-04 | End: 2022-06-05

## 2022-06-04 RX ORDER — HEPARIN SODIUM 1000 [USP'U]/ML
4000 INJECTION, SOLUTION INTRAVENOUS; SUBCUTANEOUS ONCE
Status: COMPLETED | OUTPATIENT
Start: 2022-06-04 | End: 2022-06-04

## 2022-06-04 RX ORDER — ENOXAPARIN SODIUM 100 MG/ML
40 INJECTION SUBCUTANEOUS EVERY 24 HOURS
Status: DISCONTINUED | OUTPATIENT
Start: 2022-06-04 | End: 2022-06-05

## 2022-06-04 RX ORDER — DILTIAZEM HYDROCHLORIDE 5 MG/ML
10 INJECTION INTRAVENOUS ONCE
Status: COMPLETED | OUTPATIENT
Start: 2022-06-04 | End: 2022-06-04

## 2022-06-04 RX ORDER — HEPARIN SODIUM 1000 [USP'U]/ML
4000 INJECTION, SOLUTION INTRAVENOUS; SUBCUTANEOUS
Status: DISCONTINUED | OUTPATIENT
Start: 2022-06-04 | End: 2022-06-05

## 2022-06-04 RX ADMIN — HEPARIN SODIUM 11.1 UNITS/KG/HR: 10000 INJECTION, SOLUTION INTRAVENOUS at 13:29

## 2022-06-04 RX ADMIN — OMEGA-3 FATTY ACIDS CAP 1000 MG 1000 MG: 1000 CAP at 21:35

## 2022-06-04 RX ADMIN — DILTIAZEM HYDROCHLORIDE 10 MG/HR: 5 INJECTION INTRAVENOUS at 12:38

## 2022-06-04 RX ADMIN — ASPIRIN 81 MG CHEWABLE TABLET 81 MG: 81 TABLET CHEWABLE at 21:35

## 2022-06-04 RX ADMIN — DILTIAZEM HYDROCHLORIDE 10 MG: 5 INJECTION INTRAVENOUS at 12:38

## 2022-06-04 RX ADMIN — CLOPIDOGREL BISULFATE 75 MG: 75 TABLET ORAL at 21:35

## 2022-06-04 RX ADMIN — INSULIN GLARGINE 8 UNITS: 100 INJECTION, SOLUTION SUBCUTANEOUS at 22:36

## 2022-06-04 RX ADMIN — HEPARIN SODIUM 4000 UNITS: 1000 INJECTION INTRAVENOUS; SUBCUTANEOUS at 13:25

## 2022-06-04 RX ADMIN — GABAPENTIN 300 MG: 300 CAPSULE ORAL at 17:16

## 2022-06-04 RX ADMIN — INSULIN LISPRO 6 UNITS: 100 INJECTION, SOLUTION INTRAVENOUS; SUBCUTANEOUS at 16:00

## 2022-06-04 RX ADMIN — INSULIN LISPRO 9 UNITS: 100 INJECTION, SOLUTION INTRAVENOUS; SUBCUTANEOUS at 19:53

## 2022-06-04 RX ADMIN — METOPROLOL SUCCINATE 25 MG: 25 TABLET, EXTENDED RELEASE ORAL at 21:35

## 2022-06-04 RX ADMIN — HEPARIN SODIUM 4000 UNITS: 1000 INJECTION INTRAVENOUS; SUBCUTANEOUS at 22:37

## 2022-06-04 NOTE — ASSESSMENT & PLAN NOTE
Lab Results   Component Value Date    HGBA1C 8 4 (H) 05/10/2022       No results for input(s): POCGLU in the last 72 hours  Blood Sugar Average: Last 72 hrs:   longstanding history of diabetes  Takes Lantus 40 units q a m  And 8 units q p m    Have entered carb correction scale as non formulary  Endocrinology consult

## 2022-06-04 NOTE — H&P
[facility]  H&P- Meron Garcia 1945, 68 y o  male MRN: [id number]  Unit/Bed#: ED 24 Encounter: [id number]  Primary Care Provider: Mariela Matt MD   Date and time admitted to hospital: 6/4/2022 12:01 PM    * Atrial fibrillation with RVR ([address] )  Assessment & Plan  · POA with shortness of breath and chest discomfort, found to be in afib with RVR  · Received cardizem bolus with improvement in rates  Maintained on cardizem infusion   · Appears comfortable now  · Cardiology consulted  · Heparin infusion initiated     Type 1 diabetes mellitus with diabetic peripheral angiopathy without gangrene [facility])  Assessment & Plan  Lab Results   Component Value Date    HGBA1C 8 4 (H) 05/10/2022       No results for input(s): POCGLU in the last 72 hours  Blood Sugar Average: Last 72 hrs:   longstanding history of diabetes  Takes Lantus 40 units q a m  And 8 units q p m    Have entered carb correction scale as non formulary  Endocrinology consult    Restrictive lung disease  Assessment & Plan  · Follows with Dr Blanka Yarbrough     Coronary artery disease of native artery of native heart with stable angina pectoris [facility])  Assessment & Plan  · Extensive history of PCI with OJ placements (reports 11 stents in total)  · Cont plavix  · On toprol; will hold until rate control strategy determined by cardiology  · Cont icosapent     Obstructive sleep apnea of adult  Assessment & Plan  · CPAP ordered at night     Anxiety with depression  Assessment & Plan  · Cont cymbalta     CKD (chronic kidney disease) stage 3, GFR 30-59 ml/min [facility])  Assessment & Plan  Lab Results   Component Value Date    EGFR 50 06/04/2022    EGFR 42 05/10/2022    EGFR 41 01/06/2022    CREATININE 1 35 (H) 06/04/2022    CREATININE 1 56 (H) 05/10/2022    CREATININE 1 60 (H) 01/06/2022   noted, stable   Monitor with treatment     Chronic systolic congestive heart failure [facility])  Assessment & Plan  Wt Readings from Last 3 Encounters:   05/24/22 92 5 kg (204 lb)   05/23/22 93 1 kg (205 lb 3 2 oz)   05/18/22 92 8 kg (204 lb 8 oz)      Appears euvolemic and is at dry weight  Continue oral diuretics          Chronic respiratory failure with hypoxia (HCC)  Assessment & Plan  · On 3L of O2 at home  At baseline  VTE Pharmacologic Prophylaxis: VTE Score: 4 Moderate Risk (Score 3-4) - Pharmacological DVT Prophylaxis Ordered: heparin drip  Code Status: Level 1 - Full Code   Discussion with family: Updated  (wife) at bedside  Anticipated Length of Stay: Patient will be admitted on an inpatient basis with an anticipated length of stay of greater than 2 midnights secondary to AFib with RVR  Total Time for Visit, including Counseling / Coordination of Care: 90 minutes Greater than 50% of this total time spent on direct patient counseling and coordination of care  Chief Complaint:  Shortness of breath and chest pain    History of Present Illness:  Dominic Thurston is a 68 y o  male with a PMH of history of type I DM, combined CHF, extensive CAD history, chronic hypoxic respiratory failure with emphysema who presents with acute onset of shortness of breath and chest pain  Patient's symptoms started this morning when he woke up  Was in his usual state of health night prior  Denies any change to his medications  He is compliant with his medications  In the ED patient noted to be in AFib with RVR  He was started on Cardizem drip with improvement in his rate  At time of my assessment patient resting comfortably  He denies chest pain at rest   No shortness of breath at rest   Patient reportedly at his dry weight of around 202 lb       Review of Systems:  Review of Systems   Constitutional: Negative for chills and fever  HENT: Negative  Respiratory: Positive for shortness of breath  Cardiovascular: Positive for chest pain and palpitations  Gastrointestinal: Negative  Genitourinary: Negative  Musculoskeletal: Negative  Neurological: Negative  All other systems reviewed and are negative        Past Medical and Surgical History:   Past Medical History:   Diagnosis Date    Acute venous embolism and thrombosis of deep vessels of proximal lower extremity ([facility] )     resolved 04/04/2015    DARINEL (acute kidney injury) ([facility] ) 01/12/2016    Anesthesia     RESPIRATORY ISSUES DUE TO SLEEP APNEA    Cardiac disease     heart attack, stents x 4    Chronic cough     resolved 02/04/2016    Chronic pain disorder     intermitent claudication    COPD (chronic obstructive pulmonary disease) ([facility]     Coronary artery disease     CPAP (continuous positive airway pressure) dependence     Diabetes mellitus ([facility] )     Disease of thyroid gland     DVT (deep venous thrombosis) ([facility])     Heart failure with mid-range ejection fraction ([facility])     Hyperlipidemia     Hypertension     Ischemic cardiomyopathy     last assessed 09/26/2017    Myocardial infarction [facility])     MI +2 2015,2018    NSTEMI (non-ST elevated myocardial infarction) ([facility] ) 03/23/2019    Pulmonary emphysema (HCC)     Pulmonary granuloma ([facility])     resolved 02/03/2017    Renal failure     Sleep apnea        Past Surgical History:   Procedure Laterality Date    BYPASS FEMORAL-POPLITEAL      initial stenosis with stent left, 7 x 100 smart stent onset 02/24/2014    CARDIAC SURGERY      cardiac stents    CATARACT EXTRACTION      COLOGUARD (HISTORICAL)  2018    CORONARY ANGIOPLASTY WITH STENT PLACEMENT      x4    IR AORTAGRAM WITH RUN-OFF  3/14/2019    ND THROMBOENDARTECTMY FEMORAL COMMON Left 3/22/2019    Procedure: ENDARTERECTOMY ARTERIAL FEMORAL WITH PATCH ANGIOPLASTY, BALLOON ANGIOPLASTY, STENT;  Surgeon: Troy Banegas MD;  Location: [facility];  Service: Vascular    ND VEIN BYPASS GRAFT,FEM-POP Left 3/22/2019    Procedure: BYPASS FEMORAL-POPLITEAL WITH COMPLETION ARTERIOGRAM;  Surgeon: Troy Banegas MD;  [facility]: [facility];  Service: Vascular    VASCULAR SURGERY stent placement    WOUND DEBRIDEMENT Left 4/15/2019    Procedure: DEBRIDEMENT WOUND AND DRESSING CHANGE [facility] OUT) left groin with VAC;  Surgeon: Adi Jang DO;  Location: [facility];  Service: Vascular       Meds/Allergies:  Prior to Admission medications    Medication Sig Start Date End Date Taking? Authorizing Provider   aspirin 81 MG tablet Take 81 mg by mouth daily  Yes Historical Provider, MD   cholecalciferol (VITAMIN D3) 1,000 units tablet Take 1,000 Units by mouth daily    Yes Historical Provider, MD   clopidogrel (PLAVIX) 75 mg tablet Take 1 tablet (75 mg total) by mouth in the morning  5/18/22  Yes Jamil Atkinson MD   Coenzyme Q10 (COQ-10) 200 MG CAPS Take 200 mg by mouth daily   Yes Historical Provider, MD   docusate sodium (COLACE) 50 mg capsule Take by mouth 2 (two) times a day as needed     Yes Historical Provider, MD   DULoxetine (Cymbalta) 30 mg delayed release capsule Take 1 capsule (30 mg total) by mouth in the morning  5/17/22  Yes Abundio Richards MD   gabapentin (NEURONTIN) 300 mg capsule Take 1 capsule (300 mg total) by mouth in the morning and 1 capsule (300 mg total) in the evening  5/17/22  Yes Abundio Richards MD   Glucagon (Baqsimi One Pack) 3 MG/DOSE POWD 1 Dose into each nostril as needed   Yes Historical Provider, MD   Icosapent Ethyl (Vascepa) 1 g CAPS Take 1 capsule (1 g total) by mouth in the morning and 1 capsule (1 g total) before bedtime   5/18/22  Yes Jamil Atkinson MD   insulin glargine (LANTUS) 100 units/mL subcutaneous injection Inject under the skin 40 units am- 10 units pm   Yes Historical Provider, MD   insulin lispro (HUMALOG) 100 units/mL injection Inject 3 units with breakfast, 6 units at lunch, and 6 units at dinner  Patient taking differently: 4 (four) times a day Inject 3 units with breakfast, 6 units at lunch, and 6 units at dinner  Carbs counting 10/25/18  Yes Abundio Richards MD   levothyroxine 175 mcg tablet Take 175 mcg by mouth in the morning  5/11/22  Yes Historical Provider, MD   metoprolol succinate (TOPROL-XL) 25 mg 24 hr tablet Take 0 5 tablets (12 5 mg total) by mouth in the morning  5/18/22  Yes Kerry Leone MD   nitroglycerin (NITROSTAT) 0 4 mg SL tablet Place 1 tablet (0 4 mg total) under the tongue every 5 (five) minutes as needed for chest pain 6/3/21  Yes Israel Hull MD   polyethylene glycol (MIRALAX) 17 g packet Take 17 g by mouth daily   Yes Historical Provider, MD   rosuvastatin (CRESTOR) 20 MG tablet Take 1 tablet (20 mg total) by mouth in the morning  5/18/22  Yes Kerry Leone MD   torsemide [facility]) 10 mg tablet Take 1 tablet (10 mg total) by mouth in the morning  5/18/22  Yes MD Sheila Joy Deacon INSULIN SYRINGE 30G X 1/2" 1 ML MISC Use as directed 11/7/18  Yes Historical Provider, MD   Wound Dressings ([facility] Wound/Burn Dressing) GEL Apply topically if needed   Yes Historical Provider, MD   SF 1 1 % GEL BRUCH ONCE DAILY AT BEDTIME, BRUSH OF 1 MINUTE AS DIRECTED 4/21/20 6/4/22  Historical Provider, MD   Sodium Fluoride 5000 PPM 1 1 % PSTE BRUSH TWO TIMES A DAY AS DIRECTED 11/16/21 6/4/22  Historical Provider, MD     I have reviewed home medications using recent Epic encounter  Allergies:    Allergies   Allergen Reactions    Doxazosin Myalgia     UNKNOWN  UNKNOWN  Muscle cramps    Iohexol      UNKNOWN    Lisinopril Cough     cough  Other reaction(s): CAMELLA SINENSIS (ZESTRIL) (cough)  cough    Vancomycin Hives    Imdur [Isosorbide Nitrate] Hives    Adhesive [Medical Tape]      Skin Breakdown    Cardura [Doxazosin Mesylate]      Muscle cramps    Isosorbide Rash    Other Other (See Comments)     Iv dye for cardiac cath  Renal failure very close to dialysis  But no dialysis       Social History:  Marital Status: /Civil Union   Occupation:   Patient Pre-hospital Living Situation: Home  Patient Pre-hospital Level of Mobility: walks  Patient Pre-hospital Diet Restrictions: diabetic   Substance Use History:   Social History     Substance and Sexual Activity   Alcohol Use Yes    Comment: 2 drinks per day     Social History     Tobacco Use   Smoking Status Former Smoker    Packs/day: 4 00    Years: 47 00    Pack years: 188 00    Types: Cigarettes    Start date:     Quit date:     Years since quittin 4   Smokeless Tobacco Never Used     Social History     Substance and Sexual Activity   Drug Use No       Family History:  Family History   Problem Relation Age of Onset    Heart disease Father         pacer placement    Hypertension Father     Kidney disease Father     Heart failure Father     Heart attack Father     Liver disease Father     Cirrhosis Mother         due to beer consumption    Liver disease Mother     Diabetes Other        Physical Exam:     Vitals:   Blood Pressure: 115/71 (22 1502)  Pulse: 70 (22 1502)  Temperature: 98 °F (36 7 °C) (22 1430)  Temp Source: Oral (22 1430)  Respirations: 20 (22 1502)  Height: 5' 7" (170 2 cm) (22 1430)  Weight - Scale: 93 kg (205 lb) (22 1430)  SpO2: 95 % (22 1502)    Physical Exam  Vitals and nursing note reviewed  Constitutional:       General: He is not in acute distress  Appearance: Normal appearance  He is not ill-appearing  HENT:      Head: Normocephalic and atraumatic  Mouth/Throat:      Mouth: Mucous membranes are moist       Pharynx: No oropharyngeal exudate  Cardiovascular:      Rate and Rhythm: Normal rate  Rhythm irregular  Heart sounds: No murmur heard  Pulmonary:      Effort: Pulmonary effort is normal  No respiratory distress  Breath sounds: No stridor  No wheezing  Abdominal:      General: Abdomen is flat  There is no distension  Palpations: Abdomen is soft  There is no mass  Tenderness: There is no abdominal tenderness  Hernia: No hernia is present  Musculoskeletal:         General: No swelling        Right lower leg: No edema  Left lower leg: No edema  Skin:     General: Skin is warm and dry  Coloration: Skin is not jaundiced  Neurological:      General: No focal deficit present  Mental Status: He is alert  Mental status is at baseline  He is disoriented  Additional Data:     Lab Results:  Results from last 7 days   Lab Units 06/04/22  1222   WBC Thousand/uL 6 78   HEMOGLOBIN g/dL 14 6   HEMATOCRIT % 45 7   PLATELETS Thousands/uL 140*   NEUTROS PCT % 60   LYMPHS PCT % 27   MONOS PCT % 9   EOS PCT % 3     Results from last 7 days   Lab Units 06/04/22  1222   SODIUM mmol/L 143   POTASSIUM mmol/L 4 5   CHLORIDE mmol/L 105   CO2 mmol/L 27   BUN mg/dL 28*   CREATININE mg/dL 1 35*   ANION GAP mmol/L 11   CALCIUM mg/dL 9 5   ALBUMIN g/dL 4 1   TOTAL BILIRUBIN mg/dL 0 50   ALK PHOS U/L 108*   ALT U/L 11   AST U/L 18   GLUCOSE RANDOM mg/dL 152*     Results from last 7 days   Lab Units 06/04/22  1222   INR  0 95                   Imaging: Personally reviewed the following imaging: chest xray  XR chest 1 view portable   ED Interpretation by Jerri Ledezma DO (06/04 1313)   Right-sided pleural effusion          EKG and Other Studies Reviewed on Admission:   · EKG: Atrial fibrillation    ** Please Note: This note has been constructed using a voice recognition system   **

## 2022-06-04 NOTE — ED PROVIDER NOTES
History  Chief Complaint   Patient presents with    Chest Pain     Patient arrives to the ER from home with complaints of chest pain  Took 3 nitros with minimal relief  Extensive cardiac history   Rapid Heart Rate       History provided by:  Patient  Malaise - 7 years or greater  Severity:  Moderate  Onset quality:  Gradual  Duration:  4 days  Timing:  Intermittent  Progression:  Waxing and waning  Chronicity:  New  Context comment:  Today last night with chest pain prompting ED visit  Relieved by:  None tried  Worsened by:  Nothing  Ineffective treatments:  None tried  Associated symptoms: chest pain    Associated symptoms: no abdominal pain, no cough, no diarrhea, no dizziness, no dysuria, no fever, no frequency, no headaches, no nausea, no shortness of breath and no vomiting    Chest pain:     Quality: aching      Severity:  Moderate    Onset quality:  Gradual    Duration:  2 hours    Timing:  Intermittent    Progression:  Waxing and waning    Chronicity:  Recurrent (Improved with nitroglycerin)      Prior to Admission Medications   Prescriptions Last Dose Informant Patient Reported? Taking? Coenzyme Q10 (COQ-10) 200 MG CAPS  Spouse/Significant Other Yes No   Sig: Take 200 mg by mouth daily   DULoxetine (Cymbalta) 30 mg delayed release capsule  Spouse/Significant Other No No   Sig: Take 1 capsule (30 mg total) by mouth in the morning  Glucagon (Baqsimi One Pack) 3 MG/DOSE POWD   Yes No   Si Dose into each nostril as needed   Icosapent Ethyl (Vascepa) 1 g CAPS   No No   Sig: Take 1 capsule (1 g total) by mouth in the morning and 1 capsule (1 g total) before bedtime     SF 1 1 % GEL  Spouse/Significant Other Yes No   Sig: BRUCH ONCE DAILY AT BEDTIME, BRUSH OF 1 MINUTE AS DIRECTED   Sodium Fluoride 5000 PPM 1 1 % PSTE  Spouse/Significant Other Yes No   Sig: BRUSH TWO TIMES A DAY AS DIRECTED   ULTICARE INSULIN SYRINGE 30G X 1/2" 1 ML MISC  Spouse/Significant Other Yes No   Sig: Use as directed   aspirin 81 MG tablet  Spouse/Significant Other Yes No   Sig: Take 81 mg by mouth daily  cholecalciferol (VITAMIN D3) 1,000 units tablet  Spouse/Significant Other Yes No   Sig: Take 1,000 Units by mouth daily    clopidogrel (PLAVIX) 75 mg tablet   No No   Sig: Take 1 tablet (75 mg total) by mouth in the morning  docusate sodium (COLACE) 50 mg capsule  Spouse/Significant Other Yes No   Sig: Take by mouth 2 (two) times a day as needed     gabapentin (NEURONTIN) 300 mg capsule  Spouse/Significant Other No No   Sig: Take 1 capsule (300 mg total) by mouth in the morning and 1 capsule (300 mg total) in the evening  insulin glargine (LANTUS) 100 units/mL subcutaneous injection  Spouse/Significant Other Yes No   Sig: Inject under the skin 40 units am- 10 units pm   insulin lispro (HUMALOG) 100 units/mL injection  Spouse/Significant Other No No   Sig: Inject 3 units with breakfast, 6 units at lunch, and 6 units at dinner   Patient taking differently: 4 (four) times a day Inject 3 units with breakfast, 6 units at lunch, and 6 units at dinner  Carbs counting   levothyroxine 175 mcg tablet  Spouse/Significant Other Yes No   Sig: Take 175 mcg by mouth in the morning  metoprolol succinate (TOPROL-XL) 25 mg 24 hr tablet   No No   Sig: Take 0 5 tablets (12 5 mg total) by mouth in the morning  nitroglycerin (NITROSTAT) 0 4 mg SL tablet  Spouse/Significant Other No No   Sig: Place 1 tablet (0 4 mg total) under the tongue every 5 (five) minutes as needed for chest pain   polyethylene glycol (MIRALAX) 17 g packet  Spouse/Significant Other Yes No   Sig: Take 17 g by mouth daily   rosuvastatin (CRESTOR) 20 MG tablet   No No   Sig: Take 1 tablet (20 mg total) by mouth in the morning  torsemide (DEMADEX) 10 mg tablet   No No   Sig: Take 1 tablet (10 mg total) by mouth in the morning        Facility-Administered Medications: None       Past Medical History:   Diagnosis Date    Acute venous embolism and thrombosis of deep vessels of proximal lower extremity ([facility] )     resolved 04/04/2015    DARINEL (acute kidney injury) ([facility] ) 01/12/2016    Anesthesia     RESPIRATORY ISSUES DUE TO SLEEP APNEA    Cardiac disease     heart attack, stents x 4    Chronic cough     resolved 02/04/2016    Chronic pain disorder     intermitent claudication    COPD (chronic obstructive pulmonary disease) [facility]     Coronary artery disease     CPAP (continuous positive airway pressure) dependence     Diabetes mellitus ([facility] )     Disease of thyroid gland     DVT (deep venous thrombosis) ([facility])     Heart failure with mid-range ejection fraction ([facility])     Hyperlipidemia     Hypertension     Ischemic cardiomyopathy     last assessed 09/26/2017    Myocardial infarction [facility])     MI +2 2015,2018    NSTEMI (non-ST elevated myocardial infarction) ([facility] ) 03/23/2019    Pulmonary emphysema ([facility])     Pulmonary granuloma ([facility])     resolved 02/03/2017    Renal failure     Sleep apnea        Past Surgical History:   Procedure Laterality Date    BYPASS FEMORAL-POPLITEAL      initial stenosis with stent left, 7 x 100 smart stent onset 02/24/2014    CARDIAC SURGERY      cardiac stents    CATARACT EXTRACTION      COLOGUARD (HISTORICAL)  2018    CORONARY ANGIOPLASTY WITH STENT PLACEMENT      x4    IR AORTAGRAM WITH RUN-OFF  3/14/2019    NJ THROMBOENDARTECTMY FEMORAL COMMON Left 3/22/2019    Procedure: ENDARTERECTOMY ARTERIAL FEMORAL WITH PATCH ANGIOPLASTY, BALLOON ANGIOPLASTY, STENT;  Surgeon: Mirta Pino MD;  [facility];  Service: Vascular    NJ VEIN BYPASS GRAFT,FEM-POP Left 3/22/2019    Procedure: BYPASS FEMORAL-POPLITEAL WITH COMPLETION ARTERIOGRAM;  Surgeon: Mirta Pino MD;  [facility];  Service: Vascular    VASCULAR SURGERY      stent placement    WOUND DEBRIDEMENT Left 4/15/2019    Procedure: DEBRIDEMENT WOUND AND DRESSING CHANGE ([address]) left groin with VAC;  Surgeon: Adi Jang DO;  [facility];  Service: Vascular Family History   Problem Relation Age of Onset    Heart disease Father         pacer placement    Hypertension Father     Kidney disease Father     Heart failure Father     Heart attack Father     Liver disease Father     Cirrhosis Mother         due to beer consumption    Liver disease Mother     Diabetes Other      I have reviewed and agree with the history as documented  E-Cigarette/Vaping    E-Cigarette Use Never User      E-Cigarette/Vaping Substances    Nicotine No     THC No     CBD No     Flavoring No     Other No     Unknown No      Social History     Tobacco Use    Smoking status: Former Smoker     Packs/day: 4 00     Years: 47 00     Pack years: 188 00     Types: Cigarettes     Start date:      Quit date:      Years since quittin 4    Smokeless tobacco: Never Used   Vaping Use    Vaping Use: Never used   Substance Use Topics    Alcohol use: Yes     Comment: 2 drinks per day    Drug use: No       Review of Systems   Constitutional: Negative for activity change, chills, diaphoresis and fever  HENT: Negative for congestion, sinus pressure and sore throat  Eyes: Negative for pain and visual disturbance  Respiratory: Negative for cough, chest tightness, shortness of breath, wheezing and stridor  Cardiovascular: Positive for chest pain  Negative for palpitations  Gastrointestinal: Negative for abdominal distention, abdominal pain, constipation, diarrhea, nausea and vomiting  Genitourinary: Negative for dysuria and frequency  Musculoskeletal: Negative for neck pain and neck stiffness  Skin: Negative for rash  Neurological: Negative for dizziness, speech difficulty, light-headedness, numbness and headaches  Physical Exam  Physical Exam  Vitals reviewed  Constitutional:       Appearance: He is well-developed  He is not diaphoretic  HENT:      Head: Normocephalic and atraumatic        Right Ear: External ear normal       Left Ear: External ear normal       Nose: Nose normal    Eyes:      General:         Right eye: No discharge  Left eye: No discharge  Pupils: Pupils are equal, round, and reactive to light  Neck:      Trachea: No tracheal deviation  Cardiovascular:      Rate and Rhythm: Tachycardia present  Rhythm irregular  Heart sounds: Normal heart sounds  No murmur heard  Pulmonary:      Effort: Pulmonary effort is normal  No respiratory distress  Breath sounds: No stridor  Examination of the right-upper field reveals decreased breath sounds  Decreased breath sounds present  Abdominal:      General: There is no distension  Palpations: Abdomen is soft  Tenderness: There is no abdominal tenderness  There is no guarding or rebound  Musculoskeletal:         General: Normal range of motion  Cervical back: Normal range of motion and neck supple  Skin:     General: Skin is warm and dry  Coloration: Skin is not pale  Findings: No erythema  Neurological:      General: No focal deficit present  Mental Status: He is alert and oriented to person, place, and time           Vital Signs  ED Triage Vitals   Temperature Pulse Respirations Blood Pressure SpO2   06/04/22 1154 06/04/22 1154 06/04/22 1154 06/04/22 1201 06/04/22 1154   97 8 °F (36 6 °C) (!) 144 20 91/53 95 %      Temp Source Heart Rate Source Patient Position - Orthostatic VS BP Location FiO2 (%)   06/04/22 1154 06/04/22 1154 06/04/22 1154 06/04/22 1154 --   Oral Monitor Sitting Left arm       Pain Score       --                  Vitals:    06/04/22 1218 06/04/22 1230 06/04/22 1245 06/04/22 1300   BP: 147/70 115/73     Pulse:  (!) 136 102 98   Patient Position - Orthostatic VS:             Visual Acuity      ED Medications  Medications   heparin (porcine) injection 4,000 Units (has no administration in time range)   heparin (porcine) 25,000 units in 0 45% NaCl 250 mL infusion (premix) (has no administration in time range)   heparin (porcine) injection 4,000 Units (has no administration in time range)   heparin (porcine) injection 2,000 Units (has no administration in time range)   diltiazem (CARDIZEM) injection 10 mg (10 mg Intravenous Given 6/4/22 1238)   diltiazem (CARDIZEM) 125 mg in sodium chloride 0 9 % 125 mL infusion (10 mg/hr Intravenous New Bag 6/4/22 1238)       Diagnostic Studies  Results Reviewed     Procedure Component Value Units Date/Time    TSH, 3rd generation with Free T4 reflex [[id number]]  (Normal) Collected: 06/04/22 1222    Lab Status: Final result Specimen: Blood from Arm, Right Updated: 06/04/22 1304     TSH 3RD GENERATON 0 521 uIU/mL     Narrative:      Patients undergoing fluorescein dye angiography may retain small amounts of fluorescein in the body for 48-72 hours post procedure  Samples containing fluorescein can produce falsely depressed TSH values  If the patient had this procedure,a specimen should be resubmitted post fluorescein clearance        APTT six (6) hours after Heparin bolus/drip initiation or dosing change [[id number]]     Lab Status: No result Specimen: Blood     HS Troponin 0hr (reflex protocol) [[id number]]  (Abnormal) Collected: 06/04/22 1222    Lab Status: Final result Specimen: Blood from Arm, Right Updated: 06/04/22 1255     hs TnI 0hr 66 ng/L     HS Troponin I 2hr [[id number]]     Lab Status: No result Specimen: Blood     B-Type Natriuretic Peptide(BNP), AN, CA, EA Campuses Only [[id number]]  (Abnormal) Collected: 06/04/22 1222    Lab Status: Final result Specimen: Blood from Arm, Right Updated: 06/04/22 1253      pg/mL     Comprehensive metabolic panel [[id number]]  (Abnormal) Collected: 06/04/22 1222    Lab Status: Final result Specimen: Blood from Arm, Right Updated: 06/04/22 1245     Sodium 143 mmol/L      Potassium 4 5 mmol/L      Chloride 105 mmol/L      CO2 27 mmol/L      ANION GAP 11 mmol/L      BUN 28 mg/dL      Creatinine 1 35 mg/dL      Glucose 152 mg/dL      Calcium 9 5 mg/dL      AST 18 U/L ALT 11 U/L      Alkaline Phosphatase 108 U/L      Total Protein 8 0 g/dL      Albumin 4 1 g/dL      Total Bilirubin 0 50 mg/dL      eGFR 50 ml/min/1 73sq m     Narrative:      [place] guidelines for Chronic Kidney Disease (CKD):     Stage 1 with normal or high GFR (GFR > 90 mL/min/1 73 square meters)    Stage 2 Mild CKD (GFR = 60-89 mL/min/1 73 square meters)    Stage 3A Moderate CKD (GFR = 45-59 mL/min/1 73 square meters)    Stage 3B Moderate CKD (GFR = 30-44 mL/min/1 73 square meters)    Stage 4 Severe CKD (GFR = 15-29 mL/min/1 73 square meters)    Stage 5 End Stage CKD (GFR <15 mL/min/1 73 square meters)  Note: GFR calculation is accurate only with a steady state creatinine    Protime-INR [[id number]]  (Normal) Collected: 06/04/22 1222    Lab Status: Final result Specimen: Blood from Arm, Right Updated: 06/04/22 1241     Protime 12 7 seconds      INR 0 95    APTT [[id number]]  (Normal) Collected: 06/04/22 1222    Lab Status: Final result Specimen: Blood from Arm, Right Updated: 06/04/22 1241     PTT 27 seconds     CBC and differential [[id number]]  (Abnormal) Collected: 06/04/22 1222    Lab Status: Final result Specimen: Blood from Arm, Right Updated: 06/04/22 1231     WBC 6 78 Thousand/uL      RBC 4 67 Million/uL      Hemoglobin 14 6 g/dL      Hematocrit 45 7 %      MCV 98 fL      MCH 31 3 pg      MCHC 31 9 g/dL      RDW 15 1 %      MPV 12 2 fL      Platelets 351 Thousands/uL      nRBC 0 /100 WBCs      Neutrophils Relative 60 %      Immat GRANS % 0 %      Lymphocytes Relative 27 %      Monocytes Relative 9 %      Eosinophils Relative 3 %      Basophils Relative 1 %      Neutrophils Absolute 4 09 Thousands/µL      Immature Grans Absolute 0 02 Thousand/uL      Lymphocytes Absolute 1 82 Thousands/µL      Monocytes Absolute 0 59 Thousand/µL      Eosinophils Absolute 0 22 Thousand/µL      Basophils Absolute 0 04 Thousands/µL                  XR chest 1 view portable   ED Interpretation by Emilee Quijano DO (06/04 1313)   Right-sided pleural effusion                 Procedures  ECG 12 Lead Documentation Only    Date/Time: 6/4/2022 12:00 PM  Performed by: Emilee Quijano DO  Authorized by: Emilee Quijano DO     ECG reviewed by me, the ED Provider: yes    Patient location:  ED  Interpretation:     Interpretation: abnormal    Rate:     ECG rate assessment: tachycardic    Rhythm:     Rhythm: sinus rhythm    Ectopy:     Ectopy: none    QRS:     QRS axis:  Normal    QRS intervals:  Normal  Conduction:     Conduction: normal    ST segments:     ST segments:  Non-specific  T waves:     T waves: non-specific      CriticalCare Time  Performed by: Emilee Quijano DO  Authorized by: Emilee Quijano DO     Critical care provider statement:     Critical care time (minutes):  45    Critical care time was exclusive of:  Separately billable procedures and treating other patients    Critical care was necessary to treat or prevent imminent or life-threatening deterioration of the following conditions: Atrial fibrillation with rapid ventricular response, requiring IV Cardizem infusion  Critical care was time spent personally by me on the following activities:  Obtaining history from patient or surrogate, development of treatment plan with patient or surrogate, discussions with consultants, evaluation of patient's response to treatment, examination of patient, ordering and performing treatments and interventions, ordering and review of laboratory studies, ordering and review of radiographic studies, re-evaluation of patient's condition and review of old charts             ED Course                               SBIRT 20yo+    Flowsheet Row Most Recent Value   SBIRT (23 yo +)    In order to provide better care to our patients, we are screening all of our patients for alcohol and drug use  Would it be okay to ask you these screening questions?  Unable to answer at this time Filed at: 06/04/2022 1157                    [facility]  Number of Diagnoses or Management Options  Atrial fibrillation, rapid [facility]): new and requires workup  Elevated troponin I level: new and requires workup  Pleural effusion on right: new and requires workup  Diagnosis management comments:         AFib with RVR, unclear exact onset of symptoms  Admitted to Internal Medicine Service now rate controlled on a Cardizem drip, started on heparin       Amount and/or Complexity of Data Reviewed  Clinical lab tests: ordered and reviewed  Tests in the radiology section of CPT®: ordered and reviewed  Decide to obtain previous medical records or to obtain history from someone other than the patient: yes  Obtain history from someone other than the patient: yes  Review and summarize past medical records: yes  Discuss the patient with other providers: yes  Independent visualization of images, tracings, or specimens: yes        Disposition  Final diagnoses:   Atrial fibrillation, rapid ([facility] )   Elevated troponin I level   Pleural effusion on right     Time reflects when diagnosis was documented in both MDM as applicable and the Disposition within this note     Time User Action Codes Description Comment    6/4/2022 12:56 PM Annabella Monson Add [I48 91] Atrial fibrillation, rapid ([address] )     6/4/2022  1:00 PM Chris Quintana [R77 8] Elevated troponin I level     6/4/2022  1:13 PM Annabella Monson Add [J90] Pleural effusion on right       ED Disposition     ED Disposition   Admit    Condition   Stable    Date/Time   Sat Jun 4, 2022 12:56 PM    Comment   Case was discussed with Dr Crow Newman and the patient's admission status was agreed to be Admission Status: inpatient status to the service of Dr Crow Newman   Follow-up Information    None         Patient's Medications   Discharge Prescriptions    No medications on file       No discharge procedures on file      PDMP Review     None          ED Provider  Electronically Signed by           Handy Arce [address] [facility],   06/04/22 6548

## 2022-06-04 NOTE — ASSESSMENT & PLAN NOTE
Wt Readings from Last 3 Encounters:   05/24/22 92 5 kg (204 lb)   05/23/22 93 1 kg (205 lb 3 2 oz)   05/18/22 92 8 kg (204 lb 8 oz)      Appears euvolemic and is at dry weight  Continue oral diuretics

## 2022-06-04 NOTE — ED NOTES
Talked to Dr Crow Newman regarding patient's home medications per wife's concern  Provider to update medication orders  Reviewed patient's current cardiac rythnm and titrated Cardizem drip down to 2 5mg/hr, instructed by provider to continue at 2 5mg/hr unless otherwise instructed by cardiology  HR between 65-70bpm, BP is stable       Em Ferro RN  06/04/22 4880

## 2022-06-04 NOTE — ASSESSMENT & PLAN NOTE
· Extensive history of PCI with [facility] placements (reports 11 stents in total)  · Cont plavix  · On toprol; will hold until rate control strategy determined by cardiology  · Cont icosapent

## 2022-06-04 NOTE — ASSESSMENT & PLAN NOTE
· POA with shortness of breath and chest discomfort, found to be in afib with RVR  · Received cardizem bolus with improvement in rates   Maintained on cardizem infusion   · Appears comfortable now  · Cardiology consulted  · Heparin infusion initiated

## 2022-06-05 PROBLEM — J90 PLEURAL EFFUSION: Status: ACTIVE | Noted: 2022-06-05

## 2022-06-05 LAB
ANION GAP SERPL CALCULATED.3IONS-SCNC: 7 MMOL/L (ref 4–13)
APTT PPP: 122 SECONDS (ref 23–37)
ATRIAL RATE: 140 BPM
ATRIAL RATE: 147 BPM
BASOPHILS # BLD AUTO: 0.04 THOUSANDS/ΜL (ref 0–0.1)
BASOPHILS NFR BLD AUTO: 1 % (ref 0–1)
BUN SERPL-MCNC: 39 MG/DL (ref 5–25)
CALCIUM SERPL-MCNC: 8.6 MG/DL (ref 8.4–10.2)
CHLORIDE SERPL-SCNC: 107 MMOL/L (ref 96–108)
CO2 SERPL-SCNC: 25 MMOL/L (ref 21–32)
CREAT SERPL-MCNC: 1.52 MG/DL (ref 0.6–1.3)
EOSINOPHIL # BLD AUTO: 0.29 THOUSAND/ΜL (ref 0–0.61)
EOSINOPHIL NFR BLD AUTO: 5 % (ref 0–6)
ERYTHROCYTE [DISTWIDTH] IN BLOOD BY AUTOMATED COUNT: 15.2 % (ref 11.6–15.1)
GFR SERPL CREATININE-BSD FRML MDRD: 43 ML/MIN/1.73SQ M
GLUCOSE SERPL-MCNC: 105 MG/DL (ref 65–140)
GLUCOSE SERPL-MCNC: 142 MG/DL (ref 65–140)
GLUCOSE SERPL-MCNC: 198 MG/DL (ref 65–140)
GLUCOSE SERPL-MCNC: 259 MG/DL (ref 65–140)
GLUCOSE SERPL-MCNC: 86 MG/DL (ref 65–140)
HCT VFR BLD AUTO: 36.3 % (ref 36.5–49.3)
HGB BLD-MCNC: 11.8 G/DL (ref 12–17)
IMM GRANULOCYTES # BLD AUTO: 0.02 THOUSAND/UL (ref 0–0.2)
IMM GRANULOCYTES NFR BLD AUTO: 0 % (ref 0–2)
LYMPHOCYTES # BLD AUTO: 1.72 THOUSANDS/ΜL (ref 0.6–4.47)
LYMPHOCYTES NFR BLD AUTO: 28 % (ref 14–44)
MAGNESIUM SERPL-MCNC: 2 MG/DL (ref 1.9–2.7)
MCH RBC QN AUTO: 31.6 PG (ref 26.8–34.3)
MCHC RBC AUTO-ENTMCNC: 32.5 G/DL (ref 31.4–37.4)
MCV RBC AUTO: 97 FL (ref 82–98)
MONOCYTES # BLD AUTO: 0.74 THOUSAND/ΜL (ref 0.17–1.22)
MONOCYTES NFR BLD AUTO: 12 % (ref 4–12)
NEUTROPHILS # BLD AUTO: 3.36 THOUSANDS/ΜL (ref 1.85–7.62)
NEUTS SEG NFR BLD AUTO: 54 % (ref 43–75)
NRBC BLD AUTO-RTO: 0 /100 WBCS
PLATELET # BLD AUTO: 103 THOUSANDS/UL (ref 149–390)
PMV BLD AUTO: 12.7 FL (ref 8.9–12.7)
POTASSIUM SERPL-SCNC: 3.9 MMOL/L (ref 3.5–5.3)
PR INTERVAL: 0 MS
QRS AXIS: -50 DEGREES
QRS AXIS: -55 DEGREES
QRSD INTERVAL: 102 MS
QRSD INTERVAL: 106 MS
QT INTERVAL: 300 MS
QT INTERVAL: 304 MS
QTC INTERVAL: 453 MS
QTC INTERVAL: 458 MS
RBC # BLD AUTO: 3.74 MILLION/UL (ref 3.88–5.62)
SODIUM SERPL-SCNC: 139 MMOL/L (ref 135–147)
T WAVE AXIS: 145 DEGREES
T WAVE AXIS: 160 DEGREES
VENTRICULAR RATE: 136 BPM
VENTRICULAR RATE: 137 BPM
WBC # BLD AUTO: 6.17 THOUSAND/UL (ref 4.31–10.16)

## 2022-06-05 PROCEDURE — 94760 N-INVAS EAR/PLS OXIMETRY 1: CPT

## 2022-06-05 PROCEDURE — 99222 1ST HOSP IP/OBS MODERATE 55: CPT | Performed by: INTERNAL MEDICINE

## 2022-06-05 PROCEDURE — 93010 ELECTROCARDIOGRAM REPORT: CPT | Performed by: INTERNAL MEDICINE

## 2022-06-05 PROCEDURE — 82948 REAGENT STRIP/BLOOD GLUCOSE: CPT

## 2022-06-05 PROCEDURE — 85025 COMPLETE CBC W/AUTO DIFF WBC: CPT | Performed by: HOSPITALIST

## 2022-06-05 PROCEDURE — 99223 1ST HOSP IP/OBS HIGH 75: CPT | Performed by: INTERNAL MEDICINE

## 2022-06-05 PROCEDURE — 99232 SBSQ HOSP IP/OBS MODERATE 35: CPT | Performed by: GENERAL PRACTICE

## 2022-06-05 PROCEDURE — 80048 BASIC METABOLIC PNL TOTAL CA: CPT | Performed by: HOSPITALIST

## 2022-06-05 PROCEDURE — 85730 THROMBOPLASTIN TIME PARTIAL: CPT | Performed by: HOSPITALIST

## 2022-06-05 PROCEDURE — 83735 ASSAY OF MAGNESIUM: CPT | Performed by: HOSPITALIST

## 2022-06-05 RX ORDER — INSULIN LISPRO 100 [IU]/ML
1-5 INJECTION, SOLUTION INTRAVENOUS; SUBCUTANEOUS
Status: DISCONTINUED | OUTPATIENT
Start: 2022-06-05 | End: 2022-06-06 | Stop reason: HOSPADM

## 2022-06-05 RX ORDER — POTASSIUM CHLORIDE 20 MEQ/1
20 TABLET, EXTENDED RELEASE ORAL ONCE
Status: COMPLETED | OUTPATIENT
Start: 2022-06-05 | End: 2022-06-05

## 2022-06-05 RX ORDER — FUROSEMIDE 10 MG/ML
40 INJECTION INTRAMUSCULAR; INTRAVENOUS ONCE
Status: COMPLETED | OUTPATIENT
Start: 2022-06-05 | End: 2022-06-05

## 2022-06-05 RX ORDER — METOPROLOL SUCCINATE 25 MG/1
25 TABLET, EXTENDED RELEASE ORAL DAILY
Status: DISCONTINUED | OUTPATIENT
Start: 2022-06-05 | End: 2022-06-06 | Stop reason: HOSPADM

## 2022-06-05 RX ORDER — INSULIN GLARGINE 100 [IU]/ML
6 INJECTION, SOLUTION SUBCUTANEOUS
Status: DISCONTINUED | OUTPATIENT
Start: 2022-06-05 | End: 2022-06-06 | Stop reason: HOSPADM

## 2022-06-05 RX ADMIN — DULOXETINE HYDROCHLORIDE 30 MG: 30 CAPSULE, DELAYED RELEASE ORAL at 08:57

## 2022-06-05 RX ADMIN — FUROSEMIDE 40 MG: 10 INJECTION, SOLUTION INTRAMUSCULAR; INTRAVENOUS at 10:57

## 2022-06-05 RX ADMIN — APIXABAN 5 MG: 5 TABLET, FILM COATED ORAL at 17:44

## 2022-06-05 RX ADMIN — APIXABAN 5 MG: 5 TABLET, FILM COATED ORAL at 10:57

## 2022-06-05 RX ADMIN — GABAPENTIN 300 MG: 300 CAPSULE ORAL at 17:44

## 2022-06-05 RX ADMIN — OMEGA-3 FATTY ACIDS CAP 1000 MG 1000 MG: 1000 CAP at 21:43

## 2022-06-05 RX ADMIN — INSULIN LISPRO 8 UNITS: 100 INJECTION, SOLUTION INTRAVENOUS; SUBCUTANEOUS at 16:37

## 2022-06-05 RX ADMIN — INSULIN LISPRO: 100 INJECTION, SOLUTION INTRAVENOUS; SUBCUTANEOUS at 21:45

## 2022-06-05 RX ADMIN — POTASSIUM CHLORIDE 20 MEQ: 1500 TABLET, EXTENDED RELEASE ORAL at 10:57

## 2022-06-05 RX ADMIN — ASPIRIN 81 MG CHEWABLE TABLET 81 MG: 81 TABLET CHEWABLE at 21:43

## 2022-06-05 RX ADMIN — INSULIN GLARGINE 40 UNITS: 100 INJECTION, SOLUTION SUBCUTANEOUS at 08:57

## 2022-06-05 RX ADMIN — OMEGA-3 FATTY ACIDS CAP 1000 MG 1000 MG: 1000 CAP at 08:57

## 2022-06-05 RX ADMIN — INSULIN LISPRO 8 UNITS: 100 INJECTION, SOLUTION INTRAVENOUS; SUBCUTANEOUS at 08:56

## 2022-06-05 RX ADMIN — METOPROLOL SUCCINATE 25 MG: 25 TABLET, EXTENDED RELEASE ORAL at 08:57

## 2022-06-05 RX ADMIN — LEVOTHYROXINE SODIUM 175 MCG: 175 TABLET ORAL at 05:17

## 2022-06-05 RX ADMIN — INSULIN LISPRO 2 UNITS: 100 INJECTION, SOLUTION INTRAVENOUS; SUBCUTANEOUS at 21:46

## 2022-06-05 RX ADMIN — INSULIN GLARGINE 6 UNITS: 100 INJECTION, SOLUTION SUBCUTANEOUS at 21:43

## 2022-06-05 RX ADMIN — GABAPENTIN 300 MG: 300 CAPSULE ORAL at 08:57

## 2022-06-05 RX ADMIN — INSULIN LISPRO 8 UNITS: 100 INJECTION, SOLUTION INTRAVENOUS; SUBCUTANEOUS at 12:33

## 2022-06-05 RX ADMIN — ROSUVASTATIN CALCIUM 20 MG: 20 TABLET, FILM COATED ORAL at 17:45

## 2022-06-05 NOTE — ASSESSMENT & PLAN NOTE
Wt Readings from Last 3 Encounters:   06/05/22 91 9 kg (202 lb 9 6 oz)   05/24/22 92 5 kg (204 lb)   05/23/22 93 1 kg (205 lb 3 2 oz)      Appears euvolemic and is at dry weight  Continue oral diuretics

## 2022-06-05 NOTE — CASE MANAGEMENT
Case Management Progress Note    Patient name Merrily Brunner  Location S /S -98 MRN [id number]  : 1945 Date 2022       LOS (days): 1  Geometric Mean LOS (GMLOS) (days):   Days to GMLOS:        OBJECTIVE:        Current admission status: Inpatient  Preferred Pharmacy:   [address] [facility], [address] [facility] [place] [address]  Phone: [phone] Fax: [address] [facility] [address] [place] [address]  Phone: [phone] Fax: [phone]    Primary Care Provider: Matias Ngo MD    Primary Insurance: MEDICARE  Secondary Insurance: BLUE CROSS    PROGRESS NOTE:    TT received from cardiology PA inquiring about cost of Eliquis  Call made to pharmacy and was informed that monthly cost is $74 96  Patient would be eligible for free-trial card, but no other savings programs  PA informed of above and card placed in chart for patient for discharge

## 2022-06-05 NOTE — CONSULTS
Consultation - Cardiology   [place] Renny 68 y o  male MRN: [id number]  Unit/Bed#: S -01 Encounter: [id number]    Inpatient consult to Cardiology  Consult performed by: Griselda De Souza PA-C  Consult ordered by: Amanda Olson MD            Physician Requesting Consult: Katie Pa DO  Reason for Consult / Principal Problem: new onset atrial fibrillation    Assessment/Plan:  1  New onset paroxysmal atrial fibrillation with rapid ventricular response   - presented in rapid atrial fibrillation with conversion to sinus rhythm after IV diltiazem/diltiazem gtt    - has been maintaining sinus rhythm with controlled heart rates   - will increase metoprolol dose to 25 mg daily (was taking 12 5 mg daily), patient's wife states he was intolerant of higher doses of metoprolol as well as other beta-blocker's in the past   - [id number] score = 6 (age+2, hypertension, diabetes, CHF, CAD), anticoagulation is indicated   - will start anticoagulation with Eliquis 5 mg BID, risks/benefits discussed and patient and wife are agreeable   - will discontinue plavix and continue aspirin + Eliquis to avoid triple therapy   - could consider antiarrhythmic therapy with amiodarone although with underlying fibrotic lung changes would not be best option   - alcohol cessation   - continue with CPAP    2  Coronary artery disease   - with extensive prior intervention   - most recently had OJ placed to LAD in 2018   - last [facility] 2020 at [facility]: RCA with 70% proximal and 50% mid stenoses  LAD 40-50% stenosis proximally before widely patent stents  Distal left Cx 75% in stent restenosis just before the small LPDA  OM 3 is  with left to left collaterals  Co-dominant RCA   - no intervention was performed at this time - for medical management    - continue aspirin   - plavix will be discontinued given initiation of Eliquis   - continue statin and beta-blocker    3   Ischemic cardiomyopathy   - EF 40%   - continue metoprolol succinate for GDMT    4  Chronic combined systolic and diastolic congestive heart failure   - patient has evidence of volume overload on imaging and has had increased shortness of breath over the past month    - will give lasix 40 mg IV x 1 dose today in place of home PO torsemide and assess response   - otherwise would discharge on home regimen of torsemide 10 mg daily   - continue low sodium diet    5  Type II myocardial infarction   - in the setting of rapid atrial fibrillation on top of extensive coronary artery disease as discussed above   - patient is chest pain free with resolution of sinus rhythm   - no further evaluation is needed at this time    6  Hypertension   - controlled    7  Peripheral vascular disease   - with prior bilateral lower extremity interventions   - continue aspirin and statin    History of Present Illness   HPI: Chantell Marte is a 68y o  year old male with coronary artery disease status post multiple stent placement in 2014-subsequently had restenoses LAD stent with repeat stent placement in 2018, ischemic cardiomyopathy - most recent EF 40%, chronic combined systolic and diastolic congestive heart failure, hypertension, dyslipidemia, diabetes, peripheral vascular disease with prior bilateral lower extremity intervention, obstructive sleep apnea who follows with [facility]  Patient presented to the emergency department yesterday for the evaluation of shortness of breath and chest pain  Patient states yesterday morning he awoke and stated his legs felt shaky    He had shortness of breath as well as chest pain  The day prior he was feeling in his usual state of health  His wife at bedside states she checked his pulse and noted it was rapid  She did give him 3 nitroglycerin tablets and he states the chest discomfort did improve with this  She advised him to come to the emergency department for further evaluation  The patient himself denies palpitations    He has not noticed any worsening lower extremity edema  He has been compliant CPAP and denies orthopnea and PND  He did return to [place] approximately 1 month ago  At that time he did notice slight worsening of his chronic dyspnea on exertion  Otherwise, he has not had any chest pain/pressure/discomfort  He denies lightheadedness, dizziness, and syncope  Upon admission he was found to be in rapid atrial fibrillation  This is a new diagnosis for the patient  He was given IV diltiazem and subsequently started on a Cardizem drip  He was also placed on IV heparin for anticoagulation  Overnight, the patient converted to sinus rhythm  He also had a chest x-ray which is pending read but does feel to have increased pulmonary vascular congestion compared to chest x-ray 2 weeks ago  A stable right pleural effusion is seen  His BNP was noted to be 420  He also had elevated high sensitivity troponin levels at 66, 217, and 374  Cardiology was consulted for further evaluation and management  At bedside today he reports feeling well  He states his shortness of breath feels at baseline  He denies any chest pain currently  He has been compliant with CPAP  He does drink 2-3 alcoholic beverages daily  Review of Systems   HENT: Negative  Cardiovascular: Positive for chest pain and dyspnea on exertion  Negative for leg swelling, orthopnea, palpitations, paroxysmal nocturnal dyspnea and syncope  Respiratory: Negative for cough and shortness of breath  Gastrointestinal: Negative for diarrhea, nausea and vomiting  Neurological: Negative for dizziness and light-headedness  All other systems reviewed and are negative      Historical Information   Past Medical History:   Diagnosis Date    Acute venous embolism and thrombosis of deep vessels of proximal lower [facility]     resolved 04/04/2015    DARINEL (acute kidney injury) ([facility] ) 01/12/2016    Anesthesia     RESPIRATORY ISSUES DUE TO SLEEP APNEA    Cardiac disease     heart attack, stents x 4    Chronic cough     resolved 02/04/2016    Chronic pain disorder     intermitent claudication    [facility]ch[facility] obstructive pulmonary disease) [facility])     Coronary artery disease     CPAP (continuous positive airway pressure) dependence     [facility]     Disease of thyroid gland     DVT (deep venous thrombosis) ([facility])     Heart failure with mid-range ejection fraction (HCC)     Hyperlipidemia     Hypertension     Ischemic cardiomyopathy     last assessed 09/26/2017    Myocardial infarction [facility])     MI +2 2015,2018    NSTEMI (non-ST elevated myocardial infarction) [facility] ) 03/23/2019    Pulmonary emphysema (HCC)     Pulmonary granuloma (HCC)     resolved 02/03/2017    Renal failure     Sleep apnea      Past Surgical History:   Procedure Laterality Date    BYPASS FEMORAL-POPLITEAL      initial stenosis with stent left, 7 x 100 smart stent onset 02/24/2014    CARDIAC SURGERY      cardiac stents    CATARACT EXTRACTION      COLOGUARD (HISTORICAL)  2018    CORONARY ANGIOPLASTY WITH STENT PLACEMENT      x4    IR AORTAGRAM WITH RUN-OFF  3/14/2019    IN THROMBOENDARTECTMY FEMORAL COMMON Left 3/22/2019    Procedure: ENDARTERECTOMY ARTERIAL FEMORAL WITH PATCH ANGIOPLASTY, BALLOON ANGIOPLASTY, STENT;  Surgeon: Niko Arroyo MD;  [facility];  Service: Vascular    IN VEIN BYPASS GRAFT,FEM-POP Left 3/22/2019    Procedure: BYPASS FEMORAL-POPLITEAL WITH COMPLETION ARTERIOGRAM;  Surgeon: Niko Arroyo MD;  [facility];  Service: Vascular    VASCULAR SURGERY      stent placement    WOUND DEBRIDEMENT Left 4/15/2019    Procedure: DEBRIDEMENT WOUND AND DRESSING CHANGE ([address]) left groin with VAC;  Surgeon: Kelly Hammer DO;  [facility];  Service: Vascular     Social History     Substance and Sexual Activity   Alcohol Use Yes    Comment: 2 drinks per day     Social History     Substance and Sexual Activity   Drug Use No     Social History     Tobacco Use   Smoking Status Former Smoker    Packs/day: 4 00    Years: 47 00    Pack years: 188 00    Types: Cigarettes    Start date:     Quit date:     Years since quittin 4   Smokeless Tobacco Never Used     Family History: non-contributory    Meds/Allergies   all current active meds have been reviewed  heparin (porcine), 3-20 Units/kg/hr (Order-Specific), Last Rate: 15 1 Units/kg/hr (22)        Allergies   Allergen Reactions    Doxazosin Myalgia     UNKNOWN  UNKNOWN  Muscle cramps    Iohexol      UNKNOWN    Lisinopril Cough     cough  Other reaction(s): CAMELLA SINENSIS (ZESTRIL) (cough)  cough    Vancomycin Hives    Imdur [Isosorbide Nitrate] Hives    Adhesive [Medical Tape]      Skin Breakdown    Cardura [Doxazosin Mesylate]      Muscle cramps    Isosorbide Rash    Other Other (See Comments)     Iv dye for cardiac cath  Renal failure very close to dialysis  But no dialysis       Objective   Vitals: Blood pressure 129/70, pulse 80, temperature 97 5 °F (36 4 °C), temperature source Axillary, resp  rate 20, height 5' 7" (1 702 m), weight 91 9 kg (202 lb 9 6 oz), SpO2 93 %  , Body mass index is 31 73 kg/m² ,   Orthostatic Blood Pressures    Flowsheet Row Most Recent Value   Blood Pressure 129/70 filed at 2022 2211   Patient Position - Orthostatic VS Lying filed at 2022 0072        Systolic (30URW), NFM:190 , Min:91 , JYX:248     Diastolic (53ZXD), WAO:05, Min:53, Max:75    No intake or output data in the 24 hours ending 22 0726    Weight (last 2 days)     Date/Time Weight    22 0523 91 9 (202 6)    22 1430 93 (205)          Invasive Devices  Report    Peripheral Intravenous Line  Duration           Peripheral IV 22 Left Antecubital <1 day    Peripheral IV 22 Right Antecubital <1 day                  Physical Exam  Vitals reviewed  Constitutional:       General: He is not in acute distress  Appearance: He is well-developed  He is obese  He is not diaphoretic  HENT:      Head: Normocephalic and atraumatic  Eyes:      Pupils: Pupils are equal, round, and reactive to light  Neck:      Vascular: No carotid bruit  Cardiovascular:      Rate and Rhythm: Normal rate and regular rhythm  Pulses:           Radial pulses are 2+ on the right side and 2+ on the left side  Heart sounds: S1 normal and S2 normal  No murmur heard  Pulmonary:      Effort: Pulmonary effort is normal  No respiratory distress  Breath sounds: Examination of the right-lower field reveals rales  Examination of the left-lower field reveals rales  Rales present  No wheezing  Abdominal:      General: There is no distension  Palpations: Abdomen is soft  Tenderness: There is no abdominal tenderness  Musculoskeletal:         General: Normal range of motion  Cervical back: Normal range of motion  Right lower leg: No edema  Left lower leg: No edema  Skin:     General: Skin is warm and dry  Findings: No erythema  Neurological:      General: No focal deficit present  Mental Status: He is alert and oriented to person, place, and time     Psychiatric:         Mood and Affect: Mood normal          Behavior: Behavior normal              Laboratory Results:        CBC with diff:   Results from last 7 days   Lab Units 06/05/22  0519 06/04/22  1222   WBC Thousand/uL 6 17 6 78   HEMOGLOBIN g/dL 11 8* 14 6   HEMATOCRIT % 36 3* 45 7   MCV fL 97 98   PLATELETS Thousands/uL 103* 140*   MCH pg 31 6 31 3   MCHC g/dL 32 5 31 9   RDW % 15 2* 15 1   MPV fL 12 7 12 2   NRBC AUTO /100 WBCs 0 0         CMP:  Results from last 7 days   Lab Units 06/05/22  0519 06/04/22  1222   POTASSIUM mmol/L 3 9 4 5   CHLORIDE mmol/L 107 105   CO2 mmol/L 25 27   BUN mg/dL 39* 28*   CREATININE mg/dL 1 52* 1 35*   CALCIUM mg/dL 8 6 9 5   AST U/L  --  18   ALT U/L  --  11   ALK PHOS U/L  --  108*   EGFR ml/min/1 73sq m 43 50         BMP:  Results from last 7 days   Lab Units 22  0519 22  1222   POTASSIUM mmol/L 3 9 4 5   CHLORIDE mmol/L 107 105   CO2 mmol/L 25 27   BUN mg/dL 39* 28*   CREATININE mg/dL 1 52* 1 35*   CALCIUM mg/dL 8 6 9 5       BNP:    Recent Labs     22  1222   *       Magnesium:   Results from last 7 days   Lab Units 22  0519   MAGNESIUM mg/dL 2 0       Coags:   Results from last 7 days   Lab Units 22  0616 22  2133 22  1222   PTT seconds 122* 36 27   INR   --   --  0 95       TSH:       Hemoglobin A1C       Lipid Profile:         Cardiac testing:   Results for orders placed during the hospital encounter of 20    Echo complete with contrast if indicated    Narrative  [facility]  [facility], [address] [facility]  [phone]    Transthoracic Echocardiogram  2D, M-mode, Doppler, and Color Doppler    Study date:  2020    Patient: Jacoby León  MR number: [id number]  Account number: [de-identified]  : 1945  Age: 76 years  Gender: Male  Status: Outpatient  Location: [address] [place] [address] [facility]  Height: 67 in  Weight: 217 6 lb  BP: 138/ 80 mmHg    Indications: CAD  Diagnoses: I25 119 - Atherosclerotic heart disease of native coronary artery with unspecified angina pectoris    Sonographer:  Edmar Early RDCS  Primary Physician:  Caio Briceño MD  Referring Physician:  Edi Luz MD  [facility]  [facility]  Cardiology Fellow:  Mannie Bautista DO  Interpreting Physician:  Shiva Jang MD    SUMMARY    LEFT VENTRICLE:  Systolic function was moderately reduced  Ejection fraction was estimated to be 40 %  There was severe hypokinesis of the basal inferoseptal, entire inferior, and basal-mid inferolateral wall(s)  Wall thickness was mildly increased  There was mild concentric hypertrophy  Doppler parameters were consistent with abnormal left ventricular relaxation (grade 1 diastolic dysfunction)      MITRAL VALVE:  There was mild annular calcification  There was mild regurgitation  HISTORY: PRIOR HISTORY: Type 1 diabetes  ITZEL  Restrictive lung disease  Emphysema  CAD  CHF  NSTEMI  History of Ischemic cardiomyopathy  COPD  Former smoker  PROCEDURE: The study was performed in the [address]  This was a routine study  The transthoracic approach was used  The study included complete 2D imaging, M-mode, complete spectral Doppler, and color Doppler  The  heart rate was 65 bpm, at the start of the study  Images were obtained from the parasternal, apical, subcostal, and suprasternal notch acoustic windows  Intravenous contrast ( 0 6 ml Definity in NSS) was administered to opacify the left  ventricle  Echocardiographic views were limited due to poor acoustic window availability, decreased penetration, and lung interference  This was a technically difficult study  LEFT VENTRICLE: Size was normal  Systolic function was moderately reduced  Ejection fraction was estimated to be 40 %  There was severe hypokinesis of the basal inferoseptal, entire inferior, and basal-mid inferolateral wall(s)  Wall  thickness was mildly increased  There was mild concentric hypertrophy  No evidence of apical thrombus  DOPPLER: Doppler parameters were consistent with abnormal left ventricular relaxation (grade 1 diastolic dysfunction)  RIGHT VENTRICLE: The size was normal  Systolic function was normal  Wall thickness was normal     LEFT ATRIUM: Size was normal     RIGHT ATRIUM: Size was normal     MITRAL VALVE: There was mild annular calcification  Valve structure was normal  There was normal leaflet separation  DOPPLER: The transmitral velocity was within the normal range  There was no evidence for stenosis  There was mild  regurgitation  AORTIC VALVE: The valve was trileaflet  Leaflets exhibited normal thickness and normal cuspal separation  DOPPLER: Transaortic velocity was within the normal range   There was no evidence for stenosis  There was no significant  regurgitation  TRICUSPID VALVE: The valve structure was normal  There was normal leaflet separation  DOPPLER: The transtricuspid velocity was within the normal range  There was no evidence for stenosis  There was no significant regurgitation  PULMONIC VALVE: Leaflets exhibited normal thickness, no calcification, and normal cuspal separation  DOPPLER: The transpulmonic velocity was within the normal range  There was no significant regurgitation  PERICARDIUM: There was no pericardial effusion  The pericardium was normal in appearance  AORTA: The root exhibited normal size  SYSTEMIC VEINS: IVC: The inferior vena cava was normal in size  SYSTEM MEASUREMENT TABLES    2D  %FS: 11 34 %  Ao Diam: 3 44 cm  EDV(Teich): 161 26 ml  EF(Cube): 30 3 %  EF(Teich): 24 25 %  ESV(Cube): 130 36 ml  ESV(Teich): 122 15 ml  IVSd: 1 31 cm  LA Area: 17 33 cm2  LA Diam: 4 38 cm  LVEDV MOD A4C: 136 23 ml  LVEF MOD A4C: 65 82 %  LVESV MOD A4C: 46 56 ml  LVIDd: 5 72 cm  LVIDs: 5 07 cm  LVLd A4C: 9 3 cm  LVLs A4C: 7 81 cm  LVPWd: 1 34 cm  RA Area: 12 73 cm2  RV Diam : 3 64 cm  SI(Cube): 26 99 ml/m2  SI(Teich): 18 63 ml/m2  SV MOD A4C: 89 67 ml  SV(Cube): 56 68 ml  SV(Teich): 39 11 ml    MM  TAPSE: 1 91 cm    PW  E': 0 04 m/s  E/E': 21 77  MV A Goyo: 0 98 m/s  MV Dec McMullen: 4 83 m/s2  MV DecT: 167 15 ms  MV E Goyo: 0 81 m/s  MV E/A Ratio: 0 82    [employer] Accredited Echocardiography Laboratory    Prepared and electronically signed by    Ten Vo MD  Signed [id number] 83:83:[id number]    No results found for this or any previous visit  No results found for this or any previous visit  No results found for this or any previous visit  Imaging: I have personally reviewed pertinent reports  XR chest pa & lateral    Result Date: 5/27/2022  Narrative: CHEST INDICATION:   J43 2: Centrilobular emphysema   COMPARISON:  5/20/2021 EXAM PERFORMED/VIEWS:  XR CHEST PA & LATERAL Images: 4 FINDINGS: Development of small right pleural effusion Cardiomediastinal silhouette remains mildly enlarged Linear scarring in the region of the minor fissure on the right The lungs are otherwise clear  No pneumothorax Osseous structures appear within normal limits for patient age       Impression: Development of small right pleural effusion Workstation performed: [id number]       EKG reviewed personally: atrial fibrillation with RVR  Telemetry reviewed personally: currently NSR, HR 60's, 1 3 beat run of NSVT    Assessment:  Principal Problem:    Atrial fibrillation with RVR ([facility])  Active Problems:    Type 1 diabetes mellitus with diabetic peripheral angiopathy without gangrene ([facility] )    Coronary artery disease of native artery of native heart with stable angina pectoris ([facility])    Obstructive sleep apnea of adult    Restrictive lung disease    CKD (chronic kidney disease) stage 3, GFR 30-59 ml/min ([facility])    Chronic systolic congestive heart failure (HCC)    Anxiety with depression    Chronic respiratory failure with hypoxia ([facility])        Code Status: Level 1 - Full Code

## 2022-06-05 NOTE — ASSESSMENT & PLAN NOTE
Lab Results   Component Value Date    HGBA1C 8 4 (H) 05/10/2022       Recent Labs     06/04/22  1553 06/04/22 2046 06/05/22  0734   POCGLU 239* 175* 86       Blood Sugar Average: Last 72 hrs:  (P) 973 [id number] longstanding history of diabetes  Takes Lantus 40 units q a m  And 8 units q p m    Have entered carb correction scale as non formulary  Endocrinology consult - recommendations pending

## 2022-06-05 NOTE — ASSESSMENT & PLAN NOTE
As evidenced by right-sided pleural effusion noted on chest x-ray  This has been present on prior imaging, however it looks like it has worsened  Suspect this is likely in the setting of restrictive lung disease  Patient follows up with pulmonology on outpatient basis  Will give patient a dose of IV Lasix

## 2022-06-05 NOTE — ASSESSMENT & PLAN NOTE
Lab Results   Component Value Date    EGFR 43 06/05/2022    EGFR 50 06/04/2022    EGFR 42 05/10/2022    CREATININE 1 52 (H) 06/05/2022    CREATININE 1 35 (H) 06/04/2022    CREATININE 1 56 (H) 05/10/2022   noted, stable   Monitor with treatment

## 2022-06-05 NOTE — ASSESSMENT & PLAN NOTE
· POA with shortness of breath and chest discomfort, found to be in afib with RVR  · Received cardizem bolus with improvement in rates  Maintained on cardizem infusion   · Now in NSR with heart rate in the 60s the time of my evaluation  · Later in the day noted to have a 14 beat run of an SVT, asymptomatic  · Likely not a candidate for amiodarone given restrictive lung disease    Plan:  · Cardiology consulted-recommendations appreciated  · Metoprolol succinate decreased to 25 mg daily per Cardiology  · Received K-Dur 20 mEq to maintain potassium >4  · Continue telemetry monitoring  · Heparin discontinued, patient started on Eliquis 5 mg b i d

## 2022-06-05 NOTE — CONSULTS
Consultation - Dontrell Hyaes 68 y o  male MRN: [id number]    Unit/Bed#: S -01 Encounter: [id number]        Impression:    -Type 1 diabetes  -New onset Afib with RVR  -Hypothyroidism  -Hypoglycemia unawarness  -CKD  -CAD  -CHF  -Hypertension  -Hyperlipidemia  -PAD  -ITZEL    Recommendations:  -Type 1 diabetes:Last A1c: 8 4%  Home regimen: 40 units qAM and 8 units qPM; and flexible insulin/Humalog with meals:  1 unit for 7 g with breakfast, 1 unit for 8 g with lunch, 1 unit for 4 5 g with dinner and 1 unit for 13 g with bedtime snack in addition to a correction  Inpatient, patient has requested to use flexible insulin regimen and continue his home doses  Fasting blood glucose was tightly controlled this morning  We recommend continuing Lantus 40 units q a m  Patient agrees to decrease Lantus to 6 units in the evening  Continue Humalog with meals:  Breakfast:  1 unit for every 7g of carbohydrates   Lunch: 1 unit for every 8g of carbohydrates   Dinner: 1 unit of for every 4 5g of carbohydrates  Bedtime: 1 unit of humalog for every 13g of carbohydrates  Will add correction with Humalog per home regimen:  Breakfast:  Target 120, insulin sensitivity factor 20  Lunch, target 120, ISF 25  Dinner, target 120, ISF 25  Bedtime, target 140, ISF 30  We recommend switching from regular diet to a carb controlled diet  Discussed with patient that he needs to use insulin provided from the hospital   Continue glucose monitoring for further adjustment       -Hypothyroidism: takes Levothyroxine 175 mcg daily, decreased recently from 200 mcg daily after TFTs showed elevated free T4 and low TSH  His last TSH: 0 521  Continue levothyroxine at current dose and follow-up as an outpatient       Latest Reference Range & Units 05/10/22 11:12 06/04/22 12:22   TSH 3RD GENERATON 0 450 - 4 500 uIU/mL 0 357 (L) [1] 0 521 [2]   Free T4 0 76 - 1 46 ng/dL 1 62 (H) [3]        CC: Diabetes Consult    History of Present Illness     HPI: Tristian Romero is a 68y o  year old male with type 1 diabetes and CAD s/p multiple stent placements, CHF, hypertension, hyperlipidemia and PAD, presented with shortness of breath and found with new onset paroxysmal atrial fibrillation with RVR  Endocrinology consulted to assist with management of type 1 diabetes  Patient was diagnosed with diabetes in 1976; he has been on insulin since then ( a couple months after diagnosis)  Diabetes complications include CAD, CKD, PAD and neuropathy  He follows with [facility]  Home regimen:   Lantus: 40 units qAM and 8 units qPM;   Humalog with meals:  Breakfast:  1 unit for every 7 g of carbohydrates   Lunch: 1 unit for every 8 g of carbohydrates   Dinner: 1 unit of for every 4 5 g of carbohydrates  Bedtime: 1 unit of humalog for every 13 g of carbohydrates  Correction:  Breakfast:  Target 120, insulin sensitivity factor 20  Lunch, target 120, ISF 25  Dinner, target 120, ISF 25  Bedtime, target 140, ISF 30  He has hypoglycemia unawareness  He reports episodes of hypoglycemia once or twice a week 51/69 mostly in the middle of the night or early morning  He has a Dexcom CGM  He only reports one remote episode of severe hypoglycemia needing EMS intervention many years ago  He denies polyuria, polydipsia or blurry vision  Inpatient consult to Endocrinology  Consult performed by: Luke Seip, MD  Consult ordered by: Franc Martinez MD          Review of Systems   Constitutional: Negative for activity change, appetite change, chills, diaphoresis, fever and unexpected weight change  HENT: Negative for ear pain, sore throat, trouble swallowing and voice change  Eyes: Negative for pain and visual disturbance  Respiratory: Negative for cough and shortness of breath  Cardiovascular: Negative for chest pain and palpitations  Gastrointestinal: Negative for abdominal pain, constipation, diarrhea and vomiting     Endocrine: Negative for cold intolerance, heat intolerance, polydipsia, polyphagia and polyuria  Genitourinary: Negative for dysuria and hematuria  Musculoskeletal: Negative for arthralgias and back pain  Skin: Negative for color change and rash  Allergic/Immunologic: Negative for immunocompromised state  Neurological: Negative for seizures, syncope and numbness  Psychiatric/Behavioral: Negative for sleep disturbance  The patient is not nervous/anxious  All other systems reviewed and are negative        Historical Information   Past Medical History:   Diagnosis Date    Acute venous embolism and thrombosis of deep vessels of proximal lower extremity ([place] [address] )     resolved 04/04/2015    DARINEL (acute kidney injury) ([place] [address] ) 01/12/2016    Anesthesia     RESPIRATORY ISSUES DUE TO SLEEP APNEA    Cardiac disease     heart attack, stents x 4    Chronic cough     resolved 02/04/2016    Chronic pain disorder     intermitent claudication    COPD [facility]chronic obstructive pulmonary disease) ([facility])     Coronary artery disease     CPAP (continuous positive airway pressure) dependence     Diabetes mellitus ([place] [address] )     Disease of thyroid gland     DVT (deep venous thrombosis) ([facility])     Heart failure with mid-range ejection fraction ([facility])     Hyperlipidemia     Hypertension     Ischemic cardiomyopathy     last assessed 09/26/2017    Myocardial infarction [facility])     MI +2 2015,2018    NSTEMI (non-ST elevated myocardial infarction) ([place] [address] ) 03/23/2019    Pulmonary emphysema ([facility])     Pulmonary granuloma ([facility])     resolved 02/03/2017    Renal failure     Sleep apnea      Past Surgical History:   Procedure Laterality Date    BYPASS FEMORAL-POPLITEAL      initial stenosis with stent left, 7 x 100 smart stent onset 02/24/2014    CARDIAC SURGERY      cardiac stents    CATARACT EXTRACTION      COLOGUARD (HISTORICAL)  2018    CORONARY ANGIOPLASTY WITH STENT PLACEMENT      x4    IR AORTAGRAM WITH RUN-OFF  3/14/2019    NE THROMBOENDARTECTMY FEMORAL COMMON Left 3/22/2019    Procedure: ENDARTERECTOMY ARTERIAL FEMORAL WITH PATCH ANGIOPLASTY, BALLOON ANGIOPLASTY, STENT;  Surgeon: Vipul Cox MD;  [facility];  Service: Vascular    DE VEIN BYPASS GRAFT,FEM-POP Left 3/22/2019    Procedure: BYPASS FEMORAL-POPLITEAL WITH COMPLETION ARTERIOGRAM;  Surgeon: Vipul Cox MD;  [facility];  Service: Vascular    VASCULAR SURGERY      stent placement    WOUND DEBRIDEMENT Left 4/15/2019    Procedure: DEBRIDEMENT WOUND AND DRESSING CHANGE ([address]) left groin with VAC;  Surgeon: Nisreen Jaramillo DO;  [facility];  Service: Vascular     Social History   Social History     Substance and Sexual Activity   Alcohol Use Yes    Comment: 2 drinks per day     Social History     Substance and Sexual Activity   Drug Use No     Social History     Tobacco Use   Smoking Status Former Smoker    Packs/day: 4 00    Years: 47 00    Pack years: 188 00    Types: Cigarettes    Start date:     Quit date:     Years since quittin 4   Smokeless Tobacco Never Used     Family History:   Family History   Problem Relation Age of Onset    Heart disease Father         pacer placement    Hypertension Father     Kidney disease Father     Heart failure Father     Heart attack Father     Liver disease Father     Cirrhosis Mother         due to beer consumption    Liver disease Mother     Diabetes Other        Meds/Allergies   Current Facility-Administered Medications   Medication Dose Route Frequency Provider Last Rate Last Admin    apixaban (ELIQUIS) tablet 5 mg  5 mg Oral BID Rafiq Steinberg PA-C   5 mg at 22 1057    aspirin chewable tablet 81 mg  81 mg Oral HS Pancho Sanford MD   81 mg at 22 2135    calcium carbonate (TUMS) chewable tablet 500 mg  500 mg Oral TID PRN Pancho Sanford MD        DULoxetine (CYMBALTA) delayed release capsule 30 mg  30 mg Oral Daily Pancho Sanford MD   30 mg at 22 0857    fish oil capsule 1,000 mg 1,000 mg Oral Q12H Margret Gabriel MD   1,000 mg at 06/05/22 0857    gabapentin (NEURONTIN) capsule 300 mg  300 mg Oral BID Margret Gabriel MD   300 mg at 06/05/22 0857    insulin glargine (LANTUS) subcutaneous injection 40 Units 0 4 mL  40 Units Subcutaneous QAM Margret Gabriel MD   40 Units at 06/05/22 0857    insulin glargine (LANTUS) subcutaneous injection 8 Units 0 08 mL  8 Units Subcutaneous HS Margret Gabriel MD   8 Units at 06/04/22 2236    insulin lispro (HumaLOG) 100 units/mL subcutaneous injection   Subcutaneous 4x Daily (AC & HS) Margret Gabriel MD   8 Units at 06/05/22 0856    levothyroxine tablet 175 mcg  175 mcg Oral Early Morning Margret Gabriel MD   175 mcg at 06/05/22 1834    metoprolol succinate (TOPROL-XL) 24 hr tablet 25 mg  25 mg Oral Daily Vu Araujo PA-C   25 mg at 06/05/22 0877    rosuvastatin (CRESTOR) tablet 20 mg  20 mg Oral Daily With [employer]         Allergies   Allergen Reactions    Doxazosin Myalgia     UNKNOWN  UNKNOWN  Muscle cramps    Iohexol      UNKNOWN    Lisinopril Cough     cough  Other reaction(s): CAMELLA SINENSIS (ZESTRIL) (cough)  cough    Vancomycin Hives    Imdur [Isosorbide Nitrate] Hives    Adhesive [Medical Tape]      Skin Breakdown    Cardura [Doxazosin Mesylate]      Muscle cramps    Isosorbide Rash    Other Other (See Comments)     Iv dye for cardiac cath  Renal failure very close to dialysis  But no dialysis       Objective   Vitals: Blood pressure 110/57, pulse 69, temperature 97 8 °F (36 6 °C), temperature source Axillary, resp  rate 20, height 5' 7" (1 702 m), weight 91 9 kg (202 lb 9 6 oz), SpO2 93 %      Intake/Output Summary (Last 24 hours) at 6/5/2022 1159  Last data filed at 6/5/2022 0700  Gross per 24 hour   Intake --   Output 500 ml   Net -500 ml     Invasive Devices  Report    Peripheral Intravenous Line  Duration           Peripheral IV 06/04/22 Left Antecubital 1 day    Peripheral IV 06/04/22 Right Antecubital 1 day Physical Exam  Vitals reviewed  Constitutional:       General: He is not in acute distress  Appearance: Normal appearance  He is obese  He is not ill-appearing or diaphoretic  HENT:      Head: Normocephalic and atraumatic  Nose: Nose normal       Mouth/Throat:      Mouth: Mucous membranes are moist    Eyes:      General: No scleral icterus  Extraocular Movements: Extraocular movements intact  Conjunctiva/sclera: Conjunctivae normal       Pupils: Pupils are equal, round, and reactive to light  Cardiovascular:      Rate and Rhythm: Normal rate  Pulses: Normal pulses  Heart sounds: Normal heart sounds  Pulmonary:      Effort: Pulmonary effort is normal       Breath sounds: Normal breath sounds  Abdominal:      General: Bowel sounds are normal       Palpations: Abdomen is soft  Tenderness: There is no abdominal tenderness  Musculoskeletal:         General: Normal range of motion  Cervical back: Normal range of motion and neck supple  Right lower leg: No edema  Left lower leg: No edema  Lymphadenopathy:      Cervical: No cervical adenopathy  Skin:     General: Skin is warm  Neurological:      Mental Status: He is alert and oriented to person, place, and time  Gait: Gait normal       Deep Tendon Reflexes: Reflexes normal    Psychiatric:         Mood and Affect: Mood normal          Behavior: Behavior normal          Thought Content: Thought content normal          Judgment: Judgment normal          The history was obtained from the review of the chart, patient      Lab Results:       Lab Results   Component Value Date    WBC 6 17 06/05/2022    HGB 11 8 (L) 06/05/2022    HCT 36 3 (L) 06/05/2022    MCV 97 06/05/2022     (L) 06/05/2022     Lab Results   Component Value Date/Time    BUN 39 (H) 06/05/2022 05:19 AM    BUN 19 12/21/2015 10:44 AM     12/21/2015 10:44 AM    K 3 9 06/05/2022 05:19 AM    K 4 9 12/21/2015 10:44 AM     06/05/2022 05:19 AM     (H) 12/21/2015 10:44 AM    CO2 25 06/05/2022 05:19 AM    CO2 21 03/22/2019 01:30 PM    CREATININE 1 52 (H) 06/05/2022 05:19 AM    CREATININE 1 29 12/21/2015 10:44 AM    AST 18 06/04/2022 12:22 PM    AST 22 10/01/2015 11:05 AM    ALT 11 06/04/2022 12:22 PM    ALT 35 10/01/2015 11:05 AM    ALB 4 1 06/04/2022 12:22 PM    ALB 3 3 (L) 10/01/2015 11:05 AM     No results for input(s): CHOL, HDL, LDL, TRIG, VLDL in the last 72 hours  No results found for: Alvaro Salgadoon  POC Glucose (mg/dl)   Date Value   06/05/2022 259 (H)   06/05/2022 86   06/04/2022 175 (H)   06/04/2022 239 (H)   04/18/2019 263 (H)   04/18/2019 266 (H)   04/17/2019 109   04/17/2019 134   04/17/2019 158 (H)   04/17/2019 110       Imaging Studies: I have personally reviewed pertinent reports  Portions of the record may have been created with voice recognition software

## 2022-06-05 NOTE — QUICK NOTE
Patient admitted with new onset afib with RVR was on cardizem drip  Now converted to NSR HR in 70s, discontinued cardizem drip and started Toprol XL 25mg BID  PTA on Toprol XL 12 5mg daily  EKG obtained for documentation  Continue heparin drip

## 2022-06-06 VITALS
RESPIRATION RATE: 18 BRPM | SYSTOLIC BLOOD PRESSURE: 127 MMHG | WEIGHT: 201 LBS | BODY MASS INDEX: 31.55 KG/M2 | HEART RATE: 75 BPM | DIASTOLIC BLOOD PRESSURE: 73 MMHG | HEIGHT: 67 IN | TEMPERATURE: 97.4 F | OXYGEN SATURATION: 93 %

## 2022-06-06 LAB
ANION GAP SERPL CALCULATED.3IONS-SCNC: 5 MMOL/L (ref 4–13)
ATRIAL RATE: 70 BPM
BASOPHILS # BLD AUTO: 0.03 THOUSANDS/ΜL (ref 0–0.1)
BASOPHILS NFR BLD AUTO: 1 % (ref 0–1)
BUN SERPL-MCNC: 32 MG/DL (ref 5–25)
CALCIUM SERPL-MCNC: 8.7 MG/DL (ref 8.4–10.2)
CHLORIDE SERPL-SCNC: 104 MMOL/L (ref 96–108)
CO2 SERPL-SCNC: 28 MMOL/L (ref 21–32)
CREAT SERPL-MCNC: 1.28 MG/DL (ref 0.6–1.3)
EOSINOPHIL # BLD AUTO: 0.32 THOUSAND/ΜL (ref 0–0.61)
EOSINOPHIL NFR BLD AUTO: 6 % (ref 0–6)
ERYTHROCYTE [DISTWIDTH] IN BLOOD BY AUTOMATED COUNT: 15.1 % (ref 11.6–15.1)
GFR SERPL CREATININE-BSD FRML MDRD: 53 ML/MIN/1.73SQ M
GLUCOSE SERPL-MCNC: 161 MG/DL (ref 65–140)
GLUCOSE SERPL-MCNC: 161 MG/DL (ref 65–140)
GLUCOSE SERPL-MCNC: 165 MG/DL (ref 65–140)
HCT VFR BLD AUTO: 38.4 % (ref 36.5–49.3)
HGB BLD-MCNC: 12.5 G/DL (ref 12–17)
IMM GRANULOCYTES # BLD AUTO: 0.01 THOUSAND/UL (ref 0–0.2)
IMM GRANULOCYTES NFR BLD AUTO: 0 % (ref 0–2)
LYMPHOCYTES # BLD AUTO: 1.3 THOUSANDS/ΜL (ref 0.6–4.47)
LYMPHOCYTES NFR BLD AUTO: 25 % (ref 14–44)
MAGNESIUM SERPL-MCNC: 2 MG/DL (ref 1.9–2.7)
MCH RBC QN AUTO: 31.6 PG (ref 26.8–34.3)
MCHC RBC AUTO-ENTMCNC: 32.6 G/DL (ref 31.4–37.4)
MCV RBC AUTO: 97 FL (ref 82–98)
MONOCYTES # BLD AUTO: 0.63 THOUSAND/ΜL (ref 0.17–1.22)
MONOCYTES NFR BLD AUTO: 12 % (ref 4–12)
NEUTROPHILS # BLD AUTO: 2.86 THOUSANDS/ΜL (ref 1.85–7.62)
NEUTS SEG NFR BLD AUTO: 56 % (ref 43–75)
NRBC BLD AUTO-RTO: 0 /100 WBCS
P AXIS: -2 DEGREES
PLATELET # BLD AUTO: 107 THOUSANDS/UL (ref 149–390)
PMV BLD AUTO: 12.9 FL (ref 8.9–12.7)
POTASSIUM SERPL-SCNC: 4.9 MMOL/L (ref 3.5–5.3)
PR INTERVAL: 144 MS
QRS AXIS: -45 DEGREES
QRSD INTERVAL: 92 MS
QT INTERVAL: 398 MS
QTC INTERVAL: 429 MS
RBC # BLD AUTO: 3.95 MILLION/UL (ref 3.88–5.62)
SODIUM SERPL-SCNC: 137 MMOL/L (ref 135–147)
T WAVE AXIS: 161 DEGREES
VENTRICULAR RATE: 70 BPM
WBC # BLD AUTO: 5.15 THOUSAND/UL (ref 4.31–10.16)

## 2022-06-06 PROCEDURE — 82948 REAGENT STRIP/BLOOD GLUCOSE: CPT

## 2022-06-06 PROCEDURE — 93010 ELECTROCARDIOGRAM REPORT: CPT | Performed by: INTERNAL MEDICINE

## 2022-06-06 PROCEDURE — 83735 ASSAY OF MAGNESIUM: CPT

## 2022-06-06 PROCEDURE — 99232 SBSQ HOSP IP/OBS MODERATE 35: CPT | Performed by: INTERNAL MEDICINE

## 2022-06-06 PROCEDURE — 99239 HOSP IP/OBS DSCHRG MGMT >30: CPT | Performed by: GENERAL PRACTICE

## 2022-06-06 PROCEDURE — 85025 COMPLETE CBC W/AUTO DIFF WBC: CPT

## 2022-06-06 PROCEDURE — 80048 BASIC METABOLIC PNL TOTAL CA: CPT

## 2022-06-06 RX ORDER — METOPROLOL SUCCINATE 25 MG/1
25 TABLET, EXTENDED RELEASE ORAL DAILY
Qty: 30 TABLET | Refills: 0 | Status: SHIPPED | OUTPATIENT
Start: 2022-06-07 | End: 2022-07-11 | Stop reason: SDUPTHER

## 2022-06-06 RX ORDER — CHLORAL HYDRATE 500 MG
1000 CAPSULE ORAL EVERY 12 HOURS
Qty: 60 CAPSULE | Refills: 0 | Status: SHIPPED | OUTPATIENT
Start: 2022-06-06 | End: 2022-06-08 | Stop reason: ALTCHOICE

## 2022-06-06 RX ORDER — TORSEMIDE 10 MG/1
10 TABLET ORAL DAILY
Status: DISCONTINUED | OUTPATIENT
Start: 2022-06-06 | End: 2022-06-06 | Stop reason: HOSPADM

## 2022-06-06 RX ORDER — INSULIN GLARGINE 100 [IU]/ML
INJECTION, SOLUTION SUBCUTANEOUS
Qty: 10 ML | Refills: 0
Start: 2022-06-06

## 2022-06-06 RX ADMIN — METOPROLOL SUCCINATE 25 MG: 25 TABLET, EXTENDED RELEASE ORAL at 08:35

## 2022-06-06 RX ADMIN — INSULIN LISPRO 1 UNITS: 100 INJECTION, SOLUTION INTRAVENOUS; SUBCUTANEOUS at 08:36

## 2022-06-06 RX ADMIN — TORSEMIDE 10 MG: 10 TABLET ORAL at 12:07

## 2022-06-06 RX ADMIN — DULOXETINE HYDROCHLORIDE 30 MG: 30 CAPSULE, DELAYED RELEASE ORAL at 08:35

## 2022-06-06 RX ADMIN — GABAPENTIN 300 MG: 300 CAPSULE ORAL at 08:35

## 2022-06-06 RX ADMIN — OMEGA-3 FATTY ACIDS CAP 1000 MG 1000 MG: 1000 CAP at 12:07

## 2022-06-06 RX ADMIN — LEVOTHYROXINE SODIUM 175 MCG: 175 TABLET ORAL at 05:43

## 2022-06-06 RX ADMIN — INSULIN GLARGINE 40 UNITS: 100 INJECTION, SOLUTION SUBCUTANEOUS at 08:35

## 2022-06-06 RX ADMIN — INSULIN LISPRO 7 UNITS: 100 INJECTION, SOLUTION INTRAVENOUS; SUBCUTANEOUS at 08:36

## 2022-06-06 RX ADMIN — APIXABAN 5 MG: 5 TABLET, FILM COATED ORAL at 08:35

## 2022-06-06 NOTE — ASSESSMENT & PLAN NOTE
· POA with shortness of breath and chest discomfort, found to be in afib with RVR  · Received cardizem bolus with improvement in rates  Maintained on cardizem infusion   · Now in NSR with heart rate in the 60s the time of my evaluation  · Later in the day noted to have a 14 beat run of an SVT, asymptomatic  · Likely not a candidate for amiodarone given restrictive lung disease    Plan:  · Cardiology consulted-recommendations appreciated  · Continue Toprol-XL 25 mg daily at discharge  · patient started on Eliquis 5 mg b i d    · Cardiology follow-up

## 2022-06-06 NOTE — DISCHARGE INSTR - AVS FIRST PAGE
Dear Rene Lawson,     It was our pleasure to care for you here at [facility], "[employer]  It is our hope that we were always able to exceed the expected standards for your care during your stay  You were hospitalized due to ***  You were cared for on the *** floor by Acacia Corbin MD under the service of Katie Pa,  with the [facility] who covers for your primary care physician (PCP), Israel Hull MD, while you were hospitalized  If you have any questions or concerns related to this hospitalization, you may contact us at [phone]  For follow up as well as any medication refills, we recommend that you follow up with your primary care physician  A registered nurse will reach out to you by phone within a few days after your discharge to answer any additional questions that you may have after going home  However, at this time we provide for you here, the most important instructions / recommendations at discharge:     Notable Medication Adjustments -   Please start taking Apixaban (ELIQUIS) 5mg, one tablet twice daily  Please take Metoprolol succinate (TOPROL-XL) 25mg,1 tablet daily  This is an increase from your prior dose of 12 5 mg (half tablet) daily  Please start taking Omega-3 fatty acids (fish oil) 1000mg, 1 capsule twice daily  Please change how you take your insulin glargine (LANTUS): Take 40 units every morning and 6 units every evening before bedtime  Please continue taking the rest of your medications as prescribed  Testing Required after Discharge -   None  Important follow up information -   Please follow up with your primary care provider within a week of discharge  Please follow up with cardiology on outpatient basis, an appointment has been made for you for 6/20/22 at 2:20pm  Please follow up with endocrinology on outpatient basis    Other Instructions -   Please contact your primary care provider, call 911 or go to the nearest emergency department for worsening symptoms  Practice Social distancing  Please practie adequate hand washing techinque  Wear a mask in public at all times   Please review this entire after visit summary as additional general instructions including medication list, appointments, activity, diet, any pertinent wound care, and other additional recommendations from your care team that may be provided for you        Sincerely,     Shan Kemp MD

## 2022-06-06 NOTE — PROGRESS NOTES
General Cardiology   Progress Note -  Team One   Saul Feng 68 y o  male MRN: [id number]  Unit/Bed#: S -01 Encounter: [id number]    Assessment/ Plan    1  New onset paroxysmal atrial fibrillation with RVR  Presented with symptoms of weakness, shortness of breath and chest pain  Found to be in afib with RVR  Converted on IV diltiazem gtt  Maintaining NSR on increased BB dose- Toprol XL 25 mg daily  Amiodarone not added due to underlying lung disease  TSH, K, Mg WNL  Anticoagulation with Eliquis 5 mg BID-affordable  Likely stable for discharge later today; outpatient follow up arranged  2  CAD with extensive prior intervention, most recently OJ to LAD in 2018  Last C in 2020 at [facility]: RCA with 70% proximal and 50% mid stenoses  LAD 40-50% stenosis proximally before widely patent stents  Distal left Cx 75% in stent restenosis just before the small LPDA  OM 3 is  with left to left collaterals  Co-dominant RCA  No intervention was performed, medical management recommended  Continue ASA, d/c Plavix in favor of Eliquis  Continue statin and beta blocker  3  Ischemic cardiomyopathy, LVEF 40%  GDMT-metoprolol succinate increased to 25 mg daily due to #1  Diuretics as noted below  4  Chronic combined systolic and diastolic CHF  Presented with mild volume overload in setting of new onset atrial fibrillation with RVR and given 1 dose of IV Lasix yesterday  Diuresed 2 9 L  Renal function improved and weight appears to be down a couple lb from admission as well as from last office visit: 201 lb (dry weight 199-202lb)  Resume home torsemide dose- 10 mg daily  Outpatient follow up arranged with heart failure team    5  Hypertension- stable, average /67    6  Dyslipidemia- continue statin    7  PVD with prior bilateral lower extremity interventions- ASA and statin  Subjective  Still a little fatigued but otherwise feeling well without specific cardiac complaints    Review of Systems Constitutional: Positive for malaise/fatigue  Negative for chills and diaphoresis  Cardiovascular: Negative for chest pain, dyspnea on exertion, leg swelling, orthopnea, palpitations and syncope  Respiratory: Negative for cough, shortness of breath, sleep disturbances due to breathing and sputum production  Gastrointestinal: Negative for bloating, nausea and vomiting  Neurological: Negative for dizziness, light-headedness and weakness  Psychiatric/Behavioral: Negative for altered mental status  All other systems reviewed and are negative  Objective:   Vitals: Blood pressure 127/73, pulse 75, temperature (!) 97 4 °F (36 3 °C), temperature source Axillary, resp  rate 18, height 5' 7" (1 702 m), weight 91 2 kg (201 lb), SpO2 93 %  , Body mass index is 31 48 kg/m²  ,     Systolic (94NOJ), YHR:666 , Min:117 , TUE:115     Diastolic (11TGT), PGU:39, Min:62, Max:73      Intake/Output Summary (Last 24 hours) at 6/6/2022 1013  Last data filed at 6/6/2022 0600  Gross per 24 hour   Intake 240 ml   Output 2900 ml   Net -2660 ml     Wt Readings from Last 3 Encounters:   06/06/22 91 2 kg (201 lb)   05/24/22 92 5 kg (204 lb)   05/23/22 93 1 kg (205 lb 3 2 oz)     Telemetry Review: NSR with PACs, 6 beats NSVT this morning  Physical Exam  Vitals reviewed  Constitutional:       General: He is not in acute distress  Appearance: He is obese  Neck:      Vascular: No hepatojugular reflux or JVD  Cardiovascular:      Rate and Rhythm: Normal rate and regular rhythm  Pulses: Normal pulses  Heart sounds: Normal heart sounds  No murmur heard  No friction rub  No gallop  Pulmonary:      Effort: Pulmonary effort is normal  No respiratory distress  Breath sounds: No rales  Comments: Crackles RLL, on room air  Abdominal:      General: Bowel sounds are normal  There is no distension  Palpations: Abdomen is soft  Tenderness: There is no abdominal tenderness     Musculoskeletal: General: No tenderness  Normal range of motion  Cervical back: Neck supple  Right lower leg: No edema  Left lower leg: No edema  Skin:     General: Skin is warm and dry  Findings: No erythema  Neurological:      Mental Status: He is alert and oriented to person, place, and time     Psychiatric:         Mood and Affect: Mood normal        LABORATORY RESULTS      CBC with diff:   Results from last 7 days   Lab Units 06/06/22  0548 06/05/22  0519 06/04/22  1222   WBC Thousand/uL 5 15 6 17 6 78   HEMOGLOBIN g/dL 12 5 11 8* 14 6   HEMATOCRIT % 38 4 36 3* 45 7   MCV fL 97 97 98   PLATELETS Thousands/uL 107* 103* 140*   MCH pg 31 6 31 6 31 3   MCHC g/dL 32 6 32 5 31 9   RDW % 15 1 15 2* 15 1   MPV fL 12 9* 12 7 12 2   NRBC AUTO /100 WBCs 0 0 0     CMP:  Results from last 7 days   Lab Units 06/06/22  0548 06/05/22  0519 06/04/22  1222   POTASSIUM mmol/L 4 9 3 9 4 5   CHLORIDE mmol/L 104 107 105   CO2 mmol/L 28 25 27   BUN mg/dL 32* 39* 28*   CREATININE mg/dL 1 28 1 52* 1 35*   CALCIUM mg/dL 8 7 8 6 9 5   AST U/L  --   --  18   ALT U/L  --   --  11   ALK PHOS U/L  --   --  108*   EGFR ml/min/1 73sq m 53 43 50     BMP:  Results from last 7 days   Lab Units 06/06/22  0548 06/05/22  0519 06/04/22  1222   POTASSIUM mmol/L 4 9 3 9 4 5   CHLORIDE mmol/L 104 107 105   CO2 mmol/L 28 25 27   BUN mg/dL 32* 39* 28*   CREATININE mg/dL 1 28 1 52* 1 35*   CALCIUM mg/dL 8 7 8 6 9 5     Lab Results   Component Value Date    CREATININE 1 28 06/06/2022    CREATININE 1 52 (H) 06/05/2022    CREATININE 1 35 (H) 06/04/2022     Lab Results   Component Value Date    NTBNP 2,152 (H) 05/25/2021    NTBNP 2,679 (H) 03/05/2019    NTBNP 1,054 (H) 10/20/2016      Results from last 7 days   Lab Units 06/06/22  0548 06/05/22  0519   MAGNESIUM mg/dL 2 0 2 0      Results from last 7 days   Lab Units 06/04/22  1222   TSH 3RD GENERATON uIU/mL 0 521     Results from last 7 days   Lab Units 06/04/22  1222   INR  0 95     Lipid Profile: Lab Results   Component Value Date    CHOL 129 10/01/2015     Lab Results   Component Value Date    HDL 41 05/10/2022    HDL 43 2022    HDL 38 (L) 10/01/2021     Lab Results   Component Value Date    LDLCALC 65 05/10/2022    LDLCALC 74 2022    LDLCALC 56 10/01/2021     Lab Results   Component Value Date    TRIG 140 05/10/2022    TRIG 180 (H) 2022    TRIG 203 (H) 10/01/2021       Cardiac testing:   Results for orders placed during the hospital encounter of 20    Echo complete with contrast if indicated    Narrative  [address]  [facility], [address] [facility]  [phone]    Transthoracic Echocardiogram  2D, M-mode, Doppler, and Color Doppler    Study date:  2020    Patient: Blanka Gilman  MR number: [id number]  Account number: [de-identified]  : 1945  Age: 76 years  Gender: Male  Status: Outpatient  Location: [address] [place] [address] [facility] [place]  Height: 67 in  Weight: 217 6 lb  BP: 138/ 80 mmHg    Indications: CAD  Diagnoses: I25 119 - Atherosclerotic heart disease of native coronary artery with unspecified angina pectoris    Sonographer:  Cedric Olsen RDCS  Primary Physician:  Abhijit Fan MD  Referring Physician:  Griselda Snow MD  Group:  [facility]  Cardiology Fellow:  Vanda Escalante DO  Interpreting Physician:  Val Carias MD    SUMMARY    LEFT VENTRICLE:  Systolic function was moderately reduced  Ejection fraction was estimated to be 40 %  There was severe hypokinesis of the basal inferoseptal, entire inferior, and basal-mid inferolateral wall(s)  Wall thickness was mildly increased  There was mild concentric hypertrophy  Doppler parameters were consistent with abnormal left ventricular relaxation (grade 1 diastolic dysfunction)  MITRAL VALVE:  There was mild annular calcification  There was mild regurgitation  HISTORY: PRIOR HISTORY: Type 1 diabetes  ITZEL  Restrictive lung disease  Emphysema  CAD  CHF  NSTEMI  History of Ischemic cardiomyopathy  COPD  Former smoker  PROCEDURE: The study was performed in the [address] [facility] [place]  This was a routine study  The transthoracic approach was used  The study included complete 2D imaging, M-mode, complete spectral Doppler, and color Doppler  The  heart rate was 65 bpm, at the start of the study  Images were obtained from the parasternal, apical, subcostal, and suprasternal notch acoustic windows  Intravenous contrast ( 0 6 ml Definity in NSS) was administered to opacify the left  ventricle  Echocardiographic views were limited due to poor acoustic window availability, decreased penetration, and lung interference  This was a technically difficult study  LEFT VENTRICLE: Size was normal  Systolic function was moderately reduced  Ejection fraction was estimated to be 40 %  There was severe hypokinesis of the basal inferoseptal, entire inferior, and basal-mid inferolateral wall(s)  Wall  thickness was mildly increased  There was mild concentric hypertrophy  No evidence of apical thrombus  DOPPLER: Doppler parameters were consistent with abnormal left ventricular relaxation (grade 1 diastolic dysfunction)  RIGHT VENTRICLE: The size was normal  Systolic function was normal  Wall thickness was normal     LEFT ATRIUM: Size was normal     RIGHT ATRIUM: Size was normal     MITRAL VALVE: There was mild annular calcification  Valve structure was normal  There was normal leaflet separation  DOPPLER: The transmitral velocity was within the normal range  There was no evidence for stenosis  There was mild  regurgitation  AORTIC VALVE: The valve was trileaflet  Leaflets exhibited normal thickness and normal cuspal separation  DOPPLER: Transaortic velocity was within the normal range  There was no evidence for stenosis  There was no significant  regurgitation      TRICUSPID VALVE: The valve structure was normal  There was normal leaflet separation  DOPPLER: The transtricuspid velocity was within the normal range  There was no evidence for stenosis  There was no significant regurgitation  PULMONIC VALVE: Leaflets exhibited normal thickness, no calcification, and normal cuspal separation  DOPPLER: The transpulmonic velocity was within the normal range  There was no significant regurgitation  PERICARDIUM: There was no pericardial effusion  The pericardium was normal in appearance  AORTA: The root exhibited normal size  SYSTEMIC VEINS: IVC: The inferior vena cava was normal in size      SYSTEM MEASUREMENT TABLES    2D  %FS: 11 34 %  Ao Diam: 3 44 cm  EDV(Teich): 161 26 ml  EF(Cube): 30 3 %  EF(Teich): 24 25 %  ESV(Cube): 130 36 ml  ESV(Teich): 122 15 ml  IVSd: 1 31 cm  LA Area: 17 33 cm2  LA Diam: 4 38 cm  LVEDV MOD A4C: 136 23 ml  LVEF MOD A4C: 65 82 %  LVESV MOD A4C: 46 56 ml  LVIDd: 5 72 cm  LVIDs: 5 07 cm  LVLd A4C: 9 3 cm  LVLs A4C: 7 81 cm  LVPWd: 1 34 cm  RA Area: 12 73 cm2  RV Diam : 3 64 cm  SI(Cube): 26 99 ml/m2  SI(Teich): 18 63 ml/m2  SV MOD A4C: 89 67 ml  SV(Cube): 56 68 ml  SV(Teich): 39 11 ml    MM  TAPSE: 1 91 cm    PW  E': 0 04 m/s  E/E': 21 77  MV A Goyo: 0 98 m/s  MV Dec LoÃ­za: 4 83 m/s2  MV DecT: 167 15 ms  MV E Goyo: 0 81 m/s  MV E/A Ratio: 0 82    [employer] Accredited Echocardiography Laboratory    Prepared and electronically signed by    Jonathan Spangler MD  Signed 23-Jul-2020 58:12:86    Meds/Allergies   all current active meds have been reviewed and current meds:   Current Facility-Administered Medications   Medication Dose Route Frequency    apixaban (ELIQUIS) tablet 5 mg  5 mg Oral BID    aspirin chewable tablet 81 mg  81 mg Oral HS    calcium carbonate (TUMS) chewable tablet 500 mg  500 mg Oral TID PRN    DULoxetine (CYMBALTA) delayed release capsule 30 mg  30 mg Oral Daily    fish oil capsule 1,000 mg  1,000 mg Oral Q12H    gabapentin (NEURONTIN) capsule 300 mg  300 mg Oral BID    insulin glargine (LANTUS) subcutaneous injection 40 Units 0 4 mL  40 Units Subcutaneous QAM    insulin glargine (LANTUS) subcutaneous injection 6 Units 0 06 mL  6 Units Subcutaneous HS    insulin lispro (HumaLOG) 100 units/mL subcutaneous injection 1-5 Units  1-5 Units Subcutaneous TID AC    insulin lispro (HumaLOG) 100 units/mL subcutaneous injection 1-5 Units  1-5 Units Subcutaneous HS    insulin lispro (HumaLOG) 100 units/mL subcutaneous injection   Subcutaneous 4x Daily (AC & HS)    levothyroxine tablet 175 mcg  175 mcg Oral Early Morning    metoprolol succinate (TOPROL-XL) 24 hr tablet 25 mg  25 mg Oral Daily    rosuvastatin (CRESTOR) tablet 20 mg  20 mg Oral Daily With Dinner     Medications Prior to Admission   Medication    aspirin 81 MG tablet    cholecalciferol (VITAMIN D3) 1,000 units tablet    clopidogrel (PLAVIX) 75 mg tablet    Coenzyme Q10 (COQ-10) 200 MG CAPS    docusate sodium (COLACE) 50 mg capsule    DULoxetine (Cymbalta) 30 mg delayed release capsule    gabapentin (NEURONTIN) 300 mg capsule    Glucagon (Baqsimi One Pack) 3 MG/DOSE POWD    Icosapent Ethyl (Vascepa) 1 g CAPS    insulin glargine (LANTUS) 100 units/mL subcutaneous injection    insulin lispro (HUMALOG) 100 units/mL injection    levothyroxine 175 mcg tablet    metoprolol succinate (TOPROL-XL) 25 mg 24 hr tablet    nitroglycerin (NITROSTAT) 0 4 mg SL tablet    polyethylene glycol (MIRALAX) 17 g packet    rosuvastatin (CRESTOR) 20 MG tablet    torsemide (DEMADEX) 10 mg tablet    ULTICARE INSULIN SYRINGE 30G X 1/2" 1 ML MISC    Wound Dressings ([facility] Wound/Burn Dressing) GEL     Counseling / Coordination of Care  Total floor / unit time spent today 20 minutes  Greater than 50% of total time was spent with the patient and / or family counseling and / or coordination of care  ** Please Note: Dragon 360 Dictation voice to text software may have been used in the creation of this document   **

## 2022-06-06 NOTE — DISCHARGE SUMMARY
[facility] 1945, 68 y o  male MRN: [id number]  Unit/Bed#: S -01 Encounter: [id number]  Primary Care Provider: Abundio Richards MD   Date and time admitted to hospital: 6/4/2022 12:01 PM    * Atrial fibrillation with RVR ([facility] [address] )  Assessment & Plan  · POA with shortness of breath and chest discomfort, found to be in afib with RVR  · Received cardizem bolus with improvement in rates  Maintained on cardizem infusion   · Now in NSR with heart rate in the 60s the time of my evaluation  · Later in the day noted to have a 14 beat run of an SVT, asymptomatic  · Likely not a candidate for amiodarone given restrictive lung disease    Plan:  · Cardiology consulted-recommendations appreciated  · Continue Toprol-XL 25 mg daily at discharge  · patient started on Eliquis 5 mg b i d  · Cardiology follow-up    Type 1 diabetes mellitus with diabetic peripheral angiopathy without gangrene [facility])  Assessment & Plan  Lab Results   Component Value Date    HGBA1C 8 4 (H) 05/10/2022       Recent Labs     06/05/22  1627 06/05/22  2105 06/06/22  0718 06/06/22  1107   POCGLU 142* 198* 161* 165*       Blood Sugar Average: Last 72 hrs:  (P) 178 125 longstanding history of diabetes  Endocrinology on board-recommendations appreciated  Transition to Lantus 40 units q a m  And 6 units q p m  By endocrinology  Continue carb counting and discharge  Follow-up with Endocrinology on outpatient basis      Pleural effusion  Assessment & Plan  As evidenced by right-sided pleural effusion noted on chest x-ray  This has been present on prior imaging, however it looks like it has worsened  Suspect this is likely in the setting of restrictive lung disease    Patient follows up with pulmonology on outpatient basis  Discharged on home torsemide  PCP follow-up      Chronic systolic congestive heart failure ([facility] )  Assessment & Plan  Wt Readings from Last 3 Encounters:   06/06/22 91 2 kg (201 lb) 05/24/22 92 5 kg (204 lb)   05/23/22 93 1 kg (205 lb 3 2 oz)      Appears euvolemic and is at dry weight  Continue oral diuretics at discharge  Cardiology follow-up         Coronary artery disease of native artery of native heart with stable angina pectoris [facility])  Assessment & Plan  · Extensive history of PCI with OJ placements (reports 11 stents in total)  · Cont plavix  · On toprol; will hold until rate control strategy determined by cardiology  · Cont icosapent     Chronic respiratory failure with hypoxia ([address] )  Assessment & Plan  · On 3L of O2 at home  At baseline  Anxiety with depression  Assessment & Plan  · Continue cymbalta at discharge    CKD (chronic kidney disease) stage 3, GFR 30-59 ml/min [facility])  Assessment & Plan  Lab Results   Component Value Date    EGFR 53 06/06/2022    EGFR 43 06/05/2022    EGFR 50 06/04/2022    CREATININE 1 28 06/06/2022    CREATININE 1 52 (H) 06/05/2022    CREATININE 1 35 (H) 06/04/2022   noted, stable   PCP follow-up    Restrictive lung disease  Assessment & Plan  · Follows with Dr Larry Loja     Obstructive sleep apnea of adult  Assessment & Plan  · Continue CPAP at home      Medical Problems             Resolved Problems  Date Reviewed: 5/23/2022   None               Discharging Resident: Bryce Webb MD  Discharging Attending: Luciana Arnold DO  PCP: Kane Mcgowan MD  Admission Date:   Admission Orders (From admission, onward)     Ordered        06/04/22 1318  INPATIENT ADMISSION  Once                      Discharge Date: 06/06/22    Consultations During Hospital Stay:  · Cardiology  · Endocrinology    Procedures Performed:   · None    Significant Findings / Test Results:   · Atrial fibrillation with RVR on EKG POA  · Troponin -308  XR chest 1 view portable    Result Date: 6/5/2022  Impression: Small right effusion  Workstation performed: [id number]     Incidental Findings:   · None    Test Results Pending at Discharge (will require follow up):   · None Outpatient Tests Requested:  · None    Complications:  None    Reason for Admission:  Atrial fibrillation with rapid ventricular response    Hospital Course:   Tristian Romero is a 68 y o  male patient who originally presented to the hospital on 6/4/2022 due to shortness of breath and chest pain that started when he woke up  In the ED was noted to be in AFib with RVR and started on heparin drip and Cardizem drip with improvement in his rate  Patient converted to NSR later that day, Cardizem drip was discontinued patient was started on Toprol-XL  Was seen by Cardiology on hospital day 2 and was transitioned to Eliquis 5 mg b i d  Patient was also seen by Endocrinology during this admission who recommended that he decrease his evening Lantus to 6 units  Given clinical improvement, patient was discharged on 06/06/2022  He was advised to follow-up with his PCP within a week of discharge as well as Cardiology for which an appointment was scheduled for him  He was also advised to follow-up with Endocrinology on outpatient basis  Please see above list of diagnoses and related plan for additional information  Condition at Discharge: good    Discharge Day Visit / Exam:   Subjective:  Patient on lying in bed, not in acute distress  Denies any chest pain, palpitations, shortness of breath, abdominal tenderness or distention, lightheadedness, changes in urinary or bowel habits, lower extremity edema  He had no complaints today  Vitals: Blood Pressure: 127/73 (06/06/22 0724)  Pulse: 75 (06/06/22 0724)  Temperature: (!) 97 4 °F (36 3 °C) (06/06/22 0724)  Temp Source: Axillary (06/06/22 0724)  Respirations: 18 (06/06/22 0724)  Height: 5' 7" (170 2 cm) (06/04/22 1430)  Weight - Scale: 91 2 kg (201 lb) (06/06/22 0600)  SpO2: 93 % (06/06/22 0724)  Exam:   Physical Exam  Vitals and nursing note reviewed  Constitutional:       General: He is not in acute distress  Appearance: Normal appearance  He is obese   He is not ill-appearing, toxic-appearing or diaphoretic  HENT:      Head: Normocephalic and atraumatic  Nose: Nose normal       Mouth/Throat:      Mouth: Mucous membranes are moist       Pharynx: Oropharynx is clear  Eyes:      General: No scleral icterus  Right eye: No discharge  Left eye: No discharge  Extraocular Movements: Extraocular movements intact  Conjunctiva/sclera: Conjunctivae normal    Cardiovascular:      Rate and Rhythm: Normal rate and regular rhythm  Pulses: Normal pulses  Heart sounds: Normal heart sounds  No murmur heard  Pulmonary:      Effort: Pulmonary effort is normal  No respiratory distress  Breath sounds: Normal breath sounds  No wheezing, rhonchi or rales  Abdominal:      General: Abdomen is flat  Bowel sounds are normal  There is no distension  Palpations: Abdomen is soft  Tenderness: There is no abdominal tenderness  There is no guarding or rebound  Musculoskeletal:      Cervical back: Normal range of motion and neck supple  Right lower leg: No edema  Left lower leg: No edema  Skin:     General: Skin is warm and dry  Coloration: Skin is not jaundiced or pale  Neurological:      Mental Status: He is alert and oriented to person, place, and time  Mental status is at baseline  Psychiatric:         Mood and Affect: Mood normal          Behavior: Behavior normal          Thought Content: Thought content normal          Judgment: Judgment normal         Discussion with Family: Updated  (wife) at bedside  Discharge instructions/Information to patient and family:   See after visit summary for information provided to patient and family  Provisions for Follow-Up Care:  See after visit summary for information related to follow-up care and any pertinent home health orders         Disposition:   Home    Planned Readmission:  None    Discharge Medications:  See after visit summary for reconciled discharge medications provided to patient and/or family        **Please Note: This note may have been constructed using a voice recognition system**

## 2022-06-06 NOTE — ASSESSMENT & PLAN NOTE
Wt Readings from Last 3 Encounters:   06/06/22 91 2 kg (201 lb)   05/24/22 92 5 kg (204 lb)   05/23/22 93 1 kg (205 lb 3 2 oz)      Appears euvolemic and is at dry weight  Continue oral diuretics at discharge  Cardiology follow-up

## 2022-06-06 NOTE — ASSESSMENT & PLAN NOTE
Lab Results   Component Value Date    HGBA1C 8 4 (H) 05/10/2022       Recent Labs     06/05/22  1627 06/05/22  2105 06/06/22  0718 06/06/22  1107   POCGLU 142* 198* 161* 165*       Blood Sugar Average: Last 72 hrs:  (P) 178 125 longstanding history of diabetes  Endocrinology on board-recommendations appreciated  Transition to Lantus 40 units q a m  And 6 units q p m   By endocrinology  Continue carb counting and discharge  Follow-up with Endocrinology on outpatient basis

## 2022-06-06 NOTE — DISCHARGE INSTRUCTIONS
Take your medications as directed, and keep your follow up appointments  Adhere to a heart healthy lifestyle, maintaining a low sodium diet  Daily weight and record  If your weight increases 2-3 lbs in one day, or 5 lbs in 2 days, you are short of breath or have lower extremity swelling, please call the heart failure team at  [address] [place] [address] Cardiology at [phone]

## 2022-06-06 NOTE — ASSESSMENT & PLAN NOTE
As evidenced by right-sided pleural effusion noted on chest x-ray  This has been present on prior imaging, however it looks like it has worsened  Suspect this is likely in the setting of restrictive lung disease    Patient follows up with pulmonology on outpatient basis  Discharged on home torsemide  PCP follow-up

## 2022-06-06 NOTE — ASSESSMENT & PLAN NOTE
Lab Results   Component Value Date    EGFR 53 06/06/2022    EGFR 43 06/05/2022    EGFR 50 06/04/2022    CREATININE 1 28 06/06/2022    CREATININE 1 52 (H) 06/05/2022    CREATININE 1 35 (H) 06/04/2022   noted, stable   PCP follow-up

## 2022-06-07 ENCOUNTER — TRANSITIONAL CARE MANAGEMENT (OUTPATIENT)
Dept: FAMILY MEDICINE CLINIC | Facility: CLINIC | Age: 77
End: 2022-06-07

## 2022-06-08 ENCOUNTER — OFFICE VISIT (OUTPATIENT)
Dept: FAMILY MEDICINE CLINIC | Facility: CLINIC | Age: 77
End: 2022-06-08
Payer: MEDICARE

## 2022-06-08 ENCOUNTER — PATIENT OUTREACH (OUTPATIENT)
Dept: FAMILY MEDICINE CLINIC | Facility: CLINIC | Age: 77
End: 2022-06-08

## 2022-06-08 VITALS
HEART RATE: 90 BPM | BODY MASS INDEX: 31.86 KG/M2 | TEMPERATURE: 97.5 F | OXYGEN SATURATION: 95 % | HEIGHT: 67 IN | WEIGHT: 203 LBS | DIASTOLIC BLOOD PRESSURE: 60 MMHG | SYSTOLIC BLOOD PRESSURE: 90 MMHG | RESPIRATION RATE: 16 BRPM

## 2022-06-08 DIAGNOSIS — E10.51 TYPE 1 DIABETES MELLITUS WITH DIABETIC PERIPHERAL ANGIOPATHY WITHOUT GANGRENE (HCC): ICD-10-CM

## 2022-06-08 DIAGNOSIS — N18.30 BENIGN HYPERTENSION WITH CHRONIC KIDNEY DISEASE, STAGE III (HCC): ICD-10-CM

## 2022-06-08 DIAGNOSIS — J43.2 CENTRILOBULAR EMPHYSEMA (HCC): Chronic | ICD-10-CM

## 2022-06-08 DIAGNOSIS — I48.91 ATRIAL FIBRILLATION WITH RVR (HCC): Primary | ICD-10-CM

## 2022-06-08 DIAGNOSIS — I50.22 CHRONIC SYSTOLIC CONGESTIVE HEART FAILURE (HCC): Chronic | ICD-10-CM

## 2022-06-08 DIAGNOSIS — F41.8 ANXIETY WITH DEPRESSION: Chronic | ICD-10-CM

## 2022-06-08 DIAGNOSIS — I12.9 BENIGN HYPERTENSION WITH CHRONIC KIDNEY DISEASE, STAGE III (HCC): ICD-10-CM

## 2022-06-08 DIAGNOSIS — E89.0 HYPOTHYROIDISM, POSTABLATIVE: ICD-10-CM

## 2022-06-08 DIAGNOSIS — J90 PLEURAL EFFUSION: ICD-10-CM

## 2022-06-08 DIAGNOSIS — I25.118 CORONARY ARTERY DISEASE OF NATIVE ARTERY OF NATIVE HEART WITH STABLE ANGINA PECTORIS (HCC): ICD-10-CM

## 2022-06-08 PROCEDURE — 99496 TRANSJ CARE MGMT HIGH F2F 7D: CPT | Performed by: FAMILY MEDICINE

## 2022-06-08 NOTE — Clinical Note
Good morning, [place] patient  Recent hospitalization for new onset of Afib with RVR  He is currently on Eliquis and low-dose metoprolol  Blood pressures are running low  HR 84-90, regular  Most recent echo February 2022 with ejection fraction 40%  Recent chest x-ray reveals small pleural effusion  He is scheduled for follow-up with Rehan Leon on June 20th    Thank you

## 2022-06-08 NOTE — PROGRESS NOTES
FAMILY PRACTICE OFFICE VISIT       NAME: Pili Artis  AGE: 68 y o  SEX: male       : 1945        MRN: [id number]        Assessment and Plan     1  Atrial fibrillation with RVR [facility])  Assessment & Plan:  Presented with AFib with RVR on   Converted to normal sinus rhythm on Cardizem drip  Currently on metoprolol ER 25 mg once a day and Eliquis 5 mg twice a day  Heart rate is in high 80s, blood pressures are low  Patient denies symptoms of palpitations  Blood pressure is on the lower side once dose of metoprolol was increased from 12 5 to 25 mg daily  Patient was considered a poor candidate for amiodarone due to known restrictive lung disease and renal insufficiency  I wonder if he may benefit from digoxin? I will coordinate close follow-ups with [facility]      2  Coronary artery disease of native artery of native heart with stable angina pectoris [facility])  Assessment & Plan:  Patient is scheduled for follow-up with [facility]  He currently denies symptoms of angina  Plavix was discontinued during recent hospitalization  Patient remains on regimen of aspirin, high potency statin and beta-blocker       3  Anxiety with depression  Assessment & Plan:  Continue Cymbalta      4  Chronic systolic congestive heart failure (HCC)  Assessment & Plan:  Wt Readings from Last 3 Encounters:   22 92 1 kg (203 lb)   22 91 2 kg (201 lb)   22 92 5 kg (204 lb)      Patient appears euvolemic on exam today  Chest x-ray is consistent with small right pleural effusion but no pulmonary edema  Pending follow-up with Cardiology  Continue low-dose torsemide 10 mg daily          5  Pleural effusion  Assessment & Plan:  Small right pleural effusion noted on to recent chest x-rays  Patient is under care of pulmonology,Dr Lugo recommended observation /repeat CXR in 4-6 weeks    Patient reports chronic dyspnea at baseline        6  Centrilobular emphysema [facility])  Assessment & Plan:  Chronic unchanged dyspnea at rest and on exertion  Chest x-ray reveals right small pleural effusion no overt CHF  Patient will follow-up with pulmonology  Previous trials of inhalers were unsuccessful  He uses oxygen as needed      7  Hypothyroidism, postablative  Assessment & Plan:  Dose of levothyroxine was recently adjusted by Endocrinology  TSH performed during recent hospitalization is normal     Continue levothyroxine 175 mcg daily      8  Type 1 diabetes mellitus with diabetic peripheral angiopathy without gangrene [facility])  Assessment & Plan:    Lab Results   Component Value Date    HGBA1C 8 4 (H) 05/10/2022     Patient remains under care of Endocrinology, Dr Oksana Flower      9  Benign hypertension with chronic kidney disease, stage III [facility])  Assessment & Plan:  Lab Results   Component Value Date    EGFR 53 06/06/2022    EGFR 43 06/05/2022    EGFR 50 06/04/2022    CREATININE 1 28 06/06/2022    CREATININE 1 52 (H) 06/05/2022    CREATININE 1 35 (H) 06/04/2022     Stable GFR creatinine  Patient remains on low-dose of torsemide 10 mg daily  Blood pressure is on the lower side once dose of beta-blocker was increased from 12 5 to 25 mg daily due to new onset of AFib with RVR  Patient admits to chronic symptoms of fatigue  We discussed importance of fall precautions especially on dual Eliquis and aspirin therapy         Patient requires close follow-up with Cardiology  He is currently scheduled on June 20th  Patient/wife will contact myself or Cardiology immediately with any changes in his symptoms  Blood pressure is on the lower side, patient is on metoprolol ER 25 mg daily, dose was increased due to recent onset of AFib  I warned him about orthostatic symptoms and emphasized importance of fall precautions  There are no Patient Instructions on file for this visit  Return for follow up August 2022 as scheduled prior      Discussed with the patient and all questioned fully answered  He will call me if any problems arise  M*Modal software was used to dictate this note  It may contain errors with dictating incorrect words/spelling  Please contact provider directly with any questions  Chief Complaint     Chief Complaint   Patient presents with    Transition of Care Management     Afib with RVR     Fatigue    Shortness of Breath     Exertion and non-exertion         History of Present Illness     TCM Call (since 5/8/2022)     Date and time call was made  6/7/2022  4:02 PM    Hospital care reviewed  Records reviewed    Patient was hospitialized at  [address] [place] [address]    Date of Admission  06/04/22    Date of discharge  06/06/22    Diagnosis  Afib with RVR    Disposition  Home    Were the patients medications reviewed and updated  No    Current Symptoms  Fatigue    Fatigue severity  Mild      TCM Call (since 5/8/2022)     Post hospital issues  Reduced activity    Should patient be enrolled in anticoag monitoring? No    Scheduled for follow up?   Yes    Patients specialists  Cardiologist    Cardiologist name  Elizabeth Clifton 6/20/22    Did you obtain your prescribed medications  Yes    Do you need help managing your prescriptions or medications  Yes    Why type of assitance do you need  Wife Assists    Is transportation to your appointment needed  Yes    Specify why  Wife will drive    I have advised the patient to call PCP with any new or worsening symptoms    Derrick Villa LPN    Living Arrangements  Spouse or Significiant other    Support System  Spouse    The type of support provided  Physical; Emotional    Do you have social support  Yes, as much as I need    Are you recieving any outpatient services  No    Are you recieving home care services  No    Are you using any community resources  No    Current waiver services  No    Have you fallen in the last 12 months  No    Interperter language line needed  No    Counseling  Family    Counseling topics instructions for management; Importance of RX   compliance    Comments  Appt made for 6/13/22 @ 11am      Patient presents for follow-up after recent hospitalization due to AFib with rapid ventricular response  Reportedly he woke up "feeling off" in the morning of June 4th, patient appeared pale as per wife  Had chest discomfort, and SOB   HR was 141  Took NTG - helped some with chest discomfort but not other symptoms  Patient was seen in emergency room of [facility] on force found to be in atrial fibrillation  He converted to normal sinus rhythm on Cardizem drip  He was evaluated by Cardiology while inpatient  Plavix was discontinued and patient was started on Eliquis  He remains on aspirin 81 mg daily  Dose of metoprolol ER was increased up to 25 mg daily  Patient remains on statin  He has been compliant with daily medications  He denies symptoms of palpitations or dizziness  He admits to essentially unchanged symptoms of chronic fatigue and dyspnea at rest and with exertion  CXR performed during recent hospitalization revealed small  Right pleural effusion, no evidence of acute CHF  Most recent echocardiogram was performed back in February 2022 with ejection fraction of 40%  Fatigue  Associated symptoms include arthralgias and fatigue  Pertinent negatives include no chest pain, coughing or rash  Shortness of Breath  Associated symptoms include fatigue  Pertinent negatives include no chest pain, coughing, palpitations or wheezing  Review of Systems   Review of Systems   Constitutional: Positive for fatigue  HENT: Negative  Eyes: Negative  Respiratory: Positive for shortness of breath  Negative for cough and wheezing  Cardiovascular: Negative  Negative for chest pain and palpitations  Gastrointestinal: Negative  Endocrine: Negative  Genitourinary: Negative  Musculoskeletal: Positive for arthralgias and back pain  Skin: Negative for rash  Allergic/Immunologic: Negative  Neurological: Negative  Hematological: Negative  Psychiatric/Behavioral: Negative          Active Problem List     Patient Active Problem List   Diagnosis    Type 1 diabetes mellitus with diabetic peripheral angiopathy without gangrene ([facility])    Presence of drug coated stent in LAD coronary artery    Coronary artery disease of native artery of native heart with stable angina pectoris ([facility])    Presence of drug coated stent in left circumflex coronary artery    Dyslipidemia    Obstructive sleep apnea of adult    Carotid artery stenosis, asymptomatic, bilateral    Restrictive lung disease    Centrilobular emphysema ([facility] )    Benign hypertension with chronic kidney disease, stage III ([facility])    CKD (chronic kidney disease) stage 3, GFR 30-59 ml/min ([facility])    Ren[facility] artery stenosis [facility]    Renal cyst, right    Vitamin D deficiency    Aortoiliac occlusive disease ([facility] )    Hypothyroidism, postablative    Low back pain with sciatica    Lumbar disc herniation    Lumbar radiculopathy    Diabetic neuropathy associated with type 1 diabetes mellitus ([facility])    Chronic systolic congestive heart failure ([facility])    Anxiety with depression    Spinal stenosis of lumbar region with neurogenic claudication    Thrombocytopenia, unspecified ([place] [address] )    S/P femoral-popliteal bypass surgery    Stenosis of carotid artery    Chronic respiratory failure with hypoxia ([facility])    Class 1 obesity without serious comorbidity with body mass index (BMI) of 31 0 to 31 9 in adult    Open wound of right lower leg    Atrial fibrillation with RVR ([facility])    Pleural effusion       Past Medical History:  Past Medical History:   Diagnosis Date    Acute venous embolism and thrombosis of deep vessels of proximal lower extremity ([facility] )     resolved 04/04/2015    DARINEL (acute kidney injury) ([place] [address] ) 01/12/2016    Anesthesia     RESPIRATORY ISSUES DUE TO SLEEP APNEA    Cardiac disease     heart attack, stents x 4    Chronic cough     resolved 02/04/2016    Chronic pain disorder     intermitent claudication    COPD (chronic obstructive pulmonary disease) ([facility])     Coronary artery disease     CPAP (continuous positive airway pressure) dependence     Diabetes mellitus [facility]     Disease of thyroid gland     DVT (deep venous thrombosis) ([facility])     Heart failure with mid-range ejection fraction ([facility])     Hyperlipidemia     Hypertension     Ischemic cardiomyopathy     last assessed 09/26/2017    Myocardial infarction [facility])     MI +2 2015,2018    NSTEMI (non-ST elevated myocardial infarction) ([facility] ) 03/23/2019    Pulmonary emphysema (HCC)     Pulmonary granuloma ([facility])     resolved 02/03/2017    Renal failure     Sleep apnea        Past Surgical History:  Past Surgical History:   Procedure Laterality Date    BYPASS FEMORAL-POPLITEAL      initial stenosis with stent left, 7 x 100 smart stent onset 02/24/2014    CARDIAC SURGERY      cardiac stents    CATARACT EXTRACTION      COLOGUARD (HISTORICAL)  2018    CORONARY ANGIOPLASTY WITH STENT PLACEMENT      x4    IR AORTAGRAM WITH RUN-OFF  3/14/2019    ND THROMBOENDARTECTMY FEMORAL COMMON Left 3/22/2019    Procedure: ENDARTERECTOMY ARTERIAL FEMORAL WITH PATCH ANGIOPLASTY, BALLOON ANGIOPLASTY, STENT;  Surgeon: Loyda Soriano MD;  [facility];  Service: Vascular    ND VEIN BYPASS GRAFT,FEM-POP Left 3/22/2019    Procedure: BYPASS FEMORAL-POPLITEAL WITH COMPLETION ARTERIOGRAM;  Surgeon: Loyda Soriano MD;  [facility];  Service: Vascular    VASCULAR SURGERY      stent placement    WOUND DEBRIDEMENT Left 4/15/2019    Procedure: DEBRIDEMENT WOUND AND DRESSING CHANGE ([address]) left groin with VAC;  Surgeon: Roldan Zheng DO;  [facility];  Service: Vascular       Family History:  Family History   Problem Relation Age of Onset    Heart disease Father         pacer placement    Hypertension Father     Kidney disease Father    Andrade Castro Heart failure Father     Heart attack Father     Liver disease Father     Cirrhosis Mother         due to beer consumption    Liver disease Mother     Diabetes Other        Social History:  Social History     Socioeconomic History    Marital status: /Civil Union     Spouse name: Not on file    Number of children: Not on file    Years of education: Not on file    Highest education level: Not on file   Occupational History    Occupation: Retired    Occupation: Desk work    Occupation: [employer]   Tobacco Use    Smoking status: Former Smoker     Packs/day: 4 00     Years: 47 00     Pack years: 188 00     Types: Cigarettes     Start date:      Quit date:      Years since quittin 4    Smokeless tobacco: Never Used   Vaping Use    Vaping Use: Never used   Substance and Sexual Activity    Alcohol use: Yes     Comment: 2 drinks per day    Drug use: No    Sexual activity: Not Currently   Other Topics Concern    Not on file   Social History Narrative    Not on file     Social Determinants of Health     Financial Resource Strain: Not on file   Food Insecurity: Not on file   Transportation Needs: Not on file   Physical Activity: Not on file   Stress: Not on file   Social Connections: Not on file   Intimate Partner Violence: Not on file   Housing Stability: Not on file         Objective     Vitals:    22 1029 22 1104   BP: 108/60 90/60   BP Location: Left arm    Patient Position: Sitting    Cuff Size: Standard    Pulse: 90    Resp: 16    Temp: 97 5 °F (36 4 °C)    TempSrc: Temporal    SpO2: 95%    Weight: 92 1 kg (203 lb)    Height: 5' 7" (1 702 m)        Wt Readings from Last 3 Encounters:   22 92 1 kg (203 lb)   22 91 2 kg (201 lb)   22 92 5 kg (204 lb)       Physical Exam  Vitals and nursing note reviewed  Constitutional:       General: He is not in acute distress  Appearance: Normal appearance  He is well-developed   He is not ill-appearing  HENT:      Head: Normocephalic and atraumatic  Eyes:      Conjunctiva/sclera: Conjunctivae normal    Neck:      Vascular: No carotid bruit  Comments: No JVD  Cardiovascular:      Rate and Rhythm: Normal rate and regular rhythm  Heart sounds: Normal heart sounds  No murmur heard  Comments: B/P 90/60, HR 84-90 bpm, regular  Pulmonary:      Effort: Pulmonary effort is normal  No respiratory distress  Breath sounds: Normal breath sounds  No wheezing or rales  Abdominal:      General: Bowel sounds are normal  There is no distension or abdominal bruit  Palpations: Abdomen is soft  Musculoskeletal:         General: Normal range of motion  Cervical back: Neck supple  Right lower leg: No edema  Left lower leg: No edema  Neurological:      General: No focal deficit present  Mental Status: He is alert and oriented to person, place, and time  Cranial Nerves: No cranial nerve deficit     Psychiatric:         Mood and Affect: Mood normal          Behavior: Behavior normal           Pertinent Laboratory/Diagnostic Studies:    Lab Results   Component Value Date    WBC 5 15 06/06/2022    HGB 12 5 06/06/2022    HCT 38 4 06/06/2022    MCV 97 06/06/2022     (L) 06/06/2022       No results found for: TSH    Lab Results   Component Value Date    CHOL 129 10/01/2015     Lab Results   Component Value Date    TRIG 140 05/10/2022     Lab Results   Component Value Date    HDL 41 05/10/2022     Lab Results   Component Value Date    LDLCALC 65 05/10/2022     Lab Results   Component Value Date    HGBA1C 8 4 (H) 05/10/2022     Lab Results   Component Value Date    SODIUM 137 06/06/2022    K 4 9 06/06/2022     06/06/2022    CO2 28 06/06/2022    ANIONGAP 6 12/21/2015    AGAP 5 06/06/2022    BUN 32 (H) 06/06/2022    CREATININE 1 28 06/06/2022    GLUC 161 (H) 06/06/2022    GLUF 224 (H) 05/10/2022    CALCIUM 8 7 06/06/2022    AST 18 06/04/2022    ALT 11 06/04/2022 ALKPHOS 108 (H) 06/04/2022    PROT 7 6 10/01/2015    TP 8 0 06/04/2022    BILITOT 0 33 10/01/2015    TBILI 0 50 06/04/2022    EGFR 53 06/06/2022       No orders of the defined types were placed in this encounter  ALLERGIES:  Allergies   Allergen Reactions    Doxazosin Myalgia     UNKNOWN  UNKNOWN  Muscle cramps    Iohexol      UNKNOWN    Lisinopril Cough     cough  Other reaction(s): CAMELLA SINENSIS (ZESTRIL) (cough)  cough    Vancomycin Hives    Imdur [Isosorbide Nitrate] Hives    Adhesive [Medical Tape]      Skin Breakdown    Cardura [Doxazosin Mesylate]      Muscle cramps    Isosorbide Rash    Other Other (See Comments)     Iv dye for cardiac cath  Renal failure very close to dialysis  But no dialysis       Current Medications     Current Outpatient Medications   Medication Sig Dispense Refill    apixaban (Eliquis) 5 mg Take 1 tablet (5 mg total) by mouth 2 (two) times a day 60 tablet 0    aspirin 81 MG tablet Take 81 mg by mouth daily   cholecalciferol (VITAMIN D3) 1,000 units tablet Take 1,000 Units by mouth daily       Coenzyme Q10 (COQ-10) 200 MG CAPS Take 200 mg by mouth daily      docusate sodium (COLACE) 50 mg capsule Take by mouth 2 (two) times a day as needed        DULoxetine (Cymbalta) 30 mg delayed release capsule Take 1 capsule (30 mg total) by mouth in the morning  90 capsule 3    gabapentin (NEURONTIN) 300 mg capsule Take 1 capsule (300 mg total) by mouth in the morning and 1 capsule (300 mg total) in the evening  180 capsule 3    Glucagon (Baqsimi One Pack) 3 MG/DOSE POWD 1 Dose into each nostril as needed      Icosapent Ethyl (Vascepa) 1 g CAPS Take 1 capsule (1 g total) by mouth in the morning and 1 capsule (1 g total) before bedtime  180 capsule 3    insulin glargine (LANTUS) 100 units/mL subcutaneous injection Inject 40 Units under the skin every morning AND 6 Units daily at bedtime   10 mL 0    insulin lispro (HUMALOG) 100 units/mL injection Inject 3 units with breakfast, 6 units at lunch, and 6 units at dinner (Patient taking differently: 4 (four) times a day Inject 3 units with breakfast, 6 units at lunch, and 6 units at dinner  Carbs counting) 10 mL 1    levothyroxine 175 mcg tablet Take 175 mcg by mouth in the morning   metoprolol succinate (TOPROL-XL) 25 mg 24 hr tablet Take 1 tablet (25 mg total) by mouth daily 30 tablet 0    nitroglycerin (NITROSTAT) 0 4 mg SL tablet Place 1 tablet (0 4 mg total) under the tongue every 5 (five) minutes as needed for chest pain 25 tablet 3    polyethylene glycol (MIRALAX) 17 g packet Take 17 g by mouth daily      rosuvastatin (CRESTOR) 20 MG tablet Take 1 tablet (20 mg total) by mouth in the morning  90 tablet 3    torsemide (DEMADEX) 10 mg tablet Take 1 tablet (10 mg total) by mouth in the morning  100 tablet 2    ULTICARE INSULIN SYRINGE 30G X 1/2" 1 ML MISC Use as directed  0    Wound Dressings (Medihoney Wound/Burn Dressing) GEL Apply topically if needed       No current facility-administered medications for this visit         Medications Discontinued During This Encounter   Medication Reason    Omega-3 Fatty Acids (fish oil) 1,000 mg Alternate therapy       Health Maintenance     Health Maintenance   Topic Date Due    SLP PLAN OF CARE  Never done    Lung Cancer Screening  09/18/2021    COVID-19 Vaccine (4 - Booster for Moderna series) 02/14/2022    Diabetic Foot Exam  02/27/2022    BMI: Followup Plan  06/06/2022    Fall Risk  10/04/2022    Medicare Annual Wellness Visit (AWV)  10/04/2022    HEMOGLOBIN A1C  11/10/2022    DTaP,Tdap,and Td Vaccines (2 - Td or Tdap) 05/05/2023    URINE MICROALBUMIN  05/10/2023    BMI: Adult  06/08/2023    DM Eye Exam  07/27/2023    Hepatitis C Screening  Completed    Pneumococcal Vaccine: 65+ Years  Completed    Influenza Vaccine  Completed    HIB Vaccine  Aged Out    Hepatitis B Vaccine  Aged Out    IPV Vaccine  Aged Out    Hepatitis A Vaccine  Aged C/ Efren Stuart 19 Meningococcal ACWY Vaccine  Aged Out    HPV Vaccine  Aged Out       Immunization History   Administered Date(s) Administered    COVID-19 MODERNA VACC 0 5 ML IM 01/19/2021, 02/23/2021, 10/14/2021    DT (pediatric) 12/01/2009    H1N1, All Formulations 12/01/2009    INFLUENZA 10/01/2008, 10/06/2009, 09/01/2010, 11/04/2013, 08/29/2018, 09/09/2019, 09/05/2020, 09/13/2021    Influenza Split High Dose Preservative Free IM 08/28/2014, 10/03/2016, 08/29/2018    Influenza, seasonal, injectable 10/01/2008, 10/19/2010, 08/26/2011, 09/20/2011, 08/01/2012, 09/01/2012, 09/13/2013, 09/15/2015, 09/03/2017    Meningococcal C/Y-HIB PRP 12/01/2009    Pneumococcal Conjugate 13-Valent 08/11/2015    Pneumococcal Polysaccharide PPV23 10/01/2008, 06/04/2009, 03/15/2017    Td (adult), adsorbed 03/04/2011    Tdap 05/05/2013    Zoster 10/01/2009    influenza, trivalent, adjuvanted 09/05/2019, 09/05/2020       Anali Lau MD

## 2022-06-08 NOTE — PROGRESS NOTES
Spoke with patients wife and she asked her  about participating in medicare bundle program, they both decline at this time

## 2022-06-10 PROBLEM — I48.91 ATRIAL FIBRILLATION WITH RVR (HCC): Chronic | Status: ACTIVE | Noted: 2022-06-04

## 2022-06-10 NOTE — ASSESSMENT & PLAN NOTE
Chronic unchanged dyspnea at rest and on exertion  Chest x-ray reveals right small pleural effusion no overt CHF  Patient will follow-up with pulmonology  Previous trials of inhalers were unsuccessful      He uses oxygen as needed

## 2022-06-10 NOTE — ASSESSMENT & PLAN NOTE
Wt Readings from Last 3 Encounters:   06/08/22 92 1 kg (203 lb)   06/06/22 91 2 kg (201 lb)   05/24/22 92 5 kg (204 lb)      Patient appears euvolemic on exam today      Chest x-ray is consistent with small right pleural effusion but no pulmonary edema  Pending follow-up with Cardiology  Continue low-dose torsemide 10 mg daily

## 2022-06-10 NOTE — ASSESSMENT & PLAN NOTE
Small right pleural effusion noted on to recent chest x-rays  Patient is under care of pulmonology,Dr Lugo recommended observation /repeat CXR in 4-6 weeks    Patient reports chronic dyspnea at baseline

## 2022-06-10 NOTE — ASSESSMENT & PLAN NOTE
· Presented with AFib with RVR on June 4th  · Converted to normal sinus rhythm on Cardizem drip  · Currently on metoprolol ER 25 mg once a day and Eliquis 5 mg twice a day  · Heart rate is in high 80s, blood pressures are low  Patient denies symptoms of palpitations  · Blood pressure is on the lower side once dose of metoprolol was increased from 12 5 to 25 mg daily  · Patient was considered a poor candidate for amiodarone due to known restrictive lung disease and renal insufficiency  I wonder if he may benefit from digoxin?   · I will coordinate close follow-ups with [facility]

## 2022-06-10 NOTE — ASSESSMENT & PLAN NOTE
Lab Results   Component Value Date    EGFR 53 06/06/2022    EGFR 43 06/05/2022    EGFR 50 06/04/2022    CREATININE 1 28 06/06/2022    CREATININE 1 52 (H) 06/05/2022    CREATININE 1 35 (H) 06/04/2022     Stable GFR creatinine  Patient remains on low-dose of torsemide 10 mg daily  Blood pressure is on the lower side once dose of beta-blocker was increased from 12 5 to 25 mg daily due to new onset of AFib with RVR  Patient admits to chronic symptoms of fatigue      We discussed importance of fall precautions especially on dual Eliquis and aspirin therapy

## 2022-06-10 NOTE — ASSESSMENT & PLAN NOTE
Patient is scheduled for follow-up with [facility]  He currently denies symptoms of angina  Plavix was discontinued during recent hospitalization      Patient remains on regimen of aspirin, high potency statin and beta-blocker

## 2022-06-10 NOTE — ASSESSMENT & PLAN NOTE
Lab Results   Component Value Date    HGBA1C 8 4 (H) 05/10/2022     Patient remains under care of Endocrinology, Dr Mihir Corrigan

## 2022-06-10 NOTE — ASSESSMENT & PLAN NOTE
Dose of levothyroxine was recently adjusted by Endocrinology      TSH performed during recent hospitalization is normal     Continue levothyroxine 175 mcg daily

## 2022-06-16 NOTE — PROGRESS NOTES
Heart Failure Outpatient Progress Note - Andrez Adam 68 y o  male MRN: [id number]    @ Encounter: [id number]      Assessment/Plan:    Patient Active Problem List    Diagnosis Date Noted    Pleural effusion 06/05/2022    Atrial fibrillation with RVR ([facility] ) 06/04/2022    Open wound of right lower leg 05/25/2022    Class 1 obesity without serious comorbidity with body mass index (BMI) of 31 0 to 31 9 in adult 11/05/2021    Chronic respiratory failure with hypoxia ([facility] ) 05/10/2021    Stenosis of carotid artery 08/24/2020    Thrombocytopenia, unspecified ([place] [address] ) 03/10/2020    Spinal stenosis of lumbar region with neurogenic claudication     S/P femoral-popliteal bypass surgery 01/09/2020    Anxiety with depression 04/07/2019    Chronic systolic congestive heart failure ([place] [address] ) 03/01/2019    Diabetic neuropathy associated with type 1 diabetes mellitus ([place] [address] ) 01/14/2019    Lumbar disc herniation 08/01/2018    Lumbar radiculopathy 08/01/2018    Aortoiliac occlusive disease ([facility] ) 03/01/2018    Restrictive lung disease 01/30/2018    Centrilobular emphysema ([place] [address] ) 01/30/2018    Presence of drug coated stent in LAD coronary artery 01/24/2018    Coronary artery disease of native artery of native heart with stable angina pectoris ([facility] ) 01/24/2018    Presence of drug coated stent in left circumflex coronary artery 01/24/2018    Dyslipidemia 01/24/2018    Obstructive sleep apnea of adult 01/24/2018    Carotid artery stenosis, asymptomatic, bilateral 01/24/2018    CKD (chronic kidney disease) stage 3, GFR 30-59 ml/min ([facility]) 12/13/2016    Low back pain with sciatica 08/22/2016    Type 1 diabetes mellitus with diabetic peripheral angiopathy without gangrene ([facility] ) 01/12/2016    Benign hypertension with chronic kidney disease, stage III ([facility] ) 08/12/2015    Renal cyst, right 08/12/2015    Vitamin D deficiency 05/22/2015    Renal artery stenosis ([facility] ) 05/09/2015    Hypothyroidism, postablative 04/02/2013 Chronic HFmEF; LVEF 40%; LVIDd 6 7 cm; NYHA II; ACC/AHA Stage B/C Etiology: ischemic now with possible nonischemic component given atrial fib with RVR  Back in SR but will with elevated JVP, c/o increased MONTAGUE  Increase Torsemide to 20 mg daily  Check NPI  Weight of 202 lbs on 12/20,  201 lbs, 201 at discharge, today, 203 lbs  TTE 03/2/2019: LVEF 45%  LVIDd 6 cm  Normal RV size and RVSF  PASP 57 mmHg  TTE 02/06/2020 (at [address], per report): LVEF 56%  LVIDd 5 6 cm  TTE 07/23/2020: LVEF 40%  LVIDd 5 7 cm  Grade 1 DD  "hypokinesis of inferoseptal, inferior and inferolateral walls " Normal RV  Mild MR  TTE 04/22/2021 [facility] [place] cardiologist; per report, see media tab): LVEF 45-50%  LVIDd 6 7 cm  Grade 1 DD  TTE 2/3/22: LVEF 40%  LVIDD 6 3cm  Mildly increased wall thickness  RV normal size and systolic function  Mild AI  Mild MR,severe hypokinesis of the entire inferior wall                                 Neurohormonal Blockade:  --Beta Blocker: metoprolol succinate 25 mg daily  --ARNi / ACEi / ARB: No (Note: allergy to ACEi)  --Aldosterone Antagonist: No    --SGLT2 Inhibitor: No    --Diuretic: torsemide 20 mg daily      Sudden Cardiac Death Risk Reduction:  --LVEF >35%     Electrical Resynchronization:  --Candidacy for BiV device: narrow QRS     Advanced Therapies: Will continue to monitor  New onset atrial fibrillation with RVR  Converted on Diltiazem IV  Maintaining SR at discharge on Toprol XL 25 mg daily  Not placed on Amio due to lung disease  Eliquis 5 mg BID for oral AC       Coronary artery disease  S/p stenting x4 in 2014 at [facility]  Without active chest pain but with activity intolerance and worsening MONTAGUE  Continue on aspirin, Plavix, statin, and BB as above  PRN SL nitro prescribed  Will check nuclear stress imaging to assess for progressive ischemia  --[facility] 03/06/2020 (at [address], per report): "RCA with 70% proximal and 50% mid stenoses   LAD 40-50% stenosis proximally before widely patent stents  Distal left Cx 75% in stent restenosis just before the small LPDA  OM 3 is  with left to left collaterals  Co-dominant RCA "                 Chronic kidney disease, stage IIIb Baseline creatinine of 1 4-1 7  Stable                  Hypertension  Now running low                   Hyperlipidemia  Lipid panel from 01/06/2022: cholesterol 153; ; HDL 43; calculated LDL 74  Continue on CoQ10 200 mg daily and rosuvastatin 20 mg daily       Diabetes mellitus, type I              Insulin dependent  Most recent hemoglobin A1c of 7 5 from 01/06/2022      Peripheral arterial disease  Obstructive sleep apnea  Carotid artery stenosis  COPD/Restrictive lung disease / chronic respiratory failure with hypoxia  Follows with pulm  Has supplemental oxygen 2 L at home to be used with activities  Currently only using at rest  Discussed importance of use with all activities to prevent hypoxia  --6 minute walk 5/2021  Oxygen saturations 97% on room air at rest, with ambulating for 4 minutes oxygen saturation drops down to 85% on room air requiring 2 L nasal cannula   Oxygen saturation is 90 8% with 2 L nasal cannula   Total walk distance is 346 meters   Resting heart rate is 76 beats per minute upon ambulation maximum heart rate was 100 beats per minute  --HR CT Chest 9/18/20: Moderate upper lobe predominant panlobular emphysema, without significant change since 2015  Mild lower lung predominant juxtapleural reticulation due to fibrosis, slightly more extensive on the right, with no traction bronchiolectasis or honeycombing      Spinal stenosis, lumbar  Anxiety  Depression  History of PE and DVT  --VQ 9/13/19: low probability       HPI:   Chelsea Alvarez is a 68-year-old man with a PMH as above who presents to the office for an acute visit       12/20/2021 with Dr Donna Polo: "Here for follow up  Still not interested in cardiac rehab  Walking mainly inside the house   Walking up and down the flight of stairs recently due to broken smoke detector and was tired, no shortness of breath  Notes balance worse over the years  He has peripheral neuropathy and no sensation on both feet  "     Patient's wife contacted PCP on 01/21 s/p fall and hypotension on home cuff  PCP advised cardiology evaluation and assessment       01/21/2022: Patient presents with his wife for an acute visit  Last few months patient has had increased number of falls  First fall occurred in living room in late November 2020  Earlier this month on 01/02, fell upon suddenly standing from bed  [place] Tyra again this morning around midnight  Patient's wife found him around 0040 and assisted him from floor back into bed  Patient's wife (former RN) denies any signs or symptoms of stroke; BP of 87/47 with pulse of 56 at that time  Blood sugar WNL per wife  Patient has no memory of getting out of bed, falling, or receiving assistance from his wife s/p fall  Denies head strike  Patient does endorse occasional MONTAGUE and generalized fatigue  Denies any dietary changes or decreased oral intake  Did have antidepressant change to citalopram on 01/03/2022 and patient does endorse increased fatigue and restlessness with this change  Denies taking SL nitro and takes all antihypertensive medications in the morning  2/3/22  Presents for follow up  Still with c/o fatigue, SOB at rest and with exertion and worsening activity intolerance over the past year  His BP remains low despite cutting Metoprolol in half  He is supposed to be using supplemental oxygen with activities but has not been doing so  Only using when watching TV  Interval History:  5/18/22: Here for follow up  Recently came back from [place]  Getting treated for lower extremity wounds  Seen by AP in the interim  Metoprolol dose reduced due to orthostasis  Reports doing well while in [place] and was walking about half a mile  Since coming back notes more shortness of breath   Also with weight gain of about 4 lbs  Had late lunch yesterday and ate 3 chili dogs  Using oxygen supplementation on exertion only occasionally  6/20/22  Presents for hospital follow up  Recently admitted with new onset atrial fib with RVR and mild CHF exacerbation  Was started on Cardizem gtt and subsequently converted to SR  Was diuresed with IV Lasix and discharged home on higher dose of Metoprolol  Was not placed on Amiodarone due to underlying lung disease  Feeling about the same today  Has had progressively worsening MONTAGUE for some time now  Otherwise no CP, orthopnea, PND   No dizziness/LH or syncope         Past Medical History:   Diagnosis Date    Acute venous embolism and thrombosis of deep vessels of proximal lower extremity ([facility] )     resolved 04/04/2015    DARINEL (acute kidney injury) ([facility] ) 01/12/2016    Anesthesia     RESPIRATORY ISSUES DUE TO SLEEP APNEA    Cardiac disease     heart attack, stents x 4    Chronic cough     resolved 02/04/2016    Chronic pain disorder     intermitent claudication    COPD (chronic obstructive pulmonary disease) ([facility] )     Coronary artery disease     CPAP (continuous positive airway pressure) dependence     Diabetes mellitus ([facility] )     Disease of thyroid gland     DVT (deep venous thrombosis) ([facility]     Heart failure with mid-range ejection fraction ([facility] )     Hyperlipidemia     Hypertension     Ischemic cardiomyopathy     last assessed 09/26/2017    Myocardial infarction [facility])     MI +2 2015,2018    NSTEMI (non-ST elevated myocardial infarction) ([facility] ) 03/23/2019    Pulmonary emphysema (HCC)     Pulmonary granuloma (HCC)     resolved 02/03/2017    Renal failure     Sleep apnea        12 point ROS negative other than that stated in HPI    Allergies   Allergen Reactions    Doxazosin Myalgia     UNKNOWN  UNKNOWN  Muscle cramps    Iohexol      UNKNOWN    Lisinopril Cough     cough  Other reaction(s): CAMELLA SINENSIS (ZESTRIL) (cough)  cough    Vancomycin Hives    Imdur [Isosorbide Nitrate] Hives    Adhesive [Medical Tape]      Skin Breakdown    Cardura [Doxazosin Mesylate]      Muscle cramps    Isosorbide Rash    Other Other (See Comments)     Iv dye for cardiac cath  Renal failure very close to dialysis  But no dialysis       Current Outpatient Medications:     apixaban (Eliquis) 5 mg, Take 1 tablet (5 mg total) by mouth 2 (two) times a day, Disp: 60 tablet, Rfl: 0    aspirin 81 MG tablet, Take 81 mg by mouth daily  , Disp: , Rfl:     cholecalciferol (VITAMIN D3) 1,000 units tablet, Take 1,000 Units by mouth daily , Disp: , Rfl:     Coenzyme Q10 (COQ-10) 200 MG CAPS, Take 200 mg by mouth daily, Disp: , Rfl:     docusate sodium (COLACE) 50 mg capsule, Take by mouth 2 (two) times a day as needed  , Disp: , Rfl:     DULoxetine (Cymbalta) 30 mg delayed release capsule, Take 1 capsule (30 mg total) by mouth in the morning , Disp: 90 capsule, Rfl: 3    gabapentin (NEURONTIN) 300 mg capsule, Take 1 capsule (300 mg total) by mouth in the morning and 1 capsule (300 mg total) in the evening , Disp: 180 capsule, Rfl: 3    Glucagon (Baqsimi One Pack) 3 MG/DOSE POWD, 1 Dose into each nostril as needed, Disp: , Rfl:     Icosapent Ethyl (Vascepa) 1 g CAPS, Take 1 capsule (1 g total) by mouth in the morning and 1 capsule (1 g total) before bedtime  , Disp: 180 capsule, Rfl: 3    insulin glargine (LANTUS) 100 units/mL subcutaneous injection, Inject 40 Units under the skin every morning AND 6 Units daily at bedtime  , Disp: 10 mL, Rfl: 0    insulin lispro (HUMALOG) 100 units/mL injection, Inject 3 units with breakfast, 6 units at lunch, and 6 units at dinner (Patient taking differently: 4 (four) times a day Inject 3 units with breakfast, 6 units at lunch, and 6 units at dinner Carbs counting), Disp: 10 mL, Rfl: 1    levothyroxine 175 mcg tablet, Take 175 mcg by mouth in the morning , Disp: , Rfl:     metoprolol succinate (TOPROL-XL) 25 mg 24 hr tablet, Take 1 tablet (25 mg total) by mouth daily, Disp: 30 tablet, Rfl: 0    nitroglycerin (NITROSTAT) 0 4 mg SL tablet, Place 1 tablet (0 4 mg total) under the tongue every 5 (five) minutes as needed for chest pain, Disp: 25 tablet, Rfl: 3    polyethylene glycol (MIRALAX) 17 g packet, Take 17 g by mouth daily, Disp: , Rfl:     rosuvastatin (CRESTOR) 20 MG tablet, Take 1 tablet (20 mg total) by mouth in the morning , Disp: 90 tablet, Rfl: 3    torsemide (DEMADEX) 10 mg tablet, Take 1 tablet (10 mg total) by mouth in the morning , Disp: 100 tablet, Rfl: 2    ULTICARE INSULIN SYRINGE 30G X 1/2" 1 ML MISC, Use as directed, Disp: , Rfl: 0    Wound Dressings (Medihoney Wound/Burn Dressing) GEL, Apply topically if needed, Disp: , Rfl:     Social History     Socioeconomic History    Marital status: /Civil Union     Spouse name: Not on file    Number of children: Not on file    Years of education: Not on file    Highest education level: Not on file   Occupational History    Occupation: Retired    Occupation: Desk work    Occupation: [employer]   Tobacco Use    Smoking status: Former Smoker     Packs/day: 4 00     Years: 47 00     Pack years: 188 00     Types: Cigarettes     Start date:      Quit date:      Years since quittin 4    Smokeless tobacco: Never Used   Vaping Use    Vaping Use: Never used   Substance and Sexual Activity    Alcohol use: Yes     Comment: 2 drinks per day    Drug use: No    Sexual activity: Not Currently   Other Topics Concern    Not on file   Social History Narrative    Not on file     Social Determinants of Health     Financial Resource Strain: Not on file   Food Insecurity: Not on file   Transportation Needs: Not on file   Physical Activity: Not on file   Stress: Not on file   Social Connections: Not on file   Intimate Partner Violence: Not on file   Housing Stability: Not on file       Family History   Problem Relation Age of Onset    Heart disease Father         pacer placement    Hypertension Father     Kidney disease Father     Heart failure Father     Heart attack Father     Liver disease Father     Cirrhosis Mother         due to beer consumption    Liver disease Mother     Diabetes Other        Physical Exam:    Vitals:BP 98/64 (BP Location: Right arm, Patient Position: Sitting, Cuff Size: Large)   Pulse 63   Ht 5' 7" (1 702 m)   Wt 92 3 kg (203 lb 6 4 oz)   SpO2 98%   BMI 31 86 kg/m²       Wt Readings from Last 3 Encounters:   06/08/22 92 1 kg (203 lb)   06/06/22 91 2 kg (201 lb)   05/24/22 92 5 kg (204 lb)           GEN: Bronwyn Gandhi appears well, alert and oriented x 3, pleasant and cooperative   HEENT: pupils equal, round, and reactive to light; extraocular muscles intact  NECK: supple, no carotid bruits   HEART: regular rhythm, normal S1 and S2, no murmurs, clicks, gallops or rubs, JVP is up  LUNGS: diminished, to auscultation bilaterally; no wheezes, rales, or rhonchi   ABDOMEN: normal bowel sounds, soft, no tenderness, no distention  EXTREMITIES: peripheral pulses normal; no clubbing, cyanosis, or edema  NEURO: no focal findings   SKIN: normal without suspicious lesions on exposed skin    Labs & Results:    Chemistry        Component Value Date/Time     12/21/2015 1044    K 4 9 06/06/2022 0548    K 4 9 12/21/2015 1044     06/06/2022 0548     (H) 12/21/2015 1044    CO2 28 06/06/2022 0548    CO2 21 03/22/2019 1330    BUN 32 (H) 06/06/2022 0548    BUN 19 12/21/2015 1044    CREATININE 1 28 06/06/2022 0548    CREATININE 1 29 12/21/2015 1044        Component Value Date/Time    CALCIUM 8 7 06/06/2022 0548    CALCIUM 8 7 12/21/2015 1044    ALKPHOS 108 (H) 06/04/2022 1222    ALKPHOS 99 10/01/2015 1105    AST 18 06/04/2022 1222    AST 22 10/01/2015 1105    ALT 11 06/04/2022 1222    ALT 35 10/01/2015 1105    BILITOT 0 33 10/01/2015 1105        Lab Results   Component Value Date    WBC 5 15 06/06/2022    HGB 12 5 06/06/2022    HCT 38 4 06/06/2022    MCV 97 06/06/2022     (L) 06/06/2022     Lab Results   Component Value Date    NTBNP 2,152 (H) 05/25/2021      Lab Results   Component Value Date    LDLCALC 65 05/10/2022     Lab Results   Component Value Date    CRT8MBWRBSXC 0 521 06/04/2022         EKG personally reviewed by PEDRO LUIS Garcia  No results found for this visit on 06/20/22  Counseling / Coordination of Care  Total floor / unit time spent today 40 minutes  Greater than 50% of total time was spent with the patient and / or family counseling and / or coordination of care  A description of the counseling / coordination of care: 20  Thank you for the opportunity to participate in the care of this patient      Michelle Nicolas

## 2022-06-20 ENCOUNTER — OFFICE VISIT (OUTPATIENT)
Dept: CARDIOLOGY CLINIC | Facility: CLINIC | Age: 77
End: 2022-06-20
Payer: MEDICARE

## 2022-06-20 VITALS
HEART RATE: 63 BPM | WEIGHT: 203.4 LBS | SYSTOLIC BLOOD PRESSURE: 98 MMHG | BODY MASS INDEX: 31.92 KG/M2 | DIASTOLIC BLOOD PRESSURE: 64 MMHG | OXYGEN SATURATION: 98 % | HEIGHT: 67 IN

## 2022-06-20 DIAGNOSIS — I25.10 CORONARY ARTERY DISEASE WITHOUT ANGINA PECTORIS, UNSPECIFIED VESSEL OR LESION TYPE, UNSPECIFIED WHETHER NATIVE OR TRANSPLANTED HEART: ICD-10-CM

## 2022-06-20 DIAGNOSIS — N18.30 CKD (CHRONIC KIDNEY DISEASE) STAGE 3, GFR 30-59 ML/MIN (HCC): ICD-10-CM

## 2022-06-20 DIAGNOSIS — I50.20 SYSTOLIC CONGESTIVE HEART FAILURE, UNSPECIFIED HF CHRONICITY (HCC): Primary | ICD-10-CM

## 2022-06-20 DIAGNOSIS — R93.1 ABNORMAL FINDINGS ON DIAGNOSTIC IMAGING OF HEART AND CORONARY CIRCULATION: ICD-10-CM

## 2022-06-20 PROCEDURE — 99215 OFFICE O/P EST HI 40 MIN: CPT | Performed by: NURSE PRACTITIONER

## 2022-06-20 RX ORDER — TORSEMIDE 10 MG/1
20 TABLET ORAL DAILY
Qty: 100 TABLET | Refills: 2 | Status: SHIPPED | OUTPATIENT
Start: 2022-06-20

## 2022-06-20 NOTE — PATIENT INSTRUCTIONS
Weight yourself daily  If you gain 3 lbs in one day or 5 lbs in one week, please call the office at [phone] and ask for a nurse or the heart failure nurse  Keep your sodium intake to <2 grams, (2000 mg) per day, and fluids <2 Liters (2000 ml) per day  This is around 6-7, 8 oz glasses of fluid per day    Increase Torsemide to 20 mg daily  Schedule stress test today

## 2022-06-28 ENCOUNTER — HOSPITAL ENCOUNTER (OUTPATIENT)
Dept: NON INVASIVE DIAGNOSTICS | Facility: CLINIC | Age: 77
Discharge: HOME | End: 2022-06-28
Payer: MEDICARE

## 2022-06-28 ENCOUNTER — TELEPHONE (OUTPATIENT)
Dept: CARDIOLOGY CLINIC | Facility: CLINIC | Age: 77
End: 2022-06-28

## 2022-06-28 DIAGNOSIS — I25.10 CORONARY ARTERY DISEASE WITHOUT ANGINA PECTORIS, UNSPECIFIED VESSEL OR LESION TYPE, UNSPECIFIED WHETHER NATIVE OR TRANSPLANTED HEART: ICD-10-CM

## 2022-06-28 DIAGNOSIS — I50.20 SYSTOLIC CONGESTIVE HEART FAILURE, UNSPECIFIED HF CHRONICITY (HCC): ICD-10-CM

## 2022-06-28 DIAGNOSIS — R93.1 ABNORMAL FINDINGS ON DIAGNOSTIC IMAGING OF HEART AND CORONARY CIRCULATION: ICD-10-CM

## 2022-06-28 DIAGNOSIS — N18.30 CKD (CHRONIC KIDNEY DISEASE) STAGE 3, GFR 30-59 ML/MIN (HCC): ICD-10-CM

## 2022-06-28 LAB
ARRHY DURING EX: NORMAL
CHEST PAIN STATEMENT: NORMAL
MAX DIASTOLIC BP: 70 MMHG
MAX HEART RATE: 250 BPM
MAX PREDICTED HEART RATE: 143 BPM
MAX. SYSTOLIC BP: 124 MMHG
NUC STRESS EJECTION FRACTION: 31 %
PROTOCOL NAME: NORMAL
RATE PRESSURE PRODUCT: NORMAL
REASON FOR TERMINATION: NORMAL
SL CV REST NUCLEAR ISOTOPE DOSE: 10.94 MCI
SL CV STRESS NUCLEAR ISOTOPE DOSE: 32.2 MCI
SL CV STRESS RECOVERY BP: NORMAL MMHG
SL CV STRESS RECOVERY HR: 92 BPM
SL CV STRESS RECOVERY O2 SAT: 96 %
STRESS ANGINA INDEX: 0
STRESS BASELINE BP: NORMAL MMHG
STRESS BASELINE HR: 77 BPM
STRESS O2 SAT REST: 97 %
STRESS PEAK HR: 100 BPM
STRESS POST O2 SAT PEAK: 95 %
STRESS POST PEAK BP: 110 MMHG
STRESS ST DEPRESSION: 0 MM
STRESS/REST PERFUSION RATIO: 1.11
TARGET HR FORMULA: NORMAL
TEST INDICATION: NORMAL
TIME IN EXERCISE PHASE: NORMAL

## 2022-06-28 PROCEDURE — 78452 HT MUSCLE IMAGE SPECT MULT: CPT

## 2022-06-28 PROCEDURE — G1004 CDSM NDSC: HCPCS

## 2022-06-28 PROCEDURE — 93016 CV STRESS TEST SUPVJ ONLY: CPT | Performed by: INTERNAL MEDICINE

## 2022-06-28 PROCEDURE — 93017 CV STRESS TEST TRACING ONLY: CPT

## 2022-06-28 PROCEDURE — 93018 CV STRESS TEST I&R ONLY: CPT | Performed by: INTERNAL MEDICINE

## 2022-06-28 PROCEDURE — 78452 HT MUSCLE IMAGE SPECT MULT: CPT | Performed by: INTERNAL MEDICINE

## 2022-06-28 PROCEDURE — A9502 TC99M TETROFOSMIN: HCPCS

## 2022-06-28 NOTE — TELEPHONE ENCOUNTER
Email sent to release of information  Ramone uses universal viewer  I should be able to get images right away

## 2022-06-28 NOTE — TELEPHONE ENCOUNTER
----- Message from Abiodun Espinosa sent at 6/28/2022  4:15 PM EDT -----  Ayse Vega,     Can you get in touch with [facility] and ask them to send this gentleman's cath films from 2020  Debating if we should re-cath or not but need to have someone review his anatomy since it was only 2 years ago       Thanks  Nancy Vázquez

## 2022-06-29 NOTE — TELEPHONE ENCOUNTER
I called medical records dept at [facility], they requested I call tomorrow morning

## 2022-06-30 ENCOUNTER — TELEPHONE (OUTPATIENT)
Dept: CARDIOLOGY CLINIC | Facility: CLINIC | Age: 77
End: 2022-06-30

## 2022-06-30 DIAGNOSIS — I50.20 SYSTOLIC CONGESTIVE HEART FAILURE, UNSPECIFIED HF CHRONICITY (HCC): Primary | ICD-10-CM

## 2022-06-30 NOTE — TELEPHONE ENCOUNTER
Spoke with patient and wife  Informed of recent nuclear stress results  Verbalized understanding  Reports no significant improvement in SOB with increased diuretic dosing  Will check BMP   Further recs to follow

## 2022-06-30 NOTE — TELEPHONE ENCOUNTER
Received call from [address] stating disc was ready to be mailed out but needed address for office (medical request form had address stated)  Attempted to call back, unable to get through to person who called  Made x3 calls  L/m for Radiology dept [phone] hoping to get c/b today

## 2022-07-01 ENCOUNTER — APPOINTMENT (OUTPATIENT)
Dept: LAB | Facility: CLINIC | Age: 77
End: 2022-07-01
Payer: MEDICARE

## 2022-07-01 DIAGNOSIS — I48.91 ATRIAL FIBRILLATION WITH RVR (HCC): ICD-10-CM

## 2022-07-01 LAB
ANION GAP SERPL CALCULATED.3IONS-SCNC: 8 MMOL/L (ref 4–13)
BUN SERPL-MCNC: 41 MG/DL (ref 5–25)
CALCIUM SERPL-MCNC: 8.9 MG/DL (ref 8.3–10.1)
CHLORIDE SERPL-SCNC: 103 MMOL/L (ref 100–108)
CO2 SERPL-SCNC: 27 MMOL/L (ref 21–32)
CREAT SERPL-MCNC: 1.61 MG/DL (ref 0.6–1.3)
GFR SERPL CREATININE-BSD FRML MDRD: 40 ML/MIN/1.73SQ M
GLUCOSE P FAST SERPL-MCNC: 350 MG/DL (ref 65–99)
POTASSIUM SERPL-SCNC: 4.8 MMOL/L (ref 3.5–5.3)
SODIUM SERPL-SCNC: 138 MMOL/L (ref 136–145)

## 2022-07-01 PROCEDURE — 80048 BASIC METABOLIC PNL TOTAL CA: CPT | Performed by: NURSE PRACTITIONER

## 2022-07-01 PROCEDURE — 36415 COLL VENOUS BLD VENIPUNCTURE: CPT | Performed by: NURSE PRACTITIONER

## 2022-07-01 NOTE — RESULT ENCOUNTER NOTE
Called pt and advised of kidney function being stable and Creat being up slighly but still being around the baseline per [facility]

## 2022-07-06 ENCOUNTER — TELEPHONE (OUTPATIENT)
Dept: CARDIOLOGY CLINIC | Facility: CLINIC | Age: 77
End: 2022-07-06

## 2022-07-06 NOTE — TELEPHONE ENCOUNTER
Sent disc interoffice to [facility] to scan into chart   Dr Bajwa/Regine will be made aware once scanned into PACS

## 2022-07-11 DIAGNOSIS — I48.91 ATRIAL FIBRILLATION WITH RVR (HCC): ICD-10-CM

## 2022-07-11 RX ORDER — METOPROLOL SUCCINATE 25 MG/1
25 TABLET, EXTENDED RELEASE ORAL DAILY
Qty: 30 TABLET | Refills: 8 | Status: SHIPPED | OUTPATIENT
Start: 2022-07-11 | End: 2022-08-23

## 2022-07-25 ENCOUNTER — TELEPHONE (OUTPATIENT)
Dept: PULMONOLOGY | Facility: CLINIC | Age: 77
End: 2022-07-25

## 2022-07-25 NOTE — TELEPHONE ENCOUNTER
Catalino Jimenes emailed back Pt will be accepted as a new Pt will need an O2 restart, scheduled for 8/17/22 at [facility] w/ Jeremy Danielle

## 2022-07-25 NOTE — TELEPHONE ENCOUNTER
Dori Larios can you please call the DME company and find out exactly what the situation is update the pt   Thank you

## 2022-07-25 NOTE — TELEPHONE ENCOUNTER
Pt's wife called about Pt's oxygen concentrator  Linsey Medrano stated per DME company he needs to be seen every 3M to qualify for oxygen but [facility] wanted to see him in [place]  She would like clarification since they received a bill for $1,500 because they didn't have documentation for the order  Can you please give Linsey Medrano back at [phone]?

## 2022-07-25 NOTE — TELEPHONE ENCOUNTER
I spoke with Trudy Young from [email or web address] and explained to her that the Pt's wife had told me that when they were staying in [place]  They received an O2 concentrator and equpment [facility]  [id number] [facility]de-identified[facility]  company that was bought out by [employer]  Before they moved back to [place]  Mrs Dunn  called [employer]  And they told them  that they wouldn't have any problem, now she got a bill for $[phone] 00 from [employer] and when she called them they stated that they had received that bill because the proper documentation had not been submitted, Trudy Young told me that she would have to look into the acct so that [employer] could first merge both accounts and then figure out if the Pt needs a 60 Month recert or a yearly one  and let us know how to go forward with this, I called back the Pt's wife and told her what the plan was and that we would let her know if the Pt needed to come in for a 6MWT for the recert  She stated that she understood and hoped this would be resolved soon, I told her that if she didn't hear from us by the end of the week to pls  Give us a call back

## 2022-08-04 ENCOUNTER — OFFICE VISIT (OUTPATIENT)
Dept: CARDIOLOGY CLINIC | Facility: CLINIC | Age: 77
End: 2022-08-04
Payer: MEDICARE

## 2022-08-04 VITALS
SYSTOLIC BLOOD PRESSURE: 98 MMHG | OXYGEN SATURATION: 94 % | BODY MASS INDEX: 32.03 KG/M2 | HEART RATE: 64 BPM | HEIGHT: 67 IN | WEIGHT: 204.1 LBS | DIASTOLIC BLOOD PRESSURE: 64 MMHG

## 2022-08-04 DIAGNOSIS — N18.32 STAGE 3B CHRONIC KIDNEY DISEASE (HCC): ICD-10-CM

## 2022-08-04 DIAGNOSIS — I50.20 HEART FAILURE WITH REDUCED EJECTION FRACTION (HCC): ICD-10-CM

## 2022-08-04 DIAGNOSIS — I25.10 CORONARY ARTERY DISEASE WITHOUT ANGINA PECTORIS, UNSPECIFIED VESSEL OR LESION TYPE, UNSPECIFIED WHETHER NATIVE OR TRANSPLANTED HEART: Primary | ICD-10-CM

## 2022-08-04 DIAGNOSIS — I48.0 PAROXYSMAL ATRIAL FIBRILLATION (HCC): ICD-10-CM

## 2022-08-04 PROCEDURE — 99215 OFFICE O/P EST HI 40 MIN: CPT | Performed by: INTERNAL MEDICINE

## 2022-08-04 RX ORDER — RANOLAZINE 500 MG/1
500 TABLET, EXTENDED RELEASE ORAL 2 TIMES DAILY
Qty: 60 TABLET | Refills: 1 | Status: SHIPPED | OUTPATIENT
Start: 2022-08-04

## 2022-08-04 NOTE — PROGRESS NOTES
Advanced Heart Failure Outpatient Progress Note - Jocelin Miller 68 y o  male MRN: [id number]    Encounter: [id number]      Assessment/Plan:    Patient Active Problem List    Diagnosis Date Noted    Chronic respiratory failure with hypoxia ([place] [address] ) 05/10/2021    Stenosis of carotid artery 08/24/2020    Thrombocytopenia, unspecified ([place] [address] ) 03/10/2020    Spinal stenosis of lumbar region with neurogenic claudication     S/P femoral-popliteal bypass surgery 01/09/2020    Anxiety with depression 04/07/2019    Chronic systolic congestive heart failure ([place] [address] ) 03/01/2019    Diabetic neuropathy associated with type 1 diabetes mellitus ([place] [address] ) 01/14/2019    Lumbar disc herniation 08/01/2018    Lumbar radiculopathy 08/01/2018    Aortoiliac occlusive disease ([place] [address] ) 03/01/2018    Restrictive lung disease 01/30/2018    Centrilobular emphysema ([place] [address] ) 01/30/2018    Presence of drug coated stent in LAD coronary artery 01/24/2018    Coronary artery disease of native artery of native heart with stable angina pectoris ([place] [address] ) 01/24/2018    Presence of drug coated stent in left circumflex coronary artery 01/24/2018    Dyslipidemia 01/24/2018    Obstructive sleep apnea of adult 01/24/2018    Carotid artery stenosis, asymptomatic, bilateral 01/24/2018    CKD (chronic kidney disease) stage 3, GFR 30-59 ml/min ([facility]) 12/13/2016    Low back pain with sciatica 08/22/2016    Type 1 diabetes mellitus with diabetic peripheral angiopathy without gangrene ([place] [address] ) 01/12/2016    Benign hypertension with chronic kidney disease, stage III ([place] [address] ) 08/12/2015    Renal cyst, right 08/12/2015    Vitamin D deficiency 05/22/2015    Renal artery stenosis ([place] [address] ) 05/09/2015    Hypothyroidism, postablative 04/02/2013    Pleural effusion 06/05/2022    Atrial fibrillation with RVR ([place] [address] ) 06/04/2022    Open wound of right lower leg 05/25/2022    Class 1 obesity without serious comorbidity with body mass index (BMI) of 31 0 to 31 9 in adult 11/05/2021 Heart failure with redcued EF, Stage C, NYHA III  Etiology: ischemic  Mildly volume up, warm and well perfused  Euvolemic to mildly volume up    Weight:   204 lbs 8/4/22; 204 lbs 5/18/22 202 lbs 12/20/21; 204 lbs 9/27/21; 203 << 207 lbs <<210 << 211  NT proBNP:  2152 5/25/21    Studies- personally reviewed by me    Pharm Nuc Stress 6/28/22: Basal-mid inferior and inferolateral infarct with lesli-infarct ischemia  TTE 2/3/22: LVEF 40%  LVIDD 6 3cm  Mildly increased wall thickness  RV normal size and systolic function  Mild AI  Mild MR    TTE 4/22/21:  LVEF 45-50%  Grade 1 diastology  LVIDd 6 7cm  Grade 1 diastology    Echocardiogram 7/23/20  LVEF: 40%, hypokinesis of inferoseptal, inferior and inferolateral walls  LVIDd: 5 7cm  RV: normal size and function  MR: mild  PASP: inadequate TR jet for estimation  RVOT: probable midsystolic notching although no interventricular septal flattening  Other: Grade 1 diastology    [facility] 3/6/20: RCA with 70% proximal and 50% mid stenoses  LAD 40-50% stenosis proximally before widely patent stents  Distal left Cx 75% in stent restenosis just before the small LPDA  OM 3 is  with left to left collaterals  Co-dominant RCA  Medically managed (done preop for LE vascular surgery for very severe claudication)  CHF, hypotension, 16 days, exposned to covid    TTE 2/6/20 [facility]: LVEF 56%  LVIDD 5 6cm    TTE 3/23/2019: LVEF 45%  LVIDD 6cm  Normal RV size and function  PASP 57mmHg    6MWT 5/10/21:  Oxygen saturations 97% on room air at rest, with ambulating for 4 minutes oxygen saturation drops down to 85% on room air requiring 2 L nasal cannula   Oxygen saturation is 90 8% with 2 L nasal cannula   Total walk distance is 346 meters   Resting heart rate is 76 beats per minute upon ambulation maximum heart rate was 100 beats per minute    9/18/20 HRCT:   VESSELS:  Unremarkable for patient's age    IMPRESSION:  Moderate upper lobe predominant panlobular emphysema, without significant change since 2015  Mild lower lung predominant juxtapleural reticulation due to fibrosis, slightly more extensive on the right, with no traction bronchiolectasis or honeycombing     8/24/20 PFT:   The reduced total lung capacity at 50% predicted and residual volume at 34% predicted  Restrictive pattern on the spirometry  Reduced diffusion capacity  (40% predicted)  Normal airway resistance as indicated by the specific airway conductance  Diet:  2 g sodium diet  2000 mL fluid restriction    Neurohormonal Blockade:  --Beta-Blocker: metoprolol succinate 25mg daily  --ACEi, ARB or ARNi:  --Aldosterone Receptor Blocker:  --SGLT2 Inhibitor:  --Diuretic: torsemide 20mg daily    Sudden Cardiac Death Risk Reduction:  --ICD: LVEF 40%    Electrical Resynchronization:  --Candidacy for BiV device:    Advanced Therapies (if appropriate): --Inotrope:  --LVAD/Transplant Candidacy:    New onset atrial fibrillation with RVR  Admitted 6/2022  Converted on Diltiazem IV  Maintaining SR at discharge on Toprol XL 25 mg daily  Not placed on Amio due to lung disease  Eliquis 5 mg BID for oral AC  Coronary artery disease, 2014 stents x 4 at [facility], 10/2018 re stenosis of LAD stent with stent placement  Stress test with basal-mid inferior and inferolateral infarct with lesli-infarct ischemia  Rx: aspirin, rosuvastatin 20mg  Chronic hypoxic respiratory failure on home oxygen  CKD stage 3  Baseline creatinine 1 6-1 7  ITZEL on CPAP - on CPAP  DM, HbA1c  8 4 5/10/22  PAD with hx of  Femoral popliteal bypass surgery, bilateral iliac stenting, right common femoral endarterectomy, right femoral above knee popliteal artery bypass  Carotid artery stenosis  Hypertension, controlled  Hyperlipidemia    Today's Plan:  Still with shortness of breath on exertion despite improvement in volume status, although overall better  Discussed findings on stress test with Basal-mid inferior and inferolateral infarct with lesli-infarct ischemia   Last cardiac cath with 70% stenosis in the proximal RCA  Discussed with patient and wife repeat cardiac cath due to concern that persistent shortness of breath may be anginal equivalent but patient would not like to proceed with this and preferred medical therapy at this time  Start ranolazine 500mg BID  Continue metoprolol as above, BP precluding other anti-anginal and GDM therapy for cardiomyopathy  Daily weights  Salt and fluid restriction  Increase physical activities as tolerated      HPI:   68year old male with PMH as above who is here for heart failure evaluation  Per Dr Citlaly Lawson on 3/11/21: He was hospitalized March 2019 for PVD with left femoral endarterectomy with bovine patch and left femoral to above the knee bypass with left external iliac artery stent by Dr Rosario Gaytan [facility] did have intermittent angina during that postoperative period  Thereafter he required debridement in reference to a left groin infection 4/15/19   Patient has known history of CAD with remote cardiac catheterization and intervention performed at [facility] with placement of 4 stents in 2015  He had a second cardiac catheterization October 2018 with re-stenosis of the LAD and stent placement in [place]   A 50% left circumflex lesion was also noted   Patient had an echocardiogram performed March 2019 which demonstrated a mild reduction in LV systolic function in the setting of a mid apical anterior inferior and inferolateral wall motion abnormality   The resting left ventricular ejection fraction was 45-50%   There was no significant valvular heart disease   He has CRI   He is followed by Nephrology service  Radha Haley was seen by Cardiology in February 2020 at [facility] underwent cardiac catheterization March 6, 2020 by the radial approach in the setting of an abnormal pharmacologic nuclear stress test done February 6, 2020   He was found to have a 50 and 70% stenosis in a small non dominant right coronary vessel   A short left main with a left dominant system was noted  A 40% stenosis in LAD and thereafter widely patent stents   A patent obtuse OMB 1 and 2 with a large OMB 3 which is a  that receives left-to-left collaterals with distal disease in that vessel noted  He was managed medically and cleared for surgery   Echocardiogram done July 2020 demonstrated a left ventricular ejection fraction of 46% with segmental wall motion abnormality noted  There was mild LVH and mild mitral regurgitation   Patient was  hospitalized at [facility] 4/2020 in reference to need for lower extremity revascularization   He had an urgent right common femoral endarterectomy as well as right femoral to above the knee popliteal artery bypass  His baseline dry weight is 208 according to his wife who is a retired RN  Laura Zungia is followed by Pulmonary service in reference to COPD and obstructive sleep apnea   He has at least 48 pack years and perhaps more but discontinued smoking around 2008  He was seen by our nurse practitioner February 2021 with shortness of breath  He was placed on Imdur in reference to chest pain and shortness of breath  He did not tolerate this in the setting of uriticaria and nausea  It was discontinued  Lipid profile done February 2021 demonstrated total cholesterol of 144 with an HDL of 46 and a calculated LDL of 65  Torsemide is on hold per Nephrology unless weight gain develops  He was seen in the ED in [place] for CHF exacerbation and was given a dose of IV lasix and was discharged home    Echocardiogram on 4/22/21 showed EF 45-50%, dilated LV and normal right heart size and function  Seen in follow up by Abdon Seymour on 5/18/21  BP was limiting for addition of medications  C/o dyspnea at that time and does not want to undergo stress testing  He had recent 6MWT which showed desaturation on exertion and was prescribed 2L on exertion but only uses when symptomatic     7/26/21: Here for follow up   Started on low dose metoprolol on last visit  No significant change in blood pressure and heart rate  CPAP machine has been recalled, using oxygen at night  No lower extremity edema  No lightheadedness  No palpitations  Stable shortness of breath on exertion improved with use of oxygen  No chest pain/pressure  He does not want to undergo right heart catheterization at this time  9/27/2021: Patient is here for follow-up  Overall stable shortness of breath on exertion  Reports short of breath walking from waiting to examination  Weight overall stable around 202 lb and has been as low as 199 lb on home scale  Denies chest pain, PND orthopnea or lower extremity edema  No palpitations or lightheadedness  He is trying to do regular walking program   He is not interested in cardiac rehab at this time  12/20/21: Here for follow up  Still not interested in cardiac rehab  Walking mainly inside the house  Walking up and down the flight of stairs recently due to broken smoke detector and was tired, no shortness of breath  Notes balance worse over the years  He has peripheral neuropathy and no sensation on both feet  5/18/22: Here for follow up  Recently came back from [place]  Getting treated for lower extremity wounds  Seen by AP in the interim  Metoprolol dose reduced due to orthostasis  Reports doing well while in [place] and was walking about half a mile  Since coming back notes more shortness of breath  Also with weight gain of about 4 lbs  Had late lunch yesterday and ate 3 chili dogs  Using oxygen supplementation on exertion only occasionally    6/20/22  Presents for hospital follow up  Recently admitted with new onset atrial fib with RVR and mild CHF exacerbation  Was started on Cardizem gtt and subsequently converted to SR  Was diuresed with IV Lasix and discharged home on higher dose of Metoprolol  Was not placed on Amiodarone due to underlying lung disease  Feeling about the same today   Has had progressively worsening MONTAGUE for some time now  Otherwise no CP, orthopnea, PND  No dizziness/LH or syncope  Interval History:  8/4/22: here for follow up  Some improvement in shortness of breath with increased dose of torsemide  Not using oxygen as much as before  No chest pain, PND or orthopnea  No worsening lower extremity edema  Stress test showed Basal-mid inferior and inferolateral infarct with lesli-infarct ischemia  Past Medical History:   Diagnosis Date    Acute venous embolism and thrombosis of deep vessels of proximal lower extremity ([facility] )     resolved 04/04/2015    DARINEL (acute kidney injury) ([facility] ) 01/12/2016    Anesthesia     RESPIRATORY ISSUES DUE TO SLEEP APNEA    Cardiac disease     heart attack, stents x 4    Chronic cough     resolved 02/04/2016    Chronic pain disorder     intermitent claudication    COPD (chronic obstructive pulmonary disease) ([facility])     Coronary artery disease     CPAP (continuous positive airway pressure) dependence     Diabetes mellitus ([facility] )     Disease of thyroid gland     DVT (deep venous thrombosis) ([facility])     Heart failure with mid-range ejection fraction ([place] [address] )     Hyperlipidemia     Hypertension     Ischemic cardiomyopathy     last assessed 09/26/2017    Myocardial infarction [facility])     MI +2 2015,2018    NSTEMI (non-ST elevated myocardial infarction) ([facility] ) 03/23/2019    Pulmonary emphysema ([facility])     Pulmonary granuloma ([place] [address] )     resolved 02/03/2017    Renal failure     Sleep apnea        Review of Systems   Constitutional: Positive for fatigue  Negative for chills and fever  HENT: Negative for ear pain and sore throat  Eyes: Negative for pain and visual disturbance  Respiratory: Positive for shortness of breath  Negative for cough  Cardiovascular: Negative for chest pain and palpitations  Gastrointestinal: Negative for abdominal distention, abdominal pain and vomiting  Genitourinary: Negative for dysuria and hematuria     Musculoskeletal: Negative for arthralgias and back pain  Skin: Negative for color change and rash  Neurological: Negative for dizziness, seizures, syncope and light-headedness  All other systems reviewed and are negative  Allergies   Allergen Reactions    Doxazosin Myalgia     UNKNOWN  UNKNOWN  Muscle cramps    Iohexol      UNKNOWN    Lisinopril Cough     cough  Other reaction(s): CAMELLA SINENSIS (ZESTRIL) (cough)  cough    Vancomycin Hives    Imdur [Isosorbide Nitrate] Hives    Adhesive [Medical Tape]      Skin Breakdown    Cardura [Doxazosin Mesylate]      Muscle cramps    Isosorbide Rash    Other Other (See Comments)     Iv dye for cardiac cath  Renal failure very close to dialysis  But no dialysis       Current Outpatient Medications:     apixaban (Eliquis) 5 mg, Take 1 tablet (5 mg total) by mouth 2 (two) times a day, Disp: 60 tablet, Rfl: 5    aspirin 81 MG tablet, Take 81 mg by mouth daily  , Disp: , Rfl:     cholecalciferol (VITAMIN D3) 1,000 units tablet, Take 1,000 Units by mouth daily , Disp: , Rfl:     Coenzyme Q10 (COQ-10) 200 MG CAPS, Take 200 mg by mouth daily, Disp: , Rfl:     docusate sodium (COLACE) 50 mg capsule, Take by mouth 2 (two) times a day as needed  , Disp: , Rfl:     DULoxetine (Cymbalta) 30 mg delayed release capsule, Take 1 capsule (30 mg total) by mouth in the morning , Disp: 90 capsule, Rfl: 3    gabapentin (NEURONTIN) 300 mg capsule, Take 1 capsule (300 mg total) by mouth in the morning and 1 capsule (300 mg total) in the evening , Disp: 180 capsule, Rfl: 3    Glucagon (Baqsimi One Pack) 3 MG/DOSE POWD, 1 Dose into each nostril as needed, Disp: , Rfl:     Icosapent Ethyl (Vascepa) 1 g CAPS, Take 1 capsule (1 g total) by mouth in the morning and 1 capsule (1 g total) before bedtime  , Disp: 180 capsule, Rfl: 3    insulin glargine (LANTUS) 100 units/mL subcutaneous injection, Inject 40 Units under the skin every morning AND 6 Units daily at bedtime  , Disp: 10 mL, Rfl: 0    insulin lispro (HUMALOG) 100 units/mL injection, Inject 3 units with breakfast, 6 units at lunch, and 6 units at dinner (Patient taking differently: 4 (four) times a day Inject 3 units with breakfast, 6 units at lunch, and 6 units at dinner Carbs counting), Disp: 10 mL, Rfl: 1    levothyroxine 175 mcg tablet, Take 175 mcg by mouth in the morning , Disp: , Rfl:     metoprolol succinate (TOPROL-XL) 25 mg 24 hr tablet, Take 1 tablet (25 mg total) by mouth daily, Disp: 30 tablet, Rfl: 8    nitroglycerin (NITROSTAT) 0 4 mg SL tablet, Place 1 tablet (0 4 mg total) under the tongue every 5 (five) minutes as needed for chest pain, Disp: 25 tablet, Rfl: 3    polyethylene glycol (MIRALAX) 17 g packet, Take 17 g by mouth daily, Disp: , Rfl:     rosuvastatin (CRESTOR) 20 MG tablet, Take 1 tablet (20 mg total) by mouth in the morning , Disp: 90 tablet, Rfl: 3    torsemide (DEMADEX) 10 mg tablet, Take 2 tablets (20 mg total) by mouth daily, Disp: 100 tablet, Rfl: 2    ULTICARE INSULIN SYRINGE 30G X 1/2" 1 ML MISC, Use as directed, Disp: , Rfl: 0    Wound Dressings (Medihoney Wound/Burn Dressing) GEL, Apply topically if needed, Disp: , Rfl:     Social History     Socioeconomic History    Marital status: /Civil Union     Spouse name: Not on file    Number of children: Not on file    Years of education: Not on file    Highest education level: Not on file   Occupational History    Occupation: Retired    Occupation: Desk work    Occupation: Department of [employer]   Tobacco Use    Smoking status: Former Smoker     Packs/day: 4 00     Years: 47 00     Pack years: 188 00     Types: Cigarettes     Start date:      Quit date:      Years since quittin 6    Smokeless tobacco: Never Used   Vaping Use    Vaping Use: Never used   Substance and Sexual Activity    Alcohol use: Yes     Comment: 2 drinks per day    Drug use: No    Sexual activity: Not Currently   Other Topics Concern    Not on file   Social History Narrative    Not on file     Social Determinants of Health     Financial Resource Strain: Not on file   Food Insecurity: Not on file   Transportation Needs: Not on file   Physical Activity: Not on file   Stress: Not on file   Social Connections: Not on file   Intimate Partner Violence: Not on file   Housing Stability: Not on file       Family History   Problem Relation Age of Onset    Heart disease Father         pacer placement    Hypertension Father     Kidney disease Father     Heart failure Father     Heart attack Father     Liver disease Father     Cirrhosis Mother         due to beer consumption    Liver disease Mother     Diabetes Other        Physical Exam:    Vitals: Blood pressure 98/64, pulse 64, height 5' 7" (1 702 m), weight 92 6 kg (204 lb 1 6 oz), SpO2 94 %  , Body mass index is 31 97 kg/m² ,   Wt Readings from Last 3 Encounters:   08/04/22 92 6 kg (204 lb 1 6 oz)   06/20/22 92 3 kg (203 lb 6 4 oz)   06/08/22 92 1 kg (203 lb)       Physical Exam  Constitutional:       General: He is not in acute distress  Appearance: Normal appearance  HENT:      Head: Normocephalic and atraumatic  Mouth/Throat:      Mouth: Mucous membranes are moist    Eyes:      General: No scleral icterus  Extraocular Movements: Extraocular movements intact  Cardiovascular:      Rate and Rhythm: Normal rate and regular rhythm  Pulses: Normal pulses  Heart sounds: S1 normal and S2 normal  No murmur heard  No friction rub  No gallop  Comments: Nonelevated JVP  Trace  pitting edema bilaterally  Pulmonary:      Breath sounds: Normal breath sounds  Abdominal:      General: There is no distension  Palpations: Abdomen is soft  Tenderness: There is no abdominal tenderness  There is no guarding or rebound  Musculoskeletal:         General: Normal range of motion  Cervical back: Neck supple  Skin:     General: Skin is warm and dry        Capillary Refill: Capillary refill takes less than 2 seconds  Neurological:      General: No focal deficit present  Mental Status: He is alert and oriented to person, place, and time  Psychiatric:         Mood and Affect: Mood normal          Labs & Results:    Lab Results   Component Value Date    WBC 5 15 06/06/2022    HGB 12 5 06/06/2022    HCT 38 4 06/06/2022    MCV 97 06/06/2022     (L) 06/06/2022     Lab Results   Component Value Date    SODIUM 138 07/01/2022    K 4 8 07/01/2022     07/01/2022    CO2 27 07/01/2022    BUN 41 (H) 07/01/2022    CREATININE 1 61 (H) 07/01/2022    GLUC 161 (H) 06/06/2022    CALCIUM 8 9 07/01/2022     Lab Results   Component Value Date    NTBNP 2,152 (H) 05/25/2021      Lab Results   Component Value Date    CHOLESTEROL 134 05/10/2022    CHOLESTEROL 153 01/06/2022    CHOLESTEROL 135 10/01/2021     Lab Results   Component Value Date    HDL 41 05/10/2022    HDL 43 01/06/2022    HDL 38 (L) 10/01/2021     Lab Results   Component Value Date    TRIG 140 05/10/2022    TRIG 180 (H) 01/06/2022    TRIG 203 (H) 10/01/2021     Lab Results   Component Value Date    NONHDLC 67 11/28/2018    [place] 77 04/24/2018       EKG personally reviewed by Peter Marcos MD      Counseling / Coordination of Care  Time spent today 40 minutes  Greater than 50% of total time was spent with the patient and / or family counseling and / or coordination of care  We discussed diagnoses, most recent studies and any changes in treatment - reviewed results of recent stress test, prior LHC and stress test at [facility], current symptoms and medical therapy, options of cardiac catheterization for medical therapy, what medical therapy options are and what's safest given low BP, his heart monitor and plan moving forward depending on response to medical therapy    Thank you for the opportunity to participate in the care of this patient      Jr Lam MD  ADVANCED HEART FAILURE AND MECHANICAL CIRCULATORY SUPPORT  [facility]

## 2022-08-04 NOTE — PATIENT INSTRUCTIONS
Start ranolazine 500mg twice a day  Obtain blood tests per PCP  2g sodium diet  Physical activities as tolerated

## 2022-08-05 ENCOUNTER — TELEPHONE (OUTPATIENT)
Dept: NON INVASIVE DIAGNOSTICS | Facility: HOSPITAL | Age: 77
End: 2022-08-05

## 2022-08-05 NOTE — TELEPHONE ENCOUNTER
Spoke with wife  Patient/wife requesting an email address of someone in our office where he can send his Kate EKG strips  Will speak to our HF RN to see if strips can be sent to her  To be addressed Monday 8/8 when she returns from vacation

## 2022-08-09 ENCOUNTER — TELEPHONE (OUTPATIENT)
Dept: NON INVASIVE DIAGNOSTICS | Facility: HOSPITAL | Age: 77
End: 2022-08-09

## 2022-08-11 ENCOUNTER — APPOINTMENT (OUTPATIENT)
Dept: LAB | Facility: CLINIC | Age: 77
End: 2022-08-11
Payer: MEDICARE

## 2022-08-11 ENCOUNTER — TRANSCRIBE ORDERS (OUTPATIENT)
Dept: LAB | Facility: CLINIC | Age: 77
End: 2022-08-11

## 2022-08-11 DIAGNOSIS — I70.1 RENAL ARTERY STENOSIS (HCC): ICD-10-CM

## 2022-08-11 DIAGNOSIS — E87.0 TYPE I DIABETES MELLITUS WITH HYPEROSMOLAR COMA (HCC): Primary | ICD-10-CM

## 2022-08-11 DIAGNOSIS — E10.65 TYPE I DIABETES MELLITUS WITH HYPEROSMOLAR COMA (HCC): Primary | ICD-10-CM

## 2022-08-11 DIAGNOSIS — E10.69 TYPE I DIABETES MELLITUS WITH HYPEROSMOLAR COMA (HCC): Primary | ICD-10-CM

## 2022-08-11 DIAGNOSIS — E10.69 TYPE I DIABETES MELLITUS WITH HYPEROSMOLAR COMA (HCC): ICD-10-CM

## 2022-08-11 DIAGNOSIS — E87.0 TYPE I DIABETES MELLITUS WITH HYPEROSMOLAR COMA (HCC): ICD-10-CM

## 2022-08-11 DIAGNOSIS — E10.65 TYPE I DIABETES MELLITUS WITH HYPEROSMOLAR COMA (HCC): ICD-10-CM

## 2022-08-11 LAB
ALBUMIN SERPL BCP-MCNC: 3.4 G/DL (ref 3.5–5)
ALP SERPL-CCNC: 114 U/L (ref 46–116)
ALT SERPL W P-5'-P-CCNC: 23 U/L (ref 12–78)
ANION GAP SERPL CALCULATED.3IONS-SCNC: 3 MMOL/L (ref 4–13)
AST SERPL W P-5'-P-CCNC: 18 U/L (ref 5–45)
BACTERIA UR QL AUTO: ABNORMAL /HPF
BILIRUB SERPL-MCNC: 0.62 MG/DL (ref 0.2–1)
BILIRUB UR QL STRIP: NEGATIVE
BUN SERPL-MCNC: 31 MG/DL (ref 5–25)
CALCIUM ALBUM COR SERPL-MCNC: 9.8 MG/DL (ref 8.3–10.1)
CALCIUM SERPL-MCNC: 9.3 MG/DL (ref 8.3–10.1)
CHLORIDE SERPL-SCNC: 112 MMOL/L (ref 96–108)
CLARITY UR: CLEAR
CO2 SERPL-SCNC: 28 MMOL/L (ref 21–32)
COLOR UR: COLORLESS
CREAT SERPL-MCNC: 1.67 MG/DL (ref 0.6–1.3)
CREAT UR-MCNC: 43.3 MG/DL
EST. AVERAGE GLUCOSE BLD GHB EST-MCNC: 186 MG/DL
GFR SERPL CREATININE-BSD FRML MDRD: 38 ML/MIN/1.73SQ M
GLUCOSE P FAST SERPL-MCNC: 117 MG/DL (ref 65–99)
GLUCOSE UR STRIP-MCNC: NEGATIVE MG/DL
HBA1C MFR BLD: 8.1 %
HGB UR QL STRIP.AUTO: NEGATIVE
KETONES UR STRIP-MCNC: NEGATIVE MG/DL
LEUKOCYTE ESTERASE UR QL STRIP: NEGATIVE
MAGNESIUM SERPL-MCNC: 2.3 MG/DL (ref 1.6–2.6)
NITRITE UR QL STRIP: NEGATIVE
NON-SQ EPI CELLS URNS QL MICRO: ABNORMAL /HPF
PH UR STRIP.AUTO: 6 [PH]
PHOSPHATE SERPL-MCNC: 3.6 MG/DL (ref 2.3–4.1)
POTASSIUM SERPL-SCNC: 4.5 MMOL/L (ref 3.5–5.3)
PROT SERPL-MCNC: 8.1 G/DL (ref 6.4–8.4)
PROT UR STRIP-MCNC: ABNORMAL MG/DL
PROT UR-MCNC: 28 MG/DL
PROT/CREAT UR: 0.65 MG/G{CREAT} (ref 0–0.1)
PTH-INTACT SERPL-MCNC: 77.3 PG/ML (ref 18.4–80.1)
RBC #/AREA URNS AUTO: ABNORMAL /HPF
SODIUM SERPL-SCNC: 143 MMOL/L (ref 135–147)
SP GR UR STRIP.AUTO: 1.01 (ref 1–1.03)
URATE SERPL-MCNC: 8.4 MG/DL (ref 3.5–8.5)
UROBILINOGEN UR STRIP-ACNC: <2 MG/DL
WBC #/AREA URNS AUTO: ABNORMAL /HPF

## 2022-08-11 PROCEDURE — 82570 ASSAY OF URINE CREATININE: CPT

## 2022-08-11 PROCEDURE — 84550 ASSAY OF BLOOD/URIC ACID: CPT

## 2022-08-11 PROCEDURE — 84156 ASSAY OF PROTEIN URINE: CPT

## 2022-08-11 PROCEDURE — 83970 ASSAY OF PARATHORMONE: CPT

## 2022-08-11 PROCEDURE — 83735 ASSAY OF MAGNESIUM: CPT

## 2022-08-11 PROCEDURE — 81001 URINALYSIS AUTO W/SCOPE: CPT

## 2022-08-11 PROCEDURE — 80053 COMPREHEN METABOLIC PANEL: CPT

## 2022-08-11 PROCEDURE — 84100 ASSAY OF PHOSPHORUS: CPT

## 2022-08-11 PROCEDURE — 83036 HEMOGLOBIN GLYCOSYLATED A1C: CPT

## 2022-08-11 PROCEDURE — 36415 COLL VENOUS BLD VENIPUNCTURE: CPT

## 2022-08-12 ENCOUNTER — RA CDI HCC (OUTPATIENT)
Dept: OTHER | Facility: HOSPITAL | Age: 77
End: 2022-08-12

## 2022-08-12 ENCOUNTER — TELEPHONE (OUTPATIENT)
Dept: NEPHROLOGY | Facility: CLINIC | Age: 77
End: 2022-08-12

## 2022-08-12 NOTE — TELEPHONE ENCOUNTER
----- Message from Riana Canas sent at 8/12/2022 11:59 AM EDT -----  Please call patient let him know we received blood work 1 day ago  His renal function remains stable along with all of his other labs    Thank you   ----- Message -----  From: Lab, Background User  Sent: 8/11/2022   3:28 PM EDT  To: Fatimah Feliciano, DO

## 2022-08-12 NOTE — TELEPHONE ENCOUNTER
Called patient and spoke with his wife Breanne Cho and went over the following information:    Please call patient let him know we received blood work 1 day ago  His renal function remains stable along with all of his other labs  Breanne Cho verbally understood and had no further questions for me at this time

## 2022-08-12 NOTE — PROGRESS NOTES
[facility]  coding opportunities     I13 [id number] and [id number]     Chart Reviewed number of suggestions sent to Provider: 2     Patients Insurance     Medicare Insurance: [employer]     Commercial Insurance: [employer]

## 2022-08-18 ENCOUNTER — OFFICE VISIT (OUTPATIENT)
Dept: FAMILY MEDICINE CLINIC | Facility: CLINIC | Age: 77
End: 2022-08-18
Payer: MEDICARE

## 2022-08-18 VITALS
DIASTOLIC BLOOD PRESSURE: 60 MMHG | HEART RATE: 83 BPM | TEMPERATURE: 97.6 F | RESPIRATION RATE: 20 BRPM | BODY MASS INDEX: 32.33 KG/M2 | SYSTOLIC BLOOD PRESSURE: 120 MMHG | HEIGHT: 67 IN | OXYGEN SATURATION: 93 % | WEIGHT: 206 LBS

## 2022-08-18 DIAGNOSIS — I12.9 BENIGN HYPERTENSION WITH CHRONIC KIDNEY DISEASE, STAGE III (HCC): ICD-10-CM

## 2022-08-18 DIAGNOSIS — J43.2 CENTRILOBULAR EMPHYSEMA (HCC): Chronic | ICD-10-CM

## 2022-08-18 DIAGNOSIS — E10.51 TYPE 1 DIABETES MELLITUS WITH DIABETIC PERIPHERAL ANGIOPATHY WITHOUT GANGRENE (HCC): Primary | ICD-10-CM

## 2022-08-18 DIAGNOSIS — I50.22 CHRONIC SYSTOLIC CONGESTIVE HEART FAILURE (HCC): Chronic | ICD-10-CM

## 2022-08-18 DIAGNOSIS — Z12.11 COLON CANCER SCREENING: ICD-10-CM

## 2022-08-18 DIAGNOSIS — N18.30 BENIGN HYPERTENSION WITH CHRONIC KIDNEY DISEASE, STAGE III (HCC): ICD-10-CM

## 2022-08-18 DIAGNOSIS — F41.8 ANXIETY WITH DEPRESSION: Chronic | ICD-10-CM

## 2022-08-18 PROCEDURE — 99214 OFFICE O/P EST MOD 30 MIN: CPT | Performed by: FAMILY MEDICINE

## 2022-08-18 RX ORDER — INSULIN LISPRO 100 [IU]/ML
INJECTION, SOLUTION INTRAVENOUS; SUBCUTANEOUS
COMMUNITY
Start: 2022-07-01

## 2022-08-18 NOTE — PATIENT INSTRUCTIONS
Please decrease dose of Cymbalta to  every other day for 2 weeks, then take 1 capsule twice a week for 2 weeks- then STOP  If you decide to try alternative medication for irritability and mood-please call me  Please contact your cardiologist at [facility] to discuss current symptoms and plan of action  If right ankle remains bothersome-please let me know will proceed with x-ray

## 2022-08-18 NOTE — PROGRESS NOTES
FAMILY PRACTICE OFFICE VISIT       NAME: Kalpana Artis  AGE: 68 y o  SEX: male       : 1945        MRN: [id number]        Assessment and Plan     1  Type 1 diabetes mellitus with diabetic peripheral angiopathy without gangrene [facility])  Assessment & Plan:    Lab Results   Component Value Date    HGBA1C 8 1 (H) 2022     Hemoglobin A1c is elevated  Patient is under care of Endocrinology at [facility]      2  Chronic systolic congestive heart failure [facility])  Assessment & Plan:  Wt Readings from Last 3 Encounters:   22 93 4 kg (206 lb)   22 92 6 kg (204 lb 1 6 oz)   22 92 3 kg (203 lb 6 4 oz)     Patient appears euvolemic on exam today  Continue low-dose of torsemide 10 mg daily  Patient will be establishing care with Cardiology in [place]          3  Colon cancer screening  -     [facility]    4  Benign hypertension with chronic kidney disease, stage III [facility])  Assessment & Plan:  Lab Results   Component Value Date    EGFR 38 2022    EGFR 40 2022    EGFR 53 2022    CREATININE 1 67 (H) 2022    CREATININE 1 61 (H) 2022    CREATININE 1 28 2022     BP is well controlled  Patient tolerates metoprolol ER 25 mg daily without significant blood pressure drops lately  He remains on torsemide 10 mg daily      5  Centrilobular emphysema ([address] )  Assessment & Plan:  Stable symptoms of dyspnea on exertion  Trials of numerous inhalers have been unsuccessful  Patient is following up with pulmonology for treatment of COPD and restrictive lung disease      6  Anxiety with depression  Assessment & Plan:  Lack of improvement of Cymbalta  Duloxetine has not been effective for neuropathic pain as well  He prefers to wean of medications and reassess his symptoms within next few months after moving to [place]  Instructions for weaning off duloxetine provided    Previous unsuccessful trials of Celexa and Effexor      Patient is moving to [place] within next few weeks  If he is back in [place] by [place] will contact me and will proceed with blood work and schedule follow-up  Otherwise he will schedule routine medical care with his new PCP in [place]  Patient Instructions   Please decrease dose of Cymbalta to  every other day for 2 weeks, then take 1 capsule twice a week for 2 weeks- then STOP  If you decide to try alternative medication for irritability and mood-please call me  Please contact your cardiologist at [facility] to discuss current symptoms and plan of action  If right ankle remains bothersome-please let me know will proceed with x-ray        Return in about 3 months (around 11/18/2022)  Discussed with the patient and all questioned fully answered  He will call me if any problems arise  [employer] software was used to dictate this note  It may contain errors with dictating incorrect words/spelling  Please contact provider directly with any questions  Chief Complaint     Chief Complaint   Patient presents with    Follow-up     3 month       History of Present Illness     Follow up chronic medical conditions  Results of recent blood work reviewed with patient and his wife  They had fairly stressful summer as they are preparing to move to [place] this fall  Patient had recent f/up  [facility]- cardiology  Ranexa was recommended  Patient is uncomfortable trying Ranexa due to previous significant adverse reaction to isosorbide manifesting as hives and low blood pressure  He denies any symptoms of angina  Chronic dyspnea on exertion has not changed for quite a while  No palpitations or dizziness  Most recent cardiac catheterization performed over 2 years ago at [facility]  Patient remains on aspirin, statin and beta-blocker  He is on Eliquis for recent diagnosis of AFib  He uses low-dose of torsemide 10 mg daily  Recent blood work indicates elevated hemoglobin A1c at 8 3%    Patient is under care of endocrinology, Dr Aylin Evans  He remains under ongoing care of Nephrology and pulmonology  Patient reports twisting his right ankle a few weeks ago  He was able to bear weight immediately, he developed discomfort a few days later  He denies any swelling and prefers to hold off any imaging studies for now  Review of Systems   Review of Systems   Constitutional: Positive for fatigue ( chronic)  Negative for activity change and appetite change  HENT: Negative  Respiratory: Positive for shortness of breath (Chronic)  Cardiovascular: Negative  Negative for chest pain, palpitations and leg swelling  Gastrointestinal: Negative  Endocrine: Negative  Genitourinary: Negative  Musculoskeletal: Positive for back pain  Skin: Negative  Allergic/Immunologic: Negative  Neurological: Positive for numbness ( chronic neuropathy)  Hematological: Negative  Psychiatric/Behavioral: The patient is nervous/anxious ( chronic irritability and nervousness)          Active Problem List     Patient Active Problem List   Diagnosis    Type 1 diabetes mellitus with diabetic peripheral angiopathy without gangrene ([facility]    Presence of drug coated stent in LAD coronary artery    Coronary artery disease of native artery of native heart with stable angina pectoris ([facility])    Presence of drug coated stent in left circumflex coronary artery    Dyslipidemia    Obstructive sleep apnea of adult    Carotid artery stenosis, asymptomatic, bilateral    Restrictive lung disease    Centrilobular emphysema ([address] )    Benign hypertension with chronic kidney disease, stage III ([address] )    CKD (chronic kidney disease) stage 3, GFR 30-59 ml/min ([facility])    Renal artery stenosis ([address] )    Renal cyst, right    Vitamin D deficiency    Aortoiliac occlusive disease ([address] )    Hypothyroidism, postablative    Low back pain with sciatica    Lumbar disc herniation    Lumbar radiculopathy    Diabetic neuropathy associated with type 1 diabetes mellitus (HCC)    Chronic systolic congestive heart failure (HCC)    Anxiety with depression    Spinal stenosis of lumbar region with neurogenic claudication    Thrombocytopenia, unspecified ([facility] )    S/P femoral-popliteal bypass surgery    Stenosis of carotid artery    Chronic respiratory failure with hypoxia ([facility])    Class 1 obesity without serious comorbidity with body mass index (BMI) of 31 0 to 31 9 in adult    Open wound of right lower leg    Paroxysmal A-fib ([facility])    Pleural effusion       Past Medical History:  Past Medical History:   Diagnosis Date    Acute venous embolism and thrombosis of deep vessels of proximal lower extremity ([facility] )     resolved 04/04/2015    DARINEL (acute kidney injury) ([facility] ) 01/12/2016    Anesthesia     RESPIRATORY ISSUES DUE TO SLEEP APNEA    Cardiac disease     heart attack, stents x 4    Chronic cough     resolved 02/04/2016    Chronic pain disorder     intermitent claudication    COPD (chronic obstructive pulmonary disease) ([facility] )     Coronary artery disease     CPAP (continuous positive airway pressure) dependence     Diabetes mellitus ([facility] )     Disease of thyroid gland     DVT (deep venous thrombosis) ([facility] )     Heart failure with mid-range ejection fraction ([place] [address] )     Hyperlipidemia     Hypertension     Ischemic cardiomyopathy     last assessed 09/26/2017    Myocardial infarction [facility])     MI +2 2015,2018    NSTEMI (non-ST elevated myocardial infarction) ([place] [address] ) 03/23/2019    Pulmonary emphysema ([facility])     Pulmonary granuloma ([facility])     resolved 02/03/2017    Renal failure     Sleep apnea        Past Surgical History:  Past Surgical History:   Procedure Laterality Date    BYPASS FEMORAL-POPLITEAL      initial stenosis with stent left, 7 x 100 smart stent onset 02/24/2014    CARDIAC SURGERY      cardiac stents    CATARACT EXTRACTION      COLOGUARD (HISTORICAL)  2018    CORONARY ANGIOPLASTY WITH STENT PLACEMENT      x4    IR AORTAGRAM WITH RUN-OFF  3/14/2019    OR THROMBOENDARTECTMY FEMORAL COMMON Left 3/22/2019    Procedure: ENDARTERECTOMY ARTERIAL FEMORAL WITH PATCH ANGIOPLASTY, BALLOON ANGIOPLASTY, STENT;  Surgeon: Shreyas Anthony MD;  [facility];  Service: Vascular    OR VEIN BYPASS GRAFT,FEM-POP Left 3/22/2019    Procedure: BYPASS FEMORAL-POPLITEAL WITH COMPLETION ARTERIOGRAM;  Surgeon: Shreyas Anthony MD;  [facility];  Service: Vascular    VASCULAR SURGERY      stent placement    WOUND DEBRIDEMENT Left 4/15/2019    Procedure: DEBRIDEMENT WOUND AND DRESSING CHANGE ([address]) left groin with VAC;  Surgeon: Jessie Maddox DO;  [facility];  Service: Vascular       Family History:  Family History   Problem Relation Age of Onset    Heart disease Father         pacer placement    Hypertension Father     Kidney disease Father     Heart failure Father     Heart attack Father     Liver disease Father     Cirrhosis Mother         due to beer consumption    Liver disease Mother     Diabetes Other        Social History:  Social History     Socioeconomic History    Marital status: /Civil Union     Spouse name: Not on file    Number of children: Not on file    Years of education: Not on file    Highest education level: Not on file   Occupational History    Occupation: Retired    Occupation: Desk work    Occupation: [employer]   Tobacco Use    Smoking status: Former Smoker     Packs/day: 4 00     Years: 47 00     Pack years: 188 00     Types: Cigarettes     Start date:      Quit date:      Years since quittin 6    Smokeless tobacco: Never Used   Vaping Use    Vaping Use: Never used   Substance and Sexual Activity    Alcohol use: Yes     Comment: 2 drinks per day    Drug use: No    Sexual activity: Not Currently   Other Topics Concern    Not on file   Social History Narrative    Not on file     Social Determinants of Health     Financial Resource Strain: Not on file   Food Insecurity: Not on file   Transportation Needs: Not on file   Physical Activity: Not on file   Stress: Not on file   Social Connections: Not on file   Intimate Partner Violence: Not on file   Housing Stability: Not on file         Objective     Vitals:    08/18/22 1319   BP: 120/60   BP Location: Left arm   Patient Position: Sitting   Cuff Size: Standard   Pulse: 83   Resp: 20   Temp: 97 6 °F (36 4 °C)   TempSrc: Temporal   SpO2: 93%   Weight: 93 4 kg (206 lb)   Height: 5' 7" (1 702 m)       Wt Readings from Last 3 Encounters:   08/18/22 93 4 kg (206 lb)   08/04/22 92 6 kg (204 lb 1 6 oz)   06/20/22 92 3 kg (203 lb 6 4 oz)       Physical Exam  Vitals and nursing note reviewed  Constitutional:       General: He is not in acute distress  Appearance: Normal appearance  He is well-developed  He is not ill-appearing  HENT:      Head: Normocephalic and atraumatic  Eyes:      Conjunctiva/sclera: Conjunctivae normal    Neck:      Vascular: No carotid bruit  Comments: No JVD  Cardiovascular:      Rate and Rhythm: Normal rate and regular rhythm  Heart sounds: Normal heart sounds  No murmur heard  Pulmonary:      Effort: Pulmonary effort is normal  No respiratory distress  Breath sounds: Normal breath sounds  No wheezing or rales  Abdominal:      General: Bowel sounds are normal  There is no distension or abdominal bruit  Musculoskeletal:         General: Normal range of motion  Cervical back: Neck supple  No rigidity  Right lower leg: No edema  Left lower leg: No edema  Comments: Right ankle:  Full range of motion  No tenderness, no edema  Neurological:      Mental Status: He is alert and oriented to person, place, and time  Cranial Nerves: No cranial nerve deficit  Psychiatric:         Mood and Affect: Mood normal          Behavior: Behavior normal          Thought Content:  Thought content normal           Pertinent Laboratory/Diagnostic Studies:    Lab Results   Component Value Date    WBC 5 15 06/06/2022    HGB 12 5 06/06/2022    HCT 38 4 06/06/2022    MCV 97 06/06/2022     (L) 06/06/2022       No results found for: TSH    Lab Results   Component Value Date    CHOL 129 10/01/2015     Lab Results   Component Value Date    TRIG 140 05/10/2022     Lab Results   Component Value Date    HDL 41 05/10/2022     Lab Results   Component Value Date    LDLCALC 65 05/10/2022     Lab Results   Component Value Date    HGBA1C 8 1 (H) 08/11/2022     Lab Results   Component Value Date    SODIUM 143 08/11/2022    K 4 5 08/11/2022     (H) 08/11/2022    CO2 28 08/11/2022    ANIONGAP 6 12/21/2015    AGAP 3 (L) 08/11/2022    BUN 31 (H) 08/11/2022    CREATININE 1 67 (H) 08/11/2022    GLUC 161 (H) 06/06/2022    GLUF 117 (H) 08/11/2022    CALCIUM 9 3 08/11/2022    AST 18 08/11/2022    ALT 23 08/11/2022    ALKPHOS 114 08/11/2022    PROT 7 6 10/01/2015    TP 8 1 08/11/2022    BILITOT 0 33 10/01/2015    TBILI 0 62 08/11/2022    EGFR 38 08/11/2022       Orders Placed This Encounter   Procedures    Cologuard       ALLERGIES:  Allergies   Allergen Reactions    Doxazosin Myalgia     UNKNOWN  UNKNOWN  Muscle cramps    Iohexol      UNKNOWN    Lisinopril Cough     cough  Other reaction(s): CAMELLA SINENSIS (ZESTRIL) (cough)  cough    Vancomycin Hives    Imdur [Isosorbide Nitrate] Hives    Adhesive [Medical Tape]      Skin Breakdown    Cardura [Doxazosin Mesylate]      Muscle cramps    Isosorbide Rash    Other Other (See Comments)     Iv dye for cardiac cath  Renal failure very close to dialysis  But no dialysis       Current Medications     Current Outpatient Medications   Medication Sig Dispense Refill    apixaban (Eliquis) 5 mg Take 1 tablet (5 mg total) by mouth 2 (two) times a day 60 tablet 5    aspirin 81 MG tablet Take 81 mg by mouth daily        cholecalciferol (VITAMIN D3) 1,000 units tablet Take 1,000 Units by mouth daily       Coenzyme Q10 (COQ-10) 200 MG CAPS Take 200 mg by mouth daily      docusate sodium (COLACE) 50 mg capsule Take by mouth 2 (two) times a day as needed        gabapentin (NEURONTIN) 300 mg capsule Take 1 capsule (300 mg total) by mouth in the morning and 1 capsule (300 mg total) in the evening  180 capsule 3    Glucagon (Baqsimi One Pack) 3 MG/DOSE POWD 1 Dose into each nostril as needed      Icosapent Ethyl (Vascepa) 1 g CAPS Take 1 capsule (1 g total) by mouth in the morning and 1 capsule (1 g total) before bedtime  180 capsule 3    insulin glargine (LANTUS) 100 units/mL subcutaneous injection Inject 40 Units under the skin every morning AND 6 Units daily at bedtime  (Patient taking differently: Inject 40 Units under the skin every morning AND 14 Units daily at bedtime  ) 10 mL 0    levothyroxine 175 mcg tablet Take 175 mcg by mouth in the morning   metoprolol succinate (TOPROL-XL) 25 mg 24 hr tablet Take 1 tablet (25 mg total) by mouth daily 30 tablet 8    nitroglycerin (NITROSTAT) 0 4 mg SL tablet Place 1 tablet (0 4 mg total) under the tongue every 5 (five) minutes as needed for chest pain 25 tablet 3    polyethylene glycol (MIRALAX) 17 g packet Take 17 g by mouth daily      ranolazine (RANEXA) 500 mg 12 hr tablet Take 1 tablet (500 mg total) by mouth 2 (two) times a day 60 tablet 1    rosuvastatin (CRESTOR) 20 MG tablet Take 1 tablet (20 mg total) by mouth in the morning  90 tablet 3    torsemide (DEMADEX) 10 mg tablet Take 2 tablets (20 mg total) by mouth daily 100 tablet 2    ULTICARE INSULIN SYRINGE 30G X 1/2" 1 ML MISC Use as directed  0    Wound Dressings ([facility] Wound/Burn Dressing) GEL Apply topically if needed      insulin lispro (HumaLOG) 100 units/mL injection USE 1 UNIT PER 7 GRAMS AT BREAKFAST, 1 UNIT PER 8 GRAMS AT LUNCH, 1 UNIT PER 4 5 GRAMS AT DINNER, 1 UNIT PER 13 GRAMS AT BEDTIME  TOTAL TRAE       No current facility-administered medications for this visit         Medications Discontinued During This Encounter Medication Reason    insulin lispro (HUMALOG) 100 units/mL injection Dose adjustment    DULoxetine (Cymbalta) 30 mg delayed release capsule        Health Maintenance     Health Maintenance   Topic Date Due    SLP PLAN OF CARE  Never done    Lung Cancer Screening  09/18/2021    COVID-19 Vaccine (4 - Booster for Moderna series) 02/14/2022    Diabetic Foot Exam  02/27/2022    BMI: Followup Plan  06/06/2022    Influenza Vaccine (1) 09/01/2022    Fall Risk  10/04/2022    Medicare Annual Wellness Visit (AWV)  10/04/2022    HEMOGLOBIN A1C  02/11/2023    DM Eye Exam  07/27/2023    URINE MICROALBUMIN  08/11/2023    BMI: Adult  08/18/2023    Hepatitis C Screening  Completed    Pneumococcal Vaccine: 65+ Years  Completed    HIB Vaccine  Aged Out    Hepatitis B Vaccine  Aged Out    IPV Vaccine  Aged Out    Hepatitis A Vaccine  Aged Out    Meningococcal ACWY Vaccine  Aged Out    HPV Vaccine  Aged Out       Immunization History   Administered Date(s) Administered    COVID-19 MODERNA VACC 0 5 ML IM 01/19/2021, 02/23/2021, 10/14/2021    DT (pediatric) 12/01/2009    H1N1, All Formulations 12/01/2009    INFLUENZA 10/01/2008, 10/06/2009, 09/01/2010, 11/04/2013, 08/29/2018, 09/09/2019, 09/05/2020, 09/13/2021    Influenza Split High Dose Preservative Free IM 08/28/2014, 10/03/2016, 08/29/2018    Influenza, seasonal, injectable 10/01/2008, 10/19/2010, 08/26/2011, 09/20/2011, 08/01/2012, 09/01/2012, 09/13/2013, 09/15/2015, 09/03/2017    Meningococcal C/Y-HIB PRP 12/01/2009    Pneumococcal Conjugate 13-Valent 08/11/2015    Pneumococcal Polysaccharide PPV23 10/01/2008, 06/04/2009, 03/15/2017    Td (adult), adsorbed 03/04/2011    Tdap 05/05/2013    Zoster 10/01/2009    influenza, trivalent, adjuvanted 09/05/2019, 09/05/2020       Shirley Ware MD

## 2022-08-22 PROBLEM — I48.0 PAROXYSMAL A-FIB (HCC): Status: ACTIVE | Noted: 2022-06-04

## 2022-08-22 NOTE — ASSESSMENT & PLAN NOTE
Lack of improvement of Cymbalta  Duloxetine has not been effective for neuropathic pain as well  He prefers to wean of medications and reassess his symptoms within next few months after moving to [place]  Instructions for weaning off duloxetine provided    Previous unsuccessful trials of Celexa and Effexor

## 2022-08-22 NOTE — ASSESSMENT & PLAN NOTE
Lab Results   Component Value Date    EGFR 38 08/11/2022    EGFR 40 07/01/2022    EGFR 53 06/06/2022    CREATININE 1 67 (H) 08/11/2022    CREATININE 1 61 (H) 07/01/2022    CREATININE 1 28 06/06/2022     BP is well controlled  Patient tolerates metoprolol ER 25 mg daily without significant blood pressure drops lately    He remains on torsemide 10 mg daily

## 2022-08-22 NOTE — ASSESSMENT & PLAN NOTE
Patient uses oxygen at home on an as-needed basis  He remains under care of [facility] pulmonology

## 2022-08-22 NOTE — ASSESSMENT & PLAN NOTE
Stable symptoms of dyspnea on exertion  Trials of numerous inhalers have been unsuccessful    Patient is following up with pulmonology for treatment of COPD and restrictive lung disease

## 2022-08-22 NOTE — ASSESSMENT & PLAN NOTE
Patient denies any unusual dyspnea on exertion, his symptoms are at baseline  No symptoms of angina  He will be establishing care with Cardiology in [place]    Patient has not tried Ranexa that was recommended by [facility] due to significant adverse side effects caused by isosorbide in the past (low blood pressure and hives)  Most recent cardiac catheterization was performed in February 2020 at [facility]

## 2022-08-22 NOTE — ASSESSMENT & PLAN NOTE
Lab Results   Component Value Date    HGBA1C 8 1 (H) 08/11/2022     Hemoglobin A1c is elevated    Patient is under care of Endocrinology at [facility]

## 2022-08-22 NOTE — ASSESSMENT & PLAN NOTE
Wt Readings from Last 3 Encounters:   08/18/22 93 4 kg (206 lb)   08/04/22 92 6 kg (204 lb 1 6 oz)   06/20/22 92 3 kg (203 lb 6 4 oz)     Patient appears euvolemic on exam today  Continue low-dose of torsemide 10 mg daily    Patient will be establishing care with Cardiology in [place]

## 2022-08-22 NOTE — ASSESSMENT & PLAN NOTE
Continue Eliquis and metoprolol  Patient appears in normal sinus rhythm today    No symptoms of palpitations

## 2022-08-23 ENCOUNTER — OFFICE VISIT (OUTPATIENT)
Dept: PULMONOLOGY | Facility: HOSPITAL | Age: 77
End: 2022-08-23
Payer: MEDICARE

## 2022-08-23 VITALS
HEIGHT: 67 IN | HEART RATE: 81 BPM | OXYGEN SATURATION: 96 % | BODY MASS INDEX: 32.33 KG/M2 | DIASTOLIC BLOOD PRESSURE: 64 MMHG | TEMPERATURE: 96.9 F | SYSTOLIC BLOOD PRESSURE: 120 MMHG | WEIGHT: 206 LBS

## 2022-08-23 DIAGNOSIS — J98.4 RESTRICTIVE LUNG DISEASE: ICD-10-CM

## 2022-08-23 DIAGNOSIS — G47.33 OBSTRUCTIVE SLEEP APNEA OF ADULT: ICD-10-CM

## 2022-08-23 DIAGNOSIS — E66.9 OBESITY (BMI 30-39.9): ICD-10-CM

## 2022-08-23 DIAGNOSIS — J43.2 CENTRILOBULAR EMPHYSEMA (HCC): Primary | Chronic | ICD-10-CM

## 2022-08-23 DIAGNOSIS — J96.11 CHRONIC RESPIRATORY FAILURE WITH HYPOXIA (HCC): ICD-10-CM

## 2022-08-23 PROCEDURE — 99214 OFFICE O/P EST MOD 30 MIN: CPT | Performed by: PHYSICIAN ASSISTANT

## 2022-08-23 PROCEDURE — 94618 PULMONARY STRESS TESTING: CPT | Performed by: PHYSICIAN ASSISTANT

## 2022-08-23 RX ORDER — DULOXETIN HYDROCHLORIDE 30 MG/1
30 CAPSULE, DELAYED RELEASE ORAL DAILY
COMMUNITY

## 2022-08-23 NOTE — PROGRESS NOTES
Assessment & Plan:      1  Centrilobular emphysema (HCC)  fluticasone-umeclidinium-vilanterol (Trelegy Ellipta) 100-62 5-25 MCG/INH inhaler   2  Obstructive sleep apnea of adult     3  Restrictive lung disease     4  Chronic respiratory failure with hypoxia (HCC)  POCT 6 minute walk   5  Obesity (BMI 30-39  9)           Patient presenting for follow-up  They are using the CPAP and benefitting from it  Better quality of sleep at night and more energy throughout the day  Reviewed compliance report with the patient demonstrating residual AHI is acceptable and they are compliant with use  Will continue CPAP at current pressure settings (14-20)  Discussed regular cleaning and changing of the supplies  Patient is aware of consequences of untreated sleep apnea   Patient had repeat 6MW today for home O2 re-certification  This demonstrates patient requires 2 L with exertion  Importance of O2 compliance discussed in detail  No prior benefit from [address]  Will trial Trelegy  Sample provided  Proper use discussed  He will call us if he notes benefit  Patient is accompanied by his wife, who is a nurse  All of their questions were answered to the best of my ability  Subjective:     Patient ID: Emiliano Enrique is a 68 y o  male  Chief Complaint:  Emiliano Enrique is a 68 y o  male who has past medical history of obesity, restrictive lung disease, obstructive sleep apnea here for follow-up  Patient reports his CPAP is working quite well  No issues with it  He does admit to chronic [place] and not using the O2 basically at all  The following portions of the patient's history were reviewed in this encounter and updated as appropriate: Past medical, social, surgical, family, allergies    Review of Systems   Respiratory: Positive for shortness of breath  Neurological: Positive for light-headedness  Psychiatric/Behavioral: Positive for sleep disturbance     All other systems reviewed and are negative  Objective:  Vitals:    08/23/22 1347   BP: 120/64   BP Location: Left arm   Patient Position: Sitting   Cuff Size: Standard   Pulse: 81   Temp: (!) 96 9 °F (36 1 °C)   TempSrc: Tympanic   SpO2: 96%   Weight: 93 4 kg (206 lb)   Height: 5' 7" (1 702 m)       Physical Exam  Vitals and nursing note reviewed  Constitutional:       General: He is not in acute distress  Appearance: He is well-developed  He is obese  He is not diaphoretic  HENT:      Head: Normocephalic and atraumatic  Right Ear: External ear normal       Left Ear: External ear normal       Nose: Nose normal    Eyes:      General: No scleral icterus  Neck:      Trachea: No tracheal deviation  Cardiovascular:      Rate and Rhythm: Normal rate and regular rhythm  Heart sounds: Normal heart sounds  No murmur heard  No friction rub  No gallop  Pulmonary:      Effort: Pulmonary effort is normal  No respiratory distress  Breath sounds: No stridor  No wheezing  Comments: Decreased breath sounds    Musculoskeletal:         General: No deformity  Skin:     General: Skin is warm and dry  Neurological:      Mental Status: He is alert and oriented to person, place, and time  Cranial Nerves: No cranial nerve deficit  Motor: No abnormal muscle tone  Psychiatric:         Behavior: Behavior normal          Thought Content:  Thought content normal          Judgment: Judgment normal          Lab Review:   Appointment on 08/11/2022   Component Date Value    Sodium 08/11/2022 143     Potassium 08/11/2022 4 5     Chloride 08/11/2022 112 (A)    CO2 08/11/2022 28     ANION GAP 08/11/2022 3 (A)    BUN 08/11/2022 31 (A)    Creatinine 08/11/2022 1 67 (A)    Glucose, Fasting 08/11/2022 117 (A)    Calcium 08/11/2022 9 3     Corrected Calcium 08/11/2022 9 8     AST 08/11/2022 18     ALT 08/11/2022 23     Alkaline Phosphatase 08/11/2022 114     Total Protein 08/11/2022 8 1     Albumin 08/11/2022 3 4 (A)  Total Bilirubin 08/11/2022 0 62     eGFR 08/11/2022 38     Magnesium 08/11/2022 2 3     Phosphorus 08/11/2022 3 6     PTH 08/11/2022 77 3     Uric Acid 08/11/2022 8 4     Color, UA 08/11/2022 Colorless     Clarity, UA 08/11/2022 Clear     Specific Gravity, UA 08/11/2022 1 010     pH, UA 08/11/2022 6 0     Leukocytes, UA 08/11/2022 Negative     Nitrite, UA 08/11/2022 Negative     Protein, UA 08/11/2022 Trace (A)    Glucose, UA 08/11/2022 Negative     Ketones, UA 08/11/2022 Negative     Urobilinogen, UA 08/11/2022 <2 0     Bilirubin, UA 08/11/2022 Negative     Occult Blood, UA 08/11/2022 Negative     RBC, UA 08/11/2022 None Seen     WBC, UA 08/11/2022 None Seen     Epithelial Cells 08/11/2022 None Seen     Bacteria, UA 08/11/2022 None Seen     Creatinine, Ur 08/11/2022 43 3     Protein Urine Random 08/11/2022 28     Prot/Creat Ratio, Ur 08/11/2022 0 65 (A)    Hemoglobin A1C 08/11/2022 8 1 (A)    EAG 08/11/2022 186    Telephone on 06/30/2022   Component Date Value    Sodium 07/01/2022 138     Potassium 07/01/2022 4 8     Chloride 07/01/2022 103     CO2 07/01/2022 27     ANION GAP 07/01/2022 8     BUN 07/01/2022 41 (A)    Creatinine 07/01/2022 1 61 (A)    Glucose, Fasting 07/01/2022 350 (A)    Calcium 07/01/2022 8 9     eGFR 07/01/2022 [address] Outpatient Visit on 06/28/2022   Component Date Value    Baseline HR 06/28/2022 77     Baseline BP 06/28/2022 124/70     O2 sat rest 06/28/2022 97     Stress peak HR 06/28/2022 100     Post peak BP 06/28/2022 110     Rate Pressure Product 06/28/2022 11,000 0     O2 sat peak 06/28/2022 95     Recovery HR 06/28/2022 92     Recovery BP 06/28/2022 120/74     O2 sat recovery 06/28/2022 96     Angina Index 06/28/2022 0     Rest Nuclear Isotope Dose 06/28/2022 10 94     Stress Nuclear Isotope D* 06/28/2022 32 20     Stress/rest perfusion ra* 06/28/2022 1 11     EF (%) 06/28/2022 31     ST Depression (mm) 06/28/2022 0     Protocol Name 06/28/2022 Robb Britton     Time In Exercise Phase 06/28/2022 00:03:00     MAX  SYSTOLIC BP 74/61/1303 969     Max Diastolic Bp 52/66/1683 70     Max Heart Rate 06/28/2022 250     Max Predicted Heart Rate 06/28/2022 143     Reason for Termination 06/28/2022 TEST COMPLETE        Test Indication 06/28/2022 Screening for CAD     Target Hr Formular 06/28/2022 (220 - Age)*100%     Arrhy During Ex 06/28/2022 isolated PVCs     Chest Pain Statement 06/28/2022 none

## 2022-09-06 ENCOUNTER — PATIENT OUTREACH (OUTPATIENT)
Dept: FAMILY MEDICINE CLINIC | Facility: CLINIC | Age: 77
End: 2022-09-06

## 2022-09-07 DIAGNOSIS — G89.29 CHRONIC LOW BACK PAIN WITH BILATERAL SCIATICA, UNSPECIFIED BACK PAIN LATERALITY: ICD-10-CM

## 2022-09-07 DIAGNOSIS — M54.41 CHRONIC LOW BACK PAIN WITH BILATERAL SCIATICA, UNSPECIFIED BACK PAIN LATERALITY: ICD-10-CM

## 2022-09-07 DIAGNOSIS — M54.42 CHRONIC LOW BACK PAIN WITH BILATERAL SCIATICA, UNSPECIFIED BACK PAIN LATERALITY: ICD-10-CM

## 2022-09-07 DIAGNOSIS — I25.118 CORONARY ARTERY DISEASE OF NATIVE ARTERY OF NATIVE HEART WITH STABLE ANGINA PECTORIS (HCC): ICD-10-CM

## 2022-09-07 DIAGNOSIS — E89.0 HYPOTHYROIDISM, POSTABLATIVE: ICD-10-CM

## 2022-09-07 DIAGNOSIS — E10.51 TYPE 1 DIABETES MELLITUS WITH DIABETIC PERIPHERAL ANGIOPATHY WITHOUT GANGRENE (HCC): Primary | ICD-10-CM

## 2022-09-07 RX ORDER — GABAPENTIN 300 MG/1
300 CAPSULE ORAL 2 TIMES DAILY
Qty: 180 CAPSULE | Refills: 3 | Status: SHIPPED | OUTPATIENT
Start: 2022-09-07

## 2022-09-08 DIAGNOSIS — I25.10 CORONARY ARTERY DISEASE INVOLVING NATIVE CORONARY ARTERY OF NATIVE HEART WITHOUT ANGINA PECTORIS: ICD-10-CM

## 2022-09-08 RX ORDER — NITROGLYCERIN 0.4 MG/1
0.4 TABLET SUBLINGUAL
Qty: 25 TABLET | Refills: 3 | Status: SHIPPED | OUTPATIENT
Start: 2022-09-08

## 2022-09-12 ENCOUNTER — PATIENT MESSAGE (OUTPATIENT)
Dept: FAMILY MEDICINE CLINIC | Facility: CLINIC | Age: 77
End: 2022-09-12

## 2022-10-07 LAB — COLOGUARD RESULT REPORTABLE: NEGATIVE

## 2022-10-25 ENCOUNTER — TELEPHONE (OUTPATIENT)
Dept: NEPHROLOGY | Facility: CLINIC | Age: 77
End: 2022-10-25

## 2024-01-01 NOTE — ASSESSMENT & PLAN NOTE
Lab Results   Component Value Date    HGBA1C 7 6 (H) 09/17/2019   ·  Patient remains under care of endocrinology, Dr Mihir Corrigan
Wt Readings from Last 3 Encounters:   10/01/19 97 3 kg (214 lb 9 6 oz)   09/30/19 97 5 kg (215 lb)   09/19/19 97 3 kg (214 lb 8 oz)     · Patient clinically appears to be euvolemic on exam today  · Recent weight gain is likely related to hypothyroidism  · I advised him to decrease dose of furosemide from 40-20 mg daily, pending follow-up with Nephrology who follows BMP
· Continue regimen of aspirin, Plavix, Crestor, isosorbide and metoprolol  · Patient remains under care of [facility] 
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
· Patient remains under ongoing care of [facility] vascular surgery, Dr Maxime Vergara  · Recent evaluation and arterial duplex in early September, status post left femoral endarterectomy and femoral -popliteal bypass with [place] graft    Severe disease on the right, patient's symptoms I fairly minimal and primarily related to neurogenic claudication and peripheral neuropathy, vascular surgery advises ongoing observation and re-evaluation in 3 months
· Recent TSH is elevated, patient is experiencing symptoms of fatigue and weight gain  · Dose of levothyroxine will likely be increased from 175 mcg daily to 200 mcg daily  · Endocrinology follows
· [facility] pulmonology, [facility], follows, patient uses CPAP as directed  · Known emphysema, patient has tried multiple inhalers without improvement, no Rx at present time
never used

## 2024-06-26 NOTE — H&P
Consult and Accept Note - Critical Care  Roopa March 68 y o  male MRN: [id number]  Unit/Bed#: [facility] 517-01 Encounter: [id number]     Physician Requesting Consult: Dede Acevedo MD  Consults     Reason for Consultation / Chief Complaint: ST changes on EKG leads intra-operatively     History of Present Illness:  Roopa March is a 68 y o  male with a PMH of diabetes mellitus, severe peripheral arterial occlusive disease with a history of bilateral iliac stenting and left superficial femoral artery stent placement who presents post-operatively s/p endarterectomy, femoral-popliteal bypass with completion arteriogram   As per anesthesia, during the case, the patient allegedly became hypotensive after induction and ST changes were visualized in leads on the computer screen  Patient received epinephrine intra-operatively  Patient has an extensive past medical history including non-STEMI with stents, chronic systolic congestive heart failure, ischemic cardiomyopathy, type 1 diabetes, lumbar disc herniation, COPD, drug eluting stent in his LAD, dyslipidemia, ITZEL, CKD, sciatica, hypertension, hypothyroidism and bilateral renal artery stenosis  History obtained from chart review and patient        Past Medical History:  Past Medical History:   Diagnosis Date    Acute kidney injury ([address] )     resolved 11/30/2015    Acute venous embolism and thrombosis of deep vessels of proximal lower extremity ([address] )     resolved 04/04/2015    DARINEL (acute kidney injury) ([address] ) 1/12/2016    Anesthesia     RESPIRATORY ISSUES DUE TO SLEEP APNEA    Cardiac disease     heart attack, stents x 4    CHF (congestive heart failure) [facility])     Chronic cough     resolved 02/04/2016    Chronic pain disorder     intermitent claudication    COPD (chronic obstructive pulmonary disease) ([facility])     Coronary artery disease     CPAP (continuous positive airway pressure) dependence     Diabetes mellitus ([address] )     Disease of thyroid gland     DVT (deep venous thrombosis) (HCC)     Heart failure (HCC)     Hyperlipidemia     Hypertension     Ischemic cardiomyopathy     last assessed 2017    Myocardial infarction [facility])     MI +2 ,    Pulmonary emphysema (HCC)     Pulmonary granuloma (HCC)     resolved 2017    Renal failure     Sleep apnea         Past Surgical History:  Past Surgical History:   Procedure Laterality Date    BYPASS FEMORAL-POPLITEAL      initial stenosis with stent left, 7 x 100 smart stent onset 2014    CARDIAC SURGERY      cardiac stents    CATARACT EXTRACTION      COLOGUARD (HISTORICAL)      CORONARY ANGIOPLASTY WITH STENT PLACEMENT      x4    IR ABDOMINAL ANGIOGRAPHY / INTERVENTION  3/14/2019    VASCULAR SURGERY      stent placement        Past Family History:  Family History   Problem Relation Age of Onset    Heart disease Father         pacer placement    Hypertension Father     Kidney disease Father     Heart failure Father     Heart attack Father     Liver disease Father     Cirrhosis Mother         due to beer consumption    Liver disease Mother     Diabetes Other         Social History:  Social History     Tobacco Use   Smoking Status Former Smoker    Packs/day: 4 00    Years: 48 00    Pack years: 192 00    Types: Cigarettes    Last attempt to quit:     Years since quittin 2   Smokeless Tobacco Never Used   Tobacco Comment    smoking 48 years 1 5 ppd d/c oct 2008 screening protocol as per allscripts     Social History     Substance and Sexual Activity   Alcohol Use Yes    Alcohol/week: 12 6 oz    Types: 21 Standard drinks or equivalent per week    Frequency: 4 or more times a week    Drinks per session: 1 or 2    Comment: 2-3 glasses of vodka daily (6 shots) (history 2 drinks per day as per allscripts     Social History     Substance and Sexual Activity   Drug Use No     Marital Status: /Civil Union     Home Medications:   Prior to Admission medications Medication Sig Start Date End Date Taking? Authorizing Provider   aspirin 81 MG tablet Take 81 mg by mouth daily     Yes Historical Provider, MD   cholecalciferol (VITAMIN D3) 1,000 units tablet Take 1,000 Units by mouth daily     Yes Historical Provider, MD   clopidogrel (PLAVIX) 75 mg tablet Take 1 tablet (75 mg total) by mouth daily 2/27/19  Yes Ellen Velarde DO   Coenzyme Q10 (COQ-10) 200 MG CAPS Take 200 mg by mouth daily   Yes Historical Provider, MD   docusate sodium (COLACE) 50 mg capsule Take by mouth daily   Yes Historical Provider, MD   furosemide (LASIX) 20 mg tablet Take 1 tablet (20 mg total) by mouth daily 3/18/19  Yes Ellen Velarde DO   gabapentin (NEURONTIN) 100 mg capsule Take 3 capsules twice a day  Patient taking differently: Take 200 mg by mouth 2 (two) times a day Take 3 capsules twice a day  10/28/18  Yes Lissett Oliveira MD   glucagon (GLUCAGON EMERGENCY) 1 MG injection 1 mg   Yes Historical Provider, MD   insulin glargine (LANTUS) 100 units/mL subcutaneous injection Inject 66 Units under the skin daily 38 units in the morning and 28 units in the evening  Patient taking differently: Inject 40 Units under the skin daily 40 units in the morning and 18 units in the evening 6/6/18  Yes PEDRO LUIS Mendoza   insulin lispro (HUMALOG) 100 units/mL injection Inject 3 units with breakfast, 6 units at lunch, and 6 units at dinner  Patient taking differently: 4 (four) times a day Inject 3 units with breakfast, 6 units at lunch, and 6 units at dinner 10/25/18  Yes Lissett Oliveira MD   Lactobacillus-Inulin ([address] PO) Take 1 capsule by mouth daily   Yes Historical Provider, MD   levothyroxine 175 mcg tablet Take 1 tablet (175 mcg total) by mouth daily in the early morning 4/11/18  Yes Cooper Cardona MD   metoprolol tartrate (LOPRESSOR) 25 mg tablet Take 0 5 tablets (12 5 mg total) by mouth every 12 (twelve) hours 1/30/19  Yes Chandan Medellin,    NIFEdipine 0 3%-lidocaine 5% rectal ointment Apply 1 application topically every 4 (four) hours as needed for discomfort or pain Apply a small amount to anal opening 4-6 times per day   3/1/19  Yes Matilde Michael MD   rosuvastatin (CRESTOR) 20 MG tablet Take 1 tablet (20 mg total) by mouth daily 1/30/19  Yes Balaji Dubose DO   silver sulfadiazine (SILVADENE,SSD) 1 % cream Apply topically daily   Yes Historical Provider, MD   levalbuterol Angeline Meng HFA) 45 mcg/act inhaler Inhale 1-2 puffs 11/16/18   Historical Provider, MD   nitroglycerin (NITROSTAT) 0 4 mg SL tablet Place 1 tablet (0 4 mg total) under the tongue every 5 (five) minutes as needed for chest pain 1/30/19   Balaji Dubose DO   ULTICARE INSULIN SYRINGE 30G X 1/2" 1 ML MISC Use as directed 11/7/18   Historical Provider, MD   [address] [facility] X 5/16" 0 3 ML MISC Use as directed 11/7/18   Historical Provider, MD        Inpatient Medications:  Scheduled Meds:  Current Facility-Administered Medications:  acetaminophen 975 mg Oral Q8H [facility] Celestine Mcarthur PA-C    albuterol 2 puff Inhalation Q4H PRN Celestine Mcarthur PA-C    aspirin 81 mg Oral Daily Celestine Mcarthur PA-C    atorvastatin 40 mg Oral Daily With [employer] MAGO Martinez    cholecalciferol 1,000 Units Oral Daily Celestine Mcarthur PA-C    clopidogrel 75 mg Oral Daily Celestine Mcarthur PA-C    docusate sodium 100 mg Oral BID Celestine Mcarthur PA-C    gabapentin 200 mg Oral BID Celestine Mcarthur PA-C    heparin (porcine) 5,000 Units Subcutaneous Q8H [facility] Celestine Mcarthur PA-C    HYDROmorphone 0 5 mg Intravenous Q4H PRN Celestine Mcarthur PA-C    insulin glargine 18 Units Subcutaneous HS Celestine Mcarthur PA-C    [START ON 3/23/2019] insulin glargine 40 Units Subcutaneous QAM Celestine Mcarthur PA-C    insulin lispro 1-5 Units Subcutaneous HS Celestine Mcarthur PA-C    insulin lispro 1-6 Units Subcutaneous TID AC Celestine Mcarthur PA-C    [START ON 3/23/2019] insulin lispro 3 Units Subcutaneous Daily With Breakfast Marlene Rosenbaum PA-C    insulin lispro 6 Units Subcutaneous Daily With Lunch Josiane Martinez PA-C    insulin lispro 6 Units Subcutaneous Daily With Dinner Eliezerhelene Shanique Martinez PA-C    [START ON 3/23/2019] levothyroxine 175 mcg Oral Early Morning Marlene Rosenbaum PA-C    metoprolol tartrate 12 5 mg Oral Q12H [address] [employer] MAGO Rosenbaum    multi-electrolyte 75 mL/hr Intravenous Continuous Andre Dejesus MD Last Rate: 75 mL/hr (03/22/19 1501)   nitroGLYcerin 50 mcg/min Intravenous Titrated Rolly Lucas MD    ondansetron 4 mg Intravenous Q6H PRN Marlene Rosenbaum PA-C    oxyCODONE 5 mg Oral Q4H PRN Marlene Rosenbaum PA-C    Or        oxyCODONE 10 mg Oral Q4H PRN Marlene Rosenbaum PA-C      Continuous Infusions:  multi-electrolyte 75 mL/hr Last Rate: 75 mL/hr (03/22/19 1501)   nitroGLYcerin 50 mcg/min      PRN Meds:    albuterol 2 puff Q4H PRN   HYDROmorphone 0 5 mg Q4H PRN   ondansetron 4 mg Q6H PRN   oxyCODONE 5 mg Q4H PRN   Or     oxyCODONE 10 mg Q4H PRN        Allergies: Allergies   Allergen Reactions    Doxazosin     Cardura [Doxazosin Mesylate]      Muscle cramps    Other      Iv dye for cardiac cath  Renal failure very close to dialysis  But no dialysis    Zestril [Lisinopril]      cough        ROS:   Review of Systems   Constitutional: Negative for diaphoresis, fever and unexpected weight change  HENT: Negative for congestion, rhinorrhea and sore throat  Eyes: Negative for pain, discharge and visual disturbance  Respiratory: Negative for cough, shortness of breath and wheezing  Cardiovascular: Negative for chest pain, palpitations and leg swelling  Gastrointestinal: Negative for abdominal pain, blood in stool, constipation, diarrhea, nausea and vomiting  Genitourinary: Negative for dysuria, flank pain and hematuria  Musculoskeletal: Negative for arthralgias and myalgias          Left leg pain and left foot pain  Skin: Negative for rash and wound  Allergic/Immunologic: Negative for environmental allergies and food allergies  Neurological: Negative for dizziness, seizures, weakness and numbness  Hematological: Negative for adenopathy  Psychiatric/Behavioral: Negative for confusion and hallucinations  Vitals:  Vitals:    19 1415 19 1430 19 1445 19 1500   BP: 117/70 144/64 150/68 129/59   Pulse: 86 80 78 78   Resp: 17 15 20 16   Temp:    (!) 97 4 °F (36 3 °C)   TempSrc:       SpO2: 100% 100% 100% 98%   Weight:       Height:         Temperature:   Temp (24hrs), Av 4 °F (36 3 °C), Min:97 3 °F (36 3 °C), Max:97 5 °F (36 4 °C)    Current Temperature: (!) 97 4 °F (36 3 °C)     Weights:   IBW: 66 1 kg  Body mass index is 30 38 kg/m²  Non-Invasive/Invasive Ventilation Settings:  Respiratory    Lab Data (Last 4 hours)    None         O2/Vent Data (Last 4 hours)    None                Physical Exam:  Physical Exam   Constitutional: He is oriented to person, place, and time  He appears well-developed and well-nourished  No distress  Nasal cannula in place  HENT:   Head: Normocephalic and atraumatic  Right Ear: External ear normal    Left Ear: External ear normal    Eyes: Pupils are equal, round, and reactive to light  EOM are normal    Neck: Normal range of motion  Neck supple  Cardiovascular: Normal rate and regular rhythm  Murmur heard  Systolic murmur noted  Arterial line in place  Dopplerable signal in the bypass graft and dopplerable DP and PT signals bilaterally  Pulmonary/Chest: Effort normal and breath sounds normal  No respiratory distress  He has no wheezes  He has no rales  Abdominal: Soft  Bowel sounds are normal  He exhibits no distension  There is no tenderness  There is no guarding  Musculoskeletal: Normal range of motion  He exhibits no edema  Neurological: He is alert and oriented to person, place, and time     Speech is fluent without dysarthria or aphasia  Patient moves all 4 extremities  Skin: Skin is warm and dry  There is erythema  Slight erythema of the left foot  Ulcer noted on left heel  Psychiatric: He has a normal mood and affect  His behavior is normal    Nursing note and vitals reviewed  Labs: Invalid input(s):  EOSPCT Results from last 7 days   Lab Units 03/21/19  1418 03/18/19  1114   SODIUM mmol/L 142 139   POTASSIUM mmol/L 4 9 4 7   CHLORIDE mmol/L 106 108   CO2 mmol/L 28 26   BUN mg/dL 36* 27*   CREATININE mg/dL 1 59* 1 47*   CALCIUM mg/dL 9 5 8 8   ALK PHOS U/L 92  --    ALT U/L 25  --    AST U/L 18  --      Results from last 7 days   Lab Units 03/21/19  1418   MAGNESIUM mg/dL 2 3     Results from last 7 days   Lab Units 03/21/19  1418   PHOSPHORUS mg/dL 4 1      Results from last 7 days   Lab Units 03/21/19  1418   INR  1 07         0   Lab Value Date/Time    TROPONINI 0 07 (H) 03/22/2019 1434        Imaging:     Echo 3/20    LEFT VENTRICLE:  The ventricle was mildly dilated  Systolic function was mildly reduced  Ejection fraction was estimated to be 45 %  There was severe hypokinesis of the basal-mid anteroseptal, basal-mid inferior, and basal-mid inferolateral wall(s)  Wall thickness was mildly increased  The changes were consistent with eccentric hypertrophy  Doppler parameters were consistent with abnormal left ventricular relaxation (grade 1 diastolic dysfunction)      LEFT ATRIUM:  The atrium was mildly dilated      MITRAL VALVE:  There was mild annular calcification  There was mild regurgitation      TRICUSPID VALVE:  There was mild regurgitation  Estimated peak PA pressure was 57 mmHg    The findings suggest severe pulmonary hypertension      HISTORY: PRIOR HISTORY: Hypertension, dyslipidemia, coronary artery disease, myocardial infarction, stent, ischemic cardiomyopathy, congestive heart failure, deep vein thrombosis, former smoker, COPD, emphysema, diabetes, chronic kidney  disease, acute kidney injury, sleep apnea, hypothyroidism     PROCEDURE: The study was performed in the [address] [employer] [employer]  This was a routine study  The transthoracic approach was used  The study included complete 2D imaging, M-mode, complete spectral Doppler, and color Doppler  Image  quality was adequate      LEFT VENTRICLE: The ventricle was mildly dilated  Systolic function was mildly reduced  Ejection fraction was estimated to be 45 %  There was severe hypokinesis of the basal-mid anteroseptal, basal-mid inferior, and basal-mid inferolateral  wall(s)  Wall thickness was mildly increased  The changes were consistent with eccentric hypertrophy  DOPPLER: Doppler parameters were consistent with abnormal left ventricular relaxation (grade 1 diastolic dysfunction)      RIGHT VENTRICLE: The size was normal  Systolic function was normal  Wall thickness was normal      LEFT ATRIUM: The atrium was mildly dilated      RIGHT ATRIUM: Size was normal      MITRAL VALVE: There was mild annular calcification  Valve structure was normal  There was normal leaflet separation  DOPPLER: The transmitral velocity was within the normal range  There was no evidence for stenosis  There was mild  regurgitation      AORTIC VALVE: The valve was probably trileaflet  Leaflets exhibited normal thickness and normal cuspal separation  DOPPLER: Transaortic velocity was within the normal range  There was no evidence for stenosis  There was no significant  regurgitation      TRICUSPID VALVE: The valve structure was normal  There was normal leaflet separation  DOPPLER: The transtricuspid velocity was within the normal range  There was no evidence for stenosis  There was mild regurgitation  Estimated peak PA  pressure was 57 mmHg  The findings suggest severe pulmonary hypertension      PULMONIC VALVE: Leaflets exhibited normal thickness, no calcification, and normal cuspal separation   DOPPLER: The transpulmonic velocity was within the normal range  There was no significant regurgitation      PERICARDIUM: There was no pericardial effusion  The pericardium was normal in appearance      AORTA: The root exhibited normal size      SYSTEMIC VEINS: IVC: The inferior vena cava was normal in size        EKG:  SR 72 bpm, ST and T abnormalities    Micro:  Lab Results   Component Value Date    URINECX No Growth <1000 cfu/mL 03/05/2019    URINECX No Growth <1000 cfu/mL 02/27/2019    URINECX 3639-3624 cfu/ml Gram Positive Cocci (A) 05/24/2018       Assessment: Ely Solis is a 68 y o  male with a PMH of diabetes mellitus, severe peripheral arterial occlusive disease with a history of bilateral iliac stenting and left superficial femoral artery stent placement who presents post-operatively s/p endarterectomy, femoral-popliteal bypass with completion arteriogram      Plan:      Neuro:   Pain management  - Tylenol and Gabapentin scheduled, prn Oxycodone and Dilaudid   - Acute pain service on consult     Lumbar radiculopathy and disc herniation     Diabetic neuropathy      CV:   CAD with CHF, EF 45% (3/20): ASA, Plavix, Metoprolol, follow up Echo   - Cardiology on consult  - Arterial line in place   - Repeat EKG and trend troponins     Hypertension: came from OR on Nitro drip, will try to transition off pending Cardiology recommendations     Dyslipidemia   - Continue home meds: ASA and statin     Severe PAD    Drug eluding stent in Left circumflex coronary artery     Ischemic cardiomyopathy     Chronic systolic CHF      Lung:   COPD: on Nasal canula, albuterol q6 prn,     ITZEL: CPAP at home     Restrictive lung disease      GI:   - No acute issues       FEN:   - Isolyte @ 75cc/hr until midnight, will replete on post-operative labs   - Diabetic diet      :   - Cronin in place   - Strict Is & Os    Renal artery stenosis     CKD stage III  - Nephrology on consult      ID: No active issues      Heme: Follow up labs     Endo:   Uncontrolled Diabetes    - Endocrinology on consult   - Continue home DM regimen: Lantus/Humalog at bedtime, Humalog in am     Hypothyroid: Levothyroxine     Msk/Skin:   - Left groin and left medial thigh incisions clean, dry and intact   - Dopplerable left bypass graft and bilateral DP and PT signals   PT/OT      Disposition: ICU     VTE Pharmacologic Prophylaxis: Heparin  VTE Mechanical Prophylaxis: sequential compression device     Invasive lines and devices: Invasive Devices     Peripheral Intravenous Line            Peripheral IV 03/22/19 Left Hand less than 1 day    Peripheral IV 03/22/19 Right Hand less than 1 day          Arterial Line            Arterial Line 03/22/19 Left Radial less than 1 day          Drain            Urethral Catheter Latex 16 Fr  less than 1 day                 Code Status: Prior  POA:  wife      Given critical illness, patient length of stay will require greater than two midnights  Counseling / Coordination of Care     Portions of the record may have been created with voice recognition software  Occasional wrong word or "sound a like" substitutions may have occurred due to the inherent limitations of voice recognition software  Read the chart carefully and recognize, using context, where substitutions have occurred          Shanda Stubbs MD Yes

## 2024-08-22 NOTE — TELEPHONE ENCOUNTER
Assessment per SGNA guidelines  Non labored breathing, skin dry warm and appropriate for race.Abdomen soft      Consent to hold faxed to Dr Randolph Lock today

## 2024-08-30 NOTE — ASSESSMENT & PLAN NOTE
Lab Results   Component Value Date    HGBA1C 7 6 (H) 09/17/2019   ·  Patient remains on gabapentin 300 mg twice a day
Patient is compliant with CPAP, [facility] pulmonology follows
· Patient denies symptoms of angina or palpitations  · Chronic unchanged dyspnea on exertion, history of restrictive lung disease    · Current regimen includes Plavix, aspirin, statin, beta-blocker and isosorbide  · Patient remains under care of [facility]'s Cardiology, Dr Madison Rg
· Patient has been experiencing recurrent symptoms of claudication, right lower extremity is worse than left  · Pending aortic duplex of lower extremities later this week    · [facility] vascular surgery follows  · Patient remains on Crestor 20 mg daily and aspirin  · Left he is in general doing well following femoral endarterectomy and femoral -popliteal bypass with [employer] graft 3/2019, Dr Quiros  · Right there is known underlying severe femoral-popliteal occlusive disease
· Patient has been experiencing symptoms of fatigue  · Most recent TSH performed in late October was 7 9, no further dose adjustment by endocrinology, current dose of levothyroxine is 200 mcg daily  · Patient will proceed with repeat TSH next week    · Endocrinology, Dr Jaden Young
· Patient remains under care of [facility]'s Nephrology    · Recent BMP reveals stable/mildly improved GFR and creatinine, current creatinine 1 74, prior value 1 83   · Patient remains on torsemide 20 mg daily, I advised to reduce dose of torsemide and take it 2 days on 1 day off as long as okay with Nephrology
· Worsening of low back pain and neurogenic claudication    · Referral to [facility] for re-evaluation and consideration for corticosteroid injection
Fair

## 2025-06-15 NOTE — TELEPHONE ENCOUNTER
From: Chanda Brownlee  To: Nasir Pastrana MD  Sent: 1/5/2021 12:41 PM EST  Subject: Prescription Question    Dr Mcdonald Dom your office on 27 Dece  and today, 5 Jan , and left messages requesting a phone call back regarding CPAP issue/request-no response as yet  We have had serious issues with [facility] since it has been affiliated with [facility]  I am requesting your office send a copy of my sleep study and your prescription for my CPAP to [facility], [address]  [address]  Phone [phone]; Attention "Patrick Carrera"  Thank you  parallel and then the horizontal to the nailbed.  Hemostatic at this time          DIAGNOSTIC RESULTS   LABS:     No results found for this or any previous visit (from the past 24 hours).    EKG: If performed, independent interpretation documented below in the MDM section  All EKGs independently interpreted by me.    RADIOLOGY:  Non-plain film images such as CT, Ultrasound and MRI are read by the radiologist. Plain radiographic images are visualized and preliminarily interpreted by the ED Provider with the findings documented in the MDM section.     Interpretation per the Radiologist below, if available at the time of this note:     No orders to display        PROCEDURES   Unless otherwise noted below, none  Procedures   Procedure Note - Laceration Repair:    Procedure by Emmanuel Spain, DO   2cm curved laceration to ring finger  was Extensively irrigated with saline under jet lavage, prepped with Chlorprep and draped in a sterile fashion.  The area was anesthetized via digital block of 5 mL Lidocaine 1%.    The wound was explored with the following results: No foreign bodies found.  The wound was repaired with One layer suture closure: Skin Layer:  3 sutures placed, stitch type:simple interrupted, suture: 5-0 nylon.  The wound was closed with good hemostasis and approximation.  Sterile dressing applied.  Lip laceration: Not Applicable  Intermediate (Visible debris, Heavy contamination, Extensive cleaning, Layered closure OR Undermining)  Estimated blood loss: minimal  The procedure took 1-15 minutes, and patient tolerated well.    CRITICAL CARE TIME       EMERGENCY DEPARTMENT COURSE and DIFFERENTIAL DIAGNOSIS/MDM   Vitals:    Vitals:    06/15/25 1318   BP: (!) 148/98   Pulse: 99   Resp: 16   Temp: 98.4 °F (36.9 °C)   TempSrc: Oral   SpO2: 100%   Weight: 111.1 kg (245 lb)   Height: 1.651 m (5' 5\")        Patient was given the following medications:  Medications   tetanus & diphtheria toxoids (adult) 5-2 LFU injection

## (undated) DEVICE — SUT SILK 4-0 18 IN A183H

## (undated) DEVICE — SUT SILK 3-0 18 IN A184H

## (undated) DEVICE — 4 F TEMPO AQUA 0.038  65CM VER: Brand: TEMPO AQUA

## (undated) DEVICE — 1200CC GUARDIAN II: Brand: GUARDIAN

## (undated) DEVICE — CATH BAL CHARGER 6 X 40MM X 75CM

## (undated) DEVICE — 3M™ IOBAN™ 2 ANTIMICROBIAL INCISE DRAPE 6640EZ: Brand: IOBAN™ 2

## (undated) DEVICE — GLOVE SRG BIOGEL 7.5

## (undated) DEVICE — SUT MONOCRYL 3-0 SH 27 IN Y416H

## (undated) DEVICE — STOPCOCK 3-WAY

## (undated) DEVICE — GLOVE SRG BIOGEL ORTHOPEDIC 7.5

## (undated) DEVICE — PAD GROUNDING ADULT

## (undated) DEVICE — RADIFOCUS GLIDEWIRE: Brand: GLIDEWIRE

## (undated) DEVICE — BETHLEHEM UNIVERSAL MINOR GEN: Brand: CARDINAL HEALTH

## (undated) DEVICE — INFLATION DEVICE BASIX 30ATM

## (undated) DEVICE — ADHESIVE SKN CLSR HISTOACRYL FLEX 0.5ML LF

## (undated) DEVICE — GAUZE SPONGES,16 PLY: Brand: CURITY

## (undated) DEVICE — LARGE (BLUE) FOR GRAFTS UP TO 10 MM, 20 1/2" / 52 CM (1/PKG): Brand: SCANLAN® VASCULAR TUNNELER SHEATHS AND BULLET TIPS

## (undated) DEVICE — SURGICEL FIBRILLAR 1 X 2

## (undated) DEVICE — FOGARTY EMBOLECTOMY CATHETER: Brand: FOGARTY

## (undated) DEVICE — STERILE FEM POP PACK: Brand: CARDINAL HEALTH

## (undated) DEVICE — BAG DECANTER

## (undated) DEVICE — IV CATH INTROCAN 18G X 1 1/4 SAFETY

## (undated) DEVICE — Device

## (undated) DEVICE — RADIFOCUS TORQUE DEVICE MULTI-TORQUE VISE: Brand: RADIFOCUS TORQUE DEVICE

## (undated) DEVICE — CHLORAPREP HI-LITE 26ML ORANGE

## (undated) DEVICE — GLOVE INDICATOR PI UNDERGLOVE SZ 8 BLUE

## (undated) DEVICE — TRAY FOLEY 16FR URIMETER SURESTEP

## (undated) DEVICE — PROXIMATE PLUS MD MULTI-DIRECTIONAL RELEASE SKIN STAPLERS CONTAINS 35 STAINLESS STEEL STAPLES APPROXIMATE CLOSED DIMENSIONS: 6.9MM X 3.9MM WIDE: Brand: PROXIMATE

## (undated) DEVICE — PINNACLE R/O II INTRODUCER SHEATH WITH RADIOPAQUE MARKER: Brand: PINNACLE

## (undated) DEVICE — SUT MONOCRYL 2-0 CT-1 27 IN Y339H

## (undated) DEVICE — Device: Brand: MEDEX

## (undated) DEVICE — SUT SILK 2-0 18 IN A185H

## (undated) DEVICE — DRAPE C-ARM X-RAY

## (undated) DEVICE — INTENDED FOR TISSUE SEPARATION, AND OTHER PROCEDURES THAT REQUIRE A SHARP SURGICAL BLADE TO PUNCTURE OR CUT.: Brand: BARD-PARKER ® CARBON RIB-BACK BLADES

## (undated) DEVICE — PENCIL ELECTROSURG E-Z CLEAN -0035H

## (undated) DEVICE — SUT PROLENE 6-0 BV-1/BV-1 24 IN 8805H

## (undated) DEVICE — SUT MONOCRYL 4-0 PS-2 18 IN Y496G

## (undated) DEVICE — 3000CC GUARDIAN II: Brand: GUARDIAN

## (undated) DEVICE — SURGICEL 4 X 8

## (undated) DEVICE — 40601 PROLONGED POSITIONING SYSTEM: Brand: 40601 PROLONGED POSITIONING SYSTEM

## (undated) DEVICE — SYRINGE 10ML LL

## (undated) DEVICE — HP FLOW SWITCH

## (undated) DEVICE — DRAPE SURGIKIT SADDLE BAG

## (undated) DEVICE — SUT PROLENE 4-0 RB-1/RB-1 36 IN 8557H

## (undated) DEVICE — VAC CANISTER 500ML

## (undated) DEVICE — MICRO KITS 5FR W/10CM

## (undated) DEVICE — SUT PROLENE 7-0 BV175-6 24 IN M8737

## (undated) DEVICE — INTENDED FOR TISSUE SEPARATION, AND OTHER PROCEDURES THAT REQUIRE A SHARP SURGICAL BLADE TO PUNCTURE OR CUT.: Brand: BARD-PARKER SAFETY BLADES SIZE 15, STERILE

## (undated) DEVICE — 1/2 FORCE SURGICAL SPRING CLIP: Brand: STEALTH® SPRING CLIP

## (undated) DEVICE — LIGACLIP MCA MULTIPLE CLIP APPLIERS, 20 SMALL CLIPS: Brand: LIGACLIP